# Patient Record
Sex: FEMALE | Race: WHITE | NOT HISPANIC OR LATINO | Employment: OTHER | URBAN - METROPOLITAN AREA
[De-identification: names, ages, dates, MRNs, and addresses within clinical notes are randomized per-mention and may not be internally consistent; named-entity substitution may affect disease eponyms.]

---

## 2018-09-04 ENCOUNTER — APPOINTMENT (OUTPATIENT)
Dept: LAB | Facility: CLINIC | Age: 68
End: 2018-09-04
Payer: MEDICARE

## 2018-09-04 ENCOUNTER — TELEPHONE (OUTPATIENT)
Dept: GASTROENTEROLOGY | Facility: CLINIC | Age: 68
End: 2018-09-04

## 2018-09-04 ENCOUNTER — OFFICE VISIT (OUTPATIENT)
Dept: GASTROENTEROLOGY | Facility: CLINIC | Age: 68
End: 2018-09-04
Payer: MEDICARE

## 2018-09-04 VITALS
HEART RATE: 80 BPM | HEIGHT: 65 IN | SYSTOLIC BLOOD PRESSURE: 122 MMHG | WEIGHT: 172 LBS | BODY MASS INDEX: 28.66 KG/M2 | DIASTOLIC BLOOD PRESSURE: 70 MMHG

## 2018-09-04 DIAGNOSIS — R93.89 ABNORMAL ULTRASOUND: Primary | ICD-10-CM

## 2018-09-04 DIAGNOSIS — R93.89 ABNORMAL ULTRASOUND: ICD-10-CM

## 2018-09-04 DIAGNOSIS — K62.5 RECTAL BLEEDING: ICD-10-CM

## 2018-09-04 DIAGNOSIS — K74.69 OTHER CIRRHOSIS OF LIVER (HCC): ICD-10-CM

## 2018-09-04 DIAGNOSIS — K80.20 GALLSTONES: ICD-10-CM

## 2018-09-04 DIAGNOSIS — K83.8 DILATED BILE DUCT: ICD-10-CM

## 2018-09-04 DIAGNOSIS — R18.8 OTHER ASCITES: ICD-10-CM

## 2018-09-04 LAB
FERRITIN SERPL-MCNC: 274 NG/ML (ref 8–388)
IRON SATN MFR SERPL: 9 %
IRON SERPL-MCNC: 21 UG/DL (ref 50–170)
TIBC SERPL-MCNC: 237 UG/DL (ref 250–450)

## 2018-09-04 PROCEDURE — 82784 ASSAY IGA/IGD/IGG/IGM EACH: CPT

## 2018-09-04 PROCEDURE — 86704 HEP B CORE ANTIBODY TOTAL: CPT

## 2018-09-04 PROCEDURE — 36415 COLL VENOUS BLD VENIPUNCTURE: CPT

## 2018-09-04 PROCEDURE — 83540 ASSAY OF IRON: CPT

## 2018-09-04 PROCEDURE — 99204 OFFICE O/P NEW MOD 45 MIN: CPT | Performed by: INTERNAL MEDICINE

## 2018-09-04 PROCEDURE — 82103 ALPHA-1-ANTITRYPSIN TOTAL: CPT

## 2018-09-04 PROCEDURE — 87340 HEPATITIS B SURFACE AG IA: CPT

## 2018-09-04 PROCEDURE — 86256 FLUORESCENT ANTIBODY TITER: CPT

## 2018-09-04 PROCEDURE — 86038 ANTINUCLEAR ANTIBODIES: CPT

## 2018-09-04 PROCEDURE — 83516 IMMUNOASSAY NONANTIBODY: CPT

## 2018-09-04 PROCEDURE — 83550 IRON BINDING TEST: CPT

## 2018-09-04 PROCEDURE — 87521 HEPATITIS C PROBE&RVRS TRNSC: CPT

## 2018-09-04 PROCEDURE — 86255 FLUORESCENT ANTIBODY SCREEN: CPT

## 2018-09-04 PROCEDURE — 82728 ASSAY OF FERRITIN: CPT

## 2018-09-04 NOTE — PROGRESS NOTES
[facility]    Dear Ada Amato,    I had the pleasure of seeing your patient Esteban Rosado in the office today and I thank you for this kind referral        Chief Complaint:   Abdominal swelling      HPI:  Esteban Rosado is a 75 y o  female who presents with swelling in the abdomen  The patient underwent a subsequent ultrasound which showed significant ascites as well as gallstones, gallbladder wall thickening, dilated bile duct, and nonvisualization of the ovaries  Interestingly the patient's platelet count is 959 and her LFTs are normal  There appeared to be some mild nodularity on the ultrasound of the liver  According to the patient she has no prior history of any known liver disease  She denies any hepatitis or hepatitis prodrome  No history of jaundice  Patient has no history of heavy alcohol use  She does have a family history of alcoholic cirrhosis in her mother but this was in her [de-identified]  Patient denies any travel to endemic areas  She did have an ectopic pregnancy many years ago and may have received a transfusion at that time  According to the patient she has no symptomatology referable to her liver such as confusion or alteration of the sleep-wake cycle  No melena  Recently she has seen some bright red blood per rectum  She has no prior history of colonoscopy  Patient has no history of intravenous or intranasal drug use  No accidental needle sticks  No known sexual exposure to any hepatitis a  No other discernible liver history  She has developed some peripheral edema but this is improved slightly  No history of any endoscopy  Review of Systems:   Constitutional: No fever or chills, feels well, no tiredness, no recent weight gain or weight loss  HENT: No complaints of earache, no hearing loss, no nosebleeds, no nasal discharge, no sore throat, no hoarseness      Eyes: No complaints of eye pain, no red eyes, no discharge from eyes, no itchy eyes   Cardiovascular: No complaints of slow heart rate, no fast heart rate, no chest pain, no palpitations, no leg claudication, positive swelling in the ankles  Respiratory: No complaints of shortness of breath, no wheezing, no cough, no SOB on exertion, no orthopnea  Gastrointestinal: As noted in HPI  Genitourinary: No complaints of dysuria, no incontinence, no hesitancy, no nocturia  Musculoskeletal: No complaints of arthralgia, no myalgias, no joint swelling or stiffness, no limb pain or swelling  Neurological: No complaints of headache, no confusion, no convulsions, no numbness or tingling, no dizziness or fainting, no limb weakness, no difficulty walking  Skin: No complaints of skin rash or skin lesions, no itching, no skin wound, no dry skin  Hematological/Lymphatic: No complaints of swollen glands, does not bleed easy  Allergic/Immunologic: No immunocompromised state  Endocrine:  No complaints of polyuria, no polydipsia  Psychiatric/Behavioral: is not suicidal, no sleep disturbances, no anxiety or depression, no change in personality, no emotional problems  Historical Information   Past Medical History:   Diagnosis Date    [facility]      Past Surgical History:   Procedure Laterality Date    ECTOPIC PREGNANCY SURGERY       Social History   History   Alcohol Use    Yes     Comment: ocassional     History   Drug Use No     History   Smoking Status    Former Smoker   Smokeless Tobacco    Former User     Family History   Problem Relation Age of Onset    Cirrhosis Mother          Current Medications: currently has no medications in their medication list        Vital Signs: /70   Pulse 80   Ht 5' 4 5" (1 638 m)   Wt 78 kg (172 lb)   BMI 29 07 kg/m²     Physical Exam:   Constitutional  General Appearance: No acute distress, well appearing and well nourished  Head  Normocephalic  Eyes  Conjunctivae and lids: No swelling, erythema, or discharge      Pupils and irises: Equal, round and reactive to light  Ears, Nose, Mouth, and Throat  External inspection of ears and nose: Normal  Nasal mucosa, septum and turbinates: Normal without edema or erythema/   Oropharynx: Normal with no erythema, edema, exudate or lesions  Neck  Normal range of motion  Neck supple  No HJR  Cardiovascular  Auscultation of the heart: Normal rate and rhythm, normal S1 and S2 without murmurs  Examination of the extremities for edema and/or varicosities:  1+ pedal edema bilaterally  Pulmonary/Chest  Respiratory effort: No increased work of breathing or signs of respiratory distress  Auscultation of lungs: Clear to auscultation, equal breath sounds bilaterally, no wheezes, rales, no rhonchi  Abdomen  Abdomen: Non-tender, no masses  Positive distention with shifting dullness and fluid wave  No caput medusa  Non painful  Moderately tense  Liver and spleen: No hepatomegaly or splenomegaly  Musculoskeletal  Gait and station: normal   Digits and Nails: normal without clubbing or cyanosis  Inspection/palpation of joints, bones, and muscles: Normal  Neurological  No nystagmus or asterixis  Skin  Skin and subcutaneous tissue: Normal without rashes or lesions  Lymphatic  Palpation of the lymph nodes in neck: No lymphadenopathy  Psychiatric  Orientation to person, place and time: Normal   Mood and affect: Normal          Labs:   No results found for: ALBUMIN, ALT, AST, BUN, CALCIUM, CL, CHOL, CO2, CREATININE, GFRAA, GFRNONAA, GLU, HDL, HCT, HGB, HGBA1C, LDL, MG, PHOS, PLT, K, PSA, NA, TRIG, WBC      X-Rays & Procedures:   MRI abdomen wo contrast and mrcp    (Results Pending)   IR paracentesis    (Results Pending)         ______________________________________________________________________      Assessment & Plan:      Diagnoses and all orders for this visit:    Abnormal ultrasound  -     Alpha-1-antitrypsin; Future  -     EAM Screen w/ Reflex to Titer/Pattern;  Future  - Antimitochondrial antibody; Future  -     Celiac Disease Antibody Profile; Future  -     Ferritin; Future  -     Hepatitis B core antibody, total; Future  -     Hepatitis B surface antigen; Future  -     Hepatitis C Qualitative by PCR; Future  -     Iron Saturation %; Future  -     TIBC; Future  -     MRI abdomen wo contrast and mrcp; Future  -     IR paracentesis; Future    Other cirrhosis of liver (HCC)  -     Alpha-1-antitrypsin; Future  -     EMA Screen w/ Reflex to Titer/Pattern; Future  -     Antimitochondrial antibody; Future  -     Celiac Disease Antibody Profile; Future  -     Ferritin; Future  -     Hepatitis B core antibody, total; Future  -     Hepatitis B surface antigen; Future  -     Hepatitis C Qualitative by PCR; Future  -     Iron Saturation %; Future  -     TIBC; Future  -     MRI abdomen wo contrast and mrcp; Future  -     IR paracentesis; Future    Other ascites  -     Alpha-1-antitrypsin; Future  -     EMA Screen w/ Reflex to Titer/Pattern; Future  -     Antimitochondrial antibody; Future  -     Celiac Disease Antibody Profile; Future  -     Ferritin; Future  -     Hepatitis B core antibody, total; Future  -     Hepatitis B surface antigen; Future  -     Hepatitis C Qualitative by PCR; Future  -     Iron Saturation %; Future  -     TIBC; Future  -     MRI abdomen wo contrast and mrcp; Future  -     IR paracentesis; Future    Rectal bleeding    Dilated bile duct    Gallstones    Other orders  -     Diet NPO; Sips with meds; Standing  -     Void on call to OR; Standing  -     Insert peripheral IV; Standing  -     Diet NPO; Sips with meds; Standing  -     Void on call to OR; Standing  -     Insert peripheral IV; Standing        I have taken liberty of scheduling the patient for both EGD and colonoscopy  We will also obtain an MRI with an MRCP  Abdominal paracentesis will be performed for diagnostic purposes  A full liver workup will be done    At the end of it, there is a possibility this might be Mieg's syndrome since neither of her ovaries Are visible, and her liver functions and platelet count are unremarkable  The nodularity of the liver is certainly minimal on the ultrasound  At any rate, further recommendations will depend on the study results      I would like to thank you for allowing me to participate in her care      With warmest regards,    Karl Rosenbaum MD, [facility]

## 2018-09-05 LAB
HBV CORE AB SER QL: NORMAL
HBV SURFACE AG SER QL: NORMAL
MITOCHONDRIA M2 IGG SER-ACNC: 6.1 UNITS (ref 0–20)
RYE IGE QN: NEGATIVE

## 2018-09-06 LAB
A1AT SERPL-MCNC: 261 MG/DL (ref 90–200)
ENDOMYSIUM IGA SER QL: NEGATIVE
GLIADIN PEPTIDE IGA SER-ACNC: 5 UNITS (ref 0–19)
GLIADIN PEPTIDE IGG SER-ACNC: 3 UNITS (ref 0–19)
IGA SERPL-MCNC: 271 MG/DL (ref 87–352)
TTG IGA SER-ACNC: <2 U/ML (ref 0–3)
TTG IGG SER-ACNC: <2 U/ML (ref 0–5)

## 2018-09-07 LAB — HCV RNA SERPL QL NAA+PROBE: NEGATIVE

## 2018-09-13 ENCOUNTER — TELEPHONE (OUTPATIENT)
Dept: GASTROENTEROLOGY | Facility: CLINIC | Age: 68
End: 2018-09-13

## 2018-09-13 ENCOUNTER — HOSPITAL ENCOUNTER (OUTPATIENT)
Dept: MRI IMAGING | Facility: CLINIC | Age: 68
Discharge: HOME | End: 2018-09-13
Payer: MEDICARE

## 2018-09-13 DIAGNOSIS — R93.89 ABNORMAL ULTRASOUND: ICD-10-CM

## 2018-09-13 DIAGNOSIS — R18.8 OTHER ASCITES: ICD-10-CM

## 2018-09-13 DIAGNOSIS — K74.69 OTHER CIRRHOSIS OF LIVER (HCC): ICD-10-CM

## 2018-09-13 PROCEDURE — 74181 MRI ABDOMEN W/O CONTRAST: CPT

## 2018-09-14 ENCOUNTER — TELEPHONE (OUTPATIENT)
Dept: INTERVENTIONAL RADIOLOGY/VASCULAR | Facility: HOSPITAL | Age: 68
End: 2018-09-14

## 2018-09-17 ENCOUNTER — TELEPHONE (OUTPATIENT)
Dept: NON INVASIVE DIAGNOSTICS | Facility: HOSPITAL | Age: 68
End: 2018-09-17

## 2018-09-18 ENCOUNTER — HOSPITAL ENCOUNTER (OUTPATIENT)
Dept: INTERVENTIONAL RADIOLOGY/VASCULAR | Facility: HOSPITAL | Age: 68
Discharge: HOME | End: 2018-09-18
Attending: INTERNAL MEDICINE | Admitting: RADIOLOGY
Payer: MEDICARE

## 2018-09-18 DIAGNOSIS — R93.89 ABNORMAL ULTRASOUND: ICD-10-CM

## 2018-09-18 DIAGNOSIS — R18.8 OTHER ASCITES: ICD-10-CM

## 2018-09-18 DIAGNOSIS — K74.69 OTHER CIRRHOSIS OF LIVER (HCC): ICD-10-CM

## 2018-09-18 LAB
ALBUMIN FLD-MCNC: 2.7 G/DL
APPEARANCE FLD: NORMAL
BILIRUB FLD-MCNC: 0.66 MG/DL
COLOR FLD: YELLOW
GLUCOSE FLD-MCNC: 86 MG/DL
LDH FLD L TO P-CCNC: 478 U/L
LYMPHOCYTES NFR BLD AUTO: 48 %
MONO+MESO NFR FLD MANUAL: 40 %
MONOCYTES NFR BLD AUTO: 5 %
NEUTS SEG NFR BLD AUTO: 7 %
PH BODY FLUID: 7.6
PROT FLD-MCNC: 5.2 G/DL
SITE: NORMAL
TOTAL CELLS COUNTED SPEC: 100
WBC # FLD MANUAL: 547 /UL

## 2018-09-18 PROCEDURE — 49083 ABD PARACENTESIS W/IMAGING: CPT

## 2018-09-18 PROCEDURE — 87205 SMEAR GRAM STAIN: CPT | Performed by: INTERNAL MEDICINE

## 2018-09-18 PROCEDURE — 89051 BODY FLUID CELL COUNT: CPT | Performed by: INTERNAL MEDICINE

## 2018-09-18 PROCEDURE — 83986 ASSAY PH BODY FLUID NOS: CPT | Performed by: INTERNAL MEDICINE

## 2018-09-18 PROCEDURE — 49083 ABD PARACENTESIS W/IMAGING: CPT | Performed by: RADIOLOGY

## 2018-09-18 PROCEDURE — 87070 CULTURE OTHR SPECIMN AEROBIC: CPT | Performed by: INTERNAL MEDICINE

## 2018-09-18 PROCEDURE — 83615 LACTATE (LD) (LDH) ENZYME: CPT | Performed by: INTERNAL MEDICINE

## 2018-09-18 PROCEDURE — 82247 BILIRUBIN TOTAL: CPT | Performed by: INTERNAL MEDICINE

## 2018-09-18 PROCEDURE — 82945 GLUCOSE OTHER FLUID: CPT | Performed by: INTERNAL MEDICINE

## 2018-09-18 PROCEDURE — 82042 OTHER SOURCE ALBUMIN QUAN EA: CPT | Performed by: INTERNAL MEDICINE

## 2018-09-18 PROCEDURE — 84157 ASSAY OF PROTEIN OTHER: CPT | Performed by: INTERNAL MEDICINE

## 2018-09-18 RX ORDER — LIDOCAINE HYDROCHLORIDE 10 MG/ML
INJECTION, SOLUTION INFILTRATION; PERINEURAL CODE/TRAUMA/SEDATION MEDICATION
Status: COMPLETED | OUTPATIENT
Start: 2018-09-18 | End: 2018-09-18

## 2018-09-18 RX ORDER — ALBUMIN (HUMAN) 12.5 G/50ML
50 SOLUTION INTRAVENOUS ONCE
Status: COMPLETED | OUTPATIENT
Start: 2018-09-18 | End: 2018-09-18

## 2018-09-18 RX ADMIN — LIDOCAINE HYDROCHLORIDE 10 ML: 10 INJECTION, SOLUTION INFILTRATION; PERINEURAL at 11:42

## 2018-09-18 RX ADMIN — ALBUMIN HUMAN 50 G: 0.25 SOLUTION INTRAVENOUS at 12:20

## 2018-09-18 NOTE — DISCHARGE INSTRUCTIONS
Abdominal Paracentesis     WHAT YOU NEED TO KNOW:   Abdominal paracentesis is a procedure to remove abnormal fluid buildup in your abdomen  Fluid builds up because of liver problems, such as swelling and scarring  Heart failure, kidney disease, a mass, or problems with your pancreas may also cause fluid buildup  DISCHARGE INSTRUCTIONS:     Follow up with your healthcare provider as directed: Write down your questions so you remember to ask them during your visits  Wound care: Remove dressing after 24 hours  Leave glue in place  Return to your normal activities    Contact Interventional Radiology at [phone] [place] PATIENTS: Contact Interventional Radiology at [phone]) Flory Adams PATIENTS: Contact Interventional Radiology at [phone]) if:  · You have a fever and your wound is red and swollen  · You have yellow, green, or bad-smelling discharge coming from your wound  · You have pain or swelling in your abdomen  · You have an upset stomach or you vomit  · You have sudden, sharp pain in your abdomen  · You urinate very little or not at all  · You feel confused and more tired than usual    · Your arm or leg feels warm, tender, and painful  It may look swollen and red  · You suddenly feel lightheaded and have trouble breathing

## 2018-09-21 LAB
BACTERIA SPEC BFLD CULT: NO GROWTH
GRAM STN SPEC: NORMAL
GRAM STN SPEC: NORMAL

## 2018-09-24 ENCOUNTER — ANESTHESIA EVENT (OUTPATIENT)
Dept: PERIOP | Facility: HOSPITAL | Age: 68
End: 2018-09-24
Payer: MEDICARE

## 2018-09-25 ENCOUNTER — ANESTHESIA (OUTPATIENT)
Dept: PERIOP | Facility: HOSPITAL | Age: 68
End: 2018-09-25
Payer: MEDICARE

## 2018-09-25 ENCOUNTER — TELEPHONE (OUTPATIENT)
Dept: GASTROENTEROLOGY | Facility: CLINIC | Age: 68
End: 2018-09-25

## 2018-09-25 ENCOUNTER — HOSPITAL ENCOUNTER (OUTPATIENT)
Facility: HOSPITAL | Age: 68
Setting detail: OUTPATIENT SURGERY
Discharge: HOME | End: 2018-09-25
Attending: INTERNAL MEDICINE | Admitting: INTERNAL MEDICINE
Payer: MEDICARE

## 2018-09-25 VITALS
HEIGHT: 64 IN | TEMPERATURE: 97.7 F | WEIGHT: 152.6 LBS | RESPIRATION RATE: 18 BRPM | BODY MASS INDEX: 26.05 KG/M2 | DIASTOLIC BLOOD PRESSURE: 66 MMHG | SYSTOLIC BLOOD PRESSURE: 105 MMHG | HEART RATE: 83 BPM | OXYGEN SATURATION: 96 %

## 2018-09-25 DIAGNOSIS — K22.10 EROSIVE ESOPHAGITIS: Primary | ICD-10-CM

## 2018-09-25 DIAGNOSIS — K62.5 RECTAL BLEEDING: ICD-10-CM

## 2018-09-25 LAB — GLUCOSE SERPL-MCNC: 95 MG/DL (ref 65–140)

## 2018-09-25 PROCEDURE — 88305 TISSUE EXAM BY PATHOLOGIST: CPT | Performed by: PATHOLOGY

## 2018-09-25 PROCEDURE — 43235 EGD DIAGNOSTIC BRUSH WASH: CPT | Performed by: INTERNAL MEDICINE

## 2018-09-25 PROCEDURE — 82948 REAGENT STRIP/BLOOD GLUCOSE: CPT

## 2018-09-25 PROCEDURE — 45380 COLONOSCOPY AND BIOPSY: CPT | Performed by: INTERNAL MEDICINE

## 2018-09-25 RX ORDER — PROPOFOL 10 MG/ML
INJECTION, EMULSION INTRAVENOUS AS NEEDED
Status: DISCONTINUED | OUTPATIENT
Start: 2018-09-25 | End: 2018-09-25 | Stop reason: SURG

## 2018-09-25 RX ORDER — PANTOPRAZOLE SODIUM 40 MG/1
40 TABLET, DELAYED RELEASE ORAL DAILY
Qty: 30 TABLET | Refills: 5 | Status: SHIPPED | OUTPATIENT
Start: 2018-09-25 | End: 2018-11-14

## 2018-09-25 RX ORDER — SODIUM CHLORIDE, SODIUM LACTATE, POTASSIUM CHLORIDE, CALCIUM CHLORIDE 600; 310; 30; 20 MG/100ML; MG/100ML; MG/100ML; MG/100ML
125 INJECTION, SOLUTION INTRAVENOUS CONTINUOUS
Status: DISCONTINUED | OUTPATIENT
Start: 2018-09-25 | End: 2018-09-25 | Stop reason: HOSPADM

## 2018-09-25 RX ORDER — LIDOCAINE HYDROCHLORIDE 10 MG/ML
INJECTION, SOLUTION INFILTRATION; PERINEURAL AS NEEDED
Status: DISCONTINUED | OUTPATIENT
Start: 2018-09-25 | End: 2018-09-25 | Stop reason: SURG

## 2018-09-25 RX ADMIN — PROPOFOL 100 MG: 10 INJECTION, EMULSION INTRAVENOUS at 10:26

## 2018-09-25 RX ADMIN — PROPOFOL 50 MG: 10 INJECTION, EMULSION INTRAVENOUS at 10:28

## 2018-09-25 RX ADMIN — PROPOFOL 50 MG: 10 INJECTION, EMULSION INTRAVENOUS at 10:37

## 2018-09-25 RX ADMIN — PROPOFOL 50 MG: 10 INJECTION, EMULSION INTRAVENOUS at 10:33

## 2018-09-25 RX ADMIN — PROPOFOL 50 MG: 10 INJECTION, EMULSION INTRAVENOUS at 10:42

## 2018-09-25 RX ADMIN — SODIUM CHLORIDE, SODIUM LACTATE, POTASSIUM CHLORIDE, AND CALCIUM CHLORIDE 125 ML/HR: .6; .31; .03; .02 INJECTION, SOLUTION INTRAVENOUS at 10:18

## 2018-09-25 RX ADMIN — LIDOCAINE HYDROCHLORIDE 50 MG: 10 INJECTION, SOLUTION INFILTRATION; PERINEURAL at 10:26

## 2018-09-25 NOTE — ANESTHESIA POSTPROCEDURE EVALUATION
Post-Op Assessment Note      CV Status:  Stable    Mental Status:  Alert and awake    Hydration Status:  Euvolemic    PONV Controlled:  Controlled    Airway Patency:  Patent    Post Op Vitals Reviewed: Yes          Staff: CRNA           /56 (09/25/18 1054)    Temp 97 7 °F (36 5 °C) (09/25/18 1054)    Pulse 86 (09/25/18 1054)   Resp 14 (09/25/18 1054)    SpO2 96 % (09/25/18 1054)

## 2018-09-25 NOTE — ANESTHESIA PREPROCEDURE EVALUATION
Review of Systems/Medical History          Cardiovascular   Pulmonary  Asthma ,        GI/Hepatic    Liver disease , Chronic liver disease,             Endo/Other  Diabetes ,      GYN       Hematology   Musculoskeletal       Neurology   Psychology           Physical Exam    Airway    Mallampati score: II         Dental   No notable dental hx     Cardiovascular      Pulmonary      Other Findings        Anesthesia Plan  ASA Score- 3     Anesthesia Type- IV sedation with anesthesia with ASA Monitors  Additional Monitors:   Airway Plan:     Comment: I, Dr Judit Ornelas, the attending physician, have personally seen and evaluated the patient prior to anesthetic care  I have reviewed the pre-anesthetic record, and other medical records if appropriate to the anesthetic care  If a CRNA is involved in the case, I have reviewed the CRNA assessment, if present, and agree  The patient is in a suitable condition to proceed with my formulated anesthetic plan        Plan Factors-    Induction- intravenous  Postoperative Plan-     Informed Consent- Anesthetic plan and risks discussed with patient  I personally reviewed this patient with the CRNA  Discussed and agreed on the Anesthesia Plan with the CRNA  Sarah Douglas

## 2018-09-25 NOTE — OP NOTE
Postoperative Note  PATIENT NAME: Baldemar Florez  : 1950  MRN: [id number]  [facility] RO[facility] 01    Surgery Date: 2018    POST-OP DIAGNOSIS: See the impression below    SEDATION: Monitored anesthesia care, check anesthesia records    PHYSICAL EXAM:  Vitals:    18 1003   BP: 123/58   Pulse: 87   Resp: 17   Temp: 98 °F (36 7 °C)   SpO2: 99%     Body mass index is 26 19 kg/m²  General: NAD  Heart: S1 & S2 normal, RRR  Lungs: CTA, No rales or rhonchi  Abdomen: Soft, nontender, nondistended, good bowel sounds    CONSENT:  Informed consent was obtained for the procedure, including sedation after explaining the risks and benefits of the procedure  Risks including but not limited to bleeding, perforation, infection, aspiration were discussed in detail  Also explained about less than 100%$ sensitivity with the exam and other alternatives  DESCRIPTION:   Procedure:  Fiberoptic colonoscopy with biopsy    Indications:  Rectal bleeding of undetermined etiology    ASA 2    Premedicated with mac  Anesthesia    Patient is identified by me  She is examined prior to the procedure found to be in stable condition  She is attached to cardiac monitor and pulse oximeter and placed in the left lateral position  After adequate intravenous sedation the Olympus video colonoscope was inserted and passed easily the cecum  The ileocecal valve and the appendiceal orifice are identified and the scope was slowly withdrawn with good circumferential visualization of the mucosa  The preparation is adequate  The right transverse and descending colons were unremarkable  In the sigmoid colon from approximately 18-25 cm there appears to be a very large infiltrative probably extrinsic compression of the colonic mucosa  No a exert fitted lesions are noted anteriorly  Multiple biopsies taken with excellent hemostasis  Some small diverticuli noted above this  None of these are inflamed or have any impacted fecaliths or bleeding  Retroversion is normal  No other inflammatory neoplastic or vascular lesions are noted  My impression: 1  Lengthy segment, possibly extrinsic or infiltrative, in the sigmoid colon, status post biopsy  2  Minor left colon diverticulosis    RECOMMENDATIONS:  Patient is already scheduled for a follow-up with me in 1 week  Repeat colonoscopy in 5 years    COMPLICATIONS:  None; patient tolerated the procedure well  DISPOSITION: PACU  CONDITION: Stable    True Craig MD  9/25/2018,10:48 AM        Portions of the record may have been created with voice recognition software   Occasional wrong word or "sound a like" substitutions may have occurred due to the inherent limitations of voice recognition software   Read the chart carefully and recognize, using context, where substitutions have occurred

## 2018-09-25 NOTE — OP NOTE
Postoperative Note  PATIENT NAME: Eladia Brush  : 1950  MRN: [id number]  [facility]    Surgery Date: 2018    POST-OP DIAGNOSIS: See the impression below    SEDATION: Monitored anesthesia care, check anesthesia records    PHYSICAL EXAM:  Vitals:    18 1003   BP: 123/58   Pulse: 87   Resp: 17   Temp: 98 °F (36 7 °C)   SpO2: 99%     Body mass index is 26 19 kg/m²  General: NAD  Heart: S1 & S2 normal, RRR  Lungs: CTA, No rales or rhonchi  Abdomen: Soft, nontender, nondistended, good bowel sounds    CONSENT:  Informed consent was obtained for the procedure, including sedation after explaining the risks and benefits of the procedure  Risks including but not limited to bleeding, perforation, infection, aspiration were discussed in detail  Also explained about less than 100%$ sensitivity with the exam and other alternatives  DESCRIPTION:   Procedure, esophagogastroduodenoscopy    Indications:  25-year-old female with cirrhosis and ascites  This is being done to rule out varices    ASA 2    Premedicated with mac anesthesia    Patient is identified by me  She is examined prior to the procedure and found to be in stable condition  She is attached to cardiac monitor and pulse oximeter and placed in left lateral position  After adequate intravenous sedation the Olympus video gastroscope was inserted under direct vision into the esophagus  No evidence of varices are seen in the proximal mid or distal esophagus  At the GE junction however there is diffuse erythema and minor erosion consistent with an LA class B esophagitis  No ulceration is seen  No varices noted  The Z-line is seen at 32 cm from the incisors  Beyond this there is a 3 cm hiatal hernia with normal gastric mucosa  The stomach is entered  Body and antrum normal  No evidence of portal hypertensive gastropathy is seen  Pylorus is normal   Duodenum was cannulated into the 3rd portion and is normal  No duodenal varices seen  Retroversion reveals an unremarkable GE junction and fundus with no gastric varices seen  She tolerated the procedure well and was stable throughout  My impression:  1  Erosive esophagitis  2  Hiatal hernia    RECOMMENDATIONS:  Begin Protonix 40 mg p o  q a m  COMPLICATIONS:  None; patient tolerated the procedure well  DISPOSITION: PACU  CONDITION: Stable    Danielle White MD  9/25/2018,10:33 AM        Portions of the record may have been created with voice recognition software   Occasional wrong word or "sound a like" substitutions may have occurred due to the inherent limitations of voice recognition software   Read the chart carefully and recognize, using context, where substitutions have occurred

## 2018-09-25 NOTE — DISCHARGE INSTR - AVS FIRST PAGE
Postoperative Note  PATIENT NAME: Baldemar Florez  : 1950  MRN: [id number]  [facility]    Surgery Date: 2018    POST-OP DIAGNOSIS: See the impression below    SEDATION: Monitored anesthesia care, check anesthesia records    PHYSICAL EXAM:  Vitals:    18 1003   BP: 123/58   Pulse: 87   Resp: 17   Temp: 98 °F (36 7 °C)   SpO2: 99%     Body mass index is 26 19 kg/m²  General: NAD  Heart: S1 & S2 normal, RRR  Lungs: CTA, No rales or rhonchi  Abdomen: Soft, nontender, nondistended, good bowel sounds    CONSENT:  Informed consent was obtained for the procedure, including sedation after explaining the risks and benefits of the procedure  Risks including but not limited to bleeding, perforation, infection, aspiration were discussed in detail  Also explained about less than 100%$ sensitivity with the exam and other alternatives  DESCRIPTION:   Procedure, esophagogastroduodenoscopy    Indications:  29-year-old female with cirrhosis and ascites  This is being done to rule out varices    ASA 2    Premedicated with mac anesthesia    Patient is identified by me  She is examined prior to the procedure and found to be in stable condition  She is attached to cardiac monitor and pulse oximeter and placed in left lateral position  After adequate intravenous sedation the Olympus video gastroscope was inserted under direct vision into the esophagus  No evidence of varices are seen in the proximal mid or distal esophagus  At the GE junction however there is diffuse erythema and minor erosion consistent with an LA class B esophagitis  No ulceration is seen  No varices noted  The Z-line is seen at 32 cm from the incisors  Beyond this there is a 3 cm hiatal hernia with normal gastric mucosa  The stomach is entered  Body and antrum normal  No evidence of portal hypertensive gastropathy is seen  Pylorus is normal   Duodenum was cannulated into the 3rd portion and is normal  No duodenal varices seen  Retroversion reveals an unremarkable GE junction and fundus with no gastric varices seen  She tolerated the procedure well and was stable throughout  My impression:  1  Erosive esophagitis  2  Hiatal hernia    RECOMMENDATIONS:  Begin Protonix 40 mg p o  q a m  COMPLICATIONS:  None; patient tolerated the procedure well  DISPOSITION: PACU  CONDITION: Stable    Mamie Mckee MD  2018,10:33 AM        Portions of the record may have been created with voice recognition software   Occasional wrong word or "sound a like" substitutions may have occurred due to the inherent limitations of voice recognition software   Read the chart carefully and recognize, using context, where substitutions have occurred  Postoperative Note  PATIENT NAME: Swapnil Garcia  : 1950  MRN: [id number]  [facility]    Surgery Date: 2018    POST-OP DIAGNOSIS: See the impression below    SEDATION: Monitored anesthesia care, check anesthesia records    PHYSICAL EXAM:  Vitals:    18 1003   BP: 123/58   Pulse: 87   Resp: 17   Temp: 98 °F (36 7 °C)   SpO2: 99%     Body mass index is 26 19 kg/m²  General: NAD  Heart: S1 & S2 normal, RRR  Lungs: CTA, No rales or rhonchi  Abdomen: Soft, nontender, nondistended, good bowel sounds    CONSENT:  Informed consent was obtained for the procedure, including sedation after explaining the risks and benefits of the procedure  Risks including but not limited to bleeding, perforation, infection, aspiration were discussed in detail  Also explained about less than 100%$ sensitivity with the exam and other alternatives  DESCRIPTION:   Procedure, esophagogastroduodenoscopy    Indications:  50-year-old female with cirrhosis and ascites  This is being done to rule out varices    ASA 2    Premedicated with mac anesthesia    Patient is identified by me  She is examined prior to the procedure and found to be in stable condition    She is attached to cardiac monitor and pulse oximeter and placed in left lateral position  After adequate intravenous sedation the Olympus video gastroscope was inserted under direct vision into the esophagus  No evidence of varices are seen in the proximal mid or distal esophagus  At the GE junction however there is diffuse erythema and minor erosion consistent with an LA class B esophagitis  No ulceration is seen  No varices noted  The Z-line is seen at 32 cm from the incisors  Beyond this there is a 3 cm hiatal hernia with normal gastric mucosa  The stomach is entered  Body and antrum normal  No evidence of portal hypertensive gastropathy is seen  Pylorus is normal   Duodenum was cannulated into the 3rd portion and is normal  No duodenal varices seen  Retroversion reveals an unremarkable GE junction and fundus with no gastric varices seen  She tolerated the procedure well and was stable throughout  My impression:  1  Erosive esophagitis  2  Hiatal hernia    RECOMMENDATIONS:  Begin Protonix 40 mg p o  q a m  COMPLICATIONS:  None; patient tolerated the procedure well  DISPOSITION: PACU  CONDITION: Stable    Priya Mendiola MD  2018,10:33 AM        Portions of the record may have been created with voice recognition software   Occasional wrong word or "sound a like" substitutions may have occurred due to the inherent limitations of voice recognition software   Read the chart carefully and recognize, using context, where substitutions have occurred  Postoperative Note  PATIENT NAME: Elicia Kamara  : 1950  MRN: [id number]  [facility]    Surgery Date: 2018    POST-OP DIAGNOSIS: See the impression below    SEDATION: Monitored anesthesia care, check anesthesia records    PHYSICAL EXAM:  Vitals:    18 1003   BP: 123/58   Pulse: 87   Resp: 17   Temp: 98 °F (36 7 °C)   SpO2: 99%     Body mass index is 26 19 kg/m²      General: NAD  Heart: S1 & S2 normal, RRR  Lungs: CTA, No rales or rhonchi  Abdomen: Soft, nontender, nondistended, good bowel sounds    CONSENT:  Informed consent was obtained for the procedure, including sedation after explaining the risks and benefits of the procedure  Risks including but not limited to bleeding, perforation, infection, aspiration were discussed in detail  Also explained about less than 100%$ sensitivity with the exam and other alternatives  DESCRIPTION:   Procedure:  Fiberoptic colonoscopy with biopsy    Indications:  Rectal bleeding of undetermined etiology    ASA 2    Premedicated with mac  Anesthesia    Patient is identified by me  She is examined prior to the procedure found to be in stable condition  She is attached to cardiac monitor and pulse oximeter and placed in the left lateral position  After adequate intravenous sedation the Olympus video colonoscope was inserted and passed easily the cecum  The ileocecal valve and the appendiceal orifice are identified and the scope was slowly withdrawn with good circumferential visualization of the mucosa  The preparation is adequate  The right transverse and descending colons were unremarkable  In the sigmoid colon from approximately 18-25 cm there appears to be a very large infiltrative probably extrinsic compression of the colonic mucosa  No a exert fitted lesions are noted anteriorly  Multiple biopsies taken with excellent hemostasis  Some small diverticuli noted above this  None of these are inflamed or have any impacted fecaliths or bleeding  Retroversion is normal  No other inflammatory neoplastic or vascular lesions are noted  My impression: 1  Lengthy segment, possibly extrinsic or infiltrative, in the sigmoid colon, status post biopsy  2  Minor left colon diverticulosis    RECOMMENDATIONS:  Patient is already scheduled for a follow-up with me in 1 week  Repeat colonoscopy in 5 years    COMPLICATIONS:  None; patient tolerated the procedure well    DISPOSITION: PACU  CONDITION: Stable    True Craig MD  9/25/2018,10:48 AM        Portions of the record may have been created with voice recognition software   Occasional wrong word or "sound a like" substitutions may have occurred due to the inherent limitations of voice recognition software   Read the chart carefully and recognize, using context, where substitutions have occurred

## 2018-10-05 ENCOUNTER — TELEPHONE (OUTPATIENT)
Dept: GASTROENTEROLOGY | Facility: CLINIC | Age: 68
End: 2018-10-05

## 2018-10-05 DIAGNOSIS — R93.89 ABNORMAL MRI: Primary | ICD-10-CM

## 2018-10-05 NOTE — TELEPHONE ENCOUNTER
Dr Brenda Rodriges please put through a referral to GYN for this ptn   You said Dr Alan Box with [facility]

## 2018-10-06 ENCOUNTER — TELEPHONE (OUTPATIENT)
Dept: GASTROENTEROLOGY | Facility: CLINIC | Age: 68
End: 2018-10-06

## 2018-10-10 ENCOUNTER — TELEPHONE (OUTPATIENT)
Dept: GASTROENTEROLOGY | Facility: CLINIC | Age: 68
End: 2018-10-10

## 2018-10-10 NOTE — TELEPHONE ENCOUNTER
Call the patient yet again and got the answering machine  Left her another message to call me up  Did the same on Sunday with no response yet

## 2018-10-11 ENCOUNTER — TELEPHONE (OUTPATIENT)
Dept: GASTROENTEROLOGY | Facility: CLINIC | Age: 68
End: 2018-10-11

## 2018-10-11 ENCOUNTER — OFFICE VISIT (OUTPATIENT)
Dept: GASTROENTEROLOGY | Facility: CLINIC | Age: 68
End: 2018-10-11
Payer: MEDICARE

## 2018-10-11 VITALS
DIASTOLIC BLOOD PRESSURE: 60 MMHG | WEIGHT: 163 LBS | HEIGHT: 64 IN | BODY MASS INDEX: 27.83 KG/M2 | SYSTOLIC BLOOD PRESSURE: 118 MMHG | HEART RATE: 76 BPM

## 2018-10-11 DIAGNOSIS — N94.89 ADNEXAL MASS: Primary | ICD-10-CM

## 2018-10-11 DIAGNOSIS — R18.8 OTHER ASCITES: ICD-10-CM

## 2018-10-11 DIAGNOSIS — K76.9 LESION OF LIVER: ICD-10-CM

## 2018-10-11 PROCEDURE — 99213 OFFICE O/P EST LOW 20 MIN: CPT | Performed by: NURSE PRACTITIONER

## 2018-10-11 NOTE — TELEPHONE ENCOUNTER
Please see if you can get the reports  Did they draw blood  give medicine do any diagnostic imaging

## 2018-10-11 NOTE — PROGRESS NOTES
Assessment/Plan:    Adnexal mass- patient given      Diagnoses and all orders for this visit:    Adnexal mass  -     Ambulatory referral to Gynecologic Oncology; Future    Other ascites  -     IR paracentesis; Future    Lesion of liver    @ASSESSMENTEN  D@     Subjective:      Patient ID: Tate Giron is a 75 y o  female  Results of MRI and colonoscopy discussed with patient by Dr lSick Stein  MRI reviewed with patient and referral given to oncological  Gynecologist    Her last appointment with gynecology was about 18 years ago  Patient with ascites and will need repeat paracentesis  She also has pedal edema that resolved with her last paracentesis and feels that her fever swelling again  She has small amount of bright red blood per rectum on her toilet tissue with bowel movements  Colonoscopy did not show any rectal lesions that would contribute to this and so we discussed the possibility that it is internal hemorrhoids  The following portions of the patient's history were reviewed and updated as appropriate: She  has a past medical history of Asthma and Diabetes mellitus ([facility] [address] )  She   Patient Active Problem List    Diagnosis Date Noted    Adnexal mass 10/11/2018    Other ascites 10/11/2018    Lesion of liver 10/11/2018    Rectal bleeding 09/04/2018     She  has a past surgical history that includes Ectopic pregnancy surgery; IR paracentesis (9/18/2018); Tonsillectomy; and pr colonoscopy flx dx w/collj spec when pfrmd (N/A, 9/25/2018)  Her family history includes Cirrhosis in her mother  She  reports that she has quit smoking  She has quit using smokeless tobacco  She reports that she drinks alcohol  She reports that she does not use drugs  Current Outpatient Prescriptions   Medication Sig Dispense Refill    pantoprazole (PROTONIX) 40 mg tablet Take 1 tablet (40 mg total) by mouth daily 30 tablet 5     No current facility-administered medications for this visit        Current Outpatient Prescriptions on File Prior to Visit   Medication Sig    pantoprazole (PROTONIX) 40 mg tablet Take 1 tablet (40 mg total) by mouth daily     No current facility-administered medications on file prior to visit  She has No Known Allergies       Review of Systems   HENT: Negative  Respiratory: Negative  Cardiovascular: Negative  Gastrointestinal: Positive for abdominal distention and blood in stool  Objective:      /60   Pulse 76   Ht 5' 4" (1 626 m)   Wt 73 9 kg (163 lb)   BMI 27 98 kg/m²          Physical Exam   Cardiovascular: Normal rate and regular rhythm  Pulmonary/Chest: Effort normal and breath sounds normal    Abdominal: She exhibits distension  There is tenderness  Musculoskeletal: She exhibits edema (pedal) and tenderness  Skin: Skin is warm and dry  Psychiatric: She has a normal mood and affect

## 2018-10-12 ENCOUNTER — TELEPHONE (OUTPATIENT)
Dept: GASTROENTEROLOGY | Facility: CLINIC | Age: 68
End: 2018-10-12

## 2018-10-17 ENCOUNTER — OFFICE VISIT (OUTPATIENT)
Dept: GYNECOLOGIC ONCOLOGY | Facility: CLINIC | Age: 68
End: 2018-10-17
Payer: MEDICARE

## 2018-10-17 VITALS
SYSTOLIC BLOOD PRESSURE: 120 MMHG | TEMPERATURE: 97.7 F | RESPIRATION RATE: 18 BRPM | DIASTOLIC BLOOD PRESSURE: 68 MMHG | HEIGHT: 64 IN | WEIGHT: 168 LBS | BODY MASS INDEX: 28.68 KG/M2 | HEART RATE: 84 BPM

## 2018-10-17 DIAGNOSIS — R19.09 OTHER INTRA-ABDOMINAL AND PELVIC SWELLING, MASS AND LUMP: ICD-10-CM

## 2018-10-17 DIAGNOSIS — K76.9 LESION OF LIVER: ICD-10-CM

## 2018-10-17 DIAGNOSIS — R60.0 EDEMA LEG: ICD-10-CM

## 2018-10-17 DIAGNOSIS — N94.89 ADNEXAL MASS: ICD-10-CM

## 2018-10-17 DIAGNOSIS — K77 LIVER DISORDERS IN DISEASES CLASSIFIED ELSEWHERE: ICD-10-CM

## 2018-10-17 DIAGNOSIS — Z12.4 CERVICAL CANCER SCREENING: ICD-10-CM

## 2018-10-17 DIAGNOSIS — R18.8 OTHER ASCITES: Primary | ICD-10-CM

## 2018-10-17 PROCEDURE — 99205 OFFICE O/P NEW HI 60 MIN: CPT | Performed by: OBSTETRICS & GYNECOLOGY

## 2018-10-17 NOTE — ASSESSMENT & PLAN NOTE
There is a palpable firm fixed adnexal mass on exam   This in association with the ascites and the liver lesion are most likely indicative of a stage IV ovarian cancer  Cytology and biopsy would need to be performed to confirm this  I have recommended the patient undergo testing with  by blood work  A full CT scan of the chest abdomen and pelvis will be performed to help further help document the extent of disease  The exam today is rather limited due to the tense abdomen from the ascites  If the CT scan reveals extensive abdominal disease especially with liver involvement a biopsy will be performed to confirm the site of origin  We have than stated to the patient that she will likely initiate her treatment with carboplatin and Taxol intravenous therapy

## 2018-10-17 NOTE — PROGRESS NOTES
Assessment/Plan:    Problem List Items Addressed This Visit     Adnexal mass     There is a palpable firm fixed adnexal mass on exam   This in association with the ascites and the liver lesion are most likely indicative of a stage IV ovarian cancer  Cytology and biopsy would need to be performed to confirm this  I have recommended the patient undergo testing with  by blood work  A full CT scan of the chest abdomen and pelvis will be performed to help further help document the extent of disease  The exam today is rather limited due to the tense abdomen from the ascites  If the CT scan reveals extensive abdominal disease especially with liver involvement a biopsy will be performed to confirm the site of origin  We have than stated to the patient that she will likely initiate her treatment with carboplatin and Taxol intravenous therapy  Relevant Orders    CT chest abdomen pelvis w contrast        BUN    Creatinine, serum    CBC and differential    APTT    Protime-INR    Other ascites - Primary     The patient is due to have ascites removed tomorrow  We will ask to have a cytologic exam performed on this  In this way we will attempt to document a likely cancer  Relevant Orders    CT chest abdomen pelvis w contrast        BUN    Creatinine, serum    Non-gynecologic cytology    Lesion of liver     Two 2 cm lesions within the dome of the liver most consistent with metastatic disease  This would likely make this a stage IV ovarian cancer if the ovary is the primary  We may need to perform a needle-guided biopsy on these areas  I will order PT PTT and platelets  If this does not appear to be a stage IV ovarian cancer then chemotherapy would be the initial treatment with carboplatin and Taxol  Edema leg     Bilateral lower extremity edema is noted although the left is slightly more pronounced and uncomfortable    Given that this is a likely malignancy I feel that we should rule out a deep venous thrombosis  Relevant Orders    VAS lower limb venous duplex study, complete bilateral      Other Visit Diagnoses     Other intra-abdominal and pelvic swelling, mass and lump         Relevant Orders        Liver disorders in diseases classified elsewhere         Relevant Orders    APTT    Cervical cancer screening        Relevant Orders    Conventional pap smear, diagnostic              CHIEF COMPLAINT:       Subjective:     Problem:  Cancer Staging  No matching staging information was found for the patient  Previous therapy:   No history exists  Patient ID: Karla Juarez is a 75 y o  female  Patient is a very pleasant 24-year-old white female seen in consultation from Dr Torsten Gray and Kyle Montez regarding evaluation and management of recurrent ascites and possible pelvic mass  The patient was in her usual state of health when she began to notice bloating of the abdomen in June of 2018  At that same time she developed significant diarrhea and vomiting but this resolved spontaneously  Later in August and in early September the patient noted significant worsening of this  She contacted her primary care physician Dr Torsten Gray who advised its testing  The patient underwent an EKG which he reports is normal ultrasound which showed significant ascites  The patient was then referred to Gastroenterology  She saw them in September 4th and had multiple testing including an ultrasound-guided paracentesis for 9 L of fluid  I do not see that ascites fluid was run for cytology anywhere  And she has also had  multiple blood work  A colonoscopy and endoscopy which were essentially unremarkable and an MRI of the abdomen  The MRI revealed:    1  Two adjacent lesions measuring 2 9 cm and 2 8 cm at the hepatic dome, likely of hepatocellular origin  Given the mild surface nodularity and possible liver cirrhosis, these may represent hepatocellular carcinomas    Further evaluation with dedicated   liver protocol MRI is recommended      2   Partially imaged lobulated and heterogeneous pelvic mass that appears to involve both adnexa  Further evaluation with dedicated pelvic MRI or pelvic ultrasound is recommended      3  Moderate to large volume of abdominopelvic ascites      4  Cholelithiasis      5  No biliary ductal dilation  The patient continues to have worsening symptomatology including significant abdominal bloating  She is due for another ultrasound-guided paracentesis tomorrow  She has had some bright red bleeding per rectum but this was told to her that this is likely hemorrhoidal   She does have significant difficulties with urgency and bowel function  The patient feels that she may actually have accidents but has not done yet  She is afraid to eat due to bloating and discomfort  The patient is caring primarily for her 3 grandchildren ages 6 7 and 15  Additionally she runs a   She also cares for her  who has had a significant stroke in approximately April of 2018  He is improving  The patient has no significant history of liver disease alcohol abuse or acetaminophen abuse  She has no family history of ovarian or breast cancer  Her mother has had colon cancer but was treated and is a survivor  The patient now presents for evaluation and management of recurrent abdominal ascites  Review of Systems   Constitutional: Negative for appetite change  Gastrointestinal: Positive for abdominal distention  Musculoskeletal: Positive for joint swelling  Current Outpatient Prescriptions   Medication Sig Dispense Refill    pantoprazole (PROTONIX) 40 mg tablet Take 1 tablet (40 mg total) by mouth daily 30 tablet 5     No current facility-administered medications for this visit          No Known Allergies    Past Medical History:   Diagnosis Date    Abdominal fluid collection     Asthma     Diabetes mellitus ([facility] [address] )        Past Surgical History:   Procedure Laterality Date    ECTOPIC PREGNANCY SURGERY      ECTOPIC PREGNANCY SURGERY      IR PARACENTESIS  2018    UT COLONOSCOPY FLX DX W/COLLJ SPEC WHEN PFRMD N/A 2018    Procedure: EGD AND COLONOSCOPY;  Surgeon: Wilman Rivera MD;  Location: [facility]; Service: Gastroenterology    TONSILECTOMY AND ADNOIDECTOMY      TONSILLECTOMY         OB History      Para Term  AB Living    3 2 2   1 2    SAB TAB Ectopic Multiple Live Births        1   2        Obstetric Comments    Menarche: 8th grade (around age 14)            Family History   Problem Relation Age of Onset    Cirrhosis Mother     Colon cancer Mother     Leukemia Cousin     Diabetes Father        The following portions of the patient's history were reviewed and updated as appropriate: allergies, current medications, past family history, past medical history, past social history, past surgical history and problem list       Objective:    Blood pressure 120/68, pulse 84, temperature 97 7 °F (36 5 °C), resp  rate 18, height 5' 3 5" (1 613 m), weight 76 2 kg (168 lb)  Body mass index is 29 29 kg/m²  Physical Exam   Constitutional: She is oriented to person, place, and time  She appears well-developed and well-nourished  HENT:   Head: Normocephalic and atraumatic  Eyes: Pupils are equal, round, and reactive to light  EOM are normal    Neck: Normal range of motion  Neck supple  Cardiovascular: Normal rate, regular rhythm and normal heart sounds  Pulmonary/Chest: Effort normal and breath sounds normal  No respiratory distress  Abdominal: Soft  Bowel sounds are normal  She exhibits distension  She exhibits no ascites  There is no tenderness  There is no rigidity, no rebound and no guarding  Marked abdominal ascites consistent with at least 8 L of fluid  No masses palpated but abdomen is significantly tense     Genitourinary:   Genitourinary Comments: -Normal external female genitalia, normal Bartholin's and [place]'s glands                  -Normal midline urethral meatus  No lesions notes                  -Bladder without fullness mass or tenderness                  -Vagina without lesion or discharge there is a significant cystocele and rectocele noted                  -Cervix normal appearing without visible lesions                  -Uterus with normal contour, mobility  No tenderness, there is significant grade 2-3 prolapse to the introitus                   -there is a fixed firm mass in the mid to posterior pelvis consistent with an adnexal mass  This appears to be fixed to the uterus as well as the bilateral pelvic sidewall that measures approximately 10 by 4 cm                   - Anus without fissure of lesion     Musculoskeletal: Normal range of motion  She exhibits edema  Bilateral lower extremity edema with left extremity in the foot more than the right  Approximately 2/4 on both lower extremities  There is mild tenderness in the left foot and ankle  Lymphadenopathy:     She has no cervical adenopathy  She has no axillary adenopathy  Right: No inguinal and no supraclavicular adenopathy present  Left: No inguinal and no supraclavicular adenopathy present  Neurological: She is alert and oriented to person, place, and time  Skin: Skin is warm and dry  Psychiatric: She has a normal mood and affect   Her behavior is normal

## 2018-10-17 NOTE — ASSESSMENT & PLAN NOTE
The patient is due to have ascites removed tomorrow  We will ask to have a cytologic exam performed on this  In this way we will attempt to document a likely cancer

## 2018-10-17 NOTE — ASSESSMENT & PLAN NOTE
Two 2 cm lesions within the dome of the liver most consistent with metastatic disease  This would likely make this a stage IV ovarian cancer if the ovary is the primary  We may need to perform a needle-guided biopsy on these areas  I will order PT PTT and platelets  If this does not appear to be a stage IV ovarian cancer then chemotherapy would be the initial treatment with carboplatin and Taxol

## 2018-10-17 NOTE — LETTER
October 17, 2018     Yarely Perches, [address] [place] [address]    Patient: Acacia Anthony   YOB: 1950   Date of Visit: 10/17/2018       Dear Dr Americo Frankel:    Thank you for referring Acacia Anthony to me for evaluation  Below are my notes for this consultation  If you have questions, please do not hesitate to call me  I look forward to following your patient along with you  Sincerely,        Annelise Elena MD        CC: MD Florence Drew Se, MD Tere Flint, MD  10/17/2018  4:15 PM  Sign at close encounter  Assessment/Plan:    Problem List Items Addressed This Visit     Adnexal mass     There is a palpable firm fixed adnexal mass on exam   This in association with the ascites and the liver lesion are most likely indicative of a stage IV ovarian cancer  Cytology and biopsy would need to be performed to confirm this  I have recommended the patient undergo testing with  by blood work  A full CT scan of the chest abdomen and pelvis will be performed to help further help document the extent of disease  The exam today is rather limited due to the tense abdomen from the ascites  If the CT scan reveals extensive abdominal disease especially with liver involvement a biopsy will be performed to confirm the site of origin  We have than stated to the patient that she will likely initiate her treatment with carboplatin and Taxol intravenous therapy  Relevant Orders    CT chest abdomen pelvis w contrast        BUN    Creatinine, serum    CBC and differential    APTT    Protime-INR    Other ascites - Primary     The patient is due to have ascites removed tomorrow  We will ask to have a cytologic exam performed on this  In this way we will attempt to document a likely cancer           Relevant Orders    CT chest abdomen pelvis w contrast        BUN    Creatinine, serum    Non-gynecologic cytology    Lesion of liver Two 2 cm lesions within the dome of the liver most consistent with metastatic disease  This would likely make this a stage IV ovarian cancer if the ovary is the primary  We may need to perform a needle-guided biopsy on these areas  I will order PT PTT and platelets  If this does not appear to be a stage IV ovarian cancer then chemotherapy would be the initial treatment with carboplatin and Taxol  Edema leg     Bilateral lower extremity edema is noted although the left is slightly more pronounced and uncomfortable  Given that this is a likely malignancy I feel that we should rule out a deep venous thrombosis  Relevant Orders    VAS lower limb venous duplex study, complete bilateral      Other Visit Diagnoses     Other intra-abdominal and pelvic swelling, mass and lump         Relevant Orders        Liver disorders in diseases classified elsewhere         Relevant Orders    APTT    Cervical cancer screening        Relevant Orders    Conventional pap smear, diagnostic              CHIEF COMPLAINT:       Subjective:     Problem:  Cancer Staging  No matching staging information was found for the patient  Previous therapy:   No history exists  Patient ID: Saba Zacarias is a 75 y o  female  Patient is a very pleasant 21-year-old white female seen in consultation from Dr Rosa Elena Nicole and Nash Boeck regarding evaluation and management of recurrent ascites and possible pelvic mass  The patient was in her usual state of health when she began to notice bloating of the abdomen in June of 2018  At that same time she developed significant diarrhea and vomiting but this resolved spontaneously  Later in August and in early September the patient noted significant worsening of this  She contacted her primary care physician Dr Rosa Elena Nicole who advised its testing  The patient underwent an EKG which he reports is normal ultrasound which showed significant ascites    The patient was then referred to Gastroenterology  She saw them in September 4th and had multiple testing including an ultrasound-guided paracentesis for 9 L of fluid  I do not see that ascites fluid was run for cytology anywhere  And she has also had  multiple blood work  A colonoscopy and endoscopy which were essentially unremarkable and an MRI of the abdomen  The MRI revealed:    1  Two adjacent lesions measuring 2 9 cm and 2 8 cm at the hepatic dome, likely of hepatocellular origin  Given the mild surface nodularity and possible liver cirrhosis, these may represent hepatocellular carcinomas  Further evaluation with dedicated   liver protocol MRI is recommended      2   Partially imaged lobulated and heterogeneous pelvic mass that appears to involve both adnexa  Further evaluation with dedicated pelvic MRI or pelvic ultrasound is recommended      3  Moderate to large volume of abdominopelvic ascites      4  Cholelithiasis      5  No biliary ductal dilation  The patient continues to have worsening symptomatology including significant abdominal bloating  She is due for another ultrasound-guided paracentesis tomorrow  She has had some bright red bleeding per rectum but this was told to her that this is likely hemorrhoidal   She does have significant difficulties with urgency and bowel function  The patient feels that she may actually have accidents but has not done yet  She is afraid to eat due to bloating and discomfort  The patient is caring primarily for her 3 grandchildren ages 6 7 and 15  Additionally she runs a   She also cares for her  who has had a significant stroke in approximately April of 2018  He is improving  The patient has no significant history of liver disease alcohol abuse or acetaminophen abuse  She has no family history of ovarian or breast cancer  Her mother has had colon cancer but was treated and is a survivor    The patient now presents for evaluation and management of recurrent abdominal ascites  Review of Systems   Constitutional: Negative for appetite change  Gastrointestinal: Positive for abdominal distention  Musculoskeletal: Positive for joint swelling  Current Outpatient Prescriptions   Medication Sig Dispense Refill    pantoprazole (PROTONIX) 40 mg tablet Take 1 tablet (40 mg total) by mouth daily 30 tablet 5     No current facility-administered medications for this visit  No Known Allergies    Past Medical History:   Diagnosis Date    Abdominal fluid collection     Asthma     Diabetes mellitus [facility])        Past Surgical History:   Procedure Laterality Date    ECTOPIC PREGNANCY SURGERY      ECTOPIC PREGNANCY SURGERY      IR PARACENTESIS  2018    MN COLONOSCOPY FLX DX W/COLLJ SPEC WHEN PFRMD N/A 2018    Procedure: EGD AND COLONOSCOPY;  Surgeon: Kael Gottlieb MD;  Location: [facility]; Service: Gastroenterology    TONSILECTOMY AND ADNOIDECTOMY      TONSILLECTOMY         OB History      Para Term  AB Living    3 2 2   1 2    SAB TAB Ectopic Multiple Live Births        1   2        Obstetric Comments    Menarche: 8th grade (around age 14)            Family History   Problem Relation Age of Onset    Cirrhosis Mother     Colon cancer Mother     Leukemia Cousin     Diabetes Father        The following portions of the patient's history were reviewed and updated as appropriate: allergies, current medications, past family history, past medical history, past social history, past surgical history and problem list       Objective:    Blood pressure 120/68, pulse 84, temperature 97 7 °F (36 5 °C), resp  rate 18, height 5' 3 5" (1 613 m), weight 76 2 kg (168 lb)  Body mass index is 29 29 kg/m²  Physical Exam   Constitutional: She is oriented to person, place, and time  She appears well-developed and well-nourished  HENT:   Head: Normocephalic and atraumatic  Eyes: Pupils are equal, round, and reactive to light   EOM are normal    Neck: Normal range of motion  Neck supple  Cardiovascular: Normal rate, regular rhythm and normal heart sounds  Pulmonary/Chest: Effort normal and breath sounds normal  No respiratory distress  Abdominal: Soft  Bowel sounds are normal  She exhibits distension  She exhibits no ascites  There is no tenderness  There is no rigidity, no rebound and no guarding  Marked abdominal ascites consistent with at least 8 L of fluid  No masses palpated but abdomen is significantly tense  Genitourinary:   Genitourinary Comments: -Normal external female genitalia, normal Bartholin's and [place]'s glands                  -Normal midline urethral meatus  No lesions notes                  -Bladder without fullness mass or tenderness                  -Vagina without lesion or discharge there is a significant cystocele and rectocele noted                  -Cervix normal appearing without visible lesions                  -Uterus with normal contour, mobility  No tenderness, there is significant grade 2-3 prolapse to the introitus                   -there is a fixed firm mass in the mid to posterior pelvis consistent with an adnexal mass  This appears to be fixed to the uterus as well as the bilateral pelvic sidewall that measures approximately 10 by 4 cm                   - Anus without fissure of lesion     Musculoskeletal: Normal range of motion  She exhibits edema  Bilateral lower extremity edema with left extremity in the foot more than the right  Approximately 2/4 on both lower extremities  There is mild tenderness in the left foot and ankle  Lymphadenopathy:     She has no cervical adenopathy  She has no axillary adenopathy  Right: No inguinal and no supraclavicular adenopathy present  Left: No inguinal and no supraclavicular adenopathy present  Neurological: She is alert and oriented to person, place, and time  Skin: Skin is warm and dry     Psychiatric: She has a normal mood and affect   Her behavior is normal

## 2018-10-17 NOTE — ASSESSMENT & PLAN NOTE
Bilateral lower extremity edema is noted although the left is slightly more pronounced and uncomfortable  Given that this is a likely malignancy I feel that we should rule out a deep venous thrombosis

## 2018-10-18 ENCOUNTER — LAB REQUISITION (OUTPATIENT)
Dept: LAB | Facility: HOSPITAL | Age: 68
End: 2018-10-18
Payer: MEDICARE

## 2018-10-18 ENCOUNTER — HOSPITAL ENCOUNTER (OUTPATIENT)
Dept: INTERVENTIONAL RADIOLOGY/VASCULAR | Facility: HOSPITAL | Age: 68
Discharge: HOME | End: 2018-10-18
Admitting: RADIOLOGY
Payer: MEDICARE

## 2018-10-18 VITALS
RESPIRATION RATE: 20 BRPM | HEART RATE: 90 BPM | DIASTOLIC BLOOD PRESSURE: 65 MMHG | OXYGEN SATURATION: 99 % | SYSTOLIC BLOOD PRESSURE: 131 MMHG

## 2018-10-18 DIAGNOSIS — R18.8 OTHER ASCITES: ICD-10-CM

## 2018-10-18 DIAGNOSIS — Z12.4 ENCOUNTER FOR SCREENING FOR MALIGNANT NEOPLASM OF CERVIX: ICD-10-CM

## 2018-10-18 LAB
APTT PPP: 32 SECONDS (ref 24–36)
BASOPHILS # BLD AUTO: 0.07 THOUSANDS/ΜL (ref 0–0.1)
BASOPHILS NFR BLD AUTO: 1 % (ref 0–1)
BUN SERPL-MCNC: 12 MG/DL (ref 5–25)
CANCER AG125 SERPL-ACNC: 194.5 U/ML (ref 0–30)
CREAT SERPL-MCNC: 0.76 MG/DL (ref 0.6–1.3)
EOSINOPHIL # BLD AUTO: 0.19 THOUSAND/ΜL (ref 0–0.61)
EOSINOPHIL NFR BLD AUTO: 2 % (ref 0–6)
ERYTHROCYTE [DISTWIDTH] IN BLOOD BY AUTOMATED COUNT: 17.8 % (ref 11.6–15.1)
GFR SERPL CREATININE-BSD FRML MDRD: 81 ML/MIN/1.73SQ M
HCT VFR BLD AUTO: 33 % (ref 34.8–46.1)
HGB BLD-MCNC: 10.3 G/DL (ref 11.5–15.4)
IMM GRANULOCYTES # BLD AUTO: 0.04 THOUSAND/UL (ref 0–0.2)
IMM GRANULOCYTES NFR BLD AUTO: 0 % (ref 0–2)
INR PPP: 1.08 (ref 0.86–1.17)
LYMPHOCYTES # BLD AUTO: 2.3 THOUSANDS/ΜL (ref 0.6–4.47)
LYMPHOCYTES NFR BLD AUTO: 23 % (ref 14–44)
MCH RBC QN AUTO: 24 PG (ref 26.8–34.3)
MCHC RBC AUTO-ENTMCNC: 31.2 G/DL (ref 31.4–37.4)
MCV RBC AUTO: 77 FL (ref 82–98)
MONOCYTES # BLD AUTO: 0.71 THOUSAND/ΜL (ref 0.17–1.22)
MONOCYTES NFR BLD AUTO: 7 % (ref 4–12)
NEUTROPHILS # BLD AUTO: 6.58 THOUSANDS/ΜL (ref 1.85–7.62)
NEUTS SEG NFR BLD AUTO: 67 % (ref 43–75)
NRBC BLD AUTO-RTO: 0 /100 WBCS
PLATELET # BLD AUTO: 313 THOUSANDS/UL (ref 149–390)
PMV BLD AUTO: 12.6 FL (ref 8.9–12.7)
PROTHROMBIN TIME: 13.9 SECONDS (ref 11.8–14.2)
RBC # BLD AUTO: 4.3 MILLION/UL (ref 3.81–5.12)
WBC # BLD AUTO: 9.89 THOUSAND/UL (ref 4.31–10.16)

## 2018-10-18 PROCEDURE — G0143 SCR C/V CYTO,THINLAYER,RESCR: HCPCS | Performed by: OBSTETRICS & GYNECOLOGY

## 2018-10-18 PROCEDURE — 88305 TISSUE EXAM BY PATHOLOGIST: CPT | Performed by: PATHOLOGY

## 2018-10-18 PROCEDURE — 82565 ASSAY OF CREATININE: CPT | Performed by: OBSTETRICS & GYNECOLOGY

## 2018-10-18 PROCEDURE — 85730 THROMBOPLASTIN TIME PARTIAL: CPT | Performed by: OBSTETRICS & GYNECOLOGY

## 2018-10-18 PROCEDURE — 88112 CYTOPATH CELL ENHANCE TECH: CPT | Performed by: PATHOLOGY

## 2018-10-18 PROCEDURE — 49083 ABD PARACENTESIS W/IMAGING: CPT

## 2018-10-18 PROCEDURE — 86304 IMMUNOASSAY TUMOR CA 125: CPT | Performed by: OBSTETRICS & GYNECOLOGY

## 2018-10-18 PROCEDURE — 85025 COMPLETE CBC W/AUTO DIFF WBC: CPT | Performed by: OBSTETRICS & GYNECOLOGY

## 2018-10-18 PROCEDURE — 49083 ABD PARACENTESIS W/IMAGING: CPT | Performed by: RADIOLOGY

## 2018-10-18 PROCEDURE — 84520 ASSAY OF UREA NITROGEN: CPT | Performed by: OBSTETRICS & GYNECOLOGY

## 2018-10-18 PROCEDURE — 85610 PROTHROMBIN TIME: CPT | Performed by: OBSTETRICS & GYNECOLOGY

## 2018-10-18 RX ORDER — ALBUMIN (HUMAN) 12.5 G/50ML
50 SOLUTION INTRAVENOUS ONCE
Status: DISCONTINUED | OUTPATIENT
Start: 2018-10-18 | End: 2018-10-22 | Stop reason: HOSPADM

## 2018-10-18 RX ORDER — LIDOCAINE HYDROCHLORIDE 10 MG/ML
INJECTION, SOLUTION INFILTRATION; PERINEURAL CODE/TRAUMA/SEDATION MEDICATION
Status: COMPLETED | OUTPATIENT
Start: 2018-10-18 | End: 2018-10-18

## 2018-10-18 RX ORDER — ALBUMIN (HUMAN) 12.5 G/50ML
12.5 SOLUTION INTRAVENOUS ONCE
Status: DISCONTINUED | OUTPATIENT
Start: 2018-10-18 | End: 2018-10-18

## 2018-10-18 RX ADMIN — LIDOCAINE HYDROCHLORIDE 5 ML: 10 INJECTION, SOLUTION INFILTRATION; PERINEURAL at 15:19

## 2018-10-18 NOTE — BRIEF OP NOTE (RAD/CATH)
IR PARACENTESIS  Procedure Note    PATIENT NAME: Willie Camarena  : 1950  MRN: [id number]     Pre-op Diagnosis:   1  Other ascites      Post-op Diagnosis:   1  Other ascites        Surgeon:   Karoline Jiang MD  Assistants:     Estimated Blood Loss:  None  Findings:  Large amount of ascites    Specimens:  7 575 L of jadiel colored fluid  Specimens were submitted for laboratory analysis  Complications:  None      Anesthesia: Mary Anne Carreon MD     Date: 10/18/2018  Time: 5:00 PM

## 2018-10-18 NOTE — SEDATION DOCUMENTATION
7575 ml jadiel tinged fluid removed from abd and sent to lab  Bloodwork also drawn from [facility] and sent to lab

## 2018-10-19 ENCOUNTER — HOSPITAL ENCOUNTER (OUTPATIENT)
Dept: ULTRASOUND IMAGING | Facility: HOSPITAL | Age: 68
Discharge: HOME | End: 2018-10-19
Attending: OBSTETRICS & GYNECOLOGY
Payer: MEDICARE

## 2018-10-19 ENCOUNTER — HOSPITAL ENCOUNTER (OUTPATIENT)
Dept: CT IMAGING | Facility: CLINIC | Age: 68
Discharge: HOME | End: 2018-10-19
Attending: OBSTETRICS & GYNECOLOGY
Payer: MEDICARE

## 2018-10-19 DIAGNOSIS — R18.8 OTHER ASCITES: ICD-10-CM

## 2018-10-19 DIAGNOSIS — N94.89 ADNEXAL MASS: ICD-10-CM

## 2018-10-19 DIAGNOSIS — R60.0 EDEMA LEG: ICD-10-CM

## 2018-10-19 PROCEDURE — 71260 CT THORAX DX C+: CPT

## 2018-10-19 PROCEDURE — 93970 EXTREMITY STUDY: CPT | Performed by: SURGERY

## 2018-10-19 PROCEDURE — 93970 EXTREMITY STUDY: CPT

## 2018-10-19 PROCEDURE — 74177 CT ABD & PELVIS W/CONTRAST: CPT

## 2018-10-19 RX ADMIN — IOHEXOL 100 ML: 350 INJECTION, SOLUTION INTRAVENOUS at 12:12

## 2018-10-23 DIAGNOSIS — C78.6 PERITONEAL CARCINOMATOSIS (HCC): Primary | ICD-10-CM

## 2018-10-23 LAB
LAB AP GYN PRIMARY INTERPRETATION: NORMAL
Lab: NORMAL

## 2018-10-24 ENCOUNTER — OFFICE VISIT (OUTPATIENT)
Dept: GYNECOLOGIC ONCOLOGY | Facility: CLINIC | Age: 68
End: 2018-10-24
Payer: MEDICARE

## 2018-10-24 VITALS
DIASTOLIC BLOOD PRESSURE: 56 MMHG | SYSTOLIC BLOOD PRESSURE: 100 MMHG | HEART RATE: 88 BPM | TEMPERATURE: 98.1 F | WEIGHT: 152 LBS | RESPIRATION RATE: 18 BRPM | BODY MASS INDEX: 25.95 KG/M2 | HEIGHT: 64 IN

## 2018-10-24 DIAGNOSIS — R18.8 OTHER ASCITES: ICD-10-CM

## 2018-10-24 DIAGNOSIS — N94.89 ADNEXAL MASS: Primary | ICD-10-CM

## 2018-10-24 PROCEDURE — 99214 OFFICE O/P EST MOD 30 MIN: CPT | Performed by: OBSTETRICS & GYNECOLOGY

## 2018-10-24 NOTE — LETTER
October 24, 2018     [facility]  [address] [facility]    Patient: Alma Avery   YOB: 1950   Date of Visit: 10/24/2018       Dear Dr Martha Holcomb: Thank you for referring Alma Avery to me for evaluation  Below are my notes for this consultation  If you have questions, please do not hesitate to call me  I look forward to following your patient along with you  Sincerely,        Ajay Boyd MD        CC: No Recipients  Ajay Boyd MD  10/24/2018 12:26 PM  Sign at close encounter  Assessment/Plan:    Problem List Items Addressed This Visit     Adnexal mass - Primary     CT scan was performed which reveals conglomeration of a large abdominal pelvic mass including bilateral adnexa and uterus  Additionally there are multiple abdominal sites as well as ascites  This is most consistent with a stage IV ovarian cancer  Paracentesis was performed and cytology was showing some atypia but essentially indeterminate for malignancy  We have gone ahead and scheduled a biopsy for the abdominal masses  The biopsy will be performed November 11th unfortunately there is no earlier date  The patient's PT PTT and platelet were already performed  I assume that this will confirm her diagnosis of stage IV ovarian cancer  In that light we have gone ahead and consented the patient for Carboplatinum paclitaxel and Avastin with a P planned treatment of carboplatin area under the curve of 6 with paclitaxel at 175 milligrams/meter squared and a vast in of 10 milligrams/kg  We will plan to perform this the for 3 cycles and then if significant disease reduction is noted we will plan an interval debulking followed by 3 further cycles of chemotherapy  The patient with was told that this is likely palliative with only 5% cure rate    She was also told that she will most likely need genetics and genomic testing to see if any further chemotherapy or biological therapy treatments may be in her best interest as we go through this  Additionally of consented her for blood  This would suffice in case she needs any transfusion during this treatment plan         Other ascites     The patient had a paracentesis since last visit  She said this was a difficult to tolerate with much more pain than the prior 1  Her abdominal symptomatology with the ascites removed is better  She would prefer not to reschedule an at this time if she can avoid it  I have stated that this will resolve very quickly after chemotherapy is initiated  CHIEF COMPLAINT:   Follow-up from evaluation of pelvic mass  Patient ID: Ruy Sepulveda is a 75 y o  female  Patient is a very pleasant 27-year-old white female with onset of abdominal bloating ascites liver mass and pelvic mass  Since her last visit she has undergone multiple blood and imaging testing  She underwent a CT scan which reveals a large midline pelvic mass including both ovaries and uterus  There is multiple abdominal metastasis including on the small bowel mesentery and within the left liver parenchyma as seen on prior MRI  This is most consistent with a stage IV ovarian cancer  Additionally  was performed which was approximately 1200  The patient was scheduled for a CT-guided needle biopsy in anticipation of neoadjuvant chemotherapy however the biopsy isn't able to be performed for another 2 weeks  She has also undergone bilateral lower extremity Dopplers for leg edema which have shown no evidence of DVT  The patient also underwent a paracentesis which resulted in significant discomfort however her abdominal bloating has decreased and she has been eating fine  She does still occasionally have episodes of urgent diarrhea  Other than that she has no new complaints and presents today for discussion of consideration of treatment          The following portions of the patient's history were reviewed and updated as appropriate: allergies, current medications, past family history, past medical history, past social history, past surgical history and problem list     Review of Systems   Constitutional: Positive for activity change and appetite change  HENT: Negative  Eyes: Negative  Respiratory: Negative  Cardiovascular: Negative  Gastrointestinal: Positive for abdominal distention, abdominal pain and diarrhea  Endocrine: Negative  Genitourinary: Negative  Musculoskeletal: Negative  Skin: Negative  Neurological: Negative  Hematological: Negative  Psychiatric/Behavioral: Negative  Current Outpatient Prescriptions   Medication Sig Dispense Refill    pantoprazole (PROTONIX) 40 mg tablet Take 1 tablet (40 mg total) by mouth daily 30 tablet 5     No current facility-administered medications for this visit  Objective:    Blood pressure 100/56, pulse 88, temperature 98 1 °F (36 7 °C), resp  rate 18, height 5' 3 5" (1 613 m), weight 68 9 kg (152 lb)  Body mass index is 26 5 kg/m²  Body surface area is 1 73 meters squared  Physical Exam   Constitutional: She is oriented to person, place, and time  She appears well-developed and well-nourished  HENT:   Head: Normocephalic and atraumatic  Eyes: Pupils are equal, round, and reactive to light  EOM are normal    Neck: Normal range of motion  Neck supple  Cardiovascular: Normal rate, regular rhythm and normal heart sounds  Pulmonary/Chest: Effort normal and breath sounds normal  No respiratory distress  Abdominal: Soft  Bowel sounds are normal  She exhibits distension and mass  She exhibits no ascites  There is no tenderness  There is no rigidity, no rebound and no guarding  Genitourinary:   Genitourinary Comments: Deferred     Musculoskeletal: Normal range of motion  Lymphadenopathy:     She has no cervical adenopathy  She has no axillary adenopathy  Right: No inguinal and no supraclavicular adenopathy present  Left: No inguinal and no supraclavicular adenopathy present  Neurological: She is alert and oriented to person, place, and time  Skin: Skin is warm and dry  Psychiatric: She has a normal mood and affect   Her behavior is normal        Lab Results   Component Value Date     194 5 (H) 10/18/2018     Lab Results   Component Value Date    BUN 12 10/18/2018    CREATININE 0 76 10/18/2018    EGFR 81 10/18/2018     Lab Results   Component Value Date    WBC 9 89 10/18/2018    HGB 10 3 (L) 10/18/2018    HCT 33 0 (L) 10/18/2018    MCV 77 (L) 10/18/2018     10/18/2018     Lab Results   Component Value Date    NEUTROABS 6 58 10/18/2018

## 2018-10-24 NOTE — PROGRESS NOTES
Assessment/Plan:    Problem List Items Addressed This Visit     Adnexal mass - Primary     CT scan was performed which reveals conglomeration of a large abdominal pelvic mass including bilateral adnexa and uterus  Additionally there are multiple abdominal sites as well as ascites  This is most consistent with a stage IV ovarian cancer  Paracentesis was performed and cytology was showing some atypia but essentially indeterminate for malignancy  We have gone ahead and scheduled a biopsy for the abdominal masses  The biopsy will be performed November 11th unfortunately there is no earlier date  The patient's PT PTT and platelet were already performed  I assume that this will confirm her diagnosis of stage IV ovarian cancer  In that light we have gone ahead and consented the patient for Carboplatinum paclitaxel and Avastin with a P planned treatment of carboplatin area under the curve of 6 with paclitaxel at 175 milligrams/meter squared and a vast in of 10 milligrams/kg  We will plan to perform this the for 3 cycles and then if significant disease reduction is noted we will plan an interval debulking followed by 3 further cycles of chemotherapy  The patient with was told that this is likely palliative with only 5% cure rate  She was also told that she will most likely need genetics and genomic testing to see if any further chemotherapy or biological therapy treatments may be in her best interest as we go through this  Additionally of consented her for blood  This would suffice in case she needs any transfusion during this treatment plan         Other ascites     The patient had a paracentesis since last visit  She said this was a difficult to tolerate with much more pain than the prior 1  Her abdominal symptomatology with the ascites removed is better  She would prefer not to reschedule an at this time if she can avoid it    I have stated that this will resolve very quickly after chemotherapy is initiated  CHIEF COMPLAINT:   Follow-up from evaluation of pelvic mass  Patient ID: Lazara Beltran is a 75 y o  female  Patient is a very pleasant 53-year-old white female with onset of abdominal bloating ascites liver mass and pelvic mass  Since her last visit she has undergone multiple blood and imaging testing  She underwent a CT scan which reveals a large midline pelvic mass including both ovaries and uterus  There is multiple abdominal metastasis including on the small bowel mesentery and within the left liver parenchyma as seen on prior MRI  This is most consistent with a stage IV ovarian cancer  Additionally  was performed which was approximately 1200  The patient was scheduled for a CT-guided needle biopsy in anticipation of neoadjuvant chemotherapy however the biopsy isn't able to be performed for another 2 weeks  She has also undergone bilateral lower extremity Dopplers for leg edema which have shown no evidence of DVT  The patient also underwent a paracentesis which resulted in significant discomfort however her abdominal bloating has decreased and she has been eating fine  She does still occasionally have episodes of urgent diarrhea  Other than that she has no new complaints and presents today for discussion of consideration of treatment  The following portions of the patient's history were reviewed and updated as appropriate: allergies, current medications, past family history, past medical history, past social history, past surgical history and problem list     Review of Systems   Constitutional: Positive for activity change and appetite change  HENT: Negative  Eyes: Negative  Respiratory: Negative  Cardiovascular: Negative  Gastrointestinal: Positive for abdominal distention, abdominal pain and diarrhea  Endocrine: Negative  Genitourinary: Negative  Musculoskeletal: Negative  Skin: Negative  Neurological: Negative  Hematological: Negative  Psychiatric/Behavioral: Negative  Current Outpatient Prescriptions   Medication Sig Dispense Refill    pantoprazole (PROTONIX) 40 mg tablet Take 1 tablet (40 mg total) by mouth daily 30 tablet 5     No current facility-administered medications for this visit  Objective:    Blood pressure 100/56, pulse 88, temperature 98 1 °F (36 7 °C), resp  rate 18, height 5' 3 5" (1 613 m), weight 68 9 kg (152 lb)  Body mass index is 26 5 kg/m²  Body surface area is 1 73 meters squared  Physical Exam   Constitutional: She is oriented to person, place, and time  She appears well-developed and well-nourished  HENT:   Head: Normocephalic and atraumatic  Eyes: Pupils are equal, round, and reactive to light  EOM are normal    Neck: Normal range of motion  Neck supple  Cardiovascular: Normal rate, regular rhythm and normal heart sounds  Pulmonary/Chest: Effort normal and breath sounds normal  No respiratory distress  Abdominal: Soft  Bowel sounds are normal  She exhibits distension and mass  She exhibits no ascites  There is no tenderness  There is no rigidity, no rebound and no guarding  Genitourinary:   Genitourinary Comments: Deferred     Musculoskeletal: Normal range of motion  Lymphadenopathy:     She has no cervical adenopathy  She has no axillary adenopathy  Right: No inguinal and no supraclavicular adenopathy present  Left: No inguinal and no supraclavicular adenopathy present  Neurological: She is alert and oriented to person, place, and time  Skin: Skin is warm and dry  Psychiatric: She has a normal mood and affect   Her behavior is normal        Lab Results   Component Value Date     194 5 (H) 10/18/2018     Lab Results   Component Value Date    BUN 12 10/18/2018    CREATININE 0 76 10/18/2018    EGFR 81 10/18/2018     Lab Results   Component Value Date    WBC 9 89 10/18/2018    HGB 10 3 (L) 10/18/2018    HCT 33 0 (L) 10/18/2018 MCV 77 (L) 10/18/2018     10/18/2018     Lab Results   Component Value Date    NEUTROABS 6 58 10/18/2018

## 2018-10-24 NOTE — ASSESSMENT & PLAN NOTE
The patient had a paracentesis since last visit  She said this was a difficult to tolerate with much more pain than the prior 1  Her abdominal symptomatology with the ascites removed is better  She would prefer not to reschedule an at this time if she can avoid it  I have stated that this will resolve very quickly after chemotherapy is initiated

## 2018-10-24 NOTE — ASSESSMENT & PLAN NOTE
CT scan was performed which reveals conglomeration of a large abdominal pelvic mass including bilateral adnexa and uterus  Additionally there are multiple abdominal sites as well as ascites  This is most consistent with a stage IV ovarian cancer  Paracentesis was performed and cytology was showing some atypia but essentially indeterminate for malignancy  We have gone ahead and scheduled a biopsy for the abdominal masses  The biopsy will be performed November 11th unfortunately there is no earlier date  The patient's PT PTT and platelet were already performed  I assume that this will confirm her diagnosis of stage IV ovarian cancer  In that light we have gone ahead and consented the patient for Carboplatinum paclitaxel and Avastin with a P planned treatment of carboplatin area under the curve of 6 with paclitaxel at 175 milligrams/meter squared and a vast in of 10 milligrams/kg  We will plan to perform this the for 3 cycles and then if significant disease reduction is noted we will plan an interval debulking followed by 3 further cycles of chemotherapy  The patient with was told that this is likely palliative with only 5% cure rate  She was also told that she will most likely need genetics and genomic testing to see if any further chemotherapy or biological therapy treatments may be in her best interest as we go through this  Additionally of consented her for blood    This would suffice in case she needs any transfusion during this treatment plan

## 2018-11-06 ENCOUNTER — HOSPITAL ENCOUNTER (OUTPATIENT)
Dept: CT IMAGING | Facility: HOSPITAL | Age: 68
Discharge: HOME | End: 2018-11-06
Payer: MEDICARE

## 2018-11-06 ENCOUNTER — TELEPHONE (OUTPATIENT)
Dept: GASTROENTEROLOGY | Facility: CLINIC | Age: 68
End: 2018-11-06

## 2018-11-06 ENCOUNTER — HOSPITAL ENCOUNTER (OUTPATIENT)
Dept: CT IMAGING | Facility: HOSPITAL | Age: 68
Discharge: HOME | End: 2018-11-06
Attending: RADIOLOGY

## 2018-11-06 VITALS
HEART RATE: 78 BPM | RESPIRATION RATE: 20 BRPM | SYSTOLIC BLOOD PRESSURE: 120 MMHG | TEMPERATURE: 98.1 F | WEIGHT: 159.39 LBS | DIASTOLIC BLOOD PRESSURE: 60 MMHG | OXYGEN SATURATION: 98 % | BODY MASS INDEX: 27.21 KG/M2 | HEIGHT: 64 IN

## 2018-11-06 VITALS
HEART RATE: 80 BPM | SYSTOLIC BLOOD PRESSURE: 110 MMHG | DIASTOLIC BLOOD PRESSURE: 59 MMHG | OXYGEN SATURATION: 100 % | RESPIRATION RATE: 18 BRPM

## 2018-11-06 DIAGNOSIS — C78.6 PERITONEAL CARCINOMATOSIS (HCC): ICD-10-CM

## 2018-11-06 PROCEDURE — 88363 XM ARCHIVE TISSUE MOLEC ANAL: CPT | Performed by: PATHOLOGY

## 2018-11-06 PROCEDURE — 49180 BIOPSY ABDOMINAL MASS: CPT | Performed by: RADIOLOGY

## 2018-11-06 PROCEDURE — 88305 TISSUE EXAM BY PATHOLOGIST: CPT | Performed by: PATHOLOGY

## 2018-11-06 PROCEDURE — 77012 CT SCAN FOR NEEDLE BIOPSY: CPT | Performed by: RADIOLOGY

## 2018-11-06 PROCEDURE — 99153 MOD SED SAME PHYS/QHP EA: CPT

## 2018-11-06 PROCEDURE — 88341 IMHCHEM/IMCYTCHM EA ADD ANTB: CPT | Performed by: PATHOLOGY

## 2018-11-06 PROCEDURE — 99152 MOD SED SAME PHYS/QHP 5/>YRS: CPT | Performed by: RADIOLOGY

## 2018-11-06 PROCEDURE — 88342 IMHCHEM/IMCYTCHM 1ST ANTB: CPT | Performed by: PATHOLOGY

## 2018-11-06 PROCEDURE — 99152 MOD SED SAME PHYS/QHP 5/>YRS: CPT

## 2018-11-06 PROCEDURE — 49083 ABD PARACENTESIS W/IMAGING: CPT

## 2018-11-06 PROCEDURE — 88333 PATH CONSLTJ SURG CYTO XM 1: CPT | Performed by: PATHOLOGY

## 2018-11-06 PROCEDURE — 49180 BIOPSY ABDOMINAL MASS: CPT

## 2018-11-06 RX ORDER — FENTANYL CITRATE 50 UG/ML
INJECTION, SOLUTION INTRAMUSCULAR; INTRAVENOUS CODE/TRAUMA/SEDATION MEDICATION
Status: COMPLETED | OUTPATIENT
Start: 2018-11-06 | End: 2018-11-06

## 2018-11-06 RX ORDER — MIDAZOLAM HYDROCHLORIDE 1 MG/ML
INJECTION INTRAMUSCULAR; INTRAVENOUS CODE/TRAUMA/SEDATION MEDICATION
Status: COMPLETED | OUTPATIENT
Start: 2018-11-06 | End: 2018-11-06

## 2018-11-06 RX ORDER — LIDOCAINE HYDROCHLORIDE 10 MG/ML
INJECTION, SOLUTION INFILTRATION; PERINEURAL CODE/TRAUMA/SEDATION MEDICATION
Status: COMPLETED | OUTPATIENT
Start: 2018-11-06 | End: 2018-11-06

## 2018-11-06 RX ORDER — ALBUMIN (HUMAN) 12.5 G/50ML
50 SOLUTION INTRAVENOUS ONCE
Status: COMPLETED | OUTPATIENT
Start: 2018-11-06 | End: 2018-11-06

## 2018-11-06 RX ADMIN — MIDAZOLAM HYDROCHLORIDE 1 MG: 1 INJECTION, SOLUTION INTRAMUSCULAR; INTRAVENOUS at 10:09

## 2018-11-06 RX ADMIN — ALBUMIN HUMAN 50 G: 0.25 SOLUTION INTRAVENOUS at 10:48

## 2018-11-06 RX ADMIN — FENTANYL CITRATE 50 MCG: 50 INJECTION, SOLUTION INTRAMUSCULAR; INTRAVENOUS at 10:09

## 2018-11-06 RX ADMIN — MIDAZOLAM HYDROCHLORIDE 1 MG: 1 INJECTION, SOLUTION INTRAMUSCULAR; INTRAVENOUS at 10:23

## 2018-11-06 RX ADMIN — LIDOCAINE HYDROCHLORIDE 10 ML: 10 INJECTION, SOLUTION INFILTRATION; PERINEURAL at 10:42

## 2018-11-06 RX ADMIN — FENTANYL CITRATE 50 MCG: 50 INJECTION, SOLUTION INTRAMUSCULAR; INTRAVENOUS at 10:23

## 2018-11-06 RX ADMIN — LIDOCAINE HYDROCHLORIDE 10 ML: 10 INJECTION, SOLUTION INFILTRATION; PERINEURAL at 10:23

## 2018-11-06 NOTE — BRIEF OP NOTE (RAD/CATH)
CT NEEDLE BIOPSY PERITONEUM  And CT guided apracentesis - Procedure Note    PATIENT NAME: Tate Giron  : 1950  MRN: [id number]     Pre-op Diagnosis:   1  Peritoneal carcinomatosis ([address] )      Post-op Diagnosis:   1   Peritoneal carcinomatosis [facility])        Surgeon:   Jacqui Rey MD  Assistants:     No qualified resident was available, Resident is only observing    Estimated Blood Loss: minimal  Findings: Successful core biopsy large pelvic mass and paracentesis due to the marked increase in ascites and patient discomfort    Specimens: 18 gauge core biospy    Complications:  None immediate    Anesthesia: Conscious sedation    Jacqui Rey MD     Date: 2018  Time: 10:53 AM

## 2018-11-06 NOTE — H&P (VIEW-ONLY)
Assessment/Plan:    Problem List Items Addressed This Visit     Adnexal mass - Primary     CT scan was performed which reveals conglomeration of a large abdominal pelvic mass including bilateral adnexa and uterus  Additionally there are multiple abdominal sites as well as ascites  This is most consistent with a stage IV ovarian cancer  Paracentesis was performed and cytology was showing some atypia but essentially indeterminate for malignancy  We have gone ahead and scheduled a biopsy for the abdominal masses  The biopsy will be performed November 11th unfortunately there is no earlier date  The patient's PT PTT and platelet were already performed  I assume that this will confirm her diagnosis of stage IV ovarian cancer  In that light we have gone ahead and consented the patient for Carboplatinum paclitaxel and Avastin with a P planned treatment of carboplatin area under the curve of 6 with paclitaxel at 175 milligrams/meter squared and a vast in of 10 milligrams/kg  We will plan to perform this the for 3 cycles and then if significant disease reduction is noted we will plan an interval debulking followed by 3 further cycles of chemotherapy  The patient with was told that this is likely palliative with only 5% cure rate  She was also told that she will most likely need genetics and genomic testing to see if any further chemotherapy or biological therapy treatments may be in her best interest as we go through this  Additionally of consented her for blood  This would suffice in case she needs any transfusion during this treatment plan         Other ascites     The patient had a paracentesis since last visit  She said this was a difficult to tolerate with much more pain than the prior 1  Her abdominal symptomatology with the ascites removed is better  She would prefer not to reschedule an at this time if she can avoid it    I have stated that this will resolve very quickly after chemotherapy is initiated  CHIEF COMPLAINT:   Follow-up from evaluation of pelvic mass  Patient ID: Irineo Wilkins is a 75 y o  female  Patient is a very pleasant 59-year-old white female with onset of abdominal bloating ascites liver mass and pelvic mass  Since her last visit she has undergone multiple blood and imaging testing  She underwent a CT scan which reveals a large midline pelvic mass including both ovaries and uterus  There is multiple abdominal metastasis including on the small bowel mesentery and within the left liver parenchyma as seen on prior MRI  This is most consistent with a stage IV ovarian cancer  Additionally  was performed which was approximately 1200  The patient was scheduled for a CT-guided needle biopsy in anticipation of neoadjuvant chemotherapy however the biopsy isn't able to be performed for another 2 weeks  She has also undergone bilateral lower extremity Dopplers for leg edema which have shown no evidence of DVT  The patient also underwent a paracentesis which resulted in significant discomfort however her abdominal bloating has decreased and she has been eating fine  She does still occasionally have episodes of urgent diarrhea  Other than that she has no new complaints and presents today for discussion of consideration of treatment  The following portions of the patient's history were reviewed and updated as appropriate: allergies, current medications, past family history, past medical history, past social history, past surgical history and problem list     Review of Systems   Constitutional: Positive for activity change and appetite change  HENT: Negative  Eyes: Negative  Respiratory: Negative  Cardiovascular: Negative  Gastrointestinal: Positive for abdominal distention, abdominal pain and diarrhea  Endocrine: Negative  Genitourinary: Negative  Musculoskeletal: Negative  Skin: Negative  Neurological: Negative  Hematological: Negative  Psychiatric/Behavioral: Negative  Current Outpatient Prescriptions   Medication Sig Dispense Refill    pantoprazole (PROTONIX) 40 mg tablet Take 1 tablet (40 mg total) by mouth daily 30 tablet 5     No current facility-administered medications for this visit  Objective:    Blood pressure 100/56, pulse 88, temperature 98 1 °F (36 7 °C), resp  rate 18, height 5' 3 5" (1 613 m), weight 68 9 kg (152 lb)  Body mass index is 26 5 kg/m²  Body surface area is 1 73 meters squared  Physical Exam   Constitutional: She is oriented to person, place, and time  She appears well-developed and well-nourished  HENT:   Head: Normocephalic and atraumatic  Eyes: Pupils are equal, round, and reactive to light  EOM are normal    Neck: Normal range of motion  Neck supple  Cardiovascular: Normal rate, regular rhythm and normal heart sounds  Pulmonary/Chest: Effort normal and breath sounds normal  No respiratory distress  Abdominal: Soft  Bowel sounds are normal  She exhibits distension and mass  She exhibits no ascites  There is no tenderness  There is no rigidity, no rebound and no guarding  Genitourinary:   Genitourinary Comments: Deferred     Musculoskeletal: Normal range of motion  Lymphadenopathy:     She has no cervical adenopathy  She has no axillary adenopathy  Right: No inguinal and no supraclavicular adenopathy present  Left: No inguinal and no supraclavicular adenopathy present  Neurological: She is alert and oriented to person, place, and time  Skin: Skin is warm and dry  Psychiatric: She has a normal mood and affect   Her behavior is normal        Lab Results   Component Value Date     194 5 (H) 10/18/2018     Lab Results   Component Value Date    BUN 12 10/18/2018    CREATININE 0 76 10/18/2018    EGFR 81 10/18/2018     Lab Results   Component Value Date    WBC 9 89 10/18/2018    HGB 10 3 (L) 10/18/2018    HCT 33 0 (L) 10/18/2018 MCV 77 (L) 10/18/2018     10/18/2018     Lab Results   Component Value Date    NEUTROABS 6 58 10/18/2018

## 2018-11-06 NOTE — TELEPHONE ENCOUNTER
Dorian from [facility] IR called requesting a PRN ORDER for patient Paracentesis w/albumin, as needed   Call IR William Lucia at [phone] ty

## 2018-11-06 NOTE — TELEPHONE ENCOUNTER
I informed Jazmin Whiteside and this is a gynecological carcinomatosis with malignant ascites and is being followed by gynecology  I am really not taking care of the patient at this point  She will contact gynecology for this order

## 2018-11-06 NOTE — INTERVAL H&P NOTE
Update: (This section must be completed if the H&P was completed greater than 24 hrs to procedure or admission)    H&P reviewed  After examining the patient, I find no changed to the H&P since it had been written  Patient re-evaluated   Accept as history and physical     Jamila Drummond MD/November 6, 2018/10:51 AM

## 2018-11-07 DIAGNOSIS — R18.8 OTHER ASCITES: Primary | ICD-10-CM

## 2018-11-09 DIAGNOSIS — C56.9 MALIGNANT NEOPLASM OF OVARY, UNSPECIFIED LATERALITY (HCC): Primary | ICD-10-CM

## 2018-11-09 RX ORDER — LORAZEPAM 1 MG/1
1 TABLET ORAL EVERY 6 HOURS PRN
Qty: 30 TABLET | Refills: 0 | Status: SHIPPED | OUTPATIENT
Start: 2018-11-09 | End: 2019-02-05

## 2018-11-09 RX ORDER — ONDANSETRON HYDROCHLORIDE 8 MG/1
8 TABLET, FILM COATED ORAL EVERY 8 HOURS PRN
Qty: 30 TABLET | Refills: 1 | Status: SHIPPED | OUTPATIENT
Start: 2018-11-09 | End: 2019-02-05

## 2018-11-14 ENCOUNTER — OFFICE VISIT (OUTPATIENT)
Dept: GYNECOLOGIC ONCOLOGY | Facility: CLINIC | Age: 68
End: 2018-11-14
Payer: MEDICARE

## 2018-11-14 ENCOUNTER — HOSPITAL ENCOUNTER (OUTPATIENT)
Dept: INFUSION CENTER | Facility: CLINIC | Age: 68
Discharge: HOME | End: 2018-11-14

## 2018-11-14 VITALS
BODY MASS INDEX: 26.38 KG/M2 | HEART RATE: 88 BPM | SYSTOLIC BLOOD PRESSURE: 110 MMHG | DIASTOLIC BLOOD PRESSURE: 54 MMHG | HEIGHT: 64 IN | WEIGHT: 154.5 LBS | RESPIRATION RATE: 18 BRPM | TEMPERATURE: 97.9 F

## 2018-11-14 DIAGNOSIS — C56.9 MALIGNANT NEOPLASM OF OVARY, UNSPECIFIED LATERALITY (HCC): Primary | ICD-10-CM

## 2018-11-14 DIAGNOSIS — R18.8 OTHER ASCITES: ICD-10-CM

## 2018-11-14 PROCEDURE — 99215 OFFICE O/P EST HI 40 MIN: CPT | Performed by: OBSTETRICS & GYNECOLOGY

## 2018-11-14 RX ORDER — TRAMADOL HYDROCHLORIDE 50 MG/1
50 TABLET ORAL EVERY 6 HOURS PRN
Qty: 30 TABLET | Refills: 0 | Status: CANCELLED | OUTPATIENT
Start: 2018-11-14

## 2018-11-14 NOTE — ASSESSMENT & PLAN NOTE
Patient continues to have significant ascites  She is at this point unwilling to move ahead with further paracenteses due to the pain and bowel dysfunction that causes thereafter  We have liberalized her use of aspirin for pain relief and I have also given her prescription for tramadol 50 mg 1 p o  Q 6 if pain becomes worse than the aspirin will take care of

## 2018-11-14 NOTE — ASSESSMENT & PLAN NOTE
Patient is now diagnosed with stage IV ovarian cancer by recent biopsy  I have discussed with the patient and her daughter Deana Bernstein by phone once again the treatment plan which would be 3 cycles of carboplatin Taxol and Avastin for neoadjuvant treatment  If adequate response is noted then interval debulking would be performed  We discussed the risks and benefits again of treatment planning of the patient has already signed a consent form  We have discussed when to treat  The patient is interested in going to [employer] and has had this planned for several months  She will go to [employer] and then come back and initiate treatment after that point  The patient does have a port planned to be placed in the near future prior to initiation of treatment

## 2018-11-14 NOTE — PROGRESS NOTES
Assessment/Plan:    Problem List Items Addressed This Visit     Other ascites     Patient continues to have significant ascites  She is at this point unwilling to move ahead with further paracenteses due to the pain and bowel dysfunction that causes thereafter  We have liberalized her use of aspirin for pain relief and I have also given her prescription for tramadol 50 mg 1 p o  Q 6 if pain becomes worse than the aspirin will take care of  Ovarian cancer [facility]) - Primary     Patient is now diagnosed with stage IV ovarian cancer by recent biopsy  I have discussed with the patient and her daughter Jerod Hanks by phone once again the treatment plan which would be 3 cycles of carboplatin Taxol and Avastin for neoadjuvant treatment  If adequate response is noted then interval debulking would be performed  We discussed the risks and benefits again of treatment planning of the patient has already signed a consent form  We have discussed when to treat  The patient is interested in going to [employer] and has had this planned for several months  She will go to [employer] and then come back and initiate treatment after that point  The patient does have a port planned to be placed in the near future prior to initiation of treatment  Overall consultation took 40 minutes with greater than 50% of the time being dedicated toward discussion time with the patient and her daughter Jerod Hanks by phone  CHIEF COMPLAINT:   Ovarian cancer clinical stage IVB for initiation of treatment      Patient ID: Armani Wagner is a 75 y o  female  Patient is very pleasant 78-year-old white female with a several month history of abdominal bloating bowel dysfunction and paracenteses for large volume ascites  She recently underwent a CT-guided needle biopsy of the pelvic mass which returned a diagnosis of serous carcinoma consistent with ovarian primary      After the patient's paracentesis she commonly has significant bowel dysfunction and abdominal pain  This continued with her last cycle  Does note decreased appetite decreased p  o  intake of 40 lb weight loss in the past several months  She has been consented for carboplatin and paclitaxel and Avastin but has not started this due to upcoming planned vacation to [place] [employer]  She presents today for consideration of treatment  The following portions of the patient's history were reviewed and updated as appropriate: allergies, current medications, past family history, past medical history, past social history, past surgical history and problem list     Review of Systems   Constitutional: Negative  HENT: Negative  Eyes: Negative  Respiratory: Negative  Cardiovascular: Negative  Gastrointestinal: Positive for abdominal distention, abdominal pain and diarrhea  Endocrine: Negative  Genitourinary: Negative  Musculoskeletal: Negative  Skin: Negative  Neurological: Negative  Hematological: Negative  Psychiatric/Behavioral: Negative  Current Outpatient Prescriptions   Medication Sig Dispense Refill    LORazepam (ATIVAN) 1 mg tablet Take 1 tablet (1 mg total) by mouth every 6 (six) hours as needed (nausea or anxiety) 30 tablet 0    ondansetron (ZOFRAN) 8 mg tablet Take 1 tablet (8 mg total) by mouth every 8 (eight) hours as needed for nausea or vomiting 30 tablet 1     No current facility-administered medications for this visit  Objective:    Blood pressure 110/54, pulse 88, temperature 97 9 °F (36 6 °C), resp  rate 18, height 5' 4" (1 626 m), weight 70 1 kg (154 lb 8 oz)  Body mass index is 26 52 kg/m²  Body surface area is 1 75 meters squared  Physical Exam   Constitutional: She is oriented to person, place, and time  She appears well-developed and well-nourished  HENT:   Head: Normocephalic and atraumatic  Eyes: EOM are normal    Neck: Normal range of motion  Neck supple  No thyromegaly present     Cardiovascular: Normal rate, regular rhythm and normal heart sounds  Pulmonary/Chest: Effort normal and breath sounds normal    Abdominal: Soft  Bowel sounds are normal  She exhibits distension and mass  Genitourinary:   Genitourinary Comments: Deferred                    Musculoskeletal: Normal range of motion  Lymphadenopathy:     She has no cervical adenopathy  Neurological: She is alert and oriented to person, place, and time  Skin: Skin is warm and dry  Psychiatric: She has a normal mood and affect   Her behavior is normal        Lab Results   Component Value Date     194 5 (H) 10/18/2018     Lab Results   Component Value Date    BUN 12 10/18/2018    CREATININE 0 76 10/18/2018    EGFR 81 10/18/2018     Lab Results   Component Value Date    WBC 9 89 10/18/2018    HGB 10 3 (L) 10/18/2018    HCT 33 0 (L) 10/18/2018    MCV 77 (L) 10/18/2018     10/18/2018     Lab Results   Component Value Date    NEUTROABS 6 58 10/18/2018

## 2018-11-26 ENCOUNTER — TELEPHONE (OUTPATIENT)
Dept: NON INVASIVE DIAGNOSTICS | Facility: HOSPITAL | Age: 68
End: 2018-11-26

## 2018-11-28 ENCOUNTER — TELEPHONE (OUTPATIENT)
Dept: SURGERY | Facility: HOSPITAL | Age: 68
End: 2018-11-28

## 2018-11-29 ENCOUNTER — HOSPITAL ENCOUNTER (OUTPATIENT)
Dept: INTERVENTIONAL RADIOLOGY/VASCULAR | Facility: HOSPITAL | Age: 68
Discharge: HOME | End: 2018-11-29
Admitting: PHYSICIAN ASSISTANT
Payer: MEDICARE

## 2018-11-29 VITALS
HEIGHT: 64 IN | DIASTOLIC BLOOD PRESSURE: 63 MMHG | BODY MASS INDEX: 28.89 KG/M2 | OXYGEN SATURATION: 100 % | HEART RATE: 81 BPM | TEMPERATURE: 98 F | WEIGHT: 169.25 LBS | SYSTOLIC BLOOD PRESSURE: 113 MMHG | RESPIRATION RATE: 16 BRPM

## 2018-11-29 DIAGNOSIS — C56.9 MALIGNANT NEOPLASM OF OVARY, UNSPECIFIED LATERALITY (HCC): ICD-10-CM

## 2018-11-29 LAB
ANION GAP SERPL CALCULATED.3IONS-SCNC: 10 MMOL/L (ref 4–13)
BUN SERPL-MCNC: 13 MG/DL (ref 5–25)
CALCIUM SERPL-MCNC: 8.9 MG/DL (ref 8.3–10.1)
CHLORIDE SERPL-SCNC: 105 MMOL/L (ref 100–108)
CO2 SERPL-SCNC: 26 MMOL/L (ref 21–32)
CREAT SERPL-MCNC: 0.73 MG/DL (ref 0.6–1.3)
ERYTHROCYTE [DISTWIDTH] IN BLOOD BY AUTOMATED COUNT: 18.8 % (ref 11.6–15.1)
GFR SERPL CREATININE-BSD FRML MDRD: 85 ML/MIN/1.73SQ M
GLUCOSE P FAST SERPL-MCNC: 112 MG/DL (ref 65–99)
GLUCOSE SERPL-MCNC: 112 MG/DL (ref 65–140)
GLUCOSE SERPL-MCNC: 118 MG/DL (ref 65–140)
HCT VFR BLD AUTO: 28.8 % (ref 34.8–46.1)
HGB BLD-MCNC: 8.9 G/DL (ref 11.5–15.4)
INR PPP: 1 (ref 0.86–1.17)
MCH RBC QN AUTO: 24.3 PG (ref 26.8–34.3)
MCHC RBC AUTO-ENTMCNC: 30.9 G/DL (ref 31.4–37.4)
MCV RBC AUTO: 79 FL (ref 82–98)
PLATELET # BLD AUTO: 477 THOUSANDS/UL (ref 149–390)
PMV BLD AUTO: 12.1 FL (ref 8.9–12.7)
POTASSIUM SERPL-SCNC: 3.8 MMOL/L (ref 3.5–5.3)
PROTHROMBIN TIME: 13.1 SECONDS (ref 11.8–14.2)
RBC # BLD AUTO: 3.66 MILLION/UL (ref 3.81–5.12)
SODIUM SERPL-SCNC: 141 MMOL/L (ref 136–145)
WBC # BLD AUTO: 10.26 THOUSAND/UL (ref 4.31–10.16)

## 2018-11-29 PROCEDURE — 36561 INSERT TUNNELED CV CATH: CPT

## 2018-11-29 PROCEDURE — 76937 US GUIDE VASCULAR ACCESS: CPT

## 2018-11-29 PROCEDURE — 85610 PROTHROMBIN TIME: CPT | Performed by: RADIOLOGY

## 2018-11-29 PROCEDURE — 85027 COMPLETE CBC AUTOMATED: CPT | Performed by: RADIOLOGY

## 2018-11-29 PROCEDURE — 36561 INSERT TUNNELED CV CATH: CPT | Performed by: RADIOLOGY

## 2018-11-29 PROCEDURE — 80048 BASIC METABOLIC PNL TOTAL CA: CPT | Performed by: RADIOLOGY

## 2018-11-29 PROCEDURE — 82948 REAGENT STRIP/BLOOD GLUCOSE: CPT

## 2018-11-29 PROCEDURE — 77001 FLUOROGUIDE FOR VEIN DEVICE: CPT | Performed by: RADIOLOGY

## 2018-11-29 PROCEDURE — 77001 FLUOROGUIDE FOR VEIN DEVICE: CPT

## 2018-11-29 PROCEDURE — 99152 MOD SED SAME PHYS/QHP 5/>YRS: CPT | Performed by: RADIOLOGY

## 2018-11-29 PROCEDURE — 76937 US GUIDE VASCULAR ACCESS: CPT | Performed by: RADIOLOGY

## 2018-11-29 PROCEDURE — C1788 PORT, INDWELLING, IMP: HCPCS

## 2018-11-29 RX ORDER — HEPARIN SODIUM (PORCINE) LOCK FLUSH IV SOLN 100 UNIT/ML 100 UNIT/ML
SOLUTION INTRAVENOUS CODE/TRAUMA/SEDATION MEDICATION
Status: COMPLETED | OUTPATIENT
Start: 2018-11-29 | End: 2018-11-29

## 2018-11-29 RX ORDER — MIDAZOLAM HYDROCHLORIDE 1 MG/ML
INJECTION INTRAMUSCULAR; INTRAVENOUS CODE/TRAUMA/SEDATION MEDICATION
Status: COMPLETED | OUTPATIENT
Start: 2018-11-29 | End: 2018-11-29

## 2018-11-29 RX ORDER — HYDROCODONE BITARTRATE AND ACETAMINOPHEN 5; 325 MG/1; MG/1
1 TABLET ORAL EVERY 6 HOURS PRN
Status: DISCONTINUED | OUTPATIENT
Start: 2018-11-29 | End: 2018-12-03 | Stop reason: HOSPADM

## 2018-11-29 RX ORDER — DIPHENHYDRAMINE HYDROCHLORIDE 50 MG/ML
INJECTION INTRAMUSCULAR; INTRAVENOUS CODE/TRAUMA/SEDATION MEDICATION
Status: COMPLETED | OUTPATIENT
Start: 2018-11-29 | End: 2018-11-29

## 2018-11-29 RX ORDER — FENTANYL CITRATE 50 UG/ML
INJECTION, SOLUTION INTRAMUSCULAR; INTRAVENOUS CODE/TRAUMA/SEDATION MEDICATION
Status: COMPLETED | OUTPATIENT
Start: 2018-11-29 | End: 2018-11-29

## 2018-11-29 RX ORDER — ACETAMINOPHEN, ASPIRIN AND CAFFEINE 250; 250; 65 MG/1; MG/1; MG/1
1 TABLET, FILM COATED ORAL EVERY 6 HOURS PRN
COMMUNITY

## 2018-11-29 RX ORDER — LIDOCAINE HYDROCHLORIDE AND EPINEPHRINE 10; 10 MG/ML; UG/ML
INJECTION, SOLUTION INFILTRATION; PERINEURAL CODE/TRAUMA/SEDATION MEDICATION
Status: COMPLETED | OUTPATIENT
Start: 2018-11-29 | End: 2018-11-29

## 2018-11-29 RX ADMIN — FENTANYL CITRATE 50 MCG: 50 INJECTION, SOLUTION INTRAMUSCULAR; INTRAVENOUS at 14:01

## 2018-11-29 RX ADMIN — FENTANYL CITRATE 50 MCG: 50 INJECTION, SOLUTION INTRAMUSCULAR; INTRAVENOUS at 13:58

## 2018-11-29 RX ADMIN — LIDOCAINE HYDROCHLORIDE,EPINEPHRINE BITARTRATE 5 ML: 10; .01 INJECTION, SOLUTION INFILTRATION; PERINEURAL at 13:58

## 2018-11-29 RX ADMIN — FENTANYL CITRATE 50 MCG: 50 INJECTION, SOLUTION INTRAMUSCULAR; INTRAVENOUS at 13:42

## 2018-11-29 RX ADMIN — MIDAZOLAM HYDROCHLORIDE 1 MG: 1 INJECTION, SOLUTION INTRAMUSCULAR; INTRAVENOUS at 13:42

## 2018-11-29 RX ADMIN — HEPARIN SODIUM (PORCINE) LOCK FLUSH IV SOLN 100 UNIT/ML 300 UNITS: 100 SOLUTION at 14:09

## 2018-11-29 RX ADMIN — MIDAZOLAM HYDROCHLORIDE 1 MG: 1 INJECTION, SOLUTION INTRAMUSCULAR; INTRAVENOUS at 13:58

## 2018-11-29 RX ADMIN — LIDOCAINE HYDROCHLORIDE,EPINEPHRINE BITARTRATE 10 ML: 10; .01 INJECTION, SOLUTION INFILTRATION; PERINEURAL at 14:01

## 2018-11-29 RX ADMIN — DIPHENHYDRAMINE HYDROCHLORIDE 50 MG: 50 INJECTION, SOLUTION INTRAMUSCULAR; INTRAVENOUS at 14:02

## 2018-11-29 NOTE — DISCHARGE INSTRUCTIONS
Implanted Venous Access Port     WHAT YOU NEED TO KNOW:   An implanted venous access port is a device used to give treatments and take blood  It may also be called a central venous access device (CVAD)  The port is a small container that is placed under your skin, usually in your upper chest  The port is attached to a catheter that enters a large vein  DISCHARGE INSTRUCTIONS:   Resume your normal diet  Small sips of flat soda will help with mild nausea  Prevent an infection:   · Wash your hands often  Use soap and water  Clean your hands before and after you care for your port  Remind everyone who cares for your port to wash their hands  · Check your skin for infection every day  Look for redness, swelling, or fluid oozing from the port site  Care for your port:   1  You may shower beginning 48 hours after procedure  2  Change dressing if it becomes wet  3  Remove dressing after 24 hours  Leave glue in place  4  It is normal for some bruising to occur  5  Use Tylenol for pain  6  Limit use of arm on the side that your port was placed  Lift nothing heavier than 5 pounds for 1 week, and then gradually increase activity as tolerated  7  DO NOT apply ointment, lotion or cream to port site until incision is healed  Allow glue to fall off  DO NOT attempt to peel glue from skin even it it begins to flake  8, After the port incision is healed you may swim, bathe  Notify the Interventional Radiologist if you have any of the followin  Fever above 101 F    2  Increased redness or swelling after 1st day  3  Increased pain after 1st day  4  Any sign of infection (drainage from port site, skin separation, hot to touch)  5  Persistent nausea or vomiting  Contact Interventional Radiology at [phone] [facility] PATIENTS: Contact Interventional Radiology at [phone]) ([address]: Contact Interventional Radiology at [phone])

## 2018-11-29 NOTE — BRIEF OP NOTE (RAD/CATH)
IR PORT PLACEMENT  Procedure Note    PATIENT NAME: Karla Code  : 1950  MRN: [id number]     Pre-op Diagnosis:   1  Malignant neoplasm of ovary, unspecified laterality (HCC)      Post-op Diagnosis:   1  Malignant neoplasm of ovary, unspecified laterality [facility])        Surgeon:   Arpan Monae MD  Assistants:     No qualified resident was available, Resident is only observing    Estimated Blood Loss: minimal     Findings:     8 Prydeinig right IJ dignity port placed with US and fluoroscopic guidance  Excellent bidirectional flow  Flushed with heparin  Port is ready for use       Specimens: none    Complications:  none    Anesthesia: Conscious sedation and Gisella Pineda MD     Date: 2018  Time: 2:18 PM

## 2018-11-30 ENCOUNTER — HOSPITAL ENCOUNTER (OUTPATIENT)
Dept: INFUSION CENTER | Facility: CLINIC | Age: 68
Discharge: HOME | End: 2018-11-30
Payer: MEDICARE

## 2018-11-30 LAB
ALBUMIN SERPL BCP-MCNC: 2.3 G/DL (ref 3.5–5)
ALP SERPL-CCNC: 92 U/L (ref 46–116)
ALT SERPL W P-5'-P-CCNC: 18 U/L (ref 12–78)
ANION GAP SERPL CALCULATED.3IONS-SCNC: 9 MMOL/L (ref 4–13)
AST SERPL W P-5'-P-CCNC: 27 U/L (ref 5–45)
BASOPHILS # BLD AUTO: 0.07 THOUSANDS/ΜL (ref 0–0.1)
BASOPHILS NFR BLD AUTO: 1 % (ref 0–1)
BILIRUB SERPL-MCNC: 0.2 MG/DL (ref 0.2–1)
BUN SERPL-MCNC: 14 MG/DL (ref 5–25)
CALCIUM SERPL-MCNC: 8.7 MG/DL (ref 8.3–10.1)
CHLORIDE SERPL-SCNC: 107 MMOL/L (ref 100–108)
CO2 SERPL-SCNC: 26 MMOL/L (ref 21–32)
CREAT SERPL-MCNC: 0.79 MG/DL (ref 0.6–1.3)
CREAT UR-MCNC: 300 MG/DL
EOSINOPHIL # BLD AUTO: 0.2 THOUSAND/ΜL (ref 0–0.61)
EOSINOPHIL NFR BLD AUTO: 2 % (ref 0–6)
ERYTHROCYTE [DISTWIDTH] IN BLOOD BY AUTOMATED COUNT: 18.7 % (ref 11.6–15.1)
GFR SERPL CREATININE-BSD FRML MDRD: 77 ML/MIN/1.73SQ M
GLUCOSE SERPL-MCNC: 120 MG/DL (ref 65–140)
HCT VFR BLD AUTO: 25.8 % (ref 34.8–46.1)
HGB BLD-MCNC: 8 G/DL (ref 11.5–15.4)
IMM GRANULOCYTES # BLD AUTO: 0.04 THOUSAND/UL (ref 0–0.2)
IMM GRANULOCYTES NFR BLD AUTO: 0 % (ref 0–2)
LYMPHOCYTES # BLD AUTO: 1.89 THOUSANDS/ΜL (ref 0.6–4.47)
LYMPHOCYTES NFR BLD AUTO: 20 % (ref 14–44)
MAGNESIUM SERPL-MCNC: 2.1 MG/DL (ref 1.6–2.6)
MCH RBC QN AUTO: 24.5 PG (ref 26.8–34.3)
MCHC RBC AUTO-ENTMCNC: 31 G/DL (ref 31.4–37.4)
MCV RBC AUTO: 79 FL (ref 82–98)
MONOCYTES # BLD AUTO: 0.72 THOUSAND/ΜL (ref 0.17–1.22)
MONOCYTES NFR BLD AUTO: 8 % (ref 4–12)
NEUTROPHILS # BLD AUTO: 6.6 THOUSANDS/ΜL (ref 1.85–7.62)
NEUTS SEG NFR BLD AUTO: 69 % (ref 43–75)
NRBC BLD AUTO-RTO: 0 /100 WBCS
PLATELET # BLD AUTO: 419 THOUSANDS/UL (ref 149–390)
PMV BLD AUTO: 12.6 FL (ref 8.9–12.7)
POTASSIUM SERPL-SCNC: 3.9 MMOL/L (ref 3.5–5.3)
PROT SERPL-MCNC: 6.1 G/DL (ref 6.4–8.2)
PROT UR-MCNC: 39 MG/DL
PROT/CREAT UR: 0.13 MG/G{CREAT} (ref 0–0.1)
RBC # BLD AUTO: 3.27 MILLION/UL (ref 3.81–5.12)
SODIUM SERPL-SCNC: 142 MMOL/L (ref 136–145)
WBC # BLD AUTO: 9.52 THOUSAND/UL (ref 4.31–10.16)

## 2018-11-30 PROCEDURE — 80053 COMPREHEN METABOLIC PANEL: CPT | Performed by: PHYSICIAN ASSISTANT

## 2018-11-30 PROCEDURE — 82570 ASSAY OF URINE CREATININE: CPT | Performed by: PHYSICIAN ASSISTANT

## 2018-11-30 PROCEDURE — 85025 COMPLETE CBC W/AUTO DIFF WBC: CPT | Performed by: PHYSICIAN ASSISTANT

## 2018-11-30 PROCEDURE — 83735 ASSAY OF MAGNESIUM: CPT | Performed by: PHYSICIAN ASSISTANT

## 2018-11-30 PROCEDURE — 86304 IMMUNOASSAY TUMOR CA 125: CPT | Performed by: PHYSICIAN ASSISTANT

## 2018-11-30 PROCEDURE — 84156 ASSAY OF PROTEIN URINE: CPT | Performed by: PHYSICIAN ASSISTANT

## 2018-11-30 RX ORDER — TRAMADOL HYDROCHLORIDE 50 MG/1
50 TABLET ORAL EVERY 6 HOURS PRN
COMMUNITY
End: 2019-02-05

## 2018-11-30 RX ADMIN — HEPARIN 300 UNITS: 100 SYRINGE at 10:59

## 2018-11-30 NOTE — PLAN OF CARE
Problem: Potential for Falls  Goal: Patient will remain free of falls  INTERVENTIONS:  - Assess patient frequently for physical needs  -  Identify cognitive and physical deficits and behaviors that affect risk of falls    -  [place] fall precautions as indicated by assessment   - Educate patient/family on patient safety including physical limitations  - Instruct patient to call for assistance with activity based on assessment  - Modify environment to reduce risk of injury  - Consider OT/PT consult to assist with strengthening/mobility   Outcome: Progressing

## 2018-11-30 NOTE — PROGRESS NOTES
Central labs drawn via port  Catheter maintenance performed per protocol without complications  Pt provided urine sample per physician's order  Pt discharged in stable condition and is aware of next appointment  AVS provided

## 2018-12-02 LAB — CANCER AG125 SERPL-ACNC: 135 U/ML (ref 0–30)

## 2018-12-03 RX ORDER — SODIUM CHLORIDE 9 MG/ML
20 INJECTION, SOLUTION INTRAVENOUS CONTINUOUS
Status: DISCONTINUED | OUTPATIENT
Start: 2018-12-04 | End: 2018-12-07 | Stop reason: HOSPADM

## 2018-12-03 RX ORDER — PALONOSETRON 0.05 MG/ML
0.25 INJECTION, SOLUTION INTRAVENOUS ONCE
Status: COMPLETED | OUTPATIENT
Start: 2018-12-04 | End: 2018-12-04

## 2018-12-04 ENCOUNTER — HOSPITAL ENCOUNTER (OUTPATIENT)
Dept: INFUSION CENTER | Facility: CLINIC | Age: 68
Discharge: HOME | End: 2018-12-04
Payer: MEDICARE

## 2018-12-04 ENCOUNTER — TELEPHONE (OUTPATIENT)
Dept: GYNECOLOGIC ONCOLOGY | Facility: CLINIC | Age: 68
End: 2018-12-04

## 2018-12-04 VITALS
SYSTOLIC BLOOD PRESSURE: 120 MMHG | DIASTOLIC BLOOD PRESSURE: 57 MMHG | RESPIRATION RATE: 18 BRPM | TEMPERATURE: 97.7 F | WEIGHT: 173.28 LBS | HEIGHT: 65 IN | BODY MASS INDEX: 28.87 KG/M2 | HEART RATE: 92 BPM

## 2018-12-04 DIAGNOSIS — D64.9 ANEMIA, UNSPECIFIED TYPE: Primary | ICD-10-CM

## 2018-12-04 DIAGNOSIS — C56.9 MALIGNANT NEOPLASM OF OVARY, UNSPECIFIED LATERALITY (HCC): ICD-10-CM

## 2018-12-04 DIAGNOSIS — D64.9 ANEMIA, UNSPECIFIED TYPE: ICD-10-CM

## 2018-12-04 LAB
ABO GROUP BLD: NORMAL
BASOPHILS # BLD AUTO: 0.08 THOUSANDS/ΜL (ref 0–0.1)
BASOPHILS NFR BLD AUTO: 1 % (ref 0–1)
BLD GP AB SCN SERPL QL: NEGATIVE
EOSINOPHIL # BLD AUTO: 0.19 THOUSAND/ΜL (ref 0–0.61)
EOSINOPHIL NFR BLD AUTO: 2 % (ref 0–6)
ERYTHROCYTE [DISTWIDTH] IN BLOOD BY AUTOMATED COUNT: 18.5 % (ref 11.6–15.1)
HCT VFR BLD AUTO: 24.6 % (ref 34.8–46.1)
HGB BLD-MCNC: 7.6 G/DL (ref 11.5–15.4)
IMM GRANULOCYTES # BLD AUTO: 0.04 THOUSAND/UL (ref 0–0.2)
IMM GRANULOCYTES NFR BLD AUTO: 0 % (ref 0–2)
LYMPHOCYTES # BLD AUTO: 1.83 THOUSANDS/ΜL (ref 0.6–4.47)
LYMPHOCYTES NFR BLD AUTO: 16 % (ref 14–44)
MCH RBC QN AUTO: 24.1 PG (ref 26.8–34.3)
MCHC RBC AUTO-ENTMCNC: 30.9 G/DL (ref 31.4–37.4)
MCV RBC AUTO: 78 FL (ref 82–98)
MONOCYTES # BLD AUTO: 0.76 THOUSAND/ΜL (ref 0.17–1.22)
MONOCYTES NFR BLD AUTO: 7 % (ref 4–12)
NEUTROPHILS # BLD AUTO: 8.3 THOUSANDS/ΜL (ref 1.85–7.62)
NEUTS SEG NFR BLD AUTO: 74 % (ref 43–75)
NRBC BLD AUTO-RTO: 0 /100 WBCS
PLATELET # BLD AUTO: 395 THOUSANDS/UL (ref 149–390)
PMV BLD AUTO: 11.9 FL (ref 8.9–12.7)
RBC # BLD AUTO: 3.15 MILLION/UL (ref 3.81–5.12)
RH BLD: POSITIVE
SPECIMEN EXPIRATION DATE: NORMAL
WBC # BLD AUTO: 11.2 THOUSAND/UL (ref 4.31–10.16)

## 2018-12-04 PROCEDURE — 96415 CHEMO IV INFUSION ADDL HR: CPT

## 2018-12-04 PROCEDURE — 96417 CHEMO IV INFUS EACH ADDL SEQ: CPT

## 2018-12-04 PROCEDURE — 86920 COMPATIBILITY TEST SPIN: CPT

## 2018-12-04 PROCEDURE — 86900 BLOOD TYPING SEROLOGIC ABO: CPT

## 2018-12-04 PROCEDURE — 85025 COMPLETE CBC W/AUTO DIFF WBC: CPT | Performed by: PHYSICIAN ASSISTANT

## 2018-12-04 PROCEDURE — 96377 APPLICATON ON-BODY INJECTOR: CPT

## 2018-12-04 PROCEDURE — 86901 BLOOD TYPING SEROLOGIC RH(D): CPT

## 2018-12-04 PROCEDURE — 96375 TX/PRO/DX INJ NEW DRUG ADDON: CPT

## 2018-12-04 PROCEDURE — 86850 RBC ANTIBODY SCREEN: CPT

## 2018-12-04 PROCEDURE — 96367 TX/PROPH/DG ADDL SEQ IV INF: CPT

## 2018-12-04 PROCEDURE — 96413 CHEMO IV INFUSION 1 HR: CPT

## 2018-12-04 RX ORDER — LIDOCAINE AND PRILOCAINE 25; 25 MG/G; MG/G
CREAM TOPICAL
Qty: 30 G | Refills: 1 | Status: SHIPPED | OUTPATIENT
Start: 2018-12-04 | End: 2019-12-31 | Stop reason: HOSPADM

## 2018-12-04 RX ADMIN — BEVACIZUMAB 720 MG: 400 INJECTION, SOLUTION INTRAVENOUS at 15:52

## 2018-12-04 RX ADMIN — SODIUM CHLORIDE 20 ML/HR: 0.9 INJECTION, SOLUTION INTRAVENOUS at 09:07

## 2018-12-04 RX ADMIN — PEGFILGRASTIM 6 MG: KIT SUBCUTANEOUS at 17:20

## 2018-12-04 RX ADMIN — PACLITAXEL 312 MG: 6 INJECTION, SOLUTION INTRAVENOUS at 11:44

## 2018-12-04 RX ADMIN — FAMOTIDINE 20 MG: 10 INJECTION, SOLUTION INTRAVENOUS at 09:38

## 2018-12-04 RX ADMIN — CARBOPLATIN 550 MG: 10 INJECTION, SOLUTION INTRAVENOUS at 14:47

## 2018-12-04 RX ADMIN — PALONOSETRON 0.25 MG: 0.25 INJECTION, SOLUTION INTRAVENOUS at 10:17

## 2018-12-04 RX ADMIN — DEXAMETHASONE SODIUM PHOSPHATE 20 MG: 10 INJECTION, SOLUTION INTRAMUSCULAR; INTRAVENOUS at 09:05

## 2018-12-04 RX ADMIN — SODIUM CHLORIDE 150 MG: 0.9 INJECTION, SOLUTION INTRAVENOUS at 10:56

## 2018-12-04 RX ADMIN — DIPHENHYDRAMINE HYDROCHLORIDE 25 MG: 50 INJECTION, SOLUTION INTRAMUSCULAR; INTRAVENOUS at 10:17

## 2018-12-04 RX ADMIN — HEPARIN 300 UNITS: 100 SYRINGE at 17:19

## 2018-12-04 NOTE — PROGRESS NOTES
Spoke with Alessandra VALADEZ informed her pt hgb today on redraw 7 6  Per Verlan Rubinstein ok to proceed with treatment today   Pt to have type and screen drawn and transfused with 2 units RBC tomorrow 12/5/18

## 2018-12-04 NOTE — PROGRESS NOTES
Pt comes to infusion for iv chemotherapy  Pt tolerated transfusion without difficulty or complaint  Pt neulasta onpro applied to LUE   Pt avs provided

## 2018-12-04 NOTE — SOCIAL WORK
MSW met with pt today in the [facility] center for her first treatment  She is here with her daughter Teena Brown  Pt was pleasant and accepting of the visit  She reports that she is feeling tired and sore from her port placement last week, but otherwise is OK  She has pain in her stomach and a poor appetite, which she relates to issues with ascites  She has had it drained 3 times and struggled with side effects, so she decided she would not do that again  She is hoping that the chemo, as a side effect, will help clear up the ascites in her abdomen  She is very self aware of her health and managed her diabetes for years through her diet  She takes several supplements and was also doing fruit/vegetable juicing, and is considering transitioning to plant based diet  She lives at home with her , who has had multiple strokes in the past few years  They have custody of 3 grandchildren, ages 15, 6, and 10  She also owns and manages a  center  Her  is impaired from his strokes and requires assistance with his care needs, which pt primarily provides  Her daughter Teena Brown has applied for FMLA from work to be around to assist while pt is having treatment done  Pt describes a strong network of support from her family, friends, and employees  She just recently just came back from a [place] vacation with 15+ family members  She does not describe any needs at this time  She has very good insurance coverage through her 's half-way plan and says that she has not paid anything OOP at this point  Family will be providing transportation to all appointments  I did review the [employer] transportation system with pt, because her home situation is complicated and in the event that family is unable to provide transport she agrees to call me to coordinate [employer]   We reviewed the role of the cancer counselor, and MSW provided my contact information    She is agreeable to me meeting with her at her next scheduled infusion to check in and see how things are going  No further needs or concerns today

## 2018-12-04 NOTE — PLAN OF CARE
Problem: Potential for Falls  Goal: Patient will remain free of falls  INTERVENTIONS:  - Assess patient frequently for physical needs  -  Identify cognitive and physical deficits and behaviors that affect risk of falls    -  [place] fall precautions as indicated by assessment   - Educate patient/family on patient safety including physical limitations  - Instruct patient to call for assistance with activity based on assessment  - Modify environment to reduce risk of injury  - Consider OT/PT consult to assist with strengthening/mobility   Outcome: Progressing

## 2018-12-05 ENCOUNTER — HOSPITAL ENCOUNTER (OUTPATIENT)
Dept: INFUSION CENTER | Facility: CLINIC | Age: 68
Discharge: HOME | End: 2018-12-05
Payer: MEDICARE

## 2018-12-05 VITALS
DIASTOLIC BLOOD PRESSURE: 57 MMHG | TEMPERATURE: 98.5 F | SYSTOLIC BLOOD PRESSURE: 111 MMHG | RESPIRATION RATE: 16 BRPM | HEART RATE: 76 BPM | OXYGEN SATURATION: 95 %

## 2018-12-05 PROCEDURE — P9021 RED BLOOD CELLS UNIT: HCPCS

## 2018-12-05 PROCEDURE — 36430 TRANSFUSION BLD/BLD COMPNT: CPT

## 2018-12-05 RX ORDER — ACETAMINOPHEN 325 MG/1
650 TABLET ORAL ONCE
Status: COMPLETED | OUTPATIENT
Start: 2018-12-05 | End: 2018-12-05

## 2018-12-05 RX ORDER — SODIUM CHLORIDE 9 MG/ML
20 INJECTION, SOLUTION INTRAVENOUS CONTINUOUS
Status: DISCONTINUED | OUTPATIENT
Start: 2018-12-05 | End: 2018-12-08 | Stop reason: HOSPADM

## 2018-12-05 RX ORDER — DIPHENHYDRAMINE HCL 25 MG
25 TABLET ORAL ONCE
Status: COMPLETED | OUTPATIENT
Start: 2018-12-05 | End: 2018-12-05

## 2018-12-05 RX ADMIN — ACETAMINOPHEN 650 MG: 325 TABLET, FILM COATED ORAL at 09:22

## 2018-12-05 RX ADMIN — HEPARIN 300 UNITS: 100 SYRINGE at 15:17

## 2018-12-05 RX ADMIN — DIPHENHYDRAMINE HCL 25 MG: 25 TABLET ORAL at 09:22

## 2018-12-05 RX ADMIN — SODIUM CHLORIDE 20 ML/HR: 0.9 INJECTION, SOLUTION INTRAVENOUS at 09:18

## 2018-12-05 NOTE — PLAN OF CARE
Problem: Potential for Falls  Goal: Patient will remain free of falls  INTERVENTIONS:  - Assess patient frequently for physical needs  -  Identify cognitive and physical deficits and behaviors that affect risk of falls    -  [place] fall precautions as indicated by assessment   - Educate patient/family on patient safety including physical limitations  - Instruct patient to call for assistance with activity based on assessment  - Modify environment to reduce risk of injury  - Consider OT/PT consult to assist with strengthening/mobility   Outcome: Progressing

## 2018-12-05 NOTE — PROGRESS NOTES
At the end of pt's 2nd unit of blood, pt reported feeling "off"  VSS  Pt walked to bathroom, urinated, and reported feeling better  Pt tolerated 2 units PRBC  Pt's port de-accessed and pt discharged in stable condition  AVS provided and pt aware to return on Friday for blood work

## 2018-12-05 NOTE — PROGRESS NOTES
Pt offers no complaints  States that she tolerated yesterday's treatment without incident and did well overnight  Pt states that she has had a blood transfusion approximately 40 years ago and does not remember having any adverse reaction  Hgb 7 6  Pt to receive 2 units PRBC today

## 2018-12-06 LAB
ABO GROUP BLD BPU: NORMAL
ABO GROUP BLD BPU: NORMAL
BPU ID: NORMAL
BPU ID: NORMAL
CROSSMATCH: NORMAL
CROSSMATCH: NORMAL
UNIT DISPENSE STATUS: NORMAL
UNIT DISPENSE STATUS: NORMAL
UNIT PRODUCT CODE: NORMAL
UNIT PRODUCT CODE: NORMAL
UNIT RH: NORMAL
UNIT RH: NORMAL

## 2018-12-07 ENCOUNTER — HOSPITAL ENCOUNTER (OUTPATIENT)
Dept: INFUSION CENTER | Facility: CLINIC | Age: 68
Discharge: HOME | End: 2018-12-07
Payer: MEDICARE

## 2018-12-07 VITALS
SYSTOLIC BLOOD PRESSURE: 126 MMHG | DIASTOLIC BLOOD PRESSURE: 58 MMHG | TEMPERATURE: 98.1 F | HEART RATE: 84 BPM | RESPIRATION RATE: 20 BRPM

## 2018-12-07 LAB
ALBUMIN SERPL BCP-MCNC: 2.2 G/DL (ref 3.5–5)
ALP SERPL-CCNC: 109 U/L (ref 46–116)
ALT SERPL W P-5'-P-CCNC: 22 U/L (ref 12–78)
ANION GAP SERPL CALCULATED.3IONS-SCNC: 10 MMOL/L (ref 4–13)
AST SERPL W P-5'-P-CCNC: 45 U/L (ref 5–45)
BASOPHILS # BLD MANUAL: 0 THOUSAND/UL (ref 0–0.1)
BASOPHILS NFR MAR MANUAL: 0 % (ref 0–1)
BILIRUB SERPL-MCNC: 0.3 MG/DL (ref 0.2–1)
BUN SERPL-MCNC: 14 MG/DL (ref 5–25)
CALCIUM SERPL-MCNC: 8.3 MG/DL (ref 8.3–10.1)
CHLORIDE SERPL-SCNC: 105 MMOL/L (ref 100–108)
CO2 SERPL-SCNC: 24 MMOL/L (ref 21–32)
CREAT SERPL-MCNC: 0.66 MG/DL (ref 0.6–1.3)
EOSINOPHIL # BLD MANUAL: 0 THOUSAND/UL (ref 0–0.4)
EOSINOPHIL NFR BLD MANUAL: 0 % (ref 0–6)
ERYTHROCYTE [DISTWIDTH] IN BLOOD BY AUTOMATED COUNT: 17.9 % (ref 11.6–15.1)
GFR SERPL CREATININE-BSD FRML MDRD: 91 ML/MIN/1.73SQ M
GLUCOSE P FAST SERPL-MCNC: 92 MG/DL (ref 65–99)
GLUCOSE SERPL-MCNC: 92 MG/DL (ref 65–140)
HCT VFR BLD AUTO: 32.7 % (ref 34.8–46.1)
HGB BLD-MCNC: 10.5 G/DL (ref 11.5–15.4)
LYMPHOCYTES # BLD AUTO: 0.89 THOUSAND/UL (ref 0.6–4.47)
LYMPHOCYTES # BLD AUTO: 2 % (ref 14–44)
MAGNESIUM SERPL-MCNC: 1.9 MG/DL (ref 1.6–2.6)
MCH RBC QN AUTO: 25.5 PG (ref 26.8–34.3)
MCHC RBC AUTO-ENTMCNC: 32.1 G/DL (ref 31.4–37.4)
MCV RBC AUTO: 80 FL (ref 82–98)
MONOCYTES # BLD AUTO: 0.45 THOUSAND/UL (ref 0–1.22)
MONOCYTES NFR BLD: 1 % (ref 4–12)
NEUTROPHILS # BLD MANUAL: 43.24 THOUSAND/UL (ref 1.85–7.62)
NEUTS BAND NFR BLD MANUAL: 9 % (ref 0–8)
NEUTS SEG NFR BLD AUTO: 88 % (ref 43–75)
NRBC BLD AUTO-RTO: 0 /100 WBCS
PLATELET # BLD AUTO: 268 THOUSANDS/UL (ref 149–390)
PLATELET BLD QL SMEAR: ADEQUATE
PMV BLD AUTO: 12.6 FL (ref 8.9–12.7)
POTASSIUM SERPL-SCNC: 3.5 MMOL/L (ref 3.5–5.3)
PROT SERPL-MCNC: 6 G/DL (ref 6.4–8.2)
RBC # BLD AUTO: 4.11 MILLION/UL (ref 3.81–5.12)
SODIUM SERPL-SCNC: 139 MMOL/L (ref 136–145)
TOTAL CELLS COUNTED SPEC: 100
WBC # BLD AUTO: 44.58 THOUSAND/UL (ref 4.31–10.16)

## 2018-12-07 PROCEDURE — 83735 ASSAY OF MAGNESIUM: CPT | Performed by: PHYSICIAN ASSISTANT

## 2018-12-07 PROCEDURE — 80053 COMPREHEN METABOLIC PANEL: CPT | Performed by: PHYSICIAN ASSISTANT

## 2018-12-07 PROCEDURE — 85027 COMPLETE CBC AUTOMATED: CPT | Performed by: PHYSICIAN ASSISTANT

## 2018-12-07 PROCEDURE — 85007 BL SMEAR W/DIFF WBC COUNT: CPT | Performed by: PHYSICIAN ASSISTANT

## 2018-12-07 RX ADMIN — HEPARIN 300 UNITS: 100 SYRINGE at 11:19

## 2018-12-07 NOTE — PROGRESS NOTES
Pt here for central labs  Appears uncomfortable and states "not great" when asked how she is feeling  Pt reports feeling achey and like she has the flu since she got the Neulasta injection  Pt also reports feeling very fatigued and has shoulder pain across her back and that her abdomen feels distended  Notified Emory Mathews PA-C  Recommended pt take ibuprofen, tylenol, and prescribed Ultran for discomforts  Also recommended a paracentesis, but patient refuses one at this time  Pt states "they make me feel worse, not better"  Emory Clemente made aware  Pt encouraged to call the office if she changes her mind  Labs drawn from port and sent to the lab  Port flushed and hep-locked and de-accessed  Aware of next appointments  AVS declined

## 2018-12-10 ENCOUNTER — HOSPITAL ENCOUNTER (EMERGENCY)
Facility: HOSPITAL | Age: 68
Discharge: HOME | End: 2018-12-10
Attending: EMERGENCY MEDICINE | Admitting: EMERGENCY MEDICINE
Payer: MEDICARE

## 2018-12-10 ENCOUNTER — APPOINTMENT (EMERGENCY)
Dept: INTERVENTIONAL RADIOLOGY/VASCULAR | Facility: HOSPITAL | Age: 68
End: 2018-12-10
Attending: EMERGENCY MEDICINE
Payer: MEDICARE

## 2018-12-10 VITALS
OXYGEN SATURATION: 96 % | BODY MASS INDEX: 28.64 KG/M2 | RESPIRATION RATE: 12 BRPM | SYSTOLIC BLOOD PRESSURE: 148 MMHG | WEIGHT: 170.64 LBS | DIASTOLIC BLOOD PRESSURE: 70 MMHG | TEMPERATURE: 97.4 F | HEART RATE: 83 BPM

## 2018-12-10 DIAGNOSIS — R14.0 ABDOMINAL DISTENTION: ICD-10-CM

## 2018-12-10 DIAGNOSIS — R18.8 ASCITIC FLUID: Primary | ICD-10-CM

## 2018-12-10 DIAGNOSIS — C56.9 MALIGNANT NEOPLASM OF OVARY, UNSPECIFIED LATERALITY (HCC): Primary | ICD-10-CM

## 2018-12-10 LAB
ALBUMIN SERPL BCP-MCNC: 2.6 G/DL (ref 3.5–5)
ALP SERPL-CCNC: 147 U/L (ref 46–116)
ALT SERPL W P-5'-P-CCNC: 21 U/L (ref 12–78)
ANION GAP SERPL CALCULATED.3IONS-SCNC: 12 MMOL/L (ref 4–13)
AST SERPL W P-5'-P-CCNC: 40 U/L (ref 5–45)
ATRIAL RATE: 72 BPM
BASOPHILS # BLD MANUAL: 0 THOUSAND/UL (ref 0–0.1)
BASOPHILS NFR MAR MANUAL: 0 % (ref 0–1)
BILIRUB SERPL-MCNC: 0.3 MG/DL (ref 0.2–1)
BUN SERPL-MCNC: 13 MG/DL (ref 5–25)
CALCIUM SERPL-MCNC: 8.6 MG/DL (ref 8.3–10.1)
CHLORIDE SERPL-SCNC: 103 MMOL/L (ref 100–108)
CO2 SERPL-SCNC: 26 MMOL/L (ref 21–32)
CREAT SERPL-MCNC: 0.64 MG/DL (ref 0.6–1.3)
EOSINOPHIL # BLD MANUAL: 0.12 THOUSAND/UL (ref 0–0.4)
EOSINOPHIL NFR BLD MANUAL: 1 % (ref 0–6)
ERYTHROCYTE [DISTWIDTH] IN BLOOD BY AUTOMATED COUNT: 17.8 % (ref 11.6–15.1)
GFR SERPL CREATININE-BSD FRML MDRD: 92 ML/MIN/1.73SQ M
GLUCOSE SERPL-MCNC: 76 MG/DL (ref 65–140)
HCT VFR BLD AUTO: 35.6 % (ref 34.8–46.1)
HGB BLD-MCNC: 11.2 G/DL (ref 11.5–15.4)
LIPASE SERPL-CCNC: 96 U/L (ref 73–393)
LYMPHOCYTES # BLD AUTO: 26 % (ref 14–44)
LYMPHOCYTES # BLD AUTO: 3.18 THOUSAND/UL (ref 0.6–4.47)
MCH RBC QN AUTO: 24.9 PG (ref 26.8–34.3)
MCHC RBC AUTO-ENTMCNC: 31.5 G/DL (ref 31.4–37.4)
MCV RBC AUTO: 79 FL (ref 82–98)
MONOCYTES # BLD AUTO: 0.73 THOUSAND/UL (ref 0–1.22)
MONOCYTES NFR BLD: 6 % (ref 4–12)
NEUTROPHILS # BLD MANUAL: 7.7 THOUSAND/UL (ref 1.85–7.62)
NEUTS BAND NFR BLD MANUAL: 13 % (ref 0–8)
NEUTS SEG NFR BLD AUTO: 50 % (ref 43–75)
NRBC BLD AUTO-RTO: 0 /100 WBCS
NT-PROBNP SERPL-MCNC: 734 PG/ML
P AXIS: 40 DEGREES
PLATELET # BLD AUTO: 233 THOUSANDS/UL (ref 149–390)
PLATELET BLD QL SMEAR: ADEQUATE
PMV BLD AUTO: 12.4 FL (ref 8.9–12.7)
POTASSIUM SERPL-SCNC: 4 MMOL/L (ref 3.5–5.3)
PR INTERVAL: 132 MS
PROT SERPL-MCNC: 6.4 G/DL (ref 6.4–8.2)
QRS AXIS: 9 DEGREES
QRSD INTERVAL: 76 MS
QT INTERVAL: 432 MS
QTC INTERVAL: 473 MS
RBC # BLD AUTO: 4.5 MILLION/UL (ref 3.81–5.12)
SODIUM SERPL-SCNC: 141 MMOL/L (ref 136–145)
T WAVE AXIS: 14 DEGREES
TOTAL CELLS COUNTED SPEC: 100
TROPONIN I SERPL-MCNC: <0.02 NG/ML
VARIANT LYMPHS # BLD AUTO: 4 %
VENTRICULAR RATE: 72 BPM
WBC # BLD AUTO: 12.22 THOUSAND/UL (ref 4.31–10.16)

## 2018-12-10 PROCEDURE — 49083 ABD PARACENTESIS W/IMAGING: CPT

## 2018-12-10 PROCEDURE — 93005 ELECTROCARDIOGRAM TRACING: CPT

## 2018-12-10 PROCEDURE — 93010 ELECTROCARDIOGRAM REPORT: CPT | Performed by: INTERNAL MEDICINE

## 2018-12-10 PROCEDURE — 87205 SMEAR GRAM STAIN: CPT | Performed by: RADIOLOGY

## 2018-12-10 PROCEDURE — 85027 COMPLETE CBC AUTOMATED: CPT | Performed by: EMERGENCY MEDICINE

## 2018-12-10 PROCEDURE — 83880 ASSAY OF NATRIURETIC PEPTIDE: CPT | Performed by: EMERGENCY MEDICINE

## 2018-12-10 PROCEDURE — 99284 EMERGENCY DEPT VISIT MOD MDM: CPT

## 2018-12-10 PROCEDURE — 96375 TX/PRO/DX INJ NEW DRUG ADDON: CPT

## 2018-12-10 PROCEDURE — 36415 COLL VENOUS BLD VENIPUNCTURE: CPT | Performed by: EMERGENCY MEDICINE

## 2018-12-10 PROCEDURE — 85007 BL SMEAR W/DIFF WBC COUNT: CPT | Performed by: EMERGENCY MEDICINE

## 2018-12-10 PROCEDURE — 83690 ASSAY OF LIPASE: CPT | Performed by: EMERGENCY MEDICINE

## 2018-12-10 PROCEDURE — 96365 THER/PROPH/DIAG IV INF INIT: CPT

## 2018-12-10 PROCEDURE — 80053 COMPREHEN METABOLIC PANEL: CPT | Performed by: EMERGENCY MEDICINE

## 2018-12-10 PROCEDURE — 49083 ABD PARACENTESIS W/IMAGING: CPT | Performed by: RADIOLOGY

## 2018-12-10 PROCEDURE — 87070 CULTURE OTHR SPECIMN AEROBIC: CPT | Performed by: RADIOLOGY

## 2018-12-10 PROCEDURE — 84484 ASSAY OF TROPONIN QUANT: CPT | Performed by: EMERGENCY MEDICINE

## 2018-12-10 RX ORDER — HYDROMORPHONE HCL/PF 1 MG/ML
1 SYRINGE (ML) INJECTION ONCE
Status: COMPLETED | OUTPATIENT
Start: 2018-12-10 | End: 2018-12-10

## 2018-12-10 RX ORDER — ONDANSETRON 2 MG/ML
4 INJECTION INTRAMUSCULAR; INTRAVENOUS ONCE
Status: COMPLETED | OUTPATIENT
Start: 2018-12-10 | End: 2018-12-10

## 2018-12-10 RX ORDER — ALBUMIN (HUMAN) 12.5 G/50ML
50 SOLUTION INTRAVENOUS ONCE
Status: COMPLETED | OUTPATIENT
Start: 2018-12-10 | End: 2018-12-10

## 2018-12-10 RX ORDER — LIDOCAINE HYDROCHLORIDE 10 MG/ML
INJECTION, SOLUTION INFILTRATION; PERINEURAL CODE/TRAUMA/SEDATION MEDICATION
Status: COMPLETED | OUTPATIENT
Start: 2018-12-10 | End: 2018-12-10

## 2018-12-10 RX ADMIN — HYDROMORPHONE HYDROCHLORIDE 1 MG: 1 INJECTION, SOLUTION INTRAMUSCULAR; INTRAVENOUS; SUBCUTANEOUS at 14:15

## 2018-12-10 RX ADMIN — LIDOCAINE HYDROCHLORIDE 10 ML: 10 INJECTION, SOLUTION INFILTRATION; PERINEURAL at 16:38

## 2018-12-10 RX ADMIN — ONDANSETRON 4 MG: 2 INJECTION INTRAMUSCULAR; INTRAVENOUS at 14:15

## 2018-12-10 RX ADMIN — ALBUMIN HUMAN 50 G: 0.25 SOLUTION INTRAVENOUS at 17:38

## 2018-12-10 RX ADMIN — SODIUM CHLORIDE 1000 ML: 0.9 INJECTION, SOLUTION INTRAVENOUS at 14:10

## 2018-12-10 NOTE — ED PROVIDER NOTES
History  Chief Complaint   Patient presents with    Abdominal Pain     PTS FAMILY SAYS "SHES HERE FOR A PARACENTESIS AND FLUIDS, PCP CALLED" +N/V/D SINCE CHEMO, LAST CHEMO WAS TUESDAY 12/4     75 y o  F presents for paracentesis  Patient has a past medical history ovarian cancer and has required multiple paracentesis in the past   Patient had port placed recently, was advised by IR team to have paracentesis performed at that time, however patient refused  Patient now presents the emergency department under direction of her doctor to have a paracentesis performed because of abdominal distention  The last paracentesis she had performed was November 4th  Also having nausea, vomiting, difficulty tolerating fluid secondary to abdominal distension  Patient is also feeling edema in her bilateral lower extremities  No fevers or chills  Abdominal pain secondary to distension  Prior to Admission Medications   Prescriptions Last Dose Informant Patient Reported? Taking?    LORazepam (ATIVAN) 1 mg tablet  Self No No   Sig: Take 1 tablet (1 mg total) by mouth every 6 (six) hours as needed (nausea or anxiety)   aspirin-acetaminophen-caffeine (EXCEDRIN MIGRAINE) 250-250-65 MG per tablet   Yes No   Sig: Take 1 tablet by mouth every 6 (six) hours as needed for headaches   lidocaine-prilocaine (EMLA) cream   No No   Sig: Apply to port site prior to labs/chemo   ondansetron (ZOFRAN) 8 mg tablet  Self No No   Sig: Take 1 tablet (8 mg total) by mouth every 8 (eight) hours as needed for nausea or vomiting   traMADol (ULTRAM) 50 mg tablet   Yes No   Sig: Take 50 mg by mouth every 6 (six) hours as needed for moderate pain      Facility-Administered Medications: None       Past Medical History:   Diagnosis Date    Abdominal fluid collection     Asthma     Cancer ([facility] [address] )     ovaries    Diabetes mellitus ([facility] [address] )        Past Surgical History:   Procedure Laterality Date    CT GUIDED PARACENTESIS  11/6/2018    CT NEEDLE BIOPSY PERITONEUM  11/6/2018    ECTOPIC PREGNANCY SURGERY      ECTOPIC PREGNANCY SURGERY      IR PARACENTESIS  9/18/2018    IR PARACENTESIS  10/18/2018    IR PORT PLACEMENT  11/29/2018    UT COLONOSCOPY FLX DX W/COLLJ SPEC WHEN PFRMD N/A 9/25/2018    Procedure: EGD AND COLONOSCOPY;  Surgeon: Yuliana Sanchez MD;  Location: [facility]; Service: Gastroenterology    TONSILECTOMY AND ADNOIDECTOMY      TONSILLECTOMY         Family History   Problem Relation Age of Onset    Cirrhosis Mother     Colon cancer Mother     Leukemia Cousin     Diabetes Father      I have reviewed and agree with the history as documented  Social History   Substance Use Topics    Smoking status: Former Smoker    Smokeless tobacco: Never Used    Alcohol use Yes      Comment: occassionally        Review of Systems   Constitutional: Negative for chills, fatigue and fever  HENT: Negative for congestion and sore throat  Respiratory: Negative for cough, chest tightness and shortness of breath  Cardiovascular: Positive for leg swelling  Negative for chest pain and palpitations  Gastrointestinal: Positive for abdominal distention, abdominal pain, nausea and vomiting  Negative for blood in stool, constipation and diarrhea  Genitourinary: Negative for dysuria and flank pain  Musculoskeletal: Negative for back pain and neck pain  Skin: Negative for color change, rash and wound  Allergic/Immunologic: Negative for immunocompromised state  Neurological: Negative for dizziness, syncope, weakness, numbness and headaches  Physical Exam  Physical Exam   Constitutional: She is oriented to person, place, and time  She appears well-developed and well-nourished  No distress  HENT:   Head: Normocephalic and atraumatic  Eyes: Pupils are equal, round, and reactive to light  Conjunctivae and EOM are normal  Right eye exhibits no discharge  Left eye exhibits no discharge  No scleral icterus  Neck: Normal range of motion  Neck supple  No JVD present  Cardiovascular: Normal rate, regular rhythm, normal heart sounds and intact distal pulses  Exam reveals no gallop and no friction rub  No murmur heard  Pulmonary/Chest: Effort normal and breath sounds normal  No respiratory distress  She has no wheezes  She has no rales  She exhibits no tenderness  Abdominal: Soft  Bowel sounds are normal  She exhibits distension  There is tenderness (Upper more than lower)  There is no rebound and no guarding  Musculoskeletal: Normal range of motion  She exhibits edema ( bilateral)  She exhibits no tenderness or deformity  Neurological: She is alert and oriented to person, place, and time  No cranial nerve deficit  Skin: Skin is warm and dry  No rash noted  She is not diaphoretic  No erythema  No pallor  Psychiatric: She has a normal mood and affect  Her behavior is normal    Vitals reviewed        Vital Signs  ED Triage Vitals   Temperature Pulse Respirations Blood Pressure SpO2   12/10/18 1343 12/10/18 1343 12/10/18 1343 12/10/18 1343 12/10/18 1343   (!) 97 4 °F (36 3 °C) 86 15 135/76 96 %      Temp Source Heart Rate Source Patient Position - Orthostatic VS BP Location FiO2 (%)   12/10/18 1343 12/10/18 1343 12/10/18 1559 12/10/18 1343 --   Oral Monitor Lying Right arm       Pain Score       12/10/18 1343       7           Vitals:    12/10/18 1705 12/10/18 1706 12/10/18 1735 12/10/18 1911   BP:   135/71 148/70   Pulse: 80 79 80 83   Patient Position - Orthostatic VS:   Lying        Visual Acuity      ED Medications  Medications   HYDROmorphone (DILAUDID) injection 1 mg (1 mg Intravenous Given 12/10/18 1415)   ondansetron (ZOFRAN) injection 4 mg (4 mg Intravenous Given 12/10/18 1415)   sodium chloride 0 9 % bolus 1,000 mL (0 mL Intravenous Stopped 12/10/18 1410)   lidocaine (XYLOCAINE) 1 % injection (10 mL Infiltration Given 12/10/18 1638)   albumin human (FLEXBUMIN) 25 % injection 50 g (0 g Intravenous Stopped 12/10/18 1901) Diagnostic Studies  Results Reviewed     Procedure Component Value Units Date/Time    Body fluid culture and Gram stain [[id number]] Collected:  12/10/18 1654    Lab Status: In process Specimen: Body Fluid from Paracentesis Updated:  12/10/18 1719    CBC and differential [[id number]]  (Abnormal) Collected:  12/10/18 1410    Lab Status:  Final result Specimen:  Blood from Arm, Right Updated:  12/10/18 1455     WBC 12 22 (H) Thousand/uL      RBC 4 50 Million/uL      Hemoglobin 11 2 (L) g/dL      Hematocrit 35 6 %      MCV 79 (L) fL      MCH 24 9 (L) pg      MCHC 31 5 g/dL      RDW 17 8 (H) %      MPV 12 4 fL      Platelets 281 Thousands/uL      nRBC 0 /100 WBCs     Narrative: This is an appended report  These results have been appended to a previously verified report      Lipase [[id number]]  (Normal) Collected:  12/10/18 1410    Lab Status:  Final result Specimen:  Blood from Arm, Right Updated:  12/10/18 1438     Lipase 96 u/L     NT-BNP PRO [[id number]]  (Abnormal) Collected:  12/10/18 1410    Lab Status:  Final result Specimen:  Blood from Arm, Right Updated:  12/10/18 1438     NT-proBNP 734 (H) pg/mL     Troponin I [[id number]]  (Normal) Collected:  12/10/18 1410    Lab Status:  Final result Specimen:  Blood from Arm, Right Updated:  12/10/18 1436     Troponin I <0 02 ng/mL     Comprehensive metabolic panel [[id number]]  (Abnormal) Collected:  12/10/18 1410    Lab Status:  Final result Specimen:  Blood from Arm, Right Updated:  12/10/18 1431     Sodium 141 mmol/L      Potassium 4 0 mmol/L      Chloride 103 mmol/L      CO2 26 mmol/L      ANION GAP 12 mmol/L      BUN 13 mg/dL      Creatinine 0 64 mg/dL      Glucose 76 mg/dL      Calcium 8 6 mg/dL      AST 40 U/L      ALT 21 U/L      Alkaline Phosphatase 147 (H) U/L      Total Protein 6 4 g/dL      Albumin 2 6 (L) g/dL      Total Bilirubin 0 30 mg/dL      eGFR 92 ml/min/1 73sq m     Narrative:         National Kidney Disease Education Program recommendations are as follows:  GFR calculation is accurate only with a steady state creatinine  Chronic Kidney disease less than 60 ml/min/1 73 sq  meters  Kidney failure less than 15 ml/min/1 73 sq  meters  IR paracentesis    (Results Pending)              Procedures  Procedures       Phone Contacts  ED Phone Contact    ED Course  ED Course as of Dec 10 1953   Mon Dec 10, 2018   1456 Dilaudid was too strong for the patient, requesting less strong pain medication next time    1503 Call placed to IR in attempt to get paracentesis preformed  IR has a full schedule and will attempt to get to her tonight  Innapropriate to preform in the ED as it is therapeutic and patient already does not want to have this procedure preformed regularly because of the pain caused one time during drainage  Will not drain in the department and further deter her from having paracentecis preformed    961 1579 Advised daughter and patient of IRs schedule and they are upset at the timing needed to get the procedure done  Explained why paracentesis are not available as emergent procedures and advised them to call for a scheduled paracentesis in the future  1643 Patient is having paracentesis performed at this time    1854 Patient has returned from paracentesis  She feels well  She has 1 more bag of albumin to go  Then patient feels well for discharge and is ready to leave the hospital                                 MDM  Number of Diagnoses or Management Options  Abdominal distention:   Ascitic fluid:   Diagnosis management comments: Patient presents for a paracentesis to be performed  Patient does have OP orders for paracentesis, however she did not schedule this to be done  Instead presents asking for paracentesis  Abd labwork preformed, pain control, IR consult to have paracentesis preformed  IR performs paracentesis for the patient  She feels much improved after this  Received albumin after her paracentesis    Patient feels well and is ready for discharge  Given good return precautions in case of signs of infection or worsening condition  Amount and/or Complexity of Data Reviewed  Clinical lab tests: ordered and reviewed  Tests in the radiology section of CPT®: reviewed and ordered      CritCare Time    Disposition  Final diagnoses:   Ascitic fluid   Abdominal distention     Time reflects when diagnosis was documented in both MDM as applicable and the Disposition within this note     Time User Action Codes Description Comment    12/10/2018  7:38 PM [place], [place] L Add [R18 8] Ascitic fluid     12/10/2018  7:38 PM [place]mith, [place] Nereida L Add [R14 0] Abdominal distention       ED Disposition     ED Disposition Condition Comment    Discharge  [address] discharge to home/self care  Condition at discharge: Good        Follow-up Information     Follow up With Specialties Details Why Contact Info Additional [address] [facility] Emergency Department Emergency Medicine  If symptoms worsen: fever/chills, worsening abdominal pain, vomiting, etc [address] [address] [address], [place], [place],   Zenaida Potter MD Internal Medicine Schedule an appointment as soon as possible for a visit For follow up to ensure improvement, and for further testing and treatment as needed [address] [facility] [address] 2001       follow up w your oncologist               Patient's Medications   Discharge Prescriptions    No medications on file     No discharge procedures on file      ED Provider  Electronically Signed by           Agapito Shelby DO  12/10/18 2294

## 2018-12-10 NOTE — SEDATION DOCUMENTATION
6000 ml dark jadiel fluid removed  Pt requested that no more fluid be removed  Albumin ordered, body fluid culture sent

## 2018-12-11 NOTE — DISCHARGE INSTRUCTIONS
Abdominal Pain   AMBULATORY CARE:   Abdominal pain  can be dull, achy, or sharp  You may have pain in one area of your abdomen, or in your entire abdomen  Your pain may be caused by a condition such as constipation, food sensitivity or poisoning, infection, or a blockage  Abdominal pain can also be from a hernia, appendicitis, or an ulcer  Liver, gallbladder, or kidney conditions can also cause abdominal pain  The cause of your abdominal pain may be unknown  Seek care immediately if:   · You have new chest pain or shortness of breath  · You have pulsing pain in your upper abdomen or lower back that suddenly becomes constant  · Your pain is in the right lower abdominal area and worsens with movement  · You have a fever over 100 4°F (38°C) or shaking chills  · You are vomiting and cannot keep food or liquids down  · Your pain does not improve or gets worse over the next 8 to 12 hours  · You see blood in your vomit or bowel movements, or they look black and tarry  · Your skin or the whites of your eyes turn yellow  · You are a woman and have a large amount of vaginal bleeding that is not your monthly period  Contact your healthcare provider if:   · You have pain in your lower back  · You are a man and have pain in your testicles  · You have pain when you urinate  · You have questions or concerns about your condition or care  Treatment for abdominal pain  may include medicine to calm your stomach, prevent vomiting, or decrease pain  Follow up with your healthcare provider as directed:  Write down your questions so you remember to ask them during your visits  © 2017 [address]  Information is for End User's use only and may not be sold, redistributed or otherwise used for commercial purposes  All illustrations and images included in CareNotes® are the copyrighted property of [employer]  or Carlos Bhandari    The above information is an educational aid only  It is not intended as medical advice for individual conditions or treatments  Talk to your doctor, nurse or pharmacist before following any medical regimen to see if it is safe and effective for you  Abdominal Paracentesis     WHAT YOU NEED TO KNOW:   Abdominal paracentesis is a procedure to remove abnormal fluid buildup in your abdomen  Fluid builds up because of liver problems, such as swelling and scarring  Heart failure, kidney disease, a mass, or problems with your pancreas may also cause fluid buildup  DISCHARGE INSTRUCTIONS:     Follow up with your healthcare provider as directed: Write down your questions so you remember to ask them during your visits  Wound care: Remove dressing after 24 hours  Leave glue in place  Return to your normal activities    Contact Interventional Radiology at [phone] [place] PATIENTS: Contact Interventional Radiology at [phone]) Kym Cornejo PATIENTS: Contact Interventional Radiology at [phone]) if:  · You have a fever and your wound is red and swollen  · You have yellow, green, or bad-smelling discharge coming from your wound  · You have pain or swelling in your abdomen  · You have an upset stomach or you vomit  · You have sudden, sharp pain in your abdomen  · You urinate very little or not at all  · You feel confused and more tired than usual    · Your arm or leg feels warm, tender, and painful  It may look swollen and red  · You suddenly feel lightheaded and have trouble breathing

## 2018-12-13 LAB
BACTERIA SPEC BFLD CULT: NO GROWTH
GRAM STN SPEC: NORMAL
GRAM STN SPEC: NORMAL

## 2018-12-14 ENCOUNTER — ANESTHESIA (INPATIENT)
Dept: PERIOP | Facility: HOSPITAL | Age: 68
DRG: 329 | End: 2018-12-14
Payer: MEDICARE

## 2018-12-14 ENCOUNTER — APPOINTMENT (EMERGENCY)
Dept: CT IMAGING | Facility: HOSPITAL | Age: 68
DRG: 754 | End: 2018-12-14
Payer: MEDICARE

## 2018-12-14 ENCOUNTER — HOSPITAL ENCOUNTER (INPATIENT)
Facility: HOSPITAL | Age: 68
LOS: 15 days | DRG: 329 | End: 2018-12-29
Attending: OBSTETRICS & GYNECOLOGY | Admitting: OBSTETRICS & GYNECOLOGY
Payer: MEDICARE

## 2018-12-14 ENCOUNTER — ANESTHESIA EVENT (INPATIENT)
Dept: PERIOP | Facility: HOSPITAL | Age: 68
DRG: 329 | End: 2018-12-14
Payer: MEDICARE

## 2018-12-14 ENCOUNTER — HOSPITAL ENCOUNTER (INPATIENT)
Facility: HOSPITAL | Age: 68
LOS: 1 days | DRG: 754 | End: 2018-12-14
Attending: EMERGENCY MEDICINE | Admitting: INTERNAL MEDICINE
Payer: MEDICARE

## 2018-12-14 VITALS
DIASTOLIC BLOOD PRESSURE: 57 MMHG | OXYGEN SATURATION: 93 % | TEMPERATURE: 97.7 F | HEART RATE: 96 BPM | RESPIRATION RATE: 20 BRPM | SYSTOLIC BLOOD PRESSURE: 98 MMHG

## 2018-12-14 DIAGNOSIS — A08.4 VIRAL GASTROENTERITIS: Primary | ICD-10-CM

## 2018-12-14 DIAGNOSIS — R11.2 NAUSEA VOMITING AND DIARRHEA: ICD-10-CM

## 2018-12-14 DIAGNOSIS — R65.21 SEPTIC SHOCK (HCC): ICD-10-CM

## 2018-12-14 DIAGNOSIS — R19.7 NAUSEA VOMITING AND DIARRHEA: ICD-10-CM

## 2018-12-14 DIAGNOSIS — Z93.2 S/P ILEOSTOMY (HCC): ICD-10-CM

## 2018-12-14 DIAGNOSIS — K62.5 RECTAL BLEEDING: ICD-10-CM

## 2018-12-14 DIAGNOSIS — E11.9 DM TYPE 2 (DIABETES MELLITUS, TYPE 2) (HCC): ICD-10-CM

## 2018-12-14 DIAGNOSIS — R10.84 GENERALIZED ABDOMINAL PAIN: ICD-10-CM

## 2018-12-14 DIAGNOSIS — K63.1 BOWEL PERFORATION (HCC): Primary | ICD-10-CM

## 2018-12-14 DIAGNOSIS — A41.9 SEPTIC SHOCK (HCC): ICD-10-CM

## 2018-12-14 DIAGNOSIS — C56.9 MALIGNANT NEOPLASM OF OVARY, UNSPECIFIED LATERALITY (HCC): ICD-10-CM

## 2018-12-14 DIAGNOSIS — D72.829 LEUKOCYTOSIS, UNSPECIFIED TYPE: ICD-10-CM

## 2018-12-14 PROBLEM — K65.9 PERITONITIS (HCC): Status: ACTIVE | Noted: 2018-12-14

## 2018-12-14 PROBLEM — R10.9 ABDOMINAL PAIN: Status: ACTIVE | Noted: 2018-12-14

## 2018-12-14 LAB
ALBUMIN SERPL BCP-MCNC: 1.9 G/DL (ref 3.5–5)
ALBUMIN SERPL BCP-MCNC: 2.5 G/DL (ref 3.5–5)
ALP SERPL-CCNC: 108 U/L (ref 46–116)
ALP SERPL-CCNC: 63 U/L (ref 46–116)
ALT SERPL W P-5'-P-CCNC: 15 U/L (ref 12–78)
ALT SERPL W P-5'-P-CCNC: 19 U/L (ref 12–78)
ANION GAP SERPL CALCULATED.3IONS-SCNC: 12 MMOL/L (ref 4–13)
ANION GAP SERPL CALCULATED.3IONS-SCNC: 13 MMOL/L (ref 4–13)
ANISOCYTOSIS BLD QL SMEAR: PRESENT
APTT PPP: 38 SECONDS (ref 26–38)
APTT PPP: 44 SECONDS (ref 26–38)
AST SERPL W P-5'-P-CCNC: 26 U/L (ref 5–45)
AST SERPL W P-5'-P-CCNC: 79 U/L (ref 5–45)
ATRIAL RATE: 102 BPM
ATRIAL RATE: 104 BPM
ATRIAL RATE: 106 BPM
BASOPHILS # BLD MANUAL: 0 THOUSAND/UL (ref 0–0.1)
BASOPHILS # BLD MANUAL: 0 THOUSAND/UL (ref 0–0.1)
BASOPHILS NFR MAR MANUAL: 0 % (ref 0–1)
BASOPHILS NFR MAR MANUAL: 0 % (ref 0–1)
BILIRUB SERPL-MCNC: 0.38 MG/DL (ref 0.2–1)
BILIRUB SERPL-MCNC: 0.8 MG/DL (ref 0.2–1)
BUN SERPL-MCNC: 25 MG/DL (ref 5–25)
BUN SERPL-MCNC: 26 MG/DL (ref 5–25)
BURR CELLS BLD QL SMEAR: PRESENT
CALCIUM SERPL-MCNC: 6.9 MG/DL (ref 8.3–10.1)
CALCIUM SERPL-MCNC: 8.6 MG/DL (ref 8.3–10.1)
CHLORIDE SERPL-SCNC: 105 MMOL/L (ref 100–108)
CHLORIDE SERPL-SCNC: 98 MMOL/L (ref 100–108)
CO2 SERPL-SCNC: 22 MMOL/L (ref 21–32)
CO2 SERPL-SCNC: 23 MMOL/L (ref 21–32)
CREAT SERPL-MCNC: 0.94 MG/DL (ref 0.6–1.3)
CREAT SERPL-MCNC: 1 MG/DL (ref 0.6–1.3)
EOSINOPHIL # BLD MANUAL: 0 THOUSAND/UL (ref 0–0.4)
EOSINOPHIL # BLD MANUAL: 0 THOUSAND/UL (ref 0–0.4)
EOSINOPHIL NFR BLD MANUAL: 0 % (ref 0–6)
EOSINOPHIL NFR BLD MANUAL: 0 % (ref 0–6)
ERYTHROCYTE [DISTWIDTH] IN BLOOD BY AUTOMATED COUNT: 18.1 % (ref 11.6–15.1)
ERYTHROCYTE [DISTWIDTH] IN BLOOD BY AUTOMATED COUNT: 18.7 % (ref 11.6–15.1)
GFR SERPL CREATININE-BSD FRML MDRD: 58 ML/MIN/1.73SQ M
GFR SERPL CREATININE-BSD FRML MDRD: 63 ML/MIN/1.73SQ M
GLUCOSE SERPL-MCNC: 127 MG/DL (ref 65–140)
GLUCOSE SERPL-MCNC: 243 MG/DL (ref 65–140)
HCT VFR BLD AUTO: 31.6 % (ref 34.8–46.1)
HCT VFR BLD AUTO: 39.1 % (ref 34.8–46.1)
HGB BLD-MCNC: 10.1 G/DL (ref 11.5–15.4)
HGB BLD-MCNC: 12.3 G/DL (ref 11.5–15.4)
HYPERCHROMIA BLD QL SMEAR: PRESENT
INR PPP: 1.49 (ref 0.86–1.17)
INR PPP: 1.66 (ref 0.86–1.17)
LACTATE SERPL-SCNC: 3.3 MMOL/L (ref 0.5–2)
LACTATE SERPL-SCNC: 4.4 MMOL/L (ref 0.5–2)
LACTATE SERPL-SCNC: 5 MMOL/L (ref 0.5–2)
LIPASE SERPL-CCNC: 23 U/L (ref 73–393)
LYMPHOCYTES # BLD AUTO: 0.18 THOUSAND/UL (ref 0.6–4.47)
LYMPHOCYTES # BLD AUTO: 0.81 THOUSAND/UL (ref 0.6–4.47)
LYMPHOCYTES # BLD AUTO: 1 % (ref 14–44)
LYMPHOCYTES # BLD AUTO: 5 % (ref 14–44)
MCH RBC QN AUTO: 24.7 PG (ref 26.8–34.3)
MCH RBC QN AUTO: 24.9 PG (ref 26.8–34.3)
MCHC RBC AUTO-ENTMCNC: 31.5 G/DL (ref 31.4–37.4)
MCHC RBC AUTO-ENTMCNC: 32 G/DL (ref 31.4–37.4)
MCV RBC AUTO: 78 FL (ref 82–98)
MCV RBC AUTO: 79 FL (ref 82–98)
METAMYELOCYTES NFR BLD MANUAL: 2 % (ref 0–1)
MONOCYTES # BLD AUTO: 0 THOUSAND/UL (ref 0–1.22)
MONOCYTES # BLD AUTO: 0.37 THOUSAND/UL (ref 0–1.22)
MONOCYTES NFR BLD: 0 % (ref 4–12)
MONOCYTES NFR BLD: 2 % (ref 4–12)
MYELOCYTES NFR BLD MANUAL: 1 % (ref 0–1)
NEUTROPHILS # BLD MANUAL: 14.79 THOUSAND/UL (ref 1.85–7.62)
NEUTROPHILS # BLD MANUAL: 16.85 THOUSAND/UL (ref 1.85–7.62)
NEUTS BAND NFR BLD MANUAL: 31 % (ref 0–8)
NEUTS BAND NFR BLD MANUAL: 9 % (ref 0–8)
NEUTS SEG NFR BLD AUTO: 61 % (ref 43–75)
NEUTS SEG NFR BLD AUTO: 82 % (ref 43–75)
NRBC BLD AUTO-RTO: 0 /100 WBCS
NRBC BLD AUTO-RTO: 0 /100 WBCS
OVALOCYTES BLD QL SMEAR: PRESENT
P AXIS: 45 DEGREES
P AXIS: 54 DEGREES
P AXIS: 60 DEGREES
PLATELET # BLD AUTO: 169 THOUSANDS/UL (ref 149–390)
PLATELET # BLD AUTO: 177 THOUSANDS/UL (ref 149–390)
PLATELET BLD QL SMEAR: ADEQUATE
PLATELET BLD QL SMEAR: ADEQUATE
PMV BLD AUTO: 12.4 FL (ref 8.9–12.7)
POIKILOCYTOSIS BLD QL SMEAR: PRESENT
POLYCHROMASIA BLD QL SMEAR: PRESENT
POTASSIUM SERPL-SCNC: 3.4 MMOL/L (ref 3.5–5.3)
POTASSIUM SERPL-SCNC: 3.4 MMOL/L (ref 3.5–5.3)
POTASSIUM SERPL-SCNC: 6.9 MMOL/L (ref 3.5–5.3)
PR INTERVAL: 130 MS
PR INTERVAL: 136 MS
PR INTERVAL: 140 MS
PROT SERPL-MCNC: 4.9 G/DL (ref 6.4–8.2)
PROT SERPL-MCNC: 6 G/DL (ref 6.4–8.2)
PROTHROMBIN TIME: 17.9 SECONDS (ref 11.8–14.2)
PROTHROMBIN TIME: 19.7 SECONDS (ref 11.8–14.2)
QRS AXIS: 30 DEGREES
QRS AXIS: 33 DEGREES
QRS AXIS: 43 DEGREES
QRSD INTERVAL: 78 MS
QRSD INTERVAL: 80 MS
QRSD INTERVAL: 82 MS
QT INTERVAL: 336 MS
QT INTERVAL: 344 MS
QT INTERVAL: 362 MS
QTC INTERVAL: 441 MS
QTC INTERVAL: 448 MS
QTC INTERVAL: 480 MS
RBC # BLD AUTO: 4.06 MILLION/UL (ref 3.81–5.12)
RBC # BLD AUTO: 4.98 MILLION/UL (ref 3.81–5.12)
RBC MORPH BLD: PRESENT
SODIUM SERPL-SCNC: 133 MMOL/L (ref 136–145)
SODIUM SERPL-SCNC: 140 MMOL/L (ref 136–145)
T WAVE AXIS: 58 DEGREES
T WAVE AXIS: 60 DEGREES
T WAVE AXIS: 75 DEGREES
TOTAL CELLS COUNTED SPEC: 100
VARIANT LYMPHS # BLD AUTO: 2 %
VARIANT LYMPHS # BLD AUTO: 4 %
VENTRICULAR RATE: 102 BPM
VENTRICULAR RATE: 104 BPM
VENTRICULAR RATE: 106 BPM
WBC # BLD AUTO: 16.25 THOUSAND/UL (ref 4.31–10.16)
WBC # BLD AUTO: 18.31 THOUSAND/UL (ref 4.31–10.16)

## 2018-12-14 PROCEDURE — 80053 COMPREHEN METABOLIC PANEL: CPT | Performed by: OBSTETRICS & GYNECOLOGY

## 2018-12-14 PROCEDURE — 82803 BLOOD GASES ANY COMBINATION: CPT

## 2018-12-14 PROCEDURE — 87077 CULTURE AEROBIC IDENTIFY: CPT | Performed by: OBSTETRICS & GYNECOLOGY

## 2018-12-14 PROCEDURE — 85610 PROTHROMBIN TIME: CPT | Performed by: OBSTETRICS & GYNECOLOGY

## 2018-12-14 PROCEDURE — 85007 BL SMEAR W/DIFF WBC COUNT: CPT | Performed by: OBSTETRICS & GYNECOLOGY

## 2018-12-14 PROCEDURE — 84132 ASSAY OF SERUM POTASSIUM: CPT

## 2018-12-14 PROCEDURE — 0WJG0ZZ INSPECTION OF PERITONEAL CAVITY, OPEN APPROACH: ICD-10-PCS | Performed by: OBSTETRICS & GYNECOLOGY

## 2018-12-14 PROCEDURE — 96361 HYDRATE IV INFUSION ADD-ON: CPT

## 2018-12-14 PROCEDURE — 87186 SC STD MICRODIL/AGAR DIL: CPT | Performed by: OBSTETRICS & GYNECOLOGY

## 2018-12-14 PROCEDURE — 86920 COMPATIBILITY TEST SPIN: CPT

## 2018-12-14 PROCEDURE — 30233N1 TRANSFUSION OF NONAUTOLOGOUS RED BLOOD CELLS INTO PERIPHERAL VEIN, PERCUTANEOUS APPROACH: ICD-10-PCS | Performed by: OBSTETRICS & GYNECOLOGY

## 2018-12-14 PROCEDURE — 86900 BLOOD TYPING SEROLOGIC ABO: CPT | Performed by: NURSE ANESTHETIST, CERTIFIED REGISTERED

## 2018-12-14 PROCEDURE — 85014 HEMATOCRIT: CPT

## 2018-12-14 PROCEDURE — 83605 ASSAY OF LACTIC ACID: CPT | Performed by: NURSE PRACTITIONER

## 2018-12-14 PROCEDURE — 82947 ASSAY GLUCOSE BLOOD QUANT: CPT

## 2018-12-14 PROCEDURE — 83690 ASSAY OF LIPASE: CPT | Performed by: NURSE PRACTITIONER

## 2018-12-14 PROCEDURE — 87186 SC STD MICRODIL/AGAR DIL: CPT | Performed by: NURSE PRACTITIONER

## 2018-12-14 PROCEDURE — 84295 ASSAY OF SERUM SODIUM: CPT

## 2018-12-14 PROCEDURE — 85027 COMPLETE CBC AUTOMATED: CPT | Performed by: NURSE PRACTITIONER

## 2018-12-14 PROCEDURE — 86901 BLOOD TYPING SEROLOGIC RH(D): CPT | Performed by: NURSE ANESTHETIST, CERTIFIED REGISTERED

## 2018-12-14 PROCEDURE — 87176 TISSUE HOMOGENIZATION CULTR: CPT | Performed by: OBSTETRICS & GYNECOLOGY

## 2018-12-14 PROCEDURE — 99233 SBSQ HOSP IP/OBS HIGH 50: CPT | Performed by: OBSTETRICS & GYNECOLOGY

## 2018-12-14 PROCEDURE — 87076 CULTURE ANAEROBE IDENT EACH: CPT | Performed by: OBSTETRICS & GYNECOLOGY

## 2018-12-14 PROCEDURE — 93005 ELECTROCARDIOGRAM TRACING: CPT

## 2018-12-14 PROCEDURE — 49000 EXPLORATION OF ABDOMEN: CPT | Performed by: OBSTETRICS & GYNECOLOGY

## 2018-12-14 PROCEDURE — 85730 THROMBOPLASTIN TIME PARTIAL: CPT | Performed by: NURSE PRACTITIONER

## 2018-12-14 PROCEDURE — 84132 ASSAY OF SERUM POTASSIUM: CPT | Performed by: NURSE PRACTITIONER

## 2018-12-14 PROCEDURE — 87185 SC STD ENZYME DETCJ PER NZM: CPT | Performed by: OBSTETRICS & GYNECOLOGY

## 2018-12-14 PROCEDURE — 87205 SMEAR GRAM STAIN: CPT | Performed by: OBSTETRICS & GYNECOLOGY

## 2018-12-14 PROCEDURE — 87086 URINE CULTURE/COLONY COUNT: CPT | Performed by: OBSTETRICS & GYNECOLOGY

## 2018-12-14 PROCEDURE — 87181 SC STD AGAR DILUTION PER AGT: CPT | Performed by: NURSE PRACTITIONER

## 2018-12-14 PROCEDURE — 85730 THROMBOPLASTIN TIME PARTIAL: CPT | Performed by: OBSTETRICS & GYNECOLOGY

## 2018-12-14 PROCEDURE — 87070 CULTURE OTHR SPECIMN AEROBIC: CPT | Performed by: OBSTETRICS & GYNECOLOGY

## 2018-12-14 PROCEDURE — 36415 COLL VENOUS BLD VENIPUNCTURE: CPT | Performed by: NURSE PRACTITIONER

## 2018-12-14 PROCEDURE — 74177 CT ABD & PELVIS W/CONTRAST: CPT

## 2018-12-14 PROCEDURE — 87040 BLOOD CULTURE FOR BACTERIA: CPT | Performed by: NURSE PRACTITIONER

## 2018-12-14 PROCEDURE — 85610 PROTHROMBIN TIME: CPT | Performed by: NURSE PRACTITIONER

## 2018-12-14 PROCEDURE — 96374 THER/PROPH/DIAG INJ IV PUSH: CPT

## 2018-12-14 PROCEDURE — 93010 ELECTROCARDIOGRAM REPORT: CPT | Performed by: INTERNAL MEDICINE

## 2018-12-14 PROCEDURE — 80053 COMPREHEN METABOLIC PANEL: CPT | Performed by: NURSE PRACTITIONER

## 2018-12-14 PROCEDURE — 82330 ASSAY OF CALCIUM: CPT

## 2018-12-14 PROCEDURE — 86850 RBC ANTIBODY SCREEN: CPT | Performed by: NURSE ANESTHETIST, CERTIFIED REGISTERED

## 2018-12-14 PROCEDURE — 87075 CULTR BACTERIA EXCEPT BLOOD: CPT | Performed by: OBSTETRICS & GYNECOLOGY

## 2018-12-14 PROCEDURE — 96375 TX/PRO/DX INJ NEW DRUG ADDON: CPT

## 2018-12-14 PROCEDURE — 85007 BL SMEAR W/DIFF WBC COUNT: CPT | Performed by: NURSE PRACTITIONER

## 2018-12-14 PROCEDURE — 99285 EMERGENCY DEPT VISIT HI MDM: CPT

## 2018-12-14 PROCEDURE — 84145 PROCALCITONIN (PCT): CPT | Performed by: NURSE PRACTITIONER

## 2018-12-14 PROCEDURE — 85027 COMPLETE CBC AUTOMATED: CPT | Performed by: OBSTETRICS & GYNECOLOGY

## 2018-12-14 PROCEDURE — 87185 SC STD ENZYME DETCJ PER NZM: CPT | Performed by: NURSE PRACTITIONER

## 2018-12-14 RX ORDER — ACETAMINOPHEN 325 MG/1
650 TABLET ORAL EVERY 6 HOURS PRN
Status: CANCELLED | OUTPATIENT
Start: 2018-12-14

## 2018-12-14 RX ORDER — HEPARIN SODIUM 5000 [USP'U]/ML
5000 INJECTION, SOLUTION INTRAVENOUS; SUBCUTANEOUS EVERY 8 HOURS SCHEDULED
Status: CANCELLED | OUTPATIENT
Start: 2018-12-14

## 2018-12-14 RX ORDER — SUCCINYLCHOLINE CHLORIDE 20 MG/ML
INJECTION INTRAMUSCULAR; INTRAVENOUS AS NEEDED
Status: DISCONTINUED | OUTPATIENT
Start: 2018-12-14 | End: 2018-12-15 | Stop reason: SURG

## 2018-12-14 RX ORDER — ONDANSETRON 2 MG/ML
4 INJECTION INTRAMUSCULAR; INTRAVENOUS ONCE
Status: COMPLETED | OUTPATIENT
Start: 2018-12-14 | End: 2018-12-14

## 2018-12-14 RX ORDER — CEFAZOLIN SODIUM 1 G/50ML
SOLUTION INTRAVENOUS AS NEEDED
Status: DISCONTINUED | OUTPATIENT
Start: 2018-12-14 | End: 2018-12-15 | Stop reason: SURG

## 2018-12-14 RX ORDER — MIDAZOLAM HYDROCHLORIDE 1 MG/ML
INJECTION INTRAMUSCULAR; INTRAVENOUS AS NEEDED
Status: DISCONTINUED | OUTPATIENT
Start: 2018-12-14 | End: 2018-12-15 | Stop reason: SURG

## 2018-12-14 RX ORDER — ALBUMIN, HUMAN INJ 5% 5 %
SOLUTION INTRAVENOUS CONTINUOUS PRN
Status: DISCONTINUED | OUTPATIENT
Start: 2018-12-14 | End: 2018-12-15 | Stop reason: SURG

## 2018-12-14 RX ORDER — PROMETHAZINE HYDROCHLORIDE 25 MG/ML
12.5 INJECTION, SOLUTION INTRAMUSCULAR; INTRAVENOUS ONCE
Status: COMPLETED | OUTPATIENT
Start: 2018-12-14 | End: 2018-12-14

## 2018-12-14 RX ORDER — ONDANSETRON 2 MG/ML
4 INJECTION INTRAMUSCULAR; INTRAVENOUS EVERY 6 HOURS PRN
Status: DISCONTINUED | OUTPATIENT
Start: 2018-12-14 | End: 2018-12-29 | Stop reason: HOSPADM

## 2018-12-14 RX ORDER — SODIUM CHLORIDE 9 MG/ML
75 INJECTION, SOLUTION INTRAVENOUS ONCE
Status: CANCELLED | OUTPATIENT
Start: 2018-12-14

## 2018-12-14 RX ORDER — SCOLOPAMINE TRANSDERMAL SYSTEM 1 MG/1
1 PATCH, EXTENDED RELEASE TRANSDERMAL
Status: CANCELLED | OUTPATIENT
Start: 2018-12-14 | End: 2018-12-17

## 2018-12-14 RX ORDER — ROCURONIUM BROMIDE 10 MG/ML
INJECTION, SOLUTION INTRAVENOUS AS NEEDED
Status: DISCONTINUED | OUTPATIENT
Start: 2018-12-14 | End: 2018-12-15 | Stop reason: SURG

## 2018-12-14 RX ORDER — MORPHINE SULFATE 4 MG/ML
4 INJECTION, SOLUTION INTRAMUSCULAR; INTRAVENOUS ONCE
Status: COMPLETED | OUTPATIENT
Start: 2018-12-14 | End: 2018-12-14

## 2018-12-14 RX ORDER — SODIUM CHLORIDE, SODIUM LACTATE, POTASSIUM CHLORIDE, CALCIUM CHLORIDE 600; 310; 30; 20 MG/100ML; MG/100ML; MG/100ML; MG/100ML
INJECTION, SOLUTION INTRAVENOUS CONTINUOUS PRN
Status: DISCONTINUED | OUTPATIENT
Start: 2018-12-14 | End: 2018-12-15 | Stop reason: SURG

## 2018-12-14 RX ORDER — PROPOFOL 10 MG/ML
INJECTION, EMULSION INTRAVENOUS AS NEEDED
Status: DISCONTINUED | OUTPATIENT
Start: 2018-12-14 | End: 2018-12-15 | Stop reason: SURG

## 2018-12-14 RX ORDER — TRAMADOL HYDROCHLORIDE 50 MG/1
50 TABLET ORAL EVERY 6 HOURS PRN
Status: CANCELLED | OUTPATIENT
Start: 2018-12-14

## 2018-12-14 RX ORDER — DEXTROSE, SODIUM CHLORIDE, AND POTASSIUM CHLORIDE 5; .9; .15 G/100ML; G/100ML; G/100ML
125 INJECTION INTRAVENOUS CONTINUOUS
Status: DISCONTINUED | OUTPATIENT
Start: 2018-12-14 | End: 2018-12-15

## 2018-12-14 RX ORDER — SODIUM CHLORIDE 9 MG/ML
INJECTION, SOLUTION INTRAVENOUS CONTINUOUS PRN
Status: DISCONTINUED | OUTPATIENT
Start: 2018-12-14 | End: 2018-12-15 | Stop reason: SURG

## 2018-12-14 RX ORDER — ONDANSETRON 2 MG/ML
4 INJECTION INTRAMUSCULAR; INTRAVENOUS EVERY 6 HOURS PRN
Status: CANCELLED | OUTPATIENT
Start: 2018-12-14

## 2018-12-14 RX ORDER — LIDOCAINE HYDROCHLORIDE 10 MG/ML
INJECTION, SOLUTION INFILTRATION; PERINEURAL AS NEEDED
Status: DISCONTINUED | OUTPATIENT
Start: 2018-12-14 | End: 2018-12-15 | Stop reason: SURG

## 2018-12-14 RX ORDER — MAGNESIUM HYDROXIDE 1200 MG/15ML
LIQUID ORAL AS NEEDED
Status: DISCONTINUED | OUTPATIENT
Start: 2018-12-14 | End: 2018-12-14 | Stop reason: HOSPADM

## 2018-12-14 RX ORDER — HEPARIN SODIUM 5000 [USP'U]/ML
INJECTION, SOLUTION INTRAVENOUS; SUBCUTANEOUS AS NEEDED
Status: DISCONTINUED | OUTPATIENT
Start: 2018-12-14 | End: 2018-12-15 | Stop reason: SURG

## 2018-12-14 RX ORDER — CALCIUM CHLORIDE 100 MG/ML
INJECTION INTRAVENOUS; INTRAVENTRICULAR AS NEEDED
Status: DISCONTINUED | OUTPATIENT
Start: 2018-12-14 | End: 2018-12-15 | Stop reason: SURG

## 2018-12-14 RX ORDER — FENTANYL CITRATE 50 UG/ML
INJECTION, SOLUTION INTRAMUSCULAR; INTRAVENOUS
Status: COMPLETED
Start: 2018-12-14 | End: 2018-12-15

## 2018-12-14 RX ADMIN — HEPARIN SODIUM 5000 UNITS: 5000 INJECTION INTRAVENOUS; SUBCUTANEOUS at 22:58

## 2018-12-14 RX ADMIN — SODIUM CHLORIDE 1000 ML: 0.9 INJECTION, SOLUTION INTRAVENOUS at 14:58

## 2018-12-14 RX ADMIN — PROMETHAZINE HYDROCHLORIDE 12.5 MG: 25 INJECTION, SOLUTION INTRAMUSCULAR; INTRAVENOUS at 14:58

## 2018-12-14 RX ADMIN — CALCIUM CHLORIDE 0.5 G: 100 INJECTION PARENTERAL at 23:16

## 2018-12-14 RX ADMIN — ALBUMIN (HUMAN): 12.5 SOLUTION INTRAVENOUS at 23:16

## 2018-12-14 RX ADMIN — SODIUM CHLORIDE, SODIUM LACTATE, POTASSIUM CHLORIDE, AND CALCIUM CHLORIDE: .6; .31; .03; .02 INJECTION, SOLUTION INTRAVENOUS at 22:35

## 2018-12-14 RX ADMIN — ALBUMIN (HUMAN): 12.5 SOLUTION INTRAVENOUS at 22:58

## 2018-12-14 RX ADMIN — MORPHINE SULFATE 4 MG: 4 INJECTION INTRAVENOUS at 17:24

## 2018-12-14 RX ADMIN — PROPOFOL 50 MCG/KG/MIN: 10 INJECTION, EMULSION INTRAVENOUS at 23:55

## 2018-12-14 RX ADMIN — SODIUM CHLORIDE 1000 ML: 0.9 INJECTION, SOLUTION INTRAVENOUS at 16:24

## 2018-12-14 RX ADMIN — LIDOCAINE HYDROCHLORIDE 50 MG: 10 INJECTION, SOLUTION INFILTRATION; PERINEURAL at 22:41

## 2018-12-14 RX ADMIN — CEFAZOLIN SODIUM 1000 MG: 1 SOLUTION INTRAVENOUS at 22:50

## 2018-12-14 RX ADMIN — ROCURONIUM BROMIDE 50 MG: 10 INJECTION INTRAVENOUS at 22:43

## 2018-12-14 RX ADMIN — IOHEXOL 85 ML: 350 INJECTION, SOLUTION INTRAVENOUS at 15:56

## 2018-12-14 RX ADMIN — MIDAZOLAM 2 MG: 1 INJECTION INTRAMUSCULAR; INTRAVENOUS at 23:32

## 2018-12-14 RX ADMIN — ALBUMIN (HUMAN): 12.5 SOLUTION INTRAVENOUS at 22:50

## 2018-12-14 RX ADMIN — SUCCINYLCHOLINE CHLORIDE 100 MG: 20 INJECTION, SOLUTION INTRAMUSCULAR; INTRAVENOUS at 22:41

## 2018-12-14 RX ADMIN — SODIUM CHLORIDE: 0.9 INJECTION, SOLUTION INTRAVENOUS at 22:47

## 2018-12-14 RX ADMIN — Medication 500 MG: at 22:54

## 2018-12-14 RX ADMIN — PROPOFOL 100 MG: 10 INJECTION, EMULSION INTRAVENOUS at 22:41

## 2018-12-14 RX ADMIN — ONDANSETRON 4 MG: 2 INJECTION INTRAMUSCULAR; INTRAVENOUS at 20:22

## 2018-12-14 NOTE — ED PROVIDER NOTES
History  Chief Complaint   Patient presents with    Abdominal Pain     patient stated that she has ovarian cancer, abdomen distended  Recently tapped Monday and got 6 L off of her  hx liver failure     Resents here with a chief complaint of nausea vomiting and diarrhea since last evening starting around 6:00 p m  Xena Patterson She is accompanied by her caretaker and her son  She currently is undergoing chemotherapy for ovarian cancer with some metastasis to the liver  She had paracentesis performed on the 10th which removed approximately 6 L of fluid, she is a generalized abdominal tenderness  Given ondansetron 4 mg prior to arrival   Will evaluate her for acute abdomen including possibility pancreatitis cholecystitis, abdominal pathology 2nd to possible metastasis  Prior to Admission Medications   Prescriptions Last Dose Informant Patient Reported? Taking?    LORazepam (ATIVAN) 1 mg tablet  Self No No   Sig: Take 1 tablet (1 mg total) by mouth every 6 (six) hours as needed (nausea or anxiety)   aspirin-acetaminophen-caffeine (EXCEDRIN MIGRAINE) 250-250-65 MG per tablet   Yes No   Sig: Take 1 tablet by mouth every 6 (six) hours as needed for headaches   lidocaine-prilocaine (EMLA) cream   No No   Sig: Apply to port site prior to labs/chemo   ondansetron (ZOFRAN) 8 mg tablet  Self No No   Sig: Take 1 tablet (8 mg total) by mouth every 8 (eight) hours as needed for nausea or vomiting   traMADol (ULTRAM) 50 mg tablet   Yes No   Sig: Take 50 mg by mouth every 6 (six) hours as needed for moderate pain      Facility-Administered Medications: None       Past Medical History:   Diagnosis Date    Abdominal fluid collection     Asthma     Cancer ([facility] )     ovaries    Diabetes mellitus ([facility] )        Past Surgical History:   Procedure Laterality Date    CT GUIDED PARACENTESIS  11/6/2018    CT NEEDLE BIOPSY PERITONEUM  11/6/2018    ECTOPIC PREGNANCY SURGERY      ECTOPIC PREGNANCY SURGERY      IR PARACENTESIS  9/18/2018  IR PARACENTESIS  10/18/2018    IR PARACENTESIS  12/10/2018    IR PORT PLACEMENT  11/29/2018    NV COLONOSCOPY FLX DX W/COLLJ SPEC WHEN PFRMD N/A 9/25/2018    Procedure: EGD AND COLONOSCOPY;  Surgeon: Sushila Bae MD;  Location: [facility]; Service: Gastroenterology    TONSILECTOMY AND ADNOIDECTOMY      TONSILLECTOMY         Family History   Problem Relation Age of Onset    Cirrhosis Mother     Colon cancer Mother     Leukemia Cousin     Diabetes Father      I have reviewed and agree with the history as documented  Social History   Substance Use Topics    Smoking status: Former Smoker    Smokeless tobacco: Never Used    Alcohol use Yes      Comment: occassionally        Review of Systems   Constitutional: Negative for activity change, fatigue and fever  HENT: Negative for congestion, ear pain, rhinorrhea and sore throat  Eyes: Negative  Respiratory: Negative for cough, shortness of breath and wheezing  Gastrointestinal: Positive for abdominal pain, diarrhea, nausea and vomiting  Endocrine: Negative  Genitourinary: Negative for difficulty urinating, dyspareunia, dysuria, flank pain, frequency, menstrual problem, pelvic pain, urgency, vaginal bleeding, vaginal discharge and vaginal pain  Musculoskeletal: Negative for arthralgias and myalgias  Skin: Negative for color change and pallor  Neurological: Negative for dizziness, speech difficulty, weakness and headaches  Hematological: Negative for adenopathy  Psychiatric/Behavioral: Negative for confusion  Physical Exam  Physical Exam   Constitutional: She is oriented to person, place, and time  She appears well-developed and well-nourished  She is cooperative  Non-toxic appearance  She does not have a sickly appearance  She does not appear ill  No distress  HENT:   Head: Normocephalic and atraumatic     Right Ear: Tympanic membrane and external ear normal    Left Ear: Tympanic membrane and external ear normal  Nose: No rhinorrhea, sinus tenderness or nasal deformity  No epistaxis  Right sinus exhibits no maxillary sinus tenderness and no frontal sinus tenderness  Left sinus exhibits no maxillary sinus tenderness and no frontal sinus tenderness  Mouth/Throat: Oropharynx is clear and moist and mucous membranes are normal  Normal dentition  Eyes: Pupils are equal, round, and reactive to light  EOM are normal    Neck: Normal range of motion  Neck supple  Cardiovascular: Normal rate, regular rhythm and normal heart sounds  No murmur heard  Pulmonary/Chest: Effort normal and breath sounds normal  No accessory muscle usage  No respiratory distress  She has no wheezes  She has no rales  She exhibits no tenderness  Abdominal: Soft  She exhibits distension  There is tenderness  There is no guarding  Musculoskeletal: Normal range of motion  She exhibits no edema or tenderness  Lymphadenopathy:     She has no cervical adenopathy  Neurological: She is alert and oriented to person, place, and time  She exhibits normal muscle tone  Skin: Skin is warm and dry  No rash noted  No erythema  Psychiatric: She has a normal mood and affect  Nursing note and vitals reviewed        Vital Signs  ED Triage Vitals   Temperature Pulse Respirations Blood Pressure SpO2   12/14/18 1410 12/14/18 1403 12/14/18 1403 12/14/18 1403 12/14/18 1403   97 7 °F (36 5 °C) 103 19 147/71 96 %      Temp src Heart Rate Source Patient Position - Orthostatic VS BP Location FiO2 (%)   -- 12/14/18 1403 12/14/18 1403 12/14/18 1403 --    Monitor Lying Right arm       Pain Score       12/14/18 1724       Worst Possible Pain           Vitals:    12/14/18 1403 12/14/18 1501 12/14/18 1838 12/14/18 1900   BP: 147/71 93/58 98/57 98/57   Pulse: 103 (!) 106 93 96   Patient Position - Orthostatic VS: Lying  Lying Lying       Visual Acuity      ED Medications  Medications   piperacillin-tazobactam (ZOSYN) 3 375 g in sodium chloride 0 9 % 50 mL IVPB (not administered)   sodium chloride 0 9 % bolus 1,000 mL (0 mL Intravenous Stopped 12/14/18 1558)   promethazine (PHENERGAN) injection 12 5 mg (12 5 mg Intravenous Given 12/14/18 1458)   sodium chloride 0 9 % bolus 1,000 mL (0 mL Intravenous Stopped 12/14/18 1724)   iohexol (OMNIPAQUE) 350 MG/ML injection (MULTI-DOSE) 85 mL (85 mL Intravenous Given 12/14/18 1556)   morphine (PF) 4 mg/mL injection 4 mg (4 mg Intravenous Given 12/14/18 1724)   ondansetron (ZOFRAN) injection 4 mg (4 mg Intravenous Given 12/14/18 2022)       Diagnostic Studies  Results Reviewed     Procedure Component Value Units Date/Time    Lactic acid x2 [[id number]]  (Abnormal) Collected:  12/14/18 1907    Lab Status:  Final result Specimen:  Blood from Arm, Left Updated:  12/14/18 1938     LACTIC ACID 3 3 (HH) mmol/L     Narrative:         Result may be elevated if tourniquet was used during collection  Blood culture #1 [[id number]] Collected:  12/14/18 1709    Lab Status: In process Specimen:  Blood from Arm, Right Updated:  12/14/18 1814    Protime-INR [[id number]]  (Abnormal) Collected:  12/14/18 1713    Lab Status:  Final result Specimen:  Blood from Arm, Right Updated:  12/14/18 1747     Protime 17 9 (H) seconds      INR 1 49 (H)    APTT [[id number]]  (Normal) Collected:  12/14/18 1713    Lab Status:  Final result Specimen:  Blood from Arm, Right Updated:  12/14/18 1747     PTT 38 seconds     Lactic acid x2 [[id number]]  (Abnormal) Collected:  12/14/18 1707    Lab Status:  Final result Specimen:  Blood from Arm, Left Updated:  12/14/18 1747     LACTIC ACID 4 4 (HH) mmol/L     Narrative:         Result may be elevated if tourniquet was used during collection  Blood culture #2 [[id number]] Collected:  12/14/18 1728    Lab Status: In process Specimen:  Blood from Arm, Left Updated:  12/14/18 1730    Procalcitonin [[id number]] Collected:  12/14/18 1712    Lab Status:   In process Specimen:  Blood from Arm, Right Updated:  12/14/18 1717    Potassium [[id number]]  (Abnormal) Collected:  12/14/18 1620    Lab Status:  Final result Specimen:  Blood from Arm, Left Updated:  12/14/18 1637     Potassium 3 4 (L) mmol/L     Lactic acid, plasma [[id number]]  (Abnormal) Collected:  12/14/18 1458    Lab Status:  Final result Specimen:  Blood from Arm, Right Updated:  12/14/18 1553     LACTIC ACID 5 0 (HH) mmol/L     Narrative:         Result may be elevated if tourniquet was used during collection  Comprehensive metabolic panel [[id number]]  (Abnormal) Collected:  12/14/18 1458    Lab Status:  Final result Specimen:  Blood from Arm, Right Updated:  12/14/18 1538     Sodium 133 (L) mmol/L      Potassium 6 9 (HH) mmol/L      Chloride 98 (L) mmol/L      CO2 22 mmol/L      ANION GAP 13 mmol/L      BUN 25 mg/dL      Creatinine 1 00 mg/dL      Glucose 243 (H) mg/dL      Calcium 8 6 mg/dL      AST 79 (H) U/L      ALT 19 U/L      Alkaline Phosphatase 108 U/L      Total Protein 6 0 (L) g/dL      Albumin 1 9 (L) g/dL      Total Bilirubin 0 80 mg/dL      eGFR 58 ml/min/1 73sq m     Narrative:         National Kidney Disease Education Program recommendations are as follows:  GFR calculation is accurate only with a steady state creatinine  Chronic Kidney disease less than 60 ml/min/1 73 sq  meters  Kidney failure less than 15 ml/min/1 73 sq  meters  CBC and differential [[id number]]  (Abnormal) Collected:  12/14/18 1458    Lab Status:  Final result Specimen:  Blood from Arm, Right Updated:  12/14/18 1537     WBC 18 31 (H) Thousand/uL      RBC 4 98 Million/uL      Hemoglobin 12 3 g/dL      Hematocrit 39 1 %      MCV 79 (L) fL      MCH 24 7 (L) pg      MCHC 31 5 g/dL      RDW 18 7 (H) %      Platelets 475 Thousands/uL      nRBC 0 /100 WBCs     Narrative: This is an appended report  These results have been appended to a previously verified report      Lipase [[id number]]  (Abnormal) Collected:  12/14/18 1458    Lab Status:  Final result Specimen:  Blood from Arm, Right Updated: 12/14/18 1531     Lipase 23 (L) u/L                  CT abdomen pelvis with contrast   Final Result by Forest Kern MD (12/14 1642)   Addendum 2 of 2 by Forest Kern MD (12/14 1957)   ADDENDUM:         I personally discussed this study with Andres Román on 12/14/2018 at 7:56    PM          The 1st line in the 1st addendum has a voice dictation error and should    state interventional radiologist not potential radiologist                Addendum 1 of 2 by Forest Kern MD (12/14 1950)   ADDENDUM:      Potential radiologist states that a large quantity of free air should not    be present secondary to paracentesis therefore bowel perforation should be    considered especially given the history of cirrhosis and implants  One of    these may have eroded into the    bowel lumen  An exact site of perforation is not apparent  Final      Large quantity of ascites  Moderate diffuse free intraperitoneal air in keeping with recent paracentesis  Pelvic mass which previously measured 13 3 x 9 3 cm is slightly smaller now measuring 12 7 x 7 4 cm  Previously seen mesenteric/peritoneal implants are less well-visualized secondary to being partially obscured by surrounding ascites  These are likely improved  Stable hepatic dome lesion measuring 3 3 x 2 5 cm other subcutaneous abscess or implants are not well visualized  No bowel obstruction is patient with nausea and diarrhea  Diffuse small bowel thickening likely reactive to the ascites  The study was marked in EPIC for significant notification  Workstation performed: [id number]                    Procedures  Procedures       Phone Contacts  ED Phone Contact    ED Course  ED Course as of Dec 14 2053   Fri Dec 14, 2018   0172 While I was attempting to find out if interventional radiology was here over the weekend he looked at the Ct images and verbalized concern for perforated viscous    I verbalized these concerns to a doctor Michelle who is the accepting surgeon  Patient is being emergently transferred to [place]  Given whether condition life flight is not on option  [employer] truck will be here in 30 minutes  5 Spoke with the Radiologist Tyson who discussed findings of CT with IR  It is now felt that she may have had mets that eroded the viscous  Transport is here to take her to [place] under the care of [address]  Number of Diagnoses or Management Options  Generalized abdominal pain: new and requires workup  Nausea vomiting and diarrhea: new and requires workup  Viral gastroenteritis: new and requires workup  Diagnosis management comments: Patient is stable at the time of transfer going to [address] [place] [address] under the surgical Oncology service  Initially CT of the abdomen was read as ascites and some free air possibly from recent paracentesis  Whilst I was trying to Establish the availability of IR Dr Deborah Brand looked at CT and felt that the findings were not consistent with a recurrent ascites but rather concerned for perforated viscus  This was communicated to Dr Makenna Orta who I then spoke with and he was concerned that perhaps she has metastasis that may have eroded the viscous  I Spoke with Dr Shawnee Toro to express radiology concerns of perforated viscous  Patient emergently transferred via ground to [place]  No Flight services now do to whether  1st available truck was utilized  GYN resident to be paged on arrival to room  Unfortunately zosyn was not administered here, we did not want to delay transfere any longer  Zosyn sent in a sealed biobag to be hung at accepting facility  Initial Lactic was felt to be inaccurate as initial Potassium was inaccurate          Amount and/or Complexity of Data Reviewed  Clinical lab tests: reviewed and ordered  Tests in the radiology section of CPT®: reviewed and ordered  Discuss the patient with other providers: yes (IR Radiologist Mio Quintanilla, Radiologist Adeola, SURG ONC Winsome, IM Van )  Independent visualization of images, tracings, or specimens: yes    Patient Progress  Patient progress: stable    CritCare Time    Disposition  Final diagnoses:   Viral gastroenteritis   Nausea vomiting and diarrhea   Generalized abdominal pain     Time reflects when diagnosis was documented in both MDM as applicable and the Disposition within this note     Time User Action Codes Description Comment    12/14/2018  6:24 PM Welch Wallback Add [A08 4] Viral gastroenteritis     12/14/2018  6:26 PM Welch Wallback Add [R11 2,  R19 7] Nausea vomiting and diarrhea     12/14/2018  6:52 PM Marga Penfield Add [K62 5] Rectal bleeding     12/14/2018  6:52 PM Harrold Penfield Modify [K62 5] Rectal bleeding     12/14/2018  8:40 PM Welch Wallback Add [R10 84] Generalized abdominal pain       ED Disposition     ED Disposition Condition Comment    Admit  Case was discussed with Tarun Palmer and the patient's admission status was agreed to be Admission Status: inpatient status to the service of Dr Tarun Palmer MD Documentation      Most Recent Value   Patient Condition  The patient has been stabilized such that within reasonable medical probability, no material deterioration of the patient condition or the condition of the unborn child(liza) is likely to result from the transfer   Reason for Transfer  Level of Care needed not available at this facility   Benefits of Transfer  Specialized equipment and/or services available at the receiving facility (Include comment)________________________   Risks of Transfer  Potential for delay in receiving treatment, Potential deterioration of medical condition, Loss of IV, Increased discomfort during transfer, Possible worsening of condition or death during transfer   Accepting Physician  [address] Name, [address]    (Name & Tel number)  Luis Every   Transported by [facility] and Unit #)  EMS   Sending MD  [facility]   Provider Certification  General risk, such as traffic hazards, adverse weather conditions, rough terrain or turbulence, possible failure of equipment (including vehicle or aircraft), or consequences of actions of persons outside the control of the transport personnel      RN Documentation      [place] [address] [address] Name, [address]   SL[facility]    (Name & Tel number)  [place] by [facility] and Unit #)  EMS      Follow-up Information    None         Discharge Medication List as of 12/14/2018  8:39 PM      CONTINUE these medications which have NOT CHANGED    Details   aspirin-acetaminophen-caffeine (EXCEDRIN MIGRAINE) 250-250-65 MG per tablet Take 1 tablet by mouth every 6 (six) hours as needed for headaches, Historical Med      lidocaine-prilocaine (EMLA) cream Apply to port site prior to labs/chemo, Normal      LORazepam (ATIVAN) 1 mg tablet Take 1 tablet (1 mg total) by mouth every 6 (six) hours as needed (nausea or anxiety), Starting Fri 11/9/2018, Normal      ondansetron (ZOFRAN) 8 mg tablet Take 1 tablet (8 mg total) by mouth every 8 (eight) hours as needed for nausea or vomiting, Starting Fri 11/9/2018, Normal      traMADol (ULTRAM) 50 mg tablet Take 50 mg by mouth every 6 (six) hours as needed for moderate pain, Historical Med           No discharge procedures on file      ED Provider  Electronically Signed by           RIGOBERTO Barnes  12/14/18 7231

## 2018-12-15 ENCOUNTER — ANESTHESIA (INPATIENT)
Dept: PERIOP | Facility: HOSPITAL | Age: 68
DRG: 329 | End: 2018-12-15
Payer: MEDICARE

## 2018-12-15 ENCOUNTER — ANESTHESIA EVENT (INPATIENT)
Dept: PERIOP | Facility: HOSPITAL | Age: 68
DRG: 329 | End: 2018-12-15
Payer: MEDICARE

## 2018-12-15 ENCOUNTER — APPOINTMENT (INPATIENT)
Dept: RADIOLOGY | Facility: HOSPITAL | Age: 68
DRG: 329 | End: 2018-12-15
Payer: MEDICARE

## 2018-12-15 LAB
ABO GROUP BLD: NORMAL
ALBUMIN SERPL BCP-MCNC: 2.2 G/DL (ref 3.5–5)
ALBUMIN SERPL BCP-MCNC: 2.6 G/DL (ref 3.5–5)
ALP SERPL-CCNC: 54 U/L (ref 46–116)
ALP SERPL-CCNC: 57 U/L (ref 46–116)
ALT SERPL W P-5'-P-CCNC: 12 U/L (ref 12–78)
ALT SERPL W P-5'-P-CCNC: 13 U/L (ref 12–78)
ANION GAP SERPL CALCULATED.3IONS-SCNC: 12 MMOL/L (ref 4–13)
ANION GAP SERPL CALCULATED.3IONS-SCNC: 9 MMOL/L (ref 4–13)
ANION GAP SERPL CALCULATED.3IONS-SCNC: 9 MMOL/L (ref 4–13)
ANISOCYTOSIS BLD QL SMEAR: PRESENT
AST SERPL W P-5'-P-CCNC: 24 U/L (ref 5–45)
AST SERPL W P-5'-P-CCNC: 24 U/L (ref 5–45)
BASE EXCESS BLDA CALC-SCNC: -2.1 MMOL/L
BASE EXCESS BLDA CALC-SCNC: -3.2 MMOL/L
BASE EXCESS BLDA CALC-SCNC: -3.8 MMOL/L
BASOPHILS # BLD MANUAL: 0 THOUSAND/UL (ref 0–0.1)
BASOPHILS NFR MAR MANUAL: 0 % (ref 0–1)
BILIRUB SERPL-MCNC: 0.51 MG/DL (ref 0.2–1)
BILIRUB SERPL-MCNC: 0.52 MG/DL (ref 0.2–1)
BLD GP AB SCN SERPL QL: NEGATIVE
BUN SERPL-MCNC: 21 MG/DL (ref 5–25)
BUN SERPL-MCNC: 23 MG/DL (ref 5–25)
BUN SERPL-MCNC: 26 MG/DL (ref 5–25)
BURR CELLS BLD QL SMEAR: PRESENT
CA-I BLD-SCNC: 1.02 MMOL/L (ref 1.12–1.32)
CA-I BLD-SCNC: 1.04 MMOL/L (ref 1.12–1.32)
CA-I BLD-SCNC: 1.05 MMOL/L (ref 1.12–1.32)
CALCIUM SERPL-MCNC: 7.1 MG/DL (ref 8.3–10.1)
CALCIUM SERPL-MCNC: 7.3 MG/DL (ref 8.3–10.1)
CALCIUM SERPL-MCNC: 7.4 MG/DL (ref 8.3–10.1)
CHLORIDE SERPL-SCNC: 106 MMOL/L (ref 100–108)
CHLORIDE SERPL-SCNC: 108 MMOL/L (ref 100–108)
CHLORIDE SERPL-SCNC: 109 MMOL/L (ref 100–108)
CO2 SERPL-SCNC: 22 MMOL/L (ref 21–32)
CO2 SERPL-SCNC: 23 MMOL/L (ref 21–32)
CO2 SERPL-SCNC: 24 MMOL/L (ref 21–32)
CREAT SERPL-MCNC: 0.58 MG/DL (ref 0.6–1.3)
CREAT SERPL-MCNC: 0.7 MG/DL (ref 0.6–1.3)
CREAT SERPL-MCNC: 0.86 MG/DL (ref 0.6–1.3)
DOHLE BOD BLD QL SMEAR: PRESENT
EOSINOPHIL # BLD MANUAL: 0 THOUSAND/UL (ref 0–0.4)
EOSINOPHIL NFR BLD MANUAL: 0 % (ref 0–6)
ERYTHROCYTE [DISTWIDTH] IN BLOOD BY AUTOMATED COUNT: 18.2 % (ref 11.6–15.1)
ERYTHROCYTE [DISTWIDTH] IN BLOOD BY AUTOMATED COUNT: 18.3 % (ref 11.6–15.1)
ERYTHROCYTE [DISTWIDTH] IN BLOOD BY AUTOMATED COUNT: 18.5 % (ref 11.6–15.1)
GFR SERPL CREATININE-BSD FRML MDRD: 70 ML/MIN/1.73SQ M
GFR SERPL CREATININE-BSD FRML MDRD: 89 ML/MIN/1.73SQ M
GFR SERPL CREATININE-BSD FRML MDRD: 95 ML/MIN/1.73SQ M
GLUCOSE SERPL-MCNC: 103 MG/DL (ref 65–140)
GLUCOSE SERPL-MCNC: 119 MG/DL (ref 65–140)
GLUCOSE SERPL-MCNC: 133 MG/DL (ref 65–140)
GLUCOSE SERPL-MCNC: 69 MG/DL (ref 65–140)
GLUCOSE SERPL-MCNC: 84 MG/DL (ref 65–140)
GLUCOSE SERPL-MCNC: 85 MG/DL (ref 65–140)
GLUCOSE SERPL-MCNC: 87 MG/DL (ref 65–140)
GLUCOSE SERPL-MCNC: 96 MG/DL (ref 65–140)
GLUCOSE SERPL-MCNC: 97 MG/DL (ref 65–140)
HCO3 BLDA-SCNC: 21.2 MMOL/L (ref 22–28)
HCO3 BLDA-SCNC: 21.9 MMOL/L (ref 22–28)
HCO3 BLDA-SCNC: 22.2 MMOL/L (ref 22–28)
HCT VFR BLD AUTO: 26.6 % (ref 34.8–46.1)
HCT VFR BLD AUTO: 29 % (ref 34.8–46.1)
HCT VFR BLD AUTO: 30.6 % (ref 34.8–46.1)
HGB BLD-MCNC: 8.4 G/DL (ref 11.5–15.4)
HGB BLD-MCNC: 9.2 G/DL (ref 11.5–15.4)
HGB BLD-MCNC: 9.7 G/DL (ref 11.5–15.4)
HOROWITZ INDEX BLDA+IHG-RTO: 50 MM[HG]
HOROWITZ INDEX BLDA+IHG-RTO: 50 MM[HG]
HYPERCHROMIA BLD QL SMEAR: PRESENT
INR PPP: 1.5 (ref 0.86–1.17)
LACTATE SERPL-SCNC: 1.2 MMOL/L (ref 0.5–2)
LACTATE SERPL-SCNC: 1.6 MMOL/L (ref 0.5–2)
LACTATE SERPL-SCNC: 2.7 MMOL/L (ref 0.5–2)
LYMPHOCYTES # BLD AUTO: 0.21 THOUSAND/UL (ref 0.6–4.47)
LYMPHOCYTES # BLD AUTO: 0.83 THOUSAND/UL (ref 0.6–4.47)
LYMPHOCYTES # BLD AUTO: 1 % (ref 14–44)
LYMPHOCYTES # BLD AUTO: 10 % (ref 14–44)
LYMPHOCYTES # BLD AUTO: 2.1 THOUSAND/UL (ref 0.6–4.47)
LYMPHOCYTES # BLD AUTO: 5 % (ref 14–44)
MAGNESIUM SERPL-MCNC: 1.5 MG/DL (ref 1.6–2.6)
MAGNESIUM SERPL-MCNC: 2.4 MG/DL (ref 1.6–2.6)
MCH RBC QN AUTO: 24.6 PG (ref 26.8–34.3)
MCH RBC QN AUTO: 24.6 PG (ref 26.8–34.3)
MCH RBC QN AUTO: 25 PG (ref 26.8–34.3)
MCHC RBC AUTO-ENTMCNC: 31.6 G/DL (ref 31.4–37.4)
MCHC RBC AUTO-ENTMCNC: 31.7 G/DL (ref 31.4–37.4)
MCHC RBC AUTO-ENTMCNC: 31.7 G/DL (ref 31.4–37.4)
MCV RBC AUTO: 78 FL (ref 82–98)
MCV RBC AUTO: 78 FL (ref 82–98)
MCV RBC AUTO: 79 FL (ref 82–98)
METAMYELOCYTES NFR BLD MANUAL: 4 % (ref 0–1)
MICROCYTES BLD QL AUTO: PRESENT
MONOCYTES # BLD AUTO: 0.17 THOUSAND/UL (ref 0–1.22)
MONOCYTES # BLD AUTO: 0.21 THOUSAND/UL (ref 0–1.22)
MONOCYTES # BLD AUTO: 0.42 THOUSAND/UL (ref 0–1.22)
MONOCYTES NFR BLD: 1 % (ref 4–12)
MONOCYTES NFR BLD: 1 % (ref 4–12)
MONOCYTES NFR BLD: 2 % (ref 4–12)
NASAL CANNULA: 2
NEUTROPHILS # BLD MANUAL: 14.93 THOUSAND/UL (ref 1.85–7.62)
NEUTROPHILS # BLD MANUAL: 18.46 THOUSAND/UL (ref 1.85–7.62)
NEUTROPHILS # BLD MANUAL: 20.94 THOUSAND/UL (ref 1.85–7.62)
NEUTS BAND NFR BLD MANUAL: 1 % (ref 0–8)
NEUTS BAND NFR BLD MANUAL: 10 % (ref 0–8)
NEUTS BAND NFR BLD MANUAL: 3 % (ref 0–8)
NEUTS SEG NFR BLD AUTO: 78 % (ref 43–75)
NEUTS SEG NFR BLD AUTO: 87 % (ref 43–75)
NEUTS SEG NFR BLD AUTO: 97 % (ref 43–75)
NON VENT ROOM AIR: 95 %
NRBC BLD AUTO-RTO: 0 /100 WBCS
O2 CT BLDA-SCNC: 13.4 ML/DL (ref 16–23)
O2 CT BLDA-SCNC: 14.7 ML/DL (ref 16–23)
O2 CT BLDA-SCNC: 15.2 ML/DL (ref 16–23)
OXYHGB MFR BLDA: 95.2 % (ref 94–97)
OXYHGB MFR BLDA: 97.9 % (ref 94–97)
OXYHGB MFR BLDA: 98.4 % (ref 94–97)
PCO2 BLDA: 34.7 MM HG (ref 36–44)
PCO2 BLDA: 35.4 MM HG (ref 36–44)
PCO2 BLDA: 44.6 MM HG (ref 36–44)
PEEP RESPIRATORY: 5 CM[H2O]
PEEP RESPIRATORY: 5 CM[H2O]
PH BLDA: 7.32 [PH] (ref 7.35–7.45)
PH BLDA: 7.39 [PH] (ref 7.35–7.45)
PH BLDA: 7.42 [PH] (ref 7.35–7.45)
PHOSPHATE SERPL-MCNC: 3.4 MG/DL (ref 2.3–4.1)
PHOSPHATE SERPL-MCNC: 3.8 MG/DL (ref 2.3–4.1)
PLATELET # BLD AUTO: 133 THOUSANDS/UL (ref 149–390)
PLATELET # BLD AUTO: 146 THOUSANDS/UL (ref 149–390)
PLATELET # BLD AUTO: 147 THOUSANDS/UL (ref 149–390)
PLATELET BLD QL SMEAR: ABNORMAL
PLATELET BLD QL SMEAR: ABNORMAL
PLATELET BLD QL SMEAR: ADEQUATE
PMV BLD AUTO: 12.6 FL (ref 8.9–12.7)
PMV BLD AUTO: 12.6 FL (ref 8.9–12.7)
PMV BLD AUTO: 12.7 FL (ref 8.9–12.7)
PO2 BLDA: 155.2 MM HG (ref 75–129)
PO2 BLDA: 170 MM HG (ref 75–129)
PO2 BLDA: 85.7 MM HG (ref 75–129)
POIKILOCYTOSIS BLD QL SMEAR: PRESENT
POLYCHROMASIA BLD QL SMEAR: PRESENT
POTASSIUM SERPL-SCNC: 3.2 MMOL/L (ref 3.5–5.3)
POTASSIUM SERPL-SCNC: 4 MMOL/L (ref 3.5–5.3)
POTASSIUM SERPL-SCNC: 4 MMOL/L (ref 3.5–5.3)
PROCALCITONIN SERPL-MCNC: 56.6 NG/ML
PROT SERPL-MCNC: 4.3 G/DL (ref 6.4–8.2)
PROT SERPL-MCNC: 4.7 G/DL (ref 6.4–8.2)
PROTHROMBIN TIME: 18.2 SECONDS (ref 11.8–14.2)
RBC # BLD AUTO: 3.36 MILLION/UL (ref 3.81–5.12)
RBC # BLD AUTO: 3.74 MILLION/UL (ref 3.81–5.12)
RBC # BLD AUTO: 3.94 MILLION/UL (ref 3.81–5.12)
RBC MORPH BLD: PRESENT
RH BLD: POSITIVE
SODIUM SERPL-SCNC: 139 MMOL/L (ref 136–145)
SODIUM SERPL-SCNC: 141 MMOL/L (ref 136–145)
SODIUM SERPL-SCNC: 142 MMOL/L (ref 136–145)
SPECIMEN EXPIRATION DATE: NORMAL
SPECIMEN SOURCE: ABNORMAL
VENT AC: 14
VENT AC: 14
VENT- AC: AC
VENT- AC: AC
VT SETTING VENT: 400 ML
VT SETTING VENT: 500 ML
WBC # BLD AUTO: 16.59 THOUSAND/UL (ref 4.31–10.16)
WBC # BLD AUTO: 20.98 THOUSAND/UL (ref 4.31–10.16)
WBC # BLD AUTO: 21.37 THOUSAND/UL (ref 4.31–10.16)

## 2018-12-15 PROCEDURE — 82330 ASSAY OF CALCIUM: CPT

## 2018-12-15 PROCEDURE — 80048 BASIC METABOLIC PNL TOTAL CA: CPT | Performed by: PHYSICIAN ASSISTANT

## 2018-12-15 PROCEDURE — 82947 ASSAY GLUCOSE BLOOD QUANT: CPT

## 2018-12-15 PROCEDURE — 71045 X-RAY EXAM CHEST 1 VIEW: CPT

## 2018-12-15 PROCEDURE — 99291 CRITICAL CARE FIRST HOUR: CPT | Performed by: SURGERY

## 2018-12-15 PROCEDURE — 80053 COMPREHEN METABOLIC PANEL: CPT | Performed by: SURGERY

## 2018-12-15 PROCEDURE — 82948 REAGENT STRIP/BLOOD GLUCOSE: CPT

## 2018-12-15 PROCEDURE — 82805 BLOOD GASES W/O2 SATURATION: CPT | Performed by: PHYSICIAN ASSISTANT

## 2018-12-15 PROCEDURE — 85027 COMPLETE CBC AUTOMATED: CPT | Performed by: SURGERY

## 2018-12-15 PROCEDURE — 85007 BL SMEAR W/DIFF WBC COUNT: CPT | Performed by: SURGERY

## 2018-12-15 PROCEDURE — 84100 ASSAY OF PHOSPHORUS: CPT | Performed by: SURGERY

## 2018-12-15 PROCEDURE — 0WQF0ZZ REPAIR ABDOMINAL WALL, OPEN APPROACH: ICD-10-PCS | Performed by: OBSTETRICS & GYNECOLOGY

## 2018-12-15 PROCEDURE — 82330 ASSAY OF CALCIUM: CPT | Performed by: PHYSICIAN ASSISTANT

## 2018-12-15 PROCEDURE — 94760 N-INVAS EAR/PLS OXIMETRY 1: CPT

## 2018-12-15 PROCEDURE — 85027 COMPLETE CBC AUTOMATED: CPT | Performed by: PHYSICIAN ASSISTANT

## 2018-12-15 PROCEDURE — 82805 BLOOD GASES W/O2 SATURATION: CPT | Performed by: SURGERY

## 2018-12-15 PROCEDURE — 83605 ASSAY OF LACTIC ACID: CPT | Performed by: OBSTETRICS & GYNECOLOGY

## 2018-12-15 PROCEDURE — C9113 INJ PANTOPRAZOLE SODIUM, VIA: HCPCS | Performed by: SURGERY

## 2018-12-15 PROCEDURE — 83735 ASSAY OF MAGNESIUM: CPT | Performed by: SURGERY

## 2018-12-15 PROCEDURE — 0WJG0ZZ INSPECTION OF PERITONEAL CAVITY, OPEN APPROACH: ICD-10-PCS | Performed by: OBSTETRICS & GYNECOLOGY

## 2018-12-15 PROCEDURE — 84132 ASSAY OF SERUM POTASSIUM: CPT

## 2018-12-15 PROCEDURE — 85610 PROTHROMBIN TIME: CPT | Performed by: PHYSICIAN ASSISTANT

## 2018-12-15 PROCEDURE — 85014 HEMATOCRIT: CPT

## 2018-12-15 PROCEDURE — 85007 BL SMEAR W/DIFF WBC COUNT: CPT | Performed by: PHYSICIAN ASSISTANT

## 2018-12-15 PROCEDURE — 84295 ASSAY OF SERUM SODIUM: CPT

## 2018-12-15 PROCEDURE — 94002 VENT MGMT INPAT INIT DAY: CPT

## 2018-12-15 PROCEDURE — 82330 ASSAY OF CALCIUM: CPT | Performed by: SURGERY

## 2018-12-15 PROCEDURE — 82803 BLOOD GASES ANY COMBINATION: CPT

## 2018-12-15 PROCEDURE — 44310 ILEOSTOMY/JEJUNOSTOMY: CPT | Performed by: OBSTETRICS & GYNECOLOGY

## 2018-12-15 PROCEDURE — 83605 ASSAY OF LACTIC ACID: CPT | Performed by: PHYSICIAN ASSISTANT

## 2018-12-15 PROCEDURE — 0D1B0Z4 BYPASS ILEUM TO CUTANEOUS, OPEN APPROACH: ICD-10-PCS | Performed by: OBSTETRICS & GYNECOLOGY

## 2018-12-15 PROCEDURE — 83605 ASSAY OF LACTIC ACID: CPT | Performed by: SURGERY

## 2018-12-15 RX ORDER — SODIUM CHLORIDE, SODIUM GLUCONATE, SODIUM ACETATE, POTASSIUM CHLORIDE, MAGNESIUM CHLORIDE, SODIUM PHOSPHATE, DIBASIC, AND POTASSIUM PHOSPHATE .53; .5; .37; .037; .03; .012; .00082 G/100ML; G/100ML; G/100ML; G/100ML; G/100ML; G/100ML; G/100ML
1000 INJECTION, SOLUTION INTRAVENOUS ONCE
Status: COMPLETED | OUTPATIENT
Start: 2018-12-15 | End: 2018-12-15

## 2018-12-15 RX ORDER — CHLORHEXIDINE GLUCONATE 0.12 MG/ML
15 RINSE ORAL EVERY 12 HOURS SCHEDULED
Status: DISCONTINUED | OUTPATIENT
Start: 2018-12-15 | End: 2018-12-29 | Stop reason: HOSPADM

## 2018-12-15 RX ORDER — FENTANYL CITRATE 50 UG/ML
25 INJECTION, SOLUTION INTRAMUSCULAR; INTRAVENOUS EVERY 2 HOUR PRN
Status: DISCONTINUED | OUTPATIENT
Start: 2018-12-15 | End: 2018-12-16

## 2018-12-15 RX ORDER — NEOSTIGMINE METHYLSULFATE 1 MG/ML
INJECTION INTRAVENOUS AS NEEDED
Status: DISCONTINUED | OUTPATIENT
Start: 2018-12-15 | End: 2018-12-15 | Stop reason: SURG

## 2018-12-15 RX ORDER — HYDROMORPHONE HCL/PF 1 MG/ML
SYRINGE (ML) INJECTION AS NEEDED
Status: DISCONTINUED | OUTPATIENT
Start: 2018-12-15 | End: 2018-12-15 | Stop reason: SURG

## 2018-12-15 RX ORDER — HYDROMORPHONE HCL/PF 1 MG/ML
0.5 SYRINGE (ML) INJECTION EVERY 6 HOURS PRN
Status: DISCONTINUED | OUTPATIENT
Start: 2018-12-15 | End: 2018-12-15

## 2018-12-15 RX ORDER — PROPOFOL 10 MG/ML
5-50 INJECTION, EMULSION INTRAVENOUS
Status: DISCONTINUED | OUTPATIENT
Start: 2018-12-15 | End: 2018-12-15

## 2018-12-15 RX ORDER — DEXTROSE MONOHYDRATE 25 G/50ML
25 INJECTION, SOLUTION INTRAVENOUS ONCE
Status: COMPLETED | OUTPATIENT
Start: 2018-12-15 | End: 2018-12-15

## 2018-12-15 RX ORDER — ONDANSETRON 2 MG/ML
INJECTION INTRAMUSCULAR; INTRAVENOUS AS NEEDED
Status: DISCONTINUED | OUTPATIENT
Start: 2018-12-15 | End: 2018-12-15 | Stop reason: SURG

## 2018-12-15 RX ORDER — MAGNESIUM SULFATE HEPTAHYDRATE 40 MG/ML
2 INJECTION, SOLUTION INTRAVENOUS ONCE
Status: COMPLETED | OUTPATIENT
Start: 2018-12-15 | End: 2018-12-15

## 2018-12-15 RX ORDER — ONDANSETRON 2 MG/ML
4 INJECTION INTRAMUSCULAR; INTRAVENOUS ONCE AS NEEDED
Status: DISCONTINUED | OUTPATIENT
Start: 2018-12-15 | End: 2018-12-15 | Stop reason: HOSPADM

## 2018-12-15 RX ORDER — FENTANYL CITRATE/PF 50 MCG/ML
25 SYRINGE (ML) INJECTION
Status: DISCONTINUED | OUTPATIENT
Start: 2018-12-15 | End: 2018-12-15 | Stop reason: HOSPADM

## 2018-12-15 RX ORDER — ROCURONIUM BROMIDE 10 MG/ML
INJECTION, SOLUTION INTRAVENOUS AS NEEDED
Status: DISCONTINUED | OUTPATIENT
Start: 2018-12-15 | End: 2018-12-15 | Stop reason: SURG

## 2018-12-15 RX ORDER — SODIUM CHLORIDE 9 MG/ML
INJECTION, SOLUTION INTRAVENOUS CONTINUOUS PRN
Status: DISCONTINUED | OUTPATIENT
Start: 2018-12-15 | End: 2018-12-15 | Stop reason: SURG

## 2018-12-15 RX ORDER — SODIUM CHLORIDE, SODIUM GLUCONATE, SODIUM ACETATE, POTASSIUM CHLORIDE, MAGNESIUM CHLORIDE, SODIUM PHOSPHATE, DIBASIC, AND POTASSIUM PHOSPHATE .53; .5; .37; .037; .03; .012; .00082 G/100ML; G/100ML; G/100ML; G/100ML; G/100ML; G/100ML; G/100ML
125 INJECTION, SOLUTION INTRAVENOUS CONTINUOUS
Status: DISCONTINUED | OUTPATIENT
Start: 2018-12-15 | End: 2018-12-16

## 2018-12-15 RX ORDER — PROPOFOL 10 MG/ML
INJECTION, EMULSION INTRAVENOUS CONTINUOUS PRN
Status: DISCONTINUED | OUTPATIENT
Start: 2018-12-14 | End: 2018-12-15 | Stop reason: SURG

## 2018-12-15 RX ORDER — DEXTROSE MONOHYDRATE 25 G/50ML
INJECTION, SOLUTION INTRAVENOUS
Status: COMPLETED
Start: 2018-12-15 | End: 2018-12-15

## 2018-12-15 RX ORDER — SODIUM CHLORIDE 9 MG/ML
20 INJECTION, SOLUTION INTRAVENOUS CONTINUOUS
Status: DISCONTINUED | OUTPATIENT
Start: 2018-12-15 | End: 2018-12-16

## 2018-12-15 RX ORDER — GLYCOPYRROLATE 0.2 MG/ML
INJECTION INTRAMUSCULAR; INTRAVENOUS AS NEEDED
Status: DISCONTINUED | OUTPATIENT
Start: 2018-12-15 | End: 2018-12-15 | Stop reason: SURG

## 2018-12-15 RX ORDER — ALBUMIN, HUMAN INJ 5% 5 %
SOLUTION INTRAVENOUS CONTINUOUS PRN
Status: DISCONTINUED | OUTPATIENT
Start: 2018-12-15 | End: 2018-12-15 | Stop reason: SURG

## 2018-12-15 RX ORDER — HEPARIN SODIUM 5000 [USP'U]/ML
5000 INJECTION, SOLUTION INTRAVENOUS; SUBCUTANEOUS EVERY 8 HOURS SCHEDULED
Status: DISCONTINUED | OUTPATIENT
Start: 2018-12-15 | End: 2018-12-17

## 2018-12-15 RX ORDER — HYDROMORPHONE HCL/PF 1 MG/ML
0.2 SYRINGE (ML) INJECTION
Status: DISCONTINUED | OUTPATIENT
Start: 2018-12-15 | End: 2018-12-15

## 2018-12-15 RX ORDER — HYDROMORPHONE HCL/PF 1 MG/ML
0.2 SYRINGE (ML) INJECTION
Status: DISCONTINUED | OUTPATIENT
Start: 2018-12-15 | End: 2018-12-15 | Stop reason: HOSPADM

## 2018-12-15 RX ORDER — MORPHINE SULFATE 4 MG/ML
4 INJECTION, SOLUTION INTRAMUSCULAR; INTRAVENOUS EVERY 4 HOURS PRN
Status: DISCONTINUED | OUTPATIENT
Start: 2018-12-15 | End: 2018-12-16

## 2018-12-15 RX ORDER — CALCIUM CHLORIDE 100 MG/ML
INJECTION INTRAVENOUS; INTRAVENTRICULAR AS NEEDED
Status: DISCONTINUED | OUTPATIENT
Start: 2018-12-15 | End: 2018-12-15 | Stop reason: SURG

## 2018-12-15 RX ORDER — POTASSIUM CHLORIDE 14.9 MG/ML
20 INJECTION INTRAVENOUS
Status: COMPLETED | OUTPATIENT
Start: 2018-12-15 | End: 2018-12-15

## 2018-12-15 RX ORDER — PANTOPRAZOLE SODIUM 40 MG/1
40 INJECTION, POWDER, FOR SOLUTION INTRAVENOUS
Status: DISCONTINUED | OUTPATIENT
Start: 2018-12-15 | End: 2018-12-23

## 2018-12-15 RX ORDER — CEFAZOLIN SODIUM 1 G/50ML
1000 SOLUTION INTRAVENOUS ONCE
Status: DISCONTINUED | OUTPATIENT
Start: 2018-12-15 | End: 2018-12-15

## 2018-12-15 RX ADMIN — METRONIDAZOLE 500 MG: 500 INJECTION, SOLUTION INTRAVENOUS at 16:03

## 2018-12-15 RX ADMIN — HEPARIN SODIUM 5000 UNITS: 5000 INJECTION INTRAVENOUS; SUBCUTANEOUS at 01:16

## 2018-12-15 RX ADMIN — CALCIUM CHLORIDE 0.25 G: 100 INJECTION PARENTERAL at 12:19

## 2018-12-15 RX ADMIN — POTASSIUM CHLORIDE 20 MEQ: 200 INJECTION, SOLUTION INTRAVENOUS at 01:54

## 2018-12-15 RX ADMIN — MAGNESIUM SULFATE IN WATER 2 G: 40 INJECTION, SOLUTION INTRAVENOUS at 01:54

## 2018-12-15 RX ADMIN — POTASSIUM CHLORIDE 20 MEQ: 200 INJECTION, SOLUTION INTRAVENOUS at 06:00

## 2018-12-15 RX ADMIN — ALBUMIN (HUMAN): 12.5 SOLUTION INTRAVENOUS at 12:00

## 2018-12-15 RX ADMIN — CALCIUM GLUCONATE 1 G: 98 INJECTION, SOLUTION INTRAVENOUS at 02:18

## 2018-12-15 RX ADMIN — HYDROMORPHONE HYDROCHLORIDE 0.5 MG: 1 INJECTION, SOLUTION INTRAMUSCULAR; INTRAVENOUS; SUBCUTANEOUS at 12:15

## 2018-12-15 RX ADMIN — CHLORHEXIDINE GLUCONATE 0.12% ORAL RINSE 15 ML: 1.2 LIQUID ORAL at 21:57

## 2018-12-15 RX ADMIN — HEPARIN SODIUM 5000 UNITS: 5000 INJECTION INTRAVENOUS; SUBCUTANEOUS at 05:04

## 2018-12-15 RX ADMIN — CHLORHEXIDINE GLUCONATE 0.12% ORAL RINSE 15 ML: 1.2 LIQUID ORAL at 01:16

## 2018-12-15 RX ADMIN — DEXTROSE MONOHYDRATE 25 ML: 25 INJECTION, SOLUTION INTRAVENOUS at 22:13

## 2018-12-15 RX ADMIN — CALCIUM GLUCONATE 1 G: 98 INJECTION, SOLUTION INTRAVENOUS at 07:24

## 2018-12-15 RX ADMIN — SODIUM CHLORIDE, SODIUM GLUCONATE, SODIUM ACETATE, POTASSIUM CHLORIDE, MAGNESIUM CHLORIDE, SODIUM PHOSPHATE, DIBASIC, AND POTASSIUM PHOSPHATE 1000 ML: .53; .5; .37; .037; .03; .012; .00082 INJECTION, SOLUTION INTRAVENOUS at 05:04

## 2018-12-15 RX ADMIN — HEPARIN SODIUM 5000 UNITS: 5000 INJECTION INTRAVENOUS; SUBCUTANEOUS at 16:03

## 2018-12-15 RX ADMIN — CEFEPIME 2000 MG: 2 INJECTION, POWDER, FOR SOLUTION INTRAMUSCULAR; INTRAVENOUS at 23:27

## 2018-12-15 RX ADMIN — CEFEPIME 2000 MG: 2 INJECTION, POWDER, FOR SOLUTION INTRAMUSCULAR; INTRAVENOUS at 16:01

## 2018-12-15 RX ADMIN — HYDROMORPHONE HYDROCHLORIDE 0.25 MG: 1 INJECTION, SOLUTION INTRAMUSCULAR; INTRAVENOUS; SUBCUTANEOUS at 13:35

## 2018-12-15 RX ADMIN — ONDANSETRON 4 MG: 2 INJECTION INTRAMUSCULAR; INTRAVENOUS at 11:36

## 2018-12-15 RX ADMIN — METRONIDAZOLE 500 MG: 500 INJECTION, SOLUTION INTRAVENOUS at 06:02

## 2018-12-15 RX ADMIN — FENTANYL CITRATE 25 MCG: 50 INJECTION, SOLUTION INTRAMUSCULAR; INTRAVENOUS at 14:37

## 2018-12-15 RX ADMIN — POTASSIUM CHLORIDE 20 MEQ: 200 INJECTION, SOLUTION INTRAVENOUS at 07:24

## 2018-12-15 RX ADMIN — DEXTROSE 50 % IN WATER (D50W) INTRAVENOUS SYRINGE 25 ML: at 22:13

## 2018-12-15 RX ADMIN — CALCIUM GLUCONATE 2 G: 98 INJECTION, SOLUTION INTRAVENOUS at 17:54

## 2018-12-15 RX ADMIN — SODIUM CHLORIDE, SODIUM GLUCONATE, SODIUM ACETATE, POTASSIUM CHLORIDE, MAGNESIUM CHLORIDE, SODIUM PHOSPHATE, DIBASIC, AND POTASSIUM PHOSPHATE 125 ML/HR: .53; .5; .37; .037; .03; .012; .00082 INJECTION, SOLUTION INTRAVENOUS at 00:46

## 2018-12-15 RX ADMIN — METRONIDAZOLE 500 MG: 500 INJECTION, SOLUTION INTRAVENOUS at 22:19

## 2018-12-15 RX ADMIN — Medication 1 SPRAY: at 22:19

## 2018-12-15 RX ADMIN — CHLORHEXIDINE GLUCONATE 0.12% ORAL RINSE 15 ML: 1.2 LIQUID ORAL at 09:31

## 2018-12-15 RX ADMIN — SODIUM CHLORIDE, SODIUM GLUCONATE, SODIUM ACETATE, POTASSIUM CHLORIDE, MAGNESIUM CHLORIDE, SODIUM PHOSPHATE, DIBASIC, AND POTASSIUM PHOSPHATE 125 ML/HR: .53; .5; .37; .037; .03; .012; .00082 INJECTION, SOLUTION INTRAVENOUS at 09:04

## 2018-12-15 RX ADMIN — GLYCOPYRROLATE 0.5 MG: 0.2 INJECTION, SOLUTION INTRAMUSCULAR; INTRAVENOUS at 13:15

## 2018-12-15 RX ADMIN — HYDROMORPHONE HYDROCHLORIDE 0.25 MG: 1 INJECTION, SOLUTION INTRAMUSCULAR; INTRAVENOUS; SUBCUTANEOUS at 13:05

## 2018-12-15 RX ADMIN — FENTANYL CITRATE 75 MCG/HR: 50 INJECTION, SOLUTION INTRAMUSCULAR; INTRAVENOUS at 01:16

## 2018-12-15 RX ADMIN — POTASSIUM CHLORIDE 20 MEQ: 200 INJECTION, SOLUTION INTRAVENOUS at 03:57

## 2018-12-15 RX ADMIN — ALBUMIN (HUMAN): 12.5 SOLUTION INTRAVENOUS at 12:20

## 2018-12-15 RX ADMIN — ROCURONIUM BROMIDE 20 MG: 10 INJECTION INTRAVENOUS at 11:21

## 2018-12-15 RX ADMIN — NEOSTIGMINE METHYLSULFATE 3 MG: 1 INJECTION INTRAVENOUS at 13:15

## 2018-12-15 RX ADMIN — HEPARIN SODIUM 5000 UNITS: 5000 INJECTION INTRAVENOUS; SUBCUTANEOUS at 21:57

## 2018-12-15 RX ADMIN — SODIUM CHLORIDE: 0.9 INJECTION, SOLUTION INTRAVENOUS at 11:19

## 2018-12-15 RX ADMIN — SODIUM CHLORIDE, SODIUM GLUCONATE, SODIUM ACETATE, POTASSIUM CHLORIDE, MAGNESIUM CHLORIDE, SODIUM PHOSPHATE, DIBASIC, AND POTASSIUM PHOSPHATE 1000 ML: .53; .5; .37; .037; .03; .012; .00082 INJECTION, SOLUTION INTRAVENOUS at 01:49

## 2018-12-15 RX ADMIN — PANTOPRAZOLE SODIUM 40 MG: 40 INJECTION, POWDER, FOR SOLUTION INTRAVENOUS at 09:31

## 2018-12-15 RX ADMIN — DEXAMETHASONE SODIUM PHOSPHATE 4 MG: 10 INJECTION INTRAMUSCULAR; INTRAVENOUS at 11:36

## 2018-12-15 RX ADMIN — MORPHINE SULFATE 4 MG: 4 INJECTION INTRAVENOUS at 20:52

## 2018-12-15 NOTE — ED NOTES
ABDIRAHMAN  20:00  Patient going to [facility] room 604 accepting Dr Елена Egan  Nurse report to [phone]       Piper Bo RN  12/14/18 7895

## 2018-12-15 NOTE — PROGRESS NOTES
Intraoperative Consultation    Called into OR by primary surgeon regarding patient with Stage IVB ovarian cancer with suspected bowel perforation  Diffuse carcinomatosis involving entire bowel and peritoneum noted  No definitive point of perforation noted  However, pelvis frozen secondary to pelvic mass  Suspect perforation likely secondary to erosion into lower sigmoid colon/rectum  After discussion Dr Елена Egan, will plan to washout abdomen and widely drain with abthera  Pt will go to SICU for further resuscitation with plans to return to OR for washout and possible diversion/drainage/closure

## 2018-12-15 NOTE — CONSULTS
RD received consult for 'dietary recommendation';   Full evaluation documented in flow sheet; Summary and Recommendations: records reveal significant wt loss of 28 lbs since 9/4/18, most likely associated with paracentesis on several occassions prior to admit; when cleared to initiate oral diet suggest progress to diet solid foods; suggest CCD1 features due to DM2 hx;  RD to follow as high nutrition risk

## 2018-12-15 NOTE — PROGRESS NOTES
Gyn Oncology Progress note   Sara Post 75 y o  female MRN: [id number]  Unit/Bed#: [facility] 513-01 Encounter: [id number]      A/P: 75 y o  With Stage IV ovarian cancer admitted in septic shock to bowel perforation, now status post exploratory laparotomy, abdominal washout and Abthera vac placement  #1 Sepsis secondary to bowel perforation:   Intraoperatively with diffuse carcinomatosis involving entire bowel & peritoneum, without definitive point of perforation noted  Status post surgery as mentioned above currently in the SICU  Rubia Scott Lactate 4 4 -->-->1 6 ; WBC 20 ; Hb 9 2  - Continued management per ICU  - Plan to return to the OR for washout and possible diversion/drainage/closure today  - Follow up blood culture, urine, anaerobic/aerobic cultures  - Continue Cefepime 2g Q 12 + Flagyl 500mg Q 8    #2 Stage IV Ovarian Cancer  Status post neoadjuvant chemotherapy with Carboplatin, Taxol & Avastin  #3 Type 2 DM: Accuchecks 90s-130s  Continue sliding scale insulin, #3    #4 FEN: NPO, Isolyte @ 125cc/hr    #5 DVT ppx  Heparin 5000u TID    #6 Disposition: Inpatient      Sara Post is intubated and sedated  Squeezes hand when instructed  BP 92/56   Pulse 86   Temp 98 4 °F (36 9 °C) (Oral)   Resp 14   Wt 65 5 kg (144 lb 6 4 oz)   SpO2 100%   BMI 24 23 kg/m²     I/O last 3 completed shifts: In: 5870 6 [I V :4720 6; IV Piggyback:1150]  Out: 7850 [Urine:250; Emesis/NG output:100; Drains:500;  Other:7000]  I/O this shift:  In: 383 3 [I V :383 3]  Out: -     Lab Results   Component Value Date    WBC 20 98 (H) 12/15/2018    HGB 9 2 (L) 12/15/2018    HCT 29 0 (L) 12/15/2018    MCV 78 (L) 12/15/2018     (L) 12/15/2018       Lab Results   Component Value Date    CALCIUM 7 3 (L) 12/15/2018    K 4 0 12/15/2018    CO2 24 12/15/2018     12/15/2018    BUN 23 12/15/2018    CREATININE 0 70 12/15/2018       Lab Results   Component Value Date/Time    POCGLU 96 12/15/2018 06:01 AM    POCGLU 133 12/15/2018 01:17 AM    POCGLU 118 11/29/2018 12:38 PM    POCGLU 95 09/25/2018 10:15 AM       Physical Exam  Gen: Intubated, sedated; follows instruction to squeeze hand  CVS: S1S2+, RRR, no murmurs  Lungs: CTAB  Abdomen: Soft, nondistended, Abthera vac in place  Extremities: SCDs on and on    Juan Pablo Newton MD  OBGYN PGY3  12/15/2018  9:06 AM

## 2018-12-15 NOTE — QUICK NOTE
57-year-old female with stage IV ovarian cancer status post recent chemotherapy presenting with intractable nausea, vomiting, abdominal pain and diarrhea  Patient had recent paracentesis, 6 L of fluid removed on 12/10/2018  CT of the abdomen revealed large volume ascites and ovarian mass  It showed no evidence of bowel obstruction  Labs reviewed it did show hyponatremia, elevated LFTs  Potassium was initially elevated, repeat sample normal   Clinically patient appears dehydrated  On examination she is cachectic appearing, dry mucous membranes, lips and tongue  She is alert oriented x3 answering questions appropriately  Lungs decreased breath sounds at bases  Heart sounds are regular  Abdomen grossly distended  Mildly tender to palpation  I did discuss the case with Soniya Joanna attending on call and discuss the management plan  He recommended to continue fluid hydration for hypotension and dehydration and transfer patient to [place] to gyn resident service  I did inform ED provider about the management plan and will be helping to transfer the patient to [facility]  Have also informed the patient and her friend at the bedside who agreed for the transfer

## 2018-12-15 NOTE — OP NOTE
OPERATIVE REPORT  PATIENT NAME: Alex Orr    :  1950  MRN: [id number]  Pt Location: [facility] OR ROOM 06    SURGERY DATE: 12/15/2018    Surgeon(s) and Role:     * Matthew Banks MD - Primary     * Smita Arias MD - Assisting    Preop Diagnosis:  Bowel perforation ([address] ) [K63 1]  Open abdomen    Post-Op Diagnosis Codes: * Bowel perforation (HCC) [K63 1]     * status post abdominal washout, intraperitoneal drain placement x4, diverting loop ileostomy abdominal wall closure    Procedure(s) (LRB):  LAPAROTOMY EXPLORATORY, ABDOMINAL WASHOUT, DRAIN PLACEMENT x 4, DIVERTING LOOP ILEOSTOMY AND ABDOMINAL CLOSURE,  ALL OTHER INDICATED PROCEDURES (N/A)    Specimen(s):  * No specimens in log *    Estimated Blood Loss:   Minimal    Drains:  Closed/Suction Drain Abdomen Bulb 19 Fr  (Active)   Number of days: 0       NG/OG/Enteral Tube Nasogastric 18 Fr Left nares (Active)   Placement Reverification Auscultation 12/15/2018  8:00 AM   Site Assessment Clean;Dry; Intact 12/15/2018  8:00 AM   Status Suction-low continuous 12/15/2018  8:00 AM   Drainage Appearance Green 12/15/2018  8:00 AM   Output (mL) 150 mL 12/15/2018 10:00 AM   Number of days: 1       Ileostomy Loop RLQ (Active)   Number of days: 0       Urethral Catheter Latex 16 Fr  (Active)   Reasons to continue Urinary Catheter  Accurate I&O assessment in critically ill patients (48 hr  max) 12/15/2018  9:10 AM   Site Assessment Clean;Skin intact 12/15/2018  4:00 AM   Collection Container Standard drainage bag 12/15/2018  4:00 AM   Securement Method Securing device (Describe) 12/15/2018  4:00 AM   Output (mL) 100 mL 12/15/2018 10:00 AM   Number of days: 1       Anesthesia Type:   General    Operative Indications:  Patient with open abdomen after exploratory laparotomy last night performed for suspected and confirmed bowel perforation    After discussion with ICU team and obtaining consent by family member she was taken back to the operating today for abdominal washout, diverting ileostomy and consideration of abdominal closure  Operative Findings:  1  No evidence of proximal gastrointestinal leak  2  Evidence of ongoing leakage in the pelvis at the level of the rectosigmoid in relation to frozen pelvis from malignant tumor involvement  3  Diffuse peritoneal miliary implants  4  Diffuse fibrinous exudate throughout peritoneal surfaces consistent with recent inflammatory process/perforation  Complications:   None    Procedure and Technique:  The patient was taken to the operating room or her endotracheal anesthesia was administered and found to be adequate  The patient was due for her next dose of cefepime and Flagyl so those antibiotics were administered in the operating room shortly before skin incision  The patient's Abthera bag system cover was removed and the superficial sponge was removed as well  The abdominal skin was prepared with Betadine  The patient was draped in the usual fashion  The subfascial sponge and Abthera plastic cover were removed  The above-mentioned findings were noted  The bowel was run from the terminal ileum to the ligament of Treitz  The upper abdomen and pelvis were meant and confirmed our suspicion for ongoing leakage from a perforation which was not clearly visible but certainly present in the distal sigmoid/rectum  The peritoneal cavity was washed with several L of warm normal saline  Then, a total of four 19 Osman drains were placed in each quadrant, exteriorized through separate stab incisions and secured with stitches of 3 0 nylon  A loop ileostomy was created  Ileostomy site was selected in the right lower quadrant approximately mid distance from the anterior superior iliac spine and the umbilicus in the midportion of the rectus muscle approximately 3 fingerbreadths away from the bony structures  The skin subcutaneous fat were excised in a circumferential fashion measuring approximately 3 5 cm  The fascia was divided in a cruciate fashion and a loop of terminal ileum was delivered through this incision  This loop was located approximately 15 cm proximal to the ileocecal valve  In order to facilitate mobilization of this loop a red rubber catheter was passed through a tiny window in the mesentery  At this point the abdominal wall was closed using a running stitch with 2 #1 looped PDS sutures  Additionally, #1 Prolene Dion-Lyon interrupted figure-of-eight stitches were placed at approximately 3-5 cm intervals to secure excellent closure given recent administration of chemotherapy, Avastin and high risk for evisceration  The subcutaneous fat was copiously irrigated  The subcutaneous fat and skin at the level the umbilicus were closed in 2 layers with interrupted stitches of 3 O Vicryl and a subcuticular stitch of 4 O Monocryl  The rest of the incision was closed with staples and intervening Telfa omar  At this point the incision was covered and the ileostomy was matured  The anti mesenteric bowel was divided with electrocautery and the ileostomy was secured to the surrounding skin with interrupted stitches of 3 O Monocryl/Vicryl in a rosebudding techniche  The red rubber catheter was looped and secured to itself with stitches of 3 0 nylon  It was kept in place as a temporary bridge  This will be removed postoperatively  An ileostomy appliance was cut to size and applied  The midline incision was dressed  The patient tolerated the procedure well  Sponge, needle and instrument counts reports correct x2  Radiofrequency survey of the peritoneal cavity revealed no retained sponges or gauzes  The patient was successfully extubated in the operating room and transferred to the postanesthetic care unit in stable condition       I was present for the entire procedure    Patient Disposition:  PACU     SIGNATURE: Kirstin Davey MD, [facility]  DATE: December 15, 2018  TIME: 1:30 PM

## 2018-12-15 NOTE — OP NOTE
OPERATIVE REPORT  PATIENT NAME: Barrett Moy    :  1950  MRN: [id number]  Pt Location: [facility] OR ROOM 08    SURGERY DATE: 2018 - 12/15/2018    Surgeon(s) and Role:     * Araceli Amos MD - Primary     * Alka Ray MD - Assisting     * José Antonio Guerrero DO - Co-surgeon    Preop Diagnosis:  1  Stage IVB ovarian cancer now with suspected bowel perforation septic shock    Postoperative diagnosis:  1  Stage IVB ovarian cancer now with suspected bowel perforation septic shock    Procedure(s) (LRB):  LAPAROTOMY EXPLORATORY; Abdominal Washout; Application of Abthera Vac Dressing (N/A)    Specimen(s):  ID Type Source Tests Collected by Time Destination   A : Peritoneal  Tissue Peritoneum ANAEROBIC CULTURE AND GRAM STAIN, CULTURE, TISSUE AND GRAM STAIN Araceli Amos MD 2018 [id number]    B :  Urine Urine, Indwelling Haskins Catheter URINE CULTURE Araceli Amos MD 2018 9524        Estimated Blood Loss:   Minimal    Drains:  Negative Pressure Wound Therapy (V A C ) Abdomen Anterior (Active)   Blue foam- # applied 2 2018 11:29 PM   Cycle Continuous 2018 11:29 PM   Target Pressure (mmHg) 125 2018 11:29 PM   Dressing Status Clean;Dry; Intact 2018 12:00 AM   Number of days: 0       NG/OG/Enteral Tube Nasogastric 18 Fr Left nares (Active)   Number of days: 0       Urethral Catheter Latex 16 Fr  (Active)   Number of days: 0       Anesthesia Type:   General    Operative Indications:  Patient with history of stage IVB ovarian cancer now approximately 4 days out from recent paracentesis and 3 days out from recent chemotherapy with carboplatin, Taxol and Avastin  Presented with intractable nausea vomiting, abdominal pain, leukocytosis and elevated lactate  CT scan demonstrated extensive free air concerning for bowel perforation  After counseling, she consented to proceed with surgical exploration  Operative Findings:  1   Approximately 6 L of bloody extremely foul-smelling ascitic fluid  2  Extensive fibrinous exudate throughout abdomen and pelvis consistent with ongoing inflammatory process  3  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, colon, anterior abdominal peritoneum, upper abdomen, etc   Frozen pelvis with bilateral pelvic masses and definitive rectal involvement  Specific point of bowel perforation was not identified  However, it was suspected that perforation arose from tumor involvement of the rectum  After consultation with Dr Zheng Fish from Trauma surgery we opted to not proceed with diversion at this time and instead to manage with open abdomen to take patient back to the operating room in the near future for repeat washout and possible diversion by means of diverting enterostomy/colostomy  Complications:   None    Procedure and Technique:  The patient was taken to the operating room her general tracheal anesthesia was induced without complications  IV fluids were running  The patient received antibiotic prophylaxis per hospital protocol  5000 units of subcutaneous heparin were administered  Sequential compression devices were applied to lower extremities and activated prior to induction of anesthesia  The patient's abdomen, perineum and vagina were prepped and draped in the usual fashion  A Haskins catheter was placed  The procedure was performed via midline infra and supraumbilical laparotomy  The peritoneal cavity was entered sharply with electrocautery without difficulties  The above-mentioned findings were noted  All bloody/foul-smelling ascites was suctioned out of the abdomen and the peritoneal cavity was copiously irrigated with approximately 15 L of warm normal saline  The peritoneal cavity was thoroughly examined and the above-mentioned findings were noted  After consultation with Dr Zheng Fish from Trauma surgery I decided to no proceed with diversion at this time    Instead, patient will be managed with open abdomen with take back for repeat washout possible diversion by enterostomy/colostomy  At this point, Dr Funmi Rucker proceeded with placement of an [place] system for management of option abdomen  The patient remained stable throughout the procedure  She was then transferred intubated to the intensive care unit for postoperative monitoring and management of sepsis from bowel perforation/peritonitis       I was present for the entire procedure    Patient Disposition:  Critical Care Unit    SIGNATURE: Carlos Ordaz MD, [facility]  DATE: December 14, 2018  TIME: 11:46 PM

## 2018-12-15 NOTE — ANESTHESIA PROCEDURE NOTES
Arterial Line Insertion  Date/Time: 12/14/2018 10:47 PM  Performed by: Carito Hernandez by: Curt Ruiz   Consent: Written consent obtained  Risks and benefits: risks, benefits and alternatives were discussed  Consent given by: patient  Patient understanding: patient states understanding of the procedure being performed  Patient consent: the patient's understanding of the procedure matches consent given  Procedure consent: procedure consent matches procedure scheduled  Relevant documents: relevant documents present and verified  Test results: test results available and properly labeled  Required items: required blood products, implants, devices, and special equipment available  Patient identity confirmed: verbally with patient, arm band, provided demographic data and hospital-assigned identification number  Time out: Immediately prior to procedure a "time out" was called to verify the correct patient, procedure, equipment, support staff and site/side marked as required  Preparation: Patient was prepped and draped in the usual sterile fashion  Indications: hemodynamic monitoring  Orientation:  Right  Location: radial artery  Sedation:  Patient sedated: GETA      Procedure Details:  Jamal's test normal: yes  Needle gauge: 20  Seldinger technique: Seldinger technique used  Cutdown: cutdown required  Number of attempts: 1    Post-procedure:  Post-procedure: dressing applied  Waveform: good waveform and waveform confirmed  Post-procedure CNS: normal  Patient tolerance: Patient tolerated the procedure well with no immediate complications

## 2018-12-15 NOTE — CONSULTS
ICU Accept Note - Surgical Critical Care   Lashell Diallo 75 y o  female MRN: [id number]  Unit/Bed#: [facility] 327-16 Encounter: [id number]    Attending Physician: Guzman Hensley MD      ______________________________________________________________________  Assessment and Plan:   Principal Problem: Bowel perforation (HCC)  Active Problems:    Other ascites    Edema leg    Ovarian cancer (HCC)    Intractable nausea and vomiting    Septic shock (HCC)    Peritonitis ([facility] [address] )  Resolved Problems:    * No resolved hospital problems   *        NEURO:  -GCS: 3T (E: 1, V: 1, M: 1)  -SEDATIVES: Propofol @50  -ANALGESIA: Fentanyl @75  -SEIZURE PROPHYLAXIS: n/a  -PLAN:   q4h neuro checks   Wean sedation as tolerated  Continue analgesia    CV:   Temp:  [96 2 °F (35 7 °C)-97 7 °F (36 5 °C)] 96 2 °F (35 7 °C)  HR:  [] 92  Resp:  [18-20] 20  BP: ()/(56-71) 89/56  SpO2:  [93 %-96 %] 93 %    Results from last 7 days  Lab Units 12/14/18  1907 12/14/18  1707 12/14/18  1458   LACTIC ACID mmol/L 3 3* 4 4* 5 0*     -MEDICATIONS: n/a  -PLAN:   Keep MAP >65  Continue fluid resuscitation   Trend endpoints  Consider pressor support if fluid unresponsive     PULM:   Temp:  [96 2 °F (35 7 °C)-97 7 °F (36 5 °C)] 96 2 °F (35 7 °C)  HR:  [] 92  Resp:  [18-20] 20  BP: ()/(56-71) 89/56  SpO2:  [93 %-96 %] 93 %     -ABG: ABG:No results found for: PHART, JJN8NVJ, PO2ART, FFY3AUC, S6MTKBMG, BEART, SOURCE  VBG:        Respiratory    Lab Data (Last 4 hours)      12/15 0037            pH, Arterial       7 395           pCO2, Arterial       (!)35 4           pO2, Arterial       (!)170 0           HCO3, Arterial       (!)21 2           Base Excess, Arterial       -3 2                O2/Vent Data       12/15 0013   Most Recent         Vent Mode AC/VC  AC/VC      Resp Rate (BPM) (BPM) 14  14      Vt (mL) (mL) 400  400      FIO2 (%) (%) 50  50      PEEP (cmH2O) (cmH2O) 5  5      MV 5 95  5 95                 -PLAN:   Serial ABGs  Continue ventilator management    GI:  S/p ex-lap, abdominal washout, Abthera VAC placement  MEDICATIONS: Protonix 40mg daily, Zofran PRN  GI ppx: n/a  -PLAN:  Eventual return to OR later today or tomorrow for second-look, possible diversion/drainage/closure    :     Results from last 7 days  Lab Units 12/14/18  2322 12/14/18  1458 12/10/18  1410   BUN mg/dL 26* 25 13   CREATININE mg/dL 0 94 1 00 0 64     ZULUAGA: yes  I/O:     I/O       12/13 0701 - 12/14 0700 12/14 0701 - 12/15 0700    I V   2000    IV Piggyback  1000    Total Intake   3000    Other  7000    Total Output   7000    Net   -4000              -PLAN:  Strict I/Os  Trend Creatinine  7L ascites drained intraop  Received 1L albumin, 2L crystalloid intraop    F/E/N:   FLUIDS: Isolyte @125  ELECTROLYTES:    Results from last 7 days  Lab Units 12/14/18  2322 12/14/18  1620 12/14/18  1458 12/10/18  1410   SODIUM mmol/L 140  --  133* 141   POTASSIUM mmol/L 3 4* 3 4* 6 9* 4 0   CHLORIDE mmol/L 105  --  98* 103   CO2 mmol/L 23  --  22 26   ANION GAP mmol/L 12  --  13 12   CALCIUM mg/dL 6 9*  --  8 6 8 6     NUTRITION: Diet NPO; Sips with meds    -PLAN:  NPO/NGT  Replete electrolytes PRN    ID:     Results from last 7 days  Lab Units 12/14/18  1458 12/14/18 12/10/18  1410   WBC Thousand/uL 18 31* 16 25* 12 22*     CULTURES:   Results from last 7 days  Lab Units 12/10/18  1654   GRAM STAIN RESULT  1+ Polys  No organisms seen   BODY FLUID CULTURE, STERILE  No growth     ANTIBIOTICS: Cefepime/Flagyl  -PLAN:   F/u blood cx, urine cx, intraop tissue/anaerobic cx  Continue Abx  Trend WBC  Monitor fever curve    HEME:    Results from last 7 days  Lab Units 12/14/18  1458 12/14/18 12/10/18  1410   HEMOGLOBIN g/dL 12 3 10 1* 11 2*   HEMATOCRIT % 39 1 31 6* 35 6   PLATELETS Thousands/uL 169 177 233       Results from last 7 days  Lab Units 12/14/18  1713 12/14/18   INR  1 49* 1 66*   PTT seconds 38 44*     DVT ppx: SQH/SCDs    -PLAN:   Stable, continue to monitor  Transfuse if Hgb <7      ENDO:   -POC        -MEDICATIONS: SSI #3 w/ bedtime coverage    -PLAN:  Continue SSI  Continue glucose checks     MSK/SKIN:   PT/OT  Routine skin care  Frequent offloading     LDA:  Invasive Devices     Peripheral Intravenous Line            Peripheral IV 18 Left Antecubital less than 1 day    Peripheral IV 18 Left Arm less than 1 day    Peripheral IV 18 Left Forearm less than 1 day    Peripheral IV 18 Right Antecubital less than 1 day          Arterial Line            Arterial Line 18 Right Radial less than 1 day          Drain            NG/OG/Enteral Tube Nasogastric 18 Fr Left nares less than 1 day    Negative Pressure Wound Therapy (V A C ) Abdomen Anterior less than 1 day    Urethral Catheter Latex 16 Fr  less than 1 day          Airway            ETT  Cuffed;Oral;Pre-curved; Hi-Lo; Inflated 7 mm less than 1 day                Disposition: Continue ICU Care    Code Status: Level 1 - Full Code    Counseling / Coordination of Care  Total Critical Care time spent 30 minutes excluding procedures, teaching and family updates  ______________________________________________________________________    Chief Complaint: Stage IV Ovarian Cancer, perforated viscus    24 Hour Events:     Pt received a total of 1L albumin and 2L of crystalloid intraop and 7L of ascites was drained  No definitive surgical planes or site of bowel perforation was identified, so abdomen was washed out, and Abthera VAC was placed with plans to return to OR later today or tomorrow for a second look  Arrived to the ICU intubated, in stable condition  On Propofol  Not requiring any pressor support at this time  Review of Systems   Unable to perform ROS: Intubated     ______________________________________________________________________    Physical Exam:     Physical Exam   Constitutional: She appears well-developed and well-nourished  No distress  She is intubated  HENT:   Head: Normocephalic and atraumatic  NGT in place   Eyes: Conjunctivae are normal    Neck: Neck supple  Cardiovascular: Normal rate and regular rhythm  Pulses:       Carotid pulses are 2+ on the right side, and 2+ on the left side  Radial pulses are 2+ on the right side, and 2+ on the left side  Femoral pulses are 2+ on the right side, and 2+ on the left side  Dorsalis pedis pulses are 2+ on the right side, and 2+ on the left side  Pulmonary/Chest: Effort normal  She is intubated  Abdominal: Soft  She exhibits no distension and no mass  Open abdomen w/ Abthera VAC in place, draining serosanguinous fluid   Genitourinary:   Genitourinary Comments: Haskins in place   Neurological: GCS eye subscore is 1  GCS verbal subscore is 1  GCS motor subscore is 1  GCS 3T   Skin: She is not diaphoretic      ______________________________________________________________________  Vitals:    18 2200   BP: (!) 89/56   BP Location: Right arm   Pulse: 92   Resp: 20   Temp: (!) 96 2 °F (35 7 °C)   TempSrc: Oral       Temperature:   Temp (24hrs), Av °F (36 1 °C), Min:96 2 °F (35 7 °C), Max:97 7 °F (36 5 °C)    Current Temperature: (!) 96 2 °F (35 7 °C)  Weights: There is no height or weight on file to calculate BMI    Weight (last 2 days)     None        Hemodynamic Monitoring:  N/A     Non-Invasive/Invasive Ventilation Settings:  Respiratory    Lab Data (Last 4 hours)    None         O2/Vent Data (Last 4 hours)    None              No results found for: PHART, RFB3ERR, PO2ART, DZI2XHM, F0KIZSGO, BEART, SOURCE  SpO2: SpO2: 100 %  Intake and Outputs:  I/O        07 -  0700  07 - 12/15 0700    IV Piggyback  1000    Total Intake   1000    Other  7000    Total Output   7000    Net   -6000                Nutrition:        Diet Orders            Start     Ordered    18  Diet NPO; Sips with meds  Diet effective now     Question Answer Comment   Diet Type NPO NPO Except: Sips with meds    RD to adjust diet per protocol?  Yes        12/14/18 2155          Labs:     Results from last 7 days  Lab Units 12/14/18  1458 12/14/18 12/10/18  1410   WBC Thousand/uL 18 31* 16 25* 12 22*   HEMOGLOBIN g/dL 12 3 10 1* 11 2*   HEMATOCRIT % 39 1 31 6* 35 6   PLATELETS Thousands/uL 169 177 233   BANDS PCT % 31* 9* 13*   MONO PCT % 2* 0* 6       Results from last 7 days  Lab Units 12/14/18  2322 12/14/18  1620 12/14/18  1458 12/10/18  1410   SODIUM mmol/L 140  --  133* 141   POTASSIUM mmol/L 3 4* 3 4* 6 9* 4 0   CHLORIDE mmol/L 105  --  98* 103   CO2 mmol/L 23  --  22 26   ANION GAP mmol/L 12  --  13 12   BUN mg/dL 26*  --  25 13   CREATININE mg/dL 0 94  --  1 00 0 64   CALCIUM mg/dL 6 9*  --  8 6 8 6   ALT U/L 15  --  19 21   AST U/L 26  --  79* 40   ALK PHOS U/L 63  --  108 147*   ALBUMIN g/dL 2 5*  --  1 9* 2 6*   TOTAL BILIRUBIN mg/dL 0 38  --  0 80 0 30            Results from last 7 days  Lab Units 12/14/18  1713 12/14/18   INR  1 49* 1 66*   PTT seconds 38 44*       Results from last 7 days  Lab Units 12/10/18  1410   TROPONIN I ng/mL <0 02       Results from last 7 days  Lab Units 12/14/18  1907 12/14/18  1707 12/14/18  1458   LACTIC ACID mmol/L 3 3* 4 4* 5 0*     ABG:No results found for: PHART, JMJ2EHE, PO2ART, BUS5SNT, U1GWBMXX, BEART, SOURCE  VBG:        No results found for: [facility]   Imaging:     STAT CXR ICU    (Results Pending)     Micro:    Results from last 7 days  Lab Units 12/10/18  1654   GRAM STAIN RESULT  1+ Polys  No organisms seen   BODY FLUID CULTURE, STERILE  No growth     Allergies: No Known Allergies  Medications:   Scheduled Meds:  Current Facility-Administered Medications:  dextrose 5 % and sodium chloride 0 9 % with KCl 20 mEq/L 125 mL/hr Intravenous Continuous Florin Beard MD   fentanyl citrate (PF)       ondansetron 4 mg Intravenous Q6H PRN Florin Willams MD     Facility-Administered Medications Ordered in Other Encounters:  albumin human   Continuous PRN Maryclare Daily, CRNA Last Rate: Stopped (12/14/18 4153)   alteplase 2 mg Intracatheter PRN Alanna Sewell MD    calcium chloride   PRN Deborah Deed, CRNA    ceFAZolin   PRN Deborah Deed, CRNA    heparin (porcine)  Subcutaneous PRN Maryclare Daily, CRNA    heparin lock flush 300 Units Intracatheter PRN Alanna Sewell MD    lactated ringers  Intravenous Continuous PRN Deborah Deed, CRNA    lidocaine   PRN Deborah Deed, CRNA    metroNIDAZOLE (FLAGYL) 5 mg/mL IV soln (BEAN/PED)   Continuous PRN Deborah Deed, CRNA    midazolam  Intravenous PRN Deborah Deed, CRNA    phenlyephrine  Intravenous PRN Deborah Deed, CRNA    propofol  Intravenous PRN Deborah Deed, CRNA    rocuronium  Intravenous PRN Deborah Deed, CRNA    sodium chloride  Intravenous Continuous PRN Deborah Deed, CRNA    succinylcholine  Intravenous PRN Deborah Deed, CRNA      Continuous Infusions:  dextrose 5 % and sodium chloride 0 9 % with KCl 20 mEq/L 125 mL/hr     PRN Meds:    ondansetron 4 mg Q6H PRN     VTE Pharmacologic Prophylaxis: Heparin  VTE Mechanical Prophylaxis: sequential compression device  Invasive lines and devices: Invasive Devices     Peripheral Intravenous Line            Peripheral IV 12/14/18 Left Antecubital less than 1 day    Peripheral IV 12/14/18 Left Arm less than 1 day    Peripheral IV 12/14/18 Left Forearm less than 1 day    Peripheral IV 12/14/18 Right Antecubital less than 1 day          Arterial Line            Arterial Line 12/14/18 Right Radial less than 1 day          Drain            NG/OG/Enteral Tube Nasogastric 18 Fr Left nares less than 1 day    Negative Pressure Wound Therapy (V A C ) Abdomen Anterior less than 1 day    Urethral Catheter Latex 16 Fr  less than 1 day          Airway            ETT  Cuffed;Oral;Pre-curved; Hi-Lo; Inflated 7 mm less than 1 day                     Portions of the record may have been created with voice recognition software    Occasional wrong word or "sound a like" substitutions may have occurred due to the inherent limitations of voice recognition software  Read the chart carefully and recognize, using context, where substitutions have occurred      Elijah Sewell MD

## 2018-12-15 NOTE — ANESTHESIA PREPROCEDURE EVALUATION
Review of Systems/Medical History  Patient summary reviewed  Chart reviewed  No history of anesthetic complications     Cardiovascular  Negative cardio ROS    Pulmonary  Smoker ex-smoker  , Asthma , well controlled/ stable ,        GI/Hepatic    Liver disease , Chronic liver disease,   Comment: Intractable N/V  Bowel perforation c/b SIRS, lactate 5  Large volume ascites s/p paracentesis     Negative  ROS        Endo/Other  Diabetes type 2 ,      GYN    Ovarian cancer,   Comment: chemotherapy     Hematology  Negative hematology ROS      Musculoskeletal  Negative musculoskeletal ROS        Neurology  Negative neurology ROS      Psychology   Negative psychology ROS              Physical Exam    Airway    Mallampati score: II  TM Distance: >3 FB  Neck ROM: full     Dental   No notable dental hx     Cardiovascular  Comment: Negative ROS, Rhythm: regular, Rate: normal, Cardiovascular exam normal    Pulmonary  Pulmonary exam normal Breath sounds clear to auscultation,     Other Findings        Anesthesia Plan  ASA Score- 5 Emergent    Anesthesia Type- general and regional with ASA Monitors  Additional Monitors: arterial line  Airway Plan: ETT  Comment: Possible bilateral TAP blocks for postop pain control  Plan Factors-    Induction- intravenous and rapid sequence induction  Postoperative Plan- Plan for postoperative opioid use  Planned trial extubation    Informed Consent- Anesthetic plan and risks discussed with patient  I personally reviewed this patient with the CRNA  Discussed and agreed on the Anesthesia Plan with the CRNA  Get [place]         Recent labs personally reviewed:  Lab Results   Component Value Date    WBC 18 31 (H) 12/14/2018    HGB 12 3 12/14/2018     12/14/2018     Lab Results   Component Value Date    K 3 4 (L) 12/14/2018    BUN 25 12/14/2018    CREATININE 1 00 12/14/2018     Lab Results   Component Value Date    PTT 38 12/14/2018      Lab Results   Component Value Date    INR 1 49 (H) 12/14/2018       Blood type A    No results found for: Sreekanth Shaffer MD, have personally seen and evaluated the patient prior to anesthetic care  I have reviewed the pre-anesthetic record, and other medical records if appropriate to the anesthetic care  If a CRNA is involved in the case, I have reviewed the CRNA assessment, if present, and agree  Risks/benefits and alternatives discussed with patient including possible PONV, sore throat, and possibility of rare anesthetic and surgical emergencies

## 2018-12-15 NOTE — NURSING NOTE
Received report from PACU RN  Per RN, no antibiotics given  Awaiting antibiotics from pharmacy  Assessment implemented and charted per flowsheet

## 2018-12-15 NOTE — PLAN OF CARE
Problem: Nutrition/Hydration-ADULT  Goal: Nutrient/Hydration intake appropriate for improving, restoring or maintaining nutritional needs  Monitor and assess patient's nutrition/hydration status for malnutrition (ex- brittle hair, bruises, dry skin, pale skin and conjunctiva, muscle wasting, smooth red tongue, and disorientation)  Collaborate with interdisciplinary team and initiate plan and interventions as ordered  Monitor patient's weight and dietary intake as ordered or per policy  Utilize nutrition screening tool and intervene per policy  Determine patient's food preferences and provide high-protein, high-caloric foods as appropriate       INTERVENTIONS:  - Monitor oral intake, urinary output, labs, and treatment plans  - Assess nutrition and hydration status and recommend course of action  - Evaluate amount of meals eaten  - Assist patient with eating if necessary   - Allow adequate time for meals  - Recommend/ encourage appropriate diets, oral nutritional supplements, and vitamin/mineral supplements  - Order, calculate, and assess calorie counts as needed  - Recommend, monitor, and adjust tube feedings and TPN/PPN based on assessed needs  - Assess need for intravenous fluids  - Provide specific nutrition/hydration education as appropriate  - Include patient/family/caregiver in decisions related to nutrition  Outcome: Progressing  Anticipate oral diet will be initiated when deemed medically appropriate with ongoing post-op recovery

## 2018-12-15 NOTE — EMTALA/ACUTE CARE TRANSFER
[address] [address]  Dept: [phone]      [id number] TRANSFER CONSENT    NAME Bonnie Talavera                                         1950                              MRN [id number]    I have been informed of my rights regarding examination, treatment, and transfer   by Dr Denisha Liao MD    Benefits: Specialized equipment and/or services available at the receiving facility (Include comment)________________________    Risks: Potential for delay in receiving treatment, Potential deterioration of medical condition, Loss of IV, Increased discomfort during transfer, Possible worsening of condition or death during transfer      Consent for Transfer:  I acknowledge that my medical condition has been evaluated and explained to me by the emergency department physician or other qualified medical person and/or my attending physician, who has recommended that I be transferred to the service of  Accepting Physician: Michelle at [address] Name, [address] : [facility]  The above potential benefits of such transfer, the potential risks associated with such transfer, and the probable risks of not being transferred have been explained to me, and I fully understand them  The doctor has explained that, in my case, the benefits of transfer outweigh the risks  I agree to be transferred  I authorize the performance of emergency medical procedures and treatments upon me in both transit and upon arrival at the receiving facility  Additionally, I authorize the release of any and all medical records to the receiving facility and request they be transported with me, if possible  I understand that the safest mode of transportation during a medical emergency is an ambulance and that the Hospital advocates the use of this mode of transport   Risks of traveling to the receiving facility by car, including absence of medical control, life sustaining equipment, such as oxygen, and medical personnel has been explained to me and I fully understand them  (HAKEEM CORRECT BOX BELOW)  [  ]  I consent to the stated transfer and to be transported by ambulance/helicopter  [  ]  I consent to the stated transfer, but refuse transportation by ambulance and accept full responsibility for my transportation by car  I understand the risks of non-ambulance transfers and I exonerate the Hospital and its staff from any deterioration in my condition that results from this refusal     X___________________________________________    DATE  18  TIME________  Signature of patient or legally responsible individual signing on patient behalf           RELATIONSHIP TO PATIENT_________________________          Provider Certification    NAME Alma Avery                                         1950                              MRN [id number]    A medical screening exam was performed on the above named patient  Based on the examination:    Condition Necessitating Transfer The primary encounter diagnosis was Viral gastroenteritis  Diagnoses of Nausea vomiting and diarrhea and Rectal bleeding were also pertinent to this visit      Patient Condition: The patient has been stabilized such that within reasonable medical probability, no material deterioration of the patient condition or the condition of the unborn child(liza) is likely to result from the transfer    Reason for Transfer: Level of Care needed not available at this facility    Transfer Requirements: Facility [facility]   · Space available and qualified personnel available for treatment as acknowledged by Navarro Hirsch  · Agreed to accept transfer and to provide appropriate medical treatment as acknowledged by       Michelle  · Appropriate medical records of the examination and treatment of the patient are provided at the time of transfer   [address] _______  · Transfer will be performed by qualified personnel from EMS  and appropriate transfer equipment as required, including the use of necessary and appropriate life support measures  Provider Certification: I have examined the patient and explained the following risks and benefits of being transferred/refusing transfer to the patient/family:  General risk, such as traffic hazards, adverse weather conditions, rough terrain or turbulence, possible failure of equipment (including vehicle or aircraft), or consequences of actions of persons outside the control of the transport personnel      Based on these reasonable risks and benefits to the patient and/or the unborn child(liza), and based upon the information available at the time of the patients examination, I certify that the medical benefits reasonably to be expected from the provision of appropriate medical treatments at another medical facility outweigh the increasing risks, if any, to the individuals medical condition, and in the case of labor to the unborn child, from effecting the transfer      X____________________________________________ DATE 12/14/18        TIME_______      ORIGINAL - SEND TO MEDICAL RECORDS   COPY - SEND WITH PATIENT DURING TRANSFER

## 2018-12-15 NOTE — NURSING NOTE
Per Dr Vaughn Madrid, send ordered antibiotics down with pt to OR for OR staff to infuse  Pt left via bed to OR

## 2018-12-15 NOTE — PROGRESS NOTES
IR consulted this evening for possible paracentesis or abdominal fluid drainage  Patient presents with nausea, vomiting and abdominal distension  She underwent recent paracentesis 4 days ago with 6 liters withdrawn  I reviewed the CT scan and there is a large amount of free air and abdominal fluid most consistent with a perforated viscus possibly from erosion of the tumor into the colon  There is an ileus also  Her WBC is 18, lactic acid high at 4 4 and she is tachycardic and likely very septic  I recommended urgent surgical consultation and she will likely need an ICU bed  I discussed this with the ED provider, Nina Torres who called Dr Gigi Valle who will accept the patient at [facility]    I also spoke with Dr Gigi Valle who is aware of these findings and is awaiting the patients arrival

## 2018-12-15 NOTE — OR NURSING
Patient identified  Surgical consent with patient  Patient intubated, bypassed holding area and taken directly back to OR 6 for surgery with Dr Clotilda Severs

## 2018-12-15 NOTE — PROGRESS NOTES
Discussed w/ Dr Jefry Ruiz, plan to proceed with ex lap, loop ileostomy, abdominal closure, all other indicated procedures     Discussed w/ OR charge nurse  Dr Jefry Ruiz will discuss w/ family for consent

## 2018-12-15 NOTE — ASSESSMENT & PLAN NOTE
Differential diagnosis viral gastroenteritis versus chemotherapy related versus bacterial  CT of the abdomen findings reviewed  No acute abnormality  No bowel obstruction    It did show large ascites and large ovarian mass

## 2018-12-15 NOTE — ANESTHESIA PREPROCEDURE EVALUATION
Review of Systems/Medical History  Patient summary reviewed  Chart reviewed  No history of anesthetic complications     Cardiovascular  Negative cardio ROS    Pulmonary  Asthma ,        GI/Hepatic      Comment: Bowel perforation     Negative  ROS        Endo/Other  Diabetes ,      GYN    Ovarian cancer,        Hematology      Comment: Coagulopathy, INR 1 5 Musculoskeletal  Negative musculoskeletal ROS        Neurology  Negative neurology ROS      Psychology   Anxiety,     Comment: Intubated and sedated         Physical Exam    Airway       Dental       Cardiovascular  Comment: Negative ROS,     Pulmonary      Other Findings  ETT in situ      Anesthesia Plan  ASA Score- 4     Anesthesia Type- general with ASA Monitors  Additional Monitors:   Airway Plan: ETT  Comment: Patient underwent emergency surgery beginning twelve hours ago and is now returning to OR for further surgical management      Lab Results       Component                Value               Date                       WBC                      20 98 (H)           12/15/2018                 HGB                      9 2 (L)             12/15/2018                 HCT                      29 0 (L)            12/15/2018                 MCV                      78 (L)              12/15/2018                 PLT                      147 (L)             12/15/2018            Lab Results       Component                Value               Date                       CALCIUM                  7 3 (L)             12/15/2018                 K                        4 0                 12/15/2018                 CO2                      24                  12/15/2018                 CL                       106                 12/15/2018                 BUN                      23                  12/15/2018                 CREATININE               0 70                12/15/2018            Lab Results       Component                Value               Date INR                      1 50 (H)            12/15/2018                 INR                      1 49 (H)            12/14/2018                 INR                      1 66 (H)            12/14/2018                 PROTIME                  18 2 (H)            12/15/2018                 PROTIME                  17 9 (H)            12/14/2018                 PROTIME                  19 7 (H)            12/14/2018              Plan Factors-    Induction- inhalational and intravenous  Postoperative Plan- Plan for postoperative opioid use  Informed Consent- Anesthetic plan and risks discussed with daughter Pushpa Dixon [phone])  I personally reviewed this patient with the CRNA  Discussed and agreed on the Anesthesia Plan with the CRNA  Mercy Hendricks

## 2018-12-15 NOTE — H&P
Assessment/Plan:     74 yo with Stage IV ovarian cancer with suspected bowel perforation  -Plan to proceed to the OR emergently for exploratory laparotomy and all other indicated procedures  CHIEF COMPLAINT:       Subjective: 74 yo with Stage IV ovarian cancer s/p neoadjuvant chemotherapy with Carbo, Taxol and Avastin who presents as a transfer from [facility]  A CT scan in the ED was concerning for a bowel perforation and patient was transferred for further management  Patient initially presented for intractable nausea, vomiting, and abdominal pain  Patient states that he had a paracentesis a few days ago ad since that time has had abdominal pain  She also notes that she has had diarrhea for the last several day since receiving Chemotherapy  She also states that she has been unable to eat today, but has been urinating without difficulty  Problem:  Cancer Staging  Ovarian cancer [facility])  Staging form: Ovary, Fallopian Tube, Primary Peritoneal, AJCC 8th Edition  - Clinical stage from 11/14/2018: FIGO Stage IVB (cT3c, cN0, pM1b) - Signed by Thania Anderson MD on 11/14/2018  - Pathologic: No stage assigned - Unsigned      Previous therapy:     Ovarian cancer ([facility] [address] )    11/9/2018 Initial Diagnosis     Ovarian cancer ([facility] [address] )         11/9/2018 Biopsy     CT-guided needle biopsy of abdominal mass 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  Given liver involvement this would be stage IV         11/14/2018 -  Chemotherapy     Carboplatin area under the curve of 6, paclitaxel 135 milligrams/meter squared, Avastin 10 milligrams/kilogram Q 3 weeks x3 followed by assessment for interval debulking              Patient ID: Juany Mars is a 75 y o  female  HPI    Review of Systems   Constitutional: Positive for fatigue  Negative for chills, diaphoresis and fever  HENT: Negative for facial swelling  Eyes: Negative for photophobia and visual disturbance     Respiratory: Positive for shortness of breath  Gastrointestinal: Positive for abdominal distention, abdominal pain, diarrhea, nausea and vomiting  Endocrine: Negative for polyuria  Genitourinary: Negative for dysuria, flank pain, frequency, hematuria and vaginal bleeding  Musculoskeletal: Positive for gait problem  Negative for back pain  Skin: Negative for pallor  Neurological: Positive for dizziness, weakness and light-headedness  Negative for facial asymmetry, numbness and headaches  Hematological: Does not bruise/bleed easily  Psychiatric/Behavioral: Negative for agitation and confusion  Current Facility-Administered Medications   Medication Dose Route Frequency Provider Last Rate Last Dose    dextrose 5 % and sodium chloride 0 9 % with KCl 20 mEq/L infusion (premix)  125 mL/hr Intravenous Continuous Florin Beard MD        ondansetron (ZOFRAN) injection 4 mg  4 mg Intravenous Q6H PRN Florin Beard MD         Facility-Administered Medications Ordered in Other Encounters   Medication Dose Route Frequency Provider Last Rate Last Dose    alteplase (CATHFLO) injection 2 mg  2 mg Intracatheter PRN Tawnya Ghosh MD        heparin lock flush 100 units/mL injection 300 Units  300 Units Intracatheter PRN Tawnya Ghosh MD           No Known Allergies    Past Medical History:   Diagnosis Date    Abdominal fluid collection     Asthma     Cancer ([facility] [address] )     ovaries    Diabetes mellitus ([facility] [address] )        Past Surgical History:   Procedure Laterality Date    CT GUIDED PARACENTESIS  11/6/2018    CT NEEDLE BIOPSY PERITONEUM  11/6/2018    ECTOPIC PREGNANCY SURGERY      ECTOPIC PREGNANCY SURGERY      IR PARACENTESIS  9/18/2018    IR PARACENTESIS  10/18/2018    IR PARACENTESIS  12/10/2018    IR PORT PLACEMENT  11/29/2018    PA COLONOSCOPY FLX DX W/COLLJ SPEC WHEN PFRMD N/A 9/25/2018    Procedure: EGD AND COLONOSCOPY;  Surgeon: Lea Galindo MD;  Location: [facility];   Service: Gastroenterology  TONSILECTOMY AND ADNOIDECTOMY      TONSILLECTOMY         OB History      Para Term  AB Living    3 2 2   1 2    SAB TAB Ectopic Multiple Live Births        1   2        Obstetric Comments    Menarche: 8th grade (around age 14)            Family History   Problem Relation Age of Onset    Cirrhosis Mother     Colon cancer Mother     Leukemia Cousin     Diabetes Father            Objective: There were no vitals taken for this visit  There is no height or weight on file to calculate BMI  Physical Exam   Constitutional: She is oriented to person, place, and time  She appears distressed  Cachetic, ill appearing   HENT:   Head: Normocephalic and atraumatic  Abdominal: Soft  She exhibits distension  There is no tenderness  There is no rebound and no guarding  Neurological: She is alert and oriented to person, place, and time  Skin: She is not diaphoretic  Vitals reviewed          Lab Results   Component Value Date     135 0 (H) 2018     Lab Results   Component Value Date    WBC 18 31 (H) 2018    HGB 12 3 2018    HCT 39 1 2018    MCV 79 (L) 2018     2018     Lab Results   Component Value Date    K 3 4 (L) 2018    CL 98 (L) 2018    CO2 22 2018    BUN 25 2018    CREATININE 1 00 2018    GLUF 92 2018    CALCIUM 8 6 2018    AST 79 (H) 2018    ALT 19 2018    ALKPHOS 108 2018    EGFR 58 2018       Mirella López MD, MPH  OB/GYN, PGY3  2018, 10:05 PM

## 2018-12-15 NOTE — ANESTHESIA POSTPROCEDURE EVALUATION
Post-Op Assessment Note      CV Status:  Stable    Mental Status:  Unresponsive (sedated on ppf)    Hydration Status:  Euvolemic and stable    PONV Controlled:  None    Airway Patency:  Patent  Airway: intubated    Post Op Vitals Reviewed: Yes          Staff: CRNA, Anesthesiologist       Comments: report to icu staff          /71 (12/15/18 0015)    Temp     Pulse 90 (12/15/18 0015)   Resp 14 (12/15/18 0015)    SpO2 100 % (12/15/18 0015)

## 2018-12-15 NOTE — ANESTHESIA POSTPROCEDURE EVALUATION
Post-Op Assessment Note      CV Status:  Stable    Mental Status:  Alert and awake    Hydration Status:  Euvolemic    PONV Controlled:  Controlled    Airway Patency:  Patent    Post Op Vitals Reviewed: Yes          Staff: CRNA           /62 (12/15/18 1339)    Temp 98 2 °F (36 8 °C) (12/15/18 1339)    Pulse 104 (12/15/18 1339)   Resp 16 (12/15/18 1339)    SpO2 100 % (12/15/18 1339)

## 2018-12-16 PROBLEM — E87.6 HYPOKALEMIA: Status: ACTIVE | Noted: 2018-12-16

## 2018-12-16 PROBLEM — Z93.2 S/P ILEOSTOMY (HCC): Status: ACTIVE | Noted: 2018-12-16

## 2018-12-16 PROBLEM — D62 ACUTE BLOOD LOSS ANEMIA: Status: ACTIVE | Noted: 2018-12-16

## 2018-12-16 PROBLEM — D72.829 LEUKOCYTOSIS: Status: ACTIVE | Noted: 2018-12-16

## 2018-12-16 LAB
ALBUMIN SERPL BCP-MCNC: 2 G/DL (ref 3.5–5)
ALP SERPL-CCNC: 71 U/L (ref 46–116)
ALT SERPL W P-5'-P-CCNC: 17 U/L (ref 12–78)
ANION GAP SERPL CALCULATED.3IONS-SCNC: 8 MMOL/L (ref 4–13)
ANISOCYTOSIS BLD QL SMEAR: PRESENT
AST SERPL W P-5'-P-CCNC: 41 U/L (ref 5–45)
BACTERIA UR CULT: NORMAL
BASE EXCESS BLDA CALC-SCNC: -1 MMOL/L (ref -2–3)
BASE EXCESS BLDA CALC-SCNC: 0 MMOL/L (ref -2–3)
BASOPHILS # BLD MANUAL: 0 THOUSAND/UL (ref 0–0.1)
BASOPHILS NFR MAR MANUAL: 0 % (ref 0–1)
BILIRUB SERPL-MCNC: 0.32 MG/DL (ref 0.2–1)
BUN SERPL-MCNC: 16 MG/DL (ref 5–25)
BURR CELLS BLD QL SMEAR: PRESENT
CA-I BLD-SCNC: 0.97 MMOL/L (ref 1.12–1.32)
CA-I BLD-SCNC: 1 MMOL/L (ref 1.12–1.32)
CA-I BLD-SCNC: 1.08 MMOL/L (ref 1.12–1.32)
CALCIUM SERPL-MCNC: 8.2 MG/DL (ref 8.3–10.1)
CHLORIDE SERPL-SCNC: 108 MMOL/L (ref 100–108)
CO2 SERPL-SCNC: 24 MMOL/L (ref 21–32)
CREAT SERPL-MCNC: 0.48 MG/DL (ref 0.6–1.3)
EOSINOPHIL # BLD MANUAL: 0 THOUSAND/UL (ref 0–0.4)
EOSINOPHIL NFR BLD MANUAL: 0 % (ref 0–6)
ERYTHROCYTE [DISTWIDTH] IN BLOOD BY AUTOMATED COUNT: 18.6 % (ref 11.6–15.1)
GFR SERPL CREATININE-BSD FRML MDRD: 101 ML/MIN/1.73SQ M
GLUCOSE SERPL-MCNC: 109 MG/DL (ref 65–140)
GLUCOSE SERPL-MCNC: 130 MG/DL (ref 65–140)
GLUCOSE SERPL-MCNC: 67 MG/DL (ref 65–140)
GLUCOSE SERPL-MCNC: 69 MG/DL (ref 65–140)
GLUCOSE SERPL-MCNC: 72 MG/DL (ref 65–140)
GLUCOSE SERPL-MCNC: 76 MG/DL (ref 65–140)
GLUCOSE SERPL-MCNC: 81 MG/DL (ref 65–140)
GLUCOSE SERPL-MCNC: 84 MG/DL (ref 65–140)
HCO3 BLDA-SCNC: 22.9 MMOL/L (ref 22–28)
HCO3 BLDA-SCNC: 25.4 MMOL/L (ref 22–28)
HCT VFR BLD AUTO: 26 % (ref 34.8–46.1)
HCT VFR BLD CALC: 27 % (ref 34.8–46.1)
HCT VFR BLD CALC: 28 % (ref 34.8–46.1)
HGB BLD-MCNC: 8.1 G/DL (ref 11.5–15.4)
HGB BLDA-MCNC: 9.2 G/DL (ref 11.5–15.4)
HGB BLDA-MCNC: 9.5 G/DL (ref 11.5–15.4)
LYMPHOCYTES # BLD AUTO: 1.02 THOUSAND/UL (ref 0.6–4.47)
LYMPHOCYTES # BLD AUTO: 4 % (ref 14–44)
MAGNESIUM SERPL-MCNC: 2.6 MG/DL (ref 1.6–2.6)
MCH RBC QN AUTO: 24.5 PG (ref 26.8–34.3)
MCHC RBC AUTO-ENTMCNC: 31.2 G/DL (ref 31.4–37.4)
MCV RBC AUTO: 79 FL (ref 82–98)
MICROCYTES BLD QL AUTO: PRESENT
MONOCYTES # BLD AUTO: 0 THOUSAND/UL (ref 0–1.22)
MONOCYTES NFR BLD: 0 % (ref 4–12)
NEUTROPHILS # BLD MANUAL: 24.25 THOUSAND/UL (ref 1.85–7.62)
NEUTS SEG NFR BLD AUTO: 95 % (ref 43–75)
NRBC BLD AUTO-RTO: 0 /100 WBCS
PCO2 BLD: 24 MMOL/L (ref 21–32)
PCO2 BLD: 27 MMOL/L (ref 21–32)
PCO2 BLD: 35.5 MM HG (ref 36–44)
PCO2 BLD: 44.4 MM HG (ref 36–44)
PH BLD: 7.36 [PH] (ref 7.35–7.45)
PH BLD: 7.42 [PH] (ref 7.35–7.45)
PHOSPHATE SERPL-MCNC: 1.6 MG/DL (ref 2.3–4.1)
PLATELET # BLD AUTO: 133 THOUSANDS/UL (ref 149–390)
PLATELET BLD QL SMEAR: ABNORMAL
PMV BLD AUTO: 13.6 FL (ref 8.9–12.7)
PO2 BLD: 209 MM HG (ref 75–129)
PO2 BLD: 370 MM HG (ref 75–129)
POIKILOCYTOSIS BLD QL SMEAR: PRESENT
POLYCHROMASIA BLD QL SMEAR: PRESENT
POTASSIUM BLD-SCNC: 3.4 MMOL/L (ref 3.5–5.3)
POTASSIUM BLD-SCNC: 4.1 MMOL/L (ref 3.5–5.3)
POTASSIUM SERPL-SCNC: 3.4 MMOL/L (ref 3.5–5.3)
PROT SERPL-MCNC: 4.7 G/DL (ref 6.4–8.2)
RBC # BLD AUTO: 3.3 MILLION/UL (ref 3.81–5.12)
RBC MORPH BLD: PRESENT
SAO2 % BLD FROM PO2: 100 % (ref 95–98)
SAO2 % BLD FROM PO2: 100 % (ref 95–98)
SODIUM BLD-SCNC: 139 MMOL/L (ref 136–145)
SODIUM BLD-SCNC: 140 MMOL/L (ref 136–145)
SODIUM SERPL-SCNC: 140 MMOL/L (ref 136–145)
SPECIMEN SOURCE: ABNORMAL
SPECIMEN SOURCE: ABNORMAL
TARGETS BLD QL SMEAR: PRESENT
VARIANT LYMPHS # BLD AUTO: 1 %
WBC # BLD AUTO: 25.53 THOUSAND/UL (ref 4.31–10.16)

## 2018-12-16 PROCEDURE — 99024 POSTOP FOLLOW-UP VISIT: CPT | Performed by: OBSTETRICS & GYNECOLOGY

## 2018-12-16 PROCEDURE — 82330 ASSAY OF CALCIUM: CPT | Performed by: PHYSICIAN ASSISTANT

## 2018-12-16 PROCEDURE — 85027 COMPLETE CBC AUTOMATED: CPT | Performed by: PHYSICIAN ASSISTANT

## 2018-12-16 PROCEDURE — 83735 ASSAY OF MAGNESIUM: CPT | Performed by: PHYSICIAN ASSISTANT

## 2018-12-16 PROCEDURE — 82948 REAGENT STRIP/BLOOD GLUCOSE: CPT

## 2018-12-16 PROCEDURE — 84100 ASSAY OF PHOSPHORUS: CPT | Performed by: PHYSICIAN ASSISTANT

## 2018-12-16 PROCEDURE — 80053 COMPREHEN METABOLIC PANEL: CPT | Performed by: PHYSICIAN ASSISTANT

## 2018-12-16 PROCEDURE — 99232 SBSQ HOSP IP/OBS MODERATE 35: CPT | Performed by: SURGERY

## 2018-12-16 PROCEDURE — 85007 BL SMEAR W/DIFF WBC COUNT: CPT | Performed by: PHYSICIAN ASSISTANT

## 2018-12-16 PROCEDURE — C9113 INJ PANTOPRAZOLE SODIUM, VIA: HCPCS | Performed by: SURGERY

## 2018-12-16 RX ORDER — DEXTROSE MONOHYDRATE 25 G/50ML
25 INJECTION, SOLUTION INTRAVENOUS ONCE
Status: COMPLETED | OUTPATIENT
Start: 2018-12-16 | End: 2018-12-16

## 2018-12-16 RX ORDER — ACETAMINOPHEN 325 MG/1
650 TABLET ORAL EVERY 6 HOURS PRN
Status: DISCONTINUED | OUTPATIENT
Start: 2018-12-16 | End: 2018-12-29 | Stop reason: HOSPADM

## 2018-12-16 RX ORDER — POTASSIUM CHLORIDE 14.9 MG/ML
20 INJECTION INTRAVENOUS
Status: DISPENSED | OUTPATIENT
Start: 2018-12-16 | End: 2018-12-16

## 2018-12-16 RX ORDER — SODIUM CHLORIDE, SODIUM GLUCONATE, SODIUM ACETATE, POTASSIUM CHLORIDE, MAGNESIUM CHLORIDE, SODIUM PHOSPHATE, DIBASIC, AND POTASSIUM PHOSPHATE .53; .5; .37; .037; .03; .012; .00082 G/100ML; G/100ML; G/100ML; G/100ML; G/100ML; G/100ML; G/100ML
125 INJECTION, SOLUTION INTRAVENOUS CONTINUOUS
Status: DISCONTINUED | OUTPATIENT
Start: 2018-12-16 | End: 2018-12-17

## 2018-12-16 RX ORDER — DEXTROSE MONOHYDRATE 25 G/50ML
INJECTION, SOLUTION INTRAVENOUS
Status: DISPENSED
Start: 2018-12-16 | End: 2018-12-17

## 2018-12-16 RX ORDER — HYDROMORPHONE HCL/PF 1 MG/ML
0.5 SYRINGE (ML) INJECTION
Status: DISCONTINUED | OUTPATIENT
Start: 2018-12-16 | End: 2018-12-16

## 2018-12-16 RX ORDER — OXYCODONE HYDROCHLORIDE 5 MG/1
5 TABLET ORAL EVERY 4 HOURS PRN
Status: DISCONTINUED | OUTPATIENT
Start: 2018-12-16 | End: 2018-12-29 | Stop reason: HOSPADM

## 2018-12-16 RX ORDER — OXYCODONE HYDROCHLORIDE 10 MG/1
10 TABLET ORAL EVERY 4 HOURS PRN
Status: DISCONTINUED | OUTPATIENT
Start: 2018-12-16 | End: 2018-12-29 | Stop reason: HOSPADM

## 2018-12-16 RX ORDER — MORPHINE SULFATE 4 MG/ML
4 INJECTION, SOLUTION INTRAMUSCULAR; INTRAVENOUS EVERY 4 HOURS PRN
Status: DISCONTINUED | OUTPATIENT
Start: 2018-12-16 | End: 2018-12-29 | Stop reason: HOSPADM

## 2018-12-16 RX ORDER — DEXTROSE MONOHYDRATE 25 G/50ML
INJECTION, SOLUTION INTRAVENOUS
Status: COMPLETED
Start: 2018-12-16 | End: 2018-12-16

## 2018-12-16 RX ADMIN — METRONIDAZOLE 500 MG: 500 INJECTION, SOLUTION INTRAVENOUS at 14:08

## 2018-12-16 RX ADMIN — HEPARIN SODIUM 5000 UNITS: 5000 INJECTION INTRAVENOUS; SUBCUTANEOUS at 21:55

## 2018-12-16 RX ADMIN — SODIUM CHLORIDE, SODIUM GLUCONATE, SODIUM ACETATE, POTASSIUM CHLORIDE, MAGNESIUM CHLORIDE, SODIUM PHOSPHATE, DIBASIC, AND POTASSIUM PHOSPHATE 125 ML/HR: .53; .5; .37; .037; .03; .012; .00082 INJECTION, SOLUTION INTRAVENOUS at 03:40

## 2018-12-16 RX ADMIN — ONDANSETRON 4 MG: 2 INJECTION INTRAMUSCULAR; INTRAVENOUS at 14:27

## 2018-12-16 RX ADMIN — CEFEPIME 2000 MG: 2 INJECTION, POWDER, FOR SOLUTION INTRAMUSCULAR; INTRAVENOUS at 22:29

## 2018-12-16 RX ADMIN — SODIUM CHLORIDE 20 ML/HR: 0.9 INJECTION, SOLUTION INTRAVENOUS at 02:00

## 2018-12-16 RX ADMIN — METRONIDAZOLE 500 MG: 500 INJECTION, SOLUTION INTRAVENOUS at 08:21

## 2018-12-16 RX ADMIN — POTASSIUM CHLORIDE 20 MEQ: 200 INJECTION, SOLUTION INTRAVENOUS at 10:26

## 2018-12-16 RX ADMIN — SODIUM CHLORIDE, SODIUM GLUCONATE, SODIUM ACETATE, POTASSIUM CHLORIDE, MAGNESIUM CHLORIDE, SODIUM PHOSPHATE, DIBASIC, AND POTASSIUM PHOSPHATE 125 ML/HR: .53; .5; .37; .037; .03; .012; .00082 INJECTION, SOLUTION INTRAVENOUS at 23:19

## 2018-12-16 RX ADMIN — CEFEPIME 2000 MG: 2 INJECTION, POWDER, FOR SOLUTION INTRAMUSCULAR; INTRAVENOUS at 13:14

## 2018-12-16 RX ADMIN — MORPHINE SULFATE 4 MG: 4 INJECTION INTRAVENOUS at 05:23

## 2018-12-16 RX ADMIN — CHLORHEXIDINE GLUCONATE 0.12% ORAL RINSE 15 ML: 1.2 LIQUID ORAL at 08:21

## 2018-12-16 RX ADMIN — OXYCODONE HYDROCHLORIDE 5 MG: 5 TABLET ORAL at 08:21

## 2018-12-16 RX ADMIN — POTASSIUM PHOSPHATE, MONOBASIC AND POTASSIUM PHOSPHATE, DIBASIC 21 MMOL: 224; 236 INJECTION, SOLUTION INTRAVENOUS at 09:31

## 2018-12-16 RX ADMIN — HEPARIN SODIUM 5000 UNITS: 5000 INJECTION INTRAVENOUS; SUBCUTANEOUS at 14:07

## 2018-12-16 RX ADMIN — SODIUM CHLORIDE, SODIUM GLUCONATE, SODIUM ACETATE, POTASSIUM CHLORIDE, MAGNESIUM CHLORIDE, SODIUM PHOSPHATE, DIBASIC, AND POTASSIUM PHOSPHATE 125 ML/HR: .53; .5; .37; .037; .03; .012; .00082 INJECTION, SOLUTION INTRAVENOUS at 02:37

## 2018-12-16 RX ADMIN — OXYCODONE HYDROCHLORIDE 10 MG: 10 TABLET ORAL at 22:06

## 2018-12-16 RX ADMIN — CALCIUM GLUCONATE 1 G: 98 INJECTION, SOLUTION INTRAVENOUS at 09:31

## 2018-12-16 RX ADMIN — DEXTROSE MONOHYDRATE 25 ML: 25 INJECTION, SOLUTION INTRAVENOUS at 22:20

## 2018-12-16 RX ADMIN — DEXTROSE 50 % IN WATER (D50W) INTRAVENOUS SYRINGE 25 ML: at 02:43

## 2018-12-16 RX ADMIN — METRONIDAZOLE 500 MG: 500 INJECTION, SOLUTION INTRAVENOUS at 23:19

## 2018-12-16 RX ADMIN — MORPHINE SULFATE 4 MG: 4 INJECTION INTRAVENOUS at 00:52

## 2018-12-16 RX ADMIN — PANTOPRAZOLE SODIUM 40 MG: 40 INJECTION, POWDER, FOR SOLUTION INTRAVENOUS at 08:21

## 2018-12-16 RX ADMIN — OXYCODONE HYDROCHLORIDE 10 MG: 10 TABLET ORAL at 14:27

## 2018-12-16 RX ADMIN — CHLORHEXIDINE GLUCONATE 0.12% ORAL RINSE 15 ML: 1.2 LIQUID ORAL at 20:43

## 2018-12-16 RX ADMIN — HEPARIN SODIUM 5000 UNITS: 5000 INJECTION INTRAVENOUS; SUBCUTANEOUS at 06:09

## 2018-12-16 RX ADMIN — DEXTROSE MONOHYDRATE 50 ML: 25 INJECTION, SOLUTION INTRAVENOUS at 12:16

## 2018-12-16 NOTE — PLAN OF CARE
Problem: Prexisting or High Potential for Compromised Skin Integrity  Goal: Skin integrity is maintained or improved  INTERVENTIONS:  - Identify patients at risk for skin breakdown  - Assess and monitor skin integrity  - Assess and monitor nutrition and hydration status  - Monitor labs (i e  albumin)  - Assess for incontinence   - Turn and reposition patient  - Assist with mobility/ambulation  - Relieve pressure over bony prominences  - Avoid friction and shearing  - Provide appropriate hygiene as needed including keeping skin clean and dry  - Evaluate need for skin moisturizer/barrier cream  - Collaborate with interdisciplinary team (i e  Nutrition, Rehabilitation, etc )   - Patient/family teaching   Outcome: Progressing      Problem: Potential for Falls  Goal: Patient will remain free of falls  INTERVENTIONS:  - Assess patient frequently for physical needs  -  Identify cognitive and physical deficits and behaviors that affect risk of falls  -  Laverne fall precautions as indicated by assessment   - Educate patient/family on patient safety including physical limitations  - Instruct patient to call for assistance with activity based on assessment  - Modify environment to reduce risk of injury  - Consider OT/PT consult to assist with strengthening/mobility   Outcome: Progressing      Problem: Nutrition/Hydration-ADULT  Goal: Nutrient/Hydration intake appropriate for improving, restoring or maintaining nutritional needs  Monitor and assess patient's nutrition/hydration status for malnutrition (ex- brittle hair, bruises, dry skin, pale skin and conjunctiva, muscle wasting, smooth red tongue, and disorientation)  Collaborate with interdisciplinary team and initiate plan and interventions as ordered  Monitor patient's weight and dietary intake as ordered or per policy  Utilize nutrition screening tool and intervene per policy   Determine patient's food preferences and provide high-protein, high-caloric foods as appropriate       INTERVENTIONS:  - Monitor oral intake, urinary output, labs, and treatment plans  - Assess nutrition and hydration status and recommend course of action  - Evaluate amount of meals eaten  - Assist patient with eating if necessary   - Allow adequate time for meals  - Recommend/ encourage appropriate diets, oral nutritional supplements, and vitamin/mineral supplements  - Order, calculate, and assess calorie counts as needed  - Recommend, monitor, and adjust tube feedings and TPN/PPN based on assessed needs  - Assess need for intravenous fluids  - Provide specific nutrition/hydration education as appropriate  - Include patient/family/caregiver in decisions related to nutrition   Outcome: Progressing      Problem: PAIN - ADULT  Goal: Verbalizes/displays adequate comfort level or baseline comfort level  Interventions:  - Encourage patient to monitor pain and request assistance  - Assess pain using appropriate pain scale  - Administer analgesics based on type and severity of pain and evaluate response  - Implement non-pharmacological measures as appropriate and evaluate response  - Consider cultural and social influences on pain and pain management  - Notify physician/advanced practitioner if interventions unsuccessful or patient reports new pain  Outcome: Progressing      Problem: INFECTION - ADULT  Goal: Absence or prevention of progression during hospitalization  INTERVENTIONS:  - Assess and monitor for signs and symptoms of infection  - Monitor lab/diagnostic results  - Monitor all insertion sites, i e  indwelling lines, tubes, and drains  - Monitor endotracheal (as able) and nasal secretions for changes in amount and color  - [place] appropriate cooling/warming therapies per order  - Administer medications as ordered  - Instruct and encourage patient and family to use good hand hygiene technique  - Identify and instruct in appropriate isolation precautions for identified infection/condition  Outcome: Progressing      Problem: DISCHARGE PLANNING  Goal: Discharge to home or other facility with appropriate resources  INTERVENTIONS:  - Identify barriers to discharge w/patient and caregiver  - Arrange for needed discharge resources and transportation as appropriate  - Identify discharge learning needs (meds, wound care, etc )  - Arrange for interpretive services to assist at discharge as needed  - Refer to Case Management Department for coordinating discharge planning if the patient needs post-hospital services based on physician/advanced practitioner order or complex needs related to functional status, cognitive ability, or social support system  Outcome: Progressing      Problem: Knowledge Deficit  Goal: Patient/family/caregiver demonstrates understanding of disease process, treatment plan, medications, and discharge instructions  Complete learning assessment and assess knowledge base    Interventions:  - Provide teaching at level of understanding  - Provide teaching via preferred learning methods  Outcome: Progressing      Problem: GASTROINTESTINAL - ADULT  Goal: Minimal or absence of nausea and/or vomiting  INTERVENTIONS:  - Administer IV fluids as ordered to ensure adequate hydration  - Maintain NPO status until nausea and vomiting are resolved  - Nasogastric tube as ordered  - Administer ordered antiemetic medications as needed  - Provide nonpharmacologic comfort measures as appropriate  - Advance diet as tolerated, if ordered  - Nutrition services referral to assist patient with adequate nutrition and appropriate food choices  Outcome: Progressing      Problem: METABOLIC, FLUID AND ELECTROLYTES - ADULT  Goal: Glucose maintained within target range  INTERVENTIONS:  - Monitor Blood Glucose as ordered  - Assess for signs and symptoms of hyperglycemia and hypoglycemia  - Administer ordered medications to maintain glucose within target range  - Assess nutritional intake and initiate nutrition service referral as needed  Outcome: Progressing

## 2018-12-16 NOTE — PROGRESS NOTES
Gyn Oncology Progress note   Willie Camarena 75 y o  female MRN: [id number]  Unit/Bed#: [facility] 513-01 Encounter: [id number]      Assessment / Plan: 75 y o   with Stage IV ovarian cancer admitted on 12/14/18 in septic shock to bowel perforation,  status post exploratory laparotomy, abdominal washout and Abthera vac placement on 12/14/18, followed by exploratory laparotomy, abdominal washout,  Drain placement x 4, loop ileostomy & abdominal closure on 12/15/18, POD#1, doing well postoperatively, Hospital Day #2  #1 Sepsis secondary to bowel perforation:   Status post surgery as mentioned, remains in the SICU overnight for management  Lactate continues downtrending, now 1 2 ; WBC 21 ; Hb 9 2 to 8 4; Vitals stable, remains afebrile  Initial Blood cultures no growth  Follow up urine culture  Tissue gram gram 4+ polys, gram + and - sergio, follow up culture  - Continue Cefepime 2g Q 12 + Flagyl 500mg Q 8  - Continued management per ICU    #2 Post operative Care:  - FEN: NPO, Isolyte 125cc/hr ; advance diet to CLD pending NGT output  - Pain: Morphine PRN; transition to PO when transitioned to CLD  - Encouraged incentive spirometry to reduce atelectasis and pneumonia risk  - Encouraged ambulation as tolerated  - DVT ppx: SCDs, Heparin 5000u TID    #3 Stage IV Ovarian Cancer  Status post neoadjuvant chemotherapy with Carboplatin, Taxol & Avastin  #4 Type 2 DM: Accuchecks 60s-90s  Continue sliding scale insulin, #3    #4 FEN: NPO, Isolyte @ 125cc/hr    #6 Disposition: Inpatient    No acute events overnight  The patient reports that pain is well controlled, recently received 1 dose of IV morphine at 523  She continues to be NPO, was not advanced to CLD secondary to NGT output, > 200 overnight  She denies nausea or vomiting  Ileostomy without output thus far  She is not yet ambulating  She denies chest pain, shortness of breath, calf, pain or fever/chills      BP 92/51   Pulse 80   Temp 97 5 °F (36 4 °C) (Oral) Resp (!) 11   Wt 65 5 kg (144 lb 6 4 oz)   SpO2 97%   BMI 24 23 kg/m²     I/O last 3 completed shifts: In: 9182 3 [I V :7122 3; NG/GT:60; IV Piggyback:2000]  Out: 9625 [Urine:900; Emesis/NG output:725; Drains:1000; Other:7000]  I/O this shift:  In: 1407 8 [I V :1257 8;  IV Piggyback:150]  Out: 860 [Urine:350; Emesis/NG output:200; Drains:310]    Lab Results   Component Value Date    WBC 21 37 (H) 12/15/2018    HGB 8 4 (L) 12/15/2018    HCT 26 6 (L) 12/15/2018    MCV 79 (L) 12/15/2018     (L) 12/15/2018       Lab Results   Component Value Date    CALCIUM 7 1 (L) 12/15/2018    K 4 0 12/15/2018    CO2 23 12/15/2018     (H) 12/15/2018    BUN 21 12/15/2018    CREATININE 0 58 (L) 12/15/2018       Lab Results   Component Value Date/Time    POCGLU 72 12/16/2018 02:19 AM    POCGLU 97 12/15/2018 10:51 PM    POCGLU 69 12/15/2018 10:01 PM    POCGLU 84 12/15/2018 04:24 PM    POCGLU 87 12/15/2018 02:06 PM       Physical Exam  Gen: AAOx3, No acute distress  CVS: RRR, No murmurs/rubs/gallops  Lungs: CTAB, No W/R/R  NGT in place: Green output, 200 overnight  Abdomen: Soft, nondistended, appropriately tender, no rebound/guarding  Ileostomy in place: pink stoma, no output  ANDREAS drain x 4 in place: Overnight output RUQ 90, RLQ 35, LUQ 65, LLQ 50  Haskins in place draining adequate output  Extremities: SCDs on and on, symmetric, nontender     Gregg Munoz MD  OBGYN PGY3  12/16/2018  6:01 AM

## 2018-12-16 NOTE — PROGRESS NOTES
Patient seen and evaluated at bedside, POD#1 ex lap, abdominal washout, Abthera placement, now POD#0 ex lap, washout, loop ileostomy & wound closure, doing well postoperatively  She reports "zero" pain  She is NPO  Haskins is in place draining adequate urine  Ileostomy in place, pink stoma  ANDREAS x 4 in place (output 125, 75, 50,50), NGT in place 500output  Vitals stable    Continue ICU care overnight for monitoring  OK for CLD and PO medications, discussed w/ Dr Soraida Pisano

## 2018-12-16 NOTE — PROGRESS NOTES
Progress Note - Critical Care   May Flaherty 75 y o  female MRN: [id number]  Unit/Bed#: [facility] 624-54 Encounter: [id number]    Attending Physician: Renee Harp MD      ______________________________________________________________________  Assessment and Plan:   Principal Problem: Bowel perforation (HCC)  Active Problems:    Other ascites    Edema leg    Ovarian cancer (HCC)    Abdominal pain    Intractable nausea and vomiting    Septic shock (HCC)    Peritonitis (HCC)    S/P ileostomy ([address] )  Resolved Problems:    * No resolved hospital problems   *    NEURO:  -GCS: 15 (E: 4, V: 5, M: 6)  -SEDATIVES: n/a  -ANALGESIA: Tylenol 650mg q6h PRN, Oxycodone 5/10mg q4h PRN, Morphine 4 mg q4h PRN  -SEIZURE PROPHYLAXIS: n/a    -PLAN:   Neuro checks qshift  Continue analgesia  Transition to PO pain meds when NGT removed    CV:   Temp:  [97 5 °F (36 4 °C)-98 6 °F (37 °C)] 97 5 °F (36 4 °C)  HR:  [] 80  Resp:  [7-26] 11  BP: ()/(50-62) 92/51  SpO2:  [85 %-100 %] 97 %    Results from last 7 days  Lab Units 12/15/18  1425 12/15/18  0506 12/15/18  0037 12/14/18  1907 12/14/18  1707 12/14/18  1458   LACTIC ACID mmol/L 1 2 1 6 2 7* 3 3* 4 4* 5 0*     -MEDICATIONS: n/a  -PLAN:   Keep MAP >65  Endpoints cleared    PULM:   Temp:  [97 5 °F (36 4 °C)-98 6 °F (37 °C)] 97 5 °F (36 4 °C)  HR:  [] 80  Resp:  [7-26] 11  BP: ()/(50-62) 92/51  SpO2:  [85 %-100 %] 97 %   Vent Mode: AC/VC  FiO2 (%):  [100] 100  -ABG: ABG:  Lab Results   Component Value Date    PHART 7 315 (L) 12/15/2018    [id number] 44 6 (H) 12/15/2018    PO2ART 85 7 12/15/2018    [id number] 22 2 12/15/2018    BEART -3 8 12/15/2018    SOURCE Line, Arterial 12/15/2018     VBG:    Results from last 7 days  Lab Units 12/15/18  1425   ABG SOURCE  Line, Arterial       -PLAN:   Extubated yesterday  No acute respiratory issues  Keep oxygen saturation >92%  IS, aggressive pulmonary toilet  Wean supplemental oxygen as tolerated    GI:  S/p second ex-lap, abdominal washout, diverting loop ileostomy, drain placement x4  MEDICATIONS: Protonix 40mg daily, Zofran PRN  GI ppx: n/a    -PLAN:   Await ostomy function  Monitor drain x4 outputs    :     Results from last 7 days  Lab Units 12/15/18  1425 12/15/18  0506 12/15/18  0037 12/14/18  2322 12/14/18  1458 12/10/18  1410   BUN mg/dL 21 23 26* 26* 25 13   CREATININE mg/dL 0 58* 0 70 0 86 0 94 1 00 0 64     CLEMONS: yes  I/O:   I/O       12/14 0701 - 12/15 0700 12/15 0701 - 12/16 0700    I V  (mL/kg) 4720 6 (72 1) 3659 5 (55 9)    NG/GT  60    IV Piggyback 1150 1000    Total Intake(mL/kg) 5870 6 (89 6) 4719 5 (72 1)    Urine (mL/kg/hr) 250 1000 (0 6)    Emesis/NG output 100 825    Drains 500 810    Other 7000     Stool  0    Total Output 7850 2635    Net -1979 4 +2084 5              UOP: 0 6cc/kg/hr    -PLAN:  Monitor UOP  D/c clemons    F/E/N:   FLUIDS: Isolyte @125  ELECTROLYTES:    Results from last 7 days  Lab Units 12/15/18  1425 12/15/18  0506 12/15/18  0037 12/14/18  2322  12/14/18  1458 12/10/18  1410   SODIUM mmol/L 141 139 142 140  --  133* 141   POTASSIUM mmol/L 4 0 4 0 3 2* 3 4*  < > 6 9* 4 0   CHLORIDE mmol/L 109* 106 108 105  --  98* 103   CO2 mmol/L 23 24 22 23  --  22 26   ANION GAP mmol/L 9 9 12 12  --  13 12   CALCIUM mg/dL 7 1* 7 3* 7 4* 6 9*  --  8 6 8 6   MAGNESIUM mg/dL  --  2 4 1 5*  --   --   --   --    PHOSPHORUS mg/dL  --  3 4 3 8  --   --   --   --    < > = values in this interval not displayed    NUTRITION: Diet NPO    -PLAN:  NPO/NGT  Consider NG clamp trial  Swallow eval once NGT removed    ID:     Results from last 7 days  Lab Units 12/15/18  1739 12/15/18  0506 12/15/18  0037 12/14/18  1458 12/14/18 12/10/18  1410   WBC Thousand/uL 21 37* 20 98* 16 59* 18 31* 16 25* 12 22*     CULTURES:     Results from last 7 days  Lab Units 12/14/18  2314 12/14/18  1709 12/10/18  1654   BLOOD CULTURE   --  No Growth at 24 hrs   --    GRAM STAIN RESULT  4+ Polys  4+ Gram negative rods  4+ Gram positive rods --  1+ Polys  No organisms seen   BODY FLUID CULTURE, STERILE   --   --  No growth     ANTIBIOTICS: Cefepime/Flagyl  -PLAN:   F/u cultures  Narrow Abx accordingly once cx speciate  Trend WBC  Monitor fever curve    HEME:    Results from last 7 days  Lab Units 12/15/18  1739 12/15/18  0506 12/15/18  0037 18  1458 12/14/18 12/10/18  1410   HEMOGLOBIN g/dL 8 4* 9 2* 9 7* 12 3 10 1* 11 2*   HEMATOCRIT % 26 6* 29 0* 30 6* 39 1 31 6* 35 6   PLATELETS Thousands/uL 133* 147* 146* 169 177 233       Results from last 7 days  Lab Units 12/15/18  0934 18  1713 18   INR  1 50* 1 49* 1 66*   PTT seconds  --  38 44*     DVT ppx: SQH/SCDs    -PLAN:   Stable, continue to monitor  Transfuse if Hgb <7     ENDO:   -POC-->97-->72        -MEDICATIONS: SSI#3    -PLAN:  Continue SSI  Continue glucose checks     MSK/SKIN:   PT/OT  Routine skin care  Frequent offloading     LDA:  Invasive Devices     Peripheral Intravenous Line            Peripheral IV 18 Left Antecubital 1 day    Peripheral IV 18 Left Forearm 1 day          Arterial Line            Arterial Line 18 Right Radial 1 day          Drain            NG/OG/Enteral Tube Nasogastric 18 Fr Left nares 1 day    Urethral Catheter Latex 16 Fr  1 day    Closed/Suction Drain Abdomen Bulb 19 Fr  less than 1 day    Closed/Suction Drain Left LLQ Bulb 19 Fr  less than 1 day    Closed/Suction Drain Right RLQ Bulb 19 Fr  less than 1 day    Closed/Suction Drain Right RUQ Bulb 19 Fr  less than 1 day    Ileostomy Loop RLQ less than 1 day                Disposition: Continue ICU Care    Code Status: Level 1 - Full Code    Counseling / Coordination of Care  Total Critical Care time spent 30 minutes excluding procedures, teaching and family updates      ______________________________________________________________________    Chief Complaint: Stage IV Ovarian cancer, perforated viscus, s/p ex-lap/abdominal washout x2, eventual loop ileostomy, closure, drain placement x4    24 Hour Events:     No acute events overnight  This morning, pt c/o abdominal pain, relieved with Morphine  Also feels that her breathing is "jumpy," feeling like she is breathing faster than her normal rate  Pulse oximetry 97% on 2L NC w/ RR ranging 18-20  Review of Systems   Constitutional: Negative for activity change, appetite change, chills, fatigue and fever  HENT: Negative for sore throat and trouble swallowing  Respiratory: Negative for shortness of breath, wheezing and stridor  Cardiovascular: Negative for chest pain, palpitations and leg swelling  Gastrointestinal: Positive for abdominal pain  Negative for constipation, diarrhea, nausea and vomiting  Genitourinary: Negative for difficulty urinating and dysuria  Musculoskeletal: Negative for arthralgias, back pain, joint swelling, neck pain and neck stiffness  Skin: Negative for color change, pallor, rash and wound  Neurological: Negative for dizziness, tremors, seizures, syncope, weakness, light-headedness, numbness and headaches  Psychiatric/Behavioral: Negative for agitation, behavioral problems and confusion  ______________________________________________________________________    Physical Exam:     Physical Exam   Constitutional: No distress  HENT:   Head: Normocephalic and atraumatic  NGT in place w/ bilious, green output   Eyes: EOM are normal    Neck: Normal range of motion  Neck supple  Cardiovascular: Normal rate and regular rhythm  Pulmonary/Chest: Effort normal  No respiratory distress  She has no rales  She exhibits no tenderness  On 2L NC   Abdominal: Soft  She exhibits no distension  There is no tenderness  + midline abdominal incision intact w/ staples and omar x6, drains x4 in place (LUQ w/ serous output, LLQ/RUQ/RLQ w/ serosanguinous outputs)  + ostomy site pink, healthy w/ succus in ostomy bag   Skin: She is not diaphoretic  ______________________________________________________________________  Vitals:    18 0100 18 0130 18 0200 18 0230   BP: 97/52  92/51    Pulse: 84 84 84 80   Resp: (!) 11 (!) 11 (!) 10 (!) 11   Temp:       TempSrc:       SpO2: 97% 97% 97% 97%   Weight:         Temperature:   Temp (24hrs), Av 1 °F (36 7 °C), Min:97 5 °F (36 4 °C), Max:98 6 °F (37 °C)    Current Temperature: 97 5 °F (36 4 °C)  Weights:   IBW: -92 5 kg    Body mass index is 24 23 kg/m²  Weight (last 2 days)     Date/Time   Weight    12/15/18 0600  65 5 (144 4)            Hemodynamic Monitoring:  N/A     Non-Invasive/Invasive Ventilation Settings:  Respiratory    Lab Data (Last 4 hours)    None         O2/Vent Data (Last 4 hours)    None              Lab Results   Component Value Date    PHART 7 315 (L) 12/15/2018    [id number] 44 6 (H) 12/15/2018    PO2ART 85 7 12/15/2018    YYZ4RSZ 22 2 12/15/2018    BEART -3 8 12/15/2018    SOURCE Line, Arterial 12/15/2018     SpO2: SpO2: 97 %  Intake and Outputs:  I/O        0701 - 12/15 0700 12/15 07 -  0700    I V  (mL/kg) 4720 6 (72 1) 3659 5 (55 9)    NG/GT  60    IV Piggyback 1150 1000    Total Intake(mL/kg) 5870 6 (89 6) 4719 5 (72 1)    Urine (mL/kg/hr) 250 1000 (0 6)    Emesis/NG output 100 825    Drains 500 810    Other 7000     Stool  0    Total Output 7850 2635    Net -1979 4 +2084 5              UOP: 0 6 ml/hr   Nutrition:        Diet Orders            Start     Ordered    12/15/18 2048  Diet NPO  Diet effective now     Question Answer Comment   Diet Type NPO    RD to adjust diet per protocol?  Yes        12/15/18 2047        Labs:     Results from last 7 days  Lab Units 12/15/18  1739 12/15/18  0506 12/15/18  0037 18  1458 12/14/18 12/10/18  1410   WBC Thousand/uL 21 37* 20 98* 16 59* 18 31* 16 25* 12 22*   HEMOGLOBIN g/dL 8 4* 9 2* 9 7* 12 3 10 1* 11 2*   HEMATOCRIT % 26 6* 29 0* 30 6* 39 1 31 6* 35 6   PLATELETS Thousands/uL 133* 147* 146* 169 177 233 BANDS PCT % 1 10* 3 31* 9* 13*   MONO PCT % 1* 2* 1* 2* 0* 6       Results from last 7 days  Lab Units 12/15/18  1425 12/15/18  0506 12/15/18  0037 12/14/18  2322  12/14/18  1458 12/10/18  1410   SODIUM mmol/L 141 139 142 140  --  133* 141   POTASSIUM mmol/L 4 0 4 0 3 2* 3 4*  < > 6 9* 4 0   CHLORIDE mmol/L 109* 106 108 105  --  98* 103   CO2 mmol/L 23 24 22 23  --  22 26   ANION GAP mmol/L 9 9 12 12  --  13 12   BUN mg/dL 21 23 26* 26*  --  25 13   CREATININE mg/dL 0 58* 0 70 0 86 0 94  --  1 00 0 64   CALCIUM mg/dL 7 1* 7 3* 7 4* 6 9*  --  8 6 8 6   ALT U/L  --  12 13 15  --  19 21   AST U/L  --  24 24 26  --  79* 40   ALK PHOS U/L  --  57 54 63  --  108 147*   ALBUMIN g/dL  --  2 2* 2 6* 2 5*  --  1 9* 2 6*   TOTAL BILIRUBIN mg/dL  --  0 51 0 52 0 38  --  0 80 0 30   < > = values in this interval not displayed  Results from last 7 days  Lab Units 12/15/18  0506 12/15/18  0037   MAGNESIUM mg/dL 2 4 1 5*   PHOSPHORUS mg/dL 3 4 3 8        Results from last 7 days  Lab Units 12/15/18  0934 12/14/18  1713 12/14/18   INR  1 50* 1 49* 1 66*   PTT seconds  --  38 44*       Results from last 7 days  Lab Units 12/10/18  1410   TROPONIN I ng/mL <0 02       Results from last 7 days  Lab Units 12/15/18  1425 12/15/18  0506 12/15/18  0037 12/14/18  1907 12/14/18  1707 12/14/18  1458   LACTIC ACID mmol/L 1 2 1 6 2 7* 3 3* 4 4* 5 0*     ABG:  Lab Results   Component Value Date    PHART 7 315 (L) 12/15/2018    HVR9HTH 44 6 (H) 12/15/2018    PO2ART 85 7 12/15/2018    WQJ2FOM 22 2 12/15/2018    BEART -3 8 12/15/2018    SOURCE Line, Arterial 12/15/2018     VBG:    Results from last 7 days  Lab Units 12/15/18  1425   ABG SOURCE  Line, Arterial       Results from last 7 days  Lab Units 12/14/18  1712   PROCALCITONIN ng/ml 56 60*     No results found for: [facility]   Imaging:     STAT CXR ICU   Final Result by Elise Sams MD (12/15 1124)   Tubes and lines as above without pneumothorax     Small right basilar pleural effusion with patchy opacity likely reflecting atelectasis              Workstation performed: [id number]           Micro:    Results from last 7 days  Lab Units 12/14/18  2314 12/14/18  1709 12/10/18  1654   BLOOD CULTURE   --  No Growth at 24 hrs   --    GRAM STAIN RESULT  4+ Polys  4+ Gram negative rods  4+ Gram positive rods  --  1+ Polys  No organisms seen   BODY FLUID CULTURE, STERILE   --   --  No growth     Allergies: No Known Allergies  Medications:   Scheduled Meds:    Current Facility-Administered Medications:  acetaminophen 650 mg Oral Q6H PRN Gerry Greenwood MD    cefepime 2,000 mg Intravenous Q12H Gerry Greenwood MD Last Rate: Stopped (12/15/18 6257)   chlorhexidine 15 mL Swish & Spit Q12H [address] Gerry Greenwood MD    heparin (porcine) 5,000 Units Subcutaneous [facility] Gerry Greenwood MD    HYDROmorphone 0 5 mg Intravenous Q3H PRN Gerry Greenwood MD    insulin lispro 1-5 Units Subcutaneous HS Gerry Greenwood MD    insulin lispro 1-6 Units Subcutaneous TID [facility] Gerry Greenwood MD    metroNIDAZOLE 500 mg Intravenous Q8H Gerry Greenwood MD Last Rate: Stopped (12/15/18 2250)   multi-electrolyte 125 mL/hr Intravenous Continuous Abiodun Katz MD Last Rate: 125 mL/hr (12/16/18 0340)   ondansetron 4 mg Intravenous Q6H PRN Florin St MD    oxyCODONE 10 mg Oral Q4H PRN Gerry Greenwood MD    oxyCODONE 5 mg Oral Q4H PRN Gerry Greenwood MD    pantoprazole 40 mg Intravenous Q24H [address] Gerry Greenwood MD    phenol 1 spray Mouth/Throat Q2H PRN Ivette Orellana PA-C      Facility-Administered Medications Ordered in Other Encounters:  alteplase 2 mg Intracatheter PRN Cedric De Souza MD   heparin lock flush 300 Units Intracatheter PRN Cedric De Souza MD     Continuous Infusions:    multi-electrolyte 125 mL/hr Last Rate: 125 mL/hr (12/16/18 0340)     PRN Meds:    acetaminophen 650 mg Q6H PRN   HYDROmorphone 0 5 mg Q3H PRN   ondansetron 4 mg Q6H PRN   oxyCODONE 10 mg Q4H PRN   oxyCODONE 5 mg Q4H PRN   phenol 1 spray Q2H PRN     VTE Pharmacologic Prophylaxis: Heparin  VTE Mechanical Prophylaxis: sequential compression device  Invasive lines and devices: Invasive Devices     Peripheral Intravenous Line            Peripheral IV 12/14/18 Left Antecubital 1 day    Peripheral IV 12/14/18 Left Forearm 1 day          Arterial Line            Arterial Line 12/14/18 Right Radial 1 day          Drain            NG/OG/Enteral Tube Nasogastric 18 Fr Left nares 1 day    Urethral Catheter Latex 16 Fr  1 day    Closed/Suction Drain Abdomen Bulb 19 Fr  less than 1 day    Closed/Suction Drain Left LLQ Bulb 19 Fr  less than 1 day    Closed/Suction Drain Right RLQ Bulb 19 Fr  less than 1 day    Closed/Suction Drain Right RUQ Bulb 19 Fr  less than 1 day    Ileostomy Loop RLQ less than 1 day                   Portions of the record may have been created with voice recognition software  Occasional wrong word or "sound a like" substitutions may have occurred due to the inherent limitations of voice recognition software  Read the chart carefully and recognize, using context, where substitutions have occurred      Mendel Munson MD

## 2018-12-17 LAB
ANION GAP SERPL CALCULATED.3IONS-SCNC: 7 MMOL/L (ref 4–13)
ANISOCYTOSIS BLD QL SMEAR: PRESENT
ATRIAL RATE: 86 BPM
BACTERIA TISS AEROBE CULT: ABNORMAL
BASOPHILS # BLD MANUAL: 0 THOUSAND/UL (ref 0–0.1)
BASOPHILS NFR MAR MANUAL: 0 % (ref 0–1)
BUN SERPL-MCNC: 11 MG/DL (ref 5–25)
CA-I BLD-SCNC: 1.09 MMOL/L (ref 1.12–1.32)
CALCIUM SERPL-MCNC: 7.3 MG/DL (ref 8.3–10.1)
CHLORIDE SERPL-SCNC: 108 MMOL/L (ref 100–108)
CO2 SERPL-SCNC: 26 MMOL/L (ref 21–32)
CREAT SERPL-MCNC: 0.33 MG/DL (ref 0.6–1.3)
EOSINOPHIL # BLD MANUAL: 0 THOUSAND/UL (ref 0–0.4)
EOSINOPHIL NFR BLD MANUAL: 0 % (ref 0–6)
ERYTHROCYTE [DISTWIDTH] IN BLOOD BY AUTOMATED COUNT: 18.8 % (ref 11.6–15.1)
GFR SERPL CREATININE-BSD FRML MDRD: 114 ML/MIN/1.73SQ M
GLUCOSE SERPL-MCNC: 119 MG/DL (ref 65–140)
GLUCOSE SERPL-MCNC: 137 MG/DL (ref 65–140)
GLUCOSE SERPL-MCNC: 63 MG/DL (ref 65–140)
GLUCOSE SERPL-MCNC: 73 MG/DL (ref 65–140)
GLUCOSE SERPL-MCNC: 78 MG/DL (ref 65–140)
GLUCOSE SERPL-MCNC: 88 MG/DL (ref 65–140)
GLUCOSE SERPL-MCNC: 90 MG/DL (ref 65–140)
GLUCOSE SERPL-MCNC: 99 MG/DL (ref 65–140)
GLUCOSE SERPL-MCNC: <20 MG/DL (ref 65–140)
GRAM STN SPEC: ABNORMAL
HCT VFR BLD AUTO: 24.3 % (ref 34.8–46.1)
HGB BLD-MCNC: 7.7 G/DL (ref 11.5–15.4)
HYPERCHROMIA BLD QL SMEAR: PRESENT
LYMPHOCYTES # BLD AUTO: 0.28 THOUSAND/UL (ref 0.6–4.47)
LYMPHOCYTES # BLD AUTO: 1 % (ref 14–44)
MAGNESIUM SERPL-MCNC: 2.4 MG/DL (ref 1.6–2.6)
MCH RBC QN AUTO: 25.2 PG (ref 26.8–34.3)
MCHC RBC AUTO-ENTMCNC: 31.7 G/DL (ref 31.4–37.4)
MCV RBC AUTO: 80 FL (ref 82–98)
MONOCYTES # BLD AUTO: 0 THOUSAND/UL (ref 0–1.22)
MONOCYTES NFR BLD: 0 % (ref 4–12)
NEUTROPHILS # BLD MANUAL: 27.6 THOUSAND/UL (ref 1.85–7.62)
NEUTS SEG NFR BLD AUTO: 99 % (ref 43–75)
NRBC BLD AUTO-RTO: 0 /100 WBCS
P AXIS: 53 DEGREES
PHOSPHATE SERPL-MCNC: 1.2 MG/DL (ref 2.3–4.1)
PLATELET # BLD AUTO: 144 THOUSANDS/UL (ref 149–390)
PLATELET BLD QL SMEAR: ABNORMAL
PMV BLD AUTO: 12.9 FL (ref 8.9–12.7)
POTASSIUM SERPL-SCNC: 3.3 MMOL/L (ref 3.5–5.3)
PR INTERVAL: 150 MS
QRS AXIS: 40 DEGREES
QRSD INTERVAL: 75 MS
QT INTERVAL: 346 MS
QTC INTERVAL: 414 MS
RBC # BLD AUTO: 3.05 MILLION/UL (ref 3.81–5.12)
RBC MORPH BLD: PRESENT
SODIUM SERPL-SCNC: 141 MMOL/L (ref 136–145)
T WAVE AXIS: 47 DEGREES
VENTRICULAR RATE: 86 BPM
WBC # BLD AUTO: 27.88 THOUSAND/UL (ref 4.31–10.16)

## 2018-12-17 PROCEDURE — 99222 1ST HOSP IP/OBS MODERATE 55: CPT | Performed by: INTERNAL MEDICINE

## 2018-12-17 PROCEDURE — G8988 SELF CARE GOAL STATUS: HCPCS

## 2018-12-17 PROCEDURE — 93010 ELECTROCARDIOGRAM REPORT: CPT | Performed by: INTERNAL MEDICINE

## 2018-12-17 PROCEDURE — 87040 BLOOD CULTURE FOR BACTERIA: CPT | Performed by: PHYSICIAN ASSISTANT

## 2018-12-17 PROCEDURE — 82330 ASSAY OF CALCIUM: CPT | Performed by: SURGERY

## 2018-12-17 PROCEDURE — 99232 SBSQ HOSP IP/OBS MODERATE 35: CPT | Performed by: EMERGENCY MEDICINE

## 2018-12-17 PROCEDURE — 83735 ASSAY OF MAGNESIUM: CPT | Performed by: SURGERY

## 2018-12-17 PROCEDURE — 97163 PT EVAL HIGH COMPLEX 45 MIN: CPT

## 2018-12-17 PROCEDURE — 99024 POSTOP FOLLOW-UP VISIT: CPT | Performed by: OBSTETRICS & GYNECOLOGY

## 2018-12-17 PROCEDURE — 93005 ELECTROCARDIOGRAM TRACING: CPT

## 2018-12-17 PROCEDURE — 80048 BASIC METABOLIC PNL TOTAL CA: CPT | Performed by: SURGERY

## 2018-12-17 PROCEDURE — G8978 MOBILITY CURRENT STATUS: HCPCS

## 2018-12-17 PROCEDURE — 85027 COMPLETE CBC AUTOMATED: CPT | Performed by: SURGERY

## 2018-12-17 PROCEDURE — 82948 REAGENT STRIP/BLOOD GLUCOSE: CPT

## 2018-12-17 PROCEDURE — G8987 SELF CARE CURRENT STATUS: HCPCS

## 2018-12-17 PROCEDURE — 84100 ASSAY OF PHOSPHORUS: CPT | Performed by: SURGERY

## 2018-12-17 PROCEDURE — 85007 BL SMEAR W/DIFF WBC COUNT: CPT | Performed by: SURGERY

## 2018-12-17 PROCEDURE — G8979 MOBILITY GOAL STATUS: HCPCS

## 2018-12-17 PROCEDURE — 97116 GAIT TRAINING THERAPY: CPT

## 2018-12-17 PROCEDURE — C9113 INJ PANTOPRAZOLE SODIUM, VIA: HCPCS | Performed by: SURGERY

## 2018-12-17 PROCEDURE — 97167 OT EVAL HIGH COMPLEX 60 MIN: CPT

## 2018-12-17 RX ORDER — DEXTROSE MONOHYDRATE 25 G/50ML
INJECTION, SOLUTION INTRAVENOUS
Status: DISPENSED
Start: 2018-12-17 | End: 2018-12-18

## 2018-12-17 RX ORDER — DEXTROSE MONOHYDRATE 25 G/50ML
25 INJECTION, SOLUTION INTRAVENOUS ONCE
Status: COMPLETED | OUTPATIENT
Start: 2018-12-17 | End: 2018-12-17

## 2018-12-17 RX ORDER — SODIUM CHLORIDE, SODIUM GLUCONATE, SODIUM ACETATE, POTASSIUM CHLORIDE, MAGNESIUM CHLORIDE, SODIUM PHOSPHATE, DIBASIC, AND POTASSIUM PHOSPHATE .53; .5; .37; .037; .03; .012; .00082 G/100ML; G/100ML; G/100ML; G/100ML; G/100ML; G/100ML; G/100ML
500 INJECTION, SOLUTION INTRAVENOUS ONCE
Status: COMPLETED | OUTPATIENT
Start: 2018-12-17 | End: 2018-12-17

## 2018-12-17 RX ADMIN — METRONIDAZOLE 500 MG: 500 INJECTION, SOLUTION INTRAVENOUS at 23:56

## 2018-12-17 RX ADMIN — OXYCODONE HYDROCHLORIDE 10 MG: 10 TABLET ORAL at 04:14

## 2018-12-17 RX ADMIN — CHLORHEXIDINE GLUCONATE 0.12% ORAL RINSE 15 ML: 1.2 LIQUID ORAL at 08:26

## 2018-12-17 RX ADMIN — METRONIDAZOLE 500 MG: 500 INJECTION, SOLUTION INTRAVENOUS at 14:48

## 2018-12-17 RX ADMIN — ENOXAPARIN SODIUM 40 MG: 40 INJECTION SUBCUTANEOUS at 08:26

## 2018-12-17 RX ADMIN — POTASSIUM & SODIUM PHOSPHATES POWDER PACK 280-160-250 MG 1 PACKET: 280-160-250 PACK at 17:47

## 2018-12-17 RX ADMIN — HEPARIN SODIUM 5000 UNITS: 5000 INJECTION INTRAVENOUS; SUBCUTANEOUS at 05:49

## 2018-12-17 RX ADMIN — POTASSIUM & SODIUM PHOSPHATES POWDER PACK 280-160-250 MG 1 PACKET: 280-160-250 PACK at 12:58

## 2018-12-17 RX ADMIN — OXYCODONE HYDROCHLORIDE 5 MG: 5 TABLET ORAL at 21:37

## 2018-12-17 RX ADMIN — ACETAMINOPHEN 650 MG: 325 TABLET, FILM COATED ORAL at 08:22

## 2018-12-17 RX ADMIN — METRONIDAZOLE 500 MG: 500 INJECTION, SOLUTION INTRAVENOUS at 08:13

## 2018-12-17 RX ADMIN — SODIUM CHLORIDE, SODIUM GLUCONATE, SODIUM ACETATE, POTASSIUM CHLORIDE, MAGNESIUM CHLORIDE, SODIUM PHOSPHATE, DIBASIC, AND POTASSIUM PHOSPHATE 125 ML/HR: .53; .5; .37; .037; .03; .012; .00082 INJECTION, SOLUTION INTRAVENOUS at 08:01

## 2018-12-17 RX ADMIN — POTASSIUM & SODIUM PHOSPHATES POWDER PACK 280-160-250 MG 1 PACKET: 280-160-250 PACK at 21:38

## 2018-12-17 RX ADMIN — DEXTROSE MONOHYDRATE 25 ML: 25 INJECTION, SOLUTION INTRAVENOUS at 05:50

## 2018-12-17 RX ADMIN — POTASSIUM PHOSPHATE, MONOBASIC AND POTASSIUM PHOSPHATE, DIBASIC 21 MMOL: 224; 236 INJECTION, SOLUTION INTRAVENOUS at 08:22

## 2018-12-17 RX ADMIN — PANTOPRAZOLE SODIUM 40 MG: 40 INJECTION, POWDER, FOR SOLUTION INTRAVENOUS at 08:26

## 2018-12-17 RX ADMIN — CALCIUM GLUCONATE 1 G: 98 INJECTION, SOLUTION INTRAVENOUS at 08:14

## 2018-12-17 RX ADMIN — OXYCODONE HYDROCHLORIDE 10 MG: 10 TABLET ORAL at 15:30

## 2018-12-17 RX ADMIN — SODIUM CHLORIDE, SODIUM GLUCONATE, SODIUM ACETATE, POTASSIUM CHLORIDE, MAGNESIUM CHLORIDE, SODIUM PHOSPHATE, DIBASIC, AND POTASSIUM PHOSPHATE 500 ML: .53; .5; .37; .037; .03; .012; .00082 INJECTION, SOLUTION INTRAVENOUS at 06:34

## 2018-12-17 RX ADMIN — CHLORHEXIDINE GLUCONATE 0.12% ORAL RINSE 15 ML: 1.2 LIQUID ORAL at 21:37

## 2018-12-17 NOTE — PALLIATIVE CARE CONFERENCE
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  The pt reports her pastors will visit her and declines a visit from the hospital chaplains  The pt reports her dgtr is working to apply for waiver programs for the pt's son to be a paid caregiver for the pt and spouse  The pt reports her and her family are working on a plan to figure out how to care for each other  The patient is currently taking her illness one day at a time  Discussed 5 Wishes as the pt reports completing an advance directive is on her to do list  Provided a copy of 5 Wishes and encouraged the pt to review with her family  Offered support to complete the document if the pt desired assistance  Emotional support provided to the pt and will continue to provide support as needed and as the pt accepts

## 2018-12-17 NOTE — PHYSICAL THERAPY NOTE
Physical Therapy Evaluation     Patient's Name: Armani Wagner    Admitting Diagnosis  Vomiting [R11 10]    Problem List  Patient Active Problem List   Diagnosis    Other ascites    Lesion of liver    Edema leg    [facility] [facility]    Abdominal pain    Intractable nausea and vomiting    [address]el perforation ([address]    Septic shock ([facility] [address] [facility]    Peritonitis ([facility] [facility]    S/P ileostomy ([facility] [facility]    Acute blood loss anemia    Leukocytosis    Hypokalemia       Past Medical History  Past Medical History:   Diagnosis Date    Abdominal fluid collection     Asthma     Cancer ([facility] [address] [facility]     ovaries    Diabetes mellitus ([facility] [address] )        Past Surgical History  Past Surgical History:   Procedure Laterality Date    CT GUIDED PARACENTESIS  11/6/2018    CT NEEDLE BIOPSY PERITONEUM  11/6/2018    ECTOPIC PREGNANCY SURGERY      ECTOPIC PREGNANCY SURGERY      IR PARACENTESIS  9/18/2018    IR PARACENTESIS  10/18/2018    IR PARACENTESIS  12/10/2018    IR PORT PLACEMENT  11/29/2018    LAPAROTOMY N/A 12/14/2018    Procedure: LAPAROTOMY EXPLORATORY; Abdominal Washout; Application of Abthera Vac Dressing;  Surgeon: Zuleika Alvarez MD;  [facility];  Service: Gynecology Oncology    LAPAROTOMY N/A 12/15/2018    Procedure: LAPAROTOMY EXPLORATORY, ABDOMINAL WASHOUT, DRAIN PLACEMENT x 4, DIVERTING LOOP ILEOSTOMY AND ABDOMINAL CLOSURE,  ALL OTHER INDICATED PROCEDURES;  Surgeon: Zuleika Alvarez MD;  [facility];  Service: Gynecology Oncology    VT COLONOSCOPY FLX DX W/[facility]HABILITATION WHEN PFRMD N/A 9/25/2018    Procedure: EGD AND COLONOSCOPY;  Surgeon: Maynor Rader MD;  [facility];   Service: Gastroenterology    TONSILECTOMY AND ADNOIDECTOMY      TONSILLECTOMY          12/17/18 5734   Note Type   Note type Eval/Treat   Pain Assessment   Pain Assessment 0-10   Pain Score 6  (8/10 with activity)   Pain Type Acute pain   Pain Location Abdomen   Pain Orientation Upper   Home Living   Type of [address] Two level;Ramped entrance   Bathroom Shower/Tub Walk-in shower   Bathroom Toilet Raised   Bathroom Equipment Grab bars in shower; Shower chair   [address] Grab bars   Prior Function   Level of [place] Independent with ADLs and functional mobility   Lives With Spouse   Receives Help From Family   ADL Assistance Independent   IADLs Independent   Falls in the last 6 months 0   Vocational Part time employment  (Behavioral therapist)   Comments PTA, pt was I with ADLs, IADLs, and functional mobility  She drives, works full time, and is the primary caregiver to her  that recently had a stroke  Restrictions/Precautions   Other Precautions Multiple lines;Telemetry;O2;Fall Risk;Pain   General   Family/Caregiver Present Yes   Cognition   Overall Cognitive Status Impaired   Arousal/Participation Alert   Attention Attends with cues to redirect   Orientation Level Oriented to person;Oriented to place;Oriented to time;Disoriented to situation   Memory Decreased recall of recent events   Following Commands Follows multistep commands with increased time or repetition   Comments slow processing and easily agitated   RLE Assessment   RLE Assessment X   Strength RLE   RLE Overall Strength 3/5   LLE Assessment   LLE Assessment X   Strength LLE   LLE Overall Strength 3/5   Transfers   Sit to Stand 3  Moderate assistance   Additional items Assist x 1; Increased time required;Verbal cues   Stand to Sit 4  Minimal assistance   Additional items Assist x 1   Ambulation/Elevation   Gait pattern Antalgic;Narrow TESS; Forward Flexion; Short stride; Excessively slow;R Knee Mateo;L Knee Mateo   Gait Assistance 3  Moderate assist   Additional items Assist x 1;Verbal cues; Tactile cues; Assist x 3  (A x1 for lines, A x1 for close chair follow)   Assistive Device Rolling walker   Distance 20'   Balance   Static Sitting Fair   Dynamic Sitting [address] Standing Poor +   Ambulatory Poor +   Endurance Deficit   Endurance Deficit Yes   Endurance Deficit Description pain, fatigue, weakness   Activity Tolerance   Activity Tolerance Patient limited by fatigue;Patient limited by pain   Nurse Made Aware Yes, RN cleared pt for PT eval and assisted in transfer to new room   Assessment   Prognosis Fair   Problem List Decreased strength;Decreased endurance; Impaired balance;Decreased mobility; Decreased safety awareness;Decreased skin integrity;Pain   Assessment Pt is 75 y o  female seen for PT evaluation s/p admit to [facility] on 12/14/2018 w/ Bowel perforation ([facility] [address] )  Pt presented to the ED for intractable nausea, vomiting, and abdominal pain  CT revealed bowel perforation and she was [facility] for further management  S/p abdominal washout, application of Abthera Vac dresing on 12/14/2018  S/p abdominal washout, drain placement x4, diverting loop ileostomy and abdominal closure on 12/15/2018  PT consulted to assess pt's functional mobility and d/c needs  Order placed for PT eval and tx, w/ up and OOB as tolerated order  Comorbidities affecting pt's physical performance at time of assessment include: hypokalemia, leukocytosis, acute blood loss anemia, septic shock, ovarian cancer, ascites  PTA, pt was ambulates community distances and elevations and works full time  Personal factors affecting pt at time of IE include: lives in 2 story house, ambulating w/ assistive device, inability to ambulate household distances, limited home support, inability to perform current job functions, unable to perform caregiver support/tasks, inability to perform IADLs and inability to perform ADLs   Please find objective findings from PT assessment regarding body systems outlined above with impairments and limitations including weakness, impaired balance, decreased endurance, impaired coordination, gait deviations, pain, decreased activity tolerance, decreased functional mobility tolerance and fall risk  The following objective measures performed on IE also reveal limitations: Barthel Index: 40/100  Pt's clinical presentation is currently unstable/unpredictable seen in pt's presentation of pain, abnormal labs, closed/suction drain x4, lines, telemetry, supplemental oxygen  Pt to benefit from continued PT tx to address deficits as defined above and maximize level of functional independent mobility and consistency  From PT/mobility standpoint, recommendation at time of d/c would be STR pending progress in order to facilitate return to PLOF  Barriers to Discharge Inaccessible home environment;Decreased caregiver support  (2 SH, caregiver to her )   Goals   Patient Goals To get back to work and back to assisting    STG Expiration Date 12/27/18   Short Term Goal #1 Pt will demonstrate: 1) increased BLE strength >/= 1 grade for improved transfers 2) supine <> sit transfer with mod I 3) sit <> stand transfer with mod I 4) increased dynamic balance >/= 1 grade to decrease risk for falls 5) Amb >/= 200' with mod I using least restrictive AD to progress to back to work 6) up/down 1 flight of stairs with </= min A x1   Treatment Day 1   Plan   Treatment/Interventions Functional transfer training;LE strengthening/ROM; Elevations; Therapeutic exercise; Endurance training;Patient/family training;Equipment eval/education; Bed mobility;Gait training;Cognitive reorientation;Spoke to nursing;OT   PT Frequency (3-5x/wk)   Recommendation   Recommendation (rehab)   Equipment Recommended Rashid Connolly   PT - OK to Discharge Yes   Additional Comments When medically stable   Barthel Index   Feeding 10   Bathing 0   Grooming Score 0   Dressing Score 0   Bladder Score 10   Bowels Score 10   Toilet Use Score 5   Transfers (Bed/Chair) Score 5   Mobility (Level Surface) Score 0   Stairs Score 0   Barthel Index Score 40       PT Treatment  Time In: 1453  Time Out: 1508  Total Time: 15    Pt was agreeable to PT treatment session  She ambulated 20' with close chair follow and A x1 for lines, A x1 for stability  She required increased encouragement and would purposefully buckle BLE with pain  She held the center of the rolling walker reporting that she was unable to stand upright due to pain  Pt would continue to benefit from skilled PT to improve strength, endurance, balance, and mobility to return to PLOF  Recommendation at this time is STR      Svetlana Ordaz, PT, DPT

## 2018-12-17 NOTE — PROGRESS NOTES
12/17/18 1300   Clinical Encounter Type   Visited With Patient and family together   Routine Visit Introduction   Continue Visiting Yes

## 2018-12-17 NOTE — SOCIAL WORK
CM met w/pt @ bedside with introduction and explanation of CM role  Pt stated she lives with  Danielle Carbajal [phone] and family in [facility]; ramp  Pt was working, driving, independent in ADLs PTA  No DME; no HHC, no hx rehab  Pt denies inpatient treatment for MH/D&A  Preferred pharmacy is [facility]  Pt has prescription plan  Pt stated family will transport home @ discharge  CM reviewed discharge checklist with pt at this time with copy left at bedside  Pt encouraged to participate in planning prior to discharge  CM reviewed d/c planning process including the following: identifying help at home, patient preference for d/c planning needs, Discharge Lounge, [facility] Meds to Bed program, availability of treatment team to discuss questions or concerns patient and/or family may have regarding understanding medications and recognizing signs and symptoms once discharged  CM also encouraged patient to follow up with all recommended appointments after discharge  Patient advised of importance for patient and family to participate in managing patients medical well being

## 2018-12-17 NOTE — RESTORATIVE TECHNICIAN NOTE
Restorative Specialist Mobility Note       Activity: Bedrest, Dangle, Stand at bedside, Turn, Commode, Chair (from bed to commode / commode to chair)     Assistive Device: Other (Comment) (HHA x 2)     Ambulation Response:  Tolerated fairly well  Repositioned: Sitting, Up in chair

## 2018-12-17 NOTE — OCCUPATIONAL THERAPY NOTE
[address]  Evaluation     Patient Name: Alma Avery  UGTIK'Y Date: 12/17/2018  Problem List  Patient Active Problem List   Diagnosis    Other ascites    Lesion of liver    Edema leg    [facility] [address] [facility]    Abdominal pain    Intractable nausea and vomiting    Bowel perforation ([address]    Septic shock ([facility] [address] [facility]    Peritonitis ([facility] [address] [facility]    S/P ileostomy ([facility] [address] [facility]    Acute blood loss anemia    Leukocytosis    Hypokalemia     Past Medical History  Past Medical History:   Diagnosis Date    Abdominal fluid collection     Asthma     Cancer ([facility] [address] )     ovaries    Diabetes mellitus ([facility] [address] )      Past Surgical History  Past Surgical History:   Procedure Laterality Date    CT GUIDED PARACENTESIS  11/6/2018    CT NEEDLE BIOPSY PERITONEUM  11/6/2018    ECTOPIC PREGNANCY SURGERY      ECTOPIC PREGNANCY SURGERY      IR PARACENTESIS  9/18/2018    IR PARACENTESIS  10/18/2018    IR PARACENTESIS  12/10/2018    IR PORT PLACEMENT  11/29/2018    LAPAROTOMY N/A 12/14/2018    Procedure: LAPAROTOMY EXPLORATORY; Abdominal Washout; Application of Abthera Vac Dressing;  Surgeon: Ramirez Pulido MD;  [facility];  Service: Gynecology Oncology    LAPAROTOMY N/A 12/15/2018    Procedure: LAPAROTOMY EXPLORATORY, ABDOMINAL WASHOUT, DRAIN PLACEMENT x 4, DIVERTING LOOP ILEOSTOMY AND ABDOMINAL CLOSURE,  ALL OTHER INDICATED PROCEDURES;  Surgeon: Ramirez Pulido MD;  [facility];  Service: Gynecology Oncology    AR COLONOSCOPY FLX DX W/[facility]HABILIT[facility] WHEN PFRMD N/A 9/25/2018    Procedure: EGD AND COLONOSCOPY;  Surgeon: Ko Corral MD;  [facility]; Service: Gastroenterology    TONSILECTOMY AND ADNOIDECTOMY      TONSILLECTOMY             12/17/18 1306   Note Type   Note type Eval only   Restrictions/Precautions   Other Precautions Multiple lines;Telemetry;O2;Pain; Fall Risk   Pain Assessment   Pain Assessment 0-10   Pain Score 6   Pain Type Acute pain   Pain Location Abdomen   Hospital Pain Intervention(s) Repositioned; Emotional support;Rest;Other (Comment)  (RN notified and aware )   Home Living   Type of [address] Two level  (+ramp)   Bathroom Shower/Tub Walk-in shower   Bathroom Toilet Raised   Bathroom Equipment Grab bars in shower; Shower chair   Bathroom Accessibility Accessible   Home Equipment Grab bars   Additional Comments pt's  recovering from a stroke and house set-up to assist    Prior Function   Level of [place] Independent with ADLs and functional mobility   Lives With [place] Help From Family  (+son and daughter lives locally)   [id number] in the last 6 months 0   Vocational Part time employment  (Behavioral therapist)   Comments Pt reports she takes care of ,  is I with self care tasks, pt does all homemaking tasks, driving   (+takes care of 3 grandchildren )   Lifestyle   Autonomy I ADL's/IADL's +driving, works part time, takes care of  who is recovering from a stroke (per pt)   Reciprocal Relationships son/daughter/family   Service to Others part time work   Intrinsic Gratification pt unable to state   Psychosocial   Psychosocial (WDL) X   Patient Behaviors/Mood Anxious; Flat affect   Subjective   Subjective pt received beside chair, encouragement given to participate in therapy and pt stating "I already got up and I am weak"   ADL   Where Assessed Chair   Eating Assistance 6  Modified independent   Grooming Assistance 4  Minimal Assistance   UB Bathing Assistance 3  Moderate Assistance   LB Bathing Assistance 1  Total Assistance   LB Bathing Deficit Right lower leg including foot; Left lower leg including foot; Left upper leg;Right upper leg;Buttocks; Perineal area   UB Dressing Assistance 3  Moderate Assistance   LB Dressing Assistance 1  Total Assistance   LB Dressing Deficit Don/doff R sock; Don/doff L sock   Toileting Assistance  1  Total Assistance   Toileting Deficit Clothing management up;Clothing management down;Perineal hygiene   Additional Comments pt limited with self care I due to impaired activity tolerance/endurance  Bed Mobility   Additional Comments pt received in bedside chair, bed mobility NT   Transfers   Sit to Stand 3  Moderate assistance   Additional items Assist x 1; Increased time required;Verbal cues   Stand to Sit 4  Minimal assistance   Additional items Assist x 1   Toilet transfer 3  Moderate assistance   Additional items Increased time required;Assist x 1;Verbal cues  (simulated to commode)   Additional Comments verbal cues for safety, RW management   Functional Mobility   Functional Mobility 4  Minimal assistance   Additional Comments min a x 1 and assistance another for lines with RW chair>hallway   Additional items Rolling walker   Balance   Static Sitting Fair   Dynamic Sitting Fair -   Static Standing Fair -   Dynamic Standing Poor +   Ambulatory Poor +   Activity Tolerance   Activity Tolerance Patient limited by fatigue;Patient limited by pain   Medical Staff Made [id number] [address] PT   Nurse Made Aware RN cleared for therapy   RUE Assessment   RUE Assessment X  (3+/5 grossly throughout)   LUE Assessment   LUE Assessment X  (3+/5 grossly throughout)   Hand Function   Gross Motor Coordination Functional   Fine Motor Coordination Functional   Vision-Basic Assessment   Current Vision Wears glasses all the time   Vision - Complex Assessment   Acuity Able to read clock/calendar on wall without difficulty   Cognition   Overall Cognitive Status Impaired   Arousal/Participation Alert;Lethargic   Attention Attends with cues to redirect   Orientation Level Oriented to person;Oriented to place;Oriented to time;Disoriented to situation   Memory Decreased recall of recent events  (pt +min conversant, min detail provided with social history)   Following Commands Follows multistep commands with increased time or repetition   Comments min slow processing, verbal cues for safety with RW   Assessment   Limitation Decreased ADL status; Decreased UE strength;Decreased Safe judgement during ADL;Decreased endurance;Decreased cognition;Decreased self-care trans;Decreased high-level ADLs   Prognosis Fair   Assessment Pt is a 75 y o  female who was admitted to transferred from [facility] on 12[facility]14 with intractable abdominal pain, [facility] on 12/14/2018 with Bowel perforation ([address] ) underwent ex-lap with washout on 12/14/18 and returned to OR on 12/15 for diverting loop ileostomy  Pt's problem list also includes PMH of  Stage IV ovarian cancer, +chemo, liver disease,  diabetes,lesion of liver, LE edema,  Asthma, diabetes, abdominal fluid collection, IR port placement    At baseline pt was completing ADL's/IADL's with I, primary caregiver for /takes care of 3 grandchildren during the day  Pt lives with spouse in a 2 SH with a ramp  Currently pt requires min/mod a for UB self care, max/total assist LB self care tasks and min/mod a with RW  for functional mobility/transfers  Pt currently presents with impairments in the following categories -difficulty performing ADLS, difficulty performing IADLS  and flat affect activity tolerance, endurance, standing balance/tolerance, sitting balance/tolerance, UE strength, memory and safety   These impairments, as well as pt's fatigue and pain  limit pt's ability to safely engage in all baseline areas of occupation, includingbathing, dressing, toileting, functional mobility/transfers, community mobility, laundry , driving, house maintenance, medication management, meal prep, cleaning, work/volunteer work , care of children and leisure activities  From OT standpoint, recommend inpatient rehab upon D/C  OT will continue to follow to address the below stated goals       Goals   Patient Goals no goals stated   LTG Time Frame 10-14   Long Term Goal #1 see below goals   Plan   Treatment Interventions ADL retraining;Functional transfer training;UE strengthening/ROM; Endurance training;Patient/family training;Equipment evaluation/education; Neuromuscular reeducation; Compensatory technique education; Energy conservation; Activityengagement   Goal Expiration Date 12/31/18   OT Frequency 3-5x/wk   Recommendation   OT Discharge Recommendation Short Term Rehab   Equipment Recommended Bedside commode;Tub seat with back   OT - OK to Discharge (yes when medically able to short term rehab)   Barthel Index   Feeding 10   Bathing 0   Grooming Score 0   Dressing Score 0   Bladder Score 5   Bowels Score 10   Toilet Use Score 5   Transfers (Bed/Chair) Score 5   Mobility (Level Surface) Score 0   Stairs Score 0   Barthel Index Score 35   Modified [place] Scale   Modified [place] Scale 4     Occupational Therapy Goals    *Min a  ADL's after set-up with the use of [employer]N  *Min a  ADL's Toileting and clothing management  *S Functional mobility & transfers to all surfaces with Fair Balance/Safety  for participation in dynamic ADL's  *Demonstrate good carryover of safety with the use of RW during functional tasks  *Increase activity tolerance to 30 minutes for participation in ADL's & Leisure activities  *Assess DME needs  *Pt to participate in ongoing functional cognitive assessment with good attention/  participation to assist with safe D/C recommendations  *Pt will demonstrate % carryover of energy conservation techniques with ADL & Leisure tasks      Farrah Host, OT

## 2018-12-17 NOTE — PROGRESS NOTES
12/17/18 1300   Spiritual Beliefs/Perceptions   Support Systems Spouse/significant other;Children   Stress Factors   Patient Stress Factors Other (Comment)   Coping Responses   Patient Coping Other (Comment)   Plan of Care   Comments Cultivated a caring and supportive presence     Assessment Completed by: Unit visit

## 2018-12-17 NOTE — PROGRESS NOTES
Progress Note - Gynecologic Oncology   Catherine Perdomo 75 y o  female MRN: [id number]  Unit/Bed#: [facility] 513-01 Encounter: [id number]    Assessment / Plan:    1  Sepsis / septic shock secondary to bowel perforation:  Source has now been controlled after 2 abdominal washout and proximal gastrointestinal diversion by means of loop ileostomy  Shock has resolved  Now on day 2 of cefepime plus Flagyl  Blood cultures from December 14th with evidence of gram-negative rods in 1/2 bottles  Peritoneal cultures with evidence of gram-positive and gram-negative rods  E  Coli  Urine culture with no growth  Broad-spectrum antibiotic coverage seems adequate for now  Continue to monitor through final cultures and sensitivities to adjust antibiotics accordingly  Patient will receive at least 2 weeks of intravenous antibiotics given peritonitis and gram-negative rods and blood      Postoperative care:  - NG tube has been removed  No nausea vomiting  Tolerating sips of water  Hungry  Good bowel sounds and evidence of ileostomy output this morning  Advance to ADA diet as tolerated, provide supplementation with glucerna  - out of bed to chair, PT OT consult  - wound care for ileostomy teaching and changing of appliance to prevent interference with wound  - 3 omar removed  Plan to remove remaining 4 omar over the next day or so      2  Stage IV ovarian cancer:  Status post neoadjuvant chemotherapy with 1st cycle of carboplatin, Taxol and Avastin complicated by bowel perforation  Patient and family aware of grave prognosis  Resumption of chemotherapy might need to be delayed given ongoing postoperative issues until healing documented  Consider discontinuation of Avastin  These issues will be discussed further with Dr Rishabh Arroyo later this week  Considered palliative care involvement for symptom management and support with goals of care if needed        3   Type 2 diabetes:  Accu-Cheks demonstrated adequate glycemic control  Continue sliding scale insulin  4  PVCs, episode of brief supraventricular tachycardia:  Check EK G  Suspect related to ongoing infectious issues  Continue to monitor  Subjective/Objective       Subjective: "I feel better, I am hungry"    Objective:  Afebrile, no nausea vomiting, no dyspnea  Episode of brief tachycardia to the 160 to 170s lasting a few seconds, results spontaneously  Ongoing PVCs  Blood pressure 110/53, pulse 86, temperature 98 1 °F (36 7 °C), resp  rate (!) 11, weight 65 5 kg (144 lb 6 4 oz), SpO2 97 %  ,Body mass index is 24 23 kg/m²  Intake/Output Summary (Last 24 hours) at 12/17/18 8250  Last data filed at 12/17/18 0400   Gross per 24 hour   Intake             3480 ml   Output             1625 ml   Net             1855 ml       Invasive Devices     Peripheral Intravenous Line            Peripheral IV 12/14/18 Left Antecubital 2 days    Peripheral IV 12/16/18 Left Hand less than 1 day          Arterial Line            Arterial Line 12/14/18 Right Radial 2 days          Drain            Closed/Suction Drain Abdomen Bulb 19 Fr  1 day    Closed/Suction Drain Left LLQ Bulb 19 Fr  1 day    Closed/Suction Drain Right RLQ Bulb 19 Fr  1 day    Closed/Suction Drain Right RUQ Bulb 19 Fr  1 day    Ileostomy Loop RLQ 1 day                Physical Exam: /53   Pulse 86   Temp 98 1 °F (36 7 °C)   Resp (!) 11   Wt 65 5 kg (144 lb 6 4 oz)   SpO2 97%   BMI 24 23 kg/m²     General Appearance:   Appears nontoxic  Tired  Not in acute distress  Neck:   Supple, symmetrical, trachea midline, no adenopathy;     thyroid:  no enlargement/tenderness/nodules; no carotid    bruit or JVD   Back:     Symmetric, no curvature, ROM normal, no CVA tenderness   Lungs:     Clear to auscultation bilaterally, respirations unlabored   Chest Wall:    No tenderness or deformity    Heart:    Regular rate and rhythm, S1 and S2 normal, no murmur, rub   or gallop    Occasional irregular beats consistent with PVCs  Abdomen:     Soft, non-tender, bowel sounds present  Incision well healed without erythema or drainage  Three omar close to umbilicus removed  Ileostomy with pink mucosa and brownish stool output  Temporary bridge in place  All drains in place  Mostly serous output from all of them with the exception of left lower quadrant which is in close relationship to suspected pelvic perforation which is putting out dark contents as expected  Extremities:   Extremities normal, atraumatic, no cyanosis or edema   Skin:   Skin color, texture, turgor normal, no rashes or lesions        Recent Results (from the past 24 hour(s))   Fingerstick Glucose (POCT)    Collection Time: 12/16/18 12:01 PM   Result Value Ref Range    POC Glucose 67 65 - 140 mg/dl   Fingerstick Glucose (POCT)    Collection Time: 12/16/18 12:43 PM   Result Value Ref Range    POC Glucose 109 65 - 140 mg/dl   Fingerstick Glucose (POCT)    Collection Time: 12/16/18  5:21 PM   Result Value Ref Range    POC Glucose 76 65 - 140 mg/dl   Fingerstick Glucose (POCT)    Collection Time: 12/16/18 10:15 PM   Result Value Ref Range    POC Glucose 69 65 - 140 mg/dl   Fingerstick Glucose (POCT)    Collection Time: 12/17/18 12:18 AM   Result Value Ref Range    POC Glucose 88 65 - 140 mg/dl   CBC and differential    Collection Time: 12/17/18  5:36 AM   Result Value Ref Range    WBC 27 88 (H) 4 31 - 10 16 Thousand/uL    RBC 3 05 (L) 3 81 - 5 12 Million/uL    Hemoglobin 7 7 (L) 11 5 - 15 4 g/dL    Hematocrit 24 3 (L) 34 8 - 46 1 %    MCV 80 (L) 82 - 98 fL    MCH 25 2 (L) 26 8 - 34 3 pg    MCHC 31 7 31 4 - 37 4 g/dL    RDW 18 8 (H) 11 6 - 15 1 %    MPV 12 9 (H) 8 9 - 12 7 fL    Platelets 547 (L) 513 - 390 Thousands/uL   Fingerstick Glucose (POCT)    Collection Time: 12/17/18  5:36 AM   Result Value Ref Range    POC Glucose 63 (L) 65 - 140 mg/dl   ]    Lab, Imaging and other studies: Labs pending  VTE Pharmacologic Prophylaxis: Heparin    Plan to switch to Lovenox once a day as kidney function is normal  VTE Mechanical Prophylaxis: sequential compression device      Sachin Grace MD, [place], LISSETH  12/17/2018  6:15 AM

## 2018-12-17 NOTE — CONSULTS
Progress Note- Ostomy  Bonnie Talavera 75 y o  female  [id number]  [facility] 513-01        Assessment:  Patient is an 75year old female with history of stage IV ovarian Ca currently on chemo now admitted with bowel perforation  She underwent  Exploratory laparotomy washout drain placements with diverting loop colostomy on 12/15  She was seen by wound care this morning for ostomy care and teaching  She is out of bed in chair with one piece pouching system in place with ongoing leakage  Stoma if barely budded, pink, moist with intact mucocutaneous junction as well as peristomal skin, (+) flatus and green effluent, red rubber ring in place  Bilateral aspect of stoma with deep and wide crease which is causing leakage of pouch, mid abdominal incision is well approximated with drain omar in place  Teaching started by reviewing surgical process of loop ileostomy creation, how and Ileostomy works, healing process, how to empty pouch as well as skin and stoma care  Pouch change completed while giving verbal teaching and hands on demonstration  Used smooth ring, strip paste and stoma paste to raised skin crease for pouching, patient closely watching pouch change and asking questions  Patient provided with "Life After Ileostomy Surgery" book and encouraged reading, patient encouraged asking for ET nurses for ongoing teaching, patient agreeable for wound care nurse to continue following with ostomy care and teaching  Plan:  Wound care to continue following, please call ext [phone] with questions or concerns  Vitals:    12/17/18 1300   BP:    Pulse: 94   Resp: (!) 25   Temp:    SpO2:      Incision 12/15/18 Abdomen Other (Comment) (Active)   Incision Description Dry; Intact; Pink 12/17/2018 12:00 PM   Yarely-wound Assessment Clean;Dry; Intact 12/17/2018 12:00 PM   Closure Staples 12/17/2018 12:00 PM   Drainage Amount Scant 12/17/2018 12:00 PM   Drainage Description Serosanguineous 12/17/2018 12:00 PM   Treatments Cleansed;Site care 12/17/2018  8:00 AM   Dressing ABD 12/17/2018 12:00 PM   Dressing Changed Changed by provider 12/16/2018  4:00 PM   Patient Tolerance Tolerated well 12/17/2018  8:00 AM   Dressing Status Clean;Dry; Intact 12/17/2018 12:00 PM   Number of days: 2     Closed/Suction Drain Abdomen Bulb 19 Fr  (Active)   Site Description Unable to view 12/17/2018  8:00 AM   Dressing Status Clean;Dry; Intact 12/17/2018  8:00 AM   Drainage Appearance Serous 12/17/2018  8:00 AM   Status To bulb suction 12/17/2018  8:00 AM   Intake (mL) 0 mL 12/17/2018  8:00 AM   Output (mL) 10 mL 12/17/2018  8:00 AM   Number of days: 2       Closed/Suction Drain Right RUQ Bulb 19 Fr  (Active)   Site Description Unable to view 12/17/2018  8:00 AM   Dressing Status Clean;Dry; Intact 12/17/2018  8:00 AM   Drainage Appearance Serous 12/17/2018  8:00 AM   Status To bulb suction 12/17/2018  8:00 AM   Intake (mL) 0 mL 12/17/2018  8:00 AM   Output (mL) 40 mL 12/17/2018  8:00 AM   Number of days: 2       Closed/Suction Drain Right RLQ Bulb 19 Fr  (Active)   Site Description Unable to view 12/17/2018  8:00 AM   Dressing Status Clean;Dry; Intact 12/17/2018  8:00 AM   Drainage Appearance Serous 12/17/2018  8:00 AM   Status To bulb suction 12/17/2018  8:00 AM   Intake (mL) 0 mL 12/17/2018  8:00 AM   Output (mL) 5 mL 12/17/2018  8:00 AM   Number of days: 2       Closed/Suction Drain Left LLQ Bulb 19 Fr  (Active)   Site Description Unable to view 12/17/2018  8:00 AM   Dressing Status Clean;Dry; Intact 12/17/2018  8:00 AM   Drainage Appearance Bloody 12/17/2018  8:00 AM   Status To bulb suction 12/17/2018  8:00 AM   Intake (mL) 0 mL 12/17/2018  8:00 AM   Output (mL) 30 mL 12/17/2018  8:00 AM   Number of days: 2       Ileostomy Loop RLQ (Active)   Stomal Appliance 1 piece;Leaking 12/17/2018 11:32 AM   Stoma Assessment Budded;Pink 12/17/2018 11:32 AM   Stoma size (in) 1 5 in 12/17/2018 11:32 AM   Stoma Shape Round 12/17/2018 11:32 AM   Peristomal Assessment Intact 12/17/2018 11:32 AM   Treatment Bag change 12/17/2018 11:32 AM   Output (mL) 150 mL 12/17/2018 11:32 AM   Number of days: 2       Ostomy  Supplies ordered and placed in room, please call ext [phone] with concerns         Pavel Bennett, RN, BSN, [employer]

## 2018-12-17 NOTE — NUTRITION
12/17/18 1241   Recommendations/Interventions   Interventions Diet: continued as ordered; Supplement continue   Nutrition Recommendations Continue diet as ordered; Other (specify)  (Replete K+, Phos 1 2)

## 2018-12-17 NOTE — PLAN OF CARE
Problem: OCCUPATIONAL THERAPY ADULT  Goal: Performs self-care activities at highest level of function for planned discharge setting  See evaluation for individualized goals  Treatment Interventions: ADL retraining, Functional transfer training, UE strengthening/ROM, Endurance training, Patient/family training, Equipment evaluation/education, Neuromuscular reeducation, Compensatory technique education, Energy conservation, Activityengagement  Equipment Recommended: Bedside commode, Tub seat with back       See flowsheet documentation for full assessment, interventions and recommendations  Limitation: Decreased ADL status, Decreased UE strength, Decreased Safe judgement during ADL, Decreased endurance, Decreased cognition, Decreased self-care trans, Decreased high-level ADLs  Prognosis: Fair  Assessment: Pt is a 75 y o  female who was admitted to transferred from [facility] on 12/14 with intractable abdominal pain, [facility] on 12/14/2018 with Bowel perforation ([facility] [address] ) underwent ex-lap with washout on 12/14/18 and returned to OR on 12/15 for diverting loop ileostomy  Pt's problem list also includes PMH of  Stage IV ovarian cancer, +chemo, liver disease,  diabetes,lesion of liver, LE edema,  Asthma, diabetes, abdominal fluid collection, IR port placement    At baseline pt was completing ADL's/IADL's with I, primary caregiver for /takes care of 3 grandchildren during the day  Pt lives with spouse in a 2 SH with a ramp  Currently pt requires min/mod a for UB self care, max/total assist LB self care tasks and min/mod a with RW  for functional mobility/transfers  Pt currently presents with impairments in the following categories -difficulty performing ADLS, difficulty performing IADLS  and flat affect activity tolerance, endurance, standing balance/tolerance, sitting balance/tolerance, UE strength, memory and safety    These impairments, as well as pt's fatigue and pain  limit pt's ability to safely engage in all baseline areas of occupation, includingbathing, dressing, toileting, functional mobility/transfers, community mobility, laundry , driving, house maintenance, medication management, meal prep, cleaning, work/volunteer work , care of children and leisure activities  From OT standpoint, recommend inpatient rehab upon D/C  OT will continue to follow to address the below stated goals         OT Discharge Recommendation: Short Term Rehab  OT - OK to Discharge:  (yes when medically able to short term rehab)

## 2018-12-17 NOTE — UTILIZATION REVIEW
Initial Clinical Review    Admission: Date/Time/Statement: 12/14/18 @ 2151     Orders Placed This Encounter   Procedures    Inpatient Admission     Standing Status:   Standing     Number of Occurrences:   1     Order Specific Question:   Admitting Physician     Answer:   Jude Vargas [[id number]]     Order Specific Question:   Level of Care     Answer:   Med Surg [[id number]]     Order Specific Question:   Estimated length of stay     Answer:   More than 2 Midnights     Order Specific Question:   Certification     Answer:   I certify that inpatient services are medically necessary for this patient for a duration of greater than two midnights  See H&P and MD Progress Notes for additional information about the patient's course of treatment  ED: Date/Time/Mode of Arrival: Tx from [id number] [place] [address]     Chief Complaint: ovarian cancer with suspected bowel perforation    History of Illness:  76 yo with Stage IV ovarian cancer s/p neoadjuvant chemotherapy with Carbo, Taxol and Avastin who presents as a transfer from [place] [address]  A CT scan in the ED was concerning for a bowel perforation and patient was transferred for further management  Patient initially presented for intractable nausea, vomiting, and abdominal pain  Patient states that he had a paracentesis a few days ago ad since that time has had abdominal pain  She also notes that she has had diarrhea for the last several day since receiving Chemotherapy  She also states that she has been unable to eat today, but has been urinating without difficulty        76 yo with Stage IV ovarian cancer with suspected bowel perforation  -Plan to proceed to the OR emergently for exploratory laparotomy and all other indicated procedures      Vital Signs:   12/15 0701  12/16 0700 12/16 0701  12/16 2048 Most Recent    Temperature (°F) 97  598 6 97 798 97 9 (36 6)    Pulse 76104 8090 82    Respirations 726 923 13    Blood Pressure 92/48127/60 95/50115/57 105/53 Arterial Line BP 88/40158/50 126/38148/42 136/38    SpO2 (%) 85100 9397 95        Abnormal Labs/Diagnostic Test Results:    Ref Range & Units 12/15/18 0037   pH, Arterial 7 350 - 7 450 7 395    pCO2, Arterial 36 0 - 44 0 mm Hg 35 4     pO2, Arterial 75 0 - 129 0 mm Hg 170 0     HCO3, Arterial 22 0 - 28 0 mmol/L 21 2     Base Excess, Arterial mmol/L -3 2    O2 Content, Arterial 16 0 - 23 0 mL/dL 15 2     O2 HGB,Arterial  94 0 - 97 0 % 97 9     SOURCE  Line, Arterial      K 3 2, BUN 26, Ca 7 4, Tl protein 4 7, Albumin 2 6, Lactic acid 2 7, WBC 16 59, Hgb 9 7, Hct 30 6,       Past Medical/Surgical History: Active Ambulatory Problems     Diagnosis Date Noted    Other ascites 10/11/2018    Lesion of liver 10/11/2018    Edema leg 10/17/2018    Ovarian cancer ([facility] [address] ) 11/14/2018    Abdominal pain 12/14/2018    Intractable nausea and vomiting 12/14/2018     Past Medical History:   Diagnosis Date    Abdominal fluid collection     Asthma     [facility]     Diabetes mellitus ([facility] )        Admitting Diagnosis: Vomiting [R11 10]    Age/Sex: 75 y o  female    Assessment/Plan:   Assessment:  67F w stage IVB ovarian Ca s/p exlap/washout of abdomen for bowel perforation; septic shock     Plan:  Neuro: c/w fent/prop for analgesia/sedation  CV:  MAP goal >65  Resp: on vent; will SBT in AM and wean accordingly  GI: NPO/NGT; abthera intact  FEN: IVF, monitor UOP, replace lytes prn  Heme: f/u postop hgb, DVT ppx  ID: cefepime/flagyl, monitor leukocytosis, OR  cx pending  Endo:  ISS  Lines: PIV/a-line; will continue to monitor BP if needed TLC will be inserted     Dispo: remain in [address]: 12/14/2018 - 12/15/2018    Procedure(s) (LRB):  LAPAROTOMY EXPLORATORY; Abdominal Washout;  Application of Abthera Vac Dressing (N/A)    Anesthesia Type:   General     Operative Indications:  Patient with history of stage IVB ovarian cancer now approximately 4 days out from recent paracentesis and 3 days out from recent chemotherapy with carboplatin, Taxol and Avastin  Presented with intractable nausea vomiting, abdominal pain, leukocytosis and elevated lactate  CT scan demonstrated extensive free air concerning for bowel perforation  After counseling, she consented to proceed with surgical exploration      Operative Findings:  1  Approximately 6 L of bloody extremely foul-smelling ascitic fluid  2  Extensive fibrinous exudate throughout abdomen and pelvis consistent with ongoing inflammatory process  3  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, colon, anterior abdominal peritoneum, upper abdomen, etc   Frozen pelvis with bilateral pelvic masses and definitive rectal involvement  Specific point of bowel perforation was not identified  However, it was suspected that perforation arose from tumor involvement of the rectum    After consultation with Dr Jerson London from Trauma surgery we opted to not proceed with diversion at this time and instead to manage with open abdomen to take patient back to the operating room in the near future for repeat washout and possible diversion by means of diverting enterostomy/colostomy        Complications:   None      Admission Orders:  Scheduled Meds:   acetaminophen 650 mg Oral Q6H PRN   cefepime 2,000 mg Intravenous Q12H   chlorhexidine 15 mL Swish & Spit Q12H [address]   heparin (porcine) 5,000 Units Subcutaneous Q8H [address]   insulin lispro 1-5 Units Subcutaneous HS   insulin lispro 1-6 Units Subcutaneous TID AC   metroNIDAZOLE 500 mg Intravenous Q8H   morphine injection 4 mg Intravenous Q4H PRN   multi-electrolyte 125 mL/hr Intravenous Continuous   ondansetron 4 mg Intravenous Q6H PRN   oxyCODONE 10 mg Oral Q4H PRN   oxyCODONE 5 mg Oral Q4H PRN   pantoprazole 40 mg Intravenous Q24H MARCIO   phenol 1 spray Mouth/Throat Q2H PRN     Fingerstick glucose checks ac & hs  Neuro checks q shift  NGT  Daily wts  SCD's  Up as sindy  Npo  Nursing dysphagia diet

## 2018-12-17 NOTE — PLAN OF CARE
Problem: DISCHARGE PLANNING - CARE MANAGEMENT  Goal: Discharge to post-acute care or home with appropriate resources  INTERVENTIONS:  - Conduct assessment to determine patient/family and health care team treatment goals, and need for post-acute services based on payer coverage, community resources, and patient preferences, and barriers to discharge  - Address psychosocial, clinical, and financial barriers to discharge as identified in assessment in conjunction with the patient/family and health care team  - Arrange appropriate level of post-acute services according to patient's   needs and preference and payer coverage in collaboration with the physician and health care team  - Communicate with and update the patient/family, physician, and health care team regarding progress on the discharge plan  - Arrange appropriate transportation to post-acute venues  - Family to drive home @ discharge  Outcome: Progressing

## 2018-12-17 NOTE — PROGRESS NOTES
Progress Note - Critical Care   Dale Jordan 75 y o  female MRN: [id number]  Unit/Bed#: [facility] 022-12 Encounter: [id number]    Attending Physician: Carlos Ordaz MD      ______________________________________________________________________  Assessment and Plan:   Principal Problem: Bowel perforation (HCC)  Active Problems:    Other ascites    Edema leg    Ovarian cancer (HCC)    Abdominal pain    Intractable nausea and vomiting    Septic shock (HCC)    Peritonitis (HCC)    S/P ileostomy (HCC)    Acute blood loss anemia    Leukocytosis    Hypokalemia  Resolved Problems:    * No resolved hospital problems   *    NEURO:  -GCS: 15   -ANALGESIA: Tylenol 650mg q6h PRN, Oxycodone 5/10mg q4h PRN, Morphine 4 mg q4h PRN  - Palliative consulting    -PLAN:   Neuro checks qshift  Continue analgesia  Transition to PO pain meds when NGT removed    CV:   Temp:  [97 7 °F (36 5 °C)-98 4 °F (36 9 °C)] 98 1 °F (36 7 °C)  HR:  [76-94] 82  Resp:  [9-26] 11  BP: ()/(48-65) 109/58  SpO2:  [89 %-98 %] 97 %    -MEDICATIONS: n/a  -PLAN:   Keep MAP >65  Endpoints cleared    PULM:   -PLAN:   Extubated 12/15  No acute respiratory issues  Keep oxygen saturation >92%  IS, aggressive pulmonary toilet  Wean supplemental oxygen as tolerated    GI:  - S/p second ex-lap, abdominal washout, diverting loop ileostomy, drain placement x4 (12/14, 12/15)  - Protonix 40mg daily, Zofran PRN  - Gyn/onc started on regular diet today   - 200 cc from ostomy   - Monitor drain x4 outputs (545 cc x24 hr)    :     Results from last 7 days  Lab Units 12/16/18  0604 12/15/18  1425 12/15/18  0506 12/15/18  0037 12/14/18  2322 12/14/18  1458 12/10/18  1410   BUN mg/dL 16 21 23 26* 26* 25 13   CREATININE mg/dL 0 48* 0 58* 0 70 0 86 0 94 1 00 0 64     I/O:   I/O       12/14 0701 - 12/15 0700 12/15 0701 - 12/16 0700    I V  (mL/kg) 4720 6 (72 1) 3659 5 (55 9)    NG/GT  60    IV Piggyback 1150 1000    Total Intake(mL/kg) 5870 6 (89 6) 4719 5 (72 1)    Urine (mL/kg/hr) 250 1000 (0 6)    Emesis/NG output 100 825    Drains 500 810    Other 7000     Stool  0    Total Output 7850 2635    Net -1979 4 +2084 5              - Haskins removed yesterday, has had some urinary retention requiring straight cath, urine retention protocol    - Followed by gyn/onc for stage IV ovarian CA    F/E/N:   FLUIDS: Isolyte @125  ELECTROLYTES: Repleting PRN    Results from last 7 days  Lab Units 12/16/18  0604 12/15/18  1425 12/15/18  1218 12/15/18  0506 12/15/18  0037 12/14/18  2322 12/14/18  2315  12/14/18  1458 12/10/18  1410   SODIUM mmol/L 140 141  --  139 142 140  --   --  133* 141   POTASSIUM mmol/L 3 4* 4 0  --  4 0 3 2* 3 4*  --   < > 6 9* 4 0   CHLORIDE mmol/L 108 109*  --  106 108 105  --   --  98* 103   CO2 mmol/L 24 23  --  24 22 23  --   --  22 26   CO2, I-STAT mmol/L  --   --  24  --   --   --  27  --   --   --    ANION GAP mmol/L 8 9  --  9 12 12  --   --  13 12   CALCIUM mg/dL 8 2* 7 1*  --  7 3* 7 4* 6 9*  --   --  8 6 8 6   MAGNESIUM mg/dL 2 6  --   --  2 4 1 5*  --   --   --   --   --    PHOSPHORUS mg/dL 1 6*  --   --  3 4 3 8  --   --   --   --   --    < > = values in this interval not displayed    NUTRITION: Diet NPO    ID:     Results from last 7 days  Lab Units 12/17/18  0536 12/16/18  0604 12/15/18  1739 12/15/18  0506 12/15/18  0037 12/14/18  1458 12/14/18   WBC Thousand/uL 27 88* 25 53* 21 37* 20 98* 16 59* 18 31* 16 25*     CULTURES:   ANTIBIOTICS: Cefepime/Flagyl  - Blood and urine cx have been negative     HEME:    Results from last 7 days  Lab Units 12/16/18  0604 12/15/18  1739 12/15/18  1218 12/15/18  0506 12/15/18  0037 12/14/18  2315 12/14/18  1458 12/14/18 12/10/18  1410   HEMOGLOBIN g/dL 8 1* 8 4*  --  9 2* 9 7*  --  12 3 10 1* 11 2*   I STAT HEMOGLOBIN g/dl  --   --  9 2*  --   --  9 5*  --   --   --    HEMATOCRIT % 26 0* 26 6*  --  29 0* 30 6*  --  39 1 31 6* 35 6   HEMATOCRIT, ISTAT %  --   --  27*  --   --  28*  --   --   --    PLATELETS Thousands/uL 133* 133*  --  147* 146*  --  169 177 233       Results from last 7 days  Lab Units 12/15/18  0934 12/14/18  1713 12/14/18   INR  1 50* 1 49* 1 66*   PTT seconds  --  38 44*       -PLAN:   Stable, continue to monitor  Transfuse if Hgb <7     ENDO:    - Hx DM, on SSI  - Hypoglycemia 63, got 2 amps D50 overnight      MSK/SKIN:   PT/OT  Routine skin care  Frequent offloading     LDA:  Invasive Devices     Peripheral Intravenous Line            Peripheral IV 12/14/18 Left Antecubital 2 days    Peripheral IV 12/16/18 Left Hand less than 1 day          Arterial Line            Arterial Line 12/14/18 Right Radial 2 days          Drain            Closed/Suction Drain Abdomen Bulb 19 Fr  1 day    Closed/Suction Drain Left LLQ Bulb 19 Fr  1 day    Closed/Suction Drain Right RLQ Bulb 19 Fr  1 day    Closed/Suction Drain Right RUQ Bulb 19 Fr  1 day    Ileostomy Loop RLQ 1 day                Disposition: Continue ICU Care    Code Status: Level 1 - Full Code    Counseling / Coordination of Care  Total Critical Care time spent 30 minutes excluding procedures, teaching and family updates  ______________________________________________________________________    Chief Complaint: Stage IV Ovarian cancer, perforated viscus, s/p ex-lap/abdominal washout x2, eventual loop ileostomy, closure, drain placement x4    24 Hour Events:   - Episode of hypoglycemia - got 2 amps D50  - Urine retention, initiated retention protocol    Review of Systems   Constitutional: Negative for activity change, appetite change, chills, fatigue and fever  HENT: Negative for sore throat and trouble swallowing  Respiratory: Negative for shortness of breath, wheezing and stridor  Cardiovascular: Negative for chest pain, palpitations and leg swelling  Gastrointestinal: Positive for abdominal pain  Negative for constipation, diarrhea, nausea and vomiting  Genitourinary: Negative for difficulty urinating and dysuria     Musculoskeletal: Negative for arthralgias, back pain, joint swelling, neck pain and neck stiffness  Skin: Negative for color change, pallor, rash and wound  Neurological: Negative for dizziness, tremors, seizures, syncope, weakness, light-headedness, numbness and headaches  Psychiatric/Behavioral: Negative for agitation, behavioral problems and confusion  ______________________________________________________________________    Physical Exam:     Physical Exam   Constitutional: No distress  HENT:   Head: Normocephalic and atraumatic  Eyes: EOM are normal    Neck: Normal range of motion  Neck supple  Cardiovascular: Normal rate and regular rhythm  Pulmonary/Chest: Effort normal  No respiratory distress  She has no rales  She exhibits no tenderness  On 2L NC   Abdominal: Soft  She exhibits no distension  There is no tenderness  + midline abdominal incision intact w/ staples and omar x6, drains x4 in place (LUQ w/ serous output, LLQ/RUQ/RLQ w/ serosanguinous outputs)  + ostomy site pink, healthy w/ succus in ostomy bag   Skin: She is not diaphoretic      ______________________________________________________________________  Vitals:    18 0300 18 0330 18 0400 18 0407   BP: 108/54  109/58    Pulse: 88 82 82    Resp: (!) 11 (!) 11 (!) 11    Temp:    98 1 °F (36 7 °C)   TempSrc:       SpO2: 98% 97% 97%    Weight:         Temperature:   Temp (24hrs), Av 1 °F (36 7 °C), Min:97 7 °F (36 5 °C), Max:98 4 °F (36 9 °C)    Current Temperature: 98 1 °F (36 7 °C)  Weights:   IBW: -92 5 kg    Body mass index is 24 23 kg/m²  Weight (last 2 days)     Date/Time   Weight    12/15/18 0600  65 5 (144 4)          Nutrition:        Diet Orders            Start     Ordered    18  Diet NPO  Diet effective now     Comments:  Allowed to have sips, water, and ice chips   Question Answer Comment   Diet Type NPO    RD to adjust diet per protocol?  Yes        18        Labs:     Results from last 7 days  Lab Units 12/16/18  0604 12/15/18  1739 12/15/18  1218 12/15/18  0506 12/15/18  0037 12/14/18  2315 12/14/18  1458 12/14/18 12/10/18  1410   WBC Thousand/uL 25 53* 21 37*  --  20 98* 16 59*  --  18 31* 16 25* 12 22*   HEMOGLOBIN g/dL 8 1* 8 4*  --  9 2* 9 7*  --  12 3 10 1* 11 2*   I STAT HEMOGLOBIN g/dl  --   --  9 2*  --   --  9 5*  --   --   --    HEMATOCRIT % 26 0* 26 6*  --  29 0* 30 6*  --  39 1 31 6* 35 6   HEMATOCRIT, ISTAT %  --   --  27*  --   --  28*  --   --   --    PLATELETS Thousands/uL 133* 133*  --  147* 146*  --  169 177 233   BANDS PCT %  --  1  --  10* 3  --  31* 9* 13*   MONO PCT % 0* 1*  --  2* 1*  --  2* 0* 6       Results from last 7 days  Lab Units 12/16/18  0604 12/15/18  1425 12/15/18  1218 12/15/18  0506 12/15/18  0037 12/14/18  2322 12/14/18  2315  12/14/18  1458 12/10/18  1410   SODIUM mmol/L 140 141  --  139 142 140  --   --  133* 141   POTASSIUM mmol/L 3 4* 4 0  --  4 0 3 2* 3 4*  --   < > 6 9* 4 0   CHLORIDE mmol/L 108 109*  --  106 108 105  --   --  98* 103   CO2 mmol/L 24 23  --  24 22 23  --   --  22 26   CO2, I-STAT mmol/L  --   --  24  --   --   --  27  --   --   --    ANION GAP mmol/L 8 9  --  9 12 12  --   --  13 12   BUN mg/dL 16 21  --  23 26* 26*  --   --  25 13   CREATININE mg/dL 0 48* 0 58*  --  0 70 0 86 0 94  --   --  1 00 0 64   CALCIUM mg/dL 8 2* 7 1*  --  7 3* 7 4* 6 9*  --   --  8 6 8 6   ALT U/L 17  --   --  12 13 15  --   --  19 21   AST U/L 41  --   --  24 24 26  --   --  79* 40   ALK PHOS U/L 71  --   --  57 54 63  --   --  108 147*   ALBUMIN g/dL 2 0*  --   --  2 2* 2 6* 2 5*  --   --  1 9* 2 6*   TOTAL BILIRUBIN mg/dL 0 32  --   --  0 51 0 52 0 38  --   --  0 80 0 30   < > = values in this interval not displayed      Results from last 7 days  Lab Units 12/16/18  0604 12/15/18  0506 12/15/18  0037   MAGNESIUM mg/dL 2 6 2 4 1 5*   PHOSPHORUS mg/dL 1 6* 3 4 3 8        Results from last 7 days  Lab Units 12/15/18  0934 12/14/18  1713 12/14/18   INR  1 50* 1 49* 1 66*   PTT seconds  --  38 44*       Results from last 7 days  Lab Units 12/10/18  1410   TROPONIN I ng/mL <0 02       Results from last 7 days  Lab Units 12/15/18  1425 12/15/18  0506 12/15/18  0037 12/14/18  1907 12/14/18  1707 12/14/18  1458   LACTIC ACID mmol/L 1 2 1 6 2 7* 3 3* 4 4* 5 0*     ABG:  Lab Results   Component Value Date    PHART 7 315 (L) 12/15/2018    WCR1ZNU 44 6 (H) 12/15/2018    PO2ART 85 7 12/15/2018    MNH5ISG 22 2 12/15/2018    BEART -3 8 12/15/2018    SOURCE Line, Arterial 12/15/2018     VBG:    Results from last 7 days  Lab Units 12/15/18  1425   ABG SOURCE  Line, Arterial       Results from last 7 days  Lab Units 12/14/18  1712   PROCALCITONIN ng/ml 56 60*     No results found for: [facility]   Imaging:     STAT CXR ICU   Final Result by Alex Caal MD (12/15 1124)   Tubes and lines as above without pneumothorax  Small right basilar pleural effusion with patchy opacity likely reflecting atelectasis              Workstation performed: [id number]           Micro:    Results from last 7 days  Lab Units 12/14/18  2334 12/14/18  2314 12/14/18  1728 12/14/18  1709 12/10/18  1654   BLOOD CULTURE   --   --   --  No Growth at 48 hrs   --    GRAM STAIN RESULT   --  4+ Polys  4+ Gram negative rods  4+ Gram positive rods Gram negative rods  --  1+ Polys  No organisms seen   URINE CULTURE  No Growth <1000 cfu/mL  --   --   --   --    BODY FLUID CULTURE, STERILE   --   --   --   --  No growth     Allergies: No Known Allergies  Medications:   Scheduled Meds:    Current Facility-Administered Medications:  acetaminophen 650 mg Oral Q6H PRN Priscila Mcdonald MD    cefepime 2,000 mg Intravenous Q12H Priscila Mcdonald MD Last Rate: Stopped (12/16/18 2300)   chlorhexidine 15 mL Swish & Spit Q12H [address] Priscila Mcdonald MD    heparin (porcine) 5,000 Units Subcutaneous [facility] Priscila Mcdonald MD    insulin lispro 1-5 Units Subcutaneous HS Priscila Mcdonald MD    insulin lispro 1-6 Units Subcutaneous TID AC Arnold Coyle MD    metroNIDAZOLE 500 mg Intravenous Q8H Arnold Coyle MD Last Rate: Stopped (12/16/18 2350)   morphine injection 4 mg Intravenous Q4H PRN Arnold Coyle MD    multi-electrolyte 125 mL/hr Intravenous Continuous Ari Singh MD Last Rate: 125 mL/hr (12/16/18 2319)   ondansetron 4 mg Intravenous Q6H PRN Florin Mohan MD    oxyCODONE 10 mg Oral Q4H PRN Arnold Coyle MD    oxyCODONE 5 mg Oral Q4H PRN Arnold Coyle MD    pantoprazole 40 mg Intravenous Q24H [address] Arnold Coyle MD    phenol 1 spray Mouth/Throat Q2H PRN Ivette Orellana PA-C      Facility-Administered Medications Ordered in Other Encounters:  alteplase 2 mg Intracatheter PRN Radha Hernandez MD   heparin lock flush 300 Units Intracatheter PRN Radha Hernandez MD     Continuous Infusions:    multi-electrolyte 125 mL/hr Last Rate: 125 mL/hr (12/16/18 2319)     PRN Meds:    acetaminophen 650 mg Q6H PRN   morphine injection 4 mg Q4H PRN   ondansetron 4 mg Q6H PRN   oxyCODONE 10 mg Q4H PRN   oxyCODONE 5 mg Q4H PRN   phenol 1 spray Q2H PRN     VTE Pharmacologic Prophylaxis: Heparin  VTE Mechanical Prophylaxis: sequential compression device  Invasive lines and devices: Invasive Devices     Peripheral Intravenous Line            Peripheral IV 12/14/18 Left Antecubital 2 days    Peripheral IV 12/16/18 Left Hand less than 1 day          Arterial Line            Arterial Line 12/14/18 Right Radial 2 days          Drain            Closed/Suction Drain Abdomen Bulb 19 Fr  1 day    Closed/Suction Drain Left LLQ Bulb 19 Fr  1 day    Closed/Suction Drain Right RLQ Bulb 19 Fr  1 day    Closed/Suction Drain Right RUQ Bulb 19 Fr  1 day    Ileostomy Loop RLQ 1 day                   Portions of the record may have been created with voice recognition software  Occasional wrong word or "sound a like" substitutions may have occurred due to the inherent limitations of voice recognition software    Read the chart carefully and recognize, using context, where substitutions have occurred      Ricky Foster MD

## 2018-12-17 NOTE — PLAN OF CARE
Problem: PHYSICAL THERAPY ADULT  Goal: Performs mobility at highest level of function for planned discharge setting  See evaluation for individualized goals  Treatment/Interventions: Functional transfer training, LE strengthening/ROM, Elevations, Therapeutic exercise, Endurance training, Patient/family training, Equipment eval/education, Bed mobility, Gait training, Cognitive reorientation, Spoke to nursing, OT  Equipment Recommended: Rudi Mejia       See flowsheet documentation for full assessment, interventions and recommendations  Prognosis: Fair  Problem List: Decreased strength, Decreased endurance, Impaired balance, Decreased mobility, Decreased safety awareness, Decreased skin integrity, Pain  Assessment: Pt is 75 y o  female seen for PT evaluation s/p admit to [facility] on 12/14/2018 w/ Bowel perforation ([address] )  Pt presented to the ED for intractable nausea, vomiting, and abdominal pain  CT revealed bowel perforation and she was [facility] further management  S/p abdominal washout, application of Abthera Vac dresing on 12/14/2018  S/p abdominal washout, drain placement x4, diverting loop ileostomy and abdominal closure on 12/15/2018  PT consulted to assess pt's functional mobility and d/c needs  Order placed for PT eval and tx, w/ up and OOB as tolerated order  Comorbidities affecting pt's physical performance at time of assessment include: hypokalemia, leukocytosis, acute blood loss anemia, septic shock, ovarian cancer, ascites  PTA, pt was ambulates community distances and elevations and works full time  Personal factors affecting pt at time of IE include: lives in 2 story house, ambulating w/ assistive device, inability to ambulate household distances, limited home support, inability to perform current job functions, unable to perform caregiver support/tasks, inability to perform IADLs and inability to perform ADLs   Please find objective findings from PT assessment regarding body systems outlined above with impairments and limitations including weakness, impaired balance, decreased endurance, impaired coordination, gait deviations, pain, decreased activity tolerance, decreased functional mobility tolerance and fall risk  The following objective measures performed on IE also reveal limitations: Barthel Index: 40/100  Pt's clinical presentation is currently unstable/unpredictable seen in pt's presentation of pain, abnormal labs, closed/suction drain x4, lines, telemetry, supplemental oxygen  Pt to benefit from continued PT tx to address deficits as defined above and maximize level of functional independent mobility and consistency  From PT/mobility standpoint, recommendation at time of d/c would be STR pending progress in order to facilitate return to PLOF  Barriers to Discharge: Inaccessible home environment, Decreased caregiver support (2 SH, caregiver to her )     Recommendation:  (rehab)     PT - OK to Discharge: Yes    See flowsheet documentation for full assessment

## 2018-12-17 NOTE — UTILIZATION REVIEW
Initial Clinical Review    Admission: Date/Time/Statement: 12/14/18 @ 1827 Inpatient Written   Admitting Physician Afsaneh Olsen    Level of Care Med Surg    Estimated length of stay More than 2 Midnights    Certification I certify that inpatient services are medically necessary for this patient for a duration of greater than two midnights  See H&P and MD Progress Notes for additional information about the patient's course of treatment  ED: Date/Time/Mode of Arrival:   ED Arrival Information     Expected Arrival Acuity Means of Arrival Escorted By Service Admission Type    - 12/14/2018 13:54 Emergent Ambulance [facility] Hospitalist Emergency    Arrival Complaint    -          Chief Complaint:   Chief Complaint   Patient presents with    Abdominal Pain     patient stated that she has ovarian cancer, abdomen distended  Recently tapped Monday and got 6 L off of her  hx liver failure       History of Illness: Here with a chief complaint of nausea vomiting and diarrhea since last evening starting around 6:00 p m  Melania Barksdale She is accompanied by her caretaker and her son  She currently is undergoing chemotherapy for ovarian cancer with some metastasis to the liver  She had paracentesis performed on the 10th which removed approximately 6 L of fluid, she is a generalized abdominal tenderness    Given ondansetron 4 mg prior to arrival      ED Vital Signs:   ED Triage Vitals   Temperature Pulse Respirations Blood Pressure SpO2   12/14/18 1410 12/14/18 1403 12/14/18 1403 12/14/18 1403 12/14/18 1403   97 7 °F (36 5 °C) 103 19 147/71 96 %      Temp src Heart Rate Source Patient Position - Orthostatic VS BP Location FiO2 (%)   -- 12/14/18 1403 12/14/18 1403 12/14/18 1403 --    Monitor Lying Right arm       Pain Score       12/14/18 1724       Worst Possible Pain        Wt Readings from Last 1 Encounters:   12/15/18 65 5 kg (144 lb 6 4 oz)       Vital Signs (abnormal):     Abnormal Labs/Diagnostic Test Results:   LACTIC ACID 3 3 (HH)   Protime 17 9 (H)   LACTIC ACID 4 4 (HH)   Potassium 3 4 (L)   LACTIC ACID 5 0 (HH)   Sodium 133 (L)   Potassium 6 9 (HH)   Chloride 98 (L)   Glucose 243 (H)   AST 79 (H)   Total Protein 6 0 (L)   Albumin 1 9 (L)   WBC 18 31 (H)   MCV 79 (L)   MCH 24 7 (L)   RDW 18 7 (H)   Lipase 23 (L)        CT abdomen pelvis with contrast   Final Result by Forest Kern MD (12/14 1642)   Addendum 2 of 2 by Forest Kern MD (12/14 1957)   ADDENDUM:   The 1st line in the 1st addendum has a voice dictation error and should    state interventional radiologist not potential radiologist    Addendum 1 of 2 by Forest Kern MD (12/14 1950)   ADDENDUM:       Potential radiologist states that a large quantity of free air should not    be present secondary to paracentesis therefore bowel perforation should be    considered especially given the history of cirrhosis and implants  One of    these may have eroded into the    bowel lumen  An exact site of perforation is not apparent            Final       Large quantity of ascites  Moderate diffuse free intraperitoneal air in keeping with recent paracentesis        Pelvic mass which previously measured 13 3 x 9 3 cm is slightly smaller now measuring 12 7 x 7 4 cm        Previously seen mesenteric/peritoneal implants are less well-visualized secondary to being partially obscured by surrounding ascites  These are likely improved        Stable hepatic dome lesion measuring 3 3 x 2 5 cm other subcutaneous abscess or implants are not well visualized        No bowel obstruction is patient with nausea and diarrhea  Diffuse small bowel thickening likely reactive to the ascites       ED Treatment:   Medication Administration from 12/14/2018 1354 to 12/17/2018 1011       Date/Time Order Dose Route Action     12/14/2018 1458 sodium chloride 0 9 % bolus 1,000 mL 1,000 mL Intravenous New Bag     12/14/2018 1458 promethazine (PHENERGAN) injection 12 5 mg 12 5 mg Intravenous Given     12/14/2018 1624 sodium chloride 0 9 % bolus 1,000 mL 1,000 mL Intravenous New Bag     12/14/2018 1556 iohexol (OMNIPAQUE) 350 MG/ML injection (MULTI-DOSE) 85 mL 85 mL Intravenous Given     12/14/2018 1724 morphine (PF) 4 mg/mL injection 4 mg 4 mg Intravenous Given     12/14/2018 2022 ondansetron (ZOFRAN) injection 4 mg 4 mg Intravenous Given          Past Medical/Surgical History: Active Ambulatory Problems     Diagnosis Date Noted    Other ascites 10/11/2018    Lesion of liver 10/11/2018    Edema leg 10/17/2018    [facility] [address] [facility] 11/14/2018    Bowel perforation ([facility] [address] ) 12/14/2018    Septic shock ([facility] [address] ) 12/14/2018    Peritonitis ([facility] [address] ) 12/14/2018    S/P ileostomy ([facility] [address] ) 12/16/2018    Acute blood loss anemia 12/16/2018    Leukocytosis 12/16/2018    Hypokalemia 12/16/2018     Resolved Ambulatory Problems     Diagnosis Date Noted    Rectal bleeding 09/04/2018     Past Medical History:   Diagnosis Date    Abdominal fluid collection     Asthma     Cancer ([facility] [address] )     Diabetes mellitus ([facility] [address] )        Admitting Diagnosis: Abdominal pain [R10 9]    Age/Sex: 75 y o  female    Assessment/Plan: Pt to ED with c/o N/V/D  Per CRNP note:  Concerns for perforated viscous; Pt emergently transferred to [address] [place] [address]  Admission Orders:  TRANSFER TO [facility] FOR SURGICAL CONSULT    Scheduled Meds:   Facility-Administered Medications Ordered in Other Encounters:  acetaminophen 650 mg Oral Q6H PRN   cefepime 2,000 mg Intravenous Q12H   chlorhexidine 15 mL Swish & Spit Q12H MARCIO   enoxaparin 40 mg Subcutaneous Q24H [facility]   insulin lispro 1-5 Units Subcutaneous HS   insulin lispro 1-6 Units Subcutaneous TID AC   metroNIDAZOLE 500 mg Intravenous Q8H   morphine injection 4 mg Intravenous Q4H PRN   multi-electrolyte 125 mL/hr Intravenous Continuous   ondansetron 4 mg Intravenous Q6H PRN   oxyCODONE 10 mg Oral Q4H PRN   oxyCODONE 5 mg Oral Q4H PRN   pantoprazole 40 mg Intravenous Q24H [facility] phenol 1 spray Mouth/Throat Q2H PRN   potassium phosphate 21 mmol Intravenous Once     Continuous Infusions:   LR CONTINUOUS INFUSION  PRN Meds:     [address] Utilization Review Department  Phone: [phone]; Fax [phone]  [email or web address]  ATTENTION: Please call with any questions or concerns to [phone]  and carefully listen to the prompts so that you are directed to the right person  Send all requests for admission clinical reviews, approved or denied determinations and any other requests to fax [phone]   All voicemails are confidential

## 2018-12-18 DIAGNOSIS — Z71.89 COMPLEX CARE COORDINATION: Primary | ICD-10-CM

## 2018-12-18 LAB
ABO GROUP BLD BPU: NORMAL
ALBUMIN SERPL BCP-MCNC: 1.7 G/DL (ref 3.5–5)
ALP SERPL-CCNC: 130 U/L (ref 46–116)
ALT SERPL W P-5'-P-CCNC: 20 U/L (ref 12–78)
ANION GAP SERPL CALCULATED.3IONS-SCNC: 6 MMOL/L (ref 4–13)
AST SERPL W P-5'-P-CCNC: 33 U/L (ref 5–45)
BACTERIA BLD CULT: ABNORMAL
BACTERIA SPEC ANAEROBE CULT: ABNORMAL
BACTERIA SPEC ANAEROBE CULT: ABNORMAL
BASOPHILS # BLD AUTO: 0.02 THOUSANDS/ΜL (ref 0–0.1)
BASOPHILS NFR BLD AUTO: 0 % (ref 0–1)
BILIRUB SERPL-MCNC: 0.35 MG/DL (ref 0.2–1)
BPU ID: NORMAL
BUN SERPL-MCNC: 9 MG/DL (ref 5–25)
CALCIUM SERPL-MCNC: 7.7 MG/DL (ref 8.3–10.1)
CHLORIDE SERPL-SCNC: 107 MMOL/L (ref 100–108)
CO2 SERPL-SCNC: 25 MMOL/L (ref 21–32)
CREAT SERPL-MCNC: 0.29 MG/DL (ref 0.6–1.3)
CROSSMATCH: NORMAL
EOSINOPHIL # BLD AUTO: 0.04 THOUSAND/ΜL (ref 0–0.61)
EOSINOPHIL NFR BLD AUTO: 0 % (ref 0–6)
ERYTHROCYTE [DISTWIDTH] IN BLOOD BY AUTOMATED COUNT: 18.9 % (ref 11.6–15.1)
GFR SERPL CREATININE-BSD FRML MDRD: 119 ML/MIN/1.73SQ M
GLUCOSE SERPL-MCNC: 110 MG/DL (ref 65–140)
GLUCOSE SERPL-MCNC: 130 MG/DL (ref 65–140)
GLUCOSE SERPL-MCNC: 82 MG/DL (ref 65–140)
GLUCOSE SERPL-MCNC: 91 MG/DL (ref 65–140)
GLUCOSE SERPL-MCNC: 98 MG/DL (ref 65–140)
GRAM STN SPEC: ABNORMAL
HCT VFR BLD AUTO: 27.6 % (ref 34.8–46.1)
HGB BLD-MCNC: 8.5 G/DL (ref 11.5–15.4)
IMM GRANULOCYTES # BLD AUTO: 0.26 THOUSAND/UL (ref 0–0.2)
IMM GRANULOCYTES NFR BLD AUTO: 1 % (ref 0–2)
LYMPHOCYTES # BLD AUTO: 1.42 THOUSANDS/ΜL (ref 0.6–4.47)
LYMPHOCYTES NFR BLD AUTO: 6 % (ref 14–44)
MCH RBC QN AUTO: 24.6 PG (ref 26.8–34.3)
MCHC RBC AUTO-ENTMCNC: 30.8 G/DL (ref 31.4–37.4)
MCV RBC AUTO: 80 FL (ref 82–98)
MONOCYTES # BLD AUTO: 0.73 THOUSAND/ΜL (ref 0.17–1.22)
MONOCYTES NFR BLD AUTO: 3 % (ref 4–12)
NEUTROPHILS # BLD AUTO: 19.78 THOUSANDS/ΜL (ref 1.85–7.62)
NEUTS SEG NFR BLD AUTO: 90 % (ref 43–75)
NRBC BLD AUTO-RTO: 0 /100 WBCS
PLATELET # BLD AUTO: 203 THOUSANDS/UL (ref 149–390)
PMV BLD AUTO: 12.4 FL (ref 8.9–12.7)
POTASSIUM SERPL-SCNC: 4 MMOL/L (ref 3.5–5.3)
PROT SERPL-MCNC: 4.8 G/DL (ref 6.4–8.2)
RBC # BLD AUTO: 3.45 MILLION/UL (ref 3.81–5.12)
SODIUM SERPL-SCNC: 138 MMOL/L (ref 136–145)
UNIT DISPENSE STATUS: NORMAL
UNIT PRODUCT CODE: NORMAL
UNIT RH: NORMAL
WBC # BLD AUTO: 22.25 THOUSAND/UL (ref 4.31–10.16)

## 2018-12-18 PROCEDURE — C9113 INJ PANTOPRAZOLE SODIUM, VIA: HCPCS | Performed by: SURGERY

## 2018-12-18 PROCEDURE — 82948 REAGENT STRIP/BLOOD GLUCOSE: CPT

## 2018-12-18 PROCEDURE — 80053 COMPREHEN METABOLIC PANEL: CPT | Performed by: STUDENT IN AN ORGANIZED HEALTH CARE EDUCATION/TRAINING PROGRAM

## 2018-12-18 PROCEDURE — 99024 POSTOP FOLLOW-UP VISIT: CPT | Performed by: OBSTETRICS & GYNECOLOGY

## 2018-12-18 PROCEDURE — 85025 COMPLETE CBC W/AUTO DIFF WBC: CPT | Performed by: STUDENT IN AN ORGANIZED HEALTH CARE EDUCATION/TRAINING PROGRAM

## 2018-12-18 PROCEDURE — 99223 1ST HOSP IP/OBS HIGH 75: CPT | Performed by: INTERNAL MEDICINE

## 2018-12-18 PROCEDURE — 99232 SBSQ HOSP IP/OBS MODERATE 35: CPT | Performed by: INTERNAL MEDICINE

## 2018-12-18 RX ADMIN — POTASSIUM & SODIUM PHOSPHATES POWDER PACK 280-160-250 MG 1 PACKET: 280-160-250 PACK at 08:07

## 2018-12-18 RX ADMIN — OXYCODONE HYDROCHLORIDE 10 MG: 10 TABLET ORAL at 08:13

## 2018-12-18 RX ADMIN — CHLORHEXIDINE GLUCONATE 0.12% ORAL RINSE 15 ML: 1.2 LIQUID ORAL at 21:02

## 2018-12-18 RX ADMIN — PIPERACILLIN SODIUM,TAZOBACTAM SODIUM 4.5 G: 4; .5 INJECTION, POWDER, FOR SOLUTION INTRAVENOUS at 18:20

## 2018-12-18 RX ADMIN — PIPERACILLIN SODIUM,TAZOBACTAM SODIUM 4.5 G: 4; .5 INJECTION, POWDER, FOR SOLUTION INTRAVENOUS at 12:15

## 2018-12-18 RX ADMIN — ENOXAPARIN SODIUM 40 MG: 40 INJECTION SUBCUTANEOUS at 08:02

## 2018-12-18 RX ADMIN — OXYCODONE HYDROCHLORIDE 5 MG: 5 TABLET ORAL at 15:26

## 2018-12-18 RX ADMIN — ACETAMINOPHEN 650 MG: 325 TABLET, FILM COATED ORAL at 19:43

## 2018-12-18 RX ADMIN — METRONIDAZOLE 500 MG: 500 INJECTION, SOLUTION INTRAVENOUS at 08:01

## 2018-12-18 RX ADMIN — CHLORHEXIDINE GLUCONATE 0.12% ORAL RINSE 15 ML: 1.2 LIQUID ORAL at 08:02

## 2018-12-18 RX ADMIN — PANTOPRAZOLE SODIUM 40 MG: 40 INJECTION, POWDER, FOR SOLUTION INTRAVENOUS at 08:03

## 2018-12-18 RX ADMIN — PIPERACILLIN SODIUM,TAZOBACTAM SODIUM 4.5 G: 4; .5 INJECTION, POWDER, FOR SOLUTION INTRAVENOUS at 23:42

## 2018-12-18 NOTE — PROGRESS NOTES
12/18/18 1000   Clinical Encounter Type   Visited With Patient   Routine Visit Follow-up   Continue Visiting Yes

## 2018-12-18 NOTE — PALLIATIVE CARE CONFERENCE
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

## 2018-12-18 NOTE — PLAN OF CARE
DISCHARGE PLANNING     Discharge to home or other facility with appropriate resources Progressing        DISCHARGE PLANNING - CARE MANAGEMENT     Discharge to post-acute care or home with appropriate resources Progressing        GASTROINTESTINAL - ADULT     Minimal or absence of nausea and/or vomiting Progressing        INFECTION - ADULT     Absence or prevention of progression during hospitalization Progressing        Knowledge Deficit     Patient/family/caregiver demonstrates understanding of disease process, treatment plan, medications, and discharge instructions Progressing        METABOLIC, FLUID AND ELECTROLYTES - ADULT     Glucose maintained within target range Progressing        Nutrition/Hydration-ADULT     Nutrient/Hydration intake appropriate for improving, restoring or maintaining nutritional needs Progressing        PAIN - ADULT     Verbalizes/displays adequate comfort level or baseline comfort level Progressing        Potential for Falls     Patient will remain free of falls Progressing        Prexisting or High Potential for Compromised Skin Integrity     Skin integrity is maintained or improved Progressing

## 2018-12-18 NOTE — SOCIAL WORK
Pt met with Oniel Kiaser liaison from [facility] and per Oniel Kaiser they will continue to follow pt  Cm will provide an update once d/c date is determined

## 2018-12-18 NOTE — PROGRESS NOTES
12/18/18 1000   Spiritual Beliefs/Perceptions   Support Systems Spouse/significant other;Family members   Stress Factors   Patient Stress Factors Exhausted   Coping Responses   Patient Coping Other (Comment)   Plan of Care   Comments Short visit offer pastoral care presence   Assessment Completed by: Unit visit

## 2018-12-18 NOTE — UTILIZATION REVIEW
Continued Stay Review    Date:    12/18/2018    Vital Signs: /56 (BP Location: Left arm)   Pulse 90   Temp 98 1 °F (36 7 °C) (Oral)   Resp 16   Wt 66 2 kg (146 lb)   SpO2 96%   BMI 24 50 kg/m²     Medications:   Scheduled Meds:   Current Facility-Administered Medications:  acetaminophen 650 mg Oral Q6H PRN Sandro Barrios MD    chlorhexidine 15 mL Swish & Spit Q12H [address] Sandro Barrios MD    enoxaparin 40 mg Subcutaneous Q24H [address] Alford Schilder, MD    insulin lispro 1-5 Units Subcutaneous HS Sandro Barrios MD    insulin lispro 1-6 Units Subcutaneous TID [facility] Sandro Barrios MD    morphine injection 4 mg Intravenous Q4H PRN Sandro Barrios MD    ondansetron 4 mg Intravenous Q6H PRN Mj Rowe MD    oxyCODONE 10 mg Oral Q4H PRN Sandro Barrios MD    oxyCODONE 5 mg Oral Q4H PRN Sandro Barrios MD    pantoprazole 40 mg Intravenous Q24H [address] Sandro Barrios MD    phenol 1 spray Mouth/Throat Q2H PRN Ivette Orellana PA-C    piperacillin-tazobactam 4 5 g Intravenous Q6H Nasrin Feng MD Last Rate: 4 5 g (12/18/18 1215)     Continuous Infusions:    PRN Meds:   acetaminophen    morphine injection    ondansetron    oxyCODONE    oxyCODONE    phenol    Abnormal Labs/Diagnostic Results: Alk phos    130  Albumin   1 7  WBC   22 25  H/H    8,5 27,6    Age/Sex: 75 y o  female     Assessment/Plan:  Assessment / Plan: 75 y  o   with Stage IV ovarian cancer admitted on 12/14/18 in septic shock to bowel perforation,  status post exploratory laparotomy, abdominal washout and Abthera vac placement on 12/14/18, followed by exploratory laparotomy, abdominal washout,  Drain placement x 4, loop ileostomy & abdominal closure on 12/15/18, POD#3, doing well postoperatively, Hospital Day #4        #1 Sepsis secondary to bowel perforation:   - Status post surgery as mentioned, now in level 1 step down from ICU    - WBC has been trending up, f/u am labs, consider ID consult, consider possible re imaging  - Initial Blood cultures no growth  Urine culture no growth  Tissue gram gram 4+ polys, gram + and - sergio, follow up anaerobic culture  - Continue Cefepime 2g Q 12 + Flagyl 500mg Q 8 for at least 14 days-currently on day 3     #2 Post operative Care:  - Pain: controlled with tylenol and roxicodone  - Encouraged incentive spirometry to reduce atelectasis and pneumonia risk  - Encouraged ambulation as tolerated-PT following, currently recommending STR  - DVT ppx: SCDs, Lovenox 40mg daily     #3 Stage IV Ovarian Cancer  Status post neoadjuvant chemotherapy with Carboplatin, Taxol & Avastin  #4 Type 2 DM:   Accuchecks 90s-130s  Continue sliding scale insulin, #3     #5 FEN: Regular with Glucerna; Isolyte @ 125cc/hr    Discharge Plan:    Acute  rehab

## 2018-12-18 NOTE — PROGRESS NOTES
Gyn Oncology Progress note   Bonnie Talavera 75 y o  female MRN: [id number]  Unit/Bed#: [facility] 509-01 Encounter: [id number]    Bonnie Talavera has no current complaints  Pain is present - adequately treated  Patient is currently voiding  She is ambulating short distances with assistance  She is tolerating PO, and denies nausea or vomiting  Patient denies fever, chills, chest pain, shortness of breath, or calf tenderness  /63 (BP Location: Left arm)   Pulse 72   Temp 98 °F (36 7 °C) (Oral)   Resp 20   Wt 65 5 kg (144 lb 6 4 oz)   SpO2 94%   BMI 24 23 kg/m²     I/O last 3 completed shifts: In: 4253 [P O :530;  I V :4870; NG/GT:90; IV Piggyback:1165]  Out: 6894 [Urine:955; Emesis/NG output:225; Drains:925; Stool:450]  I/O this shift:  In: -   Out: 65 [Urine:250; Drains:155; Stool:50]    Lab Results   Component Value Date    WBC 27 88 (H) 12/17/2018    HGB 7 7 (L) 12/17/2018    HCT 24 3 (L) 12/17/2018    MCV 80 (L) 12/17/2018     (L) 12/17/2018       Lab Results   Component Value Date    GLUCOSE 84 12/15/2018    CALCIUM 7 3 (L) 12/17/2018    K 3 3 (L) 12/17/2018    CO2 26 12/17/2018     12/17/2018    BUN 11 12/17/2018    CREATININE 0 33 (L) 12/17/2018       Lab Results   Component Value Date/Time    POCGLU 119 12/17/2018 09:13 PM    POCGLU 137 12/17/2018 04:13 PM    POCGLU 99 12/17/2018 12:34 PM    POCGLU 90 12/17/2018 12:31 PM    POCGLU <20 (LL) 12/17/2018 12:28 PM       Physical Exam  Gen: AAOx3, NAD, comfortable in bed  CVS: S1S2+, RRR, no murmurs  Lungs: CTA b/l normal respiratory effort and rate  Abdomen: soft, non tender, incision closed with staples-no active drainage, remaining omar removed; ANDREAS drain x 4 in place; Ileostomy in place: pink stoma productive of stool  Extremities: SCDs on and on, non tender    A/P: Assessment / Plan: 75 y o   with Stage IV ovarian cancer admitted on 12/14/18 in septic shock to bowel perforation,  status post exploratory laparotomy, abdominal washout and Abthera vac placement on 12/14/18, followed by exploratory laparotomy, abdominal washout,  Drain placement x 4, loop ileostomy & abdominal closure on 12/15/18, POD#3, doing well postoperatively, Hospital Day #4        #1 Sepsis secondary to bowel perforation:   - Status post surgery as mentioned, now in level 1 step down from ICU    - WBC has been trending up, f/u am labs, consider ID consult, consider possible re imaging  - Initial Blood cultures no growth  Urine culture no growth  Tissue gram gram 4+ polys, gram + and - sergio, follow up anaerobic culture  - Continue Cefepime 2g Q 12 + Flagyl 500mg Q 8 for at least 14 days-currently on day 3     #2 Post operative Care:  - Pain: controlled with tylenol and roxicodone  - Encouraged incentive spirometry to reduce atelectasis and pneumonia risk  - Encouraged ambulation as tolerated-PT following, currently recommending STR  - DVT ppx: SCDs, Lovenox 40mg daily     #3 Stage IV Ovarian Cancer  Status post neoadjuvant chemotherapy with Carboplatin, Taxol & Avastin      #4 Type 2 DM:   Accuchecks 90s-130s  Continue sliding scale insulin, #3     #5 FEN: Regular with Glucerna; Isolyte @ 125cc/hr     #6 Disposition: Inpatient

## 2018-12-18 NOTE — SOCIAL WORK
Cm met with pt to discuss therapy recommendation for rehab  Pt is requesting referral to [address] [place] [address]

## 2018-12-18 NOTE — CONSULTS
Consultation - Infectious Disease   Janel Funez 75 y o  female MRN: [id number]  Unit/Bed#: [facility] 509-01 Encounter: [id number]      IMPRESSION & RECOMMENDATIONS:   1  Sepsis-POA  Leukocytosis and tachycardia  Likely all secondary to the patient's peritonitis and bacteremia  No other clear sources appreciated  The patient has clinically improved with resolution of the tachycardia  She continues to have a leukocytosis however this confounded by the fact that she has had Neulasta 2 weeks ago   -discontinue cefepime Flagyl  -Zosyn 4 5 g IV q 6 hours  -follow up final sensitivities and adjust antibiotics as needed  -monitor CBC with diff and creatinine  -supportive care    2  Peritonitis-secondary to bowel perforation  The patient is status post exploratory laparotomy with washout, ileostomy, and closure  She is clinically much improved with significant improvement of the abdominal pain and recovery of bowel function   -antibiotics as above  -serial abdominal exam  -close gyn oncology follow-up    3  Clostridial bacteremia-appears to be all secondary to 2  No other clear source appreciated  Clostridium can be resistant Flagyl in their favor using a beta-lactam beta lactamase inhibitor awaiting additional data   -antibiotics as above  -follow up repeat blood cultures to confirm clearance of the bacteremia  -follow up sensitivities and adjust antibiotics as needed  -would hold on PICC line placement until more data available as far sensitivities    4  Stage IV ovarian carcinoma-status post new adjuvant chemotherapy and Neulasta  -hold on chemotherapy in the setting of an acute infectious process for now    5   Diabetes mellitus-type 2      HISTORY OF PRESENT ILLNESS:  Reason for Consult:  Peritonitis, clostridial bacteremia  HPI: Janel Funez is a 75y o  year old female with a history of stage IV ovarian carcinoma on neoadjuvant chemotherapy with carbo Taxol and Avastin admitted to [facility] in [place] after being transferred from [facility] for bowel perforation with peritonitis who I am asked to assist with antibiotic management  The patient apparently received her last dose of chemotherapy approximately 2 weeks ago and also received a dose of Neulasta  She has been having intermittent paracentesis for increasing ascites  She underwent repeat paracentesis a few days prior to admission  Apparently after each time she has paracentesis she gets ill with increasing abdominal pain and nausea vomiting  She once of an developed the symptoms last Wednesday after the procedure  However the following day she developed excruciating abdominal pain and therefore went to the ER for further evaluation at [facility]  CT of the abdomen pelvis revealed evidence of bowel perforation in the setting of peritoneal carcinomatosis  She was urgently transferred to [facility] in [place] for further management  She was started on cefepime and Flagyl and has been maintained on these antibiotics during her hospital stay  She underwent urgent surgical exploration on the 14th of December with washout, ileostomy, and closure  Because of the extensive peritoneal carcinomatosis, the site of the perforation could not be found  Since having the surgery her abdominal pain is much improved  She has now had her diet advanced  She denies any fever chills or sweats, denies any current nausea vomiting or diarrhea, denies any dysuria or hematuria, denies any rash or skin lesions, denies any joint or muscle pains, denies any shortness of breath but does admit to a scant cough  REVIEW OF SYSTEMS:  A complete 12 point system-based review of systems is negative other than that noted in the HPI      PAST MEDICAL HISTORY:  Past Medical History:   Diagnosis Date    Abdominal fluid collection     Asthma     Cancer ([facility] [address] )     ovaries    Diabetes mellitus ([facility] [address] )      Past Surgical History:   Procedure Laterality Date    CT GUIDED PARACENTESIS  2018    CT NEEDLE BIOPSY PERITONEUM  2018    ECTOPIC PREGNANCY SURGERY      ECTOPIC PREGNANCY SURGERY      IR PARACENTESIS  2018    IR PARACENTESIS  10/18/2018    IR PARACENTESIS  12/10/2018    IR PORT PLACEMENT  2018    LAPAROTOMY N/A 2018    Procedure: LAPAROTOMY EXPLORATORY; Abdominal Washout; Application of Abthera Vac Dressing;  Surgeon: Noa Rivera MD;  [facility]: [facility];  Service: Gynecology Oncology    LAPAROTOMY N/A 12/15/2018    Procedure: LAPAROTOMY EXPLORATORY, ABDOMINAL WASHOUT, DRAIN PLACEMENT x 4, DIVERTING LOOP ILEOSTOMY AND ABDOMINAL CLOSURE,  ALL OTHER INDICATED PROCEDURES;  Surgeon: Noa Rivera MD;  Location: [facility];  Service: Gynecology Oncology    DE COLONOSCOPY FLX DX W/COLLJ [address] WHEN PFRMD N/A 2018    Procedure: EGD AND COLONOSCOPY;  Surgeon: Willie Cruz MD;  Location: [facility]; Service: Gastroenterology    TONSILECTOMY AND ADNOIDECTOMY      TONSILLECTOMY         FAMILY HISTORY:  Non-contributory    SOCIAL HISTORY:  Social History   History   Alcohol Use    Yes     Comment: occassionally     History   Drug Use No     History   Smoking Status    Former Smoker   Smokeless Tobacco    Never Used       ALLERGIES:  No Known Allergies    MEDICATIONS:  All current active medications have been reviewed    Antibiotics:  Cefepime Flagyl 5, postop day 4    PHYSICAL EXAM:  Temp:  [97 6 °F (36 4 °C)-98 3 °F (36 8 °C)] 98 1 °F (36 7 °C)  HR:  [72-94] 90  Resp:  [16-25] 16  BP: ()/(54-66) 102/56  SpO2:  [94 %-100 %] 96 %  Temp (24hrs), Av °F (36 7 °C), Min:97 6 °F (36 4 °C), Max:98 3 °F (36 8 °C)  Current: Temperature: 98 1 °F (36 7 °C)    Intake/Output Summary (Last 24 hours) at 18 1149  Last data filed at 18 0631   Gross per 24 hour   Intake              630 ml   Output              840 ml   Net             -210 ml       General Appearance:  Appearing well, nontoxic, and in no distress Head:  Normocephalic, without obvious abnormality, atraumatic   Eyes:  Conjunctiva pale and sclera anicteric, both eyes   Nose: Nares normal, mucosa normal, no drainage   Throat: Oropharynx moist without lesions   Neck: Supple, symmetrical, no adenopathy, no tenderness/mass/nodules   Back:   Symmetric, no curvature, ROM normal, no CVA tenderness   Lungs:   Clear to auscultation bilaterally, respirations unlabored   Chest Wall:  No tenderness or deformity   Heart:  RRR; no murmur, rub or gallop   Abdomen:   Soft, mild postoperative tenderness, non-distended, positive bowel sounds, incision without erythema or drainage  Ostomy in place  Extremities: No cyanosis, clubbing or edema   Skin: No rashes or lesions  No draining wounds noted  Lymph nodes: Cervical, supraclavicular nodes normal   Neurologic: Alert and oriented times 3, extremity strength 5/5 and symmetric       LABS, IMAGING, & OTHER STUDIES:  Lab Results:  I have personally reviewed pertinent labs  Results from last 7 days  Lab Units 12/18/18  0628 12/17/18  0536 12/16/18  0604   WBC Thousand/uL 22 25* 27 88* 25 53*   HEMOGLOBIN g/dL 8 5* 7 7* 8 1*   PLATELETS Thousands/uL 203 144* 133*       Results from last 7 days  Lab Units 12/18/18  0628 12/17/18  0536 12/16/18  0604  12/15/18  1218 12/15/18  0506   SODIUM mmol/L 138 141 140  < >  --  139   POTASSIUM mmol/L 4 0 3 3* 3 4*  < >  --  4 0   CHLORIDE mmol/L 107 108 108  < >  --  106   CO2 mmol/L 25 26 24  < >  --  24   CO2, I-STAT mmol/L  --   --   --   --  24  --    BUN mg/dL 9 11 16  < >  --  23   CREATININE mg/dL 0 29* 0 33* 0 48*  < >  --  0 70   EGFR ml/min/1 73sq m 119 114 101  < >  --  89   GLUCOSE, ISTAT mg/dl  --   --   --   --  84  --    CALCIUM mg/dL 7 7* 7 3* 8 2*  < >  --  7 3*   AST U/L 33  --  41  --   --  24   ALT U/L 20  --  17  --   --  12   ALK PHOS U/L 130*  --  71  --   --  57   < > = values in this interval not displayed      Results from last 7 days  Lab Units 12/14/18  4196 12/14/18  2314 12/14/18  1728 12/14/18  1709   BLOOD CULTURE   --   --  Clostridium species* No Growth at 72 hrs  GRAM STAIN RESULT   --  4+ Polys  4+ Gram negative rods  4+ Gram positive rods Gram negative rods  --    URINE CULTURE  No Growth <1000 cfu/mL  --   --   --        Imaging Studies:   I have personally reviewed pertinent imaging study reports and images in PACS  Chest x-ray-Tubes and lines as above without pneumothorax  Small right basilar pleural effusion with patchy opacity likely reflecting atelectasis  CT abdomen pelvis-Large quantity of ascites   Moderate diffuse free intraperitoneal air in   keeping with recent paracentesis  Pelvic mass which previously measured 13 3 x 9 3 cm is slightly smaller   now measuring 12 7 x 7 4 cm  Previously seen mesenteric/peritoneal implants are less well-visualized   secondary to being partially obscured by surrounding ascites  Jose Guadalupe Hickman are   likely improved  Stable hepatic dome lesion measuring 3 3 x 2 5 cm other subcutaneous   abscess or implants are not well visualized  No bowel obstruction is patient with nausea and diarrhea   Diffuse small   bowel thickening likely reactive to the ascites

## 2018-12-18 NOTE — PROGRESS NOTES
Progress Note - Palliative & Supportive Care  [facility]  75 y o   female  [address] 509-01   MRN: [id number]  Encounter: [id number]     Assessment  Patient Active Problem List   Diagnosis    Other ascites    Lesion of liver    Edema leg    Ovarian cancer ([facility] [address] )    Abdominal pain    Intractable nausea and vomiting    Bowel perforation (HCC)    Septic shock ([facility] [address] )    Peritonitis ([facility] [address] )    S/P ileostomy ([facility] [address] )    Acute blood loss anemia    Leukocytosis    Hypokalemia       Plan:  Symptom Management: post-op pain, discussed scheduling medications vs PRN  She would prefer to leave meds as they are for now  - PRN oxyIR   - PRN acetaminophen    Goals of Care:    - Looking forward to rehab to regain function  Thoughtful regarding idea of further chemo and is hesitant about whether she would want to  Would prefer to discuss with her oncologist as outpatient  Focusing on "one day at a time " Hoping for a "healing touch from God "   - Open to idea completing advance directive, will continue to follow up    History  Feeling well  Ambulated in [place]  Was having abdominal pain this morning, improved with PRN oxyIR 10mg  Able to eat very small amt of food this morning       Meds  all current active meds have been reviewed and current meds:   Current Facility-Administered Medications   Medication Dose Route Frequency    acetaminophen (TYLENOL) tablet 650 mg  650 mg Oral Q6H PRN    chlorhexidine (PERIDEX) 0 12 % oral rinse 15 mL  15 mL Swish & Spit Q12H [address]    enoxaparin (LOVENOX) subcutaneous injection 40 mg  40 mg Subcutaneous Q24H MARCIO    insulin lispro (HumaLOG) 100 units/mL subcutaneous injection 1-5 Units  1-5 Units Subcutaneous HS    insulin lispro (HumaLOG) 100 units/mL subcutaneous injection 1-6 Units  1-6 Units Subcutaneous TID AC    morphine (PF) 4 mg/mL injection 4 mg  4 mg Intravenous Q4H PRN    ondansetron (ZOFRAN) injection 4 mg  4 mg Intravenous Q6H PRN    oxyCODONE (ROXICODONE) immediate release tablet 10 mg  10 mg Oral Q4H PRN    oxyCODONE (ROXICODONE) IR tablet 5 mg  5 mg Oral Q4H PRN    pantoprazole (PROTONIX) injection 40 mg  40 mg Intravenous Q24H [facility]    phenol (CHLORASEPTIC) 1 4 % mucosal liquid 1 spray  1 spray Mouth/Throat Q2H PRN    piperacillin-tazobactam (ZOSYN) 4 5 g in sodium chloride 0 9 % 100 mL IVPB  4 5 g Intravenous Q6H       No Known Allergies    Objective  Physical Exam   Constitutional: She is oriented to person, place, and time  She is cooperative  fatigued   Pulmonary/Chest: Effort normal    Neurological: She is alert and oriented to person, place, and time  Skin: Skin is warm and dry  Psychiatric: She has a normal mood and affect  Cognition and memory are normal      Lab Results: I have personally reviewed pertinent labs          Marvell Cogan, [facility]  Office number: [phone]

## 2018-12-19 LAB
ALBUMIN SERPL BCP-MCNC: 1.6 G/DL (ref 3.5–5)
ALP SERPL-CCNC: 179 U/L (ref 46–116)
ALT SERPL W P-5'-P-CCNC: 18 U/L (ref 12–78)
ANION GAP SERPL CALCULATED.3IONS-SCNC: 6 MMOL/L (ref 4–13)
AST SERPL W P-5'-P-CCNC: 31 U/L (ref 5–45)
BACTERIA BLD CULT: NORMAL
BASOPHILS # BLD AUTO: 0.03 THOUSANDS/ΜL (ref 0–0.1)
BASOPHILS NFR BLD AUTO: 0 % (ref 0–1)
BILIRUB SERPL-MCNC: 0.38 MG/DL (ref 0.2–1)
BUN SERPL-MCNC: 5 MG/DL (ref 5–25)
CALCIUM SERPL-MCNC: 7.6 MG/DL (ref 8.3–10.1)
CHLORIDE SERPL-SCNC: 107 MMOL/L (ref 100–108)
CO2 SERPL-SCNC: 26 MMOL/L (ref 21–32)
CREAT SERPL-MCNC: 0.31 MG/DL (ref 0.6–1.3)
EOSINOPHIL # BLD AUTO: 0.04 THOUSAND/ΜL (ref 0–0.61)
EOSINOPHIL NFR BLD AUTO: 0 % (ref 0–6)
ERYTHROCYTE [DISTWIDTH] IN BLOOD BY AUTOMATED COUNT: 18.6 % (ref 11.6–15.1)
GFR SERPL CREATININE-BSD FRML MDRD: 117 ML/MIN/1.73SQ M
GLUCOSE SERPL-MCNC: 102 MG/DL (ref 65–140)
GLUCOSE SERPL-MCNC: 76 MG/DL (ref 65–140)
GLUCOSE SERPL-MCNC: 78 MG/DL (ref 65–140)
GLUCOSE SERPL-MCNC: 80 MG/DL (ref 65–140)
GLUCOSE SERPL-MCNC: 98 MG/DL (ref 65–140)
HCT VFR BLD AUTO: 27.9 % (ref 34.8–46.1)
HGB BLD-MCNC: 8.7 G/DL (ref 11.5–15.4)
IMM GRANULOCYTES # BLD AUTO: >0.5 THOUSAND/UL (ref 0–0.2)
IMM GRANULOCYTES NFR BLD AUTO: 2 % (ref 0–2)
LYMPHOCYTES # BLD AUTO: 1.58 THOUSANDS/ΜL (ref 0.6–4.47)
LYMPHOCYTES NFR BLD AUTO: 6 % (ref 14–44)
MCH RBC QN AUTO: 24.8 PG (ref 26.8–34.3)
MCHC RBC AUTO-ENTMCNC: 31.2 G/DL (ref 31.4–37.4)
MCV RBC AUTO: 80 FL (ref 82–98)
MONOCYTES # BLD AUTO: 1.09 THOUSAND/ΜL (ref 0.17–1.22)
MONOCYTES NFR BLD AUTO: 4 % (ref 4–12)
NEUTROPHILS # BLD AUTO: 22.35 THOUSANDS/ΜL (ref 1.85–7.62)
NEUTS SEG NFR BLD AUTO: 88 % (ref 43–75)
NRBC BLD AUTO-RTO: 0 /100 WBCS
PLATELET # BLD AUTO: 296 THOUSANDS/UL (ref 149–390)
PMV BLD AUTO: 12.4 FL (ref 8.9–12.7)
POTASSIUM SERPL-SCNC: 3.9 MMOL/L (ref 3.5–5.3)
PROT SERPL-MCNC: 4.5 G/DL (ref 6.4–8.2)
RBC # BLD AUTO: 3.51 MILLION/UL (ref 3.81–5.12)
SODIUM SERPL-SCNC: 139 MMOL/L (ref 136–145)
WBC # BLD AUTO: 25.62 THOUSAND/UL (ref 4.31–10.16)

## 2018-12-19 PROCEDURE — 80053 COMPREHEN METABOLIC PANEL: CPT | Performed by: STUDENT IN AN ORGANIZED HEALTH CARE EDUCATION/TRAINING PROGRAM

## 2018-12-19 PROCEDURE — C9113 INJ PANTOPRAZOLE SODIUM, VIA: HCPCS | Performed by: SURGERY

## 2018-12-19 PROCEDURE — 85025 COMPLETE CBC W/AUTO DIFF WBC: CPT | Performed by: STUDENT IN AN ORGANIZED HEALTH CARE EDUCATION/TRAINING PROGRAM

## 2018-12-19 PROCEDURE — 99232 SBSQ HOSP IP/OBS MODERATE 35: CPT | Performed by: INTERNAL MEDICINE

## 2018-12-19 PROCEDURE — 97530 THERAPEUTIC ACTIVITIES: CPT

## 2018-12-19 PROCEDURE — 97110 THERAPEUTIC EXERCISES: CPT

## 2018-12-19 PROCEDURE — 97116 GAIT TRAINING THERAPY: CPT

## 2018-12-19 PROCEDURE — 82948 REAGENT STRIP/BLOOD GLUCOSE: CPT

## 2018-12-19 PROCEDURE — 99024 POSTOP FOLLOW-UP VISIT: CPT | Performed by: OBSTETRICS & GYNECOLOGY

## 2018-12-19 RX ADMIN — PANTOPRAZOLE SODIUM 40 MG: 40 INJECTION, POWDER, FOR SOLUTION INTRAVENOUS at 09:14

## 2018-12-19 RX ADMIN — CHLORHEXIDINE GLUCONATE 0.12% ORAL RINSE 15 ML: 1.2 LIQUID ORAL at 09:14

## 2018-12-19 RX ADMIN — OXYCODONE HYDROCHLORIDE 10 MG: 10 TABLET ORAL at 06:44

## 2018-12-19 RX ADMIN — ENOXAPARIN SODIUM 40 MG: 40 INJECTION SUBCUTANEOUS at 09:14

## 2018-12-19 RX ADMIN — ACETAMINOPHEN 650 MG: 325 TABLET, FILM COATED ORAL at 16:21

## 2018-12-19 RX ADMIN — CHLORHEXIDINE GLUCONATE 0.12% ORAL RINSE 15 ML: 1.2 LIQUID ORAL at 21:03

## 2018-12-19 RX ADMIN — PIPERACILLIN SODIUM,TAZOBACTAM SODIUM 4.5 G: 4; .5 INJECTION, POWDER, FOR SOLUTION INTRAVENOUS at 17:38

## 2018-12-19 RX ADMIN — PIPERACILLIN SODIUM,TAZOBACTAM SODIUM 4.5 G: 4; .5 INJECTION, POWDER, FOR SOLUTION INTRAVENOUS at 11:59

## 2018-12-19 RX ADMIN — PIPERACILLIN SODIUM,TAZOBACTAM SODIUM 4.5 G: 4; .5 INJECTION, POWDER, FOR SOLUTION INTRAVENOUS at 06:29

## 2018-12-19 RX ADMIN — OXYCODONE HYDROCHLORIDE 10 MG: 10 TABLET ORAL at 21:03

## 2018-12-19 RX ADMIN — OXYCODONE HYDROCHLORIDE 10 MG: 10 TABLET ORAL at 12:47

## 2018-12-19 NOTE — OCCUPATIONAL THERAPY NOTE
OCCUPATIONAL THERAPY CANCELLATION NOTE:   Patient presents seated in recliner reporting "not doing well right now"  Patient with complaints of elevated pain levels, eyes closed  Nursing aware and PCA present   Continue to follow on caseload     MINDY Bravo

## 2018-12-19 NOTE — WOUND OSTOMY CARE
Progress Note- Ostomy  Tate Force 75 y o  female  [id number]  [facility] 509-01        Assessment:  Patient seen for teaching, on arrival to bed side patient with visitor and with lunch just delivered  Patient opting to skip teaching/learning today would like to eat and spent time with company  Patient skin was assessed and found new hospital acquired pressure injuries  1-Mid sacrum with HA DTI measuring as documented, presenting as light non blanchable purple discolaration  Heels and buttocks remains intact  Skin care plans:  1-Hydraguard to bilateral sacrum, buttock and heels TID and PRN  2-Elevate heels to offload pressure  3-Soft care cushion when out of bed  4-Moisturize skin daily with skin nourishing cream   5-Turn/reposition q2h or when medically stable for pressure re-distribution on skin  Vitals:    12/19/18 1500   BP: (!) 80/50   Pulse: 88   Resp:    Temp:    SpO2:      Pressure Ulcer 12/19/18 Sacrum Left;Right;Mid Sacrum HA DTI  (Active)   Staging Deep Tissue Injury 12/19/2018 12:47 PM   Wound Description Light purple 12/19/2018 12:47 PM   Yarely-wound Assessment Intact 12/19/2018 12:47 PM   Wound Length (cm) 8 5 cm 12/19/2018 12:47 PM   Wound Width (cm) 3 cm 12/19/2018 12:47 PM   Calculated Wound Area (cm^2) 25 5 cm^2 12/19/2018 12:47 PM   Tunneling 0 cm 12/19/2018 12:47 PM   Undermining 0 12/19/2018 12:47 PM   Patient Tolerance Tolerated well 12/19/2018 12:47 PM   Number of days: 0   Incision 12/14/18 Abdomen (Active)   Incision Description Clean;Dry; Intact; Pink 12/19/2018  4:00 AM   Yarely-wound Assessment Clean;Dry; Intact 12/19/2018  4:00 AM   Closure Staples 12/19/2018  4:00 AM   Drainage Amount None 12/19/2018  4:00 AM   Drainage Description Serosanguineous 12/17/2018  8:00 AM   Treatments Site care 12/17/2018  8:00 PM   Dressing Open to air 12/19/2018  4:00 AM   Dressing Changed New dressing applied 12/16/2018 12:00 PM   Patient Tolerance Tolerated well 12/17/2018 12:00 PM Dressing Status Other (Comment) 12/18/2018  4:00 AM   Number of days: 5       Incision 12/15/18 Abdomen Other (Comment) (Active)   Incision Description Clean;Dry; Intact 12/19/2018  6:29 AM   Yarely-wound Assessment Clean;Dry; Intact 12/19/2018  6:29 AM   Closure Staples 12/19/2018  6:29 AM   Drainage Amount Scant 12/17/2018 12:00 PM   Drainage Description Serosanguineous 12/17/2018 12:00 PM   Treatments Cleansed;Site care 12/17/2018  8:00 AM   Dressing Open to air 12/19/2018  6:29 AM   Dressing Changed Changed by provider 12/16/2018  4:00 PM   Patient Tolerance Tolerated well 12/17/2018  8:00 AM   Dressing Status Clean;Dry; Intact 12/19/2018  6:29 AM   Number of days: 4         Closed/Suction Drain Abdomen Bulb 19 Fr  (Active)   Site Description Healing 12/19/2018  3:01 PM   Dressing Status Clean;Dry; Intact 12/19/2018  3:01 PM   Drainage Appearance Serous 12/19/2018  3:01 PM   Status To bulb suction 12/19/2018  3:01 PM   Intake (mL) 0 mL 12/17/2018  8:00 AM   Output (mL) 20 mL 12/18/2018  7:48 PM   Number of days: 4       Closed/Suction Drain Right RUQ Bulb 19 Fr  (Active)   Site Description Healing 12/19/2018  3:01 PM   Dressing Status Clean;Dry; Intact 12/19/2018  3:01 PM   Drainage Appearance Serous 12/19/2018  3:01 PM   Status To bulb suction 12/19/2018  3:01 PM   Intake (mL) 0 mL 12/17/2018  8:00 AM   Output (mL) 50 mL 12/19/2018 12:59 PM   Number of days: 4       Closed/Suction Drain Right RLQ Bulb 19 Fr  (Active)   Site Description Healing 12/19/2018  3:01 PM   Dressing Status Clean;Dry; Intact 12/19/2018  3:01 PM   Drainage Appearance Serous 12/19/2018  3:01 PM   Status To bulb suction 12/19/2018  3:01 PM   Intake (mL) 0 mL 12/17/2018  8:00 AM   Output (mL) 10 mL 12/19/2018 12:59 PM   Number of days: 4       Closed/Suction Drain Left LLQ Bulb 19 Fr  (Active)   Site Description Healing 12/19/2018  3:01 PM   Dressing Status Clean;Dry; Intact 12/19/2018  3:01 PM   Drainage Appearance Serosanguineous 12/19/2018  3:01 PM   Status To bulb suction 12/19/2018  3:01 PM   Intake (mL) 0 mL 12/17/2018  8:00 AM   Output (mL) 40 mL 12/19/2018 12:59 PM   Number of days: 4       Ileostomy Loop RLQ (Active)   Stomal Appliance 1 piece 12/19/2018  3:01 PM   Stoma Assessment Budded;Pink 12/19/2018  3:01 PM   Stoma size (in) 1 5 in 12/17/2018 11:32 AM   Stoma Shape Round 12/19/2018  3:01 PM   Peristomal Assessment Clean; Intact 12/19/2018  3:01 PM   Treatment Site care;Placement checked 12/17/2018  4:00 PM   Output (mL) 250 mL 12/19/2018 12:59 PM   Number of days: 4         Primary RN aware of findings, orders placed  We will continue following for teaching and ostomy care      Tere Bardales, RN, BSN, [employer]

## 2018-12-19 NOTE — PROGRESS NOTES
Progress Note - Infectious Disease   Catherine Palacio 75 y o  female MRN: [id number]  Unit/Bed#: [facility] 509-01 Encounter: [id number]      Impression/Plan:  1  Sepsis-POA  Leukocytosis and tachycardia  Likely all secondary to the patient's peritonitis and bacteremia  No other clear sources appreciated  The patient has clinically improved with resolution of the tachycardia  She continues to have a leukocytosis however this confounded by the fact that she has had Neulasta 2 weeks ago  The white cell count does remain quite elevated  -continue Zosyn for now  -recheck CBC with diff and procalcitonin level tomorrow a m   -if patient continues to improve, likely transition to oral antibiotics tomorrow  -plan 2 weeks total of antibiotic treatment  -monitor CBC with diff and creatinine  -supportive care     2  Peritonitis-secondary to bowel perforation  The patient is status post exploratory laparotomy with washout, ileostomy, and closure  She is clinically much improved with significant improvement of the abdominal pain and recovery of bowel function   -antibiotics as above  -serial abdominal exam  -close gyn oncology follow-up     3  Clostridial bacteremia-appears to be all secondary to 2  No other clear source appreciated  Clostridium can be resistant Flagyl in their favor using a beta-lactam beta lactamase inhibitor awaiting additional data   -antibiotics as above  -follow up repeat blood cultures to confirm clearance of the bacteremia  -follow up sensitivities and adjust antibiotics as needed  -no need for PICC line for now     4  Stage IV ovarian carcinoma-status post new adjuvant chemotherapy and Neulasta  -hold on chemotherapy in the setting of an acute infectious process for now     5   Diabetes mellitus-type 2      Discussed with the primary service    Antibiotics:  Zosyn 2  Antibiotics 6  Postop day 5    Subjective:  Patient has no fever, chills, sweats; no nausea, vomiting, diarrhea; no cough, shortness of breath; no increased pain  No new symptoms  Still with a poor appetite  Objective:  Vitals:  Temp:  [97 8 °F (36 6 °C)-98 5 °F (36 9 °C)] 97 8 °F (36 6 °C)  HR:  [86-96] 90  Resp:  [16-18] 16  BP: (102-116)/(49-66) 116/64  SpO2:  [86 %-96 %] 86 %  Temp (24hrs), Av 2 °F (36 8 °C), Min:97 8 °F (36 6 °C), Max:98 5 °F (36 9 °C)  Current: Temperature: 97 8 °F (36 6 °C)    Physical Exam:   General Appearance:  Alert, interactive, nontoxic, no acute distress  Throat: Oropharynx moist without lesions  Lungs:   Clear to auscultation bilaterally; no wheezes, rhonchi or rales; respirations unlabored   Heart:  RRR; no murmur, rub or gallop   Abdomen:   Soft, non-tender, non-distended, positive bowel sounds  Incision without drainage or erythema  Extremities: No clubbing, cyanosis or edema   Skin: No new rashes or lesions  No draining wounds noted  Labs, Imaging, & Other studies:   All pertinent labs and imaging studies were personally reviewed    Results from last 7 days  Lab Units 18  0543 18  0628 18  0536   WBC Thousand/uL 25 62* 22 25* 27 88*   HEMOGLOBIN g/dL 8 7* 8 5* 7 7*   PLATELETS Thousands/uL 296 203 144*       Results from last 7 days  Lab Units 18  0543 18  0812 18  0536 18  0604  12/15/18  1218   SODIUM mmol/L 139 138 141 140  < >  --    POTASSIUM mmol/L 3 9 4 0 3 3* 3 4*  < >  --    CHLORIDE mmol/L 107 107 108 108  < >  --    CO2 mmol/L 26 25 26 24  < >  --    CO2, I-STAT mmol/L  --   --   --   --   --  24   BUN mg/dL 5 9 11 16  < >  --    CREATININE mg/dL 0 31* 0 29* 0 33* 0 48*  < >  --    EGFR ml/min/1 73sq m 117 119 114 101  < >  --    GLUCOSE, ISTAT mg/dl  --   --   --   --   --  84   CALCIUM mg/dL 7 6* 7 7* 7 3* 8 2*  < >  --    AST U/L 31 33  --  41  --   --    ALT U/L 18 20  --  17  --   --    ALK PHOS U/L 179* 130*  --  71  --   --    < > = values in this interval not displayed      Results from last 7 days  Lab Units 18  1145 12/17/18  1343 12/14/18  2334 12/14/18  2314 12/14/18  1728 12/14/18  1709   BLOOD CULTURE  No Growth at 24 hrs  No Growth at 24 hrs   --   --  Clostridium species* No Growth After 4 Days     GRAM STAIN RESULT   --   --   --  4+ Polys  4+ Gram negative rods  4+ Gram positive rods Gram negative rods  --    URINE CULTURE   --   --  No Growth <1000 cfu/mL  --   --   --

## 2018-12-19 NOTE — PROGRESS NOTES
Gyn/onc resident notified of patient's BP 81/55  Patient is asymptomatic at this time  Will monitor

## 2018-12-19 NOTE — RESTORATIVE TECHNICIAN NOTE
Restorative Specialist Mobility Note       Activity: Bedrest, Dangle, Stand at bedside, Turn, Ambulate in room, Ambulate in dwyer     Assistive Device: Front wheel walker (chair follow)     Ambulation Response: Tolerated fairly well  Repositioned: Sitting, Up in chair                           Assisted therapy during session  For all/additional information please see therapy note(s)          Jam Gaitan Restorative Technician, BS

## 2018-12-19 NOTE — PHYSICAL THERAPY NOTE
Physical Therapy Progress Note     12/19/18 1416   Pain Assessment   Pain Assessment 0-10   Pain Score 7   Pain Location Abdomen   Restrictions/Precautions   Weight Bearing Precautions Per Order No   Other Precautions Pain; Fall Risk;Telemetry; Bed Alarm   General   Family/Caregiver Present No   Cognition   Overall Cognitive Status WFL   Arousal/Participation Alert; Cooperative   Subjective   Subjective Pt reports abdominal pain  Agrees to participate  Bed Mobility   Supine to Sit 3  Moderate assistance   Additional items Assist x 1;HOB elevated;LE management;Verbal cues   Transfers   Sit to Stand (min to mod assist)   Additional items Assist x 1; Armrests; Verbal cues   Stand to Sit 4  Minimal assistance   Additional items Assist x 1; Armrests; Verbal cues   Stand pivot 4  Minimal assistance   Additional items Assist x 1;Verbal cues   Toilet transfer 4  Minimal assistance   Additional items Assist x 2;Commode   Ambulation/Elevation   Gait pattern Narrow TESS; Decreased foot clearance; Foward flexed; Short stride  (slow)   Gait Assistance 4  Minimal assist   Additional items Assist x 1;Verbal cues   Assistive Device Rolling walker   Distance 20 feet   Stair Management Assistance Not tested   Balance   Static Sitting Fair -   Static Standing Fair -   Dynamic Standing Poor +   Ambulatory Poor +   Endurance Deficit   Endurance Deficit Yes   Endurance Deficit Description pain and fatigue   Activity Tolerance   Activity Tolerance Patient limited by fatigue;Patient limited by pain   Nurse [facility] to see per RN [facility]   Exercises   Hip Flexion Sitting;10 reps;AROM; Bilateral   Knee AROM Long Arc Quad Sitting;15 reps;AROM; Bilateral   Ankle Pumps Sitting;25 reps;AROM; Bilateral   Assessment   Prognosis Good   Problem List Decreased strength;Decreased endurance; Impaired balance;Decreased mobility; Decreased coordination;Pain   Assessment Pt is making steady progress with functional mobility  Limited by pain and fatigue  Initially requires encouragement for full participation  Restorative present to assist with mobility  Min to mod assist required for transfers  Additional 10 min required due to use of commode  Gait is slow and unsteady requiring min assist with a chair follow  Completed seated exercises to conclude session  Pt would benefit from continued physical therapy to maximize functional mobility and safety  Goals   Patient Goals To get stronger   STG Expiration Date 12/27/18   Treatment Day 1   Plan   Treatment/Interventions Functional transfer training;LE strengthening/ROM; Elevations; Therapeutic exercise; Endurance training;Patient/family training;Bed mobility;Gait training;Spoke to nursing   Progress Progressing toward goals   PT Frequency (3-5x/week)   Recommendation   Recommendation (Rehab)   Equipment Recommended Raman Iyer  (LAURI)     Ignacio Quiroga, PTA

## 2018-12-19 NOTE — PALLIATIVE CARE CONFERENCE
Palliative LSW briefly visited pt  This morning  She stated her daughter was in earlier but left  Pt  Appeared uncomfortable in bed, but when questioned she stated she was very tired from am care and ostomy teaching  Pt  Stated she would like to rest   Did not inquire about advanced care planning at that time  Updated RIGOBERTO Villegas of same

## 2018-12-19 NOTE — PROGRESS NOTES
Progress Note - Palliative & Supportive Care  Tate Giron  75 y o   female  [address] 509/[facility] 509-01   MRN: [id number]  Encounter: [id number]     Assessment  Patient Active Problem List   Diagnosis    Other ascites    Lesion of liver    Edema leg    [facility] [address] [facility]    Abdominal pain    Intractable nausea and vomiting    Bowel perforation ([facility] [address] [facility]    Septic shock ([facility] [address] [facility]    Peritonitis ([facility] [address] [facility]    S/P ileostomy ([facility] [address] [facility]    Acute blood loss anemia    Leukocytosis    Hypokalemia       Plan:  Symptom Management: post-op pain  Does not want any medication changes  Does not want any pain medications to be scheduled, prefers to leave as PRN  - oxyIR 5-10mg q 4h PRN   - PRN acetaminophen    Goals of Care:    - Focused on post-op recovery and rehab  Hoping to be accepted at [facility]  Too fatigued for discussion today, but has not wanted to focus on future plans beyond rehab  Will plan to follow up with outpatient oncologist after rehab  - Open to idea completing advance directive, will continue to follow up    History  Fatigued after busy day  Having abdominal pain  Pain is improved by PRN oxycodone         Meds  all current active meds have been reviewed and current meds:   Current Facility-Administered Medications   Medication Dose Route Frequency    acetaminophen (TYLENOL) tablet 650 mg  650 mg Oral Q6H PRN    chlorhexidine (PERIDEX) 0 12 % oral rinse 15 mL  15 mL Swish & Spit Q12H [address]    enoxaparin (LOVENOX) subcutaneous injection 40 mg  40 mg Subcutaneous Q24H MARCIO    insulin lispro (HumaLOG) 100 units/mL subcutaneous injection 1-5 Units  1-5 Units Subcutaneous HS    insulin lispro (HumaLOG) 100 units/mL subcutaneous injection 1-6 Units  1-6 Units Subcutaneous TID AC    morphine (PF) 4 mg/mL injection 4 mg  4 mg Intravenous Q4H PRN    ondansetron (ZOFRAN) injection 4 mg  4 mg Intravenous Q6H PRN    oxyCODONE (ROXICODONE) immediate release tablet 10 mg  10 mg Oral Q4H PRN    oxyCODONE (ROXICODONE) IR tablet 5 mg  5 mg Oral Q4H PRN    pantoprazole (PROTONIX) injection 40 mg  40 mg Intravenous Q24H MARCIO    phenol (CHLORASEPTIC) 1 4 % mucosal liquid 1 spray  1 spray Mouth/Throat Q2H PRN    piperacillin-tazobactam (ZOSYN) 4 5 g in sodium chloride 0 9 % 100 mL IVPB  4 5 g Intravenous Q6H       No Known Allergies    Objective  Physical Exam   Constitutional: She is oriented to person, place, and time  She is cooperative  Fatigued, appears chronically ill   Pulmonary/Chest: Effort normal    Neurological: She is alert and oriented to person, place, and time  Psychiatric: She has a normal mood and affect  Cognition and memory are normal      Lab Results: I have personally reviewed pertinent labs          Elissa Feliciano, [address] [place] [address]  Office number: [phone]

## 2018-12-20 ENCOUNTER — APPOINTMENT (INPATIENT)
Dept: RADIOLOGY | Facility: HOSPITAL | Age: 68
DRG: 329 | End: 2018-12-20
Payer: MEDICARE

## 2018-12-20 LAB
ALBUMIN SERPL BCP-MCNC: 1.6 G/DL (ref 3.5–5)
ALP SERPL-CCNC: 257 U/L (ref 46–116)
ALT SERPL W P-5'-P-CCNC: 20 U/L (ref 12–78)
ANION GAP SERPL CALCULATED.3IONS-SCNC: 6 MMOL/L (ref 4–13)
AST SERPL W P-5'-P-CCNC: 36 U/L (ref 5–45)
BASOPHILS # BLD AUTO: 0.05 THOUSANDS/ΜL (ref 0–0.1)
BASOPHILS NFR BLD AUTO: 0 % (ref 0–1)
BILIRUB SERPL-MCNC: 0.38 MG/DL (ref 0.2–1)
BUN SERPL-MCNC: 8 MG/DL (ref 5–25)
CALCIUM SERPL-MCNC: 7.7 MG/DL (ref 8.3–10.1)
CHLORIDE SERPL-SCNC: 106 MMOL/L (ref 100–108)
CO2 SERPL-SCNC: 27 MMOL/L (ref 21–32)
CREAT SERPL-MCNC: 0.33 MG/DL (ref 0.6–1.3)
EOSINOPHIL # BLD AUTO: 0.05 THOUSAND/ΜL (ref 0–0.61)
EOSINOPHIL NFR BLD AUTO: 0 % (ref 0–6)
ERYTHROCYTE [DISTWIDTH] IN BLOOD BY AUTOMATED COUNT: 18.6 % (ref 11.6–15.1)
GFR SERPL CREATININE-BSD FRML MDRD: 114 ML/MIN/1.73SQ M
GLUCOSE SERPL-MCNC: 107 MG/DL (ref 65–140)
GLUCOSE SERPL-MCNC: 130 MG/DL (ref 65–140)
GLUCOSE SERPL-MCNC: 66 MG/DL (ref 65–140)
GLUCOSE SERPL-MCNC: 68 MG/DL (ref 65–140)
GLUCOSE SERPL-MCNC: 72 MG/DL (ref 65–140)
GLUCOSE SERPL-MCNC: 78 MG/DL (ref 65–140)
GLUCOSE SERPL-MCNC: 85 MG/DL (ref 65–140)
HCT VFR BLD AUTO: 27.2 % (ref 34.8–46.1)
HGB BLD-MCNC: 8.7 G/DL (ref 11.5–15.4)
IMM GRANULOCYTES # BLD AUTO: >0.5 THOUSAND/UL (ref 0–0.2)
IMM GRANULOCYTES NFR BLD AUTO: 2 % (ref 0–2)
LYMPHOCYTES # BLD AUTO: 1.88 THOUSANDS/ΜL (ref 0.6–4.47)
LYMPHOCYTES NFR BLD AUTO: 7 % (ref 14–44)
MCH RBC QN AUTO: 25.4 PG (ref 26.8–34.3)
MCHC RBC AUTO-ENTMCNC: 32 G/DL (ref 31.4–37.4)
MCV RBC AUTO: 79 FL (ref 82–98)
MONOCYTES # BLD AUTO: 1.51 THOUSAND/ΜL (ref 0.17–1.22)
MONOCYTES NFR BLD AUTO: 6 % (ref 4–12)
NEUTROPHILS # BLD AUTO: 22.76 THOUSANDS/ΜL (ref 1.85–7.62)
NEUTS SEG NFR BLD AUTO: 85 % (ref 43–75)
NRBC BLD AUTO-RTO: 0 /100 WBCS
PLATELET # BLD AUTO: 388 THOUSANDS/UL (ref 149–390)
PMV BLD AUTO: 12.1 FL (ref 8.9–12.7)
POTASSIUM SERPL-SCNC: 3.8 MMOL/L (ref 3.5–5.3)
PROCALCITONIN SERPL-MCNC: 2.48 NG/ML
PROT SERPL-MCNC: 4.9 G/DL (ref 6.4–8.2)
RBC # BLD AUTO: 3.43 MILLION/UL (ref 3.81–5.12)
SODIUM SERPL-SCNC: 139 MMOL/L (ref 136–145)
WBC # BLD AUTO: 26.77 THOUSAND/UL (ref 4.31–10.16)

## 2018-12-20 PROCEDURE — 80053 COMPREHEN METABOLIC PANEL: CPT | Performed by: STUDENT IN AN ORGANIZED HEALTH CARE EDUCATION/TRAINING PROGRAM

## 2018-12-20 PROCEDURE — 99232 SBSQ HOSP IP/OBS MODERATE 35: CPT | Performed by: INTERNAL MEDICINE

## 2018-12-20 PROCEDURE — C9113 INJ PANTOPRAZOLE SODIUM, VIA: HCPCS | Performed by: SURGERY

## 2018-12-20 PROCEDURE — 74177 CT ABD & PELVIS W/CONTRAST: CPT

## 2018-12-20 PROCEDURE — 99024 POSTOP FOLLOW-UP VISIT: CPT | Performed by: OBSTETRICS & GYNECOLOGY

## 2018-12-20 PROCEDURE — 84145 PROCALCITONIN (PCT): CPT | Performed by: INTERNAL MEDICINE

## 2018-12-20 PROCEDURE — 97116 GAIT TRAINING THERAPY: CPT

## 2018-12-20 PROCEDURE — 82948 REAGENT STRIP/BLOOD GLUCOSE: CPT

## 2018-12-20 PROCEDURE — 85025 COMPLETE CBC W/AUTO DIFF WBC: CPT | Performed by: STUDENT IN AN ORGANIZED HEALTH CARE EDUCATION/TRAINING PROGRAM

## 2018-12-20 RX ORDER — SODIUM CHLORIDE, SODIUM GLUCONATE, SODIUM ACETATE, POTASSIUM CHLORIDE, MAGNESIUM CHLORIDE, SODIUM PHOSPHATE, DIBASIC, AND POTASSIUM PHOSPHATE .53; .5; .37; .037; .03; .012; .00082 G/100ML; G/100ML; G/100ML; G/100ML; G/100ML; G/100ML; G/100ML
90 INJECTION, SOLUTION INTRAVENOUS CONTINUOUS
Status: DISCONTINUED | OUTPATIENT
Start: 2018-12-20 | End: 2018-12-24

## 2018-12-20 RX ORDER — DEXTROSE MONOHYDRATE 25 G/50ML
25 INJECTION, SOLUTION INTRAVENOUS AS NEEDED
Status: DISCONTINUED | OUTPATIENT
Start: 2018-12-20 | End: 2018-12-29 | Stop reason: HOSPADM

## 2018-12-20 RX ADMIN — OXYCODONE HYDROCHLORIDE 5 MG: 5 TABLET ORAL at 18:50

## 2018-12-20 RX ADMIN — PIPERACILLIN SODIUM,TAZOBACTAM SODIUM 4.5 G: 4; .5 INJECTION, POWDER, FOR SOLUTION INTRAVENOUS at 00:04

## 2018-12-20 RX ADMIN — PIPERACILLIN SODIUM,TAZOBACTAM SODIUM 4.5 G: 4; .5 INJECTION, POWDER, FOR SOLUTION INTRAVENOUS at 05:33

## 2018-12-20 RX ADMIN — ONDANSETRON 4 MG: 2 INJECTION INTRAMUSCULAR; INTRAVENOUS at 09:42

## 2018-12-20 RX ADMIN — SODIUM CHLORIDE, SODIUM GLUCONATE, SODIUM ACETATE, POTASSIUM CHLORIDE, MAGNESIUM CHLORIDE, SODIUM PHOSPHATE, DIBASIC, AND POTASSIUM PHOSPHATE 125 ML/HR: .53; .5; .37; .037; .03; .012; .00082 INJECTION, SOLUTION INTRAVENOUS at 15:28

## 2018-12-20 RX ADMIN — OXYCODONE HYDROCHLORIDE 5 MG: 5 TABLET ORAL at 12:24

## 2018-12-20 RX ADMIN — CHLORHEXIDINE GLUCONATE 0.12% ORAL RINSE 15 ML: 1.2 LIQUID ORAL at 22:03

## 2018-12-20 RX ADMIN — PANTOPRAZOLE SODIUM 40 MG: 40 INJECTION, POWDER, FOR SOLUTION INTRAVENOUS at 08:49

## 2018-12-20 RX ADMIN — IOHEXOL 100 ML: 350 INJECTION, SOLUTION INTRAVENOUS at 13:45

## 2018-12-20 RX ADMIN — ENOXAPARIN SODIUM 40 MG: 40 INJECTION SUBCUTANEOUS at 08:49

## 2018-12-20 RX ADMIN — PIPERACILLIN SODIUM,TAZOBACTAM SODIUM 4.5 G: 4; .5 INJECTION, POWDER, FOR SOLUTION INTRAVENOUS at 17:12

## 2018-12-20 RX ADMIN — PIPERACILLIN SODIUM,TAZOBACTAM SODIUM 4.5 G: 4; .5 INJECTION, POWDER, FOR SOLUTION INTRAVENOUS at 12:27

## 2018-12-20 NOTE — PHYSICAL THERAPY NOTE
Physical Therapy Progress Note     12/20/18 9370   Pain Assessment   Pain Assessment 0-10   Pain Score 6   Pain Type Acute pain   Pain Location Abdomen   Hospital Pain Intervention(s) Repositioned; Ambulation/increased activity; Emotional support; Rest   Response to Interventions pain increased with mobility   Restrictions/Precautions   Other Precautions Pain; Fall Risk;Multiple lines   Subjective   Subjective Pt encountered supine in bed, agreeable to treatment with encouragement  Reported she ambulated short distances in room & to doorway with staff today  More pleasant after ambulation and emotional support given during session  Bed Mobility   Supine to Sit 4  Minimal assistance   Additional items Assist x 1;HOB elevated; Bedrails; Increased time required;LE management;Verbal cues   Sit to Supine 3  Moderate assistance   Additional items Assist x 1;Bedrails; Increased time required;Verbal cues;LE management   Transfers   Sit to Stand 4  Minimal assistance   Additional items Assist x 1; Armrests; Increased time required  (elevated bed surface)   Stand to Sit 4  Minimal assistance   Additional items Assist x 1; Armrests; Increased time required   Ambulation/Elevation   Gait pattern Excessively slow; Short stride;Decreased foot clearance; Inconsistent sigrid  (tense, muscle guarding)   Gait Assistance 4  Minimal assist   Additional items Assist x 1   Assistive Device Rolling walker   Distance 20'   Balance   Static Sitting Fair   Static Standing Fair -   Ambulatory Poor +   Endurance Deficit   Endurance Deficit Yes   Endurance Deficit Description pain, fatigue   Activity Tolerance   Activity Tolerance Patient limited by fatigue;Patient limited by pain   Nurse One Laura Rasmussen RN   Assessment   Prognosis Good   Problem List Decreased strength;Decreased endurance; Impaired balance;Decreased mobility; Decreased coordination;Pain   Assessment Pt continues to require assist with mobility tasks & short ambulation distances this session due to increased abdominal pain with movements  Pt able to maintain seated balance & standing balance without assist   Min A provided during gait to ensure safety as pt demonstrates short strides, muscle guarding, and slow pacing, which increases her fall risk at this time  Pt instructed in log roll technique for bed mobility, which slightly reduced assist required, but pt continues to be limited by pain  At end of session, returned to bed & educated about importance of mobility for overall recovery  Pt verbalized understanding  Will continue to benefit from therapy services at this time to increase activity tolerance, strength, and progress ambulation to maximize independence  Barriers to Discharge Inaccessible home environment;Decreased caregiver support  (2 SH, caregiver to )   Goals   Patient Goals to move with less pain   STG Expiration Date 12/27/18   Treatment Day 2   Plan   Treatment/Interventions Functional transfer training;LE strengthening/ROM; Elevations; Therapeutic exercise; Endurance training;Patient/family training;Equipment eval/education;Gait training;Bed mobility   Progress Progressing toward goals   PT Frequency (3-5x/week)   Recommendation   Recommendation (rehab)   Equipment Recommended Swathi Harrison PTA

## 2018-12-20 NOTE — PROGRESS NOTES
Gyn Oncology Progress note   Alex Orr 75 y o  female MRN: [id number]  Unit/Bed#: [facility] 509-01 Encounter: [id number]    Alex Orr has no current complaints  Pain is present - adequately treated  Patient is currently voiding  She is ambulating  She is tolerating PO, and denies nausea or vomiting  Patient denies fever, chills, chest pain, shortness of breath, or calf tenderness  BP 99/52   Pulse 78   Temp 97 9 °F (36 6 °C) (Oral)   Resp 18   Wt 69 3 kg (152 lb 12 5 oz)   SpO2 99%   BMI 25 64 kg/m²     I/O last 3 completed shifts: In: 507 [P O :507]  Out: 2395 [Urine:1125; Drains:695; Stool:575]  No intake/output data recorded  Lab Results   Component Value Date    WBC 26 77 (H) 12/20/2018    HGB 8 7 (L) 12/20/2018    HCT 27 2 (L) 12/20/2018    MCV 79 (L) 12/20/2018     12/20/2018       Lab Results   Component Value Date    GLUCOSE 84 12/15/2018    CALCIUM 7 7 (L) 12/20/2018    K 3 8 12/20/2018    CO2 27 12/20/2018     12/20/2018    BUN 8 12/20/2018    CREATININE 0 33 (L) 12/20/2018       Lab Results   Component Value Date/Time    POCGLU 98 12/19/2018 08:48 PM    POCGLU 102 12/19/2018 04:19 PM    POCGLU 80 12/19/2018 11:50 AM    POCGLU 78 12/19/2018 06:12 AM    POCGLU 98 12/18/2018 09:03 PM       Physical Exam  Gen: AAOx3, NAD, comfortable in bed  CVS: S1S2+, RRR, no murmurs  Lungs: CTA b/l normal respiratory effort and rate  Abdomen: soft, non tender,  incision closed with staples-some clear drainage at inferior portion of incision  ANDREAS drain x 4 in place; ileostomy in place, pink stoma productive of stool  Extremities: SCDs on and on, non tender    A/P: Assessment / Plan: 75 y  o   with Stage IV ovarian cancer admitted on 12/14/18 in septic shock to bowel perforation,  status post exploratory laparotomy, abdominal washout and Abthera vac placement on 12/14/18, followed by exploratory laparotomy, abdominal washout,  Drain placement x 4, loop ileostomy & abdominal closure on 12/15/18, POD#5, doing well postoperatively, Hospital Day #6        #1 Sepsis secondary to bowel perforation:   - Status post surgery as mentioned   - WBC began to trend down on 12/18, however, it has been trending upwards (26 77 this am)  ID following  Will repeat CT A/P today  - Initial Blood cultures no growth  Urine culture no growth  Tissue gram gram 4+ polys, gram + and - sergio, final consistent with multi drug resistant e coli  Anaerobic cultures growing Clostridium   - Cefepime and Flagyl were discontinued on 12/18 per ID recs, patient now on Zosyn  Appreciate continued ID input     #2 Post operative Care:  - Pain: controlled with tylenol and roxicodone-palliative care following  - Encouraged incentive spirometry to reduce atelectasis and pneumonia risk  - Encouraged ambulation as tolerated-PT following, currently recommending STR-CM working on placement at [facility]  - DVT ppx: SCDs, Lovenox 40mg daily     #3 Stage IV Ovarian Cancer  Status post neoadjuvant chemotherapy with Carboplatin, Taxol & Avastin  Any further chemo is currently on hold until acute illness has subsided     #4 Type 2 DM:   Accuchecks 80s-102  Continue sliding scale insulin, #3     #5 FEN: Regular with Glucerna; Isolyte @ 125cc/hr     #6 Disposition: Inpatient

## 2018-12-20 NOTE — PROGRESS NOTES
Patient c/o sob while eating; sats were 88-92%; placed 1L NC back on patient for comfort and encouraged use of IS

## 2018-12-20 NOTE — WOUND OSTOMY CARE
Patient seen for teaching, however on arrival patient leaving for cat scan  Per patient she hasnt' eating today and when she returns, she wants to eat  Patient agreeable with teaching tomorrow, we will return tomorrow for teaching      Isabelle Yu, RN, BSN, [employer]

## 2018-12-20 NOTE — PROGRESS NOTES
Progress Note - Infectious Disease   Catherine Reza 75 y o  female MRN: [id number]  Unit/Bed#: [facility] 509-01 Encounter: [id number]      Impression/Plan:  1  Sepsis-POA   Leukocytosis and tachycardia   Likely all secondary to the patient's peritonitis and bacteremia   No other clear sources appreciated   The patient has clinically improved with resolution of the tachycardia   She continues to have a leukocytosis however this confounded by the fact that she has had Neulasta 2 weeks ago  The white cell count does remain quite elevated  No resistant organisms isolated  -discontinue Zosyn  -ceftriaxone 1 g IV Q 24 hours  -Flagyl 500 mg p o  Q 8 hours  -recheck CBC with diff and creatinine  -supportive care  -if CT of the abdomen and pelvis is negative for any new drainable collection, and the patient continues to improve, possibly discontinue ceftriaxone tomorrow     2  Peritonitis-secondary to bowel perforation   The patient is status post exploratory laparotomy with washout, ileostomy, and closure   She is clinically much improved with significant improvement of the abdominal pain and recovery of bowel function  However the white count has continued to drift a port   -antibiotics as above  -plan to continue Flagyl through 12/27/2018 to complete 2 weeks total  -serial abdominal exam  -recheck CT of the abdomen pelvis  -recheck CBC with diff  -close gyn oncology follow-up     3  Clostridial bacteremia-appears to be all secondary to 2  No other clear source appreciated  The organism is sensitive to Flagyl  Follow-up blood cultures are negative thus far  -antibiotics as above  -plan to continue Flagyl through 12/27/2018  -follow up repeat blood cultures to confirm clearance of the bacteremia  -no need for PICC line for now     4  Stage IV ovarian carcinoma-status post new adjuvant chemotherapy and Neulasta  -hold on chemotherapy in the setting of an acute infectious process for now     5   Diabetes mellitus-type 2       Discussed with the primary service    Antibiotics:  Zosyn 3  Antibiotics 7  Postop day 6    Subjective:  Patient has no fever, chills, sweats; no nausea, vomiting, diarrhea; no cough, shortness of breath; no increased pain  No new symptoms  She seems a bit discouraged today  Objective:  Vitals:  Temp:  [97 8 °F (36 6 °C)-98 °F (36 7 °C)] 98 °F (36 7 °C)  HR:  [78-92] 92  Resp:  [16-18] 16  BP: ()/(50-61) 114/61  SpO2:  [95 %-99 %] 98 %  Temp (24hrs), Av 9 °F (36 6 °C), Min:97 8 °F (36 6 °C), Max:98 °F (36 7 °C)  Current: Temperature: 98 °F (36 7 °C)    Physical Exam:   General Appearance:  Alert, interactive, nontoxic, no acute distress  Throat: Oropharynx moist without lesions  Lungs:   Decreased breath sounds bilaterally; no wheezes, rhonchi or rales; respirations unlabored   Heart:  RRR; no murmur, rub or gallop   Abdomen:   Soft, non-tender, non-distended, positive bowel sounds  Incision well approximated without erythema or drainage  Ostomy with good output  ANDREAS drains in place  Extremities: No clubbing, cyanosis or edema   Skin: No new rashes or lesions  No draining wounds noted         Labs, Imaging, & Other studies:   All pertinent labs and imaging studies were personally reviewed    Results from last 7 days  Lab Units 18  0442 18  0543 18  0628   WBC Thousand/uL 26 77* 25 62* 22 25*   HEMOGLOBIN g/dL 8 7* 8 7* 8 5*   PLATELETS Thousands/uL 388 296 203       Results from last 7 days  Lab Units 18  0442 18  0543 18  0628  12/15/18  1218   SODIUM mmol/L 139 139 138  < >  --    POTASSIUM mmol/L 3 8 3 9 4 0  < >  --    CHLORIDE mmol/L 106 107 107  < >  --    CO2 mmol/L 27 26 25  < >  --    CO2, I-STAT mmol/L  --   --   --   --  24   BUN mg/dL 8 5 9  < >  --    CREATININE mg/dL 0 33* 0 31* 0 29*  < >  --    EGFR ml/min/1 73sq m 114 117 119  < >  --    GLUCOSE, ISTAT mg/dl  --   --   --   --  84   CALCIUM mg/dL 7 7* 7 6* 7 7*  < >  --    AST U/L 36 31 33  < >  --    ALT U/L 20 18 20  < >  --    ALK PHOS U/L 257* 179* 130*  < >  --    < > = values in this interval not displayed  Results from last 7 days  Lab Units 12/17/18  1344 12/17/18  1343 12/14/18  2334 12/14/18  2314 12/14/18  1728 12/14/18  1709   BLOOD CULTURE  No Growth at 48 hrs  No Growth at 48 hrs   --   --  Clostridium species* No Growth After 5 Days     GRAM STAIN RESULT   --   --   --  4+ Polys  4+ Gram negative rods  4+ Gram positive rods Gram negative rods  --    URINE CULTURE   --   --  No Growth <1000 cfu/mL  --   --   --

## 2018-12-20 NOTE — PLAN OF CARE
Problem: PHYSICAL THERAPY ADULT  Goal: Performs mobility at highest level of function for planned discharge setting  See evaluation for individualized goals  Treatment/Interventions: Functional transfer training, LE strengthening/ROM, Elevations, Therapeutic exercise, Endurance training, Patient/family training, Equipment eval/education, Bed mobility, Gait training, Cognitive reorientation, Spoke to nursing, OT  Equipment Recommended: Adriana Simon       See flowsheet documentation for full assessment, interventions and recommendations  Outcome: Progressing  Prognosis: Good  Problem List: Decreased strength, Decreased endurance, Impaired balance, Decreased mobility, Decreased coordination, Pain  Assessment: Pt continues to require assist with mobility tasks & short ambulation distances this session due to increased abdominal pain with movements  Pt able to maintain seated balance & standing balance without assist   Min A provided during gait to ensure safety as pt demonstrates short strides, muscle guarding, and slow pacing, which increases her fall risk at this time  Pt instructed in log roll technique for bed mobility, which slightly reduced assist required, but pt continues to be limited by pain  At end of session, returned to bed & educated about importance of mobility for overall recovery  Pt verbalized understanding  Will continue to benefit from therapy services at this time to increase activity tolerance, strength, and progress ambulation to maximize independence  Barriers to Discharge: Inaccessible home environment, Decreased caregiver support (2 SH, caregiver to )     Recommendation:  (rehab)     PT - OK to Discharge: Yes    See flowsheet documentation for full assessment

## 2018-12-21 ENCOUNTER — HOSPITAL ENCOUNTER (OUTPATIENT)
Dept: INFUSION CENTER | Facility: CLINIC | Age: 68
Discharge: HOME | End: 2018-12-21

## 2018-12-21 LAB
ABO GROUP BLD: NORMAL
ANION GAP SERPL CALCULATED.3IONS-SCNC: 9 MMOL/L (ref 4–13)
BASOPHILS # BLD AUTO: 0.03 THOUSANDS/ΜL (ref 0–0.1)
BASOPHILS NFR BLD AUTO: 0 % (ref 0–1)
BLD GP AB SCN SERPL QL: NEGATIVE
BUN SERPL-MCNC: 5 MG/DL (ref 5–25)
CALCIUM SERPL-MCNC: 6.6 MG/DL (ref 8.3–10.1)
CHLORIDE SERPL-SCNC: 104 MMOL/L (ref 100–108)
CO2 SERPL-SCNC: 24 MMOL/L (ref 21–32)
CREAT SERPL-MCNC: 0.25 MG/DL (ref 0.6–1.3)
EOSINOPHIL # BLD AUTO: 0.03 THOUSAND/ΜL (ref 0–0.61)
EOSINOPHIL NFR BLD AUTO: 0 % (ref 0–6)
ERYTHROCYTE [DISTWIDTH] IN BLOOD BY AUTOMATED COUNT: 18.7 % (ref 11.6–15.1)
GFR SERPL CREATININE-BSD FRML MDRD: 125 ML/MIN/1.73SQ M
GLUCOSE SERPL-MCNC: 139 MG/DL (ref 65–140)
GLUCOSE SERPL-MCNC: 146 MG/DL (ref 65–140)
GLUCOSE SERPL-MCNC: 148 MG/DL (ref 65–140)
GLUCOSE SERPL-MCNC: 80 MG/DL (ref 65–140)
GLUCOSE SERPL-MCNC: 97 MG/DL (ref 65–140)
HCT VFR BLD AUTO: 20.5 % (ref 34.8–46.1)
HCT VFR BLD AUTO: 34.1 % (ref 34.8–46.1)
HGB BLD-MCNC: 11.5 G/DL (ref 11.5–15.4)
HGB BLD-MCNC: 6.5 G/DL (ref 11.5–15.4)
IMM GRANULOCYTES # BLD AUTO: 0.3 THOUSAND/UL (ref 0–0.2)
IMM GRANULOCYTES NFR BLD AUTO: 1 % (ref 0–2)
LYMPHOCYTES # BLD AUTO: 1.58 THOUSANDS/ΜL (ref 0.6–4.47)
LYMPHOCYTES NFR BLD AUTO: 8 % (ref 14–44)
MCH RBC QN AUTO: 25.1 PG (ref 26.8–34.3)
MCHC RBC AUTO-ENTMCNC: 31.7 G/DL (ref 31.4–37.4)
MCV RBC AUTO: 79 FL (ref 82–98)
MONOCYTES # BLD AUTO: 1.36 THOUSAND/ΜL (ref 0.17–1.22)
MONOCYTES NFR BLD AUTO: 6 % (ref 4–12)
NEUTROPHILS # BLD AUTO: 17.85 THOUSANDS/ΜL (ref 1.85–7.62)
NEUTS SEG NFR BLD AUTO: 85 % (ref 43–75)
NRBC BLD AUTO-RTO: 0 /100 WBCS
PLATELET # BLD AUTO: 371 THOUSANDS/UL (ref 149–390)
PMV BLD AUTO: 12.2 FL (ref 8.9–12.7)
POTASSIUM SERPL-SCNC: 3.7 MMOL/L (ref 3.5–5.3)
RBC # BLD AUTO: 2.59 MILLION/UL (ref 3.81–5.12)
RH BLD: POSITIVE
SODIUM SERPL-SCNC: 137 MMOL/L (ref 136–145)
SPECIMEN EXPIRATION DATE: NORMAL
WBC # BLD AUTO: 21.15 THOUSAND/UL (ref 4.31–10.16)

## 2018-12-21 PROCEDURE — 99232 SBSQ HOSP IP/OBS MODERATE 35: CPT | Performed by: INTERNAL MEDICINE

## 2018-12-21 PROCEDURE — 86901 BLOOD TYPING SEROLOGIC RH(D): CPT | Performed by: OBSTETRICS & GYNECOLOGY

## 2018-12-21 PROCEDURE — 85025 COMPLETE CBC W/AUTO DIFF WBC: CPT | Performed by: OBSTETRICS & GYNECOLOGY

## 2018-12-21 PROCEDURE — 86900 BLOOD TYPING SEROLOGIC ABO: CPT | Performed by: OBSTETRICS & GYNECOLOGY

## 2018-12-21 PROCEDURE — 85014 HEMATOCRIT: CPT | Performed by: OBSTETRICS & GYNECOLOGY

## 2018-12-21 PROCEDURE — 86923 COMPATIBILITY TEST ELECTRIC: CPT

## 2018-12-21 PROCEDURE — P9021 RED BLOOD CELLS UNIT: HCPCS

## 2018-12-21 PROCEDURE — 82948 REAGENT STRIP/BLOOD GLUCOSE: CPT

## 2018-12-21 PROCEDURE — C9113 INJ PANTOPRAZOLE SODIUM, VIA: HCPCS | Performed by: SURGERY

## 2018-12-21 PROCEDURE — 85018 HEMOGLOBIN: CPT | Performed by: OBSTETRICS & GYNECOLOGY

## 2018-12-21 PROCEDURE — 99024 POSTOP FOLLOW-UP VISIT: CPT | Performed by: OBSTETRICS & GYNECOLOGY

## 2018-12-21 PROCEDURE — 99231 SBSQ HOSP IP/OBS SF/LOW 25: CPT | Performed by: INTERNAL MEDICINE

## 2018-12-21 PROCEDURE — P9016 RBC LEUKOCYTES REDUCED: HCPCS

## 2018-12-21 PROCEDURE — 80048 BASIC METABOLIC PNL TOTAL CA: CPT | Performed by: OBSTETRICS & GYNECOLOGY

## 2018-12-21 PROCEDURE — 86850 RBC ANTIBODY SCREEN: CPT | Performed by: OBSTETRICS & GYNECOLOGY

## 2018-12-21 RX ORDER — METRONIDAZOLE 500 MG/1
500 TABLET ORAL EVERY 8 HOURS SCHEDULED
Status: DISCONTINUED | OUTPATIENT
Start: 2018-12-21 | End: 2018-12-29 | Stop reason: HOSPADM

## 2018-12-21 RX ADMIN — CHLORHEXIDINE GLUCONATE 0.12% ORAL RINSE 15 ML: 1.2 LIQUID ORAL at 08:35

## 2018-12-21 RX ADMIN — METRONIDAZOLE 500 MG: 500 TABLET ORAL at 15:51

## 2018-12-21 RX ADMIN — CHLORHEXIDINE GLUCONATE 0.12% ORAL RINSE 15 ML: 1.2 LIQUID ORAL at 22:56

## 2018-12-21 RX ADMIN — METRONIDAZOLE 500 MG: 500 TABLET ORAL at 21:21

## 2018-12-21 RX ADMIN — ENOXAPARIN SODIUM 40 MG: 40 INJECTION SUBCUTANEOUS at 08:00

## 2018-12-21 RX ADMIN — PIPERACILLIN SODIUM,TAZOBACTAM SODIUM 4.5 G: 4; .5 INJECTION, POWDER, FOR SOLUTION INTRAVENOUS at 00:03

## 2018-12-21 RX ADMIN — PANTOPRAZOLE SODIUM 40 MG: 40 INJECTION, POWDER, FOR SOLUTION INTRAVENOUS at 08:35

## 2018-12-21 RX ADMIN — OXYCODONE HYDROCHLORIDE 10 MG: 10 TABLET ORAL at 08:34

## 2018-12-21 RX ADMIN — SODIUM CHLORIDE, SODIUM GLUCONATE, SODIUM ACETATE, POTASSIUM CHLORIDE, MAGNESIUM CHLORIDE, SODIUM PHOSPHATE, DIBASIC, AND POTASSIUM PHOSPHATE 125 ML/HR: .53; .5; .37; .037; .03; .012; .00082 INJECTION, SOLUTION INTRAVENOUS at 15:46

## 2018-12-21 RX ADMIN — METRONIDAZOLE 500 MG: 500 TABLET ORAL at 08:35

## 2018-12-21 RX ADMIN — OXYCODONE HYDROCHLORIDE 10 MG: 10 TABLET ORAL at 18:11

## 2018-12-21 RX ADMIN — PIPERACILLIN SODIUM,TAZOBACTAM SODIUM 4.5 G: 4; .5 INJECTION, POWDER, FOR SOLUTION INTRAVENOUS at 06:33

## 2018-12-21 RX ADMIN — CEFTRIAXONE SODIUM 1000 MG: 10 INJECTION, POWDER, FOR SOLUTION INTRAVENOUS at 15:49

## 2018-12-21 NOTE — RESTORATIVE TECHNICIAN NOTE
Restorative Specialist Mobility Note       Ambulation held at this time per Rn  Pt getting blood at this time  Will continue to follow up daily  RN aware            Terri Asencio Restorative Technician, BS

## 2018-12-21 NOTE — WOUND OSTOMY CARE
Progress Note- Ostomy  Lolly Code 75 y o  female  [id number]  [facility]        Assessment:  Patient seen for ostomy care and teaching, two piece remains in place with no leakage, (+) flatus and green effluent in pouch  Somat is well budded, round, pink measuring 1 1/2 inch with intact mucocutaneous junction and peristomal skin  Bilateral aspect of stoma with slight concaved area, which creates a channel of leakage  Mid abdominal incision remains well approximated with staple in place, thin incisional line erythema present, abdomen with minimal to no distention, patient with no complaints of pain or tenderness related to abdomen and tolerating PO intact without problems  Education continued with reviewing diet, hydration, medication and daily ostomy care  Patient was given visual as well as verbal teaching on selection pouch with hands on demonstration on how to changed pouch with step by step instruction as pouch change was being completed  Stoma with red rubber catheter in place, new pouch was applied after uneven skin surface was leveled with smooth seal then two piece high output pouch applies  Patient is receptive to continued teaching, states feeling her best today and able to closely watch and participate in education with questions  All questions answered, patient encouraged reading her "Life After Ileostomy Surgery" book patient given sample pouches for practice  Patient willing to participated in pouch changing process next week  Plan: We will continue following with teaching and ostomy care, patient encourage verbalizing her need for education and ostomy management  Vitals:    12/21/18 1315   BP: 115/62   Pulse: 86   Resp: 14   Temp: 98 5 °F (36 9 °C)   SpO2:      Pressure Ulcer 12/19/18 Sacrum Left;Right;Mid Sacrum HA DTI   (Active)   Staging Deep Tissue Injury 12/21/2018  8:48 AM   Wound Description Light purple 12/21/2018  8:48 AM   Yarely-wound Assessment Intact 12/21/2018 8:48 AM   Wound Length (cm) 8 5 cm 12/19/2018 12:47 PM   Wound Width (cm) 3 cm 12/19/2018 12:47 PM   Calculated Wound Area (cm^2) 25 5 cm^2 12/19/2018 12:47 PM   Tunneling 0 cm 12/19/2018 12:47 PM   Undermining 0 12/19/2018 12:47 PM   Patient Tolerance Tolerated well 12/19/2018 12:47 PM   Number of days: 2   Incision 12/14/18 Abdomen (Active)   Incision Description Clean;Dry;Pink 12/21/2018  8:48 AM   Yarely-wound Assessment Clean;Dry; Intact 12/21/2018  8:48 AM   Closure Staples 12/21/2018  8:48 AM   Drainage Amount None 12/20/2018  8:00 AM   Drainage Description MARK 12/20/2018  8:00 AM   Treatments Site care 12/17/2018  8:00 PM   Dressing Open to air 12/21/2018  8:48 AM   Dressing Changed New dressing applied 12/16/2018 12:00 PM   Patient Tolerance Tolerated well 12/20/2018  8:00 AM   Dressing Status Other (Comment) 12/18/2018  4:00 AM   Number of days: 7       Incision 12/15/18 Abdomen Other (Comment) (Active)   Incision Description Clean;Dry; Intact 12/20/2018  4:08 AM   Yarely-wound Assessment Clean;Dry; Intact 12/20/2018  4:08 AM   Closure Staples 12/20/2018  4:08 AM   Drainage Amount Scant 12/20/2018  4:08 AM   Drainage Description Serosanguineous 12/20/2018  4:08 AM   Treatments Cleansed;Site care 12/17/2018  8:00 AM   Dressing Open to air 12/20/2018  4:08 AM   Dressing Changed Changed by provider 12/16/2018  4:00 PM   Patient Tolerance Tolerated well 12/17/2018  8:00 AM   Dressing Status Clean;Dry; Intact 12/19/2018  7:42 PM   Number of days: 6     Closed/Suction Drain Abdomen Bulb 19 Fr  (Active)   Site Description Healing;Reddened 12/21/2018  8:51 AM   Dressing Status Open to air 12/21/2018  8:51 AM   Drainage Appearance Serous 12/21/2018  8:51 AM   Status To bulb suction 12/21/2018  8:51 AM   Intake (mL) 0 mL 12/17/2018  8:00 AM   Output (mL) 0 mL 12/21/2018  8:51 AM   Number of days: 6       Closed/Suction Drain Right RUQ Bulb 19 Fr   (Active)   Site Description Reddened 12/21/2018  8:51 AM   Dressing Status Open to air 12/21/2018  8:51 AM   Drainage Appearance Serous 12/21/2018  8:51 AM   Status To bulb suction 12/21/2018  8:51 AM   Intake (mL) 0 mL 12/17/2018  8:00 AM   Output (mL) 40 mL 12/21/2018  8:51 AM   Number of days: 6       Closed/Suction Drain Right RLQ Bulb 19 Fr  (Active)   Site Description Healing;Reddened 12/21/2018  8:51 AM   Dressing Status Open to air 12/21/2018  8:51 AM   Drainage Appearance Serous 12/21/2018  8:51 AM   Status To bulb suction 12/21/2018  8:51 AM   Intake (mL) 0 mL 12/17/2018  8:00 AM   Output (mL) 5 mL 12/21/2018  8:51 AM   Number of days: 6       Closed/Suction Drain Left LLQ Bulb 19 Fr  (Active)   Site Description Reddened; Healing 12/21/2018  8:51 AM   Dressing Status Open to air 12/21/2018  8:51 AM   Drainage Appearance Serosanguineous 12/21/2018  8:51 AM   Status To bulb suction 12/21/2018  8:51 AM   Intake (mL) 0 mL 12/17/2018  8:00 AM   Output (mL) 45 mL 12/21/2018  8:51 AM   Number of days: 6       Ileostomy Loop RLQ (Active)   Stomal Appliance 1 piece 12/21/2018  1:01 PM   Stoma Assessment Budded;Pink 12/21/2018  1:01 PM   Stoma size (in) 1 5 in 12/21/2018  1:01 PM   Stoma Shape Round 12/21/2018  1:01 PM   Peristomal Assessment Intact 12/21/2018  1:01 PM   Treatment Bag change 12/21/2018  1:01 PM   Output (mL) 300 mL 12/21/2018  1:01 PM   Number of days: 6           Please call ext [phone] with questions or concerns, we will continue following      Magda Rivero, RN, BSN, [employer]

## 2018-12-21 NOTE — DISCHARGE SUMMARY
Discharge Summary - Gynecology  Kristen Li 75 y o  female MRN: [id number]  Unit/Bed#: [facility] 509-01 Encounter: [id number]    Admission Date: 12/14/2018   Discharge Date: 12/29/2018    Attending Physician: Ghislaine Gaffney Physician(s):   ID    Admitting Diagnosis:   Vomiting [R11 10]    Other Secondary Diagnosis on Admission:HTN, Fibroid uterus, left ovarian cyst    Discharge Diagnosis: same     Procedures Performed: SUKUMAR, BS, left oophorectomy    Hospital Course:    75year old with stage IV ovarian cancer s/p neoadjuvant chemotherapy with carboplatin/taxol/avastin s/p ex lap, abdominal wash out, and abthera vac on 12/14 -> ex lap, washout, loop ileostomy, wound close on 12/15  1  Sepsis secondary to bowel perforation: S/p surgery as mentioned above  Patient has a lactate that decreased from 4 4 to 1 2  She had an initial blood culture with no growth and urine culture with no growth  Tissue showed gram 4+ polys consistent with multidrug resistant e coli  Anaerobic culture growing clostridium  She was started on flagyl and cefepime  ID was consulted and antibiotics were then switched to zosyn and then to ceftraixone and PO flagyl with good response  She was then switched to cefdinir and flagyl to transition to PO regimen in order for her to leave  2  Post-operative care: NG tube was removed on POD #1  Haskins was removed on POD #1, arterial line was removed on POD #1  Pain control was transitioned from IV pain meds to PO tylenol and roxicodone on POD#1 after removal of NT tube  Physical therapy consult was placed and recommended rehab  Patient received lovenox on POD #1 with SCDs  All drains where removed except for right transgluteal which lydia be removed as outpatient  3  Stage IV ovarian cancer: Status post neoadjuvant chemotherapy with Carboplatin, Taxol & Avastin  Any further chemo is currently on hold until acute illness has subsided    4   Type 2 DM: Sugars were controlled on sliding scale insulin #3  On day of discharge, she was ambulating and able to reasonably perform all ADLs  She was tolerating PO diet, voiding and had appropriate bowel function  Pain was well controlled with PO pain medications  She was discharged to SNF on post-operative day #09 without complications  Complications: as above mentioned     Condition at Discharge: stable     Discharge Medications: See after visit summary for reconciled discharge medications provided to patient and family  Discharge instructions/Information to patient and family: See after visit summary for information provided to patient and family  Provisions for Follow-Up Care: See after visit summary for information related to follow-up care and any pertinent home health orders        Disposition: Skilled [facility]     Planned Readmission: Yes , due to nature of disease    Code Status: full

## 2018-12-21 NOTE — PROGRESS NOTES
Gyn Oncology Progress note   Acacia Anthony 75 y o  female MRN: [id number]  Unit/Bed#: [facility] 509-01 Encounter: [id number]    Acacia Anthony has no current complaints  Pain is none  Patient is currently voiding  She is ambulating  She is tolerating PO, and denies nausea or vomiting  Patient denies fever, chills, chest pain, shortness of breath, or calf tenderness  /55   Pulse 87   Temp 98 5 °F (36 9 °C)   Resp 18   Wt 69 3 kg (152 lb 12 5 oz)   SpO2 95%   BMI 25 64 kg/m²     I/O last 3 completed shifts: In: 56 [P O :800; I V :30; IV Piggyback:200]  Out: 6683 [Urine:2175; Drains:460; OANOQ:0040]  No intake/output data recorded  Lab Results   Component Value Date    WBC 21 15 (H) 12/21/2018    HGB 6 5 (LL) 12/21/2018    HCT 20 5 (L) 12/21/2018    MCV 79 (L) 12/21/2018     12/21/2018       Lab Results   Component Value Date    GLUCOSE 84 12/15/2018    CALCIUM 6 6 (L) 12/21/2018    K 3 7 12/21/2018    CO2 24 12/21/2018     12/21/2018    BUN 5 12/21/2018    CREATININE 0 25 (L) 12/21/2018       Lab Results   Component Value Date/Time    POCGLU 97 12/21/2018 06:18 AM    POCGLU 130 12/20/2018 09:23 PM    POCGLU 107 12/20/2018 04:49 PM    POCGLU 78 12/20/2018 11:04 AM    POCGLU 85 12/20/2018 09:41 AM       Physical Exam  Gen: AAOx3, NAD, comfortable in bed  CVS: S1S2+, RRR, no murmurs  Lungs: CTA b/l normal respiratory effort and rate  Abdomen: soft, non tender, incision closed with staples-some clear drainage at inferior portion of incision  ANDREAS drain x 4 in place; ileostomy in place, pink stoma productive of stool  Extremities: SCDs on and on, non tender    A/P: Assessment / Plan: 75 y  o   with Stage IV ovarian cancer admitted on 12/14/18 in septic shock to bowel perforation,  status post exploratory laparotomy, abdominal washout and Abthera vac placement on 12/14/18, followed by exploratory laparotomy, abdominal washout,  Drain placement x 4, loop ileostomy & abdominal closure on 12/15/18, POD#6, doing well postoperatively, Hospital Day #7        #1 Sepsis secondary to bowel perforation:   - Status post surgery as mentioned   - WBC began to trend down on 12/18, however, it has been trending upwards (26 77 this am)  ID following  Repeat CT showed collection similar in size to previous, but appears more like an abscess  Options include drainage vs expectant management  Given that she is afebrile and WBC is decreased this morning, will favor the latter  - Initial Blood cultures no growth  Urine culture no growth  Tissue gram gram 4+ polys, gram + and - sergio, final consistent with multi drug resistant e coli  Anaerobic cultures growing Clostridium   - Per ID patient to be placed on Ceftriaxone and Flagyl-->appreciate continued recommendations     #2 Post operative Care:  - Pain: controlled with tylenol and roxicodone-palliative care following  - Encouraged incentive spirometry to reduce atelectasis and pneumonia risk  - Encouraged ambulation as tolerated-PT following, currently recommending STR-CM working on placement at [employer]  - DVT ppx: SCDs, Lovenox 40mg daily     #3 Stage IV Ovarian Cancer  Status post neoadjuvant chemotherapy with Carboplatin, Taxol & Avastin  Any further chemo is currently on hold until acute illness has subsided     #4 Type 2 DM:   Accuchecks 90s-100s  Continue sliding scale insulin, #3    #5 Anemia: likely chronic in nature given her metastatic disease  -will transfuse 2UPRBCs and f/u Hb this afternoon     FEN: Regular with Glucerna; Isolyte @ 125cc/hr     Disposition: Inpatient

## 2018-12-21 NOTE — PROGRESS NOTES
Progress Note - Palliative & Supportive Care  [facility]  75 y o   female  [address] 509/[facility] 509-01   MRN: [id number]  Encounter: [id number]     Assessment  Patient Active Problem List   Diagnosis    Other ascites    Lesion of liver    Edema leg    [facility]var[facility] ([facility] [address] [facility]    Abdominal pain    Intractable nausea and vomiting    Bowel perforation ([facility] [address] [facility]    Septic shock ([facility] [address] [facility]    Peritonitis ([facility] [address] )    S/P ileostomy ([facility] [address] )    Acute blood loss anemia    Leukocytosis    Hypokalemia       Plan:  Symptom Management: post-op pain  Does not want any medication changes  - oxyIR 5-10mg q 4h PRN   - PRN acetaminophen    Goals of Care:    - Focused on post-op recovery and rehab  History  Feels she is improving  Is not asking for pain medication as frequently  Does not want any changes to regimen at this time         Meds  all current active meds have been reviewed and current meds:   Current Facility-Administered Medications   Medication Dose Route Frequency    acetaminophen (TYLENOL) tablet 650 mg  650 mg Oral Q6H PRN    barium sulfate 2 1 % suspension 450 mL  450 mL Oral 90 min pre-procedure    cefTRIAXone (ROCEPHIN) 1,000 mg in dextrose 5 % 50 mL IVPB  1,000 mg Intravenous Q24H    chlorhexidine (PERIDEX) 0 12 % oral rinse 15 mL  15 mL Swish & Spit Q12H [address]    dextrose 50 % IV solution 25 mL  25 mL Intravenous PRN    enoxaparin (LOVENOX) subcutaneous injection 40 mg  40 mg Subcutaneous Q24H MARCIO    insulin lispro (HumaLOG) 100 units/mL subcutaneous injection 1-5 Units  1-5 Units Subcutaneous HS    insulin lispro (HumaLOG) 100 units/mL subcutaneous injection 1-6 Units  1-6 Units Subcutaneous TID AC    metroNIDAZOLE (FLAGYL) tablet 500 mg  500 mg Oral Q8H [address]    morphine (PF) 4 mg/mL injection 4 mg  4 mg Intravenous Q4H PRN    multi-electrolyte (ISOLYTE-S PH 7 4 equivalent) IV solution  125 mL/hr Intravenous Continuous    ondansetron (ZOFRAN) injection 4 mg  4 mg Intravenous Q6H PRN    oxyCODONE (ROXICODONE) immediate release tablet 10 mg  10 mg Oral Q4H PRN    oxyCODONE (ROXICODONE) IR tablet 5 mg  5 mg Oral Q4H PRN    pantoprazole (PROTONIX) injection 40 mg  40 mg Intravenous Q24H MARCIO    phenol (CHLORASEPTIC) 1 4 % mucosal liquid 1 spray  1 spray Mouth/Throat Q2H PRN     Facility-Administered Medications Ordered in Other Encounters   Medication Dose Route Frequency    heparin lock flush 100 units/mL injection 300 Units  300 Units Intracatheter Q1H PRN       No Known Allergies    Objective  Physical Exam   Constitutional: She is oriented to person, place, and time  No distress  HENT:   Head: Normocephalic and atraumatic  Right Ear: External ear normal    Left Ear: External ear normal    Eyes: Right eye exhibits no discharge  Left eye exhibits no discharge  Cardiovascular: Normal rate  Abdominal: Soft  She exhibits no distension  Musculoskeletal: She exhibits no edema  Neurological: She is alert and oriented to person, place, and time  Skin: Skin is warm and dry  There is pallor  Psychiatric: She has a normal mood and affect  Nursing note and vitals reviewed  Lab Results: I have personally reviewed pertinent labs  Total time- 25+ minutes, coordination of care, chart review, symptom assessment       Jami Sharma DO  Palliative & Supportive Care  Office number: [phone]

## 2018-12-22 LAB
ABO GROUP BLD BPU: NORMAL
ABO GROUP BLD BPU: NORMAL
ANION GAP SERPL CALCULATED.3IONS-SCNC: 7 MMOL/L (ref 4–13)
BACTERIA BLD CULT: NORMAL
BACTERIA BLD CULT: NORMAL
BASOPHILS # BLD AUTO: 0.11 THOUSANDS/ΜL (ref 0–0.1)
BASOPHILS NFR BLD AUTO: 0 % (ref 0–1)
BPU ID: NORMAL
BPU ID: NORMAL
BUN SERPL-MCNC: 4 MG/DL (ref 5–25)
CALCIUM SERPL-MCNC: 7.7 MG/DL (ref 8.3–10.1)
CHLORIDE SERPL-SCNC: 105 MMOL/L (ref 100–108)
CO2 SERPL-SCNC: 25 MMOL/L (ref 21–32)
CREAT SERPL-MCNC: 0.24 MG/DL (ref 0.6–1.3)
EOSINOPHIL # BLD AUTO: 0.03 THOUSAND/ΜL (ref 0–0.61)
EOSINOPHIL NFR BLD AUTO: 0 % (ref 0–6)
ERYTHROCYTE [DISTWIDTH] IN BLOOD BY AUTOMATED COUNT: 19.1 % (ref 11.6–15.1)
GFR SERPL CREATININE-BSD FRML MDRD: 127 ML/MIN/1.73SQ M
GLUCOSE SERPL-MCNC: 107 MG/DL (ref 65–140)
GLUCOSE SERPL-MCNC: 121 MG/DL (ref 65–140)
GLUCOSE SERPL-MCNC: 143 MG/DL (ref 65–140)
GLUCOSE SERPL-MCNC: 173 MG/DL (ref 65–140)
GLUCOSE SERPL-MCNC: 177 MG/DL (ref 65–140)
HCT VFR BLD AUTO: 41.4 % (ref 34.8–46.1)
HGB BLD-MCNC: 13.5 G/DL (ref 11.5–15.4)
IMM GRANULOCYTES # BLD AUTO: 0.42 THOUSAND/UL (ref 0–0.2)
IMM GRANULOCYTES NFR BLD AUTO: 2 % (ref 0–2)
LYMPHOCYTES # BLD AUTO: 1.71 THOUSANDS/ΜL (ref 0.6–4.47)
LYMPHOCYTES NFR BLD AUTO: 6 % (ref 14–44)
MCH RBC QN AUTO: 26.2 PG (ref 26.8–34.3)
MCHC RBC AUTO-ENTMCNC: 32.6 G/DL (ref 31.4–37.4)
MCV RBC AUTO: 80 FL (ref 82–98)
MONOCYTES # BLD AUTO: 1.49 THOUSAND/ΜL (ref 0.17–1.22)
MONOCYTES NFR BLD AUTO: 5 % (ref 4–12)
NEUTROPHILS # BLD AUTO: 24.24 THOUSANDS/ΜL (ref 1.85–7.62)
NEUTS SEG NFR BLD AUTO: 87 % (ref 43–75)
NRBC BLD AUTO-RTO: 0 /100 WBCS
PLATELET # BLD AUTO: 618 THOUSANDS/UL (ref 149–390)
PMV BLD AUTO: 11.3 FL (ref 8.9–12.7)
POTASSIUM SERPL-SCNC: 3.8 MMOL/L (ref 3.5–5.3)
RBC # BLD AUTO: 5.16 MILLION/UL (ref 3.81–5.12)
SODIUM SERPL-SCNC: 137 MMOL/L (ref 136–145)
UNIT DISPENSE STATUS: NORMAL
UNIT DISPENSE STATUS: NORMAL
UNIT PRODUCT CODE: NORMAL
UNIT PRODUCT CODE: NORMAL
UNIT RH: NORMAL
UNIT RH: NORMAL
WBC # BLD AUTO: 28 THOUSAND/UL (ref 4.31–10.16)

## 2018-12-22 PROCEDURE — 99231 SBSQ HOSP IP/OBS SF/LOW 25: CPT | Performed by: INTERNAL MEDICINE

## 2018-12-22 PROCEDURE — C9113 INJ PANTOPRAZOLE SODIUM, VIA: HCPCS | Performed by: SURGERY

## 2018-12-22 PROCEDURE — 85025 COMPLETE CBC W/AUTO DIFF WBC: CPT | Performed by: OBSTETRICS & GYNECOLOGY

## 2018-12-22 PROCEDURE — 80048 BASIC METABOLIC PNL TOTAL CA: CPT | Performed by: OBSTETRICS & GYNECOLOGY

## 2018-12-22 PROCEDURE — 82948 REAGENT STRIP/BLOOD GLUCOSE: CPT

## 2018-12-22 PROCEDURE — 99024 POSTOP FOLLOW-UP VISIT: CPT | Performed by: OBSTETRICS & GYNECOLOGY

## 2018-12-22 RX ORDER — CALCIUM CARBONATE 1250 MG/5ML
1250 SUSPENSION ORAL
Status: DISCONTINUED | OUTPATIENT
Start: 2018-12-22 | End: 2018-12-29 | Stop reason: HOSPADM

## 2018-12-22 RX ADMIN — METRONIDAZOLE 500 MG: 500 TABLET ORAL at 07:04

## 2018-12-22 RX ADMIN — METRONIDAZOLE 500 MG: 500 TABLET ORAL at 14:49

## 2018-12-22 RX ADMIN — CALCIUM CARBONATE 1250 MG: 1250 SUSPENSION ORAL at 17:15

## 2018-12-22 RX ADMIN — INSULIN LISPRO 1 UNITS: 100 INJECTION, SOLUTION INTRAVENOUS; SUBCUTANEOUS at 12:28

## 2018-12-22 RX ADMIN — PANTOPRAZOLE SODIUM 40 MG: 40 INJECTION, POWDER, FOR SOLUTION INTRAVENOUS at 10:39

## 2018-12-22 RX ADMIN — CALCIUM CARBONATE 1250 MG: 1250 SUSPENSION ORAL at 12:29

## 2018-12-22 RX ADMIN — CEFTRIAXONE SODIUM 1000 MG: 10 INJECTION, POWDER, FOR SOLUTION INTRAVENOUS at 17:15

## 2018-12-22 RX ADMIN — METRONIDAZOLE 500 MG: 500 TABLET ORAL at 22:14

## 2018-12-22 RX ADMIN — OXYCODONE HYDROCHLORIDE 5 MG: 5 TABLET ORAL at 22:17

## 2018-12-22 RX ADMIN — ENOXAPARIN SODIUM 40 MG: 40 INJECTION SUBCUTANEOUS at 10:40

## 2018-12-22 RX ADMIN — CHLORHEXIDINE GLUCONATE 0.12% ORAL RINSE 15 ML: 1.2 LIQUID ORAL at 20:53

## 2018-12-22 RX ADMIN — SODIUM CHLORIDE, SODIUM GLUCONATE, SODIUM ACETATE, POTASSIUM CHLORIDE, MAGNESIUM CHLORIDE, SODIUM PHOSPHATE, DIBASIC, AND POTASSIUM PHOSPHATE 125 ML/HR: .53; .5; .37; .037; .03; .012; .00082 INJECTION, SOLUTION INTRAVENOUS at 17:21

## 2018-12-22 RX ADMIN — SODIUM CHLORIDE, SODIUM GLUCONATE, SODIUM ACETATE, POTASSIUM CHLORIDE, MAGNESIUM CHLORIDE, SODIUM PHOSPHATE, DIBASIC, AND POTASSIUM PHOSPHATE 125 ML/HR: .53; .5; .37; .037; .03; .012; .00082 INJECTION, SOLUTION INTRAVENOUS at 10:54

## 2018-12-22 RX ADMIN — CALCIUM CARBONATE 1250 MG: 1250 SUSPENSION ORAL at 10:44

## 2018-12-22 RX ADMIN — INSULIN LISPRO 1 UNITS: 100 INJECTION, SOLUTION INTRAVENOUS; SUBCUTANEOUS at 17:15

## 2018-12-22 RX ADMIN — SODIUM CHLORIDE, SODIUM GLUCONATE, SODIUM ACETATE, POTASSIUM CHLORIDE, MAGNESIUM CHLORIDE, SODIUM PHOSPHATE, DIBASIC, AND POTASSIUM PHOSPHATE 125 ML/HR: .53; .5; .37; .037; .03; .012; .00082 INJECTION, SOLUTION INTRAVENOUS at 00:56

## 2018-12-22 RX ADMIN — CHLORHEXIDINE GLUCONATE 0.12% ORAL RINSE 15 ML: 1.2 LIQUID ORAL at 10:39

## 2018-12-22 NOTE — PROGRESS NOTES
Progress Note - Infectious Disease   Catherine Brown 75 y o  female MRN: [id number]  Unit/Bed#: [facility] 509-01 Encounter: [id number]      Impression/Plan:  1  Sepsis-POA   Leukocytosis and tachycardia   Likely all secondary to the patient's peritonitis and bacteremia   No other clear sources appreciated   [facility] continues to have a leukocytosis which is now increased to 28,000  -continue ceftriaxone 1 g IV Q 24 hours  -continue Flagyl 500 mg p o  Q 8 hours  -recheck CBC with diff and creatinine    -the last CT scan yesterday shows a loculated fluid collection in the cul-de-sac with tiny foci of gas  There is also new wall enhancement which may represent a developing abscess  There is also an additional collection in the left subdiaphragmatic region adjacent to the spleen that may also represent an abscess  -Gyne/onc comments noted regarding IR consultation to proceed with drainage of the pelvic cystic structure  2  Peritonitis-secondary to bowel perforation   The patient is status post exploratory laparotomy with washout, ileostomy, and closure   She is clinically much improved with significant improvement of the abdominal pain and recovery of bowel function  However the white count has continued to drift a port   -antibiotics as above  -plan to continue Flagyl through 12/27/2018 to complete 2 weeks total  -serial abdominal exam  -recheck CBC with diff  -close gyn oncology follow-up     3  Clostridial bacteremia-appears to be all secondary to 2  No other clear source appreciated  The organism is sensitive to Flagyl  Follow-up blood cultures are finalized as negative   -antibiotics as above  -plan to continue Flagyl through 12/27/2018  -follow up repeat blood cultures are finalized as negative  -no need for PICC line for now     4  Stage IV ovarian carcinoma-status post new adjuvant chemotherapy and Neulasta  -hold on chemotherapy in the setting of an acute infectious process for now     5   Diabetes mellitus-type 2       Discussed with the primary service    Antibiotics:  Ceftriaxone and metronidazole  Antibiotics 9  Postop day 8    Subjective: Although she feels better she is frustrated by leakage around her ostomy bag  Objective:  Vitals:  Temp:  [97 8 °F (36 6 °C)-98 6 °F (37 °C)] 98 3 °F (36 8 °C)  HR:  [80-94] 88  Resp:  [14-18] 18  BP: (101-120)/(55-67) 120/65  SpO2:  [91 %-95 %] 92 %  Temp (24hrs), Av 3 °F (36 8 °C), Min:97 8 °F (36 6 °C), Max:98 6 °F (37 °C)  Current: Temperature: 98 3 °F (36 8 °C)    Physical Exam:   General Appearance:  Alert, chronically ill-appearing interactive, nontoxic, no acute distress  Throat: Oropharynx moist without lesions  Lungs:   Decreased breath sounds bilaterally; no wheezes, rhonchi or rales; respirations unlabored   Heart:  RRR; no murmur, rub or gallop   Abdomen:   Soft, non-tender, non-distended, positive bowel sounds  Incision well approximated without erythema or drainage  Ostomy with good output with some leakage at seam   ANDREAS drains in place  Extremities: No clubbing, cyanosis or edema   Skin: No new rashes or lesions  No draining wounds noted         Labs, Imaging, & Other studies:   All pertinent labs and imaging studies were personally reviewed    Results from last 7 days  Lab Units 18  0837 18  1944 18  0453 18  0442   WBC Thousand/uL 28 00*  --  21 15* 26 77*   HEMOGLOBIN g/dL 13 5 11 5 6 5* 8 7*   PLATELETS Thousands/uL 618*  --  371 388       Results from last 7 days  Lab Units 18  0443 18  0453 18  0442 18  0543 18  0628   SODIUM mmol/L 137 137 139 139 138   POTASSIUM mmol/L 3 8 3 7 3 8 3 9 4 0   CHLORIDE mmol/L 105 104 106 107 107   CO2 mmol/L 25 24 27 26 25   BUN mg/dL 4* 5 8 5 9   CREATININE mg/dL 0 24* 0 25* 0 33* 0 31* 0 29*   EGFR ml/min/1 73sq m 127 125 114 117 119   CALCIUM mg/dL 7 7* 6 6* 7 7* 7 6* 7 7*   AST U/L  --   --  36 31 33   ALT U/L  --   --  20 18 20   ALK PHOS U/L  --   -- 257* 179* 130*       Results from last 7 days  Lab Units 12/17/18  1344 12/17/18  1343   BLOOD CULTURE  No Growth After 4 Days  No Growth After 4 Days

## 2018-12-22 NOTE — PROGRESS NOTES
Progress Note - Infectious Disease   Catherine Brown 75 y o  female MRN: [id number]  Unit/Bed#: [facility] 509-01 Encounter: [id number]      Impression/Plan:  1  Sepsis-POA   Leukocytosis and tachycardia   Likely all secondary to the patient's peritonitis and bacteremia   No other clear sources appreciated   The patient has clinically improved with resolution of the tachycardia   She continues to have a leukocytosis however this confounded by the fact that she has had Neulasta 2 weeks ago  The white cell count does remain quite elevated  No resistant organisms isolated  -continue ceftriaxone 1 g IV Q 24 hours  -continue Flagyl 500 mg p o  Q 8 hours  -recheck CBC with diff and creatinine  WBC is still elevated but less than yesterday  -supportive care  -the CT scan yesterday shows a loculated fluid collection in the cul-de-sac with tiny foci of gas  There is also new wall enhancement which may represent a developing abscess  There is also an additional collection in the left subdiaphragmatic region adjacent to the spleen that may also represent an abscess  -Gyne/onc comments noted regarding holding off on placing a drain at this time until there is evidence supporting an abscess with a fever and continued elevation of WBCs  2  Peritonitis-secondary to bowel perforation   The patient is status post exploratory laparotomy with washout, ileostomy, and closure   She is clinically much improved with significant improvement of the abdominal pain and recovery of bowel function  However the white count has continued to drift a port   -antibiotics as above  -plan to continue Flagyl through 12/27/2018 to complete 2 weeks total  -serial abdominal exam  -recheck CBC with diff  -close gyn oncology follow-up     3  Clostridial bacteremia-appears to be all secondary to 2  No other clear source appreciated  The organism is sensitive to Flagyl    Follow-up blood cultures are negative thus far  -antibiotics as above  -plan to continue Flagyl through 2018  -follow up repeat blood cultures to confirm clearance of the bacteremia which are negative at 4 days  -no need for PICC line for now     4  Stage IV ovarian carcinoma-status post new adjuvant chemotherapy and Neulasta  -hold on chemotherapy in the setting of an acute infectious process for now     5  Diabetes mellitus-type 2       Discussed with the primary service    Antibiotics:  Ceftriaxone and metronidazole  Antibiotics 8  Postop day 7    Subjective:  Overall she feels better today with much less discomfort  She is still weak and says that she is working on regaining her strength      Objective:  Vitals:  Temp:  [97 7 °F (36 5 °C)-98 6 °F (37 °C)] 98 6 °F (37 °C)  HR:  [67-94] 94  Resp:  [12-18] 18  BP: ()/(52-76) 116/67  SpO2:  [92 %-95 %] 95 %  Temp (24hrs), Av 1 °F (36 7 °C), Min:97 7 °F (36 5 °C), Max:98 6 °F (37 °C)  Current: Temperature: 98 6 °F (37 °C)    Physical Exam:   General Appearance:  Alert, chronically ill-appearing interactive, nontoxic, no acute distress  Throat: Oropharynx moist without lesions  Lungs:   Decreased breath sounds bilaterally; no wheezes, rhonchi or rales; respirations unlabored   Heart:  RRR; no murmur, rub or gallop   Abdomen:   Soft, non-tender, non-distended, positive bowel sounds  Incision well approximated without erythema or drainage  Ostomy with good output  ANDREAS drains in place  Extremities: No clubbing, cyanosis or edema   Skin: No new rashes or lesions  No draining wounds noted         Labs, Imaging, & Other studies:   All pertinent labs and imaging studies were personally reviewed    Results from last 7 days  Lab Units 18  1944 18  0453 18  0442 18  0543   WBC Thousand/uL  --  21 15* 26 77* 25 62*   HEMOGLOBIN g/dL 11 5 6 5* 8 7* 8 7*   PLATELETS Thousands/uL  --  371 388 296       Results from last 7 days  Lab Units 18  0453 18  0442 18  0543 18  4417  12/15/18  1218 SODIUM mmol/L 137 139 139 138  < >  --    POTASSIUM mmol/L 3 7 3 8 3 9 4 0  < >  --    CHLORIDE mmol/L 104 106 107 107  < >  --    CO2 mmol/L 24 27 26 25  < >  --    CO2, I-STAT mmol/L  --   --   --   --   --  24   BUN mg/dL 5 8 5 9  < >  --    CREATININE mg/dL 0 25* 0 33* 0 31* 0 29*  < >  --    EGFR ml/min/1 73sq m 125 114 117 119  < >  --    GLUCOSE, ISTAT mg/dl  --   --   --   --   --  84   CALCIUM mg/dL 6 6* 7 7* 7 6* 7 7*  < >  --    AST U/L  --  36 31 33  < >  --    ALT U/L  --  20 18 20  < >  --    ALK PHOS U/L  --  257* 179* 130*  < >  --    < > = values in this interval not displayed  Results from last 7 days  Lab Units 12/17/18  1344 12/17/18  1343 12/14/18  2334 12/14/18  2314   BLOOD CULTURE  No Growth After 4 Days  No Growth After 4 Days    --   --    GRAM STAIN RESULT   --   --   --  4+ Polys  4+ Gram negative rods  4+ Gram positive rods   URINE CULTURE   --   --  No Growth <1000 cfu/mL  --

## 2018-12-22 NOTE — PROGRESS NOTES
IR consulted for drainage tube placement in a pelvic cul-de-sac abscess  The patient presented with Stage IV ovarian cancer admitted on 12/14/18 in septic shock due to bowel perforation  She is status post exploratory laparotomy, abdominal washout and Abthera vac placement on 12/14/18, followed by exploratory laparotomy, abdominal washout, drain placement x 4, loop ileostomy & abdominal closure on 12/15/18  She has existing surgical drains but her WBC is rising and the pelvic collection may not be drained by the surgical drains  We will set the patient up for Monday and perform a CT guided abscess drainage tube placement  The clinical service will hold the Lovenox Sunday night, make the patient NPO and place the CT drainage order  Thank you for allowing interventional radiology to assist in this patient's care

## 2018-12-22 NOTE — PROGRESS NOTES
Gyn Oncology Progress note   Radha Leigh 75 y o  female MRN: [id number]  Unit/Bed#: [facility] 509-01 Encounter: [id number]    Radha Leigh has no current complaints  Pain is none  Patient is currently voiding  She is ambulating  She is tolerating PO, and denies nausea or vomiting  Patient denies fever, chills, chest pain, shortness of breath, or calf tenderness  /65 (BP Location: Left arm)   Pulse 88   Temp 98 3 °F (36 8 °C) (Oral)   Resp 18   Wt 69 3 kg (152 lb 12 5 oz)   SpO2 92%   BMI 25 64 kg/m²     I/O last 3 completed shifts: In: 3376 7 [P O :360; I V :2166 7; Blood:700; IV Piggyback:150]  Out: 8796 [Urine:2000; Drains:415; Stool:300]  I/O this shift:  In: -   Out: 720 [Urine:600; Drains:120]    Lab Results   Component Value Date    WBC 21 15 (H) 12/21/2018    HGB 11 5 12/21/2018    HCT 34 1 (L) 12/21/2018    MCV 79 (L) 12/21/2018     12/21/2018       Lab Results   Component Value Date    GLUCOSE 84 12/15/2018    CALCIUM 7 7 (L) 12/22/2018    K 3 8 12/22/2018    CO2 25 12/22/2018     12/22/2018    BUN 4 (L) 12/22/2018    CREATININE 0 24 (L) 12/22/2018       Lab Results   Component Value Date/Time    POCGLU 121 12/22/2018 06:35 AM    POCGLU 148 (H) 12/21/2018 09:00 PM    POCGLU 139 12/21/2018 04:01 PM    POCGLU 146 (H) 12/21/2018 11:08 AM    POCGLU 97 12/21/2018 06:18 AM       Physical Exam  Gen: AAOx3, NAD, comfortable in bed  CVS: S1S2+, RRR, no murmurs  Lungs: CTA b/l normal respiratory effort and rate  Abdomen: soft, non tender, incision closed with staples-some clear drainage at inferior portion of incision  ANDREAS drain x 4 in place; ileostomy in place, pink stoma productive of stool  Extremities: SCDs on and on, non tender    A/P: Assessment / Plan: 75 y  o   with Stage IV ovarian cancer admitted on 12/14/18 in septic shock to bowel perforation,  status post exploratory laparotomy, abdominal washout and Abthera vac placement on 12/14/18, followed by exploratory laparotomy, abdominal washout,  Drain placement x 4, loop ileostomy & abdominal closure on 12/15/18, POD#7, doing well postoperatively, Hospital Day #8        #1 Sepsis secondary to bowel perforation:   - Status post surgery as mentioned   - WBC elevated, but trending down as of 12/21 26-->21-->f/u am CBC  Repeat CT showed collection similar in size to previous, but appears more like an abscess: continue abx treatment  - Initial Blood cultures no growth  Urine culture no growth  Tissue gram gram 4+ polys, gram + and - sergio, final consistent with multi drug resistant e coli  Anaerobic cultures growing Clostridium   - Per ID patient on Ceftriaxone and Flagyl-->appreciate continued recommendations     #2 Post operative Care:  - Pain: controlled with tylenol and roxicodone-palliative care following  - Encouraged incentive spirometry to reduce atelectasis and pneumonia risk  - Encouraged ambulation as tolerated-PT following, currently recommending STR-CM working on placement at [employer]  - DVT ppx: SCDs, Lovenox 40mg daily     #3 Stage IV Ovarian Cancer  Status post neoadjuvant chemotherapy with Carboplatin, Taxol & Avastin  Any further chemo is currently on hold until acute illness has subsided     #4 Type 2 DM:   Accuchecks 120s-140s  Continue sliding scale insulin, #3     #5 Anemia: likely chronic in nature given her metastatic disease  -resolved s/p transfusion of 2UPRBCs-->11 5     FEN: Regular with Glucerna; Isolyte @ 125cc/hr     Disposition: Inpatient

## 2018-12-23 LAB
ANION GAP SERPL CALCULATED.3IONS-SCNC: 5 MMOL/L (ref 4–13)
BASOPHILS # BLD AUTO: 0.1 THOUSANDS/ΜL (ref 0–0.1)
BASOPHILS NFR BLD AUTO: 0 % (ref 0–1)
BUN SERPL-MCNC: 4 MG/DL (ref 5–25)
CALCIUM SERPL-MCNC: 7.5 MG/DL (ref 8.3–10.1)
CHLORIDE SERPL-SCNC: 106 MMOL/L (ref 100–108)
CO2 SERPL-SCNC: 26 MMOL/L (ref 21–32)
CREAT SERPL-MCNC: 0.3 MG/DL (ref 0.6–1.3)
EOSINOPHIL # BLD AUTO: 0.04 THOUSAND/ΜL (ref 0–0.61)
EOSINOPHIL NFR BLD AUTO: 0 % (ref 0–6)
ERYTHROCYTE [DISTWIDTH] IN BLOOD BY AUTOMATED COUNT: 19.7 % (ref 11.6–15.1)
GFR SERPL CREATININE-BSD FRML MDRD: 118 ML/MIN/1.73SQ M
GLUCOSE SERPL-MCNC: 110 MG/DL (ref 65–140)
GLUCOSE SERPL-MCNC: 111 MG/DL (ref 65–140)
GLUCOSE SERPL-MCNC: 133 MG/DL (ref 65–140)
GLUCOSE SERPL-MCNC: 139 MG/DL (ref 65–140)
GLUCOSE SERPL-MCNC: 150 MG/DL (ref 65–140)
HCT VFR BLD AUTO: 35.9 % (ref 34.8–46.1)
HGB BLD-MCNC: 11.7 G/DL (ref 11.5–15.4)
IMM GRANULOCYTES # BLD AUTO: 0.37 THOUSAND/UL (ref 0–0.2)
IMM GRANULOCYTES NFR BLD AUTO: 2 % (ref 0–2)
LYMPHOCYTES # BLD AUTO: 1.77 THOUSANDS/ΜL (ref 0.6–4.47)
LYMPHOCYTES NFR BLD AUTO: 7 % (ref 14–44)
MCH RBC QN AUTO: 26.1 PG (ref 26.8–34.3)
MCHC RBC AUTO-ENTMCNC: 32.6 G/DL (ref 31.4–37.4)
MCV RBC AUTO: 80 FL (ref 82–98)
MONOCYTES # BLD AUTO: 1.43 THOUSAND/ΜL (ref 0.17–1.22)
MONOCYTES NFR BLD AUTO: 6 % (ref 4–12)
NEUTROPHILS # BLD AUTO: 21.13 THOUSANDS/ΜL (ref 1.85–7.62)
NEUTS SEG NFR BLD AUTO: 85 % (ref 43–75)
NRBC BLD AUTO-RTO: 0 /100 WBCS
PLATELET # BLD AUTO: 586 THOUSANDS/UL (ref 149–390)
PMV BLD AUTO: 11.3 FL (ref 8.9–12.7)
POTASSIUM SERPL-SCNC: 3.8 MMOL/L (ref 3.5–5.3)
RBC # BLD AUTO: 4.49 MILLION/UL (ref 3.81–5.12)
SODIUM SERPL-SCNC: 137 MMOL/L (ref 136–145)
WBC # BLD AUTO: 24.84 THOUSAND/UL (ref 4.31–10.16)

## 2018-12-23 PROCEDURE — 85025 COMPLETE CBC W/AUTO DIFF WBC: CPT | Performed by: OBSTETRICS & GYNECOLOGY

## 2018-12-23 PROCEDURE — C9113 INJ PANTOPRAZOLE SODIUM, VIA: HCPCS | Performed by: SURGERY

## 2018-12-23 PROCEDURE — 99024 POSTOP FOLLOW-UP VISIT: CPT | Performed by: OBSTETRICS & GYNECOLOGY

## 2018-12-23 PROCEDURE — 99231 SBSQ HOSP IP/OBS SF/LOW 25: CPT | Performed by: INTERNAL MEDICINE

## 2018-12-23 PROCEDURE — 80048 BASIC METABOLIC PNL TOTAL CA: CPT | Performed by: OBSTETRICS & GYNECOLOGY

## 2018-12-23 PROCEDURE — 82948 REAGENT STRIP/BLOOD GLUCOSE: CPT

## 2018-12-23 RX ORDER — PANTOPRAZOLE SODIUM 40 MG/1
40 TABLET, DELAYED RELEASE ORAL
Status: DISCONTINUED | OUTPATIENT
Start: 2018-12-23 | End: 2018-12-29 | Stop reason: HOSPADM

## 2018-12-23 RX ADMIN — SODIUM CHLORIDE, SODIUM GLUCONATE, SODIUM ACETATE, POTASSIUM CHLORIDE, MAGNESIUM CHLORIDE, SODIUM PHOSPHATE, DIBASIC, AND POTASSIUM PHOSPHATE 125 ML/HR: .53; .5; .37; .037; .03; .012; .00082 INJECTION, SOLUTION INTRAVENOUS at 10:42

## 2018-12-23 RX ADMIN — OXYCODONE HYDROCHLORIDE 5 MG: 5 TABLET ORAL at 08:01

## 2018-12-23 RX ADMIN — CEFTRIAXONE SODIUM 1000 MG: 10 INJECTION, POWDER, FOR SOLUTION INTRAVENOUS at 16:26

## 2018-12-23 RX ADMIN — METRONIDAZOLE 500 MG: 500 TABLET ORAL at 06:23

## 2018-12-23 RX ADMIN — CHLORHEXIDINE GLUCONATE 0.12% ORAL RINSE 15 ML: 1.2 LIQUID ORAL at 08:01

## 2018-12-23 RX ADMIN — ENOXAPARIN SODIUM 40 MG: 40 INJECTION SUBCUTANEOUS at 08:00

## 2018-12-23 RX ADMIN — INSULIN LISPRO 1 UNITS: 100 INJECTION, SOLUTION INTRAVENOUS; SUBCUTANEOUS at 16:40

## 2018-12-23 RX ADMIN — CALCIUM CARBONATE 1250 MG: 1250 SUSPENSION ORAL at 08:02

## 2018-12-23 RX ADMIN — CALCIUM CARBONATE 1250 MG: 1250 SUSPENSION ORAL at 12:36

## 2018-12-23 RX ADMIN — METRONIDAZOLE 500 MG: 500 TABLET ORAL at 14:15

## 2018-12-23 RX ADMIN — PANTOPRAZOLE SODIUM 40 MG: 40 INJECTION, POWDER, FOR SOLUTION INTRAVENOUS at 08:01

## 2018-12-23 RX ADMIN — SODIUM CHLORIDE, SODIUM GLUCONATE, SODIUM ACETATE, POTASSIUM CHLORIDE, MAGNESIUM CHLORIDE, SODIUM PHOSPHATE, DIBASIC, AND POTASSIUM PHOSPHATE 125 ML/HR: .53; .5; .37; .037; .03; .012; .00082 INJECTION, SOLUTION INTRAVENOUS at 02:00

## 2018-12-23 RX ADMIN — METRONIDAZOLE 500 MG: 500 TABLET ORAL at 21:25

## 2018-12-23 RX ADMIN — PANTOPRAZOLE SODIUM 40 MG: 40 TABLET, DELAYED RELEASE ORAL at 16:26

## 2018-12-23 RX ADMIN — CALCIUM CARBONATE 1250 MG: 1250 SUSPENSION ORAL at 16:38

## 2018-12-23 NOTE — PROGRESS NOTES
Progress Note - Infectious Disease   Catherine Benitez 75 y o  female MRN: [id number]  Unit/Bed#: [facility] 509-01 Encounter: [id number]      Impression/Plan:  1  Sepsis-POA   Leukocytosis and tachycardia  Sigmund Damascus all secondary to the patient's peritonitis and bacteremia   No other clear sources appreciated   Bibiana Coleman continues to have a leukocytosis which is now  24,840    -continue ceftriaxone 1 g IV Q 24 hours  -continue Flagyl 500 mg p o  Q 8 hours  -recheck CBC with diff and creatinine    -the last CT scan  12/20 shows a loculated fluid collection in the cul-de-sac with tiny foci of gas  There is also new wall enhancement which may represent a developing abscess  There is also an additional collection in the left subdiaphragmatic region adjacent to the spleen that may also represent an abscess  -Gyne/onc comments noted regarding IR consultation to proceed with drainage of the pelvic cystic structure tomorrow  2  Peritonitis-secondary to bowel perforation   The patient is status post exploratory laparotomy with washout, ileostomy, and closure   She is clinically much improved with significant improvement of the abdominal pain and recovery of bowel function  However the white count has continued to be elevated  -antibiotics as above  -plan to continue Flagyl through 12/27/2018 to complete 2 weeks total  -serial abdominal exam  -recheck CBC with diff  -close gyn oncology follow-up     3  Clostridial bacteremia-appears to be all secondary to 2  No other clear source appreciated  The organism is sensitive to Flagyl  Follow-up blood cultures are finalized as negative   -antibiotics as above  -plan to continue Flagyl through 12/27/2018  -follow up repeat blood cultures are finalized as negative  -no need for PICC line for now     4  Stage IV ovarian carcinoma-status post new adjuvant chemotherapy and Neulasta  -hold on chemotherapy in the setting of an acute infectious process for now     5   Diabetes mellitus-type 2           Antibiotics:  Ceftriaxone and metronidazole  Antibiotics 10  Postop day 9    Subjective:  She feels better now that the ostomy bag no longer is leaking    Objective:  Vitals:  Temp:  [98 5 °F (36 9 °C)-98 6 °F (37 °C)] 98 5 °F (36 9 °C)  HR:  [85-87] 85  Resp:  [18] 18  BP: (107-116)/(58-64) 116/63  SpO2:  [94 %-98 %] 98 %  Temp (24hrs), Av 5 °F (36 9 °C), Min:98 5 °F (36 9 °C), Max:98 6 °F (37 °C)  Current: Temperature: 98 5 °F (36 9 °C)    Physical Exam:   General Appearance:  Alert, chronically ill-appearing interactive, nontoxic, no acute distress  Throat: Oropharynx moist without lesions  Lungs:   Decreased breath sounds bilaterally; no wheezes, rhonchi or rales; respirations unlabored   Heart:  RRR; no murmur, rub or gallop   Abdomen:   Soft, non-tender, non-distended, positive bowel sounds  Incision well approximated without erythema or drainage  Ostomy with good output without leakage at seam   ANDREAS drains in place  Extremities: No clubbing, cyanosis or edema   Skin: No new rashes or lesions  No draining wounds noted         Labs, Imaging, & Other studies:   All pertinent labs and imaging studies were personally reviewed    Results from last 7 days  Lab Units 18  0629 18  0837 18  1944 18  0453   WBC Thousand/uL 24 84* 28 00*  --  21 15*   HEMOGLOBIN g/dL 11 7 13 5 11 5 6 5*   PLATELETS Thousands/uL 586* 618*  --  371       Results from last 7 days  Lab Units 18  0629 18  0443 18  0453 18  0442 18  0543 18  0628   SODIUM mmol/L 137 137 137 139 139 138   POTASSIUM mmol/L 3 8 3 8 3 7 3 8 3 9 4 0   CHLORIDE mmol/L 106 105 104 106 107 107   CO2 mmol/L 26 25 24 27 26 25   BUN mg/dL 4* 4* 5 8 5 9   CREATININE mg/dL 0 30* 0 24* 0 25* 0 33* 0 31* 0 29*   EGFR ml/min/1 73sq m 118 127 125 114 117 119   CALCIUM mg/dL 7 5* 7 7* 6 6* 7 7* 7 6* 7 7*   AST U/L  --   --   --  36 31 33   ALT U/L  --   --   --  20 18 20   ALK PHOS U/L  --   -- --  257* 179* 130*       Results from last 7 days  Lab Units 12/17/18  1344 12/17/18  1343   BLOOD CULTURE  No Growth After 5 Days  No Growth After 5 Days

## 2018-12-23 NOTE — PROGRESS NOTES
Gyn Oncology Progress note   Karolina Mendez 75 y o  female MRN: [id number]  Unit/Bed#: [facility] 509-01 Encounter: [id number]    Karolina Mendez has no current complaints  Pain is none  Patient is currently voiding  She is ambulating  She is tolerating PO, and denies nausea or vomiting  Patient denies fever, chills, chest pain, shortness of breath, or calf tenderness  /58 (BP Location: Left arm)   Pulse 86   Temp 98 5 °F (36 9 °C) (Oral)   Resp 18   Wt 70 1 kg (154 lb 8 7 oz)   SpO2 94%   BMI 25 94 kg/m²     I/O last 3 completed shifts: In: 5691 7 [I V :5691 7]  Out: 7161 [Urine:3825; Drains:510; Stool:650]  No intake/output data recorded  Lab Results   Component Value Date    WBC 24 84 (H) 12/23/2018    HGB 11 7 12/23/2018    HCT 35 9 12/23/2018    MCV 80 (L) 12/23/2018     (H) 12/23/2018       Lab Results   Component Value Date    GLUCOSE 84 12/15/2018    CALCIUM 7 5 (L) 12/23/2018    K 3 8 12/23/2018    CO2 26 12/23/2018     12/23/2018    BUN 4 (L) 12/23/2018    CREATININE 0 30 (L) 12/23/2018       Lab Results   Component Value Date/Time    POCGLU 110 12/23/2018 06:07 AM    POCGLU 143 (H) 12/22/2018 09:09 PM    POCGLU 173 (H) 12/22/2018 05:13 PM    POCGLU 177 (H) 12/22/2018 11:06 AM    POCGLU 121 12/22/2018 06:35 AM       Physical Exam  Gen: AAOx3, NAD, comfortable in bed  CVS: S1S2+, RRR, no murmurs  Lungs: CTA b/l normal respiratory effort and rate  Abdomen: soft, non tender, incision closed with staples  ANDREAS drain x 4 in place; ileostomy in place, pink stoma productive of stool  Extremities: SCDs on and on, non tender    A/P: Assessment / Plan: 75 y  o   with Stage IV ovarian cancer admitted on 12/14/18 in septic shock to bowel perforation,  status post exploratory laparotomy, abdominal washout and Abthera vac placement on 12/14/18, followed by exploratory laparotomy, abdominal washout,  Drain placement x 4, loop ileostomy & abdominal closure on 12/15/18, POD#8, doing well postoperatively, Hospital Day #9        #1 Sepsis secondary to bowel perforation:   - Status post surgery as mentioned   - WBC elevated and fluctuating   - Repeat CT showed collection similar in size to previous, but appears more like an abscess: given fluctuating and elevated WBC, will proceed with IR drainage to be performed on 12/24  - Initial Blood cultures no growth  Urine culture no growth  Tissue gram gram 4+ polys, gram + and - sergio, final consistent with multi drug resistant e coli  Anaerobic cultures growing Clostridium   - Per ID patient on Ceftriaxone and Flagyl-->appreciate continued recommendations     #2 Post operative Care:  - Pain: controlled with tylenol and roxicodone-palliative care following  - Encouraged incentive spirometry to reduce atelectasis and pneumonia risk  - Encouraged ambulation as tolerated-PT following, currently recommending STR-CM working on placement at [employer]  - DVT ppx: SCDs, Lovenox 40mg daily-->will hold this after midnight     #3 Stage IV Ovarian Cancer  Status post neoadjuvant chemotherapy with Carboplatin, Taxol & Avastin   Any further chemo is currently on hold until acute illness has subsided     #4 Type 2 DM:  Continue sliding scale insulin, #3     #5 Anemia: likely chronic in nature given her metastatic disease  -resolved s/p transfusion of 2UPRBCs-->11 5     FEN: Regular with Glucerna; Isolyte @ 125cc/hr--> NPO after midnight     Disposition: Inpatient

## 2018-12-23 NOTE — PROGRESS NOTES
The pantoprazole has / have been converted to Oral per [facility] IV-to-PO Auto-Conversion Protocol for Adults as approved by the Pharmacy and Therapeutics Committee  Patient tolerating PO without nausea or vomiting per recent notes  The patient met all eligible criteria:  3 Age = 25years old   2) Received at least one dose of the IV form   3) Receiving at least one other scheduled oral/enteral medication   4) Tolerating an oral/enteral diet   and did not have any exclusions:   1) Critical care patient   2) Active GI bleed (IF assessing H2RAs or PPIs)   3) Continuous tube feeding (IF assessing cipro, doxycycline, levofloxacin, minocycline, rifampin, or voriconazole)   4) Receiving PO vancomycin (IF assessing metronidazole)   5) Persistent nausea and/or vomiting   6) Ileus or gastrointestinal obstruction   7) Ritesh/nasogastric tube set for continuous suction   8) Specific order not to automatically convert to PO (in the order's comments or if discussed in the most recent Infectious Disease or primary team's progress notes)       Regulo Bajwa, TiaraD

## 2018-12-24 ENCOUNTER — APPOINTMENT (INPATIENT)
Dept: RADIOLOGY | Facility: HOSPITAL | Age: 68
DRG: 329 | End: 2018-12-24
Payer: MEDICARE

## 2018-12-24 ENCOUNTER — APPOINTMENT (INPATIENT)
Dept: NON INVASIVE DIAGNOSTICS | Facility: HOSPITAL | Age: 68
DRG: 329 | End: 2018-12-24
Payer: MEDICARE

## 2018-12-24 LAB
ANION GAP SERPL CALCULATED.3IONS-SCNC: 5 MMOL/L (ref 4–13)
BASOPHILS # BLD AUTO: 0.09 THOUSANDS/ΜL (ref 0–0.1)
BASOPHILS NFR BLD AUTO: 0 % (ref 0–1)
BUN SERPL-MCNC: 4 MG/DL (ref 5–25)
CALCIUM SERPL-MCNC: 7.8 MG/DL (ref 8.3–10.1)
CHLORIDE SERPL-SCNC: 107 MMOL/L (ref 100–108)
CO2 SERPL-SCNC: 25 MMOL/L (ref 21–32)
CREAT SERPL-MCNC: 0.29 MG/DL (ref 0.6–1.3)
EOSINOPHIL # BLD AUTO: 0.05 THOUSAND/ΜL (ref 0–0.61)
EOSINOPHIL NFR BLD AUTO: 0 % (ref 0–6)
ERYTHROCYTE [DISTWIDTH] IN BLOOD BY AUTOMATED COUNT: 20.2 % (ref 11.6–15.1)
GFR SERPL CREATININE-BSD FRML MDRD: 119 ML/MIN/1.73SQ M
GLUCOSE SERPL-MCNC: 106 MG/DL (ref 65–140)
GLUCOSE SERPL-MCNC: 108 MG/DL (ref 65–140)
GLUCOSE SERPL-MCNC: 127 MG/DL (ref 65–140)
GLUCOSE SERPL-MCNC: 149 MG/DL (ref 65–140)
GLUCOSE SERPL-MCNC: 155 MG/DL (ref 65–140)
HCT VFR BLD AUTO: 36.5 % (ref 34.8–46.1)
HGB BLD-MCNC: 11.9 G/DL (ref 11.5–15.4)
IMM GRANULOCYTES # BLD AUTO: 0.27 THOUSAND/UL (ref 0–0.2)
IMM GRANULOCYTES NFR BLD AUTO: 1 % (ref 0–2)
LYMPHOCYTES # BLD AUTO: 1.56 THOUSANDS/ΜL (ref 0.6–4.47)
LYMPHOCYTES NFR BLD AUTO: 6 % (ref 14–44)
MCH RBC QN AUTO: 26.3 PG (ref 26.8–34.3)
MCHC RBC AUTO-ENTMCNC: 32.6 G/DL (ref 31.4–37.4)
MCV RBC AUTO: 81 FL (ref 82–98)
MONOCYTES # BLD AUTO: 1.53 THOUSAND/ΜL (ref 0.17–1.22)
MONOCYTES NFR BLD AUTO: 6 % (ref 4–12)
NEUTROPHILS # BLD AUTO: 21.36 THOUSANDS/ΜL (ref 1.85–7.62)
NEUTS SEG NFR BLD AUTO: 87 % (ref 43–75)
NRBC BLD AUTO-RTO: 0 /100 WBCS
PLATELET # BLD AUTO: 594 THOUSANDS/UL (ref 149–390)
PMV BLD AUTO: 10.9 FL (ref 8.9–12.7)
POTASSIUM SERPL-SCNC: 3.8 MMOL/L (ref 3.5–5.3)
RBC # BLD AUTO: 4.52 MILLION/UL (ref 3.81–5.12)
SODIUM SERPL-SCNC: 137 MMOL/L (ref 136–145)
WBC # BLD AUTO: 24.86 THOUSAND/UL (ref 4.31–10.16)

## 2018-12-24 PROCEDURE — 80048 BASIC METABOLIC PNL TOTAL CA: CPT | Performed by: OBSTETRICS & GYNECOLOGY

## 2018-12-24 PROCEDURE — 8E0WXBG COMPUTER ASSISTED PROCEDURE OF TRUNK REGION, WITH COMPUTERIZED TOMOGRAPHY: ICD-10-PCS | Performed by: OBSTETRICS & GYNECOLOGY

## 2018-12-24 PROCEDURE — 87205 SMEAR GRAM STAIN: CPT | Performed by: OBSTETRICS & GYNECOLOGY

## 2018-12-24 PROCEDURE — 93970 EXTREMITY STUDY: CPT

## 2018-12-24 PROCEDURE — 99152 MOD SED SAME PHYS/QHP 5/>YRS: CPT | Performed by: RADIOLOGY

## 2018-12-24 PROCEDURE — 99024 POSTOP FOLLOW-UP VISIT: CPT | Performed by: OBSTETRICS & GYNECOLOGY

## 2018-12-24 PROCEDURE — 87186 SC STD MICRODIL/AGAR DIL: CPT | Performed by: OBSTETRICS & GYNECOLOGY

## 2018-12-24 PROCEDURE — 99153 MOD SED SAME PHYS/QHP EA: CPT

## 2018-12-24 PROCEDURE — 99152 MOD SED SAME PHYS/QHP 5/>YRS: CPT

## 2018-12-24 PROCEDURE — 49406 IMAGE CATH FLUID PERI/RETRO: CPT | Performed by: RADIOLOGY

## 2018-12-24 PROCEDURE — 99231 SBSQ HOSP IP/OBS SF/LOW 25: CPT | Performed by: INTERNAL MEDICINE

## 2018-12-24 PROCEDURE — 93970 EXTREMITY STUDY: CPT | Performed by: SURGERY

## 2018-12-24 PROCEDURE — 99232 SBSQ HOSP IP/OBS MODERATE 35: CPT | Performed by: INTERNAL MEDICINE

## 2018-12-24 PROCEDURE — 85025 COMPLETE CBC W/AUTO DIFF WBC: CPT | Performed by: OBSTETRICS & GYNECOLOGY

## 2018-12-24 PROCEDURE — C1769 GUIDE WIRE: HCPCS

## 2018-12-24 PROCEDURE — 49406 IMAGE CATH FLUID PERI/RETRO: CPT

## 2018-12-24 PROCEDURE — 82948 REAGENT STRIP/BLOOD GLUCOSE: CPT

## 2018-12-24 PROCEDURE — C1729 CATH, DRAINAGE: HCPCS

## 2018-12-24 PROCEDURE — 87077 CULTURE AEROBIC IDENTIFY: CPT | Performed by: OBSTETRICS & GYNECOLOGY

## 2018-12-24 PROCEDURE — 87070 CULTURE OTHR SPECIMN AEROBIC: CPT | Performed by: OBSTETRICS & GYNECOLOGY

## 2018-12-24 PROCEDURE — 0W9J30Z DRAINAGE OF PELVIC CAVITY WITH DRAINAGE DEVICE, PERCUTANEOUS APPROACH: ICD-10-PCS | Performed by: OBSTETRICS & GYNECOLOGY

## 2018-12-24 RX ORDER — FENTANYL CITRATE 50 UG/ML
INJECTION, SOLUTION INTRAMUSCULAR; INTRAVENOUS CODE/TRAUMA/SEDATION MEDICATION
Status: COMPLETED | OUTPATIENT
Start: 2018-12-24 | End: 2018-12-24

## 2018-12-24 RX ORDER — MIDAZOLAM HYDROCHLORIDE 1 MG/ML
INJECTION INTRAMUSCULAR; INTRAVENOUS CODE/TRAUMA/SEDATION MEDICATION
Status: COMPLETED | OUTPATIENT
Start: 2018-12-24 | End: 2018-12-24

## 2018-12-24 RX ORDER — DIPHENHYDRAMINE HYDROCHLORIDE 50 MG/ML
INJECTION INTRAMUSCULAR; INTRAVENOUS CODE/TRAUMA/SEDATION MEDICATION
Status: COMPLETED | OUTPATIENT
Start: 2018-12-24 | End: 2018-12-24

## 2018-12-24 RX ADMIN — PANTOPRAZOLE SODIUM 40 MG: 40 TABLET, DELAYED RELEASE ORAL at 17:31

## 2018-12-24 RX ADMIN — OXYCODONE HYDROCHLORIDE 10 MG: 10 TABLET ORAL at 18:09

## 2018-12-24 RX ADMIN — CALCIUM CARBONATE 1250 MG: 1250 SUSPENSION ORAL at 14:07

## 2018-12-24 RX ADMIN — FENTANYL CITRATE 25 MCG: 50 INJECTION, SOLUTION INTRAMUSCULAR; INTRAVENOUS at 09:00

## 2018-12-24 RX ADMIN — DIPHENHYDRAMINE HYDROCHLORIDE 50 MG: 50 INJECTION, SOLUTION INTRAMUSCULAR; INTRAVENOUS at 09:16

## 2018-12-24 RX ADMIN — CHLORHEXIDINE GLUCONATE 0.12% ORAL RINSE 15 ML: 1.2 LIQUID ORAL at 21:18

## 2018-12-24 RX ADMIN — SODIUM CHLORIDE, SODIUM GLUCONATE, SODIUM ACETATE, POTASSIUM CHLORIDE, MAGNESIUM CHLORIDE, SODIUM PHOSPHATE, DIBASIC, AND POTASSIUM PHOSPHATE 90 ML/HR: .53; .5; .37; .037; .03; .012; .00082 INJECTION, SOLUTION INTRAVENOUS at 05:33

## 2018-12-24 RX ADMIN — CALCIUM CARBONATE 1250 MG: 1250 SUSPENSION ORAL at 17:31

## 2018-12-24 RX ADMIN — MIDAZOLAM 0.5 MG: 1 INJECTION INTRAMUSCULAR; INTRAVENOUS at 09:14

## 2018-12-24 RX ADMIN — METRONIDAZOLE 500 MG: 500 TABLET ORAL at 05:49

## 2018-12-24 RX ADMIN — MIDAZOLAM 1 MG: 1 INJECTION INTRAMUSCULAR; INTRAVENOUS at 08:54

## 2018-12-24 RX ADMIN — FENTANYL CITRATE 25 MCG: 50 INJECTION, SOLUTION INTRAMUSCULAR; INTRAVENOUS at 09:14

## 2018-12-24 RX ADMIN — PANTOPRAZOLE SODIUM 40 MG: 40 TABLET, DELAYED RELEASE ORAL at 05:49

## 2018-12-24 RX ADMIN — INSULIN LISPRO 1 UNITS: 100 INJECTION, SOLUTION INTRAVENOUS; SUBCUTANEOUS at 21:18

## 2018-12-24 RX ADMIN — MIDAZOLAM 0.5 MG: 1 INJECTION INTRAMUSCULAR; INTRAVENOUS at 09:08

## 2018-12-24 RX ADMIN — METRONIDAZOLE 500 MG: 500 TABLET ORAL at 21:18

## 2018-12-24 RX ADMIN — METRONIDAZOLE 500 MG: 500 TABLET ORAL at 14:07

## 2018-12-24 RX ADMIN — CEFTRIAXONE SODIUM 1000 MG: 10 INJECTION, POWDER, FOR SOLUTION INTRAVENOUS at 17:31

## 2018-12-24 RX ADMIN — OXYCODONE HYDROCHLORIDE 5 MG: 5 TABLET ORAL at 05:49

## 2018-12-24 RX ADMIN — ENOXAPARIN SODIUM 40 MG: 40 INJECTION SUBCUTANEOUS at 21:18

## 2018-12-24 RX ADMIN — FENTANYL CITRATE 50 MCG: 50 INJECTION, SOLUTION INTRAMUSCULAR; INTRAVENOUS at 08:54

## 2018-12-24 RX ADMIN — FENTANYL CITRATE 25 MCG: 50 INJECTION, SOLUTION INTRAMUSCULAR; INTRAVENOUS at 09:08

## 2018-12-24 RX ADMIN — MIDAZOLAM 0.5 MG: 1 INJECTION INTRAMUSCULAR; INTRAVENOUS at 09:00

## 2018-12-24 NOTE — PROGRESS NOTES
Progress Note - Palliative & Supportive Care  Lolly Code  75 y o   female  [address]   MRN: [id number]  Encounter: [id number]     Assessment  Patient Active Problem List   Diagnosis    Other ascites    Lesion of liver    Edema leg    [facility] ([facility]    Abdominal pain    Intractable nausea and vomiting    Bowel perforation ([facility]    Septic shock ([facility]    Peritonitis ([facility]    S/P ileostomy ([facility]    Acute blood loss anemia    Leukocytosis    Hypokalemia       Plan:  Symptom Management: post-op pain  Does not want any medication changes  - oxyIR 5-10mg q 4h PRN   - Fentanyl 25 mcg PRN while NPO   - PRN acetaminophen    Goals of Care:    - Focused on post-op recovery and rehab  History  Patient went for IR drainage of pelvic collection this AM and still notes that she feels "groggy" from the medications used during this procedure  Currently venous duplex of her legs are being done at bedside while I was there for evaluation  Pain controlled with PRN medications         Meds  all current active meds have been reviewed and current meds:   Current Facility-Administered Medications   Medication Dose Route Frequency    acetaminophen (TYLENOL) tablet 650 mg  650 mg Oral Q6H PRN    barium sulfate 2 1 % suspension 450 mL  450 mL Oral 90 min pre-procedure    Calcium Carbonate Antacid oral suspension 1,250 mg  1,250 mg Oral TID With Meals    cefTRIAXone (ROCEPHIN) 1,000 mg in dextrose 5 % 50 mL IVPB  1,000 mg Intravenous Q24H    chlorhexidine (PERIDEX) 0 12 % oral rinse 15 mL  15 mL Swish & Spit Q12H [facility]    dextrose 50 % IV solution 25 mL  25 mL Intravenous PRN    enoxaparin (LOVENOX) subcutaneous injection 40 mg  40 mg Subcutaneous Once    [START ON 12/25/2018] enoxaparin (LOVENOX) subcutaneous injection 40 mg  40 mg Subcutaneous Q24H [facility]    insulin lispro (HumaLOG) 100 units/mL subcutaneous injection 1-5 Units  1-5 Units Subcutaneous HS    insulin lispro (HumaLOG) 100 units/mL subcutaneous injection 1-6 Units  1-6 Units Subcutaneous TID AC    metroNIDAZOLE (FLAGYL) tablet 500 mg  500 mg Oral Q8H [facility]    morphine (PF) 4 mg/mL injection 4 mg  4 mg Intravenous Q4H PRN    ondansetron (ZOFRAN) injection 4 mg  4 mg Intravenous Q6H PRN    oxyCODONE (ROXICODONE) immediate release tablet 10 mg  10 mg Oral Q4H PRN    oxyCODONE (ROXICODONE) IR tablet 5 mg  5 mg Oral Q4H PRN    pantoprazole (PROTONIX) EC tablet 40 mg  40 mg Oral BID AC    phenol (CHLORASEPTIC) 1 4 % mucosal liquid 1 spray  1 spray Mouth/Throat Q2H PRN       No Known Allergies    Objective  Physical Exam   Constitutional: She is oriented to person, place, and time  No distress  HENT:   Head: Normocephalic and atraumatic  Right Ear: External ear normal    Left Ear: External ear normal    Eyes: EOM are normal  Right eye exhibits no discharge  Left eye exhibits no discharge  Cardiovascular: Normal rate  Abdominal: Soft  She exhibits no distension  Musculoskeletal: She exhibits edema  Neurological: She is alert and oriented to person, place, and time  Skin: There is pallor  Psychiatric: She has a normal mood and affect  Nursing note and vitals reviewed  Lab Results: I have personally reviewed pertinent labs  Total time- 25+ minutes, coordination of care, chart review, symptom assessment       Evaristo Wisdom DO  Palliative & Supportive Care  Office number: [phone]

## 2018-12-24 NOTE — WOUND OSTOMY CARE
Progress Note- Ostomy  Edwin Cooks 75 y o  female  [id number]  [facility]        Assessment:  Patient seen for ostomy care and teaching, stoma is still with high output with seal complication over the last two days  Patients daughter at bed side serving as secondary learner, she will be present for leaning when ever possible  Leaky one piece pouch in place with Tegaderm surrounding entire pouch, puddle of stool visible under tegaderm  Stoma remains well budded, pink, moist with (+) flatus and brown effluent  Peristomal skin is denuded with excess stoma paste build up, mucocutaneous junction is intact  Teaching continued with discussion of diet, hydration, hands on demonstration with verbal explanation of pouch change as daughter participating with cleaning skin, asking questions and closely watching pouch change completion  Additional supplies ordered for patient, discussed with RN need of prompt pouch change if leaking to prevent further skin breakdown  Patient pouched with a two piece high output pouch then connected to clemons bag since patient still with very liquidly stool and high output  Patient agreeable for continued teaching and ostomy care with place for patient hands on participation during next visit on Wednesday  Plan: We will continue following with ostomy care and teching  Vitals:    12/24/18 1551   BP: 109/57   Pulse: 95   Resp: 16   Temp: 98 °F (36 7 °C)   SpO2: 96%     Pressure Ulcer 12/19/18 Sacrum Left;Right;Mid Sacrum HA DTI   (Active)   Staging Deep Tissue Injury 12/24/2018  7:44 AM   Wound Description Light purple 12/24/2018  7:44 AM   Yarely-wound Assessment Fragile 12/24/2018  7:44 AM   Wound Length (cm) 8 5 cm 12/19/2018 12:47 PM   Wound Width (cm) 3 cm 12/19/2018 12:47 PM   Calculated Wound Area (cm^2) 25 5 cm^2 12/19/2018 12:47 PM   Tunneling 0 cm 12/19/2018 12:47 PM   Undermining 0 12/19/2018 12:47 PM   Drainage Amount None 12/24/2018  6:16 AM   Treatment Cleansed; Turn & reposition;Elevated with pillow(s); Offload 12/24/2018  7:44 AM   Dressing Foam, Silicone (eg  Allevyn, etc) 12/24/2018  7:44 AM   Patient Tolerance Tolerated well 12/19/2018 12:47 PM   Number of days: 5   Incision 12/14/18 Abdomen (Active)   Incision Description Clean;Dry; Intact 12/24/2018  6:16 AM   Yarely-wound Assessment Clean;Dry; Intact 12/24/2018  6:16 AM   Closure Staples 12/24/2018  6:16 AM   Drainage Amount None 12/24/2018  6:16 AM   Drainage Description MARK 12/20/2018  8:00 AM   Treatments Site care 12/17/2018  8:00 PM   Dressing Open to air 12/24/2018  6:16 AM   Dressing Changed New dressing applied 12/16/2018 12:00 PM   Patient Tolerance Tolerated well 12/20/2018  8:00 AM   Dressing Status Other (Comment) 12/18/2018  4:00 AM   Number of days: 10       Incision 12/15/18 Abdomen Other (Comment) (Active)   Incision Description Clean;Dry; Intact 12/24/2018  6:01 AM   Yarely-wound Assessment Clean;Dry; Intact 12/24/2018  6:01 AM   Closure Staples 12/24/2018  6:01 AM   Drainage Amount None 12/23/2018  7:47 AM   Drainage Description Serosanguineous 12/20/2018  4:08 AM   Treatments Cleansed;Site care 12/17/2018  8:00 AM   Dressing Open to air 12/24/2018  6:01 AM   Dressing Changed Changed by provider 12/16/2018  4:00 PM   Patient Tolerance Tolerated well 12/17/2018  8:00 AM   Dressing Status Clean;Dry; Intact 12/19/2018  7:42 PM   Number of days: 9     Closed/Suction Drain Abdomen Bulb 19 Fr  (Active)   Site Description Healing 12/24/2018  6:01 AM   Dressing Status Open to air 12/24/2018  6:01 AM   Drainage Appearance Serous 12/24/2018  6:01 AM   Status To bulb suction 12/24/2018  6:01 AM   Intake (mL) 0 mL 12/17/2018  8:00 AM   Output (mL) 20 mL 12/24/2018  2:19 PM   Number of days: 9       Closed/Suction Drain Right RUQ Bulb 19 Fr   (Active)   Site Description Healing 12/24/2018  6:01 AM   Dressing Status Open to air 12/24/2018  6:01 AM   Drainage Appearance Serous 12/24/2018  6:01 AM   Status To bulb suction 12/24/2018  6:01 AM   Intake (mL) 0 mL 12/17/2018  8:00 AM   Output (mL) 20 mL 12/24/2018  5:57 AM   Number of days: 9       Closed/Suction Drain Right RLQ Bulb 19 Fr  (Active)   Site Description Healing 12/24/2018  6:01 AM   Dressing Status Open to air 12/24/2018  6:01 AM   Drainage Appearance Serous 12/24/2018  6:01 AM   Status To bulb suction 12/24/2018  6:01 AM   Intake (mL) 0 mL 12/17/2018  8:00 AM   Output (mL) 0 mL 12/23/2018 10:50 PM   Number of days: 9       Closed/Suction Drain Left LLQ Bulb 19 Fr  (Active)   Site Description Healing 12/24/2018  6:01 AM   Dressing Status Open to air 12/24/2018  6:01 AM   Drainage Appearance Serous 12/24/2018  6:01 AM   Status To bulb suction 12/24/2018  6:01 AM   Intake (mL) 0 mL 12/17/2018  8:00 AM   Output (mL) 15 mL 12/24/2018  2:19 PM   Number of days: 9       Closed/Suction Drain Posterior;Superior;Right Buttock Bulb 12 Fr  (Active)   Site Description Unable to view 12/24/2018  9:58 AM   Dressing Status Clean;Dry; Intact 12/24/2018  9:58 AM   Drainage Appearance Purulent 12/24/2018  9:58 AM   Status To bulb suction 12/24/2018  9:58 AM   Output (mL) 50 mL 12/24/2018  2:00 PM   Number of days: 0       Ileostomy Loop RLQ (Active)   Stomal Appliance 1 piece;Leaking 12/24/2018 12:49 PM   Stoma Assessment Budded;Pink 12/24/2018 12:49 PM   Stoma size (in) 1 75 in 12/24/2018 12:49 PM   Stoma Shape Round 12/24/2018 12:49 PM   Peristomal Assessment Denuded 12/24/2018 12:49 PM   Treatment Bag change;Site care 12/24/2018 12:49 PM   Output (mL) 150 mL 12/24/2018 12:49 PM   Number of days: 9         Please call wound care to ext [phone] or [phone] with questions or concerns      Tere Bardales, RN, BSN, [employer]

## 2018-12-24 NOTE — PROGRESS NOTES
Pt c/o of leg heaviness and "disconnect feeling"  B/L LE more edematous from prior assessment  Now +2 pitting  Pt has voided 2400 mL clear urine in the past 8 hours  Spoke with OB GYN resident  Will watch for orders to decrease rate of continuous IVF

## 2018-12-24 NOTE — UTILIZATION REVIEW
Continued Stay Review    Date: 12/22/18    Vital Signs:   12/22/18 1642  98 6 °F (37 °C)  90  18  116/67  --  96 %  --  Lying     Medications:   Scheduled Meds:   Current Facility-Administered Medications:  acetaminophen 650 mg Oral Q6H PRN    barium sulfate 450 mL Oral 90 min pre-procedure    Calcium Carbonate Antacid 1,250 mg Oral TID With Meals    cefTRIAXone 1,000 mg Intravenous Q24H Last Rate: Stopped (12/23/18 1700)   chlorhexidine 15 mL Swish & Spit Q12H [facility]    dextrose 25 mL Intravenous PRN    enoxaparin 40 mg Subcutaneous Q24H [facility]    insulin lispro 1-5 Units Subcutaneous HS    insulin lispro 1-6 Units Subcutaneous TID AC    metroNIDAZOLE 500 mg Oral Q8H [facility]    morphine injection 4 mg Intravenous Q4H PRN    ondansetron 4 mg Intravenous Q6H PRN    oxyCODONE 10 mg Oral Q4H PRN    oxyCODONE 5 mg Oral Q4H PRN    pantoprazole 40 mg Oral BID AC    phenol 1 spray Mouth/Throat Q2H PRN      Continuous Infusions:  IV isolylte @ 90 ml hr   PRN Meds:   acetaminophen    dextrose    morphine injection    ondansetron    oxyCODONE 5 mg  X 1      phenol    Abnormal Labs/Diagnostic Results:   BUN/Cr 4/0 24  Mauro 7 7  WBC 28 00      Age/Sex: 75 y o  female     Assessment/Plan:     12/22 GYN ONC Progress Note  A/P: Assessment / Plan: 75 y  o   with Stage IV ovarian cancer admitted on 12/14/18 in septic shock to bowel perforation,  status post exploratory laparotomy, abdominal washout and Abthera vac placement on 12/14/18, followed by exploratory laparotomy, abdominal washout,  Drain placement x 4, loop ileostomy & abdominal closure on 12/15/18, POD#7, doing well postoperatively, Hospital Day #8        #1 Sepsis secondary to bowel perforation:   - Status post surgery as mentioned   - WBC elevated, but trending down as of 12/21 26-->21-->f/u am CBC  Repeat CT showed collection similar in size to previous, but appears more like an abscess: continue abx treatment  - Initial Blood cultures no growth  Urine culture no growth  Tissue gram gram 4+ polys, gram + and - sergio, final consistent with multi drug resistant e coli  Anaerobic cultures growing Clostridium   - Per ID patient on Ceftriaxone and Flagyl-->appreciate continued recommendations     #2 Post operative Care:  - Pain: controlled with tylenol and roxicodone-palliative care following  - Encouraged incentive spirometry to reduce atelectasis and pneumonia risk  - Encouraged ambulation as tolerated-PT following, currently recommending STR-CM working on placement at [employer]  - DVT ppx: SCDs, Lovenox 40mg daily     #3 Stage IV Ovarian Cancer  Status post neoadjuvant chemotherapy with Carboplatin, Taxol & Avastin  Any further chemo is currently on hold until acute illness has subsided     #4 Type 2 DM: Accuchecks 120s-140s  Continue sliding scale insulin, #3     #5 Anemia: likely chronic in nature given her metastatic disease  -resolved s/p transfusion of 2UPRBCs-->11 5     FEN: Regular with Glucerna; Isolyte @ 125cc/hr  _____________________  12/22 IR Consult   IR consulted for drainage tube placement in a pelvic cul-de-sac abscess  The patient presented with Stage IV ovarian cancer admitted on 12/14/18 in septic shock due to bowel perforation  She is status post exploratory laparotomy, abdominal washout and Abthera vac placement on 12/14/18, followed by exploratory laparotomy, abdominal washout, drain placement x 4, loop ileostomy & abdominal closure on 12/15/18  She has existing surgical drains but her WBC is rising and the pelvic collection may not be drained by the surgical drains  We will set the patient up for Monday and perform a CT guided abscess drainage tube placement  The clinical service will hold the Lovenox Sunday night, make the patient NPO and place the CT drainage order    Thank you for allowing interventional radiology to assist in this patient's care   ___________________  12/22 ID Progress Note  Antibiotics:  Ceftriaxone and metronidazole  Antibiotics 9  Postop day 8     Subjective:   Although she feels better she is frustrated by leakage around her ostomy bag      Discharge Plan: possible acute rehab

## 2018-12-24 NOTE — DISCHARGE INSTRUCTIONS
TUBE CARE INSTRUCTIONS    Care after your procedure:    Resume your normal diet  Small sips of flat soda will help with nausea  1  The properly functioning catheter should be forward flushed once (1x) daily with 10ml of normal saline using clean technique  You will be given a prescription for flushes  To flush the tube, clean both connections with alcohol swab  Twist off the drainage bag/ bulb  tubing and twist the saline syringe into the drainage tube and flush  Remove the syringe and twist the drainage bag / bulb tubing tubing back on     2  The drainage bag/bulb may be emptied as necessary  Keep a record of the amount of fluid you drain from your tube  This should be done with clean technique as well  3  A fresh dressing should be applied daily over the tube insertion site  4  As the tube is secured to the skin with only a suture,try not to pull on your tube  Tub baths are not permitted  Showers are permitted if the patient's skin entry site is prevented from getting wet  Similarly, washcloth "baths" are acceptable  Contact Interventional Radiology at [phone] [place] PATIENTS: Contact Interventional Radiology at [phone]) [place] [facility] PATIENTS: Contact Interventional Radiology at [phone]) if:    1  Leakage or large amounts of liquid around the catheter  2  Fever of 101 degrees lasting several hours without other obvious cause (such as sore throat, flu, etc)  3  Persistent nausea or vomiting  4  Diminished drainage, which may be associated with pressure or pain  Or when the     drainage from your tube is less than 10mls for 48 hours  5  Catheter pulled back or falls out  The following pharmacies carry the flush syringes         [facility]                     [facility]  [address]                         [address] [facility]  Phone [phone]            Phone [phone]   [facility]                                [phone]  [address] [facility]                      [address] [facility]  Phone [phone]            Phone [phone]                      Ozzie Brandt                                                                                                          [phone]  [facility]  [address]  [facility]  Phone [phone]                                                                          Drainage Tube Removal    Your drainage tube was removed today  What you need know at home:   Keep a clean dry dressing at the tube site until the small opening closes  It will take twenty four to forty eight hours  Keep the site dry until it heals  A small amount of drainage on your dressing is normal  Resume your normal diet  Small sips of flat soda will help with any nausea  Contact Interventional Radiology for any of the following: You have pain, fever greater than 101, shaking chills  If you have increased redness or swelling at the site  I the drainage from your site does not stop  If the site drains pus or has a bad odor  Contact Interventional Radiology at [phone]   [place] PATIENTS: Contact Interventional Radiology at [phone]) Carlos Carrasquillo PATIENTS: Contact Interventional Radiology at [phone]) if:      Abdominal Hysterectomy   WHAT YOU SHOULD KNOW:   An abdominal hysterectomy (AH) is surgery to remove your uterus  Your uterus will be removed through an incision in your abdomen   You may need an AH if you have a tumor in your uterus or other reproductive organs  You may also need an AH if you have an infection, pain, or bleeding  AFTER YOU LEAVE:   Medicines:   · Pain medicine: You may be given medicine to take away or decrease pain  Do not wait until the pain is severe before you take your medicine  · Antinausea medicine: This medicine may be given to calm your stomach and prevent vomiting  · Blood thinners  help prevent blood clots  Blood thinners may be given before, during, and after a surgery or procedure  Blood thinners make it more likely for you to bleed or bruise  · Take your medicine as directed  Call your healthcare provider if you think your medicine is not helping or if you have side effects  Tell him if you are allergic to any medicine  Keep a list of the medicines, vitamins, and herbs you take  Include the amounts, and when and why you take them  Bring the list or the pill bottles to follow-up visits  Carry your medicine list with you in case of an emergency  Follow up with your primary healthcare provider or gynecologist as directed:  Ask how to care for your wound  You may need blood tests, x-rays, or ultrasounds at your follow-up visits  Write down your questions so you remember to ask them during your visits  Limit activity until you have fully recovered from surgery:   · Ask when it is safe for you to drive, walk up stairs, lift heavy objects, and have sex  · Ask when it is okay to exercise, and what types of exercise to do  Start slowly and do more as you get stronger  Contact your primary healthcare provider or gynecologist if:   · You have heavy vaginal bleeding that fills 1 or more sanitary pads in 1 hour  · Your wound opens  · You have a fever, and your wound is red and swollen  · You have yellow, green, or bad-smelling discharge coming from your vagina  · You feel new pain or fullness in your vagina      · You have questions or concerns about your surgery, medicine, or care   Seek care immediately or call 911 if:   · You have new or more blood from your vagina or your wound  · Your arm or leg feels warm, tender, and painful  It may look swollen and red  · You suddenly feel lightheaded and have trouble breathing  · You have chest pain  You may have more pain when you take a deep breath or cough  You may cough up blood  © 2014 [address] is for End User's use only and may not be sold, redistributed or otherwise used for commercial purposes  All illustrations and images included in CareNotes® are the copyrighted property of A D A M , Inc  or Carlos Bhandari  The above information is an  only  It is not intended as medical advice for individual conditions or treatments  Talk to your doctor, nurse or pharmacist before following any medical regimen to see if it is safe and effective for you

## 2018-12-24 NOTE — BRIEF OP NOTE (RAD/CATH)
12 [place] pelvic abscess drain placement with CT guidance:  Procedure Note    PATIENT NAME: Maraynn Carson  : 1950  MRN: [id number]     Pre-op Diagnosis:   1  Bowel perforation ([address] )    2  Malignant neoplasm of ovary, unspecified laterality ([address] )    3  S/P ileostomy ([facility] )    4  Leukocytosis, unspecified type    5  Septic shock (HCC)      Post-op Diagnosis:   1  Bowel perforation ([address] )    2  Malignant neoplasm of ovary, unspecified laterality ([facility] [address] )    3  S/P ileostomy ([facility] [address] )    4  Leukocytosis, unspecified type    5  Septic shock [facility])        Surgeon:   Ximena Cantu MD  Assistants:     No qualified resident was available, Resident is only observing    Estimated Blood Loss: minimal     Findings:     Pelvic collection, purulent fluid returned, 12 [place] apd placed  Return of 120 mL of purulent material which was sent for culture  Specimens: 20 mL purulent fluid       Complications:  none    Anesthesia: Conscious sedation and Сергей Irby MD     Date: 2018  Time: 9:29 AM

## 2018-12-24 NOTE — PROGRESS NOTES
Gyn Oncology Progress note   Evelio Escoto 75 y o  female MRN: [id number]  Unit/Bed#: [facility] [place] 509-01 Encounter: [id number]    Evelio sEcoto is complaining of tenderness and swelling in her calves bilaterally  On exam, her legs are extremely tender to palpation  Otherwise, she has no complaints  She is eating well and ambulating  The plan is for her to have the abscess drained by IR today  Will discontinue her IVF to assist with edema and pain  Vitals:    12/23/18 2221   BP: 118/63   Pulse: 87   Resp: 18   Temp: 98 7 °F (37 1 °C)   SpO2: 97%     I/O last 3 completed shifts: In: 7603 7 [P O :987; I V :6566 7; IV Piggyback:50]  Out: 5210 [Urine:3550; Drains:535; HVMEF:6025]  I/O this shift:  In: 1917 5 [I V :1917 5]  Out: 2585 [Urine:2350; Drains:110; Stool:125]    Recent Results (from the past 12 hour(s))   Fingerstick Glucose (POCT)    Collection Time: 12/23/18  8:55 PM   Result Value Ref Range    POC Glucose 139 65 - 140 mg/dl   Fingerstick Glucose (POCT)    Collection Time: 12/24/18  5:47 AM   Result Value Ref Range    POC Glucose 108 65 - 140 mg/dl     Physical Exam  Gen: AAOx3, NAD, comfortable in bed  CVS: S1S2+, RRR, no murmurs  Lungs: CTA b/l normal respiratory effort and rate  Abdomen: soft, non tender, incision closed with staples  ANDREAS drain x 4 in place; ileostomy in place, pink stoma productive of stool  Extremities: pitting edema in the ankles bilaterally, tender to palpation     A/P: Assessment / Plan: 75 y  o   with Stage IV ovarian cancer admitted on 12/14/18 in septic shock to bowel perforation,  status post exploratory laparotomy, abdominal washout and Abthera vac placement on 12/14/18, followed by exploratory laparotomy, abdominal washout,  Drain placement x 4, loop ileostomy & abdominal closure on 12/15/18, POD#8, doing well postoperatively, Hospital Day #10        #1 Sepsis secondary to bowel perforation:   - Status post surgery as mentioned   - WBC elevated and fluctuating   - Repeat CT showed collection similar in size to previous, but appears more like an abscess: given fluctuating and elevated WBC, will proceed with IR drainage to be performed today (12/24)  - Initial Blood cultures no growth  Urine culture no growth  Tissue gram gram 4+ polys, gram + and - sergio, final consistent with multi drug resistant e coli  Anaerobic cultures growing Clostridium  Blood cultures were negative  - Per ID patient on Ceftriaxone and Flagyl-->appreciate continued recommendations     #2 Post operative Care:  - Pain: controlled with tylenol and roxicodone-palliative care following  - Encouraged incentive spirometry to reduce atelectasis and pneumonia risk  - Encouraged ambulation as tolerated-PT following, currently recommending STR-CM working on placement at [employer]  - DVT ppx: SCDs, Lovenox 40mg daily--> held for IR     #3 Stage IV Ovarian Cancer  Status post neoadjuvant chemotherapy with Carboplatin, Taxol & Avastin   Any further chemo is currently on hold until acute illness has subsided     #4 Type 2 DM:  Continue sliding scale insulin, #3     #5 Anemia: likely chronic in nature given her metastatic disease  -resolved s/p transfusion of 2UPRBCs-->11 5     FEN: Regular with Glucerna--> NPO since midnight     Disposition: Inpatient

## 2018-12-24 NOTE — PROGRESS NOTES
Progress Note - Infectious Disease   Catherine Ruff 75 y o  female MRN: [id number]  Unit/Bed#: [facility] 509-01 Encounter: [id number]      Impression/Plan:  1  Sepsis-POA   Leukocytosis and tachycardia  Vilma Mash all secondary to the patient's peritonitis and bacteremia   No other clear sources appreciated   Sharon Brewster continues to have a leukocytosis which is now  24,860    -continue ceftriaxone 1 g IV Q 24 hours  -continue Flagyl 500 mg p o  Q 8 hours  -patient had an IR drainage of the loculated pelvic fluid collection with purulent fluid obtained that is showing gram-positive cocci in pairs, rare gram-negative rods and Gram-positive sergio  Will await identification before changing antibiotics  -2  Peritonitis-secondary to bowel perforation   The patient is status post exploratory laparotomy with washout, ileostomy, and closure   She is clinically much improved with significant improvement of the abdominal pain and recovery of bowel function  However the white count has continued to be elevated  -antibiotics as above  -plan to continue Flagyl through 12/27/2018 to complete 2 weeks total  -serial abdominal exam  -recheck CBC with diff  -close gyn oncology follow-up     3  Clostridial bacteremia-appears to be all secondary to 2  No other clear source appreciated  The organism is sensitive to Flagyl  Follow-up blood cultures are finalized as negative   -antibiotics as above  -plan to continue Flagyl through 12/27/2018  -follow up repeat blood cultures are finalized as negative  -no need for PICC line for now     4  Stage IV ovarian carcinoma-status post new adjuvant chemotherapy and Neulasta  -hold on chemotherapy in the setting of an acute infectious process for now     5   Diabetes mellitus-type 2           Antibiotics:  Ceftriaxone and metronidazole  Antibiotics 11  Postop day 10    Subjective:  She feels better now that the ostomy bag no longer is leaking    Objective:  Vitals:  Temp:  [97 8 °F (36 6 °C)-98 7 °F (37 1 °C)] 98 °F (36 7 °C)  HR:  [87-95] 95  Resp:  [16-18] 16  BP: (101-122)/(55-65) 109/57  SpO2:  [96 %-100 %] 96 %  Temp (24hrs), Av 2 °F (36 8 °C), Min:97 8 °F (36 6 °C), Max:98 7 °F (37 1 °C)  Current: Temperature: 98 °F (36 7 °C)    Physical Exam:   General Appearance:  Alert, chronically ill-appearing interactive, nontoxic, no acute distress  Throat: Oropharynx moist without lesions  Lungs:   Decreased breath sounds bilaterally; no wheezes, rhonchi or rales; respirations unlabored   Heart:  RRR; no murmur, rub or gallop   Abdomen:   Soft, non-tender, non-distended, positive bowel sounds  Incision well approximated without erythema or drainage  Ostomy with good output without leakage at seam   ANDREAS drains in place  With new ANDREAS drain on the right side with purulent return   Extremities: No clubbing, cyanosis or edema   Skin: No new rashes or lesions  No draining wounds noted  Labs, Imaging, & Other studies:   All pertinent labs and imaging studies were personally reviewed    Results from last 7 days  Lab Units 18  0756 18  0629 18  0837   WBC Thousand/uL 24 86* 24 84* 28 00*   HEMOGLOBIN g/dL 11 9 11 7 13 5   PLATELETS Thousands/uL 594* 586* 618*       Results from last 7 days  Lab Units 18  0756 18  0629 18  0443  18  0442 18  0543 18  0628   SODIUM mmol/L 137 137 137  < > 139 139 138   POTASSIUM mmol/L 3 8 3 8 3 8  < > 3 8 3 9 4 0   CHLORIDE mmol/L 107 106 105  < > 106 107 107   CO2 mmol/L 25 26 25  < > 27 26 25   BUN mg/dL 4* 4* 4*  < > 8 5 9   CREATININE mg/dL 0 29* 0 30* 0 24*  < > 0 33* 0 31* 0 29*   EGFR ml/min/1 73sq m 119 118 127  < > 114 117 119   CALCIUM mg/dL 7 8* 7 5* 7 7*  < > 7 7* 7 6* 7 7*   AST U/L  --   --   --   --  36 31 33   ALT U/L  --   --   --   --  20 18 20   ALK PHOS U/L  --   --   --   --  257* 179* 130*   < > = values in this interval not displayed      Results from last 7 days  Lab Units 18  7484   GRAM STAIN RESULT 4+ Polys  2+ Gram positive cocci in pairs  1+ Gram positive rods  Rare Gram negative rods

## 2018-12-25 LAB
ANION GAP SERPL CALCULATED.3IONS-SCNC: 6 MMOL/L (ref 4–13)
BASOPHILS # BLD AUTO: 0.1 THOUSANDS/ΜL (ref 0–0.1)
BASOPHILS NFR BLD AUTO: 0 % (ref 0–1)
BUN SERPL-MCNC: 5 MG/DL (ref 5–25)
CALCIUM SERPL-MCNC: 7.8 MG/DL (ref 8.3–10.1)
CHLORIDE SERPL-SCNC: 107 MMOL/L (ref 100–108)
CO2 SERPL-SCNC: 25 MMOL/L (ref 21–32)
CREAT SERPL-MCNC: 0.3 MG/DL (ref 0.6–1.3)
EOSINOPHIL # BLD AUTO: 0.07 THOUSAND/ΜL (ref 0–0.61)
EOSINOPHIL NFR BLD AUTO: 0 % (ref 0–6)
ERYTHROCYTE [DISTWIDTH] IN BLOOD BY AUTOMATED COUNT: 20.1 % (ref 11.6–15.1)
GFR SERPL CREATININE-BSD FRML MDRD: 117 ML/MIN/1.73SQ M
GLUCOSE SERPL-MCNC: 139 MG/DL (ref 65–140)
GLUCOSE SERPL-MCNC: 141 MG/DL (ref 65–140)
GLUCOSE SERPL-MCNC: 156 MG/DL (ref 65–140)
GLUCOSE SERPL-MCNC: 98 MG/DL (ref 65–140)
GLUCOSE SERPL-MCNC: 99 MG/DL (ref 65–140)
HCT VFR BLD AUTO: 36.3 % (ref 34.8–46.1)
HGB BLD-MCNC: 11.6 G/DL (ref 11.5–15.4)
IMM GRANULOCYTES # BLD AUTO: 0.18 THOUSAND/UL (ref 0–0.2)
IMM GRANULOCYTES NFR BLD AUTO: 1 % (ref 0–2)
LYMPHOCYTES # BLD AUTO: 1.95 THOUSANDS/ΜL (ref 0.6–4.47)
LYMPHOCYTES NFR BLD AUTO: 8 % (ref 14–44)
MCH RBC QN AUTO: 26.2 PG (ref 26.8–34.3)
MCHC RBC AUTO-ENTMCNC: 32 G/DL (ref 31.4–37.4)
MCV RBC AUTO: 82 FL (ref 82–98)
MONOCYTES # BLD AUTO: 1.7 THOUSAND/ΜL (ref 0.17–1.22)
MONOCYTES NFR BLD AUTO: 7 % (ref 4–12)
NEUTROPHILS # BLD AUTO: 19.21 THOUSANDS/ΜL (ref 1.85–7.62)
NEUTS SEG NFR BLD AUTO: 84 % (ref 43–75)
NRBC BLD AUTO-RTO: 0 /100 WBCS
PLATELET # BLD AUTO: 571 THOUSANDS/UL (ref 149–390)
PMV BLD AUTO: 11.5 FL (ref 8.9–12.7)
POTASSIUM SERPL-SCNC: 3.9 MMOL/L (ref 3.5–5.3)
RBC # BLD AUTO: 4.42 MILLION/UL (ref 3.81–5.12)
SODIUM SERPL-SCNC: 138 MMOL/L (ref 136–145)
WBC # BLD AUTO: 23.21 THOUSAND/UL (ref 4.31–10.16)

## 2018-12-25 PROCEDURE — 80048 BASIC METABOLIC PNL TOTAL CA: CPT | Performed by: OBSTETRICS & GYNECOLOGY

## 2018-12-25 PROCEDURE — 99231 SBSQ HOSP IP/OBS SF/LOW 25: CPT | Performed by: INTERNAL MEDICINE

## 2018-12-25 PROCEDURE — 85025 COMPLETE CBC W/AUTO DIFF WBC: CPT | Performed by: OBSTETRICS & GYNECOLOGY

## 2018-12-25 PROCEDURE — 82948 REAGENT STRIP/BLOOD GLUCOSE: CPT

## 2018-12-25 RX ORDER — NYSTATIN 100000 [USP'U]/G
POWDER TOPICAL 2 TIMES DAILY
Status: DISCONTINUED | OUTPATIENT
Start: 2018-12-25 | End: 2018-12-29 | Stop reason: HOSPADM

## 2018-12-25 RX ADMIN — CALCIUM CARBONATE 1250 MG: 1250 SUSPENSION ORAL at 17:16

## 2018-12-25 RX ADMIN — PANTOPRAZOLE SODIUM 40 MG: 40 TABLET, DELAYED RELEASE ORAL at 17:16

## 2018-12-25 RX ADMIN — METRONIDAZOLE 500 MG: 500 TABLET ORAL at 21:21

## 2018-12-25 RX ADMIN — PANTOPRAZOLE SODIUM 40 MG: 40 TABLET, DELAYED RELEASE ORAL at 07:01

## 2018-12-25 RX ADMIN — CHLORHEXIDINE GLUCONATE 0.12% ORAL RINSE 15 ML: 1.2 LIQUID ORAL at 09:07

## 2018-12-25 RX ADMIN — INSULIN LISPRO 1 UNITS: 100 INJECTION, SOLUTION INTRAVENOUS; SUBCUTANEOUS at 21:28

## 2018-12-25 RX ADMIN — CEFTRIAXONE SODIUM 1000 MG: 10 INJECTION, POWDER, FOR SOLUTION INTRAVENOUS at 17:15

## 2018-12-25 RX ADMIN — METRONIDAZOLE 500 MG: 500 TABLET ORAL at 07:00

## 2018-12-25 RX ADMIN — ENOXAPARIN SODIUM 40 MG: 40 INJECTION SUBCUTANEOUS at 21:21

## 2018-12-25 RX ADMIN — OXYCODONE HYDROCHLORIDE 5 MG: 5 TABLET ORAL at 18:04

## 2018-12-25 RX ADMIN — CALCIUM CARBONATE 1250 MG: 1250 SUSPENSION ORAL at 07:21

## 2018-12-25 RX ADMIN — METRONIDAZOLE 500 MG: 500 TABLET ORAL at 14:37

## 2018-12-25 RX ADMIN — CALCIUM CARBONATE 1250 MG: 1250 SUSPENSION ORAL at 12:13

## 2018-12-25 RX ADMIN — NYSTATIN: 100000 POWDER TOPICAL at 17:16

## 2018-12-25 RX ADMIN — NYSTATIN: 100000 POWDER TOPICAL at 11:18

## 2018-12-25 NOTE — PLAN OF CARE
Problem: Prexisting or High Potential for Compromised Skin Integrity  Goal: Skin integrity is maintained or improved  INTERVENTIONS:  - Identify patients at risk for skin breakdown  - Assess and monitor skin integrity  - Assess and monitor nutrition and hydration status  - Monitor labs (i e  albumin)  - Assess for incontinence   - Turn and reposition patient  - Assist with mobility/ambulation  - Relieve pressure over bony prominences  - Avoid friction and shearing  - Provide appropriate hygiene as needed including keeping skin clean and dry  - Evaluate need for skin moisturizer/barrier cream  - Collaborate with interdisciplinary team (i e  Nutrition, Rehabilitation, etc )   - Patient/family teaching   Outcome: Progressing      Problem: Potential for Falls  Goal: Patient will remain free of falls  INTERVENTIONS:  - Assess patient frequently for physical needs  -  Identify cognitive and physical deficits and behaviors that affect risk of falls  -  [place] fall precautions as indicated by assessment   - Educate patient/family on patient safety including physical limitations  - Instruct patient to call for assistance with activity based on assessment  - Modify environment to reduce risk of injury  - Consider OT/PT consult to assist with strengthening/mobility   Outcome: Progressing      Problem: Nutrition/Hydration-ADULT  Goal: Nutrient/Hydration intake appropriate for improving, restoring or maintaining nutritional needs  Monitor and assess patient's nutrition/hydration status for malnutrition (ex- brittle hair, bruises, dry skin, pale skin and conjunctiva, muscle wasting, smooth red tongue, and disorientation)  Collaborate with interdisciplinary team and initiate plan and interventions as ordered  Monitor patient's weight and dietary intake as ordered or per policy  Utilize nutrition screening tool and intervene per policy   Determine patient's food preferences and provide high-protein, high-caloric foods as appropriate       INTERVENTIONS:  - Monitor oral intake, urinary output, labs, and treatment plans  - Assess nutrition and hydration status and recommend course of action  - Evaluate amount of meals eaten  - Assist patient with eating if necessary   - Allow adequate time for meals  - Recommend/ encourage appropriate diets, oral nutritional supplements, and vitamin/mineral supplements  - Order, calculate, and assess calorie counts as needed  - Recommend, monitor, and adjust tube feedings and TPN/PPN based on assessed needs  - Assess need for intravenous fluids  - Provide specific nutrition/hydration education as appropriate  - Include patient/family/caregiver in decisions related to nutrition   Outcome: Progressing      Problem: PAIN - ADULT  Goal: Verbalizes/displays adequate comfort level or baseline comfort level  Interventions:  - Encourage patient to monitor pain and request assistance  - Assess pain using appropriate pain scale  - Administer analgesics based on type and severity of pain and evaluate response  - Implement non-pharmacological measures as appropriate and evaluate response  - Consider cultural and social influences on pain and pain management  - Notify physician/advanced practitioner if interventions unsuccessful or patient reports new pain   Outcome: Progressing      Problem: INFECTION - ADULT  Goal: Absence or prevention of progression during hospitalization  INTERVENTIONS:  - Assess and monitor for signs and symptoms of infection  - Monitor lab/diagnostic results  - Monitor all insertion sites, i e  indwelling lines, tubes, and drains  - Monitor endotracheal (as able) and nasal secretions for changes in amount and color  - [place] appropriate cooling/warming therapies per order  - Administer medications as ordered  - Instruct and encourage patient and family to use good hand hygiene technique  - Identify and instruct in appropriate isolation precautions for identified infection/condition Outcome: Progressing      Problem: DISCHARGE PLANNING  Goal: Discharge to home or other facility with appropriate resources  INTERVENTIONS:  - Identify barriers to discharge w/patient and caregiver  - Arrange for needed discharge resources and transportation as appropriate  - Identify discharge learning needs (meds, wound care, etc )  - Arrange for interpretive services to assist at discharge as needed  - Refer to Case Management Department for coordinating discharge planning if the patient needs post-hospital services based on physician/advanced practitioner order or complex needs related to functional status, cognitive ability, or social support system   Outcome: Progressing      Problem: Knowledge Deficit  Goal: Patient/family/caregiver demonstrates understanding of disease process, treatment plan, medications, and discharge instructions  Complete learning assessment and assess knowledge base    Interventions:  - Provide teaching at level of understanding  - Provide teaching via preferred learning methods   Outcome: Progressing      Problem: GASTROINTESTINAL - ADULT  Goal: Minimal or absence of nausea and/or vomiting  INTERVENTIONS:  - Administer IV fluids as ordered to ensure adequate hydration  - Maintain NPO status until nausea and vomiting are resolved  - Nasogastric tube as ordered  - Administer ordered antiemetic medications as needed  - Provide nonpharmacologic comfort measures as appropriate  - Advance diet as tolerated, if ordered  - Nutrition services referral to assist patient with adequate nutrition and appropriate food choices   Outcome: Progressing      Problem: METABOLIC, FLUID AND ELECTROLYTES - ADULT  Goal: Glucose maintained within target range  INTERVENTIONS:  - Monitor Blood Glucose as ordered  - Assess for signs and symptoms of hyperglycemia and hypoglycemia  - Administer ordered medications to maintain glucose within target range  - Assess nutritional intake and initiate nutrition service referral as needed   Outcome: Progressing      Problem: DISCHARGE PLANNING - CARE MANAGEMENT  Goal: Discharge to post-acute care or home with appropriate resources  INTERVENTIONS:  - Conduct assessment to determine patient/family and health care team treatment goals, and need for post-acute services based on payer coverage, community resources, and patient preferences, and barriers to discharge  - Address psychosocial, clinical, and financial barriers to discharge as identified in assessment in conjunction with the patient/family and health care team  - Arrange appropriate level of post-acute services according to patient's   needs and preference and payer coverage in collaboration with the physician and health care team  - Communicate with and update the patient/family, physician, and health care team regarding progress on the discharge plan  - Arrange appropriate transportation to post-acute venues  - Family to drive home @ discharge   Outcome: Progressing

## 2018-12-25 NOTE — PROGRESS NOTES
Gyn Oncology Progress note   Maryann Carson 70 y o  female MRN: [id number]  Unit/Bed#: [facility] 509-01 Encounter: [id number]    Maryann Carson reports overall feeling "ok " Yesterday she has IR drainagae of pelvic collection, 12 [place] apd placed; she reports increased pain with new drain but improved overnight after medication  Yesterday reported increased pain in the legs secondary to swelling, but this has since resolved since IVF were discontinued  Otherwise, she has no complaints  She denies chest pain, shortness of breath or calf pain  She is tolerating a regular diet and denies nausea or vomiting  She is spontaneously voiding  She was ambulating at baseline prior to yesterday    Vitals:    12/24/18 2351   BP: 117/59   Pulse: 86   Resp: 18   Temp: 98 1 °F (36 7 °C)   SpO2: 98%     I/O last 3 completed shifts: In: 2157 5 [P O :240; I V :1917 5]  Out: 4840 [NNRUS:9656; Drains:420; Stool:775]  No intake/output data recorded      Recent Results (from the past 12 hour(s))   Fingerstick Glucose (POCT)    Collection Time: 12/24/18  9:08 PM   Result Value Ref Range    POC Glucose 155 (H) 65 - 140 mg/dl   CBC and differential    Collection Time: 12/25/18  4:31 AM   Result Value Ref Range    WBC 23 21 (H) 4 31 - 10 16 Thousand/uL    RBC 4 42 3 81 - 5 12 Million/uL    Hemoglobin 11 6 11 5 - 15 4 g/dL    Hematocrit 36 3 34 8 - 46 1 %    MCV 82 82 - 98 fL    MCH 26 2 (L) 26 8 - 34 3 pg    MCHC 32 0 31 4 - 37 4 g/dL    RDW 20 1 (H) 11 6 - 15 1 %    MPV 11 5 8 9 - 12 7 fL    Platelets 361 (H) 665 - 390 Thousands/uL    nRBC 0 /100 WBCs    Neutrophils Relative 84 (H) 43 - 75 %    Immat GRANS % 1 0 - 2 %    Lymphocytes Relative 8 (L) 14 - 44 %    Monocytes Relative 7 4 - 12 %    Eosinophils Relative 0 0 - 6 %    Basophils Relative 0 0 - 1 %    Neutrophils Absolute 19 21 (H) 1 85 - 7 62 Thousands/µL    Immature Grans Absolute 0 18 0 00 - 0 20 Thousand/uL    Lymphocytes Absolute 1 95 0 60 - 4 47 Thousands/µL    Monocytes Absolute 1 70 (H) 0 17 - 1 22 Thousand/µL    Eosinophils Absolute 0 07 0 00 - 0 61 Thousand/µL    Basophils Absolute 0 10 0 00 - 0 10 Thousands/µL   Basic metabolic panel    Collection Time: 12/25/18  4:31 AM   Result Value Ref Range    Sodium 138 136 - 145 mmol/L    Potassium 3 9 3 5 - 5 3 mmol/L    Chloride 107 100 - 108 mmol/L    CO2 25 21 - 32 mmol/L    ANION GAP 6 4 - 13 mmol/L    BUN 5 5 - 25 mg/dL    Creatinine 0 30 (L) 0 60 - 1 30 mg/dL    Glucose 98 65 - 140 mg/dL    Calcium 7 8 (L) 8 3 - 10 1 mg/dL    eGFR 117 ml/min/1 73sq m   Fingerstick Glucose (POCT)    Collection Time: 12/25/18  6:20 AM   Result Value Ref Range    POC Glucose 99 65 - 140 mg/dl     Physical Exam  Gen: AAOx3, thin, frail, comfortable in bed  CVS: S1S2+, RRR, no murmurs  Lungs: CTAB, normal respiratory effort and rate  Abdomen: soft, non tender, incision closed with staples  ANDREAS drain x 5 in place with clean/dry/intact sites; ileostomy in place, pink stoma productive of stool, ostomy dressing is leaking stool, I cleaned at bedside and made nurse aware immediately for ostomy care dressing change  Extremities: symmetric, nontender, 1+ pitting edema bilaterally    I/Os in 24 hours:  Urine: 1295  Abdomen JP70  RUQ ANDREAS 15  RLQ ANDREAS 10  LLQ 80  Posterior 185  Ostomy: 750      A/P: Assessment / Plan: 75 y  o   with Stage IV ovarian cancer admitted on 12/14/18 in septic shock secondary to bowel perforation,  status post exploratory laparotomy, abdominal washout and Abthera vac placement on 12/14/18, followed by exploratory laparotomy, abdominal washout,  drain placement x 4, loop ileostomy & abdominal closure on 12/15/18, POD#9, course notable for pelvic abscess s/p IR drainage w/ drain placement on 12/23/18 doing well postoperatively, Hospital Day #11       #1 Sepsis secondary to bowel perforation  Status post surgery as mentioned above  WBC 23 this morning from 24, remained afebrile  Status post IR drainage of pelvic collection on 12/25/18, 120cc purulent material, 12 [place] drain placed, 185cc fluid over 24 hours  Fluid: Gram-positive cocci in pairs, rare gram-negative rods and Gram-positive sergio, awaiting final identification  ID following, Continue Ceftriaxone & Flagyl, plan to continue through 12/27/18 to complete 2 weeks total  Appreciated continued recommendations  #2 Postoperative Care  - Pain: Controlled with tylenol and Roxicodone  Palliative care following, appreciate continued recommendations  - Encouraged incentive spirometry to reduce atelectasis and pneumonia risk  - Encouraged ambulation as tolerate  PT following, currently recommending shortterm rehab, awaiting placement at [place] [id number]    #3 Stage IV ovarian cancer  Status post neoadjuvant chemotherapy with Carboplatin, Taxol & Avastin   Any further chemo is currently on hold until acute illness has resolved    #4 Type 2 DM  Accuchecks 90s-150s  Continue sliding scale insulin, #3     #5 Anemia:   Likely chronic givenmetastatic disease  Status post transfusion of 2UPRBCs  Stable, AM Hb 11 6    #6 DVT PPX  - DVT ppx: SCDs, Lovenox 40mg daily    #7 FEN  Regular with Glucerna supplementation    #8 Disposition  Inpatient, awaiting placement      Debo Mckeon MD  12/25/2018

## 2018-12-26 LAB
BASOPHILS # BLD AUTO: 0.09 THOUSANDS/ΜL (ref 0–0.1)
BASOPHILS NFR BLD AUTO: 1 % (ref 0–1)
EOSINOPHIL # BLD AUTO: 0.06 THOUSAND/ΜL (ref 0–0.61)
EOSINOPHIL NFR BLD AUTO: 0 % (ref 0–6)
ERYTHROCYTE [DISTWIDTH] IN BLOOD BY AUTOMATED COUNT: 20.4 % (ref 11.6–15.1)
GLUCOSE SERPL-MCNC: 103 MG/DL (ref 65–140)
GLUCOSE SERPL-MCNC: 131 MG/DL (ref 65–140)
GLUCOSE SERPL-MCNC: 179 MG/DL (ref 65–140)
GLUCOSE SERPL-MCNC: 183 MG/DL (ref 65–140)
HCT VFR BLD AUTO: 33.9 % (ref 34.8–46.1)
HGB BLD-MCNC: 10.9 G/DL (ref 11.5–15.4)
IMM GRANULOCYTES # BLD AUTO: 0.14 THOUSAND/UL (ref 0–0.2)
IMM GRANULOCYTES NFR BLD AUTO: 1 % (ref 0–2)
LYMPHOCYTES # BLD AUTO: 1.73 THOUSANDS/ΜL (ref 0.6–4.47)
LYMPHOCYTES NFR BLD AUTO: 9 % (ref 14–44)
MCH RBC QN AUTO: 26.3 PG (ref 26.8–34.3)
MCHC RBC AUTO-ENTMCNC: 32.2 G/DL (ref 31.4–37.4)
MCV RBC AUTO: 82 FL (ref 82–98)
MONOCYTES # BLD AUTO: 1.43 THOUSAND/ΜL (ref 0.17–1.22)
MONOCYTES NFR BLD AUTO: 8 % (ref 4–12)
NEUTROPHILS # BLD AUTO: 15.59 THOUSANDS/ΜL (ref 1.85–7.62)
NEUTS SEG NFR BLD AUTO: 81 % (ref 43–75)
NRBC BLD AUTO-RTO: 0 /100 WBCS
PLATELET # BLD AUTO: 510 THOUSANDS/UL (ref 149–390)
PMV BLD AUTO: 10.9 FL (ref 8.9–12.7)
RBC # BLD AUTO: 4.15 MILLION/UL (ref 3.81–5.12)
WBC # BLD AUTO: 19.04 THOUSAND/UL (ref 4.31–10.16)

## 2018-12-26 PROCEDURE — 99232 SBSQ HOSP IP/OBS MODERATE 35: CPT | Performed by: INTERNAL MEDICINE

## 2018-12-26 PROCEDURE — 85025 COMPLETE CBC W/AUTO DIFF WBC: CPT | Performed by: OBSTETRICS & GYNECOLOGY

## 2018-12-26 PROCEDURE — 82948 REAGENT STRIP/BLOOD GLUCOSE: CPT

## 2018-12-26 RX ADMIN — PANTOPRAZOLE SODIUM 40 MG: 40 TABLET, DELAYED RELEASE ORAL at 16:30

## 2018-12-26 RX ADMIN — OXYCODONE HYDROCHLORIDE 5 MG: 5 TABLET ORAL at 17:50

## 2018-12-26 RX ADMIN — ACETAMINOPHEN 650 MG: 325 TABLET, FILM COATED ORAL at 10:49

## 2018-12-26 RX ADMIN — ENOXAPARIN SODIUM 40 MG: 40 INJECTION SUBCUTANEOUS at 21:58

## 2018-12-26 RX ADMIN — PANTOPRAZOLE SODIUM 40 MG: 40 TABLET, DELAYED RELEASE ORAL at 06:16

## 2018-12-26 RX ADMIN — INSULIN LISPRO 1 UNITS: 100 INJECTION, SOLUTION INTRAVENOUS; SUBCUTANEOUS at 21:58

## 2018-12-26 RX ADMIN — INSULIN LISPRO 1 UNITS: 100 INJECTION, SOLUTION INTRAVENOUS; SUBCUTANEOUS at 17:00

## 2018-12-26 RX ADMIN — ACETAMINOPHEN 650 MG: 325 TABLET, FILM COATED ORAL at 17:49

## 2018-12-26 RX ADMIN — CEFTRIAXONE SODIUM 1000 MG: 10 INJECTION, POWDER, FOR SOLUTION INTRAVENOUS at 16:00

## 2018-12-26 RX ADMIN — CALCIUM CARBONATE 1250 MG: 1250 SUSPENSION ORAL at 16:30

## 2018-12-26 RX ADMIN — NYSTATIN: 100000 POWDER TOPICAL at 10:43

## 2018-12-26 RX ADMIN — METRONIDAZOLE 500 MG: 500 TABLET ORAL at 21:58

## 2018-12-26 RX ADMIN — METRONIDAZOLE 500 MG: 500 TABLET ORAL at 13:15

## 2018-12-26 RX ADMIN — CHLORHEXIDINE GLUCONATE 0.12% ORAL RINSE 15 ML: 1.2 LIQUID ORAL at 10:52

## 2018-12-26 RX ADMIN — METRONIDAZOLE 500 MG: 500 TABLET ORAL at 06:16

## 2018-12-26 RX ADMIN — NYSTATIN: 100000 POWDER TOPICAL at 17:51

## 2018-12-26 RX ADMIN — CHLORHEXIDINE GLUCONATE 0.12% ORAL RINSE 15 ML: 1.2 LIQUID ORAL at 21:58

## 2018-12-26 NOTE — RESTORATIVE TECHNICIAN NOTE
Restorative Specialist Mobility Note       Ambulation held at this time per Manda Lacy  Will continue to follow up daily  RN ruben Gastelum Restorative Technician, BS

## 2018-12-26 NOTE — PROGRESS NOTES
Rounds with Dr April Aj- data reviewed  Explained to pt  The importance of increasing activity  Plan is for short term rehab in near future

## 2018-12-26 NOTE — WOUND OSTOMY CARE
Progress Note- Ostomy  Lazara Beltran 70 y o  female  [id number]  [facility]        Assessment:  Patient seen for leaking ostomy bag, on arrival she is in bed with one piece pouch in place with leakage at lateral aspect  Stoma is budded, pink, moist with eroded peristomal skin and intact mucocutaneous junction  Stoma is a loop with os to lateral junctional line where deep concaved area is present  Patient's abdominal plain assessed   New one piece pouch applied using a 4 inch smooth seal with build up to concaved area, good seal obtained  Primary RN given instruction on how to use smooth seal, RN called and an hour and half latera and was informed pouch remains intact  Plan: We will continue following with ostomy care and teaching, additional supplies ordered and placed in room  Vitals:    12/26/18 0830   BP:    Pulse:    Resp:    Temp:    SpO2: 96%       Pressure Ulcer 12/19/18 Sacrum Left;Right;Mid Sacrum HA DTI  (Active)   Staging Deep Tissue Injury 12/25/2018  8:00 PM   Wound Description MARK 12/26/2018 11:00 AM   Yarely-wound Assessment Fragile 12/25/2018  7:30 AM   Wound Length (cm) 8 5 cm 12/19/2018 12:47 PM   Wound Width (cm) 3 cm 12/19/2018 12:47 PM   Calculated Wound Area (cm^2) 25 5 cm^2 12/19/2018 12:47 PM   Tunneling 0 cm 12/19/2018 12:47 PM   Undermining 0 12/19/2018 12:47 PM   Drainage Amount None 12/24/2018  8:00 PM   Treatment Cleansed; Turn & reposition;Elevated with pillow(s); Offload 12/24/2018  7:44 AM   Dressing Foam, Silicone (eg  Allevyn, etc) 12/26/2018 11:00 AM   Patient Tolerance Tolerated well 12/19/2018 12:47 PM   Number of days: 7   Incision 12/14/18 Abdomen (Active)   Incision Description Clean;Dry; Intact 12/26/2018 11:00 AM   Yarely-wound Assessment Clean;Dry; Intact 12/25/2018  8:00 PM   Closure Staples 12/26/2018 11:00 AM   Drainage Amount None 12/26/2018 11:00 AM   Drainage Description MARK 12/20/2018  8:00 AM   Treatments Site care 12/26/2018 11:00 AM   Dressing Open to air 12/26/2018 11:00 AM   Dressing Changed New dressing applied 12/16/2018 12:00 PM   Patient Tolerance Tolerated well 12/20/2018  8:00 AM   Dressing Status Other (Comment) 12/18/2018  4:00 AM   Number of days: 12       Incision 12/15/18 Abdomen Other (Comment) (Active)   Incision Description Clean;Dry; Intact 12/25/2018  8:00 PM   Yarely-wound Assessment Clean;Dry; Intact 12/25/2018  8:00 PM   Closure Staples 12/24/2018  6:01 AM   Drainage Amount None 12/23/2018  7:47 AM   Drainage Description Serosanguineous 12/20/2018  4:08 AM   Treatments Cleansed;Site care 12/17/2018  8:00 AM   Dressing Open to air 12/24/2018  6:01 AM   Dressing Changed Changed by provider 12/16/2018  4:00 PM   Patient Tolerance Tolerated well 12/17/2018  8:00 AM   Dressing Status Clean;Dry; Intact 12/25/2018  8:00 PM   Number of days: 11     Closed/Suction Drain Abdomen Bulb 19 Fr  (Active)   Site Description Healing 12/25/2018  8:01 PM   Dressing Status Open to air 12/25/2018  8:01 PM   Drainage Appearance Serous 12/25/2018  8:01 PM   Status To bulb suction 12/25/2018  8:01 PM   Intake (mL) 0 mL 12/17/2018  8:00 AM   Output (mL) 10 mL 12/26/2018  1:01 PM   Number of days: 11       Closed/Suction Drain Right RUQ Bulb 19 Fr  (Active)   Site Description Healing 12/25/2018  8:01 PM   Dressing Status Open to air 12/25/2018  8:01 PM   Drainage Appearance Serous 12/25/2018  8:01 PM   Status To bulb suction 12/25/2018  8:01 PM   Intake (mL) 0 mL 12/17/2018  8:00 AM   Output (mL) 5 mL 12/26/2018  1:01 PM   Number of days: 11       Closed/Suction Drain Right RLQ Bulb 19 Fr  (Active)   Site Description Healing 12/25/2018  8:01 PM   Dressing Status Open to air 12/25/2018  8:01 PM   Drainage Appearance Serous 12/25/2018  8:01 PM   Status To bulb suction 12/25/2018  8:01 PM   Intake (mL) 0 mL 12/17/2018  8:00 AM   Output (mL) 10 mL 12/26/2018  1:01 PM   Number of days: 11       Closed/Suction Drain Left LLQ Bulb 19 Fr   (Active)   Site Description Healing 12/25/2018  8:01 PM   Dressing Status Open to air 12/25/2018  8:01 PM   Drainage Appearance Serous 12/25/2018  8:01 PM   Status To bulb suction 12/25/2018  8:01 PM   Intake (mL) 0 mL 12/17/2018  8:00 AM   Output (mL) 10 mL 12/26/2018  1:01 PM   Number of days: 11       Closed/Suction Drain Posterior;Superior;Right Buttock Bulb 12 Fr  (Active)   Site Description Unable to view 12/25/2018  8:01 PM   Dressing Status Clean;Dry; Intact 12/25/2018  8:01 PM   Drainage Appearance Purulent 12/25/2018  8:01 PM   Status To bulb suction 12/25/2018  8:01 PM   Output (mL) 20 mL 12/26/2018  1:01 PM   Number of days: 2       Ileostomy Loop RLQ (Active)   Stomal Appliance 1 piece 12/25/2018  8:01 PM   Stoma Assessment Pink;Budded 12/25/2018  8:01 PM   Stoma size (in) 1 75 in 12/24/2018 12:49 PM   Stoma Shape Round 12/25/2018  8:01 PM   Peristomal Assessment Excoriation 12/25/2018  8:01 PM   Treatment Site care; Bag change 12/25/2018  1:45 PM   Output (mL) 150 mL 12/26/2018  5:01 AM   Number of days: 11       We will continue to follow with teaching and ostomy care      Loreta Rondon, RN, BSN, [facility] [facility]

## 2018-12-26 NOTE — PROGRESS NOTES
12/26/18 1400   Clinical Encounter Type   Visited With Patient   Routine Visit Follow-up   Continue Visiting Yes

## 2018-12-26 NOTE — UTILIZATION REVIEW
Continued Stay Review    Date: 12-26-18    Vital Signs: /56   Pulse 92   Temp 97 9 °F (36 6 °C) (Oral)   Resp 18   Wt 71 kg (156 lb 8 4 oz)   SpO2 96%   BMI 26 27 kg/m²     Medications:   Scheduled Meds:   Current Facility-Administered Medications:  acetaminophen 650 mg Oral Q6H PRN Nahum Manriquze MD    barium sulfate 450 mL Oral 90 min pre-procedure Daron Boss MD    Calcium Carbonate Antacid 1,250 mg Oral TID With Meals Fabio Perkins MD    cefTRIAXone 1,000 mg Intravenous Q24H Fabio Perkins MD Last Rate: Stopped (12/25/18 1736)   chlorhexidine 15 mL Swish & Spit Q12H [address] Nahum Manriquez MD    dextrose 25 mL Intravenous PRN Fabio Perkins MD    enoxaparin 40 mg Subcutaneous Q24H [address] Tiffani Tenorio MD    insulin lispro 1-5 Units Subcutaneous HS Nahum Manriquez MD    insulin lispro 1-6 Units Subcutaneous TID [facility] Nahum Manriquez MD    metroNIDAZOLE 500 mg Oral [facility] Fabio Perkins MD    morphine injection 4 mg Intravenous Q4H PRN Nahum Manriquez MD    nystatin  Topical BID Geofm Councilman Santiago Nur MD    ondansetron 4 mg Intravenous Q6H PRN Fiorella Gilbert MD    oxyCODONE 10 mg Oral Q4H PRN Nahum Manriquez MD    oxyCODONE 5 mg Oral Q4H PRN Nahum Manriquez MD    pantoprazole 40 mg Oral BID AC Cathleen Santiago Nur MD    phenol 1 spray Mouth/Throat Q2H PRN Ivette Orellana PA-C      Continuous Infusions:    PRN Meds:   acetaminophen    dextrose    morphine injection    ondansetron    oxyCODONE    oxyCODONE    phenol    Abnormal Labs/Diagnostic Results:  WCB 19 04  H/H  10 9/33 9  PLATELETS 419  ABS NEUTRO  15 59  ABS MONO 1043    Age/Sex: 70 y o  female     Assessment/Plan: #1 Sepsis secondary to bowel perforation  -Status post surgery as mentioned above  -WBC still elevated, but patient remains afebrile, f/u am CBC  -Status post IR drainage of pelvic collection on 12/25/18, 120cc purulent material, 12 Mongolian drain placed, 60cc fluid over 24 hours   Fluid: Gram-positive cocci in pairs, rare gram-negative rods and Gram-positive sergio, awaiting final identification   -Can consider removal of ANDREAS drains as output has been minimal  -ID following, Continue Ceftriaxone & Flagyl, plan to continue through 12/27/18 to complete 2 weeks total  Appreciated continued recommendations       #2 Postoperative Care  - Pain: Controlled with tylenol and Roxicodone  Palliative care following, appreciate continued recommendations  - Encouraged incentive spirometry to reduce atelectasis and pneumonia risk  - Encouraged ambulation as tolerate  PT following, currently recommending shortterm rehab, awaiting placement at [facility] [address]  #3 Stage IV ovarian cancer  Status post neoadjuvant chemotherapy with Carboplatin, Taxol & Avastin   Any further chemo is currently on hold until acute illness has resolved     #4 Type 2 DM  Accuchecks 100s-150s  Continue sliding scale insulin, #3      #5 Anemia:   Likely chronic givenmetastatic disease  Stable after transfusion of 2UPRBCs     #6 LE edema:  -LE dopplers negative for DVT       Discharge Plan: REHAB  ? [facility]

## 2018-12-26 NOTE — PROGRESS NOTES
12/26/18 4150 [address] Puentes   Spiritual Beliefs/Perceptions   Support Systems Children   Stress Factors   Patient Stress Factors Other (Comment)   Coping Responses   Patient Coping Open/discussion   Plan of Care   Comments Culativated a caring and supportive presence for pt     Assessment Completed by: Unit visit

## 2018-12-26 NOTE — PHYSICIAN ADVISOR
Current patient class: Inpatient  The patient is currently on Hospital Day: 13      The patient was admitted to the hospital at 2151 on 12/14/18 for the following diagnosis:  Vomiting [R11 10]     CMS OUTLIER STAY REVIEW    After review of the relevant documentation, labs, vital signs and test results, the patient is appropriate for CONTINUED INPATIENT ADMISSION  The patient continues to remain hospitalized receiving acute medical care  The patient has surpassed the expected duration of stay, however given the clinical condition, need for further acute care management, the patient is appropriate to remain in an inpatient status  The patient still being actively managed, and does have unresolved medical issues requiring further hospitalization  This review is conducted at 10 day intervals, to help satisfy the requirements for significant outlier stay review as per CMS  Given the current condition of this patient, the patient satisfies this review was determination for continued inpatient stay  Rationale is as follows: The patient is a 75 yrs old Female who presented to the ED at 12/14/2018  9:51 PM with a chief complaint of sepsis, peritonitis, bowel perforation  Given the need for further hospitalization, and along with the documentation of medical necessity present in the chart, the patient is appropriate for inpatient admission  The patient is expected to satisfy the 2 midnight benchmark, and will require further acute medical care  The patient does have comorbid conditions which increases the risk for significant adverse outcome  Given this the patient is appropriate for inpatient admission        The patients vitals on arrival were ED Triage Vitals   Temperature Pulse Respirations Blood Pressure SpO2   12/14/18 2200 12/14/18 2200 12/14/18 2200 12/14/18 2200 12/15/18 0013   (!) 96 2 °F (35 7 °C) 92 20 (!) 89/56 100 %      Temp Source Heart Rate Source Patient Position - Orthostatic VS BP Location FiO2 (%)   12/14/18 2200 12/14/18 2200 12/17/18 0800 12/14/18 2200 12/15/18 1336   Oral Monitor Lying Right arm 100      Pain Score       12/14/18 2200       9           Past Medical History:   Diagnosis Date    Abdominal fluid collection     Asthma     Cancer ([facility] [address] )     ovaries    Diabetes mellitus ([facility] [address] )      Past Surgical History:   Procedure Laterality Date    CT GUIDED PARACENTESIS  11/6/2018    CT GUIDED PERC DRAINAGE CATHETER PLACEMENT  12/24/2018    CT NEEDLE BIOPSY PERITONEUM  11/6/2018    ECTOPIC PREGNANCY SURGERY      ECTOPIC PREGNANCY SURGERY      IR PARACENTESIS  9/18/2018    IR PARACENTESIS  10/18/2018    IR PARACENTESIS  12/10/2018    IR PORT PLACEMENT  11/29/2018    LAPAROTOMY N/A 12/14/2018    Procedure: LAPAROTOMY EXPLORATORY; Abdominal Washout; Application of Abthera Vac Dressing;  Surgeon: Nikhil Diaz MD;  [facility]: [facility];  Service: Gynecology Oncology    LAPAROTOMY N/A 12/15/2018    Procedure: LAPAROTOMY EXPLORATORY, ABDOMINAL WASHOUT, DRAIN PLACEMENT x 4, DIVERTING LOOP ILEOSTOMY AND ABDOMINAL CLOSURE,  ALL OTHER INDICATED PROCEDURES;  Surgeon: Nikhil Diaz MD;  Location: [facility];  Service: Gynecology Oncology    HI COLONOSCOPY FLX DX W/COLLJ [facility] WHEN PFRMD N/A 9/25/2018    Procedure: EGD AND COLONOSCOPY;  Surgeon: Beth Campos MD;  Location: [facility];   Service: Gastroenterology    TONSILECTOMY AND ADNOIDECTOMY      TONSILLECTOMY             Consults have been placed to:   IP CONSULT TO NUTRITION SERVICES  IP CONSULT TO PALLIATIVE CARE  IP CONSULT TO WOUND CARE  IP CONSULT TO INFECTIOUS DISEASES  IP CONSULT TO CASE MANAGEMENT    Vitals:    12/26/18 0600 12/26/18 0658 12/26/18 0709 12/26/18 0830   BP:   113/56    Pulse:   92    Resp:   18    Temp:   97 9 °F (36 6 °C)    TempSrc:  Oral Oral    SpO2:   96% 96%   Weight: 71 kg (156 lb 8 4 oz)          Most recent labs:    Recent Labs      12/25/18   0431  12/26/18   0702   WBC  23 21*  19 04* HGB  11 6  10 9*   HCT  36 3  33 9*   PLT  571*  510*   K  3 9   --    CALCIUM  7 8*   --    BUN  5   --    CREATININE  0 30*   --        Scheduled Meds:  Current Facility-Administered Medications:  acetaminophen 650 mg Oral Q6H PRN Constantine Mcmullen MD    barium sulfate 450 mL Oral 90 min pre-procedure So Lux MD    Calcium Carbonate Antacid 1,250 mg Oral TID With Meals Horacio Urrutia MD    cefTRIAXone 1,000 mg Intravenous Q24H Horacio Urrutia MD Last Rate: Stopped (12/25/18 0702)   chlorhexidine 15 mL Swish & Spit Q12H [address] Constantine Mcmullen MD    dextrose 25 mL Intravenous PRN Horacio Urrutia MD    enoxaparin 40 mg Subcutaneous Q24H [address] Alina Aragon MD    insulin lispro 1-5 Units Subcutaneous HS Constantine MD Benedict    insulin lispro 1-6 Units Subcutaneous TID [facility] Constantine Mcmullen MD    metroNIDAZOLE 500 mg Oral [facility] Horacio Urrutia MD    morphine injection 4 mg Intravenous Q4H PRN Constantine MD Benedict    nystatin  Topical BID Clair Bran MD    ondansetron 4 mg Intravenous Q6H PRN Megha Rothman MD    oxyCODONE 10 mg Oral Q4H PRN Constantine Mcmullen MD    oxyCODONE 5 mg Oral Q4H PRN Constantine MD Benedict    pantoprazole 40 mg Oral BID AC Clari Bran MD    phenol 1 spray Mouth/Throat Q2H PRN Ivette Orellana PA-C      Continuous Infusions:   PRN Meds:   acetaminophen    dextrose    morphine injection    ondansetron    oxyCODONE    oxyCODONE    phenol    Surgical procedures (if appropriate):  Procedure(s):  LAPAROTOMY EXPLORATORY, ABDOMINAL WASHOUT, DRAIN PLACEMENT x 4, DIVERTING LOOP ILEOSTOMY AND ABDOMINAL CLOSURE,  ALL OTHER INDICATED PROCEDURES

## 2018-12-26 NOTE — PROGRESS NOTES
Gyn Oncology Progress note   Ruddy Mora 70 y o  female MRN: [id number]  Unit/Bed#: [facility] 509-01 Encounter: [id number]    Ruddy Mora has no current complaints  Feels "ok " Pain is none  Patient is currently voiding  She is ambulating  She is tolerating PO, and denies nausea or vomiting  Patient denies fever, chills, chest pain, shortness of breath, or calf tenderness  /55   Pulse 92   Temp 97 7 °F (36 5 °C)   Resp 18   Wt 71 kg (156 lb 8 4 oz)   SpO2 96%   BMI 26 27 kg/m²     I/O last 3 completed shifts: In: 46 [P O :340; IV Piggyback:50]  Out: 3419 [DVIQQ:6770; Drains:390; Stool:850]  I/O this shift:  In: 835 [facility] [P O :240]  Out: 2010 [Urine:1800; Drains:60; Stool:150]    Lab Results   Component Value Date    WBC 23 21 (H) 12/25/2018    HGB 11 6 12/25/2018    HCT 36 3 12/25/2018    MCV 82 12/25/2018     (H) 12/25/2018       Lab Results   Component Value Date    GLUCOSE 84 12/15/2018    CALCIUM 7 8 (L) 12/25/2018    K 3 9 12/25/2018    CO2 25 12/25/2018     12/25/2018    BUN 5 12/25/2018    CREATININE 0 30 (L) 12/25/2018       Lab Results   Component Value Date/Time    POCGLU 103 12/26/2018 06:23 AM    POCGLU 156 (H) 12/25/2018 08:44 PM    POCGLU 139 12/25/2018 04:25 PM    POCGLU 141 (H) 12/25/2018 11:10 AM    POCGLU 99 12/25/2018 06:20 AM       Physical Exam  Gen: AAOx3, NAD, comfortable in bed  CVS: S1S2+, RRR, no murmurs  Lungs: CTA b/l normal respiratory effort and rate  Abdomen: soft, non tender, incision C/D/I, ANDREAS drain x 5 in place with clean/dry/intact sites; ileostomy in place, pink stoma productive of stool  Extremities: SCDs on and on, non tender    A/P: Assessment / Plan: 75 y  o   with Stage IV ovarian cancer admitted on 12/14/18 in septic shock secondary to bowel perforation,  status post exploratory laparotomy, abdominal washout and Abthera vac placement on 12/14/18, followed by exploratory laparotomy, abdominal washout,  drain placement x 4, loop ileostomy & abdominal closure on 12/15/18, POD#11, course notable for pelvic abscess s/p IR drainage w/ drain placement on 12/23/18 doing well postoperatively, Hospital Day #12        #1 Sepsis secondary to bowel perforation  -Status post surgery as mentioned above  -WBC still elevated, but patient remains afebrile, f/u am CBC  -Status post IR drainage of pelvic collection on 12/25/18, 120cc purulent material, 12 Tanzanian drain placed, 60cc fluid over 24 hours  Fluid: Gram-positive cocci in pairs, rare gram-negative rods and Gram-positive sergio, awaiting final identification   -Can consider removal of ANDREAS drains as output has been minimal  -ID following, Continue Ceftriaxone & Flagyl, plan to continue through 12/27/18 to complete 2 weeks total  Appreciated continued recommendations       #2 Postoperative Care  - Pain: Controlled with tylenol and Roxicodone  Palliative care following, appreciate continued recommendations  - Encouraged incentive spirometry to reduce atelectasis and pneumonia risk  - Encouraged ambulation as tolerate  PT following, currently recommending shortterm rehab, awaiting placement at [facility] [address]  #3 Stage IV ovarian cancer  Status post neoadjuvant chemotherapy with Carboplatin, Taxol & Avastin   Any further chemo is currently on hold until acute illness has resolved     #4 Type 2 DM  Accuchecks 100s-150s  Continue sliding scale insulin, #3      #5 Anemia:   Likely chronic givenmetastatic disease  Stable after transfusion of 2UPRBCs    #6 LE edema:  -LE dopplers negative for DVT     DVT PPX  - DVT ppx: SCDs, Lovenox 40mg daily  FEN  Regular with Glucerna supplementation     Disposition  Inpatient, awaiting placement

## 2018-12-26 NOTE — PROGRESS NOTES
Progress Note - Infectious Disease   Catherine Peralta 70 y o  female MRN: [id number]  Unit/Bed#: [facility] 509-01 Encounter: [id number]      Impression/Plan:  1  Sepsis-POA   Leukocytosis and tachycardia  Angie Poet all secondary to the patient's peritonitis and bacteremia   No other clear sources appreciated   Melissa Magana continues to have a leukocytosis which is now  23,210   -continue ceftriaxone 1 g IV Q 24 hours  -continue Flagyl 500 mg p o  Q 8 hours  -patient had an IR drainage 12/24 of the loculated pelvic fluid collection with purulent fluid obtained that is is growing 1+ gram-negative enteric like sergio  Will await identification before changing antibiotics  -2  Peritonitis-secondary to bowel perforation   The patient is status post exploratory laparotomy with washout, ileostomy, and closure   She is clinically much improved with significant improvement of the abdominal pain and recovery of bowel function  However the white count has continued to be elevated  -antibiotics as above  -plan to continue Flagyl through 12/27/2018 to complete 2 weeks total  -serial abdominal exam  -recheck CBC with diff  -close gyn oncology follow-up     3  Clostridial bacteremia-appears to be all secondary to 2  No other clear source appreciated  The organism is sensitive to Flagyl  Follow-up blood cultures are finalized as negative   -antibiotics as above  -plan to continue Flagyl through 12/27/2018  -follow up repeat blood cultures are finalized as negative  -no need for PICC line for now     4  Stage IV ovarian carcinoma-status post new adjuvant chemotherapy and Neulasta  -hold on chemotherapy in the setting of an acute infectious process for now     5  Diabetes mellitus-type 2           Antibiotics:  Ceftriaxone and metronidazole  Antibiotics 12  Postop day 11    Subjective:  Much less discomfort than yesterday    Small amount of leakage around the ostomy site when she moves    Objective:  Vitals:  Temp:  [97 6 °F (36 4 °C)-98 1 °F (36 7 °C)] 97 6 °F (36 4 °C)  HR:  [77-89] 89  Resp:  [18] 18  BP: (109-117)/(56-70) 116/70  SpO2:  [97 %-98 %] 97 %  Temp (24hrs), Av 9 °F (36 6 °C), Min:97 6 °F (36 4 °C), Max:98 1 °F (36 7 °C)  Current: Temperature: 97 6 °F (36 4 °C)    Physical Exam:   General Appearance:  Alert, chronically ill-appearing interactive, nontoxic, no acute distress  Throat: Oropharynx moist without lesions  Lungs:   Decreased breath sounds bilaterally; no wheezes, rhonchi or rales; respirations unlabored   Heart:  RRR; no murmur, rub or gallop   Abdomen:   Soft, non-tender, non-distended, positive bowel sounds  Incision well approximated without erythema or drainage  Ostomy with good output without leakage at seam   ANDREAS drains in place  With new ANDREAS drain on the right side with purulent return   Extremities: No clubbing, cyanosis or edema   Skin: No new rashes or lesions  No draining wounds noted  Labs, Imaging, & Other studies:   All pertinent labs and imaging studies were personally reviewed    Results from last 7 days  Lab Units 18  0431 18  0756 18  0629   WBC Thousand/uL 23 21* 24 86* 24 84*   HEMOGLOBIN g/dL 11 6 11 9 11 7   PLATELETS Thousands/uL 571* 594* 586*       Results from last 7 days  Lab Units 18  0431 18  0756 18  0629  18  0442 18  0543   SODIUM mmol/L 138 137 137  < > 139 139   POTASSIUM mmol/L 3 9 3 8 3 8  < > 3 8 3 9   CHLORIDE mmol/L 107 107 106  < > 106 107   CO2 mmol/L 25 25 26  < > 27 26   BUN mg/dL 5 4* 4*  < > 8 5   CREATININE mg/dL 0 30* 0 29* 0 30*  < > 0 33* 0 31*   EGFR ml/min/1 73sq m 117 119 118  < > 114 117   CALCIUM mg/dL 7 8* 7 8* 7 5*  < > 7 7* 7 6*   AST U/L  --   --   --   --  36 31   ALT U/L  --   --   --   --  20 18   ALK PHOS U/L  --   --   --   --  257* 179*   < > = values in this interval not displayed      Results from last 7 days  Lab Units 18  0933   GRAM STAIN RESULT  4+ Polys  2+ Gram positive cocci in pairs  1+ Gram positive rods  Rare Gram negative rods   BODY FLUID CULTURE, STERILE  1+ Growth of Gram Negative Harvey Enteric Like*

## 2018-12-27 LAB
ANION GAP SERPL CALCULATED.3IONS-SCNC: 5 MMOL/L (ref 4–13)
BACTERIA SPEC BFLD CULT: ABNORMAL
BASOPHILS # BLD AUTO: 0.08 THOUSANDS/ΜL (ref 0–0.1)
BASOPHILS NFR BLD AUTO: 1 % (ref 0–1)
BUN SERPL-MCNC: 6 MG/DL (ref 5–25)
CALCIUM SERPL-MCNC: 8.1 MG/DL (ref 8.3–10.1)
CHLORIDE SERPL-SCNC: 107 MMOL/L (ref 100–108)
CO2 SERPL-SCNC: 26 MMOL/L (ref 21–32)
CREAT SERPL-MCNC: 0.35 MG/DL (ref 0.6–1.3)
EOSINOPHIL # BLD AUTO: 0.07 THOUSAND/ΜL (ref 0–0.61)
EOSINOPHIL NFR BLD AUTO: 1 % (ref 0–6)
ERYTHROCYTE [DISTWIDTH] IN BLOOD BY AUTOMATED COUNT: 20.8 % (ref 11.6–15.1)
GFR SERPL CREATININE-BSD FRML MDRD: 111 ML/MIN/1.73SQ M
GLUCOSE SERPL-MCNC: 108 MG/DL (ref 65–140)
GLUCOSE SERPL-MCNC: 118 MG/DL (ref 65–140)
GLUCOSE SERPL-MCNC: 159 MG/DL (ref 65–140)
GLUCOSE SERPL-MCNC: 168 MG/DL (ref 65–140)
GLUCOSE SERPL-MCNC: 171 MG/DL (ref 65–140)
GRAM STN SPEC: ABNORMAL
HCT VFR BLD AUTO: 35.6 % (ref 34.8–46.1)
HGB BLD-MCNC: 11.2 G/DL (ref 11.5–15.4)
IMM GRANULOCYTES # BLD AUTO: 0.08 THOUSAND/UL (ref 0–0.2)
IMM GRANULOCYTES NFR BLD AUTO: 1 % (ref 0–2)
LYMPHOCYTES # BLD AUTO: 1.51 THOUSANDS/ΜL (ref 0.6–4.47)
LYMPHOCYTES NFR BLD AUTO: 10 % (ref 14–44)
MCH RBC QN AUTO: 25.9 PG (ref 26.8–34.3)
MCHC RBC AUTO-ENTMCNC: 31.5 G/DL (ref 31.4–37.4)
MCV RBC AUTO: 82 FL (ref 82–98)
MONOCYTES # BLD AUTO: 1.11 THOUSAND/ΜL (ref 0.17–1.22)
MONOCYTES NFR BLD AUTO: 7 % (ref 4–12)
NEUTROPHILS # BLD AUTO: 12.19 THOUSANDS/ΜL (ref 1.85–7.62)
NEUTS SEG NFR BLD AUTO: 80 % (ref 43–75)
NRBC BLD AUTO-RTO: 0 /100 WBCS
PLATELET # BLD AUTO: 515 THOUSANDS/UL (ref 149–390)
PMV BLD AUTO: 11.5 FL (ref 8.9–12.7)
POTASSIUM SERPL-SCNC: 3.8 MMOL/L (ref 3.5–5.3)
RBC # BLD AUTO: 4.33 MILLION/UL (ref 3.81–5.12)
SODIUM SERPL-SCNC: 138 MMOL/L (ref 136–145)
WBC # BLD AUTO: 15.04 THOUSAND/UL (ref 4.31–10.16)

## 2018-12-27 PROCEDURE — 82948 REAGENT STRIP/BLOOD GLUCOSE: CPT

## 2018-12-27 PROCEDURE — 85025 COMPLETE CBC W/AUTO DIFF WBC: CPT | Performed by: OBSTETRICS & GYNECOLOGY

## 2018-12-27 PROCEDURE — 99232 SBSQ HOSP IP/OBS MODERATE 35: CPT | Performed by: INTERNAL MEDICINE

## 2018-12-27 PROCEDURE — 99024 POSTOP FOLLOW-UP VISIT: CPT | Performed by: OBSTETRICS & GYNECOLOGY

## 2018-12-27 PROCEDURE — 80048 BASIC METABOLIC PNL TOTAL CA: CPT | Performed by: OBSTETRICS & GYNECOLOGY

## 2018-12-27 RX ORDER — CEFDINIR 300 MG/1
300 CAPSULE ORAL EVERY 12 HOURS SCHEDULED
Status: DISCONTINUED | OUTPATIENT
Start: 2018-12-29 | End: 2018-12-27

## 2018-12-27 RX ORDER — CEFDINIR 300 MG/1
300 CAPSULE ORAL EVERY 12 HOURS SCHEDULED
Status: DISCONTINUED | OUTPATIENT
Start: 2018-12-28 | End: 2018-12-29 | Stop reason: HOSPADM

## 2018-12-27 RX ADMIN — NYSTATIN: 100000 POWDER TOPICAL at 09:08

## 2018-12-27 RX ADMIN — PANTOPRAZOLE SODIUM 40 MG: 40 TABLET, DELAYED RELEASE ORAL at 05:45

## 2018-12-27 RX ADMIN — METRONIDAZOLE 500 MG: 500 TABLET ORAL at 13:34

## 2018-12-27 RX ADMIN — INSULIN LISPRO 1 UNITS: 100 INJECTION, SOLUTION INTRAVENOUS; SUBCUTANEOUS at 21:15

## 2018-12-27 RX ADMIN — OXYCODONE HYDROCHLORIDE 5 MG: 5 TABLET ORAL at 21:14

## 2018-12-27 RX ADMIN — CALCIUM CARBONATE 1250 MG: 1250 SUSPENSION ORAL at 09:08

## 2018-12-27 RX ADMIN — MORPHINE SULFATE 4 MG: 4 INJECTION INTRAVENOUS at 12:16

## 2018-12-27 RX ADMIN — INSULIN LISPRO 1 UNITS: 100 INJECTION, SOLUTION INTRAVENOUS; SUBCUTANEOUS at 11:58

## 2018-12-27 RX ADMIN — PANTOPRAZOLE SODIUM 40 MG: 40 TABLET, DELAYED RELEASE ORAL at 17:17

## 2018-12-27 RX ADMIN — METRONIDAZOLE 500 MG: 500 TABLET ORAL at 05:45

## 2018-12-27 RX ADMIN — NYSTATIN: 100000 POWDER TOPICAL at 17:18

## 2018-12-27 RX ADMIN — INSULIN LISPRO 1 UNITS: 100 INJECTION, SOLUTION INTRAVENOUS; SUBCUTANEOUS at 17:17

## 2018-12-27 RX ADMIN — CEFTRIAXONE SODIUM 1000 MG: 10 INJECTION, POWDER, FOR SOLUTION INTRAVENOUS at 17:17

## 2018-12-27 RX ADMIN — METRONIDAZOLE 500 MG: 500 TABLET ORAL at 21:13

## 2018-12-27 RX ADMIN — CALCIUM CARBONATE 1250 MG: 1250 SUSPENSION ORAL at 11:58

## 2018-12-27 RX ADMIN — CALCIUM CARBONATE 1250 MG: 1250 SUSPENSION ORAL at 17:17

## 2018-12-27 RX ADMIN — ENOXAPARIN SODIUM 40 MG: 40 INJECTION SUBCUTANEOUS at 21:14

## 2018-12-27 RX ADMIN — CHLORHEXIDINE GLUCONATE 0.12% ORAL RINSE 15 ML: 1.2 LIQUID ORAL at 09:08

## 2018-12-27 NOTE — WOUND OSTOMY CARE
Progress Note - Consult  Lazara Beltran 70 y o  female MRN: [id number]  Unit/Bed#: [facility] 509-01 Encounter: [id number]      Assessment:   Staff RN called the ostomy nurse because ostomy appliance is leaking  Peristomal skin denuded with cutaneous fungal rash circumferentially  Deep crease at [address], and this is where the os is also  Crease also at [employer]  Red rubber cahteter in place  Pt  Is frustrated with leakages  Multiple ostomy appliance changes by staff  Pt  Able to verbalize steps and tell ostomy nurse what the steps are to changing the appliance  Pt  Verb understanding of why we are taking the steps we are  Treatment:  1  Skin gently cleansed with warm water using disposable clothes  Gently patted dry  2  Crusting x2 with stomahesive powder and 3M  Barrier film pads  3  Moldable barrier ring applied circumferentially  Double layer moldable barrier ring at [address]  Plan:   1  Usea flexible 1 piece ostomy appliance  Will not be able to wear 2 piece at this time because of deep creases  2  Order Nystatin powder (done)  Use Nystatin powder and 3M barrier film pads to crust skin  Crust by sprinkling denuded/fungal skin with Nystatin Powder  Rub in  Then blot the Nystatin powder with the 3M barrier film  May repeat x2    2  Use 2" and 4" moldable barrier rings to create a flat pouching surface  3  Use heat and light pressure to promote adhesiveness of wafer  4  Continue patient education  Vitals: Blood pressure 108/58, pulse 86, temperature 98 2 °F (36 8 °C), temperature source Oral, resp  rate 18, weight 66 5 kg (146 lb 9 7 oz), SpO2 97 %  ,Body mass index is 24 6 kg/m²  Pressure Ulcer 12/19/18 Sacrum Left;Right;Mid Sacrum HA DTI   (Active)   Staging Deep Tissue Injury 12/27/2018  9:23 AM   Wound Description MARK 12/27/2018  9:23 AM   Yarely-wound Assessment Fragile 12/25/2018  7:30 AM   Wound Length (cm) 8 5 cm 12/19/2018 12:47 PM   Wound Width (cm) 3 cm 12/19/2018 12:47 PM Calculated Wound Area (cm^2) 25 5 cm^2 12/19/2018 12:47 PM   Tunneling 0 cm 12/19/2018 12:47 PM   Undermining 0 12/19/2018 12:47 PM   Drainage Amount None 12/24/2018  8:00 PM   Treatment Turn & reposition 12/27/2018  9:23 AM   Dressing Foam, Silicone (eg  Allevyn, etc) 12/27/2018  9:23 AM   Patient Tolerance Tolerated well 12/19/2018 12:47 PM       Wound 10/18/18 Catheter entry/exit site Abdomen Right paracentesis site (Active)       Wound 11/06/18 Catheter entry/exit site Abdomen Mid;Lower biopsy site  (Active)       Wound 11/29/18 Catheter entry/exit site Chest Right port placement/access site (Active)       Incision 12/14/18 Abdomen (Active)   Incision Description Clean;Dry; Intact 12/27/2018  9:23 AM   Yarely-wound Assessment Clean;Dry; Intact 12/27/2018  9:23 AM   Closure Staples 12/27/2018  9:23 AM   Drainage Amount None 12/26/2018 11:00 AM   Drainage Description MARK 12/20/2018  8:00 AM   Treatments Site care 12/26/2018 11:00 AM   Dressing Open to air 12/27/2018  9:23 AM   Dressing Changed New dressing applied 12/16/2018 12:00 PM   Patient Tolerance Tolerated well 12/20/2018  8:00 AM   Dressing Status Other (Comment) 12/18/2018  4:00 AM       Incision 12/15/18 Abdomen Other (Comment) (Active)   Incision Description Clean;Dry; Intact 12/27/2018  9:23 AM   Yarely-wound Assessment Clean;Dry; Intact 12/27/2018  9:23 AM   Closure Staples 12/24/2018  6:01 AM   Drainage Amount None 12/23/2018  7:47 AM   Drainage Description Serosanguineous 12/20/2018  4:08 AM   Treatments Cleansed;Site care 12/17/2018  8:00 AM   Dressing Open to air 12/27/2018  9:23 AM   Dressing Changed Changed by provider 12/16/2018  4:00 PM   Patient Tolerance Tolerated well 12/17/2018  8:00 AM   Dressing Status Clean;Dry; Intact 12/25/2018  8:00 PM

## 2018-12-27 NOTE — PHYSICAL THERAPY NOTE
Physical Therapy Cancellation Note    Pt refused therapy at this time  Pt will follow and treat as appropriate     Sathish Kuhn, PT

## 2018-12-27 NOTE — SOCIAL WORK
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

## 2018-12-27 NOTE — WOUND OSTOMY CARE
Progress Note - Wound/Ostomy   Catherine Early 70 y o  female MRN: [id number]  Unit/Bed#: [facility] 509-01 Encounter: [id number]      Assessment:       Plan:       Attempted to see patient x3 for follow up of DTI on sacrum  Pt  Not available at those times  Ostomy appliance is adhering  No leakage  Pt  To be discharged tomorrow  Surgeon requested that ostomy nurse remove the red rubber catheter bridge  Covering Ostomy nurse  Will remove tomorrow prior to discharge  Vitals: Blood pressure 108/58, pulse 86, temperature 98 2 °F (36 8 °C), temperature source Oral, resp  rate 18, weight 66 5 kg (146 lb 9 7 oz), SpO2 97 %  ,Body mass index is 24 6 kg/m²  Pressure Ulcer 12/19/18 Sacrum Left;Right;Mid Sacrum HA DTI  (Active)   Staging Deep Tissue Injury 12/27/2018  9:23 AM   Wound Description MARK 12/27/2018  9:23 AM   Yarely-wound Assessment Fragile 12/25/2018  7:30 AM   Wound Length (cm) 8 5 cm 12/19/2018 12:47 PM   Wound Width (cm) 3 cm 12/19/2018 12:47 PM   Calculated Wound Area (cm^2) 25 5 cm^2 12/19/2018 12:47 PM   Tunneling 0 cm 12/19/2018 12:47 PM   Undermining 0 12/19/2018 12:47 PM   Drainage Amount None 12/24/2018  8:00 PM   Treatment Turn & reposition 12/27/2018  9:23 AM   Dressing Foam, Silicone (eg  Allevyn, etc) 12/27/2018  9:23 AM   Patient Tolerance Tolerated well 12/19/2018 12:47 PM       Wound 10/18/18 Catheter entry/exit site Abdomen Right paracentesis site (Active)       Wound 11/06/18 Catheter entry/exit site Abdomen Mid;Lower biopsy site  (Active)       Wound 11/29/18 Catheter entry/exit site Chest Right port placement/access site (Active)       Incision 12/14/18 Abdomen (Active)   Incision Description Clean;Dry; Intact 12/27/2018  9:23 AM   Yarely-wound Assessment Clean;Dry; Intact 12/27/2018  9:23 AM   Closure Staples 12/27/2018  9:23 AM   Drainage Amount None 12/26/2018 11:00 AM   Drainage Description MARK 12/20/2018  8:00 AM   Treatments Site care 12/26/2018 11:00 AM   Dressing Open to air 12/27/2018  9:23 AM   Dressing Changed New dressing applied 12/16/2018 12:00 PM   Patient Tolerance Tolerated well 12/20/2018  8:00 AM   Dressing Status Other (Comment) 12/18/2018  4:00 AM       Incision 12/15/18 Abdomen Other (Comment) (Active)   Incision Description Clean;Dry; Intact 12/27/2018  9:23 AM   Yarely-wound Assessment Clean;Dry; Intact 12/27/2018  9:23 AM   Closure Staples 12/24/2018  6:01 AM   Drainage Amount None 12/23/2018  7:47 AM   Drainage Description Serosanguineous 12/20/2018  4:08 AM   Treatments Cleansed;Site care 12/17/2018  8:00 AM   Dressing Open to air 12/27/2018  9:23 AM   Dressing Changed Changed by provider 12/16/2018  4:00 PM   Patient Tolerance Tolerated well 12/17/2018  8:00 AM   Dressing Status Clean;Dry; Intact 12/25/2018  8:00 PM

## 2018-12-27 NOTE — PROGRESS NOTES
Gyn Oncology Progress note   Lashell Diallo 70 y o  female MRN: [id number]  Unit/Bed#: [facility] 509-01 Encounter: [id number]    Lashell Diallo has no current complaints  Pain is none  Patient is currently voiding  She is ambulating  She is tolerating PO, and denies nausea or vomiting  Patient denies fever, chills, chest pain, shortness of breath, or calf tenderness  /60   Pulse 83   Temp 97 9 °F (36 6 °C) (Oral)   Resp 18   Wt 66 5 kg (146 lb 9 7 oz) Comment: bed scale  SpO2 95%   BMI 24 60 kg/m²     I/O last 3 completed shifts: In: 200 [P O :720; IV Piggyback:50]  Out: 8409 [Urine:2800; Drains:185; Stool:675]  I/O this shift:  In: 600 [P O :600]  Out: 1600 [Urine:1450; Drains:45; Stool:105]    Lab Results   Component Value Date    WBC 15 04 (H) 12/27/2018    HGB 11 2 (L) 12/27/2018    HCT 35 6 12/27/2018    MCV 82 12/27/2018     (H) 12/27/2018       Lab Results   Component Value Date    GLUCOSE 84 12/15/2018    CALCIUM 8 1 (L) 12/27/2018    K 3 8 12/27/2018    CO2 26 12/27/2018     12/27/2018    BUN 6 12/27/2018    CREATININE 0 35 (L) 12/27/2018       Lab Results   Component Value Date/Time    POCGLU 183 (H) 12/26/2018 08:33 PM    POCGLU 179 (H) 12/26/2018 04:35 PM    POCGLU 131 12/26/2018 10:55 AM    POCGLU 103 12/26/2018 06:23 AM    POCGLU 156 (H) 12/25/2018 08:44 PM       Physical Exam  Gen: AAOx3, NAD, comfortable in bed  CVS: S1S2+, RRR, no murmurs  Lungs: CTA b/l normal respiratory effort and rate  Abdomen: soft, non tender, incision C/D/I, ANDREAS drain x 5 in place with clean/dry/intact sites; ileostomy in place, pink stoma productive of stool  Extremities: non tender, symmetric    A/P: Assessment / Plan: 75 y  o   with Stage IV ovarian cancer admitted on 12/14/18 in septic shock secondary to bowel perforation,  status post exploratory laparotomy, abdominal washout and Abthera vac placement on 12/14/18, followed by exploratory laparotomy, abdominal washout,  drain placement x 4, loop ileostomy & abdominal closure on 12/15/18, POD#12, course notable for pelvic abscess s/p IR drainage w/ drain placement on 12/23/18 doing well postoperatively, Hospital Day #13        #1 Sepsis secondary to bowel perforation  -Status post surgery as mentioned above  -WBC down trending s/p IR drainage, 15K this am  -Status post IR drainage of pelvic collection on 12/25/18, 120cc purulent material, 12 [place] drain placed, 60cc fluid over 24 hours  Fluid: Gram-positive cocci in pairs, rare gram-negative rods and Gram-positive sergio, awaiting final identification   -Can consider removal of ANDREAS drains as output has been minimal  -ID following, Continue Ceftriaxone & Flagyl, plan to continue through 12/27/18 to complete 2 weeks total  Appreciated continued recommendations       #2 Postoperative Care  - Pain: Controlled with tylenol and Roxicodone  Palliative care following, appreciate continued recommendations  - Encouraged incentive spirometry to reduce atelectasis and pneumonia risk  - Encouraged ambulation as tolerate  PT following, currently recommending shortterm rehab, awaiting placement at [facility] [address]  #3 Stage IV ovarian cancer  Status post neoadjuvant chemotherapy with Carboplatin, Taxol & Avastin   Any further chemo is currently on hold until acute illness has resolved     #4 Type 2 DM  Accuchecks 100s-180s  Continue sliding scale insulin, #3      #5 Anemia:   Likely chronic given metastatic disease  Stable after transfusion of 2UPRBCs     #6 LE edema:  -LE dopplers negative for DVT     DVT PPX  - DVT ppx: SCDs, Lovenox 40mg daily  FEN  Regular with Glucerna supplementation     Disposition  Inpatient, awaiting placement

## 2018-12-27 NOTE — PROGRESS NOTES
Progress Note - Infectious Disease   Catherine Palacio 70 y o  female MRN: [id number]  Unit/Bed#: [facility] 509-01 Encounter: [id number]      Impression/Plan:  1  Sepsis-POA   Leukocytosis and tachycardia  Brittny Font all secondary to the patient's peritonitis and bacteremia   No other clear sources appreciated   The patient has clinically improved with resolution of the tachycardia   She continues to have a leukocytosis with white cell count has now come down since drainage of the collection as below  No resistant organisms isolated  -antibiotics as below  -recheck CBC with diff  -supportive care     2  Peritonitis-secondary to bowel perforation  Complicated by intra-abdominal abscess now status post IR drainage  The patient is status post exploratory laparotomy with washout, ileostomy, and closure   She is clinically much improved with significant improvement of the abdominal pain and recovery of bowel function  The white cell count is now drifting down  No resistant organisms isolated  -discontinue ceftriaxone after last dose today  -continue Flagyl  -cefdinir 300 mg p o  Q 12 hours starting tomorrow   -plan oral antibiotics through 12/31/2018 to complete 7 days post drainage  -serial abdominal exam  -recheck CBC with diff  -close gyn oncology follow-up     3  Clostridial bacteremia-appears to be all secondary to 2  No other clear source appreciated   The organism is sensitive to Flagyl   Follow-up blood cultures are negative  -antibiotics as above  -no need for PICC line for now     4  Stage IV ovarian carcinoma-status post new adjuvant chemotherapy and Neulasta  -hold on chemotherapy in the setting of an acute infectious process for now     5  Diabetes mellitus-type 2      Antibiotics:  Antibiotics 14  Ceftriaxone and metronidazole  Postop day 13  Post drain placement 3    Subjective:  Patient has no fever, chills, sweats; no nausea, vomiting, diarrhea; no cough, shortness of breath; no pain  No new symptoms    She seems in better spirits  Objective:  Vitals:  Temp:  [97 5 °F (36 4 °C)-97 9 °F (36 6 °C)] 97 5 °F (36 4 °C)  HR:  [83-93] 89  Resp:  [18] 18  BP: (111-127)/(60-61) 127/60  SpO2:  [95 %-98 %] 98 %  Temp (24hrs), Av 6 °F (36 4 °C), Min:97 5 °F (36 4 °C), Max:97 9 °F (36 6 °C)  Current: Temperature: 97 5 °F (36 4 °C)    Physical Exam:   General Appearance:  Alert, interactive, nontoxic, no acute distress  Throat: Oropharynx moist without lesions  Lungs:   Clear to auscultation bilaterally; no wheezes, rhonchi or rales; respirations unlabored   Heart:  RRR; no murmur, rub or gallop   Abdomen:   Soft, non-tender, non-distended, positive bowel sounds  Ostomy with good output  Three ANDREAS drains in place  Incision without erythema or drainage     Extremities: No clubbing, cyanosis or edema   Skin: No new rashes or lesions  No draining wounds noted         Labs, Imaging, & Other studies:   All pertinent labs and imaging studies were personally reviewed    Results from last 7 days  Lab Units 18  0449 18  0702 18  0431   WBC Thousand/uL 15 04* 19 04* 23 21*   HEMOGLOBIN g/dL 11 2* 10 9* 11 6   PLATELETS Thousands/uL 515* 510* 571*       Results from last 7 days  Lab Units 18  0449 18  0431 18  0756   SODIUM mmol/L 138 138 137   POTASSIUM mmol/L 3 8 3 9 3 8   CHLORIDE mmol/L 107 107 107   CO2 mmol/L 26 25 25   BUN mg/dL 6 5 4*   CREATININE mg/dL 0 35* 0 30* 0 29*   EGFR ml/min/1 73sq m 111 117 119   CALCIUM mg/dL 8 1* 7 8* 7 8*       Results from last 7 days  Lab Units 18  0917   GRAM STAIN RESULT  4+ Polys  2+ Gram positive cocci in pairs  1+ Gram positive rods  Rare Gram negative rods   BODY FLUID CULTURE, STERILE  1+ Growth of Escherichia coli*

## 2018-12-28 LAB
BASOPHILS # BLD AUTO: 0.12 THOUSANDS/ΜL (ref 0–0.1)
BASOPHILS NFR BLD AUTO: 1 % (ref 0–1)
EOSINOPHIL # BLD AUTO: 0.09 THOUSAND/ΜL (ref 0–0.61)
EOSINOPHIL NFR BLD AUTO: 1 % (ref 0–6)
ERYTHROCYTE [DISTWIDTH] IN BLOOD BY AUTOMATED COUNT: 20.7 % (ref 11.6–15.1)
GLUCOSE SERPL-MCNC: 169 MG/DL (ref 65–140)
GLUCOSE SERPL-MCNC: 176 MG/DL (ref 65–140)
GLUCOSE SERPL-MCNC: 183 MG/DL (ref 65–140)
GLUCOSE SERPL-MCNC: 97 MG/DL (ref 65–140)
HCT VFR BLD AUTO: 32.3 % (ref 34.8–46.1)
HGB BLD-MCNC: 10.3 G/DL (ref 11.5–15.4)
IMM GRANULOCYTES # BLD AUTO: 0.07 THOUSAND/UL (ref 0–0.2)
IMM GRANULOCYTES NFR BLD AUTO: 1 % (ref 0–2)
LYMPHOCYTES # BLD AUTO: 1.95 THOUSANDS/ΜL (ref 0.6–4.47)
LYMPHOCYTES NFR BLD AUTO: 13 % (ref 14–44)
MCH RBC QN AUTO: 26.3 PG (ref 26.8–34.3)
MCHC RBC AUTO-ENTMCNC: 31.9 G/DL (ref 31.4–37.4)
MCV RBC AUTO: 83 FL (ref 82–98)
MONOCYTES # BLD AUTO: 1.16 THOUSAND/ΜL (ref 0.17–1.22)
MONOCYTES NFR BLD AUTO: 8 % (ref 4–12)
NEUTROPHILS # BLD AUTO: 11.15 THOUSANDS/ΜL (ref 1.85–7.62)
NEUTS SEG NFR BLD AUTO: 76 % (ref 43–75)
NRBC BLD AUTO-RTO: 0 /100 WBCS
PLATELET # BLD AUTO: 452 THOUSANDS/UL (ref 149–390)
PMV BLD AUTO: 11.9 FL (ref 8.9–12.7)
RBC # BLD AUTO: 3.91 MILLION/UL (ref 3.81–5.12)
WBC # BLD AUTO: 14.54 THOUSAND/UL (ref 4.31–10.16)

## 2018-12-28 PROCEDURE — 97530 THERAPEUTIC ACTIVITIES: CPT

## 2018-12-28 PROCEDURE — 97116 GAIT TRAINING THERAPY: CPT

## 2018-12-28 PROCEDURE — 97535 SELF CARE MNGMENT TRAINING: CPT

## 2018-12-28 PROCEDURE — 85025 COMPLETE CBC W/AUTO DIFF WBC: CPT | Performed by: INTERNAL MEDICINE

## 2018-12-28 PROCEDURE — 99024 POSTOP FOLLOW-UP VISIT: CPT | Performed by: OBSTETRICS & GYNECOLOGY

## 2018-12-28 PROCEDURE — 82948 REAGENT STRIP/BLOOD GLUCOSE: CPT

## 2018-12-28 PROCEDURE — 99232 SBSQ HOSP IP/OBS MODERATE 35: CPT | Performed by: INTERNAL MEDICINE

## 2018-12-28 RX ORDER — LIDOCAINE HYDROCHLORIDE 10 MG/ML
INJECTION, SOLUTION EPIDURAL; INFILTRATION; INTRACAUDAL; PERINEURAL
Status: COMPLETED
Start: 2018-12-28 | End: 2018-12-28

## 2018-12-28 RX ORDER — OXYCODONE HYDROCHLORIDE AND ACETAMINOPHEN 5; 325 MG/1; MG/1
1 TABLET ORAL EVERY 4 HOURS PRN
Qty: 30 TABLET | Refills: 0 | Status: SHIPPED | OUTPATIENT
Start: 2018-12-28 | End: 2019-01-07

## 2018-12-28 RX ORDER — MORPHINE SULFATE 4 MG/ML
4 INJECTION, SOLUTION INTRAMUSCULAR; INTRAVENOUS EVERY 4 HOURS PRN
Status: DISCONTINUED | OUTPATIENT
Start: 2018-12-28 | End: 2018-12-28

## 2018-12-28 RX ORDER — MORPHINE SULFATE 4 MG/ML
4 INJECTION, SOLUTION INTRAMUSCULAR; INTRAVENOUS EVERY 4 HOURS PRN
Status: DISCONTINUED | OUTPATIENT
Start: 2018-12-28 | End: 2018-12-29 | Stop reason: HOSPADM

## 2018-12-28 RX ORDER — LIDOCAINE HYDROCHLORIDE 10 MG/ML
10 INJECTION, SOLUTION INFILTRATION; PERINEURAL ONCE
Status: COMPLETED | OUTPATIENT
Start: 2018-12-28 | End: 2018-12-28

## 2018-12-28 RX ADMIN — NYSTATIN: 100000 POWDER TOPICAL at 08:16

## 2018-12-28 RX ADMIN — MORPHINE SULFATE 4 MG: 4 INJECTION INTRAVENOUS at 07:48

## 2018-12-28 RX ADMIN — CEFDINIR 300 MG: 300 CAPSULE ORAL at 08:10

## 2018-12-28 RX ADMIN — LIDOCAINE HYDROCHLORIDE 10 ML: 10 INJECTION, SOLUTION INFILTRATION; PERINEURAL at 08:10

## 2018-12-28 RX ADMIN — CALCIUM CARBONATE 1250 MG: 1250 SUSPENSION ORAL at 07:47

## 2018-12-28 RX ADMIN — METRONIDAZOLE 500 MG: 500 TABLET ORAL at 06:47

## 2018-12-28 RX ADMIN — INSULIN LISPRO 1 UNITS: 100 INJECTION, SOLUTION INTRAVENOUS; SUBCUTANEOUS at 17:22

## 2018-12-28 RX ADMIN — CALCIUM CARBONATE 1250 MG: 1250 SUSPENSION ORAL at 17:22

## 2018-12-28 RX ADMIN — PANTOPRAZOLE SODIUM 40 MG: 40 TABLET, DELAYED RELEASE ORAL at 17:22

## 2018-12-28 RX ADMIN — METRONIDAZOLE 500 MG: 500 TABLET ORAL at 21:30

## 2018-12-28 RX ADMIN — PANTOPRAZOLE SODIUM 40 MG: 40 TABLET, DELAYED RELEASE ORAL at 06:47

## 2018-12-28 RX ADMIN — ENOXAPARIN SODIUM 40 MG: 40 INJECTION SUBCUTANEOUS at 21:30

## 2018-12-28 RX ADMIN — CHLORHEXIDINE GLUCONATE 0.12% ORAL RINSE 15 ML: 1.2 LIQUID ORAL at 08:14

## 2018-12-28 RX ADMIN — CHLORHEXIDINE GLUCONATE 0.12% ORAL RINSE 15 ML: 1.2 LIQUID ORAL at 21:30

## 2018-12-28 RX ADMIN — INSULIN LISPRO 1 UNITS: 100 INJECTION, SOLUTION INTRAVENOUS; SUBCUTANEOUS at 11:43

## 2018-12-28 RX ADMIN — LIDOCAINE HYDROCHLORIDE 10 ML: 10 INJECTION, SOLUTION EPIDURAL; INFILTRATION; INTRACAUDAL; PERINEURAL at 08:10

## 2018-12-28 RX ADMIN — CEFDINIR 300 MG: 300 CAPSULE ORAL at 21:30

## 2018-12-28 RX ADMIN — METRONIDAZOLE 500 MG: 500 TABLET ORAL at 15:30

## 2018-12-28 RX ADMIN — NYSTATIN: 100000 POWDER TOPICAL at 17:22

## 2018-12-28 RX ADMIN — INSULIN LISPRO 1 UNITS: 100 INJECTION, SOLUTION INTRAVENOUS; SUBCUTANEOUS at 21:30

## 2018-12-28 RX ADMIN — CALCIUM CARBONATE 1250 MG: 1250 SUSPENSION ORAL at 11:43

## 2018-12-28 NOTE — WOUND OSTOMY CARE
Progress Note- Ostomy  Janel Funez 70 y o  female  [id number]  [facility]        Assessment:  Patient seen ostomy care given and red rubber catheter to loop ileostomy discontinued without incident  Stoma remains well budded, moist, pink with eroded peristomal skin and intact mucocutaneous junction  Stoma is round measuring 13/4in with os to lateral aspect of stoma at junctional line where patient is experiencing the most leakage  Patient pouch changed twice today, first during removal of catheter and this afternoon for leakage  Patient was pouched using a one piece convex [employer] pouching system with use of belt  Mid abdominal incision remains well approximate with staples removed today, minimal erythema noted just to incision, no drainage of dehiscence of wound noted  Patient educated on use of convex pouching system, patient provided with multiple convex barrier with high output pouch, patient agreeable with receiving samples from [address] once discharge home  Patient new pouch was applied with good seal, stool draining appropriately in pouch with signs of leakage  Plan:  Patient is schedule to be discharge today to rehab, if plan changes and she remains admitted, we will continue following with ostomy care teaching  Vitals:    12/28/18 0700   BP: 113/56   Pulse: 89   Resp: 20   Temp: 98 6 °F (37 °C)   SpO2: 97%     Pressure Ulcer 12/19/18 Sacrum Left;Right;Mid Sacrum HA DTI  (Active)   Staging Deep Tissue Injury 12/28/2018  7:30 AM   Wound Description Dry;Pink 12/28/2018  7:30 AM   Yarely-wound Assessment Fragile 12/28/2018  7:30 AM   Wound Length (cm) 8 5 cm 12/19/2018 12:47 PM   Wound Width (cm) 3 cm 12/19/2018 12:47 PM   Calculated Wound Area (cm^2) 25 5 cm^2 12/19/2018 12:47 PM   Tunneling 0 cm 12/19/2018 12:47 PM   Undermining 0 12/19/2018 12:47 PM   Drainage Amount None 12/24/2018  8:00 PM   Treatment Offload; Turn & reposition 12/28/2018  7:30 AM   Dressing Protective barrier 12/28/2018  7:30 AM   Patient Tolerance Tolerated well 12/28/2018  1:15 AM   Number of days: 9   Incision 12/14/18 Abdomen (Active)   Incision Description Clean;Dry; Intact 12/28/2018  7:30 AM   Yarely-wound Assessment Clean;Dry; Intact 12/28/2018  7:30 AM   Closure Other (Comment) 12/28/2018  8:00 AM   Drainage Amount None 12/28/2018  7:30 AM   Drainage Description MARK 12/20/2018  8:00 AM   Treatments Site care 12/26/2018 11:00 AM   Dressing Open to air 12/28/2018  7:30 AM   Dressing Changed New dressing applied 12/16/2018 12:00 PM   Patient Tolerance Tolerated well 12/20/2018  8:00 AM   Dressing Status Other (Comment) 12/18/2018  4:00 AM   Number of days: 14       Incision 12/15/18 Abdomen Other (Comment) (Active)   Incision Description Clean;Dry; Intact 12/27/2018  5:45 PM   Yarely-wound Assessment Clean;Dry; Intact 12/27/2018  5:45 PM   Closure Staples 12/24/2018  6:01 AM   Drainage Amount None 12/23/2018  7:47 AM   Drainage Description Serosanguineous 12/20/2018  4:08 AM   Treatments Cleansed;Site care 12/17/2018  8:00 AM   Dressing Open to air 12/27/2018  5:45 PM   Dressing Changed Changed by provider 12/16/2018  4:00 PM   Patient Tolerance Tolerated well 12/17/2018  8:00 AM   Dressing Status Clean;Dry; Intact 12/25/2018  8:00 PM   Number of days: 13     Closed/Suction Drain Posterior;Superior;Right Buttock Bulb 12 Fr  (Active)   Site Description Unable to view 12/28/2018  7:30 AM   Dressing Status Clean;Dry; Intact 12/28/2018  7:30 AM   Drainage Appearance Purulent 12/28/2018  7:30 AM   Status To bulb suction 12/28/2018  7:30 AM   Output (mL) 30 mL 12/28/2018  1:25 AM   Number of days: 4       Ileostomy Loop RLQ (Active)   Stomal Appliance 1 piece; Changed 12/28/2018  7:30 AM   Stoma Assessment Pink;Budded 12/28/2018  7:30 AM   Stoma size (in) 1 75 in 12/24/2018 12:49 PM   Stoma Shape Round 12/28/2018  7:30 AM   Peristomal Assessment Excoriation 12/28/2018  7:30 AM   Treatment Site care 12/27/2018  9:23 AM Output (mL) 100 mL 12/28/2018  1:25 AM   Number of days: 13         Please call wound care to ext [phone] with questions or concerns        Piper Salmon RN, BSN, [employer]

## 2018-12-28 NOTE — PHYSICAL THERAPY NOTE
Physical Therapy Progress Note     12/28/18 1130   Pain Assessment   Pain Assessment No/denies pain   Restrictions/Precautions   Other Precautions Pain; Fall Risk   Subjective   Subjective Pt encountered supine in bed with daughter present, agreeable to treatment  Reports dizziness & butterfly feeling in stomach due to receiving morphine earlier this AM   Reports no pain during session, but felt discomfort with ambulation  Became more pleasant and agreeable to remain in chair at end of session with encouragement  Bed Mobility   Supine to Sit 4  Minimal assistance   Additional items Assist x 1; Increased time required;HOB elevated;Verbal cues   Transfers   Sit to Stand 4  Minimal assistance   Additional items Assist x 1; Armrests; Increased time required  (elevated bed surface)   Stand to Sit 4  Minimal assistance   Additional items Assist x 1; Armrests; Increased time required;Verbal cues   Ambulation/Elevation   Gait pattern Excessively slow; Short stride; Foward flexed; Inconsistent sigrid;Decreased foot clearance;Narrow TESS  (increased unsteadiness with fatigue, no LOB noted)   Gait Assistance 4  Minimal assist   Additional items Assist x 1  (+ chair follow)   Assistive Device Rolling walker   Distance 30', 70'   Balance   Static Sitting Fair   Static Standing Fair -   Ambulatory Poor +   Endurance Deficit   Endurance Deficit Yes   Endurance Deficit Description limited by fatigue, weakness, slight dizziness   Activity Tolerance   Activity Tolerance Patient tolerated treatment well;Patient limited by fatigue   Nurse [address], RN   Assessment   Prognosis Good   Problem List Decreased strength;Decreased endurance; Impaired balance;Decreased mobility; Decreased coordination;Pain   Assessment Pt demonstrated improved activity tolerance & functional mobility this session  Continues to require assist for all transfers due to weakness, fatigue, and reported dizziness this session    Pt ambulated up to household distances with assist and chair follow to maintain safety  Pt demonstrates slow pacing & shortened stride with muscle guarding, which increases her fall risk  As pt fatigues, demonstrates increased LE instabiility, necessitating seated rest   Extended rest periods given after each trial to recover  At end of session, pt performed seated scooting in chair, but has difficulty offloading bottom & coordinating movements between UEs & LEs  Assist provided to fully reposition pt for comfort  Pt educated during session on importance of increased activity and positional changes for recovery & prevention of deconditioning  Pt verbalized understanding at this time  Will continue to benefit from therapy services at this time to improve functional mobility, strength, activity tolerance, and balance to maximize independence  Rehab continues to be appropriate based on these deficits at this time  Barriers to Discharge Inaccessible home environment;Decreased caregiver support   Barriers to Discharge Comments 2 SH, pt caregiver to    Plan   Treatment/Interventions Functional transfer training;LE strengthening/ROM; Therapeutic exercise; Endurance training;Patient/family training;Equipment eval/education; Bed mobility;Gait training   Progress Progressing toward goals   PT Frequency (3-5x/week)   Recommendation   Recommendation (rehab)   Equipment Recommended Rowena Arias, PTA

## 2018-12-28 NOTE — PROGRESS NOTES
Gyn Oncology Progress note   Isaiah Ashton 70 y o  female MRN: [id number]  Unit/Bed#: [place] 509-01 Encounter: [id number]    Isaiah Ashton has no current complaints  Pain is none  Patient is currently voiding  She is ambulating  Patient is passing stool through ostomy  She is tolerating PO, and denies nausea or vomitting  Patient denies fever, chills, chest pain, shortness of breath, or calf tenderness  /58 (BP Location: Right arm)   Pulse 85   Temp 98 2 °F (36 8 °C) (Oral)   Resp 16   Wt 66 5 kg (146 lb 9 7 oz) Comment: bed scale  SpO2 95%   BMI 24 60 kg/m²     I/O last 3 completed shifts: In: 0149 [P O :1622]  Out: 2525 [Urine:3450; Drains:180; Stool:980]  I/O this shift:  In: -   Out: 1340 [Urine:1050; Drains:40; Stool:250]    Lab Results   Component Value Date    WBC 14 54 (H) 12/28/2018    HGB 10 3 (L) 12/28/2018    HCT 32 3 (L) 12/28/2018    MCV 83 12/28/2018     (H) 12/28/2018       Lab Results   Component Value Date    GLUCOSE 84 12/15/2018    CALCIUM 8 1 (L) 12/27/2018    K 3 8 12/27/2018    CO2 26 12/27/2018     12/27/2018    BUN 6 12/27/2018    CREATININE 0 35 (L) 12/27/2018       Magnesium   Date Value Ref Range Status   12/17/2018 2 4 1 6 - 2 6 mg/dL Final       Lab Results   Component Value Date/Time    POCGLU 171 (H) 12/27/2018 08:32 PM    POCGLU 159 (H) 12/27/2018 04:18 PM    POCGLU 168 (H) 12/27/2018 10:36 AM    POCGLU 118 12/27/2018 06:53 AM    POCGLU 183 (H) 12/26/2018 08:33 PM       Physical Exam  Gen: AAOx3, NAD, comfortable in bed   CVS:  RRR, no murmurs or gallops  Lungs: CTA B/L, no rales or rhonchi,  normal respiratory effort and rate  Abdomen: soft, non tender, incision C/D/I, ANDREAS drain x 2 (right and left) in place with clean/dry/intact sites; ileostomy in place, pink stoma   Extremities: SCDs on and on, non tender, no edema, negative Homans  A/P: Assessment / Plan: 75 y  o   with Stage IV ovarian cancer admitted on 12/14/18 in septic shock secondary to bowel perforation,  status post exploratory laparotomy, abdominal washout and Abthera vac placement on 12/14/18, followed by exploratory laparotomy, abdominal washout,  drain placement x 4, loop ileostomy & abdominal closure on 12/15/18, POD#13, course notable for pelvic abscess s/p IR drainage w/ drain placement on 12/23/18 doing well postoperatively, Hospital Day #14        #1 Sepsis secondary to bowel perforation  -Status post surgery as mentioned above  -WBC stable at 14 5 from 15, continue to trend  -Status post removal of 3 ANDREAS drains, aston has 2 drains right and left location   -ID following, switched to cefdinir 300mg Q12h starting todayn continue flagyl, continue until 12/31 per their recommendation     #2 Postoperative Care  - Pain: Controlled with tylenol and Roxicodone  Palliative care following, appreciate continued recommendations  - Encouraged incentive spirometry to reduce atelectasis and pneumonia risk  - Encouraged ambulation as tolerate  PT following, currently recommending shortterm rehab, awaiting placement at [facility]  #3 Stage IV ovarian cancer  Status post neoadjuvant chemotherapy with Carboplatin, Taxol & Avastin   Any further chemo is currently on hold until acute illness has resolved     #4 Type 2 DM  Accuchecks 100s-180s  Continue sliding scale insulin, #3      #5 Anemia:   Hb 10 3 today, stable, continue to monitor   S/p  transfusion of 2UPRBCs     #6 LE edema:  -LE dopplers negative for DVT     DVT PPX  - DVT ppx: SCDs, Lovenox 40mg daily    FEN  Regular with Glucerna supplementation     Disposition  Inpatient, awaiting placement

## 2018-12-28 NOTE — RESTORATIVE TECHNICIAN NOTE
Restorative Specialist Mobility Note       Activity: Bedrest, Dangle, Stand at bedside, Turn, Ambulate in room, Ambulate in dwyer     Assistive Device: Front wheel walker, Other (Comment) (chair follow as needed)     Ambulation Response: Tolerated fairly well  Repositioned: Sitting, Up in chair                      Assisted therapy during session  For all/additional information please see therapy note(s)        Hasmukh Gastelum Restorative Technician, BS

## 2018-12-28 NOTE — SOCIAL WORK
The patient is approved at [facility] and bed will be available Saturday 12/29/18  Scheduled [facility] BLS transport for Sat 12/29, 1pm pickup  Informed RN, [facility] and called daughter, Alexis Bajwa, [phone]  Informed Gyn Oncology

## 2018-12-28 NOTE — PLAN OF CARE
Problem: OCCUPATIONAL THERAPY ADULT  Goal: Performs self-care activities at highest level of function for planned discharge setting  See evaluation for individualized goals  Treatment Interventions: ADL retraining, Functional transfer training, UE strengthening/ROM, Endurance training, Patient/family training, Equipment evaluation/education, Neuromuscular reeducation, Compensatory technique education, Energy conservation, Activityengagement  Equipment Recommended: Bedside commode, Tub seat with back       See flowsheet documentation for full assessment, interventions and recommendations  Outcome: Progressing  Limitation: Decreased ADL status, Decreased UE strength, Decreased Safe judgement during ADL, Decreased endurance, Decreased cognition, Decreased self-care trans, Decreased high-level ADLs  Prognosis: Fair  Assessment: Pt was seen this date for OT tx session focusing on, self care tasks, toileting, trasnfers, bed mobility and overall activity tolerance  Pt presents seated OOB in chair, demosntrates ability to doff/don socks with SBA and increased time and rest breaks to complete  Min A for STS from chair and trhgouhout SPT to bed side commode, vc for technique and hand placement  Min A required for clothing managment for toileting, min A in stance at RW for overall balance and support during pericare  Min A for SPT to bed  Pt requires overall mod A for sit to supine for LE managment  Pt is limited by overal ldecreased endurance and fatigue, would benefit from continued OT tx to improve overall funcitonal abilities  Continue to follow with jimenez POC       OT Discharge Recommendation: Short Term Rehab  OT - OK to Discharge:  (yes when medically able to short term rehab)  MINDY Priest

## 2018-12-28 NOTE — OCCUPATIONAL THERAPY NOTE
Occupational Therapy Treatment Note:     12/28/18 1245   Restrictions/Precautions   Weight Bearing Precautions Per Order No   Other Precautions Pain; Fall Risk   Pain Assessment   Pain Score 4   Pain Type Acute pain   Pain Location Abdomen   Hospital Pain Intervention(s) Repositioned   Response to Interventions tolerated   ADL   Where Assessed Chair   LB Dressing Assistance 5  Supervision/Setup   LB Dressing Deficit Increased time to complete   LB Dressing Comments doff don socks, rest required between R and L, increased time to complete   Toileting Assistance  4  Minimal Assistance   Toileting Deficit Clothing management up;Clothing management down   Toileting Comments able to complete perihygiene in stance with min A for balance and support at rw   Functional Standing Tolerance   Time ~3 mins   Activity during dressing tasks   Comments RW level   Bed Mobility   Sit to Supine 3  Moderate assistance   Additional items Assist x 1;LE management   Additional Comments vc for trasnfer techniques   Transfers   Sit to Stand 4  Minimal assistance   Additional items Assist x 1   Stand to Sit 4  Minimal assistance   Additional items Assist x 1   Stand pivot 4  Minimal assistance   Additional items Assist x 1   Toilet transfer 4  Minimal assistance   Additional items Assist x 1   Additional Comments vc for hand placement and technique   Toilet Transfers   Toilet Transfer From Other (Comment)  (chair)   Toilet Transfer Type To and from   Toilet Transfer to Standard bedside commode   Toilet Transfer Technique Stand pivot   Toilet Transfers Minimal assistance   Toilet Transfers Comments vc for hand placement   Cognition   Overall Cognitive Status [facility]   Arousal/Participation Alert; Cooperative   Attention Attends with cues to redirect   Orientation Level Oriented X4   Memory Decreased recall of recent events   Following Commands Follows multistep commands with increased time or repetition   Comments eager to get to rehab Activity Tolerance   Activity Tolerance Patient limited by fatigue   Medical Staff Made Aware NSG aware   Assessment   Assessment Pt was seen this date for OT tx session focusing on, self care tasks, toileting, trasnfers, bed mobility and overall activity tolerance  Pt presents seated OOB in chair, demosntrates ability to doff/don socks with SBA and increased time and rest breaks to complete  Min A for STS from chair and trhgouhout SPT to bed side commode, vc for technique and hand placement  Min A required for clothing managment for toileting, min A in stance at RW for overall balance and support during pericare  Min A for SPT to bed  Pt requires overall mod A for sit to supine for LE managment  Pt is limited by overal ldecreased endurance and fatigue, would benefit from continued OT tx to improve overall funcitonal abilities  Continue to follow with jimenez POC     Plan   Treatment Interventions ADL retraining   Goal Expiration Date 12/31/18   Treatment Day 1   OT Frequency 3-5x/wk   Recommendation   OT Discharge Recommendation Short Term Rehab   Equipment Recommended Bedside commode       Jack Graves, [address]

## 2018-12-28 NOTE — SOCIAL WORK
Informed by Dr Escamilla Breath that patient is ready for dc to rehab  Asked PT and OT to see patient and write note for [facility] to review  Message was sent to [facility]

## 2018-12-28 NOTE — PROGRESS NOTES
Progress Note - Infectious Disease   Catherine Peralta 70 y o  female MRN: [id number]  Unit/Bed#: [facility] 509-01 Encounter: [id number]      Impression/Plan:  1  Sepsis-POA   Leukocytosis and tachycardia  Angie Poet all secondary to the patient's peritonitis and bacteremia   No other clear sources appreciated   The patient has clinically improved with resolution of the tachycardia   She continues to have a leukocytosis with white cell count has continued to come down since drainage of the collection as below  No resistant organisms isolated  -antibiotics as below  -recheck CBC with diff  -supportive care     2  Peritonitis-secondary to bowel perforation   Complicated by intra-abdominal abscess now status post IR drainage  The patient is status post exploratory laparotomy with washout, ileostomy, and closure   She is clinically much improved with significant improvement of the abdominal pain and recovery of bowel function   The white cell count is now drifting down  No resistant organisms isolated  -continue cefdinir and Flagyl through 12/31/2018 to complete 7 days post drainage  -serial abdominal exam  -recheck CBC with diff  -close gyn oncology follow-up     3  Clostridial bacteremia-appears to be all secondary to 2  No other clear source appreciated   The organism is sensitive to Flagyl   Follow-up blood cultures are negative  -antibiotics as above  -no need for PICC line for now     4  Stage IV ovarian carcinoma-status post new adjuvant chemotherapy and Neulasta  -hold on chemotherapy in the setting of an acute infectious process for now     5  Diabetes mellitus-type 2      Possible discharge to rehab soon    Antibiotics:  Antibiotics 15  Postop day 14  Post drain placement 4  Cefdinir and Flagyl    Subjective:  Patient has no fever, chills, sweats; no nausea, vomiting, diarrhea; no cough, shortness of breath; no pain  No new symptoms  She is anxious to get home      Objective:  Vitals:  Temp:  [98 2 °F (36 8 °C)-98 6 °F (37 °C)] 98 6 °F (37 °C)  HR:  [85-89] 89  Resp:  [16-20] 20  BP: (108-116)/(56-58) 113/56  SpO2:  [95 %-97 %] 97 %  Temp (24hrs), Av 3 °F (36 8 °C), Min:98 2 °F (36 8 °C), Max:98 6 °F (37 °C)  Current: Temperature: 98 6 °F (37 °C)    Physical Exam:   General Appearance:  Alert, interactive, nontoxic, no acute distress  Throat: Oropharynx moist without lesions  Lungs:   Clear to auscultation bilaterally; no wheezes, rhonchi or rales; respirations unlabored   Heart:  RRR; no murmur, rub or gallop   Abdomen:   Soft, non-tender, non-distended, positive bowel sounds  ANDREAS drain in place  Incision well approximated without erythema or drainage  Ostomy with good output     Extremities: No clubbing, cyanosis or edema   Skin: No new rashes or lesions  No draining wounds noted         Labs, Imaging, & Other studies:   All pertinent labs and imaging studies were personally reviewed    Results from last 7 days  Lab Units 18  0444 18  0449 18  0702   WBC Thousand/uL 14 54* 15 04* 19 04*   HEMOGLOBIN g/dL 10 3* 11 2* 10 9*   PLATELETS Thousands/uL 452* 515* 510*       Results from last 7 days  Lab Units 18  0449 18  0431 18  0756   SODIUM mmol/L 138 138 137   POTASSIUM mmol/L 3 8 3 9 3 8   CHLORIDE mmol/L 107 107 107   CO2 mmol/L 26 25 25   BUN mg/dL 6 5 4*   CREATININE mg/dL 0 35* 0 30* 0 29*   EGFR ml/min/1 73sq m 111 117 119   CALCIUM mg/dL 8 1* 7 8* 7 8*       Results from last 7 days  Lab Units 18  0917   GRAM STAIN RESULT  4+ Polys  2+ Gram positive cocci in pairs  1+ Gram positive rods  Rare Gram negative rods   BODY FLUID CULTURE, STERILE  1+ Growth of Escherichia coli*

## 2018-12-28 NOTE — PLAN OF CARE
Problem: PHYSICAL THERAPY ADULT  Goal: Performs mobility at highest level of function for planned discharge setting  See evaluation for individualized goals  Treatment/Interventions: Functional transfer training, LE strengthening/ROM, Elevations, Therapeutic exercise, Endurance training, Patient/family training, Equipment eval/education, Bed mobility, Gait training, Cognitive reorientation, Spoke to nursing, OT  Equipment Recommended: Marina Ewing       See flowsheet documentation for full assessment, interventions and recommendations  Outcome: Progressing  Prognosis: Good  Problem List: Decreased strength, Decreased endurance, Impaired balance, Decreased mobility, Decreased coordination, Pain  Assessment: Pt demonstrated improved activity tolerance & functional mobility this session  Continues to require assist for all transfers due to weakness, fatigue, and reported dizziness this session  Pt ambulated up to household distances with assist and chair follow to maintain safety  Pt demonstrates slow pacing & shortened stride with muscle guarding, which increases her fall risk  As pt fatigues, demonstrates increased LE instabiility, necessitating seated rest   Extended rest periods given after each trial to recover  At end of session, pt performed seated scooting in chair, but has difficulty offloading bottom & coordinating movements between UEs & LEs  Assist provided to fully reposition pt for comfort  Pt educated during session on importance of increased activity and positional changes for recovery & prevention of deconditioning  Pt verbalized understanding at this time  Will continue to benefit from therapy services at this time to improve functional mobility, strength, activity tolerance, and balance to maximize independence  Rehab continues to be appropriate based on these deficits at this time    Barriers to Discharge: Inaccessible home environment, Decreased caregiver support  Barriers to Discharge Comments: 2 SH, pt caregiver to   Recommendation:  (rehab)     PT - OK to Discharge: Yes    See flowsheet documentation for full assessment

## 2018-12-29 ENCOUNTER — HOSPITAL ENCOUNTER (OUTPATIENT)
Facility: HOSPITAL | Age: 68
Discharge: HOME HEALTH | End: 2019-01-14
Attending: PHYSICAL MEDICINE & REHABILITATION | Admitting: PHYSICAL MEDICINE & REHABILITATION

## 2018-12-29 VITALS
RESPIRATION RATE: 18 BRPM | WEIGHT: 145.5 LBS | BODY MASS INDEX: 24.42 KG/M2 | TEMPERATURE: 98.1 F | OXYGEN SATURATION: 96 % | HEART RATE: 82 BPM | DIASTOLIC BLOOD PRESSURE: 63 MMHG | SYSTOLIC BLOOD PRESSURE: 111 MMHG

## 2018-12-29 LAB
GLUCOSE SERPL-MCNC: 119 MG/DL (ref 65–140)
GLUCOSE SERPL-MCNC: 164 MG/DL (ref 65–140)

## 2018-12-29 PROCEDURE — 82948 REAGENT STRIP/BLOOD GLUCOSE: CPT

## 2018-12-29 PROCEDURE — 99232 SBSQ HOSP IP/OBS MODERATE 35: CPT | Performed by: INTERNAL MEDICINE

## 2018-12-29 RX ORDER — PANTOPRAZOLE SODIUM 40 MG/1
40 TABLET, DELAYED RELEASE ORAL
Refills: 0
Start: 2018-12-29

## 2018-12-29 RX ORDER — METRONIDAZOLE 500 MG/1
500 TABLET ORAL EVERY 8 HOURS SCHEDULED
Refills: 0
Start: 2018-12-29 | End: 2018-12-31

## 2018-12-29 RX ORDER — CEFDINIR 300 MG/1
300 CAPSULE ORAL EVERY 12 HOURS SCHEDULED
Refills: 0
Start: 2018-12-29 | End: 2018-12-31

## 2018-12-29 RX ORDER — ACETAMINOPHEN 325 MG/1
650 TABLET ORAL EVERY 6 HOURS PRN
Qty: 30 TABLET | Refills: 0
Start: 2018-12-29 | End: 2019-12-31 | Stop reason: HOSPADM

## 2018-12-29 RX ADMIN — INSULIN LISPRO 1 UNITS: 100 INJECTION, SOLUTION INTRAVENOUS; SUBCUTANEOUS at 11:12

## 2018-12-29 RX ADMIN — CALCIUM CARBONATE 1250 MG: 1250 SUSPENSION ORAL at 08:03

## 2018-12-29 RX ADMIN — METRONIDAZOLE 500 MG: 500 TABLET ORAL at 06:17

## 2018-12-29 RX ADMIN — NYSTATIN: 100000 POWDER TOPICAL at 08:04

## 2018-12-29 RX ADMIN — CEFDINIR 300 MG: 300 CAPSULE ORAL at 08:03

## 2018-12-29 RX ADMIN — PANTOPRAZOLE SODIUM 40 MG: 40 TABLET, DELAYED RELEASE ORAL at 06:17

## 2018-12-29 RX ADMIN — CHLORHEXIDINE GLUCONATE 0.12% ORAL RINSE 15 ML: 1.2 LIQUID ORAL at 08:03

## 2018-12-29 NOTE — PLAN OF CARE
Problem: Prexisting or High Potential for Compromised Skin Integrity  Goal: Skin integrity is maintained or improved  INTERVENTIONS:  - Identify patients at risk for skin breakdown  - Assess and monitor skin integrity  - Assess and monitor nutrition and hydration status  - Monitor labs (i e  albumin)  - Assess for incontinence   - Turn and reposition patient  - Assist with mobility/ambulation  - Relieve pressure over bony prominences  - Avoid friction and shearing  - Provide appropriate hygiene as needed including keeping skin clean and dry  - Evaluate need for skin moisturizer/barrier cream  - Collaborate with interdisciplinary team (i e  Nutrition, Rehabilitation, etc )   - Patient/family teaching   Outcome: Progressing      Problem: Potential for Falls  Goal: Patient will remain free of falls  INTERVENTIONS:  - Assess patient frequently for physical needs  -  Identify cognitive and physical deficits and behaviors that affect risk of falls  -  Sieper fall precautions as indicated by assessment   - Educate patient/family on patient safety including physical limitations  - Instruct patient to call for assistance with activity based on assessment  - Modify environment to reduce risk of injury  - Consider OT/PT consult to assist with strengthening/mobility   Outcome: Progressing      Problem: Nutrition/Hydration-ADULT  Goal: Nutrient/Hydration intake appropriate for improving, restoring or maintaining nutritional needs  Monitor and assess patient's nutrition/hydration status for malnutrition (ex- brittle hair, bruises, dry skin, pale skin and conjunctiva, muscle wasting, smooth red tongue, and disorientation)  Collaborate with interdisciplinary team and initiate plan and interventions as ordered  Monitor patient's weight and dietary intake as ordered or per policy  Utilize nutrition screening tool and intervene per policy   Determine patient's food preferences and provide high-protein, high-caloric foods as appropriate       INTERVENTIONS:  - Monitor oral intake, urinary output, labs, and treatment plans  - Assess nutrition and hydration status and recommend course of action  - Evaluate amount of meals eaten  - Assist patient with eating if necessary   - Allow adequate time for meals  - Recommend/ encourage appropriate diets, oral nutritional supplements, and vitamin/mineral supplements  - Order, calculate, and assess calorie counts as needed  - Recommend, monitor, and adjust tube feedings and TPN/PPN based on assessed needs  - Assess need for intravenous fluids  - Provide specific nutrition/hydration education as appropriate  - Include patient/family/caregiver in decisions related to nutrition   Outcome: Progressing      Problem: PAIN - ADULT  Goal: Verbalizes/displays adequate comfort level or baseline comfort level  Interventions:  - Encourage patient to monitor pain and request assistance  - Assess pain using appropriate pain scale  - Administer analgesics based on type and severity of pain and evaluate response  - Implement non-pharmacological measures as appropriate and evaluate response  - Consider cultural and social influences on pain and pain management  - Notify physician/advanced practitioner if interventions unsuccessful or patient reports new pain   Outcome: Progressing      Problem: INFECTION - ADULT  Goal: Absence or prevention of progression during hospitalization  INTERVENTIONS:  - Assess and monitor for signs and symptoms of infection  - Monitor lab/diagnostic results  - Monitor all insertion sites, i e  indwelling lines, tubes, and drains  - Monitor endotracheal (as able) and nasal secretions for changes in amount and color  - [place] appropriate cooling/warming therapies per order  - Administer medications as ordered  - Instruct and encourage patient and family to use good hand hygiene technique  - Identify and instruct in appropriate isolation precautions for identified infection/condition Outcome: Progressing      Problem: DISCHARGE PLANNING  Goal: Discharge to home or other facility with appropriate resources  INTERVENTIONS:  - Identify barriers to discharge w/patient and caregiver  - Arrange for needed discharge resources and transportation as appropriate  - Identify discharge learning needs (meds, wound care, etc )  - Arrange for interpretive services to assist at discharge as needed  - Refer to Case Management Department for coordinating discharge planning if the patient needs post-hospital services based on physician/advanced practitioner order or complex needs related to functional status, cognitive ability, or social support system   Outcome: Progressing      Problem: Knowledge Deficit  Goal: Patient/family/caregiver demonstrates understanding of disease process, treatment plan, medications, and discharge instructions  Complete learning assessment and assess knowledge base    Interventions:  - Provide teaching at level of understanding  - Provide teaching via preferred learning methods   Outcome: Progressing      Problem: GASTROINTESTINAL - ADULT  Goal: Minimal or absence of nausea and/or vomiting  INTERVENTIONS:  - Administer IV fluids as ordered to ensure adequate hydration  - Maintain NPO status until nausea and vomiting are resolved  - Nasogastric tube as ordered  - Administer ordered antiemetic medications as needed  - Provide nonpharmacologic comfort measures as appropriate  - Advance diet as tolerated, if ordered  - Nutrition services referral to assist patient with adequate nutrition and appropriate food choices   Outcome: Progressing      Problem: METABOLIC, FLUID AND ELECTROLYTES - ADULT  Goal: Glucose maintained within target range  INTERVENTIONS:  - Monitor Blood Glucose as ordered  - Assess for signs and symptoms of hyperglycemia and hypoglycemia  - Administer ordered medications to maintain glucose within target range  - Assess nutritional intake and initiate nutrition service referral as needed   Outcome: Progressing      Problem: DISCHARGE PLANNING - CARE MANAGEMENT  Goal: Discharge to post-acute care or home with appropriate resources  INTERVENTIONS:  - Conduct assessment to determine patient/family and health care team treatment goals, and need for post-acute services based on payer coverage, community resources, and patient preferences, and barriers to discharge  - Address psychosocial, clinical, and financial barriers to discharge as identified in assessment in conjunction with the patient/family and health care team  - Arrange appropriate level of post-acute services according to patient's   needs and preference and payer coverage in collaboration with the physician and health care team  - Communicate with and update the patient/family, physician, and health care team regarding progress on the discharge plan  - Arrange appropriate transportation to post-acute venues  - Family to drive home @ discharge   Outcome: Progressing

## 2018-12-29 NOTE — PROGRESS NOTES
Gyn Oncology Progress note   Isaiah Ashton 70 y o  female MRN: [id number]  Unit/Bed#: [facility] 509-01 Encounter: [id number]    Isaiah Ashton has no current complaints  Pain is none  Patient is currently voiding  She is ambulating  Patient is currently passing flatus and has had bowel movements through ostomy  She is tolerating PO, and denies nausea or vomitting  Patient denies fever, chills, chest pain, shortness of breath, or calf tenderness  /62 (BP Location: Right arm)   Pulse 92   Temp 98 2 °F (36 8 °C) (Oral)   Resp 18   Wt 66 kg (145 lb 8 1 oz)   SpO2 94%   BMI 24 42 kg/m²     I/O last 3 completed shifts: In: 26 [P  O :702]  Out: 3430 [Urine:2650; Drains:80; Stool:700]  I/O this shift:  In: -   Out: 1310 [Urine:1050; Drains:10; Stool:250]    Lab Results   Component Value Date    WBC 14 54 (H) 12/28/2018    HGB 10 3 (L) 12/28/2018    HCT 32 3 (L) 12/28/2018    MCV 83 12/28/2018     (H) 12/28/2018       Lab Results   Component Value Date    GLUCOSE 84 12/15/2018    CALCIUM 8 1 (L) 12/27/2018    K 3 8 12/27/2018    CO2 26 12/27/2018     12/27/2018    BUN 6 12/27/2018    CREATININE 0 35 (L) 12/27/2018       Magnesium   Date Value Ref Range Status   12/17/2018 2 4 1 6 - 2 6 mg/dL Final       Lab Results   Component Value Date/Time    POCGLU 119 12/29/2018 06:26 AM    POCGLU 176 (H) 12/28/2018 09:08 PM    POCGLU 169 (H) 12/28/2018 04:33 PM    POCGLU 183 (H) 12/28/2018 11:31 AM    POCGLU 97 12/28/2018 06:44 AM       Physical Exam  Gen: AAOx3, NAD, comfortable in bed   CVS:  RRR, no murmurs or gallops  Lungs: CTA B/L, no rales or rhonchi,  normal respiratory effort and rate  Abdomen:soft, non tender, incision C/D/I, ANDREAS drain x 1,( right transgluteal) in place with clean/dry/intact sites; ileostomy in place, pink stoma   Extremities: SCDs on and on, non tender, no edema, negative Homans  A/P:  Assessment / Plan: 75 y  o   with Stage IV ovarian cancer admitted on 12/14/18 in septic shock secondary to bowel perforation,  status post exploratory laparotomy, abdominal washout and Abthera vac placement on 12/14/18, followed by exploratory laparotomy, abdominal washout,  drain placement x 4, loop ileostomy & abdominal closure on 12/15/18, POD#14, course notable for pelvic abscess s/p IR drainage w/ drain placement on 12/23/18 doing well postoperatively, Hospital Day #15       #1 Sepsis secondary to bowel perforation  - Resolved  -ID following, switched to cefdinir 300mg Q12h starting yesterday, continue flagyl, continue until 12/31 pe     #2 Postoperative Care  - Pain: Controlled with tylenol and Roxicodone    - on drain remaining, right transgluteal, minimal output 10ml in last 24h   - SNF approved, she will go to [facility] today at 1300      #3 Stage IV ovarian cancer  Status post neoadjuvant chemotherapy with Carboplatin, Taxol & Avastin   Any further chemo is currently on hold until acute illness has resolved     #4 Type 2 DM  Accuchecks at goal 100s to 180s  Continue sliding scale insulin, #3      #5 Anemia:   Last Hb 10 3, stable, continue to monitor   S/p  transfusion of 2UPRBCs       DVT PPX  - DVT ppx: SCDs, Lovenox 40mg daily     FEN  Regular with Glucerna supplementation     Disposition  To be DC/d to SNF today

## 2018-12-29 NOTE — SOCIAL WORK
Cm received call from [employer] asking to move pick-up time to noon  Cm contacted pt's daughter Mendel Avena and spoke with pt who were ok with changing time  Supervisor at [facility] and [address] [place] [address] resident aware

## 2018-12-29 NOTE — PROGRESS NOTES
Progress Note - Infectious Disease   Catherine Farrell Cecilia 70 y o  female MRN: [id number]  Unit/Bed#: [facility] 509-01 Encounter: [id number]      Impression/Plan:  1  Sepsis-POA   Leukocytosis and tachycardia  Audra Muhammad all secondary to the patient's peritonitis and bacteremia   No other clear sources appreciated   The patient has clinically improved with resolution of the tachycardia   She continues to have a leukocytosis with white cell count has continued to come down since drainage of the collection as below  No resistant organisms isolated  -antibiotics as below  -recheck CBC with diff  -supportive care     2  Peritonitis-secondary to bowel perforation   Complicated by intra-abdominal abscess now status post IR drainage  The patient is status post exploratory laparotomy with washout, ileostomy, and closure   She is clinically much improved with significant improvement of the abdominal pain and recovery of bowel function   The white cell count is now drifting down   No resistant organisms isolated  -continue cefdinir and Flagyl through 12/31/2018 to complete 7 days post drainage  -serial abdominal exam  -monitor CBC with diff  -close gyn oncology follow-up     3  Clostridial bacteremia-appears to be all secondary to 2  No other clear source appreciated   The organism is sensitive to Flagyl   Follow-up blood cultures are negative  -antibiotics as above  -no need for PICC line for now     4  Stage IV ovarian carcinoma-status post new adjuvant chemotherapy and Neulasta  -hold on chemotherapy in the setting of an acute infectious process for now     5  Diabetes mellitus-type 2       Possible discharge to rehab soon    Antibiotics:  Antibiotics 16  Postop day 15  Post drain placement 5  Cefdinir and Flagyl    Subjective:  Patient has no fever, chills, sweats; no nausea, vomiting, diarrhea; no cough, shortness of breath; no pain  No new symptoms  She seems in good spirits    She is currently eating breakfast     Objective:  Vitals:  Temp:  [98 1 °F (36 7 °C)-98 2 °F (36 8 °C)] 98 1 °F (36 7 °C)  HR:  [82-95] 82  Resp:  [18] 18  BP: (107-120)/(57-63) 111/63  SpO2:  [94 %-97 %] 96 %  Temp (24hrs), Av 2 °F (36 8 °C), Min:98 1 °F (36 7 °C), Max:98 2 °F (36 8 °C)  Current: Temperature: 98 1 °F (36 7 °C)    Physical Exam:   General Appearance:  Alert, interactive, nontoxic, no acute distress  Throat: Oropharynx moist without lesions  Lungs:   Clear to auscultation bilaterally; no wheezes, rhonchi or rales; respirations unlabored   Heart:  RRR; no murmur, rub or gallop   Abdomen:   Soft, non-tender, non-distended, positive bowel sounds  Incision without erythema or drainage  Ostomy with good output  ANDREAS drain in place  Extremities: No clubbing, cyanosis or edema   Skin: No new rashes or lesions  No draining wounds noted         Labs, Imaging, & Other studies:   All pertinent labs and imaging studies were personally reviewed    Results from last 7 days  Lab Units 18  0444 18  0449 18  0702   WBC Thousand/uL 14 54* 15 04* 19 04*   HEMOGLOBIN g/dL 10 3* 11 2* 10 9*   PLATELETS Thousands/uL 452* 515* 510*       Results from last 7 days  Lab Units 18  0449 18  0431 18  0756   SODIUM mmol/L 138 138 137   POTASSIUM mmol/L 3 8 3 9 3 8   CHLORIDE mmol/L 107 107 107   CO2 mmol/L 26 25 25   BUN mg/dL 6 5 4*   CREATININE mg/dL 0 35* 0 30* 0 29*   EGFR ml/min/1 73sq m 111 117 119   CALCIUM mg/dL 8 1* 7 8* 7 8*       Results from last 7 days  Lab Units 18  0917   GRAM STAIN RESULT  4+ Polys  2+ Gram positive cocci in pairs  1+ Gram positive rods  Rare Gram negative rods   BODY FLUID CULTURE, STERILE  1+ Growth of Escherichia coli*

## 2018-12-30 PROCEDURE — 87081 CULTURE SCREEN ONLY: CPT | Performed by: PHYSICAL MEDICINE & REHABILITATION

## 2018-12-31 LAB
BASOPHILS # BLD AUTO: 0.1 THOUSANDS/ΜL (ref 0–0.1)
BASOPHILS NFR BLD AUTO: 1 % (ref 0–1)
EOSINOPHIL # BLD AUTO: 0.13 THOUSAND/ΜL (ref 0–0.61)
EOSINOPHIL NFR BLD AUTO: 1 % (ref 0–6)
ERYTHROCYTE [DISTWIDTH] IN BLOOD BY AUTOMATED COUNT: 20.2 % (ref 11.6–15.1)
HCT VFR BLD AUTO: 31.7 % (ref 34.8–46.1)
HGB BLD-MCNC: 10.1 G/DL (ref 11.5–15.4)
IMM GRANULOCYTES # BLD AUTO: 0.05 THOUSAND/UL (ref 0–0.2)
IMM GRANULOCYTES NFR BLD AUTO: 1 % (ref 0–2)
LYMPHOCYTES # BLD AUTO: 1.81 THOUSANDS/ΜL (ref 0.6–4.47)
LYMPHOCYTES NFR BLD AUTO: 17 % (ref 14–44)
MCH RBC QN AUTO: 26 PG (ref 26.8–34.3)
MCHC RBC AUTO-ENTMCNC: 31.9 G/DL (ref 31.4–37.4)
MCV RBC AUTO: 82 FL (ref 82–98)
MONOCYTES # BLD AUTO: 0.71 THOUSAND/ΜL (ref 0.17–1.22)
MONOCYTES NFR BLD AUTO: 7 % (ref 4–12)
MRSA NOSE QL CULT: NORMAL
NEUTROPHILS # BLD AUTO: 7.59 THOUSANDS/ΜL (ref 1.85–7.62)
NEUTS SEG NFR BLD AUTO: 73 % (ref 43–75)
NRBC BLD AUTO-RTO: 0 /100 WBCS
PLATELET # BLD AUTO: 321 THOUSANDS/UL (ref 149–390)
PMV BLD AUTO: 11.5 FL (ref 8.9–12.7)
PREALB SERPL-MCNC: 14.8 MG/DL (ref 18–40)
RBC # BLD AUTO: 3.88 MILLION/UL (ref 3.81–5.12)
WBC # BLD AUTO: 10.39 THOUSAND/UL (ref 4.31–10.16)

## 2018-12-31 PROCEDURE — 85025 COMPLETE CBC W/AUTO DIFF WBC: CPT | Performed by: PHYSICAL MEDICINE & REHABILITATION

## 2018-12-31 PROCEDURE — 84134 ASSAY OF PREALBUMIN: CPT | Performed by: PHYSICAL MEDICINE & REHABILITATION

## 2019-01-03 LAB
ALBUMIN SERPL BCP-MCNC: 2.1 G/DL (ref 3.5–5)
ALP SERPL-CCNC: 194 U/L (ref 46–116)
ALT SERPL W P-5'-P-CCNC: 20 U/L (ref 12–78)
ANION GAP SERPL CALCULATED.3IONS-SCNC: 9 MMOL/L (ref 4–13)
AST SERPL W P-5'-P-CCNC: 23 U/L (ref 5–45)
BASOPHILS # BLD AUTO: 0.08 THOUSANDS/ΜL (ref 0–0.1)
BASOPHILS NFR BLD AUTO: 1 % (ref 0–1)
BILIRUB SERPL-MCNC: 0.2 MG/DL (ref 0.2–1)
BUN SERPL-MCNC: 8 MG/DL (ref 5–25)
CALCIUM SERPL-MCNC: 9 MG/DL (ref 8.3–10.1)
CHLORIDE SERPL-SCNC: 106 MMOL/L (ref 100–108)
CO2 SERPL-SCNC: 25 MMOL/L (ref 21–32)
CREAT SERPL-MCNC: 0.43 MG/DL (ref 0.6–1.3)
EOSINOPHIL # BLD AUTO: 0.19 THOUSAND/ΜL (ref 0–0.61)
EOSINOPHIL NFR BLD AUTO: 2 % (ref 0–6)
ERYTHROCYTE [DISTWIDTH] IN BLOOD BY AUTOMATED COUNT: 20 % (ref 11.6–15.1)
GFR SERPL CREATININE-BSD FRML MDRD: 104 ML/MIN/1.73SQ M
GLUCOSE P FAST SERPL-MCNC: 125 MG/DL (ref 65–99)
GLUCOSE SERPL-MCNC: 125 MG/DL (ref 65–140)
HCT VFR BLD AUTO: 33 % (ref 34.8–46.1)
HGB BLD-MCNC: 10.5 G/DL (ref 11.5–15.4)
IMM GRANULOCYTES # BLD AUTO: 0.03 THOUSAND/UL (ref 0–0.2)
IMM GRANULOCYTES NFR BLD AUTO: 0 % (ref 0–2)
LYMPHOCYTES # BLD AUTO: 1.78 THOUSANDS/ΜL (ref 0.6–4.47)
LYMPHOCYTES NFR BLD AUTO: 21 % (ref 14–44)
MCH RBC QN AUTO: 26.5 PG (ref 26.8–34.3)
MCHC RBC AUTO-ENTMCNC: 31.8 G/DL (ref 31.4–37.4)
MCV RBC AUTO: 83 FL (ref 82–98)
MONOCYTES # BLD AUTO: 0.67 THOUSAND/ΜL (ref 0.17–1.22)
MONOCYTES NFR BLD AUTO: 8 % (ref 4–12)
NEUTROPHILS # BLD AUTO: 5.9 THOUSANDS/ΜL (ref 1.85–7.62)
NEUTS SEG NFR BLD AUTO: 68 % (ref 43–75)
NRBC BLD AUTO-RTO: 0 /100 WBCS
PLATELET # BLD AUTO: 336 THOUSANDS/UL (ref 149–390)
PMV BLD AUTO: 11.6 FL (ref 8.9–12.7)
POTASSIUM SERPL-SCNC: 3.8 MMOL/L (ref 3.5–5.3)
PROT SERPL-MCNC: 6.8 G/DL (ref 6.4–8.2)
RBC # BLD AUTO: 3.96 MILLION/UL (ref 3.81–5.12)
SODIUM SERPL-SCNC: 140 MMOL/L (ref 136–145)
WBC # BLD AUTO: 8.65 THOUSAND/UL (ref 4.31–10.16)

## 2019-01-03 PROCEDURE — 36415 COLL VENOUS BLD VENIPUNCTURE: CPT | Performed by: PHYSICAL MEDICINE & REHABILITATION

## 2019-01-03 PROCEDURE — 85025 COMPLETE CBC W/AUTO DIFF WBC: CPT | Performed by: PHYSICAL MEDICINE & REHABILITATION

## 2019-01-03 PROCEDURE — 80053 COMPREHEN METABOLIC PANEL: CPT | Performed by: PHYSICAL MEDICINE & REHABILITATION

## 2019-01-04 ENCOUNTER — HOSPITAL ENCOUNTER (OUTPATIENT)
Dept: INFUSION CENTER | Facility: CLINIC | Age: 69
End: 2019-01-04

## 2019-01-07 ENCOUNTER — APPOINTMENT (OUTPATIENT)
Dept: INTERVENTIONAL RADIOLOGY/VASCULAR | Facility: HOSPITAL | Age: 69
End: 2019-01-07
Attending: PHYSICAL MEDICINE & REHABILITATION

## 2019-01-07 LAB
ALBUMIN SERPL BCP-MCNC: 2.2 G/DL (ref 3.5–5)
ALP SERPL-CCNC: 167 U/L (ref 46–116)
ALT SERPL W P-5'-P-CCNC: 26 U/L (ref 12–78)
ANION GAP SERPL CALCULATED.3IONS-SCNC: 7 MMOL/L (ref 4–13)
AST SERPL W P-5'-P-CCNC: 21 U/L (ref 5–45)
BASOPHILS # BLD AUTO: 0.07 THOUSANDS/ΜL (ref 0–0.1)
BASOPHILS NFR BLD AUTO: 1 % (ref 0–1)
BILIRUB SERPL-MCNC: 0.2 MG/DL (ref 0.2–1)
BUN SERPL-MCNC: 9 MG/DL (ref 5–25)
CALCIUM SERPL-MCNC: 9.4 MG/DL (ref 8.3–10.1)
CHLORIDE SERPL-SCNC: 103 MMOL/L (ref 100–108)
CO2 SERPL-SCNC: 27 MMOL/L (ref 21–32)
CREAT SERPL-MCNC: 0.49 MG/DL (ref 0.6–1.3)
EOSINOPHIL # BLD AUTO: 0.35 THOUSAND/ΜL (ref 0–0.61)
EOSINOPHIL NFR BLD AUTO: 5 % (ref 0–6)
ERYTHROCYTE [DISTWIDTH] IN BLOOD BY AUTOMATED COUNT: 19.8 % (ref 11.6–15.1)
GFR SERPL CREATININE-BSD FRML MDRD: 100 ML/MIN/1.73SQ M
GLUCOSE SERPL-MCNC: 113 MG/DL (ref 65–140)
HCT VFR BLD AUTO: 32 % (ref 34.8–46.1)
HGB BLD-MCNC: 10.2 G/DL (ref 11.5–15.4)
IMM GRANULOCYTES # BLD AUTO: 0.02 THOUSAND/UL (ref 0–0.2)
IMM GRANULOCYTES NFR BLD AUTO: 0 % (ref 0–2)
LYMPHOCYTES # BLD AUTO: 1.68 THOUSANDS/ΜL (ref 0.6–4.47)
LYMPHOCYTES NFR BLD AUTO: 23 % (ref 14–44)
MCH RBC QN AUTO: 26.2 PG (ref 26.8–34.3)
MCHC RBC AUTO-ENTMCNC: 31.9 G/DL (ref 31.4–37.4)
MCV RBC AUTO: 82 FL (ref 82–98)
MONOCYTES # BLD AUTO: 0.52 THOUSAND/ΜL (ref 0.17–1.22)
MONOCYTES NFR BLD AUTO: 7 % (ref 4–12)
NEUTROPHILS # BLD AUTO: 4.6 THOUSANDS/ΜL (ref 1.85–7.62)
NEUTS SEG NFR BLD AUTO: 64 % (ref 43–75)
NRBC BLD AUTO-RTO: 0 /100 WBCS
PLATELET # BLD AUTO: 344 THOUSANDS/UL (ref 149–390)
PMV BLD AUTO: 11 FL (ref 8.9–12.7)
POTASSIUM SERPL-SCNC: 4 MMOL/L (ref 3.5–5.3)
PROT SERPL-MCNC: 7.1 G/DL (ref 6.4–8.2)
RBC # BLD AUTO: 3.89 MILLION/UL (ref 3.81–5.12)
SODIUM SERPL-SCNC: 137 MMOL/L (ref 136–145)
WBC # BLD AUTO: 7.24 THOUSAND/UL (ref 4.31–10.16)

## 2019-01-07 PROCEDURE — 80053 COMPREHEN METABOLIC PANEL: CPT | Performed by: PHYSICAL MEDICINE & REHABILITATION

## 2019-01-07 PROCEDURE — 85025 COMPLETE CBC W/AUTO DIFF WBC: CPT | Performed by: PHYSICAL MEDICINE & REHABILITATION

## 2019-01-10 LAB
ALBUMIN SERPL BCP-MCNC: 2.5 G/DL (ref 3.5–5)
ALP SERPL-CCNC: 162 U/L (ref 46–116)
ALT SERPL W P-5'-P-CCNC: 24 U/L (ref 12–78)
ANION GAP SERPL CALCULATED.3IONS-SCNC: 9 MMOL/L (ref 4–13)
AST SERPL W P-5'-P-CCNC: 18 U/L (ref 5–45)
BASOPHILS # BLD AUTO: 0.05 THOUSANDS/ΜL (ref 0–0.1)
BASOPHILS NFR BLD AUTO: 1 % (ref 0–1)
BILIRUB SERPL-MCNC: 0.2 MG/DL (ref 0.2–1)
BUN SERPL-MCNC: 9 MG/DL (ref 5–25)
CALCIUM SERPL-MCNC: 9.5 MG/DL (ref 8.3–10.1)
CHLORIDE SERPL-SCNC: 101 MMOL/L (ref 100–108)
CO2 SERPL-SCNC: 26 MMOL/L (ref 21–32)
CREAT SERPL-MCNC: 0.48 MG/DL (ref 0.6–1.3)
EOSINOPHIL # BLD AUTO: 0.26 THOUSAND/ΜL (ref 0–0.61)
EOSINOPHIL NFR BLD AUTO: 3 % (ref 0–6)
ERYTHROCYTE [DISTWIDTH] IN BLOOD BY AUTOMATED COUNT: 19.3 % (ref 11.6–15.1)
GFR SERPL CREATININE-BSD FRML MDRD: 100 ML/MIN/1.73SQ M
GLUCOSE SERPL-MCNC: 117 MG/DL (ref 65–140)
HCT VFR BLD AUTO: 31.7 % (ref 34.8–46.1)
HGB BLD-MCNC: 10.2 G/DL (ref 11.5–15.4)
IMM GRANULOCYTES # BLD AUTO: 0.02 THOUSAND/UL (ref 0–0.2)
IMM GRANULOCYTES NFR BLD AUTO: 0 % (ref 0–2)
LYMPHOCYTES # BLD AUTO: 1.81 THOUSANDS/ΜL (ref 0.6–4.47)
LYMPHOCYTES NFR BLD AUTO: 23 % (ref 14–44)
MCH RBC QN AUTO: 26.3 PG (ref 26.8–34.3)
MCHC RBC AUTO-ENTMCNC: 32.2 G/DL (ref 31.4–37.4)
MCV RBC AUTO: 82 FL (ref 82–98)
MONOCYTES # BLD AUTO: 0.62 THOUSAND/ΜL (ref 0.17–1.22)
MONOCYTES NFR BLD AUTO: 8 % (ref 4–12)
NEUTROPHILS # BLD AUTO: 4.99 THOUSANDS/ΜL (ref 1.85–7.62)
NEUTS SEG NFR BLD AUTO: 65 % (ref 43–75)
NRBC BLD AUTO-RTO: 0 /100 WBCS
PLATELET # BLD AUTO: 386 THOUSANDS/UL (ref 149–390)
PMV BLD AUTO: 11.1 FL (ref 8.9–12.7)
POTASSIUM SERPL-SCNC: 3.8 MMOL/L (ref 3.5–5.3)
PROT SERPL-MCNC: 7.7 G/DL (ref 6.4–8.2)
RBC # BLD AUTO: 3.88 MILLION/UL (ref 3.81–5.12)
SODIUM SERPL-SCNC: 136 MMOL/L (ref 136–145)
WBC # BLD AUTO: 7.75 THOUSAND/UL (ref 4.31–10.16)

## 2019-01-10 PROCEDURE — 80053 COMPREHEN METABOLIC PANEL: CPT | Performed by: PHYSICAL MEDICINE & REHABILITATION

## 2019-01-10 PROCEDURE — 85025 COMPLETE CBC W/AUTO DIFF WBC: CPT | Performed by: PHYSICAL MEDICINE & REHABILITATION

## 2019-01-14 LAB
ALBUMIN SERPL BCP-MCNC: 2.7 G/DL (ref 3.5–5)
ALP SERPL-CCNC: 153 U/L (ref 46–116)
ALT SERPL W P-5'-P-CCNC: 20 U/L (ref 12–78)
ANION GAP SERPL CALCULATED.3IONS-SCNC: 11 MMOL/L (ref 4–13)
AST SERPL W P-5'-P-CCNC: 16 U/L (ref 5–45)
BASOPHILS # BLD AUTO: 0.05 THOUSANDS/ΜL (ref 0–0.1)
BASOPHILS NFR BLD AUTO: 1 % (ref 0–1)
BILIRUB SERPL-MCNC: 0.2 MG/DL (ref 0.2–1)
BUN SERPL-MCNC: 15 MG/DL (ref 5–25)
CALCIUM SERPL-MCNC: 10.3 MG/DL (ref 8.3–10.1)
CHLORIDE SERPL-SCNC: 98 MMOL/L (ref 100–108)
CO2 SERPL-SCNC: 24 MMOL/L (ref 21–32)
CREAT SERPL-MCNC: 0.63 MG/DL (ref 0.6–1.3)
EOSINOPHIL # BLD AUTO: 0.18 THOUSAND/ΜL (ref 0–0.61)
EOSINOPHIL NFR BLD AUTO: 3 % (ref 0–6)
ERYTHROCYTE [DISTWIDTH] IN BLOOD BY AUTOMATED COUNT: 18.9 % (ref 11.6–15.1)
GFR SERPL CREATININE-BSD FRML MDRD: 92 ML/MIN/1.73SQ M
GLUCOSE P FAST SERPL-MCNC: 117 MG/DL (ref 65–99)
GLUCOSE SERPL-MCNC: 117 MG/DL (ref 65–140)
HCT VFR BLD AUTO: 34 % (ref 34.8–46.1)
HGB BLD-MCNC: 11.3 G/DL (ref 11.5–15.4)
IMM GRANULOCYTES # BLD AUTO: 0.02 THOUSAND/UL (ref 0–0.2)
IMM GRANULOCYTES NFR BLD AUTO: 0 % (ref 0–2)
LYMPHOCYTES # BLD AUTO: 1.85 THOUSANDS/ΜL (ref 0.6–4.47)
LYMPHOCYTES NFR BLD AUTO: 27 % (ref 14–44)
MCH RBC QN AUTO: 27.2 PG (ref 26.8–34.3)
MCHC RBC AUTO-ENTMCNC: 33.2 G/DL (ref 31.4–37.4)
MCV RBC AUTO: 82 FL (ref 82–98)
MONOCYTES # BLD AUTO: 0.61 THOUSAND/ΜL (ref 0.17–1.22)
MONOCYTES NFR BLD AUTO: 9 % (ref 4–12)
NEUTROPHILS # BLD AUTO: 4.1 THOUSANDS/ΜL (ref 1.85–7.62)
NEUTS SEG NFR BLD AUTO: 60 % (ref 43–75)
NRBC BLD AUTO-RTO: 0 /100 WBCS
PLATELET # BLD AUTO: 389 THOUSANDS/UL (ref 149–390)
PMV BLD AUTO: 11.1 FL (ref 8.9–12.7)
POTASSIUM SERPL-SCNC: 4.1 MMOL/L (ref 3.5–5.3)
PROT SERPL-MCNC: 8.4 G/DL (ref 6.4–8.2)
RBC # BLD AUTO: 4.16 MILLION/UL (ref 3.81–5.12)
SODIUM SERPL-SCNC: 133 MMOL/L (ref 136–145)
WBC # BLD AUTO: 6.81 THOUSAND/UL (ref 4.31–10.16)

## 2019-01-14 PROCEDURE — 80053 COMPREHEN METABOLIC PANEL: CPT | Performed by: PHYSICAL MEDICINE & REHABILITATION

## 2019-01-14 PROCEDURE — 85025 COMPLETE CBC W/AUTO DIFF WBC: CPT | Performed by: PHYSICAL MEDICINE & REHABILITATION

## 2019-02-01 ENCOUNTER — HOSPITAL ENCOUNTER (OUTPATIENT)
Dept: INFUSION CENTER | Facility: CLINIC | Age: 69
Discharge: HOME | End: 2019-02-01

## 2019-02-04 NOTE — PROGRESS NOTES
Assessment/Plan:    Problem List Items Addressed This Visit     Ovarian cancer ([facility] [address] ) - Primary     The patient has recovered from her surgery  Her stoma is functioning well  She is tolerating regular diet  She is markedly deconditioned  She is working on increasing her p o  Intake  We have given her instructions on increasing her fat and protein intake  We have also assured that her physical therapy will continue  The patient is interested in starting chemotherapy but not at this point  I agree  We will see her back in approximately 3 weeks with consideration of starting chemotherapy in 4 weeks  CHIEF COMPLAINT:   Chemotherapy discussion  Problem:  Cancer Staging  Ovarian cancer [facility])  Staging form: Ovary, Fallopian Tube, Primary Peritoneal, AJCC 8th Edition  - Clinical stage from 11/14/2018: FIGO Stage IVB (cT3c, cN0, pM1b) - Signed by Fco Gutierrez MD on 11/14/2018  - Pathologic: No stage assigned - Unsigned        Previous therapy:     [facility] ([facility] )    11/9/2018 Initial Diagnosis     Ovarian cancer ([facility] )         11/9/2018 Biopsy     CT-guided needle biopsy of abdominal mass 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  Given liver involvement this would be stage IV         11/14/2018 - 12/4/2018 Chemotherapy     Neoadjuvant therapy: carboplatin area under the curve of 6, paclitaxel 135 milligrams/meter squared, Avastin 10 milligrams/kilogram  Completed 1 of 3 cycles  12/14/2018 Surgery     LAPAROTOMY EXPLORATORY; Abdominal Washout; Application of Abthera Vac Dressing for suspected bowel perforation and septic shock            12/15/2018 Surgery     LAPAROTOMY EXPLORATORY, ABDOMINAL WASHOUT, DRAIN PLACEMENT x 4, DIVERTING LOOP ILEOSTOMY AND ABDOMINAL CLOSURE for bowel perforation              Patient ID: Karla Juarez is a 70 y o  female  Patient returns today for post treatment evaluation and consideration of initiating further chemotherapy  The patient continues to have a slow recovery  She is doing physical therapy at home  Her weight has dropped to around 100 lb but is slightly coming up  She is using a walker by can ambulate reasonably well  She is noting that her stoma is functioning well and the appliance requires changing approximately every 3-4 days  She remains interested in chemotherapy but does not feel strong enough at this point to begin  The following portions of the patient's history were reviewed and updated as appropriate: current medications, past family history, past social history, past surgical history and problem list     Review of Systems    Current Outpatient Prescriptions   Medication Sig Dispense Refill    acetaminophen (TYLENOL) 325 mg tablet Take 2 tablets (650 mg total) by mouth every 6 (six) hours as needed for mild pain, headaches or fever 30 tablet 0    aspirin-acetaminophen-caffeine (EXCEDRIN MIGRAINE) 250-250-65 MG per tablet Take 1 tablet by mouth every 6 (six) hours as needed for headaches      lidocaine-prilocaine (EMLA) cream Apply to port site prior to labs/chemo 30 g 1    pantoprazole (PROTONIX) 40 mg tablet Take 1 tablet (40 mg total) by mouth 2 (two) times a day before meals  0     No current facility-administered medications for this visit  Objective:    Blood pressure 92/72, pulse 96, resp  rate 18, height 5' 4" (1 626 m), weight 46 7 kg (103 lb)  Body mass index is 17 68 kg/m²  Body surface area is 1 48 meters squared  Physical Exam   Constitutional: She is oriented to person, place, and time  She appears well-developed and well-nourished  HENT:   Head: Normocephalic and atraumatic  Eyes: EOM are normal    Neck: Normal range of motion  Neck supple  No thyromegaly present  Cardiovascular: Normal rate, regular rhythm and normal heart sounds  Pulmonary/Chest: Effort normal and breath sounds normal    Abdominal: Soft   Bowel sounds are normal  Well healed midline incisions  Stoma pink and functioning  Appliance in place  Genitourinary:   Genitourinary Comments: Deferred     Musculoskeletal: Normal range of motion  Lymphadenopathy:     She has no cervical adenopathy  Neurological: She is alert and oriented to person, place, and time  Skin: Skin is warm and dry  Psychiatric: She has a normal mood and affect   Her behavior is normal        Lab Results   Component Value Date     135 0 (H) 11/30/2018     Lab Results   Component Value Date    K 4 1 01/14/2019    CL 98 (L) 01/14/2019    CO2 24 01/14/2019    BUN 15 01/14/2019    CREATININE 0 63 01/14/2019    GLUCOSE 84 12/15/2018    GLUF 117 (H) 01/14/2019    CALCIUM 10 3 (H) 01/14/2019    AST 16 01/14/2019    ALT 20 01/14/2019    ALKPHOS 153 (H) 01/14/2019    EGFR 92 01/14/2019     Lab Results   Component Value Date    WBC 6 81 01/14/2019    HGB 11 3 (L) 01/14/2019    HCT 34 0 (L) 01/14/2019    MCV 82 01/14/2019     01/14/2019     Lab Results   Component Value Date    NEUTROABS 4 10 01/14/2019

## 2019-02-05 ENCOUNTER — OFFICE VISIT (OUTPATIENT)
Dept: GYNECOLOGIC ONCOLOGY | Facility: CLINIC | Age: 69
End: 2019-02-05
Payer: MEDICARE

## 2019-02-05 VITALS
HEART RATE: 96 BPM | WEIGHT: 103 LBS | RESPIRATION RATE: 18 BRPM | BODY MASS INDEX: 17.58 KG/M2 | DIASTOLIC BLOOD PRESSURE: 72 MMHG | HEIGHT: 64 IN | SYSTOLIC BLOOD PRESSURE: 92 MMHG

## 2019-02-05 DIAGNOSIS — C56.9 MALIGNANT NEOPLASM OF OVARY, UNSPECIFIED LATERALITY (HCC): Primary | ICD-10-CM

## 2019-02-05 PROCEDURE — 99214 OFFICE O/P EST MOD 30 MIN: CPT | Performed by: OBSTETRICS & GYNECOLOGY

## 2019-02-05 NOTE — ASSESSMENT & PLAN NOTE
The patient has recovered from her surgery  Her stoma is functioning well  She is tolerating regular diet  She is markedly deconditioned  She is working on increasing her p o  Intake  We have given her instructions on increasing her fat and protein intake  We have also assured that her physical therapy will continue  The patient is interested in starting chemotherapy but not at this point  I agree  We will see her back in approximately 3 weeks with consideration of starting chemotherapy in 4 weeks

## 2019-03-04 NOTE — ASSESSMENT & PLAN NOTE
Patient presents today for evaluation for re-initiation of chemotherapy  At this point she is improved but not at a performance status capable of undergoing chemotherapy  We discussed treatment options and recommended starting with carboplatin alone area under the curve of 5 at some point in the near future  She is still able to eat but is having problems with her stoma and a stoma consultation will be obtained  Due to the decrease in p o  Intake at times CBC and CPM P as well as electrolytes will be ordered  A port flush will also be ordered

## 2019-03-04 NOTE — PROGRESS NOTES
Assessment/Plan:    Problem List Items Addressed This Visit        Musculoskeletal and Integument    Stoma dermatitis     Patient has dermatitis associated with her stoma  And her stomal therapy consult will be obtained due to continued leaking  Genitourinary    Ovarian cancer [facility]) - Primary     Patient presents today for evaluation for re-initiation of chemotherapy  At this point she is improved but not at a performance status capable of undergoing chemotherapy  We discussed treatment options and recommended starting with carboplatin alone area under the curve of 5 at some point in the near future  She is still able to eat but is having problems with her stoma and a stoma consultation will be obtained  Due to the decrease in p o  Intake at times CBC and CPM P as well as electrolytes will be ordered  A port flush will also be ordered  Relevant Orders    Wound ostomy eval and treat            CHIEF COMPLAINT:   Discuss chemotherapy re-initiation for ovarian cancer     Problem:  Cancer Staging  Ovarian cancer [facility])  Staging form: Ovary, Fallopian Tube, Primary Peritoneal, AJCC 8th Edition  - Clinical stage from 11/14/2018: FIGO Stage IVB (cT3c, cN0, pM1b) - Signed by Annelise Elena MD on 11/14/2018  - Pathologic: No stage assigned - Unsigned    Previous therapy:     Ovarian cancer ([facility] [address] )    11/9/2018 Initial Diagnosis     Ovarian cancer ([facility] [address] )         11/9/2018 Biopsy     CT-guided needle biopsy of abdominal mass 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  Given liver involvement this would be stage IV         11/14/2018 - 12/4/2018 Chemotherapy     Neoadjuvant therapy: carboplatin area under the curve of 6, paclitaxel 135 milligrams/meter squared, Avastin 10 milligrams/kilogram  Completed 1 of 3 cycles  12/14/2018 Surgery     LAPAROTOMY EXPLORATORY; Abdominal Washout;  Application of Abthera Vac Dressing for suspected bowel perforation and septic shock  12/15/2018 Surgery     LAPAROTOMY EXPLORATORY, ABDOMINAL WASHOUT, DRAIN PLACEMENT x 4, DIVERTING LOOP ILEOSTOMY AND ABDOMINAL CLOSURE for bowel perforation              Patient ID: May Flaherty is a 70 y o  female  Patient presents today for consideration of initiating further chemotherapy  Since her last visit the patient has been doing outpatient rehab  She is ambulating better  She has been attempting to increase the protein in her diet  She has gained 1 lb and is feeling better  She continues to have ileostomy related issues which cause her to minimize her eating and drinking which resulted in some dehydration as has happened today  Overall she feels like she is improving  She does not feel at this point ready to take chemotherapy  The following portions of the patient's history were reviewed and updated as appropriate: allergies, current medications, past family history, past medical history, past social history, past surgical history and problem list     Review of Systems   Constitutional: Positive for appetite change and fatigue  HENT: Negative  Eyes: Negative  Respiratory: Negative  Cardiovascular: Negative  Gastrointestinal: Negative  Ileostomy related issues resulting in skin changes and appliance not fixing   Endocrine: Negative  Genitourinary: Negative  Musculoskeletal: Negative  Skin: Negative  Neurological: Negative  Hematological: Negative  Psychiatric/Behavioral: Negative  All other systems reviewed and are negative        Current Outpatient Medications   Medication Sig Dispense Refill    acetaminophen (TYLENOL) 325 mg tablet Take 2 tablets (650 mg total) by mouth every 6 (six) hours as needed for mild pain, headaches or fever 30 tablet 0    aspirin-acetaminophen-caffeine (EXCEDRIN MIGRAINE) 250-250-65 MG per tablet Take 1 tablet by mouth every 6 (six) hours as needed for headaches      lidocaine-prilocaine (EMLA) cream Apply to port site prior to labs/chemo 30 g 1    metFORMIN (GLUCOPHAGE) 500 mg tablet Take 500 mg by mouth 2 (two) times a day with meals      pantoprazole (PROTONIX) 40 mg tablet Take 1 tablet (40 mg total) by mouth 2 (two) times a day before meals  0     No current facility-administered medications for this visit  Objective:    Blood pressure 92/50, pulse 88, temperature (!) 96 °F (35 6 °C), temperature source Tympanic, resp  rate 18, height 5' 4" (1 626 m), weight 47 2 kg (104 lb)  Body mass index is 17 85 kg/m²  Body surface area is 1 48 meters squared  Physical Exam   Constitutional: She is oriented to person, place, and time  She appears well-developed and well-nourished  HENT:   Head: Normocephalic and atraumatic  Eyes: EOM are normal    Neck: Normal range of motion  Neck supple  No thyromegaly present  Cardiovascular: Normal rate, regular rhythm and normal heart sounds  Pulmonary/Chest: Effort normal and breath sounds normal    Abdominal: Soft  Bowel sounds are normal  There is no tenderness  Well healed  incisions  Appliance in place   Genitourinary:   Genitourinary Comments: Deferred   Musculoskeletal: Normal range of motion  Lymphadenopathy:     She has no cervical adenopathy  Neurological: She is alert and oriented to person, place, and time  Skin: Skin is warm and dry  Psychiatric: She has a normal mood and affect  Her behavior is normal    Vitals reviewed        Lab Results   Component Value Date     135 0 (H) 11/30/2018     Lab Results   Component Value Date    K 4 1 01/14/2019    CL 98 (L) 01/14/2019    CO2 24 01/14/2019    BUN 15 01/14/2019    CREATININE 0 63 01/14/2019    GLUCOSE 84 12/15/2018    GLUF 117 (H) 01/14/2019    CALCIUM 10 3 (H) 01/14/2019    AST 16 01/14/2019    ALT 20 01/14/2019    ALKPHOS 153 (H) 01/14/2019    EGFR 92 01/14/2019     Lab Results   Component Value Date    WBC 6 81 01/14/2019    HGB 11 3 (L) 01/14/2019    HCT 34 0 (L) 01/14/2019 MCV 82 01/14/2019     01/14/2019     Lab Results   Component Value Date    NEUTROABS 4 10 01/14/2019

## 2019-03-05 ENCOUNTER — OFFICE VISIT (OUTPATIENT)
Dept: GYNECOLOGIC ONCOLOGY | Facility: CLINIC | Age: 69
End: 2019-03-05
Payer: MEDICARE

## 2019-03-05 VITALS
HEIGHT: 64 IN | HEART RATE: 88 BPM | BODY MASS INDEX: 17.75 KG/M2 | TEMPERATURE: 96 F | RESPIRATION RATE: 18 BRPM | DIASTOLIC BLOOD PRESSURE: 50 MMHG | SYSTOLIC BLOOD PRESSURE: 92 MMHG | WEIGHT: 104 LBS

## 2019-03-05 DIAGNOSIS — L30.9 STOMA DERMATITIS: ICD-10-CM

## 2019-03-05 DIAGNOSIS — C56.9 MALIGNANT NEOPLASM OF OVARY, UNSPECIFIED LATERALITY (HCC): Primary | ICD-10-CM

## 2019-03-05 PROCEDURE — 99214 OFFICE O/P EST MOD 30 MIN: CPT | Performed by: OBSTETRICS & GYNECOLOGY

## 2019-03-05 NOTE — ASSESSMENT & PLAN NOTE
Patient has dermatitis associated with her stoma  And her stomal therapy consult will be obtained due to continued leaking

## 2019-03-05 NOTE — LETTER
March 5, 2019     [place]ito [place], [address] [facility]  [address] [facility]    Patient: Saba Zacarias   YOB: 1950   Date of Visit: 3/5/2019       Dear Dr Caden Lagunas: Thank you for referring Saba Zacarias to me for evaluation  Below are my notes for this consultation  If you have questions, please do not hesitate to call me  I look forward to following your patient along with you  Sincerely,        Celeste Abdi MD        CC: No Recipients  Celeste Abdi MD  3/5/2019 12:35 PM  Sign at close encounter  Assessment/Plan:    Problem List Items Addressed This Visit        Musculoskeletal and Integument    Stoma dermatitis     Patient has dermatitis associated with her stoma  And her stomal therapy consult will be obtained due to continued leaking  Genitourinary    Ovarian cancer [facility]) - Primary     Patient presents today for evaluation for re-initiation of chemotherapy  At this point she is improved but not at a performance status capable of undergoing chemotherapy  We discussed treatment options and recommended starting with carboplatin alone area under the curve of 5 at some point in the near future  She is still able to eat but is having problems with her stoma and a stoma consultation will be obtained  Due to the decrease in p o  Intake at times CBC and CPM P as well as electrolytes will be ordered  A port flush will also be ordered           Relevant Orders    Wound ostomy eval and treat            CHIEF COMPLAINT:   Discuss chemotherapy re-initiation for ovarian cancer     Problem:  Cancer Staging  Ovarian cancer [facility])  Staging form: Ovary, Fallopian Tube, Primary Peritoneal, AJCC 8th Edition  - Clinical stage from 11/14/2018: FIGO Stage IVB (cT3c, cN0, pM1b) - Signed by Celeste Abdi MD on 11/14/2018  - Pathologic: No stage assigned - Unsigned    Previous therapy:     Ovarian cancer ([facility] [address] )    11/9/2018 Initial Diagnosis     Ovarian cancer ([facility] [address] ) 11/9/2018 Biopsy     CT-guided needle biopsy of abdominal mass 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  Given liver involvement this would be stage IV         11/14/2018 - 12/4/2018 Chemotherapy     Neoadjuvant therapy: carboplatin area under the curve of 6, paclitaxel 135 milligrams/meter squared, Avastin 10 milligrams/kilogram  Completed 1 of 3 cycles  12/14/2018 Surgery     LAPAROTOMY EXPLORATORY; Abdominal Washout; Application of Abthera Vac Dressing for suspected bowel perforation and septic shock  12/15/2018 Surgery     LAPAROTOMY EXPLORATORY, ABDOMINAL WASHOUT, DRAIN PLACEMENT x 4, DIVERTING LOOP ILEOSTOMY AND ABDOMINAL CLOSURE for bowel perforation              Patient ID: Dale Jordan is a 70 y o  female  Patient presents today for consideration of initiating further chemotherapy  Since her last visit the patient has been doing outpatient rehab  She is ambulating better  She has been attempting to increase the protein in her diet  She has gained 1 lb and is feeling better  She continues to have ileostomy related issues which cause her to minimize her eating and drinking which resulted in some dehydration as has happened today  Overall she feels like she is improving  She does not feel at this point ready to take chemotherapy  The following portions of the patient's history were reviewed and updated as appropriate: allergies, current medications, past family history, past medical history, past social history, past surgical history and problem list     Review of Systems   Constitutional: Positive for appetite change and fatigue  HENT: Negative  Eyes: Negative  Respiratory: Negative  Cardiovascular: Negative  Gastrointestinal: Negative  Ileostomy related issues resulting in skin changes and appliance not fixing   Endocrine: Negative  Genitourinary: Negative  Musculoskeletal: Negative  Skin: Negative  Neurological: Negative  Hematological: Negative  Psychiatric/Behavioral: Negative  All other systems reviewed and are negative  Current Outpatient Medications   Medication Sig Dispense Refill    acetaminophen (TYLENOL) 325 mg tablet Take 2 tablets (650 mg total) by mouth every 6 (six) hours as needed for mild pain, headaches or fever 30 tablet 0    aspirin-acetaminophen-caffeine (EXCEDRIN MIGRAINE) 250-250-65 MG per tablet Take 1 tablet by mouth every 6 (six) hours as needed for headaches      lidocaine-prilocaine (EMLA) cream Apply to port site prior to labs/chemo 30 g 1    metFORMIN (GLUCOPHAGE) 500 mg tablet Take 500 mg by mouth 2 (two) times a day with meals      pantoprazole (PROTONIX) 40 mg tablet Take 1 tablet (40 mg total) by mouth 2 (two) times a day before meals  0     No current facility-administered medications for this visit  Objective:    Blood pressure 92/50, pulse 88, temperature (!) 96 °F (35 6 °C), temperature source Tympanic, resp  rate 18, height 5' 4" (1 626 m), weight 47 2 kg (104 lb)  Body mass index is 17 85 kg/m²  Body surface area is 1 48 meters squared  Physical Exam   Constitutional: She is oriented to person, place, and time  She appears well-developed and well-nourished  HENT:   Head: Normocephalic and atraumatic  Eyes: EOM are normal    Neck: Normal range of motion  Neck supple  No thyromegaly present  Cardiovascular: Normal rate, regular rhythm and normal heart sounds  Pulmonary/Chest: Effort normal and breath sounds normal    Abdominal: Soft  Bowel sounds are normal  There is no tenderness  Well healed  incisions  Appliance in place   Genitourinary:   Genitourinary Comments: Deferred   Musculoskeletal: Normal range of motion  Lymphadenopathy:     She has no cervical adenopathy  Neurological: She is alert and oriented to person, place, and time  Skin: Skin is warm and dry  Psychiatric: She has a normal mood and affect   Her behavior is normal    Vitals reviewed        Lab Results   Component Value Date     135 0 (H) 11/30/2018     Lab Results   Component Value Date    K 4 1 01/14/2019    CL 98 (L) 01/14/2019    CO2 24 01/14/2019    BUN 15 01/14/2019    CREATININE 0 63 01/14/2019    GLUCOSE 84 12/15/2018    GLUF 117 (H) 01/14/2019    CALCIUM 10 3 (H) 01/14/2019    AST 16 01/14/2019    ALT 20 01/14/2019    ALKPHOS 153 (H) 01/14/2019    EGFR 92 01/14/2019     Lab Results   Component Value Date    WBC 6 81 01/14/2019    HGB 11 3 (L) 01/14/2019    HCT 34 0 (L) 01/14/2019    MCV 82 01/14/2019     01/14/2019     Lab Results   Component Value Date    NEUTROABS 4 10 01/14/2019

## 2019-03-08 ENCOUNTER — HOSPITAL ENCOUNTER (OUTPATIENT)
Dept: INFUSION CENTER | Facility: CLINIC | Age: 69
Discharge: HOME | End: 2019-03-08
Payer: MEDICARE

## 2019-03-08 DIAGNOSIS — C56.9 MALIGNANT NEOPLASM OF OVARY, UNSPECIFIED LATERALITY (HCC): Primary | ICD-10-CM

## 2019-03-08 LAB
ALBUMIN SERPL BCP-MCNC: 2.6 G/DL (ref 3.5–5)
ALP SERPL-CCNC: 105 U/L (ref 46–116)
ALT SERPL W P-5'-P-CCNC: 15 U/L (ref 12–78)
ANION GAP SERPL CALCULATED.3IONS-SCNC: 12 MMOL/L (ref 4–13)
AST SERPL W P-5'-P-CCNC: 14 U/L (ref 5–45)
BASOPHILS # BLD AUTO: 0.07 THOUSANDS/ΜL (ref 0–0.1)
BASOPHILS NFR BLD AUTO: 1 % (ref 0–1)
BILIRUB SERPL-MCNC: 0.2 MG/DL (ref 0.2–1)
BUN SERPL-MCNC: 26 MG/DL (ref 5–25)
CALCIUM SERPL-MCNC: 10.8 MG/DL (ref 8.3–10.1)
CANCER AG125 SERPL-ACNC: 80.4 U/ML (ref 0–30)
CHLORIDE SERPL-SCNC: 102 MMOL/L (ref 100–108)
CO2 SERPL-SCNC: 19 MMOL/L (ref 21–32)
CREAT SERPL-MCNC: 1.15 MG/DL (ref 0.6–1.3)
EOSINOPHIL # BLD AUTO: 0.11 THOUSAND/ΜL (ref 0–0.61)
EOSINOPHIL NFR BLD AUTO: 1 % (ref 0–6)
ERYTHROCYTE [DISTWIDTH] IN BLOOD BY AUTOMATED COUNT: 18 % (ref 11.6–15.1)
GFR SERPL CREATININE-BSD FRML MDRD: 49 ML/MIN/1.73SQ M
GLUCOSE SERPL-MCNC: 210 MG/DL (ref 65–140)
HCT VFR BLD AUTO: 30.1 % (ref 34.8–46.1)
HGB BLD-MCNC: 9.4 G/DL (ref 11.5–15.4)
IMM GRANULOCYTES # BLD AUTO: 0.07 THOUSAND/UL (ref 0–0.2)
IMM GRANULOCYTES NFR BLD AUTO: 1 % (ref 0–2)
LYMPHOCYTES # BLD AUTO: 2.21 THOUSANDS/ΜL (ref 0.6–4.47)
LYMPHOCYTES NFR BLD AUTO: 15 % (ref 14–44)
MAGNESIUM SERPL-MCNC: 1.7 MG/DL (ref 1.6–2.6)
MCH RBC QN AUTO: 26 PG (ref 26.8–34.3)
MCHC RBC AUTO-ENTMCNC: 31.2 G/DL (ref 31.4–37.4)
MCV RBC AUTO: 83 FL (ref 82–98)
MONOCYTES # BLD AUTO: 0.99 THOUSAND/ΜL (ref 0.17–1.22)
MONOCYTES NFR BLD AUTO: 7 % (ref 4–12)
NEUTROPHILS # BLD AUTO: 11.84 THOUSANDS/ΜL (ref 1.85–7.62)
NEUTS SEG NFR BLD AUTO: 75 % (ref 43–75)
NRBC BLD AUTO-RTO: 0 /100 WBCS
PLATELET # BLD AUTO: 292 THOUSANDS/UL (ref 149–390)
PMV BLD AUTO: 12 FL (ref 8.9–12.7)
POTASSIUM SERPL-SCNC: 3.5 MMOL/L (ref 3.5–5.3)
PROT SERPL-MCNC: 8 G/DL (ref 6.4–8.2)
RBC # BLD AUTO: 3.61 MILLION/UL (ref 3.81–5.12)
SODIUM SERPL-SCNC: 133 MMOL/L (ref 136–145)
WBC # BLD AUTO: 15.29 THOUSAND/UL (ref 4.31–10.16)

## 2019-03-08 PROCEDURE — 86304 IMMUNOASSAY TUMOR CA 125: CPT | Performed by: PHYSICIAN ASSISTANT

## 2019-03-08 PROCEDURE — 80053 COMPREHEN METABOLIC PANEL: CPT | Performed by: PHYSICIAN ASSISTANT

## 2019-03-08 PROCEDURE — 83735 ASSAY OF MAGNESIUM: CPT | Performed by: PHYSICIAN ASSISTANT

## 2019-03-08 PROCEDURE — 85025 COMPLETE CBC W/AUTO DIFF WBC: CPT | Performed by: PHYSICIAN ASSISTANT

## 2019-03-08 NOTE — PROGRESS NOTES
Pt offers no complaints  Labs drawn and sent to the lab  No UPC needed per Kayley Murry  PA-ZULLY  Port flushed per protocol  Aware of next appointments with Dr Gerson Velázquez, no future infusion appointments at this time   Declines AVS

## 2019-03-13 ENCOUNTER — HOSPITAL ENCOUNTER (OUTPATIENT)
Dept: INFUSION CENTER | Facility: CLINIC | Age: 69
Discharge: HOME | End: 2019-03-13
Payer: MEDICARE

## 2019-03-13 VITALS
DIASTOLIC BLOOD PRESSURE: 68 MMHG | RESPIRATION RATE: 20 BRPM | HEART RATE: 85 BPM | WEIGHT: 103.17 LBS | SYSTOLIC BLOOD PRESSURE: 118 MMHG | BODY MASS INDEX: 17.71 KG/M2 | TEMPERATURE: 97.7 F

## 2019-03-13 PROCEDURE — 96361 HYDRATE IV INFUSION ADD-ON: CPT

## 2019-03-13 PROCEDURE — 96360 HYDRATION IV INFUSION INIT: CPT

## 2019-03-13 RX ADMIN — SODIUM CHLORIDE 1000 ML: 0.9 INJECTION, SOLUTION INTRAVENOUS at 13:01

## 2019-03-13 NOTE — SOCIAL WORK
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, care for grandchildren that she has custody of, and assist her  who had a severe stroke years ago  Pt typically manages all of this by herself  She is incredibly complimentary of the rehab at [facility]  Pt is not back on a treatment regiment at this time, she and her Dr do not feel that she could tolerate it  She says that she is focusing on taking one day at a time and regaining her strength  She is working on managing her colostomy as well, she reports having some issues with the supplies falling off, however she and her daughter went to an appointment yesterday and seemed to have learned some tips to keep it attached  No needs at this time, BINTA will remain available to pt should her needs change in the future

## 2019-03-13 NOTE — PROGRESS NOTES
Pt here for hydration, pt offers no complaints, resting comfortably  Vitals stable  Labs reviewed  Pt tolerated hydration well  Aware of next appointments  AVS provided

## 2019-03-18 ENCOUNTER — HOSPITAL ENCOUNTER (OUTPATIENT)
Dept: CT IMAGING | Facility: CLINIC | Age: 69
Discharge: HOME | End: 2019-03-18
Payer: MEDICARE

## 2019-03-18 DIAGNOSIS — C56.9 MALIGNANT NEOPLASM OF OVARY, UNSPECIFIED LATERALITY (HCC): ICD-10-CM

## 2019-03-18 PROBLEM — E86.0 DEHYDRATION: Status: ACTIVE | Noted: 2019-03-18

## 2019-03-18 PROCEDURE — 74177 CT ABD & PELVIS W/CONTRAST: CPT

## 2019-03-18 PROCEDURE — 71260 CT THORAX DX C+: CPT

## 2019-03-18 RX ADMIN — IODIXANOL 85 ML: 320 INJECTION, SOLUTION INTRAVASCULAR at 15:10

## 2019-03-18 NOTE — PROGRESS NOTES
Assessment/Plan:    Problem List Items Addressed This Visit        Cardiovascular and Mediastinum    Deep venous thrombosis of right profunda femoris vein (HCC)     Patient has a newly diagnosed deep venous thrombosis of the right femoral vein on recent CT scan  I have discussed with the patient that she would need to be treated with anticoagulation for at least 3 months and until she is in remission  The patient is willing to do this  I have discussed that the standard of care would be Lovenox injection  The patient adamantly refused injections due to not being comfortable with needles  She would be herself unable to learn and new nobody who could learn and would rather take a pill  We have discussed DOAC  The patient is interested in this  I have discussed Coumadin however the patient is not interested as this will have to be checked  I further discussed that due to insurance that the DOACs may be too expensive  We are consulting financial planning to assist us in this  I have discussed with the patient bring her into the hospital to get treatment started and she is not willing to do this at this time  Genitourinary    Ovarian cancer [facility]) - Primary     Patient presents today for treatment discussion for Stage IVB Ovarian cancer  At this point she is improved but not at a performance status capable of undergoing full chemotherapy  However I worry if we delay further treatment the patient may end up having some growth of tumor  We are very pleased that her  has significantly regressed and her CT scan shows significant improvement of her tumor  Her recent  tumor marker was 80 4 which has decreased from 11/2018 when it was 135  Her highest  level was 194 5 10/2018  We previously discussed treatment options and recommended starting with carboplatin alone area under the curve of 5 at some point in the near future    However she had significant nausea after her 1st treatment and she is worried about this  I have explained to the patient that the nausea may be related to her bowel obstruction her bowel perforation tumor volume or the chemo  However as we are trying to have the patient put back on more weight any reduction in this with nausea would set us back  We have discussed alternatives including starting with Taxol  The patient has more interest in this additionally we would do weekly Taxol at 80 milligrams/meter squared for 3 consecutive weeks to minimize any hematologic toxicity  Her total WBC was elevated on 3/8/19 at 15 29 and a CT scan of the chest/abd/pel was performed yesterday  She had been afebrile and without complaints of acute abdominal or pelvic pain  No signs of abscess were noted  Hgb level at that time was 9 4  Plan to re-check lab work today  She was having difficulties with stoma maintenance and was able to meet with the ET nurse last week  She was thrilled after this meeting and her stoma appliance has been on for 4-1/2 days which is a huge improvement  Relevant Orders    CBC and differential    Comprehensive metabolic panel    Magnesium       Other    Dehydration     She has had difficulty with PO intake  We did supplement with IV hydration last week            Relevant Orders    Comprehensive metabolic panel    Magnesium        CHIEF COMPLAINT:   Treatment discussion, Stage IVB ovarian cancer     Problem:  Cancer Staging  Ovarian cancer [facility])  Staging form: Ovary, Fallopian Tube, Primary Peritoneal, AJCC 8th Edition  - Clinical stage from 11/14/2018: FIGO Stage IVB (cT3c, cN0, pM1b) - Signed by Cedric De Souza MD on 11/14/2018  - Pathologic: No stage assigned - Unsigned        Previous therapy:     Ovarian cancer ([facility] [address] )    11/9/2018 Initial Diagnosis     Ovarian cancer ([facility] [address] )   tumor marker 194 5         11/9/2018 Biopsy     CT-guided needle biopsy of abdominal mass 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  Given liver involvement this would be stage IV         11/14/2018 - 12/4/2018 Chemotherapy     Neoadjuvant therapy: carboplatin area under the curve of 6, paclitaxel 135 milligrams/meter squared, Avastin 10 milligrams/kilogram  Completed 1 of 3 cycles  12/14/2018 Surgery     LAPAROTOMY EXPLORATORY; Abdominal Washout; Application of Abthera Vac Dressing for suspected bowel perforation and septic shock  12/15/2018 Surgery     LAPAROTOMY EXPLORATORY, ABDOMINAL WASHOUT, DRAIN PLACEMENT x 4, DIVERTING LOOP ILEOSTOMY AND ABDOMINAL CLOSURE for bowel perforation              Patient ID: Kristen Li is a 70 y o  female  Patient presents today for treatment discussion and consideration of initiating further chemotherapy  Since her last visit, her WBC count was noted to be markedly elevated at 15 29 and a CT scan was performed yesterday  She denies fevers and chills as well as acute abdominal or pelvic pain  She denies vaginal bleeding  She continues with outpatient physical therapy and her ambulation is improving  She has been attempting to increase the protein in her diet  She did see the ET nurse in [facility] last week for ileostomy related issues which were causing her to minimize her eating and drinking which resulted in some dehydration  She did receive supplemental IV hydration last week as well  Overall she feels like she is improving  She does not feel at this point ready to take chemotherapy  The following portions of the patient's history were reviewed and updated as appropriate: allergies, current medications, past family history, past medical history, past social history, past surgical history and problem list     Review of Systems   Constitutional: Positive for fatigue  Negative for chills and fever  HENT: Negative  Eyes: Negative  Respiratory: Negative  Cardiovascular: Negative  Gastrointestinal: Negative  Endocrine: Negative  Genitourinary: Negative  Musculoskeletal: Negative  Skin: Negative  Neurological: Negative  Hematological: Negative  Psychiatric/Behavioral: Negative  All other systems reviewed and are negative  Current Outpatient Medications   Medication Sig Dispense Refill    acetaminophen (TYLENOL) 325 mg tablet Take 2 tablets (650 mg total) by mouth every 6 (six) hours as needed for mild pain, headaches or fever 30 tablet 0    aspirin-acetaminophen-caffeine (EXCEDRIN MIGRAINE) 250-250-65 MG per tablet Take 1 tablet by mouth every 6 (six) hours as needed for headaches      lidocaine-prilocaine (EMLA) cream Apply to port site prior to labs/chemo 30 g 1    metFORMIN (GLUCOPHAGE) 500 mg tablet Take 500 mg by mouth 2 (two) times a day with meals      pantoprazole (PROTONIX) 40 mg tablet Take 1 tablet (40 mg total) by mouth 2 (two) times a day before meals  0     No current facility-administered medications for this visit  Objective:    Blood pressure 102/64, pulse 80, temperature 97 8 °F (36 6 °C), resp  rate 16, height 5' 4" (1 626 m), weight 47 6 kg (105 lb)  Body mass index is 18 02 kg/m²  Body surface area is 1 49 meters squared  Physical Exam   Constitutional: She is oriented to person, place, and time  She appears well-developed and well-nourished  Neck: Neck supple  No thyromegaly present  Cardiovascular: Normal rate and regular rhythm  Pulmonary/Chest: Effort normal and breath sounds normal    Abdominal: Soft  There is no tenderness  Genitourinary:   Genitourinary Comments: Exam deferred today  Lymphadenopathy:     She has no cervical adenopathy  Neurological: She is alert and oriented to person, place, and time  Skin: Skin is warm and dry  Psychiatric: She has a normal mood and affect  Vitals reviewed        Lab Results   Component Value Date     80 4 (H) 03/08/2019     Lab Results   Component Value Date    K 3 5 03/08/2019     03/08/2019    CO2 19 (L) 03/08/2019    BUN 26 (H) 03/08/2019    CREATININE 1 15 03/08/2019    GLUCOSE 84 12/15/2018    GLUF 117 (H) 01/14/2019    CALCIUM 10 8 (H) 03/08/2019    AST 14 03/08/2019    ALT 15 03/08/2019    ALKPHOS 105 03/08/2019    EGFR 49 03/08/2019     Lab Results   Component Value Date    WBC 15 29 (H) 03/08/2019    HGB 9 4 (L) 03/08/2019    HCT 30 1 (L) 03/08/2019    MCV 83 03/08/2019     03/08/2019     Lab Results   Component Value Date    NEUTROABS 11 84 (H) 03/08/2019

## 2019-03-18 NOTE — ASSESSMENT & PLAN NOTE
Patient presents today for treatment discussion for Stage IVB Ovarian cancer  At this point she is improved but not at a performance status capable of undergoing full chemotherapy  However I worry if we delay further treatment the patient may end up having some growth of tumor  We are very pleased that her  has significantly regressed and her CT scan shows significant improvement of her tumor  Her recent  tumor marker was 80 4 which has decreased from 11/2018 when it was 135  Her highest  level was 194 5 10/2018  We previously discussed treatment options and recommended starting with carboplatin alone area under the curve of 5 at some point in the near future  However she had significant nausea after her 1st treatment and she is worried about this  I have explained to the patient that the nausea may be related to her bowel obstruction her bowel perforation tumor volume or the chemo  However as we are trying to have the patient put back on more weight any reduction in this with nausea would set us back  We have discussed alternatives including starting with Taxol  The patient has more interest in this additionally we would do weekly Taxol at 80 milligrams/meter squared for 3 consecutive weeks to minimize any hematologic toxicity  Her total WBC was elevated on 3/8/19 at 15 29 and a CT scan of the chest/abd/pel was performed yesterday  She had been afebrile and without complaints of acute abdominal or pelvic pain  No signs of abscess were noted  Hgb level at that time was 9 4  Plan to re-check lab work today  She was having difficulties with stoma maintenance and was able to meet with the ET nurse last week  She was thrilled after this meeting and her stoma appliance has been on for 4-1/2 days which is a huge improvement

## 2019-03-19 ENCOUNTER — OFFICE VISIT (OUTPATIENT)
Dept: GYNECOLOGIC ONCOLOGY | Facility: CLINIC | Age: 69
End: 2019-03-19
Payer: MEDICARE

## 2019-03-19 ENCOUNTER — HOSPITAL ENCOUNTER (OUTPATIENT)
Dept: INFUSION CENTER | Facility: CLINIC | Age: 69
Discharge: HOME | End: 2019-03-19
Payer: MEDICARE

## 2019-03-19 VITALS
BODY MASS INDEX: 17.93 KG/M2 | WEIGHT: 105 LBS | TEMPERATURE: 97.8 F | SYSTOLIC BLOOD PRESSURE: 102 MMHG | RESPIRATION RATE: 16 BRPM | DIASTOLIC BLOOD PRESSURE: 64 MMHG | HEIGHT: 64 IN | HEART RATE: 80 BPM

## 2019-03-19 DIAGNOSIS — I82.411 DEEP VENOUS THROMBOSIS OF RIGHT PROFUNDA FEMORIS VEIN (HCC): Primary | ICD-10-CM

## 2019-03-19 DIAGNOSIS — E86.0 DEHYDRATION: ICD-10-CM

## 2019-03-19 DIAGNOSIS — C56.9 MALIGNANT NEOPLASM OF OVARY, UNSPECIFIED LATERALITY (HCC): Primary | ICD-10-CM

## 2019-03-19 DIAGNOSIS — I82.411 DEEP VENOUS THROMBOSIS OF RIGHT PROFUNDA FEMORIS VEIN (HCC): ICD-10-CM

## 2019-03-19 LAB
ALBUMIN SERPL BCP-MCNC: 3.1 G/DL (ref 3.5–5)
ALP SERPL-CCNC: 102 U/L (ref 46–116)
ALT SERPL W P-5'-P-CCNC: 25 U/L (ref 12–78)
ANION GAP SERPL CALCULATED.3IONS-SCNC: 12 MMOL/L (ref 4–13)
AST SERPL W P-5'-P-CCNC: 22 U/L (ref 5–45)
BASOPHILS # BLD AUTO: 0.09 THOUSANDS/ΜL (ref 0–0.1)
BASOPHILS NFR BLD AUTO: 1 % (ref 0–1)
BILIRUB SERPL-MCNC: 0.3 MG/DL (ref 0.2–1)
BUN SERPL-MCNC: 20 MG/DL (ref 5–25)
CALCIUM SERPL-MCNC: 10.1 MG/DL (ref 8.3–10.1)
CANCER AG125 SERPL-ACNC: 96.4 U/ML (ref 0–30)
CHLORIDE SERPL-SCNC: 104 MMOL/L (ref 100–108)
CO2 SERPL-SCNC: 20 MMOL/L (ref 21–32)
CREAT SERPL-MCNC: 1.21 MG/DL (ref 0.6–1.3)
EOSINOPHIL # BLD AUTO: 0.17 THOUSAND/ΜL (ref 0–0.61)
EOSINOPHIL NFR BLD AUTO: 1 % (ref 0–6)
ERYTHROCYTE [DISTWIDTH] IN BLOOD BY AUTOMATED COUNT: 18.2 % (ref 11.6–15.1)
GFR SERPL CREATININE-BSD FRML MDRD: 46 ML/MIN/1.73SQ M
GLUCOSE SERPL-MCNC: 103 MG/DL (ref 65–140)
HCT VFR BLD AUTO: 29.8 % (ref 34.8–46.1)
HGB BLD-MCNC: 9.5 G/DL (ref 11.5–15.4)
IMM GRANULOCYTES # BLD AUTO: 0.06 THOUSAND/UL (ref 0–0.2)
IMM GRANULOCYTES NFR BLD AUTO: 1 % (ref 0–2)
LYMPHOCYTES # BLD AUTO: 2.5 THOUSANDS/ΜL (ref 0.6–4.47)
LYMPHOCYTES NFR BLD AUTO: 20 % (ref 14–44)
MAGNESIUM SERPL-MCNC: 1.7 MG/DL (ref 1.6–2.6)
MCH RBC QN AUTO: 27 PG (ref 26.8–34.3)
MCHC RBC AUTO-ENTMCNC: 31.9 G/DL (ref 31.4–37.4)
MCV RBC AUTO: 85 FL (ref 82–98)
MONOCYTES # BLD AUTO: 0.76 THOUSAND/ΜL (ref 0.17–1.22)
MONOCYTES NFR BLD AUTO: 6 % (ref 4–12)
NEUTROPHILS # BLD AUTO: 8.88 THOUSANDS/ΜL (ref 1.85–7.62)
NEUTS SEG NFR BLD AUTO: 71 % (ref 43–75)
NRBC BLD AUTO-RTO: 0 /100 WBCS
PLATELET # BLD AUTO: 327 THOUSANDS/UL (ref 149–390)
PMV BLD AUTO: 11.1 FL (ref 8.9–12.7)
POTASSIUM SERPL-SCNC: 4 MMOL/L (ref 3.5–5.3)
PROT SERPL-MCNC: 8 G/DL (ref 6.4–8.2)
RBC # BLD AUTO: 3.52 MILLION/UL (ref 3.81–5.12)
SODIUM SERPL-SCNC: 136 MMOL/L (ref 136–145)
WBC # BLD AUTO: 12.46 THOUSAND/UL (ref 4.31–10.16)

## 2019-03-19 PROCEDURE — 85025 COMPLETE CBC W/AUTO DIFF WBC: CPT | Performed by: PHYSICIAN ASSISTANT

## 2019-03-19 PROCEDURE — 86304 IMMUNOASSAY TUMOR CA 125: CPT | Performed by: PHYSICIAN ASSISTANT

## 2019-03-19 PROCEDURE — 83735 ASSAY OF MAGNESIUM: CPT | Performed by: PHYSICIAN ASSISTANT

## 2019-03-19 PROCEDURE — 80053 COMPREHEN METABOLIC PANEL: CPT | Performed by: PHYSICIAN ASSISTANT

## 2019-03-19 PROCEDURE — 99215 OFFICE O/P EST HI 40 MIN: CPT | Performed by: OBSTETRICS & GYNECOLOGY

## 2019-03-19 NOTE — PROGRESS NOTES
Pt offers no complaints  Labs drawn and sent to the lab  Port flushed per protocol  Aware of next appointments   Declines AVS

## 2019-03-19 NOTE — PATIENT INSTRUCTIONS
Please have blood work drawn today  We will schedule next chemo starting Tuesday for the 3 consecutive weeks with Taxol alone

## 2019-03-19 NOTE — LETTER
March 19, 2019     Marga Ortiz, [address] [facility]  [address] [facility]    Patient: Bonnie Talavera   YOB: 1950   Date of Visit: 3/19/2019       Dear Dr Arianne Bacon: Thank you for referring Bonnie Talavera to me for evaluation  Below are my notes for this consultation  If you have questions, please do not hesitate to call me  I look forward to following your patient along with you  Sincerely,        Radha Osei MD        CC: No Recipients  Radha Osei MD  3/19/2019 12:08 PM  Incomplete  Assessment/Plan:    Problem List Items Addressed This Visit        Cardiovascular and Mediastinum    Deep venous thrombosis of right profunda femoris vein (HCC)     Patient has a newly diagnosed deep venous thrombosis of the right femoral vein on recent CT scan  I have discussed with the patient that she would need to be treated with anticoagulation for at least 3 months and until she is in remission  The patient is willing to do this  I have discussed that the standard of care would be Lovenox injection  The patient adamantly refused injections due to not being comfortable with needles  She would be herself unable to learn and new nobody who could learn and would rather take a pill  We have discussed DOAC  The patient is interested in this  I have discussed Coumadin however the patient is not interested as this will have to be checked  I further discussed that due to insurance that the DOACs may be too expensive  We are consulting financial planning to assist us in this  I have discussed with the patient bring her into the hospital to get treatment started and she is not willing to do this at this time  Genitourinary    Ovarian cancer [facility]) - Primary     Patient presents today for treatment discussion for Stage IVB Ovarian cancer  At this point she is improved but not at a performance status capable of undergoing full chemotherapy    However I worry if we delay further treatment the patient may end up having some growth of tumor  We are very pleased that her  has significantly regressed and her CT scan shows significant improvement of her tumor  Her recent  tumor marker was 80 4 which has decreased from 11/2018 when it was 135  Her highest  level was 194 5 10/2018  We previously discussed treatment options and recommended starting with carboplatin alone area under the curve of 5 at some point in the near future  However she had significant nausea after her 1st treatment and she is worried about this  I have explained to the patient that the nausea may be related to her bowel obstruction her bowel perforation tumor volume or the chemo  However as we are trying to have the patient put back on more weight any reduction in this with nausea would set us back  We have discussed alternatives including starting with Taxol  The patient has more interest in this additionally we would do weekly Taxol at 80 milligrams/meter squared for 3 consecutive weeks to minimize any hematologic toxicity  Her total WBC was elevated on 3/8/19 at 15 29 and a CT scan of the chest/abd/pel was performed yesterday  She had been afebrile and without complaints of acute abdominal or pelvic pain  No signs of abscess were noted  Hgb level at that time was 9 4  Plan to re-check lab work today  She was having difficulties with stoma maintenance and was able to meet with the ET nurse last week  She was thrilled after this meeting and her stoma appliance has been on for 4-1/2 days which is a huge improvement  Relevant Orders    CBC and differential    Comprehensive metabolic panel    Magnesium       Other    Dehydration     She has had difficulty with PO intake  We did supplement with IV hydration last week            Relevant Orders    Comprehensive metabolic panel    Magnesium        CHIEF COMPLAINT:   Treatment discussion, Stage IVB ovarian cancer     Problem:  Cancer Staging  Ovarian cancer [facility])  Staging form: Ovary, Fallopian Tube, Primary Peritoneal, AJCC 8th Edition  - Clinical stage from 11/14/2018: FIGO Stage IVB (cT3c, cN0, pM1b) - Signed by Radha Osei MD on 11/14/2018  - Pathologic: No stage assigned - Unsigned        Previous therapy:     Ovarian cancer ([facility] [address] )    11/9/2018 Initial Diagnosis     Ovarian cancer ([facility] [address] )   tumor marker 194 5         11/9/2018 Biopsy     CT-guided needle biopsy of abdominal mass 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  Given liver involvement this would be stage IV         11/14/2018 - 12/4/2018 Chemotherapy     Neoadjuvant therapy: carboplatin area under the curve of 6, paclitaxel 135 milligrams/meter squared, Avastin 10 milligrams/kilogram  Completed 1 of 3 cycles  12/14/2018 Surgery     LAPAROTOMY EXPLORATORY; Abdominal Washout; Application of Abthera Vac Dressing for suspected bowel perforation and septic shock  12/15/2018 Surgery     LAPAROTOMY EXPLORATORY, ABDOMINAL WASHOUT, DRAIN PLACEMENT x 4, DIVERTING LOOP ILEOSTOMY AND ABDOMINAL CLOSURE for bowel perforation              Patient ID: Bonnie Talavera is a 70 y o  female  Patient presents today for treatment discussion and consideration of initiating further chemotherapy  Since her last visit, her WBC count was noted to be markedly elevated at 15 29 and a CT scan was performed yesterday  She denies fevers and chills as well as acute abdominal or pelvic pain  She denies vaginal bleeding  She continues with outpatient physical therapy and her ambulation is improving  She has been attempting to increase the protein in her diet  She did see the ET nurse in [place] last week for ileostomy related issues which were causing her to minimize her eating and drinking which resulted in some dehydration  She did receive supplemental IV hydration last week as well  Overall she feels like she is improving    She does not feel at this point ready to take chemotherapy  The following portions of the patient's history were reviewed and updated as appropriate: allergies, current medications, past family history, past medical history, past social history, past surgical history and problem list     Review of Systems   Constitutional: Positive for fatigue  Negative for chills and fever  HENT: Negative  Eyes: Negative  Respiratory: Negative  Cardiovascular: Negative  Gastrointestinal: Negative  Endocrine: Negative  Genitourinary: Negative  Musculoskeletal: Negative  Skin: Negative  Neurological: Negative  Hematological: Negative  Psychiatric/Behavioral: Negative  All other systems reviewed and are negative  Current Outpatient Medications   Medication Sig Dispense Refill    acetaminophen (TYLENOL) 325 mg tablet Take 2 tablets (650 mg total) by mouth every 6 (six) hours as needed for mild pain, headaches or fever 30 tablet 0    aspirin-acetaminophen-caffeine (EXCEDRIN MIGRAINE) 250-250-65 MG per tablet Take 1 tablet by mouth every 6 (six) hours as needed for headaches      lidocaine-prilocaine (EMLA) cream Apply to port site prior to labs/chemo 30 g 1    metFORMIN (GLUCOPHAGE) 500 mg tablet Take 500 mg by mouth 2 (two) times a day with meals      pantoprazole (PROTONIX) 40 mg tablet Take 1 tablet (40 mg total) by mouth 2 (two) times a day before meals  0     No current facility-administered medications for this visit  Objective:    Blood pressure 102/64, pulse 80, temperature 97 8 °F (36 6 °C), resp  rate 16, height 5' 4" (1 626 m), weight 47 6 kg (105 lb)  Body mass index is 18 02 kg/m²  Body surface area is 1 49 meters squared  Physical Exam   Constitutional: She is oriented to person, place, and time  She appears well-developed and well-nourished  Neck: Neck supple  No thyromegaly present  Cardiovascular: Normal rate and regular rhythm     Pulmonary/Chest: Effort normal and breath sounds normal    Abdominal: Soft  There is no tenderness  Genitourinary:   Genitourinary Comments: Exam deferred today  Lymphadenopathy:     She has no cervical adenopathy  Neurological: She is alert and oriented to person, place, and time  Skin: Skin is warm and dry  Psychiatric: She has a normal mood and affect  Vitals reviewed  Lab Results   Component Value Date     80 4 (H) 03/08/2019     Lab Results   Component Value Date    K 3 5 03/08/2019     03/08/2019    CO2 19 (L) 03/08/2019    BUN 26 (H) 03/08/2019    CREATININE 1 15 03/08/2019    GLUCOSE 84 12/15/2018    GLUF 117 (H) 01/14/2019    CALCIUM 10 8 (H) 03/08/2019    AST 14 03/08/2019    ALT 15 03/08/2019    ALKPHOS 105 03/08/2019    EGFR 49 03/08/2019     Lab Results   Component Value Date    WBC 15 29 (H) 03/08/2019    HGB 9 4 (L) 03/08/2019    HCT 30 1 (L) 03/08/2019    MCV 83 03/08/2019     03/08/2019     Lab Results   Component Value Date    NEUTROABS 11 84 (H) 03/08/2019              Reji Alvarez MD  3/19/2019 11:39 AM  Incomplete  Assessment/Plan:    Problem List Items Addressed This Visit        Genitourinary    Ovarian cancer [facility]) - Primary     Patient presents today for treatment discussion for Stage IVB Ovarian cancer  At this point she is improved but not at a performance status capable of undergoing chemotherapy  We previously discussed treatment options and recommended starting with carboplatin alone area under the curve of 5 at some point in the near future  Her recent  tumor marker was 80 4 which has decreased from 11/2018 when it was 135  Her highest  level was 194 5 10/2018  Her total WBC was elevated on 3/8/19 at 15 29 and a CT scan of the chest/abd/pel was performed yesterday  She had been afebrile and without complaints of acute abdominal or pelvic pain  Hgb level at that time was 9 4  Plan to re-check lab work today       She was having difficulties with stoma maintenance and was able to meet with the ET nurse last week  Other    Dehydration     She has had difficulty with PO intake  We did supplement with IV hydration last week  CHIEF COMPLAINT:   Treatment discussion, Stage IVB ovarian cancer     Problem:  Cancer Staging  Ovarian cancer [facility])  Staging form: Ovary, Fallopian Tube, Primary Peritoneal, AJCC 8th Edition  - Clinical stage from 11/14/2018: FIGO Stage IVB (cT3c, cN0, pM1b) - Signed by Pili Pendleton MD on 11/14/2018  - Pathologic: No stage assigned - Unsigned        Previous therapy:     Ovarian cancer ([facility] [address] )    11/9/2018 Initial Diagnosis     Ovarian cancer ([facility] [address] )   tumor marker 194 5         11/9/2018 Biopsy     CT-guided needle biopsy of abdominal mass 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  Given liver involvement this would be stage IV         11/14/2018 - 12/4/2018 Chemotherapy     Neoadjuvant therapy: carboplatin area under the curve of 6, paclitaxel 135 milligrams/meter squared, Avastin 10 milligrams/kilogram  Completed 1 of 3 cycles  12/14/2018 Surgery     LAPAROTOMY EXPLORATORY; Abdominal Washout; Application of Abthera Vac Dressing for suspected bowel perforation and septic shock  12/15/2018 Surgery     LAPAROTOMY EXPLORATORY, ABDOMINAL WASHOUT, DRAIN PLACEMENT x 4, DIVERTING LOOP ILEOSTOMY AND ABDOMINAL CLOSURE for bowel perforation              Patient ID: Willie Camarena is a 70 y o  female  Patient presents today for treatment discussion and consideration of initiating further chemotherapy  Since her last visit, her WBC count was noted to be markedly elevated at 15 29 and a CT scan was performed yesterday  She denies fevers and chills as well as acute abdominal or pelvic pain  She denies vaginal bleeding  She continues with outpatient physical therapy and her ambulation is improving  She has been attempting to increase the protein in her diet    She did see the ET nurse in [place] last week for ileostomy related issues which were causing her to minimize her eating and drinking which resulted in some dehydration  She did receive supplemental IV hydration last week as well  Overall she feels like she is improving  She does not feel at this point ready to take chemotherapy  The following portions of the patient's history were reviewed and updated as appropriate: allergies, current medications, past family history, past medical history, past social history, past surgical history and problem list     Review of Systems   Constitutional: Positive for fatigue  Negative for chills and fever  HENT: Negative  Eyes: Negative  Respiratory: Negative  Cardiovascular: Negative  Gastrointestinal: Negative  Endocrine: Negative  Genitourinary: Negative  Musculoskeletal: Negative  Skin: Negative  Neurological: Negative  Hematological: Negative  Psychiatric/Behavioral: Negative  All other systems reviewed and are negative  Current Outpatient Medications   Medication Sig Dispense Refill    acetaminophen (TYLENOL) 325 mg tablet Take 2 tablets (650 mg total) by mouth every 6 (six) hours as needed for mild pain, headaches or fever 30 tablet 0    aspirin-acetaminophen-caffeine (EXCEDRIN MIGRAINE) 250-250-65 MG per tablet Take 1 tablet by mouth every 6 (six) hours as needed for headaches      lidocaine-prilocaine (EMLA) cream Apply to port site prior to labs/chemo 30 g 1    metFORMIN (GLUCOPHAGE) 500 mg tablet Take 500 mg by mouth 2 (two) times a day with meals      pantoprazole (PROTONIX) 40 mg tablet Take 1 tablet (40 mg total) by mouth 2 (two) times a day before meals  0     No current facility-administered medications for this visit  Objective: There were no vitals taken for this visit  There is no height or weight on file to calculate BMI  There is no height or weight on file to calculate BSA      Physical Exam   Constitutional: She is oriented to person, place, and time  She appears well-developed and well-nourished  Neck: Neck supple  No thyromegaly present  Cardiovascular: Normal rate and regular rhythm  Pulmonary/Chest: Effort normal and breath sounds normal    Abdominal: Soft  There is no tenderness  Genitourinary:   Genitourinary Comments: Exam deferred today  Lymphadenopathy:     She has no cervical adenopathy  Neurological: She is alert and oriented to person, place, and time  Skin: Skin is warm and dry  Psychiatric: She has a normal mood and affect  Vitals reviewed        Lab Results   Component Value Date     80 4 (H) 03/08/2019     Lab Results   Component Value Date    K 3 5 03/08/2019     03/08/2019    CO2 19 (L) 03/08/2019    BUN 26 (H) 03/08/2019    CREATININE 1 15 03/08/2019    GLUCOSE 84 12/15/2018    GLUF 117 (H) 01/14/2019    CALCIUM 10 8 (H) 03/08/2019    AST 14 03/08/2019    ALT 15 03/08/2019    ALKPHOS 105 03/08/2019    EGFR 49 03/08/2019     Lab Results   Component Value Date    WBC 15 29 (H) 03/08/2019    HGB 9 4 (L) 03/08/2019    HCT 30 1 (L) 03/08/2019    MCV 83 03/08/2019     03/08/2019     Lab Results   Component Value Date    NEUTROABS 11 84 (H) 03/08/2019

## 2019-03-19 NOTE — ASSESSMENT & PLAN NOTE
Patient has a newly diagnosed deep venous thrombosis of the right femoral vein on recent CT scan  I have discussed with the patient that she would need to be treated with anticoagulation for at least 3 months and until she is in remission  The patient is willing to do this  I have discussed that the standard of care would be Lovenox injection  The patient adamantly refused injections due to not being comfortable with needles  She would be herself unable to learn and new nobody who could learn and would rather take a pill  We have discussed DOAC  The patient is interested in this  I have discussed Coumadin however the patient is not interested as this will have to be checked  I further discussed that due to insurance that the DOACs may be too expensive  We are consulting financial planning to assist us in this  I have discussed with the patient bring her into the hospital to get treatment started and she is not willing to do this at this time

## 2019-03-21 DIAGNOSIS — R11.0 CHEMOTHERAPY-INDUCED NAUSEA: Primary | ICD-10-CM

## 2019-03-21 DIAGNOSIS — T45.1X5A CHEMOTHERAPY-INDUCED NAUSEA: Primary | ICD-10-CM

## 2019-03-21 RX ORDER — ONDANSETRON HYDROCHLORIDE 8 MG/1
8 TABLET, FILM COATED ORAL EVERY 8 HOURS PRN
Qty: 30 TABLET | Refills: 1 | Status: SHIPPED | OUTPATIENT
Start: 2019-03-21 | End: 2019-09-25 | Stop reason: HOSPADM

## 2019-03-25 RX ORDER — SODIUM CHLORIDE 9 MG/ML
20 INJECTION, SOLUTION INTRAVENOUS CONTINUOUS
Status: DISCONTINUED | OUTPATIENT
Start: 2019-03-26 | End: 2019-03-29 | Stop reason: HOSPADM

## 2019-03-26 ENCOUNTER — HOSPITAL ENCOUNTER (OUTPATIENT)
Dept: INFUSION CENTER | Facility: CLINIC | Age: 69
Discharge: HOME | End: 2019-03-26
Payer: MEDICARE

## 2019-03-26 VITALS
TEMPERATURE: 97.5 F | HEART RATE: 88 BPM | BODY MASS INDEX: 18.37 KG/M2 | WEIGHT: 107.58 LBS | HEIGHT: 64 IN | RESPIRATION RATE: 18 BRPM | SYSTOLIC BLOOD PRESSURE: 97 MMHG | DIASTOLIC BLOOD PRESSURE: 52 MMHG

## 2019-03-26 PROCEDURE — 96367 TX/PROPH/DG ADDL SEQ IV INF: CPT

## 2019-03-26 PROCEDURE — 96413 CHEMO IV INFUSION 1 HR: CPT

## 2019-03-26 RX ADMIN — DIPHENHYDRAMINE HYDROCHLORIDE 25 MG: 50 INJECTION, SOLUTION INTRAMUSCULAR; INTRAVENOUS at 10:10

## 2019-03-26 RX ADMIN — DEXAMETHASONE SODIUM PHOSPHATE 20 MG: 10 INJECTION, SOLUTION INTRAMUSCULAR; INTRAVENOUS at 09:49

## 2019-03-26 RX ADMIN — SODIUM CHLORIDE 20 ML/HR: 0.9 INJECTION, SOLUTION INTRAVENOUS at 09:43

## 2019-03-26 RX ADMIN — FAMOTIDINE 20 MG: 10 INJECTION, SOLUTION INTRAVENOUS at 10:32

## 2019-03-26 RX ADMIN — PACLITAXEL 119 MG: 6 INJECTION, SOLUTION INTRAVENOUS at 11:18

## 2019-03-26 NOTE — PROGRESS NOTES
Pt tolerated chemotherapy infusion without incident  Pt's ostomy appliance became un-attached  Pt's daughter fixed ostomy appliance prior to discharge  Pt discharged in stable condition

## 2019-03-26 NOTE — PLAN OF CARE
Problem: Potential for Falls  Goal: Patient will remain free of falls  Description  INTERVENTIONS:  - Assess patient frequently for physical needs  -  Identify cognitive and physical deficits and behaviors that affect risk of falls    -  [place] fall precautions as indicated by assessment   - Educate patient/family on patient safety including physical limitations  - Instruct patient to call for assistance with activity based on assessment  - Modify environment to reduce risk of injury  - Consider OT/PT consult to assist with strengthening/mobility  Outcome: Progressing

## 2019-04-01 RX ORDER — SODIUM CHLORIDE 9 MG/ML
20 INJECTION, SOLUTION INTRAVENOUS CONTINUOUS
Status: DISCONTINUED | OUTPATIENT
Start: 2019-04-02 | End: 2019-04-05 | Stop reason: HOSPADM

## 2019-04-02 ENCOUNTER — HOSPITAL ENCOUNTER (OUTPATIENT)
Dept: INFUSION CENTER | Facility: CLINIC | Age: 69
Discharge: HOME | End: 2019-04-02
Payer: MEDICARE

## 2019-04-02 VITALS
HEART RATE: 86 BPM | RESPIRATION RATE: 18 BRPM | SYSTOLIC BLOOD PRESSURE: 110 MMHG | WEIGHT: 114.64 LBS | BODY MASS INDEX: 19.57 KG/M2 | HEIGHT: 64 IN | DIASTOLIC BLOOD PRESSURE: 61 MMHG | TEMPERATURE: 97.4 F

## 2019-04-02 LAB
BASOPHILS # BLD AUTO: 0.06 THOUSANDS/ΜL (ref 0–0.1)
BASOPHILS NFR BLD AUTO: 1 % (ref 0–1)
EOSINOPHIL # BLD AUTO: 0.29 THOUSAND/ΜL (ref 0–0.61)
EOSINOPHIL NFR BLD AUTO: 3 % (ref 0–6)
ERYTHROCYTE [DISTWIDTH] IN BLOOD BY AUTOMATED COUNT: 19.3 % (ref 11.6–15.1)
HCT VFR BLD AUTO: 26.7 % (ref 34.8–46.1)
HGB BLD-MCNC: 8.3 G/DL (ref 11.5–15.4)
IMM GRANULOCYTES # BLD AUTO: 0.05 THOUSAND/UL (ref 0–0.2)
IMM GRANULOCYTES NFR BLD AUTO: 1 % (ref 0–2)
LYMPHOCYTES # BLD AUTO: 2.26 THOUSANDS/ΜL (ref 0.6–4.47)
LYMPHOCYTES NFR BLD AUTO: 24 % (ref 14–44)
MCH RBC QN AUTO: 27.9 PG (ref 26.8–34.3)
MCHC RBC AUTO-ENTMCNC: 31.1 G/DL (ref 31.4–37.4)
MCV RBC AUTO: 90 FL (ref 82–98)
MONOCYTES # BLD AUTO: 0.53 THOUSAND/ΜL (ref 0.17–1.22)
MONOCYTES NFR BLD AUTO: 6 % (ref 4–12)
NEUTROPHILS # BLD AUTO: 6.42 THOUSANDS/ΜL (ref 1.85–7.62)
NEUTS SEG NFR BLD AUTO: 65 % (ref 43–75)
NRBC BLD AUTO-RTO: 0 /100 WBCS
PLATELET # BLD AUTO: 333 THOUSANDS/UL (ref 149–390)
PMV BLD AUTO: 11.5 FL (ref 8.9–12.7)
RBC # BLD AUTO: 2.98 MILLION/UL (ref 3.81–5.12)
WBC # BLD AUTO: 9.61 THOUSAND/UL (ref 4.31–10.16)

## 2019-04-02 PROCEDURE — 96413 CHEMO IV INFUSION 1 HR: CPT

## 2019-04-02 PROCEDURE — 85025 COMPLETE CBC W/AUTO DIFF WBC: CPT | Performed by: PHYSICIAN ASSISTANT

## 2019-04-02 PROCEDURE — 96367 TX/PROPH/DG ADDL SEQ IV INF: CPT

## 2019-04-02 RX ADMIN — DEXAMETHASONE SODIUM PHOSPHATE 20 MG: 10 INJECTION, SOLUTION INTRAMUSCULAR; INTRAVENOUS at 11:53

## 2019-04-02 RX ADMIN — PACLITAXEL 119 MG: 6 INJECTION, SOLUTION INTRAVENOUS at 13:18

## 2019-04-02 RX ADMIN — FAMOTIDINE 20 MG: 10 INJECTION, SOLUTION INTRAVENOUS at 12:54

## 2019-04-02 RX ADMIN — DIPHENHYDRAMINE HYDROCHLORIDE 25 MG: 50 INJECTION, SOLUTION INTRAMUSCULAR; INTRAVENOUS at 12:30

## 2019-04-02 RX ADMIN — SODIUM CHLORIDE 20 ML/HR: 0.9 INJECTION, SOLUTION INTRAVENOUS at 11:30

## 2019-04-02 NOTE — PROGRESS NOTES
Pt describes peripheral neuropathy in feet as intermittent and usually occurs at night time  Pt states She usually walks around the house to " wake them up " RN notified pt to contact office if episodes occur more frequently, last longer or pt needs to walk longer distances to 'wake up' her feet  Pt verbalized understanding of instructions Per Melissa Argueta PA-C RN to reassess neuropathy next week

## 2019-04-02 NOTE — PROGRESS NOTES
Pt comes to infusion center For Cycle 1 Day 8 of Taxol  Pt tolerated treatment without difficulty or complaint  Pt D/fede in stable condition  AVS provided

## 2019-04-02 NOTE — PROGRESS NOTES
Spoke with Ignacio Balderas PA-C pt ok for treatment this week upon lab review  Pt assessed for symptomatic anemia r/t HGB 8 3  Pt denies SOB, Chest pain, and increased fatigue at this time  Reviewed with Pt to call office if she begins to have any of these symptoms  As her HGB will most likely drop after today  Also informed Lucy Pt c/p numbness and tingling in feet  Pt states " it feels like they're not attached " Per Provider, pt to keep track of symptoms and call office if they become progressive  Pt verbalized understanding of these instructions

## 2019-04-08 RX ORDER — SODIUM CHLORIDE 9 MG/ML
20 INJECTION, SOLUTION INTRAVENOUS CONTINUOUS
Status: DISCONTINUED | OUTPATIENT
Start: 2019-04-09 | End: 2019-04-12 | Stop reason: HOSPADM

## 2019-04-09 ENCOUNTER — HOSPITAL ENCOUNTER (OUTPATIENT)
Dept: INFUSION CENTER | Facility: CLINIC | Age: 69
Discharge: HOME | End: 2019-04-09
Payer: MEDICARE

## 2019-04-09 VITALS
HEART RATE: 84 BPM | RESPIRATION RATE: 18 BRPM | TEMPERATURE: 98.3 F | BODY MASS INDEX: 20.1 KG/M2 | DIASTOLIC BLOOD PRESSURE: 55 MMHG | WEIGHT: 117.73 LBS | HEIGHT: 64 IN | SYSTOLIC BLOOD PRESSURE: 94 MMHG

## 2019-04-09 LAB
BASOPHILS # BLD AUTO: 0.07 THOUSANDS/ΜL (ref 0–0.1)
BASOPHILS NFR BLD AUTO: 1 % (ref 0–1)
EOSINOPHIL # BLD AUTO: 0.16 THOUSAND/ΜL (ref 0–0.61)
EOSINOPHIL NFR BLD AUTO: 2 % (ref 0–6)
ERYTHROCYTE [DISTWIDTH] IN BLOOD BY AUTOMATED COUNT: 19.6 % (ref 11.6–15.1)
HCT VFR BLD AUTO: 28.1 % (ref 34.8–46.1)
HGB BLD-MCNC: 8.7 G/DL (ref 11.5–15.4)
IMM GRANULOCYTES # BLD AUTO: 0.04 THOUSAND/UL (ref 0–0.2)
IMM GRANULOCYTES NFR BLD AUTO: 1 % (ref 0–2)
LYMPHOCYTES # BLD AUTO: 2.29 THOUSANDS/ΜL (ref 0.6–4.47)
LYMPHOCYTES NFR BLD AUTO: 29 % (ref 14–44)
MCH RBC QN AUTO: 28.4 PG (ref 26.8–34.3)
MCHC RBC AUTO-ENTMCNC: 31 G/DL (ref 31.4–37.4)
MCV RBC AUTO: 92 FL (ref 82–98)
MONOCYTES # BLD AUTO: 0.54 THOUSAND/ΜL (ref 0.17–1.22)
MONOCYTES NFR BLD AUTO: 7 % (ref 4–12)
NEUTROPHILS # BLD AUTO: 4.87 THOUSANDS/ΜL (ref 1.85–7.62)
NEUTS SEG NFR BLD AUTO: 60 % (ref 43–75)
NRBC BLD AUTO-RTO: 0 /100 WBCS
PLATELET # BLD AUTO: 343 THOUSANDS/UL (ref 149–390)
PMV BLD AUTO: 11.4 FL (ref 8.9–12.7)
RBC # BLD AUTO: 3.06 MILLION/UL (ref 3.81–5.12)
WBC # BLD AUTO: 7.97 THOUSAND/UL (ref 4.31–10.16)

## 2019-04-09 PROCEDURE — 96367 TX/PROPH/DG ADDL SEQ IV INF: CPT

## 2019-04-09 PROCEDURE — 96413 CHEMO IV INFUSION 1 HR: CPT

## 2019-04-09 PROCEDURE — 85025 COMPLETE CBC W/AUTO DIFF WBC: CPT | Performed by: PHYSICIAN ASSISTANT

## 2019-04-09 RX ADMIN — SODIUM CHLORIDE 20 ML/HR: 0.9 INJECTION, SOLUTION INTRAVENOUS at 10:35

## 2019-04-09 RX ADMIN — DIPHENHYDRAMINE HYDROCHLORIDE 25 MG: 50 INJECTION, SOLUTION INTRAMUSCULAR; INTRAVENOUS at 11:33

## 2019-04-09 RX ADMIN — DEXAMETHASONE SODIUM PHOSPHATE 20 MG: 10 INJECTION, SOLUTION INTRAMUSCULAR; INTRAVENOUS at 11:10

## 2019-04-09 RX ADMIN — FAMOTIDINE 20 MG: 10 INJECTION, SOLUTION INTRAVENOUS at 12:02

## 2019-04-09 RX ADMIN — PACLITAXEL 119 MG: 6 INJECTION, SOLUTION INTRAVENOUS at 12:30

## 2019-04-09 NOTE — PLAN OF CARE
Problem: Potential for Falls  Goal: Patient will remain free of falls  Description  INTERVENTIONS:  - Assess patient frequently for physical needs  -  Identify cognitive and physical deficits and behaviors that affect risk of falls    -  [place] fall precautions as indicated by assessment   - Educate patient/family on patient safety including physical limitations  - Instruct patient to call for assistance with activity based on assessment  - Modify environment to reduce risk of injury  - Consider OT/PT consult to assist with strengthening/mobility  Outcome: Progressing

## 2019-04-09 NOTE — SOCIAL WORK
MSW met with pt today in the infusion center, she has recently resumed chemotherapy treatments after a lengthy hospitalization and rehab stay  She reports that she is feeling well and thinks that she has improved significantly in the last 2 weeks  She is about to finish with home health and they do not feel she needs any outpatient therapy at this time, she agrees  She says that she is doing all of her own ADLs and she was previously much more fatigued throughout the day than she is recently  She says that she is eating well and her weight is slowly increasing  All in all she has no concerns or complaints at this time, MSW will remain available to her as needed

## 2019-04-09 NOTE — PROGRESS NOTES
Pt to infusion center for taxol  Pt states peripheral neuropathy unchanged  Port accessed without issue CBC drawn stat as ordered  Spoke with Hernan Atkins 6  pt ok for treatment

## 2019-04-12 ENCOUNTER — DOCUMENTATION (OUTPATIENT)
Dept: OTHER | Facility: HOSPITAL | Age: 69
End: 2019-04-12

## 2019-04-16 ENCOUNTER — HOSPITAL ENCOUNTER (OUTPATIENT)
Dept: INFUSION CENTER | Facility: CLINIC | Age: 69
Discharge: HOME | End: 2019-04-16
Payer: MEDICARE

## 2019-04-16 LAB
ALBUMIN SERPL BCP-MCNC: 3.4 G/DL (ref 3.5–5)
ALP SERPL-CCNC: 87 U/L (ref 46–116)
ALT SERPL W P-5'-P-CCNC: 20 U/L (ref 12–78)
ANION GAP SERPL CALCULATED.3IONS-SCNC: 12 MMOL/L (ref 4–13)
AST SERPL W P-5'-P-CCNC: 15 U/L (ref 5–45)
BASOPHILS # BLD AUTO: 0.06 THOUSANDS/ΜL (ref 0–0.1)
BASOPHILS NFR BLD AUTO: 1 % (ref 0–1)
BILIRUB SERPL-MCNC: 0.2 MG/DL (ref 0.2–1)
BUN SERPL-MCNC: 25 MG/DL (ref 5–25)
CALCIUM SERPL-MCNC: 9.6 MG/DL (ref 8.3–10.1)
CANCER AG125 SERPL-ACNC: 79.5 U/ML (ref 0–30)
CHLORIDE SERPL-SCNC: 108 MMOL/L (ref 100–108)
CO2 SERPL-SCNC: 20 MMOL/L (ref 21–32)
CREAT SERPL-MCNC: 1.35 MG/DL (ref 0.6–1.3)
EOSINOPHIL # BLD AUTO: 0.11 THOUSAND/ΜL (ref 0–0.61)
EOSINOPHIL NFR BLD AUTO: 1 % (ref 0–6)
ERYTHROCYTE [DISTWIDTH] IN BLOOD BY AUTOMATED COUNT: 19.9 % (ref 11.6–15.1)
GFR SERPL CREATININE-BSD FRML MDRD: 40 ML/MIN/1.73SQ M
GLUCOSE SERPL-MCNC: 142 MG/DL (ref 65–140)
HCT VFR BLD AUTO: 27.8 % (ref 34.8–46.1)
HGB BLD-MCNC: 8.7 G/DL (ref 11.5–15.4)
IMM GRANULOCYTES # BLD AUTO: 0.07 THOUSAND/UL (ref 0–0.2)
IMM GRANULOCYTES NFR BLD AUTO: 1 % (ref 0–2)
LYMPHOCYTES # BLD AUTO: 2.25 THOUSANDS/ΜL (ref 0.6–4.47)
LYMPHOCYTES NFR BLD AUTO: 27 % (ref 14–44)
MAGNESIUM SERPL-MCNC: 1.5 MG/DL (ref 1.6–2.6)
MCH RBC QN AUTO: 29.1 PG (ref 26.8–34.3)
MCHC RBC AUTO-ENTMCNC: 31.3 G/DL (ref 31.4–37.4)
MCV RBC AUTO: 93 FL (ref 82–98)
MONOCYTES # BLD AUTO: 0.55 THOUSAND/ΜL (ref 0.17–1.22)
MONOCYTES NFR BLD AUTO: 7 % (ref 4–12)
NEUTROPHILS # BLD AUTO: 5.27 THOUSANDS/ΜL (ref 1.85–7.62)
NEUTS SEG NFR BLD AUTO: 63 % (ref 43–75)
NRBC BLD AUTO-RTO: 0 /100 WBCS
PLATELET # BLD AUTO: 387 THOUSANDS/UL (ref 149–390)
PMV BLD AUTO: 12.1 FL (ref 8.9–12.7)
POTASSIUM SERPL-SCNC: 3.6 MMOL/L (ref 3.5–5.3)
PROT SERPL-MCNC: 7.4 G/DL (ref 6.4–8.2)
RBC # BLD AUTO: 2.99 MILLION/UL (ref 3.81–5.12)
SODIUM SERPL-SCNC: 140 MMOL/L (ref 136–145)
WBC # BLD AUTO: 8.31 THOUSAND/UL (ref 4.31–10.16)

## 2019-04-16 PROCEDURE — 86304 IMMUNOASSAY TUMOR CA 125: CPT | Performed by: OBSTETRICS & GYNECOLOGY

## 2019-04-16 PROCEDURE — 83735 ASSAY OF MAGNESIUM: CPT | Performed by: OBSTETRICS & GYNECOLOGY

## 2019-04-16 PROCEDURE — 80053 COMPREHEN METABOLIC PANEL: CPT | Performed by: OBSTETRICS & GYNECOLOGY

## 2019-04-16 PROCEDURE — 85025 COMPLETE CBC W/AUTO DIFF WBC: CPT | Performed by: OBSTETRICS & GYNECOLOGY

## 2019-04-16 NOTE — PROGRESS NOTES
Central labs drawn via port  Catheter maintenance performed per protocol without incident  Pt discharged in stable condition and is aware of next infusion appointment on 4/23/19

## 2019-04-17 ENCOUNTER — OFFICE VISIT (OUTPATIENT)
Dept: GYNECOLOGIC ONCOLOGY | Facility: CLINIC | Age: 69
End: 2019-04-17
Payer: MEDICARE

## 2019-04-17 VITALS
SYSTOLIC BLOOD PRESSURE: 100 MMHG | BODY MASS INDEX: 20.49 KG/M2 | HEIGHT: 64 IN | WEIGHT: 120 LBS | TEMPERATURE: 98.6 F | DIASTOLIC BLOOD PRESSURE: 62 MMHG | RESPIRATION RATE: 18 BRPM | HEART RATE: 76 BPM

## 2019-04-17 DIAGNOSIS — G62.9 NEUROPATHY: Primary | ICD-10-CM

## 2019-04-17 DIAGNOSIS — C56.9 MALIGNANT NEOPLASM OF OVARY, UNSPECIFIED LATERALITY (HCC): ICD-10-CM

## 2019-04-17 PROCEDURE — 99215 OFFICE O/P EST HI 40 MIN: CPT | Performed by: OBSTETRICS & GYNECOLOGY

## 2019-04-23 DIAGNOSIS — I82.411 DEEP VENOUS THROMBOSIS OF RIGHT PROFUNDA FEMORIS VEIN (HCC): ICD-10-CM

## 2019-04-29 RX ORDER — MAGNESIUM SULFATE HEPTAHYDRATE 40 MG/ML
2 INJECTION, SOLUTION INTRAVENOUS ONCE
Status: COMPLETED | OUTPATIENT
Start: 2019-04-30 | End: 2019-04-30

## 2019-04-29 RX ORDER — PALONOSETRON 0.05 MG/ML
0.25 INJECTION, SOLUTION INTRAVENOUS ONCE
Status: COMPLETED | OUTPATIENT
Start: 2019-04-30 | End: 2019-04-30

## 2019-04-29 RX ORDER — SODIUM CHLORIDE 9 MG/ML
20 INJECTION, SOLUTION INTRAVENOUS CONTINUOUS
Status: DISCONTINUED | OUTPATIENT
Start: 2019-04-30 | End: 2019-05-03 | Stop reason: HOSPADM

## 2019-04-30 ENCOUNTER — HOSPITAL ENCOUNTER (OUTPATIENT)
Dept: INFUSION CENTER | Facility: CLINIC | Age: 69
Discharge: HOME | End: 2019-04-30
Payer: MEDICARE

## 2019-04-30 VITALS
WEIGHT: 126.1 LBS | TEMPERATURE: 97.7 F | DIASTOLIC BLOOD PRESSURE: 57 MMHG | HEIGHT: 63 IN | BODY MASS INDEX: 22.34 KG/M2 | SYSTOLIC BLOOD PRESSURE: 113 MMHG | RESPIRATION RATE: 18 BRPM | HEART RATE: 82 BPM

## 2019-04-30 PROCEDURE — 96375 TX/PRO/DX INJ NEW DRUG ADDON: CPT

## 2019-04-30 PROCEDURE — 96417 CHEMO IV INFUS EACH ADDL SEQ: CPT

## 2019-04-30 PROCEDURE — 96413 CHEMO IV INFUSION 1 HR: CPT

## 2019-04-30 PROCEDURE — 96368 THER/DIAG CONCURRENT INF: CPT

## 2019-04-30 PROCEDURE — 96367 TX/PROPH/DG ADDL SEQ IV INF: CPT

## 2019-04-30 RX ADMIN — DEXAMETHASONE SODIUM PHOSPHATE 20 MG: 10 INJECTION, SOLUTION INTRAMUSCULAR; INTRAVENOUS at 10:50

## 2019-04-30 RX ADMIN — CARBOPLATIN 234 MG: 10 INJECTION, SOLUTION INTRAVENOUS at 14:20

## 2019-04-30 RX ADMIN — MAGNESIUM SULFATE HEPTAHYDRATE 2 G: 40 INJECTION, SOLUTION INTRAVENOUS at 12:53

## 2019-04-30 RX ADMIN — DIPHENHYDRAMINE HYDROCHLORIDE 25 MG: 50 INJECTION, SOLUTION INTRAMUSCULAR; INTRAVENOUS at 11:46

## 2019-04-30 RX ADMIN — SODIUM CHLORIDE 20 ML/HR: 0.9 INJECTION, SOLUTION INTRAVENOUS at 10:35

## 2019-04-30 RX ADMIN — PACLITAXEL 103 MG: 6 INJECTION, SOLUTION INTRAVENOUS at 12:57

## 2019-04-30 RX ADMIN — FAMOTIDINE 20 MG: 10 INJECTION, SOLUTION INTRAVENOUS at 11:13

## 2019-04-30 RX ADMIN — SODIUM CHLORIDE 150 MG: 0.9 INJECTION, SOLUTION INTRAVENOUS at 12:06

## 2019-04-30 RX ADMIN — PALONOSETRON 0.25 MG: 0.05 INJECTION, SOLUTION INTRAVENOUS at 12:44

## 2019-04-30 NOTE — PROGRESS NOTES
Pt tolerated chemotherapy infusion well without incident  Offers no complaints  Pt states that the neuropathy in her hands/feet is improving and has not worsened  Discharged in stable condition and pt aware of next appointment  AVS provided

## 2019-04-30 NOTE — PLAN OF CARE
Problem: Potential for Falls  Goal: Patient will remain free of falls  Description  INTERVENTIONS:  - Assess patient frequently for physical needs  -  Identify cognitive and physical deficits and behaviors that affect risk of falls  -  [place] fall precautions as indicated by assessment   - Educate patient/family on patient safety including physical limitations  - Instruct patient to call for assistance with activity based on assessment  - Modify environment to reduce risk of injury  - Consider OT/PT consult to assist with strengthening/mobility  Outcome: Progressing     Problem: Knowledge Deficit  Goal: Patient/family/caregiver demonstrates understanding of disease process, treatment plan, medications, and discharge instructions  Description  Complete learning assessment and assess knowledge base    Interventions:  - Provide teaching at level of understanding  - Provide teaching via preferred learning methods  Outcome: Progressing

## 2019-05-06 DIAGNOSIS — C56.9 MALIGNANT NEOPLASM OF OVARY, UNSPECIFIED LATERALITY (HCC): ICD-10-CM

## 2019-05-06 RX ORDER — SODIUM CHLORIDE 9 MG/ML
20 INJECTION, SOLUTION INTRAVENOUS CONTINUOUS
Status: DISCONTINUED | OUTPATIENT
Start: 2019-05-07 | End: 2019-05-10 | Stop reason: HOSPADM

## 2019-05-07 ENCOUNTER — HOSPITAL ENCOUNTER (OUTPATIENT)
Dept: INFUSION CENTER | Facility: CLINIC | Age: 69
Discharge: HOME | End: 2019-05-07
Payer: MEDICARE

## 2019-05-07 VITALS
TEMPERATURE: 97.6 F | SYSTOLIC BLOOD PRESSURE: 117 MMHG | HEIGHT: 64 IN | WEIGHT: 128.31 LBS | DIASTOLIC BLOOD PRESSURE: 57 MMHG | RESPIRATION RATE: 18 BRPM | HEART RATE: 84 BPM | BODY MASS INDEX: 21.91 KG/M2

## 2019-05-07 DIAGNOSIS — C56.9 MALIGNANT NEOPLASM OF OVARY, UNSPECIFIED LATERALITY (HCC): ICD-10-CM

## 2019-05-07 LAB
BASOPHILS # BLD AUTO: 0.05 THOUSANDS/ΜL (ref 0–0.1)
BASOPHILS NFR BLD AUTO: 1 % (ref 0–1)
EOSINOPHIL # BLD AUTO: 0.24 THOUSAND/ΜL (ref 0–0.61)
EOSINOPHIL NFR BLD AUTO: 2 % (ref 0–6)
ERYTHROCYTE [DISTWIDTH] IN BLOOD BY AUTOMATED COUNT: 18.6 % (ref 11.6–15.1)
HCT VFR BLD AUTO: 25.7 % (ref 34.8–46.1)
HGB BLD-MCNC: 8 G/DL (ref 11.5–15.4)
IMM GRANULOCYTES # BLD AUTO: 0.06 THOUSAND/UL (ref 0–0.2)
IMM GRANULOCYTES NFR BLD AUTO: 1 % (ref 0–2)
LYMPHOCYTES # BLD AUTO: 1.69 THOUSANDS/ΜL (ref 0.6–4.47)
LYMPHOCYTES NFR BLD AUTO: 17 % (ref 14–44)
MCH RBC QN AUTO: 29.2 PG (ref 26.8–34.3)
MCHC RBC AUTO-ENTMCNC: 31.1 G/DL (ref 31.4–37.4)
MCV RBC AUTO: 94 FL (ref 82–98)
MONOCYTES # BLD AUTO: 0.67 THOUSAND/ΜL (ref 0.17–1.22)
MONOCYTES NFR BLD AUTO: 7 % (ref 4–12)
NEUTROPHILS # BLD AUTO: 7.31 THOUSANDS/ΜL (ref 1.85–7.62)
NEUTS SEG NFR BLD AUTO: 72 % (ref 43–75)
NRBC BLD AUTO-RTO: 0 /100 WBCS
PLATELET # BLD AUTO: 252 THOUSANDS/UL (ref 149–390)
PMV BLD AUTO: 12 FL (ref 8.9–12.7)
RBC # BLD AUTO: 2.74 MILLION/UL (ref 3.81–5.12)
WBC # BLD AUTO: 10.02 THOUSAND/UL (ref 4.31–10.16)

## 2019-05-07 PROCEDURE — 96367 TX/PROPH/DG ADDL SEQ IV INF: CPT

## 2019-05-07 PROCEDURE — 85025 COMPLETE CBC W/AUTO DIFF WBC: CPT | Performed by: OBSTETRICS & GYNECOLOGY

## 2019-05-07 PROCEDURE — 96413 CHEMO IV INFUSION 1 HR: CPT

## 2019-05-07 RX ADMIN — FAMOTIDINE 20 MG: 10 INJECTION, SOLUTION INTRAVENOUS at 11:22

## 2019-05-07 RX ADMIN — DIPHENHYDRAMINE HYDROCHLORIDE 25 MG: 50 INJECTION, SOLUTION INTRAMUSCULAR; INTRAVENOUS at 11:00

## 2019-05-07 RX ADMIN — DEXAMETHASONE SODIUM PHOSPHATE 20 MG: 10 INJECTION, SOLUTION INTRAMUSCULAR; INTRAVENOUS at 10:36

## 2019-05-07 RX ADMIN — SODIUM CHLORIDE 20 ML/HR: 0.9 INJECTION, SOLUTION INTRAVENOUS at 09:59

## 2019-05-07 RX ADMIN — PACLITAXEL 103 MG: 6 INJECTION, SOLUTION INTRAVENOUS at 11:57

## 2019-05-07 NOTE — PROGRESS NOTES
Pt here for C2,D8, last treatment on 4/30/2019 with Taxol/Carbo  Pt offers no complaints, resting comfortably  Vitals stable  Labs drawn and sent to lab  Call bell in reach, will continue to monitor

## 2019-05-07 NOTE — PROGRESS NOTES
Pt tolerated treatment well without any adverse reactions  Aware of next appointments  AVS provided

## 2019-05-08 DIAGNOSIS — C56.9 MALIGNANT NEOPLASM OF OVARY, UNSPECIFIED LATERALITY (HCC): ICD-10-CM

## 2019-05-10 RX ORDER — SODIUM CHLORIDE 9 MG/ML
20 INJECTION, SOLUTION INTRAVENOUS ONCE
Status: CANCELLED | OUTPATIENT
Start: 2019-05-14

## 2019-05-14 ENCOUNTER — HOSPITAL ENCOUNTER (OUTPATIENT)
Dept: INFUSION CENTER | Facility: CLINIC | Age: 69
Discharge: HOME | End: 2019-05-14
Payer: MEDICARE

## 2019-05-14 VITALS — BODY MASS INDEX: 22.26 KG/M2 | HEIGHT: 65 IN | WEIGHT: 133.6 LBS

## 2019-05-14 DIAGNOSIS — C56.9 MALIGNANT NEOPLASM OF OVARY, UNSPECIFIED LATERALITY (HCC): Primary | ICD-10-CM

## 2019-05-14 LAB
BASOPHILS # BLD AUTO: 0.03 THOUSANDS/ΜL (ref 0–0.1)
BASOPHILS NFR BLD AUTO: 1 % (ref 0–1)
EOSINOPHIL # BLD AUTO: 0.14 THOUSAND/ΜL (ref 0–0.61)
EOSINOPHIL NFR BLD AUTO: 2 % (ref 0–6)
ERYTHROCYTE [DISTWIDTH] IN BLOOD BY AUTOMATED COUNT: 18.4 % (ref 11.6–15.1)
HCT VFR BLD AUTO: 24.5 % (ref 34.8–46.1)
HGB BLD-MCNC: 7.8 G/DL (ref 11.5–15.4)
IMM GRANULOCYTES # BLD AUTO: 0.02 THOUSAND/UL (ref 0–0.2)
IMM GRANULOCYTES NFR BLD AUTO: 0 % (ref 0–2)
LYMPHOCYTES # BLD AUTO: 1.82 THOUSANDS/ΜL (ref 0.6–4.47)
LYMPHOCYTES NFR BLD AUTO: 29 % (ref 14–44)
MCH RBC QN AUTO: 30.5 PG (ref 26.8–34.3)
MCHC RBC AUTO-ENTMCNC: 31.8 G/DL (ref 31.4–37.4)
MCV RBC AUTO: 96 FL (ref 82–98)
MONOCYTES # BLD AUTO: 0.67 THOUSAND/ΜL (ref 0.17–1.22)
MONOCYTES NFR BLD AUTO: 11 % (ref 4–12)
NEUTROPHILS # BLD AUTO: 3.63 THOUSANDS/ΜL (ref 1.85–7.62)
NEUTS SEG NFR BLD AUTO: 57 % (ref 43–75)
NRBC BLD AUTO-RTO: 0 /100 WBCS
PLATELET # BLD AUTO: 301 THOUSANDS/UL (ref 149–390)
PMV BLD AUTO: 11.2 FL (ref 8.9–12.7)
RBC # BLD AUTO: 2.56 MILLION/UL (ref 3.81–5.12)
WBC # BLD AUTO: 6.31 THOUSAND/UL (ref 4.31–10.16)

## 2019-05-14 PROCEDURE — 96367 TX/PROPH/DG ADDL SEQ IV INF: CPT

## 2019-05-14 PROCEDURE — 85025 COMPLETE CBC W/AUTO DIFF WBC: CPT | Performed by: OBSTETRICS & GYNECOLOGY

## 2019-05-14 PROCEDURE — 96413 CHEMO IV INFUSION 1 HR: CPT

## 2019-05-14 RX ORDER — SODIUM CHLORIDE 9 MG/ML
20 INJECTION, SOLUTION INTRAVENOUS ONCE
Status: COMPLETED | OUTPATIENT
Start: 2019-05-14 | End: 2019-05-14

## 2019-05-14 RX ADMIN — DIPHENHYDRAMINE HYDROCHLORIDE 25 MG: 50 INJECTION, SOLUTION INTRAMUSCULAR; INTRAVENOUS at 12:54

## 2019-05-14 RX ADMIN — PACLITAXEL 103.8 MG: 6 INJECTION, SOLUTION INTRAVENOUS at 13:42

## 2019-05-14 RX ADMIN — SODIUM CHLORIDE 20 ML/HR: 0.9 INJECTION, SOLUTION INTRAVENOUS at 12:14

## 2019-05-14 RX ADMIN — DEXAMETHASONE SODIUM PHOSPHATE 10 MG: 10 INJECTION, SOLUTION INTRAMUSCULAR; INTRAVENOUS at 12:18

## 2019-05-14 RX ADMIN — FAMOTIDINE 20 MG: 10 INJECTION, SOLUTION INTRAVENOUS at 13:17

## 2019-05-14 NOTE — PROGRESS NOTES
Spoke with Jacki Severin PA-C via telephone reviewed labs from CBC/diff today  PT HGB 7 8  Per provider pt ok for treatment today as long as she feels well enough and is aware to call office with any new onset or progressive, fatigue, Dizziness, or SOB  Pt and daughter made aware of instructions and verbalized understanding

## 2019-05-16 PROBLEM — Z45.2 ENCOUNTER FOR CENTRAL LINE CARE: Status: ACTIVE | Noted: 2019-05-16

## 2019-05-21 ENCOUNTER — HOSPITAL ENCOUNTER (OUTPATIENT)
Dept: INFUSION CENTER | Facility: CLINIC | Age: 69
Discharge: HOME | End: 2019-05-21
Payer: MEDICARE

## 2019-05-21 DIAGNOSIS — Z45.2 ENCOUNTER FOR CENTRAL LINE CARE: ICD-10-CM

## 2019-05-21 DIAGNOSIS — C56.9 MALIGNANT NEOPLASM OF OVARY, UNSPECIFIED LATERALITY (HCC): Primary | ICD-10-CM

## 2019-05-21 LAB
ALBUMIN SERPL BCP-MCNC: 3.3 G/DL (ref 3.5–5)
ALP SERPL-CCNC: 81 U/L (ref 46–116)
ALT SERPL W P-5'-P-CCNC: 23 U/L (ref 12–78)
ANION GAP SERPL CALCULATED.3IONS-SCNC: 10 MMOL/L (ref 4–13)
AST SERPL W P-5'-P-CCNC: 19 U/L (ref 5–45)
BASOPHILS # BLD AUTO: 0.07 THOUSANDS/ΜL (ref 0–0.1)
BASOPHILS NFR BLD AUTO: 1 % (ref 0–1)
BILIRUB SERPL-MCNC: 0.1 MG/DL (ref 0.2–1)
BUN SERPL-MCNC: 25 MG/DL (ref 5–25)
CALCIUM SERPL-MCNC: 9.2 MG/DL (ref 8.3–10.1)
CANCER AG125 SERPL-ACNC: 19.9 U/ML (ref 0–30)
CHLORIDE SERPL-SCNC: 111 MMOL/L (ref 100–108)
CO2 SERPL-SCNC: 22 MMOL/L (ref 21–32)
CREAT SERPL-MCNC: 1.31 MG/DL (ref 0.6–1.3)
EOSINOPHIL # BLD AUTO: 0.04 THOUSAND/ΜL (ref 0–0.61)
EOSINOPHIL NFR BLD AUTO: 1 % (ref 0–6)
ERYTHROCYTE [DISTWIDTH] IN BLOOD BY AUTOMATED COUNT: 18 % (ref 11.6–15.1)
GFR SERPL CREATININE-BSD FRML MDRD: 42 ML/MIN/1.73SQ M
GLUCOSE SERPL-MCNC: 163 MG/DL (ref 65–140)
HCT VFR BLD AUTO: 25.1 % (ref 34.8–46.1)
HGB BLD-MCNC: 8 G/DL (ref 11.5–15.4)
IMM GRANULOCYTES # BLD AUTO: 0.04 THOUSAND/UL (ref 0–0.2)
IMM GRANULOCYTES NFR BLD AUTO: 1 % (ref 0–2)
LYMPHOCYTES # BLD AUTO: 1.73 THOUSANDS/ΜL (ref 0.6–4.47)
LYMPHOCYTES NFR BLD AUTO: 27 % (ref 14–44)
MAGNESIUM SERPL-MCNC: 1.8 MG/DL (ref 1.6–2.6)
MCH RBC QN AUTO: 30.4 PG (ref 26.8–34.3)
MCHC RBC AUTO-ENTMCNC: 31.9 G/DL (ref 31.4–37.4)
MCV RBC AUTO: 95 FL (ref 82–98)
MONOCYTES # BLD AUTO: 0.46 THOUSAND/ΜL (ref 0.17–1.22)
MONOCYTES NFR BLD AUTO: 7 % (ref 4–12)
NEUTROPHILS # BLD AUTO: 4.07 THOUSANDS/ΜL (ref 1.85–7.62)
NEUTS SEG NFR BLD AUTO: 63 % (ref 43–75)
NRBC BLD AUTO-RTO: 0 /100 WBCS
PLATELET # BLD AUTO: 296 THOUSANDS/UL (ref 149–390)
PMV BLD AUTO: 11.3 FL (ref 8.9–12.7)
POTASSIUM SERPL-SCNC: 4.5 MMOL/L (ref 3.5–5.3)
PROT SERPL-MCNC: 7.1 G/DL (ref 6.4–8.2)
RBC # BLD AUTO: 2.63 MILLION/UL (ref 3.81–5.12)
SODIUM SERPL-SCNC: 143 MMOL/L (ref 136–145)
WBC # BLD AUTO: 6.41 THOUSAND/UL (ref 4.31–10.16)

## 2019-05-21 PROCEDURE — 86304 IMMUNOASSAY TUMOR CA 125: CPT | Performed by: PHYSICIAN ASSISTANT

## 2019-05-21 PROCEDURE — 85025 COMPLETE CBC W/AUTO DIFF WBC: CPT | Performed by: OBSTETRICS & GYNECOLOGY

## 2019-05-21 PROCEDURE — 83735 ASSAY OF MAGNESIUM: CPT | Performed by: OBSTETRICS & GYNECOLOGY

## 2019-05-21 PROCEDURE — 80053 COMPREHEN METABOLIC PANEL: CPT | Performed by: OBSTETRICS & GYNECOLOGY

## 2019-05-21 NOTE — PROGRESS NOTES
Central labs drawn via port  Catheter maintenance performed per protocol without incident  Pt discharged in stable condition and is aware to return on 5/28/19 for chemotherapy infusion

## 2019-05-22 ENCOUNTER — OFFICE VISIT (OUTPATIENT)
Dept: GYNECOLOGIC ONCOLOGY | Facility: CLINIC | Age: 69
End: 2019-05-22
Payer: MEDICARE

## 2019-05-22 VITALS
DIASTOLIC BLOOD PRESSURE: 66 MMHG | HEART RATE: 72 BPM | RESPIRATION RATE: 18 BRPM | HEIGHT: 65 IN | SYSTOLIC BLOOD PRESSURE: 110 MMHG | BODY MASS INDEX: 22.74 KG/M2 | WEIGHT: 136.5 LBS | TEMPERATURE: 98.5 F

## 2019-05-22 DIAGNOSIS — T45.1X5A ANEMIA DUE TO ANTINEOPLASTIC CHEMOTHERAPY: ICD-10-CM

## 2019-05-22 DIAGNOSIS — D64.81 ANEMIA DUE TO ANTINEOPLASTIC CHEMOTHERAPY: ICD-10-CM

## 2019-05-22 DIAGNOSIS — C56.9 MALIGNANT NEOPLASM OF OVARY, UNSPECIFIED LATERALITY (HCC): Primary | ICD-10-CM

## 2019-05-22 PROBLEM — D64.9 ANEMIA: Status: ACTIVE | Noted: 2019-05-22

## 2019-05-22 PROCEDURE — 99214 OFFICE O/P EST MOD 30 MIN: CPT | Performed by: OBSTETRICS & GYNECOLOGY

## 2019-05-22 RX ORDER — PALONOSETRON 0.05 MG/ML
0.25 INJECTION, SOLUTION INTRAVENOUS ONCE
Status: CANCELLED
Start: 2019-05-28

## 2019-05-22 RX ORDER — SODIUM CHLORIDE 9 MG/ML
20 INJECTION, SOLUTION INTRAVENOUS ONCE
Status: CANCELLED | OUTPATIENT
Start: 2019-05-28

## 2019-05-28 ENCOUNTER — HOSPITAL ENCOUNTER (OUTPATIENT)
Dept: INFUSION CENTER | Facility: CLINIC | Age: 69
Discharge: HOME | End: 2019-05-28
Payer: MEDICARE

## 2019-05-28 VITALS
RESPIRATION RATE: 18 BRPM | DIASTOLIC BLOOD PRESSURE: 53 MMHG | WEIGHT: 136.24 LBS | SYSTOLIC BLOOD PRESSURE: 113 MMHG | BODY MASS INDEX: 23.26 KG/M2 | TEMPERATURE: 98.1 F | HEIGHT: 64 IN | HEART RATE: 83 BPM

## 2019-05-28 DIAGNOSIS — C56.9 MALIGNANT NEOPLASM OF OVARY, UNSPECIFIED LATERALITY (HCC): Primary | ICD-10-CM

## 2019-05-28 PROCEDURE — 96375 TX/PRO/DX INJ NEW DRUG ADDON: CPT

## 2019-05-28 PROCEDURE — 96367 TX/PROPH/DG ADDL SEQ IV INF: CPT

## 2019-05-28 PROCEDURE — 96417 CHEMO IV INFUS EACH ADDL SEQ: CPT

## 2019-05-28 PROCEDURE — 96413 CHEMO IV INFUSION 1 HR: CPT

## 2019-05-28 RX ORDER — SODIUM CHLORIDE 9 MG/ML
20 INJECTION, SOLUTION INTRAVENOUS ONCE
Status: COMPLETED | OUTPATIENT
Start: 2019-05-28 | End: 2019-05-28

## 2019-05-28 RX ORDER — PALONOSETRON 0.05 MG/ML
0.25 INJECTION, SOLUTION INTRAVENOUS ONCE
Status: COMPLETED | OUTPATIENT
Start: 2019-05-28 | End: 2019-05-28

## 2019-05-28 RX ADMIN — FAMOTIDINE 20 MG: 10 INJECTION, SOLUTION INTRAVENOUS at 13:15

## 2019-05-28 RX ADMIN — DIPHENHYDRAMINE HYDROCHLORIDE 25 MG: 50 INJECTION, SOLUTION INTRAMUSCULAR; INTRAVENOUS at 12:48

## 2019-05-28 RX ADMIN — SODIUM CHLORIDE 150 MG: 0.9 INJECTION, SOLUTION INTRAVENOUS at 13:38

## 2019-05-28 RX ADMIN — DEXAMETHASONE SODIUM PHOSPHATE 20 MG: 10 INJECTION, SOLUTION INTRAMUSCULAR; INTRAVENOUS at 12:27

## 2019-05-28 RX ADMIN — PALONOSETRON 0.25 MG: 0.05 INJECTION, SOLUTION INTRAVENOUS at 12:25

## 2019-05-28 RX ADMIN — CARBOPLATIN 258.4 MG: 10 INJECTION, SOLUTION INTRAVENOUS at 15:30

## 2019-05-28 RX ADMIN — PACLITAXEL 108.6 MG: 6 INJECTION, SOLUTION INTRAVENOUS at 14:22

## 2019-05-28 RX ADMIN — SODIUM CHLORIDE 20 ML/HR: 0.9 INJECTION, SOLUTION INTRAVENOUS at 12:06

## 2019-05-28 NOTE — PROGRESS NOTES
Pt here for chemotherapy D1 C3 offers no complaints, resting comfortably  Vitals stable  Labs reviewed-hgb 8-ok to treat per orders      Pt tolerated treatment well without any adverse reactions  Aware of next appointments  AVS provided

## 2019-05-31 RX ORDER — SODIUM CHLORIDE 9 MG/ML
20 INJECTION, SOLUTION INTRAVENOUS ONCE
Status: CANCELLED | OUTPATIENT
Start: 2019-06-04

## 2019-06-04 ENCOUNTER — HOSPITAL ENCOUNTER (OUTPATIENT)
Dept: INFUSION CENTER | Facility: CLINIC | Age: 69
Discharge: HOME | End: 2019-06-04
Payer: MEDICARE

## 2019-06-04 VITALS
SYSTOLIC BLOOD PRESSURE: 110 MMHG | DIASTOLIC BLOOD PRESSURE: 62 MMHG | TEMPERATURE: 98.2 F | BODY MASS INDEX: 23.71 KG/M2 | WEIGHT: 138.89 LBS | HEIGHT: 64 IN | RESPIRATION RATE: 18 BRPM | HEART RATE: 80 BPM

## 2019-06-04 DIAGNOSIS — D64.81 ANEMIA DUE TO ANTINEOPLASTIC CHEMOTHERAPY: ICD-10-CM

## 2019-06-04 DIAGNOSIS — D64.81 ANEMIA DUE TO CHEMOTHERAPY: ICD-10-CM

## 2019-06-04 DIAGNOSIS — T45.1X5A ANEMIA DUE TO ANTINEOPLASTIC CHEMOTHERAPY: ICD-10-CM

## 2019-06-04 DIAGNOSIS — D64.81 ANEMIA DUE TO CHEMOTHERAPY: Primary | ICD-10-CM

## 2019-06-04 DIAGNOSIS — T45.1X5A ANEMIA DUE TO CHEMOTHERAPY: Primary | ICD-10-CM

## 2019-06-04 DIAGNOSIS — C56.9 MALIGNANT NEOPLASM OF OVARY, UNSPECIFIED LATERALITY (HCC): Primary | ICD-10-CM

## 2019-06-04 DIAGNOSIS — T45.1X5A ANEMIA DUE TO CHEMOTHERAPY: ICD-10-CM

## 2019-06-04 LAB
ABO GROUP BLD: NORMAL
BASOPHILS # BLD AUTO: 0.04 THOUSANDS/ΜL (ref 0–0.1)
BASOPHILS NFR BLD AUTO: 1 % (ref 0–1)
BLD GP AB SCN SERPL QL: NEGATIVE
EOSINOPHIL # BLD AUTO: 0.11 THOUSAND/ΜL (ref 0–0.61)
EOSINOPHIL NFR BLD AUTO: 1 % (ref 0–6)
ERYTHROCYTE [DISTWIDTH] IN BLOOD BY AUTOMATED COUNT: 15.9 % (ref 11.6–15.1)
HCT VFR BLD AUTO: 24 % (ref 34.8–46.1)
HGB BLD-MCNC: 7.7 G/DL (ref 11.5–15.4)
IMM GRANULOCYTES # BLD AUTO: 0.05 THOUSAND/UL (ref 0–0.2)
IMM GRANULOCYTES NFR BLD AUTO: 1 % (ref 0–2)
LYMPHOCYTES # BLD AUTO: 1.82 THOUSANDS/ΜL (ref 0.6–4.47)
LYMPHOCYTES NFR BLD AUTO: 23 % (ref 14–44)
MCH RBC QN AUTO: 30.8 PG (ref 26.8–34.3)
MCHC RBC AUTO-ENTMCNC: 32.1 G/DL (ref 31.4–37.4)
MCV RBC AUTO: 96 FL (ref 82–98)
MONOCYTES # BLD AUTO: 0.58 THOUSAND/ΜL (ref 0.17–1.22)
MONOCYTES NFR BLD AUTO: 7 % (ref 4–12)
NEUTROPHILS # BLD AUTO: 5.27 THOUSANDS/ΜL (ref 1.85–7.62)
NEUTS SEG NFR BLD AUTO: 67 % (ref 43–75)
NRBC BLD AUTO-RTO: 0 /100 WBCS
PLATELET # BLD AUTO: 198 THOUSANDS/UL (ref 149–390)
PMV BLD AUTO: 11.6 FL (ref 8.9–12.7)
RBC # BLD AUTO: 2.5 MILLION/UL (ref 3.81–5.12)
RH BLD: POSITIVE
SPECIMEN EXPIRATION DATE: NORMAL
WBC # BLD AUTO: 7.87 THOUSAND/UL (ref 4.31–10.16)

## 2019-06-04 PROCEDURE — 86900 BLOOD TYPING SEROLOGIC ABO: CPT | Performed by: OBSTETRICS & GYNECOLOGY

## 2019-06-04 PROCEDURE — 86850 RBC ANTIBODY SCREEN: CPT | Performed by: OBSTETRICS & GYNECOLOGY

## 2019-06-04 PROCEDURE — 86901 BLOOD TYPING SEROLOGIC RH(D): CPT | Performed by: OBSTETRICS & GYNECOLOGY

## 2019-06-04 PROCEDURE — 96413 CHEMO IV INFUSION 1 HR: CPT

## 2019-06-04 PROCEDURE — 86920 COMPATIBILITY TEST SPIN: CPT

## 2019-06-04 PROCEDURE — 96367 TX/PROPH/DG ADDL SEQ IV INF: CPT

## 2019-06-04 PROCEDURE — 85025 COMPLETE CBC W/AUTO DIFF WBC: CPT | Performed by: OBSTETRICS & GYNECOLOGY

## 2019-06-04 RX ORDER — DIPHENHYDRAMINE HCL 25 MG
25 TABLET ORAL ONCE
Status: CANCELLED | OUTPATIENT
Start: 2019-06-04

## 2019-06-04 RX ORDER — SODIUM CHLORIDE 9 MG/ML
20 INJECTION, SOLUTION INTRAVENOUS ONCE
Status: CANCELLED | OUTPATIENT
Start: 2019-06-04

## 2019-06-04 RX ORDER — SODIUM CHLORIDE 9 MG/ML
20 INJECTION, SOLUTION INTRAVENOUS ONCE
Status: CANCELLED | OUTPATIENT
Start: 2019-06-05

## 2019-06-04 RX ORDER — ACETAMINOPHEN 325 MG/1
650 TABLET ORAL ONCE
Status: CANCELLED | OUTPATIENT
Start: 2019-06-04

## 2019-06-04 RX ORDER — DIPHENHYDRAMINE HCL 25 MG
25 TABLET ORAL ONCE
Status: CANCELLED | OUTPATIENT
Start: 2019-06-05

## 2019-06-04 RX ORDER — ACETAMINOPHEN 325 MG/1
650 TABLET ORAL ONCE
Status: CANCELLED | OUTPATIENT
Start: 2019-06-05

## 2019-06-04 RX ORDER — SODIUM CHLORIDE 9 MG/ML
20 INJECTION, SOLUTION INTRAVENOUS ONCE
Status: COMPLETED | OUTPATIENT
Start: 2019-06-04 | End: 2019-06-04

## 2019-06-04 RX ADMIN — DIPHENHYDRAMINE HYDROCHLORIDE 25 MG: 50 INJECTION, SOLUTION INTRAMUSCULAR; INTRAVENOUS at 12:15

## 2019-06-04 RX ADMIN — PACLITAXEL 108.6 MG: 6 INJECTION, SOLUTION INTRAVENOUS at 13:21

## 2019-06-04 RX ADMIN — DEXAMETHASONE SODIUM PHOSPHATE 10 MG: 10 INJECTION, SOLUTION INTRAMUSCULAR; INTRAVENOUS at 11:46

## 2019-06-04 RX ADMIN — FAMOTIDINE 20 MG: 10 INJECTION, SOLUTION INTRAVENOUS at 12:36

## 2019-06-04 RX ADMIN — SODIUM CHLORIDE 20 ML/HR: 0.9 INJECTION, SOLUTION INTRAVENOUS at 11:45

## 2019-06-04 NOTE — PLAN OF CARE
Problem: Potential for Falls  Goal: Patient will remain free of falls  Description  INTERVENTIONS:  - Assess patient frequently for physical needs  -  Identify cognitive and physical deficits and behaviors that affect risk of falls  -  [place] fall precautions as indicated by assessment   - Educate patient/family on patient safety including physical limitations  - Instruct patient to call for assistance with activity based on assessment  - Modify environment to reduce risk of injury  - Consider OT/PT consult to assist with strengthening/mobility  Outcome: Progressing     Problem: Knowledge Deficit  Goal: Patient/family/caregiver demonstrates understanding of disease process, treatment plan, medications, and discharge instructions  Description  Complete learning assessment and assess knowledge base    Interventions:  - Provide teaching at level of understanding  - Provide teaching via preferred learning methods  Outcome: Progressing

## 2019-06-04 NOTE — PROGRESS NOTES
Central labs drawn via port per physician order  Hgb 7 7  Pt asymptomatic - not reporting feelings of being fatigued, SOB, etc  Neo FRANKLIN made aware  Per Mary Beth Adams, pt is ok to proceed with treatment as scheduled today, and will receive 2 units PRBC tomorrow  Type and screen drawn via port

## 2019-06-04 NOTE — PROGRESS NOTES
Pt tolerated chemotherapy infusion well without incident  Pt discharged in stable condition and is aware to return tomorrow at 8:30AM for blood transfusion

## 2019-06-05 ENCOUNTER — HOSPITAL ENCOUNTER (OUTPATIENT)
Dept: INFUSION CENTER | Facility: CLINIC | Age: 69
Discharge: HOME | End: 2019-06-05
Payer: MEDICARE

## 2019-06-05 VITALS
RESPIRATION RATE: 18 BRPM | HEART RATE: 75 BPM | TEMPERATURE: 97.6 F | SYSTOLIC BLOOD PRESSURE: 127 MMHG | DIASTOLIC BLOOD PRESSURE: 60 MMHG

## 2019-06-05 DIAGNOSIS — D64.81 ANEMIA DUE TO ANTINEOPLASTIC CHEMOTHERAPY: Primary | ICD-10-CM

## 2019-06-05 DIAGNOSIS — T45.1X5A ANEMIA DUE TO ANTINEOPLASTIC CHEMOTHERAPY: Primary | ICD-10-CM

## 2019-06-05 PROCEDURE — 36430 TRANSFUSION BLD/BLD COMPNT: CPT

## 2019-06-05 PROCEDURE — P9016 RBC LEUKOCYTES REDUCED: HCPCS

## 2019-06-05 RX ORDER — DIPHENHYDRAMINE HCL 25 MG
25 TABLET ORAL ONCE
Status: CANCELLED | OUTPATIENT
Start: 2019-06-05

## 2019-06-05 RX ORDER — SODIUM CHLORIDE 9 MG/ML
20 INJECTION, SOLUTION INTRAVENOUS ONCE
Status: CANCELLED | OUTPATIENT
Start: 2019-06-05

## 2019-06-05 RX ORDER — ACETAMINOPHEN 325 MG/1
650 TABLET ORAL ONCE
Status: COMPLETED | OUTPATIENT
Start: 2019-06-05 | End: 2019-06-05

## 2019-06-05 RX ORDER — SODIUM CHLORIDE 9 MG/ML
20 INJECTION, SOLUTION INTRAVENOUS ONCE
Status: COMPLETED | OUTPATIENT
Start: 2019-06-05 | End: 2019-06-05

## 2019-06-05 RX ORDER — DIPHENHYDRAMINE HCL 25 MG
25 TABLET ORAL ONCE
Status: COMPLETED | OUTPATIENT
Start: 2019-06-05 | End: 2019-06-05

## 2019-06-05 RX ORDER — ACETAMINOPHEN 325 MG/1
650 TABLET ORAL ONCE
Status: CANCELLED | OUTPATIENT
Start: 2019-06-05

## 2019-06-05 RX ADMIN — SODIUM CHLORIDE 20 ML/HR: 0.9 INJECTION, SOLUTION INTRAVENOUS at 09:00

## 2019-06-05 RX ADMIN — DIPHENHYDRAMINE HCL 25 MG: 25 TABLET, FILM COATED ORAL at 09:01

## 2019-06-05 RX ADMIN — ACETAMINOPHEN 650 MG: 325 TABLET, FILM COATED ORAL at 09:01

## 2019-06-05 NOTE — PROGRESS NOTES
Pt tolerated 2 units PRBC transfusion well without incident  Hgb 7 7  Pt discharged in stable condition and is aware of next chemotherapy infusion appointment  AVS provided

## 2019-06-05 NOTE — PLAN OF CARE
Problem: Potential for Falls  Goal: Patient will remain free of falls  Description  INTERVENTIONS:  - Assess patient frequently for physical needs  -  Identify cognitive and physical deficits and behaviors that affect risk of falls  -  [place] fall precautions as indicated by assessment   - Educate patient/family on patient safety including physical limitations  - Instruct patient to call for assistance with activity based on assessment  - Modify environment to reduce risk of injury  - Consider OT/PT consult to assist with strengthening/mobility  Outcome: Progressing     Problem: Knowledge Deficit  Goal: Patient/family/caregiver demonstrates understanding of disease process, treatment plan, medications, and discharge instructions  Description  Complete learning assessment and assess knowledge base    Interventions:  - Provide teaching at level of understanding  - Provide teaching via preferred learning methods  Outcome: Progressing

## 2019-06-06 RX ORDER — SODIUM CHLORIDE 9 MG/ML
20 INJECTION, SOLUTION INTRAVENOUS ONCE
Status: CANCELLED | OUTPATIENT
Start: 2019-06-11

## 2019-06-11 ENCOUNTER — HOSPITAL ENCOUNTER (OUTPATIENT)
Dept: INFUSION CENTER | Facility: CLINIC | Age: 69
Discharge: HOME | End: 2019-06-11
Payer: MEDICARE

## 2019-06-11 VITALS
DIASTOLIC BLOOD PRESSURE: 55 MMHG | SYSTOLIC BLOOD PRESSURE: 108 MMHG | HEART RATE: 75 BPM | HEIGHT: 64 IN | RESPIRATION RATE: 18 BRPM | TEMPERATURE: 98 F | BODY MASS INDEX: 23.71 KG/M2 | WEIGHT: 138.89 LBS

## 2019-06-11 DIAGNOSIS — C56.9 MALIGNANT NEOPLASM OF OVARY, UNSPECIFIED LATERALITY (HCC): Primary | ICD-10-CM

## 2019-06-11 LAB
BASOPHILS # BLD AUTO: 0.04 THOUSANDS/ΜL (ref 0–0.1)
BASOPHILS NFR BLD AUTO: 1 % (ref 0–1)
EOSINOPHIL # BLD AUTO: 0.09 THOUSAND/ΜL (ref 0–0.61)
EOSINOPHIL NFR BLD AUTO: 2 % (ref 0–6)
ERYTHROCYTE [DISTWIDTH] IN BLOOD BY AUTOMATED COUNT: 14.6 % (ref 11.6–15.1)
HCT VFR BLD AUTO: 34.2 % (ref 34.8–46.1)
HGB BLD-MCNC: 10.9 G/DL (ref 11.5–15.4)
IMM GRANULOCYTES # BLD AUTO: 0.01 THOUSAND/UL (ref 0–0.2)
IMM GRANULOCYTES NFR BLD AUTO: 0 % (ref 0–2)
LYMPHOCYTES # BLD AUTO: 1.66 THOUSANDS/ΜL (ref 0.6–4.47)
LYMPHOCYTES NFR BLD AUTO: 34 % (ref 14–44)
MCH RBC QN AUTO: 30.4 PG (ref 26.8–34.3)
MCHC RBC AUTO-ENTMCNC: 31.9 G/DL (ref 31.4–37.4)
MCV RBC AUTO: 96 FL (ref 82–98)
MONOCYTES # BLD AUTO: 0.4 THOUSAND/ΜL (ref 0.17–1.22)
MONOCYTES NFR BLD AUTO: 8 % (ref 4–12)
NEUTROPHILS # BLD AUTO: 2.65 THOUSANDS/ΜL (ref 1.85–7.62)
NEUTS SEG NFR BLD AUTO: 55 % (ref 43–75)
NRBC BLD AUTO-RTO: 0 /100 WBCS
PLATELET # BLD AUTO: 293 THOUSANDS/UL (ref 149–390)
PMV BLD AUTO: 10.9 FL (ref 8.9–12.7)
RBC # BLD AUTO: 3.58 MILLION/UL (ref 3.81–5.12)
WBC # BLD AUTO: 4.85 THOUSAND/UL (ref 4.31–10.16)

## 2019-06-11 PROCEDURE — 96413 CHEMO IV INFUSION 1 HR: CPT

## 2019-06-11 PROCEDURE — 96367 TX/PROPH/DG ADDL SEQ IV INF: CPT

## 2019-06-11 PROCEDURE — 85025 COMPLETE CBC W/AUTO DIFF WBC: CPT | Performed by: OBSTETRICS & GYNECOLOGY

## 2019-06-11 RX ORDER — SODIUM CHLORIDE 9 MG/ML
20 INJECTION, SOLUTION INTRAVENOUS ONCE
Status: COMPLETED | OUTPATIENT
Start: 2019-06-11 | End: 2019-06-11

## 2019-06-11 RX ADMIN — PACLITAXEL 108.6 MG: 6 INJECTION, SOLUTION INTRAVENOUS at 13:31

## 2019-06-11 RX ADMIN — DEXAMETHASONE SODIUM PHOSPHATE 10 MG: 10 INJECTION, SOLUTION INTRAMUSCULAR; INTRAVENOUS at 11:53

## 2019-06-11 RX ADMIN — DIPHENHYDRAMINE HYDROCHLORIDE 25 MG: 50 INJECTION, SOLUTION INTRAMUSCULAR; INTRAVENOUS at 12:14

## 2019-06-11 RX ADMIN — SODIUM CHLORIDE 20 ML/HR: 0.9 INJECTION, SOLUTION INTRAVENOUS at 11:47

## 2019-06-11 RX ADMIN — FAMOTIDINE 20 MG: 10 INJECTION, SOLUTION INTRAVENOUS at 12:39

## 2019-06-11 NOTE — PROGRESS NOTES
Central labs drawn via port  Blood work results called to [employer]  Pt ok to continue with treatment today as ordered

## 2019-06-11 NOTE — PROGRESS NOTES
Pt tolerated chemotherapy well without incident  Pt was discharged in stable condition and is aware of next appointment  AVS provided

## 2019-06-11 NOTE — PLAN OF CARE
Problem: Potential for Falls  Goal: Patient will remain free of falls  Description  INTERVENTIONS:  - Assess patient frequently for physical needs  -  Identify cognitive and physical deficits and behaviors that affect risk of falls  -  [place] fall precautions as indicated by assessment   - Educate patient/family on patient safety including physical limitations  - Instruct patient to call for assistance with activity based on assessment  - Modify environment to reduce risk of injury  - Consider OT/PT consult to assist with strengthening/mobility  Outcome: Progressing     Problem: Knowledge Deficit  Goal: Patient/family/caregiver demonstrates understanding of disease process, treatment plan, medications, and discharge instructions  Description  Complete learning assessment and assess knowledge base    Interventions:  - Provide teaching at level of understanding  - Provide teaching via preferred learning methods  Outcome: Progressing

## 2019-06-13 ENCOUNTER — TRANSCRIBE ORDERS (OUTPATIENT)
Dept: MRI IMAGING | Facility: CLINIC | Age: 69
End: 2019-06-13

## 2019-06-14 ENCOUNTER — HOSPITAL ENCOUNTER (OUTPATIENT)
Dept: CT IMAGING | Facility: HOSPITAL | Age: 69
Discharge: HOME | End: 2019-06-14
Attending: OBSTETRICS & GYNECOLOGY
Payer: MEDICARE

## 2019-06-14 DIAGNOSIS — C56.9 MALIGNANT NEOPLASM OF OVARY, UNSPECIFIED LATERALITY (HCC): ICD-10-CM

## 2019-06-14 PROCEDURE — 74177 CT ABD & PELVIS W/CONTRAST: CPT

## 2019-06-14 PROCEDURE — 71260 CT THORAX DX C+: CPT

## 2019-06-14 RX ADMIN — IOHEXOL 100 ML: 350 INJECTION, SOLUTION INTRAVENOUS at 10:04

## 2019-06-18 ENCOUNTER — HOSPITAL ENCOUNTER (OUTPATIENT)
Dept: INFUSION CENTER | Facility: CLINIC | Age: 69
Discharge: HOME | End: 2019-06-18
Payer: MEDICARE

## 2019-06-18 DIAGNOSIS — Z45.2 ENCOUNTER FOR CENTRAL LINE CARE: ICD-10-CM

## 2019-06-18 DIAGNOSIS — C56.9 MALIGNANT NEOPLASM OF OVARY, UNSPECIFIED LATERALITY (HCC): Primary | ICD-10-CM

## 2019-06-18 LAB
ALBUMIN SERPL BCP-MCNC: 3.6 G/DL (ref 3.5–5)
ALP SERPL-CCNC: 93 U/L (ref 46–116)
ALT SERPL W P-5'-P-CCNC: 27 U/L (ref 12–78)
ANION GAP SERPL CALCULATED.3IONS-SCNC: 10 MMOL/L (ref 4–13)
AST SERPL W P-5'-P-CCNC: 16 U/L (ref 5–45)
BASOPHILS # BLD AUTO: 0.06 THOUSANDS/ΜL (ref 0–0.1)
BASOPHILS NFR BLD AUTO: 1 % (ref 0–1)
BILIRUB SERPL-MCNC: 0.1 MG/DL (ref 0.2–1)
BUN SERPL-MCNC: 35 MG/DL (ref 5–25)
CALCIUM SERPL-MCNC: 9.4 MG/DL (ref 8.3–10.1)
CANCER AG125 SERPL-ACNC: 13.9 U/ML (ref 0–30)
CHLORIDE SERPL-SCNC: 107 MMOL/L (ref 100–108)
CO2 SERPL-SCNC: 24 MMOL/L (ref 21–32)
CREAT SERPL-MCNC: 1.47 MG/DL (ref 0.6–1.3)
EOSINOPHIL # BLD AUTO: 0.05 THOUSAND/ΜL (ref 0–0.61)
EOSINOPHIL NFR BLD AUTO: 1 % (ref 0–6)
ERYTHROCYTE [DISTWIDTH] IN BLOOD BY AUTOMATED COUNT: 14.5 % (ref 11.6–15.1)
GFR SERPL CREATININE-BSD FRML MDRD: 36 ML/MIN/1.73SQ M
GLUCOSE SERPL-MCNC: 155 MG/DL (ref 65–140)
HCT VFR BLD AUTO: 32.4 % (ref 34.8–46.1)
HGB BLD-MCNC: 10.4 G/DL (ref 11.5–15.4)
IMM GRANULOCYTES # BLD AUTO: 0.02 THOUSAND/UL (ref 0–0.2)
IMM GRANULOCYTES NFR BLD AUTO: 0 % (ref 0–2)
LYMPHOCYTES # BLD AUTO: 1.97 THOUSANDS/ΜL (ref 0.6–4.47)
LYMPHOCYTES NFR BLD AUTO: 37 % (ref 14–44)
MAGNESIUM SERPL-MCNC: 1.8 MG/DL (ref 1.6–2.6)
MCH RBC QN AUTO: 30.6 PG (ref 26.8–34.3)
MCHC RBC AUTO-ENTMCNC: 32.1 G/DL (ref 31.4–37.4)
MCV RBC AUTO: 95 FL (ref 82–98)
MONOCYTES # BLD AUTO: 0.47 THOUSAND/ΜL (ref 0.17–1.22)
MONOCYTES NFR BLD AUTO: 9 % (ref 4–12)
NEUTROPHILS # BLD AUTO: 2.82 THOUSANDS/ΜL (ref 1.85–7.62)
NEUTS SEG NFR BLD AUTO: 52 % (ref 43–75)
NRBC BLD AUTO-RTO: 0 /100 WBCS
PLATELET # BLD AUTO: 251 THOUSANDS/UL (ref 149–390)
PMV BLD AUTO: 11.3 FL (ref 8.9–12.7)
POTASSIUM SERPL-SCNC: 4.4 MMOL/L (ref 3.5–5.3)
PROT SERPL-MCNC: 7.5 G/DL (ref 6.4–8.2)
RBC # BLD AUTO: 3.4 MILLION/UL (ref 3.81–5.12)
SODIUM SERPL-SCNC: 141 MMOL/L (ref 136–145)
WBC # BLD AUTO: 5.39 THOUSAND/UL (ref 4.31–10.16)

## 2019-06-18 PROCEDURE — 83735 ASSAY OF MAGNESIUM: CPT | Performed by: OBSTETRICS & GYNECOLOGY

## 2019-06-18 PROCEDURE — 85025 COMPLETE CBC W/AUTO DIFF WBC: CPT | Performed by: OBSTETRICS & GYNECOLOGY

## 2019-06-18 PROCEDURE — 80053 COMPREHEN METABOLIC PANEL: CPT | Performed by: OBSTETRICS & GYNECOLOGY

## 2019-06-18 PROCEDURE — 86304 IMMUNOASSAY TUMOR CA 125: CPT

## 2019-06-19 ENCOUNTER — OFFICE VISIT (OUTPATIENT)
Dept: GYNECOLOGIC ONCOLOGY | Facility: CLINIC | Age: 69
End: 2019-06-19
Payer: MEDICARE

## 2019-06-19 VITALS
RESPIRATION RATE: 18 BRPM | SYSTOLIC BLOOD PRESSURE: 130 MMHG | WEIGHT: 139.5 LBS | BODY MASS INDEX: 23.82 KG/M2 | DIASTOLIC BLOOD PRESSURE: 82 MMHG | HEIGHT: 64 IN | HEART RATE: 78 BPM | TEMPERATURE: 98.4 F

## 2019-06-19 DIAGNOSIS — C56.9 MALIGNANT NEOPLASM OF OVARY, UNSPECIFIED LATERALITY (HCC): Primary | ICD-10-CM

## 2019-06-19 PROCEDURE — 99214 OFFICE O/P EST MOD 30 MIN: CPT | Performed by: OBSTETRICS & GYNECOLOGY

## 2019-06-24 RX ORDER — SODIUM CHLORIDE 9 MG/ML
20 INJECTION, SOLUTION INTRAVENOUS ONCE
Status: CANCELLED | OUTPATIENT
Start: 2019-06-25

## 2019-06-24 RX ORDER — PALONOSETRON 0.05 MG/ML
0.25 INJECTION, SOLUTION INTRAVENOUS ONCE
Status: CANCELLED
Start: 2019-06-25

## 2019-06-25 ENCOUNTER — HOSPITAL ENCOUNTER (OUTPATIENT)
Dept: INFUSION CENTER | Facility: CLINIC | Age: 69
Discharge: HOME | End: 2019-06-25
Payer: MEDICARE

## 2019-06-25 VITALS
HEART RATE: 80 BPM | TEMPERATURE: 98.1 F | RESPIRATION RATE: 16 BRPM | DIASTOLIC BLOOD PRESSURE: 58 MMHG | SYSTOLIC BLOOD PRESSURE: 123 MMHG | BODY MASS INDEX: 24.24 KG/M2 | HEIGHT: 64 IN | WEIGHT: 141.98 LBS

## 2019-06-25 DIAGNOSIS — C56.9 MALIGNANT NEOPLASM OF OVARY, UNSPECIFIED LATERALITY (HCC): Primary | ICD-10-CM

## 2019-06-25 DIAGNOSIS — D64.9 ANEMIA, UNSPECIFIED TYPE: ICD-10-CM

## 2019-06-25 PROCEDURE — 96367 TX/PROPH/DG ADDL SEQ IV INF: CPT

## 2019-06-25 PROCEDURE — 96417 CHEMO IV INFUS EACH ADDL SEQ: CPT

## 2019-06-25 PROCEDURE — 96413 CHEMO IV INFUSION 1 HR: CPT

## 2019-06-25 PROCEDURE — 96375 TX/PRO/DX INJ NEW DRUG ADDON: CPT

## 2019-06-25 RX ORDER — SODIUM CHLORIDE 9 MG/ML
20 INJECTION, SOLUTION INTRAVENOUS ONCE
Status: COMPLETED | OUTPATIENT
Start: 2019-06-25 | End: 2019-06-25

## 2019-06-25 RX ORDER — PALONOSETRON 0.05 MG/ML
0.25 INJECTION, SOLUTION INTRAVENOUS ONCE
Status: COMPLETED | OUTPATIENT
Start: 2019-06-25 | End: 2019-06-25

## 2019-06-25 RX ADMIN — FAMOTIDINE 20 MG: 10 INJECTION, SOLUTION INTRAVENOUS at 12:01

## 2019-06-25 RX ADMIN — PALONOSETRON 0.25 MG: 0.05 INJECTION, SOLUTION INTRAVENOUS at 11:07

## 2019-06-25 RX ADMIN — PACLITAXEL 109.8 MG: 6 INJECTION, SOLUTION INTRAVENOUS at 13:09

## 2019-06-25 RX ADMIN — CARBOPLATIN 244.4 MG: 10 INJECTION, SOLUTION INTRAVENOUS at 14:22

## 2019-06-25 RX ADMIN — SODIUM CHLORIDE 20 ML/HR: 0.9 INJECTION, SOLUTION INTRAVENOUS at 10:50

## 2019-06-25 RX ADMIN — SODIUM CHLORIDE 150 MG: 0.9 INJECTION, SOLUTION INTRAVENOUS at 12:22

## 2019-06-25 RX ADMIN — DIPHENHYDRAMINE HYDROCHLORIDE 25 MG: 50 INJECTION, SOLUTION INTRAMUSCULAR; INTRAVENOUS at 11:36

## 2019-06-25 RX ADMIN — SODIUM CHLORIDE 500 ML: 0.9 INJECTION, SOLUTION INTRAVENOUS at 10:52

## 2019-06-25 RX ADMIN — DEXAMETHASONE SODIUM PHOSPHATE 20 MG: 10 INJECTION, SOLUTION INTRAMUSCULAR; INTRAVENOUS at 11:15

## 2019-06-25 NOTE — PROGRESS NOTES
Pt here for D1 chemotherapy, pt offers no complaints, resting comfortably  Vitals stable  Labs reviewed-WNL for treatment  Call bell in reach, will continue to monitor

## 2019-06-25 NOTE — PROGRESS NOTES
Pt tolerated treatment well without any adverse reactions  Aware of next appointments, pt will have STAT labs drawn prior to next infusion on 7/2/19  AVS provided

## 2019-06-26 RX ORDER — SODIUM CHLORIDE 9 MG/ML
20 INJECTION, SOLUTION INTRAVENOUS ONCE
Status: CANCELLED | OUTPATIENT
Start: 2019-07-02

## 2019-07-01 RX ORDER — SODIUM CHLORIDE 9 MG/ML
20 INJECTION, SOLUTION INTRAVENOUS ONCE
Status: CANCELLED | OUTPATIENT
Start: 2019-07-09

## 2019-07-02 ENCOUNTER — HOSPITAL ENCOUNTER (OUTPATIENT)
Dept: INFUSION CENTER | Facility: CLINIC | Age: 69
Discharge: HOME | End: 2019-07-02
Payer: MEDICARE

## 2019-07-02 VITALS
BODY MASS INDEX: 23.54 KG/M2 | HEIGHT: 65 IN | HEART RATE: 81 BPM | TEMPERATURE: 98.6 F | DIASTOLIC BLOOD PRESSURE: 51 MMHG | RESPIRATION RATE: 18 BRPM | SYSTOLIC BLOOD PRESSURE: 115 MMHG | WEIGHT: 141.31 LBS

## 2019-07-02 DIAGNOSIS — C56.9 MALIGNANT NEOPLASM OF OVARY, UNSPECIFIED LATERALITY (HCC): Primary | ICD-10-CM

## 2019-07-02 LAB
BASOPHILS # BLD AUTO: 0.04 THOUSANDS/ΜL (ref 0–0.1)
BASOPHILS NFR BLD AUTO: 1 % (ref 0–1)
EOSINOPHIL # BLD AUTO: 0.08 THOUSAND/ΜL (ref 0–0.61)
EOSINOPHIL NFR BLD AUTO: 1 % (ref 0–6)
ERYTHROCYTE [DISTWIDTH] IN BLOOD BY AUTOMATED COUNT: 14 % (ref 11.6–15.1)
HCT VFR BLD AUTO: 29.6 % (ref 34.8–46.1)
HGB BLD-MCNC: 9.7 G/DL (ref 11.5–15.4)
IMM GRANULOCYTES # BLD AUTO: 0.04 THOUSAND/UL (ref 0–0.2)
IMM GRANULOCYTES NFR BLD AUTO: 1 % (ref 0–2)
LYMPHOCYTES # BLD AUTO: 1.57 THOUSANDS/ΜL (ref 0.6–4.47)
LYMPHOCYTES NFR BLD AUTO: 20 % (ref 14–44)
MCH RBC QN AUTO: 30.7 PG (ref 26.8–34.3)
MCHC RBC AUTO-ENTMCNC: 32.8 G/DL (ref 31.4–37.4)
MCV RBC AUTO: 94 FL (ref 82–98)
MONOCYTES # BLD AUTO: 0.45 THOUSAND/ΜL (ref 0.17–1.22)
MONOCYTES NFR BLD AUTO: 6 % (ref 4–12)
NEUTROPHILS # BLD AUTO: 5.57 THOUSANDS/ΜL (ref 1.85–7.62)
NEUTS SEG NFR BLD AUTO: 71 % (ref 43–75)
NRBC BLD AUTO-RTO: 0 /100 WBCS
PLATELET # BLD AUTO: 162 THOUSANDS/UL (ref 149–390)
PMV BLD AUTO: 11.5 FL (ref 8.9–12.7)
RBC # BLD AUTO: 3.16 MILLION/UL (ref 3.81–5.12)
WBC # BLD AUTO: 7.75 THOUSAND/UL (ref 4.31–10.16)

## 2019-07-02 PROCEDURE — 96367 TX/PROPH/DG ADDL SEQ IV INF: CPT

## 2019-07-02 PROCEDURE — 85025 COMPLETE CBC W/AUTO DIFF WBC: CPT | Performed by: OBSTETRICS & GYNECOLOGY

## 2019-07-02 PROCEDURE — 96413 CHEMO IV INFUSION 1 HR: CPT

## 2019-07-02 RX ORDER — SODIUM CHLORIDE 9 MG/ML
20 INJECTION, SOLUTION INTRAVENOUS ONCE
Status: COMPLETED | OUTPATIENT
Start: 2019-07-02 | End: 2019-07-02

## 2019-07-02 RX ADMIN — DIPHENHYDRAMINE HYDROCHLORIDE 25 MG: 50 INJECTION, SOLUTION INTRAMUSCULAR; INTRAVENOUS at 12:24

## 2019-07-02 RX ADMIN — SODIUM CHLORIDE 20 ML/HR: 0.9 INJECTION, SOLUTION INTRAVENOUS at 11:38

## 2019-07-02 RX ADMIN — DEXAMETHASONE SODIUM PHOSPHATE 10 MG: 10 INJECTION, SOLUTION INTRAMUSCULAR; INTRAVENOUS at 12:44

## 2019-07-02 RX ADMIN — FAMOTIDINE 20 MG: 10 INJECTION, SOLUTION INTRAVENOUS at 12:04

## 2019-07-02 RX ADMIN — PACLITAXEL 109.8 MG: 6 INJECTION, SOLUTION INTRAVENOUS at 13:16

## 2019-07-09 ENCOUNTER — HOSPITAL ENCOUNTER (OUTPATIENT)
Facility: HOSPITAL | Age: 69
Discharge: HOME | End: 2019-07-10
Attending: OBSTETRICS & GYNECOLOGY | Admitting: OBSTETRICS & GYNECOLOGY
Payer: MEDICARE

## 2019-07-09 ENCOUNTER — HOSPITAL ENCOUNTER (OUTPATIENT)
Dept: INFUSION CENTER | Facility: CLINIC | Age: 69
Discharge: HOME | End: 2019-07-09
Payer: MEDICARE

## 2019-07-09 VITALS
TEMPERATURE: 98.9 F | HEIGHT: 64 IN | BODY MASS INDEX: 22.96 KG/M2 | HEART RATE: 80 BPM | DIASTOLIC BLOOD PRESSURE: 61 MMHG | RESPIRATION RATE: 16 BRPM | WEIGHT: 134.48 LBS | SYSTOLIC BLOOD PRESSURE: 134 MMHG

## 2019-07-09 DIAGNOSIS — Z93.2 S/P ILEOSTOMY (HCC): Primary | ICD-10-CM

## 2019-07-09 DIAGNOSIS — R10.10 PAIN OF UPPER ABDOMEN: ICD-10-CM

## 2019-07-09 DIAGNOSIS — C56.9 MALIGNANT NEOPLASM OF OVARY, UNSPECIFIED LATERALITY (HCC): Primary | ICD-10-CM

## 2019-07-09 LAB
BASOPHILS # BLD AUTO: 0.03 THOUSANDS/ΜL (ref 0–0.1)
BASOPHILS NFR BLD AUTO: 1 % (ref 0–1)
EOSINOPHIL # BLD AUTO: 0.06 THOUSAND/ΜL (ref 0–0.61)
EOSINOPHIL NFR BLD AUTO: 1 % (ref 0–6)
ERYTHROCYTE [DISTWIDTH] IN BLOOD BY AUTOMATED COUNT: 13.6 % (ref 11.6–15.1)
HCT VFR BLD AUTO: 25.5 % (ref 34.8–46.1)
HGB BLD-MCNC: 8.3 G/DL (ref 11.5–15.4)
IMM GRANULOCYTES # BLD AUTO: 0.01 THOUSAND/UL (ref 0–0.2)
IMM GRANULOCYTES NFR BLD AUTO: 0 % (ref 0–2)
LYMPHOCYTES # BLD AUTO: 1.43 THOUSANDS/ΜL (ref 0.6–4.47)
LYMPHOCYTES NFR BLD AUTO: 32 % (ref 14–44)
MCH RBC QN AUTO: 30.3 PG (ref 26.8–34.3)
MCHC RBC AUTO-ENTMCNC: 32.5 G/DL (ref 31.4–37.4)
MCV RBC AUTO: 93 FL (ref 82–98)
MONOCYTES # BLD AUTO: 0.63 THOUSAND/ΜL (ref 0.17–1.22)
MONOCYTES NFR BLD AUTO: 14 % (ref 4–12)
NEUTROPHILS # BLD AUTO: 2.33 THOUSANDS/ΜL (ref 1.85–7.62)
NEUTS SEG NFR BLD AUTO: 52 % (ref 43–75)
NRBC BLD AUTO-RTO: 0 /100 WBCS
PLATELET # BLD AUTO: 291 THOUSANDS/UL (ref 149–390)
PMV BLD AUTO: 10.7 FL (ref 8.9–12.7)
RBC # BLD AUTO: 2.74 MILLION/UL (ref 3.81–5.12)
WBC # BLD AUTO: 4.49 THOUSAND/UL (ref 4.31–10.16)

## 2019-07-09 PROCEDURE — NC001 PR NO CHARGE: Performed by: OBSTETRICS & GYNECOLOGY

## 2019-07-09 PROCEDURE — 85025 COMPLETE CBC W/AUTO DIFF WBC: CPT | Performed by: OBSTETRICS & GYNECOLOGY

## 2019-07-09 PROCEDURE — 96367 TX/PROPH/DG ADDL SEQ IV INF: CPT

## 2019-07-09 PROCEDURE — 96413 CHEMO IV INFUSION 1 HR: CPT

## 2019-07-09 RX ORDER — SODIUM CHLORIDE 9 MG/ML
20 INJECTION, SOLUTION INTRAVENOUS ONCE
Status: COMPLETED | OUTPATIENT
Start: 2019-07-09 | End: 2019-07-09

## 2019-07-09 RX ORDER — PANTOPRAZOLE SODIUM 40 MG/1
40 TABLET, DELAYED RELEASE ORAL
Status: DISCONTINUED | OUTPATIENT
Start: 2019-07-10 | End: 2019-07-10 | Stop reason: HOSPADM

## 2019-07-09 RX ORDER — PANTOPRAZOLE SODIUM 40 MG/1
40 TABLET, DELAYED RELEASE ORAL ONCE
Status: COMPLETED | OUTPATIENT
Start: 2019-07-09 | End: 2019-07-09

## 2019-07-09 RX ORDER — ONDANSETRON 8 MG/1
8 TABLET, ORALLY DISINTEGRATING ORAL ONCE
Status: DISCONTINUED | OUTPATIENT
Start: 2019-07-09 | End: 2019-07-10 | Stop reason: HOSPADM

## 2019-07-09 RX ORDER — ACETAMINOPHEN 325 MG/1
650 TABLET ORAL EVERY 6 HOURS PRN
Status: DISCONTINUED | OUTPATIENT
Start: 2019-07-09 | End: 2019-07-10 | Stop reason: HOSPADM

## 2019-07-09 RX ADMIN — DEXAMETHASONE SODIUM PHOSPHATE 10 MG: 10 INJECTION, SOLUTION INTRAMUSCULAR; INTRAVENOUS at 12:42

## 2019-07-09 RX ADMIN — APIXABAN 5 MG: 5 TABLET, FILM COATED ORAL at 22:38

## 2019-07-09 RX ADMIN — PACLITAXEL 109.8 MG: 6 INJECTION, SOLUTION INTRAVENOUS at 14:37

## 2019-07-09 RX ADMIN — SODIUM CHLORIDE 20 ML/HR: 0.9 INJECTION, SOLUTION INTRAVENOUS at 12:00

## 2019-07-09 RX ADMIN — PANTOPRAZOLE SODIUM 40 MG: 40 TABLET, DELAYED RELEASE ORAL at 22:38

## 2019-07-09 RX ADMIN — FAMOTIDINE 20 MG: 10 INJECTION, SOLUTION INTRAVENOUS at 13:29

## 2019-07-09 RX ADMIN — DIPHENHYDRAMINE HYDROCHLORIDE 25 MG: 50 INJECTION, SOLUTION INTRAMUSCULAR; INTRAVENOUS at 13:06

## 2019-07-09 NOTE — PROGRESS NOTES
Pt here for chemotherapy, Pt states that on Friday she was doing too much at home and her stoma has "prolapsed" pt stated that she called the office and spoke with Dr Mata Hernandez who told her over the phone that it was prolapsed and to continue to monitor the stoma and report if it turns a dark color  Pt stated she spoke with Isaac Miles NP and Sheryl Cuellar PA-C this week  Stoma today appears to be further out of site and firm to touch  Stoma is pink near skin, there is a slight purplish striation at the furthest site of stoma  Stomach directly above stoma is firm to touch as well  Pt states that it is less tender than it was the other day, but  to touch  Pt has been producing stool since this has occurred  Spoke with [employer], pt to continue to monitor if she is producing stool and has a pink stoma  Sheryl Cuellar offered to refer pt back to wound care for stoma services  Pt agrees to referral  Pending appointment at this time  Pt understands to monitor stoma and report new s/s  STAT labs drawn and sent to lab

## 2019-07-09 NOTE — PROGRESS NOTES
Pt tolerated treatment well without any adverse reactions  Pt agreed to go to [facility] for admission and evaluation of stoma  Pt plans to head to [facility] around 1700 tonight  Aware of next appointments with infusion  AVS provided

## 2019-07-09 NOTE — PROGRESS NOTES
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

## 2019-07-10 ENCOUNTER — DOCUMENTATION (OUTPATIENT)
Dept: WOUND CARE | Facility: HOSPITAL | Age: 69
End: 2019-07-10

## 2019-07-10 VITALS
SYSTOLIC BLOOD PRESSURE: 124 MMHG | BODY MASS INDEX: 22.88 KG/M2 | HEART RATE: 78 BPM | TEMPERATURE: 97.9 F | HEIGHT: 64 IN | DIASTOLIC BLOOD PRESSURE: 61 MMHG | WEIGHT: 134 LBS | RESPIRATION RATE: 14 BRPM | OXYGEN SATURATION: 92 %

## 2019-07-10 PROCEDURE — 99214 OFFICE O/P EST MOD 30 MIN: CPT | Performed by: OBSTETRICS & GYNECOLOGY

## 2019-07-10 RX ADMIN — PANTOPRAZOLE SODIUM 40 MG: 40 TABLET, DELAYED RELEASE ORAL at 05:39

## 2019-07-10 RX ADMIN — METFORMIN HYDROCHLORIDE 500 MG: 500 TABLET ORAL at 08:22

## 2019-07-10 NOTE — UTILIZATION REVIEW
07/09/19 1936  Outpatient No Charge Bed Once     Transfer Service: GYN Oncology       Question: Admitting Physician Answer: Jesse Yang        70year old female, presented to the infusion center at [facility] for chemotherapy from home via car  Transferred to [facility] as direct admission via car  Admitted as OP/NCB due to needed evaluation of stoma (possible prolapsed ostomy)  Consult Colorectal:  Few days ago she had pain around the ostomy site as well  The pain has gotten better but the intestine remains prolapsed  She has had this issue on and off but has always been able to reduce it through the stoma  She cannot reduce it at this time  For that reason, she came to the hospital and colorectal is consulted  She has never lost ostomy function nor has it ever looked differently than its normal pink color        /61 (BP Location: Right arm)   Pulse 78   Temp 97 9 °F (36 6 °C) (Oral)   Resp 14   Ht 5' 4" (1 626 m)   Wt 60 8 kg (134 lb)   SpO2 92%   BMI 23 00 kg/m²     Scheduled Meds:  Current Facility-Administered Medications:  acetaminophen 650 mg Oral Q6H PRN   apixaban 5 mg Oral Daily   metFORMIN 500 mg Oral BID With Meals   ondansetron 8 mg Oral Once   pantoprazole 40 mg Oral Early Morning       07/10/2019  DC order entered - Home

## 2019-07-10 NOTE — H&P
Gynecologic Oncology H&P    Assessment/Plan: Karon Langston is a 69 y/o w/ with stage IVB ovarian cancer with possible prolapsed ostomy  Possible prolapsed ostomy  - Wound care consultation  - Consult to colorectal surgery    Stage IVB Ovarian Cancer  - Last taxol treatment earlier today  - Will continue carboplatin and taxol treatment outpatient as scheduled    Type 2 DM  - Metformin 500mg BID    FEN  - Diabetic diet     DVT PPx  - Continue home eliquis  - Ambulation                CHIEF COMPLAINT: ostomy concerns    Subjective: Karon Langston is a 65y/o with stave IVB ovarian cancer s/p ileostomy placement in 2018 who presents with concerns of pain and bulging of stoma  Patient reports that she has noticed that her stoma appeared bigger over the last few months, but this would resolve when she changed out her ostomy bag  However on 7/5/19, patient noted that the stoma was larger and hard to touch  She also had exquisite pain at the stoma site as well as on her abdomen surrounding the ostomy bag  She reports that she decreased her appetite secondary to her concerns with the stoma, but she denies nausea/vomiting  She reports that she has continued to have ostomy output throughout the whole weekend  Currently, patient denies having any pain at the stoma site or in her abdomen  She denies fevers/chills, dysuria, chest pain, SOB  Problem:  Cancer Staging  Ovarian cancer [facility])  Staging form: Ovary, Fallopian Tube, Primary Peritoneal, AJCC 8th Edition  - Clinical stage from 11/14/2018: FIGO Stage IVB (cT3c, cN0, pM1b) - Signed by Klaus Hinkle MD on 11/14/2018  - Pathologic: No stage assigned - Unsigned      Previous therapy:     Ovarian cancer ([facility] [address] )    11/9/2018 Initial Diagnosis     Ovarian cancer ([facility] [address] )   tumor marker 194 5      11/9/2018 Biopsy     CT-guided needle biopsy of abdominal mass 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    Given liver involvement this would be stage IV      11/14/2018 - 12/4/2018 Chemotherapy     Neoadjuvant therapy: carboplatin area under the curve of 6, paclitaxel 135 milligrams/meter squared, Avastin 10 milligrams/kilogram  Completed 1 of 3 cycles  12/14/2018 Surgery     LAPAROTOMY EXPLORATORY; Abdominal Washout; Application of Abthera Vac Dressing for suspected bowel perforation and septic shock  12/15/2018 Surgery     LAPAROTOMY EXPLORATORY, ABDOMINAL WASHOUT, DRAIN PLACEMENT x 4, DIVERTING LOOP ILEOSTOMY AND ABDOMINAL CLOSURE for bowel perforation      3/26/2019 -  Chemotherapy     Taxol weekly 80 mg/m2 for 3 consecutive weeks, may add carboplatin in the future with AUC of 5       3/26/2019 -  Chemotherapy     CARBOplatin (PARAPLATIN) in sodium chloride 0 9 % 250 mL IVPB,  (original dose ), Intravenous, Once, 3 of 5 cycles  Dose modification:   (Cycle 2),   (original dose 258 4 mg, Cycle 3),   (original dose 258 4 mg, Cycle 3),   (original dose 244 4 mg, Cycle 4)  Administration: 258 4 mg (5/28/2019), 244 4 mg (6/25/2019)  PACLitaxel (TAXOL) 127 8 mg in sodium chloride 0 9 % 250 mL chemo IVPB, 80 mg/m2, Intravenous, Once, 4 of 6 cycles  Dose modification: 65 mg/m2 (original dose 80 mg/m2, Cycle 2, Reason: Dose Not Tolerated)  Administration: 103 8 mg (5/14/2019), 108 6 mg (5/28/2019), 108 6 mg (6/4/2019), 108 6 mg (6/11/2019), 109 8 mg (6/25/2019), 109 8 mg (7/2/2019), 109 8 mg (7/9/2019)      4/29/2019 Genomic Testing      Foundation 1 testing revealed a PD L1 tumor proportions score of 0%  The patient is not a candidate for PD L1 manipulation       Genetic Testing     [facility] Breast/Gyn panel, wildtype  Review of Systems   Constitutional: Negative for chills, diaphoresis and fever  Respiratory: Negative for apnea, chest tightness, shortness of breath and wheezing  Cardiovascular: Negative for chest pain and palpitations  Gastrointestinal: Negative for abdominal distention, blood in stool, nausea and vomiting  Genitourinary: Negative for decreased urine volume, dysuria, hematuria and urgency  Neurological: Negative for dizziness and numbness  No Known Allergies    Past Medical History:   Diagnosis Date    Abdominal fluid collection     Asthma     Cancer ([facility] [address] )     ovaries    Diabetes mellitus ([facility] [address] )        Past Surgical History:   Procedure Laterality Date    CT GUIDED PARACENTESIS  2018    CT GUIDED PERC DRAINAGE CATHETER PLACEMENT  2018    CT NEEDLE BIOPSY PERITONEUM  2018    ECTOPIC PREGNANCY SURGERY      ECTOPIC PREGNANCY SURGERY      IR PARACENTESIS  2018    IR PARACENTESIS  10/18/2018    IR PARACENTESIS  12/10/2018    IR PORT PLACEMENT  2018    LAPAROTOMY N/A 2018    Procedure: LAPAROTOMY EXPLORATORY; Abdominal Washout; Application of Abthera Vac Dressing;  Surgeon: Griselda Harry MD;  Location: [facility];  Service: Gynecology Oncology    LAPAROTOMY N/A 12/15/2018    Procedure: LAPAROTOMY EXPLORATORY, ABDOMINAL WASHOUT, DRAIN PLACEMENT x 4, DIVERTING LOOP ILEOSTOMY AND ABDOMINAL CLOSURE,  ALL OTHER INDICATED PROCEDURES;  Surgeon: Griselda Harry MD;  Location: [facility];  Service: Gynecology Oncology    [facility] N/A 2018    Procedure: EGD AND COLONOSCOPY;  Surgeon: Gabriella Otero MD;  Location: [facility]; Service: Gastroenterology    TONSILECTOMY AND ADNOIDECTOMY      TONSILLECTOMY         OB History        3    Para   2    Term   2            AB   1    Living   2       SAB        TAB        Ectopic   1    Multiple        Live Births   2           Obstetric Comments   Menarche: 8th grade (around age 14)               Family History   Problem Relation Age of Onset    Cirrhosis Mother     Colon cancer Mother     Leukemia Cousin     Diabetes Father        Objective: There were no vitals taken for this visit  There is no height or weight on file to calculate BMI      Physical Exam Constitutional: She is oriented to person, place, and time  She appears well-nourished  HENT:   Head: Normocephalic and atraumatic  Cardiovascular: Normal rate, regular rhythm and normal heart sounds  Pulmonary/Chest: Breath sounds normal  No respiratory distress  She has no wheezes  Abdominal: Soft  She exhibits no distension  There is no tenderness  Ostomy bag in place, C/D/I  Stoma appears pink, but larger in size  Stoma is soft to palpation  No tenderness to palpation of abdomen or stoma  Hypoactive bowel sounds throughout   Neurological: She is alert and oriented to person, place, and time  Skin: Skin is warm and dry          Lab Results   Component Value Date     13 9 06/18/2019     Lab Results   Component Value Date    WBC 4 49 07/09/2019    HGB 8 3 (L) 07/09/2019    HCT 25 5 (L) 07/09/2019    MCV 93 07/09/2019     07/09/2019     Lab Results   Component Value Date    K 4 4 06/18/2019     06/18/2019    CO2 24 06/18/2019    BUN 35 (H) 06/18/2019    CREATININE 1 47 (H) 06/18/2019    GLUCOSE 84 12/15/2018    GLUF 117 (H) 01/14/2019    CALCIUM 9 4 06/18/2019    AST 16 06/18/2019    ALT 27 06/18/2019    ALKPHOS 93 06/18/2019    EGFR 36 06/18/2019           Angy Goins MD, PGY-2  7/9/2019  8:30 PM

## 2019-07-10 NOTE — DISCHARGE INSTRUCTIONS
Ileostomy Care   WHAT YOU NEED TO KNOW:   Ileostomy care is done to keep your ileostomy and the skin around it clean  This helps prevent skin problems  An ileostomy specialist will show you how to care for your ileostomy  Follow up with your healthcare provider or ileostomy specialist as directed  DISCHARGE INSTRUCTIONS:   Products used with an ileostomy:   · Pouches  are used to collect bowel content that drains from the stoma  The pouch has a spout at the bottom to empty contents  Pouches come in a variety of sizes and styles  Most are lightweight and prevent odor  Some also have filters that release gas slowly and help decrease odor  Use a pouch that has an opening 1/8 inch larger than your stoma on each side  Your ileostomy specialist can help you decide which type of pouch is best for you  · Skin protection  may help keep your skin from getting irritated  It includes films, paste, strips, or rings  How to empty your pouch:  Empty your pouch about every 4 to 6 hours  Always empty the pouch when it becomes 1/3 full  Do not let the pouch fill completely  A full pouch puts pressure on the seal and may cause a leak  The pouch may also detach, causing bowel contents to spill  · Hold the end of the pouch up  Remove the clamp  · Roll up the ends of the pouch  This helps keep the ends clean  · Place toilet paper into the toilet to reduce splash back  Then empty the pouch into the toilet  · Unroll the ends of the pouch  Clean the ends with toilet paper or a moist paper towel  · Replace the clamp or close the end of the pouch as directed  How to change your pouch:  Your ileostomy specialist will tell you how often to change your pouch and will show you how  Always change it if is leaking  The following is general information about how to change your pouch:  · Empty the contents of the pouch into the toilet  · Gently remove the pouch    Push your skin down and away from the adhesive skin barrier with one hand  With the other hand, pull the pouch up and away from your stoma  · Clean the skin around the stoma with warm water  You may use soap  Do not use soaps that contain oil or perfumes  Pat your skin dry  · Use skin protection products as directed if you have irritated skin around the stoma  · Center the new pouch over the stoma and press it firmly into place on clean, dry skin  It may be helpful to hold your hand over the newly applied pouch for 30 seconds  The warmth of your hand can help to mold the adhesive skin barrier into place  · Discard or clean the old pouch  Place the old pouch in another plastic bag and throw it away if the pouch is disposable  If you use a reusable pouch, ask how to clean the reusable pouch  Nutrition changes to reduce symptoms:   · Do not eat foods that are hard to digest for 6 to 8 weeks after your surgery  Examples are tough meat, nuts, seeds, and raw fruits and vegetables  After 6 to 8 weeks, you can start eating your regular foods  You may still have to limit some foods that are hard to digest, such as corn, nuts, seeds, and celery  · Do not eat foods that cause cramps or diarrhea  When you start eating these foods again, eat small portions first and then gradually increase how much you eat  · Reduce gas and odor  Do not use a straw to drink liquids  Eat slowly  Some foods, such as broccoli, cabbage, beans, eggs, and fish may increase gas  Fresh parsley, yogurt, and buttermilk may help to reduce odor and gas  · Drink liquids as directed  You may need to drink about 1 to 2 extra glasses of liquid each day to prevent dehydration  Ask how much liquid you should drink each day  How an ileostomy fits into your lifestyle:   · Return to work  when your healthcare provider says it is okay  You may need support to prevent a hernia if you lift heavy items or perform heavy labor   You may need an ostomy belt over the pouch to keep it in place if you are active  · Stay active and exercise as directed  Ask your healthcare provider about the best exercise plan for you  Wear your pouch when you swim  Use waterproof tape over the edges of your skin barrier to keep your pouch from leaking  Empty your pouch before you exercise or have sex  · Carry extra supplies with you in case your bag leaks  Supplies include extra pouches, skin protection products, and a change of clothing  Contact your healthcare provider or ileostomy specialist if:   · You have a fever  · Your stoma changes in size or appearance  · You see pills (medicine) in your bowel contents  · Your incision wound or stoma is red or swollen, or you have a rash  · You have diarrhea or very watery bowel movements for more than 6 hours  · You have an unusual odor that lasts longer than a week  · Your bowel contents are black or bloody  · You have more bowel contents draining from your stoma than is normal for you  · You have nausea or are vomiting  · Your stoma becomes narrow, comes out too far, or sinks inside your abdomen  · You have questions or concerns about your condition or care  Seek care immediately or call 911 if:   · You cannot stop the bleeding from your stoma  · You are so weak that you cannot stand  · You have severe abdominal pain  · Bowel contents will not drain through your stoma  © 2017 [address]  Information is for End User's use only and may not be sold, redistributed or otherwise used for commercial purposes  All illustrations and images included in CareNotes® are the copyrighted property of A D A M , Inc  or Carlos Bhandari  The above information is an  only  It is not intended as medical advice for individual conditions or treatments  Talk to your doctor, nurse or pharmacist before following any medical regimen to see if it is safe and effective for you

## 2019-07-10 NOTE — ASSESSMENT & PLAN NOTE
68F with stage 4 ovarian cancer s/p resection of tumor and bowel with end ileostomy   Ileostomy with prolapse of about 10 cm, but continues to function normally    -Ostomy scope this admission  -time TBD  -care per primary

## 2019-07-10 NOTE — SOCIAL WORK
CM met w/pt @ bedside to discuss CM role and possible d/c needs  Pt reported she lives with her  Paul Nixon [phone] and family members in [facility] with ramp  Pt not working, drives at baseline and independent in ADLs PTA  Pt denies HHC  Hx [facility]  Pt denies inpatient mental health/ substance abuse treatment  Preferred pharmacy is [facility]  Pt reports family will drive her home at discharge  CM following for d/c needs  CM reviewed discharge checklist with patient with copy left at bedside  Pt encouraged to participate in d/c planning prior to discharge  CM reviewed d/c planning process including the following: identifying help at home, patient preference for d/c planning needs, Discharge Lounge, [facility] Meds to Bed program, availability of treatment team to discuss questions or concerns patient and/or family may have regarding understanding medications and recognizing signs and symptoms once discharged  CM also encouraged patient to follow up with all recommended appointments after discharge  Patient advised of importance for patient and family to participate in managing patients medical well being

## 2019-07-10 NOTE — PROGRESS NOTES
Gyn Oncology Progress note   Nemiah Crigler 70 y o  female MRN: [id number]  Unit/Bed#: [facility] 901-01 Encounter: [id number]    Assessment: 70 y o  with stage IVB ovarian cancer with prolapsed ostomy    Plan:    Prolapsed ostomy  - Colorectal to evaluate today, appreciate recommendations  - F/u wound care consultation    Stage IV B ovarian cancer  - Last taxol treatment on 7/9/19  - Will continue carboplatin and taxol treatment outpatient as scheduled    Type 2 DM  - Metformin 500mg BID    FEN  - Diabetic diet    DVT PPx  - Continue home eliquis  - Ambulation                  Subjective:    Nemiah Crigler has no current complaints  She has no pain  Patient is currently voiding  She is ambulating  Patient is currently passing flatus and has had bowel movement in her ostomy bag  She is tolerating PO, and denies nausea or vomitting  Patient denies fever, chills, chest pain, shortness of breath, or calf tenderness  /67   Pulse 87   Temp 98 1 °F (36 7 °C)   Resp 18   Ht 5' 4" (1 626 m)   Wt 60 8 kg (134 lb)   SpO2 99%   BMI 23 00 kg/m²     No intake/output data recorded  I/O this shift:  In: 300 [P O :300]  Out: 210 [Stool:210]    Lab Results   Component Value Date    WBC 4 49 07/09/2019    HGB 8 3 (L) 07/09/2019    HCT 25 5 (L) 07/09/2019    MCV 93 07/09/2019     07/09/2019       Lab Results   Component Value Date    GLUCOSE 84 12/15/2018    CALCIUM 9 4 06/18/2019    K 4 4 06/18/2019    CO2 24 06/18/2019     06/18/2019    BUN 35 (H) 06/18/2019    CREATININE 1 47 (H) 06/18/2019       Physical Exam   Constitutional: She is oriented to person, place, and time  She appears well-nourished  HENT:   Head: Normocephalic and atraumatic  Cardiovascular: Normal rate, regular rhythm and normal heart sounds  Pulmonary/Chest: Breath sounds normal  No respiratory distress  She has no wheezes  Abdominal: Soft  Bowel sounds are normal  She exhibits no mass  There is no tenderness   There is no guarding  Ostomy bag continues to have large, pink stoma  Stool noted in ostomy bag  No abdominal tenderness   Neurological: She is alert and oriented to person, place, and time  Skin: Skin is warm and dry              Angy Goins MD  7/10/2019  5:58 AM

## 2019-07-10 NOTE — PLAN OF CARE
Problem: DISCHARGE PLANNING  Goal: Discharge to home or other facility with appropriate resources  Description  INTERVENTIONS:  - Identify barriers to discharge w/patient and caregiver  - Arrange for needed discharge resources and transportation as appropriate  - Identify discharge learning needs (meds, wound care, etc )  - Arrange for interpretive services to assist at discharge as needed  - Refer to Case Management Department for coordinating discharge planning if the patient needs post-hospital services based on physician/advanced practitioner order or complex needs related to functional status, cognitive ability, or social support system  Outcome: Adequate for Discharge     Problem: Knowledge Deficit  Goal: Patient/family/caregiver demonstrates understanding of disease process, treatment plan, medications, and discharge instructions  Description  Complete learning assessment and assess knowledge base    Interventions:  - Provide teaching at level of understanding  - Provide teaching via preferred learning methods  Outcome: Adequate for Discharge

## 2019-07-10 NOTE — CONSULTS
Consultation - Colorectal Surgery   Martha Gunter 70 y o  female MRN: [id number]  Unit/Bed#: [facility] 901-01 Encounter: [id number]    Assessment/Plan     Assessment:  68F with stage 4 ovarian cancer s/p resection of tumor and bowel with end ileostomy  Ileostomy with prolapse of about 10 cm, but continues to function normally  Plan:  -Ostomy scope this admission  -time TBD  -care per primary  History of Present Illness   HPI:  Martha Gunter is a 70 y o  female who presents with ileostomy prolapse  A few days ago she had pain around the ostomy site as well  The pain has gotten better but the intestine remains prolapsed  She has had this issue on and off but has always been able to reduce it through the stoma  She cannot reduce it at this time  For that reason, she came to the hospital and colorectal is consulted  She has never lost ostomy function nor has it ever looked differently than its normal pink color  She has had no fevers, chills, nausea, or vomiting  She did have some pain and a feeling of hardness around her ostomy, however, this has resolved spontaneously  She has a history significant for ovarian cancer s/p resection currently on chemo, DM, and asthma  Inpatient consult to Colorectal Surgery     Date/Time 7/10/2019 4:17 AM     Performed by  Dago Case MD     Authorized by Mary Hernandez MD              Review of Systems   Constitutional: Negative  Negative for activity change and appetite change  HENT: Negative  Negative for hearing loss and trouble swallowing  Eyes: Negative  Negative for photophobia and redness  Respiratory: Negative  Negative for chest tightness and shortness of breath  Cardiovascular: Negative  Negative for chest pain and palpitations  Gastrointestinal: Positive for abdominal pain  Negative for abdominal distention, constipation, diarrhea, nausea and vomiting  Endocrine: Negative  Negative for cold intolerance and heat intolerance     Genitourinary: Negative  Negative for flank pain and genital sores  Musculoskeletal: Negative  Negative for arthralgias and back pain  Skin: Negative  Negative for color change and pallor  Allergic/Immunologic: Negative  Neurological: Negative  Negative for facial asymmetry, speech difficulty and light-headedness  Hematological: Negative  Negative for adenopathy  Psychiatric/Behavioral: Negative  Negative for agitation and behavioral problems  Historical Information   Past Medical History:   Diagnosis Date    Abdominal fluid collection     Asthma     Cancer ([facility] [address] )     ovaries    Diabetes mellitus ([facility] )      Past Surgical History:   Procedure Laterality Date    CT GUIDED PARACENTESIS  11/6/2018    CT GUIDED PERC DRAINAGE CATHETER PLACEMENT  12/24/2018    CT NEEDLE BIOPSY PERITONEUM  11/6/2018    ECTOPIC PREGNANCY SURGERY      ECTOPIC PREGNANCY SURGERY      IR PARACENTESIS  9/18/2018    IR PARACENTESIS  10/18/2018    IR PARACENTESIS  12/10/2018    IR PORT PLACEMENT  11/29/2018    LAPAROTOMY N/A 12/14/2018    Procedure: LAPAROTOMY EXPLORATORY; Abdominal Washout; Application of Abthera Vac Dressing;  Surgeon: Alisha Sarah MD;  [facility];  Service: Gynecology Oncology    LAPAROTOMY N/A 12/15/2018    Procedure: LAPAROTOMY EXPLORATORY, ABDOMINAL WASHOUT, DRAIN PLACEMENT x 4, DIVERTING LOOP ILEOSTOMY AND ABDOMINAL CLOSURE,  ALL OTHER INDICATED PROCEDURES;  Surgeon: Alisha Sarah MD;  [facility];  Service: Gynecology Oncology    [place] [facility] WHEN PFR[facility] N/A 9/25/2018    Procedure: EGD AND COLONOSCOPY;  Surgeon: Betzaida Moseley MD;  [facility]: [facility];   Service: Gastroenterology    TONSILECTOMY AND ADNOIDECTOMY      TONSILLECTOMY       Social History   Social History     Substance and Sexual Activity   Alcohol Use Yes    Comment: occassionally     Social History     Substance and Sexual Activity   Drug Use No     Social History     Tobacco Use Smoking Status Former Smoker   Smokeless Tobacco Never Used     Family History:   Family History   Problem Relation Age of Onset    Cirrhosis Mother     Colon cancer Mother     Leukemia Cousin     Diabetes Father        Meds/Allergies   current meds:   Current Facility-Administered Medications   Medication Dose Route Frequency    acetaminophen (TYLENOL) tablet 650 mg  650 mg Oral Q6H PRN    apixaban (ELIQUIS) tablet 5 mg  5 mg Oral Daily    metFORMIN (GLUCOPHAGE) tablet 500 mg  500 mg Oral BID With Meals    ondansetron (ZOFRAN-ODT) dispersible tablet 8 mg  8 mg Oral Once    pantoprazole (PROTONIX) EC tablet 40 mg  40 mg Oral Early Morning    and PTA meds:   Prior to Admission Medications   Prescriptions Last Dose Informant Patient Reported?  Taking?   acetaminophen (TYLENOL) 325 mg tablet  Self No Yes   Sig: Take 2 tablets (650 mg total) by mouth every 6 (six) hours as needed for mild pain, headaches or fever   apixaban (ELIQUIS) 5 mg  Self No Yes   Sig: Take 1 tablet PO twice daily   aspirin-acetaminophen-caffeine (EXCEDRIN MIGRAINE) 250-250-65 MG per tablet  Self Yes Yes   Sig: Take 1 tablet by mouth every 6 (six) hours as needed for headaches   lidocaine-prilocaine (EMLA) cream  Self No Yes   Sig: Apply to port site prior to labs/chemo   metFORMIN (GLUCOPHAGE) 500 mg tablet Not Taking at Unknown time Self Yes No   Sig: Take 500 mg by mouth 2 (two) times a day with meals   ondansetron (ZOFRAN) 8 mg tablet  Self No Yes   Sig: Take 1 tablet (8 mg total) by mouth every 8 (eight) hours as needed for nausea or vomiting   pantoprazole (PROTONIX) 40 mg tablet  Self No Yes   Sig: Take 1 tablet (40 mg total) by mouth 2 (two) times a day before meals      Facility-Administered Medications: None     No Known Allergies    Objective   First Vitals:   Blood Pressure: 128/72 (07/09/19 1910)  Pulse: 87 (07/09/19 1910)  Temperature: 97 7 °F (36 5 °C) (07/09/19 1910)  Respirations: 18 (07/09/19 1910)  SpO2: 98 % (07/09/19 1910)    Current Vitals:   Blood Pressure: 128/72 (07/09/19 1910)  Pulse: 87 (07/09/19 1910)  Temperature: 97 7 °F (36 5 °C) (07/09/19 1910)  Respirations: 18 (07/09/19 1910)  SpO2: 98 % (07/09/19 1910)    No intake or output data in the 24 hours ending 07/09/19 2111    Invasive Devices     Central Venous Catheter Line            Port A Cath 11/29/18 Right Chest 222 days          Drain            Ileostomy Loop  days                Physical Exam   Constitutional: She appears well-developed and well-nourished  No distress  HENT:   Head: Normocephalic and atraumatic  Right Ear: External ear normal    Left Ear: External ear normal    Eyes: Pupils are equal, round, and reactive to light  EOM are normal  Right eye exhibits no discharge  Left eye exhibits no discharge  No scleral icterus  Neck: No tracheal deviation present  Cardiovascular: Normal rate  Pulmonary/Chest: Effort normal  No respiratory distress  Abdominal: Soft  She exhibits no distension  There is no tenderness  Neurological: She is alert  Coordination normal    Skin: No rash noted  She is not diaphoretic  Vitals reviewed  Lab Results:   CBC:   Lab Results   Component Value Date    WBC 4 49 07/09/2019    HGB 8 3 (L) 07/09/2019    HCT 25 5 (L) 07/09/2019    MCV 93 07/09/2019     07/09/2019    MCH 30 3 07/09/2019    MCHC 32 5 07/09/2019    RDW 13 6 07/09/2019    MPV 10 7 07/09/2019    NRBC 0 07/09/2019   , CMP: No results found for: SODIUM, K, CL, CO2, ANIONGAP, BUN, CREATININE, GLUCOSE, CALCIUM, AST, ALT, ALKPHOS, PROT, BILITOT, EGFR, Coagulation: No results found for: PT, INR, APTT  Imaging: I have personally reviewed pertinent reports  and I have personally reviewed pertinent films in PACS  EKG, Pathology, and Other Studies: I have personally reviewed pertinent reports

## 2019-07-10 NOTE — PLAN OF CARE
Problem: DISCHARGE PLANNING  Goal: Discharge to home or other facility with appropriate resources  Description  INTERVENTIONS:  - Identify barriers to discharge w/patient and caregiver  - Arrange for needed discharge resources and transportation as appropriate  - Identify discharge learning needs (meds, wound care, etc )  - Arrange for interpretive services to assist at discharge as needed  - Refer to Case Management Department for coordinating discharge planning if the patient needs post-hospital services based on physician/advanced practitioner order or complex needs related to functional status, cognitive ability, or social support system  Outcome: Progressing     Problem: Knowledge Deficit  Goal: Patient/family/caregiver demonstrates understanding of disease process, treatment plan, medications, and discharge instructions  Description  Complete learning assessment and assess knowledge base    Interventions:  - Provide teaching at level of understanding  - Provide teaching via preferred learning methods  Outcome: Progressing

## 2019-07-14 ENCOUNTER — APPOINTMENT (EMERGENCY)
Dept: CT IMAGING | Facility: HOSPITAL | Age: 69
End: 2019-07-14
Payer: MEDICARE

## 2019-07-14 ENCOUNTER — HOSPITAL ENCOUNTER (EMERGENCY)
Facility: HOSPITAL | Age: 69
End: 2019-07-15
Attending: EMERGENCY MEDICINE | Admitting: EMERGENCY MEDICINE
Payer: MEDICARE

## 2019-07-14 DIAGNOSIS — K56.609 SBO (SMALL BOWEL OBSTRUCTION) (HCC): Primary | ICD-10-CM

## 2019-07-14 LAB
ALBUMIN SERPL BCP-MCNC: 3.5 G/DL (ref 3.5–5)
ALP SERPL-CCNC: 112 U/L (ref 46–116)
ALT SERPL W P-5'-P-CCNC: 20 U/L (ref 12–78)
ANION GAP SERPL CALCULATED.3IONS-SCNC: 15 MMOL/L (ref 4–13)
AST SERPL W P-5'-P-CCNC: 16 U/L (ref 5–45)
ATRIAL RATE: 87 BPM
BASOPHILS # BLD AUTO: 0.04 THOUSANDS/ΜL (ref 0–0.1)
BASOPHILS NFR BLD AUTO: 1 % (ref 0–1)
BILIRUB SERPL-MCNC: 0.3 MG/DL (ref 0.2–1)
BUN SERPL-MCNC: 25 MG/DL (ref 5–25)
CALCIUM SERPL-MCNC: 10.3 MG/DL (ref 8.3–10.1)
CHLORIDE SERPL-SCNC: 104 MMOL/L (ref 100–108)
CO2 SERPL-SCNC: 20 MMOL/L (ref 21–32)
CREAT SERPL-MCNC: 1.38 MG/DL (ref 0.6–1.3)
EOSINOPHIL # BLD AUTO: 0.01 THOUSAND/ΜL (ref 0–0.61)
EOSINOPHIL NFR BLD AUTO: 0 % (ref 0–6)
ERYTHROCYTE [DISTWIDTH] IN BLOOD BY AUTOMATED COUNT: 13.6 % (ref 11.6–15.1)
GFR SERPL CREATININE-BSD FRML MDRD: 39 ML/MIN/1.73SQ M
GLUCOSE SERPL-MCNC: 216 MG/DL (ref 65–140)
HCT VFR BLD AUTO: 31.7 % (ref 34.8–46.1)
HGB BLD-MCNC: 10.6 G/DL (ref 11.5–15.4)
IMM GRANULOCYTES # BLD AUTO: 0.05 THOUSAND/UL (ref 0–0.2)
IMM GRANULOCYTES NFR BLD AUTO: 1 % (ref 0–2)
LACTATE SERPL-SCNC: 2 MMOL/L (ref 0.5–2)
LIPASE SERPL-CCNC: 88 U/L (ref 73–393)
LYMPHOCYTES # BLD AUTO: 1.69 THOUSANDS/ΜL (ref 0.6–4.47)
LYMPHOCYTES NFR BLD AUTO: 24 % (ref 14–44)
MCH RBC QN AUTO: 30.1 PG (ref 26.8–34.3)
MCHC RBC AUTO-ENTMCNC: 33.4 G/DL (ref 31.4–37.4)
MCV RBC AUTO: 90 FL (ref 82–98)
MONOCYTES # BLD AUTO: 0.37 THOUSAND/ΜL (ref 0.17–1.22)
MONOCYTES NFR BLD AUTO: 5 % (ref 4–12)
NEUTROPHILS # BLD AUTO: 5.04 THOUSANDS/ΜL (ref 1.85–7.62)
NEUTS SEG NFR BLD AUTO: 69 % (ref 43–75)
NRBC BLD AUTO-RTO: 0 /100 WBCS
P AXIS: 69 DEGREES
PLATELET # BLD AUTO: 405 THOUSANDS/UL (ref 149–390)
PMV BLD AUTO: 11.3 FL (ref 8.9–12.7)
POTASSIUM SERPL-SCNC: 4.4 MMOL/L (ref 3.5–5.3)
PR INTERVAL: 144 MS
PROT SERPL-MCNC: 8.1 G/DL (ref 6.4–8.2)
QRS AXIS: 62 DEGREES
QRSD INTERVAL: 76 MS
QT INTERVAL: 372 MS
QTC INTERVAL: 447 MS
RBC # BLD AUTO: 3.52 MILLION/UL (ref 3.81–5.12)
SODIUM SERPL-SCNC: 139 MMOL/L (ref 136–145)
T WAVE AXIS: 82 DEGREES
TROPONIN I SERPL-MCNC: <0.02 NG/ML
VENTRICULAR RATE: 87 BPM
WBC # BLD AUTO: 7.2 THOUSAND/UL (ref 4.31–10.16)

## 2019-07-14 PROCEDURE — 99283 EMERGENCY DEPT VISIT LOW MDM: CPT | Performed by: EMERGENCY MEDICINE

## 2019-07-14 PROCEDURE — 96374 THER/PROPH/DIAG INJ IV PUSH: CPT

## 2019-07-14 PROCEDURE — 83690 ASSAY OF LIPASE: CPT | Performed by: EMERGENCY MEDICINE

## 2019-07-14 PROCEDURE — 96375 TX/PRO/DX INJ NEW DRUG ADDON: CPT

## 2019-07-14 PROCEDURE — 74176 CT ABD & PELVIS W/O CONTRAST: CPT

## 2019-07-14 PROCEDURE — 36415 COLL VENOUS BLD VENIPUNCTURE: CPT | Performed by: EMERGENCY MEDICINE

## 2019-07-14 PROCEDURE — 80053 COMPREHEN METABOLIC PANEL: CPT | Performed by: EMERGENCY MEDICINE

## 2019-07-14 PROCEDURE — 83605 ASSAY OF LACTIC ACID: CPT | Performed by: EMERGENCY MEDICINE

## 2019-07-14 PROCEDURE — 93010 ELECTROCARDIOGRAM REPORT: CPT | Performed by: INTERNAL MEDICINE

## 2019-07-14 PROCEDURE — 93005 ELECTROCARDIOGRAM TRACING: CPT

## 2019-07-14 PROCEDURE — 84484 ASSAY OF TROPONIN QUANT: CPT | Performed by: EMERGENCY MEDICINE

## 2019-07-14 PROCEDURE — 85025 COMPLETE CBC W/AUTO DIFF WBC: CPT | Performed by: EMERGENCY MEDICINE

## 2019-07-14 PROCEDURE — 99285 EMERGENCY DEPT VISIT HI MDM: CPT

## 2019-07-14 RX ORDER — FENTANYL CITRATE 50 UG/ML
25 INJECTION, SOLUTION INTRAMUSCULAR; INTRAVENOUS ONCE
Status: COMPLETED | OUTPATIENT
Start: 2019-07-14 | End: 2019-07-14

## 2019-07-14 RX ORDER — ONDANSETRON 2 MG/ML
4 INJECTION INTRAMUSCULAR; INTRAVENOUS ONCE
Status: COMPLETED | OUTPATIENT
Start: 2019-07-14 | End: 2019-07-14

## 2019-07-14 RX ADMIN — FENTANYL CITRATE 25 MCG: 50 INJECTION INTRAMUSCULAR; INTRAVENOUS at 21:46

## 2019-07-14 RX ADMIN — ONDANSETRON 4 MG: 2 INJECTION INTRAMUSCULAR; INTRAVENOUS at 21:39

## 2019-07-15 ENCOUNTER — HOSPITAL ENCOUNTER (INPATIENT)
Facility: HOSPITAL | Age: 69
LOS: 4 days | Discharge: HOME | DRG: 394 | End: 2019-07-19
Attending: OBSTETRICS & GYNECOLOGY | Admitting: OBSTETRICS & GYNECOLOGY
Payer: MEDICARE

## 2019-07-15 ENCOUNTER — APPOINTMENT (INPATIENT)
Dept: RADIOLOGY | Facility: HOSPITAL | Age: 69
DRG: 394 | End: 2019-07-15
Payer: MEDICARE

## 2019-07-15 VITALS
WEIGHT: 136 LBS | SYSTOLIC BLOOD PRESSURE: 131 MMHG | DIASTOLIC BLOOD PRESSURE: 66 MMHG | RESPIRATION RATE: 18 BRPM | HEIGHT: 64 IN | OXYGEN SATURATION: 96 % | TEMPERATURE: 97.8 F | BODY MASS INDEX: 23.22 KG/M2 | HEART RATE: 82 BPM

## 2019-07-15 DIAGNOSIS — Z93.2 S/P ILEOSTOMY (HCC): Primary | ICD-10-CM

## 2019-07-15 PROBLEM — K56.609 SBO (SMALL BOWEL OBSTRUCTION) (HCC): Status: ACTIVE | Noted: 2019-07-15

## 2019-07-15 LAB
ABO GROUP BLD: NORMAL
ALBUMIN SERPL BCP-MCNC: 3.2 G/DL (ref 3.5–5)
ALP SERPL-CCNC: 99 U/L (ref 46–116)
ALT SERPL W P-5'-P-CCNC: 20 U/L (ref 12–78)
ANION GAP SERPL CALCULATED.3IONS-SCNC: 8 MMOL/L (ref 4–13)
AST SERPL W P-5'-P-CCNC: 14 U/L (ref 5–45)
BASOPHILS # BLD AUTO: 0.04 THOUSANDS/ΜL (ref 0–0.1)
BASOPHILS NFR BLD AUTO: 1 % (ref 0–1)
BILIRUB SERPL-MCNC: 0.24 MG/DL (ref 0.2–1)
BLD GP AB SCN SERPL QL: NEGATIVE
BUN SERPL-MCNC: 25 MG/DL (ref 5–25)
CALCIUM SERPL-MCNC: 9.3 MG/DL (ref 8.3–10.1)
CHLORIDE SERPL-SCNC: 110 MMOL/L (ref 100–108)
CO2 SERPL-SCNC: 21 MMOL/L (ref 21–32)
CREAT SERPL-MCNC: 1.25 MG/DL (ref 0.6–1.3)
EOSINOPHIL # BLD AUTO: 0.01 THOUSAND/ΜL (ref 0–0.61)
EOSINOPHIL NFR BLD AUTO: 0 % (ref 0–6)
ERYTHROCYTE [DISTWIDTH] IN BLOOD BY AUTOMATED COUNT: 13.6 % (ref 11.6–15.1)
GFR SERPL CREATININE-BSD FRML MDRD: 44 ML/MIN/1.73SQ M
GLUCOSE SERPL-MCNC: 174 MG/DL (ref 65–140)
HCT VFR BLD AUTO: 28.9 % (ref 34.8–46.1)
HGB BLD-MCNC: 9.5 G/DL (ref 11.5–15.4)
IMM GRANULOCYTES # BLD AUTO: 0.04 THOUSAND/UL (ref 0–0.2)
IMM GRANULOCYTES NFR BLD AUTO: 1 % (ref 0–2)
LYMPHOCYTES # BLD AUTO: 1.56 THOUSANDS/ΜL (ref 0.6–4.47)
LYMPHOCYTES NFR BLD AUTO: 28 % (ref 14–44)
MAGNESIUM SERPL-MCNC: 1.8 MG/DL (ref 1.6–2.6)
MCH RBC QN AUTO: 30.1 PG (ref 26.8–34.3)
MCHC RBC AUTO-ENTMCNC: 32.9 G/DL (ref 31.4–37.4)
MCV RBC AUTO: 92 FL (ref 82–98)
MONOCYTES # BLD AUTO: 0.29 THOUSAND/ΜL (ref 0.17–1.22)
MONOCYTES NFR BLD AUTO: 5 % (ref 4–12)
NEUTROPHILS # BLD AUTO: 3.66 THOUSANDS/ΜL (ref 1.85–7.62)
NEUTS SEG NFR BLD AUTO: 65 % (ref 43–75)
NRBC BLD AUTO-RTO: 0 /100 WBCS
PHOSPHATE SERPL-MCNC: 4.4 MG/DL (ref 2.3–4.1)
PLATELET # BLD AUTO: 346 THOUSANDS/UL (ref 149–390)
PMV BLD AUTO: 10.8 FL (ref 8.9–12.7)
POTASSIUM SERPL-SCNC: 3.7 MMOL/L (ref 3.5–5.3)
PROT SERPL-MCNC: 7.1 G/DL (ref 6.4–8.2)
RBC # BLD AUTO: 3.16 MILLION/UL (ref 3.81–5.12)
RH BLD: POSITIVE
SODIUM SERPL-SCNC: 139 MMOL/L (ref 136–145)
SPECIMEN EXPIRATION DATE: NORMAL
WBC # BLD AUTO: 5.6 THOUSAND/UL (ref 4.31–10.16)

## 2019-07-15 PROCEDURE — 80053 COMPREHEN METABOLIC PANEL: CPT | Performed by: OBSTETRICS & GYNECOLOGY

## 2019-07-15 PROCEDURE — 84100 ASSAY OF PHOSPHORUS: CPT | Performed by: OBSTETRICS & GYNECOLOGY

## 2019-07-15 PROCEDURE — 86901 BLOOD TYPING SEROLOGIC RH(D): CPT | Performed by: OBSTETRICS & GYNECOLOGY

## 2019-07-15 PROCEDURE — 96376 TX/PRO/DX INJ SAME DRUG ADON: CPT

## 2019-07-15 PROCEDURE — 83735 ASSAY OF MAGNESIUM: CPT | Performed by: OBSTETRICS & GYNECOLOGY

## 2019-07-15 PROCEDURE — 85025 COMPLETE CBC W/AUTO DIFF WBC: CPT | Performed by: OBSTETRICS & GYNECOLOGY

## 2019-07-15 PROCEDURE — 74018 RADEX ABDOMEN 1 VIEW: CPT

## 2019-07-15 PROCEDURE — 86850 RBC ANTIBODY SCREEN: CPT | Performed by: OBSTETRICS & GYNECOLOGY

## 2019-07-15 PROCEDURE — 86900 BLOOD TYPING SEROLOGIC ABO: CPT | Performed by: OBSTETRICS & GYNECOLOGY

## 2019-07-15 PROCEDURE — 96375 TX/PRO/DX INJ NEW DRUG ADDON: CPT

## 2019-07-15 PROCEDURE — 99221 1ST HOSP IP/OBS SF/LOW 40: CPT | Performed by: OBSTETRICS & GYNECOLOGY

## 2019-07-15 RX ORDER — LIDOCAINE HYDROCHLORIDE 20 MG/ML
1 JELLY TOPICAL ONCE
Status: COMPLETED | OUTPATIENT
Start: 2019-07-15 | End: 2019-07-15

## 2019-07-15 RX ORDER — HYDROMORPHONE HCL/PF 1 MG/ML
0.2 SYRINGE (ML) INJECTION EVERY 4 HOURS PRN
Status: DISCONTINUED | OUTPATIENT
Start: 2019-07-15 | End: 2019-07-15

## 2019-07-15 RX ORDER — POTASSIUM CHLORIDE 14.9 MG/ML
20 INJECTION INTRAVENOUS
Status: COMPLETED | OUTPATIENT
Start: 2019-07-15 | End: 2019-07-16

## 2019-07-15 RX ORDER — FENTANYL CITRATE 50 UG/ML
25 INJECTION, SOLUTION INTRAMUSCULAR; INTRAVENOUS ONCE
Status: COMPLETED | OUTPATIENT
Start: 2019-07-15 | End: 2019-07-15

## 2019-07-15 RX ORDER — DEXTROSE, SODIUM CHLORIDE, AND POTASSIUM CHLORIDE 5; .45; .15 G/100ML; G/100ML; G/100ML
100 INJECTION INTRAVENOUS CONTINUOUS
Status: DISCONTINUED | OUTPATIENT
Start: 2019-07-15 | End: 2019-07-16

## 2019-07-15 RX ORDER — MIDAZOLAM HYDROCHLORIDE 1 MG/ML
0.5 INJECTION INTRAMUSCULAR; INTRAVENOUS ONCE AS NEEDED
Status: COMPLETED | OUTPATIENT
Start: 2019-07-15 | End: 2019-07-15

## 2019-07-15 RX ORDER — HYDROMORPHONE HCL/PF 1 MG/ML
0.1 SYRINGE (ML) INJECTION EVERY 4 HOURS PRN
Status: DISCONTINUED | OUTPATIENT
Start: 2019-07-15 | End: 2019-07-15

## 2019-07-15 RX ORDER — ONDANSETRON 2 MG/ML
4 INJECTION INTRAMUSCULAR; INTRAVENOUS EVERY 6 HOURS PRN
Status: DISCONTINUED | OUTPATIENT
Start: 2019-07-15 | End: 2019-07-19 | Stop reason: HOSPADM

## 2019-07-15 RX ORDER — HEPARIN SODIUM 5000 [USP'U]/ML
5000 INJECTION, SOLUTION INTRAVENOUS; SUBCUTANEOUS EVERY 8 HOURS SCHEDULED
Status: DISCONTINUED | OUTPATIENT
Start: 2019-07-15 | End: 2019-07-15

## 2019-07-15 RX ADMIN — Medication 1 SPRAY: at 07:05

## 2019-07-15 RX ADMIN — MORPHINE SULFATE 2 MG: 2 INJECTION, SOLUTION INTRAMUSCULAR; INTRAVENOUS at 18:49

## 2019-07-15 RX ADMIN — FENTANYL CITRATE 25 MCG: 50 INJECTION INTRAMUSCULAR; INTRAVENOUS at 00:37

## 2019-07-15 RX ADMIN — DEXTROSE, SODIUM CHLORIDE, AND POTASSIUM CHLORIDE 100 ML/HR: 5; .45; .15 INJECTION INTRAVENOUS at 12:51

## 2019-07-15 RX ADMIN — ONDANSETRON 4 MG: 2 INJECTION INTRAMUSCULAR; INTRAVENOUS at 04:07

## 2019-07-15 RX ADMIN — MORPHINE SULFATE 2 MG: 2 INJECTION, SOLUTION INTRAMUSCULAR; INTRAVENOUS at 11:20

## 2019-07-15 RX ADMIN — LIDOCAINE HYDROCHLORIDE 1 APPLICATION: 20 JELLY TOPICAL at 01:08

## 2019-07-15 RX ADMIN — ONDANSETRON 4 MG: 2 INJECTION INTRAMUSCULAR; INTRAVENOUS at 22:59

## 2019-07-15 RX ADMIN — DEXTROSE, SODIUM CHLORIDE, AND POTASSIUM CHLORIDE 100 ML/HR: 5; .45; .15 INJECTION INTRAVENOUS at 03:21

## 2019-07-15 RX ADMIN — DEXTROSE, SODIUM CHLORIDE, AND POTASSIUM CHLORIDE 100 ML/HR: 5; .45; .15 INJECTION INTRAVENOUS at 22:13

## 2019-07-15 RX ADMIN — POTASSIUM CHLORIDE 20 MEQ: 200 INJECTION, SOLUTION INTRAVENOUS at 11:15

## 2019-07-15 RX ADMIN — ENOXAPARIN SODIUM 50 MG: 60 INJECTION SUBCUTANEOUS at 16:05

## 2019-07-15 RX ADMIN — MORPHINE SULFATE 2 MG: 2 INJECTION, SOLUTION INTRAMUSCULAR; INTRAVENOUS at 22:59

## 2019-07-15 RX ADMIN — POTASSIUM CHLORIDE 20 MEQ: 200 INJECTION, SOLUTION INTRAVENOUS at 15:22

## 2019-07-15 RX ADMIN — ENOXAPARIN SODIUM 40 MG: 40 INJECTION SUBCUTANEOUS at 11:19

## 2019-07-15 RX ADMIN — MIDAZOLAM 0.5 MG: 1 INJECTION INTRAMUSCULAR; INTRAVENOUS at 01:39

## 2019-07-15 NOTE — H&P
H&P - GYN ONC  Mani Santana 70 y o  female MRN: [id number]  Unit/Bed#: [facility] 907-01 Encounter: [id number]      History of Present Illness     Chief Complaint:  Abdominal pain, nausea/vomiting    HPI:  Mani Santana is a 70 y o  [id number] with stage IVB ovarian cancer presented initially to [facility] for abdominal pain and nausea and vomiting  She states that her symptoms started earlier Sunday morning and have been worsening throughout the day  She states that symptoms were preceded by the feeling of a "GAS BUBBLE"in her abdomen that increased bloating and abdominal pain  She had multiple episodes of  nonbloody emesis  CT abdomen pelvis without contrast was performed have [facility] on 07/14/2019 showing findings consistent with small-bowel obstruction involving loops of small bowel proximal to the site of ileostomy, possibly secondary to adhesion  Patient was subsequently transferred over to [facility] for higher acuity care and gyn Oncology /colorectal surgery management  At bedside, patient appears uncomfortable  She has generalized tenderness throughout her abdomen, mild distention and guarding  Hyperactive bowel sounds are suggestive of small-bowel obstruction  Ostomy is putting out small liquidy brown stools  Review of Systems   Constitutional: Positive for appetite change and fatigue  Negative for chills, diaphoresis and fever  Respiratory: Negative for cough, choking, shortness of breath and wheezing  Cardiovascular: Negative for chest pain, palpitations and leg swelling  Gastrointestinal: Positive for abdominal distention, abdominal pain, nausea and vomiting  Negative for constipation and diarrhea  Genitourinary: Negative for decreased urine volume, dysuria, flank pain, genital sores, hematuria, pelvic pain, vaginal bleeding, vaginal discharge and vaginal pain     Neurological: Negative for dizziness, seizures, syncope, facial asymmetry, weakness, light-headedness, numbness and headaches  Psychiatric/Behavioral: Negative  Historical Information   Past Medical History:   Diagnosis Date    Abdominal fluid collection     Anemia     Asthma     Cancer [facility] [address]     ovaries    Diabetes mellitus ([facility] [address] )     History of transfusion      Past Surgical History:   Procedure Laterality Date    CT GUIDED PARACENTESIS  11/6/2018    CT GUIDED PERC DRAINAGE CATHETER PLACEMENT  12/24/2018    CT NEEDLE BIOPSY PERITONEUM  11/6/2018    ECTOPIC PREGNANCY SURGERY      ECTOPIC PREGNANCY SURGERY      IR PARACENTESIS  9/18/2018    IR PARACENTESIS  10/18/2018    IR PARACENTESIS  12/10/2018    IR PORT PLACEMENT  11/29/2018    LAPAROTOMY N/A 12/14/2018    Procedure: LAPAROTOMY EXPLORATORY; Abdominal Washout; Application of Abthera Vac Dressing;  Surgeon: Griselda Harry MD;  [facility];  Service: Gynecology Oncology    LAPAROTOMY N/A 12/15/2018    Procedure: LAPAROTOMY EXPLORATORY, ABDOMINAL WASHOUT, DRAIN PLACEMENT x 4, DIVERTING LOOP ILEOSTOMY AND ABDOMINAL CLOSURE,  ALL OTHER INDICATED PROCEDURES;  Surgeon: Griselda Harry MD;  [facility];  Service: Gynecology Oncology    [place] COLONOSCOPY FLX DX W/[address] WHEN PFRMD N/A 9/25/2018    Procedure: EGD AND COLONOSCOPY;  Surgeon: Gabriella Otero MD;  [facility];   Service: Gastroenterology    TONSILECTOMY AND ADNOIDECTOMY      TONSILLECTOMY       Social History   Social History     Substance and Sexual Activity   Alcohol Use Not Currently    Comment: occassionally     Social History     Substance and Sexual Activity   Drug Use No     Social History     Tobacco Use   Smoking Status Former Smoker   Smokeless Tobacco Never Used   Tobacco Comment    "Quit 40 yrs ago"     Family History: non-contributory    Meds/Allergies      Medications Prior to Admission   Medication    metFORMIN (GLUCOPHAGE) 500 mg tablet    pantoprazole (PROTONIX) 40 mg tablet    acetaminophen (TYLENOL) 325 mg tablet    apixaban (ELIQUIS) 5 mg    aspirin-acetaminophen-caffeine (EXCEDRIN MIGRAINE) 250-250-65 MG per tablet    lidocaine-prilocaine (EMLA) cream    ondansetron (ZOFRAN) 8 mg tablet      No Known Allergies    OBJECTIVE:    Vitals: Blood pressure 124/57, pulse 89, temperature 98 8 °F (37 1 °C), temperature source Oral, resp  rate 18, height 5' 4" (1 626 m), weight 61 7 kg (136 lb), SpO2 98 %  Body mass index is 23 34 kg/m²  Physical Exam   Constitutional: She is oriented to person, place, and time  No distress  Thin, appears uncomfortable   HENT:   Head: Normocephalic and atraumatic  Nose: Nose normal    Mouth/Throat: Oropharynx is clear and moist    Cardiovascular: Normal rate, regular rhythm, normal heart sounds and intact distal pulses  Pulmonary/Chest: Effort normal and breath sounds normal  No respiratory distress  She has no wheezes  Abdominal: Soft  She exhibits distension  There is tenderness  There is guarding  There is no rebound  No hernia  Right sided ostomy with brown stool present, hyperactive bowel sounds   Neurological: She is alert and oriented to person, place, and time  She displays normal reflexes  She exhibits normal muscle tone  Skin: Skin is warm and dry  She is not diaphoretic  No erythema  No pallor  Psychiatric: Her behavior is normal  Judgment and thought content normal            Invasive Devices     Central Venous Catheter Line            Port A Cath 11/29/18 Right Chest 228 days          Peripheral Intravenous Line            Peripheral IV 07/14/19 Left Antecubital less than 1 day          Drain            Ileostomy Loop  days    NG/OG/Enteral Tube Nasogastric 18 Fr Left nares less than 1 day                  Assessment/Plan     ASSESSMENT:    Nikki Muhammad 70 y o  [id number] with stage IVB ovarian cancer suspicious for SBO with hx of prolapsed ostomy      PLAN:  1) suspicion of small bowel obstruction with history of prolapsed ostomy  -NPO, IV fluids, NG tube on continuous suction  -colorectal surgery consult    2) stage IVB ovarian cancer  -carboplatin Taxol treatment as outpatient    3) type 2 diabetes  -hold home metformin pending dietary status    FEN: NPO, D5, half normal saline KCl 20 mEq at 100 mL/hour  DVT PPX:  Hold Eliquis at this time, we will revisit anticoagulation pharmacotherapy during morning rounds, patient is refusing SCDs, Hill Ge MD  OB/GYN PGY-4  7/15/2019 4:48 AM

## 2019-07-15 NOTE — UTILIZATION REVIEW
Initial Clinical Review  OBSERVATION 07/15/2019 @ 0304, CONVERTED TO INPATIENT ADMISSION 07/15/2019 @ 1040, DUE TO FURTHER DIAGNOSTIC WORKUP, REQUIRING AT LEAST 2 MIDNIGHT STAY  SBO > IV flds, NG Tube, NPO    Admission: Date/Time/Statement: 7/15/19 @ 1040  Orders Placed This Encounter     07/15/19 1040  Inpatient Admission Once     Transfer Service: GYN Oncology       Question Answer Comment   Admitting Physician Aziza Martínez    Level of Care Med Surg    Estimated length of stay More than 2 Midnights    Certification I certify that inpatient services are medically necessary for this patient for a duration of greater than two midnights  See H&P and MD Progress Notes for additional information about the patient's course of treatment  Assessment/Plan: 70year old female, [facility] the [facility] [address] [facility], Transferred to [facility] via EMS, higher level of care  Admitted as OBSERVATION due to SBO  patient appears uncomfortable  She has generalized tenderness throughout her abdomen, mild distention and guarding  Hyperactive bowel sounds are suggestive of small-bowel obstruction  Ostomy is putting out small liquidy brown stools  suspicion of small bowel obstruction with history of prolapsed ostomy:  NPO, IV fluids, NG tube on continuous suction  colorectal surgery consult       07/15/2019  Consult Colorectal:  Get her through this hospitalization with nonoperative management to better able to get her to elective debulking surgery, continue NPO/NG/IV fluids today      Triage Vitals   Temperature Pulse Respirations Blood Pressure SpO2   07/15/19 0251 07/15/19 0251 07/15/19 0251 07/15/19 0251 07/15/19 0251   98 8 °F (37 1 °C) 89 18 124/57 98 %      Temp Source Heart Rate Source Patient Position - Orthostatic VS BP Location FiO2 (%)   07/15/19 0251 -- 07/15/19 0251 07/15/19 0251 --   Oral  Lying Right arm       Pain Score       07/15/19 0500       7        Wt Readings from Last 1 Encounters: 07/15/19 61 7 kg (136 lb)     Additional Vital Signs:  Date/Time  Temp  Pulse  Resp  BP  SpO2  O2 Device  Patient Position - Orthostatic VS   07/15/19 0930          98 %  None (Room air)     07/15/19 0645  99 1 °F (37 3 °C)  98  18  147/74  98 %  None (Room air)  Lying   07/15/19 0251  98 8 °F (37 1 °C)  89  18  124/57  98 %  None (Room air)  Lying       Pertinent Labs/Diagnostic Test Results:   Results from last 7 days   Lab Units 07/15/19  0413 19  2138 19  1205   WBC Thousand/uL 5 60 7 20 4 49   HEMOGLOBIN g/dL 9 5* 10 6* 8 3*   HEMATOCRIT % 28 9* 31 7* 25 5*   PLATELETS Thousands/uL 346 405* 291   NEUTROS ABS Thousands/µL 3 66 5 04 2 33     Results from last 7 days   Lab Units 07/15/19  0413 19  2138   SODIUM mmol/L 139 139   POTASSIUM mmol/L 3 7 4 4   CHLORIDE mmol/L 110* 104   CO2 mmol/L 21 20*   ANION GAP mmol/L 8 15*   BUN mg/dL 25 25   CREATININE mg/dL 1 25 1 38*   EGFR ml/min/1 73sq m 44 39   CALCIUM mg/dL 9 3 10 3*   MAGNESIUM mg/dL 1 8  --    PHOSPHORUS mg/dL 4 4*  --      Results from last 7 days   Lab Units 07/15/19  0413 19  2138   AST U/L 14 16   ALT U/L 20 20   ALK PHOS U/L 99 112   TOTAL PROTEIN g/dL 7 1 8 1   ALBUMIN g/dL 3 2* 3 5   TOTAL BILIRUBIN mg/dL 0 24 0 30     Results from last 7 days   Lab Units 07/15/19  0413 19  2138   GLUCOSE RANDOM mg/dL 174* 216*     Results from last 7 days   Lab Units 19  2138   TROPONIN I ng/mL <0 02     Results from last 7 days   Lab Units 19  2138   LACTIC ACID mmol/L 2 0     Results from last 7 days   Lab Units 19  2138   LIPASE u/L 88     2019 @ 2352  CT abd/pel:  Findings consistent with small bowel obstruction involving a loop of small bowel proximal to site of ileostomy, possibly secondary to adhesion      2019 @ 2153  EC, NSR    Past Medical History:   Diagnosis Date    Abdominal fluid collection     Anemia     Asthma     Cancer ([facility] [address]     ovaries    Diabetes mellitus ([facility] [address] [facility]     History of transfusion      Admitting Diagnosis: Small bowel obstruction ([facility] [address] ) [K56 [id number]]  Age/Sex: 70 y o  female  Admission Orders:  Current Facility-Administered Medications:  dextrose 5 % and sodium chloride 0 45 % with KCl 20 mEq/L 100 mL/hr Intravenous Continuous   heparin (porcine) 5,000 Units Subcutaneous Q8H [address]   HYDROmorphone 0 2 mg Intravenous Q4H PRN   ondansetron 4 mg Intravenous Q6H PRN   phenol 1 spray Mouth/Throat Q2H PRN     NPO  IP CONSULT TO COLORECTAL SURGERY: suspicion of SBO with hx of prolapsed ostomy, s/o ileostomy  NG Tube LCWS  Noel SCDs    Network Utilization Review Department  Phone: [phone]; Fax [phone]  [email or web address]  ATTENTION: Please call with any questions or concerns to [phone]  and carefully listen to the prompts so that you are directed to the right person  Send all requests for admission clinical reviews, approved or denied determinations and any other requests to fax [phone]   All voicemails are confidential

## 2019-07-15 NOTE — EMTALA/ACUTE CARE TRANSFER
[address] [facility] [address]  [address] [facility] [id number]  Dept: [phone]      EMTALA TRANSFER CONSENT    NAME Lauro Vallejo                                         1950                              MRN [id number]    I have been informed of my rights regarding examination, treatment, and transfer   by Dr Val Fajardo MD    Benefits: Specialized equipment and/or services available at the receiving facility (Include comment)________________________(Gyn onc / colorectal)    Risks: Potential for delay in receiving treatment, Potential deterioration of medical condition, Loss of IV, Increased discomfort during transfer      Consent for Transfer:  I acknowledge that my medical condition has been evaluated and explained to me by the emergency department physician or other qualified medical person and/or my attending physician, who has recommended that I be transferred to the service of  Accepting Physician: Zeb Headings at [address], [address] : [facility]  The above potential benefits of such transfer, the potential risks associated with such transfer, and the probable risks of not being transferred have been explained to me, and I fully understand them  The doctor has explained that, in my case, the benefits of transfer outweigh the risks  I agree to be transferred  I authorize the performance of emergency medical procedures and treatments upon me in both transit and upon arrival at the receiving facility  Additionally, I authorize the release of any and all medical records to the receiving facility and request they be transported with me, if possible  I understand that the safest mode of transportation during a medical emergency is an ambulance and that the Hospital advocates the use of this mode of transport   Risks of traveling to the receiving facility by car, including absence of medical control, life sustaining equipment, such as oxygen, and medical personnel has been explained to me and I fully understand them  (HAKEEM CORRECT BOX BELOW)  [  ]  I consent to the stated transfer and to be transported by ambulance/helicopter  [  ]  I consent to the stated transfer, but refuse transportation by ambulance and accept full responsibility for my transportation by car  I understand the risks of non-ambulance transfers and I exonerate the Hospital and its staff from any deterioration in my condition that results from this refusal     X___________________________________________    DATE  07/15/19  TIME________  Signature of patient or legally responsible individual signing on patient behalf           RELATIONSHIP TO PATIENT_________________________          Provider Certification    NAME Hermelinda Croft                                         1950                              MRN [id number]    A medical screening exam was performed on the above named patient  Based on the examination:    Condition Necessitating Transfer The encounter diagnosis was SBO (small bowel obstruction) ([address] )      Patient Condition: The patient has been stabilized such that within reasonable medical probability, no material deterioration of the patient condition or the condition of the unborn child(liza) is likely to result from the transfer    Reason for Transfer: Level of Care needed not available at this facility    Transfer Requirements: Facility B   · Space available and qualified personnel available for treatment as acknowledged by PACS  · Agreed to accept transfer and to provide appropriate medical treatment as acknowledged by       Josey Shah  · Appropriate medical records of the examination and treatment of the patient are provided at the time of transfer   [address] _______  · Transfer will be performed by qualified personnel from [facility]  and appropriate transfer equipment as required, including the use of necessary and appropriate life support measures  Provider Certification: I have examined the patient and explained the following risks and benefits of being transferred/refusing transfer to the patient/family:  General risk, such as traffic hazards, adverse weather conditions, rough terrain or turbulence, possible failure of equipment (including vehicle or aircraft), or consequences of actions of persons outside the control of the transport personnel, Unanticipated needs of medical equipment and personnel during transport, Risk of worsening condition, The possibility of a transport vehicle being unavailable      Based on these reasonable risks and benefits to the patient and/or the unborn child(liza), and based upon the information available at the time of the patients examination, I certify that the medical benefits reasonably to be expected from the provision of appropriate medical treatments at another medical facility outweigh the increasing risks, if any, to the individuals medical condition, and in the case of labor to the unborn child, from effecting the transfer      X____________________________________________ DATE 07/15/19        TIME_______      ORIGINAL - SEND TO MEDICAL RECORDS   COPY - SEND WITH PATIENT DURING TRANSFER

## 2019-07-15 NOTE — SOCIAL WORK
Initial interview:     CM met with the patient to review the CM role and discuss possible dc needs  Pt lives with her  in a 2 story home in [place], [place]  Ramp entry  2nd floor bedroom with bathroom on each level  Pt is independent, retired and drives  No DME  Hx of [facility] and [employer]  Pt denied drug, etoh or mental illness history  No POA or LW  Prescriptions are filled at [facility]  Main contact:   Gerry Moreno III [phone]  Dtr Luis Li [phone]    NK Plan - home via   Family avail to assist prn  CM reviewed d/c planning process including the following: identifying help at home, patient preference for d/c planning needs, Discharge Lounge, [facility] Meds to Bed program, availability of treatment team to discuss questions or concerns patient and/or family may have regarding understanding medications and recognizing signs and symptoms once discharged  CM also encouraged patient to follow up with all recommended appointments after discharge  Patient advised of importance for patient and family to participate in managing patients medical well being

## 2019-07-15 NOTE — ED NOTES
Patient transported to [address] [place] [address], [address] [place] [address]  07/14/19 2502

## 2019-07-15 NOTE — CONSULTS
Colorectal surgery   Consultation -  Diana Ch 70 y o  female MRN: [id number]  Unit/Bed#: [facility] 907-01 Encounter: [id number]      Assessment/Plan      Assessment:  Pt is a 71 y/o F, w PMH of ovarian ca and diverting loop ileostomy placement in 2018 for colonic perforation  CT abd pelvis on 7/14 suggestive of partial SBO  VSS, and afebrile since arrival to [facility] w nice output  Abd soft, nontender  Stoma prolapsed but pink and nontender  Noted improvement of symptoms s/p NGT placement  Plan:  NPO  IVF  NGT  Nausea control  Pain control  Serial abd exams  Discussed w attending      History of Present Illness   Physician Requesting Consult: Ayde Lozoya MD  Reason for Consult / Principal Problem: Concern for SBO and prolapsed ostomy  Hx and PE limited by: No limitations  HPI: Diana Ch is a 70y o  year old female who presents with abdominal pain, nausea, and vomiting  Pt transferred from [facility] early this morning  Pt reported having emergent exlap for bowel perforation in Dec, 2018 2/2 adverse rxn to chemotherapy  Reported normal functioning of ileostomy until 7/5/19 when she noticed her stoma became firm, tender and had increased in size  Yesterday after eating a muffin she developed distension, severe pain and nausea  Reported having multiple episodes of dry heaves and nonbilious non bloody vomiting and reported to ED  Now reported reduced abd distension/ pain/ and nausea after NGT was placed  Noted that ileostomy has had reduced, but continual output since symptoms started yesterday  Denied fever, chills, chest pain, shortness of breath, nausea, vomiting, this morning  Inpatient consult to Colorectal Surgery     Date/Time 7/15/2019 6:24 AM     Performed by  Lavonne Obrien MD     Authorized by Scooby Mandujano MD              Review of Systems   Constitutional: Negative for diaphoresis, fatigue and fever  HENT: Negative           NGT in place to suction Respiratory: Negative for apnea, choking, chest tightness and shortness of breath  Cardiovascular: Negative for chest pain, palpitations and leg swelling  Gastrointestinal: Positive for abdominal distention, nausea and vomiting  N/V improved s/p NGT placement   Endocrine: Negative  Genitourinary: Negative  Skin: Negative  Allergic/Immunologic: Negative  Neurological: Negative  Hematological: Negative  Historical Information   Past Medical History:   Diagnosis Date    Abdominal fluid collection     Anemia     Asthma     Cancer ([facility] [address] )     ovaries    Diabetes mellitus ([facility] )     History of transfusion      Past Surgical History:   Procedure Laterality Date    CT GUIDED PARACENTESIS  11/6/2018    CT GUIDED PERC DRAINAGE CATHETER PLACEMENT  12/24/2018    CT NEEDLE BIOPSY PERITONEUM  11/6/2018    ECTOPIC PREGNANCY SURGERY      ECTOPIC PREGNANCY SURGERY      IR PARACENTESIS  9/18/2018    IR PARACENTESIS  10/18/2018    IR PARACENTESIS  12/10/2018    IR PORT PLACEMENT  11/29/2018    LAPAROTOMY N/A 12/14/2018    Procedure: LAPAROTOMY EXPLORATORY; Abdominal Washout; Application of Abthera Vac Dressing;  Surgeon: Divine Laughlin MD;  [facility]: [facility];  Service: Gynecology Oncology    LAPAROTOMY N/A 12/15/2018    Procedure: LAPAROTOMY EXPLORATORY, ABDOMINAL WASHOUT, DRAIN PLACEMENT x 4, DIVERTING LOOP ILEOSTOMY AND ABDOMINAL CLOSURE,  ALL OTHER INDICATED PROCEDURES;  Surgeon: Divine Laughlin MD;  [facility]: [facility];  Service: Gynecology Oncology    IN COLONOSCOPY FLX DX [facility] WHEN PFRMD N/A 9/25/2018    Procedure: EGD AND COLONOSCOPY;  Surgeon: Noemi Warren MD;  Location: [facility];   Service: Gastroenterology    TONSILECTOMY AND ADNOIDECTOMY      TONSILLECTOMY       Social History   Social History     Substance and Sexual Activity   Alcohol Use Not Currently    Comment: occassionally     Social History     Substance and Sexual Activity   Drug Use No Social History     Tobacco Use   Smoking Status Former Smoker   Smokeless Tobacco Never Used   Tobacco Comment    "Quit 40 yrs ago"     Family History   Problem Relation Age of Onset    Cirrhosis Mother     Colon cancer Mother     Leukemia Cousin     Diabetes Father        Meds/Allergies   all current active meds have been reviewed    No Known Allergies    Objective     Intake/Output Summary (Last 24 hours) at 7/15/2019 1843  Last data filed at 7/15/2019 0445  Gross per 24 hour   Intake 0 ml   Output 0 ml   Net 0 ml       Invasive Devices     Central Venous Catheter Line            Port A Cath 11/29/18 Right Chest 228 days          Peripheral Intravenous Line            Peripheral IV 07/14/19 Left Antecubital less than 1 day          Drain            Ileostomy Loop  days    NG/OG/Enteral Tube Nasogastric 18 Fr Left nares less than 1 day                Physical Exam   Constitutional: She is oriented to person, place, and time  She appears well-developed and well-nourished  HENT:   Head: Normocephalic and atraumatic  Neck: Neck supple  No JVD present  Cardiovascular: Normal rate, regular rhythm and normal heart sounds  Exam reveals no friction rub  No murmur heard  Pulmonary/Chest: Effort normal  No respiratory distress  She has no rales  Abdominal: Soft  Bowel sounds are normal  She exhibits no distension  There is no tenderness  There is no guarding  Ileostomy with good output  Stoma prolapse present, firm, but pink  Nontender  Genitourinary:   Genitourinary Comments: deferred   Musculoskeletal: She exhibits no edema  Neurological: She is alert and oriented to person, place, and time  Skin: Skin is warm  Capillary refill takes less than 2 seconds  Psychiatric: She has a normal mood and affect         CBC and differential [[id number]] (Abnormal) Collected: 07/15/19 0413   Lab Status: Final result Specimen: Blood from Arm, Right Updated: 07/15/19 0422    WBC 5 60 Thousand/uL     RBC 3 16Low  Million/uL     Hemoglobin 9 5Low  g/dL     Hematocrit 28 9Low  %     MCV 92 fL     MCH 30 1 pg     MCHC 32 9 g/dL     RDW 13 6 %     MPV 10 8 fL     Platelets 871 Thousands/uL     nRBC 0 /100 WBCs     Neutrophils Relative 65 %     Immat GRANS % 1 %     Lymphocytes Relative 28 %     Monocytes Relative 5 %     Eosinophils Relative 0 %     Basophils Relative 1 %     Neutrophils Absolute 3 66 Thousands/µL     Immature Grans Absolute 0 04 Thousand/uL     Lymphocytes Absolute 1 56 Thousands/µL     Monocytes Absolute 0 29 Thousand/µL     Eosinophils Absolute 0 01 Thousand/µL     Basophils Absolute 0 04 Thousands/µL        Results from last 7 days   Lab Units 07/15/19  0413   SODIUM mmol/L 139   CHLORIDE mmol/L 110*   CO2 mmol/L 21   BUN mg/dL 25   CREATININE mg/dL 1 25   CALCIUM mg/dL 9 3   ALK PHOS U/L 99   ALT U/L 20   AST U/L 14       Lab Results: I have personally reviewed pertinent lab results  Imaging Studies: I have personally reviewed pertinent reports  Code Status: Level 1 - Full Code  Advance Directive and Living Will:      Power of :    POLST:      Counseling / Coordination of Care  Total floor / unit time spent today 30 minutes  Greater than 50% of total time was spent with the patient and / or family counseling and / or coordination of care   A description of the counseling / coordination of care: 30

## 2019-07-15 NOTE — QUICK NOTE
KUB shows NG tube coiling at the fundus of the stomach, with the tip extending back to the GE junction  Due to these findings, would recommend nursing to pull NG tube back approximately 3 cm to allow for uncoiling  Will monitor output      Ghassan Lynn MD  OB/GYN PGY-4  7/15/2019 6:47 PM

## 2019-07-15 NOTE — RESTORATIVE TECHNICIAN NOTE
Restorative Specialist Mobility Note       Activity: Ambulate in room, Ambulate in dwyer, [address] privileges, Stand at bedside, Dangle(Educated/encouraged pt to ambulate with assistance 3-4 x's/day   Pt callbell, phone/tray within reach )     Assistive Device: None(NG Tube hooked up to wall suction )       Jan ROSS, Restorative Technician, [employer]

## 2019-07-15 NOTE — PLAN OF CARE
Problem: Prexisting or High Potential for Compromised Skin Integrity  Goal: Skin integrity is maintained or improved  Description  INTERVENTIONS:  - Identify patients at risk for skin breakdown  - Assess and monitor skin integrity  - Assess and monitor nutrition and hydration status  - Monitor labs (i e  albumin)  - Assess for incontinence   - Turn and reposition patient  - Assist with mobility/ambulation  - Relieve pressure over bony prominences  - Avoid friction and shearing  - Provide appropriate hygiene as needed including keeping skin clean and dry  - Evaluate need for skin moisturizer/barrier cream  - Collaborate with interdisciplinary team (i e  Nutrition, Rehabilitation, etc )   - Patient/family teaching  Outcome: Progressing     Problem: Nutrition/Hydration-ADULT  Goal: Nutrient/Hydration intake appropriate for improving, restoring or maintaining nutritional needs  Description  Monitor and assess patient's nutrition/hydration status for malnutrition (ex- brittle hair, bruises, dry skin, pale skin and conjunctiva, muscle wasting, smooth red tongue, and disorientation)  Collaborate with interdisciplinary team and initiate plan and interventions as ordered  Monitor patient's weight and dietary intake as ordered or per policy  Utilize nutrition screening tool and intervene per policy  Determine patient's food preferences and provide high-protein, high-caloric foods as appropriate       INTERVENTIONS:  - Monitor oral intake, urinary output, labs, and treatment plans  - Assess nutrition and hydration status and recommend course of action  - Evaluate amount of meals eaten  - Assist patient with eating if necessary   - Allow adequate time for meals  - Recommend/ encourage appropriate diets, oral nutritional supplements, and vitamin/mineral supplements  - Order, calculate, and assess calorie counts as needed  - Recommend, monitor, and adjust tube feedings and TPN/PPN based on assessed needs  - Assess need for intravenous fluids  - Provide specific nutrition/hydration education as appropriate  - Include patient/family/caregiver in decisions related to nutrition  Outcome: Not Progressing

## 2019-07-15 NOTE — PROGRESS NOTES
Ng tube secure untaped from nose and Ng tube length was pulled back 3cm as per nurse communication orders from gynecology-oncology team  Re-secured at tip of nose with new band-aid in place

## 2019-07-15 NOTE — ED PROVIDER NOTES
History  Chief Complaint   Patient presents with    Abdominal Pain     Pt states that she is having severe abdominal pain  on right lower quadrant  Pt has an ileostomy and states her stoma is hard and has been nauseous all day  70-year-old female with a history of stage IV ovarian cancer with extensive surgical history including ileostomy prolapse presents with progressive ongoing abdominal pain that started last night has progressed after taking a Gas-X earlier today  Patient subsequently has been unable to tolerate oral intake with multiple episodes of nonbloody, nonbilious emesis  Patient describes severe "gas pain" that worsened today and continues in the ER  Patient states certain movement aggravates and oral intake the pain and nothing alleviates it  Medical Decision Making   Abdominal pain with a broad differential  Will establish IV access and make patient NPO considering possibility of surgical intervention required  Will administer fentanyl for pain control  Differential includes significant likelihood of intra-abdominal pathology and based on patients exam findings and history  Will obtain EKG and troponin to evaluate for potential referred pain related to ACS  Will obtain CBC to evaluate for anemia and leukocytosis  Will obtain CMP to evaluate electrolytes, renal and hepatic function  Will obtain lipase to evaluate for potential pancreatitis  Will obtain lactate to evaluate for mesenteric ischemia and sepsis  Based on patients history, physical exam and presenting complaint, will obtain noncontrast CT imaging of patients abdomen and pelvis to further evaluate for possible intraabdominal pathology including obstruction  Medical record information:    Recent H&P:  1  Stage IVB ovarian cancer:  Ongoing chemotherapy with single agent Taxol at this time  Defer plan for eventual surgical intervention to Dr Itzel Escoto      2   Ileostomy prolapse:  Patient brought in to have enterostomal evaluation as she had difficulty adjusting pouch around a largely enlarged/prolapsed stoma  Appreciate enter stomal care input and colorectal surgery consultation  She follow-up with them for colonoscopy in anticipation of possible ileostomy takedown to be coordinated with Dr Mackenzie Cornejo      Colorectal consult:  Patient with 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, perforation likely due to avastin therapy, underwent washout of peritoneal contaminationdiverting loop ileostomy, this has some prolapse/incarceration, but is softening by her report and she would like to go home at this point given she has seen wound ostomy care  Initially thought to be colostomy and patient was scheduled for colonoscopy, however will cancel this as she has had colonoscopy within the last 1 year and would be unable to prep her as diverted to small bowel, will bring her in for office visit to reassess and discuss with her attending Dr Mackenzie Cornejo on further evaluation and surgical planning  History provided by:  Patient  Abdominal Pain   Pain location:  Generalized  Pain quality comment:  Gas like  Pain radiates to:  Does not radiate  Pain severity:  Severe  Onset quality:  Sudden  Timing:  Constant  Progression:  Worsening  Relieved by:  Nothing  Worsened by: Movement, palpation and eating  Ineffective treatments:  OTC medications  Associated symptoms: anorexia, nausea and vomiting    Associated symptoms: no belching, no chest pain, no chills, no constipation, no cough, no diarrhea, no dysuria, no fatigue, no fever, no hematemesis, no hematochezia, no hematuria, no melena, no shortness of breath and no sore throat        Prior to Admission Medications   Prescriptions Last Dose Informant Patient Reported?  Taking?   acetaminophen (TYLENOL) 325 mg tablet  Self No No   Sig: Take 2 tablets (650 mg total) by mouth every 6 (six) hours as needed for mild pain, headaches or fever   apixaban (ELIQUIS) 5 mg  Self No No   Sig: Take 1 tablet PO twice daily   aspirin-acetaminophen-caffeine (EXCEDRIN MIGRAINE) 250-250-65 MG per tablet  Self Yes No   Sig: Take 1 tablet by mouth every 6 (six) hours as needed for headaches   lidocaine-prilocaine (EMLA) cream  Self No No   Sig: Apply to port site prior to labs/chemo   metFORMIN (GLUCOPHAGE) 500 mg tablet  Self Yes No   Sig: Take 500 mg by mouth 2 (two) times a day with meals   ondansetron (ZOFRAN) 8 mg tablet  Self No No   Sig: Take 1 tablet (8 mg total) by mouth every 8 (eight) hours as needed for nausea or vomiting   pantoprazole (PROTONIX) 40 mg tablet  Self No No   Sig: Take 1 tablet (40 mg total) by mouth 2 (two) times a day before meals      Facility-Administered Medications: None       Past Medical History:   Diagnosis Date    Abdominal fluid collection     Anemia     Asthma     Cancer ([facility] )     ovaries    Diabetes mellitus ([facility] )     History of transfusion        Past Surgical History:   Procedure Laterality Date    CT GUIDED PARACENTESIS  11/6/2018    CT GUIDED PERC DRAINAGE CATHETER PLACEMENT  12/24/2018    CT NEEDLE BIOPSY PERITONEUM  11/6/2018    ECTOPIC PREGNANCY SURGERY      ECTOPIC PREGNANCY SURGERY      IR PARACENTESIS  9/18/2018    IR PARACENTESIS  10/18/2018    IR PARACENTESIS  12/10/2018    IR PORT PLACEMENT  11/29/2018    LAPAROTOMY N/A 12/14/2018    Procedure: LAPAROTOMY EXPLORATORY; Abdominal Washout;  Application of Abthera Vac Dressing;  Surgeon: Loren Ladd MD;  [facility];  Service: Gynecology Oncology    LAPAROTOMY N/A 12/15/2018    Procedure: LAPAROTOMY EXPLORATORY, ABDOMINAL WASHOUT, DRAIN PLACEMENT x 4, DIVERTING LOOP ILEOSTOMY AND ABDOMINAL CLOSURE,  ALL OTHER INDICATED PROCEDURES;  Surgeon: Loren Ladd MD;  [facility];  Service: Gynecology Oncology    UT COLONOSCOPY FLX DX W/COLLJ [facility] WHEN PFRMD N/A 9/25/2018    Procedure: EGD AND COLONOSCOPY;  Surgeon: Priya Mendiola MD;  Location: [facility]; Service: Gastroenterology    TONSILECTOMY AND ADNOIDECTOMY      TONSILLECTOMY         Family History   Problem Relation Age of Onset    Cirrhosis Mother     Colon cancer Mother     Leukemia Cousin     Diabetes Father      I have reviewed and agree with the history as documented  Social History     Tobacco Use    Smoking status: Former Smoker    Smokeless tobacco: Never Used    Tobacco comment: "Quit 40 yrs ago"   Substance Use Topics    Alcohol use: Not Currently     Comment: occassionally    Drug use: No        Review of Systems   Constitutional: Negative for chills, fatigue and fever  HENT: Negative for sore throat  Respiratory: Negative for cough and shortness of breath  Cardiovascular: Negative for chest pain  Gastrointestinal: Positive for abdominal pain, anorexia, nausea and vomiting  Negative for constipation, diarrhea, hematemesis, hematochezia and melena  Genitourinary: Negative for dysuria and hematuria  Physical Exam  Physical Exam   Constitutional: She appears well-developed and well-nourished  HENT:   Head: Normocephalic and atraumatic  Mouth/Throat: Oropharynx is clear and moist    Eyes: Pupils are equal, round, and reactive to light  EOM are normal    Neck: Neck supple  Cardiovascular: Normal rate and regular rhythm  Pulmonary/Chest: Effort normal and breath sounds normal    Abdominal: Soft  There is generalized tenderness  There is no rebound and no guarding  Ileostomy with mild prolapse, minimal output after reportedly being emptied this morning  Neurological: She is alert  Skin: Skin is warm and dry  Psychiatric: She has a normal mood and affect  Vitals reviewed        Vital Signs  ED Triage Vitals [07/14/19 2121]   Temperature Pulse Respirations Blood Pressure SpO2   97 8 °F (36 6 °C) 100 (!) 25 139/100 98 %      Temp Source Heart Rate Source Patient Position - Orthostatic VS BP Location FiO2 (%)   Oral Monitor Sitting Left arm --      Pain Score Worst Possible Pain           Vitals:    07/14/19 2145 07/14/19 2200 07/14/19 2230 07/15/19 0045   BP: 156/71 154/72 142/67 131/66   Pulse:  78 80 82   Patient Position - Orthostatic VS:             Visual Acuity      ED Medications  Medications   fentanyl citrate (PF) 100 MCG/2ML 25 mcg (25 mcg Intravenous Given 7/14/19 2146)   ondansetron (ZOFRAN) injection 4 mg (4 mg Intravenous Given 7/14/19 2139)   lidocaine (URO-JET) 2 % urethral/mucosal gel 1 application (1 application Urethral Given 7/15/19 0108)   midazolam (VERSED) injection 0 5 mg (0 5 mg Intravenous Given 7/15/19 0139)   fentanyl citrate (PF) 100 MCG/2ML 25 mcg (25 mcg Intravenous Given 7/15/19 0037)       Diagnostic Studies  Results Reviewed     Procedure Component Value Units Date/Time    Lactic acid, plasma [[id number]]  (Normal) Collected:  07/14/19 2138    Lab Status:  Final result Specimen:  Blood from Arm, Left Updated:  07/14/19 2224     LACTIC ACID 2 0 mmol/L     Narrative:       Result may be elevated if tourniquet was used during collection      Troponin I [[id number]]  (Normal) Collected:  07/14/19 2138    Lab Status:  Final result Specimen:  Blood from Arm, Left Updated:  07/14/19 2224     Troponin I <0 02 ng/mL     CMP [[id number]]  (Abnormal) Collected:  07/14/19 2138    Lab Status:  Final result Specimen:  Blood from Arm, Left Updated:  07/14/19 2221     Sodium 139 mmol/L      Potassium 4 4 mmol/L      Chloride 104 mmol/L      CO2 20 mmol/L      ANION GAP 15 mmol/L      BUN 25 mg/dL      Creatinine 1 38 mg/dL      Glucose 216 mg/dL      Calcium 10 3 mg/dL      AST 16 U/L      ALT 20 U/L      Alkaline Phosphatase 112 U/L      Total Protein 8 1 g/dL      Albumin 3 5 g/dL      Total Bilirubin 0 30 mg/dL      eGFR 39 ml/min/1 73sq m     Narrative:       [place] guidelines for Chronic Kidney Disease (CKD):     Stage 1 with normal or high GFR (GFR > 90 mL/min/1 73 square meters)    Stage 2 Mild CKD (GFR = 60-89 mL/min/1 73 square meters)    Stage 3A Moderate CKD (GFR = 45-59 mL/min/1 73 square meters)    Stage 3B Moderate CKD (GFR = 30-44 mL/min/1 73 square meters)    Stage 4 Severe CKD (GFR = 15-29 mL/min/1 73 square meters)    Stage 5 End Stage CKD (GFR <15 mL/min/1 73 square meters)  Note: GFR calculation is accurate only with a steady state creatinine    Lipase [[id number]]  (Normal) Collected:  07/14/19 2138    Lab Status:  Final result Specimen:  Blood from Arm, Left Updated:  07/14/19 2221     Lipase 88 u/L     CBC and differential [[id number]]  (Abnormal) Collected:  07/14/19 2138    Lab Status:  Final result Specimen:  Blood from Arm, Left Updated:  07/14/19 2204     WBC 7 20 Thousand/uL      RBC 3 52 Million/uL      Hemoglobin 10 6 g/dL      Hematocrit 31 7 %      MCV 90 fL      MCH 30 1 pg      MCHC 33 4 g/dL      RDW 13 6 %      MPV 11 3 fL      Platelets 232 Thousands/uL      nRBC 0 /100 WBCs      Neutrophils Relative 69 %      Immat GRANS % 1 %      Lymphocytes Relative 24 %      Monocytes Relative 5 %      Eosinophils Relative 0 %      Basophils Relative 1 %      Neutrophils Absolute 5 04 Thousands/µL      Immature Grans Absolute 0 05 Thousand/uL      Lymphocytes Absolute 1 69 Thousands/µL      Monocytes Absolute 0 37 Thousand/µL      Eosinophils Absolute 0 01 Thousand/µL      Basophils Absolute 0 04 Thousands/µL                  CT abdomen pelvis wo contrast   Final Result by Darya Love MD (07/15 0009)      Findings consistent with small bowel obstruction involving a loop of small bowel proximal to site of ileostomy, possibly secondary to adhesion  Findings discussed with Dr Andree Arroyo at 12:09 AM, 7/15/2019               Workstation performed: [id number]                    Procedures  Procedures       ED Course  ED Course as of Jul 15 0410   Sun Jul 14, 2019 2224 EKG demonstrates normal sinus rhythm with no acute ST segment changes  There is no PVCs as seen in prior EKG    There is inversion of the T-wave in aVL  Otherwise, nondiagnostic due to prior PVCs  2357 improving   Hemoglobin(!): 10 6   2358 baseline   Creatinine(!): 1 38   2358 IMPRESSION:  1  Distended gallbladder  There is cholelithiasis  No adjacent stranding or fluid  Correlate with physical  exam  If there is clinical suspicion for acute cholecystitis, consider ultrasound  2  There is fluid in the distal esophagus  Patient may be an aspiration risk  3  Right lower quadrant ileostomy  There is distention and stasis in portions of the small bowel  Suspect partial obstruction  4  Prominent uterus  Suspect fibroids  2358 Patient without signs or symptoms concerning for potential cholecystitis despite CT read  Patient's symptoms more associated with possibility of partial small bowel obstruction also seen on CT scan  Will transfer patient to [address] due to unavailability of gynecology Oncology  Discussed with Dr Barbara Herrera of [facility] who requested NGT and accepted transfer to [facility] for continued monitoring and evaluation  MDM    Disposition  Final diagnoses:   SBO (small bowel obstruction) ([facility] [address] )     Time reflects when diagnosis was documented in both MDM as applicable and the Disposition within this note     Time User Action Codes Description Comment    7/15/2019 12:56 AM Eber Lance Add [K56 609] SBO (small bowel obstruction) [facility])       ED Disposition     ED Disposition Condition Date/Time Comment    Transfer to Another [facility] Jul 15, 2019 12:56 AM Lio Quiñones should be transferred out to B          MD Documentation      Most Recent Value   Patient Condition  The patient has been stabilized such that within reasonable medical probability, no material deterioration of the patient condition or the condition of the unborn child(liza) is likely to result from the transfer   Reason for Transfer  Level of Care needed not available at this facility   Benefits of Transfer Specialized equipment and/or services available at the receiving facility (Include comment)________________________ Lattie Bushy onc / colorectal]   Risks of Transfer  Potential for delay in receiving treatment, Potential deterioration of medical condition, Loss of IV, Increased discomfort during transfer   Accepting Physician  [address] [facility] [address] Name, [facility]   [facility]    (Name & Tel number)  PACS   [facility]  [place]   Sending MD  [facility]   Provider Certification  General risk, such as traffic hazards, adverse weather conditions, rough terrain or turbulence, possible failure of equipment (including vehicle or aircraft), or consequences of actions of persons outside the control of the transport personnel, Unanticipated needs of medical equipment and personnel during transport, Risk of worsening condition, The possibility of a transport vehicle being unavailable      RN Documentation      Most [facility] Name, [facility]   [facility]    (Name & Tel number)  PACS   Transported by (Company and Unit [facility]      Follow-up Information    None         Discharge Medication List as of 7/15/2019  1:52 AM      CONTINUE these medications which have NOT CHANGED    Details   metFORMIN (GLUCOPHAGE) 500 mg tablet Take 500 mg by mouth 2 (two) times a day with meals, Historical Med      pantoprazole (PROTONIX) 40 mg tablet Take 1 tablet (40 mg total) by mouth 2 (two) times a day before meals, Starting Sat 12/29/2018, No Print      acetaminophen (TYLENOL) 325 mg tablet Take 2 tablets (650 mg total) by mouth every 6 (six) hours as needed for mild pain, headaches or fever, Starting Sat 12/29/2018, No Print      apixaban (ELIQUIS) 5 mg Take 1 tablet PO twice daily, Normal      aspirin-acetaminophen-caffeine (EXCEDRIN MIGRAINE) 250-250-65 MG per tablet Take 1 tablet by mouth every 6 (six) hours as needed for headaches, Historical Med lidocaine-prilocaine (EMLA) cream Apply to port site prior to labs/chemo, Normal      ondansetron (ZOFRAN) 8 mg tablet Take 1 tablet (8 mg total) by mouth every 8 (eight) hours as needed for nausea or vomiting, Starting Thu 3/21/2019, Normal           No discharge procedures on file      ED Provider  Electronically Signed by           Benedicto Lombardi MD  07/16/19 6774

## 2019-07-16 ENCOUNTER — APPOINTMENT (INPATIENT)
Dept: RADIOLOGY | Facility: HOSPITAL | Age: 69
DRG: 394 | End: 2019-07-16
Payer: MEDICARE

## 2019-07-16 ENCOUNTER — HOSPITAL ENCOUNTER (OUTPATIENT)
Dept: INFUSION CENTER | Facility: CLINIC | Age: 69
Discharge: HOME | End: 2019-07-16

## 2019-07-16 LAB
ANION GAP SERPL CALCULATED.3IONS-SCNC: 4 MMOL/L (ref 4–13)
BASOPHILS # BLD AUTO: 0.03 THOUSANDS/ΜL (ref 0–0.1)
BASOPHILS NFR BLD AUTO: 1 % (ref 0–1)
BUN SERPL-MCNC: 18 MG/DL (ref 5–25)
CALCIUM SERPL-MCNC: 9.5 MG/DL (ref 8.3–10.1)
CHLORIDE SERPL-SCNC: 109 MMOL/L (ref 100–108)
CO2 SERPL-SCNC: 24 MMOL/L (ref 21–32)
CREAT SERPL-MCNC: 1.16 MG/DL (ref 0.6–1.3)
EOSINOPHIL # BLD AUTO: 0.04 THOUSAND/ΜL (ref 0–0.61)
EOSINOPHIL NFR BLD AUTO: 1 % (ref 0–6)
ERYTHROCYTE [DISTWIDTH] IN BLOOD BY AUTOMATED COUNT: 13.9 % (ref 11.6–15.1)
GFR SERPL CREATININE-BSD FRML MDRD: 48 ML/MIN/1.73SQ M
GLUCOSE SERPL-MCNC: 205 MG/DL (ref 65–140)
HCT VFR BLD AUTO: 29.4 % (ref 34.8–46.1)
HGB BLD-MCNC: 9.4 G/DL (ref 11.5–15.4)
IMM GRANULOCYTES # BLD AUTO: 0.02 THOUSAND/UL (ref 0–0.2)
IMM GRANULOCYTES NFR BLD AUTO: 0 % (ref 0–2)
LYMPHOCYTES # BLD AUTO: 1.18 THOUSANDS/ΜL (ref 0.6–4.47)
LYMPHOCYTES NFR BLD AUTO: 21 % (ref 14–44)
MCH RBC QN AUTO: 30 PG (ref 26.8–34.3)
MCHC RBC AUTO-ENTMCNC: 32 G/DL (ref 31.4–37.4)
MCV RBC AUTO: 94 FL (ref 82–98)
MONOCYTES # BLD AUTO: 0.87 THOUSAND/ΜL (ref 0.17–1.22)
MONOCYTES NFR BLD AUTO: 15 % (ref 4–12)
NEUTROPHILS # BLD AUTO: 3.6 THOUSANDS/ΜL (ref 1.85–7.62)
NEUTS SEG NFR BLD AUTO: 62 % (ref 43–75)
NRBC BLD AUTO-RTO: 0 /100 WBCS
PLATELET # BLD AUTO: 318 THOUSANDS/UL (ref 149–390)
PMV BLD AUTO: 11 FL (ref 8.9–12.7)
POTASSIUM SERPL-SCNC: 4.2 MMOL/L (ref 3.5–5.3)
RBC # BLD AUTO: 3.13 MILLION/UL (ref 3.81–5.12)
SODIUM SERPL-SCNC: 137 MMOL/L (ref 136–145)
WBC # BLD AUTO: 5.74 THOUSAND/UL (ref 4.31–10.16)

## 2019-07-16 PROCEDURE — 74018 RADEX ABDOMEN 1 VIEW: CPT

## 2019-07-16 PROCEDURE — 85025 COMPLETE CBC W/AUTO DIFF WBC: CPT | Performed by: SURGERY

## 2019-07-16 PROCEDURE — 99232 SBSQ HOSP IP/OBS MODERATE 35: CPT | Performed by: OBSTETRICS & GYNECOLOGY

## 2019-07-16 PROCEDURE — 80048 BASIC METABOLIC PNL TOTAL CA: CPT | Performed by: SURGERY

## 2019-07-16 PROCEDURE — NC001 PR NO CHARGE: Performed by: OBSTETRICS & GYNECOLOGY

## 2019-07-16 RX ORDER — PROMETHAZINE HYDROCHLORIDE 25 MG/ML
12.5 INJECTION, SOLUTION INTRAMUSCULAR; INTRAVENOUS EVERY 6 HOURS PRN
Status: DISCONTINUED | OUTPATIENT
Start: 2019-07-16 | End: 2019-07-19 | Stop reason: HOSPADM

## 2019-07-16 RX ORDER — PANTOPRAZOLE SODIUM 40 MG/1
40 INJECTION, POWDER, FOR SOLUTION INTRAVENOUS
Status: DISCONTINUED | OUTPATIENT
Start: 2019-07-17 | End: 2019-07-19 | Stop reason: HOSPADM

## 2019-07-16 RX ORDER — METOCLOPRAMIDE HYDROCHLORIDE 5 MG/ML
10 INJECTION INTRAMUSCULAR; INTRAVENOUS EVERY 6 HOURS PRN
Status: DISCONTINUED | OUTPATIENT
Start: 2019-07-16 | End: 2019-07-16

## 2019-07-16 RX ORDER — SODIUM CHLORIDE 450 MG/100ML
100 INJECTION, SOLUTION INTRAVENOUS CONTINUOUS
Status: DISCONTINUED | OUTPATIENT
Start: 2019-07-16 | End: 2019-07-18

## 2019-07-16 RX ORDER — LORAZEPAM 2 MG/ML
0.5 INJECTION INTRAMUSCULAR EVERY 6 HOURS PRN
Status: DISCONTINUED | OUTPATIENT
Start: 2019-07-16 | End: 2019-07-19 | Stop reason: HOSPADM

## 2019-07-16 RX ADMIN — MORPHINE SULFATE 2 MG: 2 INJECTION, SOLUTION INTRAMUSCULAR; INTRAVENOUS at 10:42

## 2019-07-16 RX ADMIN — LORAZEPAM 0.5 MG: 2 INJECTION INTRAMUSCULAR; INTRAVENOUS at 16:17

## 2019-07-16 RX ADMIN — MORPHINE SULFATE 2 MG: 2 INJECTION, SOLUTION INTRAMUSCULAR; INTRAVENOUS at 16:28

## 2019-07-16 RX ADMIN — SODIUM CHLORIDE 100 ML/HR: 0.45 INJECTION, SOLUTION INTRAVENOUS at 12:37

## 2019-07-16 RX ADMIN — PROMETHAZINE HYDROCHLORIDE 12.5 MG: 25 INJECTION INTRAMUSCULAR; INTRAVENOUS at 10:39

## 2019-07-16 RX ADMIN — MORPHINE SULFATE 2 MG: 2 INJECTION, SOLUTION INTRAMUSCULAR; INTRAVENOUS at 22:53

## 2019-07-16 RX ADMIN — SODIUM CHLORIDE 100 ML/HR: 0.45 INJECTION, SOLUTION INTRAVENOUS at 22:53

## 2019-07-16 RX ADMIN — ONDANSETRON 4 MG: 2 INJECTION INTRAMUSCULAR; INTRAVENOUS at 04:56

## 2019-07-16 RX ADMIN — METOCLOPRAMIDE 10 MG: 5 INJECTION, SOLUTION INTRAMUSCULAR; INTRAVENOUS at 06:37

## 2019-07-16 RX ADMIN — MORPHINE SULFATE 2 MG: 2 INJECTION, SOLUTION INTRAMUSCULAR; INTRAVENOUS at 06:31

## 2019-07-16 RX ADMIN — ONDANSETRON 4 MG: 2 INJECTION INTRAMUSCULAR; INTRAVENOUS at 14:41

## 2019-07-16 RX ADMIN — DEXTROSE, SODIUM CHLORIDE, AND POTASSIUM CHLORIDE 100 ML/HR: 5; .45; .15 INJECTION INTRAVENOUS at 06:37

## 2019-07-16 RX ADMIN — ENOXAPARIN SODIUM 90 MG: 100 INJECTION SUBCUTANEOUS at 09:52

## 2019-07-16 NOTE — DISCHARGE SUMMARY
Discharge Summary - Gynecologic Oncology  Rolly Cortés 70 y o  female MRN: [id number]  [facility] Encounter: [id number]    Admission Date: 7/15/2019   Discharge Date: 7/19/2019    Attending Physician: Rupinder Garcia  Discharge Attending: Robert Cary Physician(s): Karrie Hicks    Admitting Diagnosis:   Small bowel obstruction [facility]) [P21 [id number]]    Discharge Diagnosis: Same    Procedures Performed: None    Hospital Course: Rolly Cortés is a 69 y/o with history of ovarian cancer and diverting loop ileostomy placement in 2018 for colonic perforation who presented for possible partial SBO  Vital signs were stable and patient was afebrile since [facility], so the decision was made to proceed with conservative management of her small bowel obstruction  An NG tube was placed on hospital day 1  After KUB tube was pulled by 3 cm to coiling, and 400 cc of output occurred overnight  She was given morphine 2 mg q4h PRN for pain  On hospital day 2 patient complained of nausea and was vomiting  She was given Zofran and Phenergan PRN along with Protonix  A KUB was ordered and showed NG tube was again coiled, the tube was readjusted and the nausea and vomiting resolved  On hospital day 3 her ostomy began having increased output and her NG tube was removed after decreased output  Her diet was advanced to clears  On hospital day 4 she was advanced to regular diet and restarted on home metformin  Patient was discharged on hospital day 5 with plans for surgery on 8/19/19      Lab Results:   Lab Results   Component Value Date    WBC 5 74 07/16/2019    HGB 9 4 (L) 07/16/2019    HCT 29 4 (L) 07/16/2019    MCV 94 07/16/2019     07/16/2019     Lab Results   Component Value Date    GLUCOSE 84 12/15/2018    CALCIUM 9 5 07/16/2019    K 4 2 07/16/2019    CO2 24 07/16/2019     (H) 07/16/2019    BUN 18 07/16/2019    CREATININE 1 16 07/16/2019     Lab Results   Component Value Date/Time    POCGLU 164 (H) 12/29/2018 10:41 AM    POCGLU 119 12/29/2018 06:26 AM    POCGLU 176 (H) 12/28/2018 09:08 PM    POCGLU 169 (H) 12/28/2018 04:33 PM    POCGLU 183 (H) 12/28/2018 11:31 AM     Lab Results   Component Value Date    PTT 38 12/14/2018     Lab Results   Component Value Date    INR 1 50 (H) 12/15/2018    INR 1 49 (H) 12/14/2018    INR 1 66 (H) 12/14/2018    PROTIME 18 2 (H) 12/15/2018    PROTIME 17 9 (H) 12/14/2018    PROTIME 19 7 (H) 12/14/2018     Imaging: Summarized above    Condition at Discharge: good     Discharge Medications: See after visit summary for reconciled discharge medications provided to patient and family  Discharge instructions/Information to patient and family: See after visit summary for information provided to patient and family  Provisions for Follow-Up Care: See after visit summary for information related to follow-up care and any pertinent home health orders        Disposition: Home    Planned Readmission: Yes, given nature of disease    Code Status: Full code      aBn Guillaume MD, PGY-2  7/19/2019  8:51 AM

## 2019-07-16 NOTE — PROGRESS NOTES
Gyn Oncology Progress note   Megha Caal 70 y o  female MRN: [id number]  Unit/Bed#: [facility] 907-01 Encounter: [id number]    Assessment: 70 y o  with stage IVB ovarian cancer with history of ostomy prolapse here with concerns for SBO  Hospital day 1  Plan:    Small bowel obstruciton  - NG tube to suction  - NPO status  - NG Tube was pulled back 3cm due to coiling seen on KUB  - NG tube: 400cc output overnight  - CBC WNL; BMP pending    Stage IVB ovarian cancer  - Hold carbo/taxol treatment while inpatient    Type 2 DM  - Hold home metformin    Pain  - Morphine 2mg q4h PRN  - Last dose of morphine at approximately 2300 last night  - Patient reporting pain at this time, encouraged use of morphine    Nausea/Vomiting  - Zofran 4mg q6h PRN  - Will add reglan 10mg q6h PRN for continued nausea    FEN  - NPO  - D5 1/2 NS +KCL @ 100 cc/hr                     Subjective:    Megha Caal has reports nausea/dry heaving as well as pain this morning  She reports having relief from pain when taking morphine, but the last dose was last night at approximately 2300  She also reports nausea/dry heaving around her NG tube  Last dose of zofran was approximately 2 hours ago and patient continues to have nausea at this time  Patient is currently voiding  She is ambulating  Patient is not currently passing flatus and has had small amount of brown liquid output from ostomy  She is  NPO  Patient denies fever, chills, chest pain, shortness of breath, or calf tenderness  /71   Pulse 86   Temp 98 6 °F (37 °C)   Resp 16   Ht 5' 4" (1 626 m)   Wt 61 7 kg (136 lb)   SpO2 99%   BMI 23 34 kg/m²     I/O last 3 completed shifts: In: 7373 [I V :1255; IV Piggyback:100]  Out: 460 [Urine:350; Emesis/NG output:425]  I/O this shift:   In: 826 7 [I V :726 7; IV Piggyback:100]  Out: 900 [Urine:550; Emesis/NG output:350]    Lab Results   Component Value Date    WBC 5 60 07/15/2019    HGB 9 5 (L) 07/15/2019    HCT 28 9 (L) 07/15/2019 MCV 92 07/15/2019     07/15/2019       Lab Results   Component Value Date    GLUCOSE 84 12/15/2018    CALCIUM 9 3 07/15/2019    K 3 7 07/15/2019    CO2 21 07/15/2019     (H) 07/15/2019    BUN 25 07/15/2019    CREATININE 1 25 07/15/2019       Physical Exam   Constitutional: She is oriented to person, place, and time  Patient appears to be very uncomfortable   HENT:   Head: Normocephalic and atraumatic  Cardiovascular: Normal rate, regular rhythm and normal heart sounds  Pulmonary/Chest: Breath sounds normal  No respiratory distress  She has no wheezes  Abdominal: Soft  She exhibits no distension  There is no tenderness  Bowel sounds mildly hypoactive  Ostomy shows small amount of thin brown, liquid output   Neurological: She is alert and oriented to person, place, and time  Skin: She is not diaphoretic  Vitals reviewed           Manuel Cid MD  7/16/2019  6:28 AM

## 2019-07-16 NOTE — PROGRESS NOTES
Progress Note - Swapnil Garcia 1950, 70 y o  female MRN: [id number]    Unit/Bed#: [facility] 907-01 Encounter: [id number]    Primary Care Provider: Aldair Hsieh MD   Date and time admitted to hospital: 7/15/2019  2:54 AM        Intractable nausea and vomiting  Assessment & Plan        * SBO (small bowel obstruction) ([address] )  Assessment & Plan  Pt is a 71 y/o F, w PMH of ovarian ca and diverting loop ileostomy placement in 2018 for colonic perforation  Presented w  prolapsed  stoma and CT reading partial sbo  VSS  Afebrile  Plan:  NPO  IVF  NGT  Nausea control  Pain control  Serial abd exams                Subjective/Objective      Subjective: no acute events  Feeling nauseated this AM    Objective:     Blood pressure 142/71, pulse 86, temperature 98 6 °F (37 °C), resp  rate 16, height 5' 4" (1 626 m), weight 61 7 kg (136 lb), SpO2 99 %  ,Body mass index is 23 34 kg/m²        Intake/Output Summary (Last 24 hours) at 7/16/2019 0613  Last data filed at 7/16/2019 0504  Gross per 24 hour   Intake 2181 67 ml   Output 1675 ml   Net 506 67 ml       Invasive Devices     Central Venous Catheter Line            Port A Cath 11/29/18 Right Chest 229 days          Peripheral Intravenous Line            Peripheral IV 07/14/19 Left Antecubital 1 day          Drain            Ileostomy Loop  days    NG/OG/Enteral Tube Nasogastric 18 Fr Left nares 1 day                   Lab, Imaging and other studies:CBC: No results found for: WBC, HGB, HCT, MCV, PLT, ADJUSTEDWBC, MCH, MCHC, RDW, MPV, NRBC, CMP: No results found for: SODIUM, K, CL, CO2, ANIONGAP, BUN, CREATININE, GLUCOSE, CALCIUM, AST, ALT, ALKPHOS, PROT, BILITOT, EGFR  VTE Pharmacologic Prophylaxis: Heparin  VTE Mechanical Prophylaxis: sequential compression device

## 2019-07-16 NOTE — PLAN OF CARE
Problem: Prexisting or High Potential for Compromised Skin Integrity  Goal: Skin integrity is maintained or improved  Description  INTERVENTIONS:  - Identify patients at risk for skin breakdown  - Assess and monitor skin integrity  - Assess and monitor nutrition and hydration status  - Monitor labs (i e  albumin)  - Assess for incontinence   - Turn and reposition patient  - Assist with mobility/ambulation  - Relieve pressure over bony prominences  - Avoid friction and shearing  - Provide appropriate hygiene as needed including keeping skin clean and dry  - Evaluate need for skin moisturizer/barrier cream  - Collaborate with interdisciplinary team (i e  Nutrition, Rehabilitation, etc )   - Patient/family teaching  Outcome: Progressing     Problem: Potential for Falls  Goal: Patient will remain free of falls  Description  INTERVENTIONS:  - Assess patient frequently for physical needs  -  Identify cognitive and physical deficits and behaviors that affect risk of falls  -  [place] fall precautions as indicated by assessment   - Educate patient/family on patient safety including physical limitations  - Instruct patient to call for assistance with activity based on assessment  - Modify environment to reduce risk of injury  - Consider OT/PT consult to assist with strengthening/mobility  Outcome: Progressing     Problem: Nutrition/Hydration-ADULT  Goal: Nutrient/Hydration intake appropriate for improving, restoring or maintaining nutritional needs  Description  Monitor and assess patient's nutrition/hydration status for malnutrition (ex- brittle hair, bruises, dry skin, pale skin and conjunctiva, muscle wasting, smooth red tongue, and disorientation)  Collaborate with interdisciplinary team and initiate plan and interventions as ordered  Monitor patient's weight and dietary intake as ordered or per policy  Utilize nutrition screening tool and intervene per policy   Determine patient's food preferences and provide high-protein, high-caloric foods as appropriate       INTERVENTIONS:  - Monitor oral intake, urinary output, labs, and treatment plans  - Assess nutrition and hydration status and recommend course of action  - Evaluate amount of meals eaten  - Assist patient with eating if necessary   - Allow adequate time for meals  - Recommend/ encourage appropriate diets, oral nutritional supplements, and vitamin/mineral supplements  - Order, calculate, and assess calorie counts as needed  - Recommend, monitor, and adjust tube feedings and TPN/PPN based on assessed needs  - Assess need for intravenous fluids  - Provide specific nutrition/hydration education as appropriate  - Include patient/family/caregiver in decisions related to nutrition  Outcome: Not Progressing

## 2019-07-16 NOTE — ASSESSMENT & PLAN NOTE
Pt is a 71 y/o F, w PMH of ovarian ca and diverting loop ileostomy placement in 2018 for colonic perforation  Presented w  prolapsed stoma and CT reading partial sbo  VSS  Afebrile       Plan:  NPO  IVF  NGT  Nausea control  Pain control  Serial abd exams

## 2019-07-16 NOTE — PLAN OF CARE
Problem: Nutrition/Hydration-ADULT  Goal: Nutrient/Hydration intake appropriate for improving, restoring or maintaining nutritional needs  Description  Monitor and assess patient's nutrition/hydration status for malnutrition (ex- brittle hair, bruises, dry skin, pale skin and conjunctiva, muscle wasting, smooth red tongue, and disorientation)  Collaborate with interdisciplinary team and initiate plan and interventions as ordered  Monitor patient's weight and dietary intake as ordered or per policy  Utilize nutrition screening tool and intervene per policy  Determine patient's food preferences and provide high-protein, high-caloric foods as appropriate       INTERVENTIONS:  - Monitor oral intake, urinary output, labs, and treatment plans  - Assess nutrition and hydration status and recommend course of action  - Evaluate amount of meals eaten  - Assist patient with eating if necessary   - Allow adequate time for meals  - Recommend/ encourage appropriate diets, oral nutritional supplements, and vitamin/mineral supplements  - Order, calculate, and assess calorie counts as needed  - Recommend, monitor, and adjust tube feedings and TPN/PPN based on assessed needs  - Assess need for intravenous fluids  - Provide specific nutrition/hydration education as appropriate  - Include patient/family/caregiver in decisions related to nutrition  Outcome: Progressing     Problem: Prexisting or High Potential for Compromised Skin Integrity  Goal: Skin integrity is maintained or improved  Description  INTERVENTIONS:  - Identify patients at risk for skin breakdown  - Assess and monitor skin integrity  - Assess and monitor nutrition and hydration status  - Monitor labs (i e  albumin)  - Assess for incontinence   - Turn and reposition patient  - Assist with mobility/ambulation  - Relieve pressure over bony prominences  - Avoid friction and shearing  - Provide appropriate hygiene as needed including keeping skin clean and dry  - Evaluate need for skin moisturizer/barrier cream  - Collaborate with interdisciplinary team (i e  Nutrition, Rehabilitation, etc )   - Patient/family teaching  Outcome: Progressing     Problem: Potential for Falls  Goal: Patient will remain free of falls  Description  INTERVENTIONS:  - Assess patient frequently for physical needs  -  Identify cognitive and physical deficits and behaviors that affect risk of falls    -  [place] fall precautions as indicated by assessment   - Educate patient/family on patient safety including physical limitations  - Instruct patient to call for assistance with activity based on assessment  - Modify environment to reduce risk of injury  - Consider OT/PT consult to assist with strengthening/mobility  Outcome: Progressing

## 2019-07-17 LAB
ANION GAP SERPL CALCULATED.3IONS-SCNC: 5 MMOL/L (ref 4–13)
BASOPHILS # BLD AUTO: 0.02 THOUSANDS/ΜL (ref 0–0.1)
BASOPHILS NFR BLD AUTO: 0 % (ref 0–1)
BUN SERPL-MCNC: 18 MG/DL (ref 5–25)
CALCIUM SERPL-MCNC: 9 MG/DL (ref 8.3–10.1)
CHLORIDE SERPL-SCNC: 105 MMOL/L (ref 100–108)
CO2 SERPL-SCNC: 26 MMOL/L (ref 21–32)
CREAT SERPL-MCNC: 1.09 MG/DL (ref 0.6–1.3)
EOSINOPHIL # BLD AUTO: 0.01 THOUSAND/ΜL (ref 0–0.61)
EOSINOPHIL NFR BLD AUTO: 0 % (ref 0–6)
ERYTHROCYTE [DISTWIDTH] IN BLOOD BY AUTOMATED COUNT: 13.6 % (ref 11.6–15.1)
GFR SERPL CREATININE-BSD FRML MDRD: 52 ML/MIN/1.73SQ M
GLUCOSE SERPL-MCNC: 147 MG/DL (ref 65–140)
HCT VFR BLD AUTO: 28.3 % (ref 34.8–46.1)
HGB BLD-MCNC: 9.3 G/DL (ref 11.5–15.4)
IMM GRANULOCYTES # BLD AUTO: 0.03 THOUSAND/UL (ref 0–0.2)
IMM GRANULOCYTES NFR BLD AUTO: 0 % (ref 0–2)
LYMPHOCYTES # BLD AUTO: 1.63 THOUSANDS/ΜL (ref 0.6–4.47)
LYMPHOCYTES NFR BLD AUTO: 21 % (ref 14–44)
MCH RBC QN AUTO: 30.5 PG (ref 26.8–34.3)
MCHC RBC AUTO-ENTMCNC: 32.9 G/DL (ref 31.4–37.4)
MCV RBC AUTO: 93 FL (ref 82–98)
MONOCYTES # BLD AUTO: 0.99 THOUSAND/ΜL (ref 0.17–1.22)
MONOCYTES NFR BLD AUTO: 13 % (ref 4–12)
NEUTROPHILS # BLD AUTO: 5.25 THOUSANDS/ΜL (ref 1.85–7.62)
NEUTS SEG NFR BLD AUTO: 66 % (ref 43–75)
NRBC BLD AUTO-RTO: 0 /100 WBCS
PLATELET # BLD AUTO: 322 THOUSANDS/UL (ref 149–390)
PMV BLD AUTO: 11.4 FL (ref 8.9–12.7)
POTASSIUM SERPL-SCNC: 3.8 MMOL/L (ref 3.5–5.3)
RBC # BLD AUTO: 3.05 MILLION/UL (ref 3.81–5.12)
SODIUM SERPL-SCNC: 136 MMOL/L (ref 136–145)
WBC # BLD AUTO: 7.93 THOUSAND/UL (ref 4.31–10.16)

## 2019-07-17 PROCEDURE — C9113 INJ PANTOPRAZOLE SODIUM, VIA: HCPCS | Performed by: STUDENT IN AN ORGANIZED HEALTH CARE EDUCATION/TRAINING PROGRAM

## 2019-07-17 PROCEDURE — 99233 SBSQ HOSP IP/OBS HIGH 50: CPT | Performed by: COLON & RECTAL SURGERY

## 2019-07-17 PROCEDURE — 80048 BASIC METABOLIC PNL TOTAL CA: CPT | Performed by: STUDENT IN AN ORGANIZED HEALTH CARE EDUCATION/TRAINING PROGRAM

## 2019-07-17 PROCEDURE — 99232 SBSQ HOSP IP/OBS MODERATE 35: CPT | Performed by: OBSTETRICS & GYNECOLOGY

## 2019-07-17 PROCEDURE — 85025 COMPLETE CBC W/AUTO DIFF WBC: CPT | Performed by: STUDENT IN AN ORGANIZED HEALTH CARE EDUCATION/TRAINING PROGRAM

## 2019-07-17 RX ADMIN — ENOXAPARIN SODIUM 90 MG: 100 INJECTION SUBCUTANEOUS at 08:55

## 2019-07-17 RX ADMIN — SODIUM CHLORIDE 100 ML/HR: 0.45 INJECTION, SOLUTION INTRAVENOUS at 08:56

## 2019-07-17 RX ADMIN — SODIUM CHLORIDE 100 ML/HR: 0.45 INJECTION, SOLUTION INTRAVENOUS at 19:28

## 2019-07-17 RX ADMIN — PANTOPRAZOLE SODIUM 40 MG: 40 INJECTION, POWDER, FOR SOLUTION INTRAVENOUS at 08:55

## 2019-07-17 RX ADMIN — ONDANSETRON 4 MG: 2 INJECTION INTRAMUSCULAR; INTRAVENOUS at 07:21

## 2019-07-17 NOTE — PROGRESS NOTES
Gyn Progress Note   Olena Stylesor 70 y o  female MRN: [id number]  Unit/Bed#: [facility] 907-01 Encounter: [id number]    Assessment/Plan:  70 y o  with stage IVB ovarian cancer with history of ostomy prolapse here with concerns for SBO  Hospital day 3      Plan:     Small bowel obstruciton  - Continue NG tube to suction- readjusted overnight following KUB showing tube wrapped around back to esophageal junction   - NG tube with 1750 cc overnight  - Output to ostomy overnight  - NPO status  - F/u AM labs and replete electrolytes as necessary     Stage IVB ovarian cancer  - Hold carbo/taxol treatment while inpatient  - Plan for debulking and ostomy reversal possibly next week      Type 2 DM  - Hold home metformin     Pain: Improved from yesterday  - Morphine 2mg q4h PRN     Nausea/Vomiting- much improved following NG tube readjustment ovenright  - Zofran 4mg q6h PRN  - Phenergan 12 5mg q6 PRN  - Protonix 40mg IV added     FEN  - NPO  - D5 1/2 NS +KCL @ 100 cc/hr    Subjective:   Patient denies any nausea or vomiting since NG tube adjustment and states she is feeling much better  Her pain is well controlled  She denies fevers, chills, N/V, chest pain or shortness of breath  She reports she emptied a full ostomy bag this AM with dark brown liquid  She is urinating and ambulating without difficulty      /64   Pulse 85   Temp 98 1 °F (36 7 °C)   Resp 16   Ht 5' 4" (1 626 m)   Wt 61 7 kg (136 lb)   SpO2 94%   BMI 23 34 kg/m²     Lab Results   Component Value Date    WBC 5 74 07/16/2019    HGB 9 4 (L) 07/16/2019    HCT 29 4 (L) 07/16/2019    MCV 94 07/16/2019     07/16/2019       Lab Results   Component Value Date    GLUCOSE 84 12/15/2018    CALCIUM 9 0 07/17/2019    K 3 8 07/17/2019    CO2 26 07/17/2019     07/17/2019    BUN 18 07/17/2019    CREATININE 1 09 07/17/2019       Lab Results   Component Value Date/Time    POCGLU 164 (H) 12/29/2018 10:41 AM    POCGLU 119 12/29/2018 06:26 AM    POCGLU 176 (H) 12/28/2018 09:08 PM    POCGLU 169 (H) 12/28/2018 04:33 PM    POCGLU 183 (H) 12/28/2018 11:31 AM       I/O last 3 completed shifts: In: 3416 7 [I V :3156 7; NG/GT:60; IV Piggyback:200]  Out: 2150 [Urine:1300; Emesis/NG output:850]  I/O this shift: In: 918 [I V :825; NG/GT:50]  Out: 200 [Urine:1000; Emesis/NG output:450; Stool:450]      Physical Exam  Physical Exam   Constitutional: She is oriented to person, place, and time  She appears well-developed and well-nourished  No distress  HENT:   Head: Normocephalic and atraumatic  NG tube in place with green bile draining   Cardiovascular: Normal rate, regular rhythm and normal heart sounds  Exam reveals no gallop and no friction rub  No murmur heard  Pulmonary/Chest: Effort normal and breath sounds normal  No respiratory distress  She has no wheezes  She has no rales  Abdominal: Soft  Bowel sounds are normal  She exhibits no distension  There is no tenderness  There is no rebound and no guarding  Ostomy with brown fluid output noted   Neurological: She is alert and oriented to person, place, and time  Skin: Skin is warm and dry  She is not diaphoretic  Psychiatric: She has a normal mood and affect  Her behavior is normal    Vitals reviewed        Mary Lucero MD   Gyn PGY-3  7/17/2019 5:56 AM

## 2019-07-17 NOTE — PROGRESS NOTES
Progress Note - General Surgery   Jo Shin 70 y o  female MRN: [id number]  Unit/Bed#: [facility] 907-01 Encounter: [id number]    Assessment:  Stable, feeling better  Benign abdomen  Stoma prolapse  Plan:  Ok to pull NG from our standpoint  I defer to GYN/ONC    Subjective/Objective   Chief Complaint: carcinomatosis    Subjective: Feels well  NG discomfort  Objective:     Blood pressure 138/61, pulse 85, temperature 98 2 °F (36 8 °C), resp  rate 20, height 5' 4" (1 626 m), weight 61 7 kg (136 lb), SpO2 96 %  ,Body mass index is 23 34 kg/m²  Intake/Output Summary (Last 24 hours) at 7/17/2019 1252  Last data filed at 7/17/2019 1249  Gross per 24 hour   Intake 1950 ml   Output 3200 ml   Net -1250 ml       Invasive Devices     Central Venous Catheter Line            Port A Cath 11/29/18 Right Chest 230 days          Peripheral Intravenous Line            Peripheral IV 07/14/19 Left Antecubital 2 days          Drain            Ileostomy Loop  days    NG/OG/Enteral Tube Nasogastric 18 Fr Left nares 2 days                Physical Exam:   Abdomen soft, nontender  Stomal prolapse  Lab, Imaging and other studies:I have personally reviewed pertinent lab results

## 2019-07-17 NOTE — PLAN OF CARE
Problem: Prexisting or High Potential for Compromised Skin Integrity  Goal: Skin integrity is maintained or improved  Description  INTERVENTIONS:  - Identify patients at risk for skin breakdown  - Assess and monitor skin integrity  - Assess and monitor nutrition and hydration status  - Monitor labs (i e  albumin)  - Assess for incontinence   - Turn and reposition patient  - Assist with mobility/ambulation  - Relieve pressure over bony prominences  - Avoid friction and shearing  - Provide appropriate hygiene as needed including keeping skin clean and dry  - Evaluate need for skin moisturizer/barrier cream  - Collaborate with interdisciplinary team (i e  Nutrition, Rehabilitation, etc )   - Patient/family teaching  Outcome: Progressing     Problem: Potential for Falls  Goal: Patient will remain free of falls  Description  INTERVENTIONS:  - Assess patient frequently for physical needs  -  Identify cognitive and physical deficits and behaviors that affect risk of falls    -  [place] fall precautions as indicated by assessment   - Educate patient/family on patient safety including physical limitations  - Instruct patient to call for assistance with activity based on assessment  - Modify environment to reduce risk of injury  - Consider OT/PT consult to assist with strengthening/mobility  Outcome: Progressing

## 2019-07-17 NOTE — QUICK NOTE
Ostomy with increased output and 50cc output from NG since 7AM per nursing  Will remove NG and advanced to clears as tolerated      Pipo Pearson MD  07/17/19

## 2019-07-17 NOTE — RESTORATIVE TECHNICIAN NOTE
Restorative Specialist Mobility Note       Activity: Ambulate in dwyer, Ambulate in room, Bathroom privileges, Chair, Dangle, Stand at bedside(Educated/encouraged pt to ambulate with assistance 3-4 x's/day   Pt callbell, phone/tray within reach )     Assistive Device: None    Reina ROSS, Restorative Technician, [employer]

## 2019-07-18 LAB
ANION GAP SERPL CALCULATED.3IONS-SCNC: 5 MMOL/L (ref 4–13)
BUN SERPL-MCNC: 17 MG/DL (ref 5–25)
CALCIUM SERPL-MCNC: 8.9 MG/DL (ref 8.3–10.1)
CHLORIDE SERPL-SCNC: 103 MMOL/L (ref 100–108)
CO2 SERPL-SCNC: 27 MMOL/L (ref 21–32)
CREAT SERPL-MCNC: 1.05 MG/DL (ref 0.6–1.3)
ERYTHROCYTE [DISTWIDTH] IN BLOOD BY AUTOMATED COUNT: 13.5 % (ref 11.6–15.1)
GFR SERPL CREATININE-BSD FRML MDRD: 54 ML/MIN/1.73SQ M
GLUCOSE SERPL-MCNC: 90 MG/DL (ref 65–140)
HCT VFR BLD AUTO: 26 % (ref 34.8–46.1)
HGB BLD-MCNC: 8.5 G/DL (ref 11.5–15.4)
MCH RBC QN AUTO: 30.7 PG (ref 26.8–34.3)
MCHC RBC AUTO-ENTMCNC: 32.7 G/DL (ref 31.4–37.4)
MCV RBC AUTO: 94 FL (ref 82–98)
PLATELET # BLD AUTO: 275 THOUSANDS/UL (ref 149–390)
PMV BLD AUTO: 11.4 FL (ref 8.9–12.7)
POTASSIUM SERPL-SCNC: 3.2 MMOL/L (ref 3.5–5.3)
RBC # BLD AUTO: 2.77 MILLION/UL (ref 3.81–5.12)
SODIUM SERPL-SCNC: 135 MMOL/L (ref 136–145)
WBC # BLD AUTO: 7.34 THOUSAND/UL (ref 4.31–10.16)

## 2019-07-18 PROCEDURE — 99232 SBSQ HOSP IP/OBS MODERATE 35: CPT | Performed by: OBSTETRICS & GYNECOLOGY

## 2019-07-18 PROCEDURE — 85027 COMPLETE CBC AUTOMATED: CPT | Performed by: STUDENT IN AN ORGANIZED HEALTH CARE EDUCATION/TRAINING PROGRAM

## 2019-07-18 PROCEDURE — C9113 INJ PANTOPRAZOLE SODIUM, VIA: HCPCS | Performed by: STUDENT IN AN ORGANIZED HEALTH CARE EDUCATION/TRAINING PROGRAM

## 2019-07-18 PROCEDURE — 80048 BASIC METABOLIC PNL TOTAL CA: CPT | Performed by: STUDENT IN AN ORGANIZED HEALTH CARE EDUCATION/TRAINING PROGRAM

## 2019-07-18 RX ORDER — POTASSIUM CHLORIDE 20 MEQ/1
40 TABLET, EXTENDED RELEASE ORAL ONCE
Status: COMPLETED | OUTPATIENT
Start: 2019-07-18 | End: 2019-07-18

## 2019-07-18 RX ORDER — ACETAMINOPHEN 325 MG/1
650 TABLET ORAL EVERY 6 HOURS PRN
Status: DISCONTINUED | OUTPATIENT
Start: 2019-07-18 | End: 2019-07-19 | Stop reason: HOSPADM

## 2019-07-18 RX ADMIN — ENOXAPARIN SODIUM 90 MG: 100 INJECTION SUBCUTANEOUS at 09:50

## 2019-07-18 RX ADMIN — POTASSIUM CHLORIDE 40 MEQ: 1500 TABLET, EXTENDED RELEASE ORAL at 13:59

## 2019-07-18 RX ADMIN — METFORMIN HYDROCHLORIDE 500 MG: 500 TABLET ORAL at 09:50

## 2019-07-18 RX ADMIN — SODIUM CHLORIDE 100 ML/HR: 0.45 INJECTION, SOLUTION INTRAVENOUS at 05:35

## 2019-07-18 RX ADMIN — ACETAMINOPHEN 650 MG: 325 TABLET ORAL at 23:52

## 2019-07-18 RX ADMIN — METFORMIN HYDROCHLORIDE 500 MG: 500 TABLET ORAL at 17:59

## 2019-07-18 RX ADMIN — PANTOPRAZOLE SODIUM 40 MG: 40 INJECTION, POWDER, FOR SOLUTION INTRAVENOUS at 09:50

## 2019-07-18 RX ADMIN — ACETAMINOPHEN 650 MG: 325 TABLET ORAL at 17:58

## 2019-07-18 NOTE — ASSESSMENT & PLAN NOTE
Pt is a 71 y/o F, w PMH of ovarian ca and diverting loop ileostomy placement in 2018 for colonic perforation  Presented w  prolapsed stoma and CT reading partial sbo  VSS  Afebrile  Doing well after NGT removed       Plan:  Clear liq diet  Pain control  Nausea control  Ambulate Speaking Coherently

## 2019-07-18 NOTE — QUICK NOTE
Patient seen at bedside- reports she is doing well and is tolerating regular diet without any N/V  She states that her ostomy has output has been more than fluid today  She denies any abdominal pain, chest pain, shortness of breath or calf pain  BP (!) 106/45   Pulse 80   Temp 98 4 °F (36 9 °C)   Resp 18   Ht 5' 4" (1 626 m)   Wt 61 7 kg (136 lb)   SpO2 98%   BMI 23 34 kg/m²   General:  Appears well, no acute distress, pleasant demeanor  Abdomen:  Soft, nontender, ostomy bag appears to be leaking- filled with yellow seedy stool    Continue regular diet  Will consult wound care ostomy nursing for assistance with ostomy bag      Kaya Daly MD  07/18/19

## 2019-07-18 NOTE — RESTORATIVE TECHNICIAN NOTE
Restorative Specialist Mobility Note       Activity: Ambulate in dwyer, Ambulate in room, Bathroom privileges, Dangle, Stand at bedside(Educated/encouraged pt to ambulate with assistance 3-4 x's/day   Pt callbell, phone/tray within reach )     Assistive Device: None       Martita Zamora BS, Restorative Technician, [employer]

## 2019-07-18 NOTE — PROGRESS NOTES
Progress Note - Thomas Silvina 1950, 70 y o  female MRN: [id number]    Unit/Bed#: [facility] 907-01 Encounter: [id number]    Primary Care Provider: Soraida Singh MD   Date and time admitted to hospital: 7/15/2019  2:54 AM      * SBO (small bowel obstruction) ([address] )  Assessment & Plan  Pt is a 69 y/o F, w PMH of ovarian ca and diverting loop ileostomy placement in 2018 for colonic perforation  Presented w  prolapsed stoma and CT reading partial sbo  VSS  Afebrile  Doing well after NGT removed  Plan:  Clear liq diet  Pain control  Nausea control  Ambulate          Subjective/Objective     Subjective: 1425cc out through ileostomy  AGUSTO  Denied fever, chills, chest pain, shortness of breath, nausea, vomiting, or abdominal pain this morning  Feeling well without complaints  Objective:     Blood pressure 126/60, pulse 77, temperature 99 °F (37 2 °C), temperature source Oral, resp  rate 20, height 5' 4" (1 626 m), weight 61 7 kg (136 lb), SpO2 97 %  ,Body mass index is 23 34 kg/m²  Intake/Output Summary (Last 24 hours) at 7/18/2019 0106  Last data filed at 7/18/2019 0056  Gross per 24 hour   Intake 3036 66 ml   Output 3100 ml   Net -63 34 ml       Invasive Devices     Central Venous Catheter Line            Port A Cath 11/29/18 Right Chest 231 days          Peripheral Intravenous Line            Peripheral IV 07/14/19 Left Antecubital 3 days          Drain            Ileostomy Loop  days                Physical Exam  General: NAD  HEENT: NC/AT  MMM  Cv: RRR  Lungs: normal effort  Ab: Soft, NT/ND  Ex: no CCE  Neuro: AAOx3      Lab, Imaging and other studies:  I have personally reviewed pertinent lab results    , CBC:   Lab Results   Component Value Date    WBC 7 93 07/17/2019    HGB 9 3 (L) 07/17/2019    HCT 28 3 (L) 07/17/2019    MCV 93 07/17/2019     07/17/2019    MCH 30 5 07/17/2019    MCHC 32 9 07/17/2019    RDW 13 6 07/17/2019    MPV 11 4 07/17/2019    NRBC 0 07/17/2019   , CMP: Lab Results   Component Value Date    SODIUM 136 07/17/2019    K 3 8 07/17/2019     07/17/2019    CO2 26 07/17/2019    BUN 18 07/17/2019    CREATININE 1 09 07/17/2019    CALCIUM 9 0 07/17/2019    EGFR 52 07/17/2019     VTE Pharmacologic Prophylaxis: Enoxaparin (Lovenox)  VTE Mechanical Prophylaxis: sequential compression device      Milo Rivera, PGY1  Surgery  7/18/19

## 2019-07-18 NOTE — PROGRESS NOTES
Gyn Oncology Progress note   Rio Pearson 70 y o  female MRN: [id number]  Unit/Bed#: [facility] 907-01 Encounter: [id number]      A/P:68 y  o  w/ stage IVB ovarian cancer with hx of ostomy prolapse who initially presented with concerns for a SBO, hospital day 4     1) SBO   -NG tube was removed yesterday afternoon   -Continues to have output from ostomy   -F/u am labs   -Advance diet    2) Stage IVB ovarian cancer   -Carbo/Taxol treatment held while inpatient   -Ostomy reversal and debulking surgery to be planned    3) Pain   -Well controlled, morphine 2 mg q4h PRN    4)Type 2 Diabetes    -Restart home metformin    5) Nausea/Vomiting   -Resolved since NG tube readjustment and removal   -Protonix 40 Mg IV    6) FEN   -Advanced to regular diet   -D/c fluids    7) DVT PPx   -Lovenox 1 5mg/kg    Catherine Templeton has no current complaints  Pain is well controlled with morphine PRN  Patient is currently voiding  She is ambulating  She is having output from her ostomy, 575 cc of brown liquid stool overnight  She denies nausea or vomiting, fever, chills, chest pain, shortness of breath, or calf tenderness  /60   Pulse 77   Temp 99 °F (37 2 °C) (Oral)   Resp 20   Ht 5' 4" (1 626 m)   Wt 61 7 kg (136 lb)   SpO2 97%   BMI 23 34 kg/m²     I/O last 3 completed shifts: In: 4195 [P O :860; I V :3225; NG/GT:110]  Out: 7164 [Urine:1700; Emesis/NG output:650; RSRJ]  I/O this shift:  In: 1558 3 [P O :120;  I V :1438 3]  Out: 875 [Urine:300; Stool:575]    Lab Results   Component Value Date    WBC 7 93 2019    HGB 9 3 (L) 2019    HCT 28 3 (L) 2019    MCV 93 2019     2019       Lab Results   Component Value Date    GLUCOSE 84 12/15/2018    CALCIUM 9 0 2019    K 3 8 2019    CO2 26 2019     2019    BUN 18 2019    CREATININE 1 09 2019       Lab Results   Component Value Date/Time    POCGLU 164 (H) 2018 10:41 AM    POCGLU 119 2018 06:26 AM    POCGLU 176 (H) 12/28/2018 09:08 PM    POCGLU 169 (H) 12/28/2018 04:33 PM    POCGLU 183 (H) 12/28/2018 11:31 AM       Physical Exam  Gen: AAOx3, NAD, comfortable in bed  CVS: S1S2+, RRR, no murmurs  Lungs: CTA b/l normal respiratory effort and rate  Abdomen: soft, non tender, ostomy is soft  Extremities: Non tender, homans sign negative bilaterally    Rebekah Leal MD  OBGYN PGY-1  7/18/2019  5:43 AM

## 2019-07-19 VITALS
HEART RATE: 79 BPM | HEIGHT: 64 IN | SYSTOLIC BLOOD PRESSURE: 105 MMHG | DIASTOLIC BLOOD PRESSURE: 43 MMHG | BODY MASS INDEX: 23.22 KG/M2 | WEIGHT: 136 LBS | OXYGEN SATURATION: 97 % | TEMPERATURE: 98.8 F | RESPIRATION RATE: 18 BRPM

## 2019-07-19 LAB
ANION GAP SERPL CALCULATED.3IONS-SCNC: 6 MMOL/L (ref 4–13)
BASOPHILS # BLD AUTO: 0.03 THOUSANDS/ΜL (ref 0–0.1)
BASOPHILS NFR BLD AUTO: 0 % (ref 0–1)
BUN SERPL-MCNC: 21 MG/DL (ref 5–25)
CALCIUM SERPL-MCNC: 9.1 MG/DL (ref 8.3–10.1)
CHLORIDE SERPL-SCNC: 105 MMOL/L (ref 100–108)
CO2 SERPL-SCNC: 28 MMOL/L (ref 21–32)
CREAT SERPL-MCNC: 1.17 MG/DL (ref 0.6–1.3)
EOSINOPHIL # BLD AUTO: 0.05 THOUSAND/ΜL (ref 0–0.61)
EOSINOPHIL NFR BLD AUTO: 1 % (ref 0–6)
ERYTHROCYTE [DISTWIDTH] IN BLOOD BY AUTOMATED COUNT: 13.5 % (ref 11.6–15.1)
GFR SERPL CREATININE-BSD FRML MDRD: 48 ML/MIN/1.73SQ M
GLUCOSE SERPL-MCNC: 149 MG/DL (ref 65–140)
HCT VFR BLD AUTO: 26.2 % (ref 34.8–46.1)
HGB BLD-MCNC: 8.5 G/DL (ref 11.5–15.4)
IMM GRANULOCYTES # BLD AUTO: 0.05 THOUSAND/UL (ref 0–0.2)
IMM GRANULOCYTES NFR BLD AUTO: 1 % (ref 0–2)
LYMPHOCYTES # BLD AUTO: 1.77 THOUSANDS/ΜL (ref 0.6–4.47)
LYMPHOCYTES NFR BLD AUTO: 23 % (ref 14–44)
MCH RBC QN AUTO: 30.4 PG (ref 26.8–34.3)
MCHC RBC AUTO-ENTMCNC: 32.4 G/DL (ref 31.4–37.4)
MCV RBC AUTO: 94 FL (ref 82–98)
MONOCYTES # BLD AUTO: 1.18 THOUSAND/ΜL (ref 0.17–1.22)
MONOCYTES NFR BLD AUTO: 16 % (ref 4–12)
NEUTROPHILS # BLD AUTO: 4.52 THOUSANDS/ΜL (ref 1.85–7.62)
NEUTS SEG NFR BLD AUTO: 59 % (ref 43–75)
NRBC BLD AUTO-RTO: 0 /100 WBCS
PLATELET # BLD AUTO: 261 THOUSANDS/UL (ref 149–390)
PMV BLD AUTO: 11.4 FL (ref 8.9–12.7)
POTASSIUM SERPL-SCNC: 3.6 MMOL/L (ref 3.5–5.3)
RBC # BLD AUTO: 2.8 MILLION/UL (ref 3.81–5.12)
SODIUM SERPL-SCNC: 139 MMOL/L (ref 136–145)
WBC # BLD AUTO: 7.6 THOUSAND/UL (ref 4.31–10.16)

## 2019-07-19 PROCEDURE — 85025 COMPLETE CBC W/AUTO DIFF WBC: CPT | Performed by: STUDENT IN AN ORGANIZED HEALTH CARE EDUCATION/TRAINING PROGRAM

## 2019-07-19 PROCEDURE — 80048 BASIC METABOLIC PNL TOTAL CA: CPT | Performed by: STUDENT IN AN ORGANIZED HEALTH CARE EDUCATION/TRAINING PROGRAM

## 2019-07-19 PROCEDURE — 99238 HOSP IP/OBS DSCHRG MGMT 30/<: CPT | Performed by: OBSTETRICS & GYNECOLOGY

## 2019-07-19 RX ADMIN — METFORMIN HYDROCHLORIDE 500 MG: 500 TABLET ORAL at 08:55

## 2019-07-19 NOTE — DISCHARGE INSTRUCTIONS
Ileostomy Care   WHAT YOU NEED TO KNOW:   Ileostomy care is done to keep your ileostomy and the skin around it clean  This helps prevent skin problems  An ileostomy specialist will show you how to care for your ileostomy  Follow up with your healthcare provider or ileostomy specialist as directed  DISCHARGE INSTRUCTIONS:   Products used with an ileostomy:   · Pouches  are used to collect bowel content that drains from the stoma  The pouch has a spout at the bottom to empty contents  Pouches come in a variety of sizes and styles  Most are lightweight and prevent odor  Some also have filters that release gas slowly and help decrease odor  Use a pouch that has an opening 1/8 inch larger than your stoma on each side  Your ileostomy specialist can help you decide which type of pouch is best for you  · Skin protection  may help keep your skin from getting irritated  It includes films, paste, strips, or rings  How to empty your pouch:  Empty your pouch about every 4 to 6 hours  Always empty the pouch when it becomes 1/3 full  Do not let the pouch fill completely  A full pouch puts pressure on the seal and may cause a leak  The pouch may also detach, causing bowel contents to spill  · Hold the end of the pouch up  Remove the clamp  · Roll up the ends of the pouch  This helps keep the ends clean  · Place toilet paper into the toilet to reduce splash back  Then empty the pouch into the toilet  · Unroll the ends of the pouch  Clean the ends with toilet paper or a moist paper towel  · Replace the clamp or close the end of the pouch as directed  How to change your pouch:  Your ileostomy specialist will tell you how often to change your pouch and will show you how  Always change it if is leaking  The following is general information about how to change your pouch:  · Empty the contents of the pouch into the toilet  · Gently remove the pouch    Push your skin down and away from the adhesive skin barrier with one hand  With the other hand, pull the pouch up and away from your stoma  · Clean the skin around the stoma with warm water  You may use soap  Do not use soaps that contain oil or perfumes  Pat your skin dry  · Use skin protection products as directed if you have irritated skin around the stoma  · Center the new pouch over the stoma and press it firmly into place on clean, dry skin  It may be helpful to hold your hand over the newly applied pouch for 30 seconds  The warmth of your hand can help to mold the adhesive skin barrier into place  · Discard or clean the old pouch  Place the old pouch in another plastic bag and throw it away if the pouch is disposable  If you use a reusable pouch, ask how to clean the reusable pouch  Nutrition changes to reduce symptoms:   · Do not eat foods that are hard to digest for 6 to 8 weeks after your surgery  Examples are tough meat, nuts, seeds, and raw fruits and vegetables  After 6 to 8 weeks, you can start eating your regular foods  You may still have to limit some foods that are hard to digest, such as corn, nuts, seeds, and celery  · Do not eat foods that cause cramps or diarrhea  When you start eating these foods again, eat small portions first and then gradually increase how much you eat  · Reduce gas and odor  Do not use a straw to drink liquids  Eat slowly  Some foods, such as broccoli, cabbage, beans, eggs, and fish may increase gas  Fresh parsley, yogurt, and buttermilk may help to reduce odor and gas  · Drink liquids as directed  You may need to drink about 1 to 2 extra glasses of liquid each day to prevent dehydration  Ask how much liquid you should drink each day  How an ileostomy fits into your lifestyle:   · Return to work  when your healthcare provider says it is okay  You may need support to prevent a hernia if you lift heavy items or perform heavy labor   You may need an ostomy belt over the pouch to keep it in place if you are active  · Stay active and exercise as directed  Ask your healthcare provider about the best exercise plan for you  Wear your pouch when you swim  Use waterproof tape over the edges of your skin barrier to keep your pouch from leaking  Empty your pouch before you exercise or have sex  · Carry extra supplies with you in case your bag leaks  Supplies include extra pouches, skin protection products, and a change of clothing  Contact your healthcare provider or ileostomy specialist if:   · You have a fever  · Your stoma changes in size or appearance  · You see pills (medicine) in your bowel contents  · Your incision wound or stoma is red or swollen, or you have a rash  · You have diarrhea or very watery bowel movements for more than 6 hours  · You have an unusual odor that lasts longer than a week  · Your bowel contents are black or bloody  · You have more bowel contents draining from your stoma than is normal for you  · You have nausea or are vomiting  · Your stoma becomes narrow, comes out too far, or sinks inside your abdomen  · You have questions or concerns about your condition or care  Seek care immediately or call 911 if:   · You cannot stop the bleeding from your stoma  · You are so weak that you cannot stand  · You have severe abdominal pain  · Bowel contents will not drain through your stoma  © 2017 [facility] Information is for End User's use only and may not be sold, redistributed or otherwise used for commercial purposes  All illustrations and images included in CareNotes® are the copyrighted property of A D A M , Inc  or Carlos Bhandari  The above information is an  only  It is not intended as medical advice for individual conditions or treatments  Talk to your doctor, nurse or pharmacist before following any medical regimen to see if it is safe and effective for you

## 2019-07-19 NOTE — PLAN OF CARE
Problem: Nutrition/Hydration-ADULT  Goal: Nutrient/Hydration intake appropriate for improving, restoring or maintaining nutritional needs  Description  Monitor and assess patient's nutrition/hydration status for malnutrition (ex- brittle hair, bruises, dry skin, pale skin and conjunctiva, muscle wasting, smooth red tongue, and disorientation)  Collaborate with interdisciplinary team and initiate plan and interventions as ordered  Monitor patient's weight and dietary intake as ordered or per policy  Utilize nutrition screening tool and intervene per policy  Determine patient's food preferences and provide high-protein, high-caloric foods as appropriate       INTERVENTIONS:  - Monitor oral intake, urinary output, labs, and treatment plans  - Assess nutrition and hydration status and recommend course of action  - Evaluate amount of meals eaten  - Assist patient with eating if necessary   - Allow adequate time for meals  - Recommend/ encourage appropriate diets, oral nutritional supplements, and vitamin/mineral supplements  - Order, calculate, and assess calorie counts as needed  - Recommend, monitor, and adjust tube feedings and TPN/PPN based on assessed needs  - Assess need for intravenous fluids  - Provide specific nutrition/hydration education as appropriate  - Include patient/family/caregiver in decisions related to nutrition  Outcome: Adequate for Discharge     Problem: Prexisting or High Potential for Compromised Skin Integrity  Goal: Skin integrity is maintained or improved  Description  INTERVENTIONS:  - Identify patients at risk for skin breakdown  - Assess and monitor skin integrity  - Assess and monitor nutrition and hydration status  - Monitor labs (i e  albumin)  - Assess for incontinence   - Turn and reposition patient  - Assist with mobility/ambulation  - Relieve pressure over bony prominences  - Avoid friction and shearing  - Provide appropriate hygiene as needed including keeping skin clean and dry  - Evaluate need for skin moisturizer/barrier cream  - Collaborate with interdisciplinary team (i e  Nutrition, Rehabilitation, etc )   - Patient/family teaching  Outcome: Adequate for Discharge     Problem: Potential for Falls  Goal: Patient will remain free of falls  Description  INTERVENTIONS:  - Assess patient frequently for physical needs  -  Identify cognitive and physical deficits and behaviors that affect risk of falls    -  [place] fall precautions as indicated by assessment   - Educate patient/family on patient safety including physical limitations  - Instruct patient to call for assistance with activity based on assessment  - Modify environment to reduce risk of injury  - Consider OT/PT consult to assist with strengthening/mobility  Outcome: Adequate for Discharge

## 2019-07-19 NOTE — PLAN OF CARE
Problem: Nutrition/Hydration-ADULT  Goal: Nutrient/Hydration intake appropriate for improving, restoring or maintaining nutritional needs  Description  Monitor and assess patient's nutrition/hydration status for malnutrition (ex- brittle hair, bruises, dry skin, pale skin and conjunctiva, muscle wasting, smooth red tongue, and disorientation)  Collaborate with interdisciplinary team and initiate plan and interventions as ordered  Monitor patient's weight and dietary intake as ordered or per policy  Utilize nutrition screening tool and intervene per policy  Determine patient's food preferences and provide high-protein, high-caloric foods as appropriate       INTERVENTIONS:  - Monitor oral intake, urinary output, labs, and treatment plans  - Assess nutrition and hydration status and recommend course of action  - Evaluate amount of meals eaten  - Assist patient with eating if necessary   - Allow adequate time for meals  - Recommend/ encourage appropriate diets, oral nutritional supplements, and vitamin/mineral supplements  - Order, calculate, and assess calorie counts as needed  - Recommend, monitor, and adjust tube feedings and TPN/PPN based on assessed needs  - Assess need for intravenous fluids  - Provide specific nutrition/hydration education as appropriate  - Include patient/family/caregiver in decisions related to nutrition  Outcome: Progressing     Problem: Prexisting or High Potential for Compromised Skin Integrity  Goal: Skin integrity is maintained or improved  Description  INTERVENTIONS:  - Identify patients at risk for skin breakdown  - Assess and monitor skin integrity  - Assess and monitor nutrition and hydration status  - Monitor labs (i e  albumin)  - Assess for incontinence   - Turn and reposition patient  - Assist with mobility/ambulation  - Relieve pressure over bony prominences  - Avoid friction and shearing  - Provide appropriate hygiene as needed including keeping skin clean and dry  - Evaluate need for skin moisturizer/barrier cream  - Collaborate with interdisciplinary team (i e  Nutrition, Rehabilitation, etc )   - Patient/family teaching  Outcome: Progressing     Problem: Potential for Falls  Goal: Patient will remain free of falls  Description  INTERVENTIONS:  - Assess patient frequently for physical needs  -  Identify cognitive and physical deficits and behaviors that affect risk of falls    -  [place] fall precautions as indicated by assessment   - Educate patient/family on patient safety including physical limitations  - Instruct patient to call for assistance with activity based on assessment  - Modify environment to reduce risk of injury  - Consider OT/PT consult to assist with strengthening/mobility  Outcome: Progressing

## 2019-07-19 NOTE — PROGRESS NOTES
Pt's IV due to be changed 7/18  Was told in report that Gyn/Onc ws okay with no IV access since patient is being discharged tomorrow 7/19  No order was placed  Pulled IV and told patient she may need another one and that I'm trying to get an order saying it is okay to not have IV access  The patient stated "i'm leaving in the morning, I don't want another IV " Currently attempting to reach resident covering for gyn/onc to place order

## 2019-07-19 NOTE — PROGRESS NOTES
Gyn Progress Note   Diana Ch 70 y o  female MRN: [id number]  Unit/Bed#: [facility] 907-01 Encounter: [id number]    Assessment/Plan:  70 y o  w/ stage IVB ovarian cancer with hx of ostomy prolapse who initially presented with concerns for a SBO which has since resolved and who is now tolerating regular diet  Hospital day 5      1) SBO now resolved with excellent output from ostomy and tolerating regular diet  Perhaps may be secondary to prolapsed ostomy vs adhesive disease/ tumor burden  -S/p NG tube   - Patient offered ostomy nurse consultation and she is willing to see her if available early morning  She believes ostomy leakage is secondary to placement in hospital and that her and her daughter "have a good system"  2) Stage IVB ovarian cancer              -Carbo/Taxol treatment held while inpatient              -Ostomy reversal and debulking surgery to be planned outpatient setting in 2-4 weeks time to allow appropriate interval from chemotherapy     3) Pain              -Well controlled, morphine 2 mg q4h PRN     4)Type 2 Diabetes               -Home metformin     5) Nausea/Vomiting              -Resolved since NG tube readjustment and removal              -Protonix 40 Mg IV     6) FEN              -Regular diet     7) DVT PPx              -Lovenox 1 5mg/kg   - To continue home Eliquis upon discharge    Disposition: Plan for discharge home today with plan for debulking and ostomy reversal surgery to be planned outpatient    Subjective:   Patient has no complaints  No new events over night  Her pain is  well controlled  She continues to tolerate regular diet  She denies fevers, chills, N/V, chest pain, shortness of breath or calf pain  She continues to have good output from ostomy  She is voiding and ambulating without difficulty  Desires discharge today      BP (!) 104/44   Pulse 82   Temp 98 8 °F (37 1 °C)   Resp 18   Ht 5' 4" (1 626 m)   Wt 61 7 kg (136 lb)   SpO2 100%   BMI 23 34 kg/m²     Lab Results   Component Value Date    WBC 7 34 07/18/2019    HGB 8 5 (L) 07/18/2019    HCT 26 0 (L) 07/18/2019    MCV 94 07/18/2019     07/18/2019       Lab Results   Component Value Date    GLUCOSE 84 12/15/2018    CALCIUM 8 9 07/18/2019    K 3 2 (L) 07/18/2019    CO2 27 07/18/2019     07/18/2019    BUN 17 07/18/2019    CREATININE 1 05 07/18/2019       Lab Results   Component Value Date/Time    POCGLU 164 (H) 12/29/2018 10:41 AM    POCGLU 119 12/29/2018 06:26 AM    POCGLU 176 (H) 12/28/2018 09:08 PM    POCGLU 169 (H) 12/28/2018 04:33 PM    POCGLU 183 (H) 12/28/2018 11:31 AM       I/O last 3 completed shifts: In: 4338 3 [P O :1100; I V :3238 3]  Out: 1197 [Urine:1750; Emesis/NG output:50; SJYTU:0733]  I/O this shift:  In: -   Out: 1905 [Urine:980; Stool:925]      Physical Exam  Physical Exam   Constitutional: She is oriented to person, place, and time  She appears well-developed and well-nourished  No distress  HENT:   Head: Normocephalic and atraumatic  Cardiovascular: Normal rate, regular rhythm and normal heart sounds  Exam reveals no gallop and no friction rub  No murmur heard  Pulmonary/Chest: Effort normal and breath sounds normal  No respiratory distress  She has no wheezes  She has no rales  Abdominal: Soft  Bowel sounds are normal  She exhibits no distension  There is no tenderness  There is no rebound and no guarding  Ostomy bag is intact  Ostomy output of moderate amount of yellow seedy stool   Neurological: She is alert and oriented to person, place, and time  Skin: Skin is warm and dry  She is not diaphoretic  Psychiatric: She has a normal mood and affect  Her behavior is normal    Vitals reviewed        Glen Salmeron MD   Gyn PGY-3  7/19/2019 6:11 AM

## 2019-08-13 ENCOUNTER — HOSPITAL ENCOUNTER (OUTPATIENT)
Dept: INFUSION CENTER | Facility: CLINIC | Age: 69
Discharge: HOME | End: 2019-08-13
Payer: MEDICARE

## 2019-08-13 DIAGNOSIS — Z45.2 ENCOUNTER FOR CENTRAL LINE CARE: Primary | ICD-10-CM

## 2019-08-13 DIAGNOSIS — C56.9 MALIGNANT NEOPLASM OF OVARY, UNSPECIFIED LATERALITY (HCC): ICD-10-CM

## 2019-08-13 LAB — CANCER AG125 SERPL-ACNC: 12.7 U/ML (ref 0–30)

## 2019-08-13 PROCEDURE — 86304 IMMUNOASSAY TUMOR CA 125: CPT

## 2019-08-13 NOTE — PLAN OF CARE
Problem: Potential for Falls  Goal: Patient will remain free of falls  Description  INTERVENTIONS:  - Assess patient frequently for physical needs  -  Identify cognitive and physical deficits and behaviors that affect risk of falls    -  [place] fall precautions as indicated by assessment   - Educate patient/family on patient safety including physical limitations  - Instruct patient to call for assistance with activity based on assessment  - Modify environment to reduce risk of injury  Outcome: Progressing

## 2019-08-14 ENCOUNTER — OFFICE VISIT (OUTPATIENT)
Dept: GYNECOLOGIC ONCOLOGY | Facility: CLINIC | Age: 69
End: 2019-08-14
Payer: MEDICARE

## 2019-08-14 ENCOUNTER — TELEPHONE (OUTPATIENT)
Dept: GYNECOLOGIC ONCOLOGY | Facility: CLINIC | Age: 69
End: 2019-08-14

## 2019-08-14 VITALS
SYSTOLIC BLOOD PRESSURE: 138 MMHG | TEMPERATURE: 98.4 F | RESPIRATION RATE: 18 BRPM | HEART RATE: 86 BPM | BODY MASS INDEX: 24.75 KG/M2 | DIASTOLIC BLOOD PRESSURE: 62 MMHG | HEIGHT: 64 IN | WEIGHT: 145 LBS

## 2019-08-14 DIAGNOSIS — C56.9 MALIGNANT NEOPLASM OF OVARY, UNSPECIFIED LATERALITY (HCC): Primary | ICD-10-CM

## 2019-08-14 PROCEDURE — 99215 OFFICE O/P EST HI 40 MIN: CPT | Performed by: OBSTETRICS & GYNECOLOGY

## 2019-08-14 RX ORDER — ENOXAPARIN SODIUM 300 MG/3ML
40 INJECTION INTRAVENOUS; SUBCUTANEOUS
Status: CANCELLED | OUTPATIENT
Start: 2019-08-14 | End: 2019-08-15

## 2019-08-14 RX ORDER — ACETAMINOPHEN 325 MG/1
975 TABLET ORAL ONCE
Status: CANCELLED | OUTPATIENT
Start: 2019-08-14 | End: 2019-08-14

## 2019-08-14 RX ORDER — GABAPENTIN 100 MG/1
100 CAPSULE ORAL ONCE
Status: CANCELLED | OUTPATIENT
Start: 2019-08-14 | End: 2019-08-14

## 2019-08-14 NOTE — PROGRESS NOTES
Assessment/Plan:    Problem List Items Addressed This Visit        Genitourinary    Ovarian cancer [facility]) - Primary     Patient has undergone neoadjuvant chemotherapy and has had significant regression of tumor and normalization of her   Additionally the patient has had several bouts of bowel obstructions which have resolved spontaneously  At this point we have recommended exploratory laparotomy SUKUMAR BSO tumor debulking possible bowel resection and reversal of colostomy we have discussed risks and benefits including bleeding requiring transfusion infection and damage to local structures  The patient understands accepts the risks of surgery and has signed an informed consent  Dr Mars Menendez has evaluated the patient will be available for surgery  It is likely that a portion of her colon will need to be resected  The patient knows that she is at increased risk and has signed an informed consent with me  Preoperative blood work chest x-ray EKG will all be performed  Unfortunately due to surgical scheduling the current procedures being moved  We will get back to the patient with a time when both surgeons are available  I have discussed the case with the patient and her daughter by phone           Relevant Orders    Case request operating room: HYSTERECTOMY TOTAL ABDOMINAL (SUKUMAR) BSO, tumor debulking possible bowel resection (Completed)    Type and screen    Comprehensive metabolic panel    CBC and Platelet        HEMOGLOBIN A1C W/ EAG ESTIMATION    EKG 12 lead    XR chest pa & lateral              CHIEF COMPLAINT:       Subjective:     Problem:  Cancer Staging  Ovarian cancer ([facility] )  Staging form: Ovary, Fallopian Tube, Primary Peritoneal, AJCC 8th Edition  - Clinical stage from 11/14/2018: FIGO Stage IVB (cT3c, cN0, pM1b) - Signed by Kyle Hagen MD on 11/14/2018  - Pathologic: No stage assigned - Unsigned      Previous therapy:     Ovarian cancer ([facility] )    11/9/2018 Initial Diagnosis Ovarian cancer ([facility] [address] )   tumor marker 194 5      11/9/2018 Biopsy     CT-guided needle biopsy of abdominal mass 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  Given liver involvement this would be stage IV      11/14/2018 - 12/4/2018 Chemotherapy     Neoadjuvant therapy: carboplatin area under the curve of 6, paclitaxel 135 milligrams/meter squared, Avastin 10 milligrams/kilogram  Completed 1 of 3 cycles  12/14/2018 Surgery     LAPAROTOMY EXPLORATORY; Abdominal Washout; Application of Abthera Vac Dressing for suspected bowel perforation and septic shock  12/15/2018 Surgery     LAPAROTOMY EXPLORATORY, ABDOMINAL WASHOUT, DRAIN PLACEMENT x 4, DIVERTING LOOP ILEOSTOMY AND ABDOMINAL CLOSURE for bowel perforation      3/26/2019 -  Chemotherapy     Taxol weekly 80 mg/m2 for 3 consecutive weeks, may add carboplatin in the future with AUC of 5       3/26/2019 -  Chemotherapy     CARBOplatin (PARAPLATIN) in sodium chloride 0 9 % 250 mL IVPB,  (original dose ), Intravenous, Once, 3 of 3 cycles  Dose modification:   (Cycle 2),   (original dose 258 4 mg, Cycle 3),   (original dose 258 4 mg, Cycle 3),   (original dose 244 4 mg, Cycle 4)  Administration: 258 4 mg (5/28/2019), 244 4 mg (6/25/2019)  PACLitaxel (TAXOL) 127 8 mg in sodium chloride 0 9 % 250 mL chemo IVPB, 80 mg/m2, Intravenous, Once, 4 of 4 cycles  Dose modification: 65 mg/m2 (original dose 80 mg/m2, Cycle 2, Reason: Dose Not Tolerated)  Administration: 103 8 mg (5/14/2019), 108 6 mg (5/28/2019), 108 6 mg (6/4/2019), 108 6 mg (6/11/2019), 109 8 mg (6/25/2019), 109 8 mg (7/2/2019), 109 8 mg (7/9/2019)      4/29/2019 Genomic Testing      Foundation 1 testing revealed a PD L1 tumor proportions score of 0%  The patient is not a candidate for PD L1 manipulation       Genetic Testing     [employer] Breast/Gyn panel, wildtype             Patient ID: Diana Ch is a 70 y o  female  Patient presents today for surgical evaluation for interval ovarian cancer debulking  Patient is a very pleasant 34-year-old white female who underwent treatment for stage IVB ovarian cancer  She initiated treatment with carboplatin paclitaxel and Avastin in December of 2018  She developed a bowel perforation at that time and underwent exploratory laparotomy and drainage with diverting ileostomy  Since that time the patient has undergone multiple chemotherapy treatments with carboplatin paclitaxel alone with significant regression of her tumor  Her most recent  is 11  The patient has had several episodes of bowel obstructions which have resolved spontaneously  This may be related to tele scoping of her ileostomy  The patient is in stable condition to go through surgery and we have therefore recommended surgical debulking and reversal of colostomy  The patient has been seen by Dr Kory Reynolds who will be involved with surgery  Review of Systems   Constitutional: Negative  HENT: Negative  Eyes: Negative  Respiratory: Negative  Cardiovascular: Negative  Gastrointestinal: Negative  Endocrine: Negative  Genitourinary: Negative  Musculoskeletal: Negative  Skin: Negative  Neurological: Negative  Hematological: Negative  Psychiatric/Behavioral: Negative          Current Outpatient Medications   Medication Sig Dispense Refill    acetaminophen (TYLENOL) 325 mg tablet Take 2 tablets (650 mg total) by mouth every 6 (six) hours as needed for mild pain, headaches or fever 30 tablet 0    apixaban (ELIQUIS) 5 mg Take 1 tablet PO twice daily 60 tablet 2    aspirin-acetaminophen-caffeine (EXCEDRIN MIGRAINE) 250-250-65 MG per tablet Take 1 tablet by mouth every 6 (six) hours as needed for headaches      lidocaine-prilocaine (EMLA) cream Apply to port site prior to labs/chemo 30 g 1    metFORMIN (GLUCOPHAGE) 500 mg tablet Take 500 mg by mouth 2 (two) times a day with meals      ondansetron (ZOFRAN) 8 mg tablet Take 1 tablet (8 mg total) by mouth every 8 (eight) hours as needed for nausea or vomiting 30 tablet 1    pantoprazole (PROTONIX) 40 mg tablet Take 1 tablet (40 mg total) by mouth 2 (two) times a day before meals  0     No current facility-administered medications for this visit  No Known Allergies    Past Medical History:   Diagnosis Date    Abdominal fluid collection     Anemia     Asthma     Cancer ([facility] [address] )     ovaries    Diabetes mellitus ([facility] )     History of transfusion        Past Surgical History:   Procedure Laterality Date    CT GUIDED PARACENTESIS  2018    CT GUIDED PERC DRAINAGE CATHETER PLACEMENT  2018    CT NEEDLE BIOPSY PERITONEUM  2018    ECTOPIC PREGNANCY SURGERY      ECTOPIC PREGNANCY SURGERY      IR PARACENTESIS  2018    IR PARACENTESIS  10/18/2018    IR PARACENTESIS  12/10/2018    IR PORT PLACEMENT  2018    LAPAROTOMY N/A 2018    Procedure: LAPAROTOMY EXPLORATORY; Abdominal Washout; Application of Abthera Vac Dressing;  Surgeon: Abram Hernandez MD;  [facility];  Service: Gynecology Oncology    LAPAROTOMY N/A 12/15/2018    Procedure: LAPAROTOMY EXPLORATORY, ABDOMINAL WASHOUT, DRAIN PLACEMENT x 4, DIVERTING LOOP ILEOSTOMY AND ABDOMINAL CLOSURE,  ALL OTHER INDICATED PROCEDURES;  Surgeon: Abram Hernandez MD;  [facility];  Service: Gynecology Oncology    IN COLONOSCOPY FLX DX W/[facility] WHEN PFRMD N/A 2018    Procedure: EGD AND COLONOSCOPY;  Surgeon: Miranda García MD;  [facility];   Service: Gastroenterology    TONSILECTOMY AND ADNOIDECTOMY      TONSILLECTOMY         OB History        3    Para   2    Term   2            AB   1    Living   2       SAB        TAB        Ectopic   1    Multiple        Live Births   2           Obstetric Comments   Menarche: 8th grade (around age 14)               Family History   Problem Relation Age of Onset    Cirrhosis Mother     Colon cancer Mother     Leukemia Cousin     Diabetes Father        The following portions of the patient's history were reviewed and updated as appropriate: allergies, current medications, past family history, past medical history, past social history, past surgical history and problem list       Objective:    Blood pressure 138/62, pulse 86, temperature 98 4 °F (36 9 °C), resp  rate 18, height 5' 4" (1 626 m), weight 65 8 kg (145 lb)  Body mass index is 24 89 kg/m²  Physical Exam   Constitutional: She is oriented to person, place, and time  She appears well-developed and well-nourished  HENT:   Head: Normocephalic and atraumatic  Eyes: Pupils are equal, round, and reactive to light  EOM are normal    Neck: Normal range of motion  Neck supple  Cardiovascular: Normal rate, regular rhythm and normal heart sounds  Pulmonary/Chest: Effort normal and breath sounds normal  No respiratory distress  Abdominal: Soft  Bowel sounds are normal  She exhibits no distension and no ascites  There is no tenderness  There is no rigidity, no rebound and no guarding  Stoma present and some herniation noted  Genitourinary:   Genitourinary Comments: -Normal external female genitalia, normal Bartholin's and [place]'s glands                  -Normal midline urethral meatus  No lesions notes                  -Bladder without fullness mass or tenderness                  -Vagina without lesion or discharge No significant cystocele or rectocele noted                  Uterus cervix tubes and ovaries involved in confluents mass measuring approximately 8 x 8 cm  This is significantly proximal to the pelvic floor                     - Anus without fissure of lesion     Musculoskeletal: Normal range of motion  Lymphadenopathy:     She has no cervical adenopathy  She has no axillary adenopathy  Right: No inguinal and no supraclavicular adenopathy present  Left: No inguinal and no supraclavicular adenopathy present  Neurological: She is alert and oriented to person, place, and time  Skin: Skin is warm and dry  Psychiatric: She has a normal mood and affect   Her behavior is normal          Lab Results   Component Value Date     12 7 08/13/2019     Lab Results   Component Value Date    WBC 7 60 07/19/2019    HGB 8 5 (L) 07/19/2019    HCT 26 2 (L) 07/19/2019    MCV 94 07/19/2019     07/19/2019     Lab Results   Component Value Date    K 3 6 07/19/2019     07/19/2019    CO2 28 07/19/2019    BUN 21 07/19/2019    CREATININE 1 17 07/19/2019    GLUCOSE 84 12/15/2018    GLUF 117 (H) 01/14/2019    CALCIUM 9 1 07/19/2019    AST 14 07/15/2019    ALT 20 07/15/2019    ALKPHOS 99 07/15/2019    EGFR 48 07/19/2019

## 2019-08-14 NOTE — TELEPHONE ENCOUNTER
Called patient and left message asking patient to call me back in regards to scheduling her surgery

## 2019-08-14 NOTE — LETTER
August 14, 2019     Corrinne Adolphus, MD  Attn: Corrinne Adolphus, M D   [address] [place] [address]    Patient: Zeb Hunter   YOB: 1950   Date of Visit: 8/14/2019       Dear Dr Fabricio Estrada: Thank you for referring Zeb Hunter to me for evaluation  Below are my notes for this consultation  If you have questions, please do not hesitate to call me  I look forward to following your patient along with you  Sincerely,        Estella Stearns MD        CC: No Recipients  Estella Stearns MD  8/14/2019  3:57 PM  Sign at close encounter  Assessment/Plan:    Problem List Items Addressed This Visit        Genitourinary    Ovarian cancer [facility]) - Primary     Patient has undergone neoadjuvant chemotherapy and has had significant regression of tumor and normalization of her   Additionally the patient has had several bouts of bowel obstructions which have resolved spontaneously  At this point we have recommended exploratory laparotomy SUKUMAR BSO tumor debulking possible bowel resection and reversal of colostomy we have discussed risks and benefits including bleeding requiring transfusion infection and damage to local structures  The patient understands accepts the risks of surgery and has signed an informed consent  Dr Ximena Queen has evaluated the patient will be available for surgery  It is likely that a portion of her colon will need to be resected  The patient knows that she is at increased risk and has signed an informed consent with me  Preoperative blood work chest x-ray EKG will all be performed  Unfortunately due to surgical scheduling the current procedures being moved  We will get back to the patient with a time when both surgeons are available  I have discussed the case with the patient and her daughter by phone           Relevant Orders    Case request operating room: HYSTERECTOMY TOTAL ABDOMINAL (SUKUMAR) BSO, tumor debulking possible bowel resection (Completed)    Type and screen    Comprehensive metabolic panel    CBC and Platelet        HEMOGLOBIN A1C W/ EAG ESTIMATION    EKG 12 lead    XR chest pa & lateral              CHIEF COMPLAINT:       Subjective:     Problem:  Cancer Staging  Ovarian cancer ([facility] [address] )  Staging form: Ovary, Fallopian Tube, Primary Peritoneal, AJCC 8th Edition  - Clinical stage from 11/14/2018: FIGO Stage IVB (cT3c, cN0, pM1b) - Signed by Yordy Solares MD on 11/14/2018  - Pathologic: No stage assigned - Unsigned      Previous therapy:     Ovarian cancer ([facility] [address] )    11/9/2018 Initial Diagnosis     [facility]   tumor marker 194 5      11/9/2018 Biopsy     CT-guided needle biopsy of abdominal mass 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  Given liver involvement this would be stage IV      11/14/2018 - 12/4/2018 Chemotherapy     Neoadjuvant therapy: carboplatin area under the curve of 6, paclitaxel 135 milligrams/meter squared, Avastin 10 milligrams/kilogram  Completed 1 of 3 cycles  12/14/2018 Surgery     LAPAROTOMY EXPLORATORY; Abdominal Washout; Application of Abthera Vac Dressing for suspected bowel perforation and septic shock         12/15/2018 Surgery     LAPAROTOMY EXPLORATORY, ABDOMINAL WASHOUT, DRAIN PLACEMENT x 4, DIVERTING LOOP ILEOSTOMY AND ABDOMINAL CLOSURE for bowel perforation      3/26/2019 -  Chemotherapy     Taxol weekly 80 mg/m2 for 3 consecutive weeks, may add carboplatin in the future with AUC of 5       3/26/2019 -  Chemotherapy     CARBOplatin (PARAPLATIN) in sodium chloride 0 9 % 250 mL IVPB,  (original dose ), Intravenous, Once, 3 of 3 cycles  Dose modification:   (Cycle 2),   (original dose 258 4 mg, Cycle 3),   (original dose 258 4 mg, Cycle 3),   (original dose 244 4 mg, Cycle 4)  Administration: 258 4 mg (5/28/2019), 244 4 mg (6/25/2019)  PACLitaxel (TAXOL) 127 8 mg in sodium chloride 0 9 % 250 mL chemo IVPB, 80 mg/m2, Intravenous, Once, 4 of 4 cycles  Dose modification: 65 mg/m2 (original dose 80 mg/m2, Cycle 2, Reason: Dose Not Tolerated)  Administration: 103 8 mg (5/14/2019), 108 6 mg (5/28/2019), 108 6 mg (6/4/2019), 108 6 mg (6/11/2019), 109 8 mg (6/25/2019), 109 8 mg (7/2/2019), 109 8 mg (7/9/2019)      4/29/2019 Genomic Testing      Foundation 1 testing revealed a PD L1 tumor proportions score of 0%  The patient is not a candidate for PD L1 manipulation       Genetic Testing     [employer] Breast/Gyn panel, wildtype  Patient ID: Vernon Stager is a 70 y o  female  Patient presents today for surgical evaluation for interval ovarian cancer debulking  Patient is a very pleasant 16-year-old white female who underwent treatment for stage IVB ovarian cancer  She initiated treatment with carboplatin paclitaxel and Avastin in December of 2018  She developed a bowel perforation at that time and underwent exploratory laparotomy and drainage with diverting ileostomy  Since that time the patient has undergone multiple chemotherapy treatments with carboplatin paclitaxel alone with significant regression of her tumor  Her most recent  is 12  The patient has had several episodes of bowel obstructions which have resolved spontaneously  This may be related to tele scoping of her ileostomy  The patient is in stable condition to go through surgery and we have therefore recommended surgical debulking and reversal of colostomy  The patient has been seen by Dr Paramjit Wilson who will be involved with surgery  Review of Systems   Constitutional: Negative  HENT: Negative  Eyes: Negative  Respiratory: Negative  Cardiovascular: Negative  Gastrointestinal: Negative  Endocrine: Negative  Genitourinary: Negative  Musculoskeletal: Negative  Skin: Negative  Neurological: Negative  Hematological: Negative  Psychiatric/Behavioral: Negative          Current Outpatient Medications   Medication Sig Dispense Refill    acetaminophen (TYLENOL) 325 mg tablet Take 2 tablets (650 mg total) by mouth every 6 (six) hours as needed for mild pain, headaches or fever 30 tablet 0    apixaban (ELIQUIS) 5 mg Take 1 tablet PO twice daily 60 tablet 2    aspirin-acetaminophen-caffeine (EXCEDRIN MIGRAINE) 250-250-65 MG per tablet Take 1 tablet by mouth every 6 (six) hours as needed for headaches      lidocaine-prilocaine (EMLA) cream Apply to port site prior to labs/chemo 30 g 1    metFORMIN (GLUCOPHAGE) 500 mg tablet Take 500 mg by mouth 2 (two) times a day with meals      ondansetron (ZOFRAN) 8 mg tablet Take 1 tablet (8 mg total) by mouth every 8 (eight) hours as needed for nausea or vomiting 30 tablet 1    pantoprazole (PROTONIX) 40 mg tablet Take 1 tablet (40 mg total) by mouth 2 (two) times a day before meals  0     No current facility-administered medications for this visit  No Known Allergies    Past Medical History:   Diagnosis Date    Abdominal fluid collection     Anemia     Asthma     Cancer ([facility] )     ovaries    Diabetes mellitus ([facility] )     History of transfusion        Past Surgical History:   Procedure Laterality Date    CT GUIDED PARACENTESIS  11/6/2018    CT GUIDED PERC DRAINAGE CATHETER PLACEMENT  12/24/2018    CT NEEDLE BIOPSY PERITONEUM  11/6/2018    ECTOPIC PREGNANCY SURGERY      ECTOPIC PREGNANCY SURGERY      IR PARACENTESIS  9/18/2018    IR PARACENTESIS  10/18/2018    IR PARACENTESIS  12/10/2018    IR PORT PLACEMENT  11/29/2018    LAPAROTOMY N/A 12/14/2018    Procedure: LAPAROTOMY EXPLORATORY; Abdominal Washout;  Application of Abthera Vac Dressing;  Surgeon: Tyree Espinoza MD;  [facility];  Service: Gynecology Oncology    LAPAROTOMY N/A 12/15/2018    Procedure: LAPAROTOMY EXPLORATORY, ABDOMINAL WASHOUT, DRAIN PLACEMENT x 4, DIVERTING LOOP ILEOSTOMY AND ABDOMINAL CLOSURE,  ALL OTHER INDICATED PROCEDURES;  Surgeon: Tyree Espinoza MD;  [facility];  Service: Gynecology Oncology    [place] COLONOSCOPY FLX DX W/[facility]IL[facility] WHEN PFRMD N/A 2018    Procedure: EGD AND COLONOSCOPY;  Surgeon: Oscar Terrell MD;  [facility]; Service: Gastroenterology    TONSILECTOMY AND ADNOIDECTOMY      TONSILLECTOMY         OB History        3    Para   2    Term   2            AB   1    Living   2       SAB        TAB        Ectopic   1    Multiple        Live Births   2           Obstetric Comments   Menarche: 8th grade (around age 14)               Family History   Problem Relation Age of Onset    Cirrhosis Mother     Colon cancer Mother     Leukemia Cousin     Diabetes Father        The following portions of the patient's history were reviewed and updated as appropriate: allergies, current medications, past family history, past medical history, past social history, past surgical history and problem list       Objective:    Blood pressure 138/62, pulse 86, temperature 98 4 °F (36 9 °C), resp  rate 18, height 5' 4" (1 626 m), weight 65 8 kg (145 lb)  Body mass index is 24 89 kg/m²  Physical Exam   Constitutional: She is oriented to person, place, and time  She appears well-developed and well-nourished  HENT:   Head: Normocephalic and atraumatic  Eyes: Pupils are equal, round, and reactive to light  EOM are normal    Neck: Normal range of motion  Neck supple  Cardiovascular: Normal rate, regular rhythm and normal heart sounds  Pulmonary/Chest: Effort normal and breath sounds normal  No respiratory distress  Abdominal: Soft  Bowel sounds are normal  She exhibits no distension and no ascites  There is no tenderness  There is no rigidity, no rebound and no guarding  Stoma present and some herniation noted  Genitourinary:   Genitourinary Comments: -Normal external female genitalia, normal Bartholin's and [place]'s glands                  -Normal midline urethral meatus   No lesions notes                  -Bladder without fullness mass or tenderness                  -Vagina without lesion or discharge No significant cystocele or rectocele noted                  Uterus cervix tubes and ovaries involved in confluents mass measuring approximately 8 x 8 cm  This is significantly proximal to the pelvic floor                     - Anus without fissure of lesion     Musculoskeletal: Normal range of motion  Lymphadenopathy:     She has no cervical adenopathy  She has no axillary adenopathy  Right: No inguinal and no supraclavicular adenopathy present  Left: No inguinal and no supraclavicular adenopathy present  Neurological: She is alert and oriented to person, place, and time  Skin: Skin is warm and dry  Psychiatric: She has a normal mood and affect   Her behavior is normal          Lab Results   Component Value Date     12 7 08/13/2019     Lab Results   Component Value Date    WBC 7 60 07/19/2019    HGB 8 5 (L) 07/19/2019    HCT 26 2 (L) 07/19/2019    MCV 94 07/19/2019     07/19/2019     Lab Results   Component Value Date    K 3 6 07/19/2019     07/19/2019    CO2 28 07/19/2019    BUN 21 07/19/2019    CREATININE 1 17 07/19/2019    GLUCOSE 84 12/15/2018    GLUF 117 (H) 01/14/2019    CALCIUM 9 1 07/19/2019    AST 14 07/15/2019    ALT 20 07/15/2019    ALKPHOS 99 07/15/2019    EGFR 48 07/19/2019

## 2019-08-14 NOTE — ASSESSMENT & PLAN NOTE
Patient has undergone neoadjuvant chemotherapy and has had significant regression of tumor and normalization of her   Additionally the patient has had several bouts of bowel obstructions which have resolved spontaneously  At this point we have recommended exploratory laparotomy SUKUMAR BSO tumor debulking possible bowel resection and reversal of colostomy we have discussed risks and benefits including bleeding requiring transfusion infection and damage to local structures  The patient understands accepts the risks of surgery and has signed an informed consent  Dr Kenji Vásquez has evaluated the patient will be available for surgery  It is likely that a portion of her colon will need to be resected  The patient knows that she is at increased risk and has signed an informed consent with me  Preoperative blood work chest x-ray EKG will all be performed  Unfortunately due to surgical scheduling the current procedures being moved  We will get back to the patient with a time when both surgeons are available  I have discussed the case with the patient and her daughter by phone

## 2019-08-14 NOTE — H&P (VIEW-ONLY)
Assessment/Plan:    Problem List Items Addressed This Visit        Genitourinary    Ovarian cancer [facility]) - Primary     Patient has undergone neoadjuvant chemotherapy and has had significant regression of tumor and normalization of her   Additionally the patient has had several bouts of bowel obstructions which have resolved spontaneously  At this point we have recommended exploratory laparotomy SUKUMAR BSO tumor debulking possible bowel resection and reversal of colostomy we have discussed risks and benefits including bleeding requiring transfusion infection and damage to local structures  The patient understands accepts the risks of surgery and has signed an informed consent  Dr Bruce Baker has evaluated the patient will be available for surgery  It is likely that a portion of her colon will need to be resected  The patient knows that she is at increased risk and has signed an informed consent with me  Preoperative blood work chest x-ray EKG will all be performed  Unfortunately due to surgical scheduling the current procedures being moved  We will get back to the patient with a time when both surgeons are available  I have discussed the case with the patient and her daughter by phone           Relevant Orders    Case request operating room: HYSTERECTOMY TOTAL ABDOMINAL (SUKUMAR) BSO, tumor debulking possible bowel resection (Completed)    Type and screen    Comprehensive metabolic panel    CBC and Platelet        HEMOGLOBIN A1C W/ EAG ESTIMATION    EKG 12 lead    XR chest pa & lateral              CHIEF COMPLAINT:       Subjective:     Problem:  Cancer Staging  Ovarian cancer ([facility] [place] [address] )  Staging form: Ovary, Fallopian Tube, Primary Peritoneal, AJCC 8th Edition  - Clinical stage from 11/14/2018: FIGO Stage IVB (cT3c, cN0, pM1b) - Signed by Sunni Sidhu MD on 11/14/2018  - Pathologic: No stage assigned - Unsigned      Previous therapy:     Ovarian cancer (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  Given liver involvement this would be stage IV      11/14/2018 - 12/4/2018 Chemotherapy     Neoadjuvant therapy: carboplatin area under the curve of 6, paclitaxel 135 milligrams/meter squared, Avastin 10 milligrams/kilogram  Completed 1 of 3 cycles  12/14/2018 Surgery     LAPAROTOMY EXPLORATORY; Abdominal Washout; Application of Abthera Vac Dressing for suspected bowel perforation and septic shock  12/15/2018 Surgery     LAPAROTOMY EXPLORATORY, ABDOMINAL WASHOUT, DRAIN PLACEMENT x 4, DIVERTING LOOP ILEOSTOMY AND ABDOMINAL CLOSURE for bowel perforation      3/26/2019 -  Chemotherapy     Taxol weekly 80 mg/m2 for 3 consecutive weeks, may add carboplatin in the future with AUC of 5       3/26/2019 -  Chemotherapy     CARBOplatin (PARAPLATIN) in sodium chloride 0 9 % 250 mL IVPB,  (original dose ), Intravenous, Once, 3 of 3 cycles  Dose modification:   (Cycle 2),   (original dose 258 4 mg, Cycle 3),   (original dose 258 4 mg, Cycle 3),   (original dose 244 4 mg, Cycle 4)  Administration: 258 4 mg (5/28/2019), 244 4 mg (6/25/2019)  PACLitaxel (TAXOL) 127 8 mg in sodium chloride 0 9 % 250 mL chemo IVPB, 80 mg/m2, Intravenous, Once, 4 of 4 cycles  Dose modification: 65 mg/m2 (original dose 80 mg/m2, Cycle 2, Reason: Dose Not Tolerated)  Administration: 103 8 mg (5/14/2019), 108 6 mg (5/28/2019), 108 6 mg (6/4/2019), 108 6 mg (6/11/2019), 109 8 mg (6/25/2019), 109 8 mg (7/2/2019), 109 8 mg (7/9/2019)      4/29/2019 Genomic Testing      Foundation 1 testing revealed a PD L1 tumor proportions score of 0%  The patient is not a candidate for PD L1 manipulation       Genetic Testing     [facility] Breast/Gyn panel, wildtype             Patient ID: Rivka Dai is a 70 y o  female  Patient presents today for surgical evaluation for interval ovarian cancer debulking  Patient is a very pleasant 69-year-old white female who underwent treatment for stage IVB ovarian cancer  She initiated treatment with carboplatin paclitaxel and Avastin in December of 2018  She developed a bowel perforation at that time and underwent exploratory laparotomy and drainage with diverting ileostomy  Since that time the patient has undergone multiple chemotherapy treatments with carboplatin paclitaxel alone with significant regression of her tumor  Her most recent  is 11  The patient has had several episodes of bowel obstructions which have resolved spontaneously  This may be related to tele scoping of her ileostomy  The patient is in stable condition to go through surgery and we have therefore recommended surgical debulking and reversal of colostomy  The patient has been seen by Dr Jj Multani who will be involved with surgery  Review of Systems   Constitutional: Negative  HENT: Negative  Eyes: Negative  Respiratory: Negative  Cardiovascular: Negative  Gastrointestinal: Negative  Endocrine: Negative  Genitourinary: Negative  Musculoskeletal: Negative  Skin: Negative  Neurological: Negative  Hematological: Negative  Psychiatric/Behavioral: Negative          Current Outpatient Medications   Medication Sig Dispense Refill    acetaminophen (TYLENOL) 325 mg tablet Take 2 tablets (650 mg total) by mouth every 6 (six) hours as needed for mild pain, headaches or fever 30 tablet 0    apixaban (ELIQUIS) 5 mg Take 1 tablet PO twice daily 60 tablet 2    aspirin-acetaminophen-caffeine (EXCEDRIN MIGRAINE) 250-250-65 MG per tablet Take 1 tablet by mouth every 6 (six) hours as needed for headaches      lidocaine-prilocaine (EMLA) cream Apply to port site prior to labs/chemo 30 g 1    metFORMIN (GLUCOPHAGE) 500 mg tablet Take 500 mg by mouth 2 (two) times a day with meals      ondansetron (ZOFRAN) 8 mg tablet Take 1 tablet (8 mg total) by mouth every 8 (eight) hours as needed for nausea or vomiting 30 tablet 1    pantoprazole (PROTONIX) 40 mg tablet Take 1 tablet (40 mg total) by mouth 2 (two) times a day before meals  0     No current facility-administered medications for this visit  No Known Allergies    Past Medical History:   Diagnosis Date    Abdominal fluid collection     Anemia     Asthma     Cancer ([facility] [address] )     ovaries    Diabetes mellitus ([facility] )     History of transfusion        Past Surgical History:   Procedure Laterality Date    CT GUIDED PARACENTESIS  2018    CT GUIDED PERC DRAINAGE CATHETER PLACEMENT  2018    CT NEEDLE BIOPSY PERITONEUM  2018    ECTOPIC PREGNANCY SURGERY      ECTOPIC PREGNANCY SURGERY      IR PARACENTESIS  2018    IR PARACENTESIS  10/18/2018    IR PARACENTESIS  12/10/2018    IR PORT PLACEMENT  2018    LAPAROTOMY N/A 2018    Procedure: LAPAROTOMY EXPLORATORY; Abdominal Washout; Application of Abthera Vac Dressing;  Surgeon: Atul Castorena MD;  [facility]: [facility];  Service: Gynecology Oncology    LAPAROTOMY N/A 12/15/2018    Procedure: LAPAROTOMY EXPLORATORY, ABDOMINAL WASHOUT, DRAIN PLACEMENT x 4, DIVERTING LOOP ILEOSTOMY AND ABDOMINAL CLOSURE,  ALL OTHER INDICATED PROCEDURES;  Surgeon: Atul Castorena MD;  Location: [facility];  Service: Gynecology Oncology    UT COLONOSCOPY FLX DX W/COLLJ [address] WHEN PFRMD N/A 2018    Procedure: EGD AND COLONOSCOPY;  Surgeon: True Craig MD;  Location: [facility];   Service: Gastroenterology    TONSILECTOMY AND ADNOIDECTOMY      TONSILLECTOMY         OB History        3    Para   2    Term   2            AB   1    Living   2       SAB        TAB        Ectopic   1    Multiple        Live Births   2           Obstetric Comments   Menarche: 8th grade (around age 14)               Family History   Problem Relation Age of Onset    Cirrhosis Mother     Colon cancer Mother     Leukemia Cousin     Diabetes Father        The following portions of the patient's history were reviewed and updated as appropriate: allergies, current medications, past family history, past medical history, past social history, past surgical history and problem list       Objective:    Blood pressure 138/62, pulse 86, temperature 98 4 °F (36 9 °C), resp  rate 18, height 5' 4" (1 626 m), weight 65 8 kg (145 lb)  Body mass index is 24 89 kg/m²  Physical Exam   Constitutional: She is oriented to person, place, and time  She appears well-developed and well-nourished  HENT:   Head: Normocephalic and atraumatic  Eyes: Pupils are equal, round, and reactive to light  EOM are normal    Neck: Normal range of motion  Neck supple  Cardiovascular: Normal rate, regular rhythm and normal heart sounds  Pulmonary/Chest: Effort normal and breath sounds normal  No respiratory distress  Abdominal: Soft  Bowel sounds are normal  She exhibits no distension and no ascites  There is no tenderness  There is no rigidity, no rebound and no guarding  Stoma present and some herniation noted  Genitourinary:   Genitourinary Comments: -Normal external female genitalia, normal Bartholin's and Cornucopia's glands                  -Normal midline urethral meatus  No lesions notes                  -Bladder without fullness mass or tenderness                  -Vagina without lesion or discharge No significant cystocele or rectocele noted                  Uterus cervix tubes and ovaries involved in confluents mass measuring approximately 8 x 8 cm  This is significantly proximal to the pelvic floor                     - Anus without fissure of lesion     Musculoskeletal: Normal range of motion  Lymphadenopathy:     She has no cervical adenopathy  She has no axillary adenopathy  Right: No inguinal and no supraclavicular adenopathy present  Left: No inguinal and no supraclavicular adenopathy present  Neurological: She is alert and oriented to person, place, and time  Skin: Skin is warm and dry  Psychiatric: She has a normal mood and affect   Her behavior is normal          Lab Results   Component Value Date     12 7 08/13/2019     Lab Results   Component Value Date    WBC 7 60 07/19/2019    HGB 8 5 (L) 07/19/2019    HCT 26 2 (L) 07/19/2019    MCV 94 07/19/2019     07/19/2019     Lab Results   Component Value Date    K 3 6 07/19/2019     07/19/2019    CO2 28 07/19/2019    BUN 21 07/19/2019    CREATININE 1 17 07/19/2019    GLUCOSE 84 12/15/2018    GLUF 117 (H) 01/14/2019    CALCIUM 9 1 07/19/2019    AST 14 07/15/2019    ALT 20 07/15/2019    ALKPHOS 99 07/15/2019    EGFR 48 07/19/2019

## 2019-08-20 PROBLEM — C56.9 MALIGNANT NEOPLASM OF OVARY (HCC): Status: ACTIVE | Noted: 2019-08-20

## 2019-08-20 PROCEDURE — 1124F ACP DISCUSS-NO DSCNMKR DOCD: CPT | Performed by: OBSTETRICS & GYNECOLOGY

## 2019-08-30 ENCOUNTER — HOSPITAL ENCOUNTER (OUTPATIENT)
Dept: RADIOLOGY | Facility: HOSPITAL | Age: 69
Discharge: HOME | End: 2019-08-30
Attending: OBSTETRICS & GYNECOLOGY
Payer: MEDICARE

## 2019-08-30 ENCOUNTER — HOSPITAL ENCOUNTER (OUTPATIENT)
Dept: INFUSION CENTER | Facility: CLINIC | Age: 69
Discharge: HOME | End: 2019-08-30
Payer: MEDICARE

## 2019-08-30 ENCOUNTER — OFFICE VISIT (OUTPATIENT)
Dept: LAB | Facility: HOSPITAL | Age: 69
End: 2019-08-30
Attending: OBSTETRICS & GYNECOLOGY
Payer: MEDICARE

## 2019-08-30 DIAGNOSIS — C56.9 MALIGNANT NEOPLASM OF OVARY, UNSPECIFIED LATERALITY (HCC): Primary | ICD-10-CM

## 2019-08-30 DIAGNOSIS — C56.9 MALIGNANT NEOPLASM OF OVARY, UNSPECIFIED LATERALITY (HCC): ICD-10-CM

## 2019-08-30 DIAGNOSIS — Z45.2 ENCOUNTER FOR CENTRAL LINE CARE: ICD-10-CM

## 2019-08-30 LAB
ABO GROUP BLD: NORMAL
ALBUMIN SERPL BCP-MCNC: 3.2 G/DL (ref 3.5–5)
ALP SERPL-CCNC: 90 U/L (ref 46–116)
ALT SERPL W P-5'-P-CCNC: 17 U/L (ref 12–78)
ANION GAP SERPL CALCULATED.3IONS-SCNC: 12 MMOL/L (ref 4–13)
AST SERPL W P-5'-P-CCNC: 18 U/L (ref 5–45)
BILIRUB SERPL-MCNC: 0.2 MG/DL (ref 0.2–1)
BLD GP AB SCN SERPL QL: NEGATIVE
BUN SERPL-MCNC: 19 MG/DL (ref 5–25)
CALCIUM SERPL-MCNC: 9.1 MG/DL (ref 8.3–10.1)
CANCER AG125 SERPL-ACNC: 19.1 U/ML (ref 0–30)
CHLORIDE SERPL-SCNC: 108 MMOL/L (ref 100–108)
CO2 SERPL-SCNC: 22 MMOL/L (ref 21–32)
CREAT SERPL-MCNC: 1.32 MG/DL (ref 0.6–1.3)
ERYTHROCYTE [DISTWIDTH] IN BLOOD BY AUTOMATED COUNT: 14.9 % (ref 11.6–15.1)
EST. AVERAGE GLUCOSE BLD GHB EST-MCNC: 143 MG/DL
GFR SERPL CREATININE-BSD FRML MDRD: 41 ML/MIN/1.73SQ M
GLUCOSE SERPL-MCNC: 214 MG/DL (ref 65–140)
HBA1C MFR BLD: 6.6 % (ref 4.2–6.3)
HCT VFR BLD AUTO: 28 % (ref 34.8–46.1)
HGB BLD-MCNC: 8.7 G/DL (ref 11.5–15.4)
MCH RBC QN AUTO: 30.6 PG (ref 26.8–34.3)
MCHC RBC AUTO-ENTMCNC: 31.1 G/DL (ref 31.4–37.4)
MCV RBC AUTO: 99 FL (ref 82–98)
PLATELET # BLD AUTO: 296 THOUSANDS/UL (ref 149–390)
PMV BLD AUTO: 11.6 FL (ref 8.9–12.7)
POTASSIUM SERPL-SCNC: 4 MMOL/L (ref 3.5–5.3)
PROT SERPL-MCNC: 7.3 G/DL (ref 6.4–8.2)
RBC # BLD AUTO: 2.84 MILLION/UL (ref 3.81–5.12)
RH BLD: POSITIVE
SODIUM SERPL-SCNC: 142 MMOL/L (ref 136–145)
SPECIMEN EXPIRATION DATE: NORMAL
WBC # BLD AUTO: 7.08 THOUSAND/UL (ref 4.31–10.16)

## 2019-08-30 PROCEDURE — 85027 COMPLETE CBC AUTOMATED: CPT

## 2019-08-30 PROCEDURE — 80053 COMPREHEN METABOLIC PANEL: CPT

## 2019-08-30 PROCEDURE — 83036 HEMOGLOBIN GLYCOSYLATED A1C: CPT

## 2019-08-30 PROCEDURE — 86850 RBC ANTIBODY SCREEN: CPT

## 2019-08-30 PROCEDURE — 86901 BLOOD TYPING SEROLOGIC RH(D): CPT

## 2019-08-30 PROCEDURE — 86304 IMMUNOASSAY TUMOR CA 125: CPT

## 2019-08-30 PROCEDURE — 71046 X-RAY EXAM CHEST 2 VIEWS: CPT

## 2019-08-30 PROCEDURE — 93005 ELECTROCARDIOGRAM TRACING: CPT

## 2019-08-30 PROCEDURE — 86900 BLOOD TYPING SEROLOGIC ABO: CPT

## 2019-08-30 NOTE — PROGRESS NOTES
Pt here for central labs, offers no complaints  Labs drawn and sent to the lab  Port flushed per protocol  Aware of next appointments   Declines AVS

## 2019-08-31 LAB
ATRIAL RATE: 83 BPM
P AXIS: 47 DEGREES
PR INTERVAL: 160 MS
QRS AXIS: 33 DEGREES
QRSD INTERVAL: 76 MS
QT INTERVAL: 378 MS
QTC INTERVAL: 444 MS
T WAVE AXIS: 45 DEGREES
VENTRICULAR RATE: 83 BPM

## 2019-08-31 PROCEDURE — 93010 ELECTROCARDIOGRAM REPORT: CPT | Performed by: INTERNAL MEDICINE

## 2019-09-03 ENCOUNTER — ANESTHESIA EVENT (INPATIENT)
Dept: PERIOP | Facility: HOSPITAL | Age: 69
DRG: 736 | End: 2019-09-03
Payer: MEDICARE

## 2019-09-03 DIAGNOSIS — N17.9 AKI (ACUTE KIDNEY INJURY) (HCC): Primary | ICD-10-CM

## 2019-09-03 NOTE — PRE-PROCEDURE INSTRUCTIONS
Pre-Surgery Instructions:   Medication Instructions    acetaminophen (TYLENOL) 325 mg tablet Instructed patient per Anesthesia Guidelines   apixaban (ELIQUIS) 5 mg Patient was instructed by Physician and understands  stopped on 9/8    metFORMIN (GLUCOPHAGE) 500 mg tablet Instructed patient per Anesthesia Guidelines  holding on 9/10    [START ON 9/9/2019] metroNIDAZOLE (FLAGYL) 500 mg tablet Instructed patient per Anesthesia Guidelines   [START ON 9/9/2019] neomycin (MYCIFRADIN) 500 mg tablet Instructed patient per Anesthesia Guidelines   pantoprazole (PROTONIX) 40 mg tablet Instructed patient per Anesthesia Guidelines  5HT3 Antagonists Med Class     Continue to take this medication on your normal schedule  If this is an oral medication and you take it in the morning, then you may take this medicine with a sip of water  Acetaminophen Med Class     Continue to take this medication on your normal schedule  If this is an oral medication and you take it in the morning, then you may take this medicine with a sip of water  ASA Med Class: Aspirin     Should be discontinued at least one week prior to planned operation, unless specifically stated otherwise by surgical service  Your Surgeon may have patient stop taking aspirin up to a week before surgery if having intracranial, middle ear, posterior eye, spine surgery or prostate surgery  [Patients taking aspirin for coronary stents should be reviewed by an anesthesiologist in the optimization clinic  Please do not discontinue aspirin in patients with coronary stents unless given specific permission to do so by the cardiologist who prescribed medication ]   If your surgeon approves please continue to take this medication on your normal schedule  You may take this medication on the morning of your surgery with a sip of water      Direct Xa Inhibitor Med Class     Stop taking this medication at least 3 days prior to surgery/procedure with prescribing Physician and Surgeon consultation  Insulin Med Class     Pre-Surgery/Procedure Instructions for Adult Patients who Take Medicine for Diabetes or to Control their Blood Sugar     Day Before Surgery/Procedure  Use the directions based on the type of medicine you take for your diabetes  1  If you are having a procedure that does not require a bowel prep:  ? Pre-Mixed Insulin (Intermediate Acting: Humalog 75/25, Humulin 70/30  Novolog 70/30, Regular Insulin)  § Take ½ your regular dose the evening before your procedure  ? Rapid/Fast Acting Insulin/Long Acting Insulin (Humalog U200, NovoLog, Apidra, Lantus, Levemir, Baum Patee, [place])  § Take your FULL regular dose the day before procedure  ? Oral Diabetic Medicines including Glipizide/Glimepiride/Glucotrol (sulfonylurea)  § Take your regular dose with dinner the evening before your procedure  2  If you are having a procedure (e g  Colonoscopy) that requires a bowel prep and you are allowed to have at least a clear liquid diet:  ? Pre-Mixed Insulin (Intermediate Acting: Humalog 75/25, Humulin 70/30, Novolog 70/30, Regular Insulin)  § Take ½ your regular dose the evening before your procedure  ? Rapid/Fast Acting Insulin (Humalog U200, NovoLog, Apidra, Fiasp)  § Take ½ your regular dose the evening before your procedure  ? Long Acting Insulin (Lantus, Levemir, Baum Patee)  § Take your FULL regular dose the day before procedure  ? Oral Glipizide/Glimepiride/Glucotrol (sulfonylurea)  § Take ½ your regular dose the evening before your procedure  ? Oral Diabetic Medicines that are NOT Glipizide/Glimepiride/Glucotrol  § Take your regular dose with dinner in the evening before your procedure      Day of Surgery/Procedure  · Long Acting Insulin (Lantus, Levemir, Baum Patee)  ? If you usually take your Long-Acting Insulin in the morning, take the full dose as scheduled    · With the exception of the morning Long-Acting Insulin noted above, DO NOT take ANY diabetic medicine on the day of your procedure unless you were instructed by the doctor who manages your diabetic medicines  · Continue to check your blood sugars  · If you have an insulin pump then consult with your Endocrinologist for instructions  · If you cannot see your Endocrinologist, on the day of the procedure set your insulin pump to your basal rate only  Please bring your insulin pump supplies to the hospital      This Educational material has been approved by the Patient Education Advisory Committee  Date prepared: 1/17/2018          Expiration date: 1/17/2019        Approval Number:                     Pre op instructions reviewed with pt on 9/3  Meds,bathing and hospital instructuions reviewed at this time with understanding    Phone call education given for hysterectomy class pt verbalizes understanding at this time

## 2019-09-10 ENCOUNTER — HOSPITAL ENCOUNTER (INPATIENT)
Facility: HOSPITAL | Age: 69
LOS: 11 days | Discharge: HOME HEALTH | DRG: 736 | End: 2019-09-21
Attending: OBSTETRICS & GYNECOLOGY | Admitting: OBSTETRICS & GYNECOLOGY
Payer: MEDICARE

## 2019-09-10 ENCOUNTER — ANESTHESIA (INPATIENT)
Dept: PERIOP | Facility: HOSPITAL | Age: 69
DRG: 736 | End: 2019-09-10
Payer: MEDICARE

## 2019-09-10 ENCOUNTER — APPOINTMENT (INPATIENT)
Dept: RADIOLOGY | Facility: HOSPITAL | Age: 69
DRG: 736 | End: 2019-09-10
Payer: MEDICARE

## 2019-09-10 DIAGNOSIS — J18.9 PNEUMONIA: ICD-10-CM

## 2019-09-10 DIAGNOSIS — Z43.3 COLOSTOMY CARE (HCC): ICD-10-CM

## 2019-09-10 DIAGNOSIS — C56.9 MALIGNANT NEOPLASM OF OVARY, UNSPECIFIED LATERALITY (HCC): Primary | ICD-10-CM

## 2019-09-10 DIAGNOSIS — N18.9 ACUTE RENAL FAILURE SUPERIMPOSED ON CHRONIC KIDNEY DISEASE, UNSPECIFIED CKD STAGE, UNSPECIFIED ACUTE RENAL FAILURE TYPE (HCC): ICD-10-CM

## 2019-09-10 DIAGNOSIS — E87.6 HYPOKALEMIA: ICD-10-CM

## 2019-09-10 DIAGNOSIS — R31.9 HEMATURIA, UNSPECIFIED TYPE: ICD-10-CM

## 2019-09-10 DIAGNOSIS — N17.9 ACUTE RENAL FAILURE SUPERIMPOSED ON CHRONIC KIDNEY DISEASE, UNSPECIFIED CKD STAGE, UNSPECIFIED ACUTE RENAL FAILURE TYPE (HCC): ICD-10-CM

## 2019-09-10 DIAGNOSIS — R53.81 PHYSICAL DECONDITIONING: ICD-10-CM

## 2019-09-10 LAB
ABO GROUP BLD: NORMAL
ALBUMIN SERPL BCP-MCNC: 2.8 G/DL (ref 3.5–5)
ALBUMIN SERPL BCP-MCNC: 2.9 G/DL (ref 3.5–5)
ALP SERPL-CCNC: 57 U/L (ref 46–116)
ALP SERPL-CCNC: 60 U/L (ref 46–116)
ALT SERPL W P-5'-P-CCNC: 26 U/L (ref 12–78)
ALT SERPL W P-5'-P-CCNC: 26 U/L (ref 12–78)
ANION GAP SERPL CALCULATED.3IONS-SCNC: 10 MMOL/L (ref 4–13)
ANION GAP SERPL CALCULATED.3IONS-SCNC: 7 MMOL/L (ref 4–13)
APTT PPP: 29 SECONDS (ref 23–37)
APTT PPP: 29 SECONDS (ref 23–37)
AST SERPL W P-5'-P-CCNC: 38 U/L (ref 5–45)
AST SERPL W P-5'-P-CCNC: 39 U/L (ref 5–45)
BASE EXCESS BLDA CALC-SCNC: -6 MMOL/L (ref -2–3)
BASE EXCESS BLDA CALC-SCNC: -6.8 MMOL/L
BASE EXCESS BLDA CALC-SCNC: -7 MMOL/L (ref -2–3)
BASE EXCESS BLDA CALC-SCNC: -9 MMOL/L (ref -2–3)
BASOPHILS # BLD AUTO: 0.02 THOUSANDS/ΜL (ref 0–0.1)
BASOPHILS # BLD AUTO: 0.03 THOUSANDS/ΜL (ref 0–0.1)
BASOPHILS NFR BLD AUTO: 0 % (ref 0–1)
BASOPHILS NFR BLD AUTO: 0 % (ref 0–1)
BILIRUB SERPL-MCNC: 1.14 MG/DL (ref 0.2–1)
BILIRUB SERPL-MCNC: 1.15 MG/DL (ref 0.2–1)
BLD GP AB SCN SERPL QL: NEGATIVE
BODY TEMPERATURE: 97.9 DEGREES FEHRENHEIT
BUN SERPL-MCNC: 22 MG/DL (ref 5–25)
BUN SERPL-MCNC: 22 MG/DL (ref 5–25)
CA-I BLD-SCNC: 1.19 MMOL/L (ref 1.12–1.32)
CA-I BLD-SCNC: 1.2 MMOL/L (ref 1.12–1.32)
CA-I BLD-SCNC: 1.22 MMOL/L (ref 1.12–1.32)
CA-I BLD-SCNC: 1.25 MMOL/L (ref 1.12–1.32)
CA-I BLD-SCNC: 1.26 MMOL/L (ref 1.12–1.32)
CA-I BLD-SCNC: 1.36 MMOL/L (ref 1.12–1.32)
CA-I BLD-SCNC: 1.58 MMOL/L (ref 1.12–1.32)
CALCIUM SERPL-MCNC: 10.3 MG/DL (ref 8.3–10.1)
CALCIUM SERPL-MCNC: 9.8 MG/DL (ref 8.3–10.1)
CHLORIDE SERPL-SCNC: 117 MMOL/L (ref 100–108)
CHLORIDE SERPL-SCNC: 117 MMOL/L (ref 100–108)
CO2 SERPL-SCNC: 19 MMOL/L (ref 21–32)
CO2 SERPL-SCNC: 20 MMOL/L (ref 21–32)
CREAT SERPL-MCNC: 1.29 MG/DL (ref 0.6–1.3)
CREAT SERPL-MCNC: 1.29 MG/DL (ref 0.6–1.3)
EOSINOPHIL # BLD AUTO: 0 THOUSAND/ΜL (ref 0–0.61)
EOSINOPHIL # BLD AUTO: 0.01 THOUSAND/ΜL (ref 0–0.61)
EOSINOPHIL NFR BLD AUTO: 0 % (ref 0–6)
EOSINOPHIL NFR BLD AUTO: 0 % (ref 0–6)
ERYTHROCYTE [DISTWIDTH] IN BLOOD BY AUTOMATED COUNT: 15.5 % (ref 11.6–15.1)
ERYTHROCYTE [DISTWIDTH] IN BLOOD BY AUTOMATED COUNT: 15.6 % (ref 11.6–15.1)
FIBRINOGEN PPP-MCNC: 299 MG/DL (ref 227–495)
GFR SERPL CREATININE-BSD FRML MDRD: 42 ML/MIN/1.73SQ M
GFR SERPL CREATININE-BSD FRML MDRD: 42 ML/MIN/1.73SQ M
GLUCOSE SERPL-MCNC: 101 MG/DL (ref 65–140)
GLUCOSE SERPL-MCNC: 157 MG/DL (ref 65–140)
GLUCOSE SERPL-MCNC: 179 MG/DL (ref 65–140)
GLUCOSE SERPL-MCNC: 182 MG/DL (ref 65–140)
GLUCOSE SERPL-MCNC: 194 MG/DL (ref 65–140)
GLUCOSE SERPL-MCNC: 201 MG/DL (ref 65–140)
GLUCOSE SERPL-MCNC: 218 MG/DL (ref 65–140)
GLUCOSE SERPL-MCNC: 224 MG/DL (ref 65–140)
GLUCOSE SERPL-MCNC: 227 MG/DL (ref 65–140)
GLUCOSE SERPL-MCNC: 228 MG/DL (ref 65–140)
HCO3 BLDA-SCNC: 17.2 MMOL/L (ref 22–28)
HCO3 BLDA-SCNC: 18.6 MMOL/L (ref 22–28)
HCO3 BLDA-SCNC: 18.8 MMOL/L (ref 22–28)
HCO3 BLDA-SCNC: 19.2 MMOL/L (ref 22–28)
HCO3 BLDA-SCNC: 19.8 MMOL/L (ref 22–28)
HCO3 BLDA-SCNC: 19.8 MMOL/L (ref 22–28)
HCO3 BLDA-SCNC: 20.3 MMOL/L (ref 22–28)
HCT VFR BLD AUTO: 28.9 % (ref 34.8–46.1)
HCT VFR BLD AUTO: 29.9 % (ref 34.8–46.1)
HCT VFR BLD CALC: 19 % (ref 34.8–46.1)
HCT VFR BLD CALC: 20 % (ref 34.8–46.1)
HCT VFR BLD CALC: 21 % (ref 34.8–46.1)
HCT VFR BLD CALC: 23 % (ref 34.8–46.1)
HCT VFR BLD CALC: 25 % (ref 34.8–46.1)
HCT VFR BLD CALC: 26 % (ref 34.8–46.1)
HGB BLD-MCNC: 10 G/DL (ref 11.5–15.4)
HGB BLD-MCNC: 9.7 G/DL (ref 11.5–15.4)
HGB BLDA-MCNC: 6.5 G/DL (ref 11.5–15.4)
HGB BLDA-MCNC: 6.8 G/DL (ref 11.5–15.4)
HGB BLDA-MCNC: 7.1 G/DL (ref 11.5–15.4)
HGB BLDA-MCNC: 7.8 G/DL (ref 11.5–15.4)
HGB BLDA-MCNC: 8.5 G/DL (ref 11.5–15.4)
HGB BLDA-MCNC: 8.8 G/DL (ref 11.5–15.4)
HOROWITZ INDEX BLDA+IHG-RTO: 40 MM[HG]
IMM GRANULOCYTES # BLD AUTO: 0.05 THOUSAND/UL (ref 0–0.2)
IMM GRANULOCYTES # BLD AUTO: 0.05 THOUSAND/UL (ref 0–0.2)
IMM GRANULOCYTES NFR BLD AUTO: 0 % (ref 0–2)
IMM GRANULOCYTES NFR BLD AUTO: 0 % (ref 0–2)
INR PPP: 1.16 (ref 0.84–1.19)
INR PPP: 1.23 (ref 0.84–1.19)
LACTATE SERPL-SCNC: 1.8 MMOL/L (ref 0.5–2)
LYMPHOCYTES # BLD AUTO: 0.68 THOUSANDS/ΜL (ref 0.6–4.47)
LYMPHOCYTES # BLD AUTO: 0.75 THOUSANDS/ΜL (ref 0.6–4.47)
LYMPHOCYTES NFR BLD AUTO: 5 % (ref 14–44)
LYMPHOCYTES NFR BLD AUTO: 5 % (ref 14–44)
MAGNESIUM SERPL-MCNC: 1.6 MG/DL (ref 1.6–2.6)
MAGNESIUM SERPL-MCNC: 1.8 MG/DL (ref 1.6–2.6)
MCH RBC QN AUTO: 29.5 PG (ref 26.8–34.3)
MCH RBC QN AUTO: 29.5 PG (ref 26.8–34.3)
MCHC RBC AUTO-ENTMCNC: 33.4 G/DL (ref 31.4–37.4)
MCHC RBC AUTO-ENTMCNC: 33.6 G/DL (ref 31.4–37.4)
MCV RBC AUTO: 88 FL (ref 82–98)
MCV RBC AUTO: 88 FL (ref 82–98)
MONOCYTES # BLD AUTO: 1.59 THOUSAND/ΜL (ref 0.17–1.22)
MONOCYTES # BLD AUTO: 1.8 THOUSAND/ΜL (ref 0.17–1.22)
MONOCYTES NFR BLD AUTO: 11 % (ref 4–12)
MONOCYTES NFR BLD AUTO: 12 % (ref 4–12)
NEUTROPHILS # BLD AUTO: 12.76 THOUSANDS/ΜL (ref 1.85–7.62)
NEUTROPHILS # BLD AUTO: 12.8 THOUSANDS/ΜL (ref 1.85–7.62)
NEUTS SEG NFR BLD AUTO: 83 % (ref 43–75)
NEUTS SEG NFR BLD AUTO: 84 % (ref 43–75)
NRBC BLD AUTO-RTO: 0 /100 WBCS
NRBC BLD AUTO-RTO: 0 /100 WBCS
O2 CT BLDA-SCNC: 14.4 ML/DL (ref 16–23)
OXYHGB MFR BLDA: 97.6 % (ref 94–97)
PCO2 BLD: 18 MMOL/L (ref 21–32)
PCO2 BLD: 20 MMOL/L (ref 21–32)
PCO2 BLD: 20 MMOL/L (ref 21–32)
PCO2 BLD: 21 MMOL/L (ref 21–32)
PCO2 BLD: 37 MM HG (ref 36–44)
PCO2 BLD: 38 MM HG (ref 36–44)
PCO2 BLD: 38.2 MM HG (ref 36–44)
PCO2 BLD: 38.4 MM HG (ref 36–44)
PCO2 BLD: 38.6 MM HG (ref 36–44)
PCO2 BLD: 41 MM HG (ref 36–44)
PCO2 BLDA: 38 MM HG (ref 36–44)
PCO2 TEMP ADJ BLDA: 37.3 MM HG (ref 36–44)
PEEP RESPIRATORY: 5 CM[H2O]
PH BLD: 7.26 [PH] (ref 7.35–7.45)
PH BLD: 7.29 [PH] (ref 7.35–7.45)
PH BLD: 7.3 [PH] (ref 7.35–7.45)
PH BLD: 7.32 [PH] (ref 7.35–7.45)
PH BLDA: 7.31 [PH] (ref 7.35–7.45)
PHOSPHATE SERPL-MCNC: 4.3 MG/DL (ref 2.3–4.1)
PHOSPHATE SERPL-MCNC: 4.4 MG/DL (ref 2.3–4.1)
PLATELET # BLD AUTO: 144 THOUSANDS/UL (ref 149–390)
PLATELET # BLD AUTO: 144 THOUSANDS/UL (ref 149–390)
PMV BLD AUTO: 11.2 FL (ref 8.9–12.7)
PMV BLD AUTO: 11.3 FL (ref 8.9–12.7)
PO2 BLD: 163.6 MM HG (ref 75–129)
PO2 BLD: 235 MM HG (ref 75–129)
PO2 BLD: 244 MM HG (ref 75–129)
PO2 BLD: 249 MM HG (ref 75–129)
PO2 BLD: 289 MM HG (ref 75–129)
PO2 BLDA: 165.9 MM HG (ref 75–129)
POTASSIUM BLD-SCNC: 3.6 MMOL/L (ref 3.5–5.3)
POTASSIUM BLD-SCNC: 3.7 MMOL/L (ref 3.5–5.3)
POTASSIUM BLD-SCNC: 3.7 MMOL/L (ref 3.5–5.3)
POTASSIUM BLD-SCNC: 3.8 MMOL/L (ref 3.5–5.3)
POTASSIUM BLD-SCNC: 3.9 MMOL/L (ref 3.5–5.3)
POTASSIUM BLD-SCNC: 4.3 MMOL/L (ref 3.5–5.3)
POTASSIUM SERPL-SCNC: 3.8 MMOL/L (ref 3.5–5.3)
POTASSIUM SERPL-SCNC: 3.9 MMOL/L (ref 3.5–5.3)
PROT SERPL-MCNC: 5.3 G/DL (ref 6.4–8.2)
PROT SERPL-MCNC: 5.3 G/DL (ref 6.4–8.2)
PROTHROMBIN TIME: 14.4 SECONDS (ref 11.6–14.5)
PROTHROMBIN TIME: 15.1 SECONDS (ref 11.6–14.5)
RBC # BLD AUTO: 3.29 MILLION/UL (ref 3.81–5.12)
RBC # BLD AUTO: 3.39 MILLION/UL (ref 3.81–5.12)
RH BLD: POSITIVE
SAO2 % BLD FROM PO2: 100 % (ref 95–98)
SODIUM BLD-SCNC: 142 MMOL/L (ref 136–145)
SODIUM BLD-SCNC: 142 MMOL/L (ref 136–145)
SODIUM BLD-SCNC: 143 MMOL/L (ref 136–145)
SODIUM BLD-SCNC: 144 MMOL/L (ref 136–145)
SODIUM BLD-SCNC: 144 MMOL/L (ref 136–145)
SODIUM BLD-SCNC: 145 MMOL/L (ref 136–145)
SODIUM SERPL-SCNC: 144 MMOL/L (ref 136–145)
SODIUM SERPL-SCNC: 146 MMOL/L (ref 136–145)
SPECIMEN EXPIRATION DATE: NORMAL
SPECIMEN SOURCE: ABNORMAL
VENT AC: 14
VENT- AC: AC
VT SETTING VENT: 430 ML
WBC # BLD AUTO: 15.14 THOUSAND/UL (ref 4.31–10.16)
WBC # BLD AUTO: 15.4 THOUSAND/UL (ref 4.31–10.16)

## 2019-09-10 PROCEDURE — 0D1N0Z4 BYPASS SIGMOID COLON TO CUTANEOUS, OPEN APPROACH: ICD-10-PCS | Performed by: OBSTETRICS & GYNECOLOGY

## 2019-09-10 PROCEDURE — 84132 ASSAY OF SERUM POTASSIUM: CPT

## 2019-09-10 PROCEDURE — 88309 TISSUE EXAM BY PATHOLOGIST: CPT | Performed by: PATHOLOGY

## 2019-09-10 PROCEDURE — 0T9880Z DRAINAGE OF BILATERAL URETERS WITH DRAINAGE DEVICE, VIA NATURAL OR ARTIFICIAL OPENING ENDOSCOPIC: ICD-10-PCS | Performed by: OBSTETRICS & GYNECOLOGY

## 2019-09-10 PROCEDURE — 86923 COMPATIBILITY TEST ELECTRIC: CPT

## 2019-09-10 PROCEDURE — 85730 THROMBOPLASTIN TIME PARTIAL: CPT | Performed by: OBSTETRICS & GYNECOLOGY

## 2019-09-10 PROCEDURE — 85610 PROTHROMBIN TIME: CPT | Performed by: PHYSICIAN ASSISTANT

## 2019-09-10 PROCEDURE — 82948 REAGENT STRIP/BLOOD GLUCOSE: CPT

## 2019-09-10 PROCEDURE — 88342 IMHCHEM/IMCYTCHM 1ST ANTB: CPT | Performed by: PATHOLOGY

## 2019-09-10 PROCEDURE — BT10ZZZ FLUOROSCOPY OF BLADDER: ICD-10-PCS | Performed by: OBSTETRICS & GYNECOLOGY

## 2019-09-10 PROCEDURE — 85610 PROTHROMBIN TIME: CPT | Performed by: OBSTETRICS & GYNECOLOGY

## 2019-09-10 PROCEDURE — 94760 N-INVAS EAR/PLS OXIMETRY 1: CPT

## 2019-09-10 PROCEDURE — 0DBB0ZZ EXCISION OF ILEUM, OPEN APPROACH: ICD-10-PCS | Performed by: OBSTETRICS & GYNECOLOGY

## 2019-09-10 PROCEDURE — 88344 IMHCHEM/IMCYTCHM EA MLT ANTB: CPT | Performed by: PATHOLOGY

## 2019-09-10 PROCEDURE — 82330 ASSAY OF CALCIUM: CPT

## 2019-09-10 PROCEDURE — P9100 PATHOGEN TEST FOR PLATELETS: HCPCS

## 2019-09-10 PROCEDURE — 83735 ASSAY OF MAGNESIUM: CPT | Performed by: PHYSICIAN ASSISTANT

## 2019-09-10 PROCEDURE — 0DTP0ZZ RESECTION OF RECTUM, OPEN APPROACH: ICD-10-PCS | Performed by: OBSTETRICS & GYNECOLOGY

## 2019-09-10 PROCEDURE — 0DJD8ZZ INSPECTION OF LOWER INTESTINAL TRACT, VIA NATURAL OR ARTIFICIAL OPENING ENDOSCOPIC: ICD-10-PCS | Performed by: OBSTETRICS & GYNECOLOGY

## 2019-09-10 PROCEDURE — 85014 HEMATOCRIT: CPT

## 2019-09-10 PROCEDURE — 86850 RBC ANTIBODY SCREEN: CPT | Performed by: OBSTETRICS & GYNECOLOGY

## 2019-09-10 PROCEDURE — 99291 CRITICAL CARE FIRST HOUR: CPT | Performed by: PHYSICIAN ASSISTANT

## 2019-09-10 PROCEDURE — 84100 ASSAY OF PHOSPHORUS: CPT | Performed by: PHYSICIAN ASSISTANT

## 2019-09-10 PROCEDURE — 85730 THROMBOPLASTIN TIME PARTIAL: CPT | Performed by: PHYSICIAN ASSISTANT

## 2019-09-10 PROCEDURE — 49205 PR EXCISION/DESTRUCTION OPEN ABDOMINAL TUMORS >10.0 CM: CPT | Performed by: COLON & RECTAL SURGERY

## 2019-09-10 PROCEDURE — 88341 IMHCHEM/IMCYTCHM EA ADD ANTB: CPT | Performed by: PATHOLOGY

## 2019-09-10 PROCEDURE — 84100 ASSAY OF PHOSPHORUS: CPT | Performed by: OBSTETRICS & GYNECOLOGY

## 2019-09-10 PROCEDURE — 0DTU0ZZ RESECTION OF OMENTUM, OPEN APPROACH: ICD-10-PCS | Performed by: OBSTETRICS & GYNECOLOGY

## 2019-09-10 PROCEDURE — 82803 BLOOD GASES ANY COMBINATION: CPT

## 2019-09-10 PROCEDURE — 71045 X-RAY EXAM CHEST 1 VIEW: CPT

## 2019-09-10 PROCEDURE — 88307 TISSUE EXAM BY PATHOLOGIST: CPT | Performed by: PATHOLOGY

## 2019-09-10 PROCEDURE — 88305 TISSUE EXAM BY PATHOLOGIST: CPT | Performed by: PATHOLOGY

## 2019-09-10 PROCEDURE — 85384 FIBRINOGEN ACTIVITY: CPT | Performed by: OBSTETRICS & GYNECOLOGY

## 2019-09-10 PROCEDURE — P9040 RBC LEUKOREDUCED IRRADIATED: HCPCS

## 2019-09-10 PROCEDURE — 94002 VENT MGMT INPAT INIT DAY: CPT

## 2019-09-10 PROCEDURE — C1769 GUIDE WIRE: HCPCS | Performed by: OBSTETRICS & GYNECOLOGY

## 2019-09-10 PROCEDURE — 85025 COMPLETE CBC W/AUTO DIFF WBC: CPT | Performed by: PHYSICIAN ASSISTANT

## 2019-09-10 PROCEDURE — 99024 POSTOP FOLLOW-UP VISIT: CPT | Performed by: OBSTETRICS & GYNECOLOGY

## 2019-09-10 PROCEDURE — 80053 COMPREHEN METABOLIC PANEL: CPT | Performed by: PHYSICIAN ASSISTANT

## 2019-09-10 PROCEDURE — P9035 PLATELET PHERES LEUKOREDUCED: HCPCS

## 2019-09-10 PROCEDURE — 83735 ASSAY OF MAGNESIUM: CPT | Performed by: OBSTETRICS & GYNECOLOGY

## 2019-09-10 PROCEDURE — 82805 BLOOD GASES W/O2 SATURATION: CPT | Performed by: PHYSICIAN ASSISTANT

## 2019-09-10 PROCEDURE — 84295 ASSAY OF SERUM SODIUM: CPT

## 2019-09-10 PROCEDURE — 0BH17EZ INSERTION OF ENDOTRACHEAL AIRWAY INTO TRACHEA, VIA NATURAL OR ARTIFICIAL OPENING: ICD-10-PCS | Performed by: OBSTETRICS & GYNECOLOGY

## 2019-09-10 PROCEDURE — 0UB20ZZ EXCISION OF BILATERAL OVARIES, OPEN APPROACH: ICD-10-PCS | Performed by: OBSTETRICS & GYNECOLOGY

## 2019-09-10 PROCEDURE — 82330 ASSAY OF CALCIUM: CPT | Performed by: PHYSICIAN ASSISTANT

## 2019-09-10 PROCEDURE — 86900 BLOOD TYPING SEROLOGIC ABO: CPT | Performed by: OBSTETRICS & GYNECOLOGY

## 2019-09-10 PROCEDURE — P9017 PLASMA 1 DONOR FRZ W/IN 8 HR: HCPCS

## 2019-09-10 PROCEDURE — 86901 BLOOD TYPING SEROLOGIC RH(D): CPT | Performed by: OBSTETRICS & GYNECOLOGY

## 2019-09-10 PROCEDURE — 80053 COMPREHEN METABOLIC PANEL: CPT | Performed by: OBSTETRICS & GYNECOLOGY

## 2019-09-10 PROCEDURE — 85025 COMPLETE CBC W/AUTO DIFF WBC: CPT | Performed by: OBSTETRICS & GYNECOLOGY

## 2019-09-10 PROCEDURE — 0TJ90ZZ INSPECTION OF URETER, OPEN APPROACH: ICD-10-PCS | Performed by: OBSTETRICS & GYNECOLOGY

## 2019-09-10 PROCEDURE — 0UB70ZZ EXCISION OF BILATERAL FALLOPIAN TUBES, OPEN APPROACH: ICD-10-PCS | Performed by: OBSTETRICS & GYNECOLOGY

## 2019-09-10 PROCEDURE — 58240 REMOVAL OF PELVIS CONTENTS: CPT | Performed by: OBSTETRICS & GYNECOLOGY

## 2019-09-10 PROCEDURE — P9016 RBC LEUKOCYTES REDUCED: HCPCS

## 2019-09-10 PROCEDURE — 5A1935Z RESPIRATORY VENTILATION, LESS THAN 24 CONSECUTIVE HOURS: ICD-10-PCS | Performed by: OBSTETRICS & GYNECOLOGY

## 2019-09-10 PROCEDURE — 45330 DIAGNOSTIC SIGMOIDOSCOPY: CPT | Performed by: COLON & RECTAL SURGERY

## 2019-09-10 PROCEDURE — 82947 ASSAY GLUCOSE BLOOD QUANT: CPT

## 2019-09-10 PROCEDURE — 49000 EXPLORATION OF ABDOMEN: CPT | Performed by: UROLOGY

## 2019-09-10 PROCEDURE — 83605 ASSAY OF LACTIC ACID: CPT | Performed by: PHYSICIAN ASSISTANT

## 2019-09-10 PROCEDURE — 44625 REPAIR BOWEL OPENING: CPT | Performed by: COLON & RECTAL SURGERY

## 2019-09-10 RX ORDER — ROCURONIUM BROMIDE 10 MG/ML
INJECTION, SOLUTION INTRAVENOUS AS NEEDED
Status: DISCONTINUED | OUTPATIENT
Start: 2019-09-10 | End: 2019-09-10 | Stop reason: SURG

## 2019-09-10 RX ORDER — CEFAZOLIN SODIUM 2 G/50ML
2000 SOLUTION INTRAVENOUS ONCE
Status: COMPLETED | OUTPATIENT
Start: 2019-09-10 | End: 2019-09-10

## 2019-09-10 RX ORDER — ONDANSETRON 2 MG/ML
4 INJECTION INTRAMUSCULAR; INTRAVENOUS EVERY 6 HOURS PRN
Status: DISCONTINUED | OUTPATIENT
Start: 2019-09-10 | End: 2019-09-21 | Stop reason: HOSPADM

## 2019-09-10 RX ORDER — CALCIUM CHLORIDE 100 MG/ML
INJECTION INTRAVENOUS; INTRAVENTRICULAR AS NEEDED
Status: DISCONTINUED | OUTPATIENT
Start: 2019-09-10 | End: 2019-09-10 | Stop reason: SURG

## 2019-09-10 RX ORDER — SODIUM CHLORIDE 9 MG/ML
INJECTION, SOLUTION INTRAVENOUS CONTINUOUS PRN
Status: DISCONTINUED | OUTPATIENT
Start: 2019-09-10 | End: 2019-09-10 | Stop reason: SURG

## 2019-09-10 RX ORDER — LIDOCAINE HYDROCHLORIDE AND EPINEPHRINE 15; 5 MG/ML; UG/ML
INJECTION, SOLUTION EPIDURAL
Status: COMPLETED | OUTPATIENT
Start: 2019-09-10 | End: 2019-09-10

## 2019-09-10 RX ORDER — DEXAMETHASONE SODIUM PHOSPHATE 4 MG/ML
4 INJECTION, SOLUTION INTRA-ARTICULAR; INTRALESIONAL; INTRAMUSCULAR; INTRAVENOUS; SOFT TISSUE EVERY 6 HOURS SCHEDULED
Status: COMPLETED | OUTPATIENT
Start: 2019-09-10 | End: 2019-09-12

## 2019-09-10 RX ORDER — PROPOFOL 10 MG/ML
INJECTION, EMULSION INTRAVENOUS AS NEEDED
Status: DISCONTINUED | OUTPATIENT
Start: 2019-09-10 | End: 2019-09-10 | Stop reason: SURG

## 2019-09-10 RX ORDER — MAGNESIUM HYDROXIDE 1200 MG/15ML
LIQUID ORAL AS NEEDED
Status: DISCONTINUED | OUTPATIENT
Start: 2019-09-10 | End: 2019-09-10 | Stop reason: HOSPADM

## 2019-09-10 RX ORDER — GABAPENTIN 100 MG/1
100 CAPSULE ORAL ONCE
Status: COMPLETED | OUTPATIENT
Start: 2019-09-10 | End: 2019-09-10

## 2019-09-10 RX ORDER — CHLORHEXIDINE GLUCONATE 0.12 MG/ML
15 RINSE ORAL EVERY 12 HOURS SCHEDULED
Status: DISCONTINUED | OUTPATIENT
Start: 2019-09-10 | End: 2019-09-11

## 2019-09-10 RX ORDER — HEPARIN SODIUM 5000 [USP'U]/ML
5000 INJECTION, SOLUTION INTRAVENOUS; SUBCUTANEOUS EVERY 8 HOURS SCHEDULED
Status: DISCONTINUED | OUTPATIENT
Start: 2019-09-10 | End: 2019-09-10 | Stop reason: SDUPTHER

## 2019-09-10 RX ORDER — EPHEDRINE SULFATE 50 MG/ML
INJECTION INTRAVENOUS AS NEEDED
Status: DISCONTINUED | OUTPATIENT
Start: 2019-09-10 | End: 2019-09-10 | Stop reason: SURG

## 2019-09-10 RX ORDER — OXYCODONE HYDROCHLORIDE 5 MG/1
5 TABLET ORAL EVERY 4 HOURS PRN
Qty: 20 TABLET | Refills: 0 | Status: SHIPPED | OUTPATIENT
Start: 2019-09-10 | End: 2019-09-20

## 2019-09-10 RX ORDER — CHLORHEXIDINE GLUCONATE 0.12 MG/ML
15 RINSE ORAL EVERY 12 HOURS SCHEDULED
Status: DISCONTINUED | OUTPATIENT
Start: 2019-09-10 | End: 2019-09-10 | Stop reason: SDUPTHER

## 2019-09-10 RX ORDER — PROPOFOL 10 MG/ML
5-50 INJECTION, EMULSION INTRAVENOUS
Status: DISCONTINUED | OUTPATIENT
Start: 2019-09-10 | End: 2019-09-11

## 2019-09-10 RX ORDER — SODIUM CHLORIDE, SODIUM GLUCONATE, SODIUM ACETATE, POTASSIUM CHLORIDE, MAGNESIUM CHLORIDE, SODIUM PHOSPHATE, DIBASIC, AND POTASSIUM PHOSPHATE .53; .5; .37; .037; .03; .012; .00082 G/100ML; G/100ML; G/100ML; G/100ML; G/100ML; G/100ML; G/100ML
125 INJECTION, SOLUTION INTRAVENOUS CONTINUOUS
Status: DISCONTINUED | OUTPATIENT
Start: 2019-09-10 | End: 2019-09-11

## 2019-09-10 RX ORDER — ACETAMINOPHEN 325 MG/1
975 TABLET ORAL ONCE
Status: COMPLETED | OUTPATIENT
Start: 2019-09-10 | End: 2019-09-10

## 2019-09-10 RX ORDER — SODIUM CHLORIDE, SODIUM LACTATE, POTASSIUM CHLORIDE, CALCIUM CHLORIDE 600; 310; 30; 20 MG/100ML; MG/100ML; MG/100ML; MG/100ML
INJECTION, SOLUTION INTRAVENOUS CONTINUOUS PRN
Status: DISCONTINUED | OUTPATIENT
Start: 2019-09-10 | End: 2019-09-10 | Stop reason: SURG

## 2019-09-10 RX ORDER — PROPOFOL 10 MG/ML
INJECTION, EMULSION INTRAVENOUS CONTINUOUS PRN
Status: DISCONTINUED | OUTPATIENT
Start: 2019-09-10 | End: 2019-09-10 | Stop reason: SURG

## 2019-09-10 RX ORDER — MIDAZOLAM HYDROCHLORIDE 1 MG/ML
INJECTION INTRAMUSCULAR; INTRAVENOUS AS NEEDED
Status: DISCONTINUED | OUTPATIENT
Start: 2019-09-10 | End: 2019-09-10 | Stop reason: SURG

## 2019-09-10 RX ORDER — ALBUMIN, HUMAN INJ 5% 5 %
SOLUTION INTRAVENOUS CONTINUOUS PRN
Status: DISCONTINUED | OUTPATIENT
Start: 2019-09-10 | End: 2019-09-10 | Stop reason: SURG

## 2019-09-10 RX ORDER — DIPHENHYDRAMINE HYDROCHLORIDE 50 MG/ML
25 INJECTION INTRAMUSCULAR; INTRAVENOUS EVERY 6 HOURS PRN
Status: DISCONTINUED | OUTPATIENT
Start: 2019-09-10 | End: 2019-09-21 | Stop reason: HOSPADM

## 2019-09-10 RX ORDER — ONDANSETRON 2 MG/ML
INJECTION INTRAMUSCULAR; INTRAVENOUS AS NEEDED
Status: DISCONTINUED | OUTPATIENT
Start: 2019-09-10 | End: 2019-09-10 | Stop reason: SURG

## 2019-09-10 RX ORDER — DEXTROSE, SODIUM CHLORIDE, AND POTASSIUM CHLORIDE 5; .45; .15 G/100ML; G/100ML; G/100ML
125 INJECTION INTRAVENOUS CONTINUOUS
Status: DISCONTINUED | OUTPATIENT
Start: 2019-09-10 | End: 2019-09-11

## 2019-09-10 RX ORDER — BUPIVACAINE HYDROCHLORIDE 2.5 MG/ML
INJECTION, SOLUTION INFILTRATION; PERINEURAL AS NEEDED
Status: DISCONTINUED | OUTPATIENT
Start: 2019-09-10 | End: 2019-09-10 | Stop reason: SURG

## 2019-09-10 RX ORDER — ACETAMINOPHEN 325 MG/1
650 TABLET ORAL EVERY 6 HOURS SCHEDULED
Status: DISCONTINUED | OUTPATIENT
Start: 2019-09-10 | End: 2019-09-19

## 2019-09-10 RX ORDER — HEPARIN SODIUM 5000 [USP'U]/ML
INJECTION, SOLUTION INTRAVENOUS; SUBCUTANEOUS AS NEEDED
Status: DISCONTINUED | OUTPATIENT
Start: 2019-09-10 | End: 2019-09-10 | Stop reason: SURG

## 2019-09-10 RX ORDER — DEXAMETHASONE SODIUM PHOSPHATE 10 MG/ML
INJECTION, SOLUTION INTRAMUSCULAR; INTRAVENOUS AS NEEDED
Status: DISCONTINUED | OUTPATIENT
Start: 2019-09-10 | End: 2019-09-10 | Stop reason: SURG

## 2019-09-10 RX ORDER — LIDOCAINE HYDROCHLORIDE 10 MG/ML
INJECTION, SOLUTION INFILTRATION; PERINEURAL AS NEEDED
Status: DISCONTINUED | OUTPATIENT
Start: 2019-09-10 | End: 2019-09-10 | Stop reason: SURG

## 2019-09-10 RX ORDER — FENTANYL CITRATE 50 UG/ML
INJECTION, SOLUTION INTRAMUSCULAR; INTRAVENOUS AS NEEDED
Status: DISCONTINUED | OUTPATIENT
Start: 2019-09-10 | End: 2019-09-10 | Stop reason: SURG

## 2019-09-10 RX ORDER — HYDROMORPHONE HCL/PF 1 MG/ML
0.5 SYRINGE (ML) INJECTION
Status: DISCONTINUED | OUTPATIENT
Start: 2019-09-10 | End: 2019-09-13

## 2019-09-10 RX ORDER — ONDANSETRON 2 MG/ML
4 INJECTION INTRAMUSCULAR; INTRAVENOUS EVERY 4 HOURS PRN
Status: DISCONTINUED | OUTPATIENT
Start: 2019-09-10 | End: 2019-09-10 | Stop reason: HOSPADM

## 2019-09-10 RX ORDER — HEPARIN SODIUM 5000 [USP'U]/ML
5000 INJECTION, SOLUTION INTRAVENOUS; SUBCUTANEOUS EVERY 8 HOURS SCHEDULED
Status: DISCONTINUED | OUTPATIENT
Start: 2019-09-10 | End: 2019-09-18

## 2019-09-10 RX ADMIN — INSULIN LISPRO 2 UNITS: 100 INJECTION, SOLUTION INTRAVENOUS; SUBCUTANEOUS at 23:52

## 2019-09-10 RX ADMIN — SODIUM CHLORIDE, SODIUM LACTATE, POTASSIUM CHLORIDE, AND CALCIUM CHLORIDE: .6; .31; .03; .02 INJECTION, SOLUTION INTRAVENOUS at 07:14

## 2019-09-10 RX ADMIN — INSULIN HUMAN 5 UNITS: 100 INJECTION, SOLUTION PARENTERAL at 15:00

## 2019-09-10 RX ADMIN — CEFAZOLIN SODIUM 2000 MG: 2 SOLUTION INTRAVENOUS at 12:05

## 2019-09-10 RX ADMIN — SODIUM CHLORIDE: 0.9 INJECTION, SOLUTION INTRAVENOUS at 13:13

## 2019-09-10 RX ADMIN — ONDANSETRON 4 MG: 2 INJECTION INTRAMUSCULAR; INTRAVENOUS at 08:04

## 2019-09-10 RX ADMIN — PROPOFOL 100 MCG/KG/MIN: 10 INJECTION, EMULSION INTRAVENOUS at 08:39

## 2019-09-10 RX ADMIN — LIDOCAINE HYDROCHLORIDE 60 MG: 10 INJECTION, SOLUTION INFILTRATION; PERINEURAL at 08:04

## 2019-09-10 RX ADMIN — PHENYLEPHRINE HYDROCHLORIDE 200 MCG: 10 INJECTION INTRAVENOUS at 13:20

## 2019-09-10 RX ADMIN — CEFAZOLIN SODIUM 2000 MG: 2 SOLUTION INTRAVENOUS at 08:27

## 2019-09-10 RX ADMIN — CALCIUM CHLORIDE 0.5 G: 100 INJECTION, SOLUTION INTRAVENOUS; INTRAVENTRICULAR at 13:10

## 2019-09-10 RX ADMIN — ONDANSETRON 4 MG: 2 INJECTION INTRAMUSCULAR; INTRAVENOUS at 14:41

## 2019-09-10 RX ADMIN — CALCIUM CHLORIDE 0.5 G: 100 INJECTION, SOLUTION INTRAVENOUS; INTRAVENTRICULAR at 14:31

## 2019-09-10 RX ADMIN — BUPIVACAINE HYDROCHLORIDE 4 ML: 2.5 INJECTION, SOLUTION INFILTRATION; PERINEURAL at 15:00

## 2019-09-10 RX ADMIN — HEPARIN SODIUM 5000 UNITS: 5000 INJECTION INTRAVENOUS; SUBCUTANEOUS at 18:12

## 2019-09-10 RX ADMIN — SODIUM CHLORIDE 250 ML: 0.9 INJECTION, SOLUTION INTRAVENOUS at 06:25

## 2019-09-10 RX ADMIN — ROCURONIUM BROMIDE 50 MG: 10 INJECTION INTRAVENOUS at 08:04

## 2019-09-10 RX ADMIN — FENTANYL CITRATE 50 MCG: 50 INJECTION, SOLUTION INTRAMUSCULAR; INTRAVENOUS at 07:43

## 2019-09-10 RX ADMIN — SODIUM CHLORIDE: 0.9 INJECTION, SOLUTION INTRAVENOUS at 08:13

## 2019-09-10 RX ADMIN — DEXMEDETOMIDINE HYDROCHLORIDE 0.2 MCG/KG/HR: 100 INJECTION, SOLUTION INTRAVENOUS at 08:45

## 2019-09-10 RX ADMIN — SODIUM CHLORIDE, SODIUM LACTATE, POTASSIUM CHLORIDE, AND CALCIUM CHLORIDE: .6; .31; .03; .02 INJECTION, SOLUTION INTRAVENOUS at 08:43

## 2019-09-10 RX ADMIN — ROPIVACAINE HYDROCHLORIDE: 5 INJECTION, SOLUTION EPIDURAL; INFILTRATION; PERINEURAL at 16:30

## 2019-09-10 RX ADMIN — PHENYLEPHRINE HYDROCHLORIDE 100 MCG: 10 INJECTION INTRAVENOUS at 13:28

## 2019-09-10 RX ADMIN — ACETAMINOPHEN 975 MG: 325 TABLET ORAL at 06:25

## 2019-09-10 RX ADMIN — BUPIVACAINE HYDROCHLORIDE 4 ML: 2.5 INJECTION, SOLUTION INFILTRATION; PERINEURAL at 12:00

## 2019-09-10 RX ADMIN — PHENYLEPHRINE HYDROCHLORIDE 100 MCG: 10 INJECTION INTRAVENOUS at 08:13

## 2019-09-10 RX ADMIN — METRONIDAZOLE 500 MG: 500 INJECTION, SOLUTION INTRAVENOUS at 08:30

## 2019-09-10 RX ADMIN — BUPIVACAINE HYDROCHLORIDE 4 ML: 2.5 INJECTION, SOLUTION INFILTRATION; PERINEURAL at 11:00

## 2019-09-10 RX ADMIN — INSULIN LISPRO 1 UNITS: 100 INJECTION, SOLUTION INTRAVENOUS; SUBCUTANEOUS at 22:51

## 2019-09-10 RX ADMIN — EPHEDRINE SULFATE 5 MG: 50 INJECTION, SOLUTION INTRAVENOUS at 11:37

## 2019-09-10 RX ADMIN — BUPIVACAINE HYDROCHLORIDE 4 ML: 2.5 INJECTION, SOLUTION INFILTRATION; PERINEURAL at 09:00

## 2019-09-10 RX ADMIN — ALBUMIN (HUMAN): 12.5 SOLUTION INTRAVENOUS at 08:39

## 2019-09-10 RX ADMIN — GABAPENTIN 100 MG: 100 CAPSULE ORAL at 06:25

## 2019-09-10 RX ADMIN — BUPIVACAINE HYDROCHLORIDE 4 ML: 2.5 INJECTION, SOLUTION INFILTRATION; PERINEURAL at 10:00

## 2019-09-10 RX ADMIN — HEPARIN SODIUM 5000 UNITS: 5000 INJECTION, SOLUTION INTRAVENOUS; SUBCUTANEOUS at 09:11

## 2019-09-10 RX ADMIN — ROCURONIUM BROMIDE 10 MG: 10 INJECTION INTRAVENOUS at 13:01

## 2019-09-10 RX ADMIN — ROCURONIUM BROMIDE 20 MG: 10 INJECTION INTRAVENOUS at 10:41

## 2019-09-10 RX ADMIN — CALCIUM CHLORIDE 0.5 G: 100 INJECTION, SOLUTION INTRAVENOUS; INTRAVENTRICULAR at 14:36

## 2019-09-10 RX ADMIN — FAMOTIDINE 20 MG: 10 INJECTION, SOLUTION INTRAVENOUS at 18:12

## 2019-09-10 RX ADMIN — CALCIUM CHLORIDE 0.5 G: 100 INJECTION, SOLUTION INTRAVENOUS; INTRAVENTRICULAR at 13:07

## 2019-09-10 RX ADMIN — LIDOCAINE HYDROCHLORIDE AND EPINEPHRINE 3 ML: 15; 5 INJECTION, SOLUTION EPIDURAL at 07:45

## 2019-09-10 RX ADMIN — EPHEDRINE SULFATE 5 MG: 50 INJECTION, SOLUTION INTRAVENOUS at 08:39

## 2019-09-10 RX ADMIN — MIDAZOLAM 2 MG: 1 INJECTION INTRAMUSCULAR; INTRAVENOUS at 07:43

## 2019-09-10 RX ADMIN — PHENYLEPHRINE HYDROCHLORIDE 200 MCG: 10 INJECTION INTRAVENOUS at 13:11

## 2019-09-10 RX ADMIN — PHENYLEPHRINE HYDROCHLORIDE 200 MCG: 10 INJECTION INTRAVENOUS at 08:16

## 2019-09-10 RX ADMIN — SODIUM CHLORIDE: 0.9 INJECTION, SOLUTION INTRAVENOUS at 09:32

## 2019-09-10 RX ADMIN — BUPIVACAINE HYDROCHLORIDE 4 ML: 2.5 INJECTION, SOLUTION INFILTRATION; PERINEURAL at 13:00

## 2019-09-10 RX ADMIN — PHENYLEPHRINE HYDROCHLORIDE 200 MCG: 10 INJECTION INTRAVENOUS at 08:29

## 2019-09-10 RX ADMIN — SODIUM CHLORIDE, SODIUM GLUCONATE, SODIUM ACETATE, POTASSIUM CHLORIDE AND MAGNESIUM CHLORIDE 125 ML/HR: 526; 502; 368; 37; 30 INJECTION, SOLUTION INTRAVENOUS at 18:08

## 2019-09-10 RX ADMIN — CALCIUM CHLORIDE 0.5 G: 100 INJECTION, SOLUTION INTRAVENOUS; INTRAVENTRICULAR at 11:24

## 2019-09-10 RX ADMIN — ALBUMIN (HUMAN): 12.5 SOLUTION INTRAVENOUS at 10:03

## 2019-09-10 RX ADMIN — ROCURONIUM BROMIDE 20 MG: 10 INJECTION INTRAVENOUS at 11:51

## 2019-09-10 RX ADMIN — ACETAMINOPHEN 650 MG: 325 TABLET ORAL at 18:12

## 2019-09-10 RX ADMIN — FENTANYL CITRATE 50 MCG: 50 INJECTION, SOLUTION INTRAMUSCULAR; INTRAVENOUS at 08:04

## 2019-09-10 RX ADMIN — METRONIDAZOLE 500 MG: 500 INJECTION, SOLUTION INTRAVENOUS at 12:05

## 2019-09-10 RX ADMIN — ROCURONIUM BROMIDE 20 MG: 10 INJECTION INTRAVENOUS at 09:25

## 2019-09-10 RX ADMIN — SODIUM BICARBONATE 25 MEQ: 84 INJECTION PARENTERAL at 12:51

## 2019-09-10 RX ADMIN — PHENYLEPHRINE HYDROCHLORIDE 200 MCG: 10 INJECTION INTRAVENOUS at 11:49

## 2019-09-10 RX ADMIN — SODIUM CHLORIDE: 0.9 INJECTION, SOLUTION INTRAVENOUS at 11:43

## 2019-09-10 RX ADMIN — DEXAMETHASONE SODIUM PHOSPHATE 10 MG: 10 INJECTION, SOLUTION INTRAMUSCULAR; INTRAVENOUS at 08:10

## 2019-09-10 RX ADMIN — BUPIVACAINE HYDROCHLORIDE 4 ML: 2.5 INJECTION, SOLUTION INFILTRATION; PERINEURAL at 14:00

## 2019-09-10 RX ADMIN — SODIUM CHLORIDE, SODIUM LACTATE, POTASSIUM CHLORIDE, AND CALCIUM CHLORIDE: .6; .31; .03; .02 INJECTION, SOLUTION INTRAVENOUS at 14:31

## 2019-09-10 RX ADMIN — CALCIUM CHLORIDE 0.5 G: 100 INJECTION, SOLUTION INTRAVENOUS; INTRAVENTRICULAR at 11:28

## 2019-09-10 RX ADMIN — DEXAMETHASONE SODIUM PHOSPHATE 4 MG: 4 INJECTION, SOLUTION INTRAMUSCULAR; INTRAVENOUS at 19:15

## 2019-09-10 RX ADMIN — BUPIVACAINE HYDROCHLORIDE 5 ML: 2.5 INJECTION, SOLUTION INFILTRATION; PERINEURAL at 08:55

## 2019-09-10 RX ADMIN — PHENYLEPHRINE HYDROCHLORIDE 35 MCG/MIN: 10 INJECTION INTRAVENOUS at 08:39

## 2019-09-10 RX ADMIN — DEXMEDETOMIDINE 0.4 MCG/KG/HR: 100 INJECTION, SOLUTION, CONCENTRATE INTRAVENOUS at 19:21

## 2019-09-10 RX ADMIN — PROPOFOL 100 MG: 10 INJECTION, EMULSION INTRAVENOUS at 08:04

## 2019-09-10 NOTE — ANESTHESIA POSTPROCEDURE EVALUATION
Post-Op Assessment Note    CV Status:  Stable  Pain Score: 0    Pain management: adequate     Mental Status:  Unresponsive (patient on propofol drip and intubated)   Hydration Status:  Stable   PONV Controlled:  Controlled   Airway Patency:  Patent  Airway: intubated   Post Op Vitals Reviewed: Yes      Staff: Anesthesiologist, CRNA   Comments: patient transported to ICU with RN and anesthesiologist  patient hemodynamically stable  Report given to ICU RN  pt intubated and on propofol drip      Post-op block assessment: secured with tape, site cleaned, catheter intact and no complications        BP   136/56   Temp 97 4   Pulse 68   Resp 12   SpO2 100%

## 2019-09-10 NOTE — PLAN OF CARE
Problem: Prexisting or High Potential for Compromised Skin Integrity  Goal: Skin integrity is maintained or improved  Description  INTERVENTIONS:  - Identify patients at risk for skin breakdown  - Assess and monitor skin integrity  - Assess and monitor nutrition and hydration status  - Monitor labs   - Assess for incontinence   - Turn and reposition patient  - Assist with mobility/ambulation  - Relieve pressure over bony prominences  - Avoid friction and shearing  - Provide appropriate hygiene as needed including keeping skin clean and dry  - Evaluate need for skin moisturizer/barrier cream  - Collaborate with interdisciplinary team   - Patient/family teaching  - Consider wound care consult   Outcome: Progressing     Problem: NEUROSENSORY - ADULT  Goal: Achieves stable or improved neurological status  Description  INTERVENTIONS  - Monitor and report changes in neurological status  - Monitor vital signs such as temperature, blood pressure, glucose, and any other labs ordered   - Initiate measures to prevent increased intracranial pressure  - Monitor for seizure activity and implement precautions if appropriate      Outcome: Progressing     Problem: CARDIOVASCULAR - ADULT  Goal: Maintains optimal cardiac output and hemodynamic stability  Description  INTERVENTIONS:  - Monitor I/O, vital signs and rhythm  - Monitor for S/S and trends of decreased cardiac output  - Administer and titrate ordered vasoactive medications to optimize hemodynamic stability  - Assess quality of pulses, skin color and temperature  - Assess for signs of decreased coronary artery perfusion  - Instruct patient to report change in severity of symptoms  Outcome: Progressing     Problem: RESPIRATORY - ADULT  Goal: Achieves optimal ventilation and oxygenation  Description  INTERVENTIONS:  - Assess for changes in respiratory status  - Assess for changes in mentation and behavior  - Position to facilitate oxygenation and minimize respiratory effort  - Oxygen administered by appropriate delivery if ordered  - Initiate smoking cessation education as indicated  - Encourage broncho-pulmonary hygiene including cough, deep breathe, Incentive Spirometry  - Assess the need for suctioning and aspirate as needed  - Assess and instruct to report SOB or any respiratory difficulty  - Respiratory Therapy support as indicated  Outcome: Progressing     Problem: GASTROINTESTINAL - ADULT  Goal: Maintains or returns to baseline bowel function  Description  INTERVENTIONS:  - Assess bowel function  - Encourage oral fluids to ensure adequate hydration  - Administer IV fluids if ordered to ensure adequate hydration  - Administer ordered medications as needed  - Encourage mobilization and activity  - Consider nutritional services referral to assist patient with adequate nutrition and appropriate food choices  Outcome: Progressing     Problem: GENITOURINARY - ADULT  Goal: Maintains or returns to baseline urinary function  Description  INTERVENTIONS:  - Assess urinary function  - Encourage oral fluids to ensure adequate hydration if ordered  - Administer IV fluids as ordered to ensure adequate hydration  - Administer ordered medications as needed  - Offer frequent toileting  - Follow urinary retention protocol if ordered  Outcome: Progressing     Problem: METABOLIC, FLUID AND ELECTROLYTES - ADULT  Goal: Electrolytes maintained within normal limits  Description  INTERVENTIONS:  - Monitor labs and assess patient for signs and symptoms of electrolyte imbalances  - Administer electrolyte replacement as ordered  - Monitor response to electrolyte replacements, including repeat lab results as appropriate  - Instruct patient on fluid and nutrition as appropriate  Outcome: Progressing  Goal: Fluid balance maintained  Description  INTERVENTIONS:  - Monitor labs   - Monitor I/O and WT  - Instruct patient on fluid and nutrition as appropriate  - Assess for signs & symptoms of volume excess or deficit  Outcome: Progressing  Goal: Glucose maintained within target range  Description  INTERVENTIONS:  - Monitor Blood Glucose as ordered  - Assess for signs and symptoms of hyperglycemia and hypoglycemia  - Administer ordered medications to maintain glucose within target range  - Assess nutritional intake and initiate nutrition service referral as needed  Outcome: Progressing     Problem: SKIN/TISSUE INTEGRITY - ADULT  Goal: Skin integrity remains intact  Description  INTERVENTIONS  - Identify patients at risk for skin breakdown  - Assess and monitor skin integrity  - Assess and monitor nutrition and hydration status  - Monitor labs (i e  albumin)  - Assess for incontinence   - Turn and reposition patient  - Assist with mobility/ambulation  - Relieve pressure over bony prominences  - Avoid friction and shearing  - Provide appropriate hygiene as needed including keeping skin clean and dry  - Evaluate need for skin moisturizer/barrier cream  - Collaborate with interdisciplinary team (i e  Nutrition, Rehabilitation, etc )   - Patient/family teaching  Outcome: Progressing  Goal: Incision(s), wounds(s) or drain site(s) healing without S/S of infection  Description  INTERVENTIONS  - Assess and document risk factors for skin impairment   - Assess and document dressing, incision, wound bed, drain sites and surrounding tissue  - Consider nutrition services referral as needed  - Oral mucous membranes remain intact  - Provide patient/ family education  Outcome: Progressing  Goal: Oral mucous membranes remain intact  Description  INTERVENTIONS  - Assess oral mucosa and hygiene practices  - Implement preventative oral hygiene regimen  - Implement oral medicated treatments as ordered  - Initiate Nutrition services referral as needed  Outcome: Progressing     Problem: HEMATOLOGIC - ADULT  Goal: Maintains hematologic stability  Description  INTERVENTIONS  - Assess for signs and symptoms of bleeding or hemorrhage  - Monitor labs  - Administer supportive blood products/factors as ordered and appropriate  Outcome: Progressing     Problem: MUSCULOSKELETAL - ADULT  Goal: Maintain or return mobility to safest level of function  Description  INTERVENTIONS:  - Assess patient's ability to carry out ADLs; assess patient's baseline for ADL function and identify physical deficits which impact ability to perform ADLs (bathing, care of mouth/teeth, toileting, grooming, dressing, etc )  - Assess/evaluate cause of self-care deficits   - Assess range of motion  - Assess patient's mobility  - Assess patient's need for assistive devices and provide as appropriate  - Encourage maximum independence but intervene and supervise when necessary  - Involve family in performance of ADLs  - Assess for home care needs following discharge   - Consider OT consult to assist with ADL evaluation and planning for discharge  - Provide patient education as appropriate  Outcome: Progressing  Goal: Maintain proper alignment of affected body part  Description  INTERVENTIONS:  - Support, maintain and protect limb and body alignment  - Provide patient/ family with appropriate education  Outcome: Progressing     Problem: PAIN - ADULT  Goal: Verbalizes/displays adequate comfort level or baseline comfort level  Description  Interventions:  - Encourage patient to monitor pain and request assistance  - Assess pain using appropriate pain scale  - Administer analgesics based on type and severity of pain and evaluate response  - Implement non-pharmacological measures as appropriate and evaluate response  - Consider cultural and social influences on pain and pain management  - Notify physician/advanced practitioner if interventions unsuccessful or patient reports new pain  Outcome: Progressing     Problem: INFECTION - ADULT  Goal: Absence or prevention of progression during hospitalization  Description  INTERVENTIONS:  - Assess and monitor for signs and symptoms of infection  - Monitor lab/diagnostic results  - Monitor all insertion sites, i e  indwelling lines, tubes, and drains  - Monitor endotracheal if appropriate and nasal secretions for changes in amount and color  - [place] appropriate cooling/warming therapies per order  - Administer medications as ordered  - Instruct and encourage patient and family to use good hand hygiene technique  - Identify and instruct in appropriate isolation precautions for identified infection/condition  Outcome: Progressing  Goal: Absence of fever/infection during neutropenic period  Description  INTERVENTIONS:  - Monitor WBC    Outcome: Progressing     Problem: SAFETY ADULT  Goal: Patient will remain free of falls  Description  INTERVENTIONS:  - Assess patient frequently for physical needs  -  Identify cognitive and physical deficits and behaviors that affect risk of falls    -  [place] fall precautions as indicated by assessment   - Educate patient/family on patient safety including physical limitations  - Instruct patient to call for assistance with activity based on assessment  - Modify environment to reduce risk of injury  - Consider OT/PT consult to assist with strengthening/mobility  Outcome: Progressing  Goal: Maintain or return to baseline ADL function  Description  INTERVENTIONS:  -  Assess patient's ability to carry out ADLs; assess patient's baseline for ADL function and identify physical deficits which impact ability to perform ADLs (bathing, care of mouth/teeth, toileting, grooming, dressing, etc )  - Assess/evaluate cause of self-care deficits   - Assess range of motion  - Assess patient's mobility; develop plan if impaired  - Assess patient's need for assistive devices and provide as appropriate  - Encourage maximum independence but intervene and supervise when necessary  - Involve family in performance of ADLs  - Assess for home care needs following discharge   - Consider OT consult to assist with ADL evaluation and planning for discharge  - Provide patient education as appropriate  Outcome: Progressing  Goal: Maintain or return mobility status to optimal level  Description  INTERVENTIONS:  - Assess patient's baseline mobility status (ambulation, transfers, stairs, etc )    - Identify cognitive and physical deficits and behaviors that affect mobility  - Identify mobility aids required to assist with transfers and/or ambulation (gait belt, sit-to-stand, lift, walker, cane, etc )  - [place] fall precautions as indicated by assessment  - Record patient progress and toleration of activity level on Mobility SBAR; progress patient to next Phase/Stage  - Instruct patient to call for assistance with activity based on assessment  - Consider rehabilitation consult to assist with strengthening/weightbearing, etc   Outcome: Progressing     Problem: DISCHARGE PLANNING  Goal: Discharge to home or other facility with appropriate resources  Description  INTERVENTIONS:  - Identify barriers to discharge w/patient and caregiver  - Arrange for needed discharge resources and transportation as appropriate  - Identify discharge learning needs (meds, wound care, etc )  - Arrange for interpretive services to assist at discharge as needed  - Refer to Case Management Department for coordinating discharge planning if the patient needs post-hospital services based on physician/advanced practitioner order or complex needs related to functional status, cognitive ability, or social support system  Outcome: Progressing     Problem: Knowledge Deficit  Goal: Patient/family/caregiver demonstrates understanding of disease process, treatment plan, medications, and discharge instructions  Description  Complete learning assessment and assess knowledge base    Interventions:  - Provide teaching at level of understanding  - Provide teaching via preferred learning methods  Outcome: Progressing

## 2019-09-10 NOTE — PROGRESS NOTES
Seen/examined, reviewed previous discussions from inpatient care [place]  All labs reviewed, imaging reviewed, we discussed assistance with Dr Oren Bazan for likely pelvic resection sigmoid/rectosigmoid, possible proximal rectum to encompass left hemicolectomy for previously seen involvement  She asked me again about ileostomy reversal which I told her was possible only if we were quite satisfied with anastomosis, discussing that any large amount of blood loss, tenuous anastomosis or low anastomosis among others would steer me toward leaving the ileostomy temporarily for the sake of healing anastomosis  Also discussed traditional risks of surgery including not limited to bleeding, infection, risks of anesthesia, open surgery, DVT/PE, heart attack, stroke, death, damage to local structures including ureter and duodenum, and anastomotic leak requiring reoperation, temporary versus permanent stoma  She understood these risks today, signed informed consent wished to proceed

## 2019-09-10 NOTE — RESPIRATORY THERAPY NOTE
RT Ventilator Management Note  Griffin Samano 70 y o  female MRN: [id number]  Unit/Bed#: ICU 10 Encounter: [id number]      Daily Screen     No data found in the last 10 encounters  Physical Exam:   Assessment Type: (P) Assess only  General Appearance: (P) Awake, Alert  Respiratory Pattern: (P) Assisted  Chest Assessment: (P) Chest expansion symmetrical  Bilateral Breath Sounds: (P) Clear, Diminished      Resp Comments: (P) Pt awake and alert  VS are stable and pt is comfortable  Will continue to monitor

## 2019-09-10 NOTE — ANESTHESIA PROCEDURE NOTES
Arterial Line Insertion  Performed by: Radha Clark CRNA  Authorized by: Lizy Short MD   Consent: Verbal consent obtained  Written consent obtained  Risks and benefits: risks, benefits and alternatives were discussed  Consent given by: patient  Patient understanding: patient states understanding of the procedure being performed  Patient consent: the patient's understanding of the procedure matches consent given  Procedure consent: procedure consent matches procedure scheduled  Relevant documents: relevant documents present and verified  Required items: required blood products, implants, devices, and special equipment available  Patient identity confirmed: arm band  Time out: Immediately prior to procedure a "time out" was called to verify the correct patient, procedure, equipment, support staff and site/side marked as required  Preparation: Patient was prepped and draped in the usual sterile fashion  Indications: hemodynamic monitoring  Orientation:  Left  Location: radial artery  Sedation:  Patient sedated: Patient under general anesthesia      Procedure Details:  Jamal's test normal: yes  Needle gauge: 20  Seldinger technique: Seldinger technique used  Number of attempts: 2    Post-procedure:  Post-procedure: dressing applied  Waveform: good waveform and waveform confirmed  Post-procedure CNS: normal and unchanged  Patient tolerance: Patient tolerated the procedure well with no immediate complications

## 2019-09-10 NOTE — ANESTHESIA PROCEDURE NOTES
Epidural Block    Patient location during procedure: pre-op  Start time: 9/10/2019 7:45 AM  Reason for block: procedure for pain, at surgeon's request and post-op pain management  Staffing  Anesthesiologist: Marisol Valdez MD  Performed: anesthesiologist   Preanesthetic Checklist  Completed: patient identified, site marked, surgical consent, pre-op evaluation, timeout performed, IV checked, risks and benefits discussed and monitors and equipment checked  Epidural  Patient position: sitting  Prep: ChloraPrep  Patient monitoring: cardiac monitor and frequent blood pressure checks  Approach: midline  Location: thoracic (1-12)  Injection technique: WAQAR saline  Needle  Needle type: Tuohy   Needle gauge: 18 G  Catheter type: side hole  Catheter size: 20 G  Catheter at skin depth: 10 cm  Test dose: negativelidocaine 1 5% with epinephrine 1:200,000 test dose, 3 mLnegative aspiration for CSF, negative aspiration for heme and no paresthesia on injection  patient tolerated the procedure well with no immediate complications

## 2019-09-10 NOTE — INTERVAL H&P NOTE
H&P reviewed  After examining the patient I find no changes in the patients condition since the H&P had been written  Twin total abdominal hysterectomy bilateral salpingo-oophorectomy probable Bowel resection and take down a stoma  Additionally we will likely place ureteral catheter  All bloodwork is a table for surgery  Ill preoperative testing is acceptable

## 2019-09-10 NOTE — OP NOTE
OPERATIVE REPORT  PATIENT NAME: Mani Santana    :  1950  MRN: [id number]  Pt Location: BE OR ROOM 08    SURGERY DATE: 9/10/2019    Surgeon(s) and Role:     * Ashley Villarreal MD - Primary     * Efrain Og MD - [address] Pj Arechiga MD - Assisting     * Rachel Davis MD - Co-surgeon     * Mary Day MD - Co-surgeon    Preop Diagnosis:  Malignant neoplasm of ovary, unspecified laterality ([address] ) [C56 9]    Post-Op Diagnosis Codes:     * Malignant neoplasm of ovary, unspecified laterality ([address] ) [C56 9]    Procedure(s) (LRB):  CYSTOSCOPY , INSERTION URETERAL CATHETERS (N/A)  EXPLORATORY LAPAROTOMY, EXENTERATION POSTERIOR PELVIS,  END COLOSTOMY, ILEOSTOMY REVERSAL, LYSIS OF ADHESIONS, rADICAL OMENTECTOMY AND TUMOR DEBULKINGFLEXIBLE SIGMOIDOSCOPY, CYSTOGRAM, INSPECTION OF URETERS (N/A)    Specimen(s):  ID Type Source Tests Collected by Time Destination   1 : cervix, mass, colon Tissue Uterus w/Bilateral Ovaries and Fallopian Tubes TISSUE EXAM Ashley Villarreal MD 9/10/2019 1148    2 :  Tissue Omentum TISSUE EXAM Ashley Villarreal MD 9/10/2019 1216    3 : ileostomy/small bowel resection Tissue Other TISSUE EXAM Ashley Villarreal MD 9/10/2019 1250        Estimated Blood Loss:   3000 mL    Drains:  Closed/Suction Drain Right RLQ Bulb (Active)   Number of days: 0       Ileostomy Loop RLQ (Active)   Number of days: 269       Urethral Catheter Double-lumen;Non-latex 16 Fr  (Active)   Number of days: 0       [REMOVED] Urethral Catheter Double-lumen;Non-latex 16 Fr  (Removed)   Number of days: 0       [REMOVED] Ureteral Drain/Stent Left ureter 5 Fr  (Removed)   Number of days: 0       [REMOVED] Ureteral Drain/Stent Right ureter 5 Fr  (Removed)   Number of days: 0       [REMOVED] Ureteral Drain/Stent Right ureter 5 Fr  (Removed)   Number of days: 0       [REMOVED] Ureteral Drain/Stent Left ureter 5 Fr   (Removed)   Number of days: 0       Anesthesia Type: * No anesthesia type entered *    Operative Indications:  Malignant neoplasm of ovary, unspecified laterality ([facility] [address] ) [C56 9]  History of bowel perforation    Operative Findings:  Upon opening the abdomen the omentum was noted to be measuring approximately 6 x 4 x 20 cm and wrapped around the colon  There was no obvious tumor in it but it was thickened  This was removed in its entirety  The small and large bowel were run  Minimal miliary disease of 1-2 mm was noted  The left upper quadrant was evaluated and the cystic area by the liver was not seen or palpated  A large 10 x 10 cm phlegmon mass involving uterus cervix tubes ovaries sigmoid colon and rectum was identified  This was removed in its entirety  As it was only approximately 5 cm from the anal verge it was elected not to remain us to Mohs and an and colostomy was performed  The ileostomy was taken down by Dr Kev Dale without difficulty  A small bowel resection was performed in primary side-to-side reanastomosis  At the end of the procedure an optimal debulking was obtained  There was likely some miliary 1-2 mm disease which was not resected    Complications:   None    Procedure and Technique:  After informed consent was obtained, the patient was taken to the operating room where general endotracheal anesthesia was then administered without incident  A full time out procedure was performed  The patient was prepped and draped in normal sterile fashion in the low dorsolithotomy position  A 30 degree cystoscope and bilateral ureteral catheters were placed with some difficulty due to vaginal prolapse as well as a large pelvic mass  These were brought through the urethra and placed into the Haskins catheter  Due to instrument maneuvering the catheters were dislodged and the procedure was repeated  The catheters were noted to be in place  A Haskins catheter was inserted       A midline vertical incision was created with a knife and carried through to the fascia with a Bovie electrocautery device  This was taken down to the underlying layer of fascia with cautery  The fascia was opened the midline  The fascial incision extended superiorly and anteriorly  The peritoneum was identified and entered  The peritoneal incision extended superiorly and inferiorly as well  A Bookwalter self retaining retractor was placed  The bowel was packed with moist lap pads  The mass which included uterus cervix tubes ovaries and sigmoid and rectum was a phlegmon of all these organs and could not be  from each of them  It was elected to remove the mass en mass  The bilateral pelvic sidewalls were open with the Bovie electrocautery device  The IP ligaments in ureters were identified  A hole was placed in an avascular plane between the 2  The IP ligaments were cross clamped with MD Damon clamps, back clamped with Shirley clamp cut with heavy scissors  Each pedicle is retired with 0 Vicryl suture than suture ligated with 0 Vicryl suture in a fixation stitch fashion  The ureters were stripped from there surrounding tissue bilaterally to facilitate the dissection  The bladder was  from the mass with the Bovie electrocautery device and sharp and blunt dissection  The colon was dissected free from the posterior pelvis  The blood supply was doubly cross clamped with Shirley clamps with heavy scissors,   and free tied x2 with 0 Vicryl suture  The colon was then transected after skeleton is a shin with a contour Stapler  The bilateral pararectal and paravesicle spaces were open  The ureter was traced down to the bladder opening and dissected free from its surrounding tissue  The presacral space was opened with the Bovie electrocautery device  All of these planes were then connected through their vascular areas  The bilateral uterine artery and veins were hemoclips and cut    All connective tissue was taken now with the Bovie electrocautery device any bleeding was oversewn with 0 Vicryl suture  This continued to the level of the Levater plate bilaterally  The bladder was then taken down to level of vagina and the vagina was entered with the Bovie electrocautery device  The vagina was entered anteriorly and taken around bilaterally  The uterus sacral ligaments were taken down with an Enseal device bilaterally  The posterior vagina was taken down with the Bovie electrocautery device the rectal pillars were skeleton eyes to  The superior rectum was then taken now with a contour device in the mass was removed in its entirety  Several bleeding sites in the pelvis were noted which were oversewn with 2 0 Vicryl suture or clipped with hemoclips  The vaginal cuff was skeleton eyes with the Bovie electrocautery device and oversewn with 0 Vicryl suture in a running locked fashion  The splenic flexure was skeleton eyes with the Bovie electrocautery device bringing the colon down into the pelvis  A radical omentectomy was performed by  the omentum from the transverse colon and the greater curvature of the stomach with the Enseal   A discussion was had to decide whether to undertaken and colostomy and takedown her ileostomy verses do a primary low rectal reanastomosis  As the patient still had some visible disease it was elected to do an end colostomy and remove her ileostomy which has been causing life style issues  The stoma was detached from the skin with Bovie electrocautery device and brought back into the abdomen  The loop of bowel was reapproximated with interrupted sutures of 3 0 Vicryl dating a window for side-to-side anastomosis  The small bowel was entered bilaterally with the Bovie electrocautery device and a GI a 100 was placed through this and fired in in side decide fashion  The incision line was closed with 4 Michelle clamps and the GI a 100 was again fired over this completing the anastomosis  No leaks were noted    The corner areas were reinforced with interrupted sutures of 3 0 Vicryl  No leaks were noted  There was concern for ureteral injury and Dr Lawrence Chavez was consulted  Our review of the pelvis indicated that a phleboliths was identified rather than a cut ureteral catheter  No ureteral injury was noted  The bladder was back filled and no leak was noted  The abdomen is irrigated with copious quantities of normal saline solution and multiple sutures and clips were required for hemostasis  Then deep in the pelvis Tisseal was sprayed on although raw surfaces  No further significant bleeding or bruising was noted  The upper abdomen was irrigated and explored no bleeding sites were noted  A circular disc of skin and subcutaneous tissue was removed from the left lower quadrant  A cruciate incision was created in the fascia and the and stoma was brought through without difficulty  A 10 mm flat ANDREAS drain was brought to the right lower quadrant and placed in the pelvis  The pelvis was irrigated  There is no evidence of bleeding noted  The closure tray was brought to the field  The staff was regloved and regowned  The fascia was closed using #1 looped PDS in a running Smead-Lyon fashion  The subcutaneous tissue was closed with interruprted 2 0 plain suture  The skin was closed with stratafix and histocryl  A sterile dressing was applied  The stoma was then matured by opening the suture line with the heavy scissors and 3 deep fascial sutures were placed anchoring the bowel to the fascia  The stoma was then matured in a boy but fashion with 3 0 Vicryl suture  The patient was then awakened and transferred to the ICU in stable condition  All instrument and instrument counts were correct X 2 for the procedure  No complications were noted  I was present for the entire procedure      It should be noted that this is a joint case with Dr hSae Srivastava where we performed the surgery together and functioned as co-surgeons     I was present for the entire procedure    Patient Disposition:  ICU    SIGNATURE: Garth Everett MD  DATE: September 10, 2019  TIME: 3:55 PM

## 2019-09-10 NOTE — RESPIRATORY THERAPY NOTE
RT Protocol Note  Eladia Brush 70 y o  female MRN: [id number]  Unit/Bed#: ICU 10 Encounter: [id number]    Assessment    Principal Problem:    Malignant neoplasm of ovary ([facility] )      Home Pulmonary Medications:  none       Past Medical History:   Diagnosis Date    Abdominal fluid collection     Anemia     Asthma     Cancer ([facility] )     ovaries    Diabetes mellitus ([place] [address] )     History of transfusion     PONV (postoperative nausea and vomiting)      Social History     Socioeconomic History    Marital status: /Civil Union     Spouse name: None    Number of children: None    Years of education: None    Highest education level: None   Occupational History    None   Social Needs    Financial resource strain: None    Food insecurity:     Worry: None     Inability: None    Transportation needs:     Medical: None     Non-medical: None   Tobacco Use    Smoking status: Former Smoker    Smokeless tobacco: Never Used    Tobacco comment: "Quit 40 yrs ago"   Substance and Sexual Activity    Alcohol use: Not Currently     Comment: occassionally    Drug use: No    Sexual activity: Not Currently   Lifestyle    Physical activity:     Days per week: None     Minutes per session: None    Stress: None   Relationships    Social connections:     Talks on phone: None     Gets together: None     Attends Hinduism service: None     Active member of club or organization: None     Attends meetings of clubs or organizations: None     Relationship status: None    Intimate partner violence:     Fear of current or ex partner: None     Emotionally abused: None     Physically abused: None     Forced sexual activity: None   Other Topics Concern    None   Social History Narrative    None       Subjective         Objective    Physical Exam:   Assessment Type: Assess only  General Appearance: Sedated  Respiratory Pattern: Assisted  Chest Assessment: Chest expansion symmetrical  Bilateral Breath Sounds: (P) Clear, Diminished    Vitals:  Blood pressure 132/62, pulse 70, temperature (!) 97 4 °F (36 3 °C), temperature source Oral, resp  rate 14, height 5' 4" (1 626 m), weight 63 5 kg (140 lb), SpO2 100 %  Imaging and other studies: I have personally reviewed pertinent reports              Plan    Respiratory Plan: (P) Vent/NIV/HFNC        Resp Comments: (P) pt admitted to ICU from [place]; BS clear; asthma listed in medical hx but no home pulm meds; bronchodilators not indicated; will cont to monitor

## 2019-09-10 NOTE — PROGRESS NOTES
POST OP CHECK    Procedure: Ex lap, exenteration posterior pelvis, end colostomy, ileostomy reversal, radial omentectomy, tumor debulking     Patient seen in ICU post op  No major issues post op  Patient intubated but awake, writing on paper  Vitals:    09/10/19 1909   BP:    Pulse:    Resp: (!) 25   Temp:    SpO2:      ANDREAS - 375 ml    Physical Exam:   Gen: Awake & arousable, NAD  Cardio: RRR  Lungs: CTAB  Abd: Soft, non distended, appropriate mild tenderness  Buddy Moores / serosang  Ostomy pink, bag flat  Gauze wick in ileostomy site (previous)  A/P  11AO F s/p pelvic exenteration, takedown ileostomy, end colostomy  Post op labs w/ mild metabolic acidosis  Hemodynamically stable      - Remaining intubated this evening, wean to extubated AM  - Pain control - epidural in place  - Monitor ANDREAS output quality / amount  - Further resuscitation - IVF  - Monitor endpoints

## 2019-09-10 NOTE — DISCHARGE SUMMARY
Discharge Summary   Hermelinda Croft MRN: [id number]  Unit/Bed#: PPHP 905-01 Encounter: [id number]      Admission Date: 9/10/2019     Discharge Date: 09/21/19    Attending: Jomar Luna MD    Principal Diagnosis: Malignant neoplasm of ovary, unspecified laterality ([facility] [address] ) [C56 9]    Procedures: CYSTOSCOPY , INSERTION URETERAL CATHETERS (N/A)  EXPLORATORY LAPAROTOMY, EXENTERATION POSTERIOR PELVIS,  END COLOSTOMY, ILEOSTOMY REVERSAL, LYSIS OF ADHESIONS, rADICAL OMENTECTOMY AND TUMOR DEBULKINGFLEXIBLE SIGMOIDOSCOPY, CYSTOGRAM    Hospital course: Patient with history of ovarian cancer, status post neoadjuvant chemotherapy, presented for above listed procedure  Her procedure was complicated by acute blood loss anemia, for which she received received a total of 5 units packed red blood cells, 6 units of FFP and 1 unit of platelets  Her postoperative course was  complicated by acute on chronic kidney disease, with subsequent nephrology and urology consultation  Her acute kidney injury ventrally resolved, and her Haskins catheter was removed prior to discharge and subsequent spontaneous voiding was appreciated  In the setting of bilateral pleural effusions and concern for pneumonia she received broad-spectrum antibiotics x5 days, along with thoracentesis  Palliative Care was also consulted for management of her post surgical pain, with regimen of gabapentin providing sufficient relief of discomfort    On day of discharge, her pain was controlled with oral medications, she was tolerating a regular diet, she was voiding spontaneously, and small liquid stool was appreciated in the colostomy bag  She was discharged with plan for home physical therapy and occupational therapy    She will follow up in the outpatient setting for postoperative exam    Lab Results:   Lab Results   Component Value Date    WBC 11 85 (H) 09/20/2019    HGB 8 4 (L) 09/20/2019    HCT 26 6 (L) 09/20/2019    MCV 95 09/20/2019     09/20/2019 Lab Results   Component Value Date    GLUCOSE 228 (H) 09/10/2019    CALCIUM 8 0 (L) 09/20/2019    K 3 2 (L) 09/20/2019    CO2 31 09/20/2019     09/20/2019    BUN 11 09/20/2019    CREATININE 0 89 09/20/2019     Lab Results   Component Value Date/Time    POCGLU 169 (H) 09/20/2019 09:08 PM    POCGLU 153 (H) 09/20/2019 04:14 PM    POCGLU 196 (H) 09/20/2019 12:16 PM    POCGLU 168 (H) 09/20/2019 08:09 AM    POCGLU 126 09/18/2019 06:21 PM     Lab Results   Component Value Date    PTT 41 (H) 09/12/2019     Lab Results   Component Value Date    INR 1 19 09/12/2019    INR 1 16 09/10/2019    INR 1 23 (H) 09/10/2019    PROTIME 14 7 (H) 09/12/2019    PROTIME 14 4 09/10/2019    PROTIME 15 1 (H) 58/03/9770       Complications: none apparent    Condition at discharge: stable     Discharge instructions/Information to patient and family:   See After Visit Summary for information provided to patient and family  Provisions for Follow-Up Care:  See After Visit Summary for information related to follow-up care and any pertinent home health orders  Disposition: See After Visit Summary for discharge disposition information  Planned Readmission: No    Discharge Medications: For a complete list of the patient's medications, please refer to her med rec      DO Danielle  OB/GYN, PGY3  9/21/2019, 4:16 AM

## 2019-09-10 NOTE — CONSULTS
Tomas Houston 70 y o  female MRN: [id number]  Unit/Bed#: ICU 10 Encounter: [id number]    Physician Requesting Consult: Guzman Nguyen MD    Reason for Consultation / Chief Complaint:  Postoperative abdominal tumor debulking    History of Present Illness:  Nikki Muhammad is a 70 y o  female who presents to the ICU [facility] and propofol  Patient is status post cystoscopy, insertion of ureteral catheters, exploratory laparotomy, exenteration posterior pelvis, end colostomy, ileostomy reversal, lysis of adhesions, radical omentectomy and tumor debulking, flexible sigmoidoscopy, cystogram, inspection of ureters  Due to intraoperative bleeding, patient received 6 units packed red blood cells, 4 units FFP and 1 pack of platelets  Patient remained hemodynamically stable throughout the procedure  Patient also received 5 5 L of crystalloid and 500 mL of albumin  Case lasted approximately 7-1/2 hours  History obtained from chart review  Past Medical History:  Past Medical History:   Diagnosis Date    Abdominal fluid collection     Anemia     Asthma     Cancer ([facility] )     ovaries    Diabetes mellitus ([facility] )     History of transfusion     PONV (postoperative nausea and vomiting)        Past Surgical History:  Past Surgical History:   Procedure Laterality Date    ABDOMINAL SURGERY      CT GUIDED PARACENTESIS  11/6/2018    CT GUIDED PERC DRAINAGE CATHETER PLACEMENT  12/24/2018    CT NEEDLE BIOPSY PERITONEUM  11/6/2018    ECTOPIC PREGNANCY SURGERY      ECTOPIC PREGNANCY SURGERY      IR PARACENTESIS  9/18/2018    IR PARACENTESIS  10/18/2018    IR PARACENTESIS  12/10/2018    IR PORT PLACEMENT  11/29/2018    LAPAROTOMY N/A 12/14/2018    Procedure: LAPAROTOMY EXPLORATORY; Abdominal Washout;  Application of Abthera Vac Dressing;  Surgeon: Federica Perez MD;  [facility]: [facility];  Service: Gynecology Oncology    LAPAROTOMY N/A 12/15/2018 Procedure: LAPAROTOMY EXPLORATORY, ABDOMINAL WASHOUT, DRAIN PLACEMENT x 4, DIVERTING LOOP ILEOSTOMY AND ABDOMINAL CLOSURE,  ALL OTHER INDICATED PROCEDURES;  Surgeon: Alisha Sarah MD;  Location: [facility];  Service: Gynecology Oncology    [place] [facility] WHEN PFR[facility] N/A 9/25/2018    Procedure: EGD AND COLONOSCOPY;  Surgeon: Betzaida Moseley MD;  Location: [facility]; Service: Gastroenterology    TONSILECTOMY AND ADNOIDECTOMY      TONSILLECTOMY         Past Family History:  Family History   Problem Relation Age of Onset    Cirrhosis Mother     Colon cancer Mother     Leukemia Cousin     Diabetes Father        Social History:  Social History     Tobacco Use   Smoking Status Former Smoker   Smokeless Tobacco Never Used   Tobacco Comment    "Quit 40 yrs ago"     Social History     Substance and Sexual Activity   Alcohol Use Not Currently    Comment: occassionally     Social History     Substance and Sexual Activity   Drug Use No     Marital Status: /Civil Union      Home Medications:   Prior to Admission medications    Medication Sig Start Date End Date Taking? Authorizing Provider   acetaminophen (TYLENOL) 325 mg tablet Take 2 tablets (650 mg total) by mouth every 6 (six) hours as needed for mild pain, headaches or fever 12/29/18  Yes Maged Pearson   apixaban (ELIQUIS) 5 mg Take 1 tablet PO twice daily 4/23/19  Yes Lucy Mathews PA-C   aspirin-acetaminophen-caffeine (EXCEDRIN MIGRAINE) 250-250-65 MG per tablet Take 1 tablet by mouth every 6 (six) hours as needed for headaches   Yes Historical Provider, MD   metroNIDAZOLE (FLAGYL) 500 mg tablet Take 1 tablet at 1:00pm and 1 tablet at 10:00pm the day prior to surgery  9/9/19 9/9/19 Yes Sukumar Castanon MD   neomycin [facility]) 500 mg tablet Take 2 tablets at 1:00pm and 2 tablets at 10:00pm the day prior to surgery   9/9/19 9/9/19 Yes Sukumar Casatnon MD   pantoprazole (PROTONIX) 40 mg tablet Take 1 tablet (40 mg total) by mouth 2 (two) times a day before meals 12/29/18  Yes Lacinda Horse   lidocaine-prilocaine (EMLA) cream Apply to port site prior to labs/chemo 12/4/18   Lucy Mathews PA-C   metFORMIN (GLUCOPHAGE) 500 mg tablet Take 500 mg by mouth 2 (two) times a day with meals    Historical Provider, MD   ondansetron (ZOFRAN) 8 mg tablet Take 1 tablet (8 mg total) by mouth every 8 (eight) hours as needed for nausea or vomiting 3/21/19   Kermit Chito, CRNP   oxyCODONE (ROXICODONE) 5 mg immediate release tablet Take 1 tablet (5 mg total) by mouth every 4 (four) hours as needed for moderate pain for up to 10 daysMax Daily Amount: 30 mg 9/10/19 9/20/19  Emile Arcos DO       Inpatient Medications:  Scheduled Meds:  Current Facility-Administered Medications:  acetaminophen 650 mg Oral Q6H [facility], DO   chlorhexidine 15 mL Swish & Spit Q12H [facility], DO   chlorhexidine 15 mL Swish & Spit Q12H [facility] Luciano Ricketts PA-C   dextrose 5 % and sodium chloride 0 45 % with KCl 20 mEq/L 125 mL/hr Intravenous Continuous [facility], DO   diphenhydrAMINE 25 mg Intravenous Q6H PRN [facility], DO   famotidine 20 mg Intravenous BID Judy Garcia PA-C   heparin (porcine) 5,000 Units Subcutaneous Q8H [facility], DO   heparin (porcine) 5,000 Units Subcutaneous [facility] Judy Garcia PA-C   HYDROmorphone 0 5 mg Intravenous Q1H PRN Shruthi Reyes MD   insulin lispro 1-5 Units Subcutaneous Q6H [facility] Judy Garcia PA-C   multi-electrolyte 125 mL/hr Intravenous Continuous Judy Garcia PA-C   ondansetron 4 mg Intravenous Q6H PRN [facility], DO   propofol 5-50 mcg/kg/min Intravenous Titrated Judy Garcia PA-C   ropivacaine 0 1% and fentaNYL 2 mcg/mL  Epidural Continuous Ann Wagner DO     Continuous Infusions:  dextrose 5 % and sodium chloride 0 45 % with KCl 20 mEq/L 125 mL/hr   multi-electrolyte 125 mL/hr   propofol 5-50 mcg/kg/min   ropivacaine 0 1% and fentaNYL 2 mcg/mL      PRN Meds:    diphenhydrAMINE 25 mg Q6H PRN   HYDROmorphone 0 5 mg Q1H PRN   ondansetron 4 mg Q6H PRN       Allergies:  No Known Allergies    ROS:   Review of Systems   Unable to perform ROS: Intubated       Vitals:  Vitals:    09/10/19 0602 09/10/19 0651 09/10/19 1615 09/10/19 1640   BP: 132/62      Pulse: 85   66   Resp: 20   14   Temp: 99 °F (37 2 °C)   (!) 97 4 °F (36 3 °C)   TempSrc: Tympanic   Oral   SpO2: 98%  99% 100%   Weight:  63 5 kg (140 lb)     Height:  5' 4" (1 626 m)       Temperature:   Temp (24hrs), Av 2 °F (36 8 °C), Min:97 4 °F (36 3 °C), Max:99 °F (37 2 °C)    Current Temperature: (!) 97 4 °F (36 3 °C)    Weights:   IBW: 54 7 kg  Body mass index is 24 03 kg/m²  Hemodynamic Monitoring:  N/A     Non-Invasive/Invasive Ventilation Settings:  Respiratory    Lab Data (Last 4 hours)    None         O2/Vent Data (Last 4 hours)      09/10 1615           Vent Mode AC/VC       Resp Rate (BPM) (BPM) 14       Vt (mL) (mL) 430       FIO2 (%) (%) 40       PEEP (cmH2O) (cmH2O) 5       MV 5 52                 No results found for: PHART, LAA1EHP, PO2ART, JJV7UIM, W3PUQAVF, BEART, SOURCE  SpO2: SpO2: 100 %, SpO2 Activity: SpO2 Activity: (P) At Rest, SpO2 Device: O2 Device: (P) Venturi mask     Physical Exam:  Physical Exam   Constitutional: Vital signs are normal  She appears well-developed and well-nourished  She appears listless  She is easily aroused  Non-toxic appearance  She appears ill  No distress  She is sedated, intubated and restrained  HENT:   Head: Normocephalic and atraumatic  Eyes: Pupils are equal, round, and reactive to light  Conjunctivae are normal    Neck: No JVD present  No tracheal deviation present  Cardiovascular: Normal rate, regular rhythm, normal heart sounds, intact distal pulses and normal pulses  Pulmonary/Chest: Effort normal and breath sounds normal  She is intubated  Abdominal: Soft  She exhibits no distension  There is generalized tenderness   There is no rigidity and no guarding  Neurological: She is easily aroused  She has normal strength  She appears listless  GCS eye subscore is 3  GCS verbal subscore is 1  GCS motor subscore is 6  Skin: Skin is warm and dry  Capillary refill takes less than 2 seconds  Labs:  Results from last 7 days   Lab Units 09/10/19  1454 09/10/19  1415 09/10/19  1310 09/10/19  1223 09/10/19  1118 09/10/19  1018   I STAT HEMOGLOBIN g/dl 8 5* 6 8* 7 8* 6 5* 8 8* 7 1*   HEMATOCRIT, ISTAT % 25* 20* 23* 19* 26* 21*     Results from last 7 days   Lab Units 09/10/19  1454 09/10/19  1415 09/10/19  1310 09/10/19  1223 09/10/19  1118 09/10/19  1018   CO2, I-STAT mmol/L 21 20* 21 18* 20* 21                       ABG:    VBG:          Imaging:  Portable chest x-ray pending  I have personally reviewed pertinent reports  and I have personally reviewed pertinent films in PACS  Micro:        ______________________________________________________________________    Assessment and Plan:   Principal Problem:    Malignant neoplasm of ovary ([address] )  Resolved Problems:    * No resolved hospital problems  *      Neuro:  Patient currently sedated on propofol at 30  Opens eyes to voice and follows commands  RASS -1  Continue propofol to maintain RASS -1  Frequent neurologic exams, daily CAM ICU, delirium precautions  Maintain adequate sleep-wake cycle  CV:  Hemodynamically normal, normal sinus rhythm at 64 beats per minute on telemetry  No acute events  Postop ABG with mild metabolic acidosis with pH 7 32 and base deficit 6, bicarb 19 8  Continue IV fluids  Lung:  Acute respiratory failure with hypoxia  Patient currently on [facility] ventilation  Plan to decrease sedation and perform pressure support trial   Patient may be able to be extubated this evening  Aggressive pulmonary toilet and frequent suctioning  Encourage deep breathing and coughing, incentive spirometry after extubation  ABG with metabolic acidosis    PCO2 and PO2 normal     GI:  Status post extensive abdominal surgery as listed above  Abdomen soft and generally tender without peritonitis  Midline incision well-approximated  End-colostomy pink and viable  Right lower quadrant ANDREAS drain with nearly 200 mL sanguinous output postoperatively  Per GYN Oncology resident, patient had significant bleeding in the pelvis washed out twice with irrigation and placement of Tisseal   NG tube in place  Continue to suction for now  NPO for the moment  F/E/N:  Maintenance fluids at 125 mL/hr  Follow-up postop labs and replete electrolytes as needed  :  Haskins catheter in place with bloody urine reportedly present from the time of placement of the Haskins catheter preoperatively  Monitor urine output closely  Follow-up postop BMP  ID:  Afebrile  CBC pending  Received 1 dose of Ancef and Flagyl preprocedure  No indication for antibiotics currently  Heme:  Acute blood loss anemia with EBL 3 L  Status post transfusion 6 units PRBCs, 4 units FFP and 1 unit platelets  Postop hemoglobin 10 and platelet count [id number]  Monitor for acute blood loss  Continue subcutaneous heparin and SCDs  History of PE within the last year on Eliquis discontinued on Friday, 9/6/19  Endo:  History of type 2 diabetes  Monitor blood glucose and cover with sliding scale insulin  Msk/Skin:  No skin breakdown  Frequent repositioning and offloading to avoid breakdown  Disposition:  Admit to ICU  Counseling / Coordination of Care  Total Critical Care time spent 71 minutes excluding procedures, teaching and family updates  ______________________________________________________________________    VTE Pharmacologic Prophylaxis: Heparin  VTE Mechanical Prophylaxis: sequential compression device    Invasive lines and devices:   Invasive Devices     Central Venous Catheter Line            Port A Cath 11/29/18 Right Chest 285 days          Peripheral Intravenous Line            Peripheral IV 09/10/19 Left Arm less than 1 day    Peripheral IV 09/10/19 Left Wrist less than 1 day          Epidural Line            Epidural Catheter 09/10/19 less than 1 day          Arterial Line            Arterial Line 09/10/19 Left Radial less than 1 day          Drain            Ileostomy Loop  days    Closed/Suction Drain Right RLQ Bulb less than 1 day    Urethral Catheter Double-lumen;Non-latex 16 Fr  less than 1 day          Airway            ETT  Cuffed;Oral 7 mm less than 1 day                Code Status: Level 1 - Full Code  POA:    POLST:      Given critical illness, patient length of stay will require greater than two midnights  Portions of the record may have been created with voice recognition software  Occasional wrong word or "sound a like" substitutions may have occurred due to the inherent limitations of voice recognition software  Read the chart carefully and recognize, using context, where substitutions have occurred        Lili Boles PA-C    Consults

## 2019-09-10 NOTE — ANESTHESIA PREPROCEDURE EVALUATION
Review of Systems/Medical History      History of anesthetic complications PONV    Cardiovascular  EKG reviewed, Exercise tolerance (METS): >4,  No CHF ,    Pulmonary  Smoker ex-smoker  , Asthma , well controlled/ stable ,        GI/Hepatic  Negative GI/hepatic ROS          Negative  ROS        Endo/Other  Diabetes well controlled Oral agent,      GYN    Ovarian cancer,        Hematology  Anemia ,     Musculoskeletal  Negative musculoskeletal ROS        Neurology  Negative neurology ROS      Psychology   Negative psychology ROS              Physical Exam    Airway    Mallampati score: II  TM Distance: >3 FB  Neck ROM: full     Dental       Cardiovascular  Rhythm: regular, Rate: normal,     Pulmonary  Breath sounds clear to auscultation,     Other Findings        Anesthesia Plan  ASA Score- 3     Anesthesia Type- general and epidural with ASA Monitors  Additional Monitors: arterial line  Airway Plan: ETT  Plan Factors-    Induction- intravenous  Postoperative Plan- Plan for postoperative opioid use  Informed Consent- Anesthetic plan and risks discussed with patient  I personally reviewed this patient with the CRNA  Discussed and agreed on the Anesthesia Plan with the CRNA  Angelina Abreu

## 2019-09-11 LAB
ABO GROUP BLD BPU: NORMAL
ANION GAP SERPL CALCULATED.3IONS-SCNC: 9 MMOL/L (ref 4–13)
BASOPHILS # BLD AUTO: 0.02 THOUSANDS/ΜL (ref 0–0.1)
BASOPHILS NFR BLD AUTO: 0 % (ref 0–1)
BPU ID: NORMAL
BUN SERPL-MCNC: 29 MG/DL (ref 5–25)
CALCIUM SERPL-MCNC: 9.2 MG/DL (ref 8.3–10.1)
CHLORIDE SERPL-SCNC: 116 MMOL/L (ref 100–108)
CO2 SERPL-SCNC: 20 MMOL/L (ref 21–32)
CREAT SERPL-MCNC: 1.37 MG/DL (ref 0.6–1.3)
CROSSMATCH: NORMAL
EOSINOPHIL # BLD AUTO: 0 THOUSAND/ΜL (ref 0–0.61)
EOSINOPHIL NFR BLD AUTO: 0 % (ref 0–6)
ERYTHROCYTE [DISTWIDTH] IN BLOOD BY AUTOMATED COUNT: 17.2 % (ref 11.6–15.1)
GFR SERPL CREATININE-BSD FRML MDRD: 39 ML/MIN/1.73SQ M
GLUCOSE SERPL-MCNC: 189 MG/DL (ref 65–140)
GLUCOSE SERPL-MCNC: 196 MG/DL (ref 65–140)
GLUCOSE SERPL-MCNC: 197 MG/DL (ref 65–140)
HCT VFR BLD AUTO: 27.4 % (ref 34.8–46.1)
HGB BLD-MCNC: 9.3 G/DL (ref 11.5–15.4)
IMM GRANULOCYTES # BLD AUTO: 0.08 THOUSAND/UL (ref 0–0.2)
IMM GRANULOCYTES NFR BLD AUTO: 1 % (ref 0–2)
LYMPHOCYTES # BLD AUTO: 0.91 THOUSANDS/ΜL (ref 0.6–4.47)
LYMPHOCYTES NFR BLD AUTO: 6 % (ref 14–44)
MAGNESIUM SERPL-MCNC: 1.9 MG/DL (ref 1.6–2.6)
MCH RBC QN AUTO: 28.9 PG (ref 26.8–34.3)
MCHC RBC AUTO-ENTMCNC: 33.9 G/DL (ref 31.4–37.4)
MCV RBC AUTO: 85 FL (ref 82–98)
MONOCYTES # BLD AUTO: 0.95 THOUSAND/ΜL (ref 0.17–1.22)
MONOCYTES NFR BLD AUTO: 6 % (ref 4–12)
NEUTROPHILS # BLD AUTO: 13.72 THOUSANDS/ΜL (ref 1.85–7.62)
NEUTS SEG NFR BLD AUTO: 87 % (ref 43–75)
NRBC BLD AUTO-RTO: 0 /100 WBCS
PHOSPHATE SERPL-MCNC: 5.4 MG/DL (ref 2.3–4.1)
PLATELET # BLD AUTO: 133 THOUSANDS/UL (ref 149–390)
PMV BLD AUTO: 12.1 FL (ref 8.9–12.7)
POTASSIUM SERPL-SCNC: 4.3 MMOL/L (ref 3.5–5.3)
RBC # BLD AUTO: 3.22 MILLION/UL (ref 3.81–5.12)
SODIUM SERPL-SCNC: 145 MMOL/L (ref 136–145)
UNIT DISPENSE STATUS: NORMAL
UNIT PRODUCT CODE: NORMAL
UNIT RH: NORMAL
WBC # BLD AUTO: 15.68 THOUSAND/UL (ref 4.31–10.16)

## 2019-09-11 PROCEDURE — 83735 ASSAY OF MAGNESIUM: CPT | Performed by: OBSTETRICS & GYNECOLOGY

## 2019-09-11 PROCEDURE — G8988 SELF CARE GOAL STATUS: HCPCS

## 2019-09-11 PROCEDURE — 94760 N-INVAS EAR/PLS OXIMETRY 1: CPT

## 2019-09-11 PROCEDURE — 85025 COMPLETE CBC W/AUTO DIFF WBC: CPT | Performed by: OBSTETRICS & GYNECOLOGY

## 2019-09-11 PROCEDURE — 80048 BASIC METABOLIC PNL TOTAL CA: CPT | Performed by: OBSTETRICS & GYNECOLOGY

## 2019-09-11 PROCEDURE — 99232 SBSQ HOSP IP/OBS MODERATE 35: CPT | Performed by: EMERGENCY MEDICINE

## 2019-09-11 PROCEDURE — 97167 OT EVAL HIGH COMPLEX 60 MIN: CPT

## 2019-09-11 PROCEDURE — NC001 PR NO CHARGE: Performed by: PHYSICIAN ASSISTANT

## 2019-09-11 PROCEDURE — 99024 POSTOP FOLLOW-UP VISIT: CPT | Performed by: OBSTETRICS & GYNECOLOGY

## 2019-09-11 PROCEDURE — G8987 SELF CARE CURRENT STATUS: HCPCS

## 2019-09-11 PROCEDURE — 84100 ASSAY OF PHOSPHORUS: CPT | Performed by: OBSTETRICS & GYNECOLOGY

## 2019-09-11 PROCEDURE — G8979 MOBILITY GOAL STATUS: HCPCS

## 2019-09-11 PROCEDURE — 82948 REAGENT STRIP/BLOOD GLUCOSE: CPT

## 2019-09-11 PROCEDURE — 97163 PT EVAL HIGH COMPLEX 45 MIN: CPT

## 2019-09-11 PROCEDURE — G8978 MOBILITY CURRENT STATUS: HCPCS

## 2019-09-11 RX ORDER — MAGNESIUM SULFATE 1 G/100ML
1 INJECTION INTRAVENOUS ONCE
Status: COMPLETED | OUTPATIENT
Start: 2019-09-11 | End: 2019-09-11

## 2019-09-11 RX ORDER — SODIUM CHLORIDE, SODIUM GLUCONATE, SODIUM ACETATE, POTASSIUM CHLORIDE, MAGNESIUM CHLORIDE, SODIUM PHOSPHATE, DIBASIC, AND POTASSIUM PHOSPHATE .53; .5; .37; .037; .03; .012; .00082 G/100ML; G/100ML; G/100ML; G/100ML; G/100ML; G/100ML; G/100ML
500 INJECTION, SOLUTION INTRAVENOUS ONCE
Status: COMPLETED | OUTPATIENT
Start: 2019-09-11 | End: 2019-09-11

## 2019-09-11 RX ORDER — CEFAZOLIN SODIUM 1 G/50ML
1000 SOLUTION INTRAVENOUS EVERY 8 HOURS
Status: COMPLETED | OUTPATIENT
Start: 2019-09-11 | End: 2019-09-11

## 2019-09-11 RX ADMIN — HEPARIN SODIUM 5000 UNITS: 5000 INJECTION INTRAVENOUS; SUBCUTANEOUS at 06:00

## 2019-09-11 RX ADMIN — CEFAZOLIN SODIUM 1000 MG: 1 SOLUTION INTRAVENOUS at 21:45

## 2019-09-11 RX ADMIN — ACETAMINOPHEN 650 MG: 325 TABLET ORAL at 17:50

## 2019-09-11 RX ADMIN — CHLORHEXIDINE GLUCONATE 0.12% ORAL RINSE 15 ML: 1.2 LIQUID ORAL at 01:22

## 2019-09-11 RX ADMIN — MAGNESIUM SULFATE HEPTAHYDRATE 1 G: 1 INJECTION, SOLUTION INTRAVENOUS at 08:57

## 2019-09-11 RX ADMIN — DEXAMETHASONE SODIUM PHOSPHATE 4 MG: 4 INJECTION, SOLUTION INTRAMUSCULAR; INTRAVENOUS at 01:17

## 2019-09-11 RX ADMIN — FAMOTIDINE 20 MG: 10 INJECTION, SOLUTION INTRAVENOUS at 08:08

## 2019-09-11 RX ADMIN — ACETAMINOPHEN 650 MG: 325 TABLET ORAL at 12:38

## 2019-09-11 RX ADMIN — DEXAMETHASONE SODIUM PHOSPHATE 4 MG: 4 INJECTION, SOLUTION INTRAMUSCULAR; INTRAVENOUS at 06:29

## 2019-09-11 RX ADMIN — CEFAZOLIN SODIUM 1000 MG: 1 SOLUTION INTRAVENOUS at 14:44

## 2019-09-11 RX ADMIN — DEXAMETHASONE SODIUM PHOSPHATE 4 MG: 4 INJECTION, SOLUTION INTRAMUSCULAR; INTRAVENOUS at 12:37

## 2019-09-11 RX ADMIN — SODIUM CHLORIDE, SODIUM GLUCONATE, SODIUM ACETATE, POTASSIUM CHLORIDE AND MAGNESIUM CHLORIDE 125 ML/HR: 526; 502; 368; 37; 30 INJECTION, SOLUTION INTRAVENOUS at 06:35

## 2019-09-11 RX ADMIN — METRONIDAZOLE 500 MG: 500 INJECTION, SOLUTION INTRAVENOUS at 23:25

## 2019-09-11 RX ADMIN — CEFAZOLIN SODIUM 1000 MG: 1 SOLUTION INTRAVENOUS at 08:09

## 2019-09-11 RX ADMIN — INSULIN LISPRO 1 UNITS: 100 INJECTION, SOLUTION INTRAVENOUS; SUBCUTANEOUS at 06:00

## 2019-09-11 RX ADMIN — SODIUM CHLORIDE, SODIUM GLUCONATE, SODIUM ACETATE, POTASSIUM CHLORIDE AND MAGNESIUM CHLORIDE 500 ML: 526; 502; 368; 37; 30 INJECTION, SOLUTION INTRAVENOUS at 06:35

## 2019-09-11 RX ADMIN — INSULIN LISPRO 1 UNITS: 100 INJECTION, SOLUTION INTRAVENOUS; SUBCUTANEOUS at 21:28

## 2019-09-11 RX ADMIN — METRONIDAZOLE 500 MG: 500 INJECTION, SOLUTION INTRAVENOUS at 15:35

## 2019-09-11 RX ADMIN — METRONIDAZOLE 500 MG: 500 INJECTION, SOLUTION INTRAVENOUS at 09:22

## 2019-09-11 RX ADMIN — ROPIVACAINE HYDROCHLORIDE: 5 INJECTION, SOLUTION EPIDURAL; INFILTRATION; PERINEURAL at 14:45

## 2019-09-11 RX ADMIN — CHLORHEXIDINE GLUCONATE 0.12% ORAL RINSE 15 ML: 1.2 LIQUID ORAL at 08:08

## 2019-09-11 RX ADMIN — SODIUM CHLORIDE, SODIUM GLUCONATE, SODIUM ACETATE, POTASSIUM CHLORIDE AND MAGNESIUM CHLORIDE 125 ML/HR: 526; 502; 368; 37; 30 INJECTION, SOLUTION INTRAVENOUS at 01:25

## 2019-09-11 RX ADMIN — INSULIN LISPRO 1 UNITS: 100 INJECTION, SOLUTION INTRAVENOUS; SUBCUTANEOUS at 12:38

## 2019-09-11 RX ADMIN — DEXAMETHASONE SODIUM PHOSPHATE 4 MG: 4 INJECTION, SOLUTION INTRAMUSCULAR; INTRAVENOUS at 23:27

## 2019-09-11 RX ADMIN — DEXAMETHASONE SODIUM PHOSPHATE 4 MG: 4 INJECTION, SOLUTION INTRAMUSCULAR; INTRAVENOUS at 17:50

## 2019-09-11 RX ADMIN — HEPARIN SODIUM 5000 UNITS: 5000 INJECTION INTRAVENOUS; SUBCUTANEOUS at 14:44

## 2019-09-11 RX ADMIN — ONDANSETRON 4 MG: 2 INJECTION INTRAMUSCULAR; INTRAVENOUS at 21:26

## 2019-09-11 RX ADMIN — HEPARIN SODIUM 5000 UNITS: 5000 INJECTION INTRAVENOUS; SUBCUTANEOUS at 21:34

## 2019-09-11 NOTE — PROGRESS NOTES
Progress Note - Colorectal Surgery   Karon Langston 70 y o  female MRN: [id number]  Unit/Bed#: ICU 10 Encounter: [id number]    Assessment:  71 yo F s/p Ex lap, exenteration posterior pelvis, end colostomy, ileostomy reversal, radial omentectomy, tumor debulking  Remains intubated but stable  ANDREAS 520 serosang  Hb 9 3    Plan:  - Wean to extubation today  - Epidural for pain control  - Continue wick in place to old ileostomy site- will remove 9/12 or 9/13  - Monitor ANDREAS output  - Can start clears once extubated, no indication for TF unless prolonged vent expected  - Remaining care per primary / ICU    Subjective/Objective   Chief Complaint:     Subjective: No acute issues overnight  Patient arousable, remains intubated  Objective:       Blood pressure 112/62, pulse 60, temperature 97 5 °F (36 4 °C), resp  rate 14, height 5' 4" (1 626 m), weight 63 5 kg (140 lb), SpO2 100 %  ,Body mass index is 24 03 kg/m²  Intake/Output Summary (Last 24 hours) at 9/11/2019 0527  Last data filed at 9/11/2019 0200  Gross per 24 hour   Intake 15598 31 ml   Output 4745 ml   Net 6139 31 ml       Invasive Devices     Central Venous Catheter Line            Port A Cath 11/29/18 Right Chest 286 days          Peripheral Intravenous Line            Peripheral IV 09/10/19 Left Arm less than 1 day    Peripheral IV 09/10/19 Left Wrist less than 1 day          Epidural Line            Epidural Catheter 09/10/19 less than 1 day          Arterial Line            Arterial Line 09/10/19 Left Radial less than 1 day          Drain            Ileostomy Loop  days    Closed/Suction Drain Right RLQ Bulb less than 1 day    Urethral Catheter Double-lumen;Non-latex 16 Fr  less than 1 day          Airway            ETT  Cuffed;Oral 7 mm less than 1 day                Physical Exam:   Gen: Arousable, NAD  Cardio: RRR  Lungs: CTAB  Abd: Soft, non distended, appropriately tender  ANDREAS becoming more serosang (less sanginous)   Ostomy pink, bag flat w/o gas  : Haskins w blood urine, becoming more yellow/clear      Lab, Imaging and other studies:  CBC:   Lab Results   Component Value Date    WBC 15 68 (H) 09/11/2019    HGB 9 3 (L) 09/11/2019    HCT 27 4 (L) 09/11/2019    MCV 85 09/11/2019     (L) 09/11/2019    MCH 28 9 09/11/2019    MCHC 33 9 09/11/2019    RDW 17 2 (H) 09/11/2019    MPV 12 1 09/11/2019    NRBC 0 09/11/2019   , CMP:   Lab Results   Component Value Date    SODIUM 146 (H) 09/10/2019    K 3 9 09/10/2019     (H) 09/10/2019    CO2 19 (L) 09/10/2019    CO2 21 09/10/2019    BUN 22 09/10/2019    CREATININE 1 29 09/10/2019    GLUCOSE 228 (H) 09/10/2019    CALCIUM 9 8 09/10/2019    AST 38 09/10/2019    ALT 26 09/10/2019    ALKPHOS 57 09/10/2019    EGFR 42 09/10/2019   , Coagulation:   Lab Results   Component Value Date    INR 1 16 09/10/2019     VTE Pharmacologic Prophylaxis: Heparin  VTE Mechanical Prophylaxis: sequential compression device

## 2019-09-11 NOTE — PROGRESS NOTES
Progress Note - Critical Care   Diana Ch 70 y o  female MRN: [id number]  Unit/Bed#: ICU 10 Encounter: [id number]    Assessment:  Patient is a 26-year-old female with history of ovarian cancer that presents from the operating room yesterday after an exploratory laparotomy with tumor debulking,  Exenteration of the posterior pelvis, end colostomy, ileostomy reversal, radical omentectomy, ureteral stents  Patient had  6 units PRBCs, 4 units FFP, 1 pack of platelets intraoperatively  Case last approximately 7-1/2 hours  Patient has had largely unremarkable postoperative course other than swelling of the tongue and lips  That has prevented us from extubating the patient  Patient had estimated 3 L blood loss during the case  Malignant neoplasm of ovary  Acute blood-loss anemia  History of ileostomy, Re- anastomosis with End colostomy  DM    Plan:          Neuro:    no acute issues   sedation: Precedex drip    currently following commands   GCS 11 T (E4,V1,M6)   analgesia: Tylenol 650 mg Q 6, epidural with ropivacaine and fentanyl PCA, Dilaudid 0 5 mg q 1 hour p r n  CV:   No acute issues    patient has remained normotensive overnight   patient receive 1 IV fluid bolus due to borderline pressures overnight                 Lung:     Patient remained intubated overnight secondary to minor tongue and lip swelling   currently on pressure support 8/5   plan to extubate this morning   no acute issues                 GI:   End Colostomy  ·  patient converted from ileostomy to colostomy with reanastomosis  ·  patient is having mucoid discharge from the colostomy  ·  NG tube in place to suction      GI prophylaxis: Pepcid 20 mg b i d                  FEN:   F: isolyte at 125 mL/hour  E:  Magnesium repleted this morning  N:  NPO                 :    creatinine at baseline   bloody urine from [place]   urine output adequate over the past 12 hours                 ID:    perioperative Ancef and Flagyl   no acute issues                 Heme:    DVT prophylaxis with heparin  Acute blood loss anemia   hemoglobin stable from yesterday after receiving 4 units PRBCs after significant intraoperative blood loss                 Endo:   DM  ·   insulin sliding scale algorithm 4                           Msk/Skin:    tongue/lip swelling  ·  etiology unknown  ·  started on steroids last night and swelling has subsequently improved                 Disposition:  ICU    Chief Complaint: NA    HPI/24hr events: none    Physical Exam:   Physical Exam   Constitutional: She appears well-developed and well-nourished  No distress  HENT:   Head: Normocephalic  Swelling of the lips and tongue is noted  Improved from 12 hours ago   Eyes: Pupils are equal, round, and reactive to light  Neck: Normal range of motion  Neck supple  Cardiovascular: Normal rate, regular rhythm, normal heart sounds and intact distal pulses  Pulmonary/Chest: Effort normal and breath sounds normal    Abdominal: Soft  Bowel sounds are normal  She exhibits no distension  There is tenderness  There is no guarding  Midline incision site looks non erythematous, non draining  Patient appropriately tender throughout her abdomen  ANDREAS drain placed in right lower quadrant with serosanguineous drainage  And colostomy site pink and viable with mucoid discharge   Musculoskeletal: Normal range of motion  She exhibits no edema, tenderness or deformity  Neurological: She is alert  No cranial nerve deficit or sensory deficit  GCS 11 T (E4 V1 M6)   Skin: Skin is warm and dry  Capillary refill takes less than 2 seconds  Psychiatric: She has a normal mood and affect  Her behavior is normal  Judgment and thought content normal    Vitals reviewed          Vitals:    09/11/19 0105 09/11/19 0110 09/11/19 0355 09/11/19 0545   BP:  112/62     Pulse: 60 60  62   Resp: 14 14  16   Temp:       TempSrc:       SpO2: 100% 100% 100% 100%   Weight:       Height:         Arterial Line BP: 96/52  Arterial Line MAP (mmHg): 68 mmHg    Temperature:   Temp (24hrs), Av 6 °F (36 4 °C), Min:97 4 °F (36 3 °C), Max:97 8 °F (36 6 °C)    Current: Temperature: 97 5 °F (36 4 °C)    Weights:   IBW: 54 7 kg    Body mass index is 24 03 kg/m²  Weight (last 2 days)     Date/Time   Weight    09/10/19 0651   63 5 (140)              Hemodynamic Monitoring:  N/A     Non-Invasive/Invasive Ventilation Settings:  Respiratory    Lab Data (Last 4 hours)    None         O2/Vent Data (Last 4 hours)       0355           Vent Mode AC/VC       Resp Rate (BPM) (BPM) 14       Vt (mL) (mL) 430       FIO2 (%) (%) 40       PEEP (cmH2O) (cmH2O) 5       MV 6 12                 Lab Results   Component Value Date    PHART 7 312 (L) 09/10/2019    GJA4EFH 38 0 09/10/2019    PO2ART 165 9 (H) 09/10/2019    JNR9IQB 18 8 (L) 09/10/2019    BEART -6 8 09/10/2019    SOURCE Line, Arterial 09/10/2019     SpO2: SpO2: 100 %    Intake and Outputs:  I/O        07 - 09/10 0700 09/10 07 -  0700    I V  (mL/kg)  6864 3 (108 1)    Blood  3520    IV Piggyback  500    Total Intake(mL/kg)  15459 3 (171 4)    Urine (mL/kg/hr)  1675 (1 1)    Emesis/NG output  100    Drains  680    Blood  3000    Total Output  5455    Net  +5429 3              UOP: 75 cc/hour   Nutrition:        Diet Orders   (From admission, onward)             Start     Ordered    09/10/19 1715  Diet NPO  Diet effective now     Question Answer Comment   Diet Type NPO    RD to adjust diet per protocol?  No        09/10/19 171              NPO    Labs:   Results from last 7 days   Lab Units 19  0456 09/10/19  1804 09/10/19  1715   WBC Thousand/uL 15 68* 15 14* 15 40*   HEMOGLOBIN g/dL 9 3* 9 7* 10 0*   HEMATOCRIT % 27 4* 28 9* 29 9*   PLATELETS Thousands/uL 133* 144* 144*   NEUTROS PCT % 87* 84* 83*   MONOS PCT % 6 11 12      Results from last 7 days   Lab Units 19  0456 09/10/19  1804 09/10/19  1715 09/10/19  1454 09/10/19  1415 09/10/19  1310 SODIUM mmol/L 145 146* 144  --   --   --    POTASSIUM mmol/L 4 3 3 9 3 8  --   --   --    CHLORIDE mmol/L 116* 117* 117*  --   --   --    CO2 mmol/L 20* 19* 20*  --   --   --    CO2, I-STAT mmol/L  --   --   --  21 20* 21   BUN mg/dL 29* 22 22  --   --   --    CREATININE mg/dL 1 37* 1 29 1 29  --   --   --    CALCIUM mg/dL 9 2 9 8 10 3*  --   --   --    ALK PHOS U/L  --  57 60  --   --   --    ALT U/L  --  26 26  --   --   --    AST U/L  --  38 39  --   --   --    GLUCOSE, ISTAT mg/dl  --   --   --  228* 227* 224*     Results from last 7 days   Lab Units 09/11/19  0456 09/10/19  1804 09/10/19  1715   MAGNESIUM mg/dL 1 9 1 6 1 8     Results from last 7 days   Lab Units 09/11/19  0456 09/10/19  1804 09/10/19  1715   PHOSPHORUS mg/dL 5 4* 4 4* 4 3*      Results from last 7 days   Lab Units 09/10/19  1804 09/10/19  1715   INR  1 16 1 23*   PTT seconds 29 29     Results from last 7 days   Lab Units 09/10/19  1804   LACTIC ACID mmol/L 1 8     0   Lab Value Date/Time    TROPONINI <0 02 07/14/2019 2138    TROPONINI <0 02 12/10/2018 1410       Imaging:    chest x-ray with no acute cardiopulmonary disease with endotracheal tube in good position   I have personally reviewed pertinent reports  EKG:  No new    Micro:  Lab Results   Component Value Date    BLOODCX No Growth After 5 Days  12/17/2018    BLOODCX No Growth After 5 Days   12/17/2018    BLOODCX Clostridium species (A) 12/14/2018    URINECX No Growth <1000 cfu/mL 12/14/2018       Allergies: No Known Allergies    Medications:   Scheduled Meds:    Current Facility-Administered Medications:  acetaminophen 650 mg Oral Q6H [facility] Kristy, DO    cefazolin 1,000 mg Intravenous Q8H [facility] Kristy, DO    chlorhexidine 15 mL Swish & Spit Q12H [facility] [place] Luciano Bills PA-C    dexamethasone 4 mg Intravenous Q6H [facility] Ivette Orellana PA-C    dexmedetomidine 0 1-0 7 mcg/kg/hr Intravenous Titrated Bryantblair Pacheco MD Last Rate: 0 7 mcg/kg/hr (09/10/19 2100)   dextrose 5 % and sodium chloride 0 45 % with KCl 20 mEq/L 125 mL/hr Intravenous Continuous [place] F Zabo, DO Last Rate: Stopped (09/10/19 1810)   diphenhydrAMINE 25 mg Intravenous Q6H PRN [place] F Zabo, DO    famotidine 20 mg Intravenous BID Uli Pacheco PA-C    heparin (porcine) 5,000 Units Subcutaneous [facility] Uli Pacheco PA-C    HYDROmorphone 0 5 mg Intravenous Q1H PRN Edward Kuo MD    insulin lispro 1-5 Units Subcutaneous Q6H [address] Luciano Ricketts PA-C    magnesium sulfate 1 g Intravenous Once Emily Hernandez MD    metroNIDAZOLE 500 mg Intravenous Q8H Aysha Sutton DO    multi-electrolyte 125 mL/hr Intravenous Continuous Lennox Calhoun Last Rate: 125 mL/hr (09/11/19 9810)   multi-electrolyte 500 mL Intravenous Once Ivette Orellana PA-C    ondansetron 4 mg Intravenous Q6H PRN [place] F Zabo, DO    propofol 5-50 mcg/kg/min Intravenous Titrated Uli Pacheco PA-C Last Rate: 6 mcg/kg/min (09/10/19 2256)   ropivacaine 0 1% and fentaNYL 2 mcg/mL  Epidural Continuous [place] F Zabo, DO      Continuous Infusions:    dexmedetomidine 0 1-0 7 mcg/kg/hr Last Rate: 0 7 mcg/kg/hr (09/10/19 2100)   dextrose 5 % and sodium chloride 0 45 % with KCl 20 mEq/L 125 mL/hr Last Rate: Stopped (09/10/19 1810)   multi-electrolyte 125 mL/hr Last Rate: 125 mL/hr (09/11/19 0635)   propofol 5-50 mcg/kg/min Last Rate: 6 mcg/kg/min (09/10/19 2256)   ropivacaine 0 1% and fentaNYL 2 mcg/mL       PRN Meds:    diphenhydrAMINE 25 mg Q6H PRN   HYDROmorphone 0 5 mg Q1H PRN   ondansetron 4 mg Q6H PRN       VTE Pharmacologic Prophylaxis: Heparin  VTE Mechanical Prophylaxis: sequential compression device    Invasive lines and devices:   Invasive Devices     Central Venous Catheter Line            Port A Cath 11/29/18 Right Chest 286 days          Peripheral Intravenous Line            Peripheral IV 09/10/19 Left Wrist 1 day    Peripheral IV 09/10/19 Left Arm less than 1 day          Epidural Line            Epidural Catheter 09/10/19 less than 1 day          Arterial Line            Arterial Line 09/10/19 Left Radial less than 1 day          Drain            Ileostomy Loop  days    Closed/Suction Drain Right RLQ Bulb less than 1 day    Urethral Catheter Double-lumen;Non-latex 16 Fr  less than 1 day          Airway            ETT  Cuffed;Oral 7 mm less than 1 day                   Counseling / Coordination of Care  Total Critical Care time spent 55 minutes excluding procedures, teaching and family updates  Code Status: Level 1 - Full Code     Portions of the record may have been created with voice recognition software  Occasional wrong word or "sound a like" substitutions may have occurred due to the inherent limitations of voice recognition software  Read the chart carefully and recognize, using context, where substitutions have occurred       Justin Odell MD

## 2019-09-11 NOTE — CONSULTS
Anesthesia Progress Note - Epidural Pain Management    Nemiah Crigler MRN: [id number]  Unit/Bed#: [facility] 905-01 Encounter: [id number]    SURGERY DATE: 9/10/2019  Post-Op Diagnosis Codes:     * Malignant neoplasm of ovary, unspecified laterality ([facility] [address] ) [C56 9]    Assessment:   70 y o  female STATUS POST   Procedure(s):  CYSTOSCOPY , INSERTION URETERAL CATHETERS  EXPLORATORY LAPAROTOMY, EXENTERATION POSTERIOR PELVIS,  END COLOSTOMY, ILEOSTOMY REVERSAL, LYSIS OF ADHESIONS, rADICAL OMENTECTOMY AND TUMOR DEBULKINGFLEXIBLE SIGMOIDOSCOPY, CYSTOGRAM, INSPECTION OF URETERS  POD# 1    Plan:   Patient has well managed pain with current PCEA settings  She had some pain exacerbations while be transported out of ICU and has just pushed for a demand bolus for the first time  She will try to get relief with some demand boluses, if she continues to be uncomfortable we can consider increasing her continuous infusion rate       Please call  ( [phone]) with any questions    Subjective:  Current pain location(s): abdomen  Pain Scale:   5-7/10    Meds/Allergies     No Known Allergies    Objective     Temp:  [97 4 °F (36 3 °C)-98 6 °F (37 °C)] 97 7 °F (36 5 °C)  HR:  [58-99] 99  Resp:  [12-25] 17  BP: ()/(47-67) 114/56  Arterial Line BP: ()/(42-82) 132/82    Physical Exam:  Gen: Well appearing, NAD  CV: RRR  Pulm: CTAB  Neuro: No focal deficits  Epidural Site: Catheter in good position, not tender to palpation, site clean    SIGNATURE: Rafa Salazar MD  DATE: September 11, 2019  TIME: 3:05 PM

## 2019-09-11 NOTE — NUTRITION
09/11/19 7669   Recommendations/Interventions   Interventions Other (comment)  (RD does not have protocol to adjust diet/supplements)   Nutrition Recommendations Other (specify); Lab - consider order (specify)  (While patient is on clear liquid diet suggest add Ensure clear BID  Once clear liquid diet tolerated suggest advance diet to low fiber, residue   Monitor electrolytes  )

## 2019-09-11 NOTE — PROGRESS NOTES
GYN-ONC Progress Note  Deedee Puckett  [id number]  ICU 10/ICU 10  2019  4:19 AM    ASSESS: 70year old with stage IVN ovarian cancer, s/p neoadjuvant chemotherapy, s/p ex lap, exenteration posterior pelvic, end colostomy, ileostomy reversal, NAYA, rydical omentectomy and tumor debulking, flexible sigmoidoscopy, cystogram, inspection of ureters following initial insertion ureteral catheters under cystoscopy    PLAN:    1  Postoperative care: per SICU warn to extubation today-advance diet to clears, continue wick in place at old ostomy site and remove in 48 hours, monitor ANDREAS output-600 cc output post-op, continue ancef and flagyl 24 hours postop  2  Acute blood loss anemia: Hgb 8 7-->intraop received 6 units PRBCs, 4 units FFP, 1 unit platelets, HGB 88 5-->7 3-->4 8, VSS stable overnight  3  End colostomy: per ostomy care  4   Type 2 DM: home metformin held, SSI when tolerating PO    Dispo: inpatient, anticipate stepdown 2 status following extubation    PPx: SCDs, Heparin 5000 units TID  FEN: NPO while intubated-->clear liquid diet, replete lytes prn, D5 1/2NS + 20KCL  Pain mgmnt: epidural per APS  Invasive lines: clemons, ET tube, RLQ drain  Code status: Full  ____________________    SUBJECTIVE  History of Present Illness:  Overnight: no acute events, remains intubated per SICU  Pain: epidural  Tolerating Oral Intake: intubated   Voidin 27 cc/kg/hr, concentrated red output in clemons  Flatus: no  Bowel Movement: no  Ambulating: no  Vaginal Bleeding: no  Pelvic Pain: no  Chest Pain: no  Shortness of Breath: no  Leg Pain/Discomfort: no    OBJECTIVE  Vitals  Temp:  [97 4 °F (36 3 °C)-99 °F (37 2 °C)] 97 5 °F (36 4 °C)  HR:  [60-85] 60  Resp:  [13-25] 14  BP: ()/(47-67) 112/62  Arterial Line BP: ()/(42-68) 120/66       Intake/Output Summary (Last 24 hours) at 2019 0419  Last data filed at 2019 0200  Gross per 24 hour   Intake 30648 31 ml   Output 4745 ml   Net 6139 31 ml       Physical Exam: Physical Exam   Constitutional: Vital signs are normal  She appears well-developed and well-nourished  She appears lethargic  She is active  She is intubated  Cardiovascular: Normal rate, regular rhythm, normal heart sounds and intact distal pulses  Pulmonary/Chest: Effort normal and breath sounds normal  No stridor  She is intubated  No respiratory distress  Abdominal: Soft  She exhibits no distension  There is no tenderness  Abdominal incsion clean dry and intact    RLQ drain with serosnaguinous output     Ben dressing in place for old ostomy closure    Genitourinary:   Genitourinary Comments: Concentrated red urine output in clemons    Neurological: She appears lethargic  Labs:   Recent Results (from the past 72 hour(s))   Type and screen    Collection Time: 09/10/19  6:10 AM   Result Value Ref Range    ABO Grouping A     Rh Factor Positive     Antibody Screen Negative     Specimen Expiration Date [id number]    Fingerstick Glucose (POCT)    Collection Time: 09/10/19  6:31 AM   Result Value Ref Range    POC Glucose 101 65 - 140 mg/dl   POCT Blood Gas (CG8+)    Collection Time: 09/10/19 10:18 AM   Result Value Ref Range    pH, Art i-STAT 7 302 (L) 7 350 - 7 450    pCO2, Art i-STAT 41 0 36 0 - 44 0 mm HG    pO2, ART i-STAT 289 0 (H) 75 0 - 129 0 mm HG    BE, i-STAT -6 (L) -2 - 3 mmol/L    HCO3, Art i-STAT 20 3 (L) 22 0 - 28 0 mmol/L    CO2, i-STAT 21 21 - 32 mmol/L    O2 Sat, i-STAT 100 (H) 95 - 98 %    SODIUM, I-STAT 142 136 - 145 mmol/l    Potassium, i-STAT 3 7 3 5 - 5 3 mmol/L    Calcium, Ionized i-STAT 1 25 1 12 - 1 32 mmol/L    Hct, i-STAT 21 (L) 34 8 - 46 1 %    Hgb, i-STAT 7 1 (L) 11 5 - 15 4 g/dl    Glucose, i-STAT 157 (H) 65 - 140 mg/dl    Specimen Type ARTERIAL    Prepare RBC:Has consent been obtained? Yes; Where is the Surgery Scheduled? [facility], [place];  Date of Surgery: 9/10/2019; Date of Infusion: 9/10/2019, 2 Units    Collection Time: 09/10/19 10:30 AM   Result Value Ref Range    Unit Product Code J5186B70     Unit Number [id number]     Unit ABO A     Unit [facility] POS     Crossmatch Compatible     Unit Dispense Status Issued     Unit Product Code V7741E03     Unit Number [id number]     Unit ABO A     Unit RH POS     Crossmatch Compatible     Unit Dispense Status Issued    Prepare RBC:Has consent been obtained? Yes; Where is the Surgery Scheduled? [facility]; Date of Surgery: 9/10/2019; Date of Infusion: 9/10/2019, 2 Units    Collection Time: 09/10/19 11:12 AM   Result Value Ref Range    Unit Product Code Z3153Z13     Unit Number [id number]     Unit ABO A     Unit [facility] POS     Crossmatch Compatible     Unit Dispense Status Issued     Unit Product Code K6186Q55     Unit Number [id number]     Unit ABO A     Unit RH POS     Crossmatch Compatible     Unit Dispense Status Issued    POCT Blood Gas (CG8+)    Collection Time: 09/10/19 11:18 AM   Result Value Ref Range    pH, Art i-STAT 7 295 (L) 7 350 - 7 450    pCO2, Art i-STAT 38 4 36 0 - 44 0 mm HG    pO2, ART i-STAT 244 0 (H) 75 0 - 129 0 mm HG    BE, i-STAT -7 (L) -2 - 3 mmol/L    HCO3, Art i-STAT 18 6 (L) 22 0 - 28 0 mmol/L    CO2, i-STAT 20 (L) 21 - 32 mmol/L    O2 Sat, i-STAT 100 (H) 95 - 98 %    SODIUM, I-STAT 142 136 - 145 mmol/l    Potassium, i-STAT 4 3 3 5 - 5 3 mmol/L    Calcium, Ionized i-STAT 1 19 1 12 - 1 32 mmol/L    Hct, i-STAT 26 (L) 34 8 - 46 1 %    Hgb, i-STAT 8 8 (L) 11 5 - 15 4 g/dl    Glucose, i-STAT 201 (H) 65 - 140 mg/dl    Specimen Type ARTERIAL    Prepare fresh frozen plasma:Has consent been obtained?  Yes; Date of Surgery: 9/10/2019, 2 Units    Collection Time: 09/10/19 11:30 AM   Result Value Ref Range    Unit Product Code R4747K94     Unit Number [id number]     Unit ABO A     Unit [facility] POS     Unit Dispense Status Crossmatched     Unit Product Code G1157A81     Unit Number [id number]     Unit ABO A     Unit RH POS     Unit Dispense Status Crossmatched    POCT Blood Gas (CG8+)    Collection Time: 09/10/19 12:23 PM   Result Value Ref Range    pH, Art i-STAT 7 263 (L) 7 350 - 7 450    pCO2, Art i-STAT 38 0 36 0 - 44 0 mm HG    pO2, ART i-STAT 235 0 (H) 75 0 - 129 0 mm HG    BE, i-STAT -9 (L) -2 - 3 mmol/L    HCO3, Art i-STAT 17 2 (L) 22 0 - 28 0 mmol/L    CO2, i-STAT 18 (L) 21 - 32 mmol/L    O2 Sat, i-STAT 100 (H) 95 - 98 %    SODIUM, I-STAT 145 136 - 145 mmol/l    Potassium, i-STAT 3 7 3 5 - 5 3 mmol/L    Calcium, Ionized i-STAT 1 20 1  12 - 1 32 mmol/L    Hct, i-STAT 19 (L) 34 8 - 46 1 %    Hgb, i-STAT 6 5 (LL) 11 5 - 15 4 g/dl    Glucose, i-STAT 194 (H) 65 - 140 mg/dl    Specimen Type ARTERIAL    Prepare RBC:Has consent been obtained? Yes; Where is the Surgery Scheduled? [facility]; Date of Surgery: 9/10/2019; Date of Infusion: 9/10/2019, 2 Units    Collection Time: 09/10/19 12:42 PM   Result Value Ref Range    Unit Product Code Q1570H41     Unit Number [id number]     Unit ABO A     Unit [facility] POS     Crossmatch Compatible     Unit Dispense Status Issued     Unit Product Code U9402L71     Unit Number [id number]     Unit ABO A     Unit RH POS     Crossmatch Compatible     Unit Dispense Status Issued    Prepare fresh frozen plasma:Has consent been obtained?  Yes; Date of Surgery: 9/10/2019, 2 Units    Collection Time: 09/10/19 12:42 PM   Result Value Ref Range    Unit Product Code Y1693J74     Unit Number [id number]     Unit ABO A     Unit [facility] POS     Unit Dispense Status Issued     Unit Product Code K8590T86     Unit Number [id number]     Unit ABO A     Unit RH POS     Unit Dispense Status Issued    POCT Blood Gas (CG8+)    Collection Time: 09/10/19  1:10 PM   Result Value Ref Range    pH, Art i-STAT 7 322 (L) 7 350 - 7 450    pCO2, Art i-STAT 38 2 36 0 - 44 0 mm HG    pO2, ART i-STAT 249 0 (H) 75 0 - 129 0 mm HG    BE, i-STAT -6 (L) -2 - 3 mmol/L    HCO3, Art i-STAT 19 8 (L) 22 0 - 28 0 mmol/L    CO2, i-STAT 21 21 - 32 mmol/L    O2 Sat, i-STAT 100 (H) 95 - 98 %    SODIUM, I-STAT 144 136 - 145 mmol/l    Potassium, i-STAT 3 8 3 5 - 5 3 mmol/L    Calcium, Ionized i-STAT 1 22 1 12 - 1 32 mmol/L    Hct, i-STAT 23 (L) 34 8 - 46 1 %    Hgb, i-STAT 7 8 (L) 11 5 - 15 4 g/dl    Glucose, i-STAT 224 (H) 65 - 140 mg/dl    Specimen Type ARTERIAL    Prepare platelet pheresis:Has consent been obtained? Yes, 1 Units    Collection Time: 09/10/19  1:52 PM   Result Value Ref Range    Unit Product Code C6818I97     Unit Number [id number]     Unit ABO O     Unit RH POS     Unit Dispense Status Issued    POCT Blood Gas (CG8+)    Collection Time: 09/10/19  2:15 PM   Result Value Ref Range    pH, Art i-STAT 7 322 (L) 7 350 - 7 450    pCO2, Art i-STAT 37 0 36 0 - 44 0 mm HG    pO2, ART i-STAT 249 0 (H) 75 0 - 129 0 mm HG    BE, i-STAT -6 (L) -2 - 3 mmol/L    HCO3, Art i-STAT 19 2 (L) 22 0 - 28 0 mmol/L    CO2, i-STAT 20 (L) 21 - 32 mmol/L    O2 Sat, i-STAT 100 (H) 95 - 98 %    SODIUM, I-STAT 144 136 - 145 mmol/l    Potassium, i-STAT 3 6 3 5 - 5 3 mmol/L    Calcium, Ionized i-STAT 1 26 1 12 - 1 32 mmol/L    Hct, i-STAT 20 (L) 34 8 - 46 1 %    Hgb, i-STAT 6 8 (LL) 11 5 - 15 4 g/dl    Glucose, i-STAT 227 (H) 65 - 140 mg/dl    Specimen Type ARTERIAL    Prepare RBC:Has consent been obtained? Yes; Date of Surgery: 9/10/2019; Date of Infusion: 9/10/2019, 2 Units    Collection Time: 09/10/19  2:16 PM   Result Value Ref Range    Unit Product Code E9250B37     Unit Number [id number]     Unit ABO A     Unit [facility] POS     Crossmatch Compatible     Unit Dispense Status Crossmatched     Unit Product Code C9980J03     Unit Number [id number]     Unit ABO A     Unit RH POS     Crossmatch Compatible     Unit Dispense Status Crossmatched    Prepare fresh frozen plasma:Has consent been obtained?  Yes; Date of Surgery: 9/10/2019, 2 Units    Collection Time: 09/10/19  2:18 PM   Result Value Ref Range    Unit Product Code D4255Q11     Unit Number [id number]     Unit ABO A     Unit RH NEG     Unit Dispense Status Crossmatched Unit Product Code L9281G68     Unit Number [id number]     Unit ABO A     Unit RH POS     Unit Dispense Status Multiple XM'd    POCT Blood Gas (CG8+)    Collection Time: 09/10/19  2:54 PM   Result Value Ref Range    pH, Art i-STAT 7 318 (L) 7 350 - 7 450    pCO2, Art i-STAT 38 6 36 0 - 44 0 mm HG    pO2, ART i-STAT 249 0 (H) 75 0 - 129 0 mm HG    BE, i-STAT -6 (L) -2 - 3 mmol/L    HCO3, Art i-STAT 19 8 (L) 22 0 - 28 0 mmol/L    CO2, i-STAT 21 21 - 32 mmol/L    O2 Sat, i-STAT 100 (H) 95 - 98 %    SODIUM, I-STAT 143 136 - 145 mmol/l    Potassium, i-STAT 3 9 3 5 - 5 3 mmol/L    Calcium, Ionized i-STAT 1 58 (H) 1 12 - 1 32 mmol/L    Hct, i-STAT 25 (L) 34 8 - 46 1 %    Hgb, i-STAT 8 5 (L) 11 5 - 15 4 g/dl    Glucose, i-STAT 228 (H) 65 - 140 mg/dl    Specimen Type ARTERIAL    CBC and differential    Collection Time: 09/10/19  5:15 PM   Result Value Ref Range    WBC 15 40 (H) 4 31 - 10 16 Thousand/uL    RBC 3 39 (L) 3 81 - 5 12 Million/uL    Hemoglobin 10 0 (L) 11 5 - 15 4 g/dL    Hematocrit 29 9 (L) 34 8 - 46 1 %    MCV 88 82 - 98 fL    MCH 29 5 26 8 - 34 3 pg    MCHC 33 4 31 4 - 37 4 g/dL    RDW 15 6 (H) 11 6 - 15 1 %    MPV 11 2 8 9 - 12 7 fL    Platelets 392 (L) 361 - 390 Thousands/uL    nRBC 0 /100 WBCs    Neutrophils Relative 83 (H) 43 - 75 %    Immat GRANS % 0 0 - 2 %    Lymphocytes Relative 5 (L) 14 - 44 %    Monocytes Relative 12 4 - 12 %    Eosinophils Relative 0 0 - 6 %    Basophils Relative 0 0 - 1 %    Neutrophils Absolute 12 76 (H) 1 85 - 7 62 Thousands/µL    Immature Grans Absolute 0 05 0 00 - 0 20 Thousand/uL    Lymphocytes Absolute 0 75 0 60 - 4 47 Thousands/µL    Monocytes Absolute 1 80 (H) 0 17 - 1 22 Thousand/µL    Eosinophils Absolute 0 01 0 00 - 0 61 Thousand/µL    Basophils Absolute 0 03 0 00 - 0 10 Thousands/µL   Comprehensive metabolic panel    Collection Time: 09/10/19  5:15 PM   Result Value Ref Range    Sodium 144 136 - 145 mmol/L    Potassium 3 8 3 5 - 5 3 mmol/L    Chloride 117 (H) 100 - 108 mmol/L    CO2 20 (L) 21 - 32 mmol/L    ANION GAP 7 4 - 13 mmol/L    BUN 22 5 - 25 mg/dL    Creatinine 1 29 0 60 - 1 30 mg/dL    Glucose 182 (H) 65 - 140 mg/dL    Calcium 10 3 (H) 8 3 - 10 1 mg/dL    AST 39 5 - 45 U/L    ALT 26 12 - 78 U/L    Alkaline Phosphatase 60 46 - 116 U/L    Total Protein 5 3 (L) 6 4 - 8 2 g/dL    Albumin 2 9 (L) 3 5 - 5 0 g/dL    Total Bilirubin 1 15 (H) 0 20 - 1 00 mg/dL    eGFR 42 ml/min/1 73sq m   Protime-INR    Collection Time: 09/10/19  5:15 PM   Result Value Ref Range    Protime 15 1 (H) 11 6 - 14 5 seconds    INR 1 23 (H) 0 84 - 1 19   APTT    Collection Time: 09/10/19  5:15 PM   Result Value Ref Range    PTT 29 23 - 37 seconds   Fibrinogen    Collection Time: 09/10/19  5:15 PM   Result Value Ref Range    Fibrinogen 299 227 - 495 mg/dL   Magnesium    Collection Time: 09/10/19  5:15 PM   Result Value Ref Range    Magnesium 1 8 1 6 - 2 6 mg/dL   Phosphorus    Collection Time: 09/10/19  5:15 PM   Result Value Ref Range    Phosphorus 4 3 (H) 2 3 - 4 1 mg/dL   Blood gas, arterial    Collection Time: 09/10/19  6:04 PM   Result Value Ref Range    pH, Arterial 7 312 (L) 7 350 - 7 450    PH ART TC 7 318 (L) 7 350 - 7 450    pCO2, Arterial 38 0 36 0 - 44 0 mm Hg    PCO2 (TC) Arterial 37 3 36 0 - 44 0 mm Hg    pO2, Arterial 165 9 (H) 75 0 - 129 0 mm Hg    PO2 (TC) Arterial 163 6 (H) 75 0 - 129 0 mm Hg    HCO3, Arterial 18 8 (L) 22 0 - 28 0 mmol/L    Base Excess, Arterial -6 8 mmol/L    O2 Content, Arterial 14 4 (L) 16 0 - 23 0 mL/dL    O2 HGB,Arterial  97 6 (H) 94 0 - 97 0 %    SOURCE Line, Arterial     Temperature 97 9 Degrees Fehrenheit    Vent Type- AC AC     AC Rate 14     Tidal Volume 430 ml    Inspired Air (FIO2) 40     PEEP 5    CBC and differential    Collection Time: 09/10/19  6:04 PM   Result Value Ref Range    WBC 15 14 (H) 4 31 - 10 16 Thousand/uL    RBC 3 29 (L) 3 81 - 5 12 Million/uL    Hemoglobin 9 7 (L) 11 5 - 15 4 g/dL    Hematocrit 28 9 (L) 34 8 - 46 1 %    MCV 88 82 - 98 fL MCH 29 5 26 8 - 34 3 pg    MCHC 33 6 31 4 - 37 4 g/dL    RDW 15 5 (H) 11 6 - 15 1 %    MPV 11 3 8 9 - 12 7 fL    Platelets 896 (L) 348 - 390 Thousands/uL    nRBC 0 /100 WBCs    Neutrophils Relative 84 (H) 43 - 75 %    Immat GRANS % 0 0 - 2 %    Lymphocytes Relative 5 (L) 14 - 44 %    Monocytes Relative 11 4 - 12 %    Eosinophils Relative 0 0 - 6 %    Basophils Relative 0 0 - 1 %    Neutrophils Absolute 12 80 (H) 1 85 - 7 62 Thousands/µL    Immature Grans Absolute 0 05 0 00 - 0 20 Thousand/uL    Lymphocytes Absolute 0 68 0 60 - 4 47 Thousands/µL    Monocytes Absolute 1 59 (H) 0 17 - 1 22 Thousand/µL    Eosinophils Absolute 0 00 0 00 - 0 61 Thousand/µL    Basophils Absolute 0 02 0 00 - 0 10 Thousands/µL   Comprehensive metabolic panel    Collection Time: 09/10/19  6:04 PM   Result Value Ref Range    Sodium 146 (H) 136 - 145 mmol/L    Potassium 3 9 3 5 - 5 3 mmol/L    Chloride 117 (H) 100 - 108 mmol/L    CO2 19 (L) 21 - 32 mmol/L    ANION GAP 10 4 - 13 mmol/L    BUN 22 5 - 25 mg/dL    Creatinine 1 29 0 60 - 1 30 mg/dL    Glucose 179 (H) 65 - 140 mg/dL    Calcium 9 8 8 3 - 10 1 mg/dL    AST 38 5 - 45 U/L    ALT 26 12 - 78 U/L    Alkaline Phosphatase 57 46 - 116 U/L    Total Protein 5 3 (L) 6 4 - 8 2 g/dL    Albumin 2 8 (L) 3 5 - 5 0 g/dL    Total Bilirubin 1 14 (H) 0 20 - 1 00 mg/dL    eGFR 42 ml/min/1 73sq m   Lactic acid    Collection Time: 09/10/19  6:04 PM   Result Value Ref Range    LACTIC ACID 1 8 0 5 - 2 0 mmol/L   Magnesium    Collection Time: 09/10/19  6:04 PM   Result Value Ref Range    Magnesium 1 6 1 6 - 2 6 mg/dL   Phosphorus    Collection Time: 09/10/19  6:04 PM   Result Value Ref Range    Phosphorus 4 4 (H) 2 3 - 4 1 mg/dL   Calcium, ionized    Collection Time: 09/10/19  6:04 PM   Result Value Ref Range    Calcium, Ionized 1 36 (H) 1 12 - 1 32 mmol/L   Protime-INR    Collection Time: 09/10/19  6:04 PM   Result Value Ref Range    Protime 14 4 11 6 - 14 5 seconds    INR 1 16 0 84 - 1 19   APTT Collection Time: 09/10/19  6:04 PM   Result Value Ref Range    PTT 29 23 - 37 seconds   Fingerstick Glucose (POCT)    Collection Time: 09/10/19 11:51 PM   Result Value Ref Range    POC Glucose 218 (H) 65 - 140 mg/dl       Meds:  Scheduled Meds:  Current Facility-Administered Medications:  acetaminophen 650 mg Oral Q6H [address] Jamariia F Gisellebo, DO    chlorhexidine 15 mL Swish & Spit Q12H [address] Luciano Bills PA-C    dexamethasone 4 mg Intravenous Q6H [address] Ivette Orellana PA-C    dexmedetomidine 0 1-0 7 mcg/kg/hr Intravenous Titrated Claudia Marrufo MD Last Rate: 0 7 mcg/kg/hr (09/10/19 2100)   dextrose 5 % and sodium chloride 0 45 % with KCl 20 mEq/L 125 mL/hr Intravenous Continuous Jamariia F Gisellebo, DO Last Rate: Stopped (09/10/19 1810)   diphenhydrAMINE 25 mg Intravenous Q6H PRN Ann Desaibo, DO    famotidine 20 mg Intravenous BID Lacey Shen PA-C    heparin (porcine) 5,000 Units Subcutaneous [facility] Lacey Shen PA-C    HYDROmorphone 0 5 mg Intravenous Q1H PRN Lizy Short MD    insulin lispro 1-5 Units Subcutaneous Q6H [address] Lacey Shen PA-C    multi-electrolyte 125 mL/hr Intravenous Continuous [place] Last Rate: 125 mL/hr (09/11/19 0125)   ondansetron 4 mg Intravenous Q6H PRN Ann Desaibo, DO    propofol 5-50 mcg/kg/min Intravenous Titrated Lacey Shen PA-C Last Rate: 6 mcg/kg/min (09/10/19 2256)   ropivacaine 0 1% and fentaNYL 2 mcg/mL  Epidural Continuous Jamariia F Zabo, DO      Continuous Infusions:  dexmedetomidine 0 1-0 7 mcg/kg/hr Last Rate: 0 7 mcg/kg/hr (09/10/19 2100)   dextrose 5 % and sodium chloride 0 45 % with KCl 20 mEq/L 125 mL/hr Last Rate: Stopped (09/10/19 1810)   multi-electrolyte 125 mL/hr Last Rate: 125 mL/hr (09/11/19 0124)   propofol 5-50 mcg/kg/min Last Rate: 6 mcg/kg/min (09/10/19 9855)   ropivacaine 0 1% and fentaNYL 2 mcg/mL       PRN Meds: diphenhydrAMINE    HYDROmorphone    ondansetron    Imaging Studies: I have personally reviewed pertinent reports  EKG, Pathology, Microbiology, and Other Studies: I have personally reviewed pertinent reports        __________________    Signature / Title: [id number] [facility] DO [place], Ob/Gyn, PGY-3  Date: 9/11/2019  Time: 4:19 AM

## 2019-09-11 NOTE — PLAN OF CARE
Problem: PHYSICAL THERAPY ADULT  Goal: Performs mobility at highest level of function for planned discharge setting  See evaluation for individualized goals  Description  Treatment/Interventions: Functional transfer training, LE strengthening/ROM, Elevations, Therapeutic exercise, Endurance training, Patient/family training, Equipment eval/education, Bed mobility, Gait training, Spoke to nursing          See flowsheet documentation for full assessment, interventions and recommendations  Note:   Prognosis: Fair  Problem List: Decreased strength, Decreased endurance, Impaired balance, Decreased mobility, Decreased coordination, Decreased cognition, Decreased safety awareness, Pain  Assessment: Pt is 70 y o  female seen for PT evaluation s/p admit [facility] 9/10/2019 w/ Malignant neoplasm of ovary ([facility] [address] )  PT consulted to assess pt's functional mobility and d/c needs  Order placed for PT eval and tx, w/ up and OOB as tolerated order  Comorbidities affecting pt's physical performance at time of assessment include: ovarian cancer, abdominal pain, anemia, neuropathy  PTA, pt was independent w/ all functional mobility with no AD and lives with  and 3 grandchildren with 9 steps to bedroom/bath on 2nd floor  House has ramped entrance  Personal factors affecting pt at time of IE include: lives in two story house, stairs to enter home and limited home support  Please find objective findings from PT assessment regarding body systems outlined above with impairments and limitations including weakness, impaired balance, decreased endurance, impaired coordination, gait deviations, pain, decreased activity tolerance, decreased functional mobility tolerance, decreased safety awareness and fall risk  Pt performed bed mobility at [address] and performed stand pivot from bed to w/c at [address] with HHA  Pt limited due to pain in abdomen   The following objective measures performed on IE also reveal limitations: Barthel Index: 30/100  Pt's clinical presentation is currently unstable/unpredictable seen in pt's presentation of multiple lines, epidural, severe abdominal pain, below baseline functional mobility, inaccessible home environment  Pt to benefit from continued PT tx to address deficits as defined above and maximize level of functional independent mobility and consistency  From PT/mobility standpoint, recommendation at time of d/c would be STR pending progress in order to facilitate return to PLOF  Recommendation: (rehab)     PT - OK to Discharge: (when medically stable to rehab)    See flowsheet documentation for full assessment

## 2019-09-11 NOTE — PHYSICAL THERAPY NOTE
Physical Therapy Evaluation     Patient's Name: Thomas Cool    Admitting Diagnosis  Malignant neoplasm of ovary, unspecified laterality ([facility] ) [C56 9]    Problem List  Patient Active Problem List   Diagnosis    Other ascites    Lesion of liver    Edema leg    [facility]    Abdominal pain    Intractable nausea and vomiting    Bowel perforation ([facility]    Septic shock ([facility]    Peritonitis ([facility]    S/P ileostomy ([facility]    Acute blood loss anemia    Leukocytosis    Hypokalemia    Stoma dermatitis    Dehydration    Deep venous thrombosis of right profunda femoris vein ([facility]    Neuropathy    Encounter for central line care    Anemia    SBO (small bowel obstruction) ([facility]    Malignant neoplasm of ovary ([facility] )       Past Medical History  Past Medical History:   Diagnosis Date    Abdominal fluid collection     Anemia     Asthma     Cancer ([facility]     ovaries    Diabetes mellitus [facility]     History of transfusion     PONV (postoperative nausea and vomiting)        Past Surgical History  Past Surgical History:   Procedure Laterality Date    ABDOMINAL SURGERY      CT GUIDED PARACENTESIS  11/6/2018    CT GUIDED PERC DRAINAGE CATHETER PLACEMENT  12/24/2018    CT NEEDLE BIOPSY PERITONEUM  11/6/2018    CYSTOSCOPY N/A 9/10/2019    Procedure: CYSTOSCOPY , INSERTION URETERAL CATHETERS;  Surgeon: Kyle Hagen MD;  [facility];  Service: Gynecology Oncology    ECTOPIC PREGNANCY SURGERY      ECTOPIC PREGNANCY SURGERY      EXENTERATION PELVIS N/A 9/10/2019    Procedure: EXPLORATORY LAPAROTOMY, EXENTERATION POSTERIOR PELVIS,  END COLOSTOMY, ILEOSTOMY REVERSAL, LYSIS OF ADHESIONS, rADICAL OMENTECTOMY AND TUMOR DEBULKINGFLEXIBLE SIGMOIDOSCOPY, CYSTOGRAM, INSPECTION OF URETERS;  Surgeon: Kyle Hagen MD;  [facility];  Service: Gynecology Oncology    IR PARACENTESIS  9/18/2018    IR PARACENTESIS  10/18/2018    IR PARACENTESIS  12/10/2018    IR PORT PLACEMENT  11/29/2018  LAPAROTOMY N/A 12/14/2018    Procedure: LAPAROTOMY EXPLORATORY; Abdominal Washout; Application of Abthera Vac Dressing;  Surgeon: Romy Rene MD;  [facility];  Service: Gynecology Oncology    LAPAROTOMY N/A 12/15/2018    Procedure: LAPAROTOMY EXPLORATORY, ABDOMINAL WASHOUT, DRAIN PLACEMENT x 4, DIVERTING LOOP ILEOSTOMY AND ABDOMINAL CLOSURE,  ALL OTHER INDICATED PROCEDURES;  Surgeon: Romy Rene MD;  [facility];  Service: Gynecology Oncology    CO COLONOSCOPY FLX DX W/COLLJ [facility] WHEN PFRMD N/A 9/25/2018    Procedure: EGD AND COLONOSCOPY;  Surgeon: Peter Olivia MD;  Location: [facility]; Service: Gastroenterology    TONSILECTOMY AND ADNOIDECTOMY      TONSILLECTOMY          09/11/19 1412   Note Type   Note type Eval only   Pain Assessment   Pain Assessment 0-10   Pain Score 6   Pain Type Acute pain   Pain Location Abdomen   Hospital Pain Intervention(s) Repositioned; Ambulation/increased activity; Distraction; Emotional support   Home Living   Type of [address] Two level;Bed/bath upstairs; Ramped entrance  (9 IRVIN)   Bathroom Shower/Tub Walk-in shower   Bathroom Toilet Raised   Bathroom Equipment Grab bars in shower;Grab bars around [employer]  (Denies using PTA)   Additional Comments pt reports living in 2 [address] [place] [address] with 9 IRVIN and bed/bath on 2nd floor   Prior Function   Level of [place] Independent with ADLs and functional mobility   Lives With Spouse; Other (Comment)  (3 grandkids ranging 8-14)   Receives Help From Family   ADL Assistance Independent   IADLs Independent   Falls in the last 6 months 0   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)   Comments pt reports being I with mobility and IADLs PTA   Restrictions/Precautions   Weight Bearing Precautions Per Order No   Other Precautions Cognitive;Multiple lines;Telemetry; Fall Risk;Pain  (epidural; A-line)   General   Family/Caregiver Present Yes  (daughter)   Cognition   Overall Cognitive Status WFL   Arousal/Participation Responsive   Attention Attends with cues to redirect   Orientation Level Oriented X4   Memory Within functional limits   Following Commands Follows one step commands without difficulty   Comments pt is agreeable to work with therapy   RLE Assessment   RLE Assessment   (functionally 3+/5)   LLE Assessment   LLE Assessment   (functionally 3+/5)   Light Touch   RLE Light Touch Grossly intact   LLE Light Touch Grossly intact   Bed Mobility   Supine to Sit 4  Minimal assistance   Additional items Assist x 1;HOB elevated; Increased time required;Verbal cues;LE management  (increased time 2* to pain)   Additional Comments pt sitting up in w/c at end of session being transferred to P9   Transfers   Sit to Stand 3  Moderate assistance   Additional items Assist x 1; Increased time required;Verbal cues   Stand to Sit 3  Moderate assistance   Additional items Assist x 1; Increased time required;Verbal cues   Stand pivot 3  Moderate assistance   Additional items Assist x 1; Increased time required;Verbal cues   Additional Comments HHA for stand pivot from bed to w/c   Balance   Static Sitting Fair   Static Standing Poor +   Ambulatory Poor   Endurance Deficit   Endurance Deficit Yes   Endurance Deficit Description pain   Activity Tolerance   Activity Tolerance Patient limited by pain   Medical Staff Made Aware OT   Nurse Made Aware RN cleared pt for therapy   Assessment   Prognosis Fair   Problem List Decreased strength;Decreased endurance; Impaired balance;Decreased mobility; Decreased coordination;Decreased cognition;Decreased safety awareness;Pain   Assessment Pt is 70 y o  female seen for PT evaluation s/p admit to [facility] on [facility]10[facility]2019 w/ Malignant neoplasm of ovary ([facility] )  PT consulted to assess pt's functional mobility and d/c needs  Order placed for PT eval and tx, w/ up and OOB as tolerated order   Comorbidities affecting pt's physical performance at time of assessment include: ovarian cancer, abdominal pain, anemia, neuropathy  PTA, pt was independent w/ all functional mobility with no AD and lives with  and 3 grandchildren with 9 steps to bedroom/bath on 2nd floor  House has ramped entrance  Personal factors affecting pt at time of IE include: lives in two story house, stairs to enter home and limited home support  Please find objective findings from PT assessment regarding body systems outlined above with impairments and limitations including weakness, impaired balance, decreased endurance, impaired coordination, gait deviations, pain, decreased activity tolerance, decreased functional mobility tolerance, decreased safety awareness and fall risk  Pt performed bed mobility at [address] and performed stand pivot from bed to w/c at [address] with HHA  Pt limited due to pain in abdomen  The following objective measures performed on IE also reveal limitations: Barthel Index: 30/100  Pt's clinical presentation is currently unstable/unpredictable seen in pt's presentation of multiple lines, epidural, severe abdominal pain, below baseline functional mobility, inaccessible home environment  Pt to benefit from continued PT tx to address deficits as defined above and maximize level of functional independent mobility and consistency  From PT/mobility standpoint, recommendation at time of d/c would be STR pending progress in order to facilitate return to PLOF  Goals   Patient Goals to go home   STG Expiration Date 09/25/19   Short Term Goal #1 1  Pt will demonstrate the ability to perform all aspects of bed mobility at Mod I in onder to maximize functional independence and decrease burden on caregivers  2 Pt will demonstrate the ability to perform all functional transfers at Mod I in order to maximize functional independence and decrease burden on caregivers   3 Pt will demonstrate the ability to ambulate at least 150ft with least restrictive device at Mod I in order to maximize functional independence  4 Pt will demonstrate the ability to negotiate at least 5-10 steps with/without HR and S in order to return to household/community mobility  5 Pt will demonstrate improved balance by one grade in order to decrease risk of falls  6 Pt will increase b/l LE strength by 1 grade in order to increase ease of functional mobility and transfers   Plan   Treatment/Interventions Functional transfer training;LE strengthening/ROM; Elevations; Therapeutic exercise; Endurance training;Patient/family training;Equipment eval/education; Bed mobility;Gait training;Spoke to nursing   PT Frequency   (3-5x/week)   Recommendation   Recommendation   (rehab)   PT - OK to Discharge   (when medically stable to rehab)   Modified Gallitzin Scale   Modified Gallitzin Scale 4   Barthel Index   Feeding 0   Bathing 0   Grooming Score 5   Dressing Score 5   Bladder Score 0   Bowels Score 10   Toilet Use Score 5   Transfers (Bed/Chair) Score 5   Mobility (Level Surface) Score 0   Stairs Score 0   Barthel Index Score 30       Odalis Lorenzo, PT, DPT

## 2019-09-11 NOTE — PLAN OF CARE
Problem: OCCUPATIONAL THERAPY ADULT  Goal: Performs self-care activities at highest level of function for planned discharge setting  See evaluation for individualized goals  Description  Treatment Interventions: ADL retraining, Functional transfer training, UE strengthening/ROM, Endurance training, Cognitive reorientation, Patient/family training, Equipment evaluation/education, Compensatory technique education, Continued evaluation, Energy conservation, Activityengagement          See flowsheet documentation for full assessment, interventions and recommendations  Note:   Limitation: Decreased ADL status, Decreased UE strength, Decreased UE ROM, Decreased Safe judgement during ADL, Decreased endurance, Decreased self-care trans, Decreased high-level ADLs  Prognosis: Fair  Assessment: Pt is a 71 y/o female seen for OT eval s/p adm to [facility] w/ IVN ovarian cancer; pt is status post cystoscopy, insertion of ureteral catheters, exploratory laparotomy, exenteration posterior pelvis, end colostomy, ileostomy reversal, lysis of adhesions, radical omentectomy and tumor debulking, flexible sigmoidoscopy, cystogram, inspection of ureters  Pt is dx'd w/ malignant neoplasm of ovary  Pt  has a past medical history of Abdominal fluid collection, Anemia, Asthma, Cancer ([facility] ), Diabetes mellitus ([facility] ), History of transfusion, and PONV (postoperative nausea and vomiting)    Pt with active OT orders and up with assistance  orders  Pt lives with spouse and grandkids in 2 , 9 IRVIN w/ ramp, bed/bath 2nd floor  Pt was I w/  ADLS and IADLS, drove, & required no use of DME PTA but has available a s/c, raised toilet, grab bars, RW, and cane  Pt is currently demonstrating the following occupational deficits: Mod A UB ADLS, Max A LB ADLS, Min A bed mobility, Mod A transfers and functional mobility w/ HHA and +VC for safety/SBA 2nd for lines   These deficits that are impacting pt's baseline areas of occupation are a result of the following impairments: pain, endurance, activity tolerance, functional mobility, forward functional reach, balance, trunk control, functional standing tolerance, unsupportive home environment, decreased I w/ ADLS/IADLS, strength, ROM and decreased safety awareness  The following Occupational Performance Areas to address include: eating, grooming, bathing/shower, toilet hygiene, dressing, medication management, socialization, health maintenance, functional mobility, community mobility, clothing management, household maintenance, care of children, job performance/volunteering and social participation  Pt scored overall 30/100 on the Barthel Index  Based on the aforementioned OT evaluation, functional performance deficits, and assessments, pt has been identified as a high complexity evaluation  Recommend STR upon D/C, when medically stable   Pt to continue to benefit from acute immediate OT services to address the following goals 3-5x/week to  w/in 10-14 days:      OT Discharge Recommendation: Short Term Rehab  OT - OK to Discharge: Yes(when medically stable)  Page Jeanie BERRY, OTR/L

## 2019-09-11 NOTE — CONSULTS
Progress Note- Ostomy  Deedee Puckett 70 y o  female  [id number]  ICU 10-ICU 10        Assessment:  POD # 1 -  Patient is a 70year old female admitted with planned SUKUMAR, tumor debulking with loop Ileostomy takedown and possible bowel resection  Patient is awake, alert in ICU accompanied by daughter,  expressing her frustration with having to have a colostomy now, especiall since she was expecting a reversal of her Ileostomy be done with pouching needs  Patient has a one piece pouching system in place with intact seal and minimal serosanguinous effluent, no evidence of flatus note  Previous stoma site is packed with DSD, mid abdominal incision is well approximated with Hystocril closure and left open to air, no drainage or erythema to incision noted  Stoma is pink, moist, round, appears minimally budded and appriximately 1 inch round  Colostomy care teaching stated, patient is well verse with ostomy care since she has been managing her Ileostomy with no problems from December of 2018  Patient prefers to use [place] pouching system, however we do not carry [place], patient agreeable with us providing [employer] pouching system for now, per patient she had stopped ordering any new supplies with anticipation of reversal  Patient is to be transferred to medical surgical unit sometime this afternoon, we will order supplies once transferred to new floor to prevent loosing her supplies  Plan: We will see patient tomorrow and continue with colostomy care education          Vitals:    09/11/19 0815   BP:    Pulse:    Resp:    Temp:    SpO2: 100%     Colostomy Descending/sigmoid LUQ (Active)   Stomal Appliance 1 piece 9/11/2019  9:22 AM   Stoma Assessment [place] 9/11/2019  9:22 AM   Stoma Shape Round 9/11/2019  9:22 AM   Peristomal Assessment Intact 9/11/2019  9:22 AM   Number of days: 1       Please call wound care to ext [phone] with questions or concerns, we will continue following with care and teaching        Carlee Cortez, RN, BSN, [employer]

## 2019-09-11 NOTE — OCCUPATIONAL THERAPY NOTE
[address]  Evaluation     Patient Name: Alee Soares  [id number] Date: 9/11/2019  Problem List  Principal Problem:    Malignant neoplasm of ovary [facility])    Past Medical History  Past Medical History:   Diagnosis Date    Abdominal fluid collection     Anemia     Asthma     Cancer ([facility] [address] )     ovaries    Diabetes mellitus ([facility] [address] )     History of transfusion     PONV (postoperative nausea and vomiting)      Past Surgical History  Past Surgical History:   Procedure Laterality Date    ABDOMINAL SURGERY      CT GUIDED PARACENTESIS  11/6/2018    CT GUIDED PERC DRAINAGE CATHETER PLACEMENT  12/24/2018    CT NEEDLE BIOPSY PERITONEUM  11/6/2018    CYSTOSCOPY N/A 9/10/2019    Procedure: CYSTOSCOPY , INSERTION URETERAL CATHETERS;  Surgeon: Garth Everett MD;  Location: [facility];  Service: Gynecology Oncology    ECTOPIC PREGNANCY SURGERY      ECTOPIC PREGNANCY SURGERY      EXENTERATION PELVIS N/A 9/10/2019    Procedure: EXPLORATORY LAPAROTOMY, EXENTERATION POSTERIOR PELVIS,  END COLOSTOMY, ILEOSTOMY REVERSAL, LYSIS OF ADHESIONS, rADICAL OMENTECTOMY AND TUMOR DEBULKINGFLEXIBLE SIGMOIDOSCOPY, CYSTOGRAM, INSPECTION OF URETERS;  Surgeon: Garth Everett MD;  Location: [facility];  Service: Gynecology Oncology    IR PARACENTESIS  9/18/2018    IR PARACENTESIS  10/18/2018    IR PARACENTESIS  12/10/2018    IR PORT PLACEMENT  11/29/2018    LAPAROTOMY N/A 12/14/2018    Procedure: LAPAROTOMY EXPLORATORY; Abdominal Washout;  Application of Abthera Vac Dressing;  Surgeon: Christian Ayers MD;  Location: [facility];  Service: Gynecology Oncology    LAPAROTOMY N/A 12/15/2018    Procedure: LAPAROTOMY EXPLORATORY, ABDOMINAL WASHOUT, DRAIN PLACEMENT x 4, DIVERTING LOOP ILEOSTOMY AND ABDOMINAL CLOSURE,  ALL OTHER INDICATED PROCEDURES;  Surgeon: Christian Ayers MD;  Location: [facility];  Service: Gynecology Oncology    MA COLONOSCOPY FLX DX W/CHANO Louise 1979 PFRMD N/A 9/25/2018    Procedure: EGD AND COLONOSCOPY;  Surgeon: True Craig MD;  Location: [facility]; Service: Gastroenterology    TONSILECTOMY AND ADNOIDECTOMY      TONSILLECTOMY               09/11/19 1413   Note Type   Note type Eval/Treat   Restrictions/Precautions   Weight Bearing Precautions Per Order No   Other Precautions Cognitive;Multiple lines;Telemetry; Fall Risk;Pain  (epidural; A-line)   Pain Assessment   Pain Assessment FLACC   Pain Rating: FLACC (Rest) - Face 1   Pain Rating: FLACC (Rest) - Legs 0   Pain Rating: FLACC (Rest) - Activity 0   Pain Rating: FLACC (Rest) - Cry 1   Pain Rating: FLACC (Rest) - Consolability 2   Score: FLACC (Rest) 4   Pain Rating: FLACC (Activity) - Face 1   Pain Rating: FLACC (Activity) - Legs 0   Pain Rating: FLACC (Activity) - Activity 0   Pain Rating: FLACC (Activity) - Cry 1   Pain Rating: FLACC (Activity) - Consolability 2   Score: FLACC (Activity) 4   Home Living   Type of Home House   Home Layout Two level; Other (Comment); Ramped entrance;Bed/bath upstairs  (9 IRVIN)   Bathroom Shower/Tub Walk-in shower   Bathroom Toilet Raised   Bathroom Equipment Grab bars in shower;Grab bars around [employer]  (unused PTA)   Additional Comments pt reports living in 2 SH, [address], ramped entrance available, bed/bath 2nd floor w/ full flight to enter  Prior Function   Level of [place] Independent with ADLs and functional mobility   Lives With Spouse; Other (Comment)  (3 grandkids)   Receives Help From Family   ADL Assistance Independent   IADLs Independent   Falls in the last 6 months 0   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)   Comments pt reports being I w/ ADLS, IADLS, transfers and functional mobility PTA   Lifestyle   Autonomy I ADLS, IADLS, transfers and functional mobility PTA   Reciprocal Relationships Pt lives w/ spouse and 3 grandkids; is a caregiver for all of them    Service to Others Pt is caregiver for family   Intrinsic Gratification Pt enjoys being w/ grandkids   Psychosocial   Psychosocial (WDL) X   Patient Behaviors/Mood Irritable   Length of Time/Family Visitation 6-15 min  (dght)   ADL   Eating Assistance Unable to assess   Eating Deficit NPO   Grooming Assistance 3  Moderate Assistance   UB Bathing Assistance 3  Moderate Assistance   LB Bathing Assistance 2  Maximal [address] 3  Moderate Assistance   LB Dressing Assistance 2  Maximal [address]  3  Moderate Assistance   Functional Assistance 3  Moderate Assistance   Functional Deficit Steadying;Verbal cueing;Supervision/safety; Increased time to complete   Bed Mobility   Supine to Sit 4  Minimal assistance   Additional items Assist x 1;HOB elevated; Increased time required;Verbal cues;LE management   Sit to Supine Unable to assess   Additional Comments pt went from supine to sit w/ MIn A x1 for UB support and LE management, HOB elevated for assist  increased time needed 2/2 pain  Transfers   Sit to Stand 3  Moderate assistance   Additional items Assist x 1; Increased time required;Verbal cues   Stand to Sit 3  Moderate assistance   Additional items Assist x 1; Increased time required;Verbal cues   Additional Comments pt performed sit-stand from EOB w/ Mod A x1 for moderate force production into standing and +VC for safety/proper technique   SBA 2nd for safety/lines, HHA used   Functional Mobility   Functional Mobility 3  Moderate assistance   Additional Comments pt took few small steps from EOB to chair w/ Mod A x1, HHA used, +VC for safety, SBA 2nd for safety and line management   Additional items Hand hold assistance   Balance   Static Sitting Fair -   Static Standing Poor   Ambulatory Poor   Activity Tolerance   Activity Tolerance Patient limited by fatigue;Patient limited by pain   Medical Staff Made Aware PT    Nurse Made Aware yes, Sunni   RULAMINE Assessment   RUE Assessment X  (AROM limited by pain)   LUE Assessment   LUE Assessment X  (AROM limited by pain)   Hand Function   Gross Motor Coordination Functional   Fine Motor Coordination Functional   Sensation   Light Touch No apparent deficits   Cognition   Overall Cognitive Status WFL   Arousal/Participation Responsive; Cooperative   Attention Attends with cues to redirect   Orientation Level Oriented X4   Memory Within functional limits   Following Commands Follows one step commands without difficulty   Comments Pt is cooperative; easily agitated; requires cues for safety; limited by pain and fatigue   Assessment   Limitation Decreased ADL status; Decreased UE strength;Decreased UE ROM; Decreased Safe judgement during ADL;Decreased endurance;Decreased self-care trans;Decreased high-level ADLs   Prognosis Fair   Assessment Pt is a 69 y/o female seen for OT eval s/p adm to [facility] w/ IVN ovarian cancer; pt is status post cystoscopy, insertion of ureteral catheters, exploratory laparotomy, exenteration posterior pelvis, end colostomy, ileostomy reversal, lysis of adhesions, radical omentectomy and tumor debulking, flexible sigmoidoscopy, cystogram, inspection of ureters  Pt is dx'd w/ malignant neoplasm of ovary  Pt  has a past medical history of Abdominal fluid collection, Anemia, Asthma, Cancer ([facility] [address] ), Diabetes mellitus ([facility] [address] ), History of transfusion, and PONV (postoperative nausea and vomiting)    Pt with active OT orders and up with assistance  orders  Pt lives with spouse and grandkids in 2 , [address] w/ ramp, bed/bath 2nd floor  Pt was I w/  ADLS and IADLS, drove, & required no use of DME PTA but has available a s/c, raised toilet, grab bars, RW, and cane  Pt is currently demonstrating the following occupational deficits: Mod A UB ADLS, Max A LB ADLS, Min A bed mobility, Mod A transfers and functional mobility w/ HHA and +VC for safety/SBA 2nd for lines   These deficits that are impacting pt's baseline areas of occupation are a result of the following impairments: pain, endurance, activity tolerance, functional mobility, forward functional reach, balance, trunk control, functional standing tolerance, unsupportive home environment, decreased I w/ ADLS/IADLS, strength, ROM and decreased safety awareness  The following Occupational Performance Areas to address include: eating, grooming, bathing/shower, toilet hygiene, dressing, medication management, socialization, health maintenance, functional mobility, community mobility, clothing management, household maintenance, care of children, job performance/volunteering and social participation  Pt scored overall 30/100 on the Barthel Index  Based on the aforementioned OT evaluation, functional performance deficits, and assessments, pt has been identified as a high complexity evaluation  Recommend STR upon D/C, when medically stable  Pt to continue to benefit from acute immediate OT services to address the following goals 3-5x/week to  w/in 10-14 days:    Goals   Patient Goals to be able to take care of family again   LTG Time Frame 10-   Long Term Goal #1 see below listed goals   Plan   Treatment Interventions ADL retraining;Functional transfer training;UE strengthening/ROM; Endurance training;Cognitive reorientation;Patient/family training;Equipment evaluation/education; Compensatory technique education;Continued evaluation; Energy conservation; Activityengagement   Goal Expiration Date 19   OT Frequency 3-5x/wk   Recommendation   OT Discharge Recommendation Short Term Rehab   OT - OK to Discharge Yes  (when medically stable)   Barthel Index   Feeding 0   Bathing 0   Grooming Score 5   Dressing Score 5   Bladder Score 0   Bowels Score 10   Toilet Use Score 5   Transfers (Bed/Chair) Score 5   Mobility (Level Surface) Score 0   Stairs Score 0   Barthel Index Score 30   Modified [place] Scale   Modified [place] Scale 4        GOALS    1) Pt will complete rolling left/right in bed with S assist, as prerequisite for further engagement in ADLS   2) Pt will complete supine to sit transfer with S using B/L UEs to initiate bed mobility   3) Pt will tolerate sitting at EOB 20 minutes with S assist and stable vital signs, as prerequisite for further engagement in ADLS   4) Pt will complete grooming task with S assist and increased time to increase independence in functional tasks  5) Pt will increase B/L UE ROM 1/2 MMT to increase functional UB use during ADLS   6) Pt will complete UB ADLS with S and use of AD/DME as needed to increase independence in functional tasks  7) Pt will complete LB ADLS with Min A and use of AD/DME as needed to increase independence in functional tasks  8) Pt will complete sit to stand transfer / sit pivot transfer / stand pivot transfer with S assist on/off all ADL surfaces   9) Pt will follow 100% simple one step verbal commands and be A/Ox4 consistently t/o use of external environmental cues to increased awareness for functional tasks  10) Pt will demonstrate 100% carryover of E C  techniques t/o fx'l I/ADL/ leisure tasks w/o cues s/p skilled education  11) Pt will improve functional mobility to S w/ use of AD/DME as needed to increase independence in functional activity     Rebekah Peña MS, OTR/L

## 2019-09-11 NOTE — OP NOTE
OPERATIVE REPORT  PATIENT NAME: Rio Pearson    :  1950  MRN: [id number]  Pt Location: [facility] OR ROOM 08    SURGERY DATE: 9/10/2019    Surgeon(s) and Role:     * Buzz Irwin MD - Primary     * Jakob Wagner MD - Assisting     * Charles Sanz DO - Anitha Alfaro MD - Assisting     * Emery Burr MD - Co-surgeon     * Kristi Weir MD - Co-surgeon    Preop Diagnosis:  Malignant neoplasm of ovary, unspecified laterality ([address] ) [C56 9]    Post-Op Diagnosis Codes:     * Malignant neoplasm of ovary, unspecified laterality ([address] ) [C56 9]    Procedure(s) (LRB):  CYSTOSCOPY , INSERTION URETERAL CATHETERS   CYSTOGRAM, INSPECTION OF URETERS (N/A)    EXPLORATORY LAPAROTOMY  EXENTERATION POSTERIOR PELVIS(To include low anterior resection)  END COLOSTOMY  ILEOSTOMY REVERSAL including small bowel resection  LYSIS OF ADHESIONS  RADICAL OMENTECTOMY AND TUMOR DEBULKING  FLEXIBLE SIGMOIDOSCOPY      Specimen(s):  ID Type Source Tests Collected by Time Destination   1 : cervix, mass, colon Tissue Uterus w/Bilateral Ovaries and Fallopian Tubes TISSUE EXAM Buzz Irwin MD 9/10/2019 1148    2 :  Tissue Omentum TISSUE EXAM Buzz Irwin MD 9/10/2019 1216    3 : ileostomy/small bowel resection Tissue Small Bowel, NOS TISSUE EXAM Buzz Irwni MD 9/10/2019 1250        Estimated Blood Loss:   3000 mL    Drains:  Closed/Suction Drain Right RLQ Bulb (Active)   Site Description Unable to view 9/10/2019  6:00 PM   Dressing Status Open to air 9/10/2019  6:00 PM   Drainage Appearance Bloody 9/10/2019  6:00 PM   Status To bulb suction 9/10/2019  6:00 PM   Output (mL) 80 mL 2019  6:35 AM   Number of days: 1       Ileostomy Loop RLQ (Active)   Number of days: 270       Urethral Catheter Double-lumen;Non-latex 16 Fr  (Active)   Output (mL) 100 mL 2019  5:44 AM   Number of days: 1       [REMOVED] Urethral Catheter Double-lumen;Non-latex 16 Fr   (Removed)   Number of days: 0 [REMOVED] Ureteral Drain/Stent Left ureter 5 Fr  (Removed)   Number of days: 0       [REMOVED] Ureteral Drain/Stent Right ureter 5 Fr  (Removed)   Number of days: 0       [REMOVED] Ureteral Drain/Stent Right ureter 5 Fr  (Removed)   Number of days: 0       [REMOVED] Ureteral Drain/Stent Left ureter 5 Fr  (Removed)   Number of days: 0       Anesthesia Type:   * No anesthesia type entered *    Operative Indications:  Malignant neoplasm of ovary, unspecified laterality ([facility] [address] ) [C56 9]      Operative Findings:  -Dense sigmoid/rectosigmoid phlegmon likely site of previous perforation, requiring total mesorectal excision and low anterior resection, leaving rectal cuff approximately 4 cm  On discussion with Dr Mili Landis, we agreed on end colostomy given her blood loss, and remaining miliary disease that will eventually require ongoing chemotherapy rather than place high risk anastomosis and need to keep diverting loop ileostomy  Complications:   None    Procedure and Technique:  Evelio Díaz is a 27-year-old female with ovarian cancer, previous bowel perforation on Avastin requiring diverting loop ileostomy by Gynecologic Oncology  On day of surgery we reviewed previous discussions from inpatient care  All labs reviewed, imaging reviewed, we discussed operation concurrent with Dr Mili Landis for likely pelvic resection sigmoid/rectosigmoid, possible proximal rectum to encompass left hemicolectomy for previously seen involvement    She asked me again about ileostomy reversal which I told her was possible only if we were quite satisfied with anastomosis, discussing that any large amount of blood loss, tenuous anastomosis or low anastomosis among others would steer me toward leaving the ileostomy temporarily for the sake of healing anastomosis       Also discussed traditional risks of surgery including not limited to bleeding, infection, risks of anesthesia, open surgery, DVT/PE, heart attack, stroke, death, damage to local structures including ureter and duodenum, and anastomotic leak requiring reoperation, temporary versus permanent stoma  She understood these risks today, signed informed consent wished to proceed  She is brought to the operating room as per Dr Sandrine Stafford, epidural placed, he began with laparotomy, at this point I scrubbed into the case as requested  In conjunction with mobilizing and taking infundibulopelvic ligaments, gonadal vessels, with attention to stented ureters that he was able to stent prior, the SUKUMAR/BSO was slowly completed given the large mass encompassing the pelvis and adherent to rectum  I mobilized sigmoid/rectum by encircling the superior rectal artery, again with identifying the stents prior to taking the superior rectal artery in a high/central ligation between Arzola, clamps, doubly tied with 0 Vicryl  This was hemostatic, continued with presacral dissection through the areolar plane until we were able to come down to the level of the tumor involvement posteriorly  Anteriorly after coming through cervix it was apparent that bowel had perforated at the mass here  I was able to place green load of the contour below this area of phlegmon on to clean/soft rectal tissue and fired this after appropriate compression time  This allowed us to remove pelvic mass, SUKUMAR/BSO as well as sigmoid, rectosigmoid and proximal rectum functioning as a low anterior resection within the procedure of posterior pelvic exenteration  Intraoperative flexible sigmoidoscope was placed by me after placing irrigation for pelvis, showing healthy cuff however this was only approximately 4 cm of length    Negative bubble leak test   On discussion and after omentectomy showing miliary disease also in the mesentery of the bowel we discussed that she will require further chemotherapy for her burden of disease and given her blood loss and extended case did not feel it prudent to proceed with a high risk ultra low anastomosis which would also leave her with same diverting loop ileostomy  Opted to close the ileostomy and give her a left end colostomy  Circular paddle of skin was removed approximately 1 handbreadth from costal margin and ASIS on the left side, this was in similar placement across from her contralateral ileostomy site  Dissected through the tissues, fascia was scored in a cruciate fashion, muscle was split and entered with 2 fingerbreadths  End colostomy was brought up to be later matured at the completion of the case by Dr Shan Weir  Attention was then turned to ileostomy reversal, mucocutaneous junction was taken down and the loops were dropped into the abdomen, SHAWN 100 blue load stapler was placed through anti mesenteric enterotomies, fired after appropriate compression time showing good common channel  Allis clamps were used to close the enterotomy and come below this with a repeat firing of the stapler to close this, corner and crotch stitches were placed with 3 0 Vicryl, this was tension free and placed back in the abdomen  Case at this point was turned back over to Dr Shan Weir as her original surgeon for completion as Dr Kentrell Avitia was requested for inspection of the ureters  Please refer to the remainder of his dictation for closure and maturation of colostomy, I notified circulating nurse of pelvic sponge that was placed after dissection which she acknowledged, and the counts were correct prior to my departing the operating room  I was required as co-surgeon as no qualified resident available      Patient Disposition:  PACU     SIGNATURE: Marcos Odell MD  DATE: September 11, 2019  TIME: 9:57 AM

## 2019-09-11 NOTE — SOCIAL WORK
Initial interview:     Pt is intubated, no family at bedside  Information obtained from previous encounter 7/15/2019  Pt lives with her  in a 2 story home in [place], [place]  Ramp entry  2nd floor bedroom with bathrooms on each level  Pt is independent, retired and drives  No hx of DME  Hx of [facility]  Hx of [address] rehab  No drug, etoh or mental illness history  NO POA or LW  Prescriptions are filled at [facility]  Main contact:   Gerry Dean III  [phone]  Dtr Kamila Dose [phone]     Plan - pend med progression  PT/OT hero  MSW CM [place] given handoff to follow for dc needs  CM reviewed d/c planning process including the following: identifying help at home, patient preference for d/c planning needs, Discharge Lounge, [facility] Meds to Bed program, availability of treatment team to discuss questions or concerns patient and/or family may have regarding understanding medications and recognizing signs and symptoms once discharged  CM also encouraged patient to follow up with all recommended appointments after discharge  Patient advised of importance for patient and family to participate in managing patients medical well being

## 2019-09-11 NOTE — PROGRESS NOTES
Progress Note - Critical Care   Rivka Dai 70 y o  female MRN: [id number]  Unit/Bed#: ICU 10 Encounter: [id number]    Assessment:   1  Malignant neoplasm of ovary ([address] )  2  S/p ileostomy reversal, small bowel resection, and end colostomy  3  Acute respiratory failure with hypoxia  4  Acute blood loss anemia  5  Type 2 diabetes mellitus     Plan:  Neuro:   GCS 11T  E4 moving eyes spontaneously, V1T intubated trying to communicate, M6 moves extremities spontaneously and responds to commands  - Sedation: Precedex 0 7 mcg/hr  Wean off sedation this morning   - Pain: Epidural catheter (ropivacaine + fentanyl PCEA)  Scheduled Tylenol 650 mg Q6H  PRN dilaudid  - Anxiety: no meds currently ordered  - Delirium: CAM ICU    CV:   /60 (/42-60)  HR 62 (60-72)  MAP 89 (60-94)  - Patient hemodynamically stable  - Soft blood pressure of 96/52 this morning responded well to IV fluid bolus  Lung:   SpO2 100 ()  RR 16 (13-25)  ABG 9/10: pH 7 3, pCO2 38, pO2 165 9, HCO 18 8, Base deficit -6 8  - Vent settings: AC/VC 14/430/40/5 overnight changed to pressure support 8/5  - SBT: as patient's swelling decreases wean to pressure support with trial of extubation later today as long as swelling continues to resolve  - Patient's tongue and lips swollen post-operatively given Decadron 4 mg Q6H  - Peridex rinse ordered     GI:   S/p ileostomy reversal, small bowel resection, and end colostomy  - Continuing flagyl 500 mg Q8H and ancef 1000 mg Q8H for ppx until 24 hrs postop  - Surgery to remove wick from old ileostomy site on 9/12 or 9/13  - RLQ ANDREAS 680 cc serosanguinous fluid  - NG to suction drained 100cc  - PRN zofran  - Holding home Protonix 40 mg BID    FEN:   Na 145, K 4 3, Phos 5 4, Mg 1 9      Intake: IV 7 3 L, Blood 3 5 L, Total- 10 9 L  Output: Urine 1 68 L,  cc, ANDREAS 680 cc, Blood 3 L, Total- 5 5 L  Net: +5 4 L  Rate: 24-hr 1 1 ml/kg/hr / 12-hr 1 2 mL/k/hr    - Fluids: IV isolyte at 125 ml/hr  - Diet:  Patient currently NPO  Per surgery patient can progress to clears once extubated  Avoid tube feeds if extubation expected soon  - Replete electrolytes with goals: Potassium >4 0, Magnesium >2 0, Phosphorous > 3 0     :   Cr 1 37, BUN 29   - Baseline Cr 1 32 preoperatively  - Clemons catheter draining red-tinged urine  Consider d/c'ing clemons today  Malignant Neoplasm of Ovary  S/p exenteration posterior pelvis with SUKUMAR BSO  - Continued management per gynecologic oncology team     ID:   Afebrile 97 4-97 8  WBC 15 68   - No acute infectious concern  - Continuing flagyl 500 mg Q8H and ancef 1000 mg Q8H for ppx until 24 hrs postop  Heme:   Hb 9 3, WBC 15 68, Plt 133  Acute blood loss anemia  Patient s/p intraoperative 6 U pRBC, 4 U FFP, 1 pack platelets  - Hb stable this morning at 9 3 from 9 7 postoperatively  - Continue to trend Hb level    Mild thrombocytopenia 2/2 blood loss vs  HIT  - Preoperative Plt 296, Plt this morning 133 so >50% drop in platelets since starting Heparin  Patient given 1 pack platelets intraoperatively as part of resuscitation for 3L blood loss  - Continue to trend platelets  - DVT ppx: Heparin 5000 U TID, SCD   - Holding home Eliquis 5 mg BID  Endo:   Type 2 Diabetes Mellitus  - A1c 6 6 % (8/30/19)  Most recent glucose 196  - Continue SSI and monitor glucose  - Continue holding home Metformin 500 mg BID                Msk/Skin:   - PT/OT when able     Disposition: Continue ICU care    Lines: L Radial A line, LLQ colostomy, RLQ ANDREAS drain, OGT, Clemons catheter, ETT, epidural catheter    Chief Complaint: Extubated, no complaint expressed    HPI:  71 y/o F with 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 diagnosed 11/9/18  Patient had been treated with neoadjuvant chemotherapy avastin, carboplatin, paclitaxel   On 12/14/2018 patient underwent exploratory laparotomy and diverting ileostomy due to bowel perforation likely secondary to avastin therapy  Following procedureu patient continued on carboplatin and paclitaxel with regression of tumor burden and decrease in   Patient presented 9/10/19 for surgical intervention for cancer and is now s/p exenteration of posterior pelvis with SUKUMAR and BSO, ileostomy reversal, small bowel resection, and end colostomy, and cystoscopy with ureteral catheter insertion  The procedure lasted 7 5 hrs and patient lost 3 L blood  Patient received total 6 U pRBC, 4 U FFP, 1 pack platelets intraoperatively  Patient admitted to ICU postoperatively hemodynamically stable for continued resuscitation and ventilatory management  24hr events:   Overnight patient intubated on AC/VC 14/430/40/5 maintaining saturations of %  Changed to pressure support 8/10 this morning  Patient's tongue and lips swollen post-operatively given Decadron 4 mg Q6H, which has made the swelling decrease  Patient anxious overnight  This morning patient had soft pressure 96/52, which responded well to fluid bolus  Physical Exam:   General: 71 y/o female  Well-developed, well-nourished  Head: Atraumatic, normocephalic  Mouth: Tongue and lip swelling noted  CV: Regular rate, rhythm, no murmur  Lungs: Patient intubated on AC/VC 14/430/40/5  Abdomen: Soft, nontender  Midline incision intact, nonerythematous, nondraining  Chrystine Knight at old RLQ ileostomy site  LLQ colostomy site pink  RLQ ANDREAS draining serosanguinous fluid  : Haskins catheter draining blood-tinged urine  MSK: No lower extremity edema  Neruo: GCS 11T  E4 moving eyes spontaneously, V1T intubated trying to communicate, M6 moves extremities spontaneously and responds to commands        Vitals:    09/11/19 0105 09/11/19 0110 09/11/19 0355 09/11/19 0545   BP:  112/62     Pulse: 60 60  62   Resp: 14 14  16   Temp:       TempSrc:       SpO2: 100% 100% 100% 100%   Weight:       Height:         Arterial Line BP: 96/52  Arterial Line MAP (mmHg): 68 mmHg    Temperature:   Temp (24hrs), Av 6 °F (36 4 °C), Min:97 4 °F (36 3 °C), Max:97 8 °F (36 6 °C)    Current: Temperature: 97 5 °F (36 4 °C)    Weights:   IBW: 54 7 kg    Body mass index is 24 03 kg/m²  Weight (last 2 days)     Date/Time   Weight    09/10/19 0651   63 5 (140)              Hemodynamic Monitoring:  N/A     Non-Invasive/Invasive Ventilation Settings:  Respiratory    Lab Data (Last 4 hours)    None         O2/Vent Data (Last 4 hours)       0355           Vent Mode AC/VC       Resp Rate (BPM) (BPM) 14       Vt (mL) (mL) 430       FIO2 (%) (%) 40       PEEP (cmH2O) (cmH2O) 5       MV 6 12                 Lab Results   Component Value Date    PHART 7 312 (L) 09/10/2019    YEN7FUW 38 0 09/10/2019    PO2ART 165 9 (H) 09/10/2019    TMA1NAN 18 8 (L) 09/10/2019    BEART -6 8 09/10/2019    SOURCE Line, Arterial 09/10/2019     SpO2: SpO2: 100 %    Intake and Outputs:  I/O        07 - 09/10 0700 09/10 07 -  0700    I V  (mL/kg)  6864 3 (108 1)    Blood  3520    IV Piggyback  500    Total Intake(mL/kg)  99095 3 (171 4)    Urine (mL/kg/hr)  1675 (1 1)    Emesis/NG output  100    Drains  600    Blood  3000    Total Output  5375    Net  +5509 3              UOP: 1 1 mL/kg/hour     Nutrition:        Diet Orders   (From admission, onward)             Start     Ordered    09/10/19 1715  Diet NPO  Diet effective now     Question Answer Comment   Diet Type NPO    RD to adjust diet per protocol?  No        09/10/19 171                Labs:   Results from last 7 days   Lab Units 19  0456 09/10/19  1804 09/10/19  1715   WBC Thousand/uL 15 68* 15 14* 15 40*   HEMOGLOBIN g/dL 9 3* 9 7* 10 0*   HEMATOCRIT % 27 4* 28 9* 29 9*   PLATELETS Thousands/uL 133* 144* 144*   NEUTROS PCT % 87* 84* 83*   MONOS PCT % 6 11 12    Results from last 7 days   Lab Units 19  0456 09/10/19  1804 09/10/19  1715 09/10/19  1454 09/10/19  1415 09/10/19  1310   SODIUM mmol/L 145 146* 144  --   --   --    POTASSIUM mmol/L 4 3 3 9 3 8  --   --   -- CHLORIDE mmol/L 116* 117* 117*  --   --   --    CO2 mmol/L 20* 19* 20*  --   --   --    CO2, I-STAT mmol/L  --   --   --  21 20* 21   BUN mg/dL 29* 22 22  --   --   --    CREATININE mg/dL 1 37* 1 29 1 29  --   --   --    CALCIUM mg/dL 9 2 9 8 10 3*  --   --   --    ALK PHOS U/L  --  57 60  --   --   --    ALT U/L  --  26 26  --   --   --    AST U/L  --  38 39  --   --   --    GLUCOSE, ISTAT mg/dl  --   --   --  228* 227* 224*     Results from last 7 days   Lab Units 09/11/19  0456 09/10/19  1804 09/10/19  1715   MAGNESIUM mg/dL 1 9 1 6 1 8     Results from last 7 days   Lab Units 09/11/19  0456 09/10/19  1804 09/10/19  1715   PHOSPHORUS mg/dL 5 4* 4 4* 4 3*      Results from last 7 days   Lab Units 09/10/19  1804 09/10/19  1715   INR  1 16 1 23*   PTT seconds 29 29     Results from last 7 days   Lab Units 09/10/19  1804   LACTIC ACID mmol/L 1 8     0   Lab Value Date/Time    TROPONINI <0 02 07/14/2019 2138    TROPONINI <0 02 12/10/2018 1410       Imaging: N/A      Micro:  Lab Results   Component Value Date    BLOODCX No Growth After 5 Days  12/17/2018    BLOODCX No Growth After 5 Days   12/17/2018    BLOODCX Clostridium species (A) 12/14/2018    URINECX No Growth <1000 cfu/mL 12/14/2018       Allergies: No Known Allergies    Medications:   Scheduled Meds:  Current Facility-Administered Medications:  acetaminophen 650 mg Oral Q6H [address] [place] F Kristy, DO    cefazolin 1,000 mg Intravenous Q8H Ann F Kristy, DO    chlorhexidine 15 mL Swish & Spit Q12H [address] Luciano Bills PA-C    dexamethasone 4 mg Intravenous Q6H [address] Ivette Orellana PA-C    dexmedetomidine 0 1-0 7 mcg/kg/hr Intravenous Titrated Mac Fernandez MD Last Rate: 0 7 mcg/kg/hr (09/10/19 2100)   dextrose 5 % and sodium chloride 0 45 % with KCl 20 mEq/L 125 mL/hr Intravenous Continuous Ann Wagner DO Last Rate: Stopped (09/10/19 1810)   diphenhydrAMINE 25 mg Intravenous Q6H PRN Ann Wagner DO    famotidine 20 mg Intravenous BID Olinda Mata, WHITNEY    heparin (porcine) 5,000 Units Subcutaneous [facility] Sri Watson PA-C    HYDROmorphone 0 5 mg Intravenous Q1H PRN Emerita Gonzáles MD    insulin lispro 1-5 Units Subcutaneous Q6H [facility] Luciano CarbajalWHITNEY mak    metroNIDAZOLE 500 mg Intravenous Q8H [place], DO    multi-electrolyte 125 mL/hr Intravenous Continuous [place], [place] Last Rate: 125 mL/hr (09/11/19 0125)   multi-electrolyte 500 mL Intravenous Once Ivette Orellana PA-C    ondansetron 4 mg Intravenous Q6H PRN [facility]  Kristy, DO    propofol 5-50 mcg/kg/min Intravenous Titrated Sri Watson PA-C Last Rate: 6 mcg/kg/min (09/10/19 2256)   ropivacaine 0 1% and fentaNYL 2 mcg/mL  Epidural Continuous [facility] DO      Continuous Infusions:  dexmedetomidine 0 1-0 7 mcg/kg/hr Last Rate: 0 7 mcg/kg/hr (09/10/19 2100)   dextrose 5 % and sodium chloride 0 45 % with KCl 20 mEq/L 125 mL/hr Last Rate: Stopped (09/10/19 1810)   multi-electrolyte 125 mL/hr Last Rate: 125 mL/hr (09/11/19 0125)   propofol 5-50 mcg/kg/min Last Rate: 6 mcg/kg/min (09/10/19 2256)   ropivacaine 0 1% and fentaNYL 2 mcg/mL       PRN Meds:    diphenhydrAMINE 25 mg Q6H PRN   HYDROmorphone 0 5 mg Q1H PRN   ondansetron 4 mg Q6H PRN       VTE Pharmacologic Prophylaxis: Heparin  VTE Mechanical Prophylaxis: sequential compression device    Invasive lines and devices:   Invasive Devices     Central Venous Catheter Line            Port A Cath 11/29/18 Right Chest 286 days          Peripheral Intravenous Line            Peripheral IV 09/10/19 Left Wrist 1 day    Peripheral IV 09/10/19 Left Arm less than 1 day          Epidural Line            Epidural Catheter 09/10/19 less than 1 day          Arterial Line            Arterial Line 09/10/19 Left Radial less than 1 day          Drain            Ileostomy Loop  days    Closed/Suction Drain Right RLQ Bulb less than 1 day    Urethral Catheter Double-lumen;Non-latex 16 Fr  less than 1 day          Airway            ETT Cuffed;Oral 7 mm less than 1 day                   Counseling / Coordination of Care  Total Time: 30 minutes     Code Status: Level 1 - Full Code     Portions of the record may have been created with voice recognition software  Occasional wrong word or "sound a like" substitutions may have occurred due to the inherent limitations of voice recognition software  Read the chart carefully and recognize, using context, where substitutions have occurred       Jia Howell

## 2019-09-11 NOTE — RESPIRATORY THERAPY NOTE
RT Ventilator Management Note  Thomas Cool 70 y o  female MRN: [id number]  Unit/Bed#: ICU 10 Encounter: [id number]      Daily Screen     No data found in the last 10 encounters  Physical Exam:   Assessment Type: (P) Assess only  General Appearance: (P) Sedated  Respiratory Pattern: (P) Assisted  Chest Assessment: (P) Chest expansion symmetrical  Bilateral Breath Sounds: (P) Clear, Diminished  Cough: (P) None  Suction: (P) ET Tube  O2 Device: (P) Ventilator      Resp Comments: (P) Pt  remains stable on AC mode  No changes at this time  Will continue to monitor per protocol

## 2019-09-11 NOTE — OP NOTE
OPERATIVE REPORT  PATIENT NAME: Vernon Recio    :  1950  MRN: [id number]  Pt Location: BE OR ROOM 08    SURGERY DATE: 9/10/2019    Surgeon(s) and Role:     * Lorna Escobar MD - Primary     * Yoni Simon MD - Co-surgeon      Preop Diagnosis:  Malignant neoplasm of ovary, unspecified laterality ([address] ) [C56 9]    Post-Op Diagnosis Codes:     * Malignant neoplasm of ovary, unspecified laterality ([address] ) [C56 9]    Procedure:  Exploratory laparotomy, inspection of bilateral ureters, distension of bladder (cystogram)    Specimen(s):  ID Type Source Tests Collected by Time Destination   1 : cervix, mass, colon Tissue Uterus w/Bilateral Ovaries and Fallopian Tubes TISSUE EXAM Lorna Escobar MD 9/10/2019 1148    2 :  Tissue Omentum TISSUE EXAM Lorna Escobar MD 9/10/2019 1216    3 : ileostomy/small bowel resection Tissue Small Bowel, NOS TISSUE EXAM Lorna Escobar MD 9/10/2019 1250        Estimated Blood Loss:   3000 mL    Drains:  Closed/Suction Drain Right RLQ Bulb (Active)   Site Description Unable to view 9/10/2019  6:00 PM   Dressing Status Open to air 9/10/2019  6:00 PM   Drainage Appearance Bloody 9/10/2019  6:00 PM   Status To bulb suction 9/10/2019  6:00 PM   Output (mL) 80 mL 2019  5:01 AM   Number of days: 1       Ileostomy Loop RLQ (Active)   Number of days: 270       Urethral Catheter Double-lumen;Non-latex 16 Fr  (Active)   Output (mL) 100 mL 2019  5:44 AM   Number of days: 1       [REMOVED] Urethral Catheter Double-lumen;Non-latex 16 Fr  (Removed)   Number of days: 0       [REMOVED] Ureteral Drain/Stent Left ureter 5 Fr  (Removed)   Number of days: 0       [REMOVED] Ureteral Drain/Stent Right ureter 5 Fr  (Removed)   Number of days: 0       [REMOVED] Ureteral Drain/Stent Right ureter 5 Fr  (Removed)   Number of days: 0       [REMOVED] Ureteral Drain/Stent Left ureter 5 Fr   (Removed)   Number of days: 0       Anesthesia Type:   General endotracheal    Operative Indications:  Malignant neoplasm of ovary, unspecified laterality ([facility] [address] ) [C56 9]      Operative Findings:  No evidence of ureteral or bladder injury    Complications:   None    Procedure and Technique:  Radha tinajero is a 69-year-old female under the care of Dr Sheyla Contreras for ovarian malignancy  She presents to undergo tumor debulking and colon resection along with exenteration of the posterior pelvis  Intraoperatively there was a suspicion for a left ureteral injury and urologic consultation was obtained  Upon my arrival in the operating room the patient was asleep under general anesthesia  Exploratory laparotomy was in process  A Bookwalter retractor was in place  The specimen had been removed  The left ureter had been extensively skeletonized from the retroperitoneum  Bilateral 5 [place] open-ended orange ureteral catheters had been placed by gynecological Oncology  I inspected did both ureters and found them to be intact  The area of concern for possible ureteral injury appeared to be a tubular structure likely a pelvic blood vessel with a tubular calcification within the inner lumen similar in size to a ureteral catheter  This calcification was white and clearly not the 5 [place] open-ended ureteral catheter  I thoroughly inspected the bladder as well  The bladder was over distended with 250 cc and inspected  There was no extravasation of fluid  I also assisted Dr Sheyla Contreras with hemostasis of the pelvis  At this point there did not appear to be a ureteral injury requiring additional intervention or reimplantation  Please see the dictation of Dr Sheyla Contreras for additional details      Patient Disposition:  Per Dr Kaya Johnson: Melissa Vega MD  DATE: September 11, 2019  TIME: 6:15 AM

## 2019-09-12 ENCOUNTER — APPOINTMENT (INPATIENT)
Dept: RADIOLOGY | Facility: HOSPITAL | Age: 69
DRG: 736 | End: 2019-09-12
Payer: MEDICARE

## 2019-09-12 LAB
ABO GROUP BLD BPU: NORMAL
ABO GROUP BLD BPU: NORMAL
ANION GAP SERPL CALCULATED.3IONS-SCNC: 7 MMOL/L (ref 4–13)
ANION GAP SERPL CALCULATED.3IONS-SCNC: 9 MMOL/L (ref 4–13)
APTT PPP: 41 SECONDS (ref 23–37)
BASOPHILS # BLD AUTO: 0.01 THOUSANDS/ΜL (ref 0–0.1)
BASOPHILS # BLD AUTO: 0.02 THOUSANDS/ΜL (ref 0–0.1)
BASOPHILS NFR BLD AUTO: 0 % (ref 0–1)
BASOPHILS NFR BLD AUTO: 0 % (ref 0–1)
BPU ID: NORMAL
BPU ID: NORMAL
BUN SERPL-MCNC: 50 MG/DL (ref 5–25)
BUN SERPL-MCNC: 57 MG/DL (ref 5–25)
CALCIUM SERPL-MCNC: 8.2 MG/DL (ref 8.3–10.1)
CALCIUM SERPL-MCNC: 8.3 MG/DL (ref 8.3–10.1)
CHLORIDE SERPL-SCNC: 112 MMOL/L (ref 100–108)
CHLORIDE SERPL-SCNC: 113 MMOL/L (ref 100–108)
CO2 SERPL-SCNC: 18 MMOL/L (ref 21–32)
CO2 SERPL-SCNC: 21 MMOL/L (ref 21–32)
CREAT SERPL-MCNC: 2.23 MG/DL (ref 0.6–1.3)
CREAT SERPL-MCNC: 2.94 MG/DL (ref 0.6–1.3)
EOSINOPHIL # BLD AUTO: 0 THOUSAND/ΜL (ref 0–0.61)
EOSINOPHIL # BLD AUTO: 0 THOUSAND/ΜL (ref 0–0.61)
EOSINOPHIL NFR BLD AUTO: 0 % (ref 0–6)
EOSINOPHIL NFR BLD AUTO: 0 % (ref 0–6)
ERYTHROCYTE [DISTWIDTH] IN BLOOD BY AUTOMATED COUNT: 18 % (ref 11.6–15.1)
ERYTHROCYTE [DISTWIDTH] IN BLOOD BY AUTOMATED COUNT: 18.2 % (ref 11.6–15.1)
GFR SERPL CREATININE-BSD FRML MDRD: 16 ML/MIN/1.73SQ M
GFR SERPL CREATININE-BSD FRML MDRD: 22 ML/MIN/1.73SQ M
GLUCOSE SERPL-MCNC: 102 MG/DL (ref 65–140)
GLUCOSE SERPL-MCNC: 138 MG/DL (ref 65–140)
GLUCOSE SERPL-MCNC: 140 MG/DL (ref 65–140)
GLUCOSE SERPL-MCNC: 144 MG/DL (ref 65–140)
GLUCOSE SERPL-MCNC: 157 MG/DL (ref 65–140)
GLUCOSE SERPL-MCNC: 191 MG/DL (ref 65–140)
HCT VFR BLD AUTO: 23.9 % (ref 34.8–46.1)
HCT VFR BLD AUTO: 25.1 % (ref 34.8–46.1)
HGB BLD-MCNC: 7.8 G/DL (ref 11.5–15.4)
HGB BLD-MCNC: 8.2 G/DL (ref 11.5–15.4)
IMM GRANULOCYTES # BLD AUTO: 0.15 THOUSAND/UL (ref 0–0.2)
IMM GRANULOCYTES # BLD AUTO: 0.19 THOUSAND/UL (ref 0–0.2)
IMM GRANULOCYTES NFR BLD AUTO: 1 % (ref 0–2)
IMM GRANULOCYTES NFR BLD AUTO: 1 % (ref 0–2)
INR PPP: 1.19 (ref 0.84–1.19)
LYMPHOCYTES # BLD AUTO: 1.34 THOUSANDS/ΜL (ref 0.6–4.47)
LYMPHOCYTES # BLD AUTO: 1.47 THOUSANDS/ΜL (ref 0.6–4.47)
LYMPHOCYTES NFR BLD AUTO: 7 % (ref 14–44)
LYMPHOCYTES NFR BLD AUTO: 7 % (ref 14–44)
MCH RBC QN AUTO: 29 PG (ref 26.8–34.3)
MCH RBC QN AUTO: 29.2 PG (ref 26.8–34.3)
MCHC RBC AUTO-ENTMCNC: 32.6 G/DL (ref 31.4–37.4)
MCHC RBC AUTO-ENTMCNC: 32.7 G/DL (ref 31.4–37.4)
MCV RBC AUTO: 89 FL (ref 82–98)
MCV RBC AUTO: 89 FL (ref 82–98)
MONOCYTES # BLD AUTO: 1.18 THOUSAND/ΜL (ref 0.17–1.22)
MONOCYTES # BLD AUTO: 1.4 THOUSAND/ΜL (ref 0.17–1.22)
MONOCYTES NFR BLD AUTO: 6 % (ref 4–12)
MONOCYTES NFR BLD AUTO: 8 % (ref 4–12)
NEUTROPHILS # BLD AUTO: 15.39 THOUSANDS/ΜL (ref 1.85–7.62)
NEUTROPHILS # BLD AUTO: 17.61 THOUSANDS/ΜL (ref 1.85–7.62)
NEUTS SEG NFR BLD AUTO: 84 % (ref 43–75)
NEUTS SEG NFR BLD AUTO: 86 % (ref 43–75)
NRBC BLD AUTO-RTO: 0 /100 WBCS
NRBC BLD AUTO-RTO: 0 /100 WBCS
PLATELET # BLD AUTO: 139 THOUSANDS/UL (ref 149–390)
PLATELET # BLD AUTO: 157 THOUSANDS/UL (ref 149–390)
PMV BLD AUTO: 12.3 FL (ref 8.9–12.7)
PMV BLD AUTO: 13 FL (ref 8.9–12.7)
POTASSIUM SERPL-SCNC: 4.2 MMOL/L (ref 3.5–5.3)
POTASSIUM SERPL-SCNC: 4.3 MMOL/L (ref 3.5–5.3)
PROTHROMBIN TIME: 14.7 SECONDS (ref 11.6–14.5)
RBC # BLD AUTO: 2.69 MILLION/UL (ref 3.81–5.12)
RBC # BLD AUTO: 2.81 MILLION/UL (ref 3.81–5.12)
SODIUM SERPL-SCNC: 140 MMOL/L (ref 136–145)
SODIUM SERPL-SCNC: 140 MMOL/L (ref 136–145)
UNIT DISPENSE STATUS: NORMAL
UNIT DISPENSE STATUS: NORMAL
UNIT PRODUCT CODE: NORMAL
UNIT PRODUCT CODE: NORMAL
UNIT RH: NORMAL
UNIT RH: NORMAL
WBC # BLD AUTO: 18.29 THOUSAND/UL (ref 4.31–10.16)
WBC # BLD AUTO: 20.47 THOUSAND/UL (ref 4.31–10.16)

## 2019-09-12 PROCEDURE — 85610 PROTHROMBIN TIME: CPT | Performed by: OBSTETRICS & GYNECOLOGY

## 2019-09-12 PROCEDURE — 80048 BASIC METABOLIC PNL TOTAL CA: CPT | Performed by: OBSTETRICS & GYNECOLOGY

## 2019-09-12 PROCEDURE — 85025 COMPLETE CBC W/AUTO DIFF WBC: CPT | Performed by: STUDENT IN AN ORGANIZED HEALTH CARE EDUCATION/TRAINING PROGRAM

## 2019-09-12 PROCEDURE — 99024 POSTOP FOLLOW-UP VISIT: CPT | Performed by: OBSTETRICS & GYNECOLOGY

## 2019-09-12 PROCEDURE — C9113 INJ PANTOPRAZOLE SODIUM, VIA: HCPCS | Performed by: OBSTETRICS & GYNECOLOGY

## 2019-09-12 PROCEDURE — 85025 COMPLETE CBC W/AUTO DIFF WBC: CPT | Performed by: OBSTETRICS & GYNECOLOGY

## 2019-09-12 PROCEDURE — 85730 THROMBOPLASTIN TIME PARTIAL: CPT | Performed by: OBSTETRICS & GYNECOLOGY

## 2019-09-12 PROCEDURE — 82948 REAGENT STRIP/BLOOD GLUCOSE: CPT

## 2019-09-12 PROCEDURE — 74176 CT ABD & PELVIS W/O CONTRAST: CPT

## 2019-09-12 PROCEDURE — 30233K1 TRANSFUSION OF NONAUTOLOGOUS FROZEN PLASMA INTO PERIPHERAL VEIN, PERCUTANEOUS APPROACH: ICD-10-PCS | Performed by: OBSTETRICS & GYNECOLOGY

## 2019-09-12 PROCEDURE — P9017 PLASMA 1 DONOR FRZ W/IN 8 HR: HCPCS

## 2019-09-12 PROCEDURE — 30233R1 TRANSFUSION OF NONAUTOLOGOUS PLATELETS INTO PERIPHERAL VEIN, PERCUTANEOUS APPROACH: ICD-10-PCS | Performed by: OBSTETRICS & GYNECOLOGY

## 2019-09-12 PROCEDURE — 30233N1 TRANSFUSION OF NONAUTOLOGOUS RED BLOOD CELLS INTO PERIPHERAL VEIN, PERCUTANEOUS APPROACH: ICD-10-PCS | Performed by: OBSTETRICS & GYNECOLOGY

## 2019-09-12 RX ORDER — PANTOPRAZOLE SODIUM 40 MG/1
40 INJECTION, POWDER, FOR SOLUTION INTRAVENOUS
Status: DISCONTINUED | OUTPATIENT
Start: 2019-09-12 | End: 2019-09-21 | Stop reason: HOSPADM

## 2019-09-12 RX ORDER — DEXAMETHASONE SODIUM PHOSPHATE 4 MG/ML
4 INJECTION, SOLUTION INTRA-ARTICULAR; INTRALESIONAL; INTRAMUSCULAR; INTRAVENOUS; SOFT TISSUE ONCE
Status: COMPLETED | OUTPATIENT
Start: 2019-09-12 | End: 2019-09-12

## 2019-09-12 RX ORDER — ONDANSETRON 2 MG/ML
4 INJECTION INTRAMUSCULAR; INTRAVENOUS EVERY 6 HOURS PRN
Status: DISCONTINUED | OUTPATIENT
Start: 2019-09-12 | End: 2019-09-12 | Stop reason: SDUPTHER

## 2019-09-12 RX ORDER — MORPHINE SULFATE 4 MG/ML
4 INJECTION, SOLUTION INTRAMUSCULAR; INTRAVENOUS ONCE
Status: COMPLETED | OUTPATIENT
Start: 2019-09-12 | End: 2019-09-12

## 2019-09-12 RX ORDER — SODIUM CHLORIDE 9 MG/ML
150 INJECTION, SOLUTION INTRAVENOUS CONTINUOUS
Status: DISCONTINUED | OUTPATIENT
Start: 2019-09-12 | End: 2019-09-13

## 2019-09-12 RX ADMIN — ONDANSETRON 4 MG: 2 INJECTION INTRAMUSCULAR; INTRAVENOUS at 16:31

## 2019-09-12 RX ADMIN — PANTOPRAZOLE SODIUM 40 MG: 40 INJECTION, POWDER, FOR SOLUTION INTRAVENOUS at 08:21

## 2019-09-12 RX ADMIN — INSULIN LISPRO 1 UNITS: 100 INJECTION, SOLUTION INTRAVENOUS; SUBCUTANEOUS at 00:46

## 2019-09-12 RX ADMIN — HYDROMORPHONE HYDROCHLORIDE 0.5 MG: 1 INJECTION, SOLUTION INTRAMUSCULAR; INTRAVENOUS; SUBCUTANEOUS at 16:31

## 2019-09-12 RX ADMIN — SODIUM CHLORIDE 75 ML/HR: 0.9 INJECTION, SOLUTION INTRAVENOUS at 06:42

## 2019-09-12 RX ADMIN — DEXAMETHASONE SODIUM PHOSPHATE 4 MG: 4 INJECTION, SOLUTION INTRAMUSCULAR; INTRAVENOUS at 19:37

## 2019-09-12 RX ADMIN — ROPIVACAINE HYDROCHLORIDE: 5 INJECTION, SOLUTION EPIDURAL; INFILTRATION; PERINEURAL at 08:21

## 2019-09-12 RX ADMIN — Medication: at 17:16

## 2019-09-12 RX ADMIN — MORPHINE SULFATE 4 MG: 4 INJECTION INTRAVENOUS at 17:48

## 2019-09-12 RX ADMIN — INSULIN LISPRO 1 UNITS: 100 INJECTION, SOLUTION INTRAVENOUS; SUBCUTANEOUS at 07:23

## 2019-09-12 RX ADMIN — ACETAMINOPHEN 650 MG: 325 TABLET ORAL at 06:45

## 2019-09-12 RX ADMIN — HEPARIN SODIUM 5000 UNITS: 5000 INJECTION INTRAVENOUS; SUBCUTANEOUS at 13:20

## 2019-09-12 RX ADMIN — ACETAMINOPHEN 650 MG: 325 TABLET ORAL at 11:34

## 2019-09-12 RX ADMIN — DEXAMETHASONE SODIUM PHOSPHATE 4 MG: 4 INJECTION, SOLUTION INTRAMUSCULAR; INTRAVENOUS at 06:29

## 2019-09-12 RX ADMIN — SODIUM CHLORIDE 1000 ML: 0.9 INJECTION, SOLUTION INTRAVENOUS at 07:26

## 2019-09-12 RX ADMIN — HEPARIN SODIUM 5000 UNITS: 5000 INJECTION INTRAVENOUS; SUBCUTANEOUS at 06:29

## 2019-09-12 RX ADMIN — DEXAMETHASONE SODIUM PHOSPHATE 4 MG: 4 INJECTION, SOLUTION INTRAMUSCULAR; INTRAVENOUS at 11:34

## 2019-09-12 NOTE — PROGRESS NOTES
GYN-ONC Progress Note  Shaun Colindres  [id number]  [facility] 905/[facility] 905-01  2019  5:00 AM    ASSESS: 70year old with stage IV ovarian cancer, s/p neoadjuvant chemotherapy, s/p ex lap, exenteration posterior pelvic, end colostomy, ileostomy reversal, NAYA, rydical omentectomy and tumor debulking, flexible sigmoidoscopy, cystogram, inspection of ureters following initial insertion ureteral catheters under cystoscopy    PLAN:    1  Stage IV ovarian cancer: s/p surgery, follow-up final pathology    2  Postoperative care: omar removed from old ostomy site, now covered with gauze, continue RLQ drain-250 cc output throughout yesterday    3  Hematuria: in the setting of bilateral ureteral catheterization, bladder manipulation at time of surgery, intraoperative inspection of ureter and bladder following procedure without lesions, add maintenance fluid, NS Bolus    4  Nausea and Vomiting: make NPO, zofran, protonix     5  ABLA: Hgb 8 7-->intraop received 6 units PRBCs, 4 units FFP, 1 unit platelets, HGB 26 0-->9 5-->0 1, VS remain stable overnight, follow-up AM CBC    6  T2DM: SSI 4     7   End colostomy: continue colostomy care     Dispo: inpatient    PPx: SCDs, Heparin 5000 units TID-not held as anticipating continued use of epidural catheter  FEN: clear liquid diet, replete lytes prn, NS @ 75 cc  Pain mgmnt: epidural per APS  Invasive lines: clemons, RLQ drain   Code status: FUll  ____________________    SUBJECTIVE  History of Present Illness:  Overnight: Reports minimal sleep but improved pain control with epidural   Reports vomiting around 2300 last night, bringing up dried blood, has tolerated sips of clears overnight  Voidin 7 cc/kg/hr output yesterday  Flatus: passing some gas through ostomy   Bowel Movement: mucous output through colostomy  Ambulating: no  Vaginal Bleeding: no  Pelvic Pain: no  Chest Pain: no  Shortness of Breath: no  Leg Pain/Discomfort: no    OBJECTIVE  Vitals  Temp:  [97 7 °F (36 5 °C)-99 4 °F (37 4 °C)] 99 4 °F (37 4 °C)  HR:  [62-99] 93  Resp:  [12-21] 20  BP: (100-127)/(52-58) 100/52  Arterial Line BP: ()/(42-82) 132/82       Intake/Output Summary (Last 24 hours) at 9/12/2019 0500  Last data filed at 9/12/2019 0300  Gross per 24 hour   Intake 2529 1 ml   Output 1481 ml   Net 1048 1 ml       Physical Exam:   Physical Exam   Constitutional: Vital signs are normal  She is active  Cardiovascular: Normal rate, regular rhythm, normal heart sounds and intact distal pulses  Pulmonary/Chest: Effort normal and breath sounds normal  No stridor  No respiratory distress  Abdominal: Soft  Bowel sounds are normal  She exhibits no distension  There is tenderness  + tenderness at incision site-clean, dry, and intact     RLQ drain with continue serosang output    Genitourinary:   Genitourinary Comments: Concentrated red bloody urine in clemons    Neurological: She is alert  Skin: Skin is warm and dry  Psychiatric: She has a normal mood and affect   Her behavior is normal          Labs:   Recent Results (from the past 72 hour(s))   Type and screen    Collection Time: 09/10/19  6:10 AM   Result Value Ref Range    ABO Grouping A     Rh Factor Positive     Antibody Screen Negative     Specimen Expiration Date [id number]    Fingerstick Glucose (POCT)    Collection Time: 09/10/19  6:31 AM   Result Value Ref Range    POC Glucose 101 65 - 140 mg/dl   POCT Blood Gas (CG8+)    Collection Time: 09/10/19 10:18 AM   Result Value Ref Range    pH, Art i-STAT 7 302 (L) 7 350 - 7 450    pCO2, Art i-STAT 41 0 36 0 - 44 0 mm HG    pO2, ART i-STAT 289 0 (H) 75 0 - 129 0 mm HG    BE, i-STAT -6 (L) -2 - 3 mmol/L    HCO3, Art i-STAT 20 3 (L) 22 0 - 28 0 mmol/L    CO2, i-STAT 21 21 - 32 mmol/L    O2 Sat, i-STAT 100 (H) 95 - 98 %    SODIUM, I-STAT 142 136 - 145 mmol/l    Potassium, i-STAT 3 7 3 5 - 5 3 mmol/L    Calcium, Ionized i-STAT 1 25 1 12 - 1 32 mmol/L    Hct, i-STAT 21 (L) 34 8 - 46 1 %    Hgb, i-STAT 7 1 (L) 11 5 - 15 4 g/dl    Glucose, i-STAT 157 (H) 65 - 140 mg/dl    Specimen Type ARTERIAL    POCT Blood Gas (CG8+)    Collection Time: 09/10/19 11:18 AM   Result Value Ref Range    pH, Art i-STAT 7 295 (L) 7 350 - 7 450    pCO2, Art i-STAT 38 4 36 0 - 44 0 mm HG    pO2, ART i-STAT 244 0 (H) 75 0 - 129 0 mm HG    BE, i-STAT -7 (L) -2 - 3 mmol/L    HCO3, Art i-STAT 18 6 (L) 22 0 - 28 0 mmol/L    CO2, i-STAT 20 (L) 21 - 32 mmol/L    O2 Sat, i-STAT 100 (H) 95 - 98 %    SODIUM, I-STAT 142 136 - 145 mmol/l    Potassium, i-STAT 4 3 3 5 - 5 3 mmol/L    Calcium, Ionized i-STAT 1 19 1 12 - 1 32 mmol/L    Hct, i-STAT 26 (L) 34 8 - 46 1 %    Hgb, i-STAT 8 8 (L) 11 5 - 15 4 g/dl    Glucose, i-STAT 201 (H) 65 - 140 mg/dl    Specimen Type ARTERIAL    Prepare fresh frozen plasma:Has consent been obtained? Yes; Date of Surgery: 9/10/2019, 2 Units    Collection Time: 09/10/19 11:30 AM   Result Value Ref Range    Unit Product Code A6166H14     Unit Number [id number]     Unit ABO A     Unit [facility] POS     Unit Dispense Status Crossmatched     Unit Product Code P9660H96     Unit Number [id number]     Unit ABO A     Unit RH POS     Unit Dispense Status Crossmatched    POCT Blood Gas (CG8+)    Collection Time: 09/10/19 12:23 PM   Result Value Ref Range    pH, Art i-STAT 7 263 (L) 7 350 - 7 450    pCO2, Art i-STAT 38 0 36 0 - 44 0 mm HG    pO2, ART i-STAT 235 0 (H) 75 0 - 129 0 mm HG    BE, i-STAT -9 (L) -2 - 3 mmol/L    HCO3, Art i-STAT 17 2 (L) 22 0 - 28 0 mmol/L    CO2, i-STAT 18 (L) 21 - 32 mmol/L    O2 Sat, i-STAT 100 (H) 95 - 98 %    SODIUM, I-STAT 145 136 - 145 mmol/l    Potassium, i-STAT 3 7 3 5 - 5 3 mmol/L    Calcium, Ionized i-STAT 1 20 1  12 - 1 32 mmol/L    Hct, i-STAT 19 (L) 34 8 - 46 1 %    Hgb, i-STAT 6 5 (LL) 11 5 - 15 4 g/dl    Glucose, i-STAT 194 (H) 65 - 140 mg/dl    Specimen Type ARTERIAL    POCT Blood Gas (CG8+)    Collection Time: 09/10/19  1:10 PM   Result Value Ref Range    pH, Art i-STAT 7 322 (L) 7 350 - 7 450    pCO2, Art i-STAT 38 2 36 0 - 44 0 mm HG    pO2, ART i-STAT 249 0 (H) 75 0 - 129 0 mm HG    BE, i-STAT -6 (L) -2 - 3 mmol/L    HCO3, Art i-STAT 19 8 (L) 22 0 - 28 0 mmol/L    CO2, i-STAT 21 21 - 32 mmol/L    O2 Sat, i-STAT 100 (H) 95 - 98 %    SODIUM, I-STAT 144 136 - 145 mmol/l    Potassium, i-STAT 3 8 3 5 - 5 3 mmol/L    Calcium, Ionized i-STAT 1 22 1 12 - 1 32 mmol/L    Hct, i-STAT 23 (L) 34 8 - 46 1 %    Hgb, i-STAT 7 8 (L) 11 5 - 15 4 g/dl    Glucose, i-STAT 224 (H) 65 - 140 mg/dl    Specimen Type ARTERIAL    POCT Blood Gas (CG8+)    Collection Time: 09/10/19  2:15 PM   Result Value Ref Range    pH, Art i-STAT 7 322 (L) 7 350 - 7 450    pCO2, Art i-STAT 37 0 36 0 - 44 0 mm HG    pO2, ART i-STAT 249 0 (H) 75 0 - 129 0 mm HG    BE, i-STAT -6 (L) -2 - 3 mmol/L    HCO3, Art i-STAT 19 2 (L) 22 0 - 28 0 mmol/L    CO2, i-STAT 20 (L) 21 - 32 mmol/L    O2 Sat, i-STAT 100 (H) 95 - 98 %    SODIUM, I-STAT 144 136 - 145 mmol/l    Potassium, i-STAT 3 6 3 5 - 5 3 mmol/L    Calcium, Ionized i-STAT 1 26 1 12 - 1 32 mmol/L    Hct, i-STAT 20 (L) 34 8 - 46 1 %    Hgb, i-STAT 6 8 (LL) 11 5 - 15 4 g/dl    Glucose, i-STAT 227 (H) 65 - 140 mg/dl    Specimen Type ARTERIAL    Prepare RBC:Has consent been obtained?  Yes; Date of Surgery: 9/10/2019; Date of Infusion: 9/10/2019, 2 Units    Collection Time: 09/10/19  2:16 PM   Result Value Ref Range    Unit Product Code M5009X20     Unit Number [id number]     Unit ABO A     Unit [facility] POS     Crossmatch Compatible     Unit Dispense Status Crossmatched     Unit Product Code T9557N36     Unit Number [id number]     Unit ABO A     Unit RH POS     Crossmatch Compatible     Unit Dispense Status Crossmatched    POCT Blood Gas (CG8+)    Collection Time: 09/10/19  2:54 PM   Result Value Ref Range    pH, Art i-STAT 7 318 (L) 7 350 - 7 450    pCO2, Art i-STAT 38 6 36 0 - 44 0 mm HG    pO2, ART i-STAT 249 0 (H) 75 0 - 129 0 mm HG    BE, i-STAT -6 (L) -2 - 3 mmol/L    HCO3, Art i-STAT 19 8 (L) 22 0 - 28 0 mmol/L    CO2, i-STAT 21 21 - 32 mmol/L    O2 Sat, i-STAT 100 (H) 95 - 98 %    SODIUM, I-STAT 143 136 - 145 mmol/l    Potassium, i-STAT 3 9 3 5 - 5 3 mmol/L    Calcium, Ionized i-STAT 1 58 (H) 1 12 - 1 32 mmol/L    Hct, i-STAT 25 (L) 34 8 - 46 1 %    Hgb, i-STAT 8 5 (L) 11 5 - 15 4 g/dl    Glucose, i-STAT 228 (H) 65 - 140 mg/dl    Specimen Type ARTERIAL    CBC and differential    Collection Time: 09/10/19  5:15 PM   Result Value Ref Range    WBC 15 40 (H) 4 31 - 10 16 Thousand/uL    RBC 3 39 (L) 3 81 - 5 12 Million/uL    Hemoglobin 10 0 (L) 11 5 - 15 4 g/dL    Hematocrit 29 9 (L) 34 8 - 46 1 %    MCV 88 82 - 98 fL    MCH 29 5 26 8 - 34 3 pg    MCHC 33 4 31 4 - 37 4 g/dL    RDW 15 6 (H) 11 6 - 15 1 %    MPV 11 2 8 9 - 12 7 fL    Platelets 034 (L) 052 - 390 Thousands/uL    nRBC 0 /100 WBCs    Neutrophils Relative 83 (H) 43 - 75 %    Immat GRANS % 0 0 - 2 %    Lymphocytes Relative 5 (L) 14 - 44 %    Monocytes Relative 12 4 - 12 %    Eosinophils Relative 0 0 - 6 %    Basophils Relative 0 0 - 1 %    Neutrophils Absolute 12 76 (H) 1 85 - 7 62 Thousands/µL    Immature Grans Absolute 0 05 0 00 - 0 20 Thousand/uL    Lymphocytes Absolute 0 75 0 60 - 4 47 Thousands/µL    Monocytes Absolute 1 80 (H) 0 17 - 1 22 Thousand/µL    Eosinophils Absolute 0 01 0 00 - 0 61 Thousand/µL    Basophils Absolute 0 03 0 00 - 0 10 Thousands/µL   Comprehensive metabolic panel    Collection Time: 09/10/19  5:15 PM   Result Value Ref Range    Sodium 144 136 - 145 mmol/L    Potassium 3 8 3 5 - 5 3 mmol/L    Chloride 117 (H) 100 - 108 mmol/L    CO2 20 (L) 21 - 32 mmol/L    ANION GAP 7 4 - 13 mmol/L    BUN 22 5 - 25 mg/dL    Creatinine 1 29 0 60 - 1 30 mg/dL    Glucose 182 (H) 65 - 140 mg/dL    Calcium 10 3 (H) 8 3 - 10 1 mg/dL    AST 39 5 - 45 U/L    ALT 26 12 - 78 U/L    Alkaline Phosphatase 60 46 - 116 U/L    Total Protein 5 3 (L) 6 4 - 8 2 g/dL    Albumin 2 9 (L) 3 5 - 5 0 g/dL    Total Bilirubin 1 15 (H) 0 20 - 1 00 mg/dL    eGFR 42 ml/min/1 73sq m   Protime-INR    Collection Time: 09/10/19  5:15 PM   Result Value Ref Range    Protime 15 1 (H) 11 6 - 14 5 seconds    INR 1 23 (H) 0 84 - 1 19   APTT    Collection Time: 09/10/19  5:15 PM   Result Value Ref Range    PTT 29 23 - 37 seconds   Fibrinogen    Collection Time: 09/10/19  5:15 PM   Result Value Ref Range    Fibrinogen 299 227 - 495 mg/dL   Magnesium    Collection Time: 09/10/19  5:15 PM   Result Value Ref Range    Magnesium 1 8 1 6 - 2 6 mg/dL   Phosphorus    Collection Time: 09/10/19  5:15 PM   Result Value Ref Range    Phosphorus 4 3 (H) 2 3 - 4 1 mg/dL   Blood gas, arterial    Collection Time: 09/10/19  6:04 PM   Result Value Ref Range    pH, Arterial 7 312 (L) 7 350 - 7 450    PH ART TC 7 318 (L) 7 350 - 7 450    pCO2, Arterial 38 0 36 0 - 44 0 mm Hg    PCO2 (TC) Arterial 37 3 36 0 - 44 0 mm Hg    pO2, Arterial 165 9 (H) 75 0 - 129 0 mm Hg    PO2 (TC) Arterial 163 6 (H) 75 0 - 129 0 mm Hg    HCO3, Arterial 18 8 (L) 22 0 - 28 0 mmol/L    Base Excess, Arterial -6 8 mmol/L    O2 Content, Arterial 14 4 (L) 16 0 - 23 0 mL/dL    O2 HGB,Arterial  97 6 (H) 94 0 - 97 0 %    SOURCE Line, Arterial     Temperature 97 9 Degrees Fehrenheit    Vent Type- AC AC     AC Rate 14     Tidal Volume 430 ml    Inspired Air (FIO2) 40     PEEP 5    CBC and differential    Collection Time: 09/10/19  6:04 PM   Result Value Ref Range    WBC 15 14 (H) 4 31 - 10 16 Thousand/uL    RBC 3 29 (L) 3 81 - 5 12 Million/uL    Hemoglobin 9 7 (L) 11 5 - 15 4 g/dL    Hematocrit 28 9 (L) 34 8 - 46 1 %    MCV 88 82 - 98 fL    MCH 29 5 26 8 - 34 3 pg    MCHC 33 6 31 4 - 37 4 g/dL    RDW 15 5 (H) 11 6 - 15 1 %    MPV 11 3 8 9 - 12 7 fL    Platelets 880 (L) 720 - 390 Thousands/uL    nRBC 0 /100 WBCs    Neutrophils Relative 84 (H) 43 - 75 %    Immat GRANS % 0 0 - 2 %    Lymphocytes Relative 5 (L) 14 - 44 %    Monocytes Relative 11 4 - 12 %    Eosinophils Relative 0 0 - 6 %    Basophils Relative 0 0 - 1 %    Neutrophils Absolute 12 80 (H) 1 85 - 7 62 Thousands/µL    Immature Grans Absolute 0 05 0 00 - 0 20 Thousand/uL    Lymphocytes Absolute 0 68 0 60 - 4 47 Thousands/µL    Monocytes Absolute 1 59 (H) 0 17 - 1 22 Thousand/µL    Eosinophils Absolute 0 00 0 00 - 0 61 Thousand/µL    Basophils Absolute 0 02 0 00 - 0 10 Thousands/µL   Comprehensive metabolic panel    Collection Time: 09/10/19  6:04 PM   Result Value Ref Range    Sodium 146 (H) 136 - 145 mmol/L    Potassium 3 9 3 5 - 5 3 mmol/L    Chloride 117 (H) 100 - 108 mmol/L    CO2 19 (L) 21 - 32 mmol/L    ANION GAP 10 4 - 13 mmol/L    BUN 22 5 - 25 mg/dL    Creatinine 1 29 0 60 - 1 30 mg/dL    Glucose 179 (H) 65 - 140 mg/dL    Calcium 9 8 8 3 - 10 1 mg/dL    AST 38 5 - 45 U/L    ALT 26 12 - 78 U/L    Alkaline Phosphatase 57 46 - 116 U/L    Total Protein 5 3 (L) 6 4 - 8 2 g/dL    Albumin 2 8 (L) 3 5 - 5 0 g/dL    Total Bilirubin 1 14 (H) 0 20 - 1 00 mg/dL    eGFR 42 ml/min/1 73sq m   Lactic acid    Collection Time: 09/10/19  6:04 PM   Result Value Ref Range    LACTIC ACID 1 8 0 5 - 2 0 mmol/L   Magnesium    Collection Time: 09/10/19  6:04 PM   Result Value Ref Range    Magnesium 1 6 1 6 - 2 6 mg/dL   Phosphorus    Collection Time: 09/10/19  6:04 PM   Result Value Ref Range    Phosphorus 4 4 (H) 2 3 - 4 1 mg/dL   Calcium, ionized    Collection Time: 09/10/19  6:04 PM   Result Value Ref Range    Calcium, Ionized 1 36 (H) 1 12 - 1 32 mmol/L   Protime-INR    Collection Time: 09/10/19  6:04 PM   Result Value Ref Range    Protime 14 4 11 6 - 14 5 seconds    INR 1 16 0 84 - 1 19   APTT    Collection Time: 09/10/19  6:04 PM   Result Value Ref Range    PTT 29 23 - 37 seconds   Fingerstick Glucose (POCT)    Collection Time: 09/10/19 11:51 PM   Result Value Ref Range    POC Glucose 218 (H) 65 - 140 mg/dl   CBC and differential    Collection Time: 09/11/19  4:56 AM   Result Value Ref Range    WBC 15 68 (H) 4 31 - 10 16 Thousand/uL    RBC 3 22 (L) 3 81 - 5 12 Million/uL    Hemoglobin 9 3 (L) 11 5 - 15 4 g/dL Hematocrit 27 4 (L) 34 8 - 46 1 %    MCV 85 82 - 98 fL    MCH 28 9 26 8 - 34 3 pg    MCHC 33 9 31 4 - 37 4 g/dL    RDW 17 2 (H) 11 6 - 15 1 %    MPV 12 1 8 9 - 12 7 fL    Platelets 570 (L) 155 - 390 Thousands/uL    nRBC 0 /100 WBCs    Neutrophils Relative 87 (H) 43 - 75 %    Immat GRANS % 1 0 - 2 %    Lymphocytes Relative 6 (L) 14 - 44 %    Monocytes Relative 6 4 - 12 %    Eosinophils Relative 0 0 - 6 %    Basophils Relative 0 0 - 1 %    Neutrophils Absolute 13 72 (H) 1 85 - 7 62 Thousands/µL    Immature Grans Absolute 0 08 0 00 - 0 20 Thousand/uL    Lymphocytes Absolute 0 91 0 60 - 4 47 Thousands/µL    Monocytes Absolute 0 95 0 17 - 1 22 Thousand/µL    Eosinophils Absolute 0 00 0 00 - 0 61 Thousand/µL    Basophils Absolute 0 02 0 00 - 0 10 Thousands/µL   Basic metabolic panel    Collection Time: 09/11/19  4:56 AM   Result Value Ref Range    Sodium 145 136 - 145 mmol/L    Potassium 4 3 3 5 - 5 3 mmol/L    Chloride 116 (H) 100 - 108 mmol/L    CO2 20 (L) 21 - 32 mmol/L    ANION GAP 9 4 - 13 mmol/L    BUN 29 (H) 5 - 25 mg/dL    Creatinine 1 37 (H) 0 60 - 1 30 mg/dL    Glucose 196 (H) 65 - 140 mg/dL    Calcium 9 2 8 3 - 10 1 mg/dL    eGFR 39 ml/min/1 73sq m   Magnesium    Collection Time: 09/11/19  4:56 AM   Result Value Ref Range    Magnesium 1 9 1 6 - 2 6 mg/dL   Phosphorus    Collection Time: 09/11/19  4:56 AM   Result Value Ref Range    Phosphorus 5 4 (H) 2 3 - 4 1 mg/dL   Prepare RBC:Has consent been obtained? Yes; Where is the Surgery Scheduled? [facility];  Date of Surgery: 9/10/2019; Date of Infusion: 9/10/2019, 2 Units    Collection Time: 09/11/19  5:55 AM   Result Value Ref Range    Unit Product Code B1677A76     Unit Number [id number]     Unit ABO A     Unit [facility] POS     Crossmatch Compatible     Unit Dispense Status Presumed Trans     Unit Product Code D5616T65     Unit Number [id number]     Unit ABO A     Unit RH POS     Crossmatch Compatible     Unit Dispense Status Presumed Trans    Prepare RBC:Has consent been obtained? Yes; Where is the Surgery Scheduled? [facility] Date of Surgery: 9/10/2019; Date of Infusion: 9/10/2019, 2 Units    Collection Time: 09/11/19  5:55 AM   Result Value Ref Range    Unit Product Code V6572Y62     Unit Number [id number]     Unit ABO A     Unit [facility] POS     Crossmatch Compatible     Unit Dispense Status Presumed Trans     Unit Product Code I6420B91     Unit Number [id number]     Unit ABO A     Unit RH POS     Crossmatch Compatible     Unit Dispense Status Presumed Trans    Prepare RBC:Has consent been obtained? Yes; Where is the Surgery Scheduled? [facility]; Date of Surgery: 9/10/2019; Date of Infusion: 9/10/2019, 2 Units    Collection Time: 09/11/19  5:55 AM   Result Value Ref Range    Unit Product Code H4893C93     Unit Number [id number]     Unit ABO A     Unit [facility] POS     Crossmatch Compatible     Unit Dispense Status Presumed Trans     Unit Product Code Z0169O24     Unit Number [id number]     Unit ABO A     Unit RH POS     Crossmatch Compatible     Unit Dispense Status Presumed Trans    Prepare fresh frozen plasma:Has consent been obtained? Yes; Date of Surgery: 9/10/2019, 2 Units    Collection Time: 09/11/19  5:55 AM   Result Value Ref Range    Unit Product Code X3565M95     Unit Number [id number]     Unit ABO A     Unit [facility] POS     Unit Dispense Status Presumed Trans     Unit Product Code L5515A28     Unit Number [id number]     Unit ABO A     Unit RH POS     Unit Dispense Status Presumed Trans    Prepare platelet pheresis:Has consent been obtained?  Yes, 1 Units    Collection Time: 09/11/19  5:55 AM   Result Value Ref Range    Unit Product Code X4442H88     Unit Number [id number]     Unit ABO O     Unit [facility] POS     Unit Dispense Status Presumed Trans    Fingerstick Glucose (POCT)    Collection Time: 09/11/19 11:06 AM   Result Value Ref Range    POC Glucose 197 (H) 65 - 140 mg/dl   Prepare fresh frozen plasma:Has consent been obtained? Yes; Date of Surgery: 9/10/2019, 2 Units    Collection Time: 09/11/19 11:38 AM   Result Value Ref Range    Unit Product Code A5570B64     Unit Number [id number]     Unit ABO A     Unit [facility] NEG     Unit Dispense Status Crossmatched     Unit Product Code X0534D66     Unit Number [id number]     Unit ABO A     Unit [facility] POS     Unit Dispense Status Return to Inv    Fingerstick Glucose (POCT)    Collection Time: 09/11/19  6:02 PM   Result Value Ref Range    POC Glucose 189 (H) 65 - 140 mg/dl   Fingerstick Glucose (POCT)    Collection Time: 09/12/19 12:03 AM   Result Value Ref Range    POC Glucose 191 (H) 65 - 140 mg/dl       Meds:  Scheduled Meds:  Current Facility-Administered Medications:  acetaminophen 650 mg Oral Q6H [address] Karla R Rasjesseniaon, PA-C   dexamethasone 4 mg Intravenous Q6H [address] Karla R Rasmuson, PA-C   diphenhydrAMINE 25 mg Intravenous Q6H PRN Catarino Dapnu Zepeda, PA-C   heparin (porcine) 5,000 Units Subcutaneous Q8H [address] Catarino Dapper Katelyn, PA-C   HYDROmorphone 0 5 mg Intravenous Q1H PRN Catarino Dapper Rasmuson, PA-C   insulin lispro 1-5 Units Subcutaneous Q6H [address] Karla R Rasmuson, PA-C   ondansetron 4 mg Intravenous Q6H PRN Catarino Dapper Katelyn, PA-C   ropivacaine 0 1% and fentaNYL 2 mcg/mL  Epidural Continuous Karla R Rasmuson, PA-C     Continuous Infusions:  ropivacaine 0 1% and fentaNYL 2 mcg/mL      PRN Meds: diphenhydrAMINE    HYDROmorphone    ondansetron    Imaging Studies: I have personally reviewed pertinent reports  EKG, Pathology, Microbiology, and Other Studies: I have personally reviewed pertinent reports        __________________    Signature / Title: Yonas Harvey DO, Ob/Gyn, PGY-3  Date: 9/12/2019  Time: 5:00 AM

## 2019-09-12 NOTE — PROGRESS NOTES
Progress Note - Shaun Colindres 70 y o  female MRN: [id number]    Unit/Bed#: [facility] 905-01 Encounter: [id number]      Assessment:  69 yo F s/p Ex lap, exenteration posterior pelvis, end colostomy, ileostomy reversal, radial omentectomy, tumor debulking  VSS  Afebrile  WBC 15 6, and H/H 9 3/27  Abdomen soft/ nontender/ nondistended  Ostomy viable, bowel sweat, and passing some flatus into stoma  JAISON w serosang output, 250cc  Clemons with anshu blood in bag  Plan:  Removed wick from prior colostomy this am  Continue clears  Maintain epidural  Walk  Maintain jaison  Maintain clemons  Continue IS    Subjective:   Reported 3 episodes of emesis last night  Reported passing flatus into ostomy  Denied chest pain, shortness of breath today  Objective:     Vitals: Blood pressure 100/52, pulse 93, temperature 99 4 °F (37 4 °C), resp  rate 20, height 5' 4" (1 626 m), weight 70 6 kg (155 lb 10 3 oz), SpO2 95 %  ,Body mass index is 26 72 kg/m²  Intake/Output Summary (Last 24 hours) at 9/12/2019 0548  Last data filed at 9/12/2019 0300  Gross per 24 hour   Intake 2529 1 ml   Output 1301 ml   Net 1228 1 ml       Physical Exam  General: NAD  HEENT: NC/AT  MMM  Cv: RRR     Lungs: normal effort  Ab: Soft, NT/ND  Ex: no CCE  Neuro: AAOx3    Scheduled Meds:  Current Facility-Administered Medications:  acetaminophen 650 mg Oral Q6H [address] Karla ARMANDO Zepeda PA-C   dexamethasone 4 mg Intravenous Q6H [address] Karla RADHA Hunt-ZULLY   diphenhydrAMINE 25 mg Intravenous Q6H PRN Sandra RADHA Lamas-ZULLY   heparin (porcine) 5,000 Units Subcutaneous Q8H [address] Sandra RADHA Lamas-ZULLY   HYDROmorphone 0 5 mg Intravenous Q1H PRN Sandra Dayna Zepeda PA-ZULLY   insulin lispro 1-5 Units Subcutaneous Q6H [address] KarlaRADHA Verdugo-C   ondansetron 4 mg Intravenous Q6H PRN Asndra Dayna Zepeda PA-C   ropivacaine 0 1% and fentaNYL 2 mcg/mL  Epidural Continuous Karla R WHITNEY Zepeda     Continuous Infusions:  ropivacaine 0 1% and fentaNYL 2 mcg/mL      PRN Meds: diphenhydrAMINE    HYDROmorphone    ondansetron      Invasive Devices     Central Venous Catheter Line            Port A Cath 11/29/18 Right Chest 287 days          Peripheral Intravenous Line            Peripheral IV 09/10/19 Left Arm 1 day    Peripheral IV 09/10/19 Right Wrist 1 day          Epidural Line            Epidural Catheter 09/10/19 1 day          Drain            Ileostomy Loop  days    Colostomy Descending/sigmoid LUQ 2 days    Closed/Suction Drain Right RLQ Bulb 1 day    Urethral Catheter Double-lumen;Non-latex 16 Fr  1 day                Lab, Imaging and other studies: I have personally reviewed pertinent reports      VTE Pharmacologic Prophylaxis: Heparin  VTE Mechanical Prophylaxis: sequential compression device

## 2019-09-12 NOTE — PROGRESS NOTES
Anesthesia Progress Note - [facility]    Shaun Colindres MRN: [id number]  Unit/Bed#: [facility] 905-01 Encounter: [id number]        Epidural is runnin 1% Ropivacaine plus 2 ug/cc fentanyl at 8 cc/hr, bolus of 4 cc every 10 minutes  Patient comfortable      Plan:   Continue same      Assessment:   70 y o  female status post Colostomy POD# 2  Epidural Site:C/D/I  Neurological assessment : Patient neurologically intact    Subjective:  Current pain location(s): none  Pain Scale:   0/10      Meds/Allergies   current meds:   Current Facility-Administered Medications   Medication Dose Route Frequency    acetaminophen (TYLENOL) tablet 650 mg  650 mg Oral Q6H [address]    diphenhydrAMINE (BENADRYL) injection 25 mg  25 mg Intravenous Q6H PRN    heparin (porcine) subcutaneous injection 5,000 Units  5,000 Units Subcutaneous Q8H [address]    HYDROmorphone (DILAUDID) injection 0 5 mg  0 5 mg Intravenous Q1H PRN    insulin lispro (HumaLOG) 100 units/mL subcutaneous injection 1-5 Units  1-5 Units Subcutaneous Q6H [address]    ondansetron (ZOFRAN) injection 4 mg  4 mg Intravenous Q6H PRN    pantoprazole (PROTONIX) injection 40 mg  40 mg Intravenous Q24H MARCIO    ropivacaine 0 1% and fentaNYL 2 mcg/mL PCEA   Epidural Continuous    sodium chloride 0 9 % infusion  75 mL/hr Intravenous Continuous       No Known Allergies    Objective     Temp:  [97 7 °F (36 5 °C)-99 4 °F (37 4 °C)] 98 8 °F (37 1 °C)  HR:  [83-99] 83  Resp:  [16-20] 16  BP: (100-127)/(52-58) 105/53    SIGNATURE: Wong Lamb MD  DATE: 2019  TIME: 2:25 PM    Please call  ( [facility] [phone]) with any questions

## 2019-09-12 NOTE — RESTORATIVE TECHNICIAN NOTE
Restorative Specialist Mobility Note       Activity: Other (Comment)(Attempted to see pt for activity, per RN Gisela John pt is currently resting but nursing staff will assist pt with walking later this eveing )          Martita ROSS, Restorative Technician, [employer]

## 2019-09-12 NOTE — SOCIAL WORK
A post acute care recommendation was made by your care team for STR  Discussed Freedom of Choice with patient  List of facilities given to patient via in person  patient aware the list is custom filtered for them by zip code location and that [facility] post acute providers are designated  Pt requested referrals to [address] [place] [address]  Referrals placed  Pt discussed during care coordination rounds with gyn-onc & anticipate dc next week

## 2019-09-13 PROBLEM — N17.9 ACUTE KIDNEY INJURY (HCC): Status: ACTIVE | Noted: 2019-09-13

## 2019-09-13 PROBLEM — E87.2 METABOLIC ACIDOSIS: Status: ACTIVE | Noted: 2019-09-13

## 2019-09-13 LAB
ABO GROUP BLD BPU: NORMAL
ABO GROUP BLD BPU: NORMAL
ANION GAP SERPL CALCULATED.3IONS-SCNC: 11 MMOL/L (ref 4–13)
ANION GAP SERPL CALCULATED.3IONS-SCNC: 9 MMOL/L (ref 4–13)
BACTERIA UR QL AUTO: ABNORMAL /HPF
BASOPHILS # BLD AUTO: 0.02 THOUSANDS/ΜL (ref 0–0.1)
BASOPHILS NFR BLD AUTO: 0 % (ref 0–1)
BILIRUB UR QL STRIP: ABNORMAL
BPU ID: NORMAL
BPU ID: NORMAL
BUN SERPL-MCNC: 60 MG/DL (ref 5–25)
BUN SERPL-MCNC: 64 MG/DL (ref 5–25)
CALCIUM SERPL-MCNC: 8.1 MG/DL (ref 8.3–10.1)
CALCIUM SERPL-MCNC: 8.3 MG/DL (ref 8.3–10.1)
CHLORIDE SERPL-SCNC: 113 MMOL/L (ref 100–108)
CHLORIDE SERPL-SCNC: 117 MMOL/L (ref 100–108)
CLARITY UR: ABNORMAL
CO2 SERPL-SCNC: 17 MMOL/L (ref 21–32)
CO2 SERPL-SCNC: 17 MMOL/L (ref 21–32)
COLOR UR: ABNORMAL
CREAT FLD-MCNC: 3.92 MG/DL
CREAT SERPL-MCNC: 3.43 MG/DL (ref 0.6–1.3)
CREAT SERPL-MCNC: 3.91 MG/DL (ref 0.6–1.3)
CREAT UR-MCNC: 52.1 MG/DL
EOSINOPHIL # BLD AUTO: 0 THOUSAND/ΜL (ref 0–0.61)
EOSINOPHIL NFR BLD AUTO: 0 % (ref 0–6)
ERYTHROCYTE [DISTWIDTH] IN BLOOD BY AUTOMATED COUNT: 16.8 % (ref 11.6–15.1)
GFR SERPL CREATININE-BSD FRML MDRD: 11 ML/MIN/1.73SQ M
GFR SERPL CREATININE-BSD FRML MDRD: 13 ML/MIN/1.73SQ M
GLUCOSE SERPL-MCNC: 128 MG/DL (ref 65–140)
GLUCOSE SERPL-MCNC: 129 MG/DL (ref 65–140)
GLUCOSE SERPL-MCNC: 130 MG/DL (ref 65–140)
GLUCOSE SERPL-MCNC: 135 MG/DL (ref 65–140)
GLUCOSE SERPL-MCNC: 154 MG/DL (ref 65–140)
GLUCOSE SERPL-MCNC: 156 MG/DL (ref 65–140)
GLUCOSE SERPL-MCNC: 181 MG/DL (ref 65–140)
GLUCOSE SERPL-MCNC: 190 MG/DL (ref 65–140)
GLUCOSE UR STRIP-MCNC: NEGATIVE MG/DL
HCT VFR BLD AUTO: 28.3 % (ref 34.8–46.1)
HGB BLD-MCNC: 9.2 G/DL (ref 11.5–15.4)
HGB UR QL STRIP.AUTO: ABNORMAL
IMM GRANULOCYTES # BLD AUTO: 0.2 THOUSAND/UL (ref 0–0.2)
IMM GRANULOCYTES NFR BLD AUTO: 1 % (ref 0–2)
KETONES UR STRIP-MCNC: ABNORMAL MG/DL
LEUKOCYTE ESTERASE UR QL STRIP: ABNORMAL
LYMPHOCYTES # BLD AUTO: 1.32 THOUSANDS/ΜL (ref 0.6–4.47)
LYMPHOCYTES NFR BLD AUTO: 6 % (ref 14–44)
MAGNESIUM SERPL-MCNC: 2.2 MG/DL (ref 1.6–2.6)
MCH RBC QN AUTO: 29.4 PG (ref 26.8–34.3)
MCHC RBC AUTO-ENTMCNC: 32.5 G/DL (ref 31.4–37.4)
MCV RBC AUTO: 90 FL (ref 82–98)
MONOCYTES # BLD AUTO: 1.5 THOUSAND/ΜL (ref 0.17–1.22)
MONOCYTES NFR BLD AUTO: 7 % (ref 4–12)
NEUTROPHILS # BLD AUTO: 17.87 THOUSANDS/ΜL (ref 1.85–7.62)
NEUTS SEG NFR BLD AUTO: 86 % (ref 43–75)
NITRITE UR QL STRIP: POSITIVE
NON-SQ EPI CELLS URNS QL MICRO: ABNORMAL /HPF
NRBC BLD AUTO-RTO: 0 /100 WBCS
PH UR STRIP.AUTO: 5 [PH]
PHOSPHATE SERPL-MCNC: 5.6 MG/DL (ref 2.3–4.1)
PLATELET # BLD AUTO: 154 THOUSANDS/UL (ref 149–390)
PMV BLD AUTO: 12.3 FL (ref 8.9–12.7)
POTASSIUM SERPL-SCNC: 4.2 MMOL/L (ref 3.5–5.3)
POTASSIUM SERPL-SCNC: 4.3 MMOL/L (ref 3.5–5.3)
PROT UR STRIP-MCNC: ABNORMAL MG/DL
RBC # BLD AUTO: 3.13 MILLION/UL (ref 3.81–5.12)
RBC #/AREA URNS AUTO: ABNORMAL /HPF
SODIUM 24H UR-SCNC: 100 MOL/L
SODIUM SERPL-SCNC: 139 MMOL/L (ref 136–145)
SODIUM SERPL-SCNC: 145 MMOL/L (ref 136–145)
SP GR UR STRIP.AUTO: 1.01 (ref 1–1.03)
UNIT DISPENSE STATUS: NORMAL
UNIT DISPENSE STATUS: NORMAL
UNIT PRODUCT CODE: NORMAL
UNIT PRODUCT CODE: NORMAL
UNIT RH: NORMAL
UNIT RH: NORMAL
UROBILINOGEN UR QL STRIP.AUTO: 1 E.U./DL
WBC # BLD AUTO: 20.91 THOUSAND/UL (ref 4.31–10.16)
WBC #/AREA URNS AUTO: ABNORMAL /HPF

## 2019-09-13 PROCEDURE — 82570 ASSAY OF URINE CREATININE: CPT | Performed by: INTERNAL MEDICINE

## 2019-09-13 PROCEDURE — NC001 PR NO CHARGE: Performed by: UROLOGY

## 2019-09-13 PROCEDURE — 80048 BASIC METABOLIC PNL TOTAL CA: CPT | Performed by: OBSTETRICS & GYNECOLOGY

## 2019-09-13 PROCEDURE — 82570 ASSAY OF URINE CREATININE: CPT | Performed by: OBSTETRICS & GYNECOLOGY

## 2019-09-13 PROCEDURE — 84100 ASSAY OF PHOSPHORUS: CPT | Performed by: OBSTETRICS & GYNECOLOGY

## 2019-09-13 PROCEDURE — 94762 N-INVAS EAR/PLS OXIMTRY CONT: CPT

## 2019-09-13 PROCEDURE — 99223 1ST HOSP IP/OBS HIGH 75: CPT | Performed by: INTERNAL MEDICINE

## 2019-09-13 PROCEDURE — 82948 REAGENT STRIP/BLOOD GLUCOSE: CPT

## 2019-09-13 PROCEDURE — 84300 ASSAY OF URINE SODIUM: CPT | Performed by: INTERNAL MEDICINE

## 2019-09-13 PROCEDURE — P9016 RBC LEUKOCYTES REDUCED: HCPCS

## 2019-09-13 PROCEDURE — 83735 ASSAY OF MAGNESIUM: CPT | Performed by: OBSTETRICS & GYNECOLOGY

## 2019-09-13 PROCEDURE — C9113 INJ PANTOPRAZOLE SODIUM, VIA: HCPCS | Performed by: OBSTETRICS & GYNECOLOGY

## 2019-09-13 PROCEDURE — 81001 URINALYSIS AUTO W/SCOPE: CPT | Performed by: INTERNAL MEDICINE

## 2019-09-13 PROCEDURE — 85025 COMPLETE CBC W/AUTO DIFF WBC: CPT | Performed by: OBSTETRICS & GYNECOLOGY

## 2019-09-13 PROCEDURE — 99024 POSTOP FOLLOW-UP VISIT: CPT | Performed by: OBSTETRICS & GYNECOLOGY

## 2019-09-13 PROCEDURE — 99024 POSTOP FOLLOW-UP VISIT: CPT | Performed by: UROLOGY

## 2019-09-13 RX ADMIN — HEPARIN SODIUM 5000 UNITS: 5000 INJECTION INTRAVENOUS; SUBCUTANEOUS at 23:44

## 2019-09-13 RX ADMIN — HYDROMORPHONE HYDROCHLORIDE 0.5 MG: 1 INJECTION, SOLUTION INTRAMUSCULAR; INTRAVENOUS; SUBCUTANEOUS at 01:59

## 2019-09-13 RX ADMIN — Medication: at 10:26

## 2019-09-13 RX ADMIN — HEPARIN SODIUM 5000 UNITS: 5000 INJECTION INTRAVENOUS; SUBCUTANEOUS at 13:40

## 2019-09-13 RX ADMIN — SODIUM BICARBONATE 125 ML/HR: 84 INJECTION, SOLUTION INTRAVENOUS at 12:01

## 2019-09-13 RX ADMIN — HYDROMORPHONE HYDROCHLORIDE 0.5 MG: 1 INJECTION, SOLUTION INTRAMUSCULAR; INTRAVENOUS; SUBCUTANEOUS at 04:59

## 2019-09-13 RX ADMIN — SODIUM CHLORIDE 150 ML/HR: 0.9 INJECTION, SOLUTION INTRAVENOUS at 03:25

## 2019-09-13 RX ADMIN — INSULIN LISPRO 1 UNITS: 100 INJECTION, SOLUTION INTRAVENOUS; SUBCUTANEOUS at 06:24

## 2019-09-13 RX ADMIN — SODIUM BICARBONATE 125 ML/HR: 84 INJECTION, SOLUTION INTRAVENOUS at 20:38

## 2019-09-13 RX ADMIN — PANTOPRAZOLE SODIUM 40 MG: 40 INJECTION, POWDER, FOR SOLUTION INTRAVENOUS at 08:36

## 2019-09-13 RX ADMIN — SODIUM CHLORIDE 1000 ML: 0.9 INJECTION, SOLUTION INTRAVENOUS at 08:36

## 2019-09-13 RX ADMIN — INSULIN LISPRO 1 UNITS: 100 INJECTION, SOLUTION INTRAVENOUS; SUBCUTANEOUS at 00:58

## 2019-09-13 RX ADMIN — HEPARIN SODIUM 5000 UNITS: 5000 INJECTION INTRAVENOUS; SUBCUTANEOUS at 05:00

## 2019-09-13 NOTE — CONSULTS
Consultation - Nephrology   Elicia Kamara 70 y o  female MRN: [id number]  Unit/Bed#: [facility] 905-01 Encounter: [id number]    ASSESSMENT and PLAN:  Acute kidney injury  -admission creatinine was 1 29, baseline creatinine 1 1-1 3  Renal function gradually worsening postop after  surgery on 09/10 to creatinine 3 9 today  Nonoliguric  -UA with urine microscopy pending, CT abdomen from 09/02 was suggestive of mild bilateral hydroureteronephrosis without obstructive ureteric calculi   -acute kidney injury likely multifactorial   Could be combination of obstructive uropathy as well as possible postop ATN from intraoperative hypotension and blood loss  Patient had hypotension on the day of surgery on 09/10 require IV phenylephrine, also required 6 units of PRBC intraoperatively due to blood loss  -clinically appears euvolemic, overall 9 L positive balance   -would follow up Urology recommendations regarding bilateral hydroureteronephrosis  Check UA with urine microscopy, check urine sodium and urine creatinine to calculate FeNa  -would change IV fluid to half NS with sodium bicarbonate at 125 mL/hour, avoid hypotension  Patient already has small bilateral pleural effusion, no need for aggressive IV fluid at this time   -at this time no indication for starting hemodialysis, would continue to monitor renal function and electrolytes  If renal function continues to worsen may need to considered hemodialysis  Discussed about benefit and risk involved with hemodialysis with the patient as well as her daughter  Both verbalized understanding, dialysis consent was signed and placed in the chart   -follow-up repeat labs from today afternoon  -if urine output drops, would consider IV Lasix 80 mg  Chronic kidney disease stage 3:    -Baseline creatinine 1 1-1 3  -chronic kidney disease likely due to diabetes mellitus and age-related nephron loss, chemotherapy could be contributing   -avoid nephrotoxins    Has not seen nephrologist as outpatient    Metabolic acidosis:  -bicarb level dropped to 17  Will change IV fluid to half NS with sodium bicarbonate  Lactic acid from 09/10 was 1 8    Hyperphosphatemia:  Due to acute kidney injury  Would continue to monitor    Bilateral mild hydroureteronephrosis:  Onset of hydroureteronephrosis post operatively  Follow up Urology recommendations  Noted that urology has been consulted  Anemia/leukocytosis:  Hemoglobin stable currently at 9 2  Continue to monitor  Status post multiple PRBC intraoperatively  Elevated WBC count, could be due to steroids  Continue monitoring for signs of infection  Diabetes mellitus type 2:  Continue management per primary team   Continue to hold metformin    Ovarian cancer underwent pelvic exenteration, tumor debulking, takedown ileostomy, bilateral ureteral catheters insertion and end colostomy on 09/10  Continue postop care per surgery    Discussed with Dr Dru Zamarripa  Addendum:  Patient seen by Urology already  Recommendations-if creatinine continues to rise would recommend noncontrast CT scan on September 14 to look for worsening hydronephrosis  If hydronephrosis worsened Urology would consider placement of ureteral stents  HISTORY OF PRESENT ILLNESS:  Requesting Physician: Ford Amaya MD  Reason for Consult:  Acute kidney injury    Shaun Colindres is a 70 y o  female with history of ovarian cancer diagnosed in November 2018, status post neoadjuvant chemotherapy, was receiving carboplatin and paclitaxel last chemotherapy in July 2019, history of bowel perforation status post exploratory laparotomy with diverting ileostomy, diabetes mellitus type 2 who was admitted to [facility] after presenting for elective surgery  She underwent pelvic exenteration, tumor debulking, takedown ileostomy and end colostomy on 09/10  PATIENT ALSO HAD CYSTOSCOPY INSERTION OF URETERAL CATHETERS    Nephrology consulted for worsening renal function postop  Blood pressure on 09/12 was around 100/52 but blood pressure more stable today  Urine output over last 24 hour was 955 mL  Overall 9 L net positive since admission  Patient's baseline creatinine since March 2019 has been around 1 1-1 3  Creatinine was 1 29 on 09/10 but since then has been gradually worsening and today has gone up to 3 9  Also has mild acidosis with bicarbonate level of 17  Phosphorus is elevated  WBC count elevated and hemoglobin 9 2  CT abdomen pelvis without contrast on 9/12 shows mild bilateral hydroureteronephrosis without obstructing ureteral calculus  No perinephric collection  At this time patient complaining of nausea and vomiting and abdominal pain as well as back pain  Has Haskins catheter with hematuria        PAST MEDICAL HISTORY:  Past Medical History:   Diagnosis Date    Abdominal fluid collection     Anemia     Asthma     Cancer [address] [address]     ovaries    Diabetes mellitus ([facility] [address] )     History of transfusion     PONV (postoperative nausea and vomiting)        PAST SURGICAL HISTORY:  Past Surgical History:   Procedure Laterality Date    ABDOMINAL SURGERY      CT GUIDED PARACENTESIS  11/6/2018    CT GUIDED PERC DRAINAGE CATHETER PLACEMENT  12/24/2018    CT NEEDLE BIOPSY PERITONEUM  11/6/2018    CYSTOSCOPY N/A 9/10/2019    Procedure: CYSTOSCOPY , INSERTION URETERAL CATHETERS;  Surgeon: Aracely Cardona MD;  [facility];  Service: Gynecology Oncology    ECTOPIC PREGNANCY SURGERY      ECTOPIC PREGNANCY SURGERY      EXENTERATION PELVIS N/A 9/10/2019    Procedure: EXPLORATORY LAPAROTOMY, EXENTERATION POSTERIOR PELVIS,  END COLOSTOMY, ILEOSTOMY REVERSAL, LYSIS OF ADHESIONS, rADICAL OMENTECTOMY AND TUMOR DEBULKINGFLEXIBLE SIGMOIDOSCOPY, CYSTOGRAM, INSPECTION OF URETERS;  Surgeon: Aracely Cardona MD;  [facility];  Service: Gynecology Oncology    IR PARACENTESIS  9/18/2018    IR PARACENTESIS  10/18/2018    IR PARACENTESIS  12/10/2018    IR PORT PLACEMENT  11/29/2018    LAPAROTOMY N/A 12/14/2018    Procedure: LAPAROTOMY EXPLORATORY; Abdominal Washout; Application of Abthera Vac Dressing;  Surgeon: Romy Rene MD;  [facility];  Service: Gynecology Oncology    LAPAROTOMY N/A 12/15/2018    Procedure: LAPAROTOMY EXPLORATORY, ABDOMINAL WASHOUT, DRAIN PLACEMENT x 4, DIVERTING LOOP ILEOSTOMY AND ABDOMINAL CLOSURE,  ALL OTHER INDICATED PROCEDURES;  Surgeon: Romy Rene MD;  Location: [facility];  Service: Gynecology Oncology    [facility]FR[facility] N/A 9/25/2018    Procedure: EGD AND COLONOSCOPY;  Surgeon: Peter Olivia MD;  Location: [facility];   Service: Gastroenterology    TONSILECTOMY AND ADNOIDECTOMY      TONSILLECTOMY         SOCIAL HISTORY:  Social History     Substance and Sexual Activity   Alcohol Use Not Currently    Comment: occassionally     Social History     Substance and Sexual Activity   Drug Use No     Social History     Tobacco Use   Smoking Status Former Smoker   Smokeless Tobacco Never Used   Tobacco Comment    "Quit 40 yrs ago"       FAMILY HISTORY:  Family History   Problem Relation Age of Onset    Cirrhosis Mother     Colon cancer Mother     Leukemia Cousin     Diabetes Father        ALLERGIES:  No Known Allergies    MEDICATIONS:    Current Facility-Administered Medications:     acetaminophen (TYLENOL) tablet 650 mg, 650 mg, Oral, Q6H [address], Karla R WHITNEY Zepeda, 650 mg at 09/12/19 1134    diphenhydrAMINE (BENADRYL) injection 25 mg, 25 mg, Intravenous, Q6H PRN, Peri Zepeda PA-C    fentaNYL 10 mcg/mL PCA, , Intravenous, Continuous, Ann Wagner DO    heparin (porcine) subcutaneous injection 5,000 Units, 5,000 Units, Subcutaneous, Q8H [address], 5,000 Units at 09/13/19 0500 **AND** Platelet count, , , Once, Andie Samayoa PA-C    HYDROmorphone (DILAUDID) injection 0 5 mg, 0 5 mg, Intravenous, Q1H PRN, Peri Zepeda PA-C, 0 5 mg at 09/13/19 0459    insulin lispro (HumaLOG) 100 units/mL subcutaneous injection 1-5 Units, 1-5 Units, Subcutaneous, Q6H [address], 1 Units at 09/13/19 0624 **AND** Fingerstick Glucose (POCT), , , Q6H, Karlashweta Zepeda PA-C    ondansetron (ZOFRAN) injection 4 mg, 4 mg, Intravenous, Q6H PRN, Earlean Kelp WHITNEY Zepeda, 4 mg at 09/12/19 1631    pantoprazole (PROTONIX) injection 40 mg, 40 mg, Intravenous, Q24H [address], [place] F Zabo, DO, 40 mg at 09/13/19 0836    sodium chloride 0 9 % bolus 1,000 mL, 1,000 mL, Intravenous, Once, [place], DO, Last Rate: 1,000 mL/hr at 09/13/19 0836, 1,000 mL at 09/13/19 0836    sodium chloride 0 9 % infusion, 150 mL/hr, Intravenous, Continuous, Divya Yepez MD, Last Rate: 150 mL/hr at 09/13/19 0325, 150 mL/hr at 09/13/19 0325    REVIEW OF SYSTEMS:   Review of Systems   Constitutional: Negative for appetite change, chills, fatigue and fever  HENT: Negative for ear pain and sore throat  Eyes: Negative for pain  Respiratory: Negative for cough and shortness of breath  Cardiovascular: Negative for chest pain and leg swelling  Gastrointestinal: Positive for abdominal pain, nausea and vomiting  Negative for abdominal distention and diarrhea  Genitourinary: Negative for difficulty urinating, flank pain, hematuria and urgency  Musculoskeletal: Negative for arthralgias and joint swelling  Skin: Negative for pallor and rash  Neurological: Negative for dizziness, weakness and headaches  Psychiatric/Behavioral: Negative for confusion  The patient is not nervous/anxious  All the systems were reviewed and were negative except as documented on the HPI and ROS      PHYSICAL EXAM:  Current Weight: Weight - Scale: 85 7 kg (188 lb 14 4 oz)  First Weight: Weight - Scale: 63 5 kg (140 lb)  Vitals:    09/13/19 0523 09/13/19 0539 09/13/19 0705 09/13/19 0732   BP:   156/79    BP Location:       Pulse:   85    Resp:   18    Temp:   98 7 °F (37 1 °C)    TempSrc:       SpO2: 92%  92% 92%   Weight:  85 7 kg (188 lb 14 4 oz)     Height:           Intake/Output Summary (Last 24 hours) at 9/13/2019 0836  Last data filed at 9/13/2019 0631  Gross per 24 hour   Intake 4540 88 ml   Output 1285 ml   Net 3255 88 ml     Physical Exam   Constitutional: She is oriented to person, place, and time  She appears well-developed  No distress  HENT:   Head: Normocephalic and atraumatic  Nose: Nose normal    Eyes: Pupils are equal, round, and reactive to light  Conjunctivae are normal    Neck: Neck supple  No JVD present  No thyromegaly present  Cardiovascular: Normal rate, regular rhythm and normal heart sounds  Exam reveals no friction rub  No murmur heard  Pulmonary/Chest: Effort normal and breath sounds normal  She has no wheezes  She has no rales  Abdominal: Soft  Bowel sounds are normal  She exhibits no distension  There is no tenderness  ABDOMINAL TENDERNESS, DRAIN+ , COLOSTOMY+   Musculoskeletal: She exhibits no edema or deformity  Neurological: She is alert and oriented to person, place, and time  Skin: Skin is warm and dry  Psychiatric: She has a normal mood and affect  Invasive Devices:   Urethral Catheter Double-lumen;Non-latex 16 Fr   (Active)   Reasons to continue Urinary Catheter  Accurate I&O assessment in critically ill patients (48 hr  max) 9/12/2019 10:01 AM   Goal for Removal Other (Comment) 9/12/2019 10:01 AM   Site Assessment Clean;Skin intact 9/13/2019  7:32 AM   Collection Container Standard drainage bag 9/13/2019  7:32 AM   Securement Method Securing device (Describe) 9/13/2019  7:32 AM   Irrigant Sterile water 9/13/2019  2:39 AM   Urethral Irrigation Intake (mL) 400 mL 9/13/2019  2:39 AM   Output (mL) 25 mL 9/13/2019  5:06 AM       Lab Results:   Results from last 7 days   Lab Units 09/13/19  0603 09/12/19  1735 09/12/19  1628 09/12/19  0455 09/11/19  0456 09/10/19  1804 09/10/19  1715  09/10/19  1454 09/10/19  1415 09/10/19  1310   WBC Thousand/uL 20 91*  --  20 47* 18 29* 15 68* 15 14* 15 40*   < >  --   --   --    HEMOGLOBIN g/dL 9 2*  --  8 2* 7 8* 9 3* 9 7* 10 0*   < >  --   --   --    I STAT HEMOGLOBIN g/dl  --   --   --   --   --   --   --   --  8 5* 6 8* 7 8*   HEMATOCRIT % 28 3*  --  25 1* 23 9* 27 4* 28 9* 29 9*   < >  --   --   --    HEMATOCRIT, ISTAT %  --   --   --   --   --   --   --   --  25* 20* 23*   PLATELETS Thousands/uL 154  --  157 139* 133* 144* 144*   < >  --   --   --    POTASSIUM mmol/L 4 3 4 2  --  4 3 4 3 3 9 3 8   < >  --   --   --    CHLORIDE mmol/L 113* 113*  --  112* 116* 117* 117*   < >  --   --   --    CO2 mmol/L 17* 18*  --  21 20* 19* 20*   < >  --   --   --    CO2, I-STAT mmol/L  --   --   --   --   --   --   --   --  21 20* 21   BUN mg/dL 64* 57*  --  50* 29* 22 22   < >  --   --   --    CREATININE mg/dL 3 91* 2 94*  --  2 23* 1 37* 1 29 1 29   < >  --   --   --    CALCIUM mg/dL 8 1* 8 2*  --  8 3 9 2 9 8 10 3*   < >  --   --   --    MAGNESIUM mg/dL 2 2  --   --   --  1 9 1 6 1 8   < >  --   --   --    PHOSPHORUS mg/dL 5 6*  --   --   --  5 4* 4 4* 4 3*   < >  --   --   --    ALK PHOS U/L  --   --   --   --   --  57 60  --   --   --   --    ALT U/L  --   --   --   --   --  26 26  --   --   --   --    AST U/L  --   --   --   --   --  38 39  --   --   --   --    GLUCOSE, ISTAT mg/dl  --   --   --   --   --   --   --   --  228* 227* 224*    < > = values in this interval not displayed  Other Studies:   CT abdomen pelvis  IMPRESSION:     Mild bilateral hydroureteronephrosis without obstructive ureteral calculi  No perinephric collection      The bladder is decompressed with a Haskins catheter          Portions of the record may have been created with voice recognition software  Occasional wrong word or "sound a like" substitutions may have occurred due to the inherent limitations of voice recognition software  Read the chart carefully and recognize, using context, where substitutions have occurred  If you have any questions, please contact the dictating provider

## 2019-09-13 NOTE — PLAN OF CARE
Problem: Nutrition/Hydration-ADULT  Goal: Nutrient/Hydration intake appropriate for improving, restoring or maintaining nutritional needs  Description  Monitor and assess patient's nutrition/hydration status for malnutrition  Collaborate with interdisciplinary team and initiate plan and interventions as ordered  Monitor patient's weight and dietary intake as ordered or per policy  Utilize nutrition screening tool and intervene as necessary  Determine patient's food preferences and provide high-protein, high-caloric foods as appropriate       INTERVENTIONS:  - Monitor oral intake, urinary output, labs, and treatment plans  - Assess nutrition and hydration status and recommend course of action  - Evaluate amount of meals eaten  - Assist patient with eating if necessary   - Allow adequate time for meals  - Recommend/ encourage appropriate diets, oral nutritional supplements, and vitamin/mineral supplements  - Order, calculate, and assess calorie counts as needed  - Recommend, monitor, and adjust tube feedings and TPN/PPN based on assessed needs  - Assess need for intravenous fluids  - Provide specific nutrition/hydration education as appropriate  - Include patient/family/caregiver in decisions related to nutrition  Outcome: Not Progressing  Note:   Due to NPO

## 2019-09-13 NOTE — PROGRESS NOTES
GYN-ONC Progress Note  Diane Luu  [id number]  [facility] 905/[facility] 905-01  9/13/2019  6:11 AM    ASSESS:  21-year-old with stage IV ovarian cancer, status post neoadjuvant chemotherapy, status post ex lap, exenteration posterior pelvis, and colostomy, ileostomy reversal, lysis of adhesions, radical omentectomy and tumor debulking, flexible sigmoidoscopy, cystogram, inspection of ureters following initial insertion of ureteral catheters under cystoscopy    PLAN:    1  Stage IV ovarian cancer:  Status post surgery follow up final pathology    2  Hematuria, acute on chronic kidney disease: In the setting of bilateral ureteral catheterization and bladder manipulation at time of surgery, intraoperative inspection of ureter bladder following procedure without lesions notable rising creatinine from 1 37-2 2 3-2 9, follow-up a m creatinine  CT abdomen and pelvis without contrast notable for bilateral hydro ureteral nephrosis without obstructive ureteral calculi, consider Urology, Nephrology consultation with increasing creatinine, consideration be given for ureteral stent placement ;    3  Nausea and Vomiting:  Continue NPO status, Zofran, Protonix    4  Acute blood loss anemia: Hgb 8 7-->intraop received 6 units PRBCs, 4 units FFP, 1 unit platelets, HGB 74 3-->7 0-->0 7-->7 5-->3 9-->7 unit PRBCs, 2 Units FFP in the setting of elevated PTT-->AM CBC pending    5  T2DM: SSI 4    6  End colostomy: continue colostomy care     Dispo:  Inpatient     PPx: SCDs, Heparin 5000 units TID, patient has yet to be assessed by acute Pain service following incidental epidural removal, when cleared consider transition to Lovenox  FEN: NPO, replete lytes prn, NS @ 150 cc  Pain mgmnt: Fentanyl PCA, consider switch to Dilaudid PCA  Invasive lines: clemons, RLQ drain  Code status: full  ____________________    SUBJECTIVE  History of Present Illness:  Overnight:  Reports difficulty evening, minimal sleep and feels like abdominal pain is getting worse, needing additional dose of the Dilaudid overnight in addition to fentanyl PCA states that when she is walking she feels like she has to throw up but only dry heaves and it quickly resolves  Voiding:  Urine output less bloody than yesterday, 0 58 cc/kg/hr output overnight  Flatus: reports flatus in ostomy bag  Bowel Movement: no  Ambulating: no  Vaginal Bleeding: no  Pelvic Pain: no  Chest Pain: no  Shortness of Breath: no  Leg Pain/Discomfort: no    OBJECTIVE  Vitals  Temp:  [98 5 °F (36 9 °C)-99 9 °F (37 7 °C)] 99 °F (37 2 °C)  HR:  [72-88] 82  Resp:  [16-22] 16  BP: (100-156)/(52-78) 154/78       Intake/Output Summary (Last 24 hours) at 9/13/2019 7503  Last data filed at 9/13/2019 0506  Gross per 24 hour   Intake 4339 83 ml   Output 1495 ml   Net 2844 83 ml       Physical Exam:   Physical Exam   Constitutional: She is oriented to person, place, and time  Vital signs are normal  She is active  She appears distressed  Cardiovascular: Normal rate, regular rhythm, normal heart sounds and intact distal pulses  Pulmonary/Chest: Effort normal and breath sounds normal  No stridor  No respiratory distress  Abdominal: Soft  Bowel sounds are normal  She exhibits no distension and no mass  There is tenderness  There is no rebound and no guarding  Abdominal incision clean, dry, and intact   RLQ drain-330 cc out q24h, serosang    Neurological: She is alert and oriented to person, place, and time  Skin: Skin is warm and dry  Psychiatric: She has a normal mood and affect   Her behavior is normal          Labs:   Recent Results (from the past 72 hour(s))   Fingerstick Glucose (POCT)    Collection Time: 09/10/19  6:31 AM   Result Value Ref Range    POC Glucose 101 65 - 140 mg/dl   POCT Blood Gas (CG8+)    Collection Time: 09/10/19 10:18 AM   Result Value Ref Range    pH, Art i-STAT 7 302 (L) 7 350 - 7 450    pCO2, Art i-STAT 41 0 36 0 - 44 0 mm HG    pO2, ART i-STAT 289 0 (H) 75 0 - 129 0 mm HG    BE, i-STAT -6 (L) -2 - 3 mmol/L    HCO3, Art i-STAT 20 3 (L) 22 0 - 28 0 mmol/L    CO2, i-STAT 21 21 - 32 mmol/L    O2 Sat, i-STAT 100 (H) 95 - 98 %    SODIUM, I-STAT 142 136 - 145 mmol/l    Potassium, i-STAT 3 7 3 5 - 5 3 mmol/L    Calcium, Ionized i-STAT 1 25 1 12 - 1 32 mmol/L    Hct, i-STAT 21 (L) 34 8 - 46 1 %    Hgb, i-STAT 7 1 (L) 11 5 - 15 4 g/dl    Glucose, i-STAT 157 (H) 65 - 140 mg/dl    Specimen Type ARTERIAL    POCT Blood Gas (CG8+)    Collection Time: 09/10/19 11:18 AM   Result Value Ref Range    pH, Art i-STAT 7 295 (L) 7 350 - 7 450    pCO2, Art i-STAT 38 4 36 0 - 44 0 mm HG    pO2, ART i-STAT 244 0 (H) 75 0 - 129 0 mm HG    BE, i-STAT -7 (L) -2 - 3 mmol/L    HCO3, Art i-STAT 18 6 (L) 22 0 - 28 0 mmol/L    CO2, i-STAT 20 (L) 21 - 32 mmol/L    O2 Sat, i-STAT 100 (H) 95 - 98 %    SODIUM, I-STAT 142 136 - 145 mmol/l    Potassium, i-STAT 4 3 3 5 - 5 3 mmol/L    Calcium, Ionized i-STAT 1 19 1 12 - 1 32 mmol/L    Hct, i-STAT 26 (L) 34 8 - 46 1 %    Hgb, i-STAT 8 8 (L) 11 5 - 15 4 g/dl    Glucose, i-STAT 201 (H) 65 - 140 mg/dl    Specimen Type ARTERIAL    Prepare fresh frozen plasma:Has consent been obtained?  Yes; Date of Surgery: 9/10/2019, 2 Units    Collection Time: 09/10/19 11:30 AM   Result Value Ref Range    Unit Product Code Y2228Q78     Unit Number [id number]     Unit ABO A     Unit [facility] POS     Unit Dispense Status Crossmatched     Unit Product Code C5421V53     Unit Number [id number]     Unit ABO A     Unit RH POS     Unit Dispense Status Crossmatched    POCT Blood Gas (CG8+)    Collection Time: 09/10/19 12:23 PM   Result Value Ref Range    pH, Art i-STAT 7 263 (L) 7 350 - 7 450    pCO2, Art i-STAT 38 0 36 0 - 44 0 mm HG    pO2, ART i-STAT 235 0 (H) 75 0 - 129 0 mm HG    BE, i-STAT -9 (L) -2 - 3 mmol/L    HCO3, Art i-STAT 17 2 (L) 22 0 - 28 0 mmol/L    CO2, i-STAT 18 (L) 21 - 32 mmol/L    O2 Sat, i-STAT 100 (H) 95 - 98 %    SODIUM, I-STAT 145 136 - 145 mmol/l    Potassium, i-STAT 3 7 3 5 - 5 3 mmol/L Calcium, Ionized i-STAT 1 20 1  12 - 1 32 mmol/L    Hct, i-STAT 19 (L) 34 8 - 46 1 %    Hgb, i-STAT 6 5 (LL) 11 5 - 15 4 g/dl    Glucose, i-STAT 194 (H) 65 - 140 mg/dl    Specimen Type ARTERIAL    POCT Blood Gas (CG8+)    Collection Time: 09/10/19  1:10 PM   Result Value Ref Range    pH, Art i-STAT 7 322 (L) 7 350 - 7 450    pCO2, Art i-STAT 38 2 36 0 - 44 0 mm HG    pO2, ART i-STAT 249 0 (H) 75 0 - 129 0 mm HG    BE, i-STAT -6 (L) -2 - 3 mmol/L    HCO3, Art i-STAT 19 8 (L) 22 0 - 28 0 mmol/L    CO2, i-STAT 21 21 - 32 mmol/L    O2 Sat, i-STAT 100 (H) 95 - 98 %    SODIUM, I-STAT 144 136 - 145 mmol/l    Potassium, i-STAT 3 8 3 5 - 5 3 mmol/L    Calcium, Ionized i-STAT 1 22 1 12 - 1 32 mmol/L    Hct, i-STAT 23 (L) 34 8 - 46 1 %    Hgb, i-STAT 7 8 (L) 11 5 - 15 4 g/dl    Glucose, i-STAT 224 (H) 65 - 140 mg/dl    Specimen Type ARTERIAL    POCT Blood Gas (CG8+)    Collection Time: 09/10/19  2:15 PM   Result Value Ref Range    pH, Art i-STAT 7 322 (L) 7 350 - 7 450    pCO2, Art i-STAT 37 0 36 0 - 44 0 mm HG    pO2, ART i-STAT 249 0 (H) 75 0 - 129 0 mm HG    BE, i-STAT -6 (L) -2 - 3 mmol/L    HCO3, Art i-STAT 19 2 (L) 22 0 - 28 0 mmol/L    CO2, i-STAT 20 (L) 21 - 32 mmol/L    O2 Sat, i-STAT 100 (H) 95 - 98 %    SODIUM, I-STAT 144 136 - 145 mmol/l    Potassium, i-STAT 3 6 3 5 - 5 3 mmol/L    Calcium, Ionized i-STAT 1 26 1 12 - 1 32 mmol/L    Hct, i-STAT 20 (L) 34 8 - 46 1 %    Hgb, i-STAT 6 8 (LL) 11 5 - 15 4 g/dl    Glucose, i-STAT 227 (H) 65 - 140 mg/dl    Specimen Type ARTERIAL    POCT Blood Gas (CG8+)    Collection Time: 09/10/19  2:54 PM   Result Value Ref Range    pH, Art i-STAT 7 318 (L) 7 350 - 7 450    pCO2, Art i-STAT 38 6 36 0 - 44 0 mm HG    pO2, ART i-STAT 249 0 (H) 75 0 - 129 0 mm HG    BE, i-STAT -6 (L) -2 - 3 mmol/L    HCO3, Art i-STAT 19 8 (L) 22 0 - 28 0 mmol/L    CO2, i-STAT 21 21 - 32 mmol/L    O2 Sat, i-STAT 100 (H) 95 - 98 %    SODIUM, I-STAT 143 136 - 145 mmol/l    Potassium, i-STAT 3 9 3 5 - 5 3 mmol/L Calcium, Ionized i-STAT 1 58 (H) 1 12 - 1 32 mmol/L    Hct, i-STAT 25 (L) 34 8 - 46 1 %    Hgb, i-STAT 8 5 (L) 11 5 - 15 4 g/dl    Glucose, i-STAT 228 (H) 65 - 140 mg/dl    Specimen Type ARTERIAL    CBC and differential    Collection Time: 09/10/19  5:15 PM   Result Value Ref Range    WBC 15 40 (H) 4 31 - 10 16 Thousand/uL    RBC 3 39 (L) 3 81 - 5 12 Million/uL    Hemoglobin 10 0 (L) 11 5 - 15 4 g/dL    Hematocrit 29 9 (L) 34 8 - 46 1 %    MCV 88 82 - 98 fL    MCH 29 5 26 8 - 34 3 pg    MCHC 33 4 31 4 - 37 4 g/dL    RDW 15 6 (H) 11 6 - 15 1 %    MPV 11 2 8 9 - 12 7 fL    Platelets 314 (L) 478 - 390 Thousands/uL    nRBC 0 /100 WBCs    Neutrophils Relative 83 (H) 43 - 75 %    Immat GRANS % 0 0 - 2 %    Lymphocytes Relative 5 (L) 14 - 44 %    Monocytes Relative 12 4 - 12 %    Eosinophils Relative 0 0 - 6 %    Basophils Relative 0 0 - 1 %    Neutrophils Absolute 12 76 (H) 1 85 - 7 62 Thousands/µL    Immature Grans Absolute 0 05 0 00 - 0 20 Thousand/uL    Lymphocytes Absolute 0 75 0 60 - 4 47 Thousands/µL    Monocytes Absolute 1 80 (H) 0 17 - 1 22 Thousand/µL    Eosinophils Absolute 0 01 0 00 - 0 61 Thousand/µL    Basophils Absolute 0 03 0 00 - 0 10 Thousands/µL   Comprehensive metabolic panel    Collection Time: 09/10/19  5:15 PM   Result Value Ref Range    Sodium 144 136 - 145 mmol/L    Potassium 3 8 3 5 - 5 3 mmol/L    Chloride 117 (H) 100 - 108 mmol/L    CO2 20 (L) 21 - 32 mmol/L    ANION GAP 7 4 - 13 mmol/L    BUN 22 5 - 25 mg/dL    Creatinine 1 29 0 60 - 1 30 mg/dL    Glucose 182 (H) 65 - 140 mg/dL    Calcium 10 3 (H) 8 3 - 10 1 mg/dL    AST 39 5 - 45 U/L    ALT 26 12 - 78 U/L    Alkaline Phosphatase 60 46 - 116 U/L    Total Protein 5 3 (L) 6 4 - 8 2 g/dL    Albumin 2 9 (L) 3 5 - 5 0 g/dL    Total Bilirubin 1 15 (H) 0 20 - 1 00 mg/dL    eGFR 42 ml/min/1 73sq m   Protime-INR    Collection Time: 09/10/19  5:15 PM   Result Value Ref Range    Protime 15 1 (H) 11 6 - 14 5 seconds    INR 1 23 (H) 0 84 - 1 19   APTT Collection Time: 09/10/19  5:15 PM   Result Value Ref Range    PTT 29 23 - 37 seconds   Fibrinogen    Collection Time: 09/10/19  5:15 PM   Result Value Ref Range    Fibrinogen 299 227 - 495 mg/dL   Magnesium    Collection Time: 09/10/19  5:15 PM   Result Value Ref Range    Magnesium 1 8 1 6 - 2 6 mg/dL   Phosphorus    Collection Time: 09/10/19  5:15 PM   Result Value Ref Range    Phosphorus 4 3 (H) 2 3 - 4 1 mg/dL   Blood gas, arterial    Collection Time: 09/10/19  6:04 PM   Result Value Ref Range    pH, Arterial 7 312 (L) 7 350 - 7 450    PH ART TC 7 318 (L) 7 350 - 7 450    pCO2, Arterial 38 0 36 0 - 44 0 mm Hg    PCO2 (TC) Arterial 37 3 36 0 - 44 0 mm Hg    pO2, Arterial 165 9 (H) 75 0 - 129 0 mm Hg    PO2 (TC) Arterial 163 6 (H) 75 0 - 129 0 mm Hg    HCO3, Arterial 18 8 (L) 22 0 - 28 0 mmol/L    Base Excess, Arterial -6 8 mmol/L    O2 Content, Arterial 14 4 (L) 16 0 - 23 0 mL/dL    O2 HGB,Arterial  97 6 (H) 94 0 - 97 0 %    SOURCE Line, Arterial     Temperature 97 9 Degrees Fehrenheit    Vent Type- AC AC     AC Rate 14     Tidal Volume 430 ml    Inspired Air (FIO2) 40     PEEP 5    CBC and differential    Collection Time: 09/10/19  6:04 PM   Result Value Ref Range    WBC 15 14 (H) 4 31 - 10 16 Thousand/uL    RBC 3 29 (L) 3 81 - 5 12 Million/uL    Hemoglobin 9 7 (L) 11 5 - 15 4 g/dL    Hematocrit 28 9 (L) 34 8 - 46 1 %    MCV 88 82 - 98 fL    MCH 29 5 26 8 - 34 3 pg    MCHC 33 6 31 4 - 37 4 g/dL    RDW 15 5 (H) 11 6 - 15 1 %    MPV 11 3 8 9 - 12 7 fL    Platelets 808 (L) 517 - 390 Thousands/uL    nRBC 0 /100 WBCs    Neutrophils Relative 84 (H) 43 - 75 %    Immat GRANS % 0 0 - 2 %    Lymphocytes Relative 5 (L) 14 - 44 %    Monocytes Relative 11 4 - 12 %    Eosinophils Relative 0 0 - 6 %    Basophils Relative 0 0 - 1 %    Neutrophils Absolute 12 80 (H) 1 85 - 7 62 Thousands/µL    Immature Grans Absolute 0 05 0 00 - 0 20 Thousand/uL    Lymphocytes Absolute 0 68 0 60 - 4 47 Thousands/µL    Monocytes Absolute 1 59 (H) 0 17 - 1 22 Thousand/µL    Eosinophils Absolute 0 00 0 00 - 0 61 Thousand/µL    Basophils Absolute 0 02 0 00 - 0 10 Thousands/µL   Comprehensive metabolic panel    Collection Time: 09/10/19  6:04 PM   Result Value Ref Range    Sodium 146 (H) 136 - 145 mmol/L    Potassium 3 9 3 5 - 5 3 mmol/L    Chloride 117 (H) 100 - 108 mmol/L    CO2 19 (L) 21 - 32 mmol/L    ANION GAP 10 4 - 13 mmol/L    BUN 22 5 - 25 mg/dL    Creatinine 1 29 0 60 - 1 30 mg/dL    Glucose 179 (H) 65 - 140 mg/dL    Calcium 9 8 8 3 - 10 1 mg/dL    AST 38 5 - 45 U/L    ALT 26 12 - 78 U/L    Alkaline Phosphatase 57 46 - 116 U/L    Total Protein 5 3 (L) 6 4 - 8 2 g/dL    Albumin 2 8 (L) 3 5 - 5 0 g/dL    Total Bilirubin 1 14 (H) 0 20 - 1 00 mg/dL    eGFR 42 ml/min/1 73sq m   Lactic acid    Collection Time: 09/10/19  6:04 PM   Result Value Ref Range    LACTIC ACID 1 8 0 5 - 2 0 mmol/L   Magnesium    Collection Time: 09/10/19  6:04 PM   Result Value Ref Range    Magnesium 1 6 1 6 - 2 6 mg/dL   Phosphorus    Collection Time: 09/10/19  6:04 PM   Result Value Ref Range    Phosphorus 4 4 (H) 2 3 - 4 1 mg/dL   Calcium, ionized    Collection Time: 09/10/19  6:04 PM   Result Value Ref Range    Calcium, Ionized 1 36 (H) 1 12 - 1 32 mmol/L   Protime-INR    Collection Time: 09/10/19  6:04 PM   Result Value Ref Range    Protime 14 4 11 6 - 14 5 seconds    INR 1 16 0 84 - 1 19   APTT    Collection Time: 09/10/19  6:04 PM   Result Value Ref Range    PTT 29 23 - 37 seconds   Fingerstick Glucose (POCT)    Collection Time: 09/10/19 11:51 PM   Result Value Ref Range    POC Glucose 218 (H) 65 - 140 mg/dl   CBC and differential    Collection Time: 09/11/19  4:56 AM   Result Value Ref Range    WBC 15 68 (H) 4 31 - 10 16 Thousand/uL    RBC 3 22 (L) 3 81 - 5 12 Million/uL    Hemoglobin 9 3 (L) 11 5 - 15 4 g/dL    Hematocrit 27 4 (L) 34 8 - 46 1 %    MCV 85 82 - 98 fL    MCH 28 9 26 8 - 34 3 pg    MCHC 33 9 31 4 - 37 4 g/dL    RDW 17 2 (H) 11 6 - 15 1 %    MPV 12 1 8 9 - 12 7 fL Platelets 774 (L) 323 - 390 Thousands/uL    nRBC 0 /100 WBCs    Neutrophils Relative 87 (H) 43 - 75 %    Immat GRANS % 1 0 - 2 %    Lymphocytes Relative 6 (L) 14 - 44 %    Monocytes Relative 6 4 - 12 %    Eosinophils Relative 0 0 - 6 %    Basophils Relative 0 0 - 1 %    Neutrophils Absolute 13 72 (H) 1 85 - 7 62 Thousands/µL    Immature Grans Absolute 0 08 0 00 - 0 20 Thousand/uL    Lymphocytes Absolute 0 91 0 60 - 4 47 Thousands/µL    Monocytes Absolute 0 95 0 17 - 1 22 Thousand/µL    Eosinophils Absolute 0 00 0 00 - 0 61 Thousand/µL    Basophils Absolute 0 02 0 00 - 0 10 Thousands/µL   Basic metabolic panel    Collection Time: 09/11/19  4:56 AM   Result Value Ref Range    Sodium 145 136 - 145 mmol/L    Potassium 4 3 3 5 - 5 3 mmol/L    Chloride 116 (H) 100 - 108 mmol/L    CO2 20 (L) 21 - 32 mmol/L    ANION GAP 9 4 - 13 mmol/L    BUN 29 (H) 5 - 25 mg/dL    Creatinine 1 37 (H) 0 60 - 1 30 mg/dL    Glucose 196 (H) 65 - 140 mg/dL    Calcium 9 2 8 3 - 10 1 mg/dL    eGFR 39 ml/min/1 73sq m   Magnesium    Collection Time: 09/11/19  4:56 AM   Result Value Ref Range    Magnesium 1 9 1 6 - 2 6 mg/dL   Phosphorus    Collection Time: 09/11/19  4:56 AM   Result Value Ref Range    Phosphorus 5 4 (H) 2 3 - 4 1 mg/dL   Prepare RBC:Has consent been obtained? Yes; Where is the Surgery Scheduled? [address] [place] [address]; Date of Surgery: 9/10/2019; Date of Infusion: 9/10/2019, 2 Units    Collection Time: 09/11/19  5:55 AM   Result Value Ref Range    Unit Product Code T2109R70     Unit Number [id number]     Unit ABO A     Unit [facility] POS     Crossmatch Compatible     Unit Dispense Status Presumed Trans     Unit Product Code U3107C36     Unit Number [id number]     Unit ABO A     Unit RH POS     Crossmatch Compatible     Unit Dispense Status Presumed Trans    Prepare RBC:Has consent been obtained? Yes; Where is the Surgery Scheduled? [address] [place] [address];  Date of Surgery: 9/10/2019; Date of Infusion: 9/10/2019, 2 Units    Collection Time: 09/11/19  5:55 AM   Result Value Ref Range    Unit Product Code U6753P05     Unit Number [id number]     Unit ABO A     Unit [facility] POS     Crossmatch Compatible     Unit Dispense Status Presumed Trans     Unit Product Code O2559R89     Unit Number [id number]     Unit ABO A     Unit RH POS     Crossmatch Compatible     Unit Dispense Status Presumed Trans    Prepare RBC:Has consent been obtained? Yes; Where is the Surgery Scheduled? [facility]; Date of Surgery: 9/10/2019; Date of Infusion: 9/10/2019, 2 Units    Collection Time: 09/11/19  5:55 AM   Result Value Ref Range    Unit Product Code G1237C17     Unit Number [id number]     Unit ABO A     Unit [facility] POS     Crossmatch Compatible     Unit Dispense Status Presumed Trans     Unit Product Code D8534V42     Unit Number [id number]     Unit ABO A     Unit RH POS     Crossmatch Compatible     Unit Dispense Status Presumed Trans    Prepare fresh frozen plasma:Has consent been obtained? Yes; Date of Surgery: 9/10/2019, 2 Units    Collection Time: 09/11/19  5:55 AM   Result Value Ref Range    Unit Product Code J3289C02     Unit Number [id number]     Unit ABO A     Unit [facility] POS     Unit Dispense Status Presumed Trans     Unit Product Code C9563W52     Unit Number [id number]     Unit ABO A     Unit RH POS     Unit Dispense Status Presumed Trans    Prepare platelet pheresis:Has consent been obtained?  Yes, 1 Units    Collection Time: 09/11/19  5:55 AM   Result Value Ref Range    Unit Product Code T7712R74     Unit Number [id number]     Unit ABO O     Unit [facility] POS     Unit Dispense Status Presumed Trans    Fingerstick Glucose (POCT)    Collection Time: 09/11/19 11:06 AM   Result Value Ref Range    POC Glucose 197 (H) 65 - 140 mg/dl   Fingerstick Glucose (POCT)    Collection Time: 09/11/19  6:02 PM   Result Value Ref Range    POC Glucose 189 (H) 65 - 140 mg/dl   Fingerstick Glucose (POCT)    Collection Time: 09/12/19 12:03 AM   Result Value Ref Range    POC Glucose 191 (H) 65 - 140 mg/dl   CBC and differential    Collection Time: 09/12/19  4:55 AM   Result Value Ref Range    WBC 18 29 (H) 4 31 - 10 16 Thousand/uL    RBC 2 69 (L) 3 81 - 5 12 Million/uL    Hemoglobin 7 8 (L) 11 5 - 15 4 g/dL    Hematocrit 23 9 (L) 34 8 - 46 1 %    MCV 89 82 - 98 fL    MCH 29 0 26 8 - 34 3 pg    MCHC 32 6 31 4 - 37 4 g/dL    RDW 18 2 (H) 11 6 - 15 1 %    MPV 13 0 (H) 8 9 - 12 7 fL    Platelets 938 (L) 528 - 390 Thousands/uL    nRBC 0 /100 WBCs    Neutrophils Relative 84 (H) 43 - 75 %    Immat GRANS % 1 0 - 2 %    Lymphocytes Relative 7 (L) 14 - 44 %    Monocytes Relative 8 4 - 12 %    Eosinophils Relative 0 0 - 6 %    Basophils Relative 0 0 - 1 %    Neutrophils Absolute 15 39 (H) 1 85 - 7 62 Thousands/µL    Immature Grans Absolute 0 15 0 00 - 0 20 Thousand/uL    Lymphocytes Absolute 1 34 0 60 - 4 47 Thousands/µL    Monocytes Absolute 1 40 (H) 0 17 - 1 22 Thousand/µL    Eosinophils Absolute 0 00 0 00 - 0 61 Thousand/µL    Basophils Absolute 0 01 0 00 - 0 10 Thousands/µL   Basic metabolic panel    Collection Time: 09/12/19  4:55 AM   Result Value Ref Range    Sodium 140 136 - 145 mmol/L    Potassium 4 3 3 5 - 5 3 mmol/L    Chloride 112 (H) 100 - 108 mmol/L    CO2 21 21 - 32 mmol/L    ANION GAP 7 4 - 13 mmol/L    BUN 50 (H) 5 - 25 mg/dL    Creatinine 2 23 (H) 0 60 - 1 30 mg/dL    Glucose 102 65 - 140 mg/dL    Calcium 8 3 8 3 - 10 1 mg/dL    eGFR 22 ml/min/1 73sq m   Fingerstick Glucose (POCT)    Collection Time: 09/12/19  6:03 AM   Result Value Ref Range    POC Glucose 157 (H) 65 - 140 mg/dl   Prepare fresh frozen plasma:Has consent been obtained?  Yes; Date of Surgery: 9/10/2019, 2 Units    Collection Time: 09/12/19  7:52 AM   Result Value Ref Range    Unit Product Code P3791Y12     Unit Number [id number]     Unit ABO A     Unit [facility] NEG     Unit Dispense Status Return to [facility]     Unit Product Code O0212P28     Unit Number [id number]     Unit ABO A     Unit RH POS Unit Dispense Status Return to Inv    Fingerstick Glucose (POCT)    Collection Time: 09/12/19 11:32 AM   Result Value Ref Range    POC Glucose 138 65 - 140 mg/dl   CBC and differential    Collection Time: 09/12/19  4:28 PM   Result Value Ref Range    WBC 20 47 (H) 4 31 - 10 16 Thousand/uL    RBC 2 81 (L) 3 81 - 5 12 Million/uL    Hemoglobin 8 2 (L) 11 5 - 15 4 g/dL    Hematocrit 25 1 (L) 34 8 - 46 1 %    MCV 89 82 - 98 fL    MCH 29 2 26 8 - 34 3 pg    MCHC 32 7 31 4 - 37 4 g/dL    RDW 18 0 (H) 11 6 - 15 1 %    MPV 12 3 8 9 - 12 7 fL    Platelets 919 236 - 028 Thousands/uL    nRBC 0 /100 WBCs    Neutrophils Relative 86 (H) 43 - 75 %    Immat GRANS % 1 0 - 2 %    Lymphocytes Relative 7 (L) 14 - 44 %    Monocytes Relative 6 4 - 12 %    Eosinophils Relative 0 0 - 6 %    Basophils Relative 0 0 - 1 %    Neutrophils Absolute 17 61 (H) 1 85 - 7 62 Thousands/µL    Immature Grans Absolute 0 19 0 00 - 0 20 Thousand/uL    Lymphocytes Absolute 1 47 0 60 - 4 47 Thousands/µL    Monocytes Absolute 1 18 0 17 - 1 22 Thousand/µL    Eosinophils Absolute 0 00 0 00 - 0 61 Thousand/µL    Basophils Absolute 0 02 0 00 - 0 10 Thousands/µL   Protime-INR    Collection Time: 09/12/19  5:34 PM   Result Value Ref Range    Protime 14 7 (H) 11 6 - 14 5 seconds    INR 1 19 0 84 - 1 19   APTT    Collection Time: 09/12/19  5:34 PM   Result Value Ref Range    PTT 41 (H) 23 - 37 seconds   Basic metabolic panel    Collection Time: 09/12/19  5:35 PM   Result Value Ref Range    Sodium 140 136 - 145 mmol/L    Potassium 4 2 3 5 - 5 3 mmol/L    Chloride 113 (H) 100 - 108 mmol/L    CO2 18 (L) 21 - 32 mmol/L    ANION GAP 9 4 - 13 mmol/L    BUN 57 (H) 5 - 25 mg/dL    Creatinine 2 94 (H) 0 60 - 1 30 mg/dL    Glucose 140 65 - 140 mg/dL    Calcium 8 2 (L) 8 3 - 10 1 mg/dL    eGFR 16 ml/min/1 73sq m   Fingerstick Glucose (POCT)    Collection Time: 09/12/19  6:08 PM   Result Value Ref Range    POC Glucose 144 (H) 65 - 140 mg/dl   Prepare RBC:Has consent been obtained? Yes, 1 Units    Collection Time: 09/12/19  7:32 PM   Result Value Ref Range    Unit Product Code J2339L96     Unit Number [id number]     Unit ABO A     Unit RH POS     Crossmatch Compatible     Unit Dispense Status Crossmatched    Fingerstick Glucose (POCT)    Collection Time: 09/13/19 12:00 AM   Result Value Ref Range    POC Glucose 190 (H) 65 - 140 mg/dl   Fingerstick Glucose (POCT)    Collection Time: 09/13/19 12:56 AM   Result Value Ref Range    POC Glucose 181 (H) 65 - 140 mg/dl   Prepare RBC:Has consent been obtained? Yes; Date of Surgery: 9/10/2019; Date of Infusion: 9/10/2019, 2 Units    Collection Time: 09/13/19  5:55 AM   Result Value Ref Range    Unit Product Code I1852X02     Unit Number [id number]     Unit ABO A     Unit [facility] POS     Crossmatch Compatible     Unit Dispense Status Presumed Trans     Unit Product Code B4610L59     Unit Number [id number]     Unit ABO A     Unit RH POS     Crossmatch Compatible     Unit Dispense Status Crossmatched    Prepare fresh frozen plasma:Has consent been obtained?  Yes, 2 Units    Collection Time: 09/13/19  5:55 AM   Result Value Ref Range    Unit Product Code E0659P08     Unit Number [id number]     Unit ABO A     Unit [facility] POS     Unit Dispense Status Presumed Trans     Unit Product Code K1093B42     Unit Number [id number]     Unit ABO A     Unit RH POS     Unit Dispense Status Presumed Trans        Meds:  Scheduled Meds:  Current Facility-Administered Medications:  acetaminophen 650 mg Oral Q6H [address] Karla Zepeda PA-C    diphenhydrAMINE 25 mg Intravenous Q6H PRN Sharron Moya PA-C    fentaNYL  Intravenous Continuous Ann Wagner DO    heparin (porcine) 5,000 Units Subcutaneous Q8H [address] Nestor Zepeda PA-C    HYDROmorphone 0 5 mg Intravenous Q1H PRN Nestor Zepeda PA-C    insulin lispro 1-5 Units Subcutaneous Q6H [address] Karla Zepeda PA-C    ondansetron 4 mg Intravenous Q6H PRN Sharron Moya PA-C pantoprazole 40 mg Intravenous Q24H [facility] Ann Wagner,     ropivacaine 0 1% and fentaNYL 2 mcg/mL  Epidural Continuous Matt Zepeda PA-C Last Rate: Stopped (09/12/19 1600)   sodium chloride 150 mL/hr Intravenous Continuous Hernesto Crawford MD Last Rate: 150 mL/hr (09/13/19 0325)     Continuous Infusions:  fentaNYL     ropivacaine 0 1% and fentaNYL 2 mcg/mL  Last Rate: Stopped (09/12/19 1600)   sodium chloride 150 mL/hr Last Rate: 150 mL/hr (09/13/19 0325)     PRN Meds: diphenhydrAMINE    HYDROmorphone    ondansetron    Imaging Studies: I have personally reviewed pertinent reports  CT ABDOMEN AND PELVIS WITHOUT IV CONTRAST     INDICATION:   Hematuria   "CYSTOSCOPY , INSERTION URETERAL CATHETERS (N/A)  EXPLORATORY LAPAROTOMY, EXENTERATION POSTERIOR PELVIS,  END COLOSTOMY, ILEOSTOMY REVERSAL, LYSIS OF ADHESIONS, rADICAL OMENTECTOMY AND TUMOR DEBULKINGFLEXIBLE SIGMOIDOSCOPY, CYSTOGRAM, INSPECTION OF URETERS  ""     COMPARISON:  CT abdomen pelvis 7/14/2019     TECHNIQUE:  CT examination of the abdomen and pelvis was performed without intravenous contrast   Axial, sagittal, and coronal 2D reformatted images were created from the source data and submitted for interpretation       Radiation dose length product (DLP) for this visit:  729 28 mGy-cm     This examination, like all CT scans performed in the [facility], was performed utilizing techniques to minimize radiation dose exposure, including the use of iterative   reconstruction and automated exposure control       Enteric contrast was administered       FINDINGS:     ABDOMEN     LOWER CHEST:  Small moderate bibasilar pleural effusions with associated compressive atelectasis      Low density appearance of the intracardiac blood pool in keeping with anemia     LIVER/BILIARY TREE:  Previously seen hypodense area along the medial hepatic dome is less well-visualized but likely persistent     GALLBLADDER:  There are gallstone(s) within the gallbladder, without pericholecystic inflammatory changes      SPLEEN:  Stable posterior cystic lesion with rim calcification measuring 3 cm     PANCREAS:  Unremarkable      ADRENAL GLANDS:  Unremarkable      KIDNEYS/URETERS:  Interval development of mild bilateral hydroureteronephrosis without obstructive ureteral calculi      STOMACH AND BOWEL:  No bowel obstruction  Left lower quadrant ostomy      APPENDIX:  No findings to suggest appendicitis      ABDOMINOPELVIC CAVITY:  Small quantity of free air within the abdomen and pelvis on a recent postoperative basis      Small amount of pelvic ascites  A pelvic drain is present      VESSELS:  Unremarkable for patient's age      PELVIS     REPRODUCTIVE ORGANS:  Hysterectomy      URINARY BLADDER:  The bladder is decompressed      ABDOMINAL WALL/INGUINAL REGIONS:  Postsurgical changes      OSSEOUS STRUCTURES:  No acute fracture or destructive osseous lesion      IMPRESSION:     Mild bilateral hydroureteronephrosis without obstructive ureteral calculi  No perinephric collection      The bladder is decompressed with a Haskins catheter      Expected postsurgical changes within the abdomen and pelvis  EKG, Pathology, Microbiology, and Other Studies: I have personally reviewed pertinent reports        __________________    Signature / Title: Mando Chawla DO, Ob/Gyn, PGY-3  Date: 9/13/2019  Time: 6:11 AM

## 2019-09-13 NOTE — NURSING NOTE
Haskins output of 75 cc since 1900, while the patient is receiving continuous fluids @150 cc/hr, 2 units FFP, and 1 unit PRBC  VS stable  Urine is bloody  Notified GynOnc  Ordered to continue to monitor

## 2019-09-13 NOTE — PROGRESS NOTES
Progress Note - Diane Luu 70 y o  female MRN: [id number]    Unit/Bed#: [facility] 905-01 Encounter: [id number]      Assessment:  69 yo F s/p Ex lap, exenteration posterior pelvis, end colostomy, ileostomy reversal, radial omentectomy, tumor debulking    POD#3  VSS  Afebrile  Abdomen soft/ nontender/ non distended  Ostomy viable  Passing some flatus into stoma  JAISON w serosanguinous output,300  Clemons w 930 red tinged urine  Plan:  Continue clears  Maintain epidural  Walk  Maintain jaison  Maintain clemons  Continue IS    Subjective:   Reported passing gas into ostomy  Reporting abdominal discomfort, nausea, dry heaves yesterday  Denied chest pain or shortness of breath  Objective:     Vitals: Blood pressure 156/70, pulse 82, temperature 99 9 °F (37 7 °C), resp  rate 21, height 5' 4" (1 626 m), weight 70 5 kg (155 lb 6 8 oz), SpO2 90 %  ,Body mass index is 26 68 kg/m²  Intake/Output Summary (Last 24 hours) at 9/13/2019 0300  Last data filed at 9/13/2019 0239  Gross per 24 hour   Intake 3620 33 ml   Output 1350 ml   Net 2270 33 ml       Physical Exam  General: NAD  HEENT: NC/AT  MMM  Cv: RRR     Lungs: normal effort  Ab: Soft, NT/ND  Ex: no CCE  Neuro: AAOx3    Scheduled Meds:  Current Facility-Administered Medications:  acetaminophen 650 mg Oral Q6H [address] Karla R Rasmuson, PA-C    diphenhydrAMINE 25 mg Intravenous Q6H PRN Darlean Forward PA-C    fentaNYL  Intravenous Continuous [place] F Gisellebo, DO    heparin (porcine) 5,000 Units Subcutaneous Q8H [address] Sathish Renzo Rasmuson, PA-C    HYDROmorphone 0 5 mg Intravenous Q1H PRN Sathish Renzo Rasmuson, PA-C    insulin lispro 1-5 Units Subcutaneous Q6H [address] Karla R Rasmuson, PA-C    ondansetron 4 mg Intravenous Q6H PRN Sathish Renzo Rasmuson, PA-C    pantoprazole 40 mg Intravenous Q24H [address] [place] F Zabo, DO    ropivacaine 0 1% and fentaNYL 2 mcg/mL  Epidural Continuous Sathish Zepeda PA-C Last Rate: Stopped (09/12/19 1600)   sodium chloride 150 mL/hr Intravenous Continuous Alicia Martin MD Last Rate: 150 mL/hr (09/12/19 2038)     Continuous Infusions:  fentaNYL     ropivacaine 0 1% and fentaNYL 2 mcg/mL  Last Rate: Stopped (09/12/19 1600)   sodium chloride 150 mL/hr Last Rate: 150 mL/hr (09/12/19 2038)     PRN Meds: diphenhydrAMINE    HYDROmorphone    ondansetron      Invasive Devices     Central Venous Catheter Line            Port A Cath 11/29/18 Right Chest 288 days          Peripheral Intravenous Line            Peripheral IV 09/10/19 Left Arm 2 days    Peripheral IV 09/12/19 Right Forearm less than 1 day          Epidural Line            Epidural Catheter 09/10/19 2 days          Drain            Ileostomy Loop  days    Colostomy Descending/sigmoid LUQ 3 days    Closed/Suction Drain Right RLQ Bulb 2 days    Urethral Catheter Double-lumen;Non-latex 16 Fr  2 days                Lab, Imaging and other studies: I have personally reviewed pertinent reports      VTE Pharmacologic Prophylaxis: Heparin  VTE Mechanical Prophylaxis: sequential compression device

## 2019-09-13 NOTE — WOUND OSTOMY CARE
Progress Note- Ostomy  Nikki Muhammad 70 y o  female  [id number]  [facility] 905-[facility] 905-01        Assessment:  Patient and daughter seen for colostomy care teaching, patient in bed, very somnolent and complaining of nausea  Mid abdominal incision remains well approximated  With Hystocril, with no drainage  Colostomy is pouch with a one piece pouching system, pouch is intact with no evidence of flatus or stool, minimal serosanguinous drainage noted in pouch  Stoma is round, minimally budded, pink and moist  Patient brought is some supplies from home, however her pouching system is not recommended for post-operative use  Teaching today included, difference in type output from ileostomy vs Colostomy, dietary recommendations, skin and stoma care as well as pouching system available  Both patient and daughter shown difference between closed end and drainable as well as one to two piece pouching system  All questions answered, patient and daughter encouraged asking questions and requesting for ET if they have any questions  Per patient and daughter they will not be needing any further education, ostomy supplies ordered and given to patient to take home  Plan:  Wound care is signing off, please call with questions or concerns  Vitals:    09/13/19 1303   BP: 156/77   Pulse: 84   Resp: 18   Temp: 99 3 °F (37 4 °C)   SpO2: 92%     Colostomy Descending/sigmoid LUQ (Active)   Stomal Appliance 1 piece 9/13/2019 12:09 PM   Stoma Assessment [place] 9/13/2019 12:09 PM   Stoma size (in) 1 in 9/13/2019 12:09 PM   Stoma Shape Round 9/13/2019 12:09 PM   Peristomal Assessment Intact 9/13/2019 12:09 PM   Output (mL) 0 mL 9/13/2019 12:09 PM   Number of days: 3       We encouraged call wound care with questions, concerns to ext [phone], we will remains available for patients needs or questions  Daniel Abdi, RN, BSN, [place]

## 2019-09-13 NOTE — CONSULTS
Patient seen in consultation    Please refer to most recent urology progress note by Dr Jacqueline Escobedo 9/13

## 2019-09-13 NOTE — PROGRESS NOTES
Patient refusing NGT placement at this time, despite education  No vomiting and nausea is a little bit better per pt  Attempted to call Gyn onc team multiple times, awaiting response  Will continue to monitor

## 2019-09-13 NOTE — PROGRESS NOTES
Anesthesia Progress Note - Epidural Pain Management    Olena Gallardo MRN: [id number]  Unit/Bed#: [facility] 905-01 Encounter: [id number]    SURGERY DATE: 9/10/2019  Post-Op Diagnosis Codes:     * Malignant neoplasm of ovary, unspecified laterality ([facility] [address] ) [C56 9]    Assessment:   70 y o  female STATUS POST   Procedure(s):  CYSTOSCOPY , INSERTION URETERAL CATHETERS  EXPLORATORY LAPAROTOMY, EXENTERATION POSTERIOR PELVIS,  END COLOSTOMY, ILEOSTOMY REVERSAL, LYSIS OF ADHESIONS, rADICAL OMENTECTOMY AND TUMOR DEBULKINGFLEXIBLE SIGMOIDOSCOPY, CYSTOGRAM, INSPECTION OF URETERS  POD# 3    Plan:   Patient accidentally pulled out her epidural overnight while moving around in bed  It is unclear the timing of previous dose of anticoagulation, however she denies any loss of neurological function or back pain suspect of epidural hematoma  Patient is currently being managed on a Dilaudid PCA  If pain is uncontrolled consider replacing epidural for improved postop pain control  APS will sign-off please call [phone] with additional question or if primary service would like her epidural to be replaced for improved pain control  Please call  ( [facility] [phone]) with any questions    Subjective:  Current pain location(s):  Abdomen  Pain Scale:   6-9/10    Meds/Allergies     No Known Allergies    Objective     Temp:  [98 5 °F (36 9 °C)-99 9 °F (37 7 °C)] 99 3 °F (37 4 °C)  HR:  [72-88] 87  Resp:  [16-22] 20  BP: (121-156)/(58-79) 156/78    Physical Exam:  Gen: Well appearing, NAD  CV: RRR  Pulm: CTAB  Neuro: No focal deficits  Epidural Site:  Site clean, no induration, no pain to palpation      SIGNATURE: Milton Wood MD  DATE: September 13, 2019  TIME: 12:15 PM

## 2019-09-13 NOTE — PROGRESS NOTES
Postoperative day 3  Status post posterior pelvic exenteration secondary to ovarian malignancy  Intraoperatively I was called to assess the bladder and ureters  I found that there was no evidence of ureteral injury  Both ureters, however, were extensively mobilized out of the retroperitoneum  She has a Haskins catheter in place  Her creatinine continues to rise  Today her creatinine is as high as 3 9  Consultation with Nephrology has been obtained  CT scan was performed on September 12, 2019 showing mild bilateral hydronephrosis without evidence of intra-abdominal fluid  Urine output is marginal     ANDREAS drain 260/330 last 48 hours    Impression:  Ovarian malignancy status post posterior pelvic exenteration, acute on chronic kidney disease    Plan:  Agree with nephrology consultation  Check ANDREAS drain for creatinine  If her creatinine continues to rise I would repeat a noncontrast CT scan on September 14, 2019    If the hydronephrosis worsens consideration will be given to placing ureteral stents, however, it is likely that this is acute kidney injury secondary to ATN

## 2019-09-13 NOTE — PLAN OF CARE
Problem: Prexisting or High Potential for Compromised Skin Integrity  Goal: Skin integrity is maintained or improved  Description  INTERVENTIONS:  - Identify patients at risk for skin breakdown  - Assess and monitor skin integrity  - Assess and monitor nutrition and hydration status  - Monitor labs   - Assess for incontinence   - Turn and reposition patient  - Assist with mobility/ambulation  - Relieve pressure over bony prominences  - Avoid friction and shearing  - Provide appropriate hygiene as needed including keeping skin clean and dry  - Evaluate need for skin moisturizer/barrier cream  - Collaborate with interdisciplinary team   - Patient/family teaching  - Consider wound care consult   Outcome: Progressing     Problem: NEUROSENSORY - ADULT  Goal: Achieves stable or improved neurological status  Description  INTERVENTIONS  - Monitor and report changes in neurological status  - Monitor vital signs such as temperature, blood pressure, glucose, and any other labs ordered   - Initiate measures to prevent increased intracranial pressure  - Monitor for seizure activity and implement precautions if appropriate      Outcome: Progressing     Problem: CARDIOVASCULAR - ADULT  Goal: Maintains optimal cardiac output and hemodynamic stability  Description  INTERVENTIONS:  - Monitor I/O, vital signs and rhythm  - Monitor for S/S and trends of decreased cardiac output  - Administer and titrate ordered vasoactive medications to optimize hemodynamic stability  - Assess quality of pulses, skin color and temperature  - Assess for signs of decreased coronary artery perfusion  - Instruct patient to report change in severity of symptoms  Outcome: Progressing     Problem: RESPIRATORY - ADULT  Goal: Achieves optimal ventilation and oxygenation  Description  INTERVENTIONS:  - Assess for changes in respiratory status  - Assess for changes in mentation and behavior  - Position to facilitate oxygenation and minimize respiratory effort  - Oxygen administered by appropriate delivery if ordered  - Initiate smoking cessation education as indicated  - Encourage broncho-pulmonary hygiene including cough, deep breathe, Incentive Spirometry  - Assess the need for suctioning and aspirate as needed  - Assess and instruct to report SOB or any respiratory difficulty  - Respiratory Therapy support as indicated  Outcome: Progressing     Problem: GASTROINTESTINAL - ADULT  Goal: Maintains or returns to baseline bowel function  Description  INTERVENTIONS:  - Assess bowel function  - Encourage oral fluids to ensure adequate hydration  - Administer IV fluids if ordered to ensure adequate hydration  - Administer ordered medications as needed  - Encourage mobilization and activity  - Consider nutritional services referral to assist patient with adequate nutrition and appropriate food choices  Outcome: Progressing     Problem: GENITOURINARY - ADULT  Goal: Maintains or returns to baseline urinary function  Description  INTERVENTIONS:  - Assess urinary function  - Encourage oral fluids to ensure adequate hydration if ordered  - Administer IV fluids as ordered to ensure adequate hydration  - Administer ordered medications as needed  - Offer frequent toileting  - Follow urinary retention protocol if ordered  Outcome: Progressing     Problem: METABOLIC, FLUID AND ELECTROLYTES - ADULT  Goal: Electrolytes maintained within normal limits  Description  INTERVENTIONS:  - Monitor labs and assess patient for signs and symptoms of electrolyte imbalances  - Administer electrolyte replacement as ordered  - Monitor response to electrolyte replacements, including repeat lab results as appropriate  - Instruct patient on fluid and nutrition as appropriate  Outcome: Progressing  Goal: Fluid balance maintained  Description  INTERVENTIONS:  - Monitor labs   - Monitor I/O and WT  - Instruct patient on fluid and nutrition as appropriate  - Assess for signs & symptoms of volume excess or deficit  Outcome: Progressing  Goal: Glucose maintained within target range  Description  INTERVENTIONS:  - Monitor Blood Glucose as ordered  - Assess for signs and symptoms of hyperglycemia and hypoglycemia  - Administer ordered medications to maintain glucose within target range  - Assess nutritional intake and initiate nutrition service referral as needed  Outcome: Progressing     Problem: SKIN/TISSUE INTEGRITY - ADULT  Goal: Skin integrity remains intact  Description  INTERVENTIONS  - Identify patients at risk for skin breakdown  - Assess and monitor skin integrity  - Assess and monitor nutrition and hydration status  - Monitor labs (i e  albumin)  - Assess for incontinence   - Turn and reposition patient  - Assist with mobility/ambulation  - Relieve pressure over bony prominences  - Avoid friction and shearing  - Provide appropriate hygiene as needed including keeping skin clean and dry  - Evaluate need for skin moisturizer/barrier cream  - Collaborate with interdisciplinary team (i e  Nutrition, Rehabilitation, etc )   - Patient/family teaching  Outcome: Progressing  Goal: Incision(s), wounds(s) or drain site(s) healing without S/S of infection  Description  INTERVENTIONS  - Assess and document risk factors for skin impairment   - Assess and document dressing, incision, wound bed, drain sites and surrounding tissue  - Consider nutrition services referral as needed  - Oral mucous membranes remain intact  - Provide patient/ family education  Outcome: Progressing  Goal: Oral mucous membranes remain intact  Description  INTERVENTIONS  - Assess oral mucosa and hygiene practices  - Implement preventative oral hygiene regimen  - Implement oral medicated treatments as ordered  - Initiate Nutrition services referral as needed  Outcome: Progressing     Problem: HEMATOLOGIC - ADULT  Goal: Maintains hematologic stability  Description  INTERVENTIONS  - Assess for signs and symptoms of bleeding or hemorrhage  - Monitor labs  - Administer supportive blood products/factors as ordered and appropriate  Outcome: Progressing     Problem: MUSCULOSKELETAL - ADULT  Goal: Maintain or return mobility to safest level of function  Description  INTERVENTIONS:  - Assess patient's ability to carry out ADLs; assess patient's baseline for ADL function and identify physical deficits which impact ability to perform ADLs (bathing, care of mouth/teeth, toileting, grooming, dressing, etc )  - Assess/evaluate cause of self-care deficits   - Assess range of motion  - Assess patient's mobility  - Assess patient's need for assistive devices and provide as appropriate  - Encourage maximum independence but intervene and supervise when necessary  - Involve family in performance of ADLs  - Assess for home care needs following discharge   - Consider OT consult to assist with ADL evaluation and planning for discharge  - Provide patient education as appropriate  Outcome: Progressing  Goal: Maintain proper alignment of affected body part  Description  INTERVENTIONS:  - Support, maintain and protect limb and body alignment  - Provide patient/ family with appropriate education  Outcome: Progressing     Problem: PAIN - ADULT  Goal: Verbalizes/displays adequate comfort level or baseline comfort level  Description  Interventions:  - Encourage patient to monitor pain and request assistance  - Assess pain using appropriate pain scale  - Administer analgesics based on type and severity of pain and evaluate response  - Implement non-pharmacological measures as appropriate and evaluate response  - Consider cultural and social influences on pain and pain management  - Notify physician/advanced practitioner if interventions unsuccessful or patient reports new pain  Outcome: Progressing     Problem: INFECTION - ADULT  Goal: Absence or prevention of progression during hospitalization  Description  INTERVENTIONS:  - Assess and monitor for signs and symptoms of infection  - Monitor lab/diagnostic results  - Monitor all insertion sites, i e  indwelling lines, tubes, and drains  - Monitor endotracheal if appropriate and nasal secretions for changes in amount and color  - [place] appropriate cooling/warming therapies per order  - Administer medications as ordered  - Instruct and encourage patient and family to use good hand hygiene technique  - Identify and instruct in appropriate isolation precautions for identified infection/condition  Outcome: Progressing  Goal: Absence of fever/infection during neutropenic period  Description  INTERVENTIONS:  - Monitor WBC    Outcome: Progressing     Problem: SAFETY ADULT  Goal: Patient will remain free of falls  Description  INTERVENTIONS:  - Assess patient frequently for physical needs  -  Identify cognitive and physical deficits and behaviors that affect risk of falls    -  [place] fall precautions as indicated by assessment   - Educate patient/family on patient safety including physical limitations  - Instruct patient to call for assistance with activity based on assessment  - Modify environment to reduce risk of injury  - Consider OT/PT consult to assist with strengthening/mobility  Outcome: Progressing  Goal: Maintain or return to baseline ADL function  Description  INTERVENTIONS:  -  Assess patient's ability to carry out ADLs; assess patient's baseline for ADL function and identify physical deficits which impact ability to perform ADLs (bathing, care of mouth/teeth, toileting, grooming, dressing, etc )  - Assess/evaluate cause of self-care deficits   - Assess range of motion  - Assess patient's mobility; develop plan if impaired  - Assess patient's need for assistive devices and provide as appropriate  - Encourage maximum independence but intervene and supervise when necessary  - Involve family in performance of ADLs  - Assess for home care needs following discharge   - Consider OT consult to assist with ADL evaluation and planning for discharge  - Provide patient education as appropriate  Outcome: Progressing  Goal: Maintain or return mobility status to optimal level  Description  INTERVENTIONS:  - Assess patient's baseline mobility status (ambulation, transfers, stairs, etc )    - Identify cognitive and physical deficits and behaviors that affect mobility  - Identify mobility aids required to assist with transfers and/or ambulation (gait belt, sit-to-stand, lift, walker, cane, etc )  - [place] fall precautions as indicated by assessment  - Record patient progress and toleration of activity level on Mobility SBAR; progress patient to next Phase/Stage  - Instruct patient to call for assistance with activity based on assessment  - Consider rehabilitation consult to assist with strengthening/weightbearing, etc   Outcome: Progressing     Problem: DISCHARGE PLANNING  Goal: Discharge to home or other facility with appropriate resources  Description  INTERVENTIONS:  - Identify barriers to discharge w/patient and caregiver  - Arrange for needed discharge resources and transportation as appropriate  - Identify discharge learning needs (meds, wound care, etc )  - Arrange for interpretive services to assist at discharge as needed  - Refer to Case Management Department for coordinating discharge planning if the patient needs post-hospital services based on physician/advanced practitioner order or complex needs related to functional status, cognitive ability, or social support system  Outcome: Progressing     Problem: Knowledge Deficit  Goal: Patient/family/caregiver demonstrates understanding of disease process, treatment plan, medications, and discharge instructions  Description  Complete learning assessment and assess knowledge base    Interventions:  - Provide teaching at level of understanding  - Provide teaching via preferred learning methods  Outcome: Progressing     Problem: Nutrition/Hydration-ADULT  Goal: Nutrient/Hydration intake appropriate for improving, restoring or maintaining nutritional needs  Description  Monitor and assess patient's nutrition/hydration status for malnutrition  Collaborate with interdisciplinary team and initiate plan and interventions as ordered  Monitor patient's weight and dietary intake as ordered or per policy  Utilize nutrition screening tool and intervene as necessary  Determine patient's food preferences and provide high-protein, high-caloric foods as appropriate  INTERVENTIONS:  - Monitor oral intake, urinary output, labs, and treatment plans  - Assess nutrition and hydration status and recommend course of action  - Evaluate amount of meals eaten  - Assist patient with eating if necessary   - Allow adequate time for meals  - Recommend/ encourage appropriate diets, oral nutritional supplements, and vitamin/mineral supplements  - Order, calculate, and assess calorie counts as needed  - Recommend, monitor, and adjust tube feedings and TPN/PPN based on assessed needs  - Assess need for intravenous fluids  - Provide specific nutrition/hydration education as appropriate  - Include patient/family/caregiver in decisions related to nutrition  Outcome: Progressing     Problem: Potential for Falls  Goal: Patient will remain free of falls  Description  INTERVENTIONS:  - Assess patient frequently for physical needs  -  Identify cognitive and physical deficits and behaviors that affect risk of falls    -  [place] fall precautions as indicated by assessment   - Educate patient/family on patient safety including physical limitations  - Instruct patient to call for assistance with activity based on assessment  - Modify environment to reduce risk of injury  - Consider OT/PT consult to assist with strengthening/mobility  Outcome: Progressing

## 2019-09-14 LAB
ABO GROUP BLD BPU: NORMAL
ANION GAP SERPL CALCULATED.3IONS-SCNC: 9 MMOL/L (ref 4–13)
BASOPHILS # BLD AUTO: 0.02 THOUSANDS/ΜL (ref 0–0.1)
BASOPHILS NFR BLD AUTO: 0 % (ref 0–1)
BPU ID: NORMAL
BUN SERPL-MCNC: 59 MG/DL (ref 5–25)
CALCIUM SERPL-MCNC: 8.1 MG/DL (ref 8.3–10.1)
CHLORIDE SERPL-SCNC: 114 MMOL/L (ref 100–108)
CO2 SERPL-SCNC: 19 MMOL/L (ref 21–32)
CREAT SERPL-MCNC: 2.54 MG/DL (ref 0.6–1.3)
CROSSMATCH: NORMAL
EOSINOPHIL # BLD AUTO: 0 THOUSAND/ΜL (ref 0–0.61)
EOSINOPHIL NFR BLD AUTO: 0 % (ref 0–6)
ERYTHROCYTE [DISTWIDTH] IN BLOOD BY AUTOMATED COUNT: 16.6 % (ref 11.6–15.1)
GFR SERPL CREATININE-BSD FRML MDRD: 19 ML/MIN/1.73SQ M
GLUCOSE SERPL-MCNC: 116 MG/DL (ref 65–140)
GLUCOSE SERPL-MCNC: 121 MG/DL (ref 65–140)
GLUCOSE SERPL-MCNC: 128 MG/DL (ref 65–140)
GLUCOSE SERPL-MCNC: 137 MG/DL (ref 65–140)
HCT VFR BLD AUTO: 27.4 % (ref 34.8–46.1)
HGB BLD-MCNC: 9 G/DL (ref 11.5–15.4)
IMM GRANULOCYTES # BLD AUTO: 0.2 THOUSAND/UL (ref 0–0.2)
IMM GRANULOCYTES NFR BLD AUTO: 1 % (ref 0–2)
LYMPHOCYTES # BLD AUTO: 1.45 THOUSANDS/ΜL (ref 0.6–4.47)
LYMPHOCYTES NFR BLD AUTO: 8 % (ref 14–44)
MAGNESIUM SERPL-MCNC: 2.2 MG/DL (ref 1.6–2.6)
MCH RBC QN AUTO: 29.9 PG (ref 26.8–34.3)
MCHC RBC AUTO-ENTMCNC: 32.8 G/DL (ref 31.4–37.4)
MCV RBC AUTO: 91 FL (ref 82–98)
MONOCYTES # BLD AUTO: 1.42 THOUSAND/ΜL (ref 0.17–1.22)
MONOCYTES NFR BLD AUTO: 8 % (ref 4–12)
NEUTROPHILS # BLD AUTO: 15.13 THOUSANDS/ΜL (ref 1.85–7.62)
NEUTS SEG NFR BLD AUTO: 83 % (ref 43–75)
NRBC BLD AUTO-RTO: 0 /100 WBCS
PHOSPHATE SERPL-MCNC: 3.7 MG/DL (ref 2.3–4.1)
PLATELET # BLD AUTO: 179 THOUSANDS/UL (ref 149–390)
PMV BLD AUTO: 12.3 FL (ref 8.9–12.7)
POTASSIUM SERPL-SCNC: 3.6 MMOL/L (ref 3.5–5.3)
RBC # BLD AUTO: 3.01 MILLION/UL (ref 3.81–5.12)
SODIUM SERPL-SCNC: 142 MMOL/L (ref 136–145)
UNIT DISPENSE STATUS: NORMAL
UNIT PRODUCT CODE: NORMAL
UNIT RH: NORMAL
WBC # BLD AUTO: 18.22 THOUSAND/UL (ref 4.31–10.16)

## 2019-09-14 PROCEDURE — 99232 SBSQ HOSP IP/OBS MODERATE 35: CPT | Performed by: INTERNAL MEDICINE

## 2019-09-14 PROCEDURE — 99024 POSTOP FOLLOW-UP VISIT: CPT | Performed by: UROLOGY

## 2019-09-14 PROCEDURE — C9113 INJ PANTOPRAZOLE SODIUM, VIA: HCPCS | Performed by: OBSTETRICS & GYNECOLOGY

## 2019-09-14 PROCEDURE — 85025 COMPLETE CBC W/AUTO DIFF WBC: CPT | Performed by: INTERNAL MEDICINE

## 2019-09-14 PROCEDURE — 99024 POSTOP FOLLOW-UP VISIT: CPT | Performed by: OBSTETRICS & GYNECOLOGY

## 2019-09-14 PROCEDURE — 84100 ASSAY OF PHOSPHORUS: CPT | Performed by: INTERNAL MEDICINE

## 2019-09-14 PROCEDURE — 80048 BASIC METABOLIC PNL TOTAL CA: CPT | Performed by: INTERNAL MEDICINE

## 2019-09-14 PROCEDURE — 82948 REAGENT STRIP/BLOOD GLUCOSE: CPT

## 2019-09-14 PROCEDURE — 83735 ASSAY OF MAGNESIUM: CPT | Performed by: INTERNAL MEDICINE

## 2019-09-14 RX ORDER — METOCLOPRAMIDE HYDROCHLORIDE 5 MG/ML
10 INJECTION INTRAMUSCULAR; INTRAVENOUS EVERY 6 HOURS PRN
Status: DISCONTINUED | OUTPATIENT
Start: 2019-09-14 | End: 2019-09-14

## 2019-09-14 RX ADMIN — PANTOPRAZOLE SODIUM 40 MG: 40 INJECTION, POWDER, FOR SOLUTION INTRAVENOUS at 08:13

## 2019-09-14 RX ADMIN — HEPARIN SODIUM 5000 UNITS: 5000 INJECTION INTRAVENOUS; SUBCUTANEOUS at 14:38

## 2019-09-14 RX ADMIN — SODIUM BICARBONATE 125 ML/HR: 84 INJECTION, SOLUTION INTRAVENOUS at 23:27

## 2019-09-14 RX ADMIN — SODIUM BICARBONATE 125 ML/HR: 84 INJECTION, SOLUTION INTRAVENOUS at 23:55

## 2019-09-14 RX ADMIN — HEPARIN SODIUM 5000 UNITS: 5000 INJECTION INTRAVENOUS; SUBCUTANEOUS at 23:03

## 2019-09-14 RX ADMIN — ONDANSETRON 4 MG: 2 INJECTION INTRAMUSCULAR; INTRAVENOUS at 05:28

## 2019-09-14 RX ADMIN — HEPARIN SODIUM 5000 UNITS: 5000 INJECTION INTRAVENOUS; SUBCUTANEOUS at 05:23

## 2019-09-14 RX ADMIN — SODIUM BICARBONATE 125 ML/HR: 84 INJECTION, SOLUTION INTRAVENOUS at 05:24

## 2019-09-14 NOTE — PROGRESS NOTES
Progress Note - Colorectal Surgery   Darreld Screen 70 y o  female MRN: [id number]  Unit/Bed#: [facility] 905-01 Encounter: [id number]    Assessment:  71 yo F s/p Ex lap, exenteration posterior pelvis, end colostomy, ileostomy reversal, radial omentectomy, tumor debulking  Ongoing nausea  ANDREAS 320 , serosang    Plan:  NPO, consider NGT if becomes distended or + emesis  Pain control w/ PCA   ANDREAS creat 3 92 - same as blood levels - continue for high outputs  Monitor colostomy function  Care per primary    Subjective/Objective   Chief Complaint:     Subjective: Remains w/ nausea, small volume spit up  Pain better controlled w/ PCA    Objective:     Blood pressure 156/80, pulse 90, temperature 99 3 °F (37 4 °C), resp  rate 18, height 5' 4" (1 626 m), weight 84 4 kg (186 lb 1 1 oz), SpO2 92 %  ,Body mass index is 31 94 kg/m²  Intake/Output Summary (Last 24 hours) at 9/14/2019 0635  Last data filed at 9/14/2019 4908  Gross per 24 hour   Intake 3728 17 ml   Output 4220 ml   Net -491 83 ml       Invasive Devices     Central Venous Catheter Line            Port A Cath 11/29/18 Right Chest 289 days          Peripheral Intravenous Line            Peripheral IV 09/10/19 Left Arm 3 days    Peripheral IV 09/12/19 Right Forearm 1 day          Drain            Ileostomy Loop  days    Colostomy Descending/sigmoid LUQ 4 days    Closed/Suction Drain Right RLQ Bulb 3 days    Urethral Catheter Double-lumen;Non-latex 16 Fr  3 days                Physical Exam:   Gen: A&O, NAD  Cardio: RRR  Lungs: CTAB  Abd: Soft, non distended, non tympanitic  Moderately tender  ANDREAS serosang   Colostomy flat, no air in bag      Lab, Imaging and other studies:  CBC:   Lab Results   Component Value Date    WBC 18 22 (H) 09/14/2019    HGB 9 0 (L) 09/14/2019    HCT 27 4 (L) 09/14/2019    MCV 91 09/14/2019     09/14/2019    MCH 29 9 09/14/2019    MCHC 32 8 09/14/2019    RDW 16 6 (H) 09/14/2019    MPV 12 3 09/14/2019    NRBC 0 09/14/2019   , CMP: Lab Results   Component Value Date    SODIUM 145 09/13/2019    K 4 2 09/13/2019     (H) 09/13/2019    CO2 17 (L) 09/13/2019    BUN 60 (H) 09/13/2019    CREATININE 3 43 (H) 09/13/2019    CALCIUM 8 3 09/13/2019    EGFR 13 09/13/2019   , Coagulation: No results found for: PT, INR, APTT  VTE Pharmacologic Prophylaxis: Heparin  VTE Mechanical Prophylaxis: sequential compression device

## 2019-09-14 NOTE — PLAN OF CARE
Problem: Prexisting or High Potential for Compromised Skin Integrity  Goal: Skin integrity is maintained or improved  Description  INTERVENTIONS:  - Identify patients at risk for skin breakdown  - Assess and monitor skin integrity  - Assess and monitor nutrition and hydration status  - Monitor labs   - Assess for incontinence   - Turn and reposition patient  - Assist with mobility/ambulation  - Relieve pressure over bony prominences  - Avoid friction and shearing  - Provide appropriate hygiene as needed including keeping skin clean and dry  - Evaluate need for skin moisturizer/barrier cream  - Collaborate with interdisciplinary team   - Patient/family teaching  - Consider wound care consult   Outcome: Progressing     Problem: NEUROSENSORY - ADULT  Goal: Achieves stable or improved neurological status  Description  INTERVENTIONS  - Monitor and report changes in neurological status  - Monitor vital signs such as temperature, blood pressure, glucose, and any other labs ordered   - Initiate measures to prevent increased intracranial pressure  - Monitor for seizure activity and implement precautions if appropriate      Outcome: Progressing     Problem: CARDIOVASCULAR - ADULT  Goal: Maintains optimal cardiac output and hemodynamic stability  Description  INTERVENTIONS:  - Monitor I/O, vital signs and rhythm  - Monitor for S/S and trends of decreased cardiac output  - Administer and titrate ordered vasoactive medications to optimize hemodynamic stability  - Assess quality of pulses, skin color and temperature  - Assess for signs of decreased coronary artery perfusion  - Instruct patient to report change in severity of symptoms  Outcome: Progressing     Problem: RESPIRATORY - ADULT  Goal: Achieves optimal ventilation and oxygenation  Description  INTERVENTIONS:  - Assess for changes in respiratory status  - Assess for changes in mentation and behavior  - Position to facilitate oxygenation and minimize respiratory effort  - Oxygen administered by appropriate delivery if ordered  - Initiate smoking cessation education as indicated  - Encourage broncho-pulmonary hygiene including cough, deep breathe, Incentive Spirometry  - Assess the need for suctioning and aspirate as needed  - Assess and instruct to report SOB or any respiratory difficulty  - Respiratory Therapy support as indicated  Outcome: Progressing     Problem: GASTROINTESTINAL - ADULT  Goal: Maintains or returns to baseline bowel function  Description  INTERVENTIONS:  - Assess bowel function  - Encourage oral fluids to ensure adequate hydration  - Administer IV fluids if ordered to ensure adequate hydration  - Administer ordered medications as needed  - Encourage mobilization and activity  - Consider nutritional services referral to assist patient with adequate nutrition and appropriate food choices  Outcome: Progressing     Problem: GENITOURINARY - ADULT  Goal: Maintains or returns to baseline urinary function  Description  INTERVENTIONS:  - Assess urinary function  - Encourage oral fluids to ensure adequate hydration if ordered  - Administer IV fluids as ordered to ensure adequate hydration  - Administer ordered medications as needed  - Offer frequent toileting  - Follow urinary retention protocol if ordered  Outcome: Progressing     Problem: METABOLIC, FLUID AND ELECTROLYTES - ADULT  Goal: Electrolytes maintained within normal limits  Description  INTERVENTIONS:  - Monitor labs and assess patient for signs and symptoms of electrolyte imbalances  - Administer electrolyte replacement as ordered  - Monitor response to electrolyte replacements, including repeat lab results as appropriate  - Instruct patient on fluid and nutrition as appropriate  Outcome: Progressing  Goal: Fluid balance maintained  Description  INTERVENTIONS:  - Monitor labs   - Monitor I/O and WT  - Instruct patient on fluid and nutrition as appropriate  - Assess for signs & symptoms of volume excess or deficit  Outcome: Progressing  Goal: Glucose maintained within target range  Description  INTERVENTIONS:  - Monitor Blood Glucose as ordered  - Assess for signs and symptoms of hyperglycemia and hypoglycemia  - Administer ordered medications to maintain glucose within target range  - Assess nutritional intake and initiate nutrition service referral as needed  Outcome: Progressing     Problem: SKIN/TISSUE INTEGRITY - ADULT  Goal: Skin integrity remains intact  Description  INTERVENTIONS  - Identify patients at risk for skin breakdown  - Assess and monitor skin integrity  - Assess and monitor nutrition and hydration status  - Monitor labs (i e  albumin)  - Assess for incontinence   - Turn and reposition patient  - Assist with mobility/ambulation  - Relieve pressure over bony prominences  - Avoid friction and shearing  - Provide appropriate hygiene as needed including keeping skin clean and dry  - Evaluate need for skin moisturizer/barrier cream  - Collaborate with interdisciplinary team (i e  Nutrition, Rehabilitation, etc )   - Patient/family teaching  Outcome: Progressing  Goal: Incision(s), wounds(s) or drain site(s) healing without S/S of infection  Description  INTERVENTIONS  - Assess and document risk factors for skin impairment   - Assess and document dressing, incision, wound bed, drain sites and surrounding tissue  - Consider nutrition services referral as needed  - Oral mucous membranes remain intact  - Provide patient/ family education  Outcome: Progressing  Goal: Oral mucous membranes remain intact  Description  INTERVENTIONS  - Assess oral mucosa and hygiene practices  - Implement preventative oral hygiene regimen  - Implement oral medicated treatments as ordered  - Initiate Nutrition services referral as needed  Outcome: Progressing     Problem: HEMATOLOGIC - ADULT  Goal: Maintains hematologic stability  Description  INTERVENTIONS  - Assess for signs and symptoms of bleeding or hemorrhage  - Monitor labs  - Administer supportive blood products/factors as ordered and appropriate  Outcome: Progressing     Problem: MUSCULOSKELETAL - ADULT  Goal: Maintain or return mobility to safest level of function  Description  INTERVENTIONS:  - Assess patient's ability to carry out ADLs; assess patient's baseline for ADL function and identify physical deficits which impact ability to perform ADLs (bathing, care of mouth/teeth, toileting, grooming, dressing, etc )  - Assess/evaluate cause of self-care deficits   - Assess range of motion  - Assess patient's mobility  - Assess patient's need for assistive devices and provide as appropriate  - Encourage maximum independence but intervene and supervise when necessary  - Involve family in performance of ADLs  - Assess for home care needs following discharge   - Consider OT consult to assist with ADL evaluation and planning for discharge  - Provide patient education as appropriate  Outcome: Progressing  Goal: Maintain proper alignment of affected body part  Description  INTERVENTIONS:  - Support, maintain and protect limb and body alignment  - Provide patient/ family with appropriate education  Outcome: Progressing     Problem: PAIN - ADULT  Goal: Verbalizes/displays adequate comfort level or baseline comfort level  Description  Interventions:  - Encourage patient to monitor pain and request assistance  - Assess pain using appropriate pain scale  - Administer analgesics based on type and severity of pain and evaluate response  - Implement non-pharmacological measures as appropriate and evaluate response  - Consider cultural and social influences on pain and pain management  - Notify physician/advanced practitioner if interventions unsuccessful or patient reports new pain  Outcome: Progressing     Problem: INFECTION - ADULT  Goal: Absence or prevention of progression during hospitalization  Description  INTERVENTIONS:  - Assess and monitor for signs and symptoms of infection  - Monitor lab/diagnostic results  - Monitor all insertion sites, i e  indwelling lines, tubes, and drains  - Monitor endotracheal if appropriate and nasal secretions for changes in amount and color  - [place] appropriate cooling/warming therapies per order  - Administer medications as ordered  - Instruct and encourage patient and family to use good hand hygiene technique  - Identify and instruct in appropriate isolation precautions for identified infection/condition  Outcome: Progressing  Goal: Absence of fever/infection during neutropenic period  Description  INTERVENTIONS:  - Monitor WBC    Outcome: Progressing     Problem: SAFETY ADULT  Goal: Patient will remain free of falls  Description  INTERVENTIONS:  - Assess patient frequently for physical needs  -  Identify cognitive and physical deficits and behaviors that affect risk of falls    -  [place] fall precautions as indicated by assessment   - Educate patient/family on patient safety including physical limitations  - Instruct patient to call for assistance with activity based on assessment  - Modify environment to reduce risk of injury  - Consider OT/PT consult to assist with strengthening/mobility  Outcome: Progressing  Goal: Maintain or return to baseline ADL function  Description  INTERVENTIONS:  -  Assess patient's ability to carry out ADLs; assess patient's baseline for ADL function and identify physical deficits which impact ability to perform ADLs (bathing, care of mouth/teeth, toileting, grooming, dressing, etc )  - Assess/evaluate cause of self-care deficits   - Assess range of motion  - Assess patient's mobility; develop plan if impaired  - Assess patient's need for assistive devices and provide as appropriate  - Encourage maximum independence but intervene and supervise when necessary  - Involve family in performance of ADLs  - Assess for home care needs following discharge   - Consider OT consult to assist with ADL evaluation and planning for discharge  - Provide patient education as appropriate  Outcome: Progressing  Goal: Maintain or return mobility status to optimal level  Description  INTERVENTIONS:  - Assess patient's baseline mobility status (ambulation, transfers, stairs, etc )    - Identify cognitive and physical deficits and behaviors that affect mobility  - Identify mobility aids required to assist with transfers and/or ambulation (gait belt, sit-to-stand, lift, walker, cane, etc )  - [place] fall precautions as indicated by assessment  - Record patient progress and toleration of activity level on Mobility SBAR; progress patient to next Phase/Stage  - Instruct patient to call for assistance with activity based on assessment  - Consider rehabilitation consult to assist with strengthening/weightbearing, etc   Outcome: Progressing     Problem: DISCHARGE PLANNING  Goal: Discharge to home or other facility with appropriate resources  Description  INTERVENTIONS:  - Identify barriers to discharge w/patient and caregiver  - Arrange for needed discharge resources and transportation as appropriate  - Identify discharge learning needs (meds, wound care, etc )  - Arrange for interpretive services to assist at discharge as needed  - Refer to Case Management Department for coordinating discharge planning if the patient needs post-hospital services based on physician/advanced practitioner order or complex needs related to functional status, cognitive ability, or social support system  Outcome: Progressing     Problem: Knowledge Deficit  Goal: Patient/family/caregiver demonstrates understanding of disease process, treatment plan, medications, and discharge instructions  Description  Complete learning assessment and assess knowledge base    Interventions:  - Provide teaching at level of understanding  - Provide teaching via preferred learning methods  Outcome: Progressing     Problem: Nutrition/Hydration-ADULT  Goal: Nutrient/Hydration intake appropriate for improving, restoring or maintaining nutritional needs  Description  Monitor and assess patient's nutrition/hydration status for malnutrition  Collaborate with interdisciplinary team and initiate plan and interventions as ordered  Monitor patient's weight and dietary intake as ordered or per policy  Utilize nutrition screening tool and intervene as necessary  Determine patient's food preferences and provide high-protein, high-caloric foods as appropriate  INTERVENTIONS:  - Monitor oral intake, urinary output, labs, and treatment plans  - Assess nutrition and hydration status and recommend course of action  - Evaluate amount of meals eaten  - Assist patient with eating if necessary   - Allow adequate time for meals  - Recommend/ encourage appropriate diets, oral nutritional supplements, and vitamin/mineral supplements  - Order, calculate, and assess calorie counts as needed  - Recommend, monitor, and adjust tube feedings and TPN/PPN based on assessed needs  - Assess need for intravenous fluids  - Provide specific nutrition/hydration education as appropriate  - Include patient/family/caregiver in decisions related to nutrition  Outcome: Progressing     Problem: Potential for Falls  Goal: Patient will remain free of falls  Description  INTERVENTIONS:  - Assess patient frequently for physical needs  -  Identify cognitive and physical deficits and behaviors that affect risk of falls    -  [place] fall precautions as indicated by assessment   - Educate patient/family on patient safety including physical limitations  - Instruct patient to call for assistance with activity based on assessment  - Modify environment to reduce risk of injury  - Consider OT/PT consult to assist with strengthening/mobility  Outcome: Progressing

## 2019-09-14 NOTE — PROGRESS NOTES
Progress Note - Urology Progress  Karon Langston 70 y o  female MRN: [id number]  Unit/Bed#: [facility] 905-01 Encounter: [id number]    Assessment:  Postoperative day 4  Status post posterior pelvic exenteration secondary to ovarian malignancy  Intraoperatively urologic consultation was obtained to evaluate the significant surgical dissection around her bladder and ureters, which were assessed to be intact  Today her creatinine is improved and down to 2 5, and urine output is substantially improved  Consultation with Nephrology has been obtained  Plan:  Consultation with Nephrology has been obtained  Maintain Haskins catheter and ANDREAS drainage    Subjective/Objective   Chief Complaint:  Incisional discomfort    Objective:  Her serum creatinine has stabilized  Yesterday her ANDREAS creatinine was 3 9, similar serum creatinine that was 3 4 at the time  CT scan was performed on September 12, 2019 showing mild bilateral hydronephrosis without evidence of intra-abdominal fluid  Blood pressure 156/81, pulse 95, temperature 99 3 °F (37 4 °C), resp  rate 17, height 5' 4" (1 626 m), weight 84 4 kg (186 lb 1 1 oz), SpO2 91 %  ,Body mass index is 31 94 kg/m²        Intake/Output Summary (Last 24 hours) at 9/14/2019 1435  Last data filed at 9/14/2019 1431  Gross per 24 hour   Intake 2861 75 ml   Output 3590 ml   Net -728 25 ml       Invasive Devices     Central Venous Catheter Line            Port A Cath 11/29/18 Right Chest 289 days          Peripheral Intravenous Line            Peripheral IV 09/10/19 Left Arm 4 days    Peripheral IV 09/12/19 Right Forearm 1 day          Drain            Ileostomy Loop  days    Closed/Suction Drain Right RLQ Bulb 4 days    Colostomy Descending/sigmoid LUQ 4 days    Urethral Catheter Double-lumen;Non-latex 16 Fr  4 days                Physical Exam  Gen: AAOx3, NAD, comfortable in bed  CVS: S1S2+, RRR, no murmurs  Lungs: CTA b/l normal respiratory effort and rate  Abdomen: soft, normal BS, no distension, incision C/D/I; RLQ drain 320cc over 24 hours; serosang  :  Haskins in place  Extremities: SCDs on and on, non tender    Lab, Imaging and other studies:  I have personally reviewed pertinent lab results    , CBC:   Lab Results   Component Value Date    WBC 18 22 (H) 09/14/2019    HGB 9 0 (L) 09/14/2019    HCT 27 4 (L) 09/14/2019    MCV 91 09/14/2019     09/14/2019    MCH 29 9 09/14/2019    MCHC 32 8 09/14/2019    RDW 16 6 (H) 09/14/2019    MPV 12 3 09/14/2019    NRBC 0 09/14/2019   , CMP:   Lab Results   Component Value Date    SODIUM 142 09/14/2019    K 3 6 09/14/2019     (H) 09/14/2019    CO2 19 (L) 09/14/2019    BUN 59 (H) 09/14/2019    CREATININE 2 54 (H) 09/14/2019    CALCIUM 8 1 (L) 09/14/2019    EGFR 19 09/14/2019   , Coagulation: No results found for: PT, INR, APTT

## 2019-09-14 NOTE — PROGRESS NOTES
Gyn Oncology Progress note   Zeb Hunter 70 y o  female MRN: [id number]  Unit/Bed#: [facility] 905-01 Encounter: [id number]    Zeb Hunter reports feeling better this am  She is still complaining of nausea, but it is improved from yesterday  Pain is present - adequately treated  She is ambulating  She is passing flatus in ostomy bag  Patient denies fever, chills, chest pain, shortness of breath, or calf tenderness  /80   Pulse 90   Temp 98 9 °F (37 2 °C)   Resp 18   Ht 5' 4" (1 626 m)   Wt 84 4 kg (186 lb 1 1 oz)   SpO2 92%   BMI 31 94 kg/m²     I/O last 3 completed shifts: In: 6930 3 [I V :4880 3; Blood:1050; IV Piggyback:1000]  Out: 5100 [Urine:4450; Drains:650]  No intake/output data recorded  Lab Results   Component Value Date    WBC 18 22 (H) 09/14/2019    HGB 9 0 (L) 09/14/2019    HCT 27 4 (L) 09/14/2019    MCV 91 09/14/2019     09/14/2019       Lab Results   Component Value Date    GLUCOSE 228 (H) 09/10/2019    CALCIUM 8 1 (L) 09/14/2019    K 3 6 09/14/2019    CO2 19 (L) 09/14/2019     (H) 09/14/2019    BUN 59 (H) 09/14/2019    CREATININE 2 54 (H) 09/14/2019       Lab Results   Component Value Date/Time    POCGLU 121 09/14/2019 05:22 AM    POCGLU 129 09/13/2019 11:43 PM    POCGLU 128 09/13/2019 06:09 PM    POCGLU 130 09/13/2019 12:06 PM    POCGLU 156 (H) 09/13/2019 06:21 AM       Physical Exam  Gen: AAOx3, NAD, comfortable in bed  CVS: S1S2+, RRR, no murmurs  Lungs: CTA b/l normal respiratory effort and rate  Abdomen: soft, normal BS, no distension, incision C/D/I; RLQ drain 320cc over 24 hours; serosang  Extremities: SCDs on and on, non tender    A/P: 70year old with stage IV ovarian cancer s/p neoadjuvant chemotherapy, ex lap, exenteration of the posterior pelvis, colostomy, lysis of adhesions, radical omentectomy and tumor debulking, flexible sigmoidoscopy, cystogram, inspection of the ureters following initial insertion of ureteral catheters under cystoscopy  Postoperative day 4      1) stage IV ovarian cancer  -status post surgery followup final pathology  2) hematuria, acute on chronic kidney disease  -elevated creatinine, which is improving: 3 9-->3 4-->2 5 (this am)  -Haskins to remain in place  3) nausea and vomiting  -secondary to postoperative ileus  -improving this a m   -patient refusing NG tube  -continue NPO and Zofran  -consider adding Reglan for breakthrough nausea  4) type 2 diabetes:  Sliding scale algorithm 4  5) acute blood loss anemia  -hemoglobin now stable at 9 1  6) end colostomy:  Continue colostomy care    DVT PPx: SCDs, heparin  FEN:  NPO  Pain management:  Dilaudid PCA    Dispo:   Inpatient

## 2019-09-14 NOTE — PROGRESS NOTES
NEPHROLOGY PROGRESS NOTE   Esteban Heart 70 y o  female MRN: [id number]  Unit/Bed#: [facility] 905-01 Encounter: [id number]  Reason for Consult:  Acute kidney injury    ASSESSMENT/PLAN:  1  Acute kidney injury, suspecting possible ATN from intraoperative hypotension and blood loss  Overall urine output had been improved yesterday but appears oliguric currently  2  Bilateral hydronephrosis seen on previous CT  3  Possible Haskins catheter malfunction, may need replacement  4  Chronic kidney disease stage 3, baseline creatinine 1 1-1 3  5  Metabolic acidosis, overall improved  6  Postop ex lap, colostomy, ileostomy reversal, lysis of adhesions, radical omentectomy and tumor debulking  7  Status post cystogram with inspection of ureters following bilateral urethral catheters    PLAN:  · Possible Haskins catheter malfunction, some signs of possible traction with flushing as per nursing, will notified gyn Oncology  · Continue with current IV fluids  · If no significant urine output with Haskins manipulation, may need repeat CT scan of the abdomen pelvis  · Continue with sodium bicarbonate    SUBJECTIVE:  Seen examined  Patient does awaken with stimuli  Pain seems to be under better control  Denies any worsening flank or back pain  Urine output over the evening hours was good however since this morning urine output has decreased only 100 cc  Nursing was asked to flush the catheter with possible signs of obstruction        Review of Systems    OBJECTIVE:  Current Weight: Weight - Scale: 84 4 kg (186 lb 1 1 oz)  Vitals:    09/14/19 0555 09/14/19 0703 09/14/19 0706 09/14/19 1117   BP:  158/80  156/81   BP Location:       Pulse:   89 95   Resp:  18  17   Temp:  98 9 °F (37 2 °C)  99 3 °F (37 4 °C)   TempSrc:       SpO2:    91%   Weight: 84 4 kg (186 lb 1 1 oz)      Height:           Intake/Output Summary (Last 24 hours) at 9/14/2019 1444  Last data filed at 9/14/2019 1437  Gross per 24 hour   Intake 3324 25 ml   Output 3590 ml Net -265 75 ml       Physical Exam   Constitutional: No distress  HENT:   Head: Normocephalic  Neck: Neck supple  Cardiovascular: Normal rate and regular rhythm  Pulmonary/Chest: Effort normal and breath sounds normal    Abdominal: Soft  Musculoskeletal: She exhibits no edema  Neurological: She is alert  Skin: Skin is warm and dry         Medications:    Current Facility-Administered Medications:     acetaminophen (TYLENOL) tablet 650 mg, 650 mg, Oral, Q6H MARCIO, Karla Zepeda PA-C, 650 mg at 09/12/19 1134    diphenhydrAMINE (BENADRYL) injection 25 mg, 25 mg, Intravenous, Q6H PRN, Vish Zepeda PA-C    heparin (porcine) subcutaneous injection 5,000 Units, 5,000 Units, Subcutaneous, Q8H [address], 5,000 Units at 09/14/19 1438 **AND** Platelet count, , , Once, Derarlene Marie PA-C    HYDROmorphone (DILAUDID) 1 mg/mL 50 mL PCA, , Intravenous, Continuous, Ann F DO Kristy    insulin lispro (HumaLOG) 100 units/mL subcutaneous injection 1-5 Units, 1-5 Units, Subcutaneous, Q6H [address], 1 Units at 09/13/19 0624 **AND** Fingerstick Glucose (POCT), , , Q6H, Karla Zepeda PA-C    ondansetron (ZOFRAN) injection 4 mg, 4 mg, Intravenous, Q6H PRN, Vish Zepeda PA-C, 4 mg at 09/14/19 0528    pantoprazole (PROTONIX) injection 40 mg, 40 mg, Intravenous, Q24H [address], [facility], DO, 40 mg at 09/14/19 0813    sodium bicarbonate 75 mEq in sodium chloride 0 45 % 1,000 mL infusion, 125 mL/hr, Intravenous, Continuous, Mary Nunez DO, Last Rate: 125 mL/hr at 09/14/19 0524, 125 mL/hr at 09/14/19 0524    Laboratory Results:  Results from last 7 days   Lab Units 09/14/19  0524 09/13/19  1312 09/13/19  0603 09/12/19  1735 09/12/19  1628 09/12/19  0455 09/11/19  0456 09/10/19  1804 09/10/19  1715  09/10/19  1454 09/10/19  1415 09/10/19  1310 09/10/19  1223 09/10/19  1118 09/10/19  1018   WBC Thousand/uL 18 22*  --  20 91*  --  20 47* 18 29* 15 68* 15 14* 15 40*  --   --   --   --   --   --   -- HEMOGLOBIN g/dL 9 0*  --  9 2*  --  8 2* 7 8* 9 3* 9 7* 10 0*  --   --   --   --   --   --   --    I STAT HEMOGLOBIN g/dl  --   --   --   --   --   --   --   --   --   --  8 5* 6 8* 7 8* 6 5* 8 8* 7 1*   HEMATOCRIT % 27 4*  --  28 3*  --  25 1* 23 9* 27 4* 28 9* 29 9*  --   --   --   --   --   --   --    HEMATOCRIT, ISTAT %  --   --   --   --   --   --   --   --   --   --  25* 20* 23* 19* 26* 21*   PLATELETS Thousands/uL 179  --  154  --  157 139* 133* 144* 144*  --   --   --   --   --   --   --    POTASSIUM mmol/L 3 6 4 2 4 3 4 2  --  4 3 4 3 3 9 3 8   < >  --   --   --   --   --   --    CHLORIDE mmol/L 114* 117* 113* 113*  --  112* 116* 117* 117*   < >  --   --   --   --   --   --    CO2 mmol/L 19* 17* 17* 18*  --  21 20* 19* 20*   < >  --   --   --   --   --   --    CO2, I-STAT mmol/L  --   --   --   --   --   --   --   --   --   --  21 20* 21 18* 20* 21   BUN mg/dL 59* 60* 64* 57*  --  50* 29* 22 22   < >  --   --   --   --   --   --    CREATININE mg/dL 2 54* 3 43* 3 91* 2 94*  --  2 23* 1 37* 1 29 1 29   < >  --   --   --   --   --   --    CALCIUM mg/dL 8 1* 8 3 8 1* 8 2*  --  8 3 9 2 9 8 10 3*   < >  --   --   --   --   --   --    MAGNESIUM mg/dL 2 2  --  2 2  --   --   --  1 9 1 6 1 8  --   --   --   --   --   --   --    PHOSPHORUS mg/dL 3 7  --  5 6*  --   --   --  5 4* 4 4* 4 3*  --   --   --   --   --   --   --    GLUCOSE, ISTAT mg/dl  --   --   --   --   --   --   --   --   --   --  228* 227* 224* 194* 201* 157*    < > = values in this interval not displayed

## 2019-09-15 ENCOUNTER — APPOINTMENT (INPATIENT)
Dept: RADIOLOGY | Facility: HOSPITAL | Age: 69
DRG: 736 | End: 2019-09-15
Payer: MEDICARE

## 2019-09-15 LAB
ANION GAP SERPL CALCULATED.3IONS-SCNC: 8 MMOL/L (ref 4–13)
BACTERIA UR QL AUTO: ABNORMAL /HPF
BASOPHILS # BLD AUTO: 0.01 THOUSANDS/ΜL (ref 0–0.1)
BASOPHILS NFR BLD AUTO: 0 % (ref 0–1)
BILIRUB UR QL STRIP: ABNORMAL
BUN SERPL-MCNC: 67 MG/DL (ref 5–25)
CALCIUM SERPL-MCNC: 8.1 MG/DL (ref 8.3–10.1)
CHLORIDE SERPL-SCNC: 113 MMOL/L (ref 100–108)
CLARITY UR: ABNORMAL
CO2 SERPL-SCNC: 23 MMOL/L (ref 21–32)
COLOR UR: ABNORMAL
CREAT SERPL-MCNC: 2.66 MG/DL (ref 0.6–1.3)
EOSINOPHIL # BLD AUTO: 0.02 THOUSAND/ΜL (ref 0–0.61)
EOSINOPHIL NFR BLD AUTO: 0 % (ref 0–6)
ERYTHROCYTE [DISTWIDTH] IN BLOOD BY AUTOMATED COUNT: 15.9 % (ref 11.6–15.1)
GFR SERPL CREATININE-BSD FRML MDRD: 18 ML/MIN/1.73SQ M
GLUCOSE SERPL-MCNC: 109 MG/DL (ref 65–140)
GLUCOSE SERPL-MCNC: 113 MG/DL (ref 65–140)
GLUCOSE SERPL-MCNC: 113 MG/DL (ref 65–140)
GLUCOSE SERPL-MCNC: 114 MG/DL (ref 65–140)
GLUCOSE SERPL-MCNC: 126 MG/DL (ref 65–140)
GLUCOSE SERPL-MCNC: 168 MG/DL (ref 65–140)
GLUCOSE UR STRIP-MCNC: ABNORMAL MG/DL
HCT VFR BLD AUTO: 25.3 % (ref 34.8–46.1)
HGB BLD-MCNC: 8.2 G/DL (ref 11.5–15.4)
HGB UR QL STRIP.AUTO: ABNORMAL
IMM GRANULOCYTES # BLD AUTO: 0.2 THOUSAND/UL (ref 0–0.2)
IMM GRANULOCYTES NFR BLD AUTO: 1 % (ref 0–2)
KETONES UR STRIP-MCNC: ABNORMAL MG/DL
LEUKOCYTE ESTERASE UR QL STRIP: ABNORMAL
LYMPHOCYTES # BLD AUTO: 1.48 THOUSANDS/ΜL (ref 0.6–4.47)
LYMPHOCYTES NFR BLD AUTO: 10 % (ref 14–44)
MCH RBC QN AUTO: 29.8 PG (ref 26.8–34.3)
MCHC RBC AUTO-ENTMCNC: 32.4 G/DL (ref 31.4–37.4)
MCV RBC AUTO: 92 FL (ref 82–98)
MONOCYTES # BLD AUTO: 1.39 THOUSAND/ΜL (ref 0.17–1.22)
MONOCYTES NFR BLD AUTO: 9 % (ref 4–12)
NEUTROPHILS # BLD AUTO: 11.92 THOUSANDS/ΜL (ref 1.85–7.62)
NEUTS SEG NFR BLD AUTO: 80 % (ref 43–75)
NITRITE UR QL STRIP: ABNORMAL
NON-SQ EPI CELLS URNS QL MICRO: ABNORMAL /HPF
NRBC BLD AUTO-RTO: 0 /100 WBCS
PH UR STRIP.AUTO: ABNORMAL [PH]
PLATELET # BLD AUTO: 198 THOUSANDS/UL (ref 149–390)
PMV BLD AUTO: 11.8 FL (ref 8.9–12.7)
POTASSIUM SERPL-SCNC: 3.5 MMOL/L (ref 3.5–5.3)
PROCALCITONIN SERPL-MCNC: 0.52 NG/ML
PROT UR STRIP-MCNC: ABNORMAL MG/DL
RBC # BLD AUTO: 2.75 MILLION/UL (ref 3.81–5.12)
RBC #/AREA URNS AUTO: ABNORMAL /HPF
SODIUM SERPL-SCNC: 144 MMOL/L (ref 136–145)
SP GR UR STRIP.AUTO: 1.01 (ref 1–1.03)
UROBILINOGEN UR QL STRIP.AUTO: ABNORMAL E.U./DL
WBC # BLD AUTO: 15.02 THOUSAND/UL (ref 4.31–10.16)
WBC #/AREA URNS AUTO: ABNORMAL /HPF

## 2019-09-15 PROCEDURE — 99223 1ST HOSP IP/OBS HIGH 75: CPT | Performed by: INTERNAL MEDICINE

## 2019-09-15 PROCEDURE — 82948 REAGENT STRIP/BLOOD GLUCOSE: CPT

## 2019-09-15 PROCEDURE — 81001 URINALYSIS AUTO W/SCOPE: CPT | Performed by: OBSTETRICS & GYNECOLOGY

## 2019-09-15 PROCEDURE — C9113 INJ PANTOPRAZOLE SODIUM, VIA: HCPCS | Performed by: OBSTETRICS & GYNECOLOGY

## 2019-09-15 PROCEDURE — 74176 CT ABD & PELVIS W/O CONTRAST: CPT

## 2019-09-15 PROCEDURE — 99024 POSTOP FOLLOW-UP VISIT: CPT | Performed by: OBSTETRICS & GYNECOLOGY

## 2019-09-15 PROCEDURE — 84145 PROCALCITONIN (PCT): CPT | Performed by: OBSTETRICS & GYNECOLOGY

## 2019-09-15 PROCEDURE — 71250 CT THORAX DX C-: CPT

## 2019-09-15 PROCEDURE — 99232 SBSQ HOSP IP/OBS MODERATE 35: CPT | Performed by: INTERNAL MEDICINE

## 2019-09-15 PROCEDURE — 85025 COMPLETE CBC W/AUTO DIFF WBC: CPT | Performed by: OBSTETRICS & GYNECOLOGY

## 2019-09-15 PROCEDURE — 87040 BLOOD CULTURE FOR BACTERIA: CPT | Performed by: OBSTETRICS & GYNECOLOGY

## 2019-09-15 PROCEDURE — 80048 BASIC METABOLIC PNL TOTAL CA: CPT | Performed by: OBSTETRICS & GYNECOLOGY

## 2019-09-15 RX ORDER — VANCOMYCIN HYDROCHLORIDE 1 G/200ML
15 INJECTION, SOLUTION INTRAVENOUS ONCE
Status: COMPLETED | OUTPATIENT
Start: 2019-09-15 | End: 2019-09-15

## 2019-09-15 RX ADMIN — PANTOPRAZOLE SODIUM 40 MG: 40 INJECTION, POWDER, FOR SOLUTION INTRAVENOUS at 09:01

## 2019-09-15 RX ADMIN — METRONIDAZOLE 500 MG: 500 INJECTION, SOLUTION INTRAVENOUS at 23:13

## 2019-09-15 RX ADMIN — HEPARIN SODIUM 5000 UNITS: 5000 INJECTION INTRAVENOUS; SUBCUTANEOUS at 05:28

## 2019-09-15 RX ADMIN — VANCOMYCIN HYDROCHLORIDE 1000 MG: 1 INJECTION, SOLUTION INTRAVENOUS at 16:03

## 2019-09-15 RX ADMIN — INSULIN LISPRO 1 UNITS: 100 INJECTION, SOLUTION INTRAVENOUS; SUBCUTANEOUS at 17:53

## 2019-09-15 RX ADMIN — METRONIDAZOLE 500 MG: 500 INJECTION, SOLUTION INTRAVENOUS at 14:43

## 2019-09-15 RX ADMIN — SODIUM BICARBONATE 125 ML/HR: 84 INJECTION, SOLUTION INTRAVENOUS at 18:26

## 2019-09-15 RX ADMIN — CEFEPIME HYDROCHLORIDE 1000 MG: 1 INJECTION, POWDER, FOR SOLUTION INTRAMUSCULAR; INTRAVENOUS at 15:23

## 2019-09-15 RX ADMIN — HEPARIN SODIUM 5000 UNITS: 5000 INJECTION INTRAVENOUS; SUBCUTANEOUS at 14:43

## 2019-09-15 RX ADMIN — SODIUM BICARBONATE 125 ML/HR: 84 INJECTION, SOLUTION INTRAVENOUS at 09:05

## 2019-09-15 RX ADMIN — Medication: at 18:28

## 2019-09-15 RX ADMIN — HEPARIN SODIUM 5000 UNITS: 5000 INJECTION INTRAVENOUS; SUBCUTANEOUS at 23:13

## 2019-09-15 NOTE — PROGRESS NOTES
Progress Note - Colorectal Surgery   Alee Soares 70 y o  female MRN: [id number]  Unit/Bed#: [facility] 905-01 Encounter: [id number]    Assessment:  71 yo F s/p Ex lap, exenteration posterior pelvis, end colostomy, ileostomy reversal, radial omentectomy, tumor debulking  Ongoing nausea  ANDREAS 285, serosang    Plan:  NPO, consider NGT if becomes distended or + emesis  Pain control w/ PCA   ANDREAS to bulb suction  Monitor colostomy function  Care per primary      Subjective/Objective   Chief Complaint:     Subjective: Still w nausea, refusing NGT    Objective:     Blood pressure 148/70, pulse 81, temperature 99 °F (37 2 °C), resp  rate 18, height 5' 4" (1 626 m), weight 84 3 kg (185 lb 13 6 oz), SpO2 92 %  ,Body mass index is 31 9 kg/m²        Intake/Output Summary (Last 24 hours) at 9/15/2019 0752  Last data filed at 9/15/2019 0650  Gross per 24 hour   Intake 2596 18 ml   Output 2060 ml   Net 536 18 ml       Invasive Devices     Central Venous Catheter Line            Port A Cath 11/29/18 Right Chest 290 days          Peripheral Intravenous Line            Peripheral IV 09/12/19 Right Forearm 2 days          Drain            Ileostomy Loop  days    Colostomy Descending/sigmoid LUQ 5 days    Closed/Suction Drain Right RLQ Bulb 4 days    Urethral Catheter Double-lumen;Non-latex 16 Fr  4 days                Physical Exam:   NAD, alert and oriented x3  Normocephalic, atraumatic  MMM, EOMI, PERRLA  Norm resp effort on RA  RRR  Abd soft, NT/ND, incision cdi, ANDREAS serosang, ostomy with viable stoma but no air or stool in bag  No calf tenderness or peripheral edema  Motor/sensation intact in distal extremities  CN grossly intact  -rash/lesions        Lab, Imaging and other studies:  CBC:   Lab Results   Component Value Date    WBC 15 02 (H) 09/15/2019    HGB 8 2 (L) 09/15/2019    HCT 25 3 (L) 09/15/2019    MCV 92 09/15/2019     09/15/2019    MCH 29 8 09/15/2019    MCHC 32 4 09/15/2019    RDW 15 9 (H) 09/15/2019    MPV 11 8 09/15/2019    NRBC 0 09/15/2019   , CMP:   Lab Results   Component Value Date    SODIUM 144 09/15/2019    K 3 5 09/15/2019     (H) 09/15/2019    CO2 23 09/15/2019    BUN 67 (H) 09/15/2019    CREATININE 2 66 (H) 09/15/2019    CALCIUM 8 1 (L) 09/15/2019    EGFR 18 09/15/2019   , Coagulation: No results found for: PT, INR, APTT  VTE Pharmacologic Prophylaxis: Heparin  VTE Mechanical Prophylaxis: sequential compression device

## 2019-09-15 NOTE — PROGRESS NOTES
Vancomycin Assessment    Catherine Gerber is a 70 y o  female who is currently receiving vancomycin 1,250 gm once for Pneumonia  Relevant clinical data and objective history reviewed:  Creatinine   Date Value Ref Range Status   09/15/2019 2 66 (H) 0 60 - 1 30 mg/dL Final     Comment:     Standardized to IDMS reference method   09/14/2019 2 54 (H) 0 60 - 1 30 mg/dL Final     Comment:     Standardized to IDMS reference method   09/13/2019 3 43 (H) 0 60 - 1 30 mg/dL Final     Comment:     Standardized to IDMS reference method     /72   Pulse 88   Temp 98 3 °F (36 8 °C)   Resp 16   Ht 5' 4" (1 626 m)   Wt 84 3 kg (185 lb 13 6 oz)   SpO2 95%   BMI 31 90 kg/m²   I/O last 3 completed shifts: In: 3933 7 [I V :3933 7]  Out: 4550 [Urine:4100; Drains:450]  Lab Results   Component Value Date/Time    BUN 67 (H) 09/15/2019 05:48 AM    WBC 15 02 (H) 09/15/2019 05:48 AM    HGB 8 2 (L) 09/15/2019 05:48 AM    HCT 25 3 (L) 09/15/2019 05:48 AM    MCV 92 09/15/2019 05:48 AM     09/15/2019 05:48 AM     Temp Readings from Last 3 Encounters:   09/15/19 98 3 °F (36 8 °C)   08/14/19 98 4 °F (36 9 °C)   07/19/19 98 8 °F (37 1 °C)     Vancomycin Assessment:  Indication: Pneumonia  Status: With overall improvement, however now with development of possible pneumonia  Cultures:  9/15 blood cultures x 2: in process  9/15 sputum culture: pending collection  Procalcitonin:   0 52 (9/15)  Renal Function: With GRISELDA, has been improving over the last several days  Scr is 2 66 from a peak of 3 91; baseline appears to be 1  to 1 3; UOP 0 9 ml/kg/hr in the last 24 hours  Potential Nephrotoxicity Factors:  Medications: none  Patient Factors: elderly, renal dysfunction, b/l hydronephrosis  Days of Therapy: 1  Current Dose: 1,250 mg once  Goal Trough: 15-20  Last Level: n/a     Vancomycin Plan:  Dosing: Will decrease dose to 1,000 mg x 1 (15 mg/kg based on adjusted body weight)  Next Level:  Will check a 24 hour random level for pulse dosing  Renal Function Monitoring: Continue to monitor UOP and BMP     Pharmacy will continue to follow closely for s/sx of nephrotoxicity, infusion reactions and appropriateness of therapy  BMP and CBC will be ordered per protocol  We will continue to follow the patients culture results and clinical progress daily      Sage Zuniga, PharmD, [facility] Clinical Pharmacist

## 2019-09-15 NOTE — PROGRESS NOTES
Progress Note - Colorectal Surgery   Foreignnamelizabeth Perfect 70 y o  female MRN: [id number]  Unit/Bed#: [facility] 905-01 Encounter: [id number]    Assessment:  71 yo F s/p Ex lap, exenteration posterior pelvis, end colostomy, ileostomy reversal, radial omentectomy, tumor debulking  Ongoing nausea  ANDREAS 285, serosang    Plan:  NPO, consider NGT if becomes distended or + emesis  Pain control w/ PCA   ANDREAS to bulb suction  Monitor colostomy function  Care per primary      Subjective/Objective   Chief Complaint:     Subjective: Still w nausea, refusing NGT    Objective:     Blood pressure 148/70, pulse 81, temperature 99 °F (37 2 °C), resp  rate 18, height 5' 4" (1 626 m), weight 84 3 kg (185 lb 13 6 oz), SpO2 92 %  ,Body mass index is 31 9 kg/m²        Intake/Output Summary (Last 24 hours) at 9/15/2019 0751  Last data filed at 9/15/2019 0650  Gross per 24 hour   Intake 2596 18 ml   Output 2060 ml   Net 536 18 ml       Invasive Devices     Central Venous Catheter Line            Port A Cath 11/29/18 Right Chest 290 days          Peripheral Intravenous Line            Peripheral IV 09/12/19 Right Forearm 2 days          Drain            Ileostomy Loop  days    Colostomy Descending/sigmoid LUQ 5 days    Closed/Suction Drain Right RLQ Bulb 4 days    Urethral Catheter Double-lumen;Non-latex 16 Fr  4 days                Physical Exam:   NAD, alert and oriented x3  Normocephalic, atraumatic  MMM, EOMI, PERRLA  Norm resp effort on RA  RRR  Abd soft, NT/ND, incision cdi, ANDREAS serosang, ostomy with viable stoma but no air or stool in bag  No calf tenderness or peripheral edema  Motor/sensation intact in distal extremities  CN grossly intact  -rash/lesions        Lab, Imaging and other studies:  CBC:   Lab Results   Component Value Date    WBC 15 02 (H) 09/15/2019    HGB 8 2 (L) 09/15/2019    HCT 25 3 (L) 09/15/2019    MCV 92 09/15/2019     09/15/2019    MCH 29 8 09/15/2019    MCHC 32 4 09/15/2019    RDW 15 9 (H) 09/15/2019    MPV 11 8 09/15/2019    NRBC 0 09/15/2019   , CMP:   Lab Results   Component Value Date    SODIUM 144 09/15/2019    K 3 5 09/15/2019     (H) 09/15/2019    CO2 23 09/15/2019    BUN 67 (H) 09/15/2019    CREATININE 2 66 (H) 09/15/2019    CALCIUM 8 1 (L) 09/15/2019    EGFR 18 09/15/2019   , Coagulation: No results found for: PT, INR, APTT  VTE Pharmacologic Prophylaxis: Heparin  VTE Mechanical Prophylaxis: sequential compression device

## 2019-09-15 NOTE — SOCIAL WORK
CM continues to follow for discharge planning  Patient will need rehab upon discharge  [facility] is following along and CM to notify [place] once patient is stable for discharge

## 2019-09-15 NOTE — PROGRESS NOTES
NEPHROLOGY PROGRESS NOTE   Shaun Colindres 70 y o  female MRN: [id number]  Unit/Bed#: [facility] 905-01 Encounter: [id number]  Reason for Consult:  Acute kidney injury    ASSESSMENT/PLAN:  1  Acute kidney injury, suspecting possible ATN from intraoperative hypotension and blood loss  Overall urine output had been improved yesterday but appears oliguric currently  2  Bilateral hydronephrosis persistent seen on recent CT scan  3  Chronic kidney disease stage 3, baseline creatinine 1 1-1 3  4  Metabolic acidosis, overall improved, continue sodium bicarbonate drip next 24 hours  5  Postop ex lap, colostomy, ileostomy reversal, lysis of adhesions, radical omentectomy and tumor debulking  6  Status post cystogram with inspection of ureters following bilateral urethral catheters  7  Bilateral pleural effusions, may require thoracentesis    PLAN:  · Continue with IV fluids  · Urine output appears adequate  · Renal function unchanged, may be multifactorial given ATN also could be component of obstruction given persistent bilateral hydronephrosis, discussed the possibility of stent placement    SUBJECTIVE:  Seen examined  Patient awake alert  Family at bedside  Denies any shortness of breath at rest   No active chest pain  Pain seems to be under fairly good control  Denies any significant flank pain  Catheter draining pink tinged urine  Review of Systems    OBJECTIVE:  Current Weight: Weight - Scale: 84 3 kg (185 lb 13 6 oz)  Vitals:    09/15/19 0256 09/15/19 0600 09/15/19 0708 09/15/19 1121   BP: 148/71  148/70 150/72   Pulse: 81  81 88   Resp: 18   16   Temp: 100 5 °F (38 1 °C)  99 °F (37 2 °C) 98 3 °F (36 8 °C)   TempSrc:       SpO2: 92%  92% 95%   Weight:  84 3 kg (185 lb 13 6 oz)     Height:           Intake/Output Summary (Last 24 hours) at 9/15/2019 1324  Last data filed at 9/15/2019 1300  Gross per 24 hour   Intake 1905 2 ml   Output 2685 ml   Net -779 8 ml       Physical Exam   Constitutional: No distress  HENT:   Head: Normocephalic  Neck: Neck supple  Cardiovascular: Normal rate and regular rhythm  Pulmonary/Chest: Effort normal  She has decreased breath sounds in the right lower field and the left lower field  Abdominal: Soft  Musculoskeletal: She exhibits edema  Neurological: She is alert  Skin: Skin is warm and dry         Medications:    Current Facility-Administered Medications:     acetaminophen (TYLENOL) tablet 650 mg, 650 mg, Oral, Q6H MARCIO, Karla Zepeda PA-C, 650 mg at 09/12/19 1134    diphenhydrAMINE (BENADRYL) injection 25 mg, 25 mg, Intravenous, Q6H PRN, Lidia Zepeda PA-C    heparin (porcine) subcutaneous injection 5,000 Units, 5,000 Units, Subcutaneous, Q8H [address], 5,000 Units at 09/15/19 0528 **AND** Platelet count, , , Once, Eriberto Vargas PA-C    HYDROmorphone (DILAUDID) 1 mg/mL 50 mL PCA, , Intravenous, Continuous, Ann F Gisellebo,     insulin lispro (HumaLOG) 100 units/mL subcutaneous injection 1-5 Units, 1-5 Units, Subcutaneous, Q6H [address], 1 Units at 09/13/19 0624 **AND** Fingerstick Glucose (POCT), , , Q6H, Karla Zepeda PA-C    ondansetron (ZOFRAN) injection 4 mg, 4 mg, Intravenous, Q6H PRN, Lidia Zepeda PA-C, 4 mg at 09/14/19 0528    pantoprazole (PROTONIX) injection 40 mg, 40 mg, Intravenous, Q24H [address], [place] F Zabo, DO, 40 mg at 09/15/19 0901    sodium bicarbonate 75 mEq in sodium chloride 0 45 % 1,000 mL infusion, 125 mL/hr, Intravenous, Continuous, Darshan Álvarez DO, Last Rate: 125 mL/hr at 09/15/19 0905, 125 mL/hr at 09/15/19 0905    Laboratory Results:  Results from last 7 days   Lab Units 09/15/19  0548 09/14/19  0524 09/13/19  1312 09/13/19  0603 09/12/19  1735 09/12/19  1628 09/12/19  0455 09/11/19  0456 09/10/19  1804 09/10/19  1715  09/10/19  1454 09/10/19  1415 09/10/19  1310 09/10/19  1223 09/10/19  1118 09/10/19  1018   WBC Thousand/uL 15 02* 18 22*  --  20 91*  --  20 47* 18 29* 15 68* 15 14* 15 40*   < >  --   --   --   --   -- --    HEMOGLOBIN g/dL 8 2* 9 0*  --  9 2*  --  8 2* 7 8* 9 3* 9 7* 10 0*   < >  --   --   --   --   --   --    I STAT HEMOGLOBIN g/dl  --   --   --   --   --   --   --   --   --   --   --  8 5* 6 8* 7 8* 6 5* 8 8* 7 1*   HEMATOCRIT % 25 3* 27 4*  --  28 3*  --  25 1* 23 9* 27 4* 28 9* 29 9*   < >  --   --   --   --   --   --    HEMATOCRIT, ISTAT %  --   --   --   --   --   --   --   --   --   --   --  25* 20* 23* 19* 26* 21*   PLATELETS Thousands/uL 198 179  --  154  --  157 139* 133* 144* 144*   < >  --   --   --   --   --   --    POTASSIUM mmol/L 3 5 3 6 4 2 4 3 4 2  --  4 3 4 3 3 9 3 8   < >  --   --   --   --   --   --    CHLORIDE mmol/L 113* 114* 117* 113* 113*  --  112* 116* 117* 117*   < >  --   --   --   --   --   --    CO2 mmol/L 23 19* 17* 17* 18*  --  21 20* 19* 20*   < >  --   --   --   --   --   --    CO2, I-STAT mmol/L  --   --   --   --   --   --   --   --   --   --   --  21 20* 21 18* 20* 21   BUN mg/dL 67* 59* 60* 64* 57*  --  50* 29* 22 22   < >  --   --   --   --   --   --    CREATININE mg/dL 2 66* 2 54* 3 43* 3 91* 2 94*  --  2 23* 1 37* 1 29 1 29   < >  --   --   --   --   --   --    CALCIUM mg/dL 8 1* 8 1* 8 3 8 1* 8 2*  --  8 3 9 2 9 8 10 3*   < >  --   --   --   --   --   --    MAGNESIUM mg/dL  --  2 2  --  2 2  --   --   --  1 9 1 6 1 8  --   --   --   --   --   --   --    PHOSPHORUS mg/dL  --  3 7  --  5 6*  --   --   --  5 4* 4 4* 4 3*  --   --   --   --   --   --   --    GLUCOSE, ISTAT mg/dl  --   --   --   --   --   --   --   --   --   --   --  228* 227* 224* 194* 201* 157*    < > = values in this interval not displayed

## 2019-09-15 NOTE — PROGRESS NOTES
Gyn Oncology Progress note   Rolly Cortés 70 y o  female MRN: [id number]  Unit/Bed#: [facility] 905-01 Encounter: [id number]    Rolly Cortés had a temp of 100 early this morning  She states that she is feeling ok right now, just very tired  She states that her nasuea has improved and has had no episodes of vomiting  Pain is well controlled with IV pain medications  Patient is currently voiding via clemons  There is some air in the ostomy bag  Patient denies fever, chills, chest pain, shortness of breath, or calf tenderness  /71   Pulse 81   Temp 100 5 °F (38 1 °C)   Resp 18   Ht 5' 4" (1 626 m)   Wt 84 4 kg (186 lb 1 1 oz)   SpO2 92%   BMI 31 94 kg/m²     I/O last 3 completed shifts: In: 5401 8 [I V :4401 8; IV Piggyback:1000]  Out: 8855 [Urine:4725; Drains:465]  I/O this shift:  In: 1 1 [I V :1 1]  Out: 625 [Urine:525; Drains:100]    Lab Results   Component Value Date    WBC 18 22 (H) 09/14/2019    HGB 9 0 (L) 09/14/2019    HCT 27 4 (L) 09/14/2019    MCV 91 09/14/2019     09/14/2019       Lab Results   Component Value Date    GLUCOSE 228 (H) 09/10/2019    CALCIUM 8 1 (L) 09/14/2019    K 3 6 09/14/2019    CO2 19 (L) 09/14/2019     (H) 09/14/2019    BUN 59 (H) 09/14/2019    CREATININE 2 54 (H) 09/14/2019       Lab Results   Component Value Date/Time    POCGLU 126 09/14/2019 11:59 PM    POCGLU 128 09/14/2019 06:04 PM    POCGLU 137 09/14/2019 11:43 AM    POCGLU 121 09/14/2019 05:22 AM    POCGLU 129 09/13/2019 11:43 PM       Physical Exam   Constitutional: She is oriented to person, place, and time  No distress  HENT:   Head: Normocephalic and atraumatic  Eyes: EOM are normal    Cardiovascular: Normal rate, regular rhythm, normal heart sounds and intact distal pulses  Pulmonary/Chest: Effort normal  No respiratory distress  She exhibits no tenderness  Abdominal: Soft  She exhibits no distension  Incision c/d/i   Musculoskeletal: She exhibits no edema or tenderness  Neurological: She is alert and oriented to person, place, and time  Skin: Skin is warm and dry  Psychiatric: She has a normal mood and affect  Her behavior is normal    Nursing note and vitals reviewed  A/P: 70 y o  with stage IV ovarian cancer status post neoadjuvant chemotherapy, ex lap, exenteration of the posterior pelvis, colostomy, lysis of adhesions, tumor debulking, radical omentectomy, flexible sigmoidoscopy, cystogram and insertion of ureteral catheters    Postop day 5  1) stage IV ovarian cancer:    -s/p surgery, f/u final pathology   2) Post operative Care:  - FEN: NPO, Isolate @ 125mL/hr  - Pain: dilaudid PCA  - RLQ drain with serosanguinous fluid 285cc/24hr  - DVT ppx: SCDs, Heparin 5000u TID  - Encouraged incentive spirometry to reduce atelectasis and pneumonia risk  - Encouraged ambulation as tolerated  3) Isolated temperature of a 100 5   - follow-up blood cultures, UA, CT abdomen and pelvis with PO contrast only   4) Acute on chronic kidney disease   -nephrology following, appreciate recommendations   -creatinine remains elevated but improving 3 9-->3 4-->2 5--> f/u am labs    -adequate urine output overnight 1 2 mg/kg/hr, clemons to remain in place  5) Nausea and vomiting   -continue current regimen of Zofran   - no episodes of emesis overnight  6) Type II DM   -SSI 4   -Blood sugars yesterday 114/126/128/137  7) Acute on blood loss anemia   -Hb stable at 9 1  8) Dispo: Continue inpatient hospital admission    Carlos Alberto De Jesus MD, MPH  OB/GYN, PGY4  9/15/2019, 5:35 AM

## 2019-09-15 NOTE — PROGRESS NOTES
After discussion with Rocio Elizalde attending, Dr Qi Minor, will discuss possible need for stent placement with urology, will discuss need for right thoracentesis with IR, and will discuss need for management of possible pneumonia with ID  All attendings were contacted via tiger text

## 2019-09-15 NOTE — CONSULTS
Consultation - Infectious Disease   Catherine Smith 70 y o  female MRN: [id number]  Unit/Bed#: [facility] 905-01 Encounter: [id number]      IMPRESSION & RECOMMENDATIONS:     62-year-old female patient with ovarian cancer status post neoadjuvant chemo now admitted 09/10/2019 and underwent SUKUMAR/BSO with ileostomy reversal, and colostomy, tumor debulking, and ureteral catheterization  At postop day 4 patient had a temperature of 100 5° with leukocytosis of 15,000 and procalcitonin of 0 52 associated with increased cough and increased sputum production  1- pneumonia:  Cough, dark yellow sputum production, Tmax 100 5, PC T 0 52, CT scan showing bilateral patchy airspace opacities and air bronchograms  Onset of these symptoms was preceded by few days of post op vomiting  Patient likely had an aspiration event  - please check sputum gram stain and culture  - follow Bcx result sent yesterday 09/14/19  - trend PCT, repeat CBC  - start vancomycin, cefepime and flagyl  - Check MRSA nasal screen      2- Ovarian cancer:  POD 5 for SUKUMAR/BSO with ileostomy reversal, and colostomy, tumor debulking, and ureteral catheterization    Now with bilateral pleural effusions  - consider tap of the pleural fluid to rule out parapneumonic effusion or a malignant effusion    3- GRISELDA with baseline CKD3:  Creat slowly improving, now creat 2 6 mg/dl  - repeat bmp, adjust abx dose based on CrCl  - nephrology follow up    4- anemia:  likely secondary to acute blood loss, Hb today 8 2 mg/dl  - repeat cbc and transfuse as needed      Case discussed with surgical oncology team    HISTORY OF PRESENT ILLNESS:  Reason for Consult: pneumonia  HPI: Esteban Rosado is a 70y o  year old female with ovarian cancer stage IV diagnosed 11/2018, status post neoadjuvant chemo through right chest wall Port-A-Cath, admitted 09/10/2019 and underwent SUKUMAR/BSO with ileostomy reversal, and colostomy, radical omentectomy, tumor debulking, and insertion of ureteral catheter  On 09/15/2019 patient had an episode of low-grade fever of 100 5  She also reported cough and dark yellow phlegm production for the past 2 days  The symptoms were preceded by few days of postoperative vomiting which has now resolved  A CT scan chest abdomen and pelvis was done to investigate her fever, and she was found to have bilateral patchy airspace opacities, air bronchograms, and bilateral pleural effusion  Upon evaluation today, is complaining of cough and phlegm production, she denies pain, she denies shortness of breath  Her abdomen is slightly sore, but her incision is healing well  She has a colostomy in place and a ANDREAS drain  Her Haskins is draining bloody urine  Her GRISELDA is improving  She denies pain or swelling over the right chest wall at the exit site of the Port-A-Cath  REVIEW OF SYSTEMS:  A complete 12 point system-based review of systems is negative other than that noted in the HPI      PAST MEDICAL HISTORY:  Past Medical History:   Diagnosis Date    Abdominal fluid collection     Anemia     Asthma     Cancer ([facility] [address]     ovaries    Diabetes mellitus ([facility] [address]     History of transfusion     PONV (postoperative nausea and vomiting)      Past Surgical History:   Procedure Laterality Date    ABDOMINAL SURGERY      CT GUIDED PARACENTESIS  11/6/2018    CT GUIDED PERC DRAINAGE CATHETER PLACEMENT  12/24/2018    CT NEEDLE BIOPSY PERITONEUM  11/6/2018    CYSTOSCOPY N/A 9/10/2019    Procedure: CYSTOSCOPY , INSERTION URETERAL CATHETERS;  Surgeon: Sunni Sidhu MD;  [facility];  Service: Gynecology Oncology    ECTOPIC PREGNANCY SURGERY      ECTOPIC PREGNANCY SURGERY      EXENTERATION PELVIS N/A 9/10/2019    Procedure: EXPLORATORY LAPAROTOMY, EXENTERATION POSTERIOR PELVIS,  END COLOSTOMY, ILEOSTOMY REVERSAL, LYSIS OF ADHESIONS, rADICAL OMENTECTOMY AND TUMOR DEBULKINGFLEXIBLE SIGMOIDOSCOPY, CYSTOGRAM, INSPECTION OF URETERS;  Surgeon: Sunni Sidhu MD;  [facility];  Service: Gynecology Oncology    IR PARACENTESIS  2018    IR PARACENTESIS  10/18/2018    IR PARACENTESIS  12/10/2018    IR PORT PLACEMENT  2018    LAPAROTOMY N/A 2018    Procedure: LAPAROTOMY EXPLORATORY; Abdominal Washout; Application of Abthera Vac Dressing;  Surgeon: Tyree Espinoza MD;  [facility];  Service: Gynecology Oncology    LAPAROTOMY N/A 12/15/2018    Procedure: LAPAROTOMY EXPLORATORY, ABDOMINAL WASHOUT, DRAIN PLACEMENT x 4, DIVERTING LOOP ILEOSTOMY AND ABDOMINAL CLOSURE,  ALL OTHER INDICATED PROCEDURES;  Surgeon: Tyree Espinoza MD;  [facility];  Service: Gynecology Oncology    CT COLONOSCOPY FLX DX W/COLLJ [facility] WHEN [facility] N/A 2018    Procedure: EGD AND COLONOSCOPY;  Surgeon: Dominique Lindquist MD;  Location: [facility]; Service: Gastroenterology    TONSILECTOMY AND ADNOIDECTOMY      TONSILLECTOMY         FAMILY HISTORY:  Non-contributory    SOCIAL HISTORY:  Social History   Social History     Substance and Sexual Activity   Alcohol Use Not Currently    Comment: occassionally     Social History     Substance and Sexual Activity   Drug Use No     Social History     Tobacco Use   Smoking Status Former Smoker   Smokeless Tobacco Never Used   Tobacco Comment    "Quit 40 yrs ago"       ALLERGIES:  No Known Allergies    MEDICATIONS:  All current active medications have been reviewed    Last dexamethasone dose given 19      PHYSICAL EXAM:  Temp:  [97 2 °F (36 2 °C)-100 5 °F (38 1 °C)] 98 3 °F (36 8 °C)  HR:  [81-99] 88  Resp:  [16-20] 16  BP: (148-154)/(70-81) 150/72  SpO2:  [90 %-95 %] 95 %  Temp (24hrs), Av °F (37 2 °C), Min:97 2 °F (36 2 °C), Max:100 5 °F (38 1 °C)  Current: Temperature: 98 3 °F (36 8 °C)    Intake/Output Summary (Last 24 hours) at 9/15/2019 1426  Last data filed at 9/15/2019 1300  Gross per 24 hour   Intake 1905 2 ml   Output 2685 ml   Net -779 8 ml       General Appearance:  Looks tired, but no signs of acute distress  Coughs during encounter   Head:  Normocephalic, without obvious abnormality, atraumatic   Eyes:  Conjunctiva pink and sclera anicteric, both eyes   Nose: Nares normal, mucosa normal, no drainage   Throat: Oropharynx moist without lesions   Neck: Supple, symmetrical, no adenopathy, no tenderness/mass/nodules   Back:   Symmetric, no curvature, ROM normal, no CVA tenderness   Lungs:   Decrease air entry at bases bilaterally; mild inspiratory crackles bilaterally  No wheezing or rhonchi  Chest Wall:  No tenderness or deformity; RCW port-ocath, no surrounding swelling or tenderness   Heart:  RRR; no murmur, rub or gallop   Abdomen:   Soft, non-tender, non-distended, colostomy in place, ANDREAS drain in LLQ  Abdominal incison healing well, no erythema, purulence,   Extremities: No cyanosis, clubbing or edema   Skin: No rashes or lesions  Surgical incision healing well   Lymph nodes: Cervical, supraclavicular nodes normal   Neurologic: Alert and oriented times 3, extremity strength 5/5 and symmetric       LABS, IMAGING, & OTHER STUDIES:  Lab Results:  I have personally reviewed pertinent labs    Results from last 7 days   Lab Units 09/15/19  0548 09/14/19  0524 09/13/19  0603   WBC Thousand/uL 15 02* 18 22* 20 91*   HEMOGLOBIN g/dL 8 2* 9 0* 9 2*   PLATELETS Thousands/uL 198 179 154     Results from last 7 days   Lab Units 09/15/19  0548 09/14/19  0524 09/13/19  1312  09/10/19  1804 09/10/19  1715  09/10/19  1454   SODIUM mmol/L 144 142 145   < > 146* 144   < >  --    POTASSIUM mmol/L 3 5 3 6 4 2   < > 3 9 3 8   < >  --    CHLORIDE mmol/L 113* 114* 117*   < > 117* 117*   < >  --    CO2 mmol/L 23 19* 17*   < > 19* 20*   < >  --    CO2, I-STAT mmol/L  --   --   --   --   --   --   --  21   BUN mg/dL 67* 59* 60*   < > 22 22   < >  --    CREATININE mg/dL 2 66* 2 54* 3 43*   < > 1 29 1 29   < >  --    EGFR ml/min/1 73sq m 18 19 13   < > 42 42   < >  --    GLUCOSE, ISTAT mg/dl  --   --   --   --   --   --   --  228* CALCIUM mg/dL 8 1* 8 1* 8 3   < > 9 8 10 3*   < >  --    AST U/L  --   --   --   --  38 39  --   --    ALT U/L  --   --   --   --  26 26   < >  --    ALK PHOS U/L  --   --   --   --  57 60   < >  --     < > = values in this interval not displayed  Results from last 7 days   Lab Units 09/15/19  1008   PROCALCITONIN ng/ml 0 52*       Imaging Studies:   I have personally reviewed pertinent imaging study reports and images in PACS  Other Studies:   I have personally reviewed pertinent reports

## 2019-09-16 ENCOUNTER — APPOINTMENT (INPATIENT)
Dept: RADIOLOGY | Facility: HOSPITAL | Age: 69
DRG: 736 | End: 2019-09-16
Payer: MEDICARE

## 2019-09-16 LAB
ANION GAP SERPL CALCULATED.3IONS-SCNC: 7 MMOL/L (ref 4–13)
BASOPHILS # BLD AUTO: 0.01 THOUSANDS/ΜL (ref 0–0.1)
BASOPHILS NFR BLD AUTO: 0 % (ref 0–1)
BUN SERPL-MCNC: 53 MG/DL (ref 5–25)
CALCIUM SERPL-MCNC: 8 MG/DL (ref 8.3–10.1)
CHLORIDE SERPL-SCNC: 112 MMOL/L (ref 100–108)
CO2 SERPL-SCNC: 27 MMOL/L (ref 21–32)
CREAT SERPL-MCNC: 2.01 MG/DL (ref 0.6–1.3)
EOSINOPHIL # BLD AUTO: 0.31 THOUSAND/ΜL (ref 0–0.61)
EOSINOPHIL NFR BLD AUTO: 3 % (ref 0–6)
ERYTHROCYTE [DISTWIDTH] IN BLOOD BY AUTOMATED COUNT: 15.5 % (ref 11.6–15.1)
GFR SERPL CREATININE-BSD FRML MDRD: 25 ML/MIN/1.73SQ M
GLUCOSE FLD-MCNC: 110 MG/DL
GLUCOSE SERPL-MCNC: 104 MG/DL (ref 65–140)
GLUCOSE SERPL-MCNC: 106 MG/DL (ref 65–140)
GLUCOSE SERPL-MCNC: 107 MG/DL (ref 65–140)
GLUCOSE SERPL-MCNC: 109 MG/DL (ref 65–140)
HCT VFR BLD AUTO: 24.6 % (ref 34.8–46.1)
HGB BLD-MCNC: 8 G/DL (ref 11.5–15.4)
HISTIOCYTES NFR FLD: 10 %
IMM GRANULOCYTES # BLD AUTO: 0.24 THOUSAND/UL (ref 0–0.2)
IMM GRANULOCYTES NFR BLD AUTO: 2 % (ref 0–2)
LDH FLD L TO P-CCNC: 118 U/L
LDH SERPL-CCNC: 266 U/L (ref 81–234)
LYMPHOCYTES # BLD AUTO: 1.49 THOUSANDS/ΜL (ref 0.6–4.47)
LYMPHOCYTES NFR BLD AUTO: 13 % (ref 14–44)
LYMPHOCYTES NFR BLD AUTO: 14 %
MCH RBC QN AUTO: 29.9 PG (ref 26.8–34.3)
MCHC RBC AUTO-ENTMCNC: 32.5 G/DL (ref 31.4–37.4)
MCV RBC AUTO: 92 FL (ref 82–98)
MONO+MESO NFR FLD MANUAL: 10 %
MONOCYTES # BLD AUTO: 1.19 THOUSAND/ΜL (ref 0.17–1.22)
MONOCYTES NFR BLD AUTO: 10 % (ref 4–12)
MONOCYTES NFR BLD AUTO: 6 %
NEUTROPHILS # BLD AUTO: 8.45 THOUSANDS/ΜL (ref 1.85–7.62)
NEUTS SEG NFR BLD AUTO: 60 %
NEUTS SEG NFR BLD AUTO: 72 % (ref 43–75)
NRBC BLD AUTO-RTO: 0 /100 WBCS
PH BODY FLUID: 7.8
PLATELET # BLD AUTO: 219 THOUSANDS/UL (ref 149–390)
PMV BLD AUTO: 11.5 FL (ref 8.9–12.7)
POTASSIUM SERPL-SCNC: 3.4 MMOL/L (ref 3.5–5.3)
PROCALCITONIN SERPL-MCNC: 0.33 NG/ML
PROT FLD-MCNC: <2 G/DL
PROT SERPL-MCNC: 5.3 G/DL (ref 6.4–8.2)
RBC # BLD AUTO: 2.68 MILLION/UL (ref 3.81–5.12)
SODIUM SERPL-SCNC: 146 MMOL/L (ref 136–145)
TOTAL CELLS COUNTED SPEC: 100
VANCOMYCIN SERPL-MCNC: 9.5 UG/ML
WBC # BLD AUTO: 11.69 THOUSAND/UL (ref 4.31–10.16)
WBC # FLD MANUAL: 330 /UL

## 2019-09-16 PROCEDURE — 88305 TISSUE EXAM BY PATHOLOGIST: CPT | Performed by: PATHOLOGY

## 2019-09-16 PROCEDURE — 32555 ASPIRATE PLEURA W/ IMAGING: CPT

## 2019-09-16 PROCEDURE — 84155 ASSAY OF PROTEIN SERUM: CPT | Performed by: OBSTETRICS & GYNECOLOGY

## 2019-09-16 PROCEDURE — 89051 BODY FLUID CELL COUNT: CPT | Performed by: OBSTETRICS & GYNECOLOGY

## 2019-09-16 PROCEDURE — 87070 CULTURE OTHR SPECIMN AEROBIC: CPT | Performed by: OBSTETRICS & GYNECOLOGY

## 2019-09-16 PROCEDURE — 83615 LACTATE (LD) (LDH) ENZYME: CPT | Performed by: OBSTETRICS & GYNECOLOGY

## 2019-09-16 PROCEDURE — 88112 CYTOPATH CELL ENHANCE TECH: CPT | Performed by: PATHOLOGY

## 2019-09-16 PROCEDURE — 0W993ZZ DRAINAGE OF RIGHT PLEURAL CAVITY, PERCUTANEOUS APPROACH: ICD-10-PCS | Performed by: RADIOLOGY

## 2019-09-16 PROCEDURE — 99232 SBSQ HOSP IP/OBS MODERATE 35: CPT | Performed by: INTERNAL MEDICINE

## 2019-09-16 PROCEDURE — C9113 INJ PANTOPRAZOLE SODIUM, VIA: HCPCS | Performed by: OBSTETRICS & GYNECOLOGY

## 2019-09-16 PROCEDURE — 84157 ASSAY OF PROTEIN OTHER: CPT | Performed by: OBSTETRICS & GYNECOLOGY

## 2019-09-16 PROCEDURE — 85025 COMPLETE CBC W/AUTO DIFF WBC: CPT | Performed by: OBSTETRICS & GYNECOLOGY

## 2019-09-16 PROCEDURE — 80202 ASSAY OF VANCOMYCIN: CPT | Performed by: INTERNAL MEDICINE

## 2019-09-16 PROCEDURE — 32555 ASPIRATE PLEURA W/ IMAGING: CPT | Performed by: RADIOLOGY

## 2019-09-16 PROCEDURE — 80048 BASIC METABOLIC PNL TOTAL CA: CPT | Performed by: OBSTETRICS & GYNECOLOGY

## 2019-09-16 PROCEDURE — 87205 SMEAR GRAM STAIN: CPT | Performed by: OBSTETRICS & GYNECOLOGY

## 2019-09-16 PROCEDURE — 84145 PROCALCITONIN (PCT): CPT | Performed by: INTERNAL MEDICINE

## 2019-09-16 PROCEDURE — 99024 POSTOP FOLLOW-UP VISIT: CPT | Performed by: UROLOGY

## 2019-09-16 PROCEDURE — 83986 ASSAY PH BODY FLUID NOS: CPT | Performed by: OBSTETRICS & GYNECOLOGY

## 2019-09-16 PROCEDURE — 82945 GLUCOSE OTHER FLUID: CPT | Performed by: OBSTETRICS & GYNECOLOGY

## 2019-09-16 PROCEDURE — 82948 REAGENT STRIP/BLOOD GLUCOSE: CPT

## 2019-09-16 PROCEDURE — 87081 CULTURE SCREEN ONLY: CPT | Performed by: INTERNAL MEDICINE

## 2019-09-16 PROCEDURE — 99024 POSTOP FOLLOW-UP VISIT: CPT | Performed by: OBSTETRICS & GYNECOLOGY

## 2019-09-16 RX ORDER — VANCOMYCIN HYDROCHLORIDE 1 G/200ML
15 INJECTION, SOLUTION INTRAVENOUS ONCE
Status: COMPLETED | OUTPATIENT
Start: 2019-09-16 | End: 2019-09-16

## 2019-09-16 RX ORDER — POTASSIUM CHLORIDE 14.9 MG/ML
20 INJECTION INTRAVENOUS ONCE
Status: COMPLETED | OUTPATIENT
Start: 2019-09-16 | End: 2019-09-16

## 2019-09-16 RX ORDER — SODIUM CHLORIDE 450 MG/100ML
50 INJECTION, SOLUTION INTRAVENOUS CONTINUOUS
Status: DISCONTINUED | OUTPATIENT
Start: 2019-09-16 | End: 2019-09-18

## 2019-09-16 RX ADMIN — HEPARIN SODIUM 5000 UNITS: 5000 INJECTION INTRAVENOUS; SUBCUTANEOUS at 14:27

## 2019-09-16 RX ADMIN — POTASSIUM CHLORIDE 20 MEQ: 14.9 INJECTION, SOLUTION INTRAVENOUS at 09:10

## 2019-09-16 RX ADMIN — HEPARIN SODIUM 5000 UNITS: 5000 INJECTION INTRAVENOUS; SUBCUTANEOUS at 21:21

## 2019-09-16 RX ADMIN — ACETAMINOPHEN 650 MG: 325 TABLET ORAL at 17:49

## 2019-09-16 RX ADMIN — METRONIDAZOLE 500 MG: 500 INJECTION, SOLUTION INTRAVENOUS at 21:32

## 2019-09-16 RX ADMIN — NYSTATIN 500000 UNITS: 100000 SUSPENSION ORAL at 21:21

## 2019-09-16 RX ADMIN — VANCOMYCIN HYDROCHLORIDE 1000 MG: 1 INJECTION, SOLUTION INTRAVENOUS at 17:49

## 2019-09-16 RX ADMIN — METRONIDAZOLE 500 MG: 500 INJECTION, SOLUTION INTRAVENOUS at 14:27

## 2019-09-16 RX ADMIN — SODIUM CHLORIDE 50 ML/HR: 0.45 INJECTION, SOLUTION INTRAVENOUS at 09:09

## 2019-09-16 RX ADMIN — HEPARIN SODIUM 5000 UNITS: 5000 INJECTION INTRAVENOUS; SUBCUTANEOUS at 06:14

## 2019-09-16 RX ADMIN — PANTOPRAZOLE SODIUM 40 MG: 40 INJECTION, POWDER, FOR SOLUTION INTRAVENOUS at 09:10

## 2019-09-16 RX ADMIN — METRONIDAZOLE 500 MG: 500 INJECTION, SOLUTION INTRAVENOUS at 06:14

## 2019-09-16 RX ADMIN — NYSTATIN 500000 UNITS: 100000 SUSPENSION ORAL at 17:50

## 2019-09-16 RX ADMIN — CEFEPIME HYDROCHLORIDE 1000 MG: 1 INJECTION, POWDER, FOR SOLUTION INTRAMUSCULAR; INTRAVENOUS at 15:13

## 2019-09-16 RX ADMIN — SODIUM BICARBONATE 125 ML/HR: 84 INJECTION, SOLUTION INTRAVENOUS at 03:02

## 2019-09-16 NOTE — PLAN OF CARE
Problem: SAFETY ADULT  Goal: Maintain or return to baseline ADL function  Description  INTERVENTIONS:  -  Assess patient's ability to carry out ADLs; assess patient's baseline for ADL function and identify physical deficits which impact ability to perform ADLs (bathing, care of mouth/teeth, toileting, grooming, dressing, etc )  - Assess/evaluate cause of self-care deficits   - Assess range of motion  - Assess patient's mobility; develop plan if impaired  - Assess patient's need for assistive devices and provide as appropriate  - Encourage maximum independence but intervene and supervise when necessary  - Involve family in performance of ADLs  - Assess for home care needs following discharge   - Consider OT consult to assist with ADL evaluation and planning for discharge  - Provide patient education as appropriate  Outcome: Not Progressing  Goal: Maintain or return mobility status to optimal level  Description  INTERVENTIONS:  - Assess patient's baseline mobility status (ambulation, transfers, stairs, etc )    - Identify cognitive and physical deficits and behaviors that affect mobility  - Identify mobility aids required to assist with transfers and/or ambulation (gait belt, sit-to-stand, lift, walker, cane, etc )  - [place] fall precautions as indicated by assessment  - Record patient progress and toleration of activity level on Mobility SBAR; progress patient to next Phase/Stage  - Instruct patient to call for assistance with activity based on assessment  - Consider rehabilitation consult to assist with strengthening/weightbearing, etc   Outcome: Not Progressing     Problem: Nutrition/Hydration-ADULT  Goal: Nutrient/Hydration intake appropriate for improving, restoring or maintaining nutritional needs  Description  Monitor and assess patient's nutrition/hydration status for malnutrition  Collaborate with interdisciplinary team and initiate plan and interventions as ordered    Monitor patient's weight and dietary intake as ordered or per policy  Utilize nutrition screening tool and intervene as necessary  Determine patient's food preferences and provide high-protein, high-caloric foods as appropriate       INTERVENTIONS:  - Monitor oral intake, urinary output, labs, and treatment plans  - Assess nutrition and hydration status and recommend course of action  - Evaluate amount of meals eaten  - Assist patient with eating if necessary   - Allow adequate time for meals  - Recommend/ encourage appropriate diets, oral nutritional supplements, and vitamin/mineral supplements  - Order, calculate, and assess calorie counts as needed  - Recommend, monitor, and adjust tube feedings and TPN/PPN based on assessed needs  - Assess need for intravenous fluids  - Provide specific nutrition/hydration education as appropriate  - Include patient/family/caregiver in decisions related to nutrition  Outcome: Not Progressing     Problem: NEUROSENSORY - ADULT  Goal: Achieves stable or improved neurological status  Description  INTERVENTIONS  - Monitor and report changes in neurological status  - Monitor vital signs such as temperature, blood pressure, glucose, and any other labs ordered   - Initiate measures to prevent increased intracranial pressure  - Monitor for seizure activity and implement precautions if appropriate      Outcome: Completed     Problem: CARDIOVASCULAR - ADULT  Goal: Maintains optimal cardiac output and hemodynamic stability  Description  INTERVENTIONS:  - Monitor I/O, vital signs and rhythm  - Monitor for S/S and trends of decreased cardiac output  - Administer and titrate ordered vasoactive medications to optimize hemodynamic stability  - Assess quality of pulses, skin color and temperature  - Assess for signs of decreased coronary artery perfusion  - Instruct patient to report change in severity of symptoms  Outcome: Completed     Problem: SKIN/TISSUE INTEGRITY - ADULT  Goal: Oral mucous membranes remain intact  Description  INTERVENTIONS  - Assess oral mucosa and hygiene practices  - Implement preventative oral hygiene regimen  - Implement oral medicated treatments as ordered  - Initiate Nutrition services referral as needed  Outcome: Completed     Problem: HEMATOLOGIC - ADULT  Goal: Maintains hematologic stability  Description  INTERVENTIONS  - Assess for signs and symptoms of bleeding or hemorrhage  - Monitor labs  - Administer supportive blood products/factors as ordered and appropriate  Outcome: Completed     Problem: MUSCULOSKELETAL - ADULT  Goal: Maintain proper alignment of affected body part  Description  INTERVENTIONS:  - Support, maintain and protect limb and body alignment  - Provide patient/ family with appropriate education  Outcome: Completed     Problem: INFECTION - ADULT  Goal: Absence of fever/infection during neutropenic period  Description  INTERVENTIONS:  - Monitor WBC    Outcome: Completed

## 2019-09-16 NOTE — PROGRESS NOTES
Gyn Oncology Progress note   Rolly Cortés 70 y o  female MRN: [id number]  Unit/Bed#: [facility] 905-01 Encounter: [id number]    Assessment: 70 y o  with stage 4B ovarian cancer POD# 6 from exploratory laparotomy, extended duration of posterior pelvis with low anterior resection, end colostomy, ileostomy reversal, radical omentectomy and tumor debulking, cystoscopy and flexible sigmoidoscopy    Plan:    1) acute on chronic kidney disease  - flatus creatinine 2 6, follow-up a m  Labs; nursing reported increase in urine output, 1 5 mL/kg/hr output documented last 8 hours    2) postoperative pain  - Dilaudid PCA, currently well controlled  For attempted 4 doses given on current pump report    3) anemia  - last hemoglobin 8 2, follow-up a m  Labs; patient denies any symptoms at this time, continue to monitor    4) bilateral pleural effusions   Plan for thoracocentesis today, currently NPO    5) pneumonia  Currently treated with vancomycin/cefepime/, will be day 2 pending renal function and level ; infectious disease following, cultures pending      Subjective:    Rolly Cortés has current complaints of hunger and thirst   She is currently NPO for thoracocentesis is persistently asking for something to drink     Pain is well controlled with Dilaudid PCA  Patient is currently voiding via Haskins catheter dark red urine  She is ambulating to chair  Patient is currently passing flatus into colostomy bag and has had no bowel movement  She is tolerating PO, and denies nausea or vomitting at this time  Patient denies fever, chills, chest pain, shortness of breath, or calf tenderness  Plan to transfer to [facility] from step-down today    /66   Pulse 81   Temp 99 1 °F (37 3 °C)   Resp 18   Ht 5' 4" (1 626 m)   Wt 84 3 kg (185 lb 13 6 oz)   SpO2 95%   BMI 31 90 kg/m²     I/O last 3 completed shifts: In: 2699 6 [P O :100;  I V :2599 6]  Out: 3300 [Urine:2850; Drains:450]  I/O this shift:  In: 3012 9 [I V :2912 9; IV Piggyback:100]  Out: 5595 [Urine:1050; Drains:195]    Lab Results   Component Value Date    WBC 15 02 (H) 09/15/2019    HGB 8 2 (L) 09/15/2019    HCT 25 3 (L) 09/15/2019    MCV 92 09/15/2019     09/15/2019       Lab Results   Component Value Date    GLUCOSE 228 (H) 09/10/2019    CALCIUM 8 1 (L) 09/15/2019    K 3 5 09/15/2019    CO2 23 09/15/2019     (H) 09/15/2019    BUN 67 (H) 09/15/2019    CREATININE 2 66 (H) 09/15/2019           Physical Exam   Constitutional: She is oriented to person, place, and time  She appears well-developed  She appears distressed  Saturating 95% with nasal cannula on this morning   HENT:   Head: Normocephalic and atraumatic  Eyes: Pupils are equal, round, and reactive to light  EOM are normal    Neck: Normal range of motion  No JVD present  No tracheal deviation present  Cardiovascular: Normal rate, regular rhythm, normal heart sounds and intact distal pulses  Exam reveals no gallop and no friction rub  No murmur heard  Pulmonary/Chest: Effort normal  No stridor  No respiratory distress  She has no wheezes  She has no rales  Decreased breath sounds in bilateral bases   Abdominal: Soft  Bowel sounds are normal  She exhibits no distension and no mass  There is no tenderness  There is no rebound and no guarding  Gas noted in ostomy, ANDREAS bulb full of serous fluid   Musculoskeletal: She exhibits no edema  Neurological: She is alert and oriented to person, place, and time  No cranial nerve deficit  Skin: Skin is warm and dry  She is not diaphoretic  Psychiatric: She has a normal mood and affect           Nena Michel MD  9/16/2019  6:16 AM

## 2019-09-16 NOTE — PROGRESS NOTES
Vancomycin IV Pharmacy-to-Dose Consultation    Andre Israel is a 70 y o  female who is currently receiving Vancomycin IV with management by the Pharmacy Consult service  Relevant clinical data and objective / subjective history reviewed  Vancomycin Assessment:  Indication: Pneumonia  Status: stable, afebrile, mild leukocytosis  Micro:           9/16 MRSA culture: in process           9/15 blood cultures x 2: no growth at 24 hours           9/15 sputum culture: no result  Procalcitonin:  0 33 (9/16)  Renal Function: with GRISELDA, has been improving over the last several days; SCr is 2 66 from a peak of 3 91; baseline appears to be 1 0 to 1 3; UOP 1 1 mL/kg/hr in the last 24 hours  Potential Nephrotoxicity Factors:  Patient-Factors: advanced age, renal dysfunction, b/l hydronephrosis  Days of Therapy: 2  Current Dose: utilizing pulse dosing  Goal Trough: 15-20 (appropriate for most indications)   Goal AUC(24h): 400-600  Last Level: 9 5 (random level drawn 24 hours after last dose given)      Vancomycin Plan:  Vancomycin 1000 mg (15 mg/kg using adjusted BW) IV one time to be given today for random level < 15  Next Level: will check a 12 hour random level for pulse dosing  Renal Function Monitoring: continue to monitor UOP and BMP      Pharmacy will continue to follow closely for s/sx of nephrotoxicity, infusion reactions and appropriateness of therapy  BMP and CBC will be ordered per protocol  We will continue to follow the patient's culture results and clinical progress daily         Fabio Garcia, PharmD  Pharmacist

## 2019-09-16 NOTE — QUICK NOTE
Chart reviewed with attending  Pt with hx of stage IV ovarian cancer  CT chest/abd/pelvis on 9/15/19 revealed bilateral pleural effusions  IR will attempt to perform thoracentesis today       Orville Vega PA-C

## 2019-09-16 NOTE — PROGRESS NOTES
Progress Note - Infectious Disease   Catherine Flanagan 70 y o  female MRN: [id number]  Unit/Bed#: [facility] 905-01 Encounter: [id number]      Impression/Plan:  70 y ears old female patient  with stage 4 ovarian cancer; today she is  POD  6 from exploratory laparotomy, extended duration of posterior pelvis with low anterior resection, end colostomy, ileostomy reversal, radical omentectomy and tumor debulking, cystoscopy and flexible sigmoidoscopy  She had fever of 100 5, with cough and sputum production  CT showing patchy bilateral airspace opacities, air bronchograms, and her PCT 0 52  Patient started on empiric treatment for pneumonia  She is planned for thoracentesis today to r/o malignant effusion or parapnuemonic fluid collection  1- pneumonia:  Cough, dark yellow sputum production, Tmax 100 5, PC T 0 52, CT scan showing bilateral patchy airspace opacities and air bronchograms  Onset of these symptoms was preceded by few days of post op vomiting  Patient likely had an aspiration event  - please send sputum gram stain and culture  - follow Bcx result sent yesterday 09/14/19  - trend PCT, repeat CBC  Check MRSA nasal screen  - continue  vancomycin, cefepime and flagyl; if MRSA screen negative, stop vancomycin            2- Ovarian cancer:  POD 6 for SUKMUAR/BSO with ileostomy reversal, and colostomy, tumor debulking, and ureteral catheterization  Now with bilateral pleural effusions  parapnuemonic versus malignant?  -  tap of the pleural fluid to rule out parapneumonic effusion or a malignant effusion; send fluid for PH, glucose, LDH, gram stain and culture, protein   Check serum LDH and protein     3- GRISELDA with baseline CKD3:  Creat slowly improving, now creat 2 01 mg/dl  - repeat bmp, adjust abx dose based on CrCl  - nephrology follow up     4- anemia:  likely secondary to acute blood loss, Hb today 8 2 mg/dl  - repeat cbc and transfuse as needed        Case discussed with surgical oncology team    Antibiotics:  Vanco, cefepime, flagyl day 2     Subjective:  Patient has no fever, chills, sweats; no nausea, vomiting, diarrhea;; no pain  No new symptoms  Cough imporving    Objective:  Vitals:  Temp:  [98 4 °F (36 9 °C)-99 4 °F (37 4 °C)] 99 4 °F (37 4 °C)  HR:  [80-89] 88  Resp:  [16-20] 20  BP: (132-160)/(66-83) 160/83  SpO2:  [93 %-96 %] 94 %  Temp (24hrs), Av 8 °F (37 1 °C), Min:98 4 °F (36 9 °C), Max:99 4 °F (37 4 °C)  Current: Temperature: 99 4 °F (37 4 °C)    Physical Exam:   General Appearance:  Alert, interactive, nontoxic, no acute distress  Throat: Oropharynx moist without lesions  Lungs:   Clear to auscultation bilaterally; no wheezes, rhonchi or rales; respirations unlabored   Heart:  RRR; no murmur, rub or gallop   Abdomen:   Soft, non-tender, non-distended, positive bowel sounds  Abdominal incision healing well, no purulence or erythema   Extremities: No clubbing, cyanosis or edema   Skin: No new rashes or lesions  No draining wounds noted   RCW port-o-cath in place, site not tender or swollen       Labs, Imaging, & Other studies:   All pertinent labs and imaging studies were personally reviewed  Results from last 7 days   Lab Units 19  0657 09/15/19  0548 19  0524   WBC Thousand/uL 11 69* 15 02* 18 22*   HEMOGLOBIN g/dL 8 0* 8 2* 9 0*   PLATELETS Thousands/uL 219 198 179     Results from last 7 days   Lab Units 19  0657 09/15/19  0548 19  0524  09/10/19  1804 09/10/19  1715  09/10/19  1454   SODIUM mmol/L 146* 144 142   < > 146* 144   < >  --    POTASSIUM mmol/L 3 4* 3 5 3 6   < > 3 9 3 8   < >  --    CHLORIDE mmol/L 112* 113* 114*   < > 117* 117*   < >  --    CO2 mmol/L 27 23 19*   < > 19* 20*   < >  --    CO2, I-STAT mmol/L  --   --   --   --   --   --   --  21   BUN mg/dL 53* 67* 59*   < > 22 22   < >  --    CREATININE mg/dL 2 01* 2 66* 2 54*   < > 1 29 1 29   < >  --    EGFR ml/min/1 73sq m 25 18 19   < > 42 42   < >  --    GLUCOSE, ISTAT mg/dl  --   -- --   --   --   --   --  228*   CALCIUM mg/dL 8 0* 8 1* 8 1*   < > 9 8 10 3*   < >  --    AST U/L  --   --   --   --  38 39  --   --    ALT U/L  --   --   --   --  26 26   < >  --    ALK PHOS U/L  --   --   --   --  57 60   < >  --     < > = values in this interval not displayed           Results from last 7 days   Lab Units 09/16/19  0620 09/15/19  1008   PROCALCITONIN ng/ml 0 33* 0 52*

## 2019-09-16 NOTE — BRIEF OP NOTE (RAD/CATH)
INTERVENTIONAL RADIOLOGY PROCEDURE NOTE    Date: 9/16/2019    Preoperative diagnosis:  Large right pleural effusion    Postoperative diagnosis: Same    Procedure:  Ultrasound-guided right thoracentesis    Surgeon: Milka You MD     Assistant: None  No qualified resident was available  Blood loss:  Trace    Specimens:  Sent to lab as requested    Findings:  1125 mL clear yellow pleural fluid removed from right chest under ultrasound guidance      Complications:  None immediate    Anesthesia: local

## 2019-09-16 NOTE — SEDATION DOCUMENTATION
Dr Brendan Wills performed right side thoracentesis without complication  Site CDI  1125cc clear yellow fluid drained  Labs sent   Report called to [facility]

## 2019-09-16 NOTE — PROGRESS NOTES
[address] [facility] NOTE   Olena Payor 70 y o  female MRN: [id number]  Unit/Bed#: [facility] 905-01 Encounter: [id number]  Reason for Consult: GRISELDA on CKD III    ASSESSMENT and PLAN:    50-year-old female with ovarian ca s/p neoadjuvent chemo, who on 09/10 underwent laparotomy, cystoscopy, ileostomy reversal, lysis of adhesions, tumor debulking  Patient required 6 units of packed red blood cell, 4 units of FFP, 1 unit of platelet, 5 L of fluids  Postoperatively patient is transferred to the ICU  1) GRISELDA on CKD III - CKD likely secondary to DM and age related nephron loss     - baseline Cr 1 1-1 3 mg/dL   - Cr rising to 3 9 on 9/13 and Nephrology is consulted  -urinalysis-  -CT of the abdomen pelvis with mild hydroureteronephrosis   - repeat CT scan reviewed from 9/15 with patchy b/l ground glass airspace opacity, b/l pl effusion, persistent mild b/l hydro without obstruction noted  - Etiology possible ATN in setting of hypotension and blood loss and hypoperfusion and post obstruction   - Urology on board   - 9/16 - UOP improving to 2 1 L yesterday  Input 3 1 L; Cr improving from 2 7 to 2 mg/dL;  Na rising    Plan:    - f/u vancomycin levels  - discontinue bicarb gtt  - start 1/2 NS  - give 20 meq potassium IV one time  - BMP in AM  - b/l hydro being monitored conservatively (final plan per Urology)    2) ovarian Ca     - s/p surgery     3) acid/base     - bicarb improved with bicarb gtt to 27    4) electrolytes     - Na rising to 146  - K low 3 4    5) HTN     - BP stable 130-150    6) anemia     - monitoring for now  - Hb 8 on 9/16    7) clemons     - there was concern for clemons catheter malfunction  - clemons appears to be draining    8) PNA with fevers and pl effusion    - ID on board  - started on broad spectrum antibiotics on 9/15  - blood cultures pending  - IR for drain of pl effusions today to eval for infectious vs malignancy   - leukocytosis improving on 9/16      SUBJECTIVE / INTERVAL HISTORY:  Pt weak  Feeling thirsty  States is feeling better overall       OBJECTIVE:  Current Weight: Weight - Scale: 84 6 kg (186 lb 6 4 oz)  Vitals:    09/15/19 2203 09/16/19 0346 09/16/19 0600 09/16/19 0736   BP: 152/75 132/66  135/67   Pulse: 85 81  80   Resp: 18 18  18   Temp: 99 1 °F (37 3 °C) 99 1 °F (37 3 °C)  99 °F (37 2 °C)   TempSrc:       SpO2: 96% 95%  95%   Weight:   84 6 kg (186 lb 6 4 oz)    Height:           Intake/Output Summary (Last 24 hours) at 9/16/2019 0464  Last data filed at 9/16/2019 8668  Gross per 24 hour   Intake 3116 3 ml   Output 2485 ml   Net 631 3 ml     General: NAD, weak  Skin: no rash  Eyes: anicteric sclera  ENT: dry mucous membrane  Neck: supple  Chest: CTA b/l, no ronchii, no wheeze, no rubs, no rales, diminished air intake b/l bases  CVS: s1s2, no murmur, no gallop, no rub  Abdomen: soft, nl sounds, surg site intact  Extremities: trace edema LE b/l  : + clemons  Neuro: AAOX3  Psych: normal affect    Medications:    Current Facility-Administered Medications:     acetaminophen (TYLENOL) tablet 650 mg, 650 mg, Oral, Q6H MARCIO, Karla Zepeda PA-C, 650 mg at 09/12/19 1134    cefepime (MAXIPIME) 1,000 mg in dextrose 5 % 50 mL IVPB, 1,000 mg, Intravenous, Q24H, Daquan Pineda MD, Last Rate: 100 mL/hr at 09/15/19 1523, 1,000 mg at 09/15/19 1523    diphenhydrAMINE (BENADRYL) injection 25 mg, 25 mg, Intravenous, Q6H PRN, Miriam Zepeda PA-C    heparin (porcine) subcutaneous injection 5,000 Units, 5,000 Units, Subcutaneous, Q8H [address], 5,000 Units at 09/16/19 9688 **AND** Platelet count, , , Once, Ignacia Sheller, PA-C    HYDROmorphone (DILAUDID) 1 mg/mL 50 mL PCA, , Intravenous, Continuous, Jamariia F DO Kristy    insulin lispro (HumaLOG) 100 units/mL subcutaneous injection 1-5 Units, 1-5 Units, Subcutaneous, Q6H [address], 1 Units at 09/15/19 1753 **AND** Fingerstick Glucose (POCT), , , Q6H, Karla Zepeda PA-C    metroNIDAZOLE (FLAGYL) IVPB (premix) 500 mg, 500 mg, Intravenous, Q8H, Shahbaz Martines MD, Last Rate: 200 mL/hr at 09/16/19 0614, 500 mg at 09/16/19 0614    ondansetron (ZOFRAN) injection 4 mg, 4 mg, Intravenous, Q6H PRN, Ayesha Zepeda PA-C, 4 mg at 09/14/19 0528    pantoprazole (PROTONIX) injection 40 mg, 40 mg, Intravenous, Q24H [address], [place] F DO Kristy, 40 mg at 09/15/19 0901    sodium bicarbonate 75 mEq in sodium chloride 0 45 % 1,000 mL infusion, 125 mL/hr, Intravenous, Continuous, Tiny DO Lang, Last Rate: 125 mL/hr at 09/16/19 0302, 125 mL/hr at 09/16/19 0302    Laboratory Results:  Results from last 7 days   Lab Units 09/16/19  0657 09/15/19  0548 09/14/19  0524 09/13/19  1312 09/13/19  0603 09/12/19  1735 09/12/19  1628 09/12/19  0455 09/11/19  0456 09/10/19  1804 09/10/19  1715  09/10/19  1454 09/10/19  1415 09/10/19  1310 09/10/19  1223 09/10/19  1118 09/10/19  1018   WBC Thousand/uL 11 69* 15 02* 18 22*  --  20 91*  --  20 47* 18 29* 15 68* 15 14* 15 40*   < >  --   --   --   --   --   --    HEMOGLOBIN g/dL 8 0* 8 2* 9 0*  --  9 2*  --  8 2* 7 8* 9 3* 9 7* 10 0*   < >  --   --   --   --   --   --    I STAT HEMOGLOBIN g/dl  --   --   --   --   --   --   --   --   --   --   --   --  8 5* 6 8* 7 8* 6 5* 8 8* 7 1*   HEMATOCRIT % 24 6* 25 3* 27 4*  --  28 3*  --  25 1* 23 9* 27 4* 28 9* 29 9*   < >  --   --   --   --   --   --    HEMATOCRIT, ISTAT %  --   --   --   --   --   --   --   --   --   --   --   --  25* 20* 23* 19* 26* 21*   PLATELETS Thousands/uL 219 198 179  --  154  --  157 139* 133* 144* 144*   < >  --   --   --   --   --   --    POTASSIUM mmol/L 3 4* 3 5 3 6 4 2 4 3 4 2  --  4 3 4 3 3 9 3 8   < >  --   --   --   --   --   --    CHLORIDE mmol/L 112* 113* 114* 117* 113* 113*  --  112* 116* 117* 117*   < >  --   --   --   --   --   --    CO2 mmol/L 27 23 19* 17* 17* 18*  --  21 20* 19* 20*   < >  --   --   --   --   --   --    CO2, I-STAT mmol/L  --   --   --   --   --   --   --   --   --   --   --   --  21 20* 21 18* 20* 21   BUN mg/dL 53* 67* 59* 60* 64* 57*  --  50* 29* 22 22   < >  --   --   --   --   --   --    CREATININE mg/dL 2 01* 2 66* 2 54* 3 43* 3 91* 2 94*  --  2 23* 1 37* 1 29 1 29   < >  --   --   --   --   --   --    CALCIUM mg/dL 8 0* 8 1* 8 1* 8 3 8 1* 8 2*  --  8 3 9 2 9 8 10 3*   < >  --   --   --   --   --   --    MAGNESIUM mg/dL  --   --  2 2  --  2 2  --   --   --  1 9 1 6 1 8  --   --   --   --   --   --   --    PHOSPHORUS mg/dL  --   --  3 7  --  5 6*  --   --   --  5 4* 4 4* 4 3*  --   --   --   --   --   --   --    GLUCOSE, ISTAT mg/dl  --   --   --   --   --   --   --   --   --   --   --   --  228* 227* 224* 194* 201* 157*    < > = values in this interval not displayed

## 2019-09-16 NOTE — PROGRESS NOTES
UROLOGY PROGRESS NOTE   Patient Identifiers: Griffin Samano (MRN [id number])  Date of Service: 9/16/2019    Subjective:     Awake alert  Very weak  Urine pink  No clots      Patient has    As above      Objective:     VITALS:    Vitals:    09/16/19 1102   BP: 160/83   Pulse: 88   Resp: 20   Temp: 99 4 °F (37 4 °C)   SpO2: 94%           LABS:  Lab Results   Component Value Date    HGB 8 0 (L) 09/16/2019    HCT 24 6 (L) 09/16/2019    WBC 11 69 (H) 09/16/2019     09/16/2019   ]    Lab Results   Component Value Date    K 3 4 (L) 09/16/2019     (H) 09/16/2019    CO2 27 09/16/2019    BUN 53 (H) 09/16/2019    CREATININE 2 01 (H) 09/16/2019    CALCIUM 8 0 (L) 09/16/2019    GLUCOSE 228 (H) 09/10/2019   ]        INPATIENT MEDS:    Current Facility-Administered Medications:     acetaminophen (TYLENOL) tablet 650 mg, 650 mg, Oral, Q6H [facility], Karla Zepeda PA-C, 650 mg at 09/12/19 1134    cefepime (MAXIPIME) 1,000 mg in dextrose 5 % 50 mL IVPB, 1,000 mg, Intravenous, Q24H, Daquan Pineda MD, Last Rate: 100 mL/hr at 09/15/19 1523, 1,000 mg at 09/15/19 1523    diphenhydrAMINE (BENADRYL) injection 25 mg, 25 mg, Intravenous, Q6H PRN, Casper Zepeda PA-C    heparin (porcine) subcutaneous injection 5,000 Units, 5,000 Units, Subcutaneous, Q8H [facility], 5,000 Units at 09/16/19 0614 **AND** Platelet count, , , Once, Cy Victoria PA-C    HYDROmorphone (DILAUDID) 1 mg/mL 50 mL PCA, , Intravenous, Continuous, Tish Wagner DO    insulin lispro (HumaLOG) 100 units/mL subcutaneous injection 1-5 Units, 1-5 Units, Subcutaneous, Q6H [facility], 1 Units at 09/15/19 2193 **AND** Fingerstick Glucose (POCT), , , Q6H, Karla Zepeda PA-C    metroNIDAZOLE (FLAGYL) IVPB (premix) 500 mg, 500 mg, Intravenous, Q8H, Daquan Pineda MD, Last Rate: 200 mL/hr at 09/16/19 0614, 500 mg at 09/16/19 0614    ondansetron (ZOFRAN) injection 4 mg, 4 mg, Intravenous, Q6H PRN, Casper Zepeda PA-C, 4 mg at 09/14/19 0509   pantoprazole (PROTONIX) injection 40 mg, 40 mg, Intravenous, Q24H [address], Anthony Flaming F Zabo, DO, 40 mg at 09/16/19 0910    sodium chloride infusion 0 45 %, 50 mL/hr, Intravenous, Continuous, Annabelle Robles MD, Last Rate: 50 mL/hr at 09/16/19 0909, 50 mL/hr at 09/16/19 0909      Physical Exam:   /83   Pulse 88   Temp 99 4 °F (37 4 °C)   Resp 20   Ht 5' 4" (1 626 m)   Wt 84 6 kg (186 lb 6 4 oz)   SpO2 94%   BMI 32 00 kg/m²   GEN: no acute distress    RESP: breathing comfortably with no accessory muscle use    ABD: soft, appropriately tender to palpation, non-distended   INCISION:    EXT: no significant peripheral edema   ZULUAGA: draining pink clear urine  no clots and       RADIOLOGY:   CT CHEST, ABDOMEN AND PELVIS WITHOUT IV CONTRAST   IMPRESSION:  1  Limited evaluation secondary to noncontrast technique  Patchy bilateral groundglass airspace opacities suspicious for multifocal infection versus pulmonary edema  2  Moderate size bilateral pleural effusions with adjacent bibasilar opacities likely compressive atelectasis  3  There are a few residual foci of free intra-abdominal air decreased from prior examination in this patient with recent abdominal surgery  4  Cholelithiasis and gallbladder distention  If there is concern for acute cholecystitis further evaluation with right upper quadrant ultrasound could be considered  5  Persistent mild bilateral hydronephrosis without obstructing calculus      Assessment:     1  POD#5  Pelvic exoneration secondary to ovarian malignancy    2   Acute kidney injury /improving    Plan:   - ANDREAS drain 195  - maintain Zuluaga catheter  - continue to monitor for hydronephrosis-  With repeat at least a renal ultrasound if creatinine does not continue to improve  -

## 2019-09-17 LAB
ANION GAP SERPL CALCULATED.3IONS-SCNC: 5 MMOL/L (ref 4–13)
BUN SERPL-MCNC: 37 MG/DL (ref 5–25)
CALCIUM SERPL-MCNC: 8.2 MG/DL (ref 8.3–10.1)
CHLORIDE SERPL-SCNC: 110 MMOL/L (ref 100–108)
CO2 SERPL-SCNC: 29 MMOL/L (ref 21–32)
CREAT SERPL-MCNC: 1.38 MG/DL (ref 0.6–1.3)
ERYTHROCYTE [DISTWIDTH] IN BLOOD BY AUTOMATED COUNT: 15.2 % (ref 11.6–15.1)
GFR SERPL CREATININE-BSD FRML MDRD: 39 ML/MIN/1.73SQ M
GLUCOSE SERPL-MCNC: 106 MG/DL (ref 65–140)
GLUCOSE SERPL-MCNC: 107 MG/DL (ref 65–140)
GLUCOSE SERPL-MCNC: 134 MG/DL (ref 65–140)
GLUCOSE SERPL-MCNC: 136 MG/DL (ref 65–140)
GLUCOSE SERPL-MCNC: 142 MG/DL (ref 65–140)
HCT VFR BLD AUTO: 25.2 % (ref 34.8–46.1)
HGB BLD-MCNC: 8 G/DL (ref 11.5–15.4)
MCH RBC QN AUTO: 29.4 PG (ref 26.8–34.3)
MCHC RBC AUTO-ENTMCNC: 31.7 G/DL (ref 31.4–37.4)
MCV RBC AUTO: 93 FL (ref 82–98)
MRSA NOSE QL CULT: NORMAL
PLATELET # BLD AUTO: 252 THOUSANDS/UL (ref 149–390)
PMV BLD AUTO: 11.9 FL (ref 8.9–12.7)
POTASSIUM SERPL-SCNC: 3.4 MMOL/L (ref 3.5–5.3)
RBC # BLD AUTO: 2.72 MILLION/UL (ref 3.81–5.12)
SODIUM SERPL-SCNC: 144 MMOL/L (ref 136–145)
VANCOMYCIN SERPL-MCNC: 17.8 UG/ML
VANCOMYCIN TROUGH SERPL-MCNC: 15.2 UG/ML (ref 10–20)
WBC # BLD AUTO: 12.03 THOUSAND/UL (ref 4.31–10.16)

## 2019-09-17 PROCEDURE — 97110 THERAPEUTIC EXERCISES: CPT

## 2019-09-17 PROCEDURE — C9113 INJ PANTOPRAZOLE SODIUM, VIA: HCPCS | Performed by: OBSTETRICS & GYNECOLOGY

## 2019-09-17 PROCEDURE — 85027 COMPLETE CBC AUTOMATED: CPT | Performed by: INTERNAL MEDICINE

## 2019-09-17 PROCEDURE — 99232 SBSQ HOSP IP/OBS MODERATE 35: CPT | Performed by: INTERNAL MEDICINE

## 2019-09-17 PROCEDURE — 82948 REAGENT STRIP/BLOOD GLUCOSE: CPT

## 2019-09-17 PROCEDURE — 80202 ASSAY OF VANCOMYCIN: CPT | Performed by: OBSTETRICS & GYNECOLOGY

## 2019-09-17 PROCEDURE — 80202 ASSAY OF VANCOMYCIN: CPT | Performed by: INTERNAL MEDICINE

## 2019-09-17 PROCEDURE — 99024 POSTOP FOLLOW-UP VISIT: CPT | Performed by: UROLOGY

## 2019-09-17 PROCEDURE — 80048 BASIC METABOLIC PNL TOTAL CA: CPT | Performed by: INTERNAL MEDICINE

## 2019-09-17 PROCEDURE — 99024 POSTOP FOLLOW-UP VISIT: CPT | Performed by: OBSTETRICS & GYNECOLOGY

## 2019-09-17 PROCEDURE — 97530 THERAPEUTIC ACTIVITIES: CPT

## 2019-09-17 RX ORDER — HYDROMORPHONE HCL/PF 1 MG/ML
0.5 SYRINGE (ML) INJECTION EVERY 4 HOURS PRN
Status: DISCONTINUED | OUTPATIENT
Start: 2019-09-17 | End: 2019-09-17

## 2019-09-17 RX ORDER — POTASSIUM CHLORIDE 14.9 MG/ML
20 INJECTION INTRAVENOUS ONCE
Status: COMPLETED | OUTPATIENT
Start: 2019-09-17 | End: 2019-09-17

## 2019-09-17 RX ORDER — OXYCODONE HYDROCHLORIDE 10 MG/1
10 TABLET ORAL EVERY 6 HOURS
Status: DISCONTINUED | OUTPATIENT
Start: 2019-09-17 | End: 2019-09-17

## 2019-09-17 RX ORDER — OXYCODONE HYDROCHLORIDE 10 MG/1
10 TABLET ORAL EVERY 4 HOURS PRN
Status: DISCONTINUED | OUTPATIENT
Start: 2019-09-17 | End: 2019-09-17

## 2019-09-17 RX ORDER — HYDROMORPHONE HCL/PF 1 MG/ML
1 SYRINGE (ML) INJECTION EVERY 4 HOURS PRN
Status: DISCONTINUED | OUTPATIENT
Start: 2019-09-17 | End: 2019-09-19

## 2019-09-17 RX ORDER — OXYCODONE HYDROCHLORIDE 10 MG/1
10 TABLET ORAL EVERY 4 HOURS PRN
Status: DISCONTINUED | OUTPATIENT
Start: 2019-09-17 | End: 2019-09-18

## 2019-09-17 RX ORDER — OXYCODONE HYDROCHLORIDE 5 MG/1
5 TABLET ORAL EVERY 4 HOURS PRN
Status: DISCONTINUED | OUTPATIENT
Start: 2019-09-17 | End: 2019-09-18

## 2019-09-17 RX ORDER — HYDROMORPHONE HYDROCHLORIDE 2 MG/1
1 TABLET ORAL EVERY 4 HOURS PRN
Status: DISCONTINUED | OUTPATIENT
Start: 2019-09-17 | End: 2019-09-17

## 2019-09-17 RX ADMIN — METRONIDAZOLE 500 MG: 500 INJECTION, SOLUTION INTRAVENOUS at 16:20

## 2019-09-17 RX ADMIN — OXYCODONE HYDROCHLORIDE 10 MG: 10 TABLET ORAL at 19:19

## 2019-09-17 RX ADMIN — POTASSIUM CHLORIDE 20 MEQ: 14.9 INJECTION, SOLUTION INTRAVENOUS at 09:01

## 2019-09-17 RX ADMIN — ACETAMINOPHEN 650 MG: 325 TABLET ORAL at 17:07

## 2019-09-17 RX ADMIN — CEFEPIME HYDROCHLORIDE 1000 MG: 1 INJECTION, POWDER, FOR SOLUTION INTRAMUSCULAR; INTRAVENOUS at 17:07

## 2019-09-17 RX ADMIN — OXYCODONE HYDROCHLORIDE 10 MG: 10 TABLET ORAL at 08:57

## 2019-09-17 RX ADMIN — HYDROMORPHONE HYDROCHLORIDE 1 MG: 1 INJECTION, SOLUTION INTRAMUSCULAR; INTRAVENOUS; SUBCUTANEOUS at 16:52

## 2019-09-17 RX ADMIN — HEPARIN SODIUM 5000 UNITS: 5000 INJECTION INTRAVENOUS; SUBCUTANEOUS at 21:39

## 2019-09-17 RX ADMIN — HYDROMORPHONE HYDROCHLORIDE 1 MG: 1 INJECTION, SOLUTION INTRAMUSCULAR; INTRAVENOUS; SUBCUTANEOUS at 11:25

## 2019-09-17 RX ADMIN — VANCOMYCIN HYDROCHLORIDE 1250 MG: 10 INJECTION, POWDER, LYOPHILIZED, FOR SOLUTION INTRAVENOUS at 11:25

## 2019-09-17 RX ADMIN — SODIUM CHLORIDE 50 ML/HR: 0.45 INJECTION, SOLUTION INTRAVENOUS at 09:01

## 2019-09-17 RX ADMIN — NYSTATIN 500000 UNITS: 100000 SUSPENSION ORAL at 09:02

## 2019-09-17 RX ADMIN — HEPARIN SODIUM 5000 UNITS: 5000 INJECTION INTRAVENOUS; SUBCUTANEOUS at 05:18

## 2019-09-17 RX ADMIN — METRONIDAZOLE 500 MG: 500 INJECTION, SOLUTION INTRAVENOUS at 06:04

## 2019-09-17 RX ADMIN — HEPARIN SODIUM 5000 UNITS: 5000 INJECTION INTRAVENOUS; SUBCUTANEOUS at 13:18

## 2019-09-17 RX ADMIN — OXYCODONE HYDROCHLORIDE 10 MG: 10 TABLET ORAL at 13:28

## 2019-09-17 RX ADMIN — PANTOPRAZOLE SODIUM 40 MG: 40 INJECTION, POWDER, FOR SOLUTION INTRAVENOUS at 09:02

## 2019-09-17 RX ADMIN — METRONIDAZOLE 500 MG: 500 INJECTION, SOLUTION INTRAVENOUS at 21:39

## 2019-09-17 NOTE — PLAN OF CARE
Problem: PHYSICAL THERAPY ADULT  Goal: Performs mobility at highest level of function for planned discharge setting  See evaluation for individualized goals  Description  Treatment/Interventions: Functional transfer training, LE strengthening/ROM, Elevations, Therapeutic exercise, Endurance training, Patient/family training, Equipment eval/education, Bed mobility, Gait training, Spoke to nursing          See flowsheet documentation for full assessment, interventions and recommendations  Outcome: Progressing  Note:   Prognosis: Fair  Problem List: Decreased strength, Decreased endurance, Impaired balance, Decreased mobility, Pain  Assessment: Pt presents to therapy today with increased abdominal pain, limited endurance, decreased B LE strength and fall risk  These impairments limit the patient by requiring assistance for mobility and limiting her participation in therapy  Pt would benefit from continued skilled therapy while in the hospital to improve function and work towards PLOF  Recommend rehab  At end of session patient was left supine with call bell within reach  Pt was unable to stand for more than 5 seconds due to increased pain  Recommendation: Other (Comment)(rehab)     PT - OK to Discharge: No    See flowsheet documentation for full assessment

## 2019-09-17 NOTE — PLAN OF CARE
Problem: OCCUPATIONAL THERAPY ADULT  Goal: Performs self-care activities at highest level of function for planned discharge setting  See evaluation for individualized goals  Description  Treatment Interventions: ADL retraining, Functional transfer training, UE strengthening/ROM, Endurance training, Cognitive reorientation, Patient/family training, Equipment evaluation/education, Compensatory technique education, Continued evaluation, Energy conservation, Activityengagement          See flowsheet documentation for full assessment, interventions and recommendations  Outcome: Progressing  Note:   Limitation: Decreased ADL status, Decreased UE strength, Decreased UE ROM, Decreased Safe judgement during ADL, Decreased endurance, Decreased self-care trans, Decreased high-level ADLs  Prognosis: Fair  Assessment: Patient participated in skilled OT with focus on activity endurance, activity engagement, bed mobility skills, adaptive techniques for dressing and bathing  Patient presents supine in bed agreeable to participate in OT within her pain limitations  Patient identifiedy by name and   Patient reports that her pain elevated "when I sat on the edge of the bed with PT  Patient educated in functional ROM for BUE's for carryover into functional task performance including fisting for both hands  Patient reports that "the swelling has decreased  It is still difficult to make a full fist " Patient requires assist levels as documented due to decreased endurance and increased pain levels with movement  Patient would benefit from [address] with focus on increasing functional strength and endurance, increasing functional safety and independence with transfers and functional mobility skills, increase functional independence with self care/adl skills for carryover into her daily routine        OT Discharge Recommendation: Short Term Rehab  OT - OK to Discharge: (when medically cleared)  Denisha Herzog

## 2019-09-17 NOTE — PROGRESS NOTES
Progress Note - Infectious Disease   Catherine Truong 70 y o  female MRN: [id number]  Unit/Bed#: [facility] 905-01 Encounter: [id number]      Impression/Plan:    70 y ears old female patient  with stage 4 ovarian cancer; today she is  POD 7 from exploratory laparotomy, extended duration of posterior pelvis with low anterior resection, end colostomy, ileostomy reversal, radical omentectomy and tumor debulking, cystoscopy and flexible sigmoidoscopy  She had fever of 100 5, with cough and sputum production  CT showing patchy bilateral airspace opacities, air bronchograms, and her PCT 0 52  Patient started on empiric treatment for pneumonia  She is day 3 of abx today, clinically improving  She had thoracentesis yesterday          1- pneumonia:  Cough, dark yellow sputum production, Tmax 100 5, PC T 0 52, CT scan showing bilateral patchy airspace opacities and air bronchograms  Onset of these symptoms was preceded by few days of post op vomiting  Patient likely had an aspiration event  Now afebrile, cough significantly improved  Sputum Cx not available, Bcx negative, MRSA screen negative  Pleural effusion with LDH ratio 0 44, protein ratio <0 3, PH 7 8, normal glucose, culture negative  Not paraopneumonic   - stop vancomycin   - continue cefepime and flagyl;  - at time of discharge, can switch to po abx to finish 5 days course          2- Ovarian cancer:  POD 7 for SUKUMAR/BSO with ileostomy reversal, and colostomy, tumor debulking, and ureteral catheterization  Pleural effusion tapped yesterday, transudate by light criteria     - follow up cytology of the fluid       3- GRISELDA with baseline CKD3:  Creat slowly improving, now creat 1 3 mg/dl  - repeat bmp, adjust abx dose based on CrCl  - nephrology follow up     4- anemia:  likely secondary to acute blood loss, Hb today 8 mg/dl  - repeat cbc and transfuse as needed       Case discussed with surgical oncology team     Antibiotics:  Vanco, cefepime, flagyl day 3    Subjective:  Patient has no fever, chills, sweats; no nausea, vomiting, diarrhea; no cough, shortness of breath; no pain  No new symptoms  Objective:  Vitals:  Temp:  [97 6 °F (36 4 °C)-99 5 °F (37 5 °C)] 98 6 °F (37 °C)  HR:  [72-89] 77  Resp:  [18-20] 18  BP: (112-186)/(45-84) 167/84  SpO2:  [92 %-95 %] 93 %  Temp (24hrs), Av 6 °F (37 °C), Min:97 6 °F (36 4 °C), Max:99 5 °F (37 5 °C)  Current: Temperature: 98 6 °F (37 °C)    Physical Exam:   General Appearance:  Alert, interactive, nontoxic, no acute distress  Throat: Oropharynx moist without lesions  Lungs:   Clear to auscultation bilaterally; no wheezes, rhonchi or rales; respirations unlabored   Heart:  RRR; no murmur, rub or gallop   Abdomen:   Soft, non-tender, non-distended, positive bowel sounds  Colostomy in place  Extremities: No clubbing, cyanosis or edema   Skin: No new rashes or lesions  Abdominal incision healing well, no erythema or purulence          Labs, Imaging, & Other studies:   All pertinent labs and imaging studies were personally reviewed  Results from last 7 days   Lab Units 19  0523 19  0657 09/15/19  0548   WBC Thousand/uL 12 03* 11 69* 15 02*   HEMOGLOBIN g/dL 8 0* 8 0* 8 2*   PLATELETS Thousands/uL 252 219 198     Results from last 7 days   Lab Units 19  0523 19  0657 09/15/19  0548  09/10/19  1804 09/10/19  1715  09/10/19  1454   SODIUM mmol/L 144 146* 144   < > 146* 144   < >  --    POTASSIUM mmol/L 3 4* 3 4* 3 5   < > 3 9 3 8   < >  --    CHLORIDE mmol/L 110* 112* 113*   < > 117* 117*   < >  --    CO2 mmol/L 29 27 23   < > 19* 20*   < >  --    CO2, I-STAT mmol/L  --   --   --   --   --   --   --  21   BUN mg/dL 37* 53* 67*   < > 22 22   < >  --    CREATININE mg/dL 1 38* 2 01* 2 66*   < > 1 29 1 29   < >  --    EGFR ml/min/1 73sq m 39 25 18   < > 42 42   < >  --    GLUCOSE, ISTAT mg/dl  --   --   --   --   --   --   --  228*   CALCIUM mg/dL 8 2* 8 0* 8 1*   < > 9 8 10 3*   < >  --    AST U/L --   --   --   --  38 39  --   --    ALT U/L  --   --   --   --  26 26   < >  --    ALK PHOS U/L  --   --   --   --  57 60   < >  --     < > = values in this interval not displayed  Results from last 7 days   Lab Units 09/16/19  1412 09/15/19  0606 09/15/19  8493   BLOOD CULTURE   --  No Growth at 24 hrs  No Growth at 24 hrs     GRAM STAIN RESULT  1+ Polys  No bacteria seen  --   --    BODY FLUID CULTURE, STERILE  No growth  --   --      Results from last 7 days   Lab Units 09/16/19  0620 09/15/19  1008   PROCALCITONIN ng/ml 0 33* 0 52*

## 2019-09-17 NOTE — PROGRESS NOTES
[address] [facility] NOTE   Nikki Muhammad 70 y o  female MRN: [id number]  Unit/Bed#: [facility] 905-01 Encounter: [id number]  Reason for Consult: GRISELDA on CKD III    ASSESSMENT and PLAN:    59-year-old female with ovarian ca s/p neoadjuvent chemo, who on 09/10 underwent laparotomy, cystoscopy, ileostomy reversal, lysis of adhesions, tumor debulking  Patient required 6 units of packed red blood cell, 4 units of FFP, 1 unit of platelet, 5 L of fluids  Postoperatively patient is transferred to the ICU     1) GRISELDA on CKD III - CKD likely secondary to DM and age related nephron loss      - baseline Cr 1 1-1 3 mg/dL   - Cr rising to 3 9 on 9/13 and Nephrology is consulted  -urinalysis-  -CT of the abdomen pelvis with mild hydroureteronephrosis   - repeat CT scan reviewed from 9/15 with patchy b/l ground glass airspace opacity, b/l pl effusion, persistent mild b/l hydro without obstruction noted  - Etiology possible ATN in setting of hypotension and blood loss and hypoperfusion and post obstruction   - Urology on board   - 9/16 - UOP improving to 2 1 L yesterday  Input 3 1 L; Cr improving from 2 7 to 2 mg/dL;  Na rising; thoracentesis completed of R lung    - 9/17 - Cr improving to 1 4 mg/dL; vanc random 17 8       Plan:     - f/u vancomycin levels per Pharmacy team    - cont 1/2 NS  - give 20 meq potassium IV one time  - check mag  - BMP in AM  - b/l hydro being monitored conservatively (final plan per Urology)     2) ovarian Ca      - s/p surgery      3) acid/base      - bicarb improved with bicarb gtt to 27     4) electrolytes      - Na improvign 144  - K low 3 4  - replete as above     5) HTN      - BP relatively stable 130-150     6) anemia      - monitoring for now  - Hb 8 on 9/17     7) clemons      - there was concern for clemons catheter malfunction  - clemons appears to be draining  - Urology on board     8) PNA with fevers and pl effusion     - ID on board  - started on broad spectrum antibiotics on 9/15  - blood cultures pending  - IR for drain of pl effusions 9/16 to eval for infectious vs malignancy --> Right thoracentesis with 1 1 L removed  - leukocytosis improving on 9/16    SUBJECTIVE / INTERVAL HISTORY:    I/O - UOP 1 7 L; thoracentesis 1 1 L; ANDREAS drain 280 cc  -160s with one time 170-180s  Feeling weak  No SOB  + nausea overnight  OBJECTIVE:  Current Weight: Weight - Scale: 84 kg (185 lb 3 oz)  Vitals:    09/16/19 2256 09/17/19 0344 09/17/19 0600 09/17/19 0732   BP: 144/69 145/68  145/67   Pulse: 84 73  72   Resp: 20 18  18   Temp: 99 °F (37 2 °C) 99 5 °F (37 5 °C)  97 6 °F (36 4 °C)   TempSrc:       SpO2: 95% 93%  93%   Weight:   84 kg (185 lb 3 oz)    Height:           Intake/Output Summary (Last 24 hours) at 9/17/2019 0811  Last data filed at 9/17/2019 0636  Gross per 24 hour   Intake 1115 5 ml   Output 3180 ml   Net -2064 5 ml     General: NAD  Skin: no rash  Eyes: anicteric sclera  ENT: moist mucous membrane  Neck: supple  Chest: CTA b/l, no ronchii, no wheeze, no rubs, no rales, diminished air intake bases  CVS: s1s2, no murmur, no gallop, no rub  Abdomen: soft, nontender, nl sounds, surg site intact  + ostomy   + ANDREAS drain  Extremities: no edema LE b/l  : + clemons  Neuro: AAOX3  Psych: normal affect    Medications:    Current Facility-Administered Medications:     acetaminophen (TYLENOL) tablet 650 mg, 650 mg, Oral, Q6H Karla BUCKLEY PA-C, 650 mg at 09/16/19 1749    cefepime (MAXIPIME) 1,000 mg in dextrose 5 % 50 mL IVPB, 1,000 mg, Intravenous, Q24H, Daquan Pineda MD, Last Rate: 100 mL/hr at 09/16/19 1513, 1,000 mg at 09/16/19 1513    diphenhydrAMINE (BENADRYL) injection 25 mg, 25 mg, Intravenous, Q6H PRN, Dana Bates WHITNEY Zepeda    heparin (porcine) subcutaneous injection 5,000 Units, 5,000 Units, Subcutaneous, Q8H [facility], 5,000 Units at 09/17/19 0518 **AND** Platelet count, , , Once, Aruna Mathew PA-C    insulin lispro (HumaLOG) 100 units/mL subcutaneous injection 1-5 Units, 1-5 Units, Subcutaneous, Q6H [facility], 1 Units at 09/15/19 1753 **AND** Fingerstick Glucose (POCT), , , Q6H, Karla Zepeda PA-C    metroNIDAZOLE (FLAGYL) IVPB (premix) 500 mg, 500 mg, Intravenous, Q8H, Daquan Pineda MD, Stopped at 09/17/19 1685    nystatin (MYCOSTATIN) oral suspension 500,000 Units, 500,000 Units, Swish & Swallow, 4x Daily, Ezequiel Llamaser, DO, 500,000 Units at 09/16/19 2121    ondansetron (ZOFRAN) injection 4 mg, 4 mg, Intravenous, Q6H PRN, Jocelyn Zepeda PA-C, 4 mg at 09/14/19 0528    oxyCODONE (ROXICODONE) immediate release tablet 10 mg, 10 mg, Oral, Q4H PRN, Vearl Boxer, MD    pantoprazole (PROTONIX) injection 40 mg, 40 mg, Intravenous, Q24H [facility], [facility] F Kristy, , 40 mg at 09/16/19 0910    sodium chloride infusion 0 45 %, 50 mL/hr, Intravenous, Continuous, Jeb Leung MD, Last Rate: 50 mL/hr at 09/16/19 0909, 50 mL/hr at 09/16/19 0909    vancomycin (VANCOCIN) 1,250 mg in sodium chloride 0 9 % 250 mL IVPB, 1,250 mg, Intravenous, Q24H, Crystal Hernandez MD    Laboratory Results:  Results from last 7 days   Lab Units 09/17/19  0523 09/16/19  0657 09/15/19  0548 09/14/19  0524 09/13/19  1312 09/13/19  0603 09/12/19  1735 09/12/19  1628 09/12/19  0455 09/11/19  0456 09/10/19  1804 09/10/19  1715  09/10/19  1454 09/10/19  1415 09/10/19  1310 09/10/19  1223 09/10/19  1118 09/10/19  1018   WBC Thousand/uL 12 03* 11 69* 15 02* 18 22*  --  20 91*  --  20 47* 18 29* 15 68* 15 14* 15 40*   < >  --   --   --   --   --   --    HEMOGLOBIN g/dL 8 0* 8 0* 8 2* 9 0*  --  9 2*  --  8 2* 7 8* 9 3* 9 7* 10 0*   < >  --   --   --   --   --   --    I STAT HEMOGLOBIN g/dl  --   --   --   --   --   --   --   --   --   --   --   --   --  8 5* 6 8* 7 8* 6 5* 8 8* 7 1*   HEMATOCRIT % 25 2* 24 6* 25 3* 27 4*  --  28 3*  --  25 1* 23 9* 27 4* 28 9* 29 9*   < >  --   --   --   --   --   --    HEMATOCRIT, ISTAT %  --   --   --   --   --   --   --   --   --   --   --   --   --  25* 20* 23* 19* 26* 21*   PLATELETS Thousands/uL 252 219 198 179  --  154  --  157 139* 133* 144* 144*   < >  --   --   --   --   --   --    POTASSIUM mmol/L 3 4* 3 4* 3 5 3 6 4 2 4 3 4 2  --  4 3 4 3 3 9 3 8   < >  --   --   --   --   --   --    CHLORIDE mmol/L 110* 112* 113* 114* 117* 113* 113*  --  112* 116* 117* 117*   < >  --   --   --   --   --   --    CO2 mmol/L 29 27 23 19* 17* 17* 18*  --  21 20* 19* 20*   < >  --   --   --   --   --   --    CO2, I-STAT mmol/L  --   --   --   --   --   --   --   --   --   --   --   --   --  21 20* 21 18* 20* 21   BUN mg/dL 37* 53* 67* 59* 60* 64* 57*  --  50* 29* 22 22   < >  --   --   --   --   --   --    CREATININE mg/dL 1 38* 2 01* 2 66* 2 54* 3 43* 3 91* 2 94*  --  2 23* 1 37* 1 29 1 29   < >  --   --   --   --   --   --    CALCIUM mg/dL 8 2* 8 0* 8 1* 8 1* 8 3 8 1* 8 2*  --  8 3 9 2 9 8 10 3*   < >  --   --   --   --   --   --    MAGNESIUM mg/dL  --   --   --  2 2  --  2 2  --   --   --  1 9 1 6 1 8  --   --   --   --   --   --   --    PHOSPHORUS mg/dL  --   --   --  3 7  --  5 6*  --   --   --  5 4* 4 4* 4 3*  --   --   --   --   --   --   --    GLUCOSE, ISTAT mg/dl  --   --   --   --   --   --   --   --   --   --   --   --   --  228* 227* 224* 194* 201* 157*    < > = values in this interval not displayed

## 2019-09-17 NOTE — PHYSICAL THERAPY NOTE
Physical Therapy treatment note     Patient Name: Deedee CAPUTO Date: 9/17/2019     Problem List  Principal Problem:    Malignant neoplasm of ovary ([facility] [address] )  Active Problems:    Acute kidney injury ([facility] [address] )    Metabolic acidosis       Past Medical History  Past Medical History:   Diagnosis Date    Abdominal fluid collection     Anemia     Asthma     Cancer ([facility] [address] )     ovaries    Diabetes mellitus ([facility] [address] )     History of transfusion     PONV (postoperative nausea and vomiting)         Past Surgical History  Past Surgical History:   Procedure Laterality Date    ABDOMINAL SURGERY      CT GUIDED PARACENTESIS  11/6/2018    CT GUIDED PERC DRAINAGE CATHETER PLACEMENT  12/24/2018    CT NEEDLE BIOPSY PERITONEUM  11/6/2018    CYSTOSCOPY N/A 9/10/2019    Procedure: CYSTOSCOPY , INSERTION URETERAL CATHETERS;  Surgeon: Kaiden Posadas MD;  [facility];  Service: Gynecology Oncology    ECTOPIC PREGNANCY SURGERY      ECTOPIC PREGNANCY SURGERY      EXENTERATION PELVIS N/A 9/10/2019    Procedure: EXPLORATORY LAPAROTOMY, EXENTERATION POSTERIOR PELVIS,  END COLOSTOMY, ILEOSTOMY REVERSAL, LYSIS OF ADHESIONS, rADICAL OMENTECTOMY AND TUMOR DEBULKINGFLEXIBLE SIGMOIDOSCOPY, CYSTOGRAM, INSPECTION OF URETERS;  Surgeon: Kaiden Posadas MD;  [facility];  Service: Gynecology Oncology    IR PARACENTESIS  9/18/2018    IR PARACENTESIS  10/18/2018    IR PARACENTESIS  12/10/2018    IR PORT PLACEMENT  11/29/2018    IR THORACENTESIS  9/16/2019    LAPAROTOMY N/A 12/14/2018    Procedure: LAPAROTOMY EXPLORATORY; Abdominal Washout;  Application of Abthera Vac Dressing;  Surgeon: Isaac Anguiano MD;  [facility];  Service: Gynecology Oncology    LAPAROTOMY N/A 12/15/2018    Procedure: LAPAROTOMY EXPLORATORY, ABDOMINAL WASHOUT, DRAIN PLACEMENT x 4, DIVERTING LOOP ILEOSTOMY AND ABDOMINAL CLOSURE,  ALL OTHER INDICATED PROCEDURES;  Surgeon: Claire MD Michelle;  [facility]: [facility];  Service: Gynecology Oncology    IL COLONOSCOPY FLX DX W/COLLJ [facility] WHEN PFRMD N/A 9/25/2018    Procedure: EGD AND COLONOSCOPY;  Surgeon: Peter Olivai MD;  Location: [facility]; Service: Gastroenterology    TONSILECTOMY AND ADNOIDECTOMY      TONSILLECTOMY             09/17/19 1056   Pain Assessment   Pain Assessment Reid-Baker FACES   Reid-Baker FACES Pain Rating 4   Pain Type Surgical pain   Pain Location Abdomen   Restrictions/Precautions   Weight Bearing Precautions Per Order No   Other Precautions Multiple lines;Cognitive;Pain; Fall Risk   General   Chart Reviewed Yes   Family/Caregiver Present No   Cognition   Overall Cognitive Status WFL   Arousal/Participation Alert; Cooperative   Attention Within functional limits   Memory Within functional limits   Following Commands Follows all commands and directions without difficulty   Bed Mobility   Rolling R 5  Supervision   Supine to Sit 4  Minimal assistance   Additional items Assist x 1   Transfers   Sit to Stand 3  Moderate assistance   Additional items Assist x 1   Stand to Sit 3  Moderate assistance   Additional items Assist x 1   Balance   Static Sitting Fair   Dynamic Sitting Fair -   Static Standing Fair -   Endurance Deficit   Endurance Deficit Yes   Activity Tolerance   Activity Tolerance Patient limited by fatigue;Patient limited by pain   Nurse Made Aware nurse approved therapy session   Exercises   Hip Flexion Sitting;Bilateral  (12 reps x 2 sets )   Knee AROM Long Arc Quad Sitting;Bilateral  (12 reps x 2 sets )   Ankle Pumps Sitting;Bilateral  (30 reps x 2 sets )   Assessment   Prognosis Fair   Problem List Decreased strength;Decreased endurance; Impaired balance;Decreased mobility;Pain   Assessment Pt presents to therapy today with increased abdominal pain, limited endurance, decreased B LE strength and fall risk   These impairments limit the patient by requiring assistance for mobility and limiting her participation in therapy  Pt would benefit from continued skilled therapy while in the hospital to improve function and work towards PLOF  Recommend rehab  At end of session patient was left supine with call bell within reach  Pt was unable to stand for more than 5 seconds due to increased pain  Goals   STG Expiration Date 09/25/19   Short Term Goal #1 continue to progres towards goals   Plan   Treatment/Interventions Functional transfer training;LE strengthening/ROM; Therapeutic exercise; Endurance training;Equipment eval/education; Bed mobility;Gait training   Progress Progressing toward goals   PT Frequency Other (Comment)  (3-5xwk)   Recommendation   Recommendation Other (Comment)  (rehab)   PT - OK to Discharge No   Additional Comments need to practice ambualtion   Silvia Miller, Pt, DPT

## 2019-09-17 NOTE — PROGRESS NOTES
Vancomycin IV Pharmacy-to-Dose Consultation    Nemiah Crigler is a 70 y o  female who is currently receiving Vancomycin IV with management by the Pharmacy Consult service  Relevant clinical data and objective / subjective history reviewed  Vancomycin Assessment:  Indication: Pneumonia  Status: stable, afebrile, WBC count still elevated  Micro:            9/16 MRSA culture: in process           9/15 blood cultures x 2: no growth at 48 hours           9/15 sputum culture: no result  Procalcitonin: 0 33 (9/16)  Renal Function: GRISELDA almost resolved; creatinine almost back to baseline; SCr 1 38 (9/17)  Potential Nephrotoxicity Factors:  Patient-Factors: advanced age, renal dysfunction, b/l hydronephrosis  Days of Therapy: 3  Current Dose: 1000 mg (15 mg/kg using adj BW) IV when level < 15 (pulse dosing)  Goal Trough: 15-20 (appropriate for most indications)  Goal AUC(24h): 400-600  Last Level: 17 8 (9/17 at 0523 drawn 12 hours after the last dose given)  Pharmacokinetic Analysis: level < 20 so patient adequately cleared first 2 doses of vancomycin with pulse therapy      Vancomycin Plan:  GRISELDA is resolving, therefore, will start standing regimen of vancomycin 1250 mg IV q24h  Predicted Trough / AUC: 16 4 / 634  Next Level: plan for trough before the 3rd dose on 9/19 at 1100 to check for adequate dosage  Renal Function Monitoring: continue to monitor SCr and UOP; next BMP scheduled for 9/18 AM      Pharmacy will continue to follow closely for s/sx of nephrotoxicity, infusion reactions and appropriateness of therapy  BMP and CBC will be ordered per protocol  We will continue to follow the patient's culture results and clinical progress daily         Hanh Hernandez, PharmD  Pharmacist

## 2019-09-17 NOTE — PROGRESS NOTES
GYN-ONC Progress Note  Vernon Stager  [id number]  [facility] 905/[facility] 905-01  9/17/2019  5:52 AM    ASSESS: 70year old with stage 4B ovarian cancer, now POD #7 from exploratory laparotomy, posterior pelvic exenteration with low anterior resection, end colostomy, ileostomy reversal, radical omentectomy and tumor debulking, cystoscopy and flexible sigmoidoscopy    PLAN:    1  Acute on chronic kidney disease:  Creatinine trending down from peak of 3 9, 2 0 yesterday in follow-up AM BMP, urine output for yesterday 1 7 L, Nephrology following recommendations appreciated  2  Acute blood loss anemia:  Hemoglobin trending the low 8s, remained asymptomatic, follow up a m  CBC  3  Bilateral pleural effusions:  Status post thoracentesis with IR yesterday taking up more than 1 L on the right side, Pneumonia suspected, continuing on vancomycin cefepime and Flagyl, follow-up MRSA culture and discontinue vancomycin if negative, preliminary blood cultures negative, id following-recommendations appreciated  4  Postoperative care:  Consider discontinuation of Dilaudid PCA when tolerating regular diet will attempt advanced today and start po pain medication    Dispo:   Inpatient    PPx: SCDs, heparin 5000 units t i d , and consider transition to Lovenox  FEN: con  advancement to regular  diet, replete lytes prn, 1/2 NS  Pain mgmnt:  Dilaudid PCA, consider transition to oral regimen  Invasive lines: clemons, RLQ drain-280 cc out throughout the past 24 hours  Code status: full   ____________________    SUBJECTIVE  History of Present Illness:  Overnight:  No acute events, states she feels like she is breathing better after thoracentesis  Pain:  Reported incisional discomfort at 6/10 using Dilaudid PCA 10 doses given with 10 attempts  Tolerating Oral Intake: tolerating clears without nausea or vomiting overnight  Voiding: yes  Flatus:  Passing flatus through ostomy  Bowel Movement: no  Ambulating: yes  Vaginal Bleeding: no  Pelvic Pain: no  Chest Pain: no  Shortness of Breath: no  Leg Pain/Discomfort: no    OBJECTIVE  Vitals  Temp:  [98 2 °F (36 8 °C)-99 5 °F (37 5 °C)] 99 5 °F (37 5 °C)  HR:  [73-89] 73  Resp:  [18-20] 18  BP: (112-186)/(45-84) 145/68       Intake/Output Summary (Last 24 hours) at 9/17/2019 0552  Last data filed at 9/16/2019 2300  Gross per 24 hour   Intake 1506 85 ml   Output 3950 ml   Net -2443 15 ml       Physical Exam:   Physical Exam   Constitutional: Vital signs are normal  She is active  Cardiovascular: Normal rate, regular rhythm, normal heart sounds and intact distal pulses  Pulmonary/Chest: Breath sounds normal  No stridor  No respiratory distress  Abdominal: Soft  Bowel sounds are normal  She exhibits no distension  There is no tenderness  Incision clean, dry, and intact  Drain with serosang output  Ostomy + gas output    Neurological: She is alert  Skin: Skin is warm and dry  Psychiatric: She has a normal mood and affect  Her behavior is normal          Labs:   Recent Results (from the past 72 hour(s))   Prepare RBC:Has consent been obtained? Yes; Date of Surgery: 9/10/2019; Date of Infusion: 9/10/2019, 2 Units    Collection Time: 09/14/19  6:52 AM   Result Value Ref Range    Unit Product Code I5248U91     Unit Number [id number]     Unit ABO A     Unit [facility] POS     Crossmatch Compatible     Unit Dispense Status Presumed Trans     Unit Product Code H0435D65     Unit Number [id number]     Unit ABO A     Unit RH POS     Crossmatch Compatible     Unit Dispense Status Return to Inv    Prepare RBC:Has consent been obtained?  Yes, 1 Units    Collection Time: 09/14/19  6:52 AM   Result Value Ref Range    Unit Product Code R7870C09     Unit Number [id number]     Unit ABO A     Unit RH POS     Crossmatch Compatible     Unit Dispense Status Return to Inv    Fingerstick Glucose (POCT)    Collection Time: 09/14/19 11:43 AM   Result Value Ref Range    POC Glucose 137 65 - 140 mg/dl   Fingerstick Glucose (POCT)    Collection Time: 09/14/19  6:04 PM   Result Value Ref Range    POC Glucose 128 65 - 140 mg/dl   Fingerstick Glucose (POCT)    Collection Time: 09/14/19 11:59 PM   Result Value Ref Range    POC Glucose 126 65 - 140 mg/dl   Prepare fresh frozen plasma:Has consent been obtained?  Yes; Date of Surgery: 9/10/2019, 2 Units    Collection Time: 09/15/19 12:01 AM   Result Value Ref Range    Unit Product Code I2687V33     Unit Number [id number]     Unit ABO A     Unit [facility] POS     Unit Dispense Status Return to [facility]     Unit Product Code Z3218V86     Unit Number [id number]     Unit ABO A     Unit RH POS     Unit Dispense Status Return to [facility]    Fingerstick Glucose (POCT)    Collection Time: 09/15/19  5:26 AM   Result Value Ref Range    POC Glucose 114 65 - 140 mg/dl   UA w Reflex to Microscopic w Reflex to Culture    Collection Time: 09/15/19  5:44 AM   Result Value Ref Range    Color, UA Bloody     Clarity, UA Turbid     Specific Elfrida, UA 1 014 1 003 - 1 030    pH, UA Interference-unable to analyze (A) 4 5, 5 0, 5 5, 6 0, 6 5, 7 0, 7 5, 8 0    Leukocytes, UA Interference- unable to analyze (A) Negative    Nitrite, UA Interference- unable to analyze Negative    Protein, UA Interference- unable to analyze (A) Negative mg/dl    Glucose, UA Interference- unable to analyze Negative mg/dl    Ketones, UA Interference- unable to analyze (A) Negative mg/dl    Urobilinogen, UA Interference-unable to analyze 0 2, 1 0 E U /dl E U /dl    Bilirubin, UA Interference- unable to analyze (A) Negative    Blood, UA Interference- unable to analyze (A) Negative   Urine Microscopic    Collection Time: 09/15/19  5:44 AM   Result Value Ref Range    RBC, UA Innumerable (A) None Seen, 0-5 /hpf    WBC, UA 4-10 (A) None Seen, 0-5, 5-55, 5-65 /hpf    Epithelial Cells None Seen None Seen, Occasional /hpf    Bacteria, UA None Seen None Seen, Occasional /hpf   CBC and differential    Collection Time: 09/15/19  5:48 AM   Result Value Ref Range    WBC 15 02 (H) 4 31 - 10 16 Thousand/uL    RBC 2 75 (L) 3 81 - 5 12 Million/uL    Hemoglobin 8 2 (L) 11 5 - 15 4 g/dL    Hematocrit 25 3 (L) 34 8 - 46 1 %    MCV 92 82 - 98 fL    MCH 29 8 26 8 - 34 3 pg    MCHC 32 4 31 4 - 37 4 g/dL    RDW 15 9 (H) 11 6 - 15 1 %    MPV 11 8 8 9 - 12 7 fL    Platelets 978 466 - 870 Thousands/uL    nRBC 0 /100 WBCs    Neutrophils Relative 80 (H) 43 - 75 %    Immat GRANS % 1 0 - 2 %    Lymphocytes Relative 10 (L) 14 - 44 %    Monocytes Relative 9 4 - 12 %    Eosinophils Relative 0 0 - 6 %    Basophils Relative 0 0 - 1 %    Neutrophils Absolute 11 92 (H) 1 85 - 7 62 Thousands/µL    Immature Grans Absolute 0 20 0 00 - 0 20 Thousand/uL    Lymphocytes Absolute 1 48 0 60 - 4 47 Thousands/µL    Monocytes Absolute 1 39 (H) 0 17 - 1 22 Thousand/µL    Eosinophils Absolute 0 02 0 00 - 0 61 Thousand/µL    Basophils Absolute 0 01 0 00 - 0 10 Thousands/µL   Basic metabolic panel    Collection Time: 09/15/19  5:48 AM   Result Value Ref Range    Sodium 144 136 - 145 mmol/L    Potassium 3 5 3 5 - 5 3 mmol/L    Chloride 113 (H) 100 - 108 mmol/L    CO2 23 21 - 32 mmol/L    ANION GAP 8 4 - 13 mmol/L    BUN 67 (H) 5 - 25 mg/dL    Creatinine 2 66 (H) 0 60 - 1 30 mg/dL    Glucose 109 65 - 140 mg/dL    Calcium 8 1 (L) 8 3 - 10 1 mg/dL    eGFR 18 ml/min/1 73sq m   Blood culture    Collection Time: 09/15/19  6:05 AM   Result Value Ref Range    Blood Culture No Growth at 24 hrs  Blood culture    Collection Time: 09/15/19  6:06 AM   Result Value Ref Range    Blood Culture No Growth at 24 hrs      Procalcitonin    Collection Time: 09/15/19 10:08 AM   Result Value Ref Range    Procalcitonin 0 52 (H) <=0 25 ng/ml   Fingerstick Glucose (POCT)    Collection Time: 09/15/19 11:55 AM   Result Value Ref Range    POC Glucose 113 65 - 140 mg/dl   Fingerstick Glucose (POCT)    Collection Time: 09/15/19  5:50 PM   Result Value Ref Range    POC Glucose 168 (H) 65 - 140 mg/dl   Fingerstick Glucose (POCT) Collection Time: 09/15/19 11:28 PM   Result Value Ref Range    POC Glucose 113 65 - 140 mg/dl   Fingerstick Glucose (POCT)    Collection Time: 09/16/19  6:02 AM   Result Value Ref Range    POC Glucose 109 65 - 140 mg/dl   Procalcitonin    Collection Time: 09/16/19  6:20 AM   Result Value Ref Range    Procalcitonin 0 33 (H) <=0 25 ng/ml   CBC and differential    Collection Time: 09/16/19  6:57 AM   Result Value Ref Range    WBC 11 69 (H) 4 31 - 10 16 Thousand/uL    RBC 2 68 (L) 3 81 - 5 12 Million/uL    Hemoglobin 8 0 (L) 11 5 - 15 4 g/dL    Hematocrit 24 6 (L) 34 8 - 46 1 %    MCV 92 82 - 98 fL    MCH 29 9 26 8 - 34 3 pg    MCHC 32 5 31 4 - 37 4 g/dL    RDW 15 5 (H) 11 6 - 15 1 %    MPV 11 5 8 9 - 12 7 fL    Platelets 825 243 - 420 Thousands/uL    nRBC 0 /100 WBCs    Neutrophils Relative 72 43 - 75 %    Immat GRANS % 2 0 - 2 %    Lymphocytes Relative 13 (L) 14 - 44 %    Monocytes Relative 10 4 - 12 %    Eosinophils Relative 3 0 - 6 %    Basophils Relative 0 0 - 1 %    Neutrophils Absolute 8 45 (H) 1 85 - 7 62 Thousands/µL    Immature Grans Absolute 0 24 (H) 0 00 - 0 20 Thousand/uL    Lymphocytes Absolute 1 49 0 60 - 4 47 Thousands/µL    Monocytes Absolute 1 19 0 17 - 1 22 Thousand/µL    Eosinophils Absolute 0 31 0 00 - 0 61 Thousand/µL    Basophils Absolute 0 01 0 00 - 0 10 Thousands/µL   Basic metabolic panel    Collection Time: 09/16/19  6:57 AM   Result Value Ref Range    Sodium 146 (H) 136 - 145 mmol/L    Potassium 3 4 (L) 3 5 - 5 3 mmol/L    Chloride 112 (H) 100 - 108 mmol/L    CO2 27 21 - 32 mmol/L    ANION GAP 7 4 - 13 mmol/L    BUN 53 (H) 5 - 25 mg/dL    Creatinine 2 01 (H) 0 60 - 1 30 mg/dL    Glucose 107 65 - 140 mg/dL    Calcium 8 0 (L) 8 3 - 10 1 mg/dL    eGFR 25 ml/min/1 73sq m   Fingerstick Glucose (POCT)    Collection Time: 09/16/19 11:51 AM   Result Value Ref Range    POC Glucose 104 65 - 140 mg/dl   Body fluid white cell count with differential    Collection Time: 09/16/19  2:11 PM   Result Value Ref Range    Site      WBC, Fluid 330 /ul   Body Fluid Diff    Collection Time: 09/16/19  2:11 PM   Result Value Ref Range    Total Counted 100     Neutrophils % (Fluid) 60 %    Lymphs % (Fluid) 14 %    Mesothelial % (Fluid) 10 %    Histiocyte % (Fluid) 10 %    Monocytes % (Fluid) 6 %   pH, body fluid    Collection Time: 09/16/19  2:12 PM   Result Value Ref Range    PH BODY FLUID 7 8    Glucose, body fluid    Collection Time: 09/16/19  2:12 PM   Result Value Ref Range    Glucose, Fluid 110 mg/dL   LD (LDH), Body Fluid    Collection Time: 09/16/19  2:12 PM   Result Value Ref Range    LD, Fluid 118 U/L   Body fluid culture and Gram stain    Collection Time: 09/16/19  2:12 PM   Result Value Ref Range    Gram Stain Result 1+ Polys     Gram Stain Result No bacteria seen    Total Protein, Fluid    Collection Time: 09/16/19  2:12 PM   Result Value Ref Range    Protein, Fluid <2 0 g/dL   Vancomycin, random    Collection Time: 09/16/19  4:03 PM   Result Value Ref Range    Vancomycin Rm 9 5 ug/mL   LD,Blood    Collection Time: 09/16/19  4:03 PM   Result Value Ref Range     (H) 81 - 234 U/L   Protein, total    Collection Time: 09/16/19  4:03 PM   Result Value Ref Range    Total Protein 5 3 (L) 6 4 - 8 2 g/dL   Fingerstick Glucose (POCT)    Collection Time: 09/16/19  5:55 PM   Result Value Ref Range    POC Glucose 106 65 - 140 mg/dl   Fingerstick Glucose (POCT)    Collection Time: 09/17/19 12:44 AM   Result Value Ref Range    POC Glucose 106 65 - 140 mg/dl       Meds:  Scheduled Meds:  Current Facility-Administered Medications:  acetaminophen 650 mg Oral Q6H [address] Karla Zepeda PA-C    cefepime 1,000 mg Intravenous Q24H Daquan Pineda MD Last Rate: 1,000 mg (09/16/19 1513)   diphenhydrAMINE 25 mg Intravenous Q6H PRN Miriam Zepeda PA-C    heparin (porcine) 5,000 Units Subcutaneous Q8H [address] Karla Zepeda PA-C    HYDROmorphone  Intravenous Continuous Gerson Wagner DO    insulin lispro 1-5 Units Subcutaneous Q6H [facility] Karla Zepeda PA-C    metroNIDAZOLE 500 mg Intravenous Q8H Daquan Pineda MD Last Rate: Stopped (09/16/19 2200)   nystatin 500,000 Units Swish & Swallow 4x Daily Ann Wagner DO    ondansetron 4 mg Intravenous Q6H PRN Vish Zepeda PA-C    pantoprazole 40 mg Intravenous Q24H [facility] Ann Wagner DO    sodium chloride 50 mL/hr Intravenous Continuous Gosia Mcmullen MD Last Rate: 50 mL/hr (09/16/19 0909)     Continuous Infusions:  HYDROmorphone     sodium chloride 50 mL/hr Last Rate: 50 mL/hr (09/16/19 0909)     PRN Meds: diphenhydrAMINE    ondansetron    Imaging Studies: I have personally reviewed pertinent reports  EKG, Pathology, Microbiology, and Other Studies: I have personally reviewed pertinent reports        __________________    Signature / Title: Mary Nunez DO, Ob/Gyn, PGY-3  Date: 9/17/2019  Time: 5:52 AM

## 2019-09-17 NOTE — OCCUPATIONAL THERAPY NOTE
Occupational Therapy Treatment Note:       09/17/19 3281   Restrictions/Precautions   Other Precautions Cognitive;Multiple lines;Telemetry; Fall Risk;Pain   Pain Assessment   Pain Assessment 0-10   Pain Score 6   Pain Location Abdomen   ADL   Where Assessed Supine, bed   Grooming Assistance 5  Supervision/Setup   Grooming Deficit Setup; Wash/dry hands; Wash/dry face; Teeth care;Brushing hair   UB Bathing Assistance 3  Moderate Assistance   UB Bathing Deficit Setup;Verbal cueing; Increased time to complete   LB Bathing Assistance 3  Moderate Assistance   LB Bathing Deficit Setup;Verbal cueing;Supervision/safety; Increased time to complete   UB Dressing Assistance 3  Moderate Assistance   UB Dressing Deficit Setup;Verbal cueing;Supervision/safety; Increased time to complete; Thread RUE; Thread LUE   LB Dressing Assistance 3  Moderate Assistance   LB Dressing Deficit Setup;Verbal cueing; Increased time to complete   Toileting Assistance  3  Moderate Assistance   Toileting Deficit Setup; Increased time to complete   Bed Mobility   Rolling R 4  Minimal assistance   Additional items Assist x 1; Increased time required;Verbal cues   Rolling L 4  Minimal assistance   Additional items Assist x 1; Increased time required;Verbal cues   Cognition   Overall Cognitive Status [facility]   Arousal/Participation Alert; Cooperative   Attention Within functional limits   Orientation Level Oriented X4   Memory Within functional limits   Following Commands Follows one step commands without difficulty   Comments Patient motivated within her pain levels   Activity Tolerance   Activity Tolerance Patient limited by fatigue;Patient limited by pain   Medical Staff Made Aware patient cleared for OT by RN   Assessment   Assessment Patient participated in skilled OT with focus on activity endurance, activity engagement, bed mobility skills, adaptive techniques for dressing and bathing   Patient presents supine in bed agreeable to participate in OT within her pain limitations  Patient identifiedy by name and   Patient reports that her pain elevated "when I sat on the edge of the bed with PT  Patient educated in functional ROM for BUE's for carryover into functional task performance including fisting for both hands  Patient reports that "the swelling has decreased  It is still difficult to make a full fist " Patient requires assist levels as documented due to decreased endurance and increased pain levels with movement  Patient would benefit from [address] with focus on increasing functional strength and endurance, increasing functional safety and independence with transfers and functional mobility skills, increase functional independence with self care/adl skills for carryover into her daily routine  Plan   Treatment Interventions ADL retraining; Endurance training; Activityengagement   Goal Expiration Date 19   Treatment Day 1   OT Frequency 3-5x/wk   Recommendation   OT Discharge Recommendation Short Term Rehab   OT - OK to Discharge   (when medically cleared)   Iveth Kent

## 2019-09-17 NOTE — PROGRESS NOTES
UROLOGY PROGRESS NOTE   Patient Identifiers: Rivka Dai (MRN [id number])  Date of Service: 9/17/2019    Subjective:     Awake and alert  Seems a little stronger and smiling this morning urine somewhat bloody but no clots    Patient has  no complaints        Objective:     VITALS:    Vitals:    09/17/19 1518   BP: 137/73   Pulse: 72   Resp: 16   Temp: 99 2 °F (37 3 °C)   SpO2: 93%           LABS:  Lab Results   Component Value Date    HGB 8 0 (L) 09/17/2019    HCT 25 2 (L) 09/17/2019    WBC 12 03 (H) 09/17/2019     09/17/2019   ]    Lab Results   Component Value Date    K 3 4 (L) 09/17/2019     (H) 09/17/2019    CO2 29 09/17/2019    BUN 37 (H) 09/17/2019    CREATININE 1 38 (H) 09/17/2019    CALCIUM 8 2 (L) 09/17/2019    GLUCOSE 228 (H) 09/10/2019   ]        INPATIENT MEDS:    Current Facility-Administered Medications:     acetaminophen (TYLENOL) tablet 650 mg, 650 mg, Oral, Q6H [facility], Karla Zepeda PA-C, 650 mg at 09/17/19 1707    cefepime (MAXIPIME) 1,000 mg in dextrose 5 % 50 mL IVPB, 1,000 mg, Intravenous, Q24H, Daquan Pineda MD, Last Rate: 100 mL/hr at 09/17/19 1707, 1,000 mg at 09/17/19 1707    diphenhydrAMINE (BENADRYL) injection 25 mg, 25 mg, Intravenous, Q6H PRN, Lidia Zepeda PA-C    heparin (porcine) subcutaneous injection 5,000 Units, 5,000 Units, Subcutaneous, Q8H [facility], 5,000 Units at 09/17/19 1318 **AND** Platelet count, , , Once, Eriberto Vargas PA-C    HYDROmorphone (DILAUDID) injection 1 mg, 1 mg, Intravenous, Q4H PRN, Thuy Suresh MD, 1 mg at 09/17/19 1652    insulin lispro (HumaLOG) 100 units/mL subcutaneous injection 1-5 Units, 1-5 Units, Subcutaneous, Q6H [facility], 1 Units at 09/15/19 1753 **AND** Fingerstick Glucose (POCT), , , Q6H, Karla Zepeda PA-C    metroNIDAZOLE (FLAGYL) IVPB (premix) 500 mg, 500 mg, Intravenous, Q8H, Daquan Pineda MD, Last Rate: 200 mL/hr at 09/17/19 1620, 500 mg at 09/17/19 1620    nystatin (MYCOSTATIN) oral suspension 500,000 Units, 500,000 Units, Swish & Swallow, 4x Daily, Altamese Filler, DO, 500,000 Units at 09/17/19 0902    ondansetron [facility]) injection 4 mg, 4 mg, Intravenous, Q6H PRN, Princess Chandra Zepeda PA-C, 4 mg at 09/14/19 0528    oxyCODONE (ROXICODONE) immediate release tablet 10 mg, 10 mg, Oral, Q4H PRN, Jami Merida MD, 10 mg at 09/17/19 1328    oxyCODONE (ROXICODONE) IR tablet 5 mg, 5 mg, Oral, Q4H PRN, Jami Merida MD    pantoprazole (PROTONIX) injection 40 mg, 40 mg, Intravenous, Q24H [address], [place] F Zabo, DO, 40 mg at 09/17/19 0902    sodium chloride infusion 0 45 %, 50 mL/hr, Intravenous, Continuous, Dana Bhat MD, Last Rate: 50 mL/hr at 09/17/19 0901, 50 mL/hr at 09/17/19 0901    vancomycin (VANCOCIN) 1,250 mg in sodium chloride 0 9 % 250 mL IVPB, 1,250 mg, Intravenous, Q24H, Bryant Ahuja MD, Last Rate: 166 7 mL/hr at 09/17/19 1125, 1,250 mg at 09/17/19 1125      Physical Exam:   /73 (BP Location: Left arm)   Pulse 72   Temp 99 2 °F (37 3 °C) (Oral)   Resp 16   Ht 5' 4" (1 626 m)   Wt 84 kg (185 lb 3 oz)   SpO2 93%   BMI 31 79 kg/m²   GEN: no acute distress    RESP: breathing comfortably with no accessory muscle use    ABD: soft, appropriately tender to palpation, non-distended   INCISION:    EXT: bilateral peripheral edema   ZULUAGA: draining pink clear urine  no clots and       RADIOLOGY:    none     Assessment:    1  POD#6  Pelvic exoneration secondary to ovarian malignancy    2   Acute kidney injury /improving      Plan:   - if her urine is clear and is agreeable with the Nephrology Service could discontinue Zuluaga catheter in the morning  -  -  -

## 2019-09-17 NOTE — PLAN OF CARE
Problem: Nutrition/Hydration-ADULT  Goal: Nutrient/Hydration intake appropriate for improving, restoring or maintaining nutritional needs  Description  Monitor and assess patient's nutrition/hydration status for malnutrition  Collaborate with interdisciplinary team and initiate plan and interventions as ordered  Monitor patient's weight and dietary intake as ordered or per policy  Utilize nutrition screening tool and intervene as necessary  Determine patient's food preferences and provide high-protein, high-caloric foods as appropriate       INTERVENTIONS:  - Monitor oral intake, urinary output, labs, and treatment plans  - Assess nutrition and hydration status and recommend course of action  - Evaluate amount of meals eaten  - Assist patient with eating if necessary   - Allow adequate time for meals  - Recommend/ encourage appropriate diets, oral nutritional supplements, and vitamin/mineral supplements  - Order, calculate, and assess calorie counts as needed  - Recommend, monitor, and adjust tube feedings and TPN/PPN based on assessed needs  - Assess need for intravenous fluids  - Provide specific nutrition/hydration education as appropriate  - Include patient/family/caregiver in decisions related to nutrition  Outcome: Progressing  Note:   Advanced to regular diet today

## 2019-09-17 NOTE — PLAN OF CARE
Problem: Prexisting or High Potential for Compromised Skin Integrity  Goal: Skin integrity is maintained or improved  Description  INTERVENTIONS:  - Identify patients at risk for skin breakdown  - Assess and monitor skin integrity  - Assess and monitor nutrition and hydration status  - Monitor labs   - Assess for incontinence   - Turn and reposition patient  - Assist with mobility/ambulation  - Relieve pressure over bony prominences  - Avoid friction and shearing  - Provide appropriate hygiene as needed including keeping skin clean and dry  - Evaluate need for skin moisturizer/barrier cream  - Collaborate with interdisciplinary team   - Patient/family teaching  - Consider wound care consult   Outcome: Progressing     Problem: RESPIRATORY - ADULT  Goal: Achieves optimal ventilation and oxygenation  Description  INTERVENTIONS:  - Assess for changes in respiratory status  - Assess for changes in mentation and behavior  - Position to facilitate oxygenation and minimize respiratory effort  - Oxygen administered by appropriate delivery if ordered  - Initiate smoking cessation education as indicated  - Encourage broncho-pulmonary hygiene including cough, deep breathe, Incentive Spirometry  - Assess the need for suctioning and aspirate as needed  - Assess and instruct to report SOB or any respiratory difficulty  - Respiratory Therapy support as indicated  Outcome: Progressing     Problem: GASTROINTESTINAL - ADULT  Goal: Maintains or returns to baseline bowel function  Description  INTERVENTIONS:  - Assess bowel function  - Encourage oral fluids to ensure adequate hydration  - Administer IV fluids if ordered to ensure adequate hydration  - Administer ordered medications as needed  - Encourage mobilization and activity  - Consider nutritional services referral to assist patient with adequate nutrition and appropriate food choices  Outcome: Progressing     Problem: GENITOURINARY - ADULT  Goal: Maintains or returns to baseline urinary function  Description  INTERVENTIONS:  - Assess urinary function  - Encourage oral fluids to ensure adequate hydration if ordered  - Administer IV fluids as ordered to ensure adequate hydration  - Administer ordered medications as needed  - Offer frequent toileting  - Follow urinary retention protocol if ordered  Outcome: Progressing     Problem: METABOLIC, FLUID AND ELECTROLYTES - ADULT  Goal: Electrolytes maintained within normal limits  Description  INTERVENTIONS:  - Monitor labs and assess patient for signs and symptoms of electrolyte imbalances  - Administer electrolyte replacement as ordered  - Monitor response to electrolyte replacements, including repeat lab results as appropriate  - Instruct patient on fluid and nutrition as appropriate  Outcome: Progressing  Goal: Fluid balance maintained  Description  INTERVENTIONS:  - Monitor labs   - Monitor I/O and WT  - Instruct patient on fluid and nutrition as appropriate  - Assess for signs & symptoms of volume excess or deficit  Outcome: Progressing  Goal: Glucose maintained within target range  Description  INTERVENTIONS:  - Monitor Blood Glucose as ordered  - Assess for signs and symptoms of hyperglycemia and hypoglycemia  - Administer ordered medications to maintain glucose within target range  - Assess nutritional intake and initiate nutrition service referral as needed  Outcome: Progressing     Problem: SKIN/TISSUE INTEGRITY - ADULT  Goal: Skin integrity remains intact  Description  INTERVENTIONS  - Identify patients at risk for skin breakdown  - Assess and monitor skin integrity  - Assess and monitor nutrition and hydration status  - Monitor labs (i e  albumin)  - Assess for incontinence   - Turn and reposition patient  - Assist with mobility/ambulation  - Relieve pressure over bony prominences  - Avoid friction and shearing  - Provide appropriate hygiene as needed including keeping skin clean and dry  - Evaluate need for skin moisturizer/barrier cream  - Collaborate with interdisciplinary team (i e  Nutrition, Rehabilitation, etc )   - Patient/family teaching  Outcome: Progressing  Goal: Incision(s), wounds(s) or drain site(s) healing without S/S of infection  Description  INTERVENTIONS  - Assess and document risk factors for skin impairment   - Assess and document dressing, incision, wound bed, drain sites and surrounding tissue  - Consider nutrition services referral as needed  - Oral mucous membranes remain intact  - Provide patient/ family education  Outcome: Progressing     Problem: MUSCULOSKELETAL - ADULT  Goal: Maintain or return mobility to safest level of function  Description  INTERVENTIONS:  - Assess patient's ability to carry out ADLs; assess patient's baseline for ADL function and identify physical deficits which impact ability to perform ADLs (bathing, care of mouth/teeth, toileting, grooming, dressing, etc )  - Assess/evaluate cause of self-care deficits   - Assess range of motion  - Assess patient's mobility  - Assess patient's need for assistive devices and provide as appropriate  - Encourage maximum independence but intervene and supervise when necessary  - Involve family in performance of ADLs  - Assess for home care needs following discharge   - Consider OT consult to assist with ADL evaluation and planning for discharge  - Provide patient education as appropriate  Outcome: Progressing     Problem: PAIN - ADULT  Goal: Verbalizes/displays adequate comfort level or baseline comfort level  Description  Interventions:  - Encourage patient to monitor pain and request assistance  - Assess pain using appropriate pain scale  - Administer analgesics based on type and severity of pain and evaluate response  - Implement non-pharmacological measures as appropriate and evaluate response  - Consider cultural and social influences on pain and pain management  - Notify physician/advanced practitioner if interventions unsuccessful or patient reports new pain  Outcome: Progressing     Problem: INFECTION - ADULT  Goal: Absence or prevention of progression during hospitalization  Description  INTERVENTIONS:  - Assess and monitor for signs and symptoms of infection  - Monitor lab/diagnostic results  - Monitor all insertion sites, i e  indwelling lines, tubes, and drains  - Monitor endotracheal if appropriate and nasal secretions for changes in amount and color  - [place] appropriate cooling/warming therapies per order  - Administer medications as ordered  - Instruct and encourage patient and family to use good hand hygiene technique  - Identify and instruct in appropriate isolation precautions for identified infection/condition  Outcome: Progressing     Problem: SAFETY ADULT  Goal: Patient will remain free of falls  Description  INTERVENTIONS:  - Assess patient frequently for physical needs  -  Identify cognitive and physical deficits and behaviors that affect risk of falls    -  [place] fall precautions as indicated by assessment   - Educate patient/family on patient safety including physical limitations  - Instruct patient to call for assistance with activity based on assessment  - Modify environment to reduce risk of injury  - Consider OT/PT consult to assist with strengthening/mobility  Outcome: Progressing  Goal: Maintain or return to baseline ADL function  Description  INTERVENTIONS:  -  Assess patient's ability to carry out ADLs; assess patient's baseline for ADL function and identify physical deficits which impact ability to perform ADLs (bathing, care of mouth/teeth, toileting, grooming, dressing, etc )  - Assess/evaluate cause of self-care deficits   - Assess range of motion  - Assess patient's mobility; develop plan if impaired  - Assess patient's need for assistive devices and provide as appropriate  - Encourage maximum independence but intervene and supervise when necessary  - Involve family in performance of ADLs  - Assess for home care needs following discharge   - Consider OT consult to assist with ADL evaluation and planning for discharge  - Provide patient education as appropriate  Outcome: Progressing  Goal: Maintain or return mobility status to optimal level  Description  INTERVENTIONS:  - Assess patient's baseline mobility status (ambulation, transfers, stairs, etc )    - Identify cognitive and physical deficits and behaviors that affect mobility  - Identify mobility aids required to assist with transfers and/or ambulation (gait belt, sit-to-stand, lift, walker, cane, etc )  - [place] fall precautions as indicated by assessment  - Record patient progress and toleration of activity level on Mobility SBAR; progress patient to next Phase/Stage  - Instruct patient to call for assistance with activity based on assessment  - Consider rehabilitation consult to assist with strengthening/weightbearing, etc   Outcome: Progressing     Problem: DISCHARGE PLANNING  Goal: Discharge to home or other facility with appropriate resources  Description  INTERVENTIONS:  - Identify barriers to discharge w/patient and caregiver  - Arrange for needed discharge resources and transportation as appropriate  - Identify discharge learning needs (meds, wound care, etc )  - Arrange for interpretive services to assist at discharge as needed  - Refer to Case Management Department for coordinating discharge planning if the patient needs post-hospital services based on physician/advanced practitioner order or complex needs related to functional status, cognitive ability, or social support system  Outcome: Progressing     Problem: Knowledge Deficit  Goal: Patient/family/caregiver demonstrates understanding of disease process, treatment plan, medications, and discharge instructions  Description  Complete learning assessment and assess knowledge base    Interventions:  - Provide teaching at level of understanding  - Provide teaching via preferred learning methods  Outcome: Progressing     Problem: Nutrition/Hydration-ADULT  Goal: Nutrient/Hydration intake appropriate for improving, restoring or maintaining nutritional needs  Description  Monitor and assess patient's nutrition/hydration status for malnutrition  Collaborate with interdisciplinary team and initiate plan and interventions as ordered  Monitor patient's weight and dietary intake as ordered or per policy  Utilize nutrition screening tool and intervene as necessary  Determine patient's food preferences and provide high-protein, high-caloric foods as appropriate  INTERVENTIONS:  - Monitor oral intake, urinary output, labs, and treatment plans  - Assess nutrition and hydration status and recommend course of action  - Evaluate amount of meals eaten  - Assist patient with eating if necessary   - Allow adequate time for meals  - Recommend/ encourage appropriate diets, oral nutritional supplements, and vitamin/mineral supplements  - Order, calculate, and assess calorie counts as needed  - Recommend, monitor, and adjust tube feedings and TPN/PPN based on assessed needs  - Assess need for intravenous fluids  - Provide specific nutrition/hydration education as appropriate  - Include patient/family/caregiver in decisions related to nutrition  Outcome: Progressing     Problem: Potential for Falls  Goal: Patient will remain free of falls  Description  INTERVENTIONS:  - Assess patient frequently for physical needs  -  Identify cognitive and physical deficits and behaviors that affect risk of falls    -  [place] fall precautions as indicated by assessment   - Educate patient/family on patient safety including physical limitations  - Instruct patient to call for assistance with activity based on assessment  - Modify environment to reduce risk of injury  - Consider OT/PT consult to assist with strengthening/mobility  Outcome: Progressing

## 2019-09-18 LAB
ABO GROUP BLD BPU: NORMAL
ABO GROUP BLD BPU: NORMAL
ANION GAP SERPL CALCULATED.3IONS-SCNC: 4 MMOL/L (ref 4–13)
BASOPHILS # BLD AUTO: 0.02 THOUSANDS/ΜL (ref 0–0.1)
BASOPHILS NFR BLD AUTO: 0 % (ref 0–1)
BPU ID: NORMAL
BPU ID: NORMAL
BUN SERPL-MCNC: 21 MG/DL (ref 5–25)
CALCIUM SERPL-MCNC: 7.6 MG/DL (ref 8.3–10.1)
CHLORIDE SERPL-SCNC: 106 MMOL/L (ref 100–108)
CO2 SERPL-SCNC: 29 MMOL/L (ref 21–32)
CREAT SERPL-MCNC: 0.85 MG/DL (ref 0.6–1.3)
EOSINOPHIL # BLD AUTO: 0.77 THOUSAND/ΜL (ref 0–0.61)
EOSINOPHIL NFR BLD AUTO: 7 % (ref 0–6)
ERYTHROCYTE [DISTWIDTH] IN BLOOD BY AUTOMATED COUNT: 14.8 % (ref 11.6–15.1)
GFR SERPL CREATININE-BSD FRML MDRD: 70 ML/MIN/1.73SQ M
GLUCOSE SERPL-MCNC: 112 MG/DL (ref 65–140)
GLUCOSE SERPL-MCNC: 126 MG/DL (ref 65–140)
HCT VFR BLD AUTO: 25.9 % (ref 34.8–46.1)
HGB BLD-MCNC: 8.3 G/DL (ref 11.5–15.4)
IMM GRANULOCYTES # BLD AUTO: 0.17 THOUSAND/UL (ref 0–0.2)
IMM GRANULOCYTES NFR BLD AUTO: 2 % (ref 0–2)
LYMPHOCYTES # BLD AUTO: 1.79 THOUSANDS/ΜL (ref 0.6–4.47)
LYMPHOCYTES NFR BLD AUTO: 16 % (ref 14–44)
MAGNESIUM SERPL-MCNC: 1.7 MG/DL (ref 1.6–2.6)
MCH RBC QN AUTO: 29.7 PG (ref 26.8–34.3)
MCHC RBC AUTO-ENTMCNC: 32 G/DL (ref 31.4–37.4)
MCV RBC AUTO: 93 FL (ref 82–98)
MONOCYTES # BLD AUTO: 0.85 THOUSAND/ΜL (ref 0.17–1.22)
MONOCYTES NFR BLD AUTO: 8 % (ref 4–12)
NEUTROPHILS # BLD AUTO: 7.64 THOUSANDS/ΜL (ref 1.85–7.62)
NEUTS SEG NFR BLD AUTO: 67 % (ref 43–75)
NRBC BLD AUTO-RTO: 0 /100 WBCS
PLATELET # BLD AUTO: 290 THOUSANDS/UL (ref 149–390)
PMV BLD AUTO: 12.2 FL (ref 8.9–12.7)
POTASSIUM SERPL-SCNC: 3.2 MMOL/L (ref 3.5–5.3)
RBC # BLD AUTO: 2.79 MILLION/UL (ref 3.81–5.12)
SODIUM SERPL-SCNC: 139 MMOL/L (ref 136–145)
UNIT DISPENSE STATUS: NORMAL
UNIT DISPENSE STATUS: NORMAL
UNIT PRODUCT CODE: NORMAL
UNIT PRODUCT CODE: NORMAL
UNIT RH: NORMAL
UNIT RH: NORMAL
WBC # BLD AUTO: 11.24 THOUSAND/UL (ref 4.31–10.16)

## 2019-09-18 PROCEDURE — 82948 REAGENT STRIP/BLOOD GLUCOSE: CPT

## 2019-09-18 PROCEDURE — 85025 COMPLETE CBC W/AUTO DIFF WBC: CPT | Performed by: OBSTETRICS & GYNECOLOGY

## 2019-09-18 PROCEDURE — 99232 SBSQ HOSP IP/OBS MODERATE 35: CPT | Performed by: INTERNAL MEDICINE

## 2019-09-18 PROCEDURE — 99024 POSTOP FOLLOW-UP VISIT: CPT | Performed by: UROLOGY

## 2019-09-18 PROCEDURE — 99024 POSTOP FOLLOW-UP VISIT: CPT | Performed by: OBSTETRICS & GYNECOLOGY

## 2019-09-18 PROCEDURE — 80048 BASIC METABOLIC PNL TOTAL CA: CPT | Performed by: INTERNAL MEDICINE

## 2019-09-18 PROCEDURE — 99223 1ST HOSP IP/OBS HIGH 75: CPT | Performed by: INTERNAL MEDICINE

## 2019-09-18 PROCEDURE — 99222 1ST HOSP IP/OBS MODERATE 55: CPT | Performed by: PHYSICAL MEDICINE & REHABILITATION

## 2019-09-18 PROCEDURE — C9113 INJ PANTOPRAZOLE SODIUM, VIA: HCPCS | Performed by: OBSTETRICS & GYNECOLOGY

## 2019-09-18 PROCEDURE — 83735 ASSAY OF MAGNESIUM: CPT | Performed by: INTERNAL MEDICINE

## 2019-09-18 RX ORDER — ONDANSETRON 4 MG/1
4 TABLET, ORALLY DISINTEGRATING ORAL
Status: DISCONTINUED | OUTPATIENT
Start: 2019-09-18 | End: 2019-09-19

## 2019-09-18 RX ORDER — POTASSIUM CHLORIDE 29.8 MG/ML
40 INJECTION INTRAVENOUS ONCE
Status: COMPLETED | OUTPATIENT
Start: 2019-09-18 | End: 2019-09-18

## 2019-09-18 RX ORDER — POTASSIUM CHLORIDE 14.9 MG/ML
20 INJECTION INTRAVENOUS ONCE
Status: COMPLETED | OUTPATIENT
Start: 2019-09-18 | End: 2019-09-19

## 2019-09-18 RX ORDER — HYDROMORPHONE HYDROCHLORIDE 4 MG/1
4 TABLET ORAL
Status: DISCONTINUED | OUTPATIENT
Start: 2019-09-18 | End: 2019-09-19

## 2019-09-18 RX ORDER — POTASSIUM CHLORIDE 20 MEQ/1
40 TABLET, EXTENDED RELEASE ORAL ONCE
Status: DISCONTINUED | OUTPATIENT
Start: 2019-09-18 | End: 2019-09-18

## 2019-09-18 RX ADMIN — OXYCODONE HYDROCHLORIDE 5 MG: 5 TABLET ORAL at 02:41

## 2019-09-18 RX ADMIN — METRONIDAZOLE 500 MG: 500 INJECTION, SOLUTION INTRAVENOUS at 16:12

## 2019-09-18 RX ADMIN — OXYCODONE HYDROCHLORIDE 10 MG: 10 TABLET ORAL at 00:05

## 2019-09-18 RX ADMIN — PANTOPRAZOLE SODIUM 40 MG: 40 INJECTION, POWDER, FOR SOLUTION INTRAVENOUS at 08:42

## 2019-09-18 RX ADMIN — CEFEPIME HYDROCHLORIDE 2000 MG: 2 INJECTION, POWDER, FOR SOLUTION INTRAVENOUS at 11:13

## 2019-09-18 RX ADMIN — METRONIDAZOLE 500 MG: 500 INJECTION, SOLUTION INTRAVENOUS at 23:10

## 2019-09-18 RX ADMIN — POTASSIUM CHLORIDE 20 MEQ: 14.9 INJECTION, SOLUTION INTRAVENOUS at 17:22

## 2019-09-18 RX ADMIN — OXYCODONE HYDROCHLORIDE 10 MG: 10 TABLET ORAL at 05:35

## 2019-09-18 RX ADMIN — METRONIDAZOLE 500 MG: 500 INJECTION, SOLUTION INTRAVENOUS at 05:36

## 2019-09-18 RX ADMIN — ACETAMINOPHEN 650 MG: 325 TABLET ORAL at 00:05

## 2019-09-18 RX ADMIN — HYDROMORPHONE HYDROCHLORIDE 1 MG: 1 INJECTION, SOLUTION INTRAMUSCULAR; INTRAVENOUS; SUBCUTANEOUS at 08:42

## 2019-09-18 RX ADMIN — POTASSIUM CHLORIDE 40 MEQ: 29.8 INJECTION, SOLUTION INTRAVENOUS at 12:04

## 2019-09-18 RX ADMIN — ACETAMINOPHEN 650 MG: 325 TABLET ORAL at 18:16

## 2019-09-18 RX ADMIN — HYDROMORPHONE HYDROCHLORIDE 4 MG: 4 TABLET ORAL at 14:37

## 2019-09-18 RX ADMIN — HYDROMORPHONE HYDROCHLORIDE 4 MG: 4 TABLET ORAL at 23:10

## 2019-09-18 RX ADMIN — ENOXAPARIN SODIUM 40 MG: 40 INJECTION SUBCUTANEOUS at 08:42

## 2019-09-18 RX ADMIN — ACETAMINOPHEN 650 MG: 325 TABLET ORAL at 05:35

## 2019-09-18 RX ADMIN — HYDROMORPHONE HYDROCHLORIDE 4 MG: 4 TABLET ORAL at 18:16

## 2019-09-18 RX ADMIN — ACETAMINOPHEN 650 MG: 325 TABLET ORAL at 11:13

## 2019-09-18 NOTE — CONSULTS
Vancomycin IV Pharmacy-to-Dose Consultation    Eladia Brush is a 70 y o  female who was receiving Vancomycin IV with management by the Pharmacy Consult service for treatment of Pneumonia    The patients Vancomycin therapy has been completed / discontinued  Thank you for allowing us to take part in this patient's care  Pharmacy will sign-off now; please call or re-consult if there are any questions  Salvatore ROBERTS   Ph  Staff Pharmacist

## 2019-09-18 NOTE — PROGRESS NOTES
UROLOGY PROGRESS NOTE   Patient Identifiers: Lane Banks (MRN [id number])  Date of Service: 9/18/2019    Subjective:     Resting comfortably no reported problems overnight    Patient has  no complaints        Objective:     VITALS:    Vitals:    09/18/19 1507   BP: 141/70   Pulse: 81   Resp: 18   Temp: 99 4 °F (37 4 °C)   SpO2: 95%           LABS:  Lab Results   Component Value Date    HGB 8 3 (L) 09/18/2019    HCT 25 9 (L) 09/18/2019    WBC 11 24 (H) 09/18/2019     09/18/2019   ]    Lab Results   Component Value Date    K 3 2 (L) 09/18/2019     09/18/2019    CO2 29 09/18/2019    BUN 21 09/18/2019    CREATININE 0 85 09/18/2019    CALCIUM 7 6 (L) 09/18/2019    GLUCOSE 228 (H) 09/10/2019   ]        INPATIENT MEDS:    Current Facility-Administered Medications:     acetaminophen (TYLENOL) tablet 650 mg, 650 mg, Oral, Q6H [address], Karla Zepeda PA-C, 650 mg at 09/18/19 1113    cefepime (MAXIPIME) 2,000 mg in dextrose 5 % 50 mL IVPB, 2,000 mg, Intravenous, Q12H, Daquan Pineda MD, Stopped at 09/18/19 1145    diphenhydrAMINE (BENADRYL) injection 25 mg, 25 mg, Intravenous, Q6H PRN, Dana Zepeda PA-C    enoxaparin (LOVENOX) subcutaneous injection 40 mg, 40 mg, Subcutaneous, Q24H [address], [place] F Zabo, DO, 40 mg at 09/18/19 0842    HYDROmorphone (DILAUDID) injection 1 mg, 1 mg, Intravenous, Q4H PRN, Dimitri Olivo MD, 1 mg at 09/18/19 0842    HYDROmorphone (DILAUDID) tablet 4 mg, 4 mg, Oral, Q4H While Awake, Rowan Mills DO, 4 mg at 09/18/19 1437    metFORMIN (GLUCOPHAGE) tablet 500 mg, 500 mg, Oral, BID With Meals, Jossy Galvez MD    metroNIDAZOLE (FLAGYL) IVPB (premix) 500 mg, 500 mg, Intravenous, Q8H, Daquan Zeineddine, MD, Last Rate: 200 mL/hr at 09/18/19 1612, 500 mg at 09/18/19 1612    nystatin (MYCOSTATIN) oral suspension 500,000 Units, 500,000 Units, Swish & Swallow, 4x Daily, Rosalina Gao DO, 500,000 Units at 09/17/19 0902    ondansetron (ZOFRAN) injection 4 mg, 4 mg, Intravenous, Q6H PRN, Casper Zepeda PA-C, 4 mg at 09/14/19 0528    ondansetron (ZOFRAN-ODT) dispersible tablet 4 mg, 4 mg, Oral, TID AC, Rowan Mills DO    pantoprazole (PROTONIX) injection 40 mg, 40 mg, Intravenous, Q24H [facility], Young Harris Chuy ROJAS DO Kristy, 40 mg at 09/18/19 6270    potassium chloride 20 mEq IVPB (premix), 20 mEq, Intravenous, Once, Shiela Kay MD      Physical Exam:   /70   Pulse 81   Temp 99 4 °F (37 4 °C)   Resp 18   Ht 5' 4" (1 626 m)   Wt 85 6 kg (188 lb 11 2 oz)   SpO2 95%   BMI 32 39 kg/m²   GEN: no acute distress    RESP: breathing comfortably with no accessory muscle use    ABD: soft, non-tender, non-distended   INCISION:    EXT: no significant peripheral edema     ZULUAGA: draining pink clear urine  no clots and       RADIOLOGY:    none     Assessment:    1  POD #8  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

## 2019-09-18 NOTE — PLAN OF CARE
Problem: Prexisting or High Potential for Compromised Skin Integrity  Goal: Skin integrity is maintained or improved  Description  INTERVENTIONS:  - Identify patients at risk for skin breakdown  - Assess and monitor skin integrity  - Assess and monitor nutrition and hydration status  - Monitor labs   - Assess for incontinence   - Turn and reposition patient  - Assist with mobility/ambulation  - Relieve pressure over bony prominences  - Avoid friction and shearing  - Provide appropriate hygiene as needed including keeping skin clean and dry  - Evaluate need for skin moisturizer/barrier cream  - Collaborate with interdisciplinary team   - Patient/family teaching  - Consider wound care consult   Outcome: Progressing     Problem: RESPIRATORY - ADULT  Goal: Achieves optimal ventilation and oxygenation  Description  INTERVENTIONS:  - Assess for changes in respiratory status  - Assess for changes in mentation and behavior  - Position to facilitate oxygenation and minimize respiratory effort  - Oxygen administered by appropriate delivery if ordered  - Initiate smoking cessation education as indicated  - Encourage broncho-pulmonary hygiene including cough, deep breathe, Incentive Spirometry  - Assess the need for suctioning and aspirate as needed  - Assess and instruct to report SOB or any respiratory difficulty  - Respiratory Therapy support as indicated  Outcome: Progressing     Problem: GASTROINTESTINAL - ADULT  Goal: Maintains or returns to baseline bowel function  Description  INTERVENTIONS:  - Assess bowel function  - Encourage oral fluids to ensure adequate hydration  - Administer IV fluids if ordered to ensure adequate hydration  - Administer ordered medications as needed  - Encourage mobilization and activity  - Consider nutritional services referral to assist patient with adequate nutrition and appropriate food choices  Outcome: Progressing     Problem: GENITOURINARY - ADULT  Goal: Maintains or returns to baseline urinary function  Description  INTERVENTIONS:  - Assess urinary function  - Encourage oral fluids to ensure adequate hydration if ordered  - Administer IV fluids as ordered to ensure adequate hydration  - Administer ordered medications as needed  - Offer frequent toileting  - Follow urinary retention protocol if ordered  Outcome: Progressing     Problem: METABOLIC, FLUID AND ELECTROLYTES - ADULT  Goal: Electrolytes maintained within normal limits  Description  INTERVENTIONS:  - Monitor labs and assess patient for signs and symptoms of electrolyte imbalances  - Administer electrolyte replacement as ordered  - Monitor response to electrolyte replacements, including repeat lab results as appropriate  - Instruct patient on fluid and nutrition as appropriate  Outcome: Progressing  Goal: Fluid balance maintained  Description  INTERVENTIONS:  - Monitor labs   - Monitor I/O and WT  - Instruct patient on fluid and nutrition as appropriate  - Assess for signs & symptoms of volume excess or deficit  Outcome: Progressing  Goal: Glucose maintained within target range  Description  INTERVENTIONS:  - Monitor Blood Glucose as ordered  - Assess for signs and symptoms of hyperglycemia and hypoglycemia  - Administer ordered medications to maintain glucose within target range  - Assess nutritional intake and initiate nutrition service referral as needed  Outcome: Progressing     Problem: SKIN/TISSUE INTEGRITY - ADULT  Goal: Skin integrity remains intact  Description  INTERVENTIONS  - Identify patients at risk for skin breakdown  - Assess and monitor skin integrity  - Assess and monitor nutrition and hydration status  - Monitor labs (i e  albumin)  - Assess for incontinence   - Turn and reposition patient  - Assist with mobility/ambulation  - Relieve pressure over bony prominences  - Avoid friction and shearing  - Provide appropriate hygiene as needed including keeping skin clean and dry  - Evaluate need for skin moisturizer/barrier cream  - Collaborate with interdisciplinary team (i e  Nutrition, Rehabilitation, etc )   - Patient/family teaching  Outcome: Progressing  Goal: Incision(s), wounds(s) or drain site(s) healing without S/S of infection  Description  INTERVENTIONS  - Assess and document risk factors for skin impairment   - Assess and document dressing, incision, wound bed, drain sites and surrounding tissue  - Consider nutrition services referral as needed  - Oral mucous membranes remain intact  - Provide patient/ family education  Outcome: Progressing     Problem: MUSCULOSKELETAL - ADULT  Goal: Maintain or return mobility to safest level of function  Description  INTERVENTIONS:  - Assess patient's ability to carry out ADLs; assess patient's baseline for ADL function and identify physical deficits which impact ability to perform ADLs (bathing, care of mouth/teeth, toileting, grooming, dressing, etc )  - Assess/evaluate cause of self-care deficits   - Assess range of motion  - Assess patient's mobility  - Assess patient's need for assistive devices and provide as appropriate  - Encourage maximum independence but intervene and supervise when necessary  - Involve family in performance of ADLs  - Assess for home care needs following discharge   - Consider OT consult to assist with ADL evaluation and planning for discharge  - Provide patient education as appropriate  Outcome: Progressing     Problem: PAIN - ADULT  Goal: Verbalizes/displays adequate comfort level or baseline comfort level  Description  Interventions:  - Encourage patient to monitor pain and request assistance  - Assess pain using appropriate pain scale  - Administer analgesics based on type and severity of pain and evaluate response  - Implement non-pharmacological measures as appropriate and evaluate response  - Consider cultural and social influences on pain and pain management  - Notify physician/advanced practitioner if interventions unsuccessful or patient reports new pain  Outcome: Progressing     Problem: INFECTION - ADULT  Goal: Absence or prevention of progression during hospitalization  Description  INTERVENTIONS:  - Assess and monitor for signs and symptoms of infection  - Monitor lab/diagnostic results  - Monitor all insertion sites, i e  indwelling lines, tubes, and drains  - Monitor endotracheal if appropriate and nasal secretions for changes in amount and color  - [place] appropriate cooling/warming therapies per order  - Administer medications as ordered  - Instruct and encourage patient and family to use good hand hygiene technique  - Identify and instruct in appropriate isolation precautions for identified infection/condition  Outcome: Progressing     Problem: SAFETY ADULT  Goal: Patient will remain free of falls  Description  INTERVENTIONS:  - Assess patient frequently for physical needs  -  Identify cognitive and physical deficits and behaviors that affect risk of falls    -  [place] fall precautions as indicated by assessment   - Educate patient/family on patient safety including physical limitations  - Instruct patient to call for assistance with activity based on assessment  - Modify environment to reduce risk of injury  - Consider OT/PT consult to assist with strengthening/mobility  Outcome: Progressing  Goal: Maintain or return to baseline ADL function  Description  INTERVENTIONS:  -  Assess patient's ability to carry out ADLs; assess patient's baseline for ADL function and identify physical deficits which impact ability to perform ADLs (bathing, care of mouth/teeth, toileting, grooming, dressing, etc )  - Assess/evaluate cause of self-care deficits   - Assess range of motion  - Assess patient's mobility; develop plan if impaired  - Assess patient's need for assistive devices and provide as appropriate  - Encourage maximum independence but intervene and supervise when necessary  - Involve family in performance of ADLs  - Assess for home care needs following discharge   - Consider OT consult to assist with ADL evaluation and planning for discharge  - Provide patient education as appropriate  Outcome: Progressing  Goal: Maintain or return mobility status to optimal level  Description  INTERVENTIONS:  - Assess patient's baseline mobility status (ambulation, transfers, stairs, etc )    - Identify cognitive and physical deficits and behaviors that affect mobility  - Identify mobility aids required to assist with transfers and/or ambulation (gait belt, sit-to-stand, lift, walker, cane, etc )  - [place] fall precautions as indicated by assessment  - Record patient progress and toleration of activity level on Mobility SBAR; progress patient to next Phase/Stage  - Instruct patient to call for assistance with activity based on assessment  - Consider rehabilitation consult to assist with strengthening/weightbearing, etc   Outcome: Progressing     Problem: DISCHARGE PLANNING  Goal: Discharge to home or other facility with appropriate resources  Description  INTERVENTIONS:  - Identify barriers to discharge w/patient and caregiver  - Arrange for needed discharge resources and transportation as appropriate  - Identify discharge learning needs (meds, wound care, etc )  - Arrange for interpretive services to assist at discharge as needed  - Refer to Case Management Department for coordinating discharge planning if the patient needs post-hospital services based on physician/advanced practitioner order or complex needs related to functional status, cognitive ability, or social support system  Outcome: Progressing     Problem: Knowledge Deficit  Goal: Patient/family/caregiver demonstrates understanding of disease process, treatment plan, medications, and discharge instructions  Description  Complete learning assessment and assess knowledge base    Interventions:  - Provide teaching at level of understanding  - Provide teaching via preferred learning methods  Outcome: Progressing     Problem: Nutrition/Hydration-ADULT  Goal: Nutrient/Hydration intake appropriate for improving, restoring or maintaining nutritional needs  Description  Monitor and assess patient's nutrition/hydration status for malnutrition  Collaborate with interdisciplinary team and initiate plan and interventions as ordered  Monitor patient's weight and dietary intake as ordered or per policy  Utilize nutrition screening tool and intervene as necessary  Determine patient's food preferences and provide high-protein, high-caloric foods as appropriate  INTERVENTIONS:  - Monitor oral intake, urinary output, labs, and treatment plans  - Assess nutrition and hydration status and recommend course of action  - Evaluate amount of meals eaten  - Assist patient with eating if necessary   - Allow adequate time for meals  - Recommend/ encourage appropriate diets, oral nutritional supplements, and vitamin/mineral supplements  - Order, calculate, and assess calorie counts as needed  - Recommend, monitor, and adjust tube feedings and TPN/PPN based on assessed needs  - Assess need for intravenous fluids  - Provide specific nutrition/hydration education as appropriate  - Include patient/family/caregiver in decisions related to nutrition  Outcome: Progressing     Problem: Potential for Falls  Goal: Patient will remain free of falls  Description  INTERVENTIONS:  - Assess patient frequently for physical needs  -  Identify cognitive and physical deficits and behaviors that affect risk of falls    -  [place] fall precautions as indicated by assessment   - Educate patient/family on patient safety including physical limitations  - Instruct patient to call for assistance with activity based on assessment  - Modify environment to reduce risk of injury  - Consider OT/PT consult to assist with strengthening/mobility  Outcome: Progressing

## 2019-09-18 NOTE — PLAN OF CARE
Problem: Prexisting or High Potential for Compromised Skin Integrity  Goal: Skin integrity is maintained or improved  Description  INTERVENTIONS:  - Identify patients at risk for skin breakdown  - Assess and monitor skin integrity  - Assess and monitor nutrition and hydration status  - Monitor labs   - Assess for incontinence   - Turn and reposition patient  - Assist with mobility/ambulation  - Relieve pressure over bony prominences  - Avoid friction and shearing  - Provide appropriate hygiene as needed including keeping skin clean and dry  - Evaluate need for skin moisturizer/barrier cream  - Collaborate with interdisciplinary team   - Patient/family teaching  - Consider wound care consult   Outcome: Progressing     Problem: RESPIRATORY - ADULT  Goal: Achieves optimal ventilation and oxygenation  Description  INTERVENTIONS:  - Assess for changes in respiratory status  - Assess for changes in mentation and behavior  - Position to facilitate oxygenation and minimize respiratory effort  - Oxygen administered by appropriate delivery if ordered  - Initiate smoking cessation education as indicated  - Encourage broncho-pulmonary hygiene including cough, deep breathe, Incentive Spirometry  - Assess the need for suctioning and aspirate as needed  - Assess and instruct to report SOB or any respiratory difficulty  - Respiratory Therapy support as indicated  Outcome: Progressing     Problem: GASTROINTESTINAL - ADULT  Goal: Maintains or returns to baseline bowel function  Description  INTERVENTIONS:  - Assess bowel function  - Encourage oral fluids to ensure adequate hydration  - Administer IV fluids if ordered to ensure adequate hydration  - Administer ordered medications as needed  - Encourage mobilization and activity  - Consider nutritional services referral to assist patient with adequate nutrition and appropriate food choices  Outcome: Progressing     Problem: GENITOURINARY - ADULT  Goal: Maintains or returns to baseline urinary function  Description  INTERVENTIONS:  - Assess urinary function  - Encourage oral fluids to ensure adequate hydration if ordered  - Administer IV fluids as ordered to ensure adequate hydration  - Administer ordered medications as needed  - Offer frequent toileting  - Follow urinary retention protocol if ordered  Outcome: Progressing     Problem: METABOLIC, FLUID AND ELECTROLYTES - ADULT  Goal: Electrolytes maintained within normal limits  Description  INTERVENTIONS:  - Monitor labs and assess patient for signs and symptoms of electrolyte imbalances  - Administer electrolyte replacement as ordered  - Monitor response to electrolyte replacements, including repeat lab results as appropriate  - Instruct patient on fluid and nutrition as appropriate  Outcome: Progressing  Goal: Fluid balance maintained  Description  INTERVENTIONS:  - Monitor labs   - Monitor I/O and WT  - Instruct patient on fluid and nutrition as appropriate  - Assess for signs & symptoms of volume excess or deficit  Outcome: Progressing  Goal: Glucose maintained within target range  Description  INTERVENTIONS:  - Monitor Blood Glucose as ordered  - Assess for signs and symptoms of hyperglycemia and hypoglycemia  - Administer ordered medications to maintain glucose within target range  - Assess nutritional intake and initiate nutrition service referral as needed  Outcome: Progressing     Problem: SKIN/TISSUE INTEGRITY - ADULT  Goal: Skin integrity remains intact  Description  INTERVENTIONS  - Identify patients at risk for skin breakdown  - Assess and monitor skin integrity  - Assess and monitor nutrition and hydration status  - Monitor labs (i e  albumin)  - Assess for incontinence   - Turn and reposition patient  - Assist with mobility/ambulation  - Relieve pressure over bony prominences  - Avoid friction and shearing  - Provide appropriate hygiene as needed including keeping skin clean and dry  - Evaluate need for skin moisturizer/barrier cream  - Collaborate with interdisciplinary team (i e  Nutrition, Rehabilitation, etc )   - Patient/family teaching  Outcome: Progressing  Goal: Incision(s), wounds(s) or drain site(s) healing without S/S of infection  Description  INTERVENTIONS  - Assess and document risk factors for skin impairment   - Assess and document dressing, incision, wound bed, drain sites and surrounding tissue  - Consider nutrition services referral as needed  - Oral mucous membranes remain intact  - Provide patient/ family education  Outcome: Progressing     Problem: MUSCULOSKELETAL - ADULT  Goal: Maintain or return mobility to safest level of function  Description  INTERVENTIONS:  - Assess patient's ability to carry out ADLs; assess patient's baseline for ADL function and identify physical deficits which impact ability to perform ADLs (bathing, care of mouth/teeth, toileting, grooming, dressing, etc )  - Assess/evaluate cause of self-care deficits   - Assess range of motion  - Assess patient's mobility  - Assess patient's need for assistive devices and provide as appropriate  - Encourage maximum independence but intervene and supervise when necessary  - Involve family in performance of ADLs  - Assess for home care needs following discharge   - Consider OT consult to assist with ADL evaluation and planning for discharge  - Provide patient education as appropriate  Outcome: Progressing     Problem: PAIN - ADULT  Goal: Verbalizes/displays adequate comfort level or baseline comfort level  Description  Interventions:  - Encourage patient to monitor pain and request assistance  - Assess pain using appropriate pain scale  - Administer analgesics based on type and severity of pain and evaluate response  - Implement non-pharmacological measures as appropriate and evaluate response  - Consider cultural and social influences on pain and pain management  - Notify physician/advanced practitioner if interventions unsuccessful or patient reports new pain  Outcome: Progressing     Problem: INFECTION - ADULT  Goal: Absence or prevention of progression during hospitalization  Description  INTERVENTIONS:  - Assess and monitor for signs and symptoms of infection  - Monitor lab/diagnostic results  - Monitor all insertion sites, i e  indwelling lines, tubes, and drains  - Monitor endotracheal if appropriate and nasal secretions for changes in amount and color  - [place] appropriate cooling/warming therapies per order  - Administer medications as ordered  - Instruct and encourage patient and family to use good hand hygiene technique  - Identify and instruct in appropriate isolation precautions for identified infection/condition  Outcome: Progressing     Problem: SAFETY ADULT  Goal: Patient will remain free of falls  Description  INTERVENTIONS:  - Assess patient frequently for physical needs  -  Identify cognitive and physical deficits and behaviors that affect risk of falls    -  [place] fall precautions as indicated by assessment   - Educate patient/family on patient safety including physical limitations  - Instruct patient to call for assistance with activity based on assessment  - Modify environment to reduce risk of injury  - Consider OT/PT consult to assist with strengthening/mobility  Outcome: Progressing  Goal: Maintain or return to baseline ADL function  Description  INTERVENTIONS:  -  Assess patient's ability to carry out ADLs; assess patient's baseline for ADL function and identify physical deficits which impact ability to perform ADLs (bathing, care of mouth/teeth, toileting, grooming, dressing, etc )  - Assess/evaluate cause of self-care deficits   - Assess range of motion  - Assess patient's mobility; develop plan if impaired  - Assess patient's need for assistive devices and provide as appropriate  - Encourage maximum independence but intervene and supervise when necessary  - Involve family in performance of ADLs  - Assess for home care needs following discharge   - Consider OT consult to assist with ADL evaluation and planning for discharge  - Provide patient education as appropriate  Outcome: Progressing  Goal: Maintain or return mobility status to optimal level  Description  INTERVENTIONS:  - Assess patient's baseline mobility status (ambulation, transfers, stairs, etc )    - Identify cognitive and physical deficits and behaviors that affect mobility  - Identify mobility aids required to assist with transfers and/or ambulation (gait belt, sit-to-stand, lift, walker, cane, etc )  - [place] fall precautions as indicated by assessment  - Record patient progress and toleration of activity level on Mobility SBAR; progress patient to next Phase/Stage  - Instruct patient to call for assistance with activity based on assessment  - Consider rehabilitation consult to assist with strengthening/weightbearing, etc   Outcome: Progressing     Problem: DISCHARGE PLANNING  Goal: Discharge to home or other facility with appropriate resources  Description  INTERVENTIONS:  - Identify barriers to discharge w/patient and caregiver  - Arrange for needed discharge resources and transportation as appropriate  - Identify discharge learning needs (meds, wound care, etc )  - Arrange for interpretive services to assist at discharge as needed  - Refer to Case Management Department for coordinating discharge planning if the patient needs post-hospital services based on physician/advanced practitioner order or complex needs related to functional status, cognitive ability, or social support system  Outcome: Progressing     Problem: Knowledge Deficit  Goal: Patient/family/caregiver demonstrates understanding of disease process, treatment plan, medications, and discharge instructions  Description  Complete learning assessment and assess knowledge base    Interventions:  - Provide teaching at level of understanding  - Provide teaching via preferred learning methods  Outcome: Progressing     Problem: Nutrition/Hydration-ADULT  Goal: Nutrient/Hydration intake appropriate for improving, restoring or maintaining nutritional needs  Description  Monitor and assess patient's nutrition/hydration status for malnutrition  Collaborate with interdisciplinary team and initiate plan and interventions as ordered  Monitor patient's weight and dietary intake as ordered or per policy  Utilize nutrition screening tool and intervene as necessary  Determine patient's food preferences and provide high-protein, high-caloric foods as appropriate  INTERVENTIONS:  - Monitor oral intake, urinary output, labs, and treatment plans  - Assess nutrition and hydration status and recommend course of action  - Evaluate amount of meals eaten  - Assist patient with eating if necessary   - Allow adequate time for meals  - Recommend/ encourage appropriate diets, oral nutritional supplements, and vitamin/mineral supplements  - Order, calculate, and assess calorie counts as needed  - Recommend, monitor, and adjust tube feedings and TPN/PPN based on assessed needs  - Assess need for intravenous fluids  - Provide specific nutrition/hydration education as appropriate  - Include patient/family/caregiver in decisions related to nutrition  Outcome: Progressing     Problem: Potential for Falls  Goal: Patient will remain free of falls  Description  INTERVENTIONS:  - Assess patient frequently for physical needs  -  Identify cognitive and physical deficits and behaviors that affect risk of falls    -  [place] fall precautions as indicated by assessment   - Educate patient/family on patient safety including physical limitations  - Instruct patient to call for assistance with activity based on assessment  - Modify environment to reduce risk of injury  - Consider OT/PT consult to assist with strengthening/mobility  Outcome: Progressing

## 2019-09-18 NOTE — PROGRESS NOTES
[address] [facility] NOTE   Griffin Samano 70 y o  female MRN: [id number]  Unit/Bed#: [facility] 905-01 Encounter: [id number]  Reason for Consult: GRISELDA on CKD    ASSESSMENT and PLAN:    58-year-old female with ovarian ca s/p neoadjuvent chemo, who on 09/10 underwent laparotomy, cystoscopy, ileostomy reversal, lysis of adhesions, tumor debulking  Patient required 6 units of packed red blood cell, 4 units of FFP, 1 unit of platelet, 5 L of fluids  Postoperatively patient is transferred to the ICU     1) GRISELDA on CKD III - CKD likely secondary to DM and age related nephron loss      - baseline Cr 1 1-1 3 mg/dL   - Cr rising to 3 9 on 9/13 and Nephrology is consulted  -urinalysis-  -CT of the abdomen pelvis with mild hydroureteronephrosis   - repeat CT scan reviewed from 9/15 with patchy b/l ground glass airspace opacity, b/l pl effusion, persistent mild b/l hydro without obstruction noted  - Etiology possible ATN in setting of hypotension and blood loss and hypoperfusion and post obstruction   - Urology on board   - 9/16 - UOP improving to 2 1 L yesterday  Input 3 1 L; Cr improving from 2 7 to 2 mg/dL; Na rising; thoracentesis completed of R lung    - 9/17 - Cr improving to 1 4 mg/dL; vanc random 17 8    - 9/18 - Cr improving to baseline 0 8 mg/dL; K 3 2, repleting  Na improved 139 with hypotonic fluids     Plan:     - hold IVF today  - give 40 meq kcl IV one time now and 20 meq at 3 pm - ordered  - BMP in AM  - b/l hydro being monitored conservatively (final plan per Urology)  - from renal standpoint, clemons can be removed when safe  Review of Urology note, to remove when urine clears - but today remains punch colored  Therefore, defer final removal plan to Urology team    - repeat u/s 2-3 days after removal of clemons  - renal team will follow from [facility]  Please call if further issues/questions shall arise in interim   Thank you     2) ovarian Ca      - s/p surgery      3) acid/base      - bicarb improved     4) electrolytes      - Na improving  - K low 3 2  - replete as above     5) HTN      - BP relatively stable 130-150     6) anemia      - monitoring for now     7) clemons      - there was concern for clemosn catheter malfunction  - clemons appears to be draining  - Urology on board     8) PNA with fevers and pl effusion     - ID on board  - started on broad spectrum antibiotics on 9/15  - blood cultures pending  - IR for drain of pl effusions 9/16 to eval for infectious vs malignancy --> Right thoracentesis with 1 1 L removed  - leukocytosis improving on 9/16      SUBJECTIVE / INTERVAL HISTORY:  Pt denies new complaints  Still weak       OBJECTIVE:  Current Weight: Weight - Scale: 85 6 kg (188 lb 11 2 oz)  Vitals:    09/17/19 1518 09/17/19 2211 09/18/19 0600 09/18/19 0738   BP: 137/73 140/72  138/66   BP Location: Left arm Left arm     Pulse: 72 73  64   Resp: 16 18  20   Temp: 99 2 °F (37 3 °C) 99 2 °F (37 3 °C)  98 5 °F (36 9 °C)   TempSrc: Oral Oral     SpO2: 93% 94%  92%   Weight:   85 6 kg (188 lb 11 2 oz)    Height:           Intake/Output Summary (Last 24 hours) at 9/18/2019 5740  Last data filed at 9/18/2019 0801  Gross per 24 hour   Intake 2190 ml   Output 3285 ml   Net -1095 ml     General: NAD, weak  Skin: no rash  Eyes: anicteric sclera  ENT: dry mucous membrane  Neck: supple  Chest: CTA b/l, no ronchii, no wheeze, no rubs, no rales  CVS: s1s2, no murmur, no gallop, no rub  Abdomen: soft, nontender, nl sounds  Extremities: trace edema LE b/l  : + clemons  Neuro: AAOX3  Psych: normal affect      Medications:    Current Facility-Administered Medications:     acetaminophen (TYLENOL) tablet 650 mg, 650 mg, Oral, Q6H Karla BUCKLEY PA-C, 650 mg at 09/18/19 0535    cefepime (MAXIPIME) 1,000 mg in dextrose 5 % 50 mL IVPB, 1,000 mg, Intravenous, Q24H, Daquan Pineda MD, Last Rate: 100 mL/hr at 09/17/19 1707, 1,000 mg at 09/17/19 1707    diphenhydrAMINE (BENADRYL) injection 25 mg, 25 mg, Intravenous, Q6H PRN, Antonina Zepeda PA-C    enoxaparin (LOVENOX) subcutaneous injection 40 mg, 40 mg, Subcutaneous, Q24H [address], Raul Wagner DO    HYDROmorphone (DILAUDID) injection 1 mg, 1 mg, Intravenous, Q4H PRN, Vandana Yeh MD, 1 mg at 09/17/19 1652    metFORMIN (GLUCOPHAGE) tablet 500 mg, 500 mg, Oral, BID With Meals, Radha Fernandez MD    metroNIDAZOLE (FLAGYL) IVPB (premix) 500 mg, 500 mg, Intravenous, Q8H, Daquan MD Miriam, Stopped at 09/18/19 0653    nystatin (MYCOSTATIN) oral suspension 500,000 Units, 500,000 Units, Swish & Swallow, 4x Daily, Victor M Nielsen DO, 500,000 Units at 09/17/19 0902    ondansetron (ZOFRAN) injection 4 mg, 4 mg, Intravenous, Q6H PRN, Antonina Zepeda PA-C, 4 mg at 09/14/19 0528    oxyCODONE (ROXICODONE) immediate release tablet 10 mg, 10 mg, Oral, Q4H PRN, Vandana Yeh MD, 10 mg at 09/18/19 0535    oxyCODONE (ROXICODONE) IR tablet 5 mg, 5 mg, Oral, Q4H PRN, Vandana Yeh MD, 5 mg at 09/18/19 0241    pantoprazole (PROTONIX) injection 40 mg, 40 mg, Intravenous, Q24H [address], Raul Wagner DO, 40 mg at 09/17/19 0902    potassium chloride 20 mEq IVPB (premix), 20 mEq, Intravenous, Once, Kiko Rushing MD    potassium chloride 40 mEq IVPB (premix), 40 mEq, Intravenous, Once, Kiko Rushing MD    Laboratory Results:  Results from last 7 days   Lab Units 09/18/19  0542 09/17/19  0523 09/16/19  0806 09/15/19  0548 09/14/19  0524 09/13/19  1312 09/13/19  0603  09/12/19  1628   WBC Thousand/uL 11 24* 12 03* 11 69* 15 02* 18 22*  --  20 91*  --  20 47*   HEMOGLOBIN g/dL 8 3* 8 0* 8 0* 8 2* 9 0*  --  9 2*  --  8 2*   HEMATOCRIT % 25 9* 25 2* 24 6* 25 3* 27 4*  --  28 3*  --  25 1*   PLATELETS Thousands/uL 290 252 219 198 179  --  154  --  157   POTASSIUM mmol/L 3 2* 3 4* 3 4* 3 5 3 6 4 2 4 3   < >  --    CHLORIDE mmol/L 106 110* 112* 113* 114* 117* 113*   < >  --    CO2 mmol/L 29 29 27 23 19* 17* 17*   < >  --    BUN mg/dL 21 37* 53* 67* 59* 60* 64*   < >  -- CREATININE mg/dL 0 85 1 38* 2 01* 2 66* 2 54* 3 43* 3 91*   < >  --    CALCIUM mg/dL 7 6* 8 2* 8 0* 8 1* 8 1* 8 3 8 1*   < >  --    MAGNESIUM mg/dL 1 7  --   --   --  2 2  --  2 2  --   --    PHOSPHORUS mg/dL  --   --   --   --  3 7  --  5 6*  --   --     < > = values in this interval not displayed

## 2019-09-18 NOTE — PROGRESS NOTES
Progress Note - Infectious Disease   Catherine Brenner [facility] 70 y o  female MRN: [id number]  Unit/Bed#: [facility] 905-01 Encounter: [id number]      Impression/Plan:    70 y ears old female patient  with stage 4 ovarian cancer; today she is  POD 8 from exploratory laparotomy, extended duration of posterior pelvis with low anterior resection, end colostomy, ileostomy reversal, radical omentectomy and tumor debulking, cystoscopy and flexible sigmoidoscopy  She had fever of 100 5, with cough and sputum production  CT showing patchy bilateral airspace opacities, air bronchograms, and her PCT 0 52  Patient started on empiric treatment for pneumonia  She is day 4 of abx today, clinically improving  She had thoracentesis 09/16/19 that rule out parapneumonic effusion        1- pneumonia:  Cough, dark yellow sputum production, Tmax 100 5, PCT 0 52, CT scan showing bilateral patchy airspace opacities and air bronchograms  Onset of these symptoms was preceded by few days of post op vomiting  Patient likely had an aspiration event  Now afebrile, cough significantly improved  Sputum Cx not available, Bcx negative, MRSA screen negative  Pleural effusion with LDH ratio 0 44, protein ratio <0 3, PH 7 8, normal glucose, culture negative  Not paraopneumonic   - continue cefepime and flagyl;  - if planned for discharge today, can switch to cefdinir 300 mg po q12h and flagyl 500 mgh po q8h, end date of abx tomorrow 09/19/19          2- Ovarian cancer:  POD 8 for SUKUMAR/BSO with ileostomy reversal, and colostomy, tumor debulking, and ureteral catheterization  Pleural effusion tapped , transudate by light criteria     - follow up cytology of the fluid        3- GRISELDA with baseline CKD3:  Creat slowly improving, now creat 0 8 mg/dl  - avoid nephrotoxic medications  - nephrology follow up     4- anemia:  likely secondary to acute blood loss, Hb today 8 3 mg/dl  - repeat cbc and transfuse as needed        Case discussed with surgical oncology team     Antibiotics:  Cefepime and flagyl day 4    Subjective:  Patient has no fever, chills, sweats; no nausea, vomiting, diarrhea; no cough, shortness of breath; no pain  No new symptoms  Objective:  Vitals:  Temp:  [98 5 °F (36 9 °C)-99 2 °F (37 3 °C)] 98 5 °F (36 9 °C)  HR:  [64-77] 64  Resp:  [16-20] 20  BP: (137-167)/(66-84) 138/66  SpO2:  [92 %-94 %] 92 %  Temp (24hrs), Av 9 °F (37 2 °C), Min:98 5 °F (36 9 °C), Max:99 2 °F (37 3 °C)  Current: Temperature: 98 5 °F (36 9 °C)    Physical Exam:   General Appearance:  Alert, interactive, nontoxic, no acute distress  Throat: Oropharynx moist without lesions  Lungs:   Clear to auscultation bilaterally; no wheezes, rhonchi or rales; respirations unlabored   Heart:  RRR; no murmur, rub or gallop   Abdomen:   Soft, non-tender, non-distended, positive bowel sounds  Extremities: No clubbing, cyanosis or edema   Skin: No new rashes or lesions  No draining wounds noted  Labs, Imaging, & Other studies:   All pertinent labs and imaging studies were personally reviewed  Results from last 7 days   Lab Units 19  0542 19  0523 19  0657   WBC Thousand/uL 11 24* 12 03* 11 69*   HEMOGLOBIN g/dL 8 3* 8 0* 8 0*   PLATELETS Thousands/uL 290 252 219     Results from last 7 days   Lab Units 19  0542 19  0523 19  0657   SODIUM mmol/L 139 144 146*   POTASSIUM mmol/L 3 2* 3 4* 3 4*   CHLORIDE mmol/L 106 110* 112*   CO2 mmol/L 29 29 27   BUN mg/dL 21 37* 53*   CREATININE mg/dL 0 85 1 38* 2 01*   EGFR ml/min/1 73sq m 70 39 25   CALCIUM mg/dL 7 6* 8 2* 8 0*     Results from last 7 days   Lab Units 19  1412 19  1213 09/15/19  0606 09/15/19  0112   BLOOD CULTURE   --   --  No Growth at 48 hrs  No Growth at 48 hrs     GRAM STAIN RESULT  1+ Polys  No bacteria seen  --   --   --    BODY FLUID CULTURE, STERILE  No growth  --   --   --    MRSA CULTURE ONLY   --  No Methicillin Resistant Stapylococcus aureus (MRSA) after 24 hours --   --      Results from last 7 days   Lab Units 09/16/19  0620 09/15/19  1008   PROCALCITONIN ng/ml 0 33* 0 52*

## 2019-09-18 NOTE — NURSING NOTE
Patient complaining of worsening abdominal pain radiating into her R flank area  Haskins putting out dark red urine  GYN/ONC paged, awaiting orders

## 2019-09-18 NOTE — CONSULTS
PHYSICAL MEDICINE AND REHABILITATION CONSULT NOTE  Andre Israel 70 y o  female MRN: [id number]  Unit/Bed#: [facility] 905-01 Encounter: [id number]    Requested by (Physician/Service): Blair Verma MD  Reason for Consultation:  Assessment of rehabilitation needs    Assessment:  Rehabilitation Diagnosis: debility    Recommendations:  Rehabilitation Plan:  -Continue PT/OT while on acute care  - Agree with Palliative Care consult to manage post operative and cancer related pain  - The patient reports therapy has been difficulty due to pain  She may require a lower level of rehabilitation at sub-acute unless her pain is controlled enough for her to tolerate a more aggressive program   Will continue to follow  Medical Co-morbidities:  - Stage IV ovarian cancer s/p tumor debulking  - Acute blood loss anemia with multiple transfusions  - GRISELDA on CKD stage 3 (likely due to ATN)  - Aspiration pneumonia on IV anti-biotics  - Bilateral plural effusions  - Diabetes  - Acute post operative pain    Thank you for this consultation  Do not hesitate to contact service with further questions  RIGOBERTO Goodman  PM&R Attending     History of Present Illness:  Andre Israel is a 70 y o  female with a PMH of stage IVB ovarian cancer s/p treatment with carboplatin paclitaxel and Avastin 12/2018  At that time she had developed bowel perforations and underwent an exploratory laparotomy and drainage with diverting ileostomy  She had significant regression of the tumor with chemotherapy treatment and it was recommended that she undergo a surgical debulking of the tumor and reversal of the colostomy  She underwent an exploratory laparotomy, posterior pelvic exenteration with low anterior resection, end colostomy, ileostomy reversal, radical omentectomy, bumor debulking, cystoscopy and flexible sigmoidoscopy    Her hospital course has been complicated by aspiration pneumonia, GRISELDA on CKD stage 3, hematuria, and acute blood loss anemia  She was placed on IV anti-biotics  She also developed plural effusions and required a right thoracentesis in IR  Currently she is on cefepime and flagyl with the plan to transition to oral antibiotics at discharge  PM&R were consulted for rehabilitation recommendations  The patient was seen in her room  She report pain that starts in her abdomen and wraps around her back  Currently she rates it 6/10  She is also fatigued but denies any other complaints  Review of Systems: 10 point ROS negative except for what is noted in HPI    Function:  Prior level of function and living situation:  Independent with no DME  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  Currently her children are taking care of him and will be able to take care of both of them upon d/c  She lives in a bi-level home with stairs to the main floor  Current level of function:  Physical Therapy:     09/17/19 1056   Pain Assessment   Pain Assessment Reid-Baker FACES   Reid-Baker FACES Pain Rating 4   Pain Type Surgical pain   Pain Location Abdomen   Restrictions/Precautions   Weight Bearing Precautions Per Order No   Other Precautions Multiple lines;Cognitive;Pain; Fall Risk   General   Chart Reviewed Yes   Family/Caregiver Present No   Cognition   Overall Cognitive Status WFL   Arousal/Participation Alert; Cooperative   Attention Within functional limits   Memory Within functional limits   Following Commands Follows all commands and directions without difficulty   Bed Mobility   Rolling R 5  Supervision   Supine to Sit 4  Minimal assistance   Additional items Assist x 1   Transfers   Sit to Stand 3  Moderate assistance   Additional items Assist x 1   Stand to Sit 3  Moderate assistance   Additional items Assist x 1   Balance   Static Sitting Fair   Dynamic Sitting Fair -   Static Standing Fair -   Endurance Deficit   Endurance Deficit Yes   Activity Tolerance   Activity Tolerance Patient limited by fatigue;Patient limited by pain Nurse Made Aware nurse approved therapy session   Exercises   Hip Flexion Sitting;Bilateral  (12 reps x 2 sets )   Knee AROM Long Arc Quad Sitting;Bilateral  (12 reps x 2 sets )   Ankle Pumps Sitting;Bilateral  (30 reps x 2 sets )   Assessment   Prognosis Fair   Problem List Decreased strength;Decreased endurance; Impaired balance;Decreased mobility;Pain   Assessment Pt presents to therapy today with increased abdominal pain, limited endurance, decreased B LE strength and fall risk  These impairments limit the patient by requiring assistance for mobility and limiting her participation in therapy  Pt would benefit from continued skilled therapy while in the hospital to improve function and work towards PLOF  Recommend rehab  At end of session patient was left supine with call bell within reach  Pt was unable to stand for more than 5 seconds due to increased pain  Goals   STG Expiration Date 09/25/19   Short Term Goal #1 continue to progres towards goals   Plan   Treatment/Interventions Functional transfer training;LE strengthening/ROM; Therapeutic exercise; Endurance training;Equipment eval/education; Bed mobility;Gait training   Progress Progressing toward goals   PT Frequency Other (Comment)  (3-5xwk)   Recommendation   Recommendation Other (Comment)  (rehab)   PT - OK to Discharge No   Additional Comments need to practice ambualtion       Occupational Therapy:  ADL   Where Assessed Supine, bed   Grooming Assistance 5  Supervision/Setup   Grooming Deficit Setup; Wash/dry hands; Wash/dry face; Teeth care;Brushing hair   UB Bathing Assistance 3  Moderate Assistance   UB Bathing Deficit Setup;Verbal cueing; Increased time to complete   LB Bathing Assistance 3  Moderate Assistance   LB Bathing Deficit Setup;Verbal cueing;Supervision/safety; Increased time to complete   UB Dressing Assistance 3  Moderate Assistance   UB Dressing Deficit Setup;Verbal cueing;Supervision/safety; Increased time to complete; Thread RUE; Thread LUE LB Dressing Assistance 3  Moderate Assistance   LB Dressing Deficit Setup;Verbal cueing; Increased time to complete   Toileting Assistance  3  Moderate Assistance   Toileting Deficit Setup; Increased time to complete   Bed Mobility   Rolling R 4  Minimal assistance   Additional items Assist x 1; Increased time required;Verbal cues   Rolling L 4  Minimal assistance   Additional items Assist x 1; Increased time required;Verbal cues   Cognition   Overall Cognitive Status [facility]   Arousal/Participation Alert; Cooperative   Attention Within functional limits   Orientation Level Oriented X4   Memory Within functional limits   Following Commands Follows one step commands without difficulty   Comments Patient motivated within her pain levels   Activity Tolerance   Activity Tolerance Patient limited by fatigue;Patient limited by pain   Medical Staff Made Aware patient cleared for OT by RN   Assessment   Assessment Patient participated in skilled OT with focus on activity endurance, activity engagement, bed mobility skills, adaptive techniques for dressing and bathing  Patient presents supine in bed agreeable to participate in OT within her pain limitations  Patient identifiedy by name and   Patient reports that her pain elevated "when I sat on the edge of the bed with PT  Patient educated in functional ROM for BUE's for carryover into functional task performance including fisting for both hands  Patient reports that "the swelling has decreased  It is still difficult to make a full fist " Patient requires assist levels as documented due to decreased endurance and increased pain levels with movement  Patient would benefit from [address] with focus on increasing functional strength and endurance, increasing functional safety and independence with transfers and functional mobility skills, increase functional independence with self care/adl skills for carryover into her daily routine      Plan   Treatment Interventions ADL retraining; Endurance training; Activityengagement   Goal Expiration Date 09/25/19   Treatment Day 1   OT Frequency 3-5x/wk       Speech Therapy:      Physical Exam:  /66   Pulse 64   Temp 98 5 °F (36 9 °C)   Resp 20   Ht 5' 4" (1 626 m)   Wt 85 6 kg (188 lb 11 2 oz)   SpO2 92%   BMI 32 39 kg/m²        Intake/Output Summary (Last 24 hours) at 9/18/2019 0944  Last data filed at 9/18/2019 0853  Gross per 24 hour   Intake 2804 17 ml   Output 3285 ml   Net -480 83 ml       Body mass index is 32 39 kg/m²  Gen: No acute distress, fatigued appearing  HEENT: Moist mucus membranes, Normocephalic/Atraumatic  Cardiovascular: Regular rate, rhythm  Heme/Extr: No edema/clubbing/cyanosis  Pulmonary: Non-labored breathing  : clemons  GI: Soft, non-tender, non-distended  Ostomy bag intact, abdominal incision C/D/I with no drainage or erythema  MSK: ROM is WFL in all extremities, No appreciated spasticity/tone  Integumentary: Skin is warm, dry  No rashes or ulcers  Neuro: AAOx3, CN 2-12 intact  Sensation intact to light touch throughout  Speech is intact  Following multi-step commands  Motor: 5/5 bilateral UE, 4/5 bilateral LE  Psych: Normal mood and affect         Social History:    Social History     Socioeconomic History    Marital status: /Civil Union     Spouse name: None    Number of children: None    Years of education: None    Highest education level: None   Occupational History    None   Social Needs    Financial resource strain: None    Food insecurity:     Worry: None     Inability: None    Transportation needs:     Medical: None     Non-medical: None   Tobacco Use    Smoking status: Former Smoker    Smokeless tobacco: Never Used    Tobacco comment: "Quit 40 yrs ago"   Substance and Sexual Activity    Alcohol use: Not Currently     Comment: occassionally    Drug use: No    Sexual activity: Not Currently   Lifestyle    Physical activity:     Days per week: None     Minutes per session: None    Stress: None   Relationships    Social connections:     Talks on phone: None     Gets together: None     Attends Orthodox service: None     Active member of club or organization: None     Attends meetings of clubs or organizations: None     Relationship status: None    Intimate partner violence:     Fear of current or ex partner: None     Emotionally abused: None     Physically abused: None     Forced sexual activity: None   Other Topics Concern    None   Social History Narrative    None        Family History:    Family History   Problem Relation Age of Onset    Cirrhosis Mother     Colon cancer Mother     Leukemia Cousin     Diabetes Father          Medications:     Current Facility-Administered Medications:     acetaminophen (TYLENOL) tablet 650 mg, 650 mg, Oral, Q6H [address], Karla Zepeda PA-C, 650 mg at 09/18/19 0535    cefepime (MAXIPIME) 1,000 mg in dextrose 5 % 50 mL IVPB, 1,000 mg, Intravenous, Q24H, Daquan Pineda MD, Last Rate: 100 mL/hr at 09/17/19 1707, 1,000 mg at 09/17/19 1707    diphenhydrAMINE (BENADRYL) injection 25 mg, 25 mg, Intravenous, Q6H PRN, Shyam Zepeda PA-C    enoxaparin (LOVENOX) subcutaneous injection 40 mg, 40 mg, Subcutaneous, Q24H [address], [place] F Zabo, DO, 40 mg at 09/18/19 0842    HYDROmorphone (DILAUDID) injection 1 mg, 1 mg, Intravenous, Q4H PRN, Kayla Quiles MD, 1 mg at 09/18/19 0842    metFORMIN (GLUCOPHAGE) tablet 500 mg, 500 mg, Oral, BID With Meals, Brennan Araujo MD, Stopped at 09/18/19 0842    metroNIDAZOLE (FLAGYL) IVPB (premix) 500 mg, 500 mg, Intravenous, Q8H, Daquan Pineda MD, Stopped at 09/18/19 0653    nystatin (MYCOSTATIN) oral suspension 500,000 Units, 500,000 Units, Swish & Swallow, 4x Daily, [place], DO, 500,000 Units at 09/17/19 0902    ondansetron (ZOFRAN) injection 4 mg, 4 mg, Intravenous, Q6H PRN, Shyam Zepeda PA-C, 4 mg at 09/14/19 0528    oxyCODONE (ROXICODONE) immediate release tablet 10 mg, 10 mg, Oral, Q4H PRN, Magalys Worthington MD, 10 mg at 09/18/19 0535    oxyCODONE (ROXICODONE) IR tablet 5 mg, 5 mg, Oral, Q4H PRN, Magalys Worthington MD, 5 mg at 09/18/19 0241    pantoprazole (PROTONIX) injection 40 mg, 40 mg, Intravenous, Q24H [address], [place] F Zabo, DO, 40 mg at 09/18/19 0125    potassium chloride 20 mEq IVPB (premix), 20 mEq, Intravenous, Once, Noni Lovell MD    potassium chloride 40 mEq IVPB (premix), 40 mEq, Intravenous, Once, Noni Lovell MD    Past Medical History:     Past Medical History:   Diagnosis Date    Abdominal fluid collection     Anemia     Asthma     Cancer ([facility] [address] )     ovaries    Diabetes mellitus ([facility] [address] )     History of transfusion     PONV (postoperative nausea and vomiting)         Past Surgical History:     Past Surgical History:   Procedure Laterality Date    ABDOMINAL SURGERY      CT GUIDED PARACENTESIS  11/6/2018    CT GUIDED PERC DRAINAGE CATHETER PLACEMENT  12/24/2018    CT NEEDLE BIOPSY PERITONEUM  11/6/2018    CYSTOSCOPY N/A 9/10/2019    Procedure: CYSTOSCOPY , INSERTION URETERAL CATHETERS;  Surgeon: Blair Verma MD;  [facility];  Service: Gynecology Oncology    ECTOPIC PREGNANCY SURGERY      ECTOPIC PREGNANCY SURGERY      EXENTERATION PELVIS N/A 9/10/2019    Procedure: EXPLORATORY LAPAROTOMY, EXENTERATION POSTERIOR PELVIS,  END COLOSTOMY, ILEOSTOMY REVERSAL, LYSIS OF ADHESIONS, rADICAL OMENTECTOMY AND TUMOR DEBULKINGFLEXIBLE SIGMOIDOSCOPY, CYSTOGRAM, INSPECTION OF URETERS;  Surgeon: Blair Verma MD;  [facility];  Service: Gynecology Oncology    IR PARACENTESIS  9/18/2018    IR PARACENTESIS  10/18/2018    IR PARACENTESIS  12/10/2018    IR PORT PLACEMENT  11/29/2018    IR THORACENTESIS  9/16/2019    LAPAROTOMY N/A 12/14/2018    Procedure: LAPAROTOMY EXPLORATORY; Abdominal Washout;  Application of Abthera Vac Dressing;  Surgeon: Tyree Espinoza MD;  [facility];  Service: Gynecology Oncology    LAPAROTOMY N/A 12/15/2018    Procedure: LAPAROTOMY EXPLORATORY, ABDOMINAL WASHOUT, DRAIN PLACEMENT x 4, DIVERTING LOOP ILEOSTOMY AND ABDOMINAL CLOSURE,  ALL OTHER INDICATED PROCEDURES;  Surgeon: Luis Campos MD;  [facility];  Service: Gynecology Oncology    SD COLONOSCOPY FLX DX W/COLLJ [facility] WHEN PFRMD N/A 9/25/2018    Procedure: EGD AND COLONOSCOPY;  Surgeon: Josey Sebastian MD;  Location: [facility]; Service: Gastroenterology    TONSILECTOMY AND ADNOIDECTOMY      TONSILLECTOMY           Allergies:     No Known Allergies        LABORATORY RESULTS:      Lab Results   Component Value Date    HGB 8 3 (L) 09/18/2019    HCT 25 9 (L) 09/18/2019    WBC 11 24 (H) 09/18/2019     Lab Results   Component Value Date    BUN 21 09/18/2019    K 3 2 (L) 09/18/2019     09/18/2019    GLUCOSE 228 (H) 09/10/2019    CREATININE 0 85 09/18/2019     Lab Results   Component Value Date    PROTIME 14 7 (H) 09/12/2019    INR 1 19 09/12/2019        DIAGNOSTIC STUDIES: Reviewed  Xr Chest Pa & Lateral    Result Date: 9/4/2019  Impression: Nonresolving patchy density left midlung corresponding abnormality noted on the previous study from June 14, 2019  This remains indeterminate  CT chest can be considered for further evaluation The study was marked in EPIC for significant notification   Workstation performed: [id number]

## 2019-09-18 NOTE — PROGRESS NOTES
GYN-ONC Progress Note  Jo Shin  [id number]  [facility] 905/[facility] 905-01  9/18/2019  4:22 AM    ASSESS: 70year old with stage 4B ovarian cancer, now POD #8 from exploratory laparotomy, posterior pelvic exenteration with low anterior resection, end colostomy, ileostomy reversal, radical omentectomy and tumor debulking, cystoscopy and flexible sigmoidoscopy    PLAN:    1  Acute on chronic kidney disease:  Creatinine back to baseline, urine output remains adequate, nephrology following  2  Acute blood loss anemia:  trend hemoglobin  3  Bilateral pleural effusions:  Status post thoracentesis, pneumonia suspected, continue cefepime and Flagyl, for id recommend transition to po regimen at time of discharge  4  Postoperative care, deconditioning:  PT/OT noted arising short-term rehab following discharge, case management working on placement      Dispo: inpatient     PPx: SCDs, Heparin transitioned to lovenox  FEN: regular diet, replete lytes prn, 1/2 NS  Pain mgmnt: tylenol  Oxy, dilaudid bkt  Invasive lines: clemons, RLQ drain  Code status: full   ____________________    SUBJECTIVE  History of Present Illness:  Overnight:  Reports intermittent back pain over night, otherwise doing well    States she was able to do activities with therapist yesterday  Pain:  As above tolerating oral pain medication  Tolerating Oral Intake:  ate yogurt and soup yesterday  Voiding:  Adequate output and Clemons catheter  Flatus:  Gas output ostomy  Bowel Movement: no  Ambulating: yes  Vaginal Bleeding: no  Pelvic Pain: no  Chest Pain: no  Shortness of Breath: no  Leg Pain/Discomfort: no    OBJECTIVE  Vitals  Temp:  [97 6 °F (36 4 °C)-99 2 °F (37 3 °C)] 99 2 °F (37 3 °C)  HR:  [72-77] 73  Resp:  [16-18] 18  BP: (137-167)/(67-84) 140/72       Intake/Output Summary (Last 24 hours) at 9/18/2019 0422  Last data filed at 9/18/2019 0003  Gross per 24 hour   Intake 2072 4 ml   Output 2740 ml   Net -667 6 ml       Physical Exam:   Physical Exam Constitutional: She is oriented to person, place, and time  She appears well-developed and well-nourished  No distress  Cardiovascular: Normal rate, regular rhythm, normal heart sounds and intact distal pulses  Pulmonary/Chest: Effort normal and breath sounds normal  No stridor  No respiratory distress  Abdominal: Soft  Bowel sounds are normal  She exhibits no distension  There is no tenderness  RLQ drain serosang  Incision clean dry and intact     Neurological: She is alert and oriented to person, place, and time  Skin: Skin is warm and dry  She is not diaphoretic  Psychiatric: She has a normal mood and affect   Her behavior is normal          Labs:   Recent Results (from the past 72 hour(s))   Fingerstick Glucose (POCT)    Collection Time: 09/15/19  5:26 AM   Result Value Ref Range    POC Glucose 114 65 - 140 mg/dl   UA w Reflex to Microscopic w Reflex to Culture    Collection Time: 09/15/19  5:44 AM   Result Value Ref Range    Color, UA Bloody     Clarity, UA Turbid     Specific [place], UA 1 014 1 003 - 1 030    pH, UA Interference-unable to analyze (A) 4 5, 5 0, 5 5, 6 0, 6 5, 7 0, 7 5, 8 0    Leukocytes, UA Interference- unable to analyze (A) Negative    Nitrite, UA Interference- unable to analyze Negative    Protein, UA Interference- unable to analyze (A) Negative mg/dl    Glucose, UA Interference- unable to analyze Negative mg/dl    Ketones, UA Interference- unable to analyze (A) Negative mg/dl    Urobilinogen, UA Interference-unable to analyze 0 2, 1 0 E U /dl E U /dl    Bilirubin, UA Interference- unable to analyze (A) Negative    Blood, UA Interference- unable to analyze (A) Negative   Urine Microscopic    Collection Time: 09/15/19  5:44 AM   Result Value Ref Range    RBC, UA Innumerable (A) None Seen, 0-5 /hpf    WBC, UA 4-10 (A) None Seen, 0-5, 5-55, 5-65 /hpf    Epithelial Cells None Seen None Seen, Occasional /hpf    Bacteria, UA None Seen None Seen, Occasional /hpf   CBC and differential Collection Time: 09/15/19  5:48 AM   Result Value Ref Range    WBC 15 02 (H) 4 31 - 10 16 Thousand/uL    RBC 2 75 (L) 3 81 - 5 12 Million/uL    Hemoglobin 8 2 (L) 11 5 - 15 4 g/dL    Hematocrit 25 3 (L) 34 8 - 46 1 %    MCV 92 82 - 98 fL    MCH 29 8 26 8 - 34 3 pg    MCHC 32 4 31 4 - 37 4 g/dL    RDW 15 9 (H) 11 6 - 15 1 %    MPV 11 8 8 9 - 12 7 fL    Platelets 511 813 - 376 Thousands/uL    nRBC 0 /100 WBCs    Neutrophils Relative 80 (H) 43 - 75 %    Immat GRANS % 1 0 - 2 %    Lymphocytes Relative 10 (L) 14 - 44 %    Monocytes Relative 9 4 - 12 %    Eosinophils Relative 0 0 - 6 %    Basophils Relative 0 0 - 1 %    Neutrophils Absolute 11 92 (H) 1 85 - 7 62 Thousands/µL    Immature Grans Absolute 0 20 0 00 - 0 20 Thousand/uL    Lymphocytes Absolute 1 48 0 60 - 4 47 Thousands/µL    Monocytes Absolute 1 39 (H) 0 17 - 1 22 Thousand/µL    Eosinophils Absolute 0 02 0 00 - 0 61 Thousand/µL    Basophils Absolute 0 01 0 00 - 0 10 Thousands/µL   Basic metabolic panel    Collection Time: 09/15/19  5:48 AM   Result Value Ref Range    Sodium 144 136 - 145 mmol/L    Potassium 3 5 3 5 - 5 3 mmol/L    Chloride 113 (H) 100 - 108 mmol/L    CO2 23 21 - 32 mmol/L    ANION GAP 8 4 - 13 mmol/L    BUN 67 (H) 5 - 25 mg/dL    Creatinine 2 66 (H) 0 60 - 1 30 mg/dL    Glucose 109 65 - 140 mg/dL    Calcium 8 1 (L) 8 3 - 10 1 mg/dL    eGFR 18 ml/min/1 73sq m   Blood culture    Collection Time: 09/15/19  6:05 AM   Result Value Ref Range    Blood Culture No Growth at 48 hrs  Blood culture    Collection Time: 09/15/19  6:06 AM   Result Value Ref Range    Blood Culture No Growth at 48 hrs      Procalcitonin    Collection Time: 09/15/19 10:08 AM   Result Value Ref Range    Procalcitonin 0 52 (H) <=0 25 ng/ml   Fingerstick Glucose (POCT)    Collection Time: 09/15/19 11:55 AM   Result Value Ref Range    POC Glucose 113 65 - 140 mg/dl   Fingerstick Glucose (POCT)    Collection Time: 09/15/19  5:50 PM   Result Value Ref Range    POC Glucose 168 (H) 65 - 140 mg/dl   Fingerstick Glucose (POCT)    Collection Time: 09/15/19 11:28 PM   Result Value Ref Range    POC Glucose 113 65 - 140 mg/dl   Fingerstick Glucose (POCT)    Collection Time: 09/16/19  6:02 AM   Result Value Ref Range    POC Glucose 109 65 - 140 mg/dl   Procalcitonin    Collection Time: 09/16/19  6:20 AM   Result Value Ref Range    Procalcitonin 0 33 (H) <=0 25 ng/ml   CBC and differential    Collection Time: 09/16/19  6:57 AM   Result Value Ref Range    WBC 11 69 (H) 4 31 - 10 16 Thousand/uL    RBC 2 68 (L) 3 81 - 5 12 Million/uL    Hemoglobin 8 0 (L) 11 5 - 15 4 g/dL    Hematocrit 24 6 (L) 34 8 - 46 1 %    MCV 92 82 - 98 fL    MCH 29 9 26 8 - 34 3 pg    MCHC 32 5 31 4 - 37 4 g/dL    RDW 15 5 (H) 11 6 - 15 1 %    MPV 11 5 8 9 - 12 7 fL    Platelets 735 016 - 477 Thousands/uL    nRBC 0 /100 WBCs    Neutrophils Relative 72 43 - 75 %    Immat GRANS % 2 0 - 2 %    Lymphocytes Relative 13 (L) 14 - 44 %    Monocytes Relative 10 4 - 12 %    Eosinophils Relative 3 0 - 6 %    Basophils Relative 0 0 - 1 %    Neutrophils Absolute 8 45 (H) 1 85 - 7 62 Thousands/µL    Immature Grans Absolute 0 24 (H) 0 00 - 0 20 Thousand/uL    Lymphocytes Absolute 1 49 0 60 - 4 47 Thousands/µL    Monocytes Absolute 1 19 0 17 - 1 22 Thousand/µL    Eosinophils Absolute 0 31 0 00 - 0 61 Thousand/µL    Basophils Absolute 0 01 0 00 - 0 10 Thousands/µL   Basic metabolic panel    Collection Time: 09/16/19  6:57 AM   Result Value Ref Range    Sodium 146 (H) 136 - 145 mmol/L    Potassium 3 4 (L) 3 5 - 5 3 mmol/L    Chloride 112 (H) 100 - 108 mmol/L    CO2 27 21 - 32 mmol/L    ANION GAP 7 4 - 13 mmol/L    BUN 53 (H) 5 - 25 mg/dL    Creatinine 2 01 (H) 0 60 - 1 30 mg/dL    Glucose 107 65 - 140 mg/dL    Calcium 8 0 (L) 8 3 - 10 1 mg/dL    eGFR 25 ml/min/1 73sq m   Fingerstick Glucose (POCT)    Collection Time: 09/16/19 11:51 AM   Result Value Ref Range    POC Glucose 104 65 - 140 mg/dl   MRSA culture    Collection Time: 09/16/19 12:13 PM Result Value Ref Range    MRSA Culture Only       No Methicillin Resistant Stapylococcus aureus (MRSA) after 24 hours   Body fluid white cell count with differential    Collection Time: 09/16/19  2:11 PM   Result Value Ref Range    Site      WBC, Fluid 330 /ul   Body Fluid Diff    Collection Time: 09/16/19  2:11 PM   Result Value Ref Range    Total Counted 100     Neutrophils % (Fluid) 60 %    Lymphs % (Fluid) 14 %    Mesothelial % (Fluid) 10 %    Histiocyte % (Fluid) 10 %    Monocytes % (Fluid) 6 %   pH, body fluid    Collection Time: 09/16/19  2:12 PM   Result Value Ref Range    PH BODY FLUID 7 8    Glucose, body fluid    Collection Time: 09/16/19  2:12 PM   Result Value Ref Range    Glucose, Fluid 110 mg/dL   LD (LDH), Body Fluid    Collection Time: 09/16/19  2:12 PM   Result Value Ref Range    LD, Fluid 118 U/L   Body fluid culture and Gram stain    Collection Time: 09/16/19  2:12 PM   Result Value Ref Range    Body Fluid Culture, Sterile No growth     Gram Stain Result 1+ Polys     Gram Stain Result No bacteria seen    Total Protein, Fluid    Collection Time: 09/16/19  2:12 PM   Result Value Ref Range    Protein, Fluid <2 0 g/dL   Vancomycin, random    Collection Time: 09/16/19  4:03 PM   Result Value Ref Range    Vancomycin Rm 9 5 ug/mL   LD,Blood    Collection Time: 09/16/19  4:03 PM   Result Value Ref Range     (H) 81 - 234 U/L   Protein, total    Collection Time: 09/16/19  4:03 PM   Result Value Ref Range    Total Protein 5 3 (L) 6 4 - 8 2 g/dL   Fingerstick Glucose (POCT)    Collection Time: 09/16/19  5:55 PM   Result Value Ref Range    POC Glucose 106 65 - 140 mg/dl   Fingerstick Glucose (POCT)    Collection Time: 09/17/19 12:44 AM   Result Value Ref Range    POC Glucose 106 65 - 140 mg/dl   Basic metabolic panel    Collection Time: 09/17/19  5:23 AM   Result Value Ref Range    Sodium 144 136 - 145 mmol/L    Potassium 3 4 (L) 3 5 - 5 3 mmol/L    Chloride 110 (H) 100 - 108 mmol/L    CO2 29 21 - 32 mmol/L    ANION GAP 5 4 - 13 mmol/L    BUN 37 (H) 5 - 25 mg/dL    Creatinine 1 38 (H) 0 60 - 1 30 mg/dL    Glucose 107 65 - 140 mg/dL    Calcium 8 2 (L) 8 3 - 10 1 mg/dL    eGFR 39 ml/min/1 73sq m   CBC    Collection Time: 09/17/19  5:23 AM   Result Value Ref Range    WBC 12 03 (H) 4 31 - 10 16 Thousand/uL    RBC 2 72 (L) 3 81 - 5 12 Million/uL    Hemoglobin 8 0 (L) 11 5 - 15 4 g/dL    Hematocrit 25 2 (L) 34 8 - 46 1 %    MCV 93 82 - 98 fL    MCH 29 4 26 8 - 34 3 pg    MCHC 31 7 31 4 - 37 4 g/dL    RDW 15 2 (H) 11 6 - 15 1 %    Platelets 158 534 - 454 Thousands/uL    MPV 11 9 8 9 - 12 7 fL   Vancomycin, random    Collection Time: 09/17/19  5:23 AM   Result Value Ref Range    Vancomycin Rm 17 8 ug/mL   Vancomycin, trough    Collection Time: 09/17/19  7:10 AM   Result Value Ref Range    Vancomycin Tr 15 2 10 0 - 20 0 ug/mL   Fingerstick Glucose (POCT)    Collection Time: 09/17/19  7:28 AM   Result Value Ref Range    POC Glucose 134 65 - 140 mg/dl   Fingerstick Glucose (POCT)    Collection Time: 09/17/19 12:02 PM   Result Value Ref Range    POC Glucose 136 65 - 140 mg/dl   Fingerstick Glucose (POCT)    Collection Time: 09/17/19  6:33 PM   Result Value Ref Range    POC Glucose 142 (H) 65 - 140 mg/dl       Meds:  Scheduled Meds:  Current Facility-Administered Medications:  acetaminophen 650 mg Oral Q6H [address] Karlashweta Zepeda PA-C    cefepime 1,000 mg Intravenous Q24H Daquan Pineda MD Last Rate: 1,000 mg (09/17/19 1707)   diphenhydrAMINE 25 mg Intravenous Q6H PRN Glenodessa Zepeda PA-C    heparin (porcine) 5,000 Units Subcutaneous Q8H [address] Karlabria Zepeda PA-C    HYDROmorphone 1 mg Intravenous Q4H PRN Anais Rodriguez MD    metFORMIN 500 mg Oral BID With Meals Alicia Martin MD    metroNIDAZOLE 500 mg Intravenous Q8H Daquan Zeineddine, MD Last Rate: Stopped (09/17/19 9413)   nystatin 500,000 Units Swish & Swallow 4x Daily Ann Wagner DO    ondansetron 4 mg Intravenous Q6H PRN Sumeet Collins, PA-C oxyCODONE 10 mg Oral Q4H PRN Lidia Walker MD    oxyCODONE 5 mg Oral Q4H PRN Lidia Walker MD    pantoprazole 40 mg Intravenous Q24H [facility] Ann Wagner DO    sodium chloride 50 mL/hr Intravenous Continuous Igor Gudino MD Last Rate: 50 mL/hr (09/17/19 0901)     Continuous Infusions:  sodium chloride 50 mL/hr Last Rate: 50 mL/hr (09/17/19 0901)     PRN Meds: diphenhydrAMINE    HYDROmorphone    ondansetron    oxyCODONE    oxyCODONE    Imaging Studies: I have personally reviewed pertinent reports  EKG, Pathology, Microbiology, and Other Studies: I have personally reviewed pertinent reports        __________________    Signature / Title: Reny Savage DO, Ob/Gyn, PGY-3  Date: 9/18/2019  Time: 4:22 AM

## 2019-09-18 NOTE — CONSULTS
Consultation - Palliative Care   Martha Gunter 70 y o  female MRN: [id number]  Unit/Bed#: [facility] 905-01 Encounter: [id number]      Assessment/Plan     Assessment:  Patient Active Problem List   Diagnosis    Other ascites    Lesion of liver    Edema leg    Ovar[facility] cancer [facility] )    Abdominal pain    Intractable nausea and vomiting    Bowel perforation ([facility]    [facility]    Peritonitis ([facility]    S/P ileostomy ([facility]    Acute blood loss anemia    Leukocytosis    Hypokalemia    Stoma dermatitis    Dehydration    Deep venous thrombosis of right profunda femoris vein ([facility]    Neuropathy    Encounter for central line care    Anemia    SBO (small bowel obstruction) (HCC)    Malignant neoplasm of ovary (HCC)    Acute kidney injury ([facility]    Metabolic acidosis       Plan:  1  D/C oxycodone due to worsening nausea  2  Start ATC PO Dilaudid 4 mg every 4 hours with strict hold parameters  3  Maintain PRN IV Dilaudid at this time  4  Continue ATC tylenol for now  5  Could consider additional adjuncts but patient remains motivated to use least amount of medications as possible, consideration for gabapentin HS dosing  6  Start Zofran ODT prior to meals   7  Will defer bowel regimen to primary team given extensive surgery  8  Goals- disease directed cares  Will ask LSW to follow up for support as she is overwhelmed with her medical condition at present and would benefit from assistance with her mental health  History of Present Illness   Physician Requesting Consult: Susan Weaver MD  Reason for Consult / Principal Problem: pain/nausea  Hx and PE limited by: NA  HPI: Martha Gunter is a 70y o  year old female who is currently POD#8 from exploratory laparotomy, posterior pelvic exenteration with low anterior resection, end colostomy, ileostomy reversal, radical omentectomy and tumor debulking, cystoscopy and flexible sigmoidoscopy for Stage 4B ovarian cancer   Post-operatively she was followed by APS for epidural management  The epidural has since been discontinued and she notes ongoing pain  She states the pain is mostly in her abdomen but begins to move to her back  She feels that she cannot stay "on top" of her pain and is "playing catch up"  She notes nausea with the pain medication- currently receiving oxycodone and used 55 mg in last 24 hours in addition to 2 mg of IV Dilaudid  She does not feel as nauseated with the dilaudid but notes it does not last long for her  She feels overwhelmed by her current situation and is hopeful that she will start to feel better soon  She does not like to use medication and is already very motivated to come off of opioid therapies as she continues to heal  No other complaints at this time  Inpatient consult to Palliative Care  Consult performed by: tSephon Magaña DO  Consult ordered by: DO Danielle          Review of Systems   Gastrointestinal: Positive for abdominal pain and nausea  Negative for vomiting  Musculoskeletal: Positive for back pain  All other systems reviewed and are negative        Historical Information   Past Medical History:   Diagnosis Date    Abdominal fluid collection     Anemia     Asthma     Cancer ([facility]     ovaries    Diabetes mellitus ([facility]     History of transfusion     PONV (postoperative nausea and vomiting)      Past Surgical History:   Procedure Laterality Date    ABDOMINAL SURGERY      CT GUIDED PARACENTESIS  11/6/2018    CT GUIDED PERC DRAINAGE CATHETER PLACEMENT  12/24/2018    CT NEEDLE BIOPSY PERITONEUM  11/6/2018    CYSTOSCOPY N/A 9/10/2019    Procedure: CYSTOSCOPY , INSERTION URETERAL CATHETERS;  Surgeon: Sunni Sidhu MD;  [facility];  Service: Gynecology Oncology    ECTOPIC PREGNANCY SURGERY      ECTOPIC PREGNANCY SURGERY      EXENTERATION PELVIS N/A 9/10/2019    Procedure: EXPLORATORY LAPAROTOMY, EXENTERATION POSTERIOR PELVIS,  END COLOSTOMY, ILEOSTOMY REVERSAL, LYSIS OF ADHESIONS, rADICAL OMENTECTOMY AND TUMOR DEBULKINGFLEXIBLE SIGMOIDOSCOPY, CYSTOGRAM, INSPECTION OF URETERS;  Surgeon: Sunni Sidhu MD;  [facility];  Service: Gynecology Oncology    IR PARACENTESIS  9/18/2018    IR PARACENTESIS  10/18/2018    IR PARACENTESIS  12/10/2018    IR PORT PLACEMENT  11/29/2018    IR THORACENTESIS  9/16/2019    LAPAROTOMY N/A 12/14/2018    Procedure: LAPAROTOMY EXPLORATORY; Abdominal Washout; Application of Abthera Vac Dressing;  Surgeon: Garima Glass MD;  [facility];  Service: Gynecology Oncology    LAPAROTOMY N/A 12/15/2018    Procedure: LAPAROTOMY EXPLORATORY, ABDOMINAL WASHOUT, DRAIN PLACEMENT x 4, DIVERTING LOOP ILEOSTOMY AND ABDOMINAL CLOSURE,  ALL OTHER INDICATED PROCEDURES;  Surgeon: Garima Glass MD;  [facility];  Service: Gynecology Oncology    VT COLONOSCOPY FLX DX W/[facility] WHEN PFRMD N/A 9/25/2018    Procedure: EGD AND COLONOSCOPY;  Surgeon: Chanda Santacruz MD;  Location: [facility];   Service: Gastroenterology    TONSILECTOMY AND ADNOIDECTOMY      TONSILLECTOMY       Social History     Socioeconomic History    Marital status: /Civil Union     Spouse name: None    Number of children: None    Years of education: None    Highest education level: None   Occupational History    None   Social Needs    Financial resource strain: None    Food insecurity:     Worry: None     Inability: None    Transportation needs:     Medical: None     Non-medical: None   Tobacco Use    Smoking status: Former Smoker    Smokeless tobacco: Never Used    Tobacco comment: "Quit 40 yrs ago"   Substance and Sexual Activity    Alcohol use: Not Currently     Comment: occassionally    Drug use: No    Sexual activity: Not Currently   Lifestyle    Physical activity:     Days per week: None     Minutes per session: None    Stress: None   Relationships    Social connections:     Talks on phone: None     Gets together: None     Attends Mandaeism service: None     Active member of club or organization: None     Attends meetings of clubs or organizations: None     Relationship status: None    Intimate partner violence:     Fear of current or ex partner: None     Emotionally abused: None     Physically abused: None     Forced sexual activity: None   Other Topics Concern    None   Social History Narrative    None     Family History   Problem Relation Age of Onset    Cirrhosis Mother     Colon cancer Mother     Leukemia Cousin     Diabetes Father        Meds/Allergies   all current active meds have been reviewed and current meds:   Current Facility-Administered Medications   Medication Dose Route Frequency    acetaminophen (TYLENOL) tablet 650 mg  650 mg Oral Q6H [facility]    cefepime (MAXIPIME) 2,000 mg in dextrose 5 % 50 mL IVPB  2,000 mg Intravenous Q12H    diphenhydrAMINE (BENADRYL) injection 25 mg  25 mg Intravenous Q6H PRN    enoxaparin (LOVENOX) subcutaneous injection 40 mg  40 mg Subcutaneous Q24H MARCIO    HYDROmorphone (DILAUDID) injection 1 mg  1 mg Intravenous Q4H PRN    HYDROmorphone (DILAUDID) tablet 4 mg  4 mg Oral Q4H While Awake    metFORMIN (GLUCOPHAGE) tablet 500 mg  500 mg Oral BID With Meals    metroNIDAZOLE (FLAGYL) IVPB (premix) 500 mg  500 mg Intravenous Q8H    nystatin (MYCOSTATIN) oral suspension 500,000 Units  500,000 Units Swish & Swallow 4x Daily    ondansetron (ZOFRAN) injection 4 mg  4 mg Intravenous Q6H PRN    ondansetron (ZOFRAN-ODT) dispersible tablet 4 mg  4 mg Oral TID AC    pantoprazole (PROTONIX) injection 40 mg  40 mg Intravenous Q24H MARCIO    potassium chloride 20 mEq IVPB (premix)  20 mEq Intravenous Once    potassium chloride 40 mEq IVPB (premix)  40 mEq Intravenous Once       Palliative Care Medications: as above    No Known Allergies    Objective     Physical Exam   Constitutional: She is oriented to person, place, and time  She appears well-nourished  No distress     Chronically ill appearing   HENT:   Head: Normocephalic and atraumatic  Right Ear: External ear normal    Left Ear: External ear normal    Nose: Nose normal    Mouth/Throat: Oropharynx is clear and moist    Eyes: EOM are normal  Right eye exhibits no discharge  Left eye exhibits no discharge  No scleral icterus  Neck: Neck supple  No JVD present  No tracheal deviation present  Cardiovascular: Normal rate, regular rhythm and intact distal pulses  Pulmonary/Chest: Effort normal and breath sounds normal  No respiratory distress  She has no wheezes  She has no rales  Abdominal: Bowel sounds are normal  She exhibits no distension and no mass  There is tenderness  Ostomy pink, incision c/d/i   Musculoskeletal: She exhibits no edema, tenderness or deformity  Neurological: She is alert and oriented to person, place, and time  No cranial nerve deficit  Coordination normal    Skin: Skin is warm and dry  She is not diaphoretic  Psychiatric: She has a normal mood and affect  Her behavior is normal  Judgment and thought content normal    Nursing note and vitals reviewed  Lab Results:   I have personally reviewed pertinent labs  , CBC:   Lab Results   Component Value Date    WBC 11 24 (H) 09/18/2019    HGB 8 3 (L) 09/18/2019    HCT 25 9 (L) 09/18/2019    MCV 93 09/18/2019     09/18/2019    MCH 29 7 09/18/2019    MCHC 32 0 09/18/2019    RDW 14 8 09/18/2019    MPV 12 2 09/18/2019    NRBC 0 09/18/2019   , CMP:   Lab Results   Component Value Date    SODIUM 139 09/18/2019    K 3 2 (L) 09/18/2019     09/18/2019    CO2 29 09/18/2019    BUN 21 09/18/2019    CREATININE 0 85 09/18/2019    CALCIUM 7 6 (L) 09/18/2019    EGFR 70 09/18/2019     Imaging Studies: I have personally reviewed pertinent reports  EKG, Pathology, and Other Studies: I have personally reviewed pertinent reports        Code Status: Level 1 - Full Code  Advance Directive and Living Will:      Power of :    POLST:      Counseling / Coordination of Care  Total floor / unit time spent today 75+ minutes  Greater than 50% of total time was spent with the patient and / or family counseling and / or coordination of care  A description of the counseling / coordination of care: chart review, symptom assessment, medication review and changes, supportive listening

## 2019-09-19 LAB
ANION GAP SERPL CALCULATED.3IONS-SCNC: 5 MMOL/L (ref 4–13)
BACTERIA SPEC BFLD CULT: NO GROWTH
BASOPHILS # BLD AUTO: 0.03 THOUSANDS/ΜL (ref 0–0.1)
BASOPHILS NFR BLD AUTO: 0 % (ref 0–1)
BUN SERPL-MCNC: 14 MG/DL (ref 5–25)
CALCIUM SERPL-MCNC: 7.9 MG/DL (ref 8.3–10.1)
CHLORIDE SERPL-SCNC: 104 MMOL/L (ref 100–108)
CO2 SERPL-SCNC: 29 MMOL/L (ref 21–32)
CREAT SERPL-MCNC: 0.85 MG/DL (ref 0.6–1.3)
EOSINOPHIL # BLD AUTO: 0.57 THOUSAND/ΜL (ref 0–0.61)
EOSINOPHIL NFR BLD AUTO: 5 % (ref 0–6)
ERYTHROCYTE [DISTWIDTH] IN BLOOD BY AUTOMATED COUNT: 14.5 % (ref 11.6–15.1)
GFR SERPL CREATININE-BSD FRML MDRD: 70 ML/MIN/1.73SQ M
GLUCOSE SERPL-MCNC: 112 MG/DL (ref 65–140)
GRAM STN SPEC: NORMAL
GRAM STN SPEC: NORMAL
HCT VFR BLD AUTO: 26.8 % (ref 34.8–46.1)
HGB BLD-MCNC: 8.5 G/DL (ref 11.5–15.4)
IMM GRANULOCYTES # BLD AUTO: 0.09 THOUSAND/UL (ref 0–0.2)
IMM GRANULOCYTES NFR BLD AUTO: 1 % (ref 0–2)
LYMPHOCYTES # BLD AUTO: 1.61 THOUSANDS/ΜL (ref 0.6–4.47)
LYMPHOCYTES NFR BLD AUTO: 13 % (ref 14–44)
MCH RBC QN AUTO: 29.4 PG (ref 26.8–34.3)
MCHC RBC AUTO-ENTMCNC: 31.7 G/DL (ref 31.4–37.4)
MCV RBC AUTO: 93 FL (ref 82–98)
MONOCYTES # BLD AUTO: 0.75 THOUSAND/ΜL (ref 0.17–1.22)
MONOCYTES NFR BLD AUTO: 6 % (ref 4–12)
NEUTROPHILS # BLD AUTO: 9.05 THOUSANDS/ΜL (ref 1.85–7.62)
NEUTS SEG NFR BLD AUTO: 75 % (ref 43–75)
NRBC BLD AUTO-RTO: 0 /100 WBCS
PLATELET # BLD AUTO: 303 THOUSANDS/UL (ref 149–390)
PMV BLD AUTO: 11.3 FL (ref 8.9–12.7)
POTASSIUM SERPL-SCNC: 3.4 MMOL/L (ref 3.5–5.3)
RBC # BLD AUTO: 2.89 MILLION/UL (ref 3.81–5.12)
SODIUM SERPL-SCNC: 138 MMOL/L (ref 136–145)
WBC # BLD AUTO: 12.1 THOUSAND/UL (ref 4.31–10.16)

## 2019-09-19 PROCEDURE — 99024 POSTOP FOLLOW-UP VISIT: CPT | Performed by: OBSTETRICS & GYNECOLOGY

## 2019-09-19 PROCEDURE — 99232 SBSQ HOSP IP/OBS MODERATE 35: CPT | Performed by: INTERNAL MEDICINE

## 2019-09-19 PROCEDURE — 85025 COMPLETE CBC W/AUTO DIFF WBC: CPT | Performed by: OBSTETRICS & GYNECOLOGY

## 2019-09-19 PROCEDURE — C9113 INJ PANTOPRAZOLE SODIUM, VIA: HCPCS | Performed by: OBSTETRICS & GYNECOLOGY

## 2019-09-19 PROCEDURE — 99024 POSTOP FOLLOW-UP VISIT: CPT | Performed by: UROLOGY

## 2019-09-19 PROCEDURE — 80048 BASIC METABOLIC PNL TOTAL CA: CPT | Performed by: INTERNAL MEDICINE

## 2019-09-19 RX ORDER — GABAPENTIN 100 MG/1
100 CAPSULE ORAL 3 TIMES DAILY
Status: DISCONTINUED | OUTPATIENT
Start: 2019-09-19 | End: 2019-09-21 | Stop reason: HOSPADM

## 2019-09-19 RX ORDER — HYDROMORPHONE HCL/PF 1 MG/ML
0.5 SYRINGE (ML) INJECTION EVERY 4 HOURS PRN
Status: DISCONTINUED | OUTPATIENT
Start: 2019-09-19 | End: 2019-09-21 | Stop reason: HOSPADM

## 2019-09-19 RX ORDER — POTASSIUM CHLORIDE 29.8 MG/ML
40 INJECTION INTRAVENOUS ONCE
Status: COMPLETED | OUTPATIENT
Start: 2019-09-19 | End: 2019-09-19

## 2019-09-19 RX ORDER — ACETAMINOPHEN 325 MG/1
650 TABLET ORAL EVERY 6 HOURS PRN
Status: DISCONTINUED | OUTPATIENT
Start: 2019-09-19 | End: 2019-09-21 | Stop reason: HOSPADM

## 2019-09-19 RX ORDER — PROCHLORPERAZINE MALEATE 5 MG/1
5 TABLET ORAL
Status: DISCONTINUED | OUTPATIENT
Start: 2019-09-19 | End: 2019-09-21 | Stop reason: HOSPADM

## 2019-09-19 RX ORDER — PROMETHAZINE HYDROCHLORIDE 25 MG/1
25 TABLET ORAL EVERY 6 HOURS PRN
Status: DISCONTINUED | OUTPATIENT
Start: 2019-09-19 | End: 2019-09-21 | Stop reason: HOSPADM

## 2019-09-19 RX ADMIN — ACETAMINOPHEN 650 MG: 325 TABLET ORAL at 05:40

## 2019-09-19 RX ADMIN — POTASSIUM CHLORIDE 40 MEQ: 29.8 INJECTION, SOLUTION INTRAVENOUS at 09:11

## 2019-09-19 RX ADMIN — NYSTATIN 500000 UNITS: 100000 SUSPENSION ORAL at 22:48

## 2019-09-19 RX ADMIN — METFORMIN HYDROCHLORIDE 500 MG: 500 TABLET ORAL at 17:47

## 2019-09-19 RX ADMIN — PROCHLORPERAZINE MALEATE 5 MG: 5 TABLET, FILM COATED ORAL at 17:46

## 2019-09-19 RX ADMIN — CEFEPIME HYDROCHLORIDE 2000 MG: 2 INJECTION, POWDER, FOR SOLUTION INTRAVENOUS at 11:50

## 2019-09-19 RX ADMIN — PROCHLORPERAZINE MALEATE 5 MG: 5 TABLET, FILM COATED ORAL at 13:28

## 2019-09-19 RX ADMIN — ENOXAPARIN SODIUM 40 MG: 40 INJECTION SUBCUTANEOUS at 09:10

## 2019-09-19 RX ADMIN — GABAPENTIN 100 MG: 100 CAPSULE ORAL at 22:48

## 2019-09-19 RX ADMIN — NYSTATIN 500000 UNITS: 100000 SUSPENSION ORAL at 17:46

## 2019-09-19 RX ADMIN — METFORMIN HYDROCHLORIDE 500 MG: 500 TABLET ORAL at 09:10

## 2019-09-19 RX ADMIN — ACETAMINOPHEN 650 MG: 325 TABLET ORAL at 11:50

## 2019-09-19 RX ADMIN — PANTOPRAZOLE SODIUM 40 MG: 40 INJECTION, POWDER, FOR SOLUTION INTRAVENOUS at 09:10

## 2019-09-19 RX ADMIN — CEFEPIME HYDROCHLORIDE 2000 MG: 2 INJECTION, POWDER, FOR SOLUTION INTRAVENOUS at 00:09

## 2019-09-19 RX ADMIN — GABAPENTIN 100 MG: 100 CAPSULE ORAL at 17:47

## 2019-09-19 RX ADMIN — NYSTATIN 500000 UNITS: 100000 SUSPENSION ORAL at 11:51

## 2019-09-19 RX ADMIN — METRONIDAZOLE 500 MG: 500 INJECTION, SOLUTION INTRAVENOUS at 05:40

## 2019-09-19 RX ADMIN — PROMETHAZINE HYDROCHLORIDE 25 MG: 25 TABLET ORAL at 02:34

## 2019-09-19 NOTE — PROGRESS NOTES
UROLOGY PROGRESS NOTE   Patient Identifiers: Jo Shin (MRN [id number])  Date of Service: 9/19/2019    Subjective:    resting comfortable  Creat 0 85   Urine clear yellow    Patient has  as above      Objective:     VITALS:    Vitals:    09/19/19 0726   BP: 143/69   Pulse: 70   Resp: 18   Temp: 98 8 °F (37 1 °C)   SpO2: 96%           LABS:  Lab Results   Component Value Date    HGB 8 5 (L) 09/19/2019    HCT 26 8 (L) 09/19/2019    WBC 12 10 (H) 09/19/2019     09/19/2019   ]    Lab Results   Component Value Date    K 3 4 (L) 09/19/2019     09/19/2019    CO2 29 09/19/2019    BUN 14 09/19/2019    CREATININE 0 85 09/19/2019    CALCIUM 7 9 (L) 09/19/2019    GLUCOSE 228 (H) 09/10/2019   ]        INPATIENT MEDS:    Current Facility-Administered Medications:     acetaminophen (TYLENOL) tablet 650 mg, 650 mg, Oral, Q6H [address], Karla Zepeda PA-C, 650 mg at 09/19/19 0540    cefepime (MAXIPIME) 2,000 mg in dextrose 5 % 50 mL IVPB, 2,000 mg, Intravenous, Q12H, Daquan Pineda MD, Stopped at 09/19/19 0136    diphenhydrAMINE (BENADRYL) injection 25 mg, 25 mg, Intravenous, Q6H PRN, Edilberto Zepeda PA-C    enoxaparin (LOVENOX) subcutaneous injection 40 mg, 40 mg, Subcutaneous, Q24H [address], [place] F Zabo, DO, 40 mg at 09/18/19 0842    HYDROmorphone (DILAUDID) injection 1 mg, 1 mg, Intravenous, Q4H PRN, Tavo Pastrana MD, 1 mg at 09/18/19 0842    HYDROmorphone (DILAUDID) tablet 4 mg, 4 mg, Oral, Q4H While Awake, Rowan Mills DO, 4 mg at 09/18/19 2310    metFORMIN (GLUCOPHAGE) tablet 500 mg, 500 mg, Oral, BID With Meals, Rebecca Pride MD    metroNIDAZOLE (FLAGYL) IVPB (premix) 500 mg, 500 mg, Intravenous, Q8H, Daquan Pineda MD, Last Rate: 200 mL/hr at 09/19/19 0540, 500 mg at 09/19/19 0540    nystatin (MYCOSTATIN) oral suspension 500,000 Units, 500,000 Units, Swish & Swallow, 4x Daily, Gregory Wagner DO, 500,000 Units at 09/17/19 0902    ondansetron (ZOFRAN) injection 4 mg, 4 mg, Intravenous, Q6H PRN, Princess Chandra Zepeda PA-C, 4 mg at 09/14/19 0528    ondansetron (ZOFRAN-ODT) dispersible tablet 4 mg, 4 mg, Oral, TID AC, Rowan Mills DO    pantoprazole (PROTONIX) injection 40 mg, 40 mg, Intravenous, Q24H [facility] DO Kristy, 40 mg at 09/18/19 5548    potassium chloride 40 mEq IVPB (premix), 40 mEq, Intravenous, Once, Dana Bhat MD    promethazine (PHENERGAN) tablet 25 mg, 25 mg, Oral, Q6H PRN, Sherian Fleischer, MD, 25 mg at 09/19/19 0234      Physical Exam:   /69   Pulse 70   Temp 98 8 °F (37 1 °C)   Resp 18   Ht 5' 4" (1 626 m)   Wt 76 8 kg (169 lb 6 4 oz) Comment: standing scale vs bed weight   SpO2 96%   BMI 29 08 kg/m²   GEN: no acute distress    RESP: breathing comfortably with no accessory muscle use    ABD: soft, appropriately tender to palpation, non-distended   INCISION: clean, dry, intact   EXT: no significant peripheral edema     ZULUAGA: in place draining clear yellow urine  no clots and       RADIOLOGY:   none     Assessment:   1  POD #9  Pelvic exoneration secondary to ovarian malignancy    2   Acute kidney injury Alecia Ashton      Plan:   -dc zuluaga  -  -  -

## 2019-09-19 NOTE — RESTORATIVE TECHNICIAN NOTE
Restorative Specialist Mobility Note       Activity: Ambulate in dwyer, Ambulate in room, Bathroom privileges, Chair, Dangle, Stand at bedside(Educated/encouraged pt to ambulate with assistance 3-4 x's/day  Chair alarm on   Pt callbell, phone/tray within reach )     Assistive Device: Front wheel walker                Edwar ROSS, Restorative Technician, [facility]

## 2019-09-19 NOTE — PROGRESS NOTES
Progress note - Palliative and Supportive Care   Shyam Jacome 70 y o  female [id number]    Assessment:   -   Patient Active Problem List   Diagnosis    Other ascites    Lesion of liver    Edema leg    Ovarian cancer ([facility] )    Abdominal pain    Intractable nausea and vomiting    Bowel perforation (HCC)    Septic shock ([facility] )    Peritonitis (HCC)    S/P ileostomy ([facility] )    Acute blood loss anemia    Leukocytosis    Hypokalemia    Stoma dermatitis    Dehydration    Deep venous thrombosis of right profunda femoris vein (HCC)    Neuropathy    Encounter for central line care    Anemia    SBO (small bowel obstruction) (HCC)    Malignant neoplasm of ovary (HCC)    Acute kidney injury ([facility] )    Metabolic acidosis         Plan:  1  Symptom management -    - D/C Dilaudid   - Start Gabapentin 100 mg TID   - Change tylenol to PRN   - Decrease IV Dilaudid to 0 5 mg PRN   - Consider short course of NSAIDS pending renal function    2  Goals - full cares       Interval history:       Patient still with nausea and feels the dilaudid is putting her in a "dark place"       MEDICATIONS / ALLERGIES:     all current active meds have been reviewed and current meds:   Current Facility-Administered Medications   Medication Dose Route Frequency    acetaminophen (TYLENOL) tablet 650 mg  650 mg Oral Q6H PRN    diphenhydrAMINE (BENADRYL) injection 25 mg  25 mg Intravenous Q6H PRN    enoxaparin (LOVENOX) subcutaneous injection 40 mg  40 mg Subcutaneous Q24H MARCIO    gabapentin (NEURONTIN) capsule 100 mg  100 mg Oral TID    HYDROmorphone (DILAUDID) injection 0 5 mg  0 5 mg Intravenous Q4H PRN    metFORMIN (GLUCOPHAGE) tablet 500 mg  500 mg Oral BID With Meals    nystatin (MYCOSTATIN) oral suspension 500,000 Units  500,000 Units Swish & Swallow 4x Daily    ondansetron (ZOFRAN) injection 4 mg  4 mg Intravenous Q6H PRN    pantoprazole (PROTONIX) injection 40 mg  40 mg Intravenous Q24H MARCIO    prochlorperazine (COMPAZINE) tablet 5 mg  5 mg Oral TID AC    promethazine (PHENERGAN) tablet 25 mg  25 mg Oral Q6H PRN       No Known Allergies    OBJECTIVE:    Physical Exam  Physical Exam   Constitutional: She is oriented to person, place, and time  No distress  HENT:   Head: Normocephalic and atraumatic  Right Ear: External ear normal    Left Ear: External ear normal    Eyes: Right eye exhibits no discharge  Left eye exhibits no discharge  Cardiovascular: Normal rate  Pulmonary/Chest: Effort normal  No respiratory distress  Abdominal: Soft  She exhibits no distension  There is tenderness  Musculoskeletal: She exhibits no edema  Neurological: She is alert and oriented to person, place, and time  Skin: There is pallor  Psychiatric: She has a normal mood and affect  Nursing note and vitals reviewed  Lab Results:   I have personally reviewed pertinent labs  , CBC:   Lab Results   Component Value Date    WBC 12 10 (H) 09/19/2019    HGB 8 5 (L) 09/19/2019    HCT 26 8 (L) 09/19/2019    MCV 93 09/19/2019     09/19/2019    MCH 29 4 09/19/2019    MCHC 31 7 09/19/2019    RDW 14 5 09/19/2019    MPV 11 3 09/19/2019    NRBC 0 09/19/2019   , CMP:   Lab Results   Component Value Date    SODIUM 138 09/19/2019    K 3 4 (L) 09/19/2019     09/19/2019    CO2 29 09/19/2019    BUN 14 09/19/2019    CREATININE 0 85 09/19/2019    CALCIUM 7 9 (L) 09/19/2019    EGFR 70 09/19/2019     Imaging Studies: reviewed  EKG, Pathology, and Other Studies: reviewed    Counseling / Coordination of Care  Total floor / unit time spent today 25+ minutes  Greater than 50% of total time was spent with the patient and / or family counseling and / or coordination of care  A description of the counseling / coordination of care: symptom assessment, medication changes, discussion with primary, supportive listening

## 2019-09-19 NOTE — PROGRESS NOTES
[address] [facility] NOTE   Nikki Muhammad 70 y o  female MRN: [id number]  Unit/Bed#: [facility] 905-01 Encounter: [id number]  Reason for Consult: GRISELDA on CKD    ASSESSMENT and PLAN:    69-year-old female with ovarian ca s/p neoadjuvent chemo, who on 09/10 underwent laparotomy, cystoscopy, ileostomy reversal, lysis of adhesions, tumor debulking  Patient required 6 units of packed red blood cell, 4 units of FFP, 1 unit of platelet, 5 L of fluids  Postoperatively patient is transferred to the ICU     1) GRISELDA on CKD III - CKD likely secondary to DM and age related nephron loss      - baseline Cr 1 1-1 3 mg/dL   - Cr rising to 3 9 on 9/13 and Nephrology is consulted  -urinalysis-  -CT of the abdomen pelvis with mild hydroureteronephrosis   - repeat CT scan reviewed from 9/15 with patchy b/l ground glass airspace opacity, b/l pl effusion, persistent mild b/l hydro without obstruction noted  - Etiology possible ATN in setting of hypotension and blood loss and hypoperfusion and post obstruction   - Urology on board   - 9/16 - UOP improving to 2 1 L yesterday  Input 3 1 L; Cr improving from 2 7 to 2 mg/dL; Na rising; thoracentesis completed of R lung    - 9/17 - Cr improving to 1 4 mg/dL; vanc random 17 8    - 9/18 - Cr improving to baseline 0 8 mg/dL; K 3 2, repleting   Na improved 139 with hypotonic fluids  - 9/19 - Cr stable 0 85  K 3 4  repleting     Plan:     - give 40 meq kcl IV one time   - BMP in AM  - b/l hydro being monitored conservatively (final plan per Urology)  - clemons removal per Urology team  - repeat u/s 2-3 days after removal of clemons     2) ovarian Ca      - s/p surgery      3) acid/base      - bicarb improved     4) electrolytes      - Na improving  - K replete as above     5) HTN      - BP relatively stable 130-150     6) anemia      - monitoring for now     7) clemons      - there was concern for clemons catheter malfunction  - clemons appears to be draining  - Urology on board     8) PNA with fevers and pl effusion     - ID on board  - started on broad spectrum antibiotics on 9/15  - blood cultures pending  - IR for drain of pl effusions 9/16 to eval for infectious vs malignancy --> Right thoracentesis with 1 1 L removed  - leukocytosis improving on 9/16    SUBJECTIVE / INTERVAL HISTORY:    Pt weak  Denies SOB       OBJECTIVE:  Current Weight: Weight - Scale: 76 8 kg (169 lb 6 4 oz)(standing scale vs bed weight )  Vitals:    09/18/19 1507 09/18/19 2225 09/19/19 0534 09/19/19 0726   BP: 141/70 142/70  143/69   Pulse: 81 73  70   Resp: 18 18  18   Temp: 99 4 °F (37 4 °C) 99 4 °F (37 4 °C)  98 8 °F (37 1 °C)   TempSrc:       SpO2: 95% 95%  96%   Weight:   76 8 kg (169 lb 6 4 oz)    Height:           Intake/Output Summary (Last 24 hours) at 9/19/2019 0748  Last data filed at 9/19/2019 0533  Gross per 24 hour   Intake 1224 17 ml   Output 4000 ml   Net -2775 83 ml     General: NAD, weak  Skin: no rash  Eyes: anicteric sclera  ENT: moist mucous membrane  Neck: supple  Chest: CTA b/l, no ronchii, no wheeze, no rubs, no rales, diminished air intake b/l bases  CVS: s1s2, no murmur, no gallop, no rub  Abdomen: soft, nontender, nl sounds  Extremities: trace edema LE b/l  : no clemons  Neuro: AAOX3  Psych: normal affect    Medications:    Current Facility-Administered Medications:     acetaminophen (TYLENOL) tablet 650 mg, 650 mg, Oral, Q6H MARCIO, Karla Zepeda PA-C, 650 mg at 09/19/19 0540    cefepime (MAXIPIME) 2,000 mg in dextrose 5 % 50 mL IVPB, 2,000 mg, Intravenous, Q12H, Daquan Pineda MD, Stopped at 09/19/19 0136    diphenhydrAMINE (BENADRYL) injection 25 mg, 25 mg, Intravenous, Q6H PRN, Lester Zepeda PA-C    enoxaparin (LOVENOX) subcutaneous injection 40 mg, 40 mg, Subcutaneous, Q24H [address], [facility] F DO Kristy, 40 mg at 09/18/19 0842    HYDROmorphone (DILAUDID) injection 1 mg, 1 mg, Intravenous, Q4H PRN, Isiah Llamas MD, 1 mg at 09/18/19 0842    HYDROmorphone (DILAUDID) tablet 4 mg, 4 mg, Oral, Q4H While Awake, Leandro Mills DO, 4 mg at 09/18/19 2310    metFORMIN (GLUCOPHAGE) tablet 500 mg, 500 mg, Oral, BID With Meals, Ivy Hoyt MD    metroNIDAZOLE (FLAGYL) IVPB (premix) 500 mg, 500 mg, Intravenous, Q8H, Daquan Pineda MD, Last Rate: 200 mL/hr at 09/19/19 0540, 500 mg at 09/19/19 0540    nystatin (MYCOSTATIN) oral suspension 500,000 Units, 500,000 Units, Swish & Swallow, 4x Daily, Gregory BOB Wagner DO, 500,000 Units at 09/17/19 0902    ondansetron (ZOFRAN) injection 4 mg, 4 mg, Intravenous, Q6H PRN, Sandra Zepeda PA-C, 4 mg at 09/14/19 0528    ondansetron (ZOFRAN-ODT) dispersible tablet 4 mg, 4 mg, Oral, TID AC, Rowan Mills DO    pantoprazole (PROTONIX) injection 40 mg, 40 mg, Intravenous, Q24H [address], [facility] F DO Kristy, 40 mg at 09/18/19 0842    promethazine (PHENERGAN) tablet 25 mg, 25 mg, Oral, Q6H PRN, Ivy Hoyt MD, 25 mg at 09/19/19 0234    Laboratory Results:  Results from last 7 days   Lab Units 09/19/19  0652 09/19/19  0546 09/18/19  0542 09/17/19  0523 09/16/19  0657 09/15/19  0548 09/14/19  0524 09/13/19  1312 09/13/19  0603   WBC Thousand/uL 12 10*  --  11 24* 12 03* 11 69* 15 02* 18 22*  --  20 91*   HEMOGLOBIN g/dL 8 5*  --  8 3* 8 0* 8 0* 8 2* 9 0*  --  9 2*   HEMATOCRIT % 26 8*  --  25 9* 25 2* 24 6* 25 3* 27 4*  --  28 3*   PLATELETS Thousands/uL 303  --  290 252 219 198 179  --  154   POTASSIUM mmol/L  --  3 4* 3 2* 3 4* 3 4* 3 5 3 6 4 2 4 3   CHLORIDE mmol/L  --  104 106 110* 112* 113* 114* 117* 113*   CO2 mmol/L  --  29 29 29 27 23 19* 17* 17*   BUN mg/dL  --  14 21 37* 53* 67* 59* 60* 64*   CREATININE mg/dL  --  0 85 0 85 1 38* 2 01* 2 66* 2 54* 3 43* 3 91*   CALCIUM mg/dL  --  7 9* 7 6* 8 2* 8 0* 8 1* 8 1* 8 3 8 1*   MAGNESIUM mg/dL  --   --  1 7  --   --   --  2 2  --  2 2   PHOSPHORUS mg/dL  --   --   --   --   --   --  3 7  --  5 6*

## 2019-09-19 NOTE — PHYSICAL THERAPY NOTE
Physical Therapy Cancellation Note  Pt refused PT services today  Encouraged however declined  Will follow as appropriate      Maria Elena Spencer, PTA

## 2019-09-19 NOTE — SOCIAL WORK
PMR note from yesterday states that patient is appropriate for Acute rehab and that patient's first choice would be to go to [facility] acute rehab  I met with patient to discuss same  She confirmed that her first choice is to go to acute rehab at [facility] instead of STR at [facility]  Referal placed in [place]

## 2019-09-19 NOTE — PROGRESS NOTES
GYN-ONC Progress Note  Moshe Cole  [id number]  [facility] 905/[facility] 905-01  9/19/2019  6:13 AM    ASSESS:  59-year-old with stage IVB ovarian cancer, now postoperative day 9  Following exploratory laparotomy and posterior pelvic exenteration with anterior resection and colostomy, ileostomy reversal, radical omentectomy and tumor debulking cystoscopy, flexible sigmoidoscopy    PLAN:    1  Acute on chronic kidney disease: Cr back to baseline, follow-up AM BMP, nephrology signed off  2  Acute Blood Loss Anemia: trend hgb  3  Bilateral pleural effusions: s/p thoracentesis, will discontinue abx for pneumonia today, afebrile  4  Postoperative care, deconditioning: recommendation made for acute rehab, case management working on placement  5  Nausea: per palliative recommendations    Dispo: inpatient pending acute rehab acceptance    PPx: SCDs, lovenox  FEN: regular diet, replete lytes prn, D5 1/2NS + 20KCL  Pain mgmnt: per palliative   Invasive lines: clemons  Code status: full   ____________________    SUBJECTIVE  History of Present Illness:  Overnight: reports nausea overnight, not relieved with ODT zofran  Pain: reports incisional discomfort rated 4/10, taking oral dilaudid and tylenol  Tolerating Oral Intake: yes   Voiding: yes  Flatus: yes  Bowel Movement: now with stool output  Ambulating: yes  Vaginal Bleeding: no  Pelvic Pain: no  Chest Pain: no  Shortness of Breath: no  Leg Pain/Discomfort: no    OBJECTIVE  Vitals  Temp:  [98 5 °F (36 9 °C)-99 4 °F (37 4 °C)] 99 4 °F (37 4 °C)  HR:  [64-81] 73  Resp:  [18-20] 18  BP: (138-142)/(66-70) 142/70       Intake/Output Summary (Last 24 hours) at 9/19/2019 5042  Last data filed at 9/19/2019 0533  Gross per 24 hour   Intake 1224 17 ml   Output 4000 ml   Net -2775 83 ml       Physical Exam:   Physical Exam   Constitutional: She appears well-developed and well-nourished  No distress  Cardiovascular: Normal rate, regular rhythm, normal heart sounds and intact distal pulses  Pulmonary/Chest: Effort normal and breath sounds normal  No stridor  No respiratory distress  Abdominal: Soft  Bowel sounds are normal  She exhibits no distension  There is no tenderness  Incision clean, dry, and intact  Drain with serosang output  Stool output in colostomy   Skin: Skin is warm and dry  She is not diaphoretic  Psychiatric: She has a normal mood and affect   Her behavior is normal          Labs:   Recent Results (from the past 72 hour(s))   Procalcitonin    Collection Time: 09/16/19  6:20 AM   Result Value Ref Range    Procalcitonin 0 33 (H) <=0 25 ng/ml   CBC and differential    Collection Time: 09/16/19  6:57 AM   Result Value Ref Range    WBC 11 69 (H) 4 31 - 10 16 Thousand/uL    RBC 2 68 (L) 3 81 - 5 12 Million/uL    Hemoglobin 8 0 (L) 11 5 - 15 4 g/dL    Hematocrit 24 6 (L) 34 8 - 46 1 %    MCV 92 82 - 98 fL    MCH 29 9 26 8 - 34 3 pg    MCHC 32 5 31 4 - 37 4 g/dL    RDW 15 5 (H) 11 6 - 15 1 %    MPV 11 5 8 9 - 12 7 fL    Platelets 673 155 - 945 Thousands/uL    nRBC 0 /100 WBCs    Neutrophils Relative 72 43 - 75 %    Immat GRANS % 2 0 - 2 %    Lymphocytes Relative 13 (L) 14 - 44 %    Monocytes Relative 10 4 - 12 %    Eosinophils Relative 3 0 - 6 %    Basophils Relative 0 0 - 1 %    Neutrophils Absolute 8 45 (H) 1 85 - 7 62 Thousands/µL    Immature Grans Absolute 0 24 (H) 0 00 - 0 20 Thousand/uL    Lymphocytes Absolute 1 49 0 60 - 4 47 Thousands/µL    Monocytes Absolute 1 19 0 17 - 1 22 Thousand/µL    Eosinophils Absolute 0 31 0 00 - 0 61 Thousand/µL    Basophils Absolute 0 01 0 00 - 0 10 Thousands/µL   Basic metabolic panel    Collection Time: 09/16/19  6:57 AM   Result Value Ref Range    Sodium 146 (H) 136 - 145 mmol/L    Potassium 3 4 (L) 3 5 - 5 3 mmol/L    Chloride 112 (H) 100 - 108 mmol/L    CO2 27 21 - 32 mmol/L    ANION GAP 7 4 - 13 mmol/L    BUN 53 (H) 5 - 25 mg/dL    Creatinine 2 01 (H) 0 60 - 1 30 mg/dL    Glucose 107 65 - 140 mg/dL    Calcium 8 0 (L) 8 3 - 10 1 mg/dL    eGFR 25 ml/min/1 73sq m   Fingerstick Glucose (POCT)    Collection Time: 09/16/19 11:51 AM   Result Value Ref Range    POC Glucose 104 65 - 140 mg/dl   MRSA culture    Collection Time: 09/16/19 12:13 PM   Result Value Ref Range    MRSA Culture Only       No Methicillin Resistant Stapylococcus aureus (MRSA) after 24 hours   Body fluid white cell count with differential    Collection Time: 09/16/19  2:11 PM   Result Value Ref Range    Site      WBC, Fluid 330 /ul   Body Fluid Diff    Collection Time: 09/16/19  2:11 PM   Result Value Ref Range    Total Counted 100     Neutrophils % (Fluid) 60 %    Lymphs % (Fluid) 14 %    Mesothelial % (Fluid) 10 %    Histiocyte % (Fluid) 10 %    Monocytes % (Fluid) 6 %   pH, body fluid    Collection Time: 09/16/19  2:12 PM   Result Value Ref Range    PH BODY FLUID 7 8    Glucose, body fluid    Collection Time: 09/16/19  2:12 PM   Result Value Ref Range    Glucose, Fluid 110 mg/dL   LD (LDH), Body Fluid    Collection Time: 09/16/19  2:12 PM   Result Value Ref Range    LD, Fluid 118 U/L   Body fluid culture and Gram stain    Collection Time: 09/16/19  2:12 PM   Result Value Ref Range    Body Fluid Culture, Sterile No growth     Gram Stain Result 1+ Polys     Gram Stain Result No bacteria seen    Total Protein, Fluid    Collection Time: 09/16/19  2:12 PM   Result Value Ref Range    Protein, Fluid <2 0 g/dL   Vancomycin, random    Collection Time: 09/16/19  4:03 PM   Result Value Ref Range    Vancomycin Rm 9 5 ug/mL   LD,Blood    Collection Time: 09/16/19  4:03 PM   Result Value Ref Range     (H) 81 - 234 U/L   Protein, total    Collection Time: 09/16/19  4:03 PM   Result Value Ref Range    Total Protein 5 3 (L) 6 4 - 8 2 g/dL   Fingerstick Glucose (POCT)    Collection Time: 09/16/19  5:55 PM   Result Value Ref Range    POC Glucose 106 65 - 140 mg/dl   Fingerstick Glucose (POCT)    Collection Time: 09/17/19 12:44 AM   Result Value Ref Range    POC Glucose 106 65 - 140 mg/dl   Basic metabolic panel    Collection Time: 09/17/19  5:23 AM   Result Value Ref Range    Sodium 144 136 - 145 mmol/L    Potassium 3 4 (L) 3 5 - 5 3 mmol/L    Chloride 110 (H) 100 - 108 mmol/L    CO2 29 21 - 32 mmol/L    ANION GAP 5 4 - 13 mmol/L    BUN 37 (H) 5 - 25 mg/dL    Creatinine 1 38 (H) 0 60 - 1 30 mg/dL    Glucose 107 65 - 140 mg/dL    Calcium 8 2 (L) 8 3 - 10 1 mg/dL    eGFR 39 ml/min/1 73sq m   CBC    Collection Time: 09/17/19  5:23 AM   Result Value Ref Range    WBC 12 03 (H) 4 31 - 10 16 Thousand/uL    RBC 2 72 (L) 3 81 - 5 12 Million/uL    Hemoglobin 8 0 (L) 11 5 - 15 4 g/dL    Hematocrit 25 2 (L) 34 8 - 46 1 %    MCV 93 82 - 98 fL    MCH 29 4 26 8 - 34 3 pg    MCHC 31 7 31 4 - 37 4 g/dL    RDW 15 2 (H) 11 6 - 15 1 %    Platelets 547 541 - 385 Thousands/uL    MPV 11 9 8 9 - 12 7 fL   Vancomycin, random    Collection Time: 09/17/19  5:23 AM   Result Value Ref Range    Vancomycin Rm 17 8 ug/mL   Vancomycin, trough    Collection Time: 09/17/19  7:10 AM   Result Value Ref Range    Vancomycin Tr 15 2 10 0 - 20 0 ug/mL   Fingerstick Glucose (POCT)    Collection Time: 09/17/19  7:28 AM   Result Value Ref Range    POC Glucose 134 65 - 140 mg/dl   Fingerstick Glucose (POCT)    Collection Time: 09/17/19 12:02 PM   Result Value Ref Range    POC Glucose 136 65 - 140 mg/dl   Fingerstick Glucose (POCT)    Collection Time: 09/17/19  6:33 PM   Result Value Ref Range    POC Glucose 142 (H) 65 - 140 mg/dl   Basic metabolic panel    Collection Time: 09/18/19  5:42 AM   Result Value Ref Range    Sodium 139 136 - 145 mmol/L    Potassium 3 2 (L) 3 5 - 5 3 mmol/L    Chloride 106 100 - 108 mmol/L    CO2 29 21 - 32 mmol/L    ANION GAP 4 4 - 13 mmol/L    BUN 21 5 - 25 mg/dL    Creatinine 0 85 0 60 - 1 30 mg/dL    Glucose 112 65 - 140 mg/dL    Calcium 7 6 (L) 8 3 - 10 1 mg/dL    eGFR 70 ml/min/1 73sq m   Magnesium    Collection Time: 09/18/19  5:42 AM   Result Value Ref Range    Magnesium 1 7 1 6 - 2 6 mg/dL   CBC and differential Collection Time: 09/18/19  5:42 AM   Result Value Ref Range    WBC 11 24 (H) 4 31 - 10 16 Thousand/uL    RBC 2 79 (L) 3 81 - 5 12 Million/uL    Hemoglobin 8 3 (L) 11 5 - 15 4 g/dL    Hematocrit 25 9 (L) 34 8 - 46 1 %    MCV 93 82 - 98 fL    MCH 29 7 26 8 - 34 3 pg    MCHC 32 0 31 4 - 37 4 g/dL    RDW 14 8 11 6 - 15 1 %    MPV 12 2 8 9 - 12 7 fL    Platelets 883 373 - 253 Thousands/uL    nRBC 0 /100 WBCs    Neutrophils Relative 67 43 - 75 %    Immat GRANS % 2 0 - 2 %    Lymphocytes Relative 16 14 - 44 %    Monocytes Relative 8 4 - 12 %    Eosinophils Relative 7 (H) 0 - 6 %    Basophils Relative 0 0 - 1 %    Neutrophils Absolute 7 64 (H) 1 85 - 7 62 Thousands/µL    Immature Grans Absolute 0 17 0 00 - 0 20 Thousand/uL    Lymphocytes Absolute 1 79 0 60 - 4 47 Thousands/µL    Monocytes Absolute 0 85 0 17 - 1 22 Thousand/µL    Eosinophils Absolute 0 77 (H) 0 00 - 0 61 Thousand/µL    Basophils Absolute 0 02 0 00 - 0 10 Thousands/µL   Prepare fresh frozen plasma:Has consent been obtained?  Yes; Date of Surgery: 9/10/2019, 2 Units    Collection Time: 09/18/19  7:17 AM   Result Value Ref Range    Unit Product Code X4770D62     Unit Number [id number]     Unit ABO A     Unit [facility] POS     Unit Dispense Status Transfused     Unit Product Code S5353W74     Unit Number [id number]     Unit ABO A     Unit [facility] POS     Unit Dispense Status Transfused    Fingerstick Glucose (POCT)    Collection Time: 09/18/19  6:21 PM   Result Value Ref Range    POC Glucose 126 65 - 140 mg/dl       Meds:  Scheduled Meds:  Current Facility-Administered Medications:  acetaminophen 650 mg Oral Q6H [address] Karla Zepeda PA-C    cefepime 2,000 mg Intravenous Q12H Daquan Pineda MD Last Rate: Stopped (09/19/19 0136)   diphenhydrAMINE 25 mg Intravenous Q6H PRN Princess Chandra Zepeda PA-C    enoxaparin 40 mg Subcutaneous Q24H [address] Jamariia BOB Wagner DO    HYDROmorphone 1 mg Intravenous Q4H PRN Jami Merida MD    HYDROmorphone 4 mg Oral Q4H While Awake Fanta Orr,     metFORMIN 500 mg Oral BID With Meals Fantasma Sims MD    metroNIDAZOLE 500 mg Intravenous Q8H Daquan Pineda MD Last Rate: 500 mg (09/19/19 0540)   nystatin 500,000 Units Swish & Swallow 4x Daily [place] F Zabo, DO    ondansetron 4 mg Intravenous Q6H PRN Lidia Zepeda PA-C    ondansetron 4 mg Oral TID AC Rowan Mills, DO    pantoprazole 40 mg Intravenous Q24H [address] [place] F Kristy, DO    promethazine 25 mg Oral Q6H PRN Fantasma Sims MD      Continuous Infusions:   PRN Meds: diphenhydrAMINE    HYDROmorphone    ondansetron    promethazine    Imaging Studies: I have personally reviewed pertinent reports  EKG, Pathology, Microbiology, and Other Studies: I have personally reviewed pertinent reports        __________________    Signature / Title: Darshan Álvarez DO, Ob/Gyn, PGY-3  Date: 9/19/2019  Time: 6:13 AM

## 2019-09-20 LAB
ANION GAP SERPL CALCULATED.3IONS-SCNC: 6 MMOL/L (ref 4–13)
BACTERIA BLD CULT: NORMAL
BACTERIA BLD CULT: NORMAL
BUN SERPL-MCNC: 11 MG/DL (ref 5–25)
CALCIUM SERPL-MCNC: 8 MG/DL (ref 8.3–10.1)
CHLORIDE SERPL-SCNC: 107 MMOL/L (ref 100–108)
CO2 SERPL-SCNC: 31 MMOL/L (ref 21–32)
CREAT SERPL-MCNC: 0.89 MG/DL (ref 0.6–1.3)
ERYTHROCYTE [DISTWIDTH] IN BLOOD BY AUTOMATED COUNT: 14.8 % (ref 11.6–15.1)
GFR SERPL CREATININE-BSD FRML MDRD: 66 ML/MIN/1.73SQ M
GLUCOSE SERPL-MCNC: 153 MG/DL (ref 65–140)
GLUCOSE SERPL-MCNC: 158 MG/DL (ref 65–140)
GLUCOSE SERPL-MCNC: 168 MG/DL (ref 65–140)
GLUCOSE SERPL-MCNC: 169 MG/DL (ref 65–140)
GLUCOSE SERPL-MCNC: 196 MG/DL (ref 65–140)
HCT VFR BLD AUTO: 26.6 % (ref 34.8–46.1)
HGB BLD-MCNC: 8.4 G/DL (ref 11.5–15.4)
MCH RBC QN AUTO: 29.9 PG (ref 26.8–34.3)
MCHC RBC AUTO-ENTMCNC: 31.6 G/DL (ref 31.4–37.4)
MCV RBC AUTO: 95 FL (ref 82–98)
PLATELET # BLD AUTO: 311 THOUSANDS/UL (ref 149–390)
PMV BLD AUTO: 11.8 FL (ref 8.9–12.7)
POTASSIUM SERPL-SCNC: 3.2 MMOL/L (ref 3.5–5.3)
RBC # BLD AUTO: 2.81 MILLION/UL (ref 3.81–5.12)
SODIUM SERPL-SCNC: 144 MMOL/L (ref 136–145)
WBC # BLD AUTO: 11.85 THOUSAND/UL (ref 4.31–10.16)

## 2019-09-20 PROCEDURE — C9113 INJ PANTOPRAZOLE SODIUM, VIA: HCPCS | Performed by: OBSTETRICS & GYNECOLOGY

## 2019-09-20 PROCEDURE — 99024 POSTOP FOLLOW-UP VISIT: CPT | Performed by: OBSTETRICS & GYNECOLOGY

## 2019-09-20 PROCEDURE — 97530 THERAPEUTIC ACTIVITIES: CPT | Performed by: STUDENT IN AN ORGANIZED HEALTH CARE EDUCATION/TRAINING PROGRAM

## 2019-09-20 PROCEDURE — 82948 REAGENT STRIP/BLOOD GLUCOSE: CPT

## 2019-09-20 PROCEDURE — 80048 BASIC METABOLIC PNL TOTAL CA: CPT | Performed by: INTERNAL MEDICINE

## 2019-09-20 PROCEDURE — 99232 SBSQ HOSP IP/OBS MODERATE 35: CPT | Performed by: INTERNAL MEDICINE

## 2019-09-20 PROCEDURE — 97535 SELF CARE MNGMENT TRAINING: CPT | Performed by: STUDENT IN AN ORGANIZED HEALTH CARE EDUCATION/TRAINING PROGRAM

## 2019-09-20 PROCEDURE — 85027 COMPLETE CBC AUTOMATED: CPT | Performed by: INTERNAL MEDICINE

## 2019-09-20 RX ORDER — POTASSIUM CHLORIDE 14.9 MG/ML
20 INJECTION INTRAVENOUS ONCE
Status: COMPLETED | OUTPATIENT
Start: 2019-09-20 | End: 2019-09-20

## 2019-09-20 RX ORDER — GABAPENTIN 100 MG/1
100 CAPSULE ORAL 3 TIMES DAILY
Qty: 90 CAPSULE | Refills: 11 | Status: ON HOLD | OUTPATIENT
Start: 2019-09-20 | End: 2019-10-22

## 2019-09-20 RX ORDER — POTASSIUM CHLORIDE 29.8 MG/ML
40 INJECTION INTRAVENOUS ONCE
Status: COMPLETED | OUTPATIENT
Start: 2019-09-20 | End: 2019-09-20

## 2019-09-20 RX ADMIN — ENOXAPARIN SODIUM 40 MG: 40 INJECTION SUBCUTANEOUS at 10:02

## 2019-09-20 RX ADMIN — NYSTATIN 500000 UNITS: 100000 SUSPENSION ORAL at 18:13

## 2019-09-20 RX ADMIN — GABAPENTIN 100 MG: 100 CAPSULE ORAL at 15:58

## 2019-09-20 RX ADMIN — POTASSIUM CHLORIDE 20 MEQ: 14.9 INJECTION, SOLUTION INTRAVENOUS at 15:58

## 2019-09-20 RX ADMIN — POTASSIUM CHLORIDE 40 MEQ: 29.8 INJECTION, SOLUTION INTRAVENOUS at 11:41

## 2019-09-20 RX ADMIN — NYSTATIN 500000 UNITS: 100000 SUSPENSION ORAL at 21:09

## 2019-09-20 RX ADMIN — PROCHLORPERAZINE MALEATE 5 MG: 5 TABLET, FILM COATED ORAL at 10:02

## 2019-09-20 RX ADMIN — GABAPENTIN 100 MG: 100 CAPSULE ORAL at 10:02

## 2019-09-20 RX ADMIN — GABAPENTIN 100 MG: 100 CAPSULE ORAL at 21:09

## 2019-09-20 RX ADMIN — METFORMIN HYDROCHLORIDE 500 MG: 500 TABLET ORAL at 10:02

## 2019-09-20 RX ADMIN — NYSTATIN 500000 UNITS: 100000 SUSPENSION ORAL at 10:02

## 2019-09-20 RX ADMIN — METFORMIN HYDROCHLORIDE 500 MG: 500 TABLET ORAL at 18:13

## 2019-09-20 RX ADMIN — PANTOPRAZOLE SODIUM 40 MG: 40 INJECTION, POWDER, FOR SOLUTION INTRAVENOUS at 10:02

## 2019-09-20 NOTE — PROGRESS NOTES
[address] [facility] NOTE   [place]ain 70 y o  female MRN: [id number]  Unit/Bed#: [facility] 905-01 Encounter: [id number]  Reason for Consult: GRISELDA on CKD III    ASSESSMENT and PLAN:     76-year-old female with ovarian ca s/p neoadjuvent chemo, who on 09/10 underwent laparotomy, cystoscopy, ileostomy reversal, lysis of adhesions, tumor debulking  Patient required 6 units of packed red blood cell, 4 units of FFP, 1 unit of platelet, 5 L of fluids  Postoperatively patient is transferred to the ICU     1) GRISELDA on CKD III - CKD likely secondary to DM and age related nephron loss      - baseline Cr 1 1-1 3 mg/dL   - Cr rising to 3 9 on 9/13 and Nephrology is consulted  -urinalysis-  -CT of the abdomen pelvis with mild hydroureteronephrosis   - repeat CT scan reviewed from 9/15 with patchy b/l ground glass airspace opacity, b/l pl effusion, persistent mild b/l hydro without obstruction noted  - Etiology possible ATN in setting of hypotension and blood loss and hypoperfusion and post obstruction   - Urology on board   - 9/16 - UOP improving to 2 1 L yesterday  Input 3 1 L; Cr improving from 2 7 to 2 mg/dL; Na rising; thoracentesis completed of R lung    - 9/17 - Cr improving to 1 4 mg/dL; vanc random 17 8    - 9/18 - Cr improving to baseline 0 8 mg/dL; K 3 2, repleting  Na improved 139 with hypotonic fluids  - 9/19 - Cr stable 0 85  K 3 4  repleting  - 9/20 - Cr stable 0 89 mg/dL     Plan:     - give IV potassium 40 meq now and 20 meq this afternoon  - on d/c can give 20 meq kdur once daily  - BMP on Monday if d/c today  - BMP in AM if inpatient  Mag in AM  - b/l hydro being monitored conservatively (final plan per Urology)  - clemons removal per Urology team (pt declined yest)  - reviewed with Primary Team plan above  - please call back if further issues/questions arise in interm   Thank you       2) ovarian Ca      - s/p surgery      3) acid/base      - bicarb improved     4) electrolytes      - Na improving  - K replete as above     5) HTN      - BP relatively stable 130-150     6) anemia      - monitoring for now     7) clemons      - there was concern for clemons catheter malfunction  - clemons appears to be draining  - Urology on board     8) PNA with fevers and pl effusion     - ID on board  - started on broad spectrum antibiotics on 9/15  - blood cultures pending  - IR for drain of pl effusions 9/16 to eval for infectious vs malignancy --> Right thoracentesis with 1 1 L removed  - leukocytosis improving on 9/20      SUBJECTIVE / INTERVAL HISTORY:    Pt denies complaints  OBJECTIVE:  Current Weight: Weight - Scale: 78 3 kg (172 lb 9 6 oz)  Vitals:    09/19/19 1529 09/19/19 2152 09/20/19 0600 09/20/19 0710   BP: 141/66 125/64  156/65   Pulse: 80 80  77   Resp: 20 18  18   Temp: 98 7 °F (37 1 °C) 98 8 °F (37 1 °C)  99 1 °F (37 3 °C)   TempSrc:       SpO2: 95% 96%  95%   Weight:   78 3 kg (172 lb 9 6 oz)    Height:           Intake/Output Summary (Last 24 hours) at 9/20/2019 1760  Last data filed at 9/20/2019 0601  Gross per 24 hour   Intake 660 ml   Output 4325 ml   Net -3665 ml     General: NAD  Skin: no rash  Eyes: anicteric sclera  Rest of exam deferred as pt had break in ostomy bag and required immediate clean up     Neuro: AAOX3  Psych: normal affect    Medications:    Current Facility-Administered Medications:     acetaminophen (TYLENOL) tablet 650 mg, 650 mg, Oral, Q6H PRN, Rowan Mills DO    diphenhydrAMINE (BENADRYL) injection 25 mg, 25 mg, Intravenous, Q6H PRN, Neno Zepeda PA-C    enoxaparin (LOVENOX) subcutaneous injection 40 mg, 40 mg, Subcutaneous, Q24H [facility] Zabo, DO, 40 mg at 09/19/19 0910    gabapentin (NEURONTIN) capsule 100 mg, 100 mg, Oral, TID, Rowan Mills DO, 100 mg at 09/19/19 2248    HYDROmorphone (DILAUDID) injection 0 5 mg, 0 5 mg, Intravenous, Q4H PRN, Rowan Mills DO    metFORMIN (GLUCOPHAGE) tablet 500 mg, 500 mg, Oral, BID With Meals, Divya Yepez MD, 500 mg at 09/19/19 1747    nystatin (MYCOSTATIN) oral suspension 500,000 Units, 500,000 Units, Swish & Swallow, 4x Daily, Gregory Wagner, DO, 500,000 Units at 09/19/19 2248    ondansetron (ZOFRAN) injection 4 mg, 4 mg, Intravenous, Q6H PRN, Barbara Zepeda PA-C, 4 mg at 09/14/19 0528    pantoprazole (PROTONIX) injection 40 mg, 40 mg, Intravenous, Q24H [address], Jamariia BOB Wagner, DO, 40 mg at 09/19/19 0910    potassium chloride 20 mEq IVPB (premix), 20 mEq, Intravenous, Once, Kwasi Mena MD    potassium chloride 40 mEq IVPB (premix), 40 mEq, Intravenous, Once, Kwasi Mena MD    prochlorperazine (COMPAZINE) tablet 5 mg, 5 mg, Oral, TID AC, Rowan Mills DO, 5 mg at 09/19/19 1746    promethazine (PHENERGAN) tablet 25 mg, 25 mg, Oral, Q6H PRN, Rob Gilbert MD, 25 mg at 09/19/19 0234    Laboratory Results:  Results from last 7 days   Lab Units 09/20/19  0544 09/20/19  0540 09/19/19  9787 09/19/19  0546 09/18/19  0542 09/17/19  0523 09/16/19  0657 09/15/19  0548 09/14/19  0524   WBC Thousand/uL  --  11 85* 12 10*  --  11 24* 12 03* 11 69* 15 02* 18 22*   HEMOGLOBIN g/dL  --  8 4* 8 5*  --  8 3* 8 0* 8 0* 8 2* 9 0*   HEMATOCRIT %  --  26 6* 26 8*  --  25 9* 25 2* 24 6* 25 3* 27 4*   PLATELETS Thousands/uL  --  311 303  --  290 252 219 198 179   POTASSIUM mmol/L 3 2*  --   --  3 4* 3 2* 3 4* 3 4* 3 5 3 6   CHLORIDE mmol/L 107  --   --  104 106 110* 112* 113* 114*   CO2 mmol/L 31  --   --  29 29 29 27 23 19*   BUN mg/dL 11  --   --  14 21 37* 53* 67* 59*   CREATININE mg/dL 0 89  --   --  0 85 0 85 1 38* 2 01* 2 66* 2 54*   CALCIUM mg/dL 8 0*  --   --  7 9* 7 6* 8 2* 8 0* 8 1* 8 1*   MAGNESIUM mg/dL  --   --   --   --  1 7  --   --   --  2 2   PHOSPHORUS mg/dL  --   --   --   --   --   --   --   --  3 7

## 2019-09-20 NOTE — SOCIAL WORK
PT recommending pt go home with [facility] Sharma on d/c  Pt requesting a referral be placed to [facility], as she has used them in the past   Referral placed in [facility] to [facility]  [facility] is willing to accept pt upon d/c, pt notified and is in agreement  A post acute care recommendation was made by your care team for [facility]  Discussed Freedom of Choice with patient  List of agencies given to patient via in person  patient aware the list is custom filtered for them by preference  and that [facility] post acute providers are designated

## 2019-09-20 NOTE — OCCUPATIONAL THERAPY NOTE
09/20/19 1236   Restrictions/Precautions   Weight Bearing Precautions Per Order No   Other Precautions Cognitive;Multiple lines;Telemetry; Fall Risk;Pain   Pain Assessment   Pain Assessment 0-10   Pain Score 5   ADL   Where Assessed Edge of bed   LB Dressing Assistance 5  Supervision/Setup   LB Dressing Deficit Setup   LB Dressing Comments don/doff socks   Functional Standing Tolerance   Time 1 min 30 sec   Activity static standing   Comments RW   Bed Mobility   Supine to Sit 5  Supervision   Sit to Supine 5  Supervision   Transfers   Sit to Stand 5  Supervision   Stand to Sit 5  Supervision   Stand pivot 5  Supervision   Toilet transfer 5  Supervision   Cognition   Overall Cognitive Status [facility]   Arousal/Participation Alert   Attention Within functional limits   Orientation Level Oriented X4   Memory Within functional limits   Following Commands Follows one step commands without difficulty   Activity Tolerance   Activity Tolerance Patient tolerated treatment well   Assessment   Assessment Pt participates in OT session with focus on LB dressing, bed mobility, transfers, and standing tolerance to increase I for d/c  Pt supervision supine to sit EOB with HOB flat and no SR support  Pt does require vc to take breaths at times during session with O2 falling briefly to 82, but bounces back after rest break  Pt supervision toilet transfer with RW and grab bar support  Pt supervision LB dressing to don/doff socks while seated EOB  Pt engaged in static standing with RW for 1 min 30 sec  D/C recommendation updated to Home OT from [address] [place] after conferring with evaluating OTR  PT also notified via OTR regarding change in OT D/C recommendation  Pt will continue to benefit from activity tolerance, adls, and transfers  Plan   Treatment Interventions ADL retraining;Functional transfer training; Endurance training; Activityengagement   Goal Expiration Date 09/25/19   Treatment Day 2   OT Frequency 3-5x/wk   Recommendation   OT Discharge Recommendation Home OT  (updated from [facility] after consulting evaluating OTR)

## 2019-09-20 NOTE — PROGRESS NOTES
GYN-ONC Progress Note  Jo Shin  [id number]  [facility] 905/[facility] 905-01  9/20/2019  5:51 AM    ASSESS: 70year old with stage IVB ovarian ca, now POD #10 following exploratory laparotomy and posterior pelvic exenteration with anterior resection and colostomy, ileostomy reversal, radical omentectomy and tumor debulking cystoscopy, flexible sigmoidoscopy    PLAN:    1  Acute on CKD: Cr stable  2  Acute blood loss anemia: trend hgb  3  Bilateral pleural effusions: s/p thoracentesis, abx for pneumonia discontinued, remains afebrile  4  Postoperative care, deconditioning, nausea: acute rehab placement pending, continue per palliative care recommendations    Dispo: inpatient pending acute rehab placement    PPx: SCDs, lovenox  FEN: regular diet, replete lytes prn  Pain mgmnt: per palliative-continue gabapentin  Invasive lines: clemons   Code status: full  ____________________    SUBJECTIVE  History of Present Illness:  Overnight: no complaints, able to sleep throughout the night, states she feels like a person again with recent addition of gabapentin to her pain regimen   Pain: most recently rated 5/10, taking tylenol  Tolerating Oral Intake: yes   Voiding: adequate clemons output  Flatus: yes  Bowel Movement: no output in ostomy q24h  Ambulating: yes  Vaginal Bleeding: no  Pelvic Pain: no  Chest Pain: no  Shortness of Breath: no  Leg Pain/Discomfort: no    OBJECTIVE  Vitals  Temp:  [98 7 °F (37 1 °C)-98 8 °F (37 1 °C)] 98 8 °F (37 1 °C)  HR:  [70-80] 80  Resp:  [18-20] 18  BP: (125-143)/(64-69) 125/64       Intake/Output Summary (Last 24 hours) at 9/20/2019 0551  Last data filed at 9/20/2019 0300  Gross per 24 hour   Intake 660 ml   Output 3210 ml   Net -2550 ml       Physical Exam:   Physical Exam   Constitutional: She is oriented to person, place, and time  She appears well-developed and well-nourished  No distress  Cardiovascular: Normal rate, regular rhythm, normal heart sounds and intact distal pulses  Pulmonary/Chest: Effort normal and breath sounds normal  No stridor  No respiratory distress  Abdominal: Soft  Bowel sounds are normal  She exhibits no distension  There is no tenderness  Incision clean, dry, and intact  RLQ drain in place    Neurological: She is alert and oriented to person, place, and time  Skin: Skin is warm and dry  She is not diaphoretic  Psychiatric: She has a normal mood and affect   Her behavior is normal        Labs:   Recent Results (from the past 72 hour(s))   Vancomycin, trough    Collection Time: 09/17/19  7:10 AM   Result Value Ref Range    Vancomycin Tr 15 2 10 0 - 20 0 ug/mL   Fingerstick Glucose (POCT)    Collection Time: 09/17/19  7:28 AM   Result Value Ref Range    POC Glucose 134 65 - 140 mg/dl   Fingerstick Glucose (POCT)    Collection Time: 09/17/19 12:02 PM   Result Value Ref Range    POC Glucose 136 65 - 140 mg/dl   Fingerstick Glucose (POCT)    Collection Time: 09/17/19  6:33 PM   Result Value Ref Range    POC Glucose 142 (H) 65 - 140 mg/dl   Basic metabolic panel    Collection Time: 09/18/19  5:42 AM   Result Value Ref Range    Sodium 139 136 - 145 mmol/L    Potassium 3 2 (L) 3 5 - 5 3 mmol/L    Chloride 106 100 - 108 mmol/L    CO2 29 21 - 32 mmol/L    ANION GAP 4 4 - 13 mmol/L    BUN 21 5 - 25 mg/dL    Creatinine 0 85 0 60 - 1 30 mg/dL    Glucose 112 65 - 140 mg/dL    Calcium 7 6 (L) 8 3 - 10 1 mg/dL    eGFR 70 ml/min/1 73sq m   Magnesium    Collection Time: 09/18/19  5:42 AM   Result Value Ref Range    Magnesium 1 7 1 6 - 2 6 mg/dL   CBC and differential    Collection Time: 09/18/19  5:42 AM   Result Value Ref Range    WBC 11 24 (H) 4 31 - 10 16 Thousand/uL    RBC 2 79 (L) 3 81 - 5 12 Million/uL    Hemoglobin 8 3 (L) 11 5 - 15 4 g/dL    Hematocrit 25 9 (L) 34 8 - 46 1 %    MCV 93 82 - 98 fL    MCH 29 7 26 8 - 34 3 pg    MCHC 32 0 31 4 - 37 4 g/dL    RDW 14 8 11 6 - 15 1 %    MPV 12 2 8 9 - 12 7 fL    Platelets 251 395 - 431 Thousands/uL    nRBC 0 /100 WBCs Neutrophils Relative 67 43 - 75 %    Immat GRANS % 2 0 - 2 %    Lymphocytes Relative 16 14 - 44 %    Monocytes Relative 8 4 - 12 %    Eosinophils Relative 7 (H) 0 - 6 %    Basophils Relative 0 0 - 1 %    Neutrophils Absolute 7 64 (H) 1 85 - 7 62 Thousands/µL    Immature Grans Absolute 0 17 0 00 - 0 20 Thousand/uL    Lymphocytes Absolute 1 79 0 60 - 4 47 Thousands/µL    Monocytes Absolute 0 85 0 17 - 1 22 Thousand/µL    Eosinophils Absolute 0 77 (H) 0 00 - 0 61 Thousand/µL    Basophils Absolute 0 02 0 00 - 0 10 Thousands/µL   Prepare fresh frozen plasma:Has consent been obtained?  Yes; Date of Surgery: 9/10/2019, 2 Units    Collection Time: 09/18/19  7:17 AM   Result Value Ref Range    Unit Product Code F6078Y59     Unit Number [id number]     Unit ABO A     Unit [facility] POS     Unit Dispense Status Transfused     Unit Product Code W3831V93     Unit Number [id number]     Unit ABO A     Unit [facility] POS     Unit Dispense Status Transfused    Fingerstick Glucose (POCT)    Collection Time: 09/18/19  6:21 PM   Result Value Ref Range    POC Glucose 126 65 - 140 mg/dl   Basic metabolic panel    Collection Time: 09/19/19  5:46 AM   Result Value Ref Range    Sodium 138 136 - 145 mmol/L    Potassium 3 4 (L) 3 5 - 5 3 mmol/L    Chloride 104 100 - 108 mmol/L    CO2 29 21 - 32 mmol/L    ANION GAP 5 4 - 13 mmol/L    BUN 14 5 - 25 mg/dL    Creatinine 0 85 0 60 - 1 30 mg/dL    Glucose 112 65 - 140 mg/dL    Calcium 7 9 (L) 8 3 - 10 1 mg/dL    eGFR 70 ml/min/1 73sq m   CBC and differential    Collection Time: 09/19/19  6:52 AM   Result Value Ref Range    WBC 12 10 (H) 4 31 - 10 16 Thousand/uL    RBC 2 89 (L) 3 81 - 5 12 Million/uL    Hemoglobin 8 5 (L) 11 5 - 15 4 g/dL    Hematocrit 26 8 (L) 34 8 - 46 1 %    MCV 93 82 - 98 fL    MCH 29 4 26 8 - 34 3 pg    MCHC 31 7 31 4 - 37 4 g/dL    RDW 14 5 11 6 - 15 1 %    MPV 11 3 8 9 - 12 7 fL    Platelets 728 193 - 756 Thousands/uL    nRBC 0 /100 WBCs    Neutrophils Relative 75 43 - 75 % Immat GRANS % 1 0 - 2 %    Lymphocytes Relative 13 (L) 14 - 44 %    Monocytes Relative 6 4 - 12 %    Eosinophils Relative 5 0 - 6 %    Basophils Relative 0 0 - 1 %    Neutrophils Absolute 9 05 (H) 1 85 - 7 62 Thousands/µL    Immature Grans Absolute 0 09 0 00 - 0 20 Thousand/uL    Lymphocytes Absolute 1 61 0 60 - 4 47 Thousands/µL    Monocytes Absolute 0 75 0 17 - 1 22 Thousand/µL    Eosinophils Absolute 0 57 0 00 - 0 61 Thousand/µL    Basophils Absolute 0 03 0 00 - 0 10 Thousands/µL       Meds:  Scheduled Meds:  Current Facility-Administered Medications:  acetaminophen 650 mg Oral Q6H PRN Rowan M Bendas, DO   diphenhydrAMINE 25 mg Intravenous Q6H PRN Nataly Zepeda PA-C   enoxaparin 40 mg Subcutaneous Q24H [address] [place] F Zabo, DO   gabapentin 100 mg Oral TID Rowan M Bendas, DO   HYDROmorphone 0 5 mg Intravenous Q4H PRN Rowan M Bendas, DO   metFORMIN 500 mg Oral BID With Meals Brooks Herrmann MD   nystatin 500,000 Units Swish & Swallow 4x Daily [place] F Zabo, DO   ondansetron 4 mg Intravenous Q6H PRN Rodolfo Henson PA-C   pantoprazole 40 mg Intravenous Q24H [address] [place] F Zabo, DO   prochlorperazine 5 mg Oral TID AC Rowan M Bendas, DO   promethazine 25 mg Oral Q6H PRN Brooks Herrmann MD     Continuous Infusions:   PRN Meds:   acetaminophen    diphenhydrAMINE    HYDROmorphone    ondansetron    promethazine    Imaging Studies: I have personally reviewed pertinent reports  EKG, Pathology, Microbiology, and Other Studies: I have personally reviewed pertinent reports        __________________    Signature / Title: Renae Reyes DO, Ob/Gyn, PGY-3  Date: 9/20/2019  Time: 5:51 AM

## 2019-09-20 NOTE — RESTORATIVE TECHNICIAN NOTE
Restorative Specialist Mobility Note       Activity: Ambulate in dwyer, Ambulate in room, Bathroom privileges, Chair, Dangle, Stand at bedside(Educated/encouraged pt to ambulate with assistance 3-4 x's/day  Bed alarm on   Pt callbell, phone/tray within reach )     Assistive Device: Front wheel walker    Bernadette ROSS, Restorative Technician, [facility]

## 2019-09-20 NOTE — PROGRESS NOTES
Progress Note - Palliative & Supportive Care  Hermelinda Croft  70 y o   female  [facility] 905/[facility] 905-01   MRN: [id number]  Encounter: [id number]     ASSESSMENT:    Patient Active Problem List   Diagnosis    Other ascites    Lesion of liver    Edema leg    Ovarian cancer ([facility] [address] )    Abdominal pain    Intractable nausea and vomiting    Bowel perforation ([address] )    Septic shock ([facility] [address] )    Peritonitis (HCC)    S/P ileostomy ([facility] [address] )    Acute blood loss anemia    Leukocytosis    Hypokalemia    Stoma dermatitis    Dehydration    Deep venous thrombosis of right profunda femoris vein (HCC)    Neuropathy    Encounter for central line care    Anemia    SBO (small bowel obstruction) (HCC)    Malignant neoplasm of ovary (HCC)    Acute kidney injury ([facility] [address] )    Metabolic acidosis     5S w/ stage IVB ovarian cancer, now s/p exploratory laparotomy + posterior pelvic exenteration w/ anterior resection + colostomy, ileostomy reversal, radical omentectomy and tumor debulking cystoscopy, flexible sigmoidoscopy  PLAN:    1  Symptom management:   Pain is being well managed on 100mg gabapentin PO TID scheduled  This should continue after discharge (whether this is done to home, or to STR)  PRN acetaminophen should continue as well   Patient felt altered when on opioids; she is very sensitive to such medications  Try to avoid opioids unless absolutely necessary   She had some nausea yesterday but felt medications "made it worse" in the past  No nausea today  She does not want any changes to her regimen (currently doing well on TID scheduled prochlorperazine, PRN ondansetron, PRN promethazine)  Recommend continuing prochlorperazine and PRN ondansetron at discharge   No other symptoms requiring acute medical intervention at this time  She denies diarrhea, constipation, anxiousness  2  Goals:    Ongoing disease-directed therapy     Wishes to go home if she is capable of caring for herself; otherwise she is willing to go to STR  She is frustrated that there is no longer a [facility] acute rehab at [facility], as that is close to her home   Patient is willing to follow up at our [facility] outpatient practice w/ Dr Natasha Chaidez  Code status: Level 1 - Full Code   Decisional apparatus:  Patient does have capacity to make medical decisions on my exam today  Advance Directive / Living Will / POLST:  Nothing on file; she has been given the 5 Wishes document in a prior hospitalization  We appreciate the opportunity to participate in this patient's care  We will continue to follow  Please do not hesitate to contact our on-call provider through our clinic answering service at [phone] should you have acute symptom control concerns  INTERVAL HISTORY:  Current pain location(s): mild diffuse abdominal pain, much improved in recent days  Current Analgesic regimen:  Gabapentin 100mg scheduled TID, PRN acetaminophen  24 hour Total OME: zero  Other history: Patient had an issue where her colostomy bag was not properly secured, and there was significant leaking this morning  Resolved  Patient feels clear-minded, and reports her pain is well-controlled at rest and when moving and ambulating  She is eager to go home  Review of Systems   Constitutional: Positive for fatigue  Negative for chills, diaphoresis and fever  HENT: Negative for congestion, sinus pressure and sore throat  Eyes: Negative for photophobia and visual disturbance  Respiratory: Negative for cough, chest tightness, shortness of breath and wheezing  Cardiovascular: Negative for chest pain, palpitations and leg swelling  Gastrointestinal: Positive for abdominal pain  Negative for abdominal distention, constipation, diarrhea, nausea and vomiting  Skin: Positive for pallor  Neurological: Negative for dizziness, syncope, speech difficulty, light-headedness and headaches     Psychiatric/Behavioral: Negative for agitation, behavioral problems, confusion, decreased concentration, dysphoric mood and hallucinations  The patient is not nervous/anxious  All other systems reviewed and are negative  MEDICATIONS / ALLERGIES:  all current active meds have been reviewed and current meds:   Current Facility-Administered Medications   Medication Dose Route Frequency    acetaminophen (TYLENOL) tablet 650 mg  650 mg Oral Q6H PRN    diphenhydrAMINE (BENADRYL) injection 25 mg  25 mg Intravenous Q6H PRN    enoxaparin (LOVENOX) subcutaneous injection 40 mg  40 mg Subcutaneous Q24H MARCIO    gabapentin (NEURONTIN) capsule 100 mg  100 mg Oral TID    HYDROmorphone (DILAUDID) injection 0 5 mg  0 5 mg Intravenous Q4H PRN    metFORMIN (GLUCOPHAGE) tablet 500 mg  500 mg Oral BID With Meals    nystatin (MYCOSTATIN) oral suspension 500,000 Units  500,000 Units Swish & Swallow 4x Daily    ondansetron (ZOFRAN) injection 4 mg  4 mg Intravenous Q6H PRN    pantoprazole (PROTONIX) injection 40 mg  40 mg Intravenous Q24H MARCIO    potassium chloride 20 mEq IVPB (premix)  20 mEq Intravenous Once    potassium chloride 40 mEq IVPB (premix)  40 mEq Intravenous Once    prochlorperazine (COMPAZINE) tablet 5 mg  5 mg Oral TID AC    promethazine (PHENERGAN) tablet 25 mg  25 mg Oral Q6H PRN       No Known Allergies    OBJECTIVE:  /65   Pulse 77   Temp 99 1 °F (37 3 °C)   Resp 18   Ht 5' 4" (1 626 m)   Wt 78 3 kg (172 lb 9 6 oz)   SpO2 95%   BMI 29 63 kg/m²   Physical Exam  Constitutional: Frail, pale elderly female in no acute physical or emotional distress  Head: Normocephalic and atraumatic  Eyes: EOM are normal  No ocular discharge  No scleral icterus  Neck: no visible adenopathy or masses  Respiratory: Effort normal  No stridor  No respiratory distress  Gastrointestinal: No abdominal distension  Mild diffuse abdominal tenderness  Surgical scars healing well  Colostomy bag in place; pink stoma  Incisions c/d/i  Musculoskeletal: No edema     Neurological: Alert, oriented and appropriately conversant  Skin: Dry, no diaphoresis  Psychiatric: Displays a normal mood and affect  Behavior, judgement and thought content appear normal      Lab Results:   I have personally reviewed pertinent labs  , CBC:   Lab Results   Component Value Date    WBC 11 85 (H) 09/20/2019    HGB 8 4 (L) 09/20/2019    HCT 26 6 (L) 09/20/2019    MCV 95 09/20/2019     09/20/2019    MCH 29 9 09/20/2019    MCHC 31 6 09/20/2019    RDW 14 8 09/20/2019    MPV 11 8 09/20/2019   , CMP:   Lab Results   Component Value Date    SODIUM 144 09/20/2019    K 3 2 (L) 09/20/2019     09/20/2019    CO2 31 09/20/2019    BUN 11 09/20/2019    CREATININE 0 89 09/20/2019    CALCIUM 8 0 (L) 09/20/2019    EGFR 66 09/20/2019     Imaging Studies: I have personally reviewed pertinent reports  EKG, Pathology, and Other Studies: I have personally reviewed pertinent reports  Counseling / Coordination of Care: Total floor / unit time spent today 20 minutes  Greater than 50% of total time was spent with the patient and / or family counseling and / or coordination of care  A description of the counseling / coordination of care: metastatic ovarian cancer, pelvic exenteration, cancer pain, post-op pain, nausea, diarrhea, constipation, goals of care      MD Petey [address] and Supportive Care

## 2019-09-20 NOTE — PLAN OF CARE
Problem: Prexisting or High Potential for Compromised Skin Integrity  Goal: Skin integrity is maintained or improved  Description  INTERVENTIONS:  - Identify patients at risk for skin breakdown  - Assess and monitor skin integrity  - Assess and monitor nutrition and hydration status  - Monitor labs   - Assess for incontinence   - Turn and reposition patient  - Assist with mobility/ambulation  - Relieve pressure over bony prominences  - Avoid friction and shearing  - Provide appropriate hygiene as needed including keeping skin clean and dry  - Evaluate need for skin moisturizer/barrier cream  - Collaborate with interdisciplinary team   - Patient/family teaching  - Consider wound care consult   Outcome: Progressing     Problem: GASTROINTESTINAL - ADULT  Goal: Maintains or returns to baseline bowel function  Description  INTERVENTIONS:  - Assess bowel function  - Encourage oral fluids to ensure adequate hydration  - Administer IV fluids if ordered to ensure adequate hydration  - Administer ordered medications as needed  - Encourage mobilization and activity  - Consider nutritional services referral to assist patient with adequate nutrition and appropriate food choices  Outcome: Progressing     Problem: GENITOURINARY - ADULT  Goal: Maintains or returns to baseline urinary function  Description  INTERVENTIONS:  - Assess urinary function  - Encourage oral fluids to ensure adequate hydration if ordered  - Administer IV fluids as ordered to ensure adequate hydration  - Administer ordered medications as needed  - Offer frequent toileting  - Follow urinary retention protocol if ordered  Outcome: Progressing     Problem: METABOLIC, FLUID AND ELECTROLYTES - ADULT  Goal: Electrolytes maintained within normal limits  Description  INTERVENTIONS:  - Monitor labs and assess patient for signs and symptoms of electrolyte imbalances  - Administer electrolyte replacement as ordered  - Monitor response to electrolyte replacements, including repeat lab results as appropriate  - Instruct patient on fluid and nutrition as appropriate  Outcome: Progressing  Goal: Fluid balance maintained  Description  INTERVENTIONS:  - Monitor labs   - Monitor I/O and WT  - Instruct patient on fluid and nutrition as appropriate  - Assess for signs & symptoms of volume excess or deficit  Outcome: Progressing  Goal: Glucose maintained within target range  Description  INTERVENTIONS:  - Monitor Blood Glucose as ordered  - Assess for signs and symptoms of hyperglycemia and hypoglycemia  - Administer ordered medications to maintain glucose within target range  - Assess nutritional intake and initiate nutrition service referral as needed  Outcome: Progressing     Problem: SKIN/TISSUE INTEGRITY - ADULT  Goal: Skin integrity remains intact  Description  INTERVENTIONS  - Identify patients at risk for skin breakdown  - Assess and monitor skin integrity  - Assess and monitor nutrition and hydration status  - Monitor labs (i e  albumin)  - Assess for incontinence   - Turn and reposition patient  - Assist with mobility/ambulation  - Relieve pressure over bony prominences  - Avoid friction and shearing  - Provide appropriate hygiene as needed including keeping skin clean and dry  - Evaluate need for skin moisturizer/barrier cream  - Collaborate with interdisciplinary team (i e  Nutrition, Rehabilitation, etc )   - Patient/family teaching  Outcome: Progressing  Goal: Incision(s), wounds(s) or drain site(s) healing without S/S of infection  Description  INTERVENTIONS  - Assess and document risk factors for skin impairment   - Assess and document dressing, incision, wound bed, drain sites and surrounding tissue  - Consider nutrition services referral as needed  - Oral mucous membranes remain intact  - Provide patient/ family education  Outcome: Progressing     Problem: MUSCULOSKELETAL - ADULT  Goal: Maintain or return mobility to safest level of function  Description  INTERVENTIONS:  - Assess patient's ability to carry out ADLs; assess patient's baseline for ADL function and identify physical deficits which impact ability to perform ADLs (bathing, care of mouth/teeth, toileting, grooming, dressing, etc )  - Assess/evaluate cause of self-care deficits   - Assess range of motion  - Assess patient's mobility  - Assess patient's need for assistive devices and provide as appropriate  - Encourage maximum independence but intervene and supervise when necessary  - Involve family in performance of ADLs  - Assess for home care needs following discharge   - Consider OT consult to assist with ADL evaluation and planning for discharge  - Provide patient education as appropriate  Outcome: Progressing     Problem: PAIN - ADULT  Goal: Verbalizes/displays adequate comfort level or baseline comfort level  Description  Interventions:  - Encourage patient to monitor pain and request assistance  - Assess pain using appropriate pain scale  - Administer analgesics based on type and severity of pain and evaluate response  - Implement non-pharmacological measures as appropriate and evaluate response  - Consider cultural and social influences on pain and pain management  - Notify physician/advanced practitioner if interventions unsuccessful or patient reports new pain  Outcome: Progressing     Problem: SAFETY ADULT  Goal: Patient will remain free of falls  Description  INTERVENTIONS:  - Assess patient frequently for physical needs  -  Identify cognitive and physical deficits and behaviors that affect risk of falls    -  [place] fall precautions as indicated by assessment   - Educate patient/family on patient safety including physical limitations  - Instruct patient to call for assistance with activity based on assessment  - Modify environment to reduce risk of injury  - Consider OT/PT consult to assist with strengthening/mobility  Outcome: Progressing  Goal: Maintain or return to baseline ADL function  Description  INTERVENTIONS:  -  Assess patient's ability to carry out ADLs; assess patient's baseline for ADL function and identify physical deficits which impact ability to perform ADLs (bathing, care of mouth/teeth, toileting, grooming, dressing, etc )  - Assess/evaluate cause of self-care deficits   - Assess range of motion  - Assess patient's mobility; develop plan if impaired  - Assess patient's need for assistive devices and provide as appropriate  - Encourage maximum independence but intervene and supervise when necessary  - Involve family in performance of ADLs  - Assess for home care needs following discharge   - Consider OT consult to assist with ADL evaluation and planning for discharge  - Provide patient education as appropriate  Outcome: Progressing  Goal: Maintain or return mobility status to optimal level  Description  INTERVENTIONS:  - Assess patient's baseline mobility status (ambulation, transfers, stairs, etc )    - Identify cognitive and physical deficits and behaviors that affect mobility  - Identify mobility aids required to assist with transfers and/or ambulation (gait belt, sit-to-stand, lift, walker, cane, etc )  - [place] fall precautions as indicated by assessment  - Record patient progress and toleration of activity level on Mobility SBAR; progress patient to next Phase/Stage  - Instruct patient to call for assistance with activity based on assessment  - Consider rehabilitation consult to assist with strengthening/weightbearing, etc   Outcome: Progressing     Problem: DISCHARGE PLANNING  Goal: Discharge to home or other facility with appropriate resources  Description  INTERVENTIONS:  - Identify barriers to discharge w/patient and caregiver  - Arrange for needed discharge resources and transportation as appropriate  - Identify discharge learning needs (meds, wound care, etc )  - Arrange for interpretive services to assist at discharge as needed  - Refer to Case Management Department for coordinating discharge planning if the patient needs post-hospital services based on physician/advanced practitioner order or complex needs related to functional status, cognitive ability, or social support system  Outcome: Progressing     Problem: Knowledge Deficit  Goal: Patient/family/caregiver demonstrates understanding of disease process, treatment plan, medications, and discharge instructions  Description  Complete learning assessment and assess knowledge base  Interventions:  - Provide teaching at level of understanding  - Provide teaching via preferred learning methods  Outcome: Progressing     Problem: Nutrition/Hydration-ADULT  Goal: Nutrient/Hydration intake appropriate for improving, restoring or maintaining nutritional needs  Description  Monitor and assess patient's nutrition/hydration status for malnutrition  Collaborate with interdisciplinary team and initiate plan and interventions as ordered  Monitor patient's weight and dietary intake as ordered or per policy  Utilize nutrition screening tool and intervene as necessary  Determine patient's food preferences and provide high-protein, high-caloric foods as appropriate       INTERVENTIONS:  - Monitor oral intake, urinary output, labs, and treatment plans  - Assess nutrition and hydration status and recommend course of action  - Evaluate amount of meals eaten  - Assist patient with eating if necessary   - Allow adequate time for meals  - Recommend/ encourage appropriate diets, oral nutritional supplements, and vitamin/mineral supplements  - Order, calculate, and assess calorie counts as needed  - Recommend, monitor, and adjust tube feedings and TPN/PPN based on assessed needs  - Assess need for intravenous fluids  - Provide specific nutrition/hydration education as appropriate  - Include patient/family/caregiver in decisions related to nutrition  Outcome: Progressing     Problem: Potential for Falls  Goal: Patient will remain free of falls  Description  INTERVENTIONS:  - Assess patient frequently for physical needs  -  Identify cognitive and physical deficits and behaviors that affect risk of falls    -  [place] fall precautions as indicated by assessment   - Educate patient/family on patient safety including physical limitations  - Instruct patient to call for assistance with activity based on assessment  - Modify environment to reduce risk of injury  - Consider OT/PT consult to assist with strengthening/mobility  Outcome: Progressing     Problem: RESPIRATORY - ADULT  Goal: Achieves optimal ventilation and oxygenation  Description  INTERVENTIONS:  - Assess for changes in respiratory status  - Assess for changes in mentation and behavior  - Position to facilitate oxygenation and minimize respiratory effort  - Oxygen administered by appropriate delivery if ordered  - Initiate smoking cessation education as indicated  - Encourage broncho-pulmonary hygiene including cough, deep breathe, Incentive Spirometry  - Assess the need for suctioning and aspirate as needed  - Assess and instruct to report SOB or any respiratory difficulty  - Respiratory Therapy support as indicated  Outcome: Completed     Problem: INFECTION - ADULT  Goal: Absence or prevention of progression during hospitalization  Description  INTERVENTIONS:  - Assess and monitor for signs and symptoms of infection  - Monitor lab/diagnostic results  - Monitor all insertion sites, i e  indwelling lines, tubes, and drains  - Monitor endotracheal if appropriate and nasal secretions for changes in amount and color  - [place] appropriate cooling/warming therapies per order  - Administer medications as ordered  - Instruct and encourage patient and family to use good hand hygiene technique  - Identify and instruct in appropriate isolation precautions for identified infection/condition  Outcome: Completed

## 2019-09-20 NOTE — PLAN OF CARE
Problem: OCCUPATIONAL THERAPY ADULT  Goal: Performs self-care activities at highest level of function for planned discharge setting  See evaluation for individualized goals  Description  Treatment Interventions: ADL retraining, Functional transfer training, UE strengthening/ROM, Endurance training, Cognitive reorientation, Patient/family training, Equipment evaluation/education, Compensatory technique education, Continued evaluation, Energy conservation, Activityengagement          See flowsheet documentation for full assessment, interventions and recommendations  Outcome: Progressing  Note:   Limitation: Decreased ADL status, Decreased UE strength, Decreased UE ROM, Decreased Safe judgement during ADL, Decreased endurance, Decreased self-care trans, Decreased high-level ADLs  Prognosis: Fair  Assessment: Pt participates in OT session with focus on LB dressing, bed mobility, transfers, and standing tolerance to increase I for d/c  Pt supervision supine to sit EOB with HOB flat and no SR support  Pt does require vc to take breaths at times during session with O2 falling briefly to 82, but bounces back after rest break  Pt supervision toilet transfer with RW and grab bar support  Pt supervision LB dressing to don/doff socks while seated EOB  Pt engaged in static standing with RW for 1 min 30 sec  D/C recommendation updated to Home OT from [employer] after conferring with evaluating OTR  PT also notified via OTR regarding change in OT D/C recommendation  Pt will continue to benefit from activity tolerance, adls, and transfers       OT Discharge Recommendation: Home OT(updated from [facility] after consulting evaluating OTR)  OT - OK to Discharge: (when medically cleared)

## 2019-09-21 VITALS
TEMPERATURE: 98.9 F | WEIGHT: 172.62 LBS | BODY MASS INDEX: 29.47 KG/M2 | HEART RATE: 72 BPM | OXYGEN SATURATION: 96 % | HEIGHT: 64 IN | RESPIRATION RATE: 18 BRPM | SYSTOLIC BLOOD PRESSURE: 143 MMHG | DIASTOLIC BLOOD PRESSURE: 66 MMHG

## 2019-09-21 LAB
ANION GAP SERPL CALCULATED.3IONS-SCNC: 8 MMOL/L (ref 4–13)
BUN SERPL-MCNC: 8 MG/DL (ref 5–25)
CALCIUM SERPL-MCNC: 7.9 MG/DL (ref 8.3–10.1)
CHLORIDE SERPL-SCNC: 112 MMOL/L (ref 100–108)
CO2 SERPL-SCNC: 30 MMOL/L (ref 21–32)
CREAT SERPL-MCNC: 0.84 MG/DL (ref 0.6–1.3)
GFR SERPL CREATININE-BSD FRML MDRD: 71 ML/MIN/1.73SQ M
GLUCOSE SERPL-MCNC: 104 MG/DL (ref 65–140)
GLUCOSE SERPL-MCNC: 126 MG/DL (ref 65–140)
GLUCOSE SERPL-MCNC: 211 MG/DL (ref 65–140)
MAGNESIUM SERPL-MCNC: 1.5 MG/DL (ref 1.6–2.6)
POTASSIUM SERPL-SCNC: 3.5 MMOL/L (ref 3.5–5.3)
SODIUM SERPL-SCNC: 150 MMOL/L (ref 136–145)

## 2019-09-21 PROCEDURE — 82948 REAGENT STRIP/BLOOD GLUCOSE: CPT

## 2019-09-21 PROCEDURE — 83735 ASSAY OF MAGNESIUM: CPT | Performed by: INTERNAL MEDICINE

## 2019-09-21 PROCEDURE — 99024 POSTOP FOLLOW-UP VISIT: CPT | Performed by: OBSTETRICS & GYNECOLOGY

## 2019-09-21 PROCEDURE — 80048 BASIC METABOLIC PNL TOTAL CA: CPT | Performed by: INTERNAL MEDICINE

## 2019-09-21 PROCEDURE — C9113 INJ PANTOPRAZOLE SODIUM, VIA: HCPCS | Performed by: OBSTETRICS & GYNECOLOGY

## 2019-09-21 RX ORDER — POTASSIUM CHLORIDE 20 MEQ/1
20 TABLET, EXTENDED RELEASE ORAL DAILY
Qty: 30 TABLET | Refills: 0 | Status: SHIPPED | OUTPATIENT
Start: 2019-09-21 | End: 2019-11-12

## 2019-09-21 RX ADMIN — NYSTATIN 500000 UNITS: 100000 SUSPENSION ORAL at 10:10

## 2019-09-21 RX ADMIN — NYSTATIN 500000 UNITS: 100000 SUSPENSION ORAL at 12:18

## 2019-09-21 RX ADMIN — ENOXAPARIN SODIUM 40 MG: 40 INJECTION SUBCUTANEOUS at 10:10

## 2019-09-21 RX ADMIN — PROCHLORPERAZINE MALEATE 5 MG: 5 TABLET, FILM COATED ORAL at 06:18

## 2019-09-21 RX ADMIN — PANTOPRAZOLE SODIUM 40 MG: 40 INJECTION, POWDER, FOR SOLUTION INTRAVENOUS at 10:10

## 2019-09-21 RX ADMIN — GABAPENTIN 100 MG: 100 CAPSULE ORAL at 10:10

## 2019-09-21 RX ADMIN — METFORMIN HYDROCHLORIDE 500 MG: 500 TABLET ORAL at 10:10

## 2019-09-21 NOTE — DISCHARGE INSTRUCTIONS
Abdominal Hysterectomy   WHAT YOU SHOULD KNOW:   An abdominal hysterectomy (AH) is surgery to remove your uterus  Your uterus will be removed through an incision in your abdomen  You may need an AH if you have a tumor in your uterus or other reproductive organs  You may also need an AH if you have an infection, pain, or bleeding  AFTER YOU LEAVE:   Medicines:   · Pain medicine: You may be given medicine to take away or decrease pain  Do not wait until the pain is severe before you take your medicine  · Antinausea medicine: This medicine may be given to calm your stomach and prevent vomiting  · Blood thinners  help prevent blood clots  Blood thinners may be given before, during, and after a surgery or procedure  Blood thinners make it more likely for you to bleed or bruise  · Take your medicine as directed  Call your healthcare provider if you think your medicine is not helping or if you have side effects  Tell him if you are allergic to any medicine  Keep a list of the medicines, vitamins, and herbs you take  Include the amounts, and when and why you take them  Bring the list or the pill bottles to follow-up visits  Carry your medicine list with you in case of an emergency  Follow up with your primary healthcare provider or gynecologist as directed:  Ask how to care for your wound  You may need blood tests, x-rays, or ultrasounds at your follow-up visits  Write down your questions so you remember to ask them during your visits  Limit activity until you have fully recovered from surgery:   · Ask when it is safe for you to drive, walk up stairs, lift heavy objects, and have sex  · Ask when it is okay to exercise, and what types of exercise to do  Start slowly and do more as you get stronger  Contact your primary healthcare provider or gynecologist if:   · You have heavy vaginal bleeding that fills 1 or more sanitary pads in 1 hour  · Your wound opens      · You have a fever, and your wound is red and swollen  · You have yellow, green, or bad-smelling discharge coming from your vagina  · You feel new pain or fullness in your vagina  · You have questions or concerns about your surgery, medicine, or care  Seek care immediately or call 911 if:   · You have new or more blood from your vagina or your wound  · Your arm or leg feels warm, tender, and painful  It may look swollen and red  · You suddenly feel lightheaded and have trouble breathing  · You have chest pain  You may have more pain when you take a deep breath or cough  You may cough up blood  © 2014 [address] is for End User's use only and may not be sold, redistributed or otherwise used for commercial purposes  All illustrations and images included in CareNotes® are the copyrighted property of A D A M , Inc  or Carlos Bhandari  The above information is an  only  It is not intended as medical advice for individual conditions or treatments  Talk to your doctor, nurse or pharmacist before following any medical regimen to see if it is safe and effective for you  Colostomy Care   WHAT YOU NEED TO KNOW:   You or a family member will need to learn to care for your colostomy  It may require changes in your lifestyle  You may work with an ostomy specialist to find the best ways to care for yourself  DISCHARGE INSTRUCTIONS:   Follow up with your healthcare provider as directed: You may need to return to have your stoma and colostomy checked  Bring your ostomy equipment with you to your appointments and any time you have to go to the hospital  Write down your questions so you remember to ask them during your visits  Skin care:  Look at the skin around your stoma each time you change your pouch  Your stoma should be pink or red and moist  You may have a small amount of bleeding when you clean your stoma   This is normal  Your stoma will get smaller and become its normal size in about 8 weeks  · Make sure the skin barrier opening fits well: The skin barrier is the part of the pouch that sticks to the skin of your abdomen  It should be no more than ? of an inch larger than your stoma  If the opening is too large, bowel movement can leak onto your skin and cause irritation  Measure the size of your stoma with the guide that comes with your colostomy supplies  Make sure you cut the skin barrier smaller as your stoma gets smaller  · Soothe irritated skin:  If your skin is red, it may mean that the skin barrier was on too long  It is important to find the cause of your skin irritation  Ask if you need help finding the cause of your skin irritation  Change your pouch: How you change your pouch depends on the type you have  You will be given specific instructions on how to change your colostomy pouch  The following is general information about how to change your pouch:  · Ask how often to change your colostomy pouch: The type of pouch you wear affects the amount of time it can be worn  The kind and amount of bowel movement you have also affects how long the pouch stays on  · Remove the pouch:  Gently remove the pouch by pushing the skin down and away from the adhesive skin barrier with one hand  With the other hand, pull the pouch up and away from the stoma  · Gently clean the skin around your stoma:  Use mild soap and water  Do not use soaps that have oil or perfumes  Pat your skin dry  · Use a pouch with the right size opening:  Use a pouch that has an opening that is ? of an inch larger than your stoma  · Use skin products to help reduce irritation:  These products can help protect your skin and keep it dry  · Use slight pressure to place your clean pouch:  Center the pouch over the stoma and press it firmly into place on clean, dry skin  It may be helpful to hold your hand over the new pouch for 30 seconds   The warmth of your hand can help stick the adhesive skin barrier into place  · Dispose of the used pouch correctly: If the pouch is disposable, place the old pouch in another plastic bag and throw it in the trash  If you use a reusable pouch, ask how to clean it  Empty your pouch:   · Empty the pouch when it is ? to ½ full:  Do not wait until the pouch is completely full  This could put pressure on the seal and cause it to leak or spill  · Hold the pouch up by the bottom end:  If the pouch has a clamp system, remove the clamp  You may need to roll the end back to keep it from getting soiled  · Drain the pouch:  Place toilet paper into the toilet before you empty the pouch to reduce splash back  Drain the pouch by squeezing the contents into the toilet  · Clean the end of the pouch:  Use toilet paper or a moist paper towel  You may also rinse the pouch but it is not necessary  Keep the end of the pouch clean  · Close the end of the pouch:  Unroll the end of the pouch  Replace the clamp or close the end of the pouch according to your healthcare provider's instructions  Foods to eat with a colostomy:   · Eat a variety of healthy foods:  Healthy foods include fruits, vegetables, whole-grain breads, low-fat dairy products, lean meats, and fish  Do not eat foods that give you cramps or diarrhea  · Limit foods that may cause gas and odor: These include vegetables such as broccoli, cabbage, and cauliflower  Beans, eggs, and fish may also cause gas and odor  Eat slowly and do not use a straw to drink liquids  Yogurt, buttermilk, and fresh parsley may help control odor and gas  · Drink liquids as directed:  Ask how much liquid to drink each day and which liquids are best for you  This may help reduce constipation  Living with a colostomy:   · Ostomy supplies:  Ask what ostomy supplies you will need and how to get them  · Work:  Your healthcare provider will tell you when you can go back to work   You may need special support to prevent a hernia if you do heavy labor, such as lifting or digging  You may need an ostomy belt over the pouch to keep it in place if you are very active  · Activity:  Ask about the best exercise plan for you  Talk to your healthcare provider before you play contact sports  You may need to wear a special support or a colostomy cover to protect your stoma  Empty your pouch before you play sports or have sex  · Bathing or swimming: You can bathe with or without your pouch  Always wear your pouch when you swim  Empty your pouch before you swim  Use waterproof tape over the edges of your skin barrier  · Traveling:  Carry extra colostomy supplies and pouches with you when you travel  If necessary, your local ostomy group or ostomy nurse may be able to help you find supplies in the area you are visiting  For support and more information:   · [employer] [facility]  [address] , [address]  Phone: [phone]  Web Address: [email or web address]  · [facility] [address] [facility] [address]   Web Address: www [email or web address]  [email or web address]  Contact your healthcare provider if:   · You have a fever  · You have a foul odor coming from your colostomy bag or stoma that lasts longer than a week  · Your skin around the stoma becomes red and irritated  · You have nausea, vomiting, pain, cramping, or bloating  · You do not have regular bowel movements through your stoma  · The size of your stoma changes  · You have questions or concerns about your condition or care  Seek care immediately or call 911 if:   · Your bowel movements are black or bloody  · Your stoma is bleeding and you cannot stop the bleeding  · You are too weak to stand up  · You have severe abdominal pain  © 2017 [address] is for End User's use only and may not be sold, redistributed or otherwise used for commercial purposes   All illustrations and images included in CareNotes® are the copyrighted property of A D A M , Inc  or Carlos Bhandari  The above information is an  only  It is not intended as medical advice for individual conditions or treatments  Talk to your doctor, nurse or pharmacist before following any medical regimen to see if it is safe and effective for you  Acute Wound Care   WHAT YOU NEED TO KNOW:   An acute wound is an injury that causes a break in the skin  DISCHARGE INSTRUCTIONS:   Medicines:   · NSAIDs  help decrease swelling, pain, and fever  This medicine is available with or without a doctor's order  NSAIDs can cause stomach bleeding or kidney problems in certain people  If you take blood thinner medicine, always ask your healthcare provider if NSAIDs are safe for you  Always read the medicine label and follow directions  · Acetaminophen  decreases pain and fever  It is available without a doctor's order  Ask how much to take and how often to take it  Follow directions  Acetaminophen can cause liver damage if not taken correctly  · Antibiotics  may be given to prevent or treat an infection caused by bacteria  · Take your medicine as directed  Contact your healthcare provider if you think your medicine is not helping or if you have side effects  Tell him if you are allergic to any medicine  Keep a list of the medicines, vitamins, and herbs you take  Include the amounts, and when and why you take them  Bring the list or the pill bottles to follow-up visits  Carry your medicine list with you in case of an emergency  Follow up with your healthcare provider as directed: You may need to return to have your stitches or staples removed, wound checked, or bandage changed  Write down your questions so you remember to ask them during your visits  Wound care:   · If your wound was closed with thin strips of medical tape, keep them clean and dry   The strips of medical tape will fall off on their own  Do not pull them off  · Keep the bandage clean and dry  Do not remove the bandage over your wound unless your healthcare provider says it is okay  · Wash your hands before and after you take care of your wound to prevent infection  · Clean the wound as directed  If you cannot reach the wound, have someone help you  · If you have packing, make sure all the gauze used to pack the wound is taken out and replaced as directed  Keep track of how many gauze dressings are placed inside the wound  Contact your healthcare provider if:   · You have muscle, joint, or body aches, sweating, or a fever  · You have more swelling, redness, or bleeding in your wound  · Your skin is itchy, swollen, or you have a rash  · You have questions or concerns about your condition or care  Seek care immediately if:   · You have pus or a foul odor coming from the wound  · You have sudden trouble breathing or chest pain  · Blood soaks through your bandage  © 2016 [address] is for End User's use only and may not be sold, redistributed or otherwise used for commercial purposes  All illustrations and images included in CareNotes® are the copyrighted property of A D A M , Inc  or Carlos Bhandari  The above information is an  only  It is not intended as medical advice for individual conditions or treatments  Talk to your doctor, nurse or pharmacist before following any medical regimen to see if it is safe and effective for you

## 2019-09-21 NOTE — SOCIAL WORK
[facility] AT [facility] updated re: discharge today  CM requested attending to include wound care on discharge instructions  No additional needs noted

## 2019-09-21 NOTE — PROGRESS NOTES
GYN-ONC Progress Note  [place]  [id number]  [facility] 9/21/2019  7:12 AM    ASSESS: 70year old with stage IVB ovarian ca, now POD #10 following exploratory laparotomy and posterior pelvic exenteration with anterior resection and colostomy, ileostomy reversal, radical omentectomy and tumor debulking cystoscopy, flexible sigmoidoscopy    PLAN:  Given resolution of CKD, stable postoperative hemoglobin, antibiotic treatment and thoracentesis for suspected pneumonia, postoperative palliative care coordination for ongoing pain control and nausea, as well as spontaneous voiding, patient may be discharged home today and will receive home therapy  Dispo: discharge    PPx: SCDs, lovenox, transition to Eliquis 2 5 mg BID at time of discharge   FEN: regular  Pain mgmnt: gabapentin  Invasive lines: ANDREAS drain removed  Code status: full  ____________________    SUBJECTIVE  History of Present Illness:  Overnight:  No acute events, stating that she is getting use to voiding spontaneously stating that sometimes at night it was hard to get to the bathroom but she was able to use a bedpan  Denies dysuria or hematuria  Pain: remains improved with gabapentin  Tolerating Oral Intake: yes   Voiding: yes  Flatus: output in colostomy  Bowel Movement: output in colostomy  Ambulating: yes  Vaginal Bleeding: no  Pelvic Pain: no  Chest Pain: no  Shortness of Breath: no  Leg Pain/Discomfort: no    OBJECTIVE  Vitals  Temp:  [98 3 °F (36 8 °C)-99 2 °F (37 3 °C)] 99 2 °F (37 3 °C)  HR:  [78-86] 83  Resp:  [20] 20  BP: (124-142)/(58-67) 142/67       Intake/Output Summary (Last 24 hours) at 9/21/2019 1451  Last data filed at 9/21/2019 0503  Gross per 24 hour   Intake 700 ml   Output 4125 ml   Net -3425 ml       Physical Exam:   Physical Exam   Constitutional: She is oriented to person, place, and time  Vital signs are normal  She is active     Cardiovascular: Normal rate, regular rhythm, normal heart sounds and intact distal pulses  Pulmonary/Chest: Effort normal and breath sounds normal  No stridor  No respiratory distress  Abdominal: Soft  Bowel sounds are normal  She exhibits no distension  There is no tenderness  Midline incision clean, dry, and intact  ANDREAS drain site clean and dry after removal, covered with steri strips  Ostomy with stool output    Neurological: She is alert and oriented to person, place, and time  Skin: Skin is warm and dry  Psychiatric: She has a normal mood and affect  Her behavior is normal          Labs:   Recent Results (from the past 72 hour(s))   Prepare fresh frozen plasma:Has consent been obtained?  Yes; Date of Surgery: 9/10/2019, 2 Units    Collection Time: 09/18/19  7:17 AM   Result Value Ref Range    Unit Product Code N0497D36     Unit Number [id number]     Unit ABO A     Unit [facility] POS     Unit Dispense Status Transfused     Unit Product Code U3773P80     Unit Number [id number]     Unit ABO A     Unit [facility] POS     Unit Dispense Status Transfused    Fingerstick Glucose (POCT)    Collection Time: 09/18/19  6:21 PM   Result Value Ref Range    POC Glucose 126 65 - 140 mg/dl   Basic metabolic panel    Collection Time: 09/19/19  5:46 AM   Result Value Ref Range    Sodium 138 136 - 145 mmol/L    Potassium 3 4 (L) 3 5 - 5 3 mmol/L    Chloride 104 100 - 108 mmol/L    CO2 29 21 - 32 mmol/L    ANION GAP 5 4 - 13 mmol/L    BUN 14 5 - 25 mg/dL    Creatinine 0 85 0 60 - 1 30 mg/dL    Glucose 112 65 - 140 mg/dL    Calcium 7 9 (L) 8 3 - 10 1 mg/dL    eGFR 70 ml/min/1 73sq m   CBC and differential    Collection Time: 09/19/19  6:52 AM   Result Value Ref Range    WBC 12 10 (H) 4 31 - 10 16 Thousand/uL    RBC 2 89 (L) 3 81 - 5 12 Million/uL    Hemoglobin 8 5 (L) 11 5 - 15 4 g/dL    Hematocrit 26 8 (L) 34 8 - 46 1 %    MCV 93 82 - 98 fL    MCH 29 4 26 8 - 34 3 pg    MCHC 31 7 31 4 - 37 4 g/dL    RDW 14 5 11 6 - 15 1 %    MPV 11 3 8 9 - 12 7 fL    Platelets 534 584 - 336 Thousands/uL    nRBC 0 /100 WBCs Neutrophils Relative 75 43 - 75 %    Immat GRANS % 1 0 - 2 %    Lymphocytes Relative 13 (L) 14 - 44 %    Monocytes Relative 6 4 - 12 %    Eosinophils Relative 5 0 - 6 %    Basophils Relative 0 0 - 1 %    Neutrophils Absolute 9 05 (H) 1 85 - 7 62 Thousands/µL    Immature Grans Absolute 0 09 0 00 - 0 20 Thousand/uL    Lymphocytes Absolute 1 61 0 60 - 4 47 Thousands/µL    Monocytes Absolute 0 75 0 17 - 1 22 Thousand/µL    Eosinophils Absolute 0 57 0 00 - 0 61 Thousand/µL    Basophils Absolute 0 03 0 00 - 0 10 Thousands/µL   CBC    Collection Time: 09/20/19  5:40 AM   Result Value Ref Range    WBC 11 85 (H) 4 31 - 10 16 Thousand/uL    RBC 2 81 (L) 3 81 - 5 12 Million/uL    Hemoglobin 8 4 (L) 11 5 - 15 4 g/dL    Hematocrit 26 6 (L) 34 8 - 46 1 %    MCV 95 82 - 98 fL    MCH 29 9 26 8 - 34 3 pg    MCHC 31 6 31 4 - 37 4 g/dL    RDW 14 8 11 6 - 15 1 %    Platelets 505 283 - 629 Thousands/uL    MPV 11 8 8 9 - 12 7 fL   Basic metabolic panel    Collection Time: 09/20/19  5:44 AM   Result Value Ref Range    Sodium 144 136 - 145 mmol/L    Potassium 3 2 (L) 3 5 - 5 3 mmol/L    Chloride 107 100 - 108 mmol/L    CO2 31 21 - 32 mmol/L    ANION GAP 6 4 - 13 mmol/L    BUN 11 5 - 25 mg/dL    Creatinine 0 89 0 60 - 1 30 mg/dL    Glucose 158 (H) 65 - 140 mg/dL    Calcium 8 0 (L) 8 3 - 10 1 mg/dL    eGFR 66 ml/min/1 73sq m   Fingerstick Glucose (POCT)    Collection Time: 09/20/19  8:09 AM   Result Value Ref Range    POC Glucose 168 (H) 65 - 140 mg/dl   Fingerstick Glucose (POCT)    Collection Time: 09/20/19 12:16 PM   Result Value Ref Range    POC Glucose 196 (H) 65 - 140 mg/dl   Fingerstick Glucose (POCT)    Collection Time: 09/20/19  4:14 PM   Result Value Ref Range    POC Glucose 153 (H) 65 - 140 mg/dl   Fingerstick Glucose (POCT)    Collection Time: 09/20/19  9:08 PM   Result Value Ref Range    POC Glucose 169 (H) 65 - 140 mg/dl   Basic metabolic panel    Collection Time: 09/21/19  5:02 AM   Result Value Ref Range    Sodium 150 (H) 136 - 145 mmol/L    Potassium 3 5 3 5 - 5 3 mmol/L    Chloride 112 (H) 100 - 108 mmol/L    CO2 30 21 - 32 mmol/L    ANION GAP 8 4 - 13 mmol/L    BUN 8 5 - 25 mg/dL    Creatinine 0 84 0 60 - 1 30 mg/dL    Glucose 104 65 - 140 mg/dL    Calcium 7 9 (L) 8 3 - 10 1 mg/dL    eGFR 71 ml/min/1 73sq m   Magnesium    Collection Time: 09/21/19  5:02 AM   Result Value Ref Range    Magnesium 1 5 (L) 1 6 - 2 6 mg/dL       Meds:  Scheduled Meds:  Current Facility-Administered Medications:  acetaminophen 650 mg Oral Q6H PRN Rowan M Bendas, DO   diphenhydrAMINE 25 mg Intravenous Q6H PRN Casper Zepeda PA-C   enoxaparin 40 mg Subcutaneous Q24H [facility] [place] Wagner, DO   gabapentin 100 mg Oral TID Rowan M Bendas, DO   HYDROmorphone 0 5 mg Intravenous Q4H PRN Rowan Mills, DO   metFORMIN 500 mg Oral BID With Meals Chitra Arora MD   nystatin 500,000 Units Swish & Swallow 4x Daily Tish Wagner, DO   ondansetron 4 mg Intravenous Q6H PRN Cy Victoria PA-C   pantoprazole 40 mg Intravenous Q24H [facility] Tish Wagner, DO   prochlorperazine 5 mg Oral TID AC Rowan NONA Mills, DO   promethazine 25 mg Oral Q6H PRN Chitra Arora MD     Continuous Infusions:   PRN Meds:   acetaminophen    diphenhydrAMINE    HYDROmorphone    ondansetron    promethazine    Imaging Studies: I have personally reviewed pertinent reports  EKG, Pathology, Microbiology, and Other Studies: I have personally reviewed pertinent reports     and I have personally reviewed pertinent films in PACS    __________________    Signature / Title: Betsy Milian DO, Ob/Gyn, PGY-3  Date: 9/21/2019  Time: 7:12 AM

## 2019-09-23 ENCOUNTER — TELEPHONE (OUTPATIENT)
Dept: GYNECOLOGIC ONCOLOGY | Facility: CLINIC | Age: 69
End: 2019-09-23

## 2019-09-25 ENCOUNTER — OFFICE VISIT (OUTPATIENT)
Dept: GYNECOLOGIC ONCOLOGY | Facility: CLINIC | Age: 69
End: 2019-09-25

## 2019-09-25 VITALS
WEIGHT: 153 LBS | BODY MASS INDEX: 26.12 KG/M2 | SYSTOLIC BLOOD PRESSURE: 120 MMHG | HEIGHT: 64 IN | RESPIRATION RATE: 18 BRPM | HEART RATE: 80 BPM | TEMPERATURE: 97.9 F | DIASTOLIC BLOOD PRESSURE: 76 MMHG

## 2019-09-25 DIAGNOSIS — C56.9 MALIGNANT NEOPLASM OF OVARY, UNSPECIFIED LATERALITY (HCC): Primary | ICD-10-CM

## 2019-09-25 DIAGNOSIS — C56.3 MALIGNANT NEOPLASM OF BOTH OVARIES (HCC): ICD-10-CM

## 2019-09-25 PROCEDURE — 99024 POSTOP FOLLOW-UP VISIT: CPT | Performed by: OBSTETRICS & GYNECOLOGY

## 2019-09-25 NOTE — LETTER
September 25, 2019     Justice Ely MD  Attn: NONA Pagan   [address] [place] [address]    Patient: Olena Gallardo   YOB: 1950   Date of Visit: 9/25/2019       Dear Dr Havery Gaucher Recipients: Thank you for referring Olena Gallardo to me for evaluation  Below are my notes for this consultation  If you have questions, please do not hesitate to call me  I look forward to following your patient along with you  Sincerely,        Sabrina Escobar MD        CC: No Recipients  Sabrina Escobar MD  9/25/2019 11:24 AM  Sign at close encounter  Assessment/Plan:    Problem List Items Addressed This Visit        Endocrine    Ovarian cancer ([facility] )     Overall the patient is doing as expected post operatively  Her stoma and bladder are functioning as expected  Her pain is improved  She is performing all activities of daily living  The patient would like to delay somewhat for her final postop check in to initiate chemotherapy  I will see her back in 2 weeks  We have recommended 3 cycles of carboplatin paclitaxel chemotherapy followed by some form of consolidation  We will give her paclitaxel on a weekly basis as we have been doing before  Malignant neoplasm of ovary ([facility] [address] ) - Primary            CHIEF COMPLAINT:  Postoperative evaluation      Problem:  Cancer Staging  Ovarian cancer [facility])  Staging form: Ovary, Fallopian Tube, Primary Peritoneal, AJCC 8th Edition  - Clinical stage from 11/14/2018: FIGO Stage IVB (cT3c, cN0, pM1b) - Signed by Sabrina Escobar MD on 11/14/2018  - Pathologic: No stage assigned - Unsigned        Previous therapy:     Ovarian cancer ([facility] [address] )    11/9/2018 Initial Diagnosis     Ovarian cancer ([facility] [address] )   tumor marker 194 5      11/9/2018 Biopsy     CT-guided needle biopsy of abdominal mass 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    Given liver involvement this would be stage IV      11/14/2018 - 12/4/2018 Chemotherapy     Neoadjuvant therapy: carboplatin area under the curve of 6, paclitaxel 135 milligrams/meter squared, Avastin 10 milligrams/kilogram  Completed 1 of 3 cycles  12/14/2018 Surgery     LAPAROTOMY EXPLORATORY; Abdominal Washout; Application of Abthera Vac Dressing for suspected bowel perforation and septic shock  12/15/2018 Surgery     LAPAROTOMY EXPLORATORY, ABDOMINAL WASHOUT, DRAIN PLACEMENT x 4, DIVERTING LOOP ILEOSTOMY AND ABDOMINAL CLOSURE for bowel perforation      3/26/2019 -  Chemotherapy     Taxol weekly 80 mg/m2 for 3 consecutive weeks, may add carboplatin in the future with AUC of 5       3/26/2019 -  Chemotherapy     CARBOplatin (PARAPLATIN) in sodium chloride 0 9 % 250 mL IVPB,  (original dose ), Intravenous, Once, 3 of 3 cycles  Dose modification:   (Cycle 2),   (original dose 258 4 mg, Cycle 3),   (original dose 258 4 mg, Cycle 3),   (original dose 244 4 mg, Cycle 4)  Administration: 258 4 mg (5/28/2019), 244 4 mg (6/25/2019)  PACLitaxel (TAXOL) 127 8 mg in sodium chloride 0 9 % 250 mL chemo IVPB, 80 mg/m2, Intravenous, Once, 4 of 4 cycles  Dose modification: 65 mg/m2 (original dose 80 mg/m2, Cycle 2, Reason: Dose Not Tolerated)  Administration: 103 8 mg (5/14/2019), 108 6 mg (5/28/2019), 108 6 mg (6/4/2019), 108 6 mg (6/11/2019), 109 8 mg (6/25/2019), 109 8 mg (7/2/2019), 109 8 mg (7/9/2019)      4/29/2019 Genomic Testing      Foundation 1 testing revealed a PD L1 tumor proportions score of 0%  The patient is not a candidate for PD L1 manipulation       Genetic Testing     [facility] Breast/Gyn panel, wildtype  9/10/2019 Surgery     Exploratory laparotomy radical omentectomy, posterior exenteration, takedown of ileostomy and end colostomy for complete debulking of visible tumor      Final pathology report revealed high-grade serous malignancy in both the omentum and the pelvic mass      9/25/2019 -  Chemotherapy     Carboplatin area under the curve of 5+ weekly paclitaxel at 80 milligrams/meters squared for 3 further cycles of treatment followed by consolidation this is to begin mid October           Patient ID: Alee Soares is a 70 y o  female  Patient presents for consideration of treatment planning and continuation of chemotherapy after recent debulking surgery  She is a very pleasant 60-year-old white female history of stage 4 ovarian cancer diagnosed in approximately August of 2018  She delayed therapy and subsequently underwent a single cycle of carboplatin paclitaxel and Avastin which resulted in bowel perforation in December of 2018  The patient then underwent exploratory laparotomy and  diverting ileostomy  The perforation was never found  She was subsequently treated with multiple cycles of carboplatin paclitaxel and had significant and reduction in disease  Most recently the patient underwent a posterior exenteration with takedown of ileostomy and some end colostomy  This was performed approximately 2 weeks ago  The patient had significant difficulty with pain control and narcotics  Once her epidural was inadvertently discontinued  The patient was started on Neurontin and her pain level improved her nausea and general dysfunction went away  The patient's final pathology report reveals the following:  Final Diagnosis  A  Uterus and bilateral ovaries and fallopian tubes, sigmoid colon, en bloc resection:  - 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, bilateral ovaries, uterus, colon (submucosa, muscular propria and pericolonic adipose tissue), favor right fallopian tube primary   - One of three pericolonic lymph nodes is positive for tumor (1/3)  - Colon resection margins are negative for tumor   - Sigmoid colon with diverticulosis  - Uterus (279 Grams) with benign endometrial polyp, adenomyosis and leiomyoma     B  Omentum, omentectomy:  - High grade serous carcinoma      C   Ileostomy, takedown:  - Portion of small intestine with mucocutaneous anastomosis consistent with stoma  - Negative for malignancy  Since she has been home the patient has been doing better  She is ambulating usually without a walker  Her pain is controlled with Neurontin  She is tolerating a regular diet  Her wounds are healing well  Her stoma is functioning well  She remains somewhat fatigued and occasionally runs out of steam   I have recommended starting chemotherapy 4 weeks after surgery which would be in early October  The patient would like to wait until mid October or november      The following portions of the patient's history were reviewed and updated as appropriate: allergies, current medications, past family history, past medical history, past social history, past surgical history and problem list     Review of Systems   Constitutional: Negative  HENT: Negative  Eyes: Negative  Respiratory: Negative  Cardiovascular: Negative  Gastrointestinal: Positive for abdominal distention and abdominal pain  Endocrine: Negative  Genitourinary: Positive for pelvic pain  Musculoskeletal: Negative  Skin: Negative  Neurological: Negative  Hematological: Negative  Psychiatric/Behavioral: Negative          Current Outpatient Medications   Medication Sig Dispense Refill    acetaminophen (TYLENOL) 325 mg tablet Take 2 tablets (650 mg total) by mouth every 6 (six) hours as needed for mild pain, headaches or fever 30 tablet 0    apixaban (ELIQUIS) 2 5 mg Take 1 tablet (2 5 mg total) by mouth 2 (two) times a day 60 tablet 0    aspirin-acetaminophen-caffeine (EXCEDRIN MIGRAINE) 250-250-65 MG per tablet Take 1 tablet by mouth every 6 (six) hours as needed for headaches      gabapentin (NEURONTIN) 100 mg capsule Take 1 capsule (100 mg total) by mouth 3 (three) times a day 90 capsule 11    lidocaine-prilocaine (EMLA) cream Apply to port site prior to labs/chemo 30 g 1    metFORMIN (GLUCOPHAGE) 500 mg tablet Take 500 mg by mouth 2 (two) times a day with meals      nystatin (MYCOSTATIN) 500,000 units/5 mL suspension Swish and swallow 5 mL (500,000 Units total) 4 (four) times a day 60 mL 0    pantoprazole (PROTONIX) 40 mg tablet Take 1 tablet (40 mg total) by mouth 2 (two) times a day before meals  0    potassium chloride (K-DUR,KLOR-CON) 20 mEq tablet Take 1 tablet (20 mEq total) by mouth daily 30 tablet 0     No current facility-administered medications for this visit  Objective:    Blood pressure 120/76, pulse 80, temperature 97 9 °F (36 6 °C), resp  rate 18, height 5' 4" (1 626 m), weight 69 4 kg (153 lb)  Body mass index is 26 26 kg/m²  Body surface area is 1 75 meters squared  Physical Exam   Constitutional: She is oriented to person, place, and time  She appears well-developed and well-nourished  HENT:   Head: Normocephalic and atraumatic  Eyes: EOM are normal    Neck: Normal range of motion  Neck supple  No thyromegaly present  Cardiovascular: Normal rate, regular rhythm and normal heart sounds  Pulmonary/Chest: Effort normal and breath sounds normal    Abdominal: Soft  Bowel sounds are normal    Well healed midline and ileostomy takedown incisions  Stoma is healed well and functioning well  Appliance is intact   Genitourinary:   Genitourinary Comments: Deferred n     Musculoskeletal: Normal range of motion  Lymphadenopathy:     She has no cervical adenopathy  Neurological: She is alert and oriented to person, place, and time  Skin: Skin is warm and dry  Psychiatric: She has a normal mood and affect   Her behavior is normal        Lab Results   Component Value Date     19 1 08/30/2019     Lab Results   Component Value Date    K 3 5 09/21/2019     (H) 09/21/2019    CO2 30 09/21/2019    BUN 8 09/21/2019    CREATININE 0 84 09/21/2019    GLUCOSE 228 (H) 09/10/2019    GLUF 117 (H) 01/14/2019    CALCIUM 7 9 (L) 09/21/2019    AST 38 09/10/2019    ALT 26 09/10/2019    ALKPHOS 57 09/10/2019    EGFR 71 09/21/2019     Lab Results   Component Value Date    WBC 11 85 (H) 09/20/2019    HGB 8 4 (L) 09/20/2019    HCT 26 6 (L) 09/20/2019    MCV 95 09/20/2019     09/20/2019     Lab Results   Component Value Date    NEUTROABS 9 05 (H) 09/19/2019

## 2019-09-25 NOTE — PROGRESS NOTES
Assessment/Plan:    Problem List Items Addressed This Visit        Endocrine    Ovarian [facility] ([facility] )     Overall the patient is doing as expected post operatively  Her stoma and bladder are functioning as expected  Her pain is improved  She is performing all activities of daily living  The patient would like to delay somewhat for her final postop check in to initiate chemotherapy  I will see her back in 2 weeks  We have recommended 3 cycles of carboplatin paclitaxel chemotherapy followed by some form of consolidation  We will give her paclitaxel on a weekly basis as we have been doing before  Malignant neoplasm of ovary ([facility] [address] ) - Primary            CHIEF COMPLAINT:  Postoperative evaluation      Problem:  Cancer Staging  Ovarian cancer [facility])  Staging form: Ovary, Fallopian Tube, Primary Peritoneal, AJCC 8th Edition  - Clinical stage from 11/14/2018: FIGO Stage IVB (cT3c, cN0, pM1b) - Signed by Margarette Garcia MD on 11/14/2018  - Pathologic: No stage assigned - Unsigned        Previous therapy:     Ovarian cancer ([facility] )    11/9/2018 Initial Diagnosis     Ovarian cancer ([place] [address] )   tumor marker 194 5      11/9/2018 Biopsy     CT-guided needle biopsy of abdominal mass 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  Given liver involvement this would be stage IV      11/14/2018 - 12/4/2018 Chemotherapy     Neoadjuvant therapy: carboplatin area under the curve of 6, paclitaxel 135 milligrams/meter squared, Avastin 10 milligrams/kilogram  Completed 1 of 3 cycles  12/14/2018 Surgery     LAPAROTOMY EXPLORATORY; Abdominal Washout; Application of Abthera Vac Dressing for suspected bowel perforation and septic shock         12/15/2018 Surgery     LAPAROTOMY EXPLORATORY, ABDOMINAL WASHOUT, DRAIN PLACEMENT x 4, DIVERTING LOOP ILEOSTOMY AND ABDOMINAL CLOSURE for bowel perforation      3/26/2019 -  Chemotherapy     Taxol weekly 80 mg/m2 for 3 consecutive weeks, may add carboplatin in the future with AUC of 5       3/26/2019 -  Chemotherapy     CARBOplatin (PARAPLATIN) in sodium chloride 0 9 % 250 mL IVPB,  (original dose ), Intravenous, Once, 3 of 3 cycles  Dose modification:   (Cycle 2),   (original dose 258 4 mg, Cycle 3),   (original dose 258 4 mg, Cycle 3),   (original dose 244 4 mg, Cycle 4)  Administration: 258 4 mg (5/28/2019), 244 4 mg (6/25/2019)  PACLitaxel (TAXOL) 127 8 mg in sodium chloride 0 9 % 250 mL chemo IVPB, 80 mg/m2, Intravenous, Once, 4 of 4 cycles  Dose modification: 65 mg/m2 (original dose 80 mg/m2, Cycle 2, Reason: Dose Not Tolerated)  Administration: 103 8 mg (5/14/2019), 108 6 mg (5/28/2019), 108 6 mg (6/4/2019), 108 6 mg (6/11/2019), 109 8 mg (6/25/2019), 109 8 mg (7/2/2019), 109 8 mg (7/9/2019)      4/29/2019 Genomic Testing      Foundation 1 testing revealed a PD L1 tumor proportions score of 0%  The patient is not a candidate for PD L1 manipulation       Genetic Testing     [facility] Breast/Gyn panel, wildtype  9/10/2019 Surgery     Exploratory laparotomy radical omentectomy, posterior exenteration, takedown of ileostomy and end colostomy for complete debulking of visible tumor  Final pathology report revealed high-grade serous malignancy in both the omentum and the pelvic mass      9/25/2019 -  Chemotherapy     Carboplatin area under the curve of 5+ weekly paclitaxel at 80 milligrams/meters squared for 3 further cycles of treatment followed by consolidation this is to begin mid October           Patient ID: Flex Colvin is a 70 y o  female  Patient presents for consideration of treatment planning and continuation of chemotherapy after recent debulking surgery  She is a very pleasant 43-year-old white female history of stage 4 ovarian cancer diagnosed in approximately August of 2018  She delayed therapy and subsequently underwent a single cycle of carboplatin paclitaxel and Avastin which resulted in bowel perforation in December of 2018   The patient then underwent exploratory laparotomy and  diverting ileostomy  The perforation was never found  She was subsequently treated with multiple cycles of carboplatin paclitaxel and had significant and reduction in disease  Most recently the patient underwent a posterior exenteration with takedown of ileostomy and some end colostomy  This was performed approximately 2 weeks ago  The patient had significant difficulty with pain control and narcotics  Once her epidural was inadvertently discontinued  The patient was started on Neurontin and her pain level improved her nausea and general dysfunction went away  The patient's final pathology report reveals the following:  Final Diagnosis  A  Uterus and bilateral ovaries and fallopian tubes, sigmoid colon, en bloc resection:  - 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, bilateral ovaries, uterus, colon (submucosa, muscular propria and pericolonic adipose tissue), favor right fallopian tube primary   - One of three pericolonic lymph nodes is positive for tumor (1/3)  - Colon resection margins are negative for tumor   - Sigmoid colon with diverticulosis  - Uterus (279 Grams) with benign endometrial polyp, adenomyosis and leiomyoma     B  Omentum, omentectomy:  - High grade serous carcinoma      C  Ileostomy, takedown:  - Portion of small intestine with mucocutaneous anastomosis consistent with stoma  - Negative for malignancy  Since she has been home the patient has been doing better  She is ambulating usually without a walker  Her pain is controlled with Neurontin  She is tolerating a regular diet  Her wounds are healing well  Her stoma is functioning well  She remains somewhat fatigued and occasionally runs out of steam   I have recommended starting chemotherapy 4 weeks after surgery which would be in early October    The patient would like to wait until mid October or november      The following portions of the patient's history were reviewed and updated as appropriate: allergies, current medications, past family history, past medical history, past social history, past surgical history and problem list     Review of Systems   Constitutional: Negative  HENT: Negative  Eyes: Negative  Respiratory: Negative  Cardiovascular: Negative  Gastrointestinal: Positive for abdominal distention and abdominal pain  Endocrine: Negative  Genitourinary: Positive for pelvic pain  Musculoskeletal: Negative  Skin: Negative  Neurological: Negative  Hematological: Negative  Psychiatric/Behavioral: Negative  Current Outpatient Medications   Medication Sig Dispense Refill    acetaminophen (TYLENOL) 325 mg tablet Take 2 tablets (650 mg total) by mouth every 6 (six) hours as needed for mild pain, headaches or fever 30 tablet 0    apixaban (ELIQUIS) 2 5 mg Take 1 tablet (2 5 mg total) by mouth 2 (two) times a day 60 tablet 0    aspirin-acetaminophen-caffeine (EXCEDRIN MIGRAINE) 250-250-65 MG per tablet Take 1 tablet by mouth every 6 (six) hours as needed for headaches      gabapentin (NEURONTIN) 100 mg capsule Take 1 capsule (100 mg total) by mouth 3 (three) times a day 90 capsule 11    lidocaine-prilocaine (EMLA) cream Apply to port site prior to labs/chemo 30 g 1    metFORMIN (GLUCOPHAGE) 500 mg tablet Take 500 mg by mouth 2 (two) times a day with meals      nystatin (MYCOSTATIN) 500,000 units/5 mL suspension Swish and swallow 5 mL (500,000 Units total) 4 (four) times a day 60 mL 0    pantoprazole (PROTONIX) 40 mg tablet Take 1 tablet (40 mg total) by mouth 2 (two) times a day before meals  0    potassium chloride (K-DUR,KLOR-CON) 20 mEq tablet Take 1 tablet (20 mEq total) by mouth daily 30 tablet 0     No current facility-administered medications for this visit  Objective:    Blood pressure 120/76, pulse 80, temperature 97 9 °F (36 6 °C), resp   rate 18, height 5' 4" (1 626 m), weight 69 4 kg (153 lb)   Body mass index is 26 26 kg/m²  Body surface area is 1 75 meters squared  Physical Exam   Constitutional: She is oriented to person, place, and time  She appears well-developed and well-nourished  HENT:   Head: Normocephalic and atraumatic  Eyes: EOM are normal    Neck: Normal range of motion  Neck supple  No thyromegaly present  Cardiovascular: Normal rate, regular rhythm and normal heart sounds  Pulmonary/Chest: Effort normal and breath sounds normal    Abdominal: Soft  Bowel sounds are normal    Well healed midline and ileostomy takedown incisions  Stoma is healed well and functioning well  Appliance is intact   Genitourinary:   Genitourinary Comments: Deferred n     Musculoskeletal: Normal range of motion  Lymphadenopathy:     She has no cervical adenopathy  Neurological: She is alert and oriented to person, place, and time  Skin: Skin is warm and dry  Psychiatric: She has a normal mood and affect   Her behavior is normal        Lab Results   Component Value Date     19 1 08/30/2019     Lab Results   Component Value Date    K 3 5 09/21/2019     (H) 09/21/2019    CO2 30 09/21/2019    BUN 8 09/21/2019    CREATININE 0 84 09/21/2019    GLUCOSE 228 (H) 09/10/2019    GLUF 117 (H) 01/14/2019    CALCIUM 7 9 (L) 09/21/2019    AST 38 09/10/2019    ALT 26 09/10/2019    ALKPHOS 57 09/10/2019    EGFR 71 09/21/2019     Lab Results   Component Value Date    WBC 11 85 (H) 09/20/2019    HGB 8 4 (L) 09/20/2019    HCT 26 6 (L) 09/20/2019    MCV 95 09/20/2019     09/20/2019     Lab Results   Component Value Date    NEUTROABS 9 05 (H) 09/19/2019

## 2019-09-25 NOTE — ASSESSMENT & PLAN NOTE
Overall the patient is doing as expected post operatively  Her stoma and bladder are functioning as expected  Her pain is improved  She is performing all activities of daily living  The patient would like to delay somewhat for her final postop check in to initiate chemotherapy  I will see her back in 2 weeks  We have recommended 3 cycles of carboplatin paclitaxel chemotherapy followed by some form of consolidation  We will give her paclitaxel on a weekly basis as we have been doing before

## 2019-10-09 ENCOUNTER — OFFICE VISIT (OUTPATIENT)
Dept: GYNECOLOGIC ONCOLOGY | Facility: CLINIC | Age: 69
End: 2019-10-09
Payer: MEDICARE

## 2019-10-09 VITALS
DIASTOLIC BLOOD PRESSURE: 60 MMHG | RESPIRATION RATE: 16 BRPM | BODY MASS INDEX: 25.27 KG/M2 | HEART RATE: 77 BPM | TEMPERATURE: 98.3 F | SYSTOLIC BLOOD PRESSURE: 120 MMHG | HEIGHT: 64 IN | WEIGHT: 148 LBS

## 2019-10-09 DIAGNOSIS — C56.9 MALIGNANT NEOPLASM OF OVARY, UNSPECIFIED LATERALITY (HCC): Primary | ICD-10-CM

## 2019-10-09 PROCEDURE — 99213 OFFICE O/P EST LOW 20 MIN: CPT | Performed by: OBSTETRICS & GYNECOLOGY

## 2019-10-09 NOTE — LETTER
October 9, 2019     Dona Miranda MD  Attn: NONA Guerrero   [address] [place] [address]    Patient: Merlyn Barreto   YOB: 1950   Date of Visit: 10/9/2019       Dear Dr Alayna Mooney: Thank you for referring Merlyn Barreto to me for evaluation  Below are my notes for this consultation  If you have questions, please do not hesitate to call me  I look forward to following your patient along with you  Sincerely,        Richard Bower MD        CC: No Recipients  Richard Bower MD  10/9/2019 10:23 AM  Sign at close encounter  Assessment/Plan:    Problem List Items Addressed This Visit        Endocrine    Ovarian cancer [facility]) - Primary     Patient has healed well postoperatively  She has no evidence of visible disease  All stoma function is normal   Her pain is resolved  She is performing normal activities of daily living  She is adapting to her new stoma  We have discussed the surgical approach to take down of her stoma  I have recommended that we complete chemotherapy in consider that at that time  This would likely involve an open procedure    We have recommended we initiating chemotherapy with carboplatin area under the curve of 5 and paclitaxel 80 milligrams/meter squared weekly  This will begin on Tuesday October 22nd  The patient has requested earlier in the morning  Pre chemo labs were ordered  The patient will continue her present postoperative care           Relevant Orders        CBC and differential    Comprehensive metabolic panel            CHIEF COMPLAINT:  Postoperative evaluation for ovarian cancer debulking to reinitiate chemotherapy      Problem:  Cancer Staging  Ovarian cancer [facility])  Staging form: Ovary, Fallopian Tube, Primary Peritoneal, AJCC 8th Edition  - Clinical stage from 11/14/2018: FIGO Stage IVB (cT3c, cN0, pM1b) - Signed by Richard Bower MD on 11/14/2018  - Pathologic: No stage assigned - Unsigned        Previous therapy:     Ovarian cancer ([facility] )    11/9/2018 Initial Diagnosis     Ovarian cancer ([facility] [address] )   tumor marker 194 5      11/9/2018 Biopsy     CT-guided needle biopsy of abdominal mass 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  Given liver involvement this would be stage IV      11/14/2018 - 12/4/2018 Chemotherapy     Neoadjuvant therapy: carboplatin area under the curve of 6, paclitaxel 135 milligrams/meter squared, Avastin 10 milligrams/kilogram  Completed 1 of 3 cycles  12/14/2018 Surgery     LAPAROTOMY EXPLORATORY; Abdominal Washout; Application of Abthera Vac Dressing for suspected bowel perforation and septic shock  12/15/2018 Surgery     LAPAROTOMY EXPLORATORY, ABDOMINAL WASHOUT, DRAIN PLACEMENT x 4, DIVERTING LOOP ILEOSTOMY AND ABDOMINAL CLOSURE for bowel perforation      3/26/2019 -  Chemotherapy     Taxol weekly 80 mg/m2 for 3 consecutive weeks, may add carboplatin in the future with AUC of 5       3/26/2019 -  Chemotherapy     CARBOplatin (PARAPLATIN) in sodium chloride 0 9 % 250 mL IVPB,  (original dose ), Intravenous, Once, 3 of 3 cycles  Dose modification:   (Cycle 2),   (original dose 258 4 mg, Cycle 3),   (original dose 258 4 mg, Cycle 3),   (original dose 244 4 mg, Cycle 4)  Administration: 258 4 mg (5/28/2019), 244 4 mg (6/25/2019)  PACLitaxel (TAXOL) 127 8 mg in sodium chloride 0 9 % 250 mL chemo IVPB, 80 mg/m2, Intravenous, Once, 4 of 4 cycles  Dose modification: 65 mg/m2 (original dose 80 mg/m2, Cycle 2, Reason: Dose Not Tolerated)  Administration: 103 8 mg (5/14/2019), 108 6 mg (5/28/2019), 108 6 mg (6/4/2019), 108 6 mg (6/11/2019), 109 8 mg (6/25/2019), 109 8 mg (7/2/2019), 109 8 mg (7/9/2019)      4/29/2019 Genomic Testing      Foundation 1 testing revealed a PD L1 tumor proportions score of 0%  The patient is not a candidate for PD L1 manipulation       Genetic Testing     [facility] Breast/Gyn panel, wildtype  9/10/2019 Surgery     Exploratory laparotomy radical omentectomy, posterior exenteration, takedown of ileostomy and end colostomy for complete debulking of visible tumor  Final pathology report revealed high-grade serous malignancy in both the omentum and the pelvic mass      9/25/2019 -  Chemotherapy     Carboplatin area under the curve of 5+ weekly paclitaxel at 80 milligrams/meters squared for 3 further cycles of treatment followed by consolidation this is to begin mid October      10/9/2019 -  Cancer Staged     Staging form: Ovary, Fallopian Tube, Primary Peritoneal, AJCC 8th Edition  - Pathologic stage from 10/9/2019: FIGO Stage IIIC (pT3c, pN1, cM0) - Signed by Vladimir Mcfarland MD on 10/9/2019  Histologic grade (G): G3  Histologic grading system: 4 grade system  Gross residual tumor after primary cyto-reductive surgery: Absent             Patient ID: Meghana Arguello is a 70 y o  female  Patient is a very pleasant 59-year-old white female with a history of stage IV ovarian cancer originally diagnosed in November of 2018  She presents today for postoperative evaluation  The patient's treatment course involve neoadjuvant chemotherapy with paclitaxel Carboplatinum and Avastin room however after the 1st cycle the patient developed a bowel perforation which required diverting stoma and long-term recovery and rehab  The patient was ease back into chemotherapy approximately February of this year and completed neoadjuvant chemotherapy in July  She underwent a posterior pelvic exenteration with end colostomy and takedown of the ileostomy in September of 2019  Her surgery was uncomplicated  We achieved an optimal debulking  The patient has had a postoperative evaluation was doing well  She presents today for consideration of re initiation of chemotherapy  Today, the patient is doing well    She denies significant abdominal pain, pelvic pain, nausea, vomiting, constipation, diarrhea, fevers, chills, or vaginal bleeding  The following portions of the patient's history were reviewed and updated as appropriate: allergies, past family history, past medical history, past social history, past surgical history and problem list     Review of Systems   Constitutional: Positive for activity change  HENT: Negative  Eyes: Negative  Respiratory: Negative  Cardiovascular: Negative  Gastrointestinal: Negative  Endocrine: Negative  Genitourinary: Negative  Musculoskeletal: Negative  Skin: Negative  Neurological: Negative  Hematological: Negative  Psychiatric/Behavioral: Negative  Current Outpatient Medications   Medication Sig Dispense Refill    acetaminophen (TYLENOL) 325 mg tablet Take 2 tablets (650 mg total) by mouth every 6 (six) hours as needed for mild pain, headaches or fever 30 tablet 0    apixaban (ELIQUIS) 2 5 mg Take 1 tablet (2 5 mg total) by mouth 2 (two) times a day 60 tablet 0    aspirin-acetaminophen-caffeine (EXCEDRIN MIGRAINE) 250-250-65 MG per tablet Take 1 tablet by mouth every 6 (six) hours as needed for headaches      gabapentin (NEURONTIN) 100 mg capsule Take 1 capsule (100 mg total) by mouth 3 (three) times a day 90 capsule 11    lidocaine-prilocaine (EMLA) cream Apply to port site prior to labs/chemo 30 g 1    metFORMIN (GLUCOPHAGE) 500 mg tablet Take 500 mg by mouth 2 (two) times a day with meals      nystatin (MYCOSTATIN) 500,000 units/5 mL suspension Swish and swallow 5 mL (500,000 Units total) 4 (four) times a day 60 mL 0    pantoprazole (PROTONIX) 40 mg tablet Take 1 tablet (40 mg total) by mouth 2 (two) times a day before meals  0    potassium chloride (K-DUR,KLOR-CON) 20 mEq tablet Take 1 tablet (20 mEq total) by mouth daily 30 tablet 0     No current facility-administered medications for this visit  Objective:    Blood pressure 120/60, pulse 77, temperature 98 3 °F (36 8 °C), resp   rate 16, height 5' 4" (1 626 m), weight 67 1 kg (148 lb)  Body mass index is 25 4 kg/m²  Body surface area is 1 72 meters squared  Physical Exam   Constitutional: She is oriented to person, place, and time  She appears well-developed and well-nourished  HENT:   Head: Normocephalic and atraumatic  Eyes: EOM are normal    Neck: Normal range of motion  Neck supple  No thyromegaly present  Cardiovascular: Normal rate, regular rhythm and normal heart sounds  Pulmonary/Chest: Effort normal and breath sounds normal    Abdominal: Soft  Bowel sounds are normal    Well healed midline and stoma takedown incisions  Colostomy functioning well  Genitourinary:   Genitourinary Comments: -Normal external female genitalia, normal Bartholin's and [place]'s glands                  -Normal midline urethral meatus  No lesions notes                  -Bladder without fullness mass or tenderness                  -Vagina without lesion or discharge No significant cystocele or rectocele noted                  -Cervix surgically absent                  -Uterus surgically absent                  -Adnexae surgically absent                  - Anus without fissure of lesion     Musculoskeletal: Normal range of motion  Lymphadenopathy:     She has no cervical adenopathy  Neurological: She is alert and oriented to person, place, and time  Skin: Skin is warm and dry  Psychiatric: She has a normal mood and affect   Her behavior is normal        Lab Results   Component Value Date     19 1 08/30/2019     Lab Results   Component Value Date    K 3 5 09/21/2019     (H) 09/21/2019    CO2 30 09/21/2019    BUN 8 09/21/2019    CREATININE 0 84 09/21/2019    GLUCOSE 228 (H) 09/10/2019    GLUF 117 (H) 01/14/2019    CALCIUM 7 9 (L) 09/21/2019    AST 38 09/10/2019    ALT 26 09/10/2019    ALKPHOS 57 09/10/2019    EGFR 71 09/21/2019     Lab Results   Component Value Date    WBC 11 85 (H) 09/20/2019    HGB 8 4 (L) 09/20/2019    HCT 26 6 (L) 09/20/2019    MCV 95 09/20/2019     09/20/2019     Lab Results   Component Value Date    NEUTROABS 9 05 (H) 09/19/2019

## 2019-10-09 NOTE — PROGRESS NOTES
Assessment/Plan:    Problem List Items Addressed This Visit        Endocrine    Ovarian cancer [facility]) - Primary     Patient has healed well postoperatively  She has no evidence of visible disease  All stoma function is normal   Her pain is resolved  She is performing normal activities of daily living  She is adapting to her new stoma  We have discussed the surgical approach to take down of her stoma  I have recommended that we complete chemotherapy in consider that at that time  This would likely involve an open procedure    We have recommended we initiating chemotherapy with carboplatin area under the curve of 5 and paclitaxel 80 milligrams/meter squared weekly  This will begin on Tuesday October 22nd  The patient has requested earlier in the morning  Pre chemo labs were ordered  The patient will continue her present postoperative care  Relevant Orders        CBC and differential    Comprehensive metabolic panel            CHIEF COMPLAINT:  Postoperative evaluation for ovarian cancer debulking to reinitiate chemotherapy      Problem:  Cancer Staging  Ovarian cancer [facility])  Staging form: Ovary, Fallopian Tube, Primary Peritoneal, AJCC 8th Edition  - Clinical stage from 11/14/2018: FIGO Stage IVB (cT3c, cN0, pM1b) - Signed by Noemi Valverde MD on 11/14/2018  - Pathologic: No stage assigned - Unsigned        Previous therapy:     Ovarian cancer ([facility] [address] )    11/9/2018 Initial Diagnosis     Ovarian cancer ([facility] [address] )   tumor marker 194 5      11/9/2018 Biopsy     CT-guided needle biopsy of abdominal mass 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  Given liver involvement this would be stage IV      11/14/2018 - 12/4/2018 Chemotherapy     Neoadjuvant therapy: carboplatin area under the curve of 6, paclitaxel 135 milligrams/meter squared, Avastin 10 milligrams/kilogram  Completed 1 of 3 cycles         12/14/2018 Surgery     LAPAROTOMY EXPLORATORY; Abdominal Washout; Application of Abthera Vac Dressing for suspected bowel perforation and septic shock  12/15/2018 Surgery     LAPAROTOMY EXPLORATORY, ABDOMINAL WASHOUT, DRAIN PLACEMENT x 4, DIVERTING LOOP ILEOSTOMY AND ABDOMINAL CLOSURE for bowel perforation      3/26/2019 -  Chemotherapy     Taxol weekly 80 mg/m2 for 3 consecutive weeks, may add carboplatin in the future with AUC of 5       3/26/2019 -  Chemotherapy     CARBOplatin (PARAPLATIN) in sodium chloride 0 9 % 250 mL IVPB,  (original dose ), Intravenous, Once, 3 of 3 cycles  Dose modification:   (Cycle 2),   (original dose 258 4 mg, Cycle 3),   (original dose 258 4 mg, Cycle 3),   (original dose 244 4 mg, Cycle 4)  Administration: 258 4 mg (5/28/2019), 244 4 mg (6/25/2019)  PACLitaxel (TAXOL) 127 8 mg in sodium chloride 0 9 % 250 mL chemo IVPB, 80 mg/m2, Intravenous, Once, 4 of 4 cycles  Dose modification: 65 mg/m2 (original dose 80 mg/m2, Cycle 2, Reason: Dose Not Tolerated)  Administration: 103 8 mg (5/14/2019), 108 6 mg (5/28/2019), 108 6 mg (6/4/2019), 108 6 mg (6/11/2019), 109 8 mg (6/25/2019), 109 8 mg (7/2/2019), 109 8 mg (7/9/2019)      4/29/2019 Genomic Testing      Foundation 1 testing revealed a PD L1 tumor proportions score of 0%  The patient is not a candidate for PD L1 manipulation       Genetic Testing     [facility] Breast/Gyn panel, wildtype  9/10/2019 Surgery     Exploratory laparotomy radical omentectomy, posterior exenteration, takedown of ileostomy and end colostomy for complete debulking of visible tumor      Final pathology report revealed high-grade serous malignancy in both the omentum and the pelvic mass      9/25/2019 -  Chemotherapy     Carboplatin area under the curve of 5+ weekly paclitaxel at 80 milligrams/meters squared for 3 further cycles of treatment followed by consolidation this is to begin mid October      10/9/2019 -  Cancer Staged     Staging form: Ovary, Fallopian Tube, Primary Peritoneal, AJCC 8th Edition  - Pathologic stage from 10/9/2019: FIGO Stage IIIC (pT3c, pN1, cM0) - Signed by Erika Quiroga MD on 10/9/2019  Histologic grade (G): G3  Histologic grading system: 4 grade system  Gross residual tumor after primary cyto-reductive surgery: Absent             Patient ID: Court Roldan is a 70 y o  female  Patient is a very pleasant 21-year-old white female with a history of stage IV ovarian cancer originally diagnosed in November of 2018  She presents today for postoperative evaluation  The patient's treatment course involve neoadjuvant chemotherapy with paclitaxel Carboplatinum and Avastin room however after the 1st cycle the patient developed a bowel perforation which required diverting stoma and long-term recovery and rehab  The patient was ease back into chemotherapy approximately February of this year and completed neoadjuvant chemotherapy in July  She underwent a posterior pelvic exenteration with end colostomy and takedown of the ileostomy in September of 2019  Her surgery was uncomplicated  We achieved an optimal debulking  The patient has had a postoperative evaluation was doing well  She presents today for consideration of re initiation of chemotherapy  Today, the patient is doing well  She denies significant abdominal pain, pelvic pain, nausea, vomiting, constipation, diarrhea, fevers, chills, or vaginal bleeding  The following portions of the patient's history were reviewed and updated as appropriate: allergies, past family history, past medical history, past social history, past surgical history and problem list     Review of Systems   Constitutional: Positive for activity change  HENT: Negative  Eyes: Negative  Respiratory: Negative  Cardiovascular: Negative  Gastrointestinal: Negative  Endocrine: Negative  Genitourinary: Negative  Musculoskeletal: Negative  Skin: Negative  Neurological: Negative  Hematological: Negative      Psychiatric/Behavioral: Negative  Current Outpatient Medications   Medication Sig Dispense Refill    acetaminophen (TYLENOL) 325 mg tablet Take 2 tablets (650 mg total) by mouth every 6 (six) hours as needed for mild pain, headaches or fever 30 tablet 0    apixaban (ELIQUIS) 2 5 mg Take 1 tablet (2 5 mg total) by mouth 2 (two) times a day 60 tablet 0    aspirin-acetaminophen-caffeine (EXCEDRIN MIGRAINE) 250-250-65 MG per tablet Take 1 tablet by mouth every 6 (six) hours as needed for headaches      gabapentin (NEURONTIN) 100 mg capsule Take 1 capsule (100 mg total) by mouth 3 (three) times a day 90 capsule 11    lidocaine-prilocaine (EMLA) cream Apply to port site prior to labs/chemo 30 g 1    metFORMIN (GLUCOPHAGE) 500 mg tablet Take 500 mg by mouth 2 (two) times a day with meals      nystatin (MYCOSTATIN) 500,000 units/5 mL suspension Swish and swallow 5 mL (500,000 Units total) 4 (four) times a day 60 mL 0    pantoprazole (PROTONIX) 40 mg tablet Take 1 tablet (40 mg total) by mouth 2 (two) times a day before meals  0    potassium chloride (K-DUR,KLOR-CON) 20 mEq tablet Take 1 tablet (20 mEq total) by mouth daily 30 tablet 0     No current facility-administered medications for this visit  Objective:    Blood pressure 120/60, pulse 77, temperature 98 3 °F (36 8 °C), resp  rate 16, height 5' 4" (1 626 m), weight 67 1 kg (148 lb)  Body mass index is 25 4 kg/m²  Body surface area is 1 72 meters squared  Physical Exam   Constitutional: She is oriented to person, place, and time  She appears well-developed and well-nourished  HENT:   Head: Normocephalic and atraumatic  Eyes: EOM are normal    Neck: Normal range of motion  Neck supple  No thyromegaly present  Cardiovascular: Normal rate, regular rhythm and normal heart sounds  Pulmonary/Chest: Effort normal and breath sounds normal    Abdominal: Soft  Bowel sounds are normal    Well healed midline and stoma takedown incisions  Colostomy functioning well  Genitourinary:   Genitourinary Comments: -Normal external female genitalia, normal Bartholin's and [place]'s glands                  -Normal midline urethral meatus  No lesions notes                  -Bladder without fullness mass or tenderness                  -Vagina without lesion or discharge No significant cystocele or rectocele noted                  -Cervix surgically absent                  -Uterus surgically absent                  -Adnexae surgically absent                  - Anus without fissure of lesion     Musculoskeletal: Normal range of motion  Lymphadenopathy:     She has no cervical adenopathy  Neurological: She is alert and oriented to person, place, and time  Skin: Skin is warm and dry  Psychiatric: She has a normal mood and affect   Her behavior is normal        Lab Results   Component Value Date     19 1 08/30/2019     Lab Results   Component Value Date    K 3 5 09/21/2019     (H) 09/21/2019    CO2 30 09/21/2019    BUN 8 09/21/2019    CREATININE 0 84 09/21/2019    GLUCOSE 228 (H) 09/10/2019    GLUF 117 (H) 01/14/2019    CALCIUM 7 9 (L) 09/21/2019    AST 38 09/10/2019    ALT 26 09/10/2019    ALKPHOS 57 09/10/2019    EGFR 71 09/21/2019     Lab Results   Component Value Date    WBC 11 85 (H) 09/20/2019    HGB 8 4 (L) 09/20/2019    HCT 26 6 (L) 09/20/2019    MCV 95 09/20/2019     09/20/2019     Lab Results   Component Value Date    NEUTROABS 9 05 (H) 09/19/2019

## 2019-10-11 DIAGNOSIS — C56.9 MALIGNANT NEOPLASM OF OVARY, UNSPECIFIED LATERALITY (HCC): ICD-10-CM

## 2019-10-15 ENCOUNTER — OFFICE VISIT (OUTPATIENT)
Dept: GYNECOLOGIC ONCOLOGY | Facility: CLINIC | Age: 69
End: 2019-10-15

## 2019-10-15 ENCOUNTER — HOSPITAL ENCOUNTER (OUTPATIENT)
Dept: CT IMAGING | Facility: HOSPITAL | Age: 69
Discharge: HOME | DRG: 857 | End: 2019-10-15
Attending: OBSTETRICS & GYNECOLOGY
Payer: MEDICARE

## 2019-10-15 ENCOUNTER — HOSPITAL ENCOUNTER (INPATIENT)
Facility: HOSPITAL | Age: 69
LOS: 7 days | Discharge: HOME HEALTH | DRG: 857 | End: 2019-10-22
Attending: EMERGENCY MEDICINE | Admitting: INTERNAL MEDICINE
Payer: MEDICARE

## 2019-10-15 VITALS
HEART RATE: 91 BPM | WEIGHT: 152 LBS | RESPIRATION RATE: 18 BRPM | HEIGHT: 64 IN | BODY MASS INDEX: 25.95 KG/M2 | SYSTOLIC BLOOD PRESSURE: 124 MMHG | DIASTOLIC BLOOD PRESSURE: 62 MMHG | TEMPERATURE: 98.4 F

## 2019-10-15 DIAGNOSIS — C56.9 MALIGNANT NEOPLASM OF OVARY, UNSPECIFIED LATERALITY (HCC): ICD-10-CM

## 2019-10-15 DIAGNOSIS — C56.3 MALIGNANT NEOPLASM OF BOTH OVARIES (HCC): Primary | ICD-10-CM

## 2019-10-15 DIAGNOSIS — R10.31 RIGHT LOWER QUADRANT ABDOMINAL PAIN: ICD-10-CM

## 2019-10-15 DIAGNOSIS — D64.9 ANEMIA, UNSPECIFIED TYPE: ICD-10-CM

## 2019-10-15 DIAGNOSIS — C56.3 MALIGNANT NEOPLASM OF BOTH OVARIES (HCC): ICD-10-CM

## 2019-10-15 DIAGNOSIS — K81.9 CHOLECYSTITIS: ICD-10-CM

## 2019-10-15 DIAGNOSIS — L02.211 ABDOMINAL WALL ABSCESS: Primary | ICD-10-CM

## 2019-10-15 DIAGNOSIS — R10.31 RIGHT LOWER QUADRANT ABDOMINAL PAIN: Primary | ICD-10-CM

## 2019-10-15 LAB
ALBUMIN SERPL BCP-MCNC: 3.1 G/DL (ref 3.5–5)
ALP SERPL-CCNC: 78 U/L (ref 46–116)
ALT SERPL W P-5'-P-CCNC: 13 U/L (ref 12–78)
ANION GAP SERPL CALCULATED.3IONS-SCNC: 11 MMOL/L (ref 4–13)
AST SERPL W P-5'-P-CCNC: 14 U/L (ref 5–45)
BASOPHILS # BLD AUTO: 0.05 THOUSANDS/ΜL (ref 0–0.1)
BASOPHILS NFR BLD AUTO: 1 % (ref 0–1)
BILIRUB SERPL-MCNC: 0.2 MG/DL (ref 0.2–1)
BUN SERPL-MCNC: 16 MG/DL (ref 5–25)
CALCIUM SERPL-MCNC: 9.3 MG/DL (ref 8.3–10.1)
CHLORIDE SERPL-SCNC: 103 MMOL/L (ref 100–108)
CO2 SERPL-SCNC: 26 MMOL/L (ref 21–32)
CREAT SERPL-MCNC: 1.08 MG/DL (ref 0.6–1.3)
EOSINOPHIL # BLD AUTO: 0.27 THOUSAND/ΜL (ref 0–0.61)
EOSINOPHIL NFR BLD AUTO: 3 % (ref 0–6)
ERYTHROCYTE [DISTWIDTH] IN BLOOD BY AUTOMATED COUNT: 14 % (ref 11.6–15.1)
GFR SERPL CREATININE-BSD FRML MDRD: 53 ML/MIN/1.73SQ M
GLUCOSE SERPL-MCNC: 104 MG/DL (ref 65–140)
HCT VFR BLD AUTO: 26.5 % (ref 34.8–46.1)
HGB BLD-MCNC: 8.4 G/DL (ref 11.5–15.4)
IMM GRANULOCYTES # BLD AUTO: 0.03 THOUSAND/UL (ref 0–0.2)
IMM GRANULOCYTES NFR BLD AUTO: 0 % (ref 0–2)
LACTATE SERPL-SCNC: 0.7 MMOL/L (ref 0.5–2)
LYMPHOCYTES # BLD AUTO: 2.1 THOUSANDS/ΜL (ref 0.6–4.47)
LYMPHOCYTES NFR BLD AUTO: 20 % (ref 14–44)
MCH RBC QN AUTO: 30 PG (ref 26.8–34.3)
MCHC RBC AUTO-ENTMCNC: 31.7 G/DL (ref 31.4–37.4)
MCV RBC AUTO: 95 FL (ref 82–98)
MONOCYTES # BLD AUTO: 0.96 THOUSAND/ΜL (ref 0.17–1.22)
MONOCYTES NFR BLD AUTO: 9 % (ref 4–12)
NEUTROPHILS # BLD AUTO: 7 THOUSANDS/ΜL (ref 1.85–7.62)
NEUTS SEG NFR BLD AUTO: 67 % (ref 43–75)
NRBC BLD AUTO-RTO: 0 /100 WBCS
PLATELET # BLD AUTO: 280 THOUSANDS/UL (ref 149–390)
PMV BLD AUTO: 11.3 FL (ref 8.9–12.7)
POTASSIUM SERPL-SCNC: 3.5 MMOL/L (ref 3.5–5.3)
PROT SERPL-MCNC: 7.4 G/DL (ref 6.4–8.2)
RBC # BLD AUTO: 2.8 MILLION/UL (ref 3.81–5.12)
SODIUM SERPL-SCNC: 140 MMOL/L (ref 136–145)
WBC # BLD AUTO: 10.41 THOUSAND/UL (ref 4.31–10.16)

## 2019-10-15 PROCEDURE — 36415 COLL VENOUS BLD VENIPUNCTURE: CPT | Performed by: PHYSICIAN ASSISTANT

## 2019-10-15 PROCEDURE — 87040 BLOOD CULTURE FOR BACTERIA: CPT | Performed by: PHYSICIAN ASSISTANT

## 2019-10-15 PROCEDURE — 74177 CT ABD & PELVIS W/CONTRAST: CPT

## 2019-10-15 PROCEDURE — 96375 TX/PRO/DX INJ NEW DRUG ADDON: CPT

## 2019-10-15 PROCEDURE — 83605 ASSAY OF LACTIC ACID: CPT | Performed by: PHYSICIAN ASSISTANT

## 2019-10-15 PROCEDURE — 85025 COMPLETE CBC W/AUTO DIFF WBC: CPT | Performed by: PHYSICIAN ASSISTANT

## 2019-10-15 PROCEDURE — 96365 THER/PROPH/DIAG IV INF INIT: CPT

## 2019-10-15 PROCEDURE — 80053 COMPREHEN METABOLIC PANEL: CPT | Performed by: PHYSICIAN ASSISTANT

## 2019-10-15 PROCEDURE — 99284 EMERGENCY DEPT VISIT MOD MDM: CPT

## 2019-10-15 PROCEDURE — 99024 POSTOP FOLLOW-UP VISIT: CPT | Performed by: OBSTETRICS & GYNECOLOGY

## 2019-10-15 PROCEDURE — 87205 SMEAR GRAM STAIN: CPT | Performed by: OBSTETRICS & GYNECOLOGY

## 2019-10-15 PROCEDURE — 87186 SC STD MICRODIL/AGAR DIL: CPT | Performed by: OBSTETRICS & GYNECOLOGY

## 2019-10-15 PROCEDURE — 87070 CULTURE OTHR SPECIMN AEROBIC: CPT | Performed by: OBSTETRICS & GYNECOLOGY

## 2019-10-15 PROCEDURE — 99223 1ST HOSP IP/OBS HIGH 75: CPT | Performed by: INTERNAL MEDICINE

## 2019-10-15 PROCEDURE — 87147 CULTURE TYPE IMMUNOLOGIC: CPT | Performed by: OBSTETRICS & GYNECOLOGY

## 2019-10-15 RX ORDER — SODIUM CHLORIDE 9 MG/ML
75 INJECTION, SOLUTION INTRAVENOUS CONTINUOUS
Status: DISCONTINUED | OUTPATIENT
Start: 2019-10-15 | End: 2019-10-18

## 2019-10-15 RX ORDER — TRAMADOL HYDROCHLORIDE 50 MG/1
50 TABLET ORAL EVERY 6 HOURS PRN
Status: DISCONTINUED | OUTPATIENT
Start: 2019-10-15 | End: 2019-10-20

## 2019-10-15 RX ORDER — ACETAMINOPHEN 325 MG/1
650 TABLET ORAL EVERY 6 HOURS PRN
Status: DISCONTINUED | OUTPATIENT
Start: 2019-10-15 | End: 2019-10-21

## 2019-10-15 RX ORDER — SODIUM CHLORIDE, SODIUM GLUCONATE, SODIUM ACETATE, POTASSIUM CHLORIDE, MAGNESIUM CHLORIDE, SODIUM PHOSPHATE, DIBASIC, AND POTASSIUM PHOSPHATE .53; .5; .37; .037; .03; .012; .00082 G/100ML; G/100ML; G/100ML; G/100ML; G/100ML; G/100ML; G/100ML
1000 INJECTION, SOLUTION INTRAVENOUS ONCE
Status: COMPLETED | OUTPATIENT
Start: 2019-10-15 | End: 2019-10-15

## 2019-10-15 RX ORDER — ONDANSETRON 2 MG/ML
4 INJECTION INTRAMUSCULAR; INTRAVENOUS EVERY 6 HOURS PRN
Status: DISCONTINUED | OUTPATIENT
Start: 2019-10-15 | End: 2019-10-22 | Stop reason: HOSPADM

## 2019-10-15 RX ORDER — CALCIUM CARBONATE 200(500)MG
1000 TABLET,CHEWABLE ORAL DAILY PRN
Status: DISCONTINUED | OUTPATIENT
Start: 2019-10-15 | End: 2019-10-22 | Stop reason: HOSPADM

## 2019-10-15 RX ORDER — VANCOMYCIN HYDROCHLORIDE 1 G/200ML
15 INJECTION, SOLUTION INTRAVENOUS EVERY 24 HOURS
Status: DISCONTINUED | OUTPATIENT
Start: 2019-10-15 | End: 2019-10-16

## 2019-10-15 RX ORDER — PANTOPRAZOLE SODIUM 40 MG/1
40 TABLET, DELAYED RELEASE ORAL
Status: DISCONTINUED | OUTPATIENT
Start: 2019-10-16 | End: 2019-10-22 | Stop reason: HOSPADM

## 2019-10-15 RX ORDER — ONDANSETRON 2 MG/ML
4 INJECTION INTRAMUSCULAR; INTRAVENOUS ONCE
Status: COMPLETED | OUTPATIENT
Start: 2019-10-15 | End: 2019-10-15

## 2019-10-15 RX ORDER — AMOXICILLIN AND CLAVULANATE POTASSIUM 875; 125 MG/1; MG/1
1 TABLET, FILM COATED ORAL EVERY 12 HOURS SCHEDULED
Qty: 20 TABLET | Refills: 0 | Status: SHIPPED | OUTPATIENT
Start: 2019-10-15 | End: 2019-10-25

## 2019-10-15 RX ORDER — GABAPENTIN 100 MG/1
100 CAPSULE ORAL 3 TIMES DAILY
Status: DISCONTINUED | OUTPATIENT
Start: 2019-10-15 | End: 2019-10-20

## 2019-10-15 RX ADMIN — MORPHINE SULFATE 2 MG: 2 INJECTION, SOLUTION INTRAMUSCULAR; INTRAVENOUS at 20:45

## 2019-10-15 RX ADMIN — IOHEXOL 100 ML: 350 INJECTION, SOLUTION INTRAVENOUS at 17:35

## 2019-10-15 RX ADMIN — ONDANSETRON 4 MG: 2 INJECTION INTRAMUSCULAR; INTRAVENOUS at 20:44

## 2019-10-15 RX ADMIN — CEFEPIME HYDROCHLORIDE 2000 MG: 2 INJECTION, POWDER, FOR SOLUTION INTRAVENOUS at 22:35

## 2019-10-15 RX ADMIN — SODIUM CHLORIDE, SODIUM GLUCONATE, SODIUM ACETATE, POTASSIUM CHLORIDE AND MAGNESIUM CHLORIDE 1000 ML: 526; 502; 368; 37; 30 INJECTION, SOLUTION INTRAVENOUS at 20:40

## 2019-10-15 NOTE — ASSESSMENT & PLAN NOTE
Patient has newly diagnosed right ethan epigastric or right lower quadrant abdominal pain  This is new  It is somewhat swollen  I am worried that there may be a purulent fluid collection below it  I have ordered a CT scan of the abdomen and pelvis  Additionally was started the patient on Augmentin 875mg 1 p  O  B i d  for 10 days     The patient is intolerant of narcotics and I have asked her to continue her Excedrin for pain relief  I have asked her to be in touch over the next several days to let us know how things are going

## 2019-10-15 NOTE — PROGRESS NOTES
Assessment/Plan:    Problem List Items Addressed This Visit        Other    Right lower quadrant abdominal pain - Primary     Patient has newly diagnosed right ethan epigastric or right lower quadrant abdominal pain  This is new  It is somewhat swollen  I am worried that there may be a purulent fluid collection below it  I have ordered a CT scan of the abdomen and pelvis  Additionally was started the patient on Augmentin 875mg 1 p  O  B i d  for 10 days     The patient is intolerant of narcotics and I have asked her to continue her Excedrin for pain relief  I have asked her to be in touch over the next several days to let us know how things are going  Relevant Medications    amoxicillin-clavulanate (AUGMENTIN) 875-125 mg per tablet    Other Relevant Orders    CT abdomen pelvis w contrast            CHIEF COMPLAINT:  Patient presents today with ethan incisional pain status post ovarian cancer debulking      Problem:  Cancer Staging  Ovarian cancer [facility])  Staging form: Ovary, Fallopian Tube, Primary Peritoneal, AJCC 8th Edition  - Clinical stage from 11/14/2018: FIGO Stage IVB (cT3c, cN0, pM1b) - Signed by Hank Best MD on 11/14/2018  - Pathologic stage from 10/9/2019: FIGO Stage IIIC (pT3c, pN1, cM0) - Signed by Hank Best MD on 10/9/2019        Previous therapy:     Ovarian cancer ([facility] [address] )    11/9/2018 Initial Diagnosis     Ovarian cancer ([facility] [address] )   tumor marker 194 5      11/9/2018 Biopsy     CT-guided needle biopsy of abdominal mass 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  Given liver involvement this would be stage IV      11/14/2018 - 12/4/2018 Chemotherapy     Neoadjuvant therapy: carboplatin area under the curve of 6, paclitaxel 135 milligrams/meter squared, Avastin 10 milligrams/kilogram  Completed 1 of 3 cycles  12/14/2018 Surgery     LAPAROTOMY EXPLORATORY; Abdominal Washout;  Application of Abthera Vac Dressing for suspected bowel perforation and septic shock  12/15/2018 Surgery     LAPAROTOMY EXPLORATORY, ABDOMINAL WASHOUT, DRAIN PLACEMENT x 4, DIVERTING LOOP ILEOSTOMY AND ABDOMINAL CLOSURE for bowel perforation      3/26/2019 -  Chemotherapy     Taxol weekly 80 mg/m2 for 3 consecutive weeks, may add carboplatin in the future with AUC of 5       3/26/2019 -  Chemotherapy     CARBOplatin (PARAPLATIN) in sodium chloride 0 9 % 250 mL IVPB,  (original dose ), Intravenous, Once, 3 of 6 cycles  Dose modification:   (Cycle 2),   (original dose 258 4 mg, Cycle 3),   (original dose 258 4 mg, Cycle 3),   (original dose 244 4 mg, Cycle 4)  Administration: 258 4 mg (5/28/2019), 244 4 mg (6/25/2019)  PACLitaxel (TAXOL) 127 8 mg in sodium chloride 0 9 % 250 mL chemo IVPB, 80 mg/m2, Intravenous, Once, 4 of 7 cycles  Dose modification: 65 mg/m2 (original dose 80 mg/m2, Cycle 2, Reason: Dose Not Tolerated)  Administration: 103 8 mg (5/14/2019), 108 6 mg (5/28/2019), 108 6 mg (6/4/2019), 108 6 mg (6/11/2019), 109 8 mg (6/25/2019), 109 8 mg (7/2/2019), 109 8 mg (7/9/2019)      4/29/2019 Genomic Testing      Foundation 1 testing revealed a PD L1 tumor proportions score of 0%  The patient is not a candidate for PD L1 manipulation       Genetic Testing     [facility] Breast/Gyn panel, wildtype  9/10/2019 Surgery     Exploratory laparotomy radical omentectomy, posterior exenteration, takedown of ileostomy and end colostomy for complete debulking of visible tumor      Final pathology report revealed high-grade serous malignancy in both the omentum and the pelvic mass      9/25/2019 -  Chemotherapy     Carboplatin area under the curve of 5+ weekly paclitaxel at 80 milligrams/meters squared for 3 further cycles of treatment followed by consolidation this is to begin mid October      10/9/2019 -  Cancer Staged     Staging form: Ovary, Fallopian Tube, Primary Peritoneal, AJCC 8th Edition  - Pathologic stage from 10/9/2019: FIGO Stage IIIC (pT3c, pN1, cM0) - Signed by Richard Bower MD on 10/9/2019  Histologic grade (G): G3  Histologic grading system: 4 grade system  Gross residual tumor after primary cyto-reductive surgery: Absent             Patient ID: Merlyn Barreto is a 70 y o  female  Patient is a very pleasant 67-year-old white female who began to notice right lower quadrant ethan-incisional pain and drainage of purulent matter over the past 24 hours  She has also noticed that the incision is abdomen is become swollen in that area  There is no erythema she has had no fevers  She has been taking Excedrin for pain with some relief  She has tried gabapentin without relief  The patient has not yet begun chemotherapy and is due to start in approximately 2 weeks  The following portions of the patient's history were reviewed and updated as appropriate: allergies, current medications, past medical history, past social history, past surgical history and problem list     Review of Systems   Constitutional: Negative  HENT: Negative  Eyes: Negative  Respiratory: Negative  Cardiovascular: Negative  Gastrointestinal: Positive for abdominal distention and abdominal pain  Endocrine: Negative  Genitourinary: Negative  Musculoskeletal: Negative  Skin: Negative  Neurological: Negative  Hematological: Negative  Psychiatric/Behavioral: Negative          Current Outpatient Medications   Medication Sig Dispense Refill    acetaminophen (TYLENOL) 325 mg tablet Take 2 tablets (650 mg total) by mouth every 6 (six) hours as needed for mild pain, headaches or fever 30 tablet 0    apixaban (ELIQUIS) 2 5 mg Take 1 tablet (2 5 mg total) by mouth 2 (two) times a day 60 tablet 0    aspirin-acetaminophen-caffeine (EXCEDRIN MIGRAINE) 250-250-65 MG per tablet Take 1 tablet by mouth every 6 (six) hours as needed for headaches      gabapentin (NEURONTIN) 100 mg capsule Take 1 capsule (100 mg total) by mouth 3 (three) times a day 90 capsule 11    lidocaine-prilocaine (EMLA) cream Apply to port site prior to labs/chemo 30 g 1    metFORMIN (GLUCOPHAGE) 500 mg tablet Take 500 mg by mouth 2 (two) times a day with meals      nystatin (MYCOSTATIN) 500,000 units/5 mL suspension Swish and swallow 5 mL (500,000 Units total) 4 (four) times a day 60 mL 0    pantoprazole (PROTONIX) 40 mg tablet Take 1 tablet (40 mg total) by mouth 2 (two) times a day before meals  0    potassium chloride (K-DUR,KLOR-CON) 20 mEq tablet Take 1 tablet (20 mEq total) by mouth daily 30 tablet 0    amoxicillin-clavulanate (AUGMENTIN) 875-125 mg per tablet Take 1 tablet by mouth every 12 (twelve) hours for 20 doses 20 tablet 0     No current facility-administered medications for this visit  Objective:    Blood pressure 124/62, pulse 91, temperature 98 4 °F (36 9 °C), resp  rate 18, height 5' 4" (1 626 m), weight 68 9 kg (152 lb)  Body mass index is 26 09 kg/m²  Body surface area is 1 74 meters squared  Physical Exam   Constitutional: She is oriented to person, place, and time  She appears well-developed and well-nourished  HENT:   Head: Normocephalic and atraumatic  Eyes: EOM are normal    Neck: Normal range of motion  Neck supple  No thyromegaly present  Cardiovascular: Normal rate, regular rhythm and normal heart sounds  Pulmonary/Chest: Effort normal and breath sounds normal    Abdominal: Soft  Bowel sounds are normal    Well healed midline incision  There is a small 2 mm area of mucosal break in the area just above the umbilicus  There is no significant drainage seen  The right periumbilical/right lower quadrant area is swollen since I last saw the patient  Additionally there is no erythema or significant tenderness  Genitourinary:   Genitourinary Comments: Deferred   Musculoskeletal: Normal range of motion  Lymphadenopathy:     She has no cervical adenopathy  Neurological: She is alert and oriented to person, place, and time     Skin: Skin is warm and dry  Psychiatric: She has a normal mood and affect   Her behavior is normal        Lab Results   Component Value Date     19 1 08/30/2019     Lab Results   Component Value Date    K 3 5 09/21/2019     (H) 09/21/2019    CO2 30 09/21/2019    BUN 8 09/21/2019    CREATININE 0 84 09/21/2019    GLUCOSE 228 (H) 09/10/2019    GLUF 117 (H) 01/14/2019    CALCIUM 7 9 (L) 09/21/2019    AST 38 09/10/2019    ALT 26 09/10/2019    ALKPHOS 57 09/10/2019    EGFR 71 09/21/2019     Lab Results   Component Value Date    WBC 11 85 (H) 09/20/2019    HGB 8 4 (L) 09/20/2019    HCT 26 6 (L) 09/20/2019    MCV 95 09/20/2019     09/20/2019     Lab Results   Component Value Date    NEUTROABS 9 05 (H) 09/19/2019

## 2019-10-16 LAB
ALBUMIN SERPL BCP-MCNC: 2.7 G/DL (ref 3.5–5)
ALP SERPL-CCNC: 72 U/L (ref 46–116)
ALT SERPL W P-5'-P-CCNC: 11 U/L (ref 12–78)
ANION GAP SERPL CALCULATED.3IONS-SCNC: 7 MMOL/L (ref 4–13)
AST SERPL W P-5'-P-CCNC: 11 U/L (ref 5–45)
BILIRUB SERPL-MCNC: 0.3 MG/DL (ref 0.2–1)
BUN SERPL-MCNC: 13 MG/DL (ref 5–25)
CALCIUM SERPL-MCNC: 9 MG/DL (ref 8.3–10.1)
CHLORIDE SERPL-SCNC: 107 MMOL/L (ref 100–108)
CO2 SERPL-SCNC: 28 MMOL/L (ref 21–32)
CREAT SERPL-MCNC: 1.08 MG/DL (ref 0.6–1.3)
ERYTHROCYTE [DISTWIDTH] IN BLOOD BY AUTOMATED COUNT: 13.8 % (ref 11.6–15.1)
GFR SERPL CREATININE-BSD FRML MDRD: 53 ML/MIN/1.73SQ M
GLUCOSE SERPL-MCNC: 113 MG/DL (ref 65–140)
GLUCOSE SERPL-MCNC: 154 MG/DL (ref 65–140)
GLUCOSE SERPL-MCNC: 159 MG/DL (ref 65–140)
GLUCOSE SERPL-MCNC: 188 MG/DL (ref 65–140)
GLUCOSE SERPL-MCNC: 99 MG/DL (ref 65–140)
HCT VFR BLD AUTO: 25.2 % (ref 34.8–46.1)
HGB BLD-MCNC: 7.9 G/DL (ref 11.5–15.4)
MCH RBC QN AUTO: 30 PG (ref 26.8–34.3)
MCHC RBC AUTO-ENTMCNC: 31.3 G/DL (ref 31.4–37.4)
MCV RBC AUTO: 96 FL (ref 82–98)
PHOSPHATE SERPL-MCNC: 4.6 MG/DL (ref 2.3–4.1)
PLATELET # BLD AUTO: 249 THOUSANDS/UL (ref 149–390)
PMV BLD AUTO: 11.4 FL (ref 8.9–12.7)
POTASSIUM SERPL-SCNC: 3.8 MMOL/L (ref 3.5–5.3)
PROT SERPL-MCNC: 6.6 G/DL (ref 6.4–8.2)
RBC # BLD AUTO: 2.63 MILLION/UL (ref 3.81–5.12)
SODIUM SERPL-SCNC: 142 MMOL/L (ref 136–145)
WBC # BLD AUTO: 7.42 THOUSAND/UL (ref 4.31–10.16)

## 2019-10-16 PROCEDURE — 82948 REAGENT STRIP/BLOOD GLUCOSE: CPT

## 2019-10-16 PROCEDURE — 99024 POSTOP FOLLOW-UP VISIT: CPT | Performed by: OBSTETRICS & GYNECOLOGY

## 2019-10-16 PROCEDURE — 99285 EMERGENCY DEPT VISIT HI MDM: CPT | Performed by: PHYSICIAN ASSISTANT

## 2019-10-16 PROCEDURE — 99233 SBSQ HOSP IP/OBS HIGH 50: CPT | Performed by: NURSE PRACTITIONER

## 2019-10-16 PROCEDURE — 99222 1ST HOSP IP/OBS MODERATE 55: CPT | Performed by: SURGERY

## 2019-10-16 PROCEDURE — 84100 ASSAY OF PHOSPHORUS: CPT | Performed by: NURSE PRACTITIONER

## 2019-10-16 PROCEDURE — 85027 COMPLETE CBC AUTOMATED: CPT | Performed by: NURSE PRACTITIONER

## 2019-10-16 PROCEDURE — 80053 COMPREHEN METABOLIC PANEL: CPT | Performed by: NURSE PRACTITIONER

## 2019-10-16 RX ADMIN — INSULIN LISPRO 1 UNITS: 100 INJECTION, SOLUTION INTRAVENOUS; SUBCUTANEOUS at 13:04

## 2019-10-16 RX ADMIN — APIXABAN 2.5 MG: 2.5 TABLET, FILM COATED ORAL at 08:14

## 2019-10-16 RX ADMIN — ACETAMINOPHEN 650 MG: 325 TABLET, FILM COATED ORAL at 01:04

## 2019-10-16 RX ADMIN — TRAMADOL HYDROCHLORIDE 50 MG: 50 TABLET, FILM COATED ORAL at 13:02

## 2019-10-16 RX ADMIN — PANTOPRAZOLE SODIUM 40 MG: 40 TABLET, DELAYED RELEASE ORAL at 18:14

## 2019-10-16 RX ADMIN — GABAPENTIN 100 MG: 100 CAPSULE ORAL at 18:14

## 2019-10-16 RX ADMIN — GABAPENTIN 100 MG: 100 CAPSULE ORAL at 22:42

## 2019-10-16 RX ADMIN — SODIUM CHLORIDE 75 ML/HR: 0.9 INJECTION, SOLUTION INTRAVENOUS at 18:14

## 2019-10-16 RX ADMIN — GABAPENTIN 100 MG: 100 CAPSULE ORAL at 02:23

## 2019-10-16 RX ADMIN — TRAMADOL HYDROCHLORIDE 50 MG: 50 TABLET, FILM COATED ORAL at 20:17

## 2019-10-16 RX ADMIN — INSULIN LISPRO 1 UNITS: 100 INJECTION, SOLUTION INTRAVENOUS; SUBCUTANEOUS at 08:15

## 2019-10-16 RX ADMIN — VANCOMYCIN HYDROCHLORIDE 1000 MG: 1 INJECTION, SOLUTION INTRAVENOUS at 02:45

## 2019-10-16 RX ADMIN — PANTOPRAZOLE SODIUM 40 MG: 40 TABLET, DELAYED RELEASE ORAL at 08:14

## 2019-10-16 RX ADMIN — SODIUM CHLORIDE 75 ML/HR: 0.9 INJECTION, SOLUTION INTRAVENOUS at 02:23

## 2019-10-16 RX ADMIN — INSULIN LISPRO 1 UNITS: 100 INJECTION, SOLUTION INTRAVENOUS; SUBCUTANEOUS at 18:14

## 2019-10-16 RX ADMIN — APIXABAN 2.5 MG: 2.5 TABLET, FILM COATED ORAL at 18:14

## 2019-10-16 RX ADMIN — GABAPENTIN 100 MG: 100 CAPSULE ORAL at 08:14

## 2019-10-16 NOTE — ASSESSMENT & PLAN NOTE
· Midline incision with adjacent thickening of the abdominal wall musculature and an adjacent 36 x 18 mm intramuscular collection #2/48 suspicious for a small abscess  · General surgery evaluated recommending local wound care with iodoform gauze and serial abdominal exam appreciate ongoing recommendation  · Gyn/Onc evaluated patient agree with local wound packing and dressing changes  Patient is scheduled to start chemotherapy outpatient 10/22  · Continue Vancomycin 1 g q 12 hours    · Wound culture x1 pending  · Blood cultures x2 pending

## 2019-10-16 NOTE — ASSESSMENT & PLAN NOTE
· Moderate abdominal pain continues related incisional abscess  · P r n   Tramadol effective, will add IV morphine for breakthrough pain

## 2019-10-16 NOTE — CONSULTS
Assessment/Plan:    Patient has developed a delayed abdominal wound hematoma verses abscess verses infected hematoma  She has been placed on appropriate antibiotics  The wound has been drained  The patient is feeling better  It is likely that she will be stable to go home with wound packing in the next day or so  The patient was questioning whether she should begin her Augmentin  I have asked her to wait until the culture was available to see if this is an appropriate treatment course  Empirically it is a reasonable drug to use however I am not sure if there will be any resistance  The cultures will tell that  The patient's chemotherapy is due to begin in a couple weeks  I feel that it is likely that she can begin on schedule  The patient will require home wound packing which can be done by visiting nurses  If the wound is clean and stable it is reasonable to begin chemotherapy despite packing this wound  CHIEF COMPLAINT:  Admission for abdominal wall lesion        Problem:  Cancer Staging  Ovarian cancer [facility])  Staging form: Ovary, Fallopian Tube, Primary Peritoneal, AJCC 8th Edition  - Clinical stage from 11/14/2018: FIGO Stage IVB (cT3c, cN0, pM1b) - Signed by Cornelia Miranda MD on 11/14/2018  - Pathologic stage from 10/9/2019: FIGO Stage IIIC (pT3c, pN1, cM0) - Signed by Cornelia Miranda MD on 10/9/2019      Previous therapy:     Ovarian cancer ([facility] [address] )    11/9/2018 Initial Diagnosis     Ovarian cancer ([facility] [address] )   tumor marker 194 5      11/9/2018 Biopsy     CT-guided needle biopsy of abdominal mass 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  Given liver involvement this would be stage IV      11/14/2018 - 12/4/2018 Chemotherapy     Neoadjuvant therapy: carboplatin area under the curve of 6, paclitaxel 135 milligrams/meter squared, Avastin 10 milligrams/kilogram  Completed 1 of 3 cycles         12/14/2018 Surgery     LAPAROTOMY EXPLORATORY; Abdominal Washout; Application of Abthera Vac Dressing for suspected bowel perforation and septic shock  12/15/2018 Surgery     LAPAROTOMY EXPLORATORY, ABDOMINAL WASHOUT, DRAIN PLACEMENT x 4, DIVERTING LOOP ILEOSTOMY AND ABDOMINAL CLOSURE for bowel perforation      3/26/2019 -  Chemotherapy     Taxol weekly 80 mg/m2 for 3 consecutive weeks, may add carboplatin in the future with AUC of 5       3/26/2019 -  Chemotherapy     CARBOplatin (PARAPLATIN) in sodium chloride 0 9 % 250 mL IVPB,  (original dose ), Intravenous, Once, 3 of 6 cycles  Dose modification:   (Cycle 2),   (original dose 258 4 mg, Cycle 3),   (original dose 258 4 mg, Cycle 3),   (original dose 244 4 mg, Cycle 4)  Administration: 258 4 mg (5/28/2019), 244 4 mg (6/25/2019)  PACLitaxel (TAXOL) 127 8 mg in sodium chloride 0 9 % 250 mL chemo IVPB, 80 mg/m2, Intravenous, Once, 4 of 7 cycles  Dose modification: 65 mg/m2 (original dose 80 mg/m2, Cycle 2, Reason: Dose Not Tolerated)  Administration: 103 8 mg (5/14/2019), 108 6 mg (5/28/2019), 108 6 mg (6/4/2019), 108 6 mg (6/11/2019), 109 8 mg (6/25/2019), 109 8 mg (7/2/2019), 109 8 mg (7/9/2019)      4/29/2019 Genomic Testing      Foundation 1 testing revealed a PD L1 tumor proportions score of 0%  The patient is not a candidate for PD L1 manipulation       Genetic Testing     [employer] Breast/Gyn panel, wildtype  9/10/2019 Surgery     Exploratory laparotomy radical omentectomy, posterior exenteration, takedown of ileostomy and end colostomy for complete debulking of visible tumor      Final pathology report revealed high-grade serous malignancy in both the omentum and the pelvic mass      9/25/2019 -  Chemotherapy     Carboplatin area under the curve of 5+ weekly paclitaxel at 80 milligrams/meters squared for 3 further cycles of treatment followed by consolidation this is to begin mid October      10/9/2019 -  Cancer Staged     Staging form: Ovary, Fallopian Tube, Primary Peritoneal, AJCC 8th Edition  - Pathologic stage from 10/9/2019: FIGO Stage IIIC (pT3c, pN1, cM0) - Signed by Sukhjinder Soler MD on 10/9/2019  Histologic grade (G): G3  Histologic grading system: 4 grade system  Gross residual tumor after primary cyto-reductive surgery: Absent             Patient ID: Tricia Kenney is a 70 y o  female  Patient is very pleasant 19-year-old white female history of stage IV ovarian carcinoma  She underwent neoadjuvant chemotherapy followed by interval debulking on 9 10/2019  Her surgery included a posterior exenteration and colostomy and takedown of ileostomy as well as omentectomy and tumor debulking  The patient did well postoperatively with the exception of pain control issues secondary to an epidural catheter becoming dislodged  The patient had been progressing well until noted approximately 48 hours ago onset of worsening abdominal pain and bloating in the left anterior abdominal wall just lateral to the midline incision  The patient had no fevers tenderness erythema  She did note some mild drainage coming from the incision  The patient was seen in the office on 10/15/2016  A culture was taken from the wound and due to the significant change in clinical status a CT scan was ordered  This revealed a small 2 cm fluid collection in the muscle  The patient's pain continued become worse until she was seen in the emergency department and subsequently admitted last evening  She was started on cefoxitin and vancomycin  Her wound was opened and packed releasing a small amount of purulence matter  The patient states that she is feeling much better  Her pain is significantly improved she can move in ambulate better  The cultures are not yet back  Her white blood cell count is 7  She has not yet begun chemotherapy and is due in approximately 2 weeks  Review of Systems   Constitutional: Negative  HENT: Negative  Eyes: Negative  Respiratory: Negative  Cardiovascular: Negative  Gastrointestinal: Positive for abdominal pain  Endocrine: Negative  Genitourinary: Negative  Musculoskeletal: Negative  Skin: Negative  Neurological: Negative  Hematological: Negative  Psychiatric/Behavioral: Negative          Current Facility-Administered Medications   Medication Dose Route Frequency Provider Last Rate Last Dose    acetaminophen (TYLENOL) tablet 650 mg  650 mg Oral Q6H PRN Valere Nims, CRNP   650 mg at 10/16/19 0104    apixaban (ELIQUIS) tablet 2 5 mg  2 5 mg Oral BID Valere Nims, CRNP   2 5 mg at 10/16/19 9281    calcium carbonate (TUMS) chewable tablet 1,000 mg  1,000 mg Oral Daily PRN Valere Nims, CRNP        gabapentin (NEURONTIN) capsule 100 mg  100 mg Oral TID Valere Nims, CRNP   100 mg at 10/16/19 0866    insulin lispro (HumaLOG) 100 units/mL subcutaneous injection 1-5 Units  1-5 Units Subcutaneous TID AC Valere Nims, CRNP   1 Units at 10/16/19 0815    insulin lispro (HumaLOG) 100 units/mL subcutaneous injection 1-5 Units  1-5 Units Subcutaneous HS Valere Nims, CRNP        ondansetron [facility]) injection 4 mg  4 mg Intravenous Q6H PRN Valere Nims, CRNP        pantoprazole (PROTONIX) EC tablet 40 mg  40 mg Oral BID AC Valere Nims, CRNP   40 mg at 10/16/19 0448    sodium chloride 0 9 % infusion  75 mL/hr Intravenous Continuous Valere Nims, CRNP 75 mL/hr at 10/16/19 0223 75 mL/hr at 10/16/19 0223    traMADol (ULTRAM) tablet 50 mg  50 mg Oral Q6H PRN Valere Nims, CRNP        [START ON 10/17/2019] vancomycin (VANCOCIN) 1,500 mg in sodium chloride 0 9 % 250 mL IVPB  20 mg/kg Intravenous Q24H Valere Nims, CRNP           No Known Allergies    Past Medical History:   Diagnosis Date    Abdominal fluid collection     Anemia     Asthma     Cancer ([facility] [address] )     ovaries    Diabetes mellitus ([facility] [address] )     History of transfusion     PONV (postoperative nausea and vomiting)        Past Surgical History:   Procedure Laterality Date    ABDOMINAL SURGERY      CT GUIDED PARACENTESIS  2018    CT GUIDED PERC DRAINAGE CATHETER PLACEMENT  2018    CT NEEDLE BIOPSY PERITONEUM  2018    CYSTOSCOPY N/A 9/10/2019    Procedure: CYSTOSCOPY , INSERTION URETERAL CATHETERS;  Surgeon: Sukhjinder Soler MD;  [facility];  Service: Gynecology Oncology    ECTOPIC PREGNANCY SURGERY      ECTOPIC PREGNANCY SURGERY      EXENTERATION PELVIS N/A 9/10/2019    Procedure: EXPLORATORY LAPAROTOMY, EXENTERATION POSTERIOR PELVIS,  END COLOSTOMY, ILEOSTOMY REVERSAL, LYSIS OF ADHESIONS, rADICAL OMENTECTOMY AND TUMOR DEBULKINGFLEXIBLE SIGMOIDOSCOPY, CYSTOGRAM, INSPECTION OF URETERS;  Surgeon: Sukhjinder Soler MD;  [facility];  Service: Gynecology Oncology    IR PARACENTESIS  2018    IR PARACENTESIS  10/18/2018    IR PARACENTESIS  12/10/2018    IR PORT PLACEMENT  2018    IR THORACENTESIS  2019    LAPAROTOMY N/A 2018    Procedure: LAPAROTOMY EXPLORATORY; Abdominal Washout; Application of Abthera Vac Dressing;  Surgeon: Ronn Anton MD;  [facility];  Service: Gynecology Oncology    LAPAROTOMY N/A 12/15/2018    Procedure: LAPAROTOMY EXPLORATORY, ABDOMINAL WASHOUT, DRAIN PLACEMENT x 4, DIVERTING LOOP ILEOSTOMY AND ABDOMINAL CLOSURE,  ALL OTHER INDICATED PROCEDURES;  Surgeon: Ronn Anton MD;  [facility]: [facility];  Service: Gynecology Oncology    IN COLONOSCOPY FLX DX W/[facility] WHEN PFRMD N/A 2018    Procedure: EGD AND COLONOSCOPY;  Surgeon: Karyn Willis MD;  Location: [facility];   Service: Gastroenterology    TONSILECTOMY AND ADNOIDECTOMY      TONSILLECTOMY         OB History        3    Para   2    Term   2            AB   1    Living   2       SAB        TAB        Ectopic   1    Multiple        Live Births   2           Obstetric Comments   Menarche: 8th grade (around age 14)               Family History   Problem Relation Age of Onset   [facility] Cirrhosis Mother     Colon cancer Mother     Leukemia Cousin     Diabetes Father        The following portions of the patient's history were reviewed and updated as appropriate: allergies, current medications, past family history, past social history, past surgical history and problem list       Objective:    Blood pressure 130/60, pulse 74, temperature 98 5 °F (36 9 °C), resp  rate 20, height 5' 4" (1 626 m), weight 68 9 kg (151 lb 14 4 oz), SpO2 96 %  Body mass index is 26 07 kg/m²  Physical Exam   Constitutional: She is oriented to person, place, and time  She appears well-developed and well-nourished  HENT:   Head: Normocephalic and atraumatic  Eyes: EOM are normal    Neck: Normal range of motion  Neck supple  No thyromegaly present  Cardiovascular: Normal rate, regular rhythm and normal heart sounds  Pulmonary/Chest: Effort normal and breath sounds normal    Abdominal: Soft  Bowel sounds are normal    Well healed midline incisions  Wound packed in the right upper quadrant from the midline incision  Colostomy is functioning well  Ileostomy site is closed   Genitourinary:   Genitourinary Comments: Deferred   Musculoskeletal: Normal range of motion  Lymphadenopathy:     She has no cervical adenopathy  Neurological: She is alert and oriented to person, place, and time  Skin: Skin is warm and dry  Psychiatric: She has a normal mood and affect   Her behavior is normal          Lab Results   Component Value Date     19 1 08/30/2019     Lab Results   Component Value Date    WBC 7 42 10/16/2019    HGB 7 9 (L) 10/16/2019    HCT 25 2 (L) 10/16/2019    MCV 96 10/16/2019     10/16/2019     Lab Results   Component Value Date    K 3 8 10/16/2019     10/16/2019    CO2 28 10/16/2019    BUN 13 10/16/2019    CREATININE 1 08 10/16/2019    GLUCOSE 228 (H) 09/10/2019    GLUF 117 (H) 01/14/2019    CALCIUM 9 0 10/16/2019    AST 11 10/16/2019    ALT 11 (L) 10/16/2019    ALKPHOS 72 10/16/2019 EGFR 53 10/16/2019

## 2019-10-16 NOTE — PROGRESS NOTES
Progress Note - Kateryna Tinsley 1950, 70 y o  female MRN: [id number]    Unit/Bed#: -01 Encounter: [id number]    Primary Care Provider: Apolonia Starkey MD   Date and time admitted to hospital: 10/15/2019  7:54 PM        * Abdominal wall abscess  Assessment & Plan  · Midline incision with adjacent thickening of the abdominal wall musculature and an adjacent 36 x 18 mm intramuscular collection #2/48 suspicious for a small abscess  · General surgery evaluated recommending local wound care with iodoform gauze and serial abdominal exam appreciate ongoing recommendation  · Gyn/Onc evaluated patient agree with local wound packing and dressing changes  Patient is scheduled to start chemotherapy outpatient 10/22  · Continue Vancomycin 1 g q 12 hours  · Wound culture x1 pending  · Blood cultures x2 pending      Abdominal pain  Assessment & Plan  · Moderate abdominal pain continues related incisional abscess  · P r n  Tramadol effective, will add IV morphine for breakthrough pain    Deep venous thrombosis of right profunda femoris vein (HCC)  Assessment & Plan  Continue Eliquis        VTE Pharmacologic Prophylaxis:   Pharmacologic: Apixaban (Eliquis)  Mechanical VTE Prophylaxis in Place: Yes    Patient Centered Rounds: I have performed bedside rounds with nursing staff today  Discussions with Specialists or Other Care Team Provider: GYN oncology and General surgery    Education and Discussions with Family / Patient:  Daughter present  Discussed antibiotic therapy including vancomycin, wound care, pain management and discharge planning  Discussion for antibiotics therapy included converting to oral medications  Additional pain coverage supported with the addition of IV morphine p r n  for breakthrough pain  Time Spent for Care: 30 minutes  More than 50% of total time spent on counseling and coordination of care as described above      Current Length of Stay: 1 day(s)    Current Patient Status: Inpatient   Certification Statement: The patient will continue to require additional inpatient hospital stay due to Antibiotic therapy in setting of abdominal wound requiring acute intervention IV    Discharge Plan:  Discharge home when medically cleared    Code Status: Level 1 - Full Code      Subjective:   Patient complained of incisional abdominal pain with drainage status post total hysterectomy, colon resection with colostomy placement, and bladder tumor debulking  Drainage started approximately 1 week ago with increasing abdominal pain beginning yesterday    Objective:   Patient is a 16-year-old female that presented to the emergency room with abdominal pain after she was seen by her gyn Oncology surgeon Dr Kathrin Ring  Patient was started on vancomycin IV  Patient is afebrile with no signs of chills, tachycardia or respiratory distress  Patient has been seen by surgery and wound care provided; iodoform pack and dry dressing placed  Patient's pain is managed with tramadol with the addition of morphine IV for breakthrough pain  Vitals:   Temp (24hrs), Av 7 °F (37 1 °C), Min:98 5 °F (36 9 °C), Max:98 9 °F (37 2 °C)    Temp:  [98 5 °F (36 9 °C)-98 9 °F (37 2 °C)] 98 5 °F (36 9 °C)  HR:  [74-87] 74  Resp:  [18-20] 20  BP: (117-172)/(57-77) 130/60  SpO2:  [95 %-99 %] 96 %  Body mass index is 26 07 kg/m²  Input and Output Summary (last 24 hours): Intake/Output Summary (Last 24 hours) at 10/16/2019 1517  Last data filed at 10/16/2019 1302  Gross per 24 hour   Intake 1240 ml   Output 650 ml   Net 590 ml       Physical Exam:     Physical Exam   Constitutional: She is oriented to person, place, and time  She appears well-developed and well-nourished  No distress  HENT:   Head: Normocephalic and atraumatic  Eyes: EOM are normal    Neck: Normal range of motion  Cardiovascular: Normal rate, regular rhythm, normal heart sounds and intact distal pulses     Pulmonary/Chest: Effort normal and breath sounds normal    Abdominal: Soft  Bowel sounds are normal  There is tenderness in the epigastric area  Musculoskeletal: Normal range of motion  Neurological: She is alert and oriented to person, place, and time  Skin: Skin is warm and dry  Capillary refill takes less than 2 seconds  She is not diaphoretic  Abdominal incision intact with the exception of 1 small area close to the umbilicus  Scant amount of serosanguineous drainage noted  Psychiatric: She has a normal mood and affect  Her behavior is normal  Thought content normal          Additional Data:     Labs:    Results from last 7 days   Lab Units 10/16/19  0545 10/15/19  2033   WBC Thousand/uL 7 42 10 41*   HEMOGLOBIN g/dL 7 9* 8 4*   HEMATOCRIT % 25 2* 26 5*   PLATELETS Thousands/uL 249 280   NEUTROS PCT %  --  67   LYMPHS PCT %  --  20   MONOS PCT %  --  9   EOS PCT %  --  3     Results from last 7 days   Lab Units 10/16/19  0545   SODIUM mmol/L 142   POTASSIUM mmol/L 3 8   CHLORIDE mmol/L 107   CO2 mmol/L 28   BUN mg/dL 13   CREATININE mg/dL 1 08   ANION GAP mmol/L 7   CALCIUM mg/dL 9 0   ALBUMIN g/dL 2 7*   TOTAL BILIRUBIN mg/dL 0 30   ALK PHOS U/L 72   ALT U/L 11*   AST U/L 11   GLUCOSE RANDOM mg/dL 99         Results from last 7 days   Lab Units 10/16/19  1044 10/16/19  0213   POC GLUCOSE mg/dl 188* 154*         Results from last 7 days   Lab Units 10/15/19  2033   LACTIC ACID mmol/L 0 7           * I Have Reviewed All Lab Data Listed Above  * Additional Pertinent Lab Tests Reviewed:  [address] Admission Reviewed    Imaging:    Imaging Reports Reviewed Today Include:  None  Imaging Personally Reviewed by Myself Includes:  None    Recent Cultures (last 7 days):           Last 24 Hours Medication List:     Current Facility-Administered Medications:  acetaminophen 650 mg Oral Q6H PRN RIGOBERTO Irizarry    apixaban 2 5 mg Oral BID RIGOBERTO Irizarry    calcium carbonate 1,000 mg Oral Daily PRN RIGOBERTO Irizarry gabapentin 100 mg Oral TID RIGOBERTO Cruz    insulin lispro 1-5 Units Subcutaneous TID AC RIGOBERTO Clements    insulin lispro 1-5 Units Subcutaneous HS RIGOBERTO Cruz    morphine injection 2 mg Intravenous Q6H PRN RIGOBERTO Mendez    ondansetron 4 mg Intravenous Q6H PRN RIGOBERTO Cruz    pantoprazole 40 mg Oral BID AC RIGOBERTO Clements    sodium chloride 75 mL/hr Intravenous Continuous RIGOBERTO Cruz Last Rate: 75 mL/hr (10/16/19 0223)   traMADol 50 mg Oral Q6H PRN RIGOBERTO Cruz    [START ON 10/17/2019] vancomycin 20 mg/kg Intravenous Q24H RIGOBERTO Cruz         Today, Patient Was Seen By: RIGOBERTO Mendez    ** Please Note: Dictation voice to text software may have been used in the creation of this document   **

## 2019-10-16 NOTE — ED PROVIDER NOTES
History  Chief Complaint   Patient presents with    Abscess     pt was told to come to ED for abcess found on CT today of incision site on abdomen  pt c/o severe pain     Patient is a 50-year-old female that presents emergency department at the direction of her Gyn/onc surgery  She had been having increased abdominal pain beginning yesterday a  She was seen by her surgeon, Dr Rosita Madrid, who ordered an outpatient CT abdomen pelvis  Patient was called and told good emergency department because the CT scan showed evidence of intra-abdominal abscess  Patient denies fever, chills  She states she is still having abdominal pain  Patient had surgical procedure on 09/10/2019 involving total hysterectomy, colon resection with stoma placement, bladder tumor debulking  Prior to Admission Medications   Prescriptions Last Dose Informant Patient Reported?  Taking?   acetaminophen (TYLENOL) 325 mg tablet Past Month at Unknown time Self No Yes   Sig: Take 2 tablets (650 mg total) by mouth every 6 (six) hours as needed for mild pain, headaches or fever   amoxicillin-clavulanate (AUGMENTIN) 875-125 mg per tablet Not Taking at Unknown time  No No   Sig: Take 1 tablet by mouth every 12 (twelve) hours for 20 doses   Patient not taking: Reported on 10/15/2019   apixaban (ELIQUIS) 2 5 mg 10/15/2019 at Unknown time Self No Yes   Sig: Take 1 tablet (2 5 mg total) by mouth 2 (two) times a day   aspirin-acetaminophen-caffeine (EXCEDRIN MIGRAINE) 250-250-65 MG per tablet Past Week at Unknown time Self Yes Yes   Sig: Take 1 tablet by mouth every 6 (six) hours as needed for headaches   gabapentin (NEURONTIN) 100 mg capsule 10/15/2019 at Unknown time Self No Yes   Sig: Take 1 capsule (100 mg total) by mouth 3 (three) times a day   lidocaine-prilocaine (EMLA) cream Not Taking at Unknown time Self No No   Sig: Apply to port site prior to labs/chemo   Patient not taking: Reported on 10/15/2019   pantoprazole (PROTONIX) 40 mg tablet 10/15/2019 at Unknown time Self No Yes   Sig: Take 1 tablet (40 mg total) by mouth 2 (two) times a day before meals   potassium chloride (K-DUR,KLOR-CON) 20 mEq tablet Not Taking at Unknown time Self No No   Sig: Take 1 tablet (20 mEq total) by mouth daily   Patient not taking: Reported on 10/15/2019      Facility-Administered Medications: None       Past Medical History:   Diagnosis Date    Abdominal fluid collection     Anemia     Asthma     Cancer ([facility] [address] )     ovaries    Diabetes mellitus ([facility] )     History of transfusion     PONV (postoperative nausea and vomiting)        Past Surgical History:   Procedure Laterality Date    ABDOMINAL SURGERY      CT GUIDED PARACENTESIS  11/6/2018    CT GUIDED PERC DRAINAGE CATHETER PLACEMENT  12/24/2018    CT NEEDLE BIOPSY PERITONEUM  11/6/2018    CYSTOSCOPY N/A 9/10/2019    Procedure: CYSTOSCOPY , INSERTION URETERAL CATHETERS;  Surgeon: Aleks Zabala MD;  [facility];  Service: Gynecology Oncology    ECTOPIC PREGNANCY SURGERY      ECTOPIC PREGNANCY SURGERY      EXENTERATION PELVIS N/A 9/10/2019    Procedure: EXPLORATORY LAPAROTOMY, EXENTERATION POSTERIOR PELVIS,  END COLOSTOMY, ILEOSTOMY REVERSAL, LYSIS OF ADHESIONS, rADICAL OMENTECTOMY AND TUMOR DEBULKINGFLEXIBLE SIGMOIDOSCOPY, CYSTOGRAM, INSPECTION OF URETERS;  Surgeon: Aleks Zabala MD;  [facility];  Service: Gynecology Oncology    IR PARACENTESIS  9/18/2018    IR PARACENTESIS  10/18/2018    IR PARACENTESIS  12/10/2018    IR PORT PLACEMENT  11/29/2018    IR THORACENTESIS  9/16/2019    LAPAROTOMY N/A 12/14/2018    Procedure: LAPAROTOMY EXPLORATORY; Abdominal Washout;  Application of Abthera Vac Dressing;  Surgeon: Phong Herbert MD;  [facility];  Service: Gynecology Oncology    LAPAROTOMY N/A 12/15/2018    Procedure: LAPAROTOMY EXPLORATORY, ABDOMINAL WASHOUT, DRAIN PLACEMENT x 4, DIVERTING LOOP ILEOSTOMY AND ABDOMINAL CLOSURE,  ALL OTHER INDICATED PROCEDURES; Surgeon: Aisha Singh MD;  [facility];  Service: Gynecology Oncology    UT COLONOSCOPY FLX DX W/[facility]HAJ [facility] WHEN PFRMD N/A 9/25/2018    Procedure: EGD AND COLONOSCOPY;  Surgeon: Ruth Vega MD;  [facility]: [facility]; Service: Gastroenterology    TONSILECTOMY AND ADNOIDECTOMY      TONSILLECTOMY         Family History   Problem Relation Age of Onset    Cirrhosis Mother     Colon cancer Mother     Leukemia Cousin     Diabetes Father      I have reviewed and agree with the history as documented  Social History     Tobacco Use    Smoking status: Former Smoker    Smokeless tobacco: Never Used    Tobacco comment: "Quit 40 yrs ago"   Substance Use Topics    Alcohol use: Not Currently     Comment: occassionally    Drug use: No        Review of Systems   Constitutional: Negative for fever  Respiratory: Negative for shortness of breath  Cardiovascular: Negative for chest pain  Gastrointestinal: Positive for abdominal pain  All other systems reviewed and are negative  Physical Exam  Physical Exam   Constitutional: She is oriented to person, place, and time  She appears well-developed and well-nourished  HENT:   Head: Normocephalic and atraumatic  Right Ear: External ear normal    Left Ear: External ear normal    Nose: Nose normal    Mouth/Throat: Oropharynx is clear and moist    Eyes: Pupils are equal, round, and reactive to light  Conjunctivae and EOM are normal    Neck: Normal range of motion  Cardiovascular: Normal rate, regular rhythm and normal heart sounds  Pulmonary/Chest: Effort normal and breath sounds normal    Abdominal: Soft  Bowel sounds are normal  There is tenderness in the periumbilical area  Neurological: She is alert and oriented to person, place, and time  Skin: Skin is warm  Psychiatric: She has a normal mood and affect  Her behavior is normal  Judgment and thought content normal    Vitals reviewed        Vital Signs  ED Triage Vitals [10/15/19 1841]   Temperature Pulse Respirations Blood Pressure SpO2   98 9 °F (37 2 °C) 87 18 (!) 172/77 99 %      Temp Source Heart Rate Source Patient Position - Orthostatic VS BP Location FiO2 (%)   Oral Monitor Sitting Left arm --      Pain Score       7           Vitals:    10/15/19 2145 10/15/19 2200 10/15/19 2215 10/16/19 0132   BP:  153/71  117/57   Pulse: 82 83 85 82   Patient Position - Orthostatic VS:             Visual Acuity      ED Medications  Medications   vancomycin (VANCOCIN) IVPB (premix) 1,000 mg (has no administration in time range)   apixaban (ELIQUIS) tablet 2 5 mg (has no administration in time range)   gabapentin (NEURONTIN) capsule 100 mg (has no administration in time range)   pantoprazole (PROTONIX) EC tablet 40 mg (has no administration in time range)   calcium carbonate (TUMS) chewable tablet 1,000 mg (has no administration in time range)   ondansetron (ZOFRAN) injection 4 mg (has no administration in time range)   acetaminophen (TYLENOL) tablet 650 mg (650 mg Oral Given 10/16/19 0104)   insulin lispro (HumaLOG) 100 units/mL subcutaneous injection 1-5 Units (has no administration in time range)   insulin lispro (HumaLOG) 100 units/mL subcutaneous injection 1-5 Units (has no administration in time range)   sodium chloride 0 9 % infusion (has no administration in time range)   traMADol (ULTRAM) tablet 50 mg (has no administration in time range)   multi-electrolyte (ISOLYTE-S PH 7 4) bolus 1,000 mL (0 mL Intravenous Stopped 10/15/19 2234)   morphine injection 2 mg (2 mg Intravenous Given 10/15/19 2045)   ondansetron (ZOFRAN) injection 4 mg (4 mg Intravenous Given 10/15/19 2044)   cefepime (MAXIPIME) 2,000 mg in dextrose 5 % 50 mL IVPB (0 mg Intravenous Stopped 10/15/19 2313)       Diagnostic Studies  Results Reviewed     Procedure Component Value Units Date/Time    Comprehensive metabolic panel [[id number]]  (Abnormal) Collected:  10/15/19 2033    Lab Status:  Final result Specimen:  Blood from Central Venous Line Updated:  10/15/19 2106     Sodium 140 mmol/L      Potassium 3 5 mmol/L      Chloride 103 mmol/L      CO2 26 mmol/L      ANION GAP 11 mmol/L      BUN 16 mg/dL      Creatinine 1 08 mg/dL      Glucose 104 mg/dL      Calcium 9 3 mg/dL      AST 14 U/L      ALT 13 U/L      Alkaline Phosphatase 78 U/L      Total Protein 7 4 g/dL      Albumin 3 1 g/dL      Total Bilirubin 0 20 mg/dL      eGFR 53 ml/min/1 73sq m     Narrative:       [place] guidelines for Chronic Kidney Disease (CKD):     Stage 1 with normal or high GFR (GFR > 90 mL/min/1 73 square meters)    Stage 2 Mild CKD (GFR = 60-89 mL/min/1 73 square meters)    Stage 3A Moderate CKD (GFR = 45-59 mL/min/1 73 square meters)    Stage 3B Moderate CKD (GFR = 30-44 mL/min/1 73 square meters)    Stage 4 Severe CKD (GFR = 15-29 mL/min/1 73 square meters)    Stage 5 End Stage CKD (GFR <15 mL/min/1 73 square meters)  Note: GFR calculation is accurate only with a steady state creatinine    Lactic acid, plasma [[id number]]  (Normal) Collected:  10/15/19 2033    Lab Status:  Final result Specimen:  Blood from Central Venous Line Updated:  10/15/19 2059     LACTIC ACID 0 7 mmol/L     Narrative:       Result may be elevated if tourniquet was used during collection      CBC and differential [[id number]]  (Abnormal) Collected:  10/15/19 2033    Lab Status:  Final result Specimen:  Blood from Central Venous Line Updated:  10/15/19 2051     WBC 10 41 Thousand/uL      RBC 2 80 Million/uL      Hemoglobin 8 4 g/dL      Hematocrit 26 5 %      MCV 95 fL      MCH 30 0 pg      MCHC 31 7 g/dL      RDW 14 0 %      MPV 11 3 fL      Platelets 030 Thousands/uL      nRBC 0 /100 WBCs      Neutrophils Relative 67 %      Immat GRANS % 0 %      Lymphocytes Relative 20 %      Monocytes Relative 9 %      Eosinophils Relative 3 %      Basophils Relative 1 %      Neutrophils Absolute 7 00 Thousands/µL      Immature Grans Absolute 0 03 Thousand/uL Lymphocytes Absolute 2 10 Thousands/µL      Monocytes Absolute 0 96 Thousand/µL      Eosinophils Absolute 0 27 Thousand/µL      Basophils Absolute 0 05 Thousands/µL     Blood culture [[id number]] Collected:  10/15/19 2039    Lab Status: In process Specimen:  Blood from Arm, Right Updated:  10/15/19 2041    Blood culture [[id number]] Collected:  10/15/19 2033    Lab Status: In process Specimen:  Blood from Central Venous Line Updated:  10/15/19 2036                 No orders to display              Procedures  Procedures       ED Course           Identification of Seniors at Risk      Most Recent Value   (ISAR) Identification of Seniors at Risk   Before the illness or injury that brought you to the Emergency, did you need someone to help you on a regular basis? 0 Filed at: 10/15/2019 1841   In the last 24 hours, have you needed more help than usual?  1 Filed at: 10/15/2019 1841   Have you been hospitalized for one or more nights during the past 6 months? 1 Filed at: 10/15/2019 1841   In general, do you see well? 1 Filed at: 10/15/2019 1841   In general, do you have serious problems with your memory? 0 Filed at: 10/15/2019 1841   Do you take more than three different medications every day? 1 Filed at: 10/15/2019 1841   ISAR Score  4 Filed at: 10/15/2019 1841                          MDM  Number of Diagnoses or Management Options  Abdominal wall abscess:   Diagnosis management comments: Patient 70 year female presents emergency department with increasing abdominal pain  CT scan is consistent small abscess in the intra-abdominal wall musculature  Patient does not meet criteria  She is started on IV cefepime  Patient will be admitted to the hospital for further IV antibiotics and evaluation  Spoke with gyn/oncology service this evening    They stated they will see the patient as a consult while in the hospital        Amount and/or Complexity of Data Reviewed  Clinical lab tests: ordered and reviewed  Tests in the radiology section of CPT®: reviewed  Review and summarize past medical records: yes  Discuss the patient with other providers: yes    Risk of Complications, Morbidity, and/or Mortality  Presenting problems: moderate  Diagnostic procedures: moderate  Management options: moderate    Patient Progress  Patient progress: stable      Disposition  Final diagnoses:   Abdominal wall abscess     Time reflects when diagnosis was documented in both MDM as applicable and the Disposition within this note     Time User Action Codes Description Comment    10/15/2019  9:37 PM Elise Olivas Add [L02 211] Abdominal wall abscess     10/15/2019 10:13 PM Alyssa Stevenson Add [C56 1,  C56 2] Malignant neoplasm of both ovaries ([facility] )     10/15/2019 10:13 PM Alyssa Stevenson Modify [C56 1,  C56 2] Malignant neoplasm of both ovaries [facility])       ED Disposition     ED Disposition Condition Date/Time Comment    Admit Paxton Deluca Oct 15, 2019 2137 Case was discussed with Dr Wilfrid Woods and the patient's admission status was agreed to be Admission Status: inpatient status to the service of Dr Wilfrid Woods   Follow-up Information    None         Current Discharge Medication List      CONTINUE these medications which have NOT CHANGED    Details   acetaminophen (TYLENOL) 325 mg tablet Take 2 tablets (650 mg total) by mouth every 6 (six) hours as needed for mild pain, headaches or fever  Qty: 30 tablet, Refills: 0    Associated Diagnoses:  Bowel perforation (HCC)      apixaban (ELIQUIS) 2 5 mg Take 1 tablet (2 5 mg total) by mouth 2 (two) times a day  Qty: 60 tablet, Refills: 0    Associated Diagnoses: Malignant neoplasm of ovary, unspecified laterality ([facility] )      aspirin-acetaminophen-caffeine (EXCEDRIN MIGRAINE) 250-250-65 MG per tablet Take 1 tablet by mouth every 6 (six) hours as needed for headaches      gabapentin (NEURONTIN) 100 mg capsule Take 1 capsule (100 mg total) by mouth 3 (three) times a day  Qty: 90 capsule, Refills: 11    Associated Diagnoses: Malignant neoplasm of ovary, unspecified laterality (HCC)      pantoprazole (PROTONIX) 40 mg tablet Take 1 tablet (40 mg total) by mouth 2 (two) times a day before meals  Refills: 0    Associated Diagnoses: Bowel perforation (HCC)      amoxicillin-clavulanate (AUGMENTIN) 875-125 mg per tablet Take 1 tablet by mouth every 12 (twelve) hours for 20 doses  Qty: 20 tablet, Refills: 0    Associated Diagnoses: Right lower quadrant abdominal pain      lidocaine-prilocaine (EMLA) cream Apply to port site prior to labs/chemo  Qty: 30 g, Refills: 1    Associated Diagnoses: Malignant neoplasm of ovary, unspecified laterality (HCC)      potassium chloride (K-DUR,KLOR-CON) 20 mEq tablet Take 1 tablet (20 mEq total) by mouth daily  Qty: 30 tablet, Refills: 0    Associated Diagnoses: Malignant neoplasm of ovary, unspecified laterality ([facility] [address] )           No discharge procedures on file      ED Provider  Electronically Signed by           Nelda Oakley PA-C  10/16/19 8953

## 2019-10-16 NOTE — H&P
[address] [facility]  H&P- Meghana Arguello 1950, 70 y o  female MRN: [id number]    Unit/Bed#: ED 16 Encounter: [id number]    Primary Care Provider: Noy Orta MD   Date and time admitted to hospital: 10/15/2019  7:54 PM    * Abdominal wall abscess  Assessment & Plan  As evidence on abdominal CT   General surgery consult  Gyn/Onc consult  Vancomycin  Local wound care    Deep venous thrombosis of right profunda femoris vein (HCC)  Assessment & Plan  Continue Eliquis    Abdominal pain  Assessment & Plan  Mild abdominal pain starting yesterday  Controlled with Excedrin at home  Prn Tylenol, tramadol  Supportive care         VTE Prophylaxis: Apixaban (Eliquis)  / sequential compression device   Code Status:  Full code  POLST: POLST form is not discussed and not completed at this time  Discussion with family:  Not available at this time    Anticipated Length of Stay:  Patient will be admitted on an Inpatient basis with an anticipated length of stay of  > 2 midnights  Justification for Hospital Stay:  Patient will require further evaluation by General surgery and fine on his well as IV antibiotics for treatment for abdominal wall abscess    Total Time for Visit, including Counseling / Coordination of Care: 1 hour  Greater than 50% of this total time spent on direct patient counseling and coordination of care  Chief Complaint:   Abdominal pain    History of Present Illness:    Meghana Arguello is a 70 y o  female who presents with abdominal pain  After further evaluation patient was found to have an abdominal wall abscess  She recently underwent surgery on September 10th  Her recovery has been going according to planned without any complications until this last week  She reports noticing some serosanguineous drainage from her incision  Denies any redness swelling or discomfort at that time    However yesterday she noticed that the drainage had turned purulent and she has had increasing abdominal pain  She denies any fevers chills nausea vomiting  Denies any chest pain chest tightness shortness of breath or difficulty breathing  She ambulates independently  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  Patient has VNA, she discussed her concerns with the in a yesterday and was advised to follow up with Dr Montilla  She went to the office today and had wound cultures collected in the was advised to come to the ED  Review of Systems:    Review of Systems   Constitutional: Negative  HENT: Negative  Eyes: Negative  Respiratory: Negative  Cardiovascular: Negative  Gastrointestinal: Positive for abdominal pain  Negative for nausea and vomiting  Endocrine: Negative  Genitourinary: Negative  Musculoskeletal: Negative  Skin: Positive for wound  Neurological: Negative  Hematological: Negative  Psychiatric/Behavioral: Negative  All other systems reviewed and are negative        Past Medical and Surgical History:     Past Medical History:   Diagnosis Date    Abdominal fluid collection     Anemia     Asthma     Cancer ([facility] [address] )     ovaries    Diabetes mellitus ([facility] [address] )     History of transfusion     PONV (postoperative nausea and vomiting)        Past Surgical History:   Procedure Laterality Date    ABDOMINAL SURGERY      CT GUIDED PARACENTESIS  11/6/2018    CT GUIDED PERC DRAINAGE CATHETER PLACEMENT  12/24/2018    CT NEEDLE BIOPSY PERITONEUM  11/6/2018    CYSTOSCOPY N/A 9/10/2019    Procedure: CYSTOSCOPY , INSERTION URETERAL CATHETERS;  Surgeon: Tina Dailey MD;  [facility];  Service: Gynecology Oncology    ECTOPIC PREGNANCY SURGERY      ECTOPIC PREGNANCY SURGERY      EXENTERATION PELVIS N/A 9/10/2019    Procedure: EXPLORATORY LAPAROTOMY, EXENTERATION POSTERIOR PELVIS,  END COLOSTOMY, ILEOSTOMY REVERSAL, LYSIS OF ADHESIONS, rADICAL OMENTECTOMY AND TUMOR DEBULKINGFLEXIBLE SIGMOIDOSCOPY, CYSTOGRAM, INSPECTION OF URETERS;  Surgeon: Tina Dailey MD;  [facility];  Service: Gynecology Oncology    IR PARACENTESIS  9/18/2018    IR PARACENTESIS  10/18/2018    IR PARACENTESIS  12/10/2018    IR PORT PLACEMENT  11/29/2018    IR THORACENTESIS  9/16/2019    LAPAROTOMY N/A 12/14/2018    Procedure: LAPAROTOMY EXPLORATORY; Abdominal Washout; Application of Abthera Vac Dressing;  Surgeon: Marcia Crocker MD;  [facility];  Service: Gynecology Oncology    LAPAROTOMY N/A 12/15/2018    Procedure: LAPAROTOMY EXPLORATORY, ABDOMINAL WASHOUT, DRAIN PLACEMENT x 4, DIVERTING LOOP ILEOSTOMY AND ABDOMINAL CLOSURE,  ALL OTHER INDICATED PROCEDURES;  Surgeon: Marcia Crocker MD;  [facility];  Service: Gynecology Oncology    KS COLONOSCOPY FLX DX W/COLLJ [facility] WHEN PFRMD N/A 9/25/2018    Procedure: EGD AND COLONOSCOPY;  Surgeon: Michele Mcmanus MD;  Location: [facility]; Service: Gastroenterology    TONSILECTOMY AND ADNOIDECTOMY      TONSILLECTOMY         Meds/Allergies:    Prior to Admission medications    Medication Sig Start Date End Date Taking?  Authorizing Provider   acetaminophen (TYLENOL) 325 mg tablet Take 2 tablets (650 mg total) by mouth every 6 (six) hours as needed for mild pain, headaches or fever 12/29/18  Yes Toño Santiago   apixaban [place]) 2 5 mg Take 1 tablet (2 5 mg total) by mouth 2 (two) times a day 9/20/19 10/20/19 Yes [place], DO   aspirin-acetaminophen-caffeine (EXCEDRIN MIGRAINE) 854-900-26 MG per tablet Take 1 tablet by mouth every 6 (six) hours as needed for headaches   Yes Historical Provider, MD   gabapentin (NEURONTIN) 100 mg capsule Take 1 capsule (100 mg total) by mouth 3 (three) times a day 9/20/19 10/20/19 Yes Ann Wagner,    pantoprazole (PROTONIX) 40 mg tablet Take 1 tablet (40 mg total) by mouth 2 (two) times a day before meals 12/29/18  Yes Toño Santiago   amoxicillin-clavulanate (AUGMENTIN) 875-125 mg per tablet Take 1 tablet by mouth every 12 (twelve) hours for 20 doses  Patient not taking: Reported on 10/15/2019 10/15/19 10/25/19  Patti Small MD   lidocaine-prilocaine (EMLA) cream Apply to port site prior to labs/chemo  Patient not taking: Reported on 10/15/2019 12/4/18   Angelica Mathews PA-C   potassium chloride (K-DUR,KLOR-CON) 20 mEq tablet Take 1 tablet (20 mEq total) by mouth daily  Patient not taking: Reported on 10/15/2019 9/21/19 10/21/19  Srini Amin DO   metFORMIN (GLUCOPHAGE) 500 mg tablet Take 500 mg by mouth 2 (two) times a day with meals  10/15/19  Historical Provider, MD   nystatin (MYCOSTATIN) 500,000 units/5 mL suspension Swish and swallow 5 mL (500,000 Units total) 4 (four) times a day  Patient not taking: Reported on 10/15/2019 9/20/19 10/15/19  Srini Amin DO     I have reviewed home medications with patient personally      Allergies: No Known Allergies    Social History:     Marital Status: /Civil Union   Occupation:  NA  Patient Pre-hospital Living Situation:  Private resident  Patient Pre-hospital Level of Mobility:  Independent  Patient Pre-hospital Diet Restrictions:  Carb controlled  Substance Use History:   Social History     Substance and Sexual Activity   Alcohol Use Not Currently    Comment: occassionally     Social History     Tobacco Use   Smoking Status Former Smoker   Smokeless Tobacco Never Used   Tobacco Comment    "Quit 40 yrs ago"     Social History     Substance and Sexual Activity   Drug Use No       Family History:    Family History   Problem Relation Age of Onset    Cirrhosis Mother     Colon cancer Mother     Leukemia Cousin     Diabetes Father        Physical Exam:     Vitals:   Blood Pressure: 163/72 (10/15/19 2130)  Pulse: 79 (10/15/19 2130)  Temperature: 98 9 °F (37 2 °C) (10/15/19 1841)  Temp Source: Oral (10/15/19 1841)  Respirations: 20 (10/15/19 2052)  Height: 5' 4" (162 6 cm) (10/15/19 1841)  Weight - Scale: 68 9 kg (151 lb 14 4 oz) (10/15/19 1841)  SpO2: 98 % (10/15/19 2130)    Physical Exam Constitutional: She is oriented to person, place, and time  She appears well-developed and well-nourished  HENT:   Head: Normocephalic  Eyes: Pupils are equal, round, and reactive to light  Neck: Normal range of motion  Neck supple  Cardiovascular: Normal rate  Pulmonary/Chest: Effort normal and breath sounds normal    Abdominal: Soft  Bowel sounds are normal  There is tenderness  Musculoskeletal: Normal range of motion  Neurological: She is alert and oriented to person, place, and time  Skin: Skin is warm and dry  There is erythema  Psychiatric: She has a normal mood and affect  Her behavior is normal  Thought content normal    Nursing note reviewed  Additional Data:     Lab Results: I have personally reviewed pertinent reports  Results from last 7 days   Lab Units 10/15/19  2033   WBC Thousand/uL 10 41*   HEMOGLOBIN g/dL 8 4*   HEMATOCRIT % 26 5*   PLATELETS Thousands/uL 280   NEUTROS PCT % 67   LYMPHS PCT % 20   MONOS PCT % 9   EOS PCT % 3     Results from last 7 days   Lab Units 10/15/19  2033   SODIUM mmol/L 140   POTASSIUM mmol/L 3 5   CHLORIDE mmol/L 103   CO2 mmol/L 26   BUN mg/dL 16   CREATININE mg/dL 1 08   ANION GAP mmol/L 11   CALCIUM mg/dL 9 3   ALBUMIN g/dL 3 1*   TOTAL BILIRUBIN mg/dL 0 20   ALK PHOS U/L 78   ALT U/L 13   AST U/L 14   GLUCOSE RANDOM mg/dL 104                 Results from last 7 days   Lab Units 10/15/19  2033   LACTIC ACID mmol/L 0 7       Imaging: I have personally reviewed pertinent reports  No orders to display       EKG, Pathology, and Other Studies Reviewed on Admission:   · EKG:  Not available    [facility] Records Reviewed: Yes     ** Please Note: This note has been constructed using a voice recognition system   **

## 2019-10-16 NOTE — ASSESSMENT & PLAN NOTE
Mild abdominal pain starting yesterday  Controlled with Excedrin at home  Prn Tylenol, tramadol  Supportive care

## 2019-10-16 NOTE — ASSESSMENT & PLAN NOTE
As evidence on abdominal CT   General surgery consult  Gyn/Onc consult  Vancomycin  Local wound care

## 2019-10-16 NOTE — PROGRESS NOTES
Vancomycin Assessment    Catherine Alvarenga is a 70 y o  female who is currently receiving vancomycin 1000mg Q24hr for skin-soft tissue infection     Relevant clinical data and objective history reviewed:  Creatinine   Date Value Ref Range Status   10/16/2019 1 08 0 60 - 1 30 mg/dL Final     Comment:     Standardized to IDMS reference method   10/15/2019 1 08 0 60 - 1 30 mg/dL Final     Comment:     Standardized to IDMS reference method   09/21/2019 0 84 0 60 - 1 30 mg/dL Final     Comment:     Standardized to IDMS reference method     Vancomycin Rm   Date Value Ref Range Status   09/17/2019 17 8 ug/mL Final     /60   Pulse 74   Temp 98 5 °F (36 9 °C)   Resp 20   Ht 5' 4" (1 626 m)   Wt 68 9 kg (151 lb 14 4 oz)   SpO2 96%   BMI 26 07 kg/m²   I/O last 3 completed shifts: In: 1000 [I V :1000]  Out: 450 [Urine:450]  Lab Results   Component Value Date/Time    BUN 13 10/16/2019 05:45 AM    WBC 7 42 10/16/2019 05:45 AM    HGB 7 9 (L) 10/16/2019 05:45 AM    HCT 25 2 (L) 10/16/2019 05:45 AM    MCV 96 10/16/2019 05:45 AM     10/16/2019 05:45 AM     Temp Readings from Last 3 Encounters:   10/16/19 98 5 °F (36 9 °C)   10/15/19 98 4 °F (36 9 °C)   10/09/19 98 3 °F (36 8 °C)     Vancomycin Days of Therapy: day 1    Assessment/Plan  The patient is currently on vancomycin utilizing scheduled dosing  Baseline risks associated with therapy include: pre-existing renal impairment and advanced age  The patient is receiving 1000mg Q24hr with the most recent vancomycin level being not at steady-state and sub-therapeutic based on a goal of 15-20 (appropriate for most indications) ; therefore, after clinical evaluation will be changed to 1500mg Q24hr   Pharmacy will continue to follow closely for s/sx of nephrotoxicity, infusion reactions and appropriateness of therapy  BMP and CBC will be ordered per protocol    Plan for trough as patient approaches steady state, prior to the 4th  dose at approximately 0145 on 10/18  Pharmacy will continue to follow the patients culture results and clinical progress daily      Curt Anaya, Pharmacist

## 2019-10-16 NOTE — ED NOTES
1  CC abcess  2  Is this admission r/t an injury? no  3  Orientation status alert and oriented  4  Abnormal labs, assessment, vitals CT scan  5  Medication/ drips  6  Last time narcotics given 2045 morphine 2mg  7  IV lines, drains, etc  Right chest port accessed today in ER, 20g power port  8  Isolation status none  9  Skin abdominal scars and colostomy bag  10  Ambulation status independant  11   ED RN phone number SARI Ramos  10/15/19 [id number] Jack Infante RN  10/15/19 [id number] Jack Infante RN  10/15/19 [id number]

## 2019-10-16 NOTE — CONSULTS
Consultation - General Surgery   Tricia Kenney 70 y o  female MRN: [id number]  Unit/Bed#: -01 Encounter: [id number]    ASSESSMENT:  39-year-old female presenting with abdominal pain and radiological findings suspicious for abdominal wall abscess  - patient had surgery on 09/10/2019, and recently began having incisional pain/abdominal pain and noticed a small area of drainage from her incision  - patient was seen by Dr Miguel Resendez in the office at which time he obtain wound cultures and a CT  CT showed evidence of intra-abdominal wall abscess so she was sent to the emergency department  - patient is currently on cefepime and vancomycin  - patient is afebrile and WBC is within normal limits  - CT also revealed gallbladder distention, cholelithiasis, and a possible small amount of pericholecystic fluid    PLAN:  - no indication for acute surgical intervention at this time for possible abscess/fluid collection in the mid abdominal incision  - continue with local wound care; wound packed with iodoform quarter-inch packing and cover with gauze and tape  - continue to monitor wound  - serial abdominal exams  - continue antibiotic  - wound cultures were taken in the office- resolved pending  - will order HIDA scan due to abnormal gallbladder findings on CT, as well as right upper quadrant tenderness  _______________________________________________________________  Physician Requesting Consult: Brandee Johnson MD    Additional consultants:  Gynecologic Oncology    Reason for Consult / Principal Problem:  Abdominal wall abscess    HPI: Tricia Kenney is a 70y o  year old female with a past medical history of ovarian cancer status post prior neoadjuvant chemotherapy and status post gynecologic procedure 09/10/2019  Patient had abdominal surgery on 09/10/2019 involving hysterectomy, colon resection and creation of colostomy, bladder tumor debulking    Patient reports having previous ileostomy that was reversed on the colostomy was created, as the were unable to perform a primary anastomosis  Patient reports incision had been healed well and had no issues until a couple days ago when she began experiencing abdominal pain, and her daughter noticed some drainage from the incision  Patient follow-up with her surgeon Dr Timmy Jung, will obtain wound cultures and sent her for a CT scan  CT scans showed possible intra-abdominal abscess so he center to the emergency department  On presentation the patient denies any fevers, chills, nausea, or vomiting  Historical Information   Past Medical History:   Diagnosis Date    Abdominal fluid collection     Anemia     Asthma     Cancer [facility] [address] [facility]     ovaries    Diabetes mellitus ([facility] [address] )     History of transfusion     PONV (postoperative nausea and vomiting)      Past Surgical History:   Procedure Laterality Date    ABDOMINAL SURGERY      CT GUIDED PARACENTESIS  11/6/2018    CT GUIDED PERC DRAINAGE CATHETER PLACEMENT  12/24/2018    CT NEEDLE BIOPSY PERITONEUM  11/6/2018    CYSTOSCOPY N/A 9/10/2019    Procedure: CYSTOSCOPY , INSERTION URETERAL CATHETERS;  Surgeon: Kyle Zacarias MD;  [facility];  Service: Gynecology Oncology    ECTOPIC PREGNANCY SURGERY      ECTOPIC PREGNANCY SURGERY      EXENTERATION PELVIS N/A 9/10/2019    Procedure: EXPLORATORY LAPAROTOMY, EXENTERATION POSTERIOR PELVIS,  END COLOSTOMY, ILEOSTOMY REVERSAL, LYSIS OF ADHESIONS, rADICAL OMENTECTOMY AND TUMOR DEBULKINGFLEXIBLE SIGMOIDOSCOPY, CYSTOGRAM, INSPECTION OF URETERS;  Surgeon: Kyle Zacarias MD;  [facility];  Service: Gynecology Oncology    IR PARACENTESIS  9/18/2018    IR PARACENTESIS  10/18/2018    IR PARACENTESIS  12/10/2018    IR PORT PLACEMENT  11/29/2018    IR THORACENTESIS  9/16/2019    LAPAROTOMY N/A 12/14/2018    Procedure: LAPAROTOMY EXPLORATORY; Abdominal Washout;  Application of Abthera Vac Dressing;  Surgeon: Nancy Shaffer MD;  [facility];  Service: Gynecology Oncology    LAPAROTOMY N/A 12/15/2018    Procedure: LAPAROTOMY EXPLORATORY, ABDOMINAL WASHOUT, DRAIN PLACEMENT x 4, DIVERTING LOOP ILEOSTOMY AND ABDOMINAL CLOSURE,  ALL OTHER INDICATED PROCEDURES;  Surgeon: Pilar Lara MD;  [facility];  Service: Gynecology Oncology    DC COLONOSCOPY FLX DX W/COLLJ [facility] WHEN P[facility] N/A 9/25/2018    Procedure: EGD AND COLONOSCOPY;  Surgeon: Tong Boss MD;  Location: [facility]; Service: Gastroenterology    TONSILECTOMY AND ADNOIDECTOMY      TONSILLECTOMY       Social History   Social History     Substance and Sexual Activity   Alcohol Use Not Currently    Comment: occassionally     Social History     Substance and Sexual Activity   Drug Use No     Social History     Tobacco Use   Smoking Status Former Smoker   Smokeless Tobacco Never Used   Tobacco Comment    "Quit 40 yrs ago"     Family History: non-contributory}    Meds/Allergies   Home meds:   Prior to Admission medications    Medication Sig Start Date End Date Taking?  Authorizing Provider   acetaminophen (TYLENOL) 325 mg tablet Take 2 tablets (650 mg total) by mouth every 6 (six) hours as needed for mild pain, headaches or fever 12/29/18  Yes [facility]) 2 5 mg Take 1 tablet (2 5 mg total) by mouth 2 (two) times a day 9/20/19 10/20/19 Yes Dong Sanford, DO   aspirin-acetaminophen-caffeine (EXCEDRIN MIGRAINE) 456-939-67 MG per tablet Take 1 tablet by mouth every 6 (six) hours as needed for headaches   Yes Historical Provider, MD   gabapentin (NEURONTIN) 100 mg capsule Take 1 capsule (100 mg total) by mouth 3 (three) times a day 9/20/19 10/20/19 Yes Ann Wagner, DO   pantoprazole (PROTONIX) 40 mg tablet Take 1 tablet (40 mg total) by mouth 2 (two) times a day before meals 12/29/18  Yes [facility]   amoxicillin-clavulanate (AUGMENTIN) 875-125 mg per tablet Take 1 tablet by mouth every 12 (twelve) hours for 20 doses  Patient not taking: Reported on 10/15/2019 10/15/19 10/25/19  Vaishnavi Rouse MD   lidocaine-prilocaine (EMLA) cream Apply to port site prior to labs/chemo  Patient not taking: Reported on 10/15/2019 12/4/18   Raheel Mathews PA-C   potassium chloride (K-DUR,KLOR-CON) 20 mEq tablet Take 1 tablet (20 mEq total) by mouth daily  Patient not taking: Reported on 10/15/2019 9/21/19 10/21/19  Christian Davidson DO     Scheduled Meds:  Current Facility-Administered Medications:  acetaminophen 650 mg Oral Q6H PRN TEOFILO FosterNP    apixaban 2 5 mg Oral BID RIGOBERTO Foster    calcium carbonate 1,000 mg Oral Daily PRN RIGOBERTO Foster    gabapentin 100 mg Oral TID RIGOBERTO Foster    insulin lispro 1-5 Units Subcutaneous TID AC RIGOBERTO Clements    insulin lispro 1-5 Units Subcutaneous HS RIGOBERTO Foster    ondansetron 4 mg Intravenous Q6H PRN TEOFILO FosterNP    pantoprazole 40 mg Oral BID AC RIGOBERTO Foster    sodium chloride 75 mL/hr Intravenous Continuous RIGOBERTO Foster Last Rate: 75 mL/hr (10/16/19 0223)   traMADol 50 mg Oral Q6H PRN RIGOBERTO Foster    [START ON 10/17/2019] vancomycin 20 mg/kg Intravenous Q24H RIGOBERTO Foster      Continuous Infusions:  sodium chloride 75 mL/hr Last Rate: 75 mL/hr (10/16/19 0223)     PRN Meds:    acetaminophen    calcium carbonate    ondansetron    traMADol    ALLERGIES: No Known Allergies    Review of Systems:  General: negative  Cardiovascular: no chest pain or dyspnea on exertion  Respiratory: no cough, shortness of breath, or wheezing  Gastrointestinal:  Abdominal pain and drainage from incisional wound  Musculoskeletal: negative  Neurological: no TIA or stroke symptoms  Hematological and Lymphatic: negative  Dermatological : negative  Psychological: negative  Ophthalmic: negative  ENT: negative      Objective   Vitals:  Blood pressure 130/60, pulse 74, temperature 98 5 °F (36 9 °C), resp   rate 20, height 5' 4" (1 626 m), weight 68 9 kg (151 lb 14 4 oz), SpO2 96 %  Body mass index is 26 07 kg/m²  SpO2: SpO2: 96 %    I/Os:  I/O       10/14 0701 - 10/15 0700 10/15 0701 - 10/16 0700 10/16 0701 - 10/17 0700    P  O   0     I V  (mL/kg)  1000 (14 5)     Total Intake(mL/kg)  1000 (14 5)     Urine (mL/kg/hr)  450 200 (0 7)    Total Output  450 200    Net  +550 -200                 Invasive Lines/Tubes:  Invasive Devices     Central Venous Catheter Line            Port A Cath 11/29/18 Right Chest 321 days          Drain            Colostomy Descending/sigmoid LUQ 36 days    Closed/Suction Drain Right RLQ Bulb 35 days                Physical Exam  General appearance: alert, appears stated age and cooperative  Head: Normocephalic, without obvious abnormality, atraumatic  Eyes: conjunctivae/corneas clear  PERRL, EOM's intact  Throat: lips, mucosa, and tongue normal; teeth and gums normal  Neck: no adenopathy, supple, symmetrical, trachea midline and thyroid not enlarged, symmetric, no tenderness/mass/nodules  Lungs: clear to auscultation bilaterally  Chest wall: no tenderness  Heart[de-identified] regular rate and rhythm  Abdomen: Soft, nondistended, nontender to palpation, normoactive bowel sounds present, previous ileostomy site is well-healed and no signs of infection, colostomy on left upper quadrant has good output and mucosa is pink and well perfused, mid abdominal incision appears to have healed well with exception of a small area near umbilicus with an opening measuring around 4 mm  Genitalia: deferred  Rectal: deferred  Extremities: extremities normal, atraumatic, no cyanosis or edema  Skin: Skin color, texture, turgor normal  No rashes or lesions  Neurologic: Grossly normal    Wound/Incision:  Small wound near umbilical area measuring around 3-4 mm, which tracts posterior laterally to the patient's right side about 2 5 cm  One has no undermining  Wound had thin clear crusting that was easily removed with setting gauze    No erythema and the perimeter of the wound noted  Upon probing of the wound bloody discharge was noted but no purulence or odor  Lab Results and Cultures:   CBC: Results from last 7 days   Lab Units 10/16/19  0545 10/15/19  2033   WBC Thousand/uL 7 42 10 41*   HEMOGLOBIN g/dL 7 9* 8 4*   HEMATOCRIT % 25 2* 26 5*   PLATELETS Thousands/uL 249 280     BMP/CMP:  Results from last 7 days   Lab Units 10/16/19  0545 10/15/19  2033   POTASSIUM mmol/L 3 8 3 5   CHLORIDE mmol/L 107 103   CO2 mmol/L 28 26   BUN mg/dL 13 16   CREATININE mg/dL 1 08 1 08   CALCIUM mg/dL 9 0 9 3     Coags:     Lipid panel:     HgbA1c:   Lab Results   Component Value Date    HGBA1C 6 6 (H) 08/30/2019       Urinalysis: Lab Results   Component Value Date    COLORU Bloody 09/15/2019    CLARITYU Turbid 09/15/2019    SPECGRAV 1 014 09/15/2019    PHUR Interference-unable to analyze (A) 09/15/2019    LEUKOCYTESUR Interference- unable to analyze (A) 09/15/2019    NITRITE Interference- unable to analyze 09/15/2019    GLUCOSEU Interference- unable to analyze 09/15/2019    KETONESU Interference- unable to analyze (A) 09/15/2019    BILIRUBINUR Interference- unable to analyze (A) 09/15/2019    BLOODU Interference- unable to analyze (A) 09/15/2019   ,   Urine Culture:   Lab Results   Component Value Date    URINECX No Growth <1000 cfu/mL 12/14/2018     Wound Culure: No results found for: WOUNDCULT  Blood Culture:   Lab Results   Component Value Date    BLOODCX No Growth After 5 Days  09/15/2019       Imaging Studies: I have personally reviewed pertinent reports  EKG, Pathology, and Other Studies: I have personally reviewed pertinent reports      VTE Prophylaxis:  Eliquis     Code Status: Level 1 - Full Code  Advance Directive and Living Will:      Power of :    POLST:      Counseling / Coordination of Care  None       Eder [place], [place]  10/16/2019

## 2019-10-16 NOTE — PLAN OF CARE
Problem: SKIN/TISSUE INTEGRITY - ADULT  Goal: Incision(s), wounds(s) or drain site(s) healing without S/S of infection  Description  INTERVENTIONS  - Assess and document risk factors for skin impairment   - Assess and document dressing, incision, wound bed, drain sites and surrounding tissue  - Consider nutrition services referral as needed  - Oral mucous membranes remain intact  - Provide patient/ family education  Outcome: Progressing     Problem: MUSCULOSKELETAL - ADULT  Goal: Maintain or return mobility to safest level of function  Description  INTERVENTIONS:  - Assess patient's ability to carry out ADLs; assess patient's baseline for ADL function and identify physical deficits which impact ability to perform ADLs (bathing, care of mouth/teeth, toileting, grooming, dressing, etc )  - Assess/evaluate cause of self-care deficits   - Assess range of motion  - Assess patient's mobility  - Assess patient's need for assistive devices and provide as appropriate  - Encourage maximum independence but intervene and supervise when necessary  - Involve family in performance of ADLs  - Assess for home care needs following discharge   - Consider OT consult to assist with ADL evaluation and planning for discharge  - Provide patient education as appropriate  Outcome: Progressing     Problem: PAIN - ADULT  Goal: Verbalizes/displays adequate comfort level or baseline comfort level  Description  Interventions:  - Encourage patient to monitor pain and request assistance  - Assess pain using appropriate pain scale  - Administer analgesics based on type and severity of pain and evaluate response  - Implement non-pharmacological measures as appropriate and evaluate response  - Consider cultural and social influences on pain and pain management  - Notify physician/advanced practitioner if interventions unsuccessful or patient reports new pain  Outcome: Progressing     Problem: INFECTION - ADULT  Goal: Absence or prevention of progression during hospitalization  Description  INTERVENTIONS:  - Assess and monitor for signs and symptoms of infection  - Monitor lab/diagnostic results  - Monitor all insertion sites, i e  indwelling lines, tubes, and drains  - Monitor endotracheal if appropriate and nasal secretions for changes in amount and color  - [place] appropriate cooling/warming therapies per order  - Administer medications as ordered  - Instruct and encourage patient and family to use good hand hygiene technique  - Identify and instruct in appropriate isolation precautions for identified infection/condition  Outcome: Progressing     Problem: SAFETY ADULT  Goal: Patient will remain free of falls  Description  INTERVENTIONS:  - Assess patient frequently for physical needs  -  Identify cognitive and physical deficits and behaviors that affect risk of falls  -  [place] fall precautions as indicated by assessment   - Educate patient/family on patient safety including physical limitations  - Instruct patient to call for assistance with activity based on assessment  - Modify environment to reduce risk of injury  - Consider OT/PT consult to assist with strengthening/mobility  Outcome: Progressing     Problem: Potential for Falls  Goal: Patient will remain free of falls  Description  INTERVENTIONS:  - Assess patient frequently for physical needs  -  Identify cognitive and physical deficits and behaviors that affect risk of falls    -  [place] fall precautions as indicated by assessment   - Educate patient/family on patient safety including physical limitations  - Instruct patient to call for assistance with activity based on assessment  - Modify environment to reduce risk of injury  - Consider OT/PT consult to assist with strengthening/mobility  Outcome: Progressing

## 2019-10-16 NOTE — MALNUTRITION/BMI
This medical record reflects one or more clinical indicators suggestive of malnutrition     Malnutrition Findings:   Malnutrition type: Chronic illness  Degree of Malnutrition: Malnutrition of mild degree  Malnutrition Characteristics: Fat loss, Muscle loss, Inadequate energy     Pt presents with mild malnutrition r/t progressive CA diagnosis as evidenced by intake hx indicative of meeting <50-75% estimated needs, moderate clavicle protrusion, servere temporal depletion, moderate interosseous wasting, mild orbital hollowing; to be treated with diet liberalization to regular, pt refuses nutritional supplements    BMI Findings: Body mass index is 26 07 kg/m²  See Nutrition note dated 10/16/2019 for additional details  Completed nutrition assessment is viewable in the nutrition documentation

## 2019-10-17 ENCOUNTER — APPOINTMENT (INPATIENT)
Dept: NUCLEAR MEDICINE | Facility: HOSPITAL | Age: 69
DRG: 857 | End: 2019-10-17
Payer: MEDICARE

## 2019-10-17 PROBLEM — T81.49XA SURGICAL WOUND INFECTION: Status: ACTIVE | Noted: 2019-10-15

## 2019-10-17 LAB
ERYTHROCYTE [DISTWIDTH] IN BLOOD BY AUTOMATED COUNT: 13.8 % (ref 11.6–15.1)
GLUCOSE SERPL-MCNC: 147 MG/DL (ref 65–140)
GLUCOSE SERPL-MCNC: 154 MG/DL (ref 65–140)
GLUCOSE SERPL-MCNC: 89 MG/DL (ref 65–140)
GLUCOSE SERPL-MCNC: 98 MG/DL (ref 65–140)
HCT VFR BLD AUTO: 26.9 % (ref 34.8–46.1)
HGB BLD-MCNC: 8.2 G/DL (ref 11.5–15.4)
MCH RBC QN AUTO: 29.1 PG (ref 26.8–34.3)
MCHC RBC AUTO-ENTMCNC: 30.5 G/DL (ref 31.4–37.4)
MCV RBC AUTO: 95 FL (ref 82–98)
PLATELET # BLD AUTO: 257 THOUSANDS/UL (ref 149–390)
PMV BLD AUTO: 11.4 FL (ref 8.9–12.7)
RBC # BLD AUTO: 2.82 MILLION/UL (ref 3.81–5.12)
WBC # BLD AUTO: 8.22 THOUSAND/UL (ref 4.31–10.16)

## 2019-10-17 PROCEDURE — A9537 TC99M MEBROFENIN: HCPCS

## 2019-10-17 PROCEDURE — 82948 REAGENT STRIP/BLOOD GLUCOSE: CPT

## 2019-10-17 PROCEDURE — 78227 HEPATOBIL SYST IMAGE W/DRUG: CPT

## 2019-10-17 PROCEDURE — 85027 COMPLETE CBC AUTOMATED: CPT | Performed by: NURSE PRACTITIONER

## 2019-10-17 PROCEDURE — 99232 SBSQ HOSP IP/OBS MODERATE 35: CPT | Performed by: SURGERY

## 2019-10-17 PROCEDURE — 99232 SBSQ HOSP IP/OBS MODERATE 35: CPT | Performed by: NURSE PRACTITIONER

## 2019-10-17 RX ADMIN — APIXABAN 2.5 MG: 2.5 TABLET, FILM COATED ORAL at 17:22

## 2019-10-17 RX ADMIN — MORPHINE SULFATE 2 MG: 2 INJECTION, SOLUTION INTRAMUSCULAR; INTRAVENOUS at 15:42

## 2019-10-17 RX ADMIN — TRAMADOL HYDROCHLORIDE 50 MG: 50 TABLET, FILM COATED ORAL at 21:18

## 2019-10-17 RX ADMIN — VANCOMYCIN HYDROCHLORIDE 1500 MG: 5 INJECTION, POWDER, LYOPHILIZED, FOR SOLUTION INTRAVENOUS at 02:26

## 2019-10-17 RX ADMIN — PANTOPRAZOLE SODIUM 40 MG: 40 TABLET, DELAYED RELEASE ORAL at 06:29

## 2019-10-17 RX ADMIN — TRAMADOL HYDROCHLORIDE 50 MG: 50 TABLET, FILM COATED ORAL at 11:30

## 2019-10-17 RX ADMIN — SODIUM CHLORIDE 75 ML/HR: 0.9 INJECTION, SOLUTION INTRAVENOUS at 08:47

## 2019-10-17 RX ADMIN — GABAPENTIN 100 MG: 100 CAPSULE ORAL at 21:18

## 2019-10-17 RX ADMIN — PANTOPRAZOLE SODIUM 40 MG: 40 TABLET, DELAYED RELEASE ORAL at 17:22

## 2019-10-17 RX ADMIN — GABAPENTIN 100 MG: 100 CAPSULE ORAL at 17:22

## 2019-10-17 RX ADMIN — APIXABAN 2.5 MG: 2.5 TABLET, FILM COATED ORAL at 08:48

## 2019-10-17 RX ADMIN — GABAPENTIN 100 MG: 100 CAPSULE ORAL at 08:48

## 2019-10-17 RX ADMIN — ACETAMINOPHEN 650 MG: 325 TABLET, FILM COATED ORAL at 00:31

## 2019-10-17 NOTE — PLAN OF CARE
Problem: SKIN/TISSUE INTEGRITY - ADULT  Goal: Incision(s), wounds(s) or drain site(s) healing without S/S of infection  Description  INTERVENTIONS  - Assess and document risk factors for skin impairment   - Assess and document dressing, incision, wound bed, drain sites and surrounding tissue  - Consider nutrition services referral as needed  - Oral mucous membranes remain intact  - Provide patient/ family education  10/17/2019 1200 by Elina Batres RN  Outcome: Progressing  10/17/2019 1158 by Elina Batres RN  Outcome: Progressing     Problem: MUSCULOSKELETAL - ADULT  Goal: Maintain or return mobility to safest level of function  Description  INTERVENTIONS:  - Assess patient's ability to carry out ADLs; assess patient's baseline for ADL function and identify physical deficits which impact ability to perform ADLs (bathing, care of mouth/teeth, toileting, grooming, dressing, etc )  - Assess/evaluate cause of self-care deficits   - Assess range of motion  - Assess patient's mobility  - Assess patient's need for assistive devices and provide as appropriate  - Encourage maximum independence but intervene and supervise when necessary  - Involve family in performance of ADLs  - Assess for home care needs following discharge   - Consider OT consult to assist with ADL evaluation and planning for discharge  - Provide patient education as appropriate  10/17/2019 1200 by Elina Batres RN  Outcome: Progressing  10/17/2019 1158 by Elina Batres RN  Outcome: Progressing     Problem: PAIN - ADULT  Goal: Verbalizes/displays adequate comfort level or baseline comfort level  Description  Interventions:  - Encourage patient to monitor pain and request assistance  - Assess pain using appropriate pain scale  - Administer analgesics based on type and severity of pain and evaluate response  - Implement non-pharmacological measures as appropriate and evaluate response  - Consider cultural and social influences on pain and pain management  - Notify physician/advanced practitioner if interventions unsuccessful or patient reports new pain  10/17/2019 1200 by Odalys Miller RN  Outcome: Progressing  10/17/2019 1158 by Odalys Miller RN  Outcome: Progressing     Problem: INFECTION - ADULT  Goal: Absence or prevention of progression during hospitalization  Description  INTERVENTIONS:  - Assess and monitor for signs and symptoms of infection  - Monitor lab/diagnostic results  - Monitor all insertion sites, i e  indwelling lines, tubes, and drains  - Monitor endotracheal if appropriate and nasal secretions for changes in amount and color  - [place] appropriate cooling/warming therapies per order  - Administer medications as ordered  - Instruct and encourage patient and family to use good hand hygiene technique  - Identify and instruct in appropriate isolation precautions for identified infection/condition  10/17/2019 1200 by Odalys Miller RN  Outcome: Progressing  10/17/2019 1158 by Odalys Miller RN  Outcome: Progressing     Problem: SAFETY ADULT  Goal: Patient will remain free of falls  Description  INTERVENTIONS:  - Assess patient frequently for physical needs  -  Identify cognitive and physical deficits and behaviors that affect risk of falls    -  [place] fall precautions as indicated by assessment   - Educate patient/family on patient safety including physical limitations  - Instruct patient to call for assistance with activity based on assessment  - Modify environment to reduce risk of injury  - Consider OT/PT consult to assist with strengthening/mobility  10/17/2019 1200 by Odalys Miller RN  Outcome: Progressing  10/17/2019 1158 by Odalys Miller RN  Outcome: Progressing     Problem: Potential for Falls  Goal: Patient will remain free of falls  Description  INTERVENTIONS:  - Assess patient frequently for physical needs  -  Identify cognitive and physical deficits and behaviors that affect risk of falls  -  [place] fall precautions as indicated by assessment   - Educate patient/family on patient safety including physical limitations  - Instruct patient to call for assistance with activity based on assessment  - Modify environment to reduce risk of injury  - Consider OT/PT consult to assist with strengthening/mobility  10/17/2019 1200 by Gloria Garcia RN  Outcome: Progressing  10/17/2019 1158 by Gloria Garcia RN  Outcome: Progressing     Problem: Nutrition/Hydration-ADULT  Goal: Nutrient/Hydration intake appropriate for improving, restoring or maintaining nutritional needs  Description  Monitor and assess patient's nutrition/hydration status for malnutrition  Collaborate with interdisciplinary team and initiate plan and interventions as ordered  Monitor patient's weight and dietary intake as ordered or per policy  Utilize nutrition screening tool and intervene as necessary  Determine patient's food preferences and provide high-protein, high-caloric foods as appropriate       INTERVENTIONS:  - Monitor oral intake, urinary output, labs, and treatment plans  - Assess nutrition and hydration status and recommend course of action  - Evaluate amount of meals eaten  - Assist patient with eating if necessary   - Allow adequate time for meals  - Recommend/ encourage appropriate diets, oral nutritional supplements, and vitamin/mineral supplements  - Order, calculate, and assess calorie counts as needed  - Recommend, monitor, and adjust tube feedings and TPN/PPN based on assessed needs  - Assess need for intravenous fluids  - Provide specific nutrition/hydration education as appropriate  - Include patient/family/caregiver in decisions related to nutrition  10/17/2019 1200 by Gloria Garcia RN  Outcome: Progressing  10/17/2019 1158 by Gloria Garcia RN  Outcome: Progressing

## 2019-10-17 NOTE — PROGRESS NOTES
Progress Note - Cynthia Prim 1950, 70 y o  female MRN: [id number]    Unit/Bed#: -01 Encounter: [id number]    Primary Care Provider: Daniel Nguyen MD   Date and time admitted to hospital: 10/15/2019  7:54 PM        * Surgical wound infection  Assessment & Plan  · Midline incision with adjacent thickening of the abdominal wall musculature and an adjacent 36 x 18 mm intramuscular collection #2/48 suspicious for a small abscess  · General surgery following ongoing recommendations appreciated  · Continue wound care instructions per surgery  · Gyn/Onc evaluated patient agree with local wound packing and dressing changes  Patient is scheduled to start chemotherapy outpatient 10/22  · Continue Vancomycin 1 g q 12 hours  · Wound culture x1 pending  · Blood cultures x2 negative for growth at 24 hours      Abdominal pain  Assessment & Plan  ·  Moderate abdominal pain continues related incisional abscess  · Personal discussion with surgery team reviewing of CT imaging there is concern for fluid around the gallbladder patient for HIDA scan today  · Will await further recommendations from surgery team  · Continue p r n  Tramadol and morphine for pain    Deep venous thrombosis of right profunda femoris vein (HCC)  Assessment & Plan  · Continue Eliquis        VTE Pharmacologic Prophylaxis:   Pharmacologic: Apixaban (Eliquis)  Mechanical VTE Prophylaxis in Place: Yes    Patient Centered Rounds: I have performed bedside rounds with nursing staff today  Discussions with Specialists or Other Care Team Provider:  Acute Care surgery    Education and Discussions with Family / Patient:  Patient    Time Spent for Care: 30 minutes  More than 50% of total time spent on counseling and coordination of care as described above      Current Length of Stay: 2 day(s)    Current Patient Status: Inpatient   Certification Statement: The patient will continue to require additional inpatient hospital stay due to Ongoing treatment in setting of abdominal pain with concern for cholecystitis    Discharge Plan:  Not medically cleared pending further diagnostic evaluation    Code Status: Level 1 - Full Code      Subjective:   Patient reports abdominal pain has improved in the last 24 hours  That surgery given concern of the right upper quadrant pains and possible CT findings of a large gallbladder the reason to perform the HIDA scan which patient states is done at 2 o'clock today  Patient was hoping to go home patient educated that if there is positive findings on the HIDA that she may need further surgical intervention patient stated understanding but is disappointed  Patient did express understanding of needing further inpatient stay at this time  Patient denies fevers chills chest pain shortness of breath denies nausea  Objective:     Vitals:   Temp (24hrs), Av 9 °F (37 7 °C), Min:98 5 °F (36 9 °C), Max:100 8 °F (38 2 °C)    Temp:  [98 5 °F (36 9 °C)-100 8 °F (38 2 °C)] 98 5 °F (36 9 °C)  HR:  [68-92] 68  Resp:  [17-19] 17  BP: (110-113)/(48-57) 111/57  SpO2:  [93 %-96 %] 96 %  Body mass index is 26 07 kg/m²  Input and Output Summary (last 24 hours): Intake/Output Summary (Last 24 hours) at 10/17/2019 1041  Last data filed at 10/17/2019 0935  Gross per 24 hour   Intake 1420 ml   Output    Net 1420 ml       Physical Exam:     Physical Exam   Constitutional: She is oriented to person, place, and time  She appears cachectic  HENT:   Head: Normocephalic and atraumatic  Eyes: Conjunctivae and EOM are normal    Neck: Normal range of motion  Cardiovascular: Normal rate, regular rhythm and normal heart sounds  Pulmonary/Chest: Effort normal and breath sounds normal    Abdominal: Soft  Bowel sounds are normal  There is tenderness  Abdominal tenderness mild improved from previous exam inferior to wound site right side surrounding tissue with no evidenceerythema   Musculoskeletal: Normal range of motion  Neurological: She is alert and oriented to person, place, and time  Skin: Skin is warm and dry  Psychiatric: She has a normal mood and affect  Her behavior is normal          Additional Data:     Labs:    Results from last 7 days   Lab Units 10/17/19  0629  10/15/19  2033   WBC Thousand/uL 8 22   < > 10 41*   HEMOGLOBIN g/dL 8 2*   < > 8 4*   HEMATOCRIT % 26 9*   < > 26 5*   PLATELETS Thousands/uL 257   < > 280   NEUTROS PCT %  --   --  67   LYMPHS PCT %  --   --  20   MONOS PCT %  --   --  9   EOS PCT %  --   --  3    < > = values in this interval not displayed  Results from last 7 days   Lab Units 10/16/19  0545   SODIUM mmol/L 142   POTASSIUM mmol/L 3 8   CHLORIDE mmol/L 107   CO2 mmol/L 28   BUN mg/dL 13   CREATININE mg/dL 1 08   ANION GAP mmol/L 7   CALCIUM mg/dL 9 0   ALBUMIN g/dL 2 7*   TOTAL BILIRUBIN mg/dL 0 30   ALK PHOS U/L 72   ALT U/L 11*   AST U/L 11   GLUCOSE RANDOM mg/dL 99         Results from last 7 days   Lab Units 10/17/19  0635 10/16/19  2244 10/16/19  1621 10/16/19  1044 10/16/19  0213   POC GLUCOSE mg/dl 98 113 159* 188* 154*         Results from last 7 days   Lab Units 10/15/19  2033   LACTIC ACID mmol/L 0 7           * I Have Reviewed All Lab Data Listed Above  * Additional Pertinent Lab Tests Reviewed: All Labs Within Last 24 Hours Reviewed        Recent Cultures (last 7 days):     Results from last 7 days   Lab Units 10/15/19  2039 10/15/19  2033 10/15/19  1458   BLOOD CULTURE  No Growth at 24 hrs   No Growth at 24 hrs   --    GRAM STAIN RESULT   --   --  1+ Disintegrating polys*  3+ Gram positive cocci in pairs*       Last 24 Hours Medication List:     Current Facility-Administered Medications:  acetaminophen 650 mg Oral Q6H PRN RIGOBERTO Gurrola    apixaban 2 5 mg Oral BID RIGOBERTO Gurrola    calcium carbonate 1,000 mg Oral Daily PRN RIGOBERTO Gurrola    gabapentin 100 mg Oral TID RIGOBERTO Gurrola    insulin lispro 1-5 Units Subcutaneous TID ALEXIA ROBERTS RIGOBERTO Figueroa    insulin lispro 1-5 Units Subcutaneous HS RIGOBERTO Grant    morphine injection 2 mg Intravenous Q6H PRN RIGOBERTO Ballard    ondansetron 4 mg Intravenous Q6H PRN RIGOBERTO Grant    pantoprazole 40 mg Oral BID AC RIGOBERTO Clements    sodium chloride 75 mL/hr Intravenous Continuous RIGOBERTO Grant Last Rate: 75 mL/hr (10/17/19 0847)   traMADol 50 mg Oral Q6H PRN RIGOBERTO Grant    vancomycin 20 mg/kg Intravenous Q24H RIGOBERTO Grant Last Rate: 1,500 mg (10/17/19 0226)        Today, Patient Was Seen By: RIGOBERTO Ballard    ** Please Note: Dictation voice to text software may have been used in the creation of this document   **

## 2019-10-17 NOTE — ASSESSMENT & PLAN NOTE
· Midline incision with adjacent thickening of the abdominal wall musculature and an adjacent 36 x 18 mm intramuscular collection #2/48 suspicious for a small abscess  · General surgery following ongoing recommendations appreciated  · Continue wound care instructions per surgery  · Gyn/Onc evaluated patient agree with local wound packing and dressing changes  Patient is scheduled to start chemotherapy outpatient 10/22  · Continue Vancomycin 1 g q 12 hours    · Wound culture x1 pending  · Blood cultures x2 negative for growth at 24 hours

## 2019-10-17 NOTE — ASSESSMENT & PLAN NOTE
·  Moderate abdominal pain continues related incisional abscess  · Personal discussion with surgery team reviewing of CT imaging there is concern for fluid around the gallbladder patient for HIDA scan today  · Will await further recommendations from surgery team  · Continue p r n   Tramadol and morphine for pain

## 2019-10-17 NOTE — SOCIAL WORK
LOS 2 0  GMLOS 4 6  The pt is a 30 day re-admit  The pt had a procedure done and there was an issue of drainage from the incion and the pt developed abdominal pain  The pt is alert and oriented  The pt resides with her  and 3 grandchildren in a bi-level home with 8 IRVIN and 6 steps to the bedroom level  The pt does not use any DME and is able to complete all of her daily living skills  The pt is active with [facility] and has been placed at [facility] in the past   The pt fills her Rx at [facility] on [address] near [facility] and is able to afford her medications  The pt  is her POA  The pt is reitred and her  transports her to and from [facility]   The pt daughter Webb Canavan will pick her up at [place] and transport her home  The pt states that she does not want to go a rehab and would like to [facility] with [facility] at [place]  CM reviewed discharge planning process including the following: identifying caregivers at home, preference for d/c planning needs, availability of treatment team to discuss questions or concerns patient and/or family may have regarding diagnosis, plan of care, old or new medications and discharge planning  Discharge Checklist reviewed and CM will continue to monitor for progress toward discharge goals in nursing and provider rounds

## 2019-10-17 NOTE — PLAN OF CARE
Problem: SKIN/TISSUE INTEGRITY - ADULT  Goal: Incision(s), wounds(s) or drain site(s) healing without S/S of infection  Description  INTERVENTIONS  - Assess and document risk factors for skin impairment   - Assess and document dressing, incision, wound bed, drain sites and surrounding tissue  - Consider nutrition services referral as needed  - Oral mucous membranes remain intact  - Provide patient/ family education  Outcome: Progressing     Problem: MUSCULOSKELETAL - ADULT  Goal: Maintain or return mobility to safest level of function  Description  INTERVENTIONS:  - Assess patient's ability to carry out ADLs; assess patient's baseline for ADL function and identify physical deficits which impact ability to perform ADLs (bathing, care of mouth/teeth, toileting, grooming, dressing, etc )  - Assess/evaluate cause of self-care deficits   - Assess range of motion  - Assess patient's mobility  - Assess patient's need for assistive devices and provide as appropriate  - Encourage maximum independence but intervene and supervise when necessary  - Involve family in performance of ADLs  - Assess for home care needs following discharge   - Consider OT consult to assist with ADL evaluation and planning for discharge  - Provide patient education as appropriate  Outcome: Progressing     Problem: PAIN - ADULT  Goal: Verbalizes/displays adequate comfort level or baseline comfort level  Description  Interventions:  - Encourage patient to monitor pain and request assistance  - Assess pain using appropriate pain scale  - Administer analgesics based on type and severity of pain and evaluate response  - Implement non-pharmacological measures as appropriate and evaluate response  - Consider cultural and social influences on pain and pain management  - Notify physician/advanced practitioner if interventions unsuccessful or patient reports new pain  Outcome: Progressing     Problem: INFECTION - ADULT  Goal: Absence or prevention of progression during hospitalization  Description  INTERVENTIONS:  - Assess and monitor for signs and symptoms of infection  - Monitor lab/diagnostic results  - Monitor all insertion sites, i e  indwelling lines, tubes, and drains  - Monitor endotracheal if appropriate and nasal secretions for changes in amount and color  - [place] appropriate cooling/warming therapies per order  - Administer medications as ordered  - Instruct and encourage patient and family to use good hand hygiene technique  - Identify and instruct in appropriate isolation precautions for identified infection/condition  Outcome: Progressing     Problem: SAFETY ADULT  Goal: Patient will remain free of falls  Description  INTERVENTIONS:  - Assess patient frequently for physical needs  -  Identify cognitive and physical deficits and behaviors that affect risk of falls  -  [place] fall precautions as indicated by assessment   - Educate patient/family on patient safety including physical limitations  - Instruct patient to call for assistance with activity based on assessment  - Modify environment to reduce risk of injury  - Consider OT/PT consult to assist with strengthening/mobility  Outcome: Progressing     Problem: Potential for Falls  Goal: Patient will remain free of falls  Description  INTERVENTIONS:  - Assess patient frequently for physical needs  -  Identify cognitive and physical deficits and behaviors that affect risk of falls    -  [place] fall precautions as indicated by assessment   - Educate patient/family on patient safety including physical limitations  - Instruct patient to call for assistance with activity based on assessment  - Modify environment to reduce risk of injury  - Consider OT/PT consult to assist with strengthening/mobility  Outcome: Progressing     Problem: Nutrition/Hydration-ADULT  Goal: Nutrient/Hydration intake appropriate for improving, restoring or maintaining nutritional needs  Description  Monitor and assess patient's nutrition/hydration status for malnutrition  Collaborate with interdisciplinary team and initiate plan and interventions as ordered  Monitor patient's weight and dietary intake as ordered or per policy  Utilize nutrition screening tool and intervene as necessary  Determine patient's food preferences and provide high-protein, high-caloric foods as appropriate       INTERVENTIONS:  - Monitor oral intake, urinary output, labs, and treatment plans  - Assess nutrition and hydration status and recommend course of action  - Evaluate amount of meals eaten  - Assist patient with eating if necessary   - Allow adequate time for meals  - Recommend/ encourage appropriate diets, oral nutritional supplements, and vitamin/mineral supplements  - Order, calculate, and assess calorie counts as needed  - Recommend, monitor, and adjust tube feedings and TPN/PPN based on assessed needs  - Assess need for intravenous fluids  - Provide specific nutrition/hydration education as appropriate  - Include patient/family/caregiver in decisions related to nutrition  Outcome: Progressing

## 2019-10-18 PROBLEM — E44.1 MILD PROTEIN-CALORIE MALNUTRITION (HCC): Status: ACTIVE | Noted: 2019-10-18

## 2019-10-18 PROBLEM — K81.9 CHOLECYSTITIS: Status: ACTIVE | Noted: 2018-12-14

## 2019-10-18 LAB
APTT PPP: 36 SECONDS (ref 23–37)
BACTERIA WND AEROBE CULT: ABNORMAL
GLUCOSE SERPL-MCNC: 129 MG/DL (ref 65–140)
GLUCOSE SERPL-MCNC: 161 MG/DL (ref 65–140)
GLUCOSE SERPL-MCNC: 176 MG/DL (ref 65–140)
GLUCOSE SERPL-MCNC: 93 MG/DL (ref 65–140)
GRAM STN SPEC: ABNORMAL
GRAM STN SPEC: ABNORMAL
INR PPP: 1.15 (ref 0.84–1.19)
PROTHROMBIN TIME: 14.7 SECONDS (ref 11.6–14.5)

## 2019-10-18 PROCEDURE — 82948 REAGENT STRIP/BLOOD GLUCOSE: CPT

## 2019-10-18 PROCEDURE — 85610 PROTHROMBIN TIME: CPT | Performed by: PHYSICIAN ASSISTANT

## 2019-10-18 PROCEDURE — 99232 SBSQ HOSP IP/OBS MODERATE 35: CPT | Performed by: PHYSICIAN ASSISTANT

## 2019-10-18 PROCEDURE — 85730 THROMBOPLASTIN TIME PARTIAL: CPT | Performed by: PHYSICIAN ASSISTANT

## 2019-10-18 PROCEDURE — 99232 SBSQ HOSP IP/OBS MODERATE 35: CPT | Performed by: SURGERY

## 2019-10-18 RX ORDER — HEPARIN SODIUM 1000 [USP'U]/ML
5200 INJECTION, SOLUTION INTRAVENOUS; SUBCUTANEOUS ONCE
Status: DISCONTINUED | OUTPATIENT
Start: 2019-10-18 | End: 2019-10-18

## 2019-10-18 RX ORDER — HEPARIN SODIUM 1000 [USP'U]/ML
5200 INJECTION, SOLUTION INTRAVENOUS; SUBCUTANEOUS AS NEEDED
Status: DISCONTINUED | OUTPATIENT
Start: 2019-10-18 | End: 2019-10-18

## 2019-10-18 RX ORDER — CEFAZOLIN SODIUM 1 G/50ML
1000 SOLUTION INTRAVENOUS EVERY 8 HOURS
Status: DISCONTINUED | OUTPATIENT
Start: 2019-10-18 | End: 2019-10-20

## 2019-10-18 RX ORDER — HEPARIN SODIUM 10000 [USP'U]/100ML
3-30 INJECTION, SOLUTION INTRAVENOUS
Status: DISCONTINUED | OUTPATIENT
Start: 2019-10-18 | End: 2019-10-18

## 2019-10-18 RX ORDER — HEPARIN SODIUM 1000 [USP'U]/ML
5200 INJECTION, SOLUTION INTRAVENOUS; SUBCUTANEOUS ONCE
Status: COMPLETED | OUTPATIENT
Start: 2019-10-18 | End: 2019-10-18

## 2019-10-18 RX ORDER — HEPARIN SODIUM 10000 [USP'U]/100ML
3-30 INJECTION, SOLUTION INTRAVENOUS
Status: DISCONTINUED | OUTPATIENT
Start: 2019-10-18 | End: 2019-10-20

## 2019-10-18 RX ORDER — HEPARIN SODIUM 1000 [USP'U]/ML
2600 INJECTION, SOLUTION INTRAVENOUS; SUBCUTANEOUS AS NEEDED
Status: DISCONTINUED | OUTPATIENT
Start: 2019-10-18 | End: 2019-10-20

## 2019-10-18 RX ORDER — HEPARIN SODIUM 1000 [USP'U]/ML
2600 INJECTION, SOLUTION INTRAVENOUS; SUBCUTANEOUS AS NEEDED
Status: DISCONTINUED | OUTPATIENT
Start: 2019-10-18 | End: 2019-10-18

## 2019-10-18 RX ORDER — HEPARIN SODIUM 1000 [USP'U]/ML
5200 INJECTION, SOLUTION INTRAVENOUS; SUBCUTANEOUS AS NEEDED
Status: DISCONTINUED | OUTPATIENT
Start: 2019-10-18 | End: 2019-10-20

## 2019-10-18 RX ADMIN — CEFAZOLIN SODIUM 1000 MG: 1 SOLUTION INTRAVENOUS at 22:23

## 2019-10-18 RX ADMIN — GABAPENTIN 100 MG: 100 CAPSULE ORAL at 22:23

## 2019-10-18 RX ADMIN — SODIUM CHLORIDE 75 ML/HR: 0.9 INJECTION, SOLUTION INTRAVENOUS at 05:41

## 2019-10-18 RX ADMIN — APIXABAN 2.5 MG: 2.5 TABLET, FILM COATED ORAL at 09:45

## 2019-10-18 RX ADMIN — INSULIN LISPRO 1 UNITS: 100 INJECTION, SOLUTION INTRAVENOUS; SUBCUTANEOUS at 22:23

## 2019-10-18 RX ADMIN — GABAPENTIN 100 MG: 100 CAPSULE ORAL at 09:45

## 2019-10-18 RX ADMIN — ACETAMINOPHEN 650 MG: 325 TABLET, FILM COATED ORAL at 06:10

## 2019-10-18 RX ADMIN — PANTOPRAZOLE SODIUM 40 MG: 40 TABLET, DELAYED RELEASE ORAL at 06:10

## 2019-10-18 RX ADMIN — SODIUM CHLORIDE 75 ML/HR: 0.9 INJECTION, SOLUTION INTRAVENOUS at 00:25

## 2019-10-18 RX ADMIN — VANCOMYCIN HYDROCHLORIDE 1500 MG: 5 INJECTION, POWDER, LYOPHILIZED, FOR SOLUTION INTRAVENOUS at 02:47

## 2019-10-18 RX ADMIN — TRAMADOL HYDROCHLORIDE 50 MG: 50 TABLET, FILM COATED ORAL at 22:22

## 2019-10-18 RX ADMIN — HEPARIN SODIUM 5200 UNITS: 1000 INJECTION, SOLUTION INTRAVENOUS; SUBCUTANEOUS at 18:42

## 2019-10-18 RX ADMIN — CEFAZOLIN SODIUM 1000 MG: 1 SOLUTION INTRAVENOUS at 14:27

## 2019-10-18 RX ADMIN — METRONIDAZOLE 500 MG: 500 INJECTION, SOLUTION INTRAVENOUS at 12:23

## 2019-10-18 RX ADMIN — GABAPENTIN 100 MG: 100 CAPSULE ORAL at 18:45

## 2019-10-18 RX ADMIN — METRONIDAZOLE 500 MG: 500 INJECTION, SOLUTION INTRAVENOUS at 20:30

## 2019-10-18 RX ADMIN — HEPARIN SODIUM AND DEXTROSE 18 UNITS/KG/HR: 10000; 5 INJECTION INTRAVENOUS at 18:57

## 2019-10-18 RX ADMIN — PANTOPRAZOLE SODIUM 40 MG: 40 TABLET, DELAYED RELEASE ORAL at 18:45

## 2019-10-18 NOTE — QUICK NOTE
Addendum:    Surgery (open genny for cholecystitis)  to be rescheduled for Sunday secondary to [facility] with Eliquis  Eliquis on hold and will bridge with heparin gtt  Patient very upset as she was told surgery was today  Both myself and surgical AP present  Explained in detail that surgery unintentionally overlooked Eliquis and that it is not safe to proceed with surgery today  Patient very frustrated over entire hospital course and felt that this diagnosis could have been made sooner  Hospital course reviewed in detail with patient and daughter  All questions answered  Patient upset because she has a court appointment next week that she needed to attend  I offered multiple times to write a letter explaining her absence, but she declined

## 2019-10-18 NOTE — ASSESSMENT & PLAN NOTE
Malnutrition Findings:   Malnutrition type: Chronic illness  Degree of Malnutrition: Malnutrition of mild degree    BMI Findings: Body mass index is 26 07 kg/m²

## 2019-10-18 NOTE — ASSESSMENT & PLAN NOTE
· Midline incision with adjacent thickening of the abdominal wall musculature and an adjacent 36 x 18 mm intramuscular collection #2/48 suspicious for a small abscess  · General surgery following ongoing recommendations appreciated  · Continue wound care instructions per surgery  · Gyn/Onc evaluated patient agree with local wound packing and dressing changes  Patient is scheduled to start chemotherapy outpatient 10/22  · Continue Vancomycin 1 g q 12 hours  · Wound culture prelim growing staph aureus - await sensitivities     · Blood cultures x2 negative for growth at 48 hours

## 2019-10-18 NOTE — PROGRESS NOTES
Vancomycin IV Pharmacy-to-Dose Consultation    Tavares Dawkins is a 70 y o   who is currently receiving Vancomycin IV with management by the Pharmacy Consult service  Assessment/Plan:  The patient was reviewed  Renal function is stable and no signs or symptoms of nephrotoxicity and/or infusion reactions were documented in the chart  Based on todays assessment, continue current vancomycin day # 2 of regimen 1500 mg q24h,  with a plan for trough to be drawn at 0200 on 10/20/19  Total Days of Vancomycin therapy received to date: 3 days  We will continue to follow the patients culture results and clinical progress daily      Hulan Nageotte, Pharmacist

## 2019-10-18 NOTE — ASSESSMENT & PLAN NOTE
· HIDA scan positive  · Await plan for surgery vs perc genny tube  · At this point patient favoring surgery so as not to delay chemo as much as possible  · Add flagyl to antibiotic regimen

## 2019-10-18 NOTE — PROGRESS NOTES
Progress Note - General Surgery   Saige Gordon 70 y o  female MRN: [id number]  Unit/Bed#: -01 Encounter: [id number]      Assessment:   58-year-old female presenting with abdominal pain and radiological findings suspicious for abdominal wall abscess  - additional CT revealed gallbladder distention with cholelithiasis, and a possible small amount of pericholecystic fluid  - nuclear medicine ago HIDA scan shows nonvisualization of the gallbladder concerning for cystic duct obstruction and acute cholecystitis  - patient is currently anticoagulated with Eliquis      Plan:  - hold Eliquis for 48 hours  - tentative open cholecystectomy on 10/20/2019  - continue with local wound care of the abdominal incisional wound    Subjective/Objective     Subjective: No acute overnight events  Patient states she is feeling okay, and denies any overt abdominal pain  Patient has a colostomy that is working well  Patient is tolerating diet without any abd pain, nausea, vomiting  Patient is urinating and ambulating without difficulty  Patient denies any CP, SOB, palpitations, fevers or chills  Objective:     Blood pressure 115/54, pulse 76, temperature 98 7 °F (37 1 °C), resp  rate 17, height 5' 4" (1 626 m), weight 68 9 kg (151 lb 14 4 oz), SpO2 97 %  Body mass index is 26 07 kg/m²  I/O       10/16 0701 - 10/17 0700 10/17 0701 - 10/18 0700 10/18 0701 - 10/19 0700    P  O  240 500 240    I V  (mL/kg)  2395 (34 8)     Total Intake(mL/kg) 240 (3 5) 2895 (42) 240 (3 5)    Urine (mL/kg/hr) 200 (0 1)      Total Output 200      Net +40 +2895 +240           Unmeasured Urine Occurrence 2 x 2 x     Unmeasured Stool Occurrence  1 x           Invasive Devices     Central Venous Catheter Line            Port A Cath 11/29/18 Right Chest 323 days          Drain            Closed/Suction Drain Right RLQ Bulb 38 days    Colostomy Descending/sigmoid LUQ 38 days                Physical Exam: /54   Pulse 76   Temp 98 7 °F (37 1 °C) Resp 17   Ht 5' 4" (1 626 m)   Wt 68 9 kg (151 lb 14 4 oz)   SpO2 97%   BMI 26 07 kg/m²   General appearance: alert and oriented, in no acute distress  Lungs: clear to auscultation bilaterally  Heart: irregularly irregular rhythm  Abdomen: soft, slight generalized tenderness, bowel sounds present, colostomy bag with stool present and ostomy appears pink and well perfused, midabdominal incisional wound has some thicker discharge but still no periincisional erythema or anshu purulent discharge  Extremities: extremities normal, warm and well-perfused; no cyanosis, clubbing, or edema    Labs   Recent Results (from the past 24 hour(s))   Fingerstick Glucose (POCT)    Collection Time: 10/17/19  4:47 PM   Result Value Ref Range    POC Glucose 89 65 - 140 mg/dl   Fingerstick Glucose (POCT)    Collection Time: 10/17/19  8:55 PM   Result Value Ref Range    POC Glucose 147 (H) 65 - 140 mg/dl   Fingerstick Glucose (POCT)    Collection Time: 10/18/19  6:43 AM   Result Value Ref Range    POC Glucose 93 65 - 140 mg/dl   Fingerstick Glucose (POCT)    Collection Time: 10/18/19 10:55 AM   Result Value Ref Range    POC Glucose 176 (H) 65 - 140 mg/dl        Imaging and other studies:  Nm Hepatobiliary W Rx    Result Date: 10/18/2019  Impression: 1  Nonvisualization of the gallbladder concerning for cystic duct obstruction and acute cholecystitis  Clinical correlation recommended  The study was marked in [facility] for immediate notification  Workstation performed: [id number]     Ct Abdomen Pelvis W Contrast    Result Date: 10/15/2019  Impression: Midline incision with adjacent thickening of the abdominal wall musculature and an adjacent 36 x 18 mm intramuscular collection #2/48 suspicious for a small abscess  The study was marked in [facility] for immediate notification   Workstation performed: [id number]       VTE Pharmacologic Prophylaxis: Eliquis  VTE Mechanical Prophylaxis: sequential compression device    Aretha Hernandez WHITNEY  10/18/2019

## 2019-10-19 LAB
ALBUMIN SERPL BCP-MCNC: 2.5 G/DL (ref 3.5–5)
ALP SERPL-CCNC: 63 U/L (ref 46–116)
ALT SERPL W P-5'-P-CCNC: 9 U/L (ref 12–78)
ANION GAP SERPL CALCULATED.3IONS-SCNC: 9 MMOL/L (ref 4–13)
APTT PPP: 75 SECONDS (ref 23–37)
APTT PPP: 81 SECONDS (ref 23–37)
AST SERPL W P-5'-P-CCNC: 11 U/L (ref 5–45)
BILIRUB SERPL-MCNC: 0.1 MG/DL (ref 0.2–1)
BUN SERPL-MCNC: 12 MG/DL (ref 5–25)
CALCIUM SERPL-MCNC: 8.9 MG/DL (ref 8.3–10.1)
CHLORIDE SERPL-SCNC: 105 MMOL/L (ref 100–108)
CO2 SERPL-SCNC: 27 MMOL/L (ref 21–32)
CREAT SERPL-MCNC: 1.1 MG/DL (ref 0.6–1.3)
ERYTHROCYTE [DISTWIDTH] IN BLOOD BY AUTOMATED COUNT: 13.6 % (ref 11.6–15.1)
GFR SERPL CREATININE-BSD FRML MDRD: 51 ML/MIN/1.73SQ M
GLUCOSE SERPL-MCNC: 109 MG/DL (ref 65–140)
GLUCOSE SERPL-MCNC: 184 MG/DL (ref 65–140)
GLUCOSE SERPL-MCNC: 201 MG/DL (ref 65–140)
GLUCOSE SERPL-MCNC: 207 MG/DL (ref 65–140)
HCT VFR BLD AUTO: 26.2 % (ref 34.8–46.1)
HGB BLD-MCNC: 8.2 G/DL (ref 11.5–15.4)
MCH RBC QN AUTO: 29.7 PG (ref 26.8–34.3)
MCHC RBC AUTO-ENTMCNC: 31.3 G/DL (ref 31.4–37.4)
MCV RBC AUTO: 95 FL (ref 82–98)
PLATELET # BLD AUTO: 259 THOUSANDS/UL (ref 149–390)
PMV BLD AUTO: 11.4 FL (ref 8.9–12.7)
POTASSIUM SERPL-SCNC: 3.4 MMOL/L (ref 3.5–5.3)
PROT SERPL-MCNC: 6.5 G/DL (ref 6.4–8.2)
RBC # BLD AUTO: 2.76 MILLION/UL (ref 3.81–5.12)
SODIUM SERPL-SCNC: 141 MMOL/L (ref 136–145)
WBC # BLD AUTO: 6.56 THOUSAND/UL (ref 4.31–10.16)

## 2019-10-19 PROCEDURE — 85730 THROMBOPLASTIN TIME PARTIAL: CPT | Performed by: FAMILY MEDICINE

## 2019-10-19 PROCEDURE — 85027 COMPLETE CBC AUTOMATED: CPT | Performed by: PHYSICIAN ASSISTANT

## 2019-10-19 PROCEDURE — 99233 SBSQ HOSP IP/OBS HIGH 50: CPT | Performed by: NURSE PRACTITIONER

## 2019-10-19 PROCEDURE — 99233 SBSQ HOSP IP/OBS HIGH 50: CPT | Performed by: PHYSICIAN ASSISTANT

## 2019-10-19 PROCEDURE — 82948 REAGENT STRIP/BLOOD GLUCOSE: CPT

## 2019-10-19 PROCEDURE — 80053 COMPREHEN METABOLIC PANEL: CPT | Performed by: PHYSICIAN ASSISTANT

## 2019-10-19 RX ORDER — METRONIDAZOLE 500 MG/1
500 TABLET ORAL EVERY 8 HOURS SCHEDULED
Status: DISCONTINUED | OUTPATIENT
Start: 2019-10-19 | End: 2019-10-20

## 2019-10-19 RX ADMIN — GABAPENTIN 100 MG: 100 CAPSULE ORAL at 16:18

## 2019-10-19 RX ADMIN — CEFAZOLIN SODIUM 1000 MG: 1 SOLUTION INTRAVENOUS at 06:26

## 2019-10-19 RX ADMIN — CEFAZOLIN SODIUM 1000 MG: 1 SOLUTION INTRAVENOUS at 23:55

## 2019-10-19 RX ADMIN — METRONIDAZOLE 500 MG: 500 TABLET ORAL at 22:04

## 2019-10-19 RX ADMIN — GABAPENTIN 100 MG: 100 CAPSULE ORAL at 20:45

## 2019-10-19 RX ADMIN — GABAPENTIN 100 MG: 100 CAPSULE ORAL at 09:27

## 2019-10-19 RX ADMIN — METRONIDAZOLE 500 MG: 500 INJECTION, SOLUTION INTRAVENOUS at 03:30

## 2019-10-19 RX ADMIN — HEPARIN SODIUM AND DEXTROSE 18 UNITS/KG/HR: 10000; 5 INJECTION INTRAVENOUS at 15:28

## 2019-10-19 RX ADMIN — PANTOPRAZOLE SODIUM 40 MG: 40 TABLET, DELAYED RELEASE ORAL at 06:26

## 2019-10-19 RX ADMIN — PANTOPRAZOLE SODIUM 40 MG: 40 TABLET, DELAYED RELEASE ORAL at 16:18

## 2019-10-19 RX ADMIN — INSULIN LISPRO 1 UNITS: 100 INJECTION, SOLUTION INTRAVENOUS; SUBCUTANEOUS at 12:35

## 2019-10-19 RX ADMIN — METRONIDAZOLE 500 MG: 500 TABLET ORAL at 16:18

## 2019-10-19 RX ADMIN — CEFAZOLIN SODIUM 1000 MG: 1 SOLUTION INTRAVENOUS at 15:28

## 2019-10-19 RX ADMIN — INSULIN LISPRO 1 UNITS: 100 INJECTION, SOLUTION INTRAVENOUS; SUBCUTANEOUS at 22:06

## 2019-10-19 NOTE — PROGRESS NOTES
Progress Note - Dillon Pete 1950, 70 y o  female MRN: [id number]    Unit/Bed#: -[facility] Encounter: [id number]    Primary Care Provider: Costa Mayer MD   Date and time admitted to hospital: 10/15/2019  7:54 PM        * Surgical wound infection  Assessment & Plan  · Midline incision with adjacent thickening of the abdominal wall musculature and an adjacent 36 x 18 mm intramuscular collection #2/48 suspicious for a small abscess  · General surgery following ongoing recommendations appreciated  · Continue wound care instructions per surgery  · Gyn/Onc evaluated patient agree with local wound packing and dressing changes  · Patient is scheduled to start chemotherapy outpatient 10/22 however this may be detained in setting of patient needing surgical intervention for cholecystitis  · Continue Vancomycin 1 g q 12 hours  · Wound culture prelim growing staph aureus - susceptible to vancomycin will continue for now patient can transition to orals complete 7 day course post surgery  · Blood cultures x2 negative for growth at 72 hours  · Dressing changed repacked with iodoform gauze suture removed from previous surgery right lower quadrant      Cholecystitis  Assessment & Plan  · HIDA scan positive  · Surgery evaluated  · Hold Eliquis for 48 hours  · Patient for lap cholecystectomy 10/20/2019  · Continue added flagyl to antibiotic regimen     Deep venous thrombosis of right profunda femoris vein (HCC)  Assessment & Plan  · Eliquis on hold in setting of need for cholecystectomy  · Patient has been initiated on a heparin GTT continue heparin protocol    Mild protein-calorie malnutrition ([facility] [address] )  Assessment & Plan  Malnutrition Findings:   Malnutrition type: Chronic illness  Degree of Malnutrition: Malnutrition of mild degree    BMI Findings: Body mass index is 26 07 kg/m²           VTE Pharmacologic Prophylaxis:   Pharmacologic: Heparin Drip  Mechanical VTE Prophylaxis in Place: Yes    Patient Centered Rounds: I have performed bedside rounds with nursing staff today  Discussions with Specialists or Other Care Team Provider:  Surgery note reviewed  Message in to gyn/onc patient's request    Education and Discussions with Family / Patient:  Patient    Time Spent for Care: 35 minutes  More than 50% of total time spent on counseling and coordination of care as described above  Current Length of Stay: 4 day(s)    Current Patient Status: Inpatient   Certification Statement: The patient will continue to require additional inpatient hospital stay due to Ongoing acute intervention in setting of acute cholecystitis with need for cholecystectomy    Discharge Plan:  Not medically cleared    Code Status: Level 1 - Full Code      Subjective:   Patient feels very frustrated and apprehensive about surgery tomorrow and really wished that her oncology physician could be present  Patient reports she feels that there is no communication discussion regarding patient giving multiple factors from surgery allowed to vent frustration encouragement given patient felt better about proceed forward with surgery if she could up with her oncologist   Patient made aware would send a message to have helped ease her mind also encouraged that there is clinical indication to proceed with surgical intervention given her testing and symptom presentation  Patient further was upset that her wound has not been changed to look that which was done at the bedside and patient was made aware is showing progress towards healing  Objective:     Vitals:   Temp (24hrs), Av 3 °F (36 8 °C), Min:98 3 °F (36 8 °C), Max:98 4 °F (36 9 °C)    Temp:  [98 3 °F (36 8 °C)-98 4 °F (36 9 °C)] 98 3 °F (36 8 °C)  HR:  [69-76] 70  Resp:  [16-18] 16  BP: (101-120)/(57-59) 101/59  SpO2:  [96 %-97 %] 96 %  Body mass index is 26 07 kg/m²       Input and Output Summary (last 24 hours):     No intake or output data in the 24 hours ending 10/19/19 1104    Physical Exam:     Physical Exam   Constitutional: She is oriented to person, place, and time  She appears cachectic  HENT:   Head: Normocephalic and atraumatic  Eyes: Conjunctivae and EOM are normal    Neck: Normal range of motion  Cardiovascular: Normal rate, regular rhythm and normal heart sounds  Pulmonary/Chest: Effort normal and breath sounds normal    Abdominal: Soft  Bowel sounds are normal    Mid abdomen borders pain minimal serous thing drainage on dressing   Wound cleanse with normal saline repacked with iodoform and covered with a dry sterile dressing  Removed suture right lower quadrant of the abdomen   Musculoskeletal: Normal range of motion  Neurological: She is alert and oriented to person, place, and time  Skin: Skin is warm and dry  Psychiatric: She has a normal mood and affect  Her behavior is normal          Additional Data:     Labs:    Results from last 7 days   Lab Units 10/19/19  1044  10/15/19  2033   WBC Thousand/uL 6 56   < > 10 41*   HEMOGLOBIN g/dL 8 2*   < > 8 4*   HEMATOCRIT % 26 2*   < > 26 5*   PLATELETS Thousands/uL 259   < > 280   NEUTROS PCT %  --   --  67   LYMPHS PCT %  --   --  20   MONOS PCT %  --   --  9   EOS PCT %  --   --  3    < > = values in this interval not displayed       Results from last 7 days   Lab Units 10/16/19  0545   SODIUM mmol/L 142   POTASSIUM mmol/L 3 8   CHLORIDE mmol/L 107   CO2 mmol/L 28   BUN mg/dL 13   CREATININE mg/dL 1 08   ANION GAP mmol/L 7   CALCIUM mg/dL 9 0   ALBUMIN g/dL 2 7*   TOTAL BILIRUBIN mg/dL 0 30   ALK PHOS U/L 72   ALT U/L 11*   AST U/L 11   GLUCOSE RANDOM mg/dL 99     Results from last 7 days   Lab Units 10/18/19  1846   INR  1 15     Results from last 7 days   Lab Units 10/18/19  2042 10/18/19  1655 10/18/19  1055 10/18/19  0643 10/17/19  2055 10/17/19  1647 10/17/19  1100 10/17/19  0635 10/16/19  2244 10/16/19  1621 10/16/19  1044 10/16/19  0213   POC GLUCOSE mg/dl 161* 129 176* 93 147* 89 154* 98 113 159* 188* 154*         Results from last 7 days   Lab Units 10/15/19  2033   LACTIC ACID mmol/L 0 7           * I Have Reviewed All Lab Data Listed Above  * Additional Pertinent Lab Tests Reviewed: All Labs Within Last 24 Hours Reviewed        Recent Cultures (last 7 days):     Results from last 7 days   Lab Units 10/15/19  2039 10/15/19  2033 10/15/19  1458   BLOOD CULTURE  No Growth at 72 hrs  No Growth at 72 hrs   --    GRAM STAIN RESULT   --   --  1+ Disintegrating polys*  3+ Gram positive cocci in pairs*   WOUND CULTURE   --   --  3+ Growth of Staphylococcus aureus*       Last 24 Hours Medication List:     Current Facility-Administered Medications:  acetaminophen 650 mg Oral Q6H PRN Garima Bidding, CRNP    calcium carbonate 1,000 mg Oral Daily PRN Garima Bidding, CRNP    cefazolin 1,000 mg Intravenous Q8H Marshall Sena, PA-C Last Rate: 1,000 mg (10/19/19 0626)   gabapentin 100 mg Oral TID Garima Bidding, CRNP    heparin (porcine) 3-30 Units/kg/hr (Order-Specific) Intravenous Titrated Tomasz Laughlin, PA-C Last Rate: 18 Units/kg/hr (10/19/19 0404)   heparin (porcine) 2,600 Units Intravenous PRN Mindalavinia Ahumada, PA-C    heparin (porcine) 5,200 Units Intravenous PRN Mindalavinia Ahumada, PA-C    insulin lispro 1-5 Units Subcutaneous TID AC RIGOBERTO Clements    insulin lispro 1-5 Units Subcutaneous HS Garima Bidding, CRNP    metroNIDAZOLE 500 mg Oral Q8H [facility] Kailyn Ritchie MD    morphine injection 2 mg Intravenous Q6H PRN RIGOBERTO Brumfield    ondansetron 4 mg Intravenous Q6H PRN Garima Bidding, CRNP    pantoprazole 40 mg Oral BID AC Garima BiddingTEOFILONP    traMADol 50 mg Oral Q6H PRN Garima Bidding, RIGOBERTO         Today, Patient Was Seen By: RIGOBERTO Brumfield    ** Please Note: Dictation voice to text software may have been used in the creation of this document   **

## 2019-10-19 NOTE — ASSESSMENT & PLAN NOTE
· Eliquis on hold in setting of need for cholecystectomy  · Patient has been initiated on a heparin GTT continue heparin protocol

## 2019-10-19 NOTE — PROGRESS NOTES
The metronidazole has / have been converted to Oral per [facility] IV-to-PO Auto-Conversion Protocol for Adults as approved by the Pharmacy and Therapeutics Committee  The patient met all eligible criteria:  3 Age = 25years old   2) Received at least one dose of the IV form   3) Receiving at least one other scheduled oral/enteral medication   4) Tolerating an oral/enteral diet   and did not have any exclusions:   1) Critical care patient   2) Active GI bleed (IF assessing H2RAs or PPIs)   3) Continuous tube feeding (IF assessing cipro, doxycycline, levofloxacin, minocycline, rifampin, or voriconazole)   4) Receiving PO vancomycin (IF assessing metronidazole)   5) Persistent nausea and/or vomiting   6) Ileus or gastrointestinal obstruction   7) Ritesh/nasogastric tube set for continuous suction   8) Specific order not to automatically convert to PO (in the order's comments or if discussed in the most recent Infectious Disease or primary team's progress notes)

## 2019-10-19 NOTE — ASSESSMENT & PLAN NOTE
· Midline incision with adjacent thickening of the abdominal wall musculature and an adjacent 36 x 18 mm intramuscular collection #2/48 suspicious for a small abscess  · General surgery following ongoing recommendations appreciated  · Continue wound care instructions per surgery  · Gyn/Onc evaluated patient agree with local wound packing and dressing changes  · Patient is scheduled to start chemotherapy outpatient 10/22 however this may be detained in setting of patient needing surgical intervention for cholecystitis  · Continue Vancomycin 1 g q 12 hours    · Wound culture prelim growing staph aureus - susceptible to vancomycin will continue for now patient can transition to orals complete 7 day course post surgery  · Blood cultures x2 negative for growth at 72 hours

## 2019-10-19 NOTE — ASSESSMENT & PLAN NOTE
· HIDA scan positive  · Surgery evaluated  · Hold Eliquis for 48 hours  · Patient for lap cholecystectomy 10/20/2019  · Continue added flagyl to antibiotic regimen

## 2019-10-19 NOTE — PROGRESS NOTES
Progress Note - General Surgery   Burgess Ingram 70 y o  female MRN: [id number]  Unit/Bed#: -01 Encounter: [id number]    Assessment/Plan  Pt seen at 1100     Cholelithiasis, cholecystitis  Plan of Open Cholecystectomy tomorrow,   -NPO mn  -type and cross  -d/c heparin gtt at mn  -cont current management  Chief Complaint: I don't have any pain, no n v        Objective/Exam:      General Appearance:    Alert and orientated x 3, cooperative, no distress   Lungs:     Clear to auscultation bilaterally, respirations unlabored    Heart:    Regular rate and rhythm   Abdomen:     Soft, NBS, small wound dehiscence with clean packing  No erythema  colostomy is functioning  Extremities:   Extremities normal,  no cyanosis or edema   Pulses:   2+ and symmetric all extremities, no calf tenderness   Skin:   Skin color, texture, turgor normal, no rashes or lesions   Neurologic:   CNII-XII intact, normal strength, sensation and reflexes     Throughout, affect appropriate       Blood pressure 101/59, pulse 70, temperature 98 3 °F (36 8 °C), resp  rate 16, height 5' 4" (1 626 m), weight 68 9 kg (151 lb 14 4 oz), SpO2 96 %  ,Body mass index is 26 07 kg/m²        Intake/Output Summary (Last 24 hours) at 10/19/2019 1421  Last data filed at 10/19/2019 1300  Gross per 24 hour   Intake 600 ml   Output    Net 600 ml       Invasive Devices     Central Venous Catheter Line            Port A Cath 11/29/18 Right Chest 324 days          Drain            Colostomy Descending/sigmoid LUQ 39 days    Closed/Suction Drain Right RLQ Bulb 38 days                                        Labs:   CBC with diff:   Lab Results   Component Value Date    WBC 6 56 10/19/2019    HGB 8 2 (L) 10/19/2019    HCT 26 2 (L) 10/19/2019    MCV 95 10/19/2019     10/19/2019    MCH 29 7 10/19/2019    MCHC 31 3 (L) 10/19/2019    RDW 13 6 10/19/2019    MPV 11 4 10/19/2019    NRBC 0 10/15/2019   ,   BMP/CMP:  Lab Results   Component Value Date K 3 4 (L) 10/19/2019     10/19/2019    CO2 27 10/19/2019    CO2 21 09/10/2019    BUN 12 10/19/2019    CREATININE 1 10 10/19/2019    GLUCOSE 228 (H) 09/10/2019    CALCIUM 8 9 10/19/2019    AST 11 10/19/2019    ALT 9 (L) 10/19/2019    ALKPHOS 63 10/19/2019    EGFR 51 10/19/2019   ,   Lipid Panel: No results found for: CHOL,   Coags:   Lab Results   Component Value Date    PTT 75 (H) 10/19/2019    INR 1 15 10/18/2019   ,     Blood Culture:   Lab Results   Component Value Date    BLOODCX No Growth at 72 hrs  10/15/2019   ,   Urinalysis:   Lab Results   Component Value Date    COLORU Bloody 09/15/2019    CLARITYU Turbid 09/15/2019    SPECGRAV 1 014 09/15/2019    PHUR Interference-unable to analyze (A) 09/15/2019    LEUKOCYTESUR Interference- unable to analyze (A) 09/15/2019    NITRITE Interference- unable to analyze 09/15/2019    GLUCOSEU Interference- unable to analyze 09/15/2019    KETONESU Interference- unable to analyze (A) 09/15/2019    BILIRUBINUR Interference- unable to analyze (A) 09/15/2019    BLOODU Interference- unable to analyze (A) 09/15/2019   ,   Urine Culture:   Lab Results   Component Value Date    URINECX No Growth <1000 cfu/mL 12/14/2018   ,   Wound Culure:   Lab Results   Component Value Date    WOUNDCULT 3+ Growth of Staphylococcus aureus (A) 10/15/2019         Imaging: Nm Hepatobiliary W Rx    Result Date: 10/18/2019  Impression: 1  Nonvisualization of the gallbladder concerning for cystic duct obstruction and acute cholecystitis  Clinical correlation recommended  The study was marked in [facility] for immediate notification  Workstation performed: [id number]     Ct Abdomen Pelvis W Contrast    Result Date: 10/15/2019  Impression: Midline incision with adjacent thickening of the abdominal wall musculature and an adjacent 36 x 18 mm intramuscular collection #2/48 suspicious for a small abscess  The study was marked in [facility] for immediate notification   Workstation performed: [id number] Nicko Wright PA-C  10/19/2019

## 2019-10-19 NOTE — PLAN OF CARE
Problem: SKIN/TISSUE INTEGRITY - ADULT  Goal: Incision(s), wounds(s) or drain site(s) healing without S/S of infection  Description  INTERVENTIONS  - Assess and document risk factors for skin impairment   - Assess and document dressing, incision, wound bed, drain sites and surrounding tissue  - Consider nutrition services referral as needed  - Oral mucous membranes remain intact  - Provide patient/ family education  Outcome: Progressing     Problem: MUSCULOSKELETAL - ADULT  Goal: Maintain or return mobility to safest level of function  Description  INTERVENTIONS:  - Assess patient's ability to carry out ADLs; assess patient's baseline for ADL function and identify physical deficits which impact ability to perform ADLs (bathing, care of mouth/teeth, toileting, grooming, dressing, etc )  - Assess/evaluate cause of self-care deficits   - Assess range of motion  - Assess patient's mobility  - Assess patient's need for assistive devices and provide as appropriate  - Encourage maximum independence but intervene and supervise when necessary  - Involve family in performance of ADLs  - Assess for home care needs following discharge   - Consider OT consult to assist with ADL evaluation and planning for discharge  - Provide patient education as appropriate  Outcome: Progressing     Problem: PAIN - ADULT  Goal: Verbalizes/displays adequate comfort level or baseline comfort level  Description  Interventions:  - Encourage patient to monitor pain and request assistance  - Assess pain using appropriate pain scale  - Administer analgesics based on type and severity of pain and evaluate response  - Implement non-pharmacological measures as appropriate and evaluate response  - Consider cultural and social influences on pain and pain management  - Notify physician/advanced practitioner if interventions unsuccessful or patient reports new pain  Outcome: Progressing     Problem: INFECTION - ADULT  Goal: Absence or prevention of progression during hospitalization  Description  INTERVENTIONS:  - Assess and monitor for signs and symptoms of infection  - Monitor lab/diagnostic results  - Monitor all insertion sites, i e  indwelling lines, tubes, and drains  - Monitor endotracheal if appropriate and nasal secretions for changes in amount and color  - [place] appropriate cooling/warming therapies per order  - Administer medications as ordered  - Instruct and encourage patient and family to use good hand hygiene technique  - Identify and instruct in appropriate isolation precautions for identified infection/condition  Outcome: Progressing     Problem: SAFETY ADULT  Goal: Patient will remain free of falls  Description  INTERVENTIONS:  - Assess patient frequently for physical needs  -  Identify cognitive and physical deficits and behaviors that affect risk of falls  -  [place] fall precautions as indicated by assessment   - Educate patient/family on patient safety including physical limitations  - Instruct patient to call for assistance with activity based on assessment  - Modify environment to reduce risk of injury  - Consider OT/PT consult to assist with strengthening/mobility  Outcome: Progressing     Problem: Potential for Falls  Goal: Patient will remain free of falls  Description  INTERVENTIONS:  - Assess patient frequently for physical needs  -  Identify cognitive and physical deficits and behaviors that affect risk of falls    -  [place] fall precautions as indicated by assessment   - Educate patient/family on patient safety including physical limitations  - Instruct patient to call for assistance with activity based on assessment  - Modify environment to reduce risk of injury  - Consider OT/PT consult to assist with strengthening/mobility  Outcome: Progressing     Problem: Nutrition/Hydration-ADULT  Goal: Nutrient/Hydration intake appropriate for improving, restoring or maintaining nutritional needs  Description  Monitor and assess patient's nutrition/hydration status for malnutrition  Collaborate with interdisciplinary team and initiate plan and interventions as ordered  Monitor patient's weight and dietary intake as ordered or per policy  Utilize nutrition screening tool and intervene as necessary  Determine patient's food preferences and provide high-protein, high-caloric foods as appropriate       INTERVENTIONS:  - Monitor oral intake, urinary output, labs, and treatment plans  - Assess nutrition and hydration status and recommend course of action  - Evaluate amount of meals eaten  - Assist patient with eating if necessary   - Allow adequate time for meals  - Recommend/ encourage appropriate diets, oral nutritional supplements, and vitamin/mineral supplements  - Order, calculate, and assess calorie counts as needed  - Recommend, monitor, and adjust tube feedings and TPN/PPN based on assessed needs  - Assess need for intravenous fluids  - Provide specific nutrition/hydration education as appropriate  - Include patient/family/caregiver in decisions related to nutrition  Outcome: Progressing

## 2019-10-20 ENCOUNTER — ANESTHESIA EVENT (INPATIENT)
Dept: PERIOP | Facility: HOSPITAL | Age: 69
DRG: 857 | End: 2019-10-20
Payer: MEDICARE

## 2019-10-20 ENCOUNTER — ANESTHESIA (INPATIENT)
Dept: PERIOP | Facility: HOSPITAL | Age: 69
DRG: 857 | End: 2019-10-20
Payer: MEDICARE

## 2019-10-20 LAB
ABO GROUP BLD: NORMAL
BACTERIA BLD CULT: NORMAL
BACTERIA BLD CULT: NORMAL
BLD GP AB SCN SERPL QL: NEGATIVE
GLUCOSE SERPL-MCNC: 166 MG/DL (ref 65–140)
GLUCOSE SERPL-MCNC: 175 MG/DL (ref 65–140)
GLUCOSE SERPL-MCNC: 203 MG/DL (ref 65–140)
GLUCOSE SERPL-MCNC: 99 MG/DL (ref 65–140)
RH BLD: POSITIVE
SPECIMEN EXPIRATION DATE: NORMAL

## 2019-10-20 PROCEDURE — 88305 TISSUE EXAM BY PATHOLOGIST: CPT | Performed by: PATHOLOGY

## 2019-10-20 PROCEDURE — 86923 COMPATIBILITY TEST ELECTRIC: CPT

## 2019-10-20 PROCEDURE — 88342 IMHCHEM/IMCYTCHM 1ST ANTB: CPT | Performed by: PATHOLOGY

## 2019-10-20 PROCEDURE — 47600 CHOLECYSTECTOMY: CPT | Performed by: SURGERY

## 2019-10-20 PROCEDURE — 88304 TISSUE EXAM BY PATHOLOGIST: CPT | Performed by: PATHOLOGY

## 2019-10-20 PROCEDURE — 99232 SBSQ HOSP IP/OBS MODERATE 35: CPT | Performed by: NURSE PRACTITIONER

## 2019-10-20 PROCEDURE — 88341 IMHCHEM/IMCYTCHM EA ADD ANTB: CPT | Performed by: PATHOLOGY

## 2019-10-20 PROCEDURE — 47600 CHOLECYSTECTOMY: CPT | Performed by: PHYSICIAN ASSISTANT

## 2019-10-20 PROCEDURE — 86850 RBC ANTIBODY SCREEN: CPT | Performed by: PHYSICIAN ASSISTANT

## 2019-10-20 PROCEDURE — 0DNW0ZZ RELEASE PERITONEUM, OPEN APPROACH: ICD-10-PCS | Performed by: SURGERY

## 2019-10-20 PROCEDURE — NC001 PR NO CHARGE: Performed by: SURGERY

## 2019-10-20 PROCEDURE — 0FT40ZZ RESECTION OF GALLBLADDER, OPEN APPROACH: ICD-10-PCS | Performed by: SURGERY

## 2019-10-20 PROCEDURE — 82948 REAGENT STRIP/BLOOD GLUCOSE: CPT

## 2019-10-20 PROCEDURE — 86901 BLOOD TYPING SEROLOGIC RH(D): CPT | Performed by: PHYSICIAN ASSISTANT

## 2019-10-20 PROCEDURE — 86900 BLOOD TYPING SEROLOGIC ABO: CPT | Performed by: PHYSICIAN ASSISTANT

## 2019-10-20 RX ORDER — KETAMINE HYDROCHLORIDE 50 MG/ML
INJECTION, SOLUTION, CONCENTRATE INTRAMUSCULAR; INTRAVENOUS AS NEEDED
Status: DISCONTINUED | OUTPATIENT
Start: 2019-10-20 | End: 2019-10-20 | Stop reason: SURG

## 2019-10-20 RX ORDER — HYDROMORPHONE HCL/PF 1 MG/ML
0.4 SYRINGE (ML) INJECTION
Status: DISCONTINUED | OUTPATIENT
Start: 2019-10-20 | End: 2019-10-20 | Stop reason: HOSPADM

## 2019-10-20 RX ORDER — SODIUM CHLORIDE, SODIUM LACTATE, POTASSIUM CHLORIDE, CALCIUM CHLORIDE 600; 310; 30; 20 MG/100ML; MG/100ML; MG/100ML; MG/100ML
50 INJECTION, SOLUTION INTRAVENOUS CONTINUOUS
Status: DISCONTINUED | OUTPATIENT
Start: 2019-10-20 | End: 2019-10-21

## 2019-10-20 RX ORDER — CEFAZOLIN SODIUM 1 G/3ML
INJECTION, POWDER, FOR SOLUTION INTRAMUSCULAR; INTRAVENOUS AS NEEDED
Status: DISCONTINUED | OUTPATIENT
Start: 2019-10-20 | End: 2019-10-20 | Stop reason: SURG

## 2019-10-20 RX ORDER — DEXAMETHASONE SODIUM PHOSPHATE 10 MG/ML
INJECTION, SOLUTION INTRAMUSCULAR; INTRAVENOUS AS NEEDED
Status: DISCONTINUED | OUTPATIENT
Start: 2019-10-20 | End: 2019-10-20 | Stop reason: SURG

## 2019-10-20 RX ORDER — FENTANYL CITRATE 50 UG/ML
INJECTION, SOLUTION INTRAMUSCULAR; INTRAVENOUS AS NEEDED
Status: DISCONTINUED | OUTPATIENT
Start: 2019-10-20 | End: 2019-10-20 | Stop reason: SURG

## 2019-10-20 RX ORDER — ROCURONIUM BROMIDE 10 MG/ML
INJECTION, SOLUTION INTRAVENOUS AS NEEDED
Status: DISCONTINUED | OUTPATIENT
Start: 2019-10-20 | End: 2019-10-20 | Stop reason: SURG

## 2019-10-20 RX ORDER — MIDAZOLAM HYDROCHLORIDE 1 MG/ML
INJECTION INTRAMUSCULAR; INTRAVENOUS AS NEEDED
Status: DISCONTINUED | OUTPATIENT
Start: 2019-10-20 | End: 2019-10-20 | Stop reason: SURG

## 2019-10-20 RX ORDER — GABAPENTIN 300 MG/1
300 CAPSULE ORAL 3 TIMES DAILY
Status: DISCONTINUED | OUTPATIENT
Start: 2019-10-20 | End: 2019-10-22 | Stop reason: HOSPADM

## 2019-10-20 RX ORDER — DIPHENHYDRAMINE HYDROCHLORIDE 50 MG/ML
12.5 INJECTION INTRAMUSCULAR; INTRAVENOUS ONCE
Status: COMPLETED | OUTPATIENT
Start: 2019-10-20 | End: 2019-10-20

## 2019-10-20 RX ORDER — PROPOFOL 10 MG/ML
INJECTION, EMULSION INTRAVENOUS CONTINUOUS PRN
Status: DISCONTINUED | OUTPATIENT
Start: 2019-10-20 | End: 2019-10-20

## 2019-10-20 RX ORDER — ONDANSETRON 2 MG/ML
4 INJECTION INTRAMUSCULAR; INTRAVENOUS ONCE AS NEEDED
Status: DISCONTINUED | OUTPATIENT
Start: 2019-10-20 | End: 2019-10-20 | Stop reason: HOSPADM

## 2019-10-20 RX ORDER — HYDROMORPHONE HCL 110MG/55ML
PATIENT CONTROLLED ANALGESIA SYRINGE INTRAVENOUS AS NEEDED
Status: DISCONTINUED | OUTPATIENT
Start: 2019-10-20 | End: 2019-10-20 | Stop reason: SURG

## 2019-10-20 RX ORDER — ONDANSETRON 2 MG/ML
INJECTION INTRAMUSCULAR; INTRAVENOUS AS NEEDED
Status: DISCONTINUED | OUTPATIENT
Start: 2019-10-20 | End: 2019-10-20 | Stop reason: SURG

## 2019-10-20 RX ORDER — TRAMADOL HYDROCHLORIDE 50 MG/1
50 TABLET ORAL EVERY 4 HOURS PRN
Status: DISCONTINUED | OUTPATIENT
Start: 2019-10-20 | End: 2019-10-22 | Stop reason: HOSPADM

## 2019-10-20 RX ORDER — LABETALOL 20 MG/4 ML (5 MG/ML) INTRAVENOUS SYRINGE
AS NEEDED
Status: DISCONTINUED | OUTPATIENT
Start: 2019-10-20 | End: 2019-10-20 | Stop reason: SURG

## 2019-10-20 RX ORDER — FENTANYL CITRATE/PF 50 MCG/ML
50 SYRINGE (ML) INJECTION
Status: COMPLETED | OUTPATIENT
Start: 2019-10-20 | End: 2019-10-20

## 2019-10-20 RX ORDER — DIPHENHYDRAMINE HCL 25 MG
25 TABLET ORAL EVERY 6 HOURS PRN
Status: DISCONTINUED | OUTPATIENT
Start: 2019-10-20 | End: 2019-10-22 | Stop reason: HOSPADM

## 2019-10-20 RX ORDER — PROPOFOL 10 MG/ML
INJECTION, EMULSION INTRAVENOUS AS NEEDED
Status: DISCONTINUED | OUTPATIENT
Start: 2019-10-20 | End: 2019-10-20 | Stop reason: SURG

## 2019-10-20 RX ORDER — SODIUM CHLORIDE 9 MG/ML
INJECTION, SOLUTION INTRAVENOUS CONTINUOUS PRN
Status: DISCONTINUED | OUTPATIENT
Start: 2019-10-20 | End: 2019-10-20

## 2019-10-20 RX ORDER — DIPHENHYDRAMINE HYDROCHLORIDE 50 MG/ML
12.5 INJECTION INTRAMUSCULAR; INTRAVENOUS ONCE AS NEEDED
Status: DISCONTINUED | OUTPATIENT
Start: 2019-10-20 | End: 2019-10-20 | Stop reason: HOSPADM

## 2019-10-20 RX ORDER — DEXMEDETOMIDINE HYDROCHLORIDE 100 UG/ML
INJECTION, SOLUTION INTRAVENOUS AS NEEDED
Status: DISCONTINUED | OUTPATIENT
Start: 2019-10-20 | End: 2019-10-20 | Stop reason: SURG

## 2019-10-20 RX ORDER — MAGNESIUM HYDROXIDE 1200 MG/15ML
LIQUID ORAL AS NEEDED
Status: DISCONTINUED | OUTPATIENT
Start: 2019-10-20 | End: 2019-10-20 | Stop reason: HOSPADM

## 2019-10-20 RX ORDER — SODIUM CHLORIDE, SODIUM LACTATE, POTASSIUM CHLORIDE, CALCIUM CHLORIDE 600; 310; 30; 20 MG/100ML; MG/100ML; MG/100ML; MG/100ML
INJECTION, SOLUTION INTRAVENOUS CONTINUOUS PRN
Status: DISCONTINUED | OUTPATIENT
Start: 2019-10-20 | End: 2019-10-20

## 2019-10-20 RX ADMIN — LIDOCAINE HYDROCHLORIDE 100 MG: 20 INJECTION, SOLUTION INTRAVENOUS at 10:26

## 2019-10-20 RX ADMIN — SODIUM CHLORIDE: 0.9 INJECTION, SOLUTION INTRAVENOUS at 10:22

## 2019-10-20 RX ADMIN — PROPOFOL 30 MG: 10 INJECTION, EMULSION INTRAVENOUS at 11:42

## 2019-10-20 RX ADMIN — HYDROMORPHONE HYDROCHLORIDE 0.5 MG: 2 INJECTION, SOLUTION INTRAMUSCULAR; INTRAVENOUS; SUBCUTANEOUS at 11:42

## 2019-10-20 RX ADMIN — CEFAZOLIN SODIUM 1000 MG: 1 SOLUTION INTRAVENOUS at 06:33

## 2019-10-20 RX ADMIN — PANTOPRAZOLE SODIUM 40 MG: 40 TABLET, DELAYED RELEASE ORAL at 06:33

## 2019-10-20 RX ADMIN — MIDAZOLAM HYDROCHLORIDE 2 MG: 1 INJECTION INTRAMUSCULAR; INTRAVENOUS at 10:22

## 2019-10-20 RX ADMIN — KETAMINE HYDROCHLORIDE 10 MG: 50 INJECTION INTRAMUSCULAR; INTRAVENOUS at 11:00

## 2019-10-20 RX ADMIN — DEXMEDETOMIDINE HCL 10 MCG: 100 INJECTION INTRAVENOUS at 10:56

## 2019-10-20 RX ADMIN — LABETALOL 20 MG/4 ML (5 MG/ML) INTRAVENOUS SYRINGE 5 MG: at 12:09

## 2019-10-20 RX ADMIN — HYDROMORPHONE HYDROCHLORIDE 0.5 MG: 2 INJECTION, SOLUTION INTRAMUSCULAR; INTRAVENOUS; SUBCUTANEOUS at 12:47

## 2019-10-20 RX ADMIN — DIPHENHYDRAMINE HYDROCHLORIDE 12.5 MG: 50 INJECTION, SOLUTION INTRAMUSCULAR; INTRAVENOUS at 15:08

## 2019-10-20 RX ADMIN — MORPHINE SULFATE 2 MG: 2 INJECTION, SOLUTION INTRAMUSCULAR; INTRAVENOUS at 19:54

## 2019-10-20 RX ADMIN — FENTANYL CITRATE 50 MCG: 50 INJECTION, SOLUTION INTRAMUSCULAR; INTRAVENOUS at 10:26

## 2019-10-20 RX ADMIN — KETAMINE HYDROCHLORIDE 10 MG: 50 INJECTION INTRAMUSCULAR; INTRAVENOUS at 11:26

## 2019-10-20 RX ADMIN — CEFAZOLIN 1000 MG: 1 INJECTION, POWDER, FOR SOLUTION INTRAVENOUS at 10:40

## 2019-10-20 RX ADMIN — METRONIDAZOLE 500 MG: 500 TABLET ORAL at 06:34

## 2019-10-20 RX ADMIN — KETAMINE HYDROCHLORIDE 20 MG: 50 INJECTION INTRAMUSCULAR; INTRAVENOUS at 10:26

## 2019-10-20 RX ADMIN — ROCURONIUM BROMIDE 40 MG: 10 INJECTION, SOLUTION INTRAVENOUS at 10:27

## 2019-10-20 RX ADMIN — SUGAMMADEX 138 MG: 100 INJECTION, SOLUTION INTRAVENOUS at 12:42

## 2019-10-20 RX ADMIN — PROPOFOL 170 MG: 10 INJECTION, EMULSION INTRAVENOUS at 10:26

## 2019-10-20 RX ADMIN — TRAMADOL HYDROCHLORIDE 50 MG: 50 TABLET, FILM COATED ORAL at 17:35

## 2019-10-20 RX ADMIN — SODIUM CHLORIDE, SODIUM LACTATE, POTASSIUM CHLORIDE, AND CALCIUM CHLORIDE: .6; .31; .03; .02 INJECTION, SOLUTION INTRAVENOUS at 10:31

## 2019-10-20 RX ADMIN — HYDROMORPHONE HYDROCHLORIDE 0.4 MG: 1 INJECTION, SOLUTION INTRAMUSCULAR; INTRAVENOUS; SUBCUTANEOUS at 13:17

## 2019-10-20 RX ADMIN — INSULIN LISPRO 1 UNITS: 100 INJECTION, SOLUTION INTRAVENOUS; SUBCUTANEOUS at 21:34

## 2019-10-20 RX ADMIN — MORPHINE SULFATE 2 MG: 2 INJECTION, SOLUTION INTRAMUSCULAR; INTRAVENOUS at 15:08

## 2019-10-20 RX ADMIN — HYDROMORPHONE HYDROCHLORIDE 1 MG: 2 INJECTION, SOLUTION INTRAMUSCULAR; INTRAVENOUS; SUBCUTANEOUS at 11:03

## 2019-10-20 RX ADMIN — INSULIN LISPRO 1 UNITS: 100 INJECTION, SOLUTION INTRAVENOUS; SUBCUTANEOUS at 18:15

## 2019-10-20 RX ADMIN — HYDROMORPHONE HYDROCHLORIDE 0.4 MG: 1 INJECTION, SOLUTION INTRAMUSCULAR; INTRAVENOUS; SUBCUTANEOUS at 13:11

## 2019-10-20 RX ADMIN — PHENYLEPHRINE HYDROCHLORIDE 100 MCG: 10 INJECTION INTRAVENOUS at 10:44

## 2019-10-20 RX ADMIN — PHENYLEPHRINE HYDROCHLORIDE 100 MCG: 10 INJECTION INTRAVENOUS at 10:32

## 2019-10-20 RX ADMIN — DEXMEDETOMIDINE HCL 10 MCG: 100 INJECTION INTRAVENOUS at 11:04

## 2019-10-20 RX ADMIN — PROPOFOL 100 MCG/KG/MIN: 10 INJECTION, EMULSION INTRAVENOUS at 10:31

## 2019-10-20 RX ADMIN — HYDROMORPHONE HYDROCHLORIDE 0.4 MG: 1 INJECTION, SOLUTION INTRAMUSCULAR; INTRAVENOUS; SUBCUTANEOUS at 13:27

## 2019-10-20 RX ADMIN — DEXMEDETOMIDINE HCL 10 MCG: 100 INJECTION INTRAVENOUS at 11:26

## 2019-10-20 RX ADMIN — FENTANYL CITRATE 50 MCG: 50 INJECTION, SOLUTION INTRAMUSCULAR; INTRAVENOUS at 10:49

## 2019-10-20 RX ADMIN — DEXAMETHASONE SODIUM PHOSPHATE 4 MG: 10 INJECTION, SOLUTION INTRAMUSCULAR; INTRAVENOUS at 10:27

## 2019-10-20 RX ADMIN — FENTANYL CITRATE 50 MCG: 50 INJECTION INTRAMUSCULAR; INTRAVENOUS at 13:02

## 2019-10-20 RX ADMIN — PANTOPRAZOLE SODIUM 40 MG: 40 TABLET, DELAYED RELEASE ORAL at 17:35

## 2019-10-20 RX ADMIN — FENTANYL CITRATE 50 MCG: 50 INJECTION INTRAMUSCULAR; INTRAVENOUS at 13:06

## 2019-10-20 RX ADMIN — GABAPENTIN 300 MG: 300 CAPSULE ORAL at 17:41

## 2019-10-20 RX ADMIN — ONDANSETRON 4 MG: 2 INJECTION INTRAMUSCULAR; INTRAVENOUS at 12:18

## 2019-10-20 RX ADMIN — SODIUM CHLORIDE, SODIUM LACTATE, POTASSIUM CHLORIDE, AND CALCIUM CHLORIDE: .6; .31; .03; .02 INJECTION, SOLUTION INTRAVENOUS at 12:36

## 2019-10-20 RX ADMIN — ROCURONIUM BROMIDE 20 MG: 10 INJECTION, SOLUTION INTRAVENOUS at 11:43

## 2019-10-20 RX ADMIN — KETAMINE HYDROCHLORIDE 10 MG: 50 INJECTION INTRAMUSCULAR; INTRAVENOUS at 10:49

## 2019-10-20 RX ADMIN — DEXMEDETOMIDINE HCL 10 MCG: 100 INJECTION INTRAVENOUS at 10:59

## 2019-10-20 NOTE — ANESTHESIA PREPROCEDURE EVALUATION
Review of Systems/Medical History  Patient summary reviewed  Chart reviewed  History of anesthetic complications PONV    Cardiovascular  EKG reviewed, Exercise tolerance (METS): >4,  No CHF , DVT (RLE; normally on eliquis, LD 10/18 AM)   Pulmonary  Smoker ex-smoker  , Asthma , well controlled/ stable ,        GI/Hepatic      Comment: Colostomy; pt prefers Teri bag for skin     Negative  ROS        Endo/Other  Diabetes well controlled Oral agent,      GYN    Ovarian cancer,   Comment: Last chemo in July (carboplatin & paclitaxol)     Hematology  Anemia ,     Musculoskeletal  Negative musculoskeletal ROS        Neurology      Comment: 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, and middle finger of left hand from chemo Psychology   Negative psychology ROS          last surgery 9/10/19 (ex lap colostomy, tumor debulking etc); did not have PONV with that anesthetic (GA, TIVA, epidural) currently has wound infection and is on ancef & flagyl     Heparin gtt off at [place]    H & H 8 2 & 26 2  plts 259  K 3 4  Glucose 99    T&C for 2 units    Physical Exam    Airway  Comment: Intubated with MAC 3, grade 1 view w/ last surgery  Mallampati score: II  TM Distance: >3 FB  Neck ROM: full     Dental   No notable dental hx     Cardiovascular  Rhythm: regular, Rate: normal,     Pulmonary  Breath sounds clear to auscultation,     Other Findings        Anesthesia Plan  ASA Score- 3     Anesthesia Type- general and regional with ASA Monitors  Additional Monitors:   Airway Plan: ETT  Comment: GETA, TIVA, and BL TAP blocks discussed and consented for  Pt has port that will be used for induction, then peripheral access  Arterial line discussed with pt if needed for hemodynamic monitoring or frequent lab draws  Plan Factors-    Induction- intravenous  Postoperative Plan- Plan for postoperative opioid use  Informed Consent- Anesthetic plan and risks discussed with patient  I personally reviewed this patient with the CRNA   Discussed and agreed on the Anesthesia Plan with the CRNA  Michelle Frias

## 2019-10-20 NOTE — ANESTHESIA POSTPROCEDURE EVALUATION
Post-Op Assessment Note    CV Status:  Stable  Pain Score: 8    Pain management: adequate     Mental Status:  Awake   Hydration Status:  Stable   PONV Controlled:  None   Airway Patency:  Patent   Post Op Vitals Reviewed: Yes      Staff: CRNA           BP (P) 114/58 (10/20/19 1254)    Temp (P) 98 8 °F (37 1 °C) (10/20/19 1254)    Pulse (P) 77 (10/20/19 1254)   Resp (P) 16 (10/20/19 1254)    SpO2 (P) 98 % (10/20/19 1254)    Postop VS in PACU noted above, SV non-obstructed  Initially said no pain, but now saying pain 8/10  PACU RN getting pain meds ordered

## 2019-10-20 NOTE — PROGRESS NOTES
Pt seen at bedside with Dr Deana Villatoro, her daughter was present  8:24  Surgery and recovery was discussed and all questions were answered  Pt feels comfortable and reassured and wishes to proceed with the surgery  She is planned for Open Cholecystectomy today  NPO and   Heparin was held since midnight        Krys Carrasquillo PA-C

## 2019-10-20 NOTE — PROGRESS NOTES
Progress Note - Bree Wells 1950, 70 y o  female MRN: [id number]    Unit/Bed#: -01 Encounter: [id number]    Primary Care Provider: Malik Ramsey MD   Date and time admitted to hospital: 10/15/2019  7:54 PM        * Surgical wound infection  Assessment & Plan  · Midline incision with adjacent thickening of the abdominal wall musculature and an adjacent 36 x 18 mm intramuscular collection #2/48 suspicious for a small abscess  · General surgery following ongoing recommendations appreciated  · Continue wound care instructions per surgery  · Gyn/Onc evaluated patient agree with local wound packing and dressing changes  · Patient is scheduled to start chemotherapy outpatient 10/22 however this may be detained in setting of patient needing surgical intervention for cholecystitis  · Continue cefazolin and Flagyl  · Wound culture growing staph aureus - susceptible to cephalosporins continue for now patient can transition to orals complete 7 day course post surgery  · Blood cultures x2 negative for growth at 4 days      Cholecystitis  Assessment & Plan  · HIDA scan positive  · Surgery evaluated  · Hold Eliquis for 48 hours  · Patient for lap cholecystectomy today  · Continue added flagyl to antibiotic regimen     Deep venous thrombosis of right profunda femoris vein (HCC)  Assessment & Plan  · Eliquis on hold in setting of need for cholecystectomy  · Heparin GTT on hold in setting of patient scheduled to go to OR today    Mild protein-calorie malnutrition ([address] )  Assessment & Plan  Malnutrition Findings:   Malnutrition type: Chronic illness  Degree of Malnutrition: Malnutrition of mild degree    BMI Findings: Body mass index is 26 07 kg/m²             VTE Pharmacologic Prophylaxis:   Pharmacologic: Pharmacologic VTE Prophylaxis contraindicated due to In setting of need for OR intervention in setting of cholecystitis  Mechanical VTE Prophylaxis in Place: Yes    Patient Centered Rounds: I have performed bedside rounds with nursing staff today  Discussions with Specialists or Other Care Team Provider:  General surgery    Education and Discussions with Family / Patient:  Patient and family friend at bedside    Time Spent for Care: 30 minutes  More than 50% of total time spent on counseling and coordination of care as described above  Current Length of Stay: 5 day(s)    Current Patient Status: Inpatient   Certification Statement: The patient will continue to require additional inpatient hospital stay due to Ongoing need for acute intervention in setting of cholecystitis    Discharge Plan:  Not medically cleared    Code Status: Level 1 - Full Code      Subjective:   Patient reporting some generalized pain denies fevers or chills  Patient is verbalizing concerned about again going to the OR today as she would like to speak with the surgeon to discuss the surgical intervention  Patient made aware would discuss with surgery team about open discussion prior to going down and is still scheduled to go to our at 09:00  Objective:     Vitals:   Temp (24hrs), Av 1 °F (36 7 °C), Min:97 5 °F (36 4 °C), Max:98 5 °F (36 9 °C)    Temp:  [97 5 °F (36 4 °C)-98 5 °F (36 9 °C)] 98 3 °F (36 8 °C)  HR:  [73-77] 73  Resp:  [18] 18  BP: (121-122)/(58) 121/58  SpO2:  [97 %-99 %] 98 %  Body mass index is 26 07 kg/m²  Input and Output Summary (last 24 hours): Intake/Output Summary (Last 24 hours) at 10/20/2019 0833  Last data filed at 10/19/2019 1746  Gross per 24 hour   Intake 980 ml   Output    Net 980 ml       Physical Exam:     Physical Exam   Constitutional: She is oriented to person, place, and time  She appears cachectic  HENT:   Head: Normocephalic and atraumatic  Eyes: Conjunctivae and EOM are normal    Neck: Normal range of motion  Cardiovascular: Normal rate, regular rhythm and normal heart sounds  Pulmonary/Chest: Effort normal and breath sounds normal    Abdominal: Soft   Bowel sounds are normal  There is generalized tenderness  Colostomy pain dressing clean dry intact to surgical wound   Musculoskeletal: Normal range of motion  Neurological: She is alert and oriented to person, place, and time  Skin: Skin is warm and dry  Psychiatric: She has a normal mood and affect  Her behavior is normal          Additional Data:     Labs:    Results from last 7 days   Lab Units 10/19/19  1044  10/15/19  2033   WBC Thousand/uL 6 56   < > 10 41*   HEMOGLOBIN g/dL 8 2*   < > 8 4*   HEMATOCRIT % 26 2*   < > 26 5*   PLATELETS Thousands/uL 259   < > 280   NEUTROS PCT %  --   --  67   LYMPHS PCT %  --   --  20   MONOS PCT %  --   --  9   EOS PCT %  --   --  3    < > = values in this interval not displayed  Results from last 7 days   Lab Units 10/19/19  1044   SODIUM mmol/L 141   POTASSIUM mmol/L 3 4*   CHLORIDE mmol/L 105   CO2 mmol/L 27   BUN mg/dL 12   CREATININE mg/dL 1 10   ANION GAP mmol/L 9   CALCIUM mg/dL 8 9   ALBUMIN g/dL 2 5*   TOTAL BILIRUBIN mg/dL 0 10*   ALK PHOS U/L 63   ALT U/L 9*   AST U/L 11   GLUCOSE RANDOM mg/dL 201*     Results from last 7 days   Lab Units 10/18/19  1846   INR  1 15     Results from last 7 days   Lab Units 10/20/19  0618 10/19/19  2115 10/19/19  1608 10/19/19  1113 10/18/19  2042 10/18/19  1655 10/18/19  1055 10/18/19  0643 10/17/19  2055 10/17/19  1647 10/17/19  1100 10/17/19  0635   POC GLUCOSE mg/dl 99 184* 109 207* 161* 129 176* 93 147* 89 154* 98         Results from last 7 days   Lab Units 10/15/19  2033   LACTIC ACID mmol/L 0 7           * I Have Reviewed All Lab Data Listed Above  * Additional Pertinent Lab Tests Reviewed: All Labs Within Last 24 Hours Reviewed        Recent Cultures (last 7 days):     Results from last 7 days   Lab Units 10/15/19  2039 10/15/19  2033 10/15/19  1458   BLOOD CULTURE  No Growth After 4 Days  No Growth After 4 Days    --    GRAM STAIN RESULT   --   --  1+ Disintegrating polys*  3+ Gram positive cocci in pairs*   WOUND CULTURE --   --  3+ Growth of Staphylococcus aureus*       Last 24 Hours Medication List:     Current Facility-Administered Medications:  acetaminophen 650 mg Oral Q6H PRN [place] Bentleyet, RIGOBERTO    calcium carbonate 1,000 mg Oral Daily PRN [place] Mcet, RIGOBERTO    cefazolin 1,000 mg Intravenous Q8H Michelle Jj PA-C Last Rate: 1,000 mg (10/20/19 0203)   gabapentin 100 mg Oral TID [place] Nichols    insulin lispro 1-5 Units Subcutaneous TID AC RIGOBERTO Clements    insulin lispro 1-5 Units Subcutaneous HS [place] Antony, RIGOBERTO    metroNIDAZOLE 500 mg Oral Q8H [address] Nicholas Manning MD    morphine injection 2 mg Intravenous Q6H PRN RIGOBERTO Ellis    ondansetron 4 mg Intravenous Q6H PRN [place]    pantoprazole 40 mg Oral BID AC RIGOBERTO Santoyo    traMADol 50 mg Oral Q6H PRN [place]         Today, Patient Was Seen By: RIGOBERTO Ellis    ** Please Note: Dictation voice to text software may have been used in the creation of this document   **

## 2019-10-20 NOTE — OP NOTE
OPERATIVE REPORT  PATIENT NAME: Kateryna Tinsley    :  1950  MRN: [id number]  Pt Location: [facility] OR ROOM 03    SURGERY DATE: 10/20/2019    Surgeon(s) and Role:     * Nicol Bowie MD - Primary     * Aracelis Cortes PA-C - Assisting    Preop Diagnosis:  Cholecystitis [K81 9]  Recent laparotomy    Post-Op Diagnosis Codes:     * Cholecystitis [K81 9]    Procedure(s) (LRB):  CHOLECYSTECTOMY, open (N/A)    Specimen(s):  ID Type Source Tests Collected by Time Destination   1 : Gallbladder and Contents Tissue Gallbladder TISSUE EXAM Nicol Bowie MD 10/20/2019 1210    2 : Attached Omental Nodule Tissue Omentum TISSUE EXAM Nicol Bowie MD 10/20/2019 1212        Estimated Blood Loss:   150 mL    Drains:  Closed/Suction Drain Right RLQ Bulb (Active)   Number of days: 40       Closed/Suction Drain Right RUQ Bulb (Active)   Number of days: 0       Colostomy Descending/sigmoid LUQ (Active)   Stomal Appliance 2 piece 10/17/2019  9:00 PM   Stoma Assessment Budded 10/17/2019  9:00 PM   Treatment Placement checked 10/16/2019  2:35 AM   Number of days: 40       Anesthesia Type:   General    Operative Indications:  Cholecystitis [K81 9]      Operative Findings:  Dense intraabdominal adhesions including the liver and gallbladder  A firm nodule was adherent to the anterior surface of the gallbladder and was sent as a separate specimen for examination  Complications:   None    Procedure and Technique:  The patient was brought to the operating room and placed in supine position  She was intubated and sedated  The abdomen was prepped and draped excluding the colostomy bag  A time out was carried out  A right subcostal incision was made with a 15 blade and carried down through the subcutaneous tissues using a bovey  The anterior sheath, rectus and posterior sheath was incised   There were dense adhesions involving the liver, omentum, duodenum, gallbladder and colon within the right upper quadrant and these were lysed for about 20 minutes  We then began a top down dissection of the galbladder from the gallbladder fossa  There was a densely adherent omental nodule on the anterior surface of the gallbladder  The duodenal and omental adhesions continued to be lysed until the triangle structures were encountered  The artery was singly and duct was doubly clipped and divided using the starr  The gallbladder and omental nodue were passed off separately  Hemostasis was achieved and the right upper quadrant was irrigated until the effluent was clear  As she had some oozing from the fossa and requires blood thinners a drain was placed into the gallbladder fossa from the right flank and sutured into place with a 2-0 nylon  The posterior and anterior sheaths were then closed individually in a running fashion using a 1 PDS  Deep dermals with 3-0 monocryl and subcuticular with 4-0 monocryl was place and the wound was covered with dermabond  She tolerated the procedure well      I was present for the entire procedure, A qualified resident physician was not available and A physician assistant was required during the procedure for retraction tissue handling,dissection and suturing    Patient Disposition:  PACU  and extubated and stable    SIGNATURE: Maria R Jacobsen MD  DATE: October 20, 2019  TIME: 12:37 PM

## 2019-10-20 NOTE — ASSESSMENT & PLAN NOTE
· Midline incision with adjacent thickening of the abdominal wall musculature and an adjacent 36 x 18 mm intramuscular collection #2/48 suspicious for a small abscess  · General surgery following ongoing recommendations appreciated  · Continue wound care instructions per surgery  · Gyn/Onc evaluated patient agree with local wound packing and dressing changes      · Patient is scheduled to start chemotherapy outpatient 10/22 however this may be detained in setting of patient needing surgical intervention for cholecystitis  · Continue cefazolin and Flagyl  · Wound culture growing staph aureus - susceptible to cephalosporins continue for now patient can transition to orals complete 7 day course post surgery  · Blood cultures x2 negative for growth at 4 days

## 2019-10-20 NOTE — ASSESSMENT & PLAN NOTE
· HIDA scan positive  · Surgery evaluated  · Hold Eliquis for 48 hours  · Patient for lap cholecystectomy today  · Continue added flagyl to antibiotic regimen

## 2019-10-20 NOTE — PLAN OF CARE
Problem: SKIN/TISSUE INTEGRITY - ADULT  Goal: Incision(s), wounds(s) or drain site(s) healing without S/S of infection  Description  INTERVENTIONS  - Assess and document risk factors for skin impairment   - Assess and document dressing, incision, wound bed, drain sites and surrounding tissue  - Consider nutrition services referral as needed  - Oral mucous membranes remain intact  - Provide patient/ family education  Outcome: Progressing     Problem: MUSCULOSKELETAL - ADULT  Goal: Maintain or return mobility to safest level of function  Description  INTERVENTIONS:  - Assess patient's ability to carry out ADLs; assess patient's baseline for ADL function and identify physical deficits which impact ability to perform ADLs (bathing, care of mouth/teeth, toileting, grooming, dressing, etc )  - Assess/evaluate cause of self-care deficits   - Assess range of motion  - Assess patient's mobility  - Assess patient's need for assistive devices and provide as appropriate  - Encourage maximum independence but intervene and supervise when necessary  - Involve family in performance of ADLs  - Assess for home care needs following discharge   - Consider OT consult to assist with ADL evaluation and planning for discharge  - Provide patient education as appropriate  Outcome: Progressing     Problem: PAIN - ADULT  Goal: Verbalizes/displays adequate comfort level or baseline comfort level  Description  Interventions:  - Encourage patient to monitor pain and request assistance  - Assess pain using appropriate pain scale  - Administer analgesics based on type and severity of pain and evaluate response  - Implement non-pharmacological measures as appropriate and evaluate response  - Consider cultural and social influences on pain and pain management  - Notify physician/advanced practitioner if interventions unsuccessful or patient reports new pain  Outcome: Progressing     Problem: INFECTION - ADULT  Goal: Absence or prevention of progression during hospitalization  Description  INTERVENTIONS:  - Assess and monitor for signs and symptoms of infection  - Monitor lab/diagnostic results  - Monitor all insertion sites, i e  indwelling lines, tubes, and drains  - Monitor endotracheal if appropriate and nasal secretions for changes in amount and color  - Sanderson appropriate cooling/warming therapies per order  - Administer medications as ordered  - Instruct and encourage patient and family to use good hand hygiene technique  - Identify and instruct in appropriate isolation precautions for identified infection/condition  Outcome: Progressing     Problem: SAFETY ADULT  Goal: Patient will remain free of falls  Description  INTERVENTIONS:  - Assess patient frequently for physical needs  -  Identify cognitive and physical deficits and behaviors that affect risk of falls  -  Sanderson fall precautions as indicated by assessment   - Educate patient/family on patient safety including physical limitations  - Instruct patient to call for assistance with activity based on assessment  - Modify environment to reduce risk of injury  - Consider OT/PT consult to assist with strengthening/mobility  Outcome: Progressing     Problem: Potential for Falls  Goal: Patient will remain free of falls  Description  INTERVENTIONS:  - Assess patient frequently for physical needs  -  Identify cognitive and physical deficits and behaviors that affect risk of falls    -  Sanderson fall precautions as indicated by assessment   - Educate patient/family on patient safety including physical limitations  - Instruct patient to call for assistance with activity based on assessment  - Modify environment to reduce risk of injury  - Consider OT/PT consult to assist with strengthening/mobility  Outcome: Progressing     Problem: Nutrition/Hydration-ADULT  Goal: Nutrient/Hydration intake appropriate for improving, restoring or maintaining nutritional needs  Description  Monitor and assess patient's nutrition/hydration status for malnutrition  Collaborate with interdisciplinary team and initiate plan and interventions as ordered  Monitor patient's weight and dietary intake as ordered or per policy  Utilize nutrition screening tool and intervene as necessary  Determine patient's food preferences and provide high-protein, high-caloric foods as appropriate       INTERVENTIONS:  - Monitor oral intake, urinary output, labs, and treatment plans  - Assess nutrition and hydration status and recommend course of action  - Evaluate amount of meals eaten  - Assist patient with eating if necessary   - Allow adequate time for meals  - Recommend/ encourage appropriate diets, oral nutritional supplements, and vitamin/mineral supplements  - Order, calculate, and assess calorie counts as needed  - Recommend, monitor, and adjust tube feedings and TPN/PPN based on assessed needs  - Assess need for intravenous fluids  - Provide specific nutrition/hydration education as appropriate  - Include patient/family/caregiver in decisions related to nutrition  Outcome: Progressing

## 2019-10-20 NOTE — PROGRESS NOTES
Brief Post Op check  Daughter present at bedside, questions answered to their satisfaction  S/p Open Cholecystectomy    Pt is awake and alert  Mikeelieser Chapman Appropriate post op incisional pain  No c/o chest pain or SOB     Pain meds ordered  If pain in not managed then increase Gabapentin per SLIM management  Incision is clean and dry       ANDREAS drain intact     Ashok Reina PA-C

## 2019-10-20 NOTE — ASSESSMENT & PLAN NOTE
· Eliquis on hold in setting of need for cholecystectomy  · Heparin GTT on hold in setting of patient scheduled to go to OR today

## 2019-10-21 LAB
ANION GAP SERPL CALCULATED.3IONS-SCNC: 8 MMOL/L (ref 4–13)
BUN SERPL-MCNC: 15 MG/DL (ref 5–25)
CALCIUM SERPL-MCNC: 8.9 MG/DL (ref 8.3–10.1)
CHLORIDE SERPL-SCNC: 103 MMOL/L (ref 100–108)
CO2 SERPL-SCNC: 30 MMOL/L (ref 21–32)
CREAT SERPL-MCNC: 1.1 MG/DL (ref 0.6–1.3)
ERYTHROCYTE [DISTWIDTH] IN BLOOD BY AUTOMATED COUNT: 13.7 % (ref 11.6–15.1)
GFR SERPL CREATININE-BSD FRML MDRD: 51 ML/MIN/1.73SQ M
GLUCOSE SERPL-MCNC: 102 MG/DL (ref 65–140)
GLUCOSE SERPL-MCNC: 104 MG/DL (ref 65–140)
GLUCOSE SERPL-MCNC: 125 MG/DL (ref 65–140)
GLUCOSE SERPL-MCNC: 144 MG/DL (ref 65–140)
GLUCOSE SERPL-MCNC: 164 MG/DL (ref 65–140)
HCT VFR BLD AUTO: 25.1 % (ref 34.8–46.1)
HGB BLD-MCNC: 7.9 G/DL (ref 11.5–15.4)
MCH RBC QN AUTO: 29.8 PG (ref 26.8–34.3)
MCHC RBC AUTO-ENTMCNC: 31.5 G/DL (ref 31.4–37.4)
MCV RBC AUTO: 95 FL (ref 82–98)
PLATELET # BLD AUTO: 282 THOUSANDS/UL (ref 149–390)
PMV BLD AUTO: 10.9 FL (ref 8.9–12.7)
POTASSIUM SERPL-SCNC: 3.9 MMOL/L (ref 3.5–5.3)
RBC # BLD AUTO: 2.65 MILLION/UL (ref 3.81–5.12)
SODIUM SERPL-SCNC: 141 MMOL/L (ref 136–145)
WBC # BLD AUTO: 9.61 THOUSAND/UL (ref 4.31–10.16)

## 2019-10-21 PROCEDURE — 80048 BASIC METABOLIC PNL TOTAL CA: CPT | Performed by: SURGERY

## 2019-10-21 PROCEDURE — 85027 COMPLETE CBC AUTOMATED: CPT | Performed by: SURGERY

## 2019-10-21 PROCEDURE — 82948 REAGENT STRIP/BLOOD GLUCOSE: CPT

## 2019-10-21 PROCEDURE — 99024 POSTOP FOLLOW-UP VISIT: CPT | Performed by: SURGERY

## 2019-10-21 PROCEDURE — 99232 SBSQ HOSP IP/OBS MODERATE 35: CPT | Performed by: PHYSICIAN ASSISTANT

## 2019-10-21 RX ORDER — CEFAZOLIN SODIUM 1 G/50ML
1000 SOLUTION INTRAVENOUS EVERY 8 HOURS
Status: DISCONTINUED | OUTPATIENT
Start: 2019-10-21 | End: 2019-10-22 | Stop reason: HOSPADM

## 2019-10-21 RX ORDER — ACETAMINOPHEN 325 MG/1
975 TABLET ORAL EVERY 6 HOURS PRN
Status: DISCONTINUED | OUTPATIENT
Start: 2019-10-21 | End: 2019-10-22 | Stop reason: HOSPADM

## 2019-10-21 RX ADMIN — TRAMADOL HYDROCHLORIDE 50 MG: 50 TABLET, FILM COATED ORAL at 06:35

## 2019-10-21 RX ADMIN — GABAPENTIN 300 MG: 300 CAPSULE ORAL at 17:11

## 2019-10-21 RX ADMIN — CEFAZOLIN SODIUM 1000 MG: 1 SOLUTION INTRAVENOUS at 17:13

## 2019-10-21 RX ADMIN — PANTOPRAZOLE SODIUM 40 MG: 40 TABLET, DELAYED RELEASE ORAL at 17:12

## 2019-10-21 RX ADMIN — GABAPENTIN 300 MG: 300 CAPSULE ORAL at 22:06

## 2019-10-21 RX ADMIN — CEFAZOLIN SODIUM 1000 MG: 1 SOLUTION INTRAVENOUS at 22:06

## 2019-10-21 RX ADMIN — INSULIN LISPRO 1 UNITS: 100 INJECTION, SOLUTION INTRAVENOUS; SUBCUTANEOUS at 21:26

## 2019-10-21 RX ADMIN — TRAMADOL HYDROCHLORIDE 50 MG: 50 TABLET, FILM COATED ORAL at 17:11

## 2019-10-21 RX ADMIN — MORPHINE SULFATE 2 MG: 2 INJECTION, SOLUTION INTRAMUSCULAR; INTRAVENOUS at 03:38

## 2019-10-21 RX ADMIN — APIXABAN 2.5 MG: 2.5 TABLET, FILM COATED ORAL at 17:11

## 2019-10-21 RX ADMIN — PANTOPRAZOLE SODIUM 40 MG: 40 TABLET, DELAYED RELEASE ORAL at 06:34

## 2019-10-21 RX ADMIN — GABAPENTIN 300 MG: 300 CAPSULE ORAL at 09:01

## 2019-10-21 NOTE — ASSESSMENT & PLAN NOTE
Malnutrition Findings:   Malnutrition type: Chronic illness  Degree of Malnutrition: Malnutrition of mild degree    BMI Findings: Body mass index is 26 07 kg/m²  Nutrition consult

## 2019-10-21 NOTE — ASSESSMENT & PLAN NOTE
· Patient with ovarian cancer s/p recent surgery in September for [facility] and tumor debulking  · Gyn oncology input appreciated  · Outpatient follow up and treatment

## 2019-10-21 NOTE — ASSESSMENT & PLAN NOTE
· HIDA scan was positive for acute cholecystitis and patient is POD #1 open laparoscopic cholecystectomy  · Post-op care per surgical team     · Serial abdominal exams, supportive care, incentive spirometry, OOB, pain control  Gabapentin dose increased  Continue Tramadol  Morphine for breakthrough  · Per general surgery, okay to resume Eliquis today  · Advancing diet as tolerated

## 2019-10-21 NOTE — PROGRESS NOTES
Progress Note - Elina Duncan 1950, 70 y o  female MRN: [id number]    Unit/Bed#: -[place] Encounter: [id number]    Primary Care Provider: Shiloh Martino MD   Date and time admitted to hospital: 10/15/2019  7:54 PM        * Surgical wound infection  Assessment & Plan  · Patient with midline incision with adjacent thickening of the abdominal wall musculature and an adjacent 36 x 18 mm intramuscular collection #2/48 suspicious for a small abdominal wall abscess  · General surgery following: ongoing recommendations appreciated  Continue wound care instructions per surgery  · Gyn/Onc evaluated patient agree with local wound packing and dressing changes  · Patient is scheduled to start chemotherapy outpatient 10/22 - however this will need to be delayed in the setting of patient needing to undergo open cholecystectomy yesterday  · Wound culture growing staph aureus - susceptible to Cefazolin - can switch to po Keflex upon discharge to complete a 7 day course s/p surgery  · Blood cultures x2 negative for growth  Cholecystitis  Assessment & Plan  · HIDA scan was positive for acute cholecystitis and patient is POD #1 open laparoscopic cholecystectomy  · Post-op care per surgical team     · Serial abdominal exams, supportive care, incentive spirometry, OOB, pain control  Gabapentin dose increased  Continue Tramadol  Morphine for breakthrough  · Per general surgery, okay to resume Eliquis today  · Advancing diet as tolerated  Mild protein-calorie malnutrition ([address] )  Assessment & Plan  Malnutrition Findings:   Malnutrition type: Chronic illness  Degree of Malnutrition: Malnutrition of mild degree    BMI Findings: Body mass index is 26 07 kg/m²  Nutrition consult  Deep venous thrombosis of right profunda femoris vein (HCC)  Assessment & Plan  · Eliquis BID      Ovarian cancer [facility])  Assessment & Plan  · Patient with ovarian cancer s/p recent surgery in September for [facility] and tumor debulking  · Gyn oncology input appreciated  · Outpatient follow up and treatment  VTE Pharmacologic Prophylaxis:   Pharmacologic: Apixaban (Eliquis)    Patient Centered Rounds: Discussed with nurse  Discussions with Specialists or Other Care Team Provider: Discussed with surgery  Education and Discussions with Family / Patient: Patient  Time Spent for Care: 30 minutes  More than 50% of total time spent on counseling and coordination of care as described above  Current Length of Stay: 6 day(s)    Current Patient Status: Inpatient   Certification Statement: The patient will continue to require additional inpatient hospital stay due to further management post surgery  Discharge Plan: Not medically cleared  Code Status: Level 1 - Full Code      Subjective:   Patient reports she is still having significant abdominal pain and does not feel she is ready to go home yet  She does think the increased Gabapentin may be helping though  No vomiting  No fevers or chills  No CP or SOB  Objective:     Vitals:   Temp (24hrs), Av 6 °F (37 °C), Min:98 2 °F (36 8 °C), Max:99 °F (37 2 °C)    Temp:  [98 2 °F (36 8 °C)-99 °F (37 2 °C)] 99 °F (37 2 °C)  HR:  [74-83] 82  Resp:  [17-18] 17  BP: (114-119)/(54-61) 118/55  SpO2:  [91 %-95 %] 95 %  Body mass index is 26 07 kg/m²  Input and Output Summary (last 24 hours): Intake/Output Summary (Last 24 hours) at 10/21/2019 1429  Last data filed at 10/21/2019 1300  Gross per 24 hour   Intake 700 ml   Output 300 ml   Net 400 ml       Physical Exam:     Physical Exam   Constitutional: She is oriented to person, place, and time  No distress  HENT:   Head: Normocephalic and atraumatic  Eyes: No scleral icterus  Cardiovascular: Normal rate and regular rhythm  Pulmonary/Chest: Effort normal  No respiratory distress  She has no wheezes  She has no rales  Abdominal: Soft  S/P surgery with mild TTP, no guarding, +ostomy with brown stool  Musculoskeletal: She exhibits no tenderness  Neurological: She is alert and oriented to person, place, and time  Skin: She is not diaphoretic  Vitals reviewed  Additional Data:     Labs:    Results from last 7 days   Lab Units 10/21/19  0636  10/15/19  2033   WBC Thousand/uL 9 61   < > 10 41*   HEMOGLOBIN g/dL 7 9*   < > 8 4*   HEMATOCRIT % 25 1*   < > 26 5*   PLATELETS Thousands/uL 282   < > 280   NEUTROS PCT %  --   --  67   LYMPHS PCT %  --   --  20   MONOS PCT %  --   --  9   EOS PCT %  --   --  3    < > = values in this interval not displayed  Results from last 7 days   Lab Units 10/21/19  0636 10/19/19  1044   POTASSIUM mmol/L 3 9 3 4*   CHLORIDE mmol/L 103 105   CO2 mmol/L 30 27   BUN mg/dL 15 12   CREATININE mg/dL 1 10 1 10   CALCIUM mg/dL 8 9 8 9   ALK PHOS U/L  --  63   ALT U/L  --  9*   AST U/L  --  11     Results from last 7 days   Lab Units 10/18/19  1846   INR  1 15       * I Have Reviewed All Lab Data Listed Above  * Additional Pertinent Lab Tests Reviewed: All Labs Within Last 24 Hours Reviewed    Imaging:    Imaging Reports Reviewed Today Include: No new imaging  Recent Cultures (last 7 days):     Results from last 7 days   Lab Units 10/15/19  2039 10/15/19  2033 10/15/19  1458   BLOOD CULTURE  No Growth After 5 Days  No Growth After 5 Days    --    GRAM STAIN RESULT   --   --  1+ Disintegrating polys*  3+ Gram positive cocci in pairs*   WOUND CULTURE   --   --  3+ Growth of Staphylococcus aureus*       Last 24 Hours Medication List:     Current Facility-Administered Medications:  acetaminophen 975 mg Oral Q6H PRN Janice Espinal PA-C   apixaban 2 5 mg Oral BID Huseyin Torres PA-C   calcium carbonate 1,000 mg Oral Daily PRN Shashank Guadarrama MD   cefazolin 1,000 mg Intravenous Q8H Hermelinda Zhang PA-C   diphenhydrAMINE 25 mg Oral Q6H PRN Keri Coad, CRNP   gabapentin 300 mg Oral TID Keri Coad, CRNP   insulin lispro 1-5 Units Subcutaneous TID AC Shashank Guadarrama MD insulin lispro 1-5 Units Subcutaneous HS Dotty Hankins MD   lactated ringers 100 mL/hr Intravenous Continuous Shirley Gomez CRNA   morphine injection 2 mg Intravenous Q4H PRN Dotty Hankins MD   ondansetron 4 mg Intravenous Q6H PRN Dotty Hankins MD   pantoprazole 40 mg Oral BID AC Dotty Hankins MD   traMADol 50 mg Oral Q4H PRN Lauren Burns MD        Today, Patient Was Seen By: Clinton So PA-C    ** Please Note: Dictation voice to text software may have been used in the creation of this document   **

## 2019-10-21 NOTE — PROGRESS NOTES
Progress Note - General Surgery   Catie Wilder 70 y o  female MRN: [id number]  Unit/Bed#: -01 Encounter: [id number]    Assessment/Plan  Catie Wilder is a 70 y o  female     1  POD#1 s/p open cholecystectomy    Continue regular diet low-fat as tolerated  Patient states she has colostomy output and has been able to tolerate PO intake without nausea or vomiting  Pain control PRN  Gabapentin dose increased by SLIM  Will add Toradol   Continue course of antibiotics for infected incisional wound from prior exploratory laparotomy  Serial abdominal exams  OK to restart Eliquis today  Continue topical dressings on wound  Will plan for removal tomorrow  ANDREAS drain with serosanguinous drainage  SLIM primary    Daily Surgical Rounds    Patient seen and assessed by Dr Stacey Mixon, and surgical team at 11:31 AM       Subjective/Objective     Subjective: No acute events overnight  Tolerating diet  Has some incisional pain but states Tramadol and Gabapentin have been effective in pain control at time of examination  Per discussion with SLIM, patient had been complaining of increased pain later in day  Having colostomy output  Objective:     Blood pressure 118/55, pulse 82, temperature 99 °F (37 2 °C), resp  rate 17, height 5' 4" (1 626 m), weight 68 9 kg (151 lb 14 4 oz), SpO2 95 %  ,Body mass index is 26 07 kg/m²        Intake/Output Summary (Last 24 hours) at 10/21/2019 1427  Last data filed at 10/21/2019 1300  Gross per 24 hour   Intake 700 ml   Output 300 ml   Net 400 ml       Invasive Devices     Central Venous Catheter Line            Port A Cath 11/29/18 Right Chest 326 days          Peripheral Intravenous Line            Peripheral IV 10/20/19 Right Wrist 1 day          Drain            Colostomy Descending/sigmoid LUQ 41 days    Closed/Suction Drain Right RLQ Bulb 40 days    Closed/Suction Drain Right RUQ Bulb 1 day                Physical Exam: /55   Pulse 82   Temp 99 °F (37 2 °C)   Resp 17   Ht 5' 4" (1 626 m)   Wt 68 9 kg (151 lb 14 4 oz)   SpO2 95%   BMI 26 07 kg/m²   General appearance: alert and oriented, in no acute distress  Lungs: clear to auscultation bilaterally  Heart: regular rate and rhythm, S1, S2 normal, no murmur, click, rub or gallop  Abdomen: soft, non-distended, cholecystectomy incision closed with drain in place, incision c/d/i approximated well, no surrounding erythema or edema, appropriate incisional tenderness, ANDREAS drain with serosang drainage  Extremities: extremities normal, warm and well-perfused; no cyanosis, clubbing, or edema    Lab, Imaging and other studies:I have personally reviewed pertinent lab results  VTE Pharmacologic Prophylaxis: Reason for no pharmacologic prophylaxis On therapeutic Eliquis  VTE Mechanical Prophylaxis: sequential compression device    Recent Results (from the past 36 hour(s))   Type and screen    Collection Time: 10/20/19  5:07 AM   Result Value Ref Range    ABO Grouping A     Rh Factor Positive     Antibody Screen Negative     Specimen Expiration Date [id number]    Fingerstick Glucose (POCT)    Collection Time: 10/20/19  6:18 AM   Result Value Ref Range    POC Glucose 99 65 - 140 mg/dl   Prepare RBC:Has consent been obtained? Yes; Where is the Surgery Scheduled? [facility];  Date of Surgery: 10/20/2019; Date of Infusion: 10/20/2019, 2 Units    Collection Time: 10/20/19  9:51 AM   Result Value Ref Range    Unit Product Code D2027H05     Unit Number [id number]     Unit ABO A     Unit [facility] POS     Crossmatch Compatible     Unit Dispense Status Crossmatched     Unit Product Code L4608J47     Unit Number [id number]     Unit ABO A     Unit [facility] POS     Crossmatch Compatible     Unit Dispense Status Crossmatched    Fingerstick Glucose (POCT)    Collection Time: 10/20/19 12:56 PM   Result Value Ref Range    POC Glucose 175 (H) 65 - 140 mg/dl   Fingerstick Glucose (POCT)    Collection Time: 10/20/19  4:00 PM   Result Value Ref Range    POC Glucose 203 (H) 65 - 140 mg/dl   Fingerstick Glucose (POCT)    Collection Time: 10/20/19  9:12 PM   Result Value Ref Range    POC Glucose 166 (H) 65 - 140 mg/dl   Fingerstick Glucose (POCT)    Collection Time: 10/21/19  6:20 AM   Result Value Ref Range    POC Glucose 104 65 - 140 mg/dl   CBC    Collection Time: 10/21/19  6:36 AM   Result Value Ref Range    WBC 9 61 4 31 - 10 16 Thousand/uL    RBC 2 65 (L) 3 81 - 5 12 Million/uL    Hemoglobin 7 9 (L) 11 5 - 15 4 g/dL    Hematocrit 25 1 (L) 34 8 - 46 1 %    MCV 95 82 - 98 fL    MCH 29 8 26 8 - 34 3 pg    MCHC 31 5 31 4 - 37 4 g/dL    RDW 13 7 11 6 - 15 1 %    Platelets 506 768 - 959 Thousands/uL    MPV 10 9 8 9 - 12 7 fL   Basic metabolic panel    Collection Time: 10/21/19  6:36 AM   Result Value Ref Range    Sodium 141 136 - 145 mmol/L    Potassium 3 9 3 5 - 5 3 mmol/L    Chloride 103 100 - 108 mmol/L    CO2 30 21 - 32 mmol/L    ANION GAP 8 4 - 13 mmol/L    BUN 15 5 - 25 mg/dL    Creatinine 1 10 0 60 - 1 30 mg/dL    Glucose 102 65 - 140 mg/dL    Calcium 8 9 8 3 - 10 1 mg/dL    eGFR 51 ml/min/1 73sq m   Fingerstick Glucose (POCT)    Collection Time: 10/21/19 11:06 AM   Result Value Ref Range    POC Glucose 125 65 - 140 mg/dl

## 2019-10-21 NOTE — ASSESSMENT & PLAN NOTE
· Patient with midline incision with adjacent thickening of the abdominal wall musculature and an adjacent 36 x 18 mm intramuscular collection #2/48 suspicious for a small abdominal wall abscess  · General surgery following: ongoing recommendations appreciated  Continue wound care instructions per surgery  · Gyn/Onc evaluated patient agree with local wound packing and dressing changes  · Patient is scheduled to start chemotherapy outpatient 10/22 - however this will need to be delayed in the setting of patient needing to undergo open cholecystectomy yesterday  · Wound culture growing staph aureus - susceptible to Cefazolin - can switch to po Keflex upon discharge to complete a 7 day course s/p surgery  · Blood cultures x2 negative for growth

## 2019-10-22 VITALS
HEART RATE: 76 BPM | OXYGEN SATURATION: 97 % | HEIGHT: 64 IN | TEMPERATURE: 98.9 F | WEIGHT: 151.9 LBS | RESPIRATION RATE: 18 BRPM | BODY MASS INDEX: 25.93 KG/M2 | SYSTOLIC BLOOD PRESSURE: 116 MMHG | DIASTOLIC BLOOD PRESSURE: 59 MMHG

## 2019-10-22 LAB
ANION GAP SERPL CALCULATED.3IONS-SCNC: 7 MMOL/L (ref 4–13)
BASOPHILS # BLD AUTO: 0.06 THOUSANDS/ΜL (ref 0–0.1)
BASOPHILS NFR BLD AUTO: 1 % (ref 0–1)
BUN SERPL-MCNC: 12 MG/DL (ref 5–25)
CALCIUM SERPL-MCNC: 9.1 MG/DL (ref 8.3–10.1)
CHLORIDE SERPL-SCNC: 103 MMOL/L (ref 100–108)
CO2 SERPL-SCNC: 31 MMOL/L (ref 21–32)
CREAT SERPL-MCNC: 1.14 MG/DL (ref 0.6–1.3)
EOSINOPHIL # BLD AUTO: 0.25 THOUSAND/ΜL (ref 0–0.61)
EOSINOPHIL NFR BLD AUTO: 3 % (ref 0–6)
ERYTHROCYTE [DISTWIDTH] IN BLOOD BY AUTOMATED COUNT: 13.5 % (ref 11.6–15.1)
GFR SERPL CREATININE-BSD FRML MDRD: 49 ML/MIN/1.73SQ M
GLUCOSE SERPL-MCNC: 123 MG/DL (ref 65–140)
GLUCOSE SERPL-MCNC: 136 MG/DL (ref 65–140)
GLUCOSE SERPL-MCNC: 146 MG/DL (ref 65–140)
HCT VFR BLD AUTO: 26.7 % (ref 34.8–46.1)
HGB BLD-MCNC: 8.5 G/DL (ref 11.5–15.4)
IMM GRANULOCYTES # BLD AUTO: 0.04 THOUSAND/UL (ref 0–0.2)
IMM GRANULOCYTES NFR BLD AUTO: 0 % (ref 0–2)
LYMPHOCYTES # BLD AUTO: 2.14 THOUSANDS/ΜL (ref 0.6–4.47)
LYMPHOCYTES NFR BLD AUTO: 24 % (ref 14–44)
MCH RBC QN AUTO: 30.1 PG (ref 26.8–34.3)
MCHC RBC AUTO-ENTMCNC: 31.8 G/DL (ref 31.4–37.4)
MCV RBC AUTO: 95 FL (ref 82–98)
MONOCYTES # BLD AUTO: 0.86 THOUSAND/ΜL (ref 0.17–1.22)
MONOCYTES NFR BLD AUTO: 10 % (ref 4–12)
NEUTROPHILS # BLD AUTO: 5.64 THOUSANDS/ΜL (ref 1.85–7.62)
NEUTS SEG NFR BLD AUTO: 62 % (ref 43–75)
NRBC BLD AUTO-RTO: 0 /100 WBCS
PLATELET # BLD AUTO: 285 THOUSANDS/UL (ref 149–390)
PMV BLD AUTO: 11.1 FL (ref 8.9–12.7)
POTASSIUM SERPL-SCNC: 4 MMOL/L (ref 3.5–5.3)
RBC # BLD AUTO: 2.82 MILLION/UL (ref 3.81–5.12)
SODIUM SERPL-SCNC: 141 MMOL/L (ref 136–145)
WBC # BLD AUTO: 8.99 THOUSAND/UL (ref 4.31–10.16)

## 2019-10-22 PROCEDURE — 82948 REAGENT STRIP/BLOOD GLUCOSE: CPT

## 2019-10-22 PROCEDURE — 80048 BASIC METABOLIC PNL TOTAL CA: CPT | Performed by: PHYSICIAN ASSISTANT

## 2019-10-22 PROCEDURE — 99239 HOSP IP/OBS DSCHRG MGMT >30: CPT | Performed by: NURSE PRACTITIONER

## 2019-10-22 PROCEDURE — 85025 COMPLETE CBC W/AUTO DIFF WBC: CPT | Performed by: PHYSICIAN ASSISTANT

## 2019-10-22 RX ORDER — TRAMADOL HYDROCHLORIDE 50 MG/1
50 TABLET ORAL EVERY 4 HOURS PRN
Qty: 20 TABLET | Refills: 0 | Status: SHIPPED | OUTPATIENT
Start: 2019-10-22 | End: 2019-10-22

## 2019-10-22 RX ORDER — GABAPENTIN 300 MG/1
300 CAPSULE ORAL 3 TIMES DAILY
Qty: 90 CAPSULE | Refills: 0 | Status: SHIPPED | OUTPATIENT
Start: 2019-10-22 | End: 2019-11-21

## 2019-10-22 RX ORDER — TRAMADOL HYDROCHLORIDE 50 MG/1
50 TABLET ORAL EVERY 4 HOURS PRN
Qty: 20 TABLET | Refills: 0 | Status: SHIPPED | OUTPATIENT
Start: 2019-10-22 | End: 2019-11-01

## 2019-10-22 RX ORDER — GABAPENTIN 300 MG/1
300 CAPSULE ORAL 3 TIMES DAILY
Qty: 90 CAPSULE | Refills: 0 | Status: SHIPPED | OUTPATIENT
Start: 2019-10-22 | End: 2019-10-22

## 2019-10-22 RX ADMIN — TRAMADOL HYDROCHLORIDE 50 MG: 50 TABLET, FILM COATED ORAL at 05:15

## 2019-10-22 RX ADMIN — APIXABAN 2.5 MG: 2.5 TABLET, FILM COATED ORAL at 08:19

## 2019-10-22 RX ADMIN — TRAMADOL HYDROCHLORIDE 50 MG: 50 TABLET, FILM COATED ORAL at 09:33

## 2019-10-22 RX ADMIN — CEFAZOLIN SODIUM 1000 MG: 1 SOLUTION INTRAVENOUS at 05:15

## 2019-10-22 RX ADMIN — GABAPENTIN 300 MG: 300 CAPSULE ORAL at 08:19

## 2019-10-22 RX ADMIN — PANTOPRAZOLE SODIUM 40 MG: 40 TABLET, DELAYED RELEASE ORAL at 08:18

## 2019-10-22 NOTE — ASSESSMENT & PLAN NOTE
Malnutrition Findings:   Malnutrition type: Chronic illness  Degree of Malnutrition: Malnutrition of mild degree    BMI Findings: Body mass index is 26 07 kg/m²     Nutrition recommendations reviewed

## 2019-10-22 NOTE — ASSESSMENT & PLAN NOTE
· Patient with ovarian cancer s/p recent surgery in September for [facility] and tumor debulking  · Gyn oncology input appreciated  · Outpatient follow up and treatment

## 2019-10-22 NOTE — ASSESSMENT & PLAN NOTE
· Patient with midline incision with adjacent thickening of the abdominal wall musculature and an adjacent 36 x 18 mm intramuscular collection #2/48 suspicious for a small abdominal wall abscess  · General surgery following: ongoing recommendations appreciated  Continue wound care instructions per surgery  · Gyn/Onc evaluated patient agree with local wound packing and dressing changes  · Patient is scheduled to start chemotherapy outpatient 10/22 however this will be rescheduled by Oncology in the outpatient setting  - however this will need to be delayed in the setting of patient needing to undergo open cholecystectomy 10/20  · Wound culture growing staph aureus - susceptible to Cefazolin - can switch to po Keflex upon discharge to complete a 7 day course s/p surgery day 4/7  · Blood cultures x2 negative for growth

## 2019-10-22 NOTE — DISCHARGE INSTRUCTIONS
Surgical wound: cleanse with normal saline, pat dry, and cover with dry sterile dressing  Surgical site: cleanse with normal saline, pat dry, and cover with dry sterile dressing  Tramadol (By mouth)   Tramadol (TRAM-a-dol)  Treats moderate to severe pain  This medicine is a narcotic pain reliever  Brand Name(s): ConZip, FusePaq Synapryn, [place], Ultram, Ultram ER, traMADol HCl   There may be other brand names for this medicine  When This Medicine Should Not Be Used: This medicine is not right for everyone  Do not use if you had an allergic reaction to tramadol or other narcotic medicine, or if you have stomach or bowel blockage (including paralytic ileus)  How to Use This Medicine:   Long Acting Capsule, Liquid, Tablet, Dissolving Tablet, Long Acting Tablet  · Take your medicine as directed  Your dose may need to be changed several times to find what works best for you  · Make sure your hands are dry before you handle the disintegrating tablet  Peel back the foil from the blister pack, then remove the tablet  Do not push the tablet through the foil  Place the tablet in your mouth  After it has melted, swallow or take a drink of water  · Swallow the extended-release tablet whole  Do not crush, break, or chew it  · Drink plenty of liquids to help avoid constipation  · This medicine should come with a Medication Guide  Ask your pharmacist for a copy if you do not have one  · Missed dose: Take a dose as soon as you remember  If it is almost time for your next dose, wait until then and take a regular dose  Do not take extra medicine to make up for a missed dose  · Store the medicine in a closed container at room temperature, away from heat, moisture, and direct light  Drugs and Foods to Avoid:   Ask your doctor or pharmacist before using any other medicine, including over-the-counter medicines, vitamins, and herbal products    · Do not use this medicine if you are using or have used an MAO inhibitor within the past 14 days  · Some medicines can affect how tramadol works  Tell your doctor if you are using any of the following:  ¨ Carbamazepine, cyclobenzaprine, digoxin, erythromycin, ketoconazole, lithium, mirtazapine, phenytoin, promethazine, rifampin, ritonavir, quinidine, or trazodone  ¨ Blood thinner (including warfarin)  ¨ Diuretic (water pill)  ¨ Medicine to treat depression (including bupropion, fluoxetine, paroxetine, quinidine)  ¨ Phenothiazine medicine  ¨ Triptan medicine for migraine headaches  · Tell your doctor if you use anything else that makes you sleepy  Some examples are allergy medicine, narcotic pain medicine, and alcohol  Tell your doctor if you are using a muscle relaxer  · Do not drink alcohol while you are using this medicine  Warnings While Using This Medicine:   · Tell your doctor if you are pregnant or breastfeeding, or if you have kidney disease, liver disease (including cirrhosis), gallstones, lung or breathing problems, pancreas problems, or a history of head injury, seizures, drug addiction, or depression or similar emotional problems  Tell your doctor if you have phenylketonuria  · This medicine may cause the following problems:  ¨ High risk of overdose, which can lead to death  ¨ Respiratory depression (serious breathing problem that can be life-threatening)  ¨ Serotonin syndrome (when used with certain medicines)  ¨ Unusual change in mood or behavior  · This medicine may make you dizzy, drowsy, or lightheaded  Do not drive or doing anything else that could be dangerous until you know how this medicine affects you  · This medicine can be habit-forming  Do not use more than your prescribed dose  Call your doctor if you think your medicine is not working  · Do not stop using this medicine suddenly  Your doctor will need to slowly decrease your dose before you stop it completely  · Tell any doctor or dentist who treats you that you are using this medicine    · This medicine may cause constipation, especially with long-term use  Ask your doctor if you should use a laxative to prevent and treat constipation  · This medicine could cause infertility  Talk with your doctor before using this medicine if you plan to have children  · Keep all medicine out of the reach of children  Never share your medicine with anyone  Possible Side Effects While Using This Medicine:   Call your doctor right away if you notice any of these side effects:  · Allergic reaction: Itching or hives, swelling in your face or hands, swelling or tingling in your mouth or throat, chest tightness, trouble breathing  · Anxiety, restlessness, fast heartbeat, fever, sweating, muscle spasms, nausea, vomiting, diarrhea, seeing or hearing things that are not there  · Blistering, peeling, red skin rash  · Blue lips, fingernails, or skin  · Extreme dizziness, drowsiness, or weakness, shallow breathing, slow heartbeat, seizures, and cold, clammy skin  · Lightheadedness, dizziness, fainting  · Seizures  · Unusual mood or behavior, thoughts of killing yourself or others  · Trouble breathing  If you notice these less serious side effects, talk with your doctor:   · Constipation, loss of appetite, stomach upset  · Dry mouth  · Headache  If you notice other side effects that you think are caused by this medicine, tell your doctor  Call your doctor for medical advice about side effects  You may report side effects to FDA at 4-866-FDA-5960  © 2017 [address] [employer] Information is for End User's use only and may not be sold, redistributed or otherwise used for commercial purposes  The above information is an  only  It is not intended as medical advice for individual conditions or treatments  Talk to your doctor, nurse or pharmacist before following any medical regimen to see if it is safe and effective for you

## 2019-10-22 NOTE — ASSESSMENT & PLAN NOTE
· HIDA scan was positive for acute cholecystitis and patient is POD #2 open laparoscopic cholecystectomy  · Post-op care per surgical team     · Serial abdominal exams, supportive care, incentive spirometry, OOB, pain control  Gabapentin dose increased  Continue Tramadol  Morphine for breakthrough  · Per general surgery, patient was resumed yesterday on Eliquis  · ANDREAS drain to be removed today patient is stable from surgery perspective for discharge  · Advancing diet as tolerated

## 2019-10-22 NOTE — PLAN OF CARE
Problem: PAIN - ADULT  Goal: Verbalizes/displays adequate comfort level or baseline comfort level  Description  Interventions:  - Encourage patient to monitor pain and request assistance  - Assess pain using appropriate pain scale  - Administer analgesics based on type and severity of pain and evaluate response  - Implement non-pharmacological measures as appropriate and evaluate response  - Consider cultural and social influences on pain and pain management  - Notify physician/advanced practitioner if interventions unsuccessful or patient reports new pain  Outcome: Progressing     Problem: INFECTION - ADULT  Goal: Absence or prevention of progression during hospitalization  Description  INTERVENTIONS:  - Assess and monitor for signs and symptoms of infection  - Monitor lab/diagnostic results  - Monitor all insertion sites, i e  indwelling lines, tubes, and drains  - Monitor endotracheal if appropriate and nasal secretions for changes in amount and color  - [place] appropriate cooling/warming therapies per order  - Administer medications as ordered  - Instruct and encourage patient and family to use good hand hygiene technique  - Identify and instruct in appropriate isolation precautions for identified infection/condition  Outcome: Progressing     Problem: SAFETY ADULT  Goal: Patient will remain free of falls  Description  INTERVENTIONS:  - Assess patient frequently for physical needs  -  Identify cognitive and physical deficits and behaviors that affect risk of falls    -  [place] fall precautions as indicated by assessment   - Educate patient/family on patient safety including physical limitations  - Instruct patient to call for assistance with activity based on assessment  - Modify environment to reduce risk of injury  - Consider OT/PT consult to assist with strengthening/mobility  Outcome: Progressing

## 2019-10-22 NOTE — PLAN OF CARE
Problem: SKIN/TISSUE INTEGRITY - ADULT  Goal: Incision(s), wounds(s) or drain site(s) healing without S/S of infection  Description  INTERVENTIONS  - Assess and document risk factors for skin impairment   - Assess and document dressing, incision, wound bed, drain sites and surrounding tissue  - Consider nutrition services referral as needed  - Oral mucous membranes remain intact  - Provide patient/ family education  Outcome: Adequate for Discharge     Problem: MUSCULOSKELETAL - ADULT  Goal: Maintain or return mobility to safest level of function  Description  INTERVENTIONS:  - Assess patient's ability to carry out ADLs; assess patient's baseline for ADL function and identify physical deficits which impact ability to perform ADLs (bathing, care of mouth/teeth, toileting, grooming, dressing, etc )  - Assess/evaluate cause of self-care deficits   - Assess range of motion  - Assess patient's mobility  - Assess patient's need for assistive devices and provide as appropriate  - Encourage maximum independence but intervene and supervise when necessary  - Involve family in performance of ADLs  - Assess for home care needs following discharge   - Consider OT consult to assist with ADL evaluation and planning for discharge  - Provide patient education as appropriate  Outcome: Adequate for Discharge     Problem: PAIN - ADULT  Goal: Verbalizes/displays adequate comfort level or baseline comfort level  Description  Interventions:  - Encourage patient to monitor pain and request assistance  - Assess pain using appropriate pain scale  - Administer analgesics based on type and severity of pain and evaluate response  - Implement non-pharmacological measures as appropriate and evaluate response  - Consider cultural and social influences on pain and pain management  - Notify physician/advanced practitioner if interventions unsuccessful or patient reports new pain  Outcome: Adequate for Discharge     Problem: INFECTION - ADULT  Goal: Absence or prevention of progression during hospitalization  Description  INTERVENTIONS:  - Assess and monitor for signs and symptoms of infection  - Monitor lab/diagnostic results  - Monitor all insertion sites, i e  indwelling lines, tubes, and drains  - Monitor endotracheal if appropriate and nasal secretions for changes in amount and color  - [place] appropriate cooling/warming therapies per order  - Administer medications as ordered  - Instruct and encourage patient and family to use good hand hygiene technique  - Identify and instruct in appropriate isolation precautions for identified infection/condition  Outcome: Adequate for Discharge     Problem: SAFETY ADULT  Goal: Patient will remain free of falls  Description  INTERVENTIONS:  - Assess patient frequently for physical needs  -  Identify cognitive and physical deficits and behaviors that affect risk of falls  -  [place] fall precautions as indicated by assessment   - Educate patient/family on patient safety including physical limitations  - Instruct patient to call for assistance with activity based on assessment  - Modify environment to reduce risk of injury  - Consider OT/PT consult to assist with strengthening/mobility  Outcome: Adequate for Discharge     Problem: Potential for Falls  Goal: Patient will remain free of falls  Description  INTERVENTIONS:  - Assess patient frequently for physical needs  -  Identify cognitive and physical deficits and behaviors that affect risk of falls    -  [place] fall precautions as indicated by assessment   - Educate patient/family on patient safety including physical limitations  - Instruct patient to call for assistance with activity based on assessment  - Modify environment to reduce risk of injury  - Consider OT/PT consult to assist with strengthening/mobility  Outcome: Adequate for Discharge     Problem: Nutrition/Hydration-ADULT  Goal: Nutrient/Hydration intake appropriate for improving, restoring or maintaining nutritional needs  Description  Monitor and assess patient's nutrition/hydration status for malnutrition  Collaborate with interdisciplinary team and initiate plan and interventions as ordered  Monitor patient's weight and dietary intake as ordered or per policy  Utilize nutrition screening tool and intervene as necessary  Determine patient's food preferences and provide high-protein, high-caloric foods as appropriate       INTERVENTIONS:  - Monitor oral intake, urinary output, labs, and treatment plans  - Assess nutrition and hydration status and recommend course of action  - Evaluate amount of meals eaten  - Assist patient with eating if necessary   - Allow adequate time for meals  - Recommend/ encourage appropriate diets, oral nutritional supplements, and vitamin/mineral supplements  - Order, calculate, and assess calorie counts as needed  - Recommend, monitor, and adjust tube feedings and TPN/PPN based on assessed needs  - Assess need for intravenous fluids  - Provide specific nutrition/hydration education as appropriate  - Include patient/family/caregiver in decisions related to nutrition  Outcome: Adequate for Discharge

## 2019-10-22 NOTE — DISCHARGE SUMMARY
Discharge- Eusebio Fleischer 1950, 70 y o  female MRN: [id number]    Unit/Bed#: -01 Encounter: [id number]    Primary Care Provider: Carmen Byrnes MD   Date and time admitted to hospital: 10/15/2019  7:54 PM        * Surgical wound infection  Assessment & Plan  · Patient with midline incision with adjacent thickening of the abdominal wall musculature and an adjacent 36 x 18 mm intramuscular collection #2/48 suspicious for a small abdominal wall abscess  · General surgery following: ongoing recommendations appreciated  Continue wound care instructions per surgery  · Gyn/Onc evaluated patient agree with local wound packing and dressing changes  · Patient is scheduled to start chemotherapy outpatient 10/22 however this will be rescheduled by Oncology in the outpatient setting  - however this will need to be delayed in the setting of patient needing to undergo open cholecystectomy 10/20  · Wound culture growing staph aureus - susceptible to Cefazolin - can switch to po Keflex upon discharge to complete a 7 day course s/p surgery day 4/7  · Blood cultures x2 negative for growth  Cholecystitis  Assessment & Plan  · HIDA scan was positive for acute cholecystitis and patient is POD #2 open laparoscopic cholecystectomy  · Post-op care per surgical team     · Serial abdominal exams, supportive care, incentive spirometry, OOB, pain control  Gabapentin dose increased  Continue Tramadol  Morphine for breakthrough  · Per general surgery, patient was resumed yesterday on Eliquis  · ANDREAS drain to be removed today patient is stable from surgery perspective for discharge  · Advancing diet as tolerated  Deep venous thrombosis of right profunda femoris vein (HCC)  Assessment & Plan  · Continue Eliquis BID      Mild protein-calorie malnutrition ([address] )  Assessment & Plan  Malnutrition Findings:   Malnutrition type: Chronic illness  Degree of Malnutrition: Malnutrition of mild degree    BMI Findings: Body mass index is 26 07 kg/m²  Nutrition recommendations reviewed    Ovarian cancer [facility])  Assessment & Plan  · Patient with ovarian cancer s/p recent surgery in September for [facility] and tumor debulking  · Gyn oncology input appreciated  · Outpatient follow up and treatment  Discharging Physician / Practitioner: RIGOBERTO Chilel  PCP: Carmen Byrnes MD  Admission Date:   Admission Orders (From admission, onward)     Ordered        10/15/19 2138  Inpatient Admission  Once                   Discharge Date: 10/22/19    Resolved Problems  Date Reviewed: 10/22/2019    None          Consultations During Hospital Stay:  · Acute Care surgery  · Gynecology/oncology    Procedures Performed:   · CT abdomen 10/15:  Midline incision adjacent to having a of abdominal vascular an adjacent the 10 x 18 mm intramuscular collection there is suspicion for small abscess  Noted prominent gallbladder distension cholelithiasis potential small quantity of  Suggest fluid  · HIDA scan 10/17:  Nonvisualization of the gallbladder concerning for cystic duct obstruction and acute cholecystitis  Patient's clinical exam lasting concern for acute cholecystitis  · Cholecystectomy 10/20    Significant Findings / Test Results:   · Surgical wound infection wound culture positive for Staph aureus treat out 7 days with cephalosporin  · Acute cholecystitis status post cholecystectomy 10/20  · Ovarian cancer  · Mild protein malnutrition  · DVT    Incidental Findings:   ·       Test Results Pending at Discharge (will require follow up): · Tissue exam from cholecystectomy     Outpatient Tests Requested:  · Per Gynecology Oncology  · Per acute Care surgery    Complications:   On anticoagulation at home 48 hours before surgical intervention could be done    Reason for Admission:  Abdominal pain/surgical wound infection/acute cholecystitis    Hospital Course:     Eusebio Fleischer is a 70 y o  female patient who originally presented to the hospital on 10/15/2019 due to increasing abdominal pain in patient who recently underwent debulking and omentectomy in setting of ovarian cancer 09/2019  Patient had noted area of nonhealing in her abdomen where she feels her source of her pain was coming from came to the ED for further evaluation where there was an area noted on CT findings however there was also findings of the gallbladder  Acute Care surgery and Gynecology Oncology were both consulted  Gynecology Oncology agreed with acute Care surgery dressing changes  On surgeries evaluation however patient's symptoms appear to be concentrated in the right upper quadrant so a HIDA scan was ordered which showed findings along with clinical exam findings of acute cholecystitis  Patient however was on Eliquis in setting of her DVT history in needed to be held for 48 hours prior to surgical intervention which prolonged her hospital stay  Patient underwent an acute cholecystectomy 10/20/2019 patient did have significant pain postprocedure however was noted to be significantly lethargic so it was recommended to avoid further opioid use be on the scheduled doses she already had  Patient's gabapentin was increased to 300 mg t i d  And patient showed gradual improvement of her pain  Patient did have a ANDREAS drain in place that was left there for 48 hours and removed on the day of discharge and patient was cleared from surgical perspective for discharge home  Patient should resume home nursing upon discharge was recommended further patient was agreeable patient also made aware that she could titrate back down offer gabapentin as her pain symptoms improved  Patient was given a script of gabapentin upon discharge and would follow up with her outpatient providers as scheduled    Of note patient was scheduled to start chemotherapy 10/23/2019 this was detained due to her need for acute intervention of her gallbladder and she will discuss with her gynecology oncology team on starting her chemotherapy  Please see above list of diagnoses and related plan for additional information  Condition at Discharge: stable     Discharge Day Visit / Exam:     Subjective:  Patient reports she does have pain however she was able to go get out of bed and go to the bathroom without difficulty reports the pain is much more tolerable since the gabapentin has been increased  Patient was questioning whether she would go home with the drain or not patient made aware that surgery would evaluate and possibly remove the drain today if she needed to go home with that that would be discussed at that time however question was surgery they are removing the drain and she is stable from their perspective for discharge home  Vitals: Blood Pressure: 116/59 (10/22/19 0654)  Pulse: 76 (10/22/19 0654)  Temperature: 98 9 °F (37 2 °C) (10/22/19 0654)  Temp Source: Temporal (10/20/19 1330)  Respirations: 18 (10/21/19 2335)  Height: 5' 4" (162 6 cm) (10/16/19 1411)  Weight - Scale: 68 9 kg (151 lb 14 4 oz) (10/16/19 1411)  SpO2: 97 % (10/22/19 0654)  Exam:   Physical Exam   Constitutional: She is oriented to person, place, and time  HENT:   Head: Normocephalic and atraumatic  Eyes: Conjunctivae and EOM are normal    Neck: Normal range of motion  Cardiovascular: Normal rate, regular rhythm and normal heart sounds  Pulmonary/Chest: Effort normal and breath sounds normal    Abdominal: Soft  Bowel sounds are normal    Minimal tenderness appreciated on exam   Musculoskeletal: Normal range of motion  Neurological: She is alert and oriented to person, place, and time  Skin: Skin is warm and dry  Midline dressing intact to surgical wound site  ANDREAS drain site dressing intact   Psychiatric: She has a normal mood and affect   Her behavior is normal            Discharge instructions/Information to patient and family:   See after visit summary for information provided to patient and family  Provisions for Follow-Up Care:  See after visit summary for information related to follow-up care and any pertinent home health orders  Disposition:     Home with VNA Services (Reminder: Complete face to face encounter)    For Discharges to [address] [facility]:   · Not Applicable to this Patient - Not Applicable to this Patient    Planned Readmission:  None     Discharge Statement:  I spent 40 minutes discharging the patient  This time was spent on the day of discharge  I had direct contact with the patient on the day of discharge  Greater than 50% of the total time was spent examining patient, answering all patient questions, arranging and discussing plan of care with patient as well as directly providing post-discharge instructions  Additional time then spent on discharge activities  Discharge Medications:  See after visit summary for reconciled discharge medications provided to patient and family        ** Please Note: This note has been constructed using a voice recognition system **

## 2019-10-22 NOTE — SOCIAL WORK
Late note: Pt for discharge home today per SLIM  IMM reviewed and signed by pt  Copy provided  Pt will return home with daughter transporting  Katy [address] 77 aware pt to be dischagred home today and will follow up for SN for wound care

## 2019-10-24 ENCOUNTER — TELEPHONE (OUTPATIENT)
Dept: SURGERY | Facility: CLINIC | Age: 69
End: 2019-10-24

## 2019-10-30 ENCOUNTER — HOSPITAL ENCOUNTER (OUTPATIENT)
Dept: INFUSION CENTER | Facility: CLINIC | Age: 69
Discharge: HOME | End: 2019-10-30

## 2019-11-04 DIAGNOSIS — C56.9 MALIGNANT NEOPLASM OF OVARY, UNSPECIFIED LATERALITY (HCC): ICD-10-CM

## 2019-11-04 RX ORDER — PALONOSETRON 0.05 MG/ML
0.25 INJECTION, SOLUTION INTRAVENOUS ONCE
Status: CANCELLED | OUTPATIENT
Start: 2019-11-06

## 2019-11-04 RX ORDER — SODIUM CHLORIDE 9 MG/ML
20 INJECTION, SOLUTION INTRAVENOUS ONCE
Status: CANCELLED | OUTPATIENT
Start: 2019-11-06

## 2019-11-06 ENCOUNTER — HOSPITAL ENCOUNTER (OUTPATIENT)
Dept: INFUSION CENTER | Facility: CLINIC | Age: 69
Discharge: HOME | End: 2019-11-06
Payer: MEDICARE

## 2019-11-06 VITALS
OXYGEN SATURATION: 96 % | RESPIRATION RATE: 22 BRPM | WEIGHT: 145.8 LBS | DIASTOLIC BLOOD PRESSURE: 63 MMHG | HEART RATE: 80 BPM | SYSTOLIC BLOOD PRESSURE: 137 MMHG | BODY MASS INDEX: 24.89 KG/M2 | TEMPERATURE: 98.5 F | HEIGHT: 64 IN

## 2019-11-06 DIAGNOSIS — C56.9 MALIGNANT NEOPLASM OF OVARY, UNSPECIFIED LATERALITY (HCC): Primary | ICD-10-CM

## 2019-11-06 DIAGNOSIS — C56.3 MALIGNANT NEOPLASM OF BOTH OVARIES (HCC): ICD-10-CM

## 2019-11-06 LAB
ALBUMIN SERPL BCP-MCNC: 3.3 G/DL (ref 3.5–5)
ALP SERPL-CCNC: 75 U/L (ref 46–116)
ALT SERPL W P-5'-P-CCNC: 19 U/L (ref 12–78)
ANION GAP SERPL CALCULATED.3IONS-SCNC: 7 MMOL/L (ref 4–13)
AST SERPL W P-5'-P-CCNC: 19 U/L (ref 5–45)
BASOPHILS # BLD AUTO: 0.06 THOUSANDS/ΜL (ref 0–0.1)
BASOPHILS NFR BLD AUTO: 1 % (ref 0–1)
BILIRUB SERPL-MCNC: 0.2 MG/DL (ref 0.2–1)
BUN SERPL-MCNC: 17 MG/DL (ref 5–25)
CALCIUM SERPL-MCNC: 9.5 MG/DL (ref 8.3–10.1)
CANCER AG125 SERPL-ACNC: 32.6 U/ML (ref 0–30)
CHLORIDE SERPL-SCNC: 106 MMOL/L (ref 100–108)
CO2 SERPL-SCNC: 28 MMOL/L (ref 21–32)
CREAT SERPL-MCNC: 1.08 MG/DL (ref 0.6–1.3)
EOSINOPHIL # BLD AUTO: 0.23 THOUSAND/ΜL (ref 0–0.61)
EOSINOPHIL NFR BLD AUTO: 3 % (ref 0–6)
ERYTHROCYTE [DISTWIDTH] IN BLOOD BY AUTOMATED COUNT: 13.5 % (ref 11.6–15.1)
GFR SERPL CREATININE-BSD FRML MDRD: 53 ML/MIN/1.73SQ M
GLUCOSE SERPL-MCNC: 119 MG/DL (ref 65–140)
HCT VFR BLD AUTO: 27.7 % (ref 34.8–46.1)
HGB BLD-MCNC: 8.8 G/DL (ref 11.5–15.4)
IMM GRANULOCYTES # BLD AUTO: 0.02 THOUSAND/UL (ref 0–0.2)
IMM GRANULOCYTES NFR BLD AUTO: 0 % (ref 0–2)
LYMPHOCYTES # BLD AUTO: 2.16 THOUSANDS/ΜL (ref 0.6–4.47)
LYMPHOCYTES NFR BLD AUTO: 31 % (ref 14–44)
MAGNESIUM SERPL-MCNC: 1.8 MG/DL (ref 1.6–2.6)
MCH RBC QN AUTO: 29.5 PG (ref 26.8–34.3)
MCHC RBC AUTO-ENTMCNC: 31.8 G/DL (ref 31.4–37.4)
MCV RBC AUTO: 93 FL (ref 82–98)
MONOCYTES # BLD AUTO: 0.5 THOUSAND/ΜL (ref 0.17–1.22)
MONOCYTES NFR BLD AUTO: 7 % (ref 4–12)
NEUTROPHILS # BLD AUTO: 4.08 THOUSANDS/ΜL (ref 1.85–7.62)
NEUTS SEG NFR BLD AUTO: 58 % (ref 43–75)
NRBC BLD AUTO-RTO: 0 /100 WBCS
PLATELET # BLD AUTO: 287 THOUSANDS/UL (ref 149–390)
PMV BLD AUTO: 11.6 FL (ref 8.9–12.7)
POTASSIUM SERPL-SCNC: 4.2 MMOL/L (ref 3.5–5.3)
PROT SERPL-MCNC: 7.4 G/DL (ref 6.4–8.2)
RBC # BLD AUTO: 2.98 MILLION/UL (ref 3.81–5.12)
SODIUM SERPL-SCNC: 141 MMOL/L (ref 136–145)
WBC # BLD AUTO: 7.05 THOUSAND/UL (ref 4.31–10.16)

## 2019-11-06 PROCEDURE — 96367 TX/PROPH/DG ADDL SEQ IV INF: CPT

## 2019-11-06 PROCEDURE — 86304 IMMUNOASSAY TUMOR CA 125: CPT

## 2019-11-06 PROCEDURE — 96413 CHEMO IV INFUSION 1 HR: CPT

## 2019-11-06 PROCEDURE — 96375 TX/PRO/DX INJ NEW DRUG ADDON: CPT

## 2019-11-06 PROCEDURE — 96417 CHEMO IV INFUS EACH ADDL SEQ: CPT

## 2019-11-06 PROCEDURE — 80053 COMPREHEN METABOLIC PANEL: CPT

## 2019-11-06 PROCEDURE — 85025 COMPLETE CBC W/AUTO DIFF WBC: CPT

## 2019-11-06 PROCEDURE — 83735 ASSAY OF MAGNESIUM: CPT

## 2019-11-06 RX ORDER — SODIUM CHLORIDE 9 MG/ML
20 INJECTION, SOLUTION INTRAVENOUS ONCE
Status: COMPLETED | OUTPATIENT
Start: 2019-11-06 | End: 2019-11-06

## 2019-11-06 RX ORDER — PALONOSETRON 0.05 MG/ML
0.25 INJECTION, SOLUTION INTRAVENOUS ONCE
Status: COMPLETED | OUTPATIENT
Start: 2019-11-06 | End: 2019-11-06

## 2019-11-06 RX ADMIN — PALONOSETRON 0.25 MG: 0.05 INJECTION, SOLUTION INTRAVENOUS at 11:29

## 2019-11-06 RX ADMIN — CARBOPLATIN 285.2 MG: 10 INJECTION, SOLUTION INTRAVENOUS at 13:28

## 2019-11-06 RX ADMIN — DEXAMETHASONE SODIUM PHOSPHATE 20 MG: 10 INJECTION, SOLUTION INTRAMUSCULAR; INTRAVENOUS at 10:02

## 2019-11-06 RX ADMIN — PACLITAXEL 111.6 MG: 6 INJECTION, SOLUTION INTRAVENOUS at 12:24

## 2019-11-06 RX ADMIN — SODIUM CHLORIDE 20 ML/HR: 0.9 INJECTION, SOLUTION INTRAVENOUS at 10:00

## 2019-11-06 RX ADMIN — DIPHENHYDRAMINE HYDROCHLORIDE 25 MG: 50 INJECTION, SOLUTION INTRAMUSCULAR; INTRAVENOUS at 10:30

## 2019-11-06 RX ADMIN — FAMOTIDINE 20 MG: 10 INJECTION, SOLUTION INTRAVENOUS at 11:02

## 2019-11-06 RX ADMIN — SODIUM CHLORIDE 150 MG: 0.9 INJECTION, SOLUTION INTRAVENOUS at 11:31

## 2019-11-06 NOTE — PROGRESS NOTES
Pt here today for chemo, port accessed stat central labs drawn and sent to lab, as per order ok to start premeds while waiting for labs to result,

## 2019-11-06 NOTE — PROGRESS NOTES
Pt tolerated infusion well and without incident, port flushed, avs given, patient d/c on stable condition

## 2019-11-06 NOTE — PLAN OF CARE
Problem: Potential for Falls  Goal: Patient will remain free of falls  Description  INTERVENTIONS:  - Assess patient frequently for physical needs  -  Identify cognitive and physical deficits and behaviors that affect risk of falls  -  [place] fall precautions as indicated by assessment   - Educate patient/family on patient safety including physical limitations  - Instruct patient to call for assistance with activity based on assessment  - Modify environment to reduce risk of injury  - Consider OT/PT consult to assist with strengthening/mobility  Outcome: Progressing     Problem: Knowledge Deficit  Goal: Patient/family/caregiver demonstrates understanding of disease process, treatment plan, medications, and discharge instructions  Description  Complete learning assessment and assess knowledge base    Interventions:  - Provide teaching at level of understanding  - Provide teaching via preferred learning methods  Outcome: Progressing

## 2019-11-06 NOTE — PROGRESS NOTES
Spoke with Andres Martin PA-C  Ordered doses of chemotherapy (Taxol 65 mg/m2 and Carboplatin AUC 4) are the correct  Dr Hatch Common note from 10/15/19 has the wrong doses noted  Pt is to proceed with treatment today as ordered, with Taxol 65 mg/m2 and Carboplatin AUC 4

## 2019-11-08 NOTE — DISCHARGE SUMMARY
Addendum to 10/22/19 discharge summary pathology report from 10/20/2019 were stating was sent for gallbladder which were compatible with patient's history of cholecystitis  Omental nodule sent with findings of metastatic carcinoma involving 1 lymph node  This is being documented in completeness of the discharge

## 2019-11-11 ENCOUNTER — OFFICE VISIT (OUTPATIENT)
Dept: SURGERY | Facility: CLINIC | Age: 69
End: 2019-11-11

## 2019-11-11 VITALS
HEIGHT: 63 IN | WEIGHT: 145 LBS | TEMPERATURE: 98.1 F | BODY MASS INDEX: 25.69 KG/M2 | DIASTOLIC BLOOD PRESSURE: 76 MMHG | SYSTOLIC BLOOD PRESSURE: 128 MMHG

## 2019-11-11 DIAGNOSIS — Z09 POSTOP CHECK: ICD-10-CM

## 2019-11-11 DIAGNOSIS — C56.3 MALIGNANT NEOPLASM OF BOTH OVARIES (HCC): ICD-10-CM

## 2019-11-11 DIAGNOSIS — K81.9 CHOLECYSTITIS: Primary | ICD-10-CM

## 2019-11-11 PROCEDURE — 99024 POSTOP FOLLOW-UP VISIT: CPT | Performed by: SURGERY

## 2019-11-11 RX ORDER — SODIUM CHLORIDE 9 MG/ML
20 INJECTION, SOLUTION INTRAVENOUS ONCE
Status: CANCELLED | OUTPATIENT
Start: 2019-11-12

## 2019-11-11 NOTE — PATIENT INSTRUCTIONS
Colace is an over the counter stool softener you can use for constipation if needed  Senna is a natural plant based over the counter laxative and also comes in a tea form

## 2019-11-11 NOTE — PROGRESS NOTES
Office Visit - General Surgery  Dedra Gudino MRN: [id number]  Encounter: [id number]    Assessment and Plan    Final Diagnosis   A  Gallbladder (cholecystectomy):     - Findings compatible with involvement by patient's known gynecologic adenocarcinoma  - Cholelithiasis noted      B  Attached omental nodule:     - Findings compatible with involvement by patient's known gynecologic adenocarcinoma  - Metastatic carcinoma involving one (1) lymph node  Problem List Items Addressed This Visit        Digestive    Cholecystitis - Primary       Endocrine    Ovarian cancer [facility])      Other Visit Diagnoses     Postop check            Id did share the results of her pathology with her  I also sent a message to her GYN oncologist to make him aware of the results as well  She is recovering very well  She reports feeling and does not have any signs or symptoms of complications  She may follow up as needed  Use Colace or senna for constipation  Chief Complaint:  Dedra Gudino is a 70 y o  female who presents for Post-op (lap genny)    Subjective    This carries a 67-year-old female who presents for postoperative exam after her open cholecystectomy  She had an open procedure due to the amount of adhesions from her prior surgeries to treat her ovarian cancer  In addition to cholecystitis her gallbladder specimen does show evidence of involvement by her known adenocarcinoma  She reports feeling well  She has a good appetite and is denying any jaundice fever or chills constipation or diarrhea  She does have some mild constipation      Past Medical History  Past Medical History:   Diagnosis Date    Abdominal fluid collection     Anemia     Asthma     [address] [address] )     ovaries    Diabetes mellitus ([facility] [address] )     History of transfusion     PONV (postoperative nausea and vomiting)        Past Surgical History  Past Surgical History:   Procedure Laterality Date    ABDOMINAL SURGERY      CHOLECYSTECTOMY N/A 10/20/2019    Procedure: CHOLECYSTECTOMY, open;  Surgeon: Cesia Haywood MD;  Location: [facility];  Service: General    CT GUIDED PARACENTESIS  11/6/2018    CT GUIDED PERC DRAINAGE CATHETER PLACEMENT  12/24/2018    CT NEEDLE BIOPSY PERITONEUM  11/6/2018    CYSTOSCOPY N/A 9/10/2019    Procedure: CYSTOSCOPY , INSERTION URETERAL CATHETERS;  Surgeon: Denis Simental MD;  [facility];  Service: Gynecology Oncology    ECTOPIC PREGNANCY SURGERY      ECTOPIC PREGNANCY SURGERY      EXENTERATION PELVIS N/A 9/10/2019    Procedure: EXPLORATORY LAPAROTOMY, EXENTERATION POSTERIOR PELVIS,  END COLOSTOMY, ILEOSTOMY REVERSAL, LYSIS OF ADHESIONS, rADICAL OMENTECTOMY AND TUMOR DEBULKINGFLEXIBLE SIGMOIDOSCOPY, CYSTOGRAM, INSPECTION OF URETERS;  Surgeon: Denis Simental MD;  [facility];  Service: Gynecology Oncology    IR PARACENTESIS  9/18/2018    IR PARACENTESIS  10/18/2018    IR PARACENTESIS  12/10/2018    IR PORT PLACEMENT  11/29/2018    IR THORACENTESIS  9/16/2019    LAPAROTOMY N/A 12/14/2018    Procedure: LAPAROTOMY EXPLORATORY; Abdominal Washout; Application of Abthera Vac Dressing;  Surgeon: Sheron Carvajal MD;  [facility];  Service: Gynecology Oncology    LAPAROTOMY N/A 12/15/2018    Procedure: LAPAROTOMY EXPLORATORY, ABDOMINAL WASHOUT, DRAIN PLACEMENT x 4, DIVERTING LOOP ILEOSTOMY AND ABDOMINAL CLOSURE,  ALL OTHER INDICATED PROCEDURES;  Surgeon: Sheron Carvajal MD;  [facility];  Service: Gynecology Oncology    [facility] N/A 9/25/2018    Procedure: EGD AND COLONOSCOPY;  Surgeon: Purnima Yao MD;  Location: [facility];   Service: Gastroenterology    TONSILECTOMY AND ADNOIDECTOMY      TONSILLECTOMY         Family History  Family History   Problem Relation Age of Onset    Cirrhosis Mother     Colon cancer Mother     Leukemia Cousin     Diabetes Father        Social History  Social History     Socioeconomic History    Marital status: /Civil Union     Spouse name: None    Number of children: None    Years of education: None    Highest education level: None   Occupational History    None   Social Needs    Financial resource strain: None    Food insecurity:     Worry: None     Inability: None    Transportation needs:     Medical: None     Non-medical: None   Tobacco Use    Smoking status: Former Smoker    Smokeless tobacco: Never Used    Tobacco comment: "Quit 40 yrs ago"   Substance and Sexual Activity    Alcohol use: Not Currently     Comment: occassionally    Drug use: No    Sexual activity: Not Currently   Lifestyle    Physical activity:     Days per week: None     Minutes per session: None    Stress: None   Relationships    Social connections:     Talks on phone: None     Gets together: None     Attends Cheondo[employer] service: None     Active member of club or organization: None     Attends meetings of clubs or organizations: None     Relationship status: None    Intimate partner violence:     Fear of current or ex partner: None     Emotionally abused: None     Physically abused: None     Forced sexual activity: None   Other Topics Concern    None   Social History Narrative    None        Medications  Current Outpatient Medications on File Prior to Visit   Medication Sig Dispense Refill    acetaminophen (TYLENOL) 325 mg tablet Take 2 tablets (650 mg total) by mouth every 6 (six) hours as needed for mild pain, headaches or fever 30 tablet 0    aspirin-acetaminophen-caffeine (EXCEDRIN MIGRAINE) 250-250-65 MG per tablet Take 1 tablet by mouth every 6 (six) hours as needed for headaches      gabapentin (NEURONTIN) 300 mg capsule Take 1 capsule (300 mg total) by mouth 3 (three) times a day 90 capsule 0    pantoprazole (PROTONIX) 40 mg tablet Take 1 tablet (40 mg total) by mouth 2 (two) times a day before meals  0    apixaban (ELIQUIS) 2 5 mg Take 1 tablet (2 5 mg total) by mouth 2 (two) times a day 60 tablet 0    lidocaine-prilocaine (EMLA) cream Apply to port site prior to labs/chemo (Patient not taking: Reported on 10/15/2019) 30 g 1    potassium chloride (K-DUR,KLOR-CON) 20 mEq tablet Take 1 tablet (20 mEq total) by mouth daily (Patient not taking: Reported on 10/15/2019) 30 tablet 0     No current facility-administered medications on file prior to visit  Allergies  No Known Allergies    Review of Systems    Objective  Vitals:    11/11/19 0837   BP: 128/76   Temp: 98 1 °F (36 7 °C)       Physical Exam   Constitutional: She is oriented to person, place, and time  She appears well-developed and well-nourished  No distress  HENT:   Head: Normocephalic and atraumatic  Eyes: Pupils are equal, round, and reactive to light  EOM are normal    Neck: Normal range of motion  Neck supple  Cardiovascular: Normal rate and regular rhythm  Pulmonary/Chest: Effort normal and breath sounds normal    Abdominal: Soft  She exhibits no distension  There is no tenderness  There is no guarding  The right upper quadrant incision is healing well  No erythema or drainage  Musculoskeletal: Normal range of motion  Neurological: She is alert and oriented to person, place, and time  Skin: Skin is warm and dry  She is not diaphoretic  She has a chronic slight palor   Psychiatric: She has a normal mood and affect  Nursing note and vitals reviewed

## 2019-11-12 ENCOUNTER — HOSPITAL ENCOUNTER (OUTPATIENT)
Dept: INFUSION CENTER | Facility: CLINIC | Age: 69
Discharge: HOME | End: 2019-11-12
Payer: MEDICARE

## 2019-11-12 VITALS
RESPIRATION RATE: 18 BRPM | WEIGHT: 146.61 LBS | HEIGHT: 64 IN | DIASTOLIC BLOOD PRESSURE: 60 MMHG | SYSTOLIC BLOOD PRESSURE: 128 MMHG | HEART RATE: 77 BPM | TEMPERATURE: 97.7 F | BODY MASS INDEX: 25.03 KG/M2

## 2019-11-12 DIAGNOSIS — C56.9 MALIGNANT NEOPLASM OF OVARY, UNSPECIFIED LATERALITY (HCC): Primary | ICD-10-CM

## 2019-11-12 DIAGNOSIS — D64.9 ANEMIA, UNSPECIFIED TYPE: ICD-10-CM

## 2019-11-12 LAB
BASOPHILS # BLD AUTO: 0.04 THOUSANDS/ΜL (ref 0–0.1)
BASOPHILS NFR BLD AUTO: 1 % (ref 0–1)
EOSINOPHIL # BLD AUTO: 0.4 THOUSAND/ΜL (ref 0–0.61)
EOSINOPHIL NFR BLD AUTO: 8 % (ref 0–6)
ERYTHROCYTE [DISTWIDTH] IN BLOOD BY AUTOMATED COUNT: 13.2 % (ref 11.6–15.1)
HCT VFR BLD AUTO: 25.5 % (ref 34.8–46.1)
HGB BLD-MCNC: 8.1 G/DL (ref 11.5–15.4)
IMM GRANULOCYTES # BLD AUTO: 0.02 THOUSAND/UL (ref 0–0.2)
IMM GRANULOCYTES NFR BLD AUTO: 0 % (ref 0–2)
LYMPHOCYTES # BLD AUTO: 1.96 THOUSANDS/ΜL (ref 0.6–4.47)
LYMPHOCYTES NFR BLD AUTO: 38 % (ref 14–44)
MCH RBC QN AUTO: 29.9 PG (ref 26.8–34.3)
MCHC RBC AUTO-ENTMCNC: 31.8 G/DL (ref 31.4–37.4)
MCV RBC AUTO: 94 FL (ref 82–98)
MONOCYTES # BLD AUTO: 0.23 THOUSAND/ΜL (ref 0.17–1.22)
MONOCYTES NFR BLD AUTO: 4 % (ref 4–12)
NEUTROPHILS # BLD AUTO: 2.56 THOUSANDS/ΜL (ref 1.85–7.62)
NEUTS SEG NFR BLD AUTO: 49 % (ref 43–75)
NRBC BLD AUTO-RTO: 0 /100 WBCS
PLATELET # BLD AUTO: 206 THOUSANDS/UL (ref 149–390)
PMV BLD AUTO: 12.2 FL (ref 8.9–12.7)
RBC # BLD AUTO: 2.71 MILLION/UL (ref 3.81–5.12)
WBC # BLD AUTO: 5.21 THOUSAND/UL (ref 4.31–10.16)

## 2019-11-12 PROCEDURE — 96413 CHEMO IV INFUSION 1 HR: CPT

## 2019-11-12 PROCEDURE — 85025 COMPLETE CBC W/AUTO DIFF WBC: CPT | Performed by: OBSTETRICS & GYNECOLOGY

## 2019-11-12 PROCEDURE — 96367 TX/PROPH/DG ADDL SEQ IV INF: CPT

## 2019-11-12 RX ORDER — SODIUM CHLORIDE 9 MG/ML
20 INJECTION, SOLUTION INTRAVENOUS ONCE
Status: COMPLETED | OUTPATIENT
Start: 2019-11-12 | End: 2019-11-12

## 2019-11-12 RX ADMIN — FAMOTIDINE 20 MG: 10 INJECTION, SOLUTION INTRAVENOUS at 13:52

## 2019-11-12 RX ADMIN — DEXAMETHASONE SODIUM PHOSPHATE 10 MG: 10 INJECTION, SOLUTION INTRAMUSCULAR; INTRAVENOUS at 13:32

## 2019-11-12 RX ADMIN — PACLITAXEL 111.6 MG: 6 INJECTION, SOLUTION INTRAVENOUS at 14:40

## 2019-11-12 RX ADMIN — SODIUM CHLORIDE 20 ML/HR: 0.9 INJECTION, SOLUTION INTRAVENOUS at 13:32

## 2019-11-12 RX ADMIN — DIPHENHYDRAMINE HYDROCHLORIDE 25 MG: 50 INJECTION, SOLUTION INTRAMUSCULAR; INTRAVENOUS at 14:12

## 2019-11-12 NOTE — PLAN OF CARE
Problem: Knowledge Deficit  Goal: Patient/family/caregiver demonstrates understanding of disease process, treatment plan, medications, and discharge instructions  Description  Complete learning assessment and assess knowledge base  Interventions:  - Provide teaching at level of understanding  - Provide teaching via preferred learning methods  11/12/2019 1457 by Mica Ramos RN  Outcome: Progressing  11/12/2019 1338 by Mica Ramos RN  Outcome: Progressing     Problem: SAFETY ADULT  Goal: Patient will remain free of falls  Description  INTERVENTIONS:  - Assess patient frequently for physical needs  -  Identify cognitive and physical deficits and behaviors that affect risk of falls    -  [employer] fall precautions as indicated by assessment   - Educate patient/family on patient safety including physical limitations  - Instruct patient to call for assistance with activity based on assessment  - Modify environment to reduce risk of injury  - Consider OT/PT consult to assist with strengthening/mobility  Outcome: Progressing

## 2019-11-18 RX ORDER — SODIUM CHLORIDE 9 MG/ML
20 INJECTION, SOLUTION INTRAVENOUS ONCE
Status: CANCELLED | OUTPATIENT
Start: 2019-11-19

## 2019-11-19 ENCOUNTER — HOSPITAL ENCOUNTER (OUTPATIENT)
Dept: INFUSION CENTER | Facility: CLINIC | Age: 69
Discharge: HOME | End: 2019-11-19
Payer: MEDICARE

## 2019-11-19 ENCOUNTER — APPOINTMENT (OUTPATIENT)
Dept: LAB | Facility: HOSPITAL | Age: 69
End: 2019-11-19
Attending: OBSTETRICS & GYNECOLOGY
Payer: MEDICARE

## 2019-11-19 VITALS
HEIGHT: 64 IN | DIASTOLIC BLOOD PRESSURE: 67 MMHG | SYSTOLIC BLOOD PRESSURE: 150 MMHG | HEART RATE: 92 BPM | TEMPERATURE: 97.7 F | WEIGHT: 148.59 LBS | RESPIRATION RATE: 18 BRPM | BODY MASS INDEX: 25.37 KG/M2

## 2019-11-19 DIAGNOSIS — C56.9 MALIGNANT NEOPLASM OF OVARY, UNSPECIFIED LATERALITY (HCC): Primary | ICD-10-CM

## 2019-11-19 DIAGNOSIS — C56.9 MALIGNANT NEOPLASM OF OVARY, UNSPECIFIED LATERALITY (HCC): ICD-10-CM

## 2019-11-19 LAB
ALBUMIN SERPL BCP-MCNC: 3.5 G/DL (ref 3.5–5)
ALP SERPL-CCNC: 86 U/L (ref 46–116)
ALT SERPL W P-5'-P-CCNC: 17 U/L (ref 12–78)
ANION GAP SERPL CALCULATED.3IONS-SCNC: 12 MMOL/L (ref 4–13)
AST SERPL W P-5'-P-CCNC: 13 U/L (ref 5–45)
BASOPHILS # BLD AUTO: 0.06 THOUSANDS/ΜL (ref 0–0.1)
BASOPHILS NFR BLD AUTO: 1 % (ref 0–1)
BILIRUB SERPL-MCNC: 0.2 MG/DL (ref 0.2–1)
BUN SERPL-MCNC: 20 MG/DL (ref 5–25)
CALCIUM SERPL-MCNC: 9.7 MG/DL (ref 8.3–10.1)
CHLORIDE SERPL-SCNC: 105 MMOL/L (ref 100–108)
CO2 SERPL-SCNC: 26 MMOL/L (ref 21–32)
CREAT SERPL-MCNC: 1.07 MG/DL (ref 0.6–1.3)
EOSINOPHIL # BLD AUTO: 0.11 THOUSAND/ΜL (ref 0–0.61)
EOSINOPHIL NFR BLD AUTO: 2 % (ref 0–6)
ERYTHROCYTE [DISTWIDTH] IN BLOOD BY AUTOMATED COUNT: 13.5 % (ref 11.6–15.1)
GFR SERPL CREATININE-BSD FRML MDRD: 53 ML/MIN/1.73SQ M
GLUCOSE P FAST SERPL-MCNC: 142 MG/DL (ref 65–99)
HCT VFR BLD AUTO: 26.7 % (ref 34.8–46.1)
HGB BLD-MCNC: 8.4 G/DL (ref 11.5–15.4)
IMM GRANULOCYTES # BLD AUTO: 0.02 THOUSAND/UL (ref 0–0.2)
IMM GRANULOCYTES NFR BLD AUTO: 0 % (ref 0–2)
LYMPHOCYTES # BLD AUTO: 1.66 THOUSANDS/ΜL (ref 0.6–4.47)
LYMPHOCYTES NFR BLD AUTO: 24 % (ref 14–44)
MAGNESIUM SERPL-MCNC: 1.8 MG/DL (ref 1.6–2.6)
MCH RBC QN AUTO: 29.2 PG (ref 26.8–34.3)
MCHC RBC AUTO-ENTMCNC: 31.5 G/DL (ref 31.4–37.4)
MCV RBC AUTO: 93 FL (ref 82–98)
MONOCYTES # BLD AUTO: 0.76 THOUSAND/ΜL (ref 0.17–1.22)
MONOCYTES NFR BLD AUTO: 11 % (ref 4–12)
NEUTROPHILS # BLD AUTO: 4.23 THOUSANDS/ΜL (ref 1.85–7.62)
NEUTS SEG NFR BLD AUTO: 62 % (ref 43–75)
NRBC BLD AUTO-RTO: 0 /100 WBCS
PLATELET # BLD AUTO: 297 THOUSANDS/UL (ref 149–390)
PMV BLD AUTO: 11 FL (ref 8.9–12.7)
POTASSIUM SERPL-SCNC: 4.2 MMOL/L (ref 3.5–5.3)
PROT SERPL-MCNC: 7.8 G/DL (ref 6.4–8.2)
RBC # BLD AUTO: 2.88 MILLION/UL (ref 3.81–5.12)
SODIUM SERPL-SCNC: 143 MMOL/L (ref 136–145)
WBC # BLD AUTO: 6.84 THOUSAND/UL (ref 4.31–10.16)

## 2019-11-19 PROCEDURE — 85025 COMPLETE CBC W/AUTO DIFF WBC: CPT | Performed by: PHYSICIAN ASSISTANT

## 2019-11-19 PROCEDURE — 96413 CHEMO IV INFUSION 1 HR: CPT

## 2019-11-19 PROCEDURE — 83735 ASSAY OF MAGNESIUM: CPT | Performed by: PHYSICIAN ASSISTANT

## 2019-11-19 PROCEDURE — 80053 COMPREHEN METABOLIC PANEL: CPT | Performed by: PHYSICIAN ASSISTANT

## 2019-11-19 PROCEDURE — 96367 TX/PROPH/DG ADDL SEQ IV INF: CPT

## 2019-11-19 RX ORDER — SODIUM CHLORIDE 9 MG/ML
20 INJECTION, SOLUTION INTRAVENOUS ONCE
Status: COMPLETED | OUTPATIENT
Start: 2019-11-19 | End: 2019-11-19

## 2019-11-19 RX ADMIN — FAMOTIDINE 20 MG: 10 INJECTION, SOLUTION INTRAVENOUS at 15:15

## 2019-11-19 RX ADMIN — PACLITAXEL 111.6 MG: 6 INJECTION, SOLUTION INTRAVENOUS at 15:40

## 2019-11-19 RX ADMIN — DEXAMETHASONE SODIUM PHOSPHATE 10 MG: 10 INJECTION, SOLUTION INTRAMUSCULAR; INTRAVENOUS at 14:32

## 2019-11-19 RX ADMIN — SODIUM CHLORIDE 20 ML/HR: 0.9 INJECTION, SOLUTION INTRAVENOUS at 14:32

## 2019-11-19 RX ADMIN — DIPHENHYDRAMINE HYDROCHLORIDE 25 MG: 50 INJECTION, SOLUTION INTRAMUSCULAR; INTRAVENOUS at 14:57

## 2019-11-19 NOTE — PLAN OF CARE
Problem: Potential for Falls  Goal: Patient will remain free of falls  Description  INTERVENTIONS:  - Assess patient frequently for physical needs  -  Identify cognitive and physical deficits and behaviors that affect risk of falls    -  [place] fall precautions as indicated by assessment   - Educate patient/family on patient safety including physical limitations  - Instruct patient to call for assistance with activity based on assessment  - Modify environment to reduce risk of injury  - Consider OT/PT consult to assist with strengthening/mobility  Outcome: Progressing

## 2019-11-22 ENCOUNTER — OFFICE VISIT (OUTPATIENT)
Dept: GYNECOLOGIC ONCOLOGY | Facility: CLINIC | Age: 69
End: 2019-11-22

## 2019-11-22 VITALS
SYSTOLIC BLOOD PRESSURE: 150 MMHG | DIASTOLIC BLOOD PRESSURE: 82 MMHG | HEART RATE: 91 BPM | WEIGHT: 149.5 LBS | TEMPERATURE: 99 F | HEIGHT: 63 IN | BODY MASS INDEX: 26.49 KG/M2

## 2019-11-22 DIAGNOSIS — L92.9 GRANULATION TISSUE: Primary | ICD-10-CM

## 2019-11-22 PROCEDURE — 99024 POSTOP FOLLOW-UP VISIT: CPT | Performed by: OBSTETRICS & GYNECOLOGY

## 2019-11-22 NOTE — ASSESSMENT & PLAN NOTE
Patient has some granulation tissue at the umbilical portion of her wound  This was treated with silver nitrate and she is presently doing well    She will follow up later this week for repeat evaluation

## 2019-11-22 NOTE — PROGRESS NOTES
Assessment/Plan:    Problem List Items Addressed This Visit        Other    Granulation tissue - Primary     Patient has some granulation tissue at the umbilical portion of her wound  This was treated with silver nitrate and she is presently doing well  She will follow up later this week for repeat evaluation                 CHIEF COMPLAINT:  Wound check      Problem:  Cancer Staging  Ovarian cancer [facility])  Staging form: Ovary, Fallopian Tube, Primary Peritoneal, AJCC 8th Edition  - Clinical stage from 11/14/2018: FIGO Stage IVB (cT3c, cN0, pM1b) - Signed by Sun Butler MD on 11/14/2018  - Pathologic stage from 10/9/2019: FIGO Stage IIIC (pT3c, pN1, cM0) - Signed by Sun Butler MD on 10/9/2019        Previous therapy:     Ovarian cancer ([facility] [address] )    11/9/2018 Initial Diagnosis     Ovarian cancer ([facility] [address] )   tumor marker 194 5      11/9/2018 Biopsy     CT-guided needle biopsy of abdominal mass 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  Given liver involvement this would be stage IV      11/14/2018 - 12/4/2018 Chemotherapy     Neoadjuvant therapy: carboplatin area under the curve of 6, paclitaxel 135 milligrams/meter squared, Avastin 10 milligrams/kilogram  Completed 1 of 3 cycles  12/14/2018 Surgery     LAPAROTOMY EXPLORATORY; Abdominal Washout; Application of Abthera Vac Dressing for suspected bowel perforation and septic shock         12/15/2018 Surgery     LAPAROTOMY EXPLORATORY, ABDOMINAL WASHOUT, DRAIN PLACEMENT x 4, DIVERTING LOOP ILEOSTOMY AND ABDOMINAL CLOSURE for bowel perforation      3/26/2019 -  Chemotherapy     Taxol weekly 80 mg/m2 for 3 consecutive weeks, may add carboplatin in the future with AUC of 5       3/26/2019 -  Chemotherapy     palonosetron (ALOXI) injection 0 25 mg, 0 25 mg, Intravenous, Once, 4 of 6 cycles  Administration: 0 25 mg (5/28/2019), 0 25 mg (6/25/2019), 0 25 mg (11/6/2019)  fosaprepitant (EMEND) 150 mg in sodium chloride 0 9 % 255 mL IVPB, 150 mg, Intravenous, Once, 4 of 6 cycles  Administration: 150 mg (5/28/2019), 150 mg (6/25/2019), 150 mg (11/6/2019)  CARBOplatin (PARAPLATIN) in sodium chloride 0 9 % 250 mL IVPB,  (original dose ), Intravenous, Once, 4 of 6 cycles  Dose modification:   (Cycle 2),   (original dose 258 4 mg, Cycle 3),   (original dose 258 4 mg, Cycle 3),   (original dose 244 4 mg, Cycle 4)  Administration: 258 4 mg (5/28/2019), 244 4 mg (6/25/2019), 285 2 mg (11/6/2019)  PACLitaxel (TAXOL) 127 8 mg in sodium chloride 0 9 % 250 mL chemo IVPB, 80 mg/m2, Intravenous, Once, 5 of 7 cycles  Dose modification: 65 mg/m2 (original dose 80 mg/m2, Cycle 2, Reason: Dose Not Tolerated)  Administration: 103 8 mg (5/14/2019), 108 6 mg (5/28/2019), 108 6 mg (6/4/2019), 108 6 mg (6/11/2019), 109 8 mg (6/25/2019), 109 8 mg (7/2/2019), 109 8 mg (7/9/2019), 111 6 mg (11/6/2019), 111 6 mg (11/12/2019), 111 6 mg (11/19/2019)      4/29/2019 Genomic Testing      Foundation 1 testing revealed a PD L1 tumor proportions score of 0%  The patient is not a candidate for PD L1 manipulation       Genetic Testing     [employer] Breast/Gyn panel, wildtype  9/10/2019 Surgery     Exploratory laparotomy radical omentectomy, posterior exenteration, takedown of ileostomy and end colostomy for complete debulking of visible tumor      Final pathology report revealed high-grade serous malignancy in both the omentum and the pelvic mass      9/25/2019 -  Chemotherapy     Carboplatin area under the curve of 5+ weekly paclitaxel at 80 milligrams/meters squared for 3 further cycles of treatment followed by consolidation this is to begin mid October      10/9/2019 -  Cancer Staged     Staging form: Ovary, Fallopian Tube, Primary Peritoneal, AJCC 8th Edition  - Pathologic stage from 10/9/2019: FIGO Stage IIIC (pT3c, pN1, cM0) - Signed by Tina Dailey MD on 10/9/2019  Histologic grade (G): G3  Histologic grading system: 4 grade system  Gross residual tumor after primary cyto-reductive surgery: Absent             Patient ID: Elina Duncan is a 70 y o  female  Patient is very pleasant 70-year-old white female who has a history of stage IV ovarian cancer  She recently underwent debulking followed by cholecystectomy  Her periumbilical incision is having some intermittent bleeding and drainage  She has no significant pain her bowel and bladder function normally she is otherwise doing well  The following portions of the patient's history were reviewed and updated as appropriate: allergies, current medications, past family history, past medical history, past surgical history and problem list     Review of Systems   Constitutional: Negative  HENT: Negative  Eyes: Negative  Respiratory: Negative  Cardiovascular: Negative  Gastrointestinal: Negative  Endocrine: Negative  Genitourinary: Negative  Musculoskeletal: Negative  Skin: Negative  Neurological: Negative  Hematological: Negative  Psychiatric/Behavioral: Negative          Current Outpatient Medications   Medication Sig Dispense Refill    aspirin-acetaminophen-caffeine (EXCEDRIN MIGRAINE) 250-250-65 MG per tablet Take 1 tablet by mouth every 6 (six) hours as needed for headaches      pantoprazole (PROTONIX) 40 mg tablet Take 1 tablet (40 mg total) by mouth 2 (two) times a day before meals  0    acetaminophen (TYLENOL) 325 mg tablet Take 2 tablets (650 mg total) by mouth every 6 (six) hours as needed for mild pain, headaches or fever (Patient not taking: Reported on 11/22/2019) 30 tablet 0    gabapentin (NEURONTIN) 300 mg capsule Take 1 capsule (300 mg total) by mouth 3 (three) times a day 90 capsule 0    lidocaine-prilocaine (EMLA) cream Apply to port site prior to labs/chemo (Patient not taking: Reported on 10/15/2019) 30 g 1    potassium chloride (K-DUR,KLOR-CON) 20 mEq tablet Take 1 tablet (20 mEq total) by mouth daily 30 tablet 0     No current facility-administered medications for this visit  Objective:    Blood pressure 150/82, pulse 91, temperature 99 °F (37 2 °C), temperature source Oral, height 5' 3" (1 6 m), weight 67 8 kg (149 lb 8 oz), not currently breastfeeding  Body mass index is 26 48 kg/m²  Body surface area is 1 71 meters squared      Physical Exam   Abdominal:   Midline incision with granulation tissue in the periumbilical area which was treated with silver nitrate       Lab Results   Component Value Date     22 0 11/19/2019     Lab Results   Component Value Date    K 4 2 11/19/2019     11/19/2019    CO2 26 11/19/2019    BUN 20 11/19/2019    CREATININE 1 07 11/19/2019    GLUCOSE 228 (H) 09/10/2019    GLUF 142 (H) 11/19/2019    CALCIUM 9 7 11/19/2019    AST 13 11/19/2019    ALT 17 11/19/2019    ALKPHOS 86 11/19/2019    EGFR 53 11/19/2019     Lab Results   Component Value Date    WBC 6 84 11/19/2019    HGB 8 4 (L) 11/19/2019    HCT 26 7 (L) 11/19/2019    MCV 93 11/19/2019     11/19/2019     Lab Results   Component Value Date    NEUTROABS 4 23 11/19/2019

## 2019-11-26 ENCOUNTER — HOSPITAL ENCOUNTER (OUTPATIENT)
Dept: INFUSION CENTER | Facility: CLINIC | Age: 69
Discharge: HOME | End: 2019-11-26
Payer: MEDICARE

## 2019-11-26 DIAGNOSIS — Z45.2 ENCOUNTER FOR CENTRAL LINE CARE: ICD-10-CM

## 2019-11-26 DIAGNOSIS — C56.9 MALIGNANT NEOPLASM OF OVARY, UNSPECIFIED LATERALITY (HCC): ICD-10-CM

## 2019-11-26 DIAGNOSIS — C56.3 MALIGNANT NEOPLASM OF BOTH OVARIES (HCC): Primary | ICD-10-CM

## 2019-11-26 LAB
ALBUMIN SERPL BCP-MCNC: 3.1 G/DL (ref 3.5–5)
ALP SERPL-CCNC: 84 U/L (ref 46–116)
ALT SERPL W P-5'-P-CCNC: 15 U/L (ref 12–78)
ANION GAP SERPL CALCULATED.3IONS-SCNC: 10 MMOL/L (ref 4–13)
AST SERPL W P-5'-P-CCNC: 13 U/L (ref 5–45)
BASOPHILS # BLD AUTO: 0.06 THOUSANDS/ΜL (ref 0–0.1)
BASOPHILS NFR BLD AUTO: 1 % (ref 0–1)
BILIRUB SERPL-MCNC: 0.2 MG/DL (ref 0.2–1)
BUN SERPL-MCNC: 18 MG/DL (ref 5–25)
CALCIUM SERPL-MCNC: 9.2 MG/DL (ref 8.3–10.1)
CHLORIDE SERPL-SCNC: 106 MMOL/L (ref 100–108)
CO2 SERPL-SCNC: 27 MMOL/L (ref 21–32)
CREAT SERPL-MCNC: 1.03 MG/DL (ref 0.6–1.3)
EOSINOPHIL # BLD AUTO: 0.11 THOUSAND/ΜL (ref 0–0.61)
EOSINOPHIL NFR BLD AUTO: 2 % (ref 0–6)
ERYTHROCYTE [DISTWIDTH] IN BLOOD BY AUTOMATED COUNT: 13.7 % (ref 11.6–15.1)
GFR SERPL CREATININE-BSD FRML MDRD: 56 ML/MIN/1.73SQ M
GLUCOSE SERPL-MCNC: 135 MG/DL (ref 65–140)
HCT VFR BLD AUTO: 24.9 % (ref 34.8–46.1)
HGB BLD-MCNC: 7.9 G/DL (ref 11.5–15.4)
IMM GRANULOCYTES # BLD AUTO: 0.02 THOUSAND/UL (ref 0–0.2)
IMM GRANULOCYTES NFR BLD AUTO: 0 % (ref 0–2)
LYMPHOCYTES # BLD AUTO: 1.74 THOUSANDS/ΜL (ref 0.6–4.47)
LYMPHOCYTES NFR BLD AUTO: 32 % (ref 14–44)
MAGNESIUM SERPL-MCNC: 1.7 MG/DL (ref 1.6–2.6)
MCH RBC QN AUTO: 29.7 PG (ref 26.8–34.3)
MCHC RBC AUTO-ENTMCNC: 31.7 G/DL (ref 31.4–37.4)
MCV RBC AUTO: 94 FL (ref 82–98)
MONOCYTES # BLD AUTO: 0.44 THOUSAND/ΜL (ref 0.17–1.22)
MONOCYTES NFR BLD AUTO: 8 % (ref 4–12)
NEUTROPHILS # BLD AUTO: 3.02 THOUSANDS/ΜL (ref 1.85–7.62)
NEUTS SEG NFR BLD AUTO: 57 % (ref 43–75)
NRBC BLD AUTO-RTO: 0 /100 WBCS
PLATELET # BLD AUTO: 340 THOUSANDS/UL (ref 149–390)
PMV BLD AUTO: 11.2 FL (ref 8.9–12.7)
POTASSIUM SERPL-SCNC: 4.1 MMOL/L (ref 3.5–5.3)
PROT SERPL-MCNC: 7 G/DL (ref 6.4–8.2)
RBC # BLD AUTO: 2.66 MILLION/UL (ref 3.81–5.12)
SODIUM SERPL-SCNC: 143 MMOL/L (ref 136–145)
WBC # BLD AUTO: 5.39 THOUSAND/UL (ref 4.31–10.16)

## 2019-11-26 PROCEDURE — 83735 ASSAY OF MAGNESIUM: CPT

## 2019-11-26 PROCEDURE — 80053 COMPREHEN METABOLIC PANEL: CPT

## 2019-11-26 PROCEDURE — 85025 COMPLETE CBC W/AUTO DIFF WBC: CPT

## 2019-11-26 RX ORDER — SODIUM CHLORIDE 9 MG/ML
20 INJECTION, SOLUTION INTRAVENOUS ONCE
Status: CANCELLED | OUTPATIENT
Start: 2019-12-03

## 2019-11-26 NOTE — PROGRESS NOTES
Pt tolerated lab drawn from port today with no adverse reactions  Left unit ambulatory with a steady gait

## 2019-11-26 NOTE — PLAN OF CARE
Problem: Potential for Falls  Goal: Patient will remain free of falls  Description  INTERVENTIONS:  - Assess patient frequently for physical needs  -  Identify cognitive and physical deficits and behaviors that affect risk of falls  -  [place] fall precautions as indicated by assessment   - Educate patient/family on patient safety including physical limitations  - Instruct patient to call for assistance with activity based on assessment  - Modify environment to reduce risk of injury  - Consider OT/PT consult to assist with strengthening/mobility  Outcome: Progressing     Problem: Knowledge Deficit  Goal: Patient/family/caregiver demonstrates understanding of disease process, treatment plan, medications, and discharge instructions  Description  Complete learning assessment and assess knowledge base    Interventions:  - Provide teaching at level of understanding  - Provide teaching via preferred learning methods  Outcome: Progressing

## 2019-11-27 ENCOUNTER — OFFICE VISIT (OUTPATIENT)
Dept: GYNECOLOGIC ONCOLOGY | Facility: CLINIC | Age: 69
End: 2019-11-27
Payer: MEDICARE

## 2019-11-27 ENCOUNTER — HOSPITAL ENCOUNTER (OUTPATIENT)
Dept: INFUSION CENTER | Facility: CLINIC | Age: 69
Discharge: HOME | End: 2019-11-27

## 2019-11-27 VITALS
WEIGHT: 156.5 LBS | HEART RATE: 98 BPM | HEIGHT: 63 IN | BODY MASS INDEX: 27.73 KG/M2 | DIASTOLIC BLOOD PRESSURE: 62 MMHG | TEMPERATURE: 97.7 F | SYSTOLIC BLOOD PRESSURE: 124 MMHG | RESPIRATION RATE: 16 BRPM

## 2019-11-27 DIAGNOSIS — C56.9 MALIGNANT NEOPLASM OF OVARY, UNSPECIFIED LATERALITY (HCC): ICD-10-CM

## 2019-11-27 DIAGNOSIS — C56.3 MALIGNANT NEOPLASM OF BOTH OVARIES (HCC): Primary | ICD-10-CM

## 2019-11-27 PROCEDURE — 99214 OFFICE O/P EST MOD 30 MIN: CPT | Performed by: OBSTETRICS & GYNECOLOGY

## 2019-11-27 NOTE — LETTER
November 27, 2019     Estevan Guerrier MD  Attn: NONA Mabry   [address] [address]    Patient: Hector Castillo   YOB: 1950   Date of Visit: 11/27/2019       Dear Dr Rita Hoff: Thank you for referring Hector Castillo to me for evaluation  Below are my notes for this consultation  If you have questions, please do not hesitate to call me  I look forward to following your patient along with you  Sincerely,        Stacey Cabrera MD        CC: No Recipients  Stacey Cabrera MD  11/27/2019 10:40 AM  Sign at close encounter  Assessment/Plan:    Problem List Items Addressed This Visit        Endocrine    Ovarian cancer ([facility] [address] ) - Primary     Overall the patient continues to do well  We will plan to further cycles of carboplatin and weekly Taxol  The patient's chemo was somewhat delayed due to her anemia  She is due to have her her transfusion performed next week  She was unable to do so today because of family obligations  Once she gets her transfusion she will reinitiate her chemotherapy and plans 2 further cycles  At that point we can consider takedown of her stoma  It is likely that she will require consolidation treatment  She is not a candidate for a Avastin however a part inhibitor may be reasonable alternative, despite the fact that her tumor has no genomic signature indicative of response  Recent data shows that this may be reasonable alternative                   CHIEF COMPLAINT:  Re-initiation of chemotherapy status post interval debulking      Problem:  Cancer Staging  Ovarian cancer [facility])  Staging form: Ovary, Fallopian Tube, Primary Peritoneal, AJCC 8th Edition  - Clinical stage from 11/14/2018: FIGO Stage IVB (cT3c, cN0, pM1b) - Signed by Stacey Cabrera MD on 11/14/2018  - Pathologic stage from 10/9/2019: FIGO Stage IIIC (pT3c, pN1, cM0) - Signed by Stacey Cabrera MD on 10/9/2019        Previous therapy:     Ovarian cancer ([facility] [address] )    11/9/2018 Initial Diagnosis     Ovarian cancer ([facility] [address] )   tumor marker 194 5      11/9/2018 Biopsy     CT-guided needle biopsy of abdominal mass 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  Given liver involvement this would be stage IV      11/14/2018 - 12/4/2018 Chemotherapy     Neoadjuvant therapy: carboplatin area under the curve of 6, paclitaxel 135 milligrams/meter squared, Avastin 10 milligrams/kilogram  Completed 1 of 3 cycles  12/14/2018 Surgery     LAPAROTOMY EXPLORATORY; Abdominal Washout; Application of Abthera Vac Dressing for suspected bowel perforation and septic shock         12/15/2018 Surgery     LAPAROTOMY EXPLORATORY, ABDOMINAL WASHOUT, DRAIN PLACEMENT x 4, DIVERTING LOOP ILEOSTOMY AND ABDOMINAL CLOSURE for bowel perforation      3/26/2019 -  Chemotherapy     Taxol weekly 80 mg/m2 for 3 consecutive weeks, may add carboplatin in the future with AUC of 5       3/26/2019 -  Chemotherapy     palonosetron (ALOXI) injection 0 25 mg, 0 25 mg, Intravenous, Once, 5 of 6 cycles  Administration: 0 25 mg (5/28/2019), 0 25 mg (6/25/2019), 0 25 mg (11/6/2019)  fosaprepitant (EMEND) 150 mg in sodium chloride 0 9 % 255 mL IVPB, 150 mg, Intravenous, Once, 5 of 6 cycles  Administration: 150 mg (5/28/2019), 150 mg (6/25/2019), 150 mg (11/6/2019)  CARBOplatin (PARAPLATIN) in sodium chloride 0 9 % 250 mL IVPB,  (original dose ), Intravenous, Once, 5 of 6 cycles  Dose modification:   (Cycle 2),   (original dose 258 4 mg, Cycle 3),   (original dose 258 4 mg, Cycle 3),   (original dose 244 4 mg, Cycle 4)  Administration: 258 4 mg (5/28/2019), 244 4 mg (6/25/2019), 285 2 mg (11/6/2019)  PACLitaxel (TAXOL) 127 8 mg in sodium chloride 0 9 % 250 mL chemo IVPB, 80 mg/m2, Intravenous, Once, 6 of 7 cycles  Dose modification: 65 mg/m2 (original dose 80 mg/m2, Cycle 2, Reason: Dose Not Tolerated)  Administration: 103 8 mg (5/14/2019), 108 6 mg (5/28/2019), 108 6 mg (6/4/2019), 108 6 mg (6/11/2019), 109 8 mg (6/25/2019), 109 8 mg (7/2/2019), 109 8 mg (7/9/2019), 111 6 mg (11/6/2019), 111 6 mg (11/12/2019), 111 6 mg (11/19/2019)      4/29/2019 Genomic Testing      Foundation 1 testing revealed a PD L1 tumor proportions score of 0%  The patient is not a candidate for PD L1 manipulation       Genetic Testing     [employer] Breast/Gyn panel, wildtype  9/10/2019 Surgery     Exploratory laparotomy radical omentectomy, posterior exenteration, takedown of ileostomy and end colostomy for complete debulking of visible tumor  Final pathology report revealed high-grade serous malignancy in both the omentum and the pelvic mass      9/25/2019 -  Chemotherapy     Carboplatin area under the curve of 5+ weekly paclitaxel at 80 milligrams/meters squared for 3 further cycles of treatment followed by consolidation this is to begin mid October      10/9/2019 -  Cancer Staged     Staging form: Ovary, Fallopian Tube, Primary Peritoneal, AJCC 8th Edition  - Pathologic stage from 10/9/2019: FIGO Stage IIIC (pT3c, pN1, cM0) - Signed by Fozia Horvath MD on 10/9/2019  Histologic grade (G): G3  Histologic grading system: 4 grade system  Gross residual tumor after primary cyto-reductive surgery: Absent        10/2019 Surgery     Interval debulking with TAHBSO revision of colostomy omentectomy and tumor debulking with complete debulking  Several weeks postoperatively the patient required a open cholecystectomy  Patient ID: Eusebio Fleischer is a 70 y o  female  The patient presents today for evaluation and management of consideration of re-initiation of chemotherapy after interval debulking  The patient was diagnosed with stage IV ovarian cancer somewhat over a year ago  She underwent initial chemotherapy resulting in a bowel perforation  The chemotherapy was modified and her treatment was re-initiated several months thereafter    She subsequently underwent an interval debulking with bridged section of all visible tumor  She developed shortly thereafter cholecystitis which required an open cholecystectomy  The patient is healing from those both and now is due to continue the last 2 cycles of carboplatin weekly paclitaxel chemotherapy  She has 1 treatment since her interval debulking which she tolerated well  Today, the patient is doing well  She denies significant abdominal pain, pelvic pain, nausea, vomiting, constipation, diarrhea, fevers, chills, or vaginal bleeding  The following portions of the patient's history were reviewed and updated as appropriate: allergies, current medications, past family history, past medical history, past surgical history and problem list     Review of Systems   Constitutional: Negative  HENT: Negative  Eyes: Negative  Respiratory: Negative  Cardiovascular: Negative  Gastrointestinal: Negative  Endocrine: Negative  Genitourinary: Negative  Musculoskeletal: Negative  Skin: Negative  Neurological: Negative  Hematological: Negative  Psychiatric/Behavioral: Negative          Current Outpatient Medications   Medication Sig Dispense Refill    acetaminophen (TYLENOL) 325 mg tablet Take 2 tablets (650 mg total) by mouth every 6 (six) hours as needed for mild pain, headaches or fever (Patient not taking: Reported on 11/22/2019) 30 tablet 0    aspirin-acetaminophen-caffeine (EXCEDRIN MIGRAINE) 250-250-65 MG per tablet Take 1 tablet by mouth every 6 (six) hours as needed for headaches      gabapentin (NEURONTIN) 300 mg capsule Take 1 capsule (300 mg total) by mouth 3 (three) times a day 90 capsule 0    lidocaine-prilocaine (EMLA) cream Apply to port site prior to labs/chemo (Patient not taking: Reported on 10/15/2019) 30 g 1    pantoprazole (PROTONIX) 40 mg tablet Take 1 tablet (40 mg total) by mouth 2 (two) times a day before meals  0    potassium chloride (K-DUR,KLOR-CON) 20 mEq tablet Take 1 tablet (20 mEq total) by mouth daily 30 tablet 0     No current facility-administered medications for this visit  Objective:    Blood pressure 124/62, pulse 98, temperature 97 7 °F (36 5 °C), resp  rate 16, height 5' 3" (1 6 m), weight 71 kg (156 lb 8 oz), not currently breastfeeding  Body mass index is 27 72 kg/m²  Body surface area is 1 74 meters squared  Physical Exam   Constitutional: She is oriented to person, place, and time  She appears well-developed and well-nourished  HENT:   Head: Normocephalic and atraumatic  Eyes: EOM are normal    Neck: Normal range of motion  Neck supple  No thyromegaly present  Cardiovascular: Normal rate, regular rhythm and normal heart sounds  Pulmonary/Chest: Effort normal and breath sounds normal    Abdominal: Soft  Bowel sounds are normal    Well healed open incisions  Stoma functioning well   Genitourinary:   Genitourinary Comments: Deferred   Musculoskeletal: Normal range of motion  Lymphadenopathy:     She has no cervical adenopathy  Neurological: She is alert and oriented to person, place, and time  Skin: Skin is warm and dry  Psychiatric: She has a normal mood and affect   Her behavior is normal        Lab Results   Component Value Date     22 0 11/19/2019     Lab Results   Component Value Date    K 4 1 11/26/2019     11/26/2019    CO2 27 11/26/2019    BUN 18 11/26/2019    CREATININE 1 03 11/26/2019    GLUCOSE 228 (H) 09/10/2019    GLUF 142 (H) 11/19/2019    CALCIUM 9 2 11/26/2019    AST 13 11/26/2019    ALT 15 11/26/2019    ALKPHOS 84 11/26/2019    EGFR 56 11/26/2019     Lab Results   Component Value Date    WBC 5 39 11/26/2019    HGB 7 9 (L) 11/26/2019    HCT 24 9 (L) 11/26/2019    MCV 94 11/26/2019     11/26/2019     Lab Results   Component Value Date    NEUTROABS 3 02 11/26/2019

## 2019-11-27 NOTE — ASSESSMENT & PLAN NOTE
Overall the patient continues to do well  We will plan to further cycles of carboplatin and weekly Taxol  The patient's chemo was somewhat delayed due to her anemia  She is due to have her her transfusion performed next week  She was unable to do so today because of family obligations  Once she gets her transfusion she will reinitiate her chemotherapy and plans 2 further cycles  At that point we can consider takedown of her stoma  It is likely that she will require consolidation treatment  She is not a candidate for a Avastin however a part inhibitor may be reasonable alternative, despite the fact that her tumor has no genomic signature indicative of response  Recent data shows that this may be reasonable alternative

## 2019-11-27 NOTE — PROGRESS NOTES
Assessment/Plan:    Problem List Items Addressed This Visit        Endocrine    Ovarian cancer ([facility] [address] ) - Primary     Overall the patient continues to do well  We will plan to further cycles of carboplatin and weekly Taxol  The patient's chemo was somewhat delayed due to her anemia  She is due to have her her transfusion performed next week  She was unable to do so today because of family obligations  Once she gets her transfusion she will reinitiate her chemotherapy and plans 2 further cycles  At that point we can consider takedown of her stoma  It is likely that she will require consolidation treatment  She is not a candidate for a Avastin however a part inhibitor may be reasonable alternative, despite the fact that her tumor has no genomic signature indicative of response  Recent data shows that this may be reasonable alternative  CHIEF COMPLAINT:  Re-initiation of chemotherapy status post interval debulking      Problem:  Cancer Staging  Ovarian cancer [facility])  Staging form: Ovary, Fallopian Tube, Primary Peritoneal, AJCC 8th Edition  - Clinical stage from 11/14/2018: FIGO Stage IVB (cT3c, cN0, pM1b) - Signed by Sukhjinder Soler MD on 11/14/2018  - Pathologic stage from 10/9/2019: FIGO Stage IIIC (pT3c, pN1, cM0) - Signed by Sukhjinder Soler MD on 10/9/2019        Previous therapy:     Ovarian cancer ([facility] [address] )    11/9/2018 Initial Diagnosis     Ovarian cancer ([facility] [address] )   tumor marker 194 5      11/9/2018 Biopsy     CT-guided needle biopsy of abdominal mass 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  Given liver involvement this would be stage IV      11/14/2018 - 12/4/2018 Chemotherapy     Neoadjuvant therapy: carboplatin area under the curve of 6, paclitaxel 135 milligrams/meter squared, Avastin 10 milligrams/kilogram  Completed 1 of 3 cycles  12/14/2018 Surgery     LAPAROTOMY EXPLORATORY; Abdominal Washout;  Application of Abthera Vac Dressing for suspected bowel perforation and septic shock  12/15/2018 Surgery     LAPAROTOMY EXPLORATORY, ABDOMINAL WASHOUT, DRAIN PLACEMENT x 4, DIVERTING LOOP ILEOSTOMY AND ABDOMINAL CLOSURE for bowel perforation      3/26/2019 -  Chemotherapy     Taxol weekly 80 mg/m2 for 3 consecutive weeks, may add carboplatin in the future with AUC of 5       3/26/2019 -  Chemotherapy     palonosetron (ALOXI) injection 0 25 mg, 0 25 mg, Intravenous, Once, 5 of 6 cycles  Administration: 0 25 mg (5/28/2019), 0 25 mg (6/25/2019), 0 25 mg (11/6/2019)  fosaprepitant (EMEND) 150 mg in sodium chloride 0 9 % 255 mL IVPB, 150 mg, Intravenous, Once, 5 of 6 cycles  Administration: 150 mg (5/28/2019), 150 mg (6/25/2019), 150 mg (11/6/2019)  CARBOplatin (PARAPLATIN) in sodium chloride 0 9 % 250 mL IVPB,  (original dose ), Intravenous, Once, 5 of 6 cycles  Dose modification:   (Cycle 2),   (original dose 258 4 mg, Cycle 3),   (original dose 258 4 mg, Cycle 3),   (original dose 244 4 mg, Cycle 4)  Administration: 258 4 mg (5/28/2019), 244 4 mg (6/25/2019), 285 2 mg (11/6/2019)  PACLitaxel (TAXOL) 127 8 mg in sodium chloride 0 9 % 250 mL chemo IVPB, 80 mg/m2, Intravenous, Once, 6 of 7 cycles  Dose modification: 65 mg/m2 (original dose 80 mg/m2, Cycle 2, Reason: Dose Not Tolerated)  Administration: 103 8 mg (5/14/2019), 108 6 mg (5/28/2019), 108 6 mg (6/4/2019), 108 6 mg (6/11/2019), 109 8 mg (6/25/2019), 109 8 mg (7/2/2019), 109 8 mg (7/9/2019), 111 6 mg (11/6/2019), 111 6 mg (11/12/2019), 111 6 mg (11/19/2019)      4/29/2019 Genomic Testing      Foundation 1 testing revealed a PD L1 tumor proportions score of 0%  The patient is not a candidate for PD L1 manipulation       Genetic Testing     [employer] Breast/Gyn panel, wildtype  9/10/2019 Surgery     Exploratory laparotomy radical omentectomy, posterior exenteration, takedown of ileostomy and end colostomy for complete debulking of visible tumor      Final pathology report revealed high-grade serous malignancy in both the omentum and the pelvic mass      9/25/2019 -  Chemotherapy     Carboplatin area under the curve of 5+ weekly paclitaxel at 80 milligrams/meters squared for 3 further cycles of treatment followed by consolidation this is to begin mid October      10/9/2019 -  Cancer Staged     Staging form: Ovary, Fallopian Tube, Primary Peritoneal, AJCC 8th Edition  - Pathologic stage from 10/9/2019: FIGO Stage IIIC (pT3c, pN1, cM0) - Signed by Fozia Horvath MD on 10/9/2019  Histologic grade (G): G3  Histologic grading system: 4 grade system  Gross residual tumor after primary cyto-reductive surgery: Absent        10/2019 Surgery     Interval debulking with TAHBSO revision of colostomy omentectomy and tumor debulking with complete debulking  Several weeks postoperatively the patient required a open cholecystectomy  Patient ID: Eusebio Fleischer is a 70 y o  female  The patient presents today for evaluation and management of consideration of re-initiation of chemotherapy after interval debulking  The patient was diagnosed with stage IV ovarian cancer somewhat over a year ago  She underwent initial chemotherapy resulting in a bowel perforation  The chemotherapy was modified and her treatment was re-initiated several months thereafter  She subsequently underwent an interval debulking with bridged section of all visible tumor  She developed shortly thereafter cholecystitis which required an open cholecystectomy  The patient is healing from those both and now is due to continue the last 2 cycles of carboplatin weekly paclitaxel chemotherapy  She has 1 treatment since her interval debulking which she tolerated well  Today, the patient is doing well  She denies significant abdominal pain, pelvic pain, nausea, vomiting, constipation, diarrhea, fevers, chills, or vaginal bleeding        The following portions of the patient's history were reviewed and updated as appropriate: allergies, current medications, past family history, past medical history, past surgical history and problem list     Review of Systems   Constitutional: Negative  HENT: Negative  Eyes: Negative  Respiratory: Negative  Cardiovascular: Negative  Gastrointestinal: Negative  Endocrine: Negative  Genitourinary: Negative  Musculoskeletal: Negative  Skin: Negative  Neurological: Negative  Hematological: Negative  Psychiatric/Behavioral: Negative  Current Outpatient Medications   Medication Sig Dispense Refill    acetaminophen (TYLENOL) 325 mg tablet Take 2 tablets (650 mg total) by mouth every 6 (six) hours as needed for mild pain, headaches or fever (Patient not taking: Reported on 11/22/2019) 30 tablet 0    aspirin-acetaminophen-caffeine (EXCEDRIN MIGRAINE) 250-250-65 MG per tablet Take 1 tablet by mouth every 6 (six) hours as needed for headaches      gabapentin (NEURONTIN) 300 mg capsule Take 1 capsule (300 mg total) by mouth 3 (three) times a day 90 capsule 0    lidocaine-prilocaine (EMLA) cream Apply to port site prior to labs/chemo (Patient not taking: Reported on 10/15/2019) 30 g 1    pantoprazole (PROTONIX) 40 mg tablet Take 1 tablet (40 mg total) by mouth 2 (two) times a day before meals  0    potassium chloride (K-DUR,KLOR-CON) 20 mEq tablet Take 1 tablet (20 mEq total) by mouth daily 30 tablet 0     No current facility-administered medications for this visit  Objective:    Blood pressure 124/62, pulse 98, temperature 97 7 °F (36 5 °C), resp  rate 16, height 5' 3" (1 6 m), weight 71 kg (156 lb 8 oz), not currently breastfeeding  Body mass index is 27 72 kg/m²  Body surface area is 1 74 meters squared  Physical Exam   Constitutional: She is oriented to person, place, and time  She appears well-developed and well-nourished  HENT:   Head: Normocephalic and atraumatic  Eyes: EOM are normal    Neck: Normal range of motion  Neck supple   No thyromegaly present  Cardiovascular: Normal rate, regular rhythm and normal heart sounds  Pulmonary/Chest: Effort normal and breath sounds normal    Abdominal: Soft  Bowel sounds are normal    Well healed open incisions  Stoma functioning well   Genitourinary:   Genitourinary Comments: Deferred   Musculoskeletal: Normal range of motion  Lymphadenopathy:     She has no cervical adenopathy  Neurological: She is alert and oriented to person, place, and time  Skin: Skin is warm and dry  Psychiatric: She has a normal mood and affect   Her behavior is normal        Lab Results   Component Value Date     22 0 11/19/2019     Lab Results   Component Value Date    K 4 1 11/26/2019     11/26/2019    CO2 27 11/26/2019    BUN 18 11/26/2019    CREATININE 1 03 11/26/2019    GLUCOSE 228 (H) 09/10/2019    GLUF 142 (H) 11/19/2019    CALCIUM 9 2 11/26/2019    AST 13 11/26/2019    ALT 15 11/26/2019    ALKPHOS 84 11/26/2019    EGFR 56 11/26/2019     Lab Results   Component Value Date    WBC 5 39 11/26/2019    HGB 7 9 (L) 11/26/2019    HCT 24 9 (L) 11/26/2019    MCV 94 11/26/2019     11/26/2019     Lab Results   Component Value Date    NEUTROABS 3 02 11/26/2019

## 2019-11-29 RX ORDER — ACETAMINOPHEN 325 MG/1
650 TABLET ORAL ONCE
Status: CANCELLED
Start: 2019-12-03

## 2019-11-29 RX ORDER — DIPHENHYDRAMINE HCL 25 MG
25 TABLET ORAL ONCE
Status: CANCELLED
Start: 2019-12-03

## 2019-12-02 ENCOUNTER — APPOINTMENT (OUTPATIENT)
Dept: LAB | Facility: HOSPITAL | Age: 69
End: 2019-12-02
Payer: MEDICARE

## 2019-12-02 DIAGNOSIS — D64.81 ANEMIA DUE TO ANTINEOPLASTIC CHEMOTHERAPY: ICD-10-CM

## 2019-12-02 DIAGNOSIS — T45.1X5A ANEMIA DUE TO ANTINEOPLASTIC CHEMOTHERAPY: ICD-10-CM

## 2019-12-02 DIAGNOSIS — C56.3 MALIGNANT NEOPLASM OF BOTH OVARIES (HCC): ICD-10-CM

## 2019-12-02 LAB
ABO GROUP BLD: NORMAL
ALBUMIN SERPL BCP-MCNC: 3.3 G/DL (ref 3.5–5)
ALP SERPL-CCNC: 85 U/L (ref 46–116)
ALT SERPL W P-5'-P-CCNC: 16 U/L (ref 12–78)
ANION GAP SERPL CALCULATED.3IONS-SCNC: 8 MMOL/L (ref 4–13)
AST SERPL W P-5'-P-CCNC: 15 U/L (ref 5–45)
BASOPHILS # BLD AUTO: 0.05 THOUSANDS/ΜL (ref 0–0.1)
BASOPHILS NFR BLD AUTO: 1 % (ref 0–1)
BILIRUB SERPL-MCNC: 0.1 MG/DL (ref 0.2–1)
BLD GP AB SCN SERPL QL: NEGATIVE
BUN SERPL-MCNC: 21 MG/DL (ref 5–25)
CALCIUM SERPL-MCNC: 9.4 MG/DL (ref 8.3–10.1)
CHLORIDE SERPL-SCNC: 104 MMOL/L (ref 100–108)
CO2 SERPL-SCNC: 29 MMOL/L (ref 21–32)
CREAT SERPL-MCNC: 1.07 MG/DL (ref 0.6–1.3)
EOSINOPHIL # BLD AUTO: 0.12 THOUSAND/ΜL (ref 0–0.61)
EOSINOPHIL NFR BLD AUTO: 2 % (ref 0–6)
ERYTHROCYTE [DISTWIDTH] IN BLOOD BY AUTOMATED COUNT: 14 % (ref 11.6–15.1)
GFR SERPL CREATININE-BSD FRML MDRD: 53 ML/MIN/1.73SQ M
GLUCOSE SERPL-MCNC: 114 MG/DL (ref 65–140)
HCT VFR BLD AUTO: 28.1 % (ref 34.8–46.1)
HGB BLD-MCNC: 8.7 G/DL (ref 11.5–15.4)
IMM GRANULOCYTES # BLD AUTO: 0.03 THOUSAND/UL (ref 0–0.2)
IMM GRANULOCYTES NFR BLD AUTO: 0 % (ref 0–2)
LYMPHOCYTES # BLD AUTO: 1.8 THOUSANDS/ΜL (ref 0.6–4.47)
LYMPHOCYTES NFR BLD AUTO: 25 % (ref 14–44)
MAGNESIUM SERPL-MCNC: 1.8 MG/DL (ref 1.6–2.6)
MCH RBC QN AUTO: 29 PG (ref 26.8–34.3)
MCHC RBC AUTO-ENTMCNC: 31 G/DL (ref 31.4–37.4)
MCV RBC AUTO: 94 FL (ref 82–98)
MONOCYTES # BLD AUTO: 0.63 THOUSAND/ΜL (ref 0.17–1.22)
MONOCYTES NFR BLD AUTO: 9 % (ref 4–12)
NEUTROPHILS # BLD AUTO: 4.63 THOUSANDS/ΜL (ref 1.85–7.62)
NEUTS SEG NFR BLD AUTO: 63 % (ref 43–75)
NRBC BLD AUTO-RTO: 0 /100 WBCS
PLATELET # BLD AUTO: 280 THOUSANDS/UL (ref 149–390)
PMV BLD AUTO: 11.2 FL (ref 8.9–12.7)
POTASSIUM SERPL-SCNC: 4.1 MMOL/L (ref 3.5–5.3)
PROT SERPL-MCNC: 7.4 G/DL (ref 6.4–8.2)
RBC # BLD AUTO: 3 MILLION/UL (ref 3.81–5.12)
RH BLD: POSITIVE
SODIUM SERPL-SCNC: 141 MMOL/L (ref 136–145)
SPECIMEN EXPIRATION DATE: NORMAL
WBC # BLD AUTO: 7.26 THOUSAND/UL (ref 4.31–10.16)

## 2019-12-02 PROCEDURE — 86900 BLOOD TYPING SEROLOGIC ABO: CPT

## 2019-12-02 PROCEDURE — 86923 COMPATIBILITY TEST ELECTRIC: CPT

## 2019-12-02 PROCEDURE — 36415 COLL VENOUS BLD VENIPUNCTURE: CPT

## 2019-12-02 PROCEDURE — 83735 ASSAY OF MAGNESIUM: CPT

## 2019-12-02 PROCEDURE — 86850 RBC ANTIBODY SCREEN: CPT

## 2019-12-02 PROCEDURE — 86304 IMMUNOASSAY TUMOR CA 125: CPT

## 2019-12-02 PROCEDURE — 80053 COMPREHEN METABOLIC PANEL: CPT

## 2019-12-02 PROCEDURE — 86901 BLOOD TYPING SEROLOGIC RH(D): CPT

## 2019-12-02 PROCEDURE — 85025 COMPLETE CBC W/AUTO DIFF WBC: CPT

## 2019-12-03 ENCOUNTER — HOSPITAL ENCOUNTER (OUTPATIENT)
Dept: INFUSION CENTER | Facility: CLINIC | Age: 69
End: 2019-12-03

## 2019-12-03 ENCOUNTER — HOSPITAL ENCOUNTER (OUTPATIENT)
Dept: INFUSION CENTER | Facility: CLINIC | Age: 69
Discharge: HOME | End: 2019-12-03
Payer: MEDICARE

## 2019-12-03 VITALS
DIASTOLIC BLOOD PRESSURE: 63 MMHG | TEMPERATURE: 98.9 F | SYSTOLIC BLOOD PRESSURE: 141 MMHG | HEART RATE: 76 BPM | RESPIRATION RATE: 20 BRPM

## 2019-12-03 DIAGNOSIS — D64.81 ANEMIA DUE TO ANTINEOPLASTIC CHEMOTHERAPY: Primary | ICD-10-CM

## 2019-12-03 DIAGNOSIS — T45.1X5A ANEMIA DUE TO ANTINEOPLASTIC CHEMOTHERAPY: Primary | ICD-10-CM

## 2019-12-03 LAB — CANCER AG125 SERPL-ACNC: 14.5 U/ML (ref 0–30)

## 2019-12-03 PROCEDURE — P9016 RBC LEUKOCYTES REDUCED: HCPCS

## 2019-12-03 PROCEDURE — 36430 TRANSFUSION BLD/BLD COMPNT: CPT

## 2019-12-03 RX ORDER — ACETAMINOPHEN 325 MG/1
650 TABLET ORAL ONCE
Status: CANCELLED
Start: 2019-12-03

## 2019-12-03 RX ORDER — SODIUM CHLORIDE 9 MG/ML
20 INJECTION, SOLUTION INTRAVENOUS ONCE
Status: CANCELLED | OUTPATIENT
Start: 2019-12-03

## 2019-12-03 RX ORDER — SODIUM CHLORIDE 9 MG/ML
20 INJECTION, SOLUTION INTRAVENOUS ONCE
Status: COMPLETED | OUTPATIENT
Start: 2019-12-03 | End: 2019-12-03

## 2019-12-03 RX ORDER — DIPHENHYDRAMINE HCL 25 MG
25 CAPSULE ORAL ONCE
Status: CANCELLED
Start: 2019-12-03

## 2019-12-03 RX ORDER — ACETAMINOPHEN 325 MG/1
650 TABLET ORAL ONCE
Status: COMPLETED | OUTPATIENT
Start: 2019-12-03 | End: 2019-12-03

## 2019-12-03 RX ORDER — DIPHENHYDRAMINE HCL 25 MG
25 TABLET ORAL ONCE
Status: COMPLETED | OUTPATIENT
Start: 2019-12-03 | End: 2019-12-03

## 2019-12-03 RX ADMIN — ACETAMINOPHEN 650 MG: 325 TABLET, FILM COATED ORAL at 12:32

## 2019-12-03 RX ADMIN — DIPHENHYDRAMINE HCL 25 MG: 25 TABLET, COATED ORAL at 12:32

## 2019-12-03 RX ADMIN — SODIUM CHLORIDE 20 ML/HR: 0.9 INJECTION, SOLUTION INTRAVENOUS at 12:32

## 2019-12-03 NOTE — PROGRESS NOTES
Pt presents for 2 units of PRBC  Pt offers no complaints, resting comfortably in stretcher  No recent consent noted in chart, Dr Kim Jansen made aware, will consent pt prior to infusion

## 2019-12-09 RX ORDER — SODIUM CHLORIDE 9 MG/ML
20 INJECTION, SOLUTION INTRAVENOUS ONCE
Status: CANCELLED | OUTPATIENT
Start: 2019-12-10

## 2019-12-09 RX ORDER — PALONOSETRON 0.05 MG/ML
0.25 INJECTION, SOLUTION INTRAVENOUS ONCE
Status: CANCELLED | OUTPATIENT
Start: 2019-12-10

## 2019-12-10 ENCOUNTER — HOSPITAL ENCOUNTER (OUTPATIENT)
Dept: INFUSION CENTER | Facility: CLINIC | Age: 69
Discharge: HOME | End: 2019-12-10
Payer: MEDICARE

## 2019-12-10 VITALS
TEMPERATURE: 98.2 F | BODY MASS INDEX: 26.72 KG/M2 | RESPIRATION RATE: 16 BRPM | WEIGHT: 156.53 LBS | DIASTOLIC BLOOD PRESSURE: 62 MMHG | HEART RATE: 82 BPM | SYSTOLIC BLOOD PRESSURE: 130 MMHG | HEIGHT: 64 IN

## 2019-12-10 DIAGNOSIS — C56.9 MALIGNANT NEOPLASM OF OVARY, UNSPECIFIED LATERALITY (HCC): Primary | ICD-10-CM

## 2019-12-10 PROCEDURE — 96413 CHEMO IV INFUSION 1 HR: CPT

## 2019-12-10 PROCEDURE — 96367 TX/PROPH/DG ADDL SEQ IV INF: CPT

## 2019-12-10 PROCEDURE — 96375 TX/PRO/DX INJ NEW DRUG ADDON: CPT

## 2019-12-10 PROCEDURE — 96417 CHEMO IV INFUS EACH ADDL SEQ: CPT

## 2019-12-10 RX ORDER — PALONOSETRON 0.05 MG/ML
0.25 INJECTION, SOLUTION INTRAVENOUS ONCE
Status: COMPLETED | OUTPATIENT
Start: 2019-12-10 | End: 2019-12-10

## 2019-12-10 RX ORDER — SODIUM CHLORIDE 9 MG/ML
20 INJECTION, SOLUTION INTRAVENOUS ONCE
Status: COMPLETED | OUTPATIENT
Start: 2019-12-10 | End: 2019-12-10

## 2019-12-10 RX ADMIN — SODIUM CHLORIDE 150 MG: 0.9 INJECTION, SOLUTION INTRAVENOUS at 13:01

## 2019-12-10 RX ADMIN — PALONOSETRON 0.25 MG: 0.05 INJECTION, SOLUTION INTRAVENOUS at 11:32

## 2019-12-10 RX ADMIN — DIPHENHYDRAMINE HYDROCHLORIDE 25 MG: 50 INJECTION, SOLUTION INTRAMUSCULAR; INTRAVENOUS at 12:08

## 2019-12-10 RX ADMIN — CARBOPLATIN 296.4 MG: 10 INJECTION, SOLUTION INTRAVENOUS at 14:55

## 2019-12-10 RX ADMIN — SODIUM CHLORIDE 500 ML: 0.9 INJECTION, SOLUTION INTRAVENOUS at 11:27

## 2019-12-10 RX ADMIN — SODIUM CHLORIDE 20 ML/HR: 0.9 INJECTION, SOLUTION INTRAVENOUS at 11:12

## 2019-12-10 RX ADMIN — DEXAMETHASONE SODIUM PHOSPHATE 20 MG: 10 INJECTION, SOLUTION INTRAMUSCULAR; INTRAVENOUS at 11:33

## 2019-12-10 RX ADMIN — PACLITAXEL 113.4 MG: 6 INJECTION, SOLUTION INTRAVENOUS at 13:44

## 2019-12-10 RX ADMIN — FAMOTIDINE 20 MG: 10 INJECTION, SOLUTION INTRAVENOUS at 12:33

## 2019-12-16 RX ORDER — SODIUM CHLORIDE 9 MG/ML
20 INJECTION, SOLUTION INTRAVENOUS ONCE
Status: CANCELLED | OUTPATIENT
Start: 2019-12-17

## 2019-12-17 ENCOUNTER — HOSPITAL ENCOUNTER (OUTPATIENT)
Dept: INFUSION CENTER | Facility: CLINIC | Age: 69
Discharge: HOME | End: 2019-12-17
Payer: MEDICARE

## 2019-12-17 VITALS
HEART RATE: 77 BPM | BODY MASS INDEX: 26.57 KG/M2 | DIASTOLIC BLOOD PRESSURE: 65 MMHG | HEIGHT: 64 IN | SYSTOLIC BLOOD PRESSURE: 140 MMHG | WEIGHT: 155.65 LBS | TEMPERATURE: 98.3 F | RESPIRATION RATE: 16 BRPM

## 2019-12-17 DIAGNOSIS — D64.9 ANEMIA, UNSPECIFIED TYPE: ICD-10-CM

## 2019-12-17 DIAGNOSIS — C56.9 MALIGNANT NEOPLASM OF OVARY, UNSPECIFIED LATERALITY (HCC): Primary | ICD-10-CM

## 2019-12-17 LAB
ALBUMIN SERPL BCP-MCNC: 3.2 G/DL (ref 3.5–5)
ALP SERPL-CCNC: 92 U/L (ref 46–116)
ALT SERPL W P-5'-P-CCNC: 16 U/L (ref 12–78)
ANION GAP SERPL CALCULATED.3IONS-SCNC: 7 MMOL/L (ref 4–13)
AST SERPL W P-5'-P-CCNC: 13 U/L (ref 5–45)
BASOPHILS # BLD AUTO: 0.04 THOUSANDS/ΜL (ref 0–0.1)
BASOPHILS NFR BLD AUTO: 1 % (ref 0–1)
BILIRUB SERPL-MCNC: 0.2 MG/DL (ref 0.2–1)
BUN SERPL-MCNC: 22 MG/DL (ref 5–25)
CALCIUM SERPL-MCNC: 8.9 MG/DL (ref 8.3–10.1)
CHLORIDE SERPL-SCNC: 106 MMOL/L (ref 100–108)
CO2 SERPL-SCNC: 28 MMOL/L (ref 21–32)
CREAT SERPL-MCNC: 1 MG/DL (ref 0.6–1.3)
EOSINOPHIL # BLD AUTO: 0.17 THOUSAND/ΜL (ref 0–0.61)
EOSINOPHIL NFR BLD AUTO: 3 % (ref 0–6)
ERYTHROCYTE [DISTWIDTH] IN BLOOD BY AUTOMATED COUNT: 13.7 % (ref 11.6–15.1)
GFR SERPL CREATININE-BSD FRML MDRD: 58 ML/MIN/1.73SQ M
GLUCOSE SERPL-MCNC: 133 MG/DL (ref 65–140)
HCT VFR BLD AUTO: 29.9 % (ref 34.8–46.1)
HGB BLD-MCNC: 9.6 G/DL (ref 11.5–15.4)
IMM GRANULOCYTES # BLD AUTO: 0.03 THOUSAND/UL (ref 0–0.2)
IMM GRANULOCYTES NFR BLD AUTO: 1 % (ref 0–2)
LYMPHOCYTES # BLD AUTO: 1.68 THOUSANDS/ΜL (ref 0.6–4.47)
LYMPHOCYTES NFR BLD AUTO: 29 % (ref 14–44)
MCH RBC QN AUTO: 29.6 PG (ref 26.8–34.3)
MCHC RBC AUTO-ENTMCNC: 32.1 G/DL (ref 31.4–37.4)
MCV RBC AUTO: 92 FL (ref 82–98)
MONOCYTES # BLD AUTO: 0.4 THOUSAND/ΜL (ref 0.17–1.22)
MONOCYTES NFR BLD AUTO: 7 % (ref 4–12)
NEUTROPHILS # BLD AUTO: 3.5 THOUSANDS/ΜL (ref 1.85–7.62)
NEUTS SEG NFR BLD AUTO: 59 % (ref 43–75)
NRBC BLD AUTO-RTO: 0 /100 WBCS
PLATELET # BLD AUTO: 254 THOUSANDS/UL (ref 149–390)
PMV BLD AUTO: 11.3 FL (ref 8.9–12.7)
POTASSIUM SERPL-SCNC: 4 MMOL/L (ref 3.5–5.3)
PROT SERPL-MCNC: 6.8 G/DL (ref 6.4–8.2)
RBC # BLD AUTO: 3.24 MILLION/UL (ref 3.81–5.12)
SODIUM SERPL-SCNC: 141 MMOL/L (ref 136–145)
WBC # BLD AUTO: 5.82 THOUSAND/UL (ref 4.31–10.16)

## 2019-12-17 PROCEDURE — 96367 TX/PROPH/DG ADDL SEQ IV INF: CPT

## 2019-12-17 PROCEDURE — 80053 COMPREHEN METABOLIC PANEL: CPT

## 2019-12-17 PROCEDURE — 85025 COMPLETE CBC W/AUTO DIFF WBC: CPT | Performed by: OBSTETRICS & GYNECOLOGY

## 2019-12-17 PROCEDURE — 96413 CHEMO IV INFUSION 1 HR: CPT

## 2019-12-17 RX ORDER — SODIUM CHLORIDE 9 MG/ML
20 INJECTION, SOLUTION INTRAVENOUS ONCE
Status: COMPLETED | OUTPATIENT
Start: 2019-12-17 | End: 2019-12-17

## 2019-12-17 RX ADMIN — PACLITAXEL 113.4 MG: 6 INJECTION, SOLUTION INTRAVENOUS at 14:24

## 2019-12-17 RX ADMIN — FAMOTIDINE 20 MG: 10 INJECTION, SOLUTION INTRAVENOUS at 13:59

## 2019-12-17 RX ADMIN — DEXAMETHASONE SODIUM PHOSPHATE 10 MG: 10 INJECTION, SOLUTION INTRAMUSCULAR; INTRAVENOUS at 12:54

## 2019-12-17 RX ADMIN — DIPHENHYDRAMINE HYDROCHLORIDE 25 MG: 50 INJECTION, SOLUTION INTRAMUSCULAR; INTRAVENOUS at 13:26

## 2019-12-17 RX ADMIN — SODIUM CHLORIDE 20 ML/HR: 0.9 INJECTION, SOLUTION INTRAVENOUS at 12:45

## 2019-12-17 NOTE — PROGRESS NOTES
Spoke with Senia Santamaria RN from Dr Abran Valencia office  She confirmed that labs were reviewed and that pt was ok to treat with Taxol 65mg/m2 today

## 2019-12-19 DIAGNOSIS — C56.3 MALIGNANT NEOPLASM OF BOTH OVARIES (HCC): Primary | ICD-10-CM

## 2019-12-20 RX ORDER — SODIUM CHLORIDE 9 MG/ML
20 INJECTION, SOLUTION INTRAVENOUS ONCE
Status: CANCELLED | OUTPATIENT
Start: 2019-12-23

## 2019-12-21 ENCOUNTER — APPOINTMENT (OUTPATIENT)
Dept: LAB | Facility: HOSPITAL | Age: 69
End: 2019-12-21
Payer: MEDICARE

## 2019-12-21 LAB
ALBUMIN SERPL BCP-MCNC: 3.4 G/DL (ref 3.5–5)
ALP SERPL-CCNC: 94 U/L (ref 46–116)
ALT SERPL W P-5'-P-CCNC: 19 U/L (ref 12–78)
ANION GAP SERPL CALCULATED.3IONS-SCNC: 8 MMOL/L (ref 4–13)
AST SERPL W P-5'-P-CCNC: 13 U/L (ref 5–45)
BASOPHILS # BLD AUTO: 0.04 THOUSANDS/ΜL (ref 0–0.1)
BASOPHILS NFR BLD AUTO: 1 % (ref 0–1)
BILIRUB SERPL-MCNC: 0.2 MG/DL (ref 0.2–1)
BUN SERPL-MCNC: 23 MG/DL (ref 5–25)
CALCIUM SERPL-MCNC: 9.3 MG/DL (ref 8.3–10.1)
CHLORIDE SERPL-SCNC: 106 MMOL/L (ref 100–108)
CO2 SERPL-SCNC: 29 MMOL/L (ref 21–32)
CREAT SERPL-MCNC: 1 MG/DL (ref 0.6–1.3)
EOSINOPHIL # BLD AUTO: 0.1 THOUSAND/ΜL (ref 0–0.61)
EOSINOPHIL NFR BLD AUTO: 2 % (ref 0–6)
ERYTHROCYTE [DISTWIDTH] IN BLOOD BY AUTOMATED COUNT: 13.9 % (ref 11.6–15.1)
GFR SERPL CREATININE-BSD FRML MDRD: 58 ML/MIN/1.73SQ M
GLUCOSE SERPL-MCNC: 130 MG/DL (ref 65–140)
HCT VFR BLD AUTO: 35.3 % (ref 34.8–46.1)
HGB BLD-MCNC: 11.1 G/DL (ref 11.5–15.4)
IMM GRANULOCYTES # BLD AUTO: 0.01 THOUSAND/UL (ref 0–0.2)
IMM GRANULOCYTES NFR BLD AUTO: 0 % (ref 0–2)
LYMPHOCYTES # BLD AUTO: 1.98 THOUSANDS/ΜL (ref 0.6–4.47)
LYMPHOCYTES NFR BLD AUTO: 42 % (ref 14–44)
MAGNESIUM SERPL-MCNC: 1.7 MG/DL (ref 1.6–2.6)
MCH RBC QN AUTO: 29.1 PG (ref 26.8–34.3)
MCHC RBC AUTO-ENTMCNC: 31.4 G/DL (ref 31.4–37.4)
MCV RBC AUTO: 93 FL (ref 82–98)
MONOCYTES # BLD AUTO: 0.32 THOUSAND/ΜL (ref 0.17–1.22)
MONOCYTES NFR BLD AUTO: 7 % (ref 4–12)
NEUTROPHILS # BLD AUTO: 2.32 THOUSANDS/ΜL (ref 1.85–7.62)
NEUTS SEG NFR BLD AUTO: 48 % (ref 43–75)
NRBC BLD AUTO-RTO: 0 /100 WBCS
PLATELET # BLD AUTO: 295 THOUSANDS/UL (ref 149–390)
PMV BLD AUTO: 11 FL (ref 8.9–12.7)
POTASSIUM SERPL-SCNC: 4.3 MMOL/L (ref 3.5–5.3)
PROT SERPL-MCNC: 7.2 G/DL (ref 6.4–8.2)
RBC # BLD AUTO: 3.81 MILLION/UL (ref 3.81–5.12)
SODIUM SERPL-SCNC: 143 MMOL/L (ref 136–145)
WBC # BLD AUTO: 4.77 THOUSAND/UL (ref 4.31–10.16)

## 2019-12-21 PROCEDURE — 36415 COLL VENOUS BLD VENIPUNCTURE: CPT | Performed by: PHYSICIAN ASSISTANT

## 2019-12-21 PROCEDURE — 83735 ASSAY OF MAGNESIUM: CPT | Performed by: PHYSICIAN ASSISTANT

## 2019-12-21 PROCEDURE — 80053 COMPREHEN METABOLIC PANEL: CPT | Performed by: PHYSICIAN ASSISTANT

## 2019-12-21 PROCEDURE — 85025 COMPLETE CBC W/AUTO DIFF WBC: CPT | Performed by: PHYSICIAN ASSISTANT

## 2019-12-23 ENCOUNTER — HOSPITAL ENCOUNTER (OUTPATIENT)
Dept: INFUSION CENTER | Facility: CLINIC | Age: 69
Discharge: HOME | End: 2019-12-23
Payer: MEDICARE

## 2019-12-23 VITALS
HEIGHT: 64 IN | HEART RATE: 80 BPM | SYSTOLIC BLOOD PRESSURE: 149 MMHG | RESPIRATION RATE: 20 BRPM | DIASTOLIC BLOOD PRESSURE: 69 MMHG | BODY MASS INDEX: 26.55 KG/M2 | WEIGHT: 155.5 LBS | TEMPERATURE: 98.2 F

## 2019-12-23 DIAGNOSIS — C56.9 MALIGNANT NEOPLASM OF OVARY, UNSPECIFIED LATERALITY (HCC): Primary | ICD-10-CM

## 2019-12-23 PROCEDURE — 96413 CHEMO IV INFUSION 1 HR: CPT

## 2019-12-23 PROCEDURE — 96367 TX/PROPH/DG ADDL SEQ IV INF: CPT

## 2019-12-23 RX ORDER — SODIUM CHLORIDE 9 MG/ML
20 INJECTION, SOLUTION INTRAVENOUS ONCE
Status: COMPLETED | OUTPATIENT
Start: 2019-12-23 | End: 2019-12-23

## 2019-12-23 RX ADMIN — SODIUM CHLORIDE 20 ML/HR: 0.9 INJECTION, SOLUTION INTRAVENOUS at 11:46

## 2019-12-23 RX ADMIN — FAMOTIDINE 20 MG: 10 INJECTION, SOLUTION INTRAVENOUS at 12:26

## 2019-12-23 RX ADMIN — DIPHENHYDRAMINE HYDROCHLORIDE 25 MG: 50 INJECTION, SOLUTION INTRAMUSCULAR; INTRAVENOUS at 12:08

## 2019-12-23 RX ADMIN — PACLITAXEL 113.4 MG: 6 INJECTION, SOLUTION INTRAVENOUS at 12:51

## 2019-12-23 RX ADMIN — DEXAMETHASONE SODIUM PHOSPHATE 10 MG: 10 INJECTION, SOLUTION INTRAMUSCULAR; INTRAVENOUS at 11:45

## 2019-12-23 NOTE — PROGRESS NOTES
Pt presents for Taxol for treatment of ovarian cancer  Vitals stable, labs within parameters for treatment  Pt offers no complaints  Will continue to monitor

## 2019-12-31 ENCOUNTER — OFFICE VISIT (OUTPATIENT)
Dept: GYNECOLOGIC ONCOLOGY | Facility: CLINIC | Age: 69
End: 2019-12-31

## 2019-12-31 ENCOUNTER — HOSPITAL ENCOUNTER (OUTPATIENT)
Dept: INFUSION CENTER | Facility: CLINIC | Age: 69
Discharge: HOME | End: 2019-12-31
Payer: MEDICARE

## 2019-12-31 VITALS
DIASTOLIC BLOOD PRESSURE: 82 MMHG | TEMPERATURE: 98 F | SYSTOLIC BLOOD PRESSURE: 138 MMHG | BODY MASS INDEX: 28.68 KG/M2 | HEART RATE: 64 BPM | RESPIRATION RATE: 18 BRPM | WEIGHT: 168 LBS | HEIGHT: 64 IN

## 2019-12-31 DIAGNOSIS — C56.9 MALIGNANT NEOPLASM OF OVARY, UNSPECIFIED LATERALITY (HCC): ICD-10-CM

## 2019-12-31 DIAGNOSIS — C56.9 MALIGNANT NEOPLASM OF OVARY, UNSPECIFIED LATERALITY (HCC): Primary | ICD-10-CM

## 2019-12-31 DIAGNOSIS — C56.3 MALIGNANT NEOPLASM OF BOTH OVARIES (HCC): ICD-10-CM

## 2019-12-31 DIAGNOSIS — C56.3 MALIGNANT NEOPLASM OF BOTH OVARIES (HCC): Primary | ICD-10-CM

## 2019-12-31 DIAGNOSIS — Z45.2 ENCOUNTER FOR CENTRAL LINE CARE: ICD-10-CM

## 2019-12-31 LAB
ALBUMIN SERPL BCP-MCNC: 3.2 G/DL (ref 3.5–5)
ALP SERPL-CCNC: 84 U/L (ref 46–116)
ALT SERPL W P-5'-P-CCNC: 18 U/L (ref 12–78)
ANION GAP SERPL CALCULATED.3IONS-SCNC: 9 MMOL/L (ref 4–13)
AST SERPL W P-5'-P-CCNC: 9 U/L (ref 5–45)
BASOPHILS # BLD AUTO: 0.05 THOUSANDS/ΜL (ref 0–0.1)
BASOPHILS NFR BLD AUTO: 1 % (ref 0–1)
BILIRUB SERPL-MCNC: 0.1 MG/DL (ref 0.2–1)
BUN SERPL-MCNC: 20 MG/DL (ref 5–25)
CALCIUM SERPL-MCNC: 9.3 MG/DL (ref 8.3–10.1)
CANCER AG125 SERPL-ACNC: 17.5 U/ML (ref 0–30)
CHLORIDE SERPL-SCNC: 104 MMOL/L (ref 100–108)
CO2 SERPL-SCNC: 27 MMOL/L (ref 21–32)
CREAT SERPL-MCNC: 1.06 MG/DL (ref 0.6–1.3)
EOSINOPHIL # BLD AUTO: 0.04 THOUSAND/ΜL (ref 0–0.61)
EOSINOPHIL NFR BLD AUTO: 1 % (ref 0–6)
ERYTHROCYTE [DISTWIDTH] IN BLOOD BY AUTOMATED COUNT: 14.4 % (ref 11.6–15.1)
GFR SERPL CREATININE-BSD FRML MDRD: 53 ML/MIN/1.73SQ M
GLUCOSE SERPL-MCNC: 182 MG/DL (ref 65–140)
HCT VFR BLD AUTO: 30 % (ref 34.8–46.1)
HGB BLD-MCNC: 9.4 G/DL (ref 11.5–15.4)
IMM GRANULOCYTES # BLD AUTO: 0.04 THOUSAND/UL (ref 0–0.2)
IMM GRANULOCYTES NFR BLD AUTO: 1 % (ref 0–2)
LYMPHOCYTES # BLD AUTO: 1.97 THOUSANDS/ΜL (ref 0.6–4.47)
LYMPHOCYTES NFR BLD AUTO: 33 % (ref 14–44)
MAGNESIUM SERPL-MCNC: 1.6 MG/DL (ref 1.6–2.6)
MCH RBC QN AUTO: 29.6 PG (ref 26.8–34.3)
MCHC RBC AUTO-ENTMCNC: 31.3 G/DL (ref 31.4–37.4)
MCV RBC AUTO: 94 FL (ref 82–98)
MONOCYTES # BLD AUTO: 0.66 THOUSAND/ΜL (ref 0.17–1.22)
MONOCYTES NFR BLD AUTO: 11 % (ref 4–12)
NEUTROPHILS # BLD AUTO: 3.16 THOUSANDS/ΜL (ref 1.85–7.62)
NEUTS SEG NFR BLD AUTO: 53 % (ref 43–75)
NRBC BLD AUTO-RTO: 0 /100 WBCS
PLATELET # BLD AUTO: 227 THOUSANDS/UL (ref 149–390)
PMV BLD AUTO: 12.2 FL (ref 8.9–12.7)
POTASSIUM SERPL-SCNC: 4 MMOL/L (ref 3.5–5.3)
PROT SERPL-MCNC: 7 G/DL (ref 6.4–8.2)
RBC # BLD AUTO: 3.18 MILLION/UL (ref 3.81–5.12)
SODIUM SERPL-SCNC: 140 MMOL/L (ref 136–145)
WBC # BLD AUTO: 5.92 THOUSAND/UL (ref 4.31–10.16)

## 2019-12-31 PROCEDURE — 80053 COMPREHEN METABOLIC PANEL: CPT

## 2019-12-31 PROCEDURE — 86304 IMMUNOASSAY TUMOR CA 125: CPT

## 2019-12-31 PROCEDURE — 85025 COMPLETE CBC W/AUTO DIFF WBC: CPT

## 2019-12-31 PROCEDURE — 83735 ASSAY OF MAGNESIUM: CPT

## 2019-12-31 PROCEDURE — 99214 OFFICE O/P EST MOD 30 MIN: CPT | Performed by: OBSTETRICS & GYNECOLOGY

## 2019-12-31 NOTE — ASSESSMENT & PLAN NOTE
Patient appears stable  She is close to a clinical remission  She has tolerated her treatment very well with the exception of the initial bowel perforation  We have recommended continuing the present chemotherapy at present dosages  She will undergo a CT scan of the chest abdomen and pelvis upon completion  I will then have the patient discussed at tumor board to see whether she is a reasonable candidate for any consolidation treatment

## 2019-12-31 NOTE — LETTER
December 31, 2019     Emily Daigle MD  Attn: NONA Blas   [address] [place], [place] [id number]    Patient: Manuelito Espinosa   YOB: 1950   Date of Visit: 12/31/2019       Dear Dr Natalya Buitrago: Thank you for referring Manuelito Espinosa to me for evaluation  Below are my notes for this consultation  If you have questions, please do not hesitate to call me  I look forward to following your patient along with you  Sincerely,        Shantelle Collado MD        CC: No Recipients  Shantelle Collado MD  12/31/2019 11:13 AM  Sign at close encounter  Assessment/Plan:    Problem List Items Addressed This Visit        Endocrine    Ovarian cancer [facility])     Patient appears stable  She is close to a clinical remission  She has tolerated her treatment very well with the exception of the initial bowel perforation  We have recommended continuing the present chemotherapy at present dosages  She will undergo a CT scan of the chest abdomen and pelvis upon completion  I will then have the patient discussed at tumor board to see whether she is a reasonable candidate for any consolidation treatment           Relevant Orders    CT chest abdomen pelvis w contrast    Malignant neoplasm of ovary ([address] ) - Primary    Relevant Orders    CT chest abdomen pelvis w contrast            CHIEF COMPLAINT:  Consideration of final carboplatin and weekly paclitaxel for primary treatment for stage ALTAGRACIA ovarian cancer      Problem:  Cancer Staging  Ovarian cancer [facility])  Staging form: Ovary, Fallopian Tube, Primary Peritoneal, AJCC 8th Edition  - Clinical stage from 11/14/2018: FIGO Stage IVB (cT3c, cN0, pM1b) - Signed by Shantelle Collado MD on 11/14/2018  - Pathologic stage from 10/9/2019: FIGO Stage IIIC (pT3c, pN1, cM0) - Signed by Shantelle Collado MD on 10/9/2019        Previous therapy:     Ovarian cancer ([address] )    11/9/2018 Initial Diagnosis     Ovarian cancer ([facility] [address] )   tumor marker 194 5 11/9/2018 Biopsy     CT-guided needle biopsy of abdominal mass 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  Given liver involvement this would be stage IV      11/14/2018 - 12/4/2018 Chemotherapy     Neoadjuvant therapy: carboplatin area under the curve of 6, paclitaxel 135 milligrams/meter squared, Avastin 10 milligrams/kilogram  Completed 1 of 3 cycles  12/14/2018 Surgery     LAPAROTOMY EXPLORATORY; Abdominal Washout; Application of Abthera Vac Dressing for suspected bowel perforation and septic shock         12/15/2018 Surgery     LAPAROTOMY EXPLORATORY, ABDOMINAL WASHOUT, DRAIN PLACEMENT x 4, DIVERTING LOOP ILEOSTOMY AND ABDOMINAL CLOSURE for bowel perforation      3/26/2019 -  Chemotherapy     Taxol weekly 80 mg/m2 for 3 consecutive weeks, may add carboplatin in the future with AUC of 5       3/26/2019 -  Chemotherapy     palonosetron (ALOXI) injection 0 25 mg, 0 25 mg, Intravenous, Once, 5 of 6 cycles  Administration: 0 25 mg (5/28/2019), 0 25 mg (6/25/2019), 0 25 mg (11/6/2019), 0 25 mg (12/10/2019)  fosaprepitant (EMEND) 150 mg in sodium chloride 0 9 % 255 mL IVPB, 150 mg, Intravenous, Once, 5 of 6 cycles  Administration: 150 mg (5/28/2019), 150 mg (6/25/2019), 150 mg (11/6/2019), 150 mg (12/10/2019)  CARBOplatin (PARAPLATIN) in sodium chloride 0 9 % 250 mL IVPB,  (original dose ), Intravenous, Once, 5 of 6 cycles  Dose modification:   (Cycle 2),   (original dose 258 4 mg, Cycle 3),   (original dose 258 4 mg, Cycle 3),   (original dose 244 4 mg, Cycle 4)  Administration: 258 4 mg (5/28/2019), 244 4 mg (6/25/2019), 285 2 mg (11/6/2019), 296 4 mg (12/10/2019)  PACLitaxel (TAXOL) 127 8 mg in sodium chloride 0 9 % 250 mL chemo IVPB, 80 mg/m2, Intravenous, Once, 6 of 7 cycles  Dose modification: 65 mg/m2 (original dose 80 mg/m2, Cycle 2, Reason: Dose Not Tolerated)  Administration: 103 8 mg (5/14/2019), 108 6 mg (5/28/2019), 108 6 mg (6/4/2019), 108 6 mg (6/11/2019), 109 8 mg (6/25/2019), 109 8 mg (7/2/2019), 109 8 mg (7/9/2019), 111 6 mg (11/6/2019), 111 6 mg (11/12/2019), 111 6 mg (11/19/2019), 113 4 mg (12/10/2019), 113 4 mg (12/17/2019), 113 4 mg (12/23/2019)      4/29/2019 Genomic Testing      Foundation 1 testing revealed a PD L1 tumor proportions score of 0%  The patient is not a candidate for PD L1 manipulation       Genetic Testing     [employer] Breast/Gyn panel, wildtype  9/10/2019 Surgery     Exploratory laparotomy radical omentectomy, posterior exenteration, takedown of ileostomy and end colostomy for complete debulking of visible tumor  Final pathology report revealed high-grade serous malignancy in both the omentum and the pelvic mass      9/25/2019 -  Chemotherapy     Carboplatin area under the curve of 5+ weekly paclitaxel at 80 milligrams/meters squared for 3 further cycles of treatment followed by consolidation this is to begin mid October      10/2019 Surgery     Interval debulking with TAHBSO revision of colostomy omentectomy and tumor debulking with complete debulking  Several weeks postoperatively the patient required a open cholecystectomy  10/9/2019 -  Cancer Staged     Staging form: Ovary, Fallopian Tube, Primary Peritoneal, AJCC 8th Edition  - Pathologic stage from 10/9/2019: FIGO Stage IIIC (pT3c, pN1, cM0) - Signed by Stacey Cabrera MD on 10/9/2019  Histologic grade (G): G3  Histologic grading system: 4 grade system  Gross residual tumor after primary cyto-reductive surgery: Absent        10/2019 - 12/2019 Chemotherapy     Carboplatin paclitaxel x3           Patient ID: Hector Castillo is a 78 y o  female  Patient is very pleasant 76-year-old female with a history of stage IV ovarian cancer  She underwent initial neoadjuvant chemotherapy with carboplatin paclitaxel and Avastin and developed a bowel perforation which required surgical diversion    She completed her neoadjuvant chemotherapy with carboplatin paclitaxel and underwent an interval debulking with no visible residual disease  The patient is presenting for her final post surgery chemotherapy with carboplatin and weekly paclitaxel  She is tolerating her treatment well  Her  is normal at 14  Today, the patient is doing well  She denies significant abdominal pain, pelvic pain, nausea, vomiting, constipation, diarrhea, fevers, chills, or vaginal bleeding  She has noted that her incision is draining in 2 spots  She is cleansing them daily  The following portions of the patient's history were reviewed and updated as appropriate: allergies, current medications, past family history, past social history, past surgical history and problem list     Review of Systems   Constitutional: Negative  HENT: Negative  Eyes: Negative  Respiratory: Negative  Cardiovascular: Negative  Gastrointestinal: Negative  Endocrine: Negative  Genitourinary: Negative  Musculoskeletal: Negative  Skin: Negative  Neurological: Negative  Hematological: Negative  Psychiatric/Behavioral: Negative  Current Outpatient Medications   Medication Sig Dispense Refill    aspirin-acetaminophen-caffeine (EXCEDRIN MIGRAINE) 250-250-65 MG per tablet Take 1 tablet by mouth every 6 (six) hours as needed for headaches      pantoprazole (PROTONIX) 40 mg tablet Take 1 tablet (40 mg total) by mouth 2 (two) times a day before meals  0    gabapentin (NEURONTIN) 300 mg capsule Take 1 capsule (300 mg total) by mouth 3 (three) times a day 90 capsule 0    potassium chloride (K-DUR,KLOR-CON) 20 mEq tablet Take 1 tablet (20 mEq total) by mouth daily 30 tablet 0     No current facility-administered medications for this visit  Objective:    Blood pressure 138/82, pulse 64, temperature 98 °F (36 7 °C), resp  rate 18, height 5' 3 78" (1 62 m), weight 76 2 kg (168 lb), not currently breastfeeding  Body mass index is 29 04 kg/m²    Body surface area is 1 81 meters squared  Physical Exam   Constitutional: She is oriented to person, place, and time  She appears well-developed and well-nourished  HENT:   Head: Normocephalic and atraumatic  Eyes: EOM are normal    Neck: Normal range of motion  Neck supple  No thyromegaly present  Cardiovascular: Normal rate, regular rhythm and normal heart sounds  Pulmonary/Chest: Effort normal and breath sounds normal    Abdominal: Soft  Bowel sounds are normal    Vertical midline incision with suture material protruding through  This was removed without difficulty in the periumbilical region  There is a small opening in the scan in more cephalad region this was probed and unremarkable  Both sites were treated with silver nitrate  Genitourinary:   Genitourinary Comments: Deferred   Musculoskeletal: Normal range of motion  Lymphadenopathy:     She has no cervical adenopathy  Neurological: She is alert and oriented to person, place, and time  Skin: Skin is warm and dry  Psychiatric: She has a normal mood and affect   Her behavior is normal        Lab Results   Component Value Date     14 5 12/02/2019     Lab Results   Component Value Date    K 4 3 12/21/2019     12/21/2019    CO2 29 12/21/2019    BUN 23 12/21/2019    CREATININE 1 00 12/21/2019    GLUCOSE 228 (H) 09/10/2019    GLUF 142 (H) 11/19/2019    CALCIUM 9 3 12/21/2019    AST 13 12/21/2019    ALT 19 12/21/2019    ALKPHOS 94 12/21/2019    EGFR 58 12/21/2019     Lab Results   Component Value Date    WBC 4 77 12/21/2019    HGB 11 1 (L) 12/21/2019    HCT 35 3 12/21/2019    MCV 93 12/21/2019     12/21/2019     Lab Results   Component Value Date    NEUTROABS 2 32 12/21/2019

## 2019-12-31 NOTE — PROGRESS NOTES
Assessment/Plan:    Problem List Items Addressed This Visit        Endocrine    Ovarian cancer [facility])     Patient appears stable  She is close to a clinical remission  She has tolerated her treatment very well with the exception of the initial bowel perforation  We have recommended continuing the present chemotherapy at present dosages  She will undergo a CT scan of the chest abdomen and pelvis upon completion  I will then have the patient discussed at tumor board to see whether she is a reasonable candidate for any consolidation treatment  Relevant Orders    CT chest abdomen pelvis w contrast    Malignant neoplasm of ovary ([address] ) - Primary    Relevant Orders    CT chest abdomen pelvis w contrast            CHIEF COMPLAINT:  Consideration of final carboplatin and weekly paclitaxel for primary treatment for stage ALTAGRACIA ovarian cancer      Problem:  Cancer Staging  Ovarian cancer [facility])  Staging form: Ovary, Fallopian Tube, Primary Peritoneal, AJCC 8th Edition  - Clinical stage from 11/14/2018: FIGO Stage IVB (cT3c, cN0, pM1b) - Signed by Shantelle Collado MD on 11/14/2018  - Pathologic stage from 10/9/2019: FIGO Stage IIIC (pT3c, pN1, cM0) - Signed by Shantelle Collado MD on 10/9/2019        Previous therapy:     Ovarian cancer ([facility] [address] )    11/9/2018 Initial Diagnosis     Ovarian cancer (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  Given liver involvement this would be stage IV      11/14/2018 - 12/4/2018 Chemotherapy     Neoadjuvant therapy: carboplatin area under the curve of 6, paclitaxel 135 milligrams/meter squared, Avastin 10 milligrams/kilogram  Completed 1 of 3 cycles  12/14/2018 Surgery     LAPAROTOMY EXPLORATORY; Abdominal Washout; Application of Abthera Vac Dressing for suspected bowel perforation and septic shock         12/15/2018 Surgery     LAPAROTOMY EXPLORATORY, ABDOMINAL WASHOUT, DRAIN PLACEMENT x 4, DIVERTING LOOP ILEOSTOMY AND ABDOMINAL CLOSURE for bowel perforation      3/26/2019 -  Chemotherapy     Taxol weekly 80 mg/m2 for 3 consecutive weeks, may add carboplatin in the future with AUC of 5       3/26/2019 -  Chemotherapy     palonosetron (ALOXI) injection 0 25 mg, 0 25 mg, Intravenous, Once, 5 of 6 cycles  Administration: 0 25 mg (5/28/2019), 0 25 mg (6/25/2019), 0 25 mg (11/6/2019), 0 25 mg (12/10/2019)  fosaprepitant (EMEND) 150 mg in sodium chloride 0 9 % 255 mL IVPB, 150 mg, Intravenous, Once, 5 of 6 cycles  Administration: 150 mg (5/28/2019), 150 mg (6/25/2019), 150 mg (11/6/2019), 150 mg (12/10/2019)  CARBOplatin (PARAPLATIN) in sodium chloride 0 9 % 250 mL IVPB,  (original dose ), Intravenous, Once, 5 of 6 cycles  Dose modification:   (Cycle 2),   (original dose 258 4 mg, Cycle 3),   (original dose 258 4 mg, Cycle 3),   (original dose 244 4 mg, Cycle 4)  Administration: 258 4 mg (5/28/2019), 244 4 mg (6/25/2019), 285 2 mg (11/6/2019), 296 4 mg (12/10/2019)  PACLitaxel (TAXOL) 127 8 mg in sodium chloride 0 9 % 250 mL chemo IVPB, 80 mg/m2, Intravenous, Once, 6 of 7 cycles  Dose modification: 65 mg/m2 (original dose 80 mg/m2, Cycle 2, Reason: Dose Not Tolerated)  Administration: 103 8 mg (5/14/2019), 108 6 mg (5/28/2019), 108 6 mg (6/4/2019), 108 6 mg (6/11/2019), 109 8 mg (6/25/2019), 109 8 mg (7/2/2019), 109 8 mg (7/9/2019), 111 6 mg (11/6/2019), 111 6 mg (11/12/2019), 111 6 mg (11/19/2019), 113 4 mg (12/10/2019), 113 4 mg (12/17/2019), 113 4 mg (12/23/2019)      4/29/2019 Genomic Testing      Foundation 1 testing revealed a PD L1 tumor proportions score of 0%  The patient is not a candidate for PD L1 manipulation       Genetic Testing     [employer] Breast/Gyn panel, wildtype        9/10/2019 Surgery     Exploratory laparotomy radical omentectomy, posterior exenteration, takedown of ileostomy and end colostomy for complete debulking of visible tumor     Final pathology report revealed high-grade serous malignancy in both the omentum and the pelvic mass      9/25/2019 -  Chemotherapy     Carboplatin area under the curve of 5+ weekly paclitaxel at 80 milligrams/meters squared for 3 further cycles of treatment followed by consolidation this is to begin mid October      10/2019 Surgery     Interval debulking with TAHBSO revision of colostomy omentectomy and tumor debulking with complete debulking  Several weeks postoperatively the patient required a open cholecystectomy  10/9/2019 -  Cancer Staged     Staging form: Ovary, Fallopian Tube, Primary Peritoneal, AJCC 8th Edition  - Pathologic stage from 10/9/2019: FIGO Stage IIIC (pT3c, pN1, cM0) - Signed by Gabrielle Ross MD on 10/9/2019  Histologic grade (G): G3  Histologic grading system: 4 grade system  Gross residual tumor after primary cyto-reductive surgery: Absent        10/2019 - 12/2019 Chemotherapy     Carboplatin paclitaxel x3           Patient ID: Dillon Pete is a 78 y o  female  Patient is very pleasant 62-year-old female with a history of stage IV ovarian cancer  She underwent initial neoadjuvant chemotherapy with carboplatin paclitaxel and Avastin and developed a bowel perforation which required surgical diversion  She completed her neoadjuvant chemotherapy with carboplatin paclitaxel and underwent an interval debulking with no visible residual disease  The patient is presenting for her final post surgery chemotherapy with carboplatin and weekly paclitaxel  She is tolerating her treatment well  Her  is normal at 14  Today, the patient is doing well  She denies significant abdominal pain, pelvic pain, nausea, vomiting, constipation, diarrhea, fevers, chills, or vaginal bleeding  She has noted that her incision is draining in 2 spots  She is cleansing them daily        The following portions of the patient's history were reviewed and updated as appropriate: allergies, current medications, past family history, past social history, past surgical history and problem list     Review of Systems   Constitutional: Negative  HENT: Negative  Eyes: Negative  Respiratory: Negative  Cardiovascular: Negative  Gastrointestinal: Negative  Endocrine: Negative  Genitourinary: Negative  Musculoskeletal: Negative  Skin: Negative  Neurological: Negative  Hematological: Negative  Psychiatric/Behavioral: Negative  Current Outpatient Medications   Medication Sig Dispense Refill    aspirin-acetaminophen-caffeine (EXCEDRIN MIGRAINE) 250-250-65 MG per tablet Take 1 tablet by mouth every 6 (six) hours as needed for headaches      pantoprazole (PROTONIX) 40 mg tablet Take 1 tablet (40 mg total) by mouth 2 (two) times a day before meals  0    gabapentin (NEURONTIN) 300 mg capsule Take 1 capsule (300 mg total) by mouth 3 (three) times a day 90 capsule 0    potassium chloride (K-DUR,KLOR-CON) 20 mEq tablet Take 1 tablet (20 mEq total) by mouth daily 30 tablet 0     No current facility-administered medications for this visit  Objective:    Blood pressure 138/82, pulse 64, temperature 98 °F (36 7 °C), resp  rate 18, height 5' 3 78" (1 62 m), weight 76 2 kg (168 lb), not currently breastfeeding  Body mass index is 29 04 kg/m²  Body surface area is 1 81 meters squared  Physical Exam   Constitutional: She is oriented to person, place, and time  She appears well-developed and well-nourished  HENT:   Head: Normocephalic and atraumatic  Eyes: EOM are normal    Neck: Normal range of motion  Neck supple  No thyromegaly present  Cardiovascular: Normal rate, regular rhythm and normal heart sounds  Pulmonary/Chest: Effort normal and breath sounds normal    Abdominal: Soft  Bowel sounds are normal    Vertical midline incision with suture material protruding through  This was removed without difficulty in the periumbilical region    There is a small opening in the scan in more cephalad region this was probed and unremarkable  Both sites were treated with silver nitrate  Genitourinary:   Genitourinary Comments: Deferred   Musculoskeletal: Normal range of motion  Lymphadenopathy:     She has no cervical adenopathy  Neurological: She is alert and oriented to person, place, and time  Skin: Skin is warm and dry  Psychiatric: She has a normal mood and affect   Her behavior is normal        Lab Results   Component Value Date     14 5 12/02/2019     Lab Results   Component Value Date    K 4 3 12/21/2019     12/21/2019    CO2 29 12/21/2019    BUN 23 12/21/2019    CREATININE 1 00 12/21/2019    GLUCOSE 228 (H) 09/10/2019    GLUF 142 (H) 11/19/2019    CALCIUM 9 3 12/21/2019    AST 13 12/21/2019    ALT 19 12/21/2019    ALKPHOS 94 12/21/2019    EGFR 58 12/21/2019     Lab Results   Component Value Date    WBC 4 77 12/21/2019    HGB 11 1 (L) 12/21/2019    HCT 35 3 12/21/2019    MCV 93 12/21/2019     12/21/2019     Lab Results   Component Value Date    NEUTROABS 2 32 12/21/2019

## 2019-12-31 NOTE — PROGRESS NOTES
Pt presents for lab specimen collection via port a cath  Port accessed, labs drawn, saline locked and de accessed without difficulty  Declined AVS, next appointment reviewed

## 2020-01-01 ENCOUNTER — TELEPHONE (OUTPATIENT)
Dept: GYNECOLOGIC ONCOLOGY | Facility: CLINIC | Age: 70
End: 2020-01-01

## 2020-01-01 ENCOUNTER — HOSPITAL ENCOUNTER (OUTPATIENT)
Dept: RADIOLOGY | Facility: MEDICAL CENTER | Age: 70
Discharge: HOME | End: 2020-12-08
Payer: MEDICARE

## 2020-01-01 ENCOUNTER — OFFICE VISIT (OUTPATIENT)
Dept: GYNECOLOGIC ONCOLOGY | Facility: CLINIC | Age: 70
End: 2020-01-01
Payer: MEDICARE

## 2020-01-01 ENCOUNTER — LAB (OUTPATIENT)
Dept: LAB | Facility: HOSPITAL | Age: 70
End: 2020-01-01
Payer: MEDICARE

## 2020-01-01 VITALS
SYSTOLIC BLOOD PRESSURE: 130 MMHG | HEIGHT: 64 IN | RESPIRATION RATE: 18 BRPM | WEIGHT: 164.5 LBS | BODY MASS INDEX: 28.09 KG/M2 | TEMPERATURE: 98.4 F | HEART RATE: 81 BPM | DIASTOLIC BLOOD PRESSURE: 72 MMHG

## 2020-01-01 VITALS
BODY MASS INDEX: 28.68 KG/M2 | RESPIRATION RATE: 18 BRPM | HEART RATE: 98 BPM | SYSTOLIC BLOOD PRESSURE: 120 MMHG | TEMPERATURE: 74 F | HEIGHT: 64 IN | DIASTOLIC BLOOD PRESSURE: 62 MMHG | WEIGHT: 168 LBS

## 2020-01-01 DIAGNOSIS — G62.9 NEUROPATHY: ICD-10-CM

## 2020-01-01 DIAGNOSIS — C56.9 MALIGNANT NEOPLASM OF OVARY, UNSPECIFIED LATERALITY (HCC): Primary | ICD-10-CM

## 2020-01-01 DIAGNOSIS — D64.9 ANEMIA, UNSPECIFIED TYPE: ICD-10-CM

## 2020-01-01 DIAGNOSIS — C56.9 MALIGNANT NEOPLASM OF OVARY, UNSPECIFIED LATERALITY (HCC): ICD-10-CM

## 2020-01-01 PROCEDURE — 71260 CT THORAX DX C+: CPT

## 2020-01-01 PROCEDURE — 99214 OFFICE O/P EST MOD 30 MIN: CPT | Performed by: OBSTETRICS & GYNECOLOGY

## 2020-01-01 PROCEDURE — G1004 CDSM NDSC: HCPCS

## 2020-01-01 PROCEDURE — 74177 CT ABD & PELVIS W/CONTRAST: CPT

## 2020-01-01 RX ORDER — TRAMADOL HYDROCHLORIDE 50 MG/1
50 TABLET ORAL EVERY 6 HOURS PRN
Qty: 20 TABLET | Refills: 1 | Status: SHIPPED | OUTPATIENT
Start: 2020-01-01 | End: 2021-01-01 | Stop reason: SDUPTHER

## 2020-01-01 RX ADMIN — IOHEXOL 85 ML: 350 INJECTION, SOLUTION INTRAVENOUS at 10:09

## 2020-01-06 RX ORDER — SODIUM CHLORIDE 9 MG/ML
20 INJECTION, SOLUTION INTRAVENOUS ONCE
Status: CANCELLED | OUTPATIENT
Start: 2020-01-07

## 2020-01-06 RX ORDER — PALONOSETRON 0.05 MG/ML
0.25 INJECTION, SOLUTION INTRAVENOUS ONCE
Status: CANCELLED | OUTPATIENT
Start: 2020-01-07

## 2020-01-07 ENCOUNTER — HOSPITAL ENCOUNTER (OUTPATIENT)
Dept: INFUSION CENTER | Facility: CLINIC | Age: 70
Discharge: HOME | End: 2020-01-07
Payer: MEDICARE

## 2020-01-07 VITALS
BODY MASS INDEX: 26.5 KG/M2 | SYSTOLIC BLOOD PRESSURE: 128 MMHG | WEIGHT: 155.2 LBS | DIASTOLIC BLOOD PRESSURE: 58 MMHG | TEMPERATURE: 99.1 F | RESPIRATION RATE: 16 BRPM | HEIGHT: 64 IN | HEART RATE: 84 BPM

## 2020-01-07 DIAGNOSIS — C56.9 MALIGNANT NEOPLASM OF OVARY, UNSPECIFIED LATERALITY (HCC): Primary | ICD-10-CM

## 2020-01-07 PROCEDURE — 96375 TX/PRO/DX INJ NEW DRUG ADDON: CPT

## 2020-01-07 PROCEDURE — 96413 CHEMO IV INFUSION 1 HR: CPT

## 2020-01-07 PROCEDURE — 96367 TX/PROPH/DG ADDL SEQ IV INF: CPT

## 2020-01-07 PROCEDURE — 96417 CHEMO IV INFUS EACH ADDL SEQ: CPT

## 2020-01-07 RX ORDER — SODIUM CHLORIDE 9 MG/ML
20 INJECTION, SOLUTION INTRAVENOUS ONCE
Status: COMPLETED | OUTPATIENT
Start: 2020-01-07 | End: 2020-01-07

## 2020-01-07 RX ORDER — PALONOSETRON 0.05 MG/ML
0.25 INJECTION, SOLUTION INTRAVENOUS ONCE
Status: COMPLETED | OUTPATIENT
Start: 2020-01-07 | End: 2020-01-07

## 2020-01-07 RX ADMIN — FAMOTIDINE 20 MG: 10 INJECTION, SOLUTION INTRAVENOUS at 11:48

## 2020-01-07 RX ADMIN — PACLITAXEL 113.4 MG: 6 INJECTION, SOLUTION INTRAVENOUS at 13:01

## 2020-01-07 RX ADMIN — SODIUM CHLORIDE 150 MG: 0.9 INJECTION, SOLUTION INTRAVENOUS at 12:17

## 2020-01-07 RX ADMIN — DEXAMETHASONE SODIUM PHOSPHATE 20 MG: 10 INJECTION, SOLUTION INTRAMUSCULAR; INTRAVENOUS at 11:02

## 2020-01-07 RX ADMIN — CARBOPLATIN 286.4 MG: 10 INJECTION, SOLUTION INTRAVENOUS at 14:05

## 2020-01-07 RX ADMIN — SODIUM CHLORIDE 20 ML/HR: 0.9 INJECTION, SOLUTION INTRAVENOUS at 10:52

## 2020-01-07 RX ADMIN — DIPHENHYDRAMINE HYDROCHLORIDE 25 MG: 50 INJECTION, SOLUTION INTRAMUSCULAR; INTRAVENOUS at 11:25

## 2020-01-07 RX ADMIN — SODIUM CHLORIDE 500 ML: 0.9 INJECTION, SOLUTION INTRAVENOUS at 10:52

## 2020-01-07 RX ADMIN — PALONOSETRON 0.25 MG: 0.05 INJECTION, SOLUTION INTRAVENOUS at 10:56

## 2020-01-07 NOTE — PROGRESS NOTES
Pt arrived on unit for chemo infusion, labs are from 12/31, per Anitra Cornejo PA-C, ok to use these labs  RCW port accessed  Will cont to [email or web address]

## 2020-01-13 ENCOUNTER — APPOINTMENT (OUTPATIENT)
Dept: LAB | Facility: HOSPITAL | Age: 70
End: 2020-01-13
Payer: MEDICARE

## 2020-01-13 RX ORDER — SODIUM CHLORIDE 9 MG/ML
20 INJECTION, SOLUTION INTRAVENOUS ONCE
Status: CANCELLED | OUTPATIENT
Start: 2020-01-14

## 2020-01-14 ENCOUNTER — HOSPITAL ENCOUNTER (OUTPATIENT)
Dept: INFUSION CENTER | Facility: CLINIC | Age: 70
Discharge: HOME | End: 2020-01-14
Payer: MEDICARE

## 2020-01-14 VITALS
HEART RATE: 88 BPM | SYSTOLIC BLOOD PRESSURE: 150 MMHG | HEIGHT: 64 IN | RESPIRATION RATE: 18 BRPM | WEIGHT: 157.41 LBS | TEMPERATURE: 97.5 F | BODY MASS INDEX: 26.87 KG/M2 | DIASTOLIC BLOOD PRESSURE: 72 MMHG

## 2020-01-14 DIAGNOSIS — C56.9 MALIGNANT NEOPLASM OF OVARY, UNSPECIFIED LATERALITY (HCC): Primary | ICD-10-CM

## 2020-01-14 PROCEDURE — 96413 CHEMO IV INFUSION 1 HR: CPT

## 2020-01-14 PROCEDURE — 96367 TX/PROPH/DG ADDL SEQ IV INF: CPT

## 2020-01-14 RX ORDER — SODIUM CHLORIDE 9 MG/ML
20 INJECTION, SOLUTION INTRAVENOUS ONCE
Status: COMPLETED | OUTPATIENT
Start: 2020-01-14 | End: 2020-01-14

## 2020-01-14 RX ADMIN — DIPHENHYDRAMINE HYDROCHLORIDE 25 MG: 50 INJECTION, SOLUTION INTRAMUSCULAR; INTRAVENOUS at 11:52

## 2020-01-14 RX ADMIN — PACLITAXEL 114.6 MG: 6 INJECTION, SOLUTION INTRAVENOUS at 12:17

## 2020-01-14 RX ADMIN — DEXAMETHASONE SODIUM PHOSPHATE 10 MG: 10 INJECTION, SOLUTION INTRAMUSCULAR; INTRAVENOUS at 11:01

## 2020-01-14 RX ADMIN — FAMOTIDINE 20 MG: 10 INJECTION, SOLUTION INTRAVENOUS at 11:24

## 2020-01-14 RX ADMIN — SODIUM CHLORIDE 20 ML/HR: 0.9 INJECTION, SOLUTION INTRAVENOUS at 11:01

## 2020-01-14 NOTE — PROGRESS NOTES
Pt presents for weekly Taxol  Vitals stable, labs within parameters for treatment  Pt offers no complaints  Will continue to monitor

## 2020-01-20 ENCOUNTER — APPOINTMENT (OUTPATIENT)
Dept: LAB | Facility: HOSPITAL | Age: 70
End: 2020-01-20
Payer: MEDICARE

## 2020-01-20 RX ORDER — SODIUM CHLORIDE 9 MG/ML
20 INJECTION, SOLUTION INTRAVENOUS ONCE
Status: CANCELLED | OUTPATIENT
Start: 2020-01-21

## 2020-01-21 ENCOUNTER — HOSPITAL ENCOUNTER (OUTPATIENT)
Dept: INFUSION CENTER | Facility: CLINIC | Age: 70
Discharge: HOME | End: 2020-01-21
Payer: MEDICARE

## 2020-01-21 VITALS
SYSTOLIC BLOOD PRESSURE: 146 MMHG | DIASTOLIC BLOOD PRESSURE: 67 MMHG | WEIGHT: 160.94 LBS | RESPIRATION RATE: 18 BRPM | HEART RATE: 75 BPM | BODY MASS INDEX: 27.48 KG/M2 | TEMPERATURE: 98.9 F | HEIGHT: 64 IN

## 2020-01-21 DIAGNOSIS — C56.9 MALIGNANT NEOPLASM OF OVARY, UNSPECIFIED LATERALITY (HCC): Primary | ICD-10-CM

## 2020-01-21 PROCEDURE — 96367 TX/PROPH/DG ADDL SEQ IV INF: CPT

## 2020-01-21 PROCEDURE — 96413 CHEMO IV INFUSION 1 HR: CPT

## 2020-01-21 RX ORDER — SODIUM CHLORIDE 9 MG/ML
20 INJECTION, SOLUTION INTRAVENOUS ONCE
Status: COMPLETED | OUTPATIENT
Start: 2020-01-21 | End: 2020-01-21

## 2020-01-21 RX ADMIN — FAMOTIDINE 20 MG: 10 INJECTION, SOLUTION INTRAVENOUS at 13:14

## 2020-01-21 RX ADMIN — DEXAMETHASONE SODIUM PHOSPHATE 10 MG: 10 INJECTION, SOLUTION INTRAMUSCULAR; INTRAVENOUS at 12:22

## 2020-01-21 RX ADMIN — SODIUM CHLORIDE 20 ML/HR: 0.9 INJECTION, SOLUTION INTRAVENOUS at 12:22

## 2020-01-21 RX ADMIN — PACLITAXEL 114.6 MG: 6 INJECTION, SOLUTION INTRAVENOUS at 13:38

## 2020-01-21 RX ADMIN — DIPHENHYDRAMINE HYDROCHLORIDE 25 MG: 50 INJECTION, SOLUTION INTRAMUSCULAR; INTRAVENOUS at 12:44

## 2020-01-21 NOTE — PLAN OF CARE
Problem: Potential for Falls  Goal: Patient will remain free of falls  Description  INTERVENTIONS:  - Assess patient frequently for physical needs  -  Identify cognitive and physical deficits and behaviors that affect risk of falls  -  [place] fall precautions as indicated by assessment   - Educate patient/family on patient safety including physical limitations  - Instruct patient to call for assistance with activity based on assessment  - Modify environment to reduce risk of injury  - Consider OT/PT consult to assist with strengthening/mobility  Outcome: Progressing     Problem: Knowledge Deficit  Goal: Patient/family/caregiver demonstrates understanding of disease process, treatment plan, medications, and discharge instructions  Description  Complete learning assessment and assess knowledge base    Interventions:  - Provide teaching at level of understanding  - Provide teaching via preferred learning methods  Outcome: Progressing

## 2020-01-28 ENCOUNTER — HOSPITAL ENCOUNTER (OUTPATIENT)
Dept: INFUSION CENTER | Facility: CLINIC | Age: 70
Discharge: HOME | End: 2020-01-28
Payer: MEDICARE

## 2020-01-28 DIAGNOSIS — C56.3 MALIGNANT NEOPLASM OF BOTH OVARIES (HCC): Primary | ICD-10-CM

## 2020-01-28 DIAGNOSIS — C56.9 MALIGNANT NEOPLASM OF OVARY, UNSPECIFIED LATERALITY (HCC): ICD-10-CM

## 2020-01-28 DIAGNOSIS — Z45.2 ENCOUNTER FOR CENTRAL LINE CARE: ICD-10-CM

## 2020-01-28 LAB
ALBUMIN SERPL BCP-MCNC: 3.3 G/DL (ref 3.5–5)
ALP SERPL-CCNC: 105 U/L (ref 46–116)
ALT SERPL W P-5'-P-CCNC: 17 U/L (ref 12–78)
ANION GAP SERPL CALCULATED.3IONS-SCNC: 8 MMOL/L (ref 4–13)
AST SERPL W P-5'-P-CCNC: 14 U/L (ref 5–45)
BASOPHILS # BLD AUTO: 0.04 THOUSANDS/ΜL (ref 0–0.1)
BASOPHILS NFR BLD AUTO: 1 % (ref 0–1)
BILIRUB SERPL-MCNC: 0.1 MG/DL (ref 0.2–1)
BUN SERPL-MCNC: 22 MG/DL (ref 5–25)
CALCIUM SERPL-MCNC: 8.9 MG/DL (ref 8.3–10.1)
CANCER AG125 SERPL-ACNC: 18.2 U/ML (ref 0–30)
CHLORIDE SERPL-SCNC: 106 MMOL/L (ref 100–108)
CO2 SERPL-SCNC: 27 MMOL/L (ref 21–32)
CREAT SERPL-MCNC: 1.16 MG/DL (ref 0.6–1.3)
EOSINOPHIL # BLD AUTO: 0.05 THOUSAND/ΜL (ref 0–0.61)
EOSINOPHIL NFR BLD AUTO: 1 % (ref 0–6)
ERYTHROCYTE [DISTWIDTH] IN BLOOD BY AUTOMATED COUNT: 15.8 % (ref 11.6–15.1)
GFR SERPL CREATININE-BSD FRML MDRD: 48 ML/MIN/1.73SQ M
GLUCOSE SERPL-MCNC: 145 MG/DL (ref 65–140)
HCT VFR BLD AUTO: 27.1 % (ref 34.8–46.1)
HGB BLD-MCNC: 8.7 G/DL (ref 11.5–15.4)
IMM GRANULOCYTES # BLD AUTO: 0.05 THOUSAND/UL (ref 0–0.2)
IMM GRANULOCYTES NFR BLD AUTO: 1 % (ref 0–2)
LYMPHOCYTES # BLD AUTO: 1.8 THOUSANDS/ΜL (ref 0.6–4.47)
LYMPHOCYTES NFR BLD AUTO: 37 % (ref 14–44)
MAGNESIUM SERPL-MCNC: 1.7 MG/DL (ref 1.6–2.6)
MCH RBC QN AUTO: 29.9 PG (ref 26.8–34.3)
MCHC RBC AUTO-ENTMCNC: 32.1 G/DL (ref 31.4–37.4)
MCV RBC AUTO: 93 FL (ref 82–98)
MONOCYTES # BLD AUTO: 0.47 THOUSAND/ΜL (ref 0.17–1.22)
MONOCYTES NFR BLD AUTO: 10 % (ref 4–12)
NEUTROPHILS # BLD AUTO: 2.45 THOUSANDS/ΜL (ref 1.85–7.62)
NEUTS SEG NFR BLD AUTO: 50 % (ref 43–75)
NRBC BLD AUTO-RTO: 0 /100 WBCS
PLATELET # BLD AUTO: 259 THOUSANDS/UL (ref 149–390)
PMV BLD AUTO: 11.8 FL (ref 8.9–12.7)
POTASSIUM SERPL-SCNC: 4.1 MMOL/L (ref 3.5–5.3)
PROT SERPL-MCNC: 6.9 G/DL (ref 6.4–8.2)
RBC # BLD AUTO: 2.91 MILLION/UL (ref 3.81–5.12)
SODIUM SERPL-SCNC: 141 MMOL/L (ref 136–145)
WBC # BLD AUTO: 4.86 THOUSAND/UL (ref 4.31–10.16)

## 2020-01-28 PROCEDURE — 86304 IMMUNOASSAY TUMOR CA 125: CPT

## 2020-01-28 PROCEDURE — 83735 ASSAY OF MAGNESIUM: CPT

## 2020-01-28 PROCEDURE — 80053 COMPREHEN METABOLIC PANEL: CPT

## 2020-01-28 PROCEDURE — 85025 COMPLETE CBC W/AUTO DIFF WBC: CPT

## 2020-02-04 ENCOUNTER — HOSPITAL ENCOUNTER (OUTPATIENT)
Dept: CT IMAGING | Facility: CLINIC | Age: 70
Discharge: HOME | End: 2020-02-04
Payer: MEDICARE

## 2020-02-04 DIAGNOSIS — C56.9 MALIGNANT NEOPLASM OF OVARY, UNSPECIFIED LATERALITY (HCC): ICD-10-CM

## 2020-02-04 PROCEDURE — 74177 CT ABD & PELVIS W/CONTRAST: CPT

## 2020-02-04 PROCEDURE — 71260 CT THORAX DX C+: CPT

## 2020-02-04 RX ADMIN — IOHEXOL 100 ML: 350 INJECTION, SOLUTION INTRAVENOUS at 10:36

## 2020-02-12 ENCOUNTER — OFFICE VISIT (OUTPATIENT)
Dept: GYNECOLOGIC ONCOLOGY | Facility: CLINIC | Age: 70
End: 2020-02-12
Payer: MEDICARE

## 2020-02-12 VITALS
BODY MASS INDEX: 28.34 KG/M2 | HEART RATE: 79 BPM | DIASTOLIC BLOOD PRESSURE: 80 MMHG | TEMPERATURE: 98.1 F | HEIGHT: 64 IN | SYSTOLIC BLOOD PRESSURE: 122 MMHG | RESPIRATION RATE: 18 BRPM | WEIGHT: 166 LBS

## 2020-02-12 DIAGNOSIS — C56.9 MALIGNANT NEOPLASM OF OVARY, UNSPECIFIED LATERALITY (HCC): Primary | ICD-10-CM

## 2020-02-12 PROCEDURE — 1124F ACP DISCUSS-NO DSCNMKR DOCD: CPT | Performed by: OBSTETRICS & GYNECOLOGY

## 2020-02-12 PROCEDURE — 99214 OFFICE O/P EST MOD 30 MIN: CPT | Performed by: OBSTETRICS & GYNECOLOGY

## 2020-02-12 NOTE — LETTER
February 12, 2020     Daniel Nguyen MD  Attn: NONA Donis   [address] [place] [address]    Patient: Cynthia Garcia   YOB: 1950   Date of Visit: 2/12/2020       Dear Dr Wili Barragan: Thank you for referring Cynthia Garcia to me for evaluation  Below are my notes for this consultation  If you have questions, please do not hesitate to call me  I look forward to following your patient along with you  Sincerely,        Tina Dailey MD        CC: No Recipients  Tina Dailey MD  2/12/2020 10:35 AM  Incomplete  Assessment/Plan:    Problem List Items Addressed This Visit        Endocrine    Ovarian cancer [facility]) - Primary     Patient has completed primary therapy with predominantly carboplatin paclitaxel for stage IV disease  Her  remains stable in the mid to high teens  Her exam is unremarkable  Her CT scan does reveal questionable abnormalities with the liver  This has been ongoing for some time  Therefore it is unclear if the patient has entered a complete remission  The patient's blood work has returned to stable levels including blood chemistries and hematology with the exception of a mildly low hemoglobin at 8 7  We discussed with the patient several options  We have discussed moving ahead with colostomy reversal with colorectal surgery verses moving ahead with consolidation therapy verses observation  The patient is very interested in having her stoma reversed as she has had problems with the throughout her course  At this time I think it is best to find out whether the patient is truly in remission  I have recommended a PET-CT scan of the abdomen and PET CT scan  This will help to decide whether the patient is truly in remission  If she is we will make the referral back to Colorectal to begin to work on colostomy reversal     With regard to consolidation treatment the patient is not a candidate for a Avastin    She does not have any actionable mutations  Relevant Orders    NM PET CT skull base to mid thigh        Overall visit took 25 minutes  CHIEF COMPLAINT:  Post treatment follow-up stage IV ovarian cancer      Problem:  Cancer Staging  Ovarian cancer [facility])  Staging form: Ovary, Fallopian Tube, Primary Peritoneal, AJCC 8th Edition  - Clinical stage from 11/14/2018: FIGO Stage IVB (cT3c, cN0, pM1b) - Signed by Luc Johansen MD on 11/14/2018  - Pathologic stage from 10/9/2019: FIGO Stage IIIC (pT3c, pN1, cM0) - Signed by Luc Johansen MD on 10/9/2019        Previous therapy:     Ovarian cancer ([facility] [address] )    11/9/2018 Initial Diagnosis     Ovarian cancer ([facility] [address] )   tumor marker 194 5      11/9/2018 Biopsy     CT-guided needle biopsy of abdominal mass 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  Given liver involvement this would be stage IV      11/14/2018 - 12/4/2018 Chemotherapy     Neoadjuvant therapy: carboplatin area under the curve of 6, paclitaxel 135 milligrams/meter squared, Avastin 10 milligrams/kilogram  Completed 1 of 3 cycles  12/14/2018 Surgery     LAPAROTOMY EXPLORATORY; Abdominal Washout; Application of Abthera Vac Dressing for suspected bowel perforation and septic shock         12/15/2018 Surgery     LAPAROTOMY EXPLORATORY, ABDOMINAL WASHOUT, DRAIN PLACEMENT x 4, DIVERTING LOOP ILEOSTOMY AND ABDOMINAL CLOSURE for bowel perforation      3/26/2019 -  Chemotherapy     Taxol weekly 80 mg/m2 for 3 consecutive weeks, may add carboplatin in the future with AUC of 5       3/26/2019 -  Chemotherapy     palonosetron (ALOXI) injection 0 25 mg, 0 25 mg, Intravenous, Once, 6 of 6 cycles  Administration: 0 25 mg (5/28/2019), 0 25 mg (6/25/2019), 0 25 mg (11/6/2019), 0 25 mg (12/10/2019), 0 25 mg (1/7/2020)  fosaprepitant (EMEND) 150 mg in sodium chloride 0 9 % 255 mL IVPB, 150 mg, Intravenous, Once, 6 of 6 cycles  Administration: 150 mg (5/28/2019), 150 mg (6/25/2019), 150 mg (11/6/2019), 150 mg (12/10/2019), 150 mg (1/7/2020)  CARBOplatin (PARAPLATIN) in sodium chloride 0 9 % 250 mL IVPB,  (original dose ), Intravenous, Once, 6 of 6 cycles  Dose modification:   (Cycle 2),   (original dose 258 4 mg, Cycle 3),   (original dose 258 4 mg, Cycle 3),   (original dose 244 4 mg, Cycle 4)  Administration: 258 4 mg (5/28/2019), 244 4 mg (6/25/2019), 285 2 mg (11/6/2019), 296 4 mg (12/10/2019), 286 4 mg (1/7/2020)  PACLitaxel (TAXOL) 127 8 mg in sodium chloride 0 9 % 250 mL chemo IVPB, 80 mg/m2, Intravenous, Once, 7 of 7 cycles  Dose modification: 65 mg/m2 (original dose 80 mg/m2, Cycle 2, Reason: Dose Not Tolerated)  Administration: 103 8 mg (5/14/2019), 108 6 mg (5/28/2019), 108 6 mg (6/4/2019), 108 6 mg (6/11/2019), 109 8 mg (6/25/2019), 109 8 mg (7/2/2019), 109 8 mg (7/9/2019), 111 6 mg (11/6/2019), 111 6 mg (11/12/2019), 111 6 mg (11/19/2019), 113 4 mg (12/10/2019), 113 4 mg (12/17/2019), 113 4 mg (12/23/2019), 113 4 mg (1/7/2020), 114 6 mg (1/14/2020), 114 6 mg (1/21/2020)      4/29/2019 Genomic Testing      Foundation 1 testing revealed a PD L1 tumor proportions score of 0%  The patient is not a candidate for PD L1 manipulation       Genetic Testing     [employer] Breast/Gyn panel, wildtype  9/10/2019 Surgery     Exploratory laparotomy radical omentectomy, posterior exenteration, takedown of ileostomy and end colostomy for complete debulking of visible tumor  Final pathology report revealed high-grade serous malignancy in both the omentum and the pelvic mass      9/25/2019 -  Chemotherapy     Carboplatin area under the curve of 5+ weekly paclitaxel at 80 milligrams/meters squared for 3 further cycles of treatment followed by consolidation this is to begin mid October      10/2019 Surgery     Interval debulking with TAHBSO revision of colostomy omentectomy and tumor debulking with complete debulking  Several weeks postoperatively the patient required a open cholecystectomy  10/2019 - 12/2019 Chemotherapy     Carboplatin paclitaxel x3      10/9/2019 -  Cancer Staged     Staging form: Ovary, Fallopian Tube, Primary Peritoneal, AJCC 8th Edition  - Pathologic stage from 10/9/2019: FIGO Stage IIIC (pT3c, pN1, cM0) - Signed by Pee Simmons MD on 10/9/2019  Histologic grade (G): G3  Histologic grading system: 4 grade system  Gross residual tumor after primary cyto-reductive surgery: Absent             Patient ID: Adilene Mcnally is a 78 y o  female  Patient is very pleasant 79-year-old female with a history of stage IV ovarian cancer initially treated with neoadjuvant chemo therapy including Avastin carboplatin paclitaxel  The patient initially developed a bowel perforation likely related to tumor die off and use of Avastin  She underwent surgery vulva in diverting colostomy and eventually recovered  She was been treated with a total of 6 cycles of carboplatin paclitaxel and an interval debulking since that time  Additionally the patient has also had a cholecystectomy  The patient's  is stable at approximately 18  She has undergone a CT scan of the chest abdomen and pelvis after completion of therapy which reveals the following:  IMPRESSION:     Innumerable hypodense hepatic lesions which were not as well seen on CT examination of 10/15/2019 which are concerning for hepatic metastasis      1 5 cm hypodensity superior to the previously noted splenic cyst, not clearly seen on prior studies and also concerning for metastasis      Parastomal hernia containing a loop of nonobstructed bowel      The study was marked in EPIC for significant notification  Overall patient is doing well  Today, the patient is doing well  She denies significant abdominal pain, pelvic pain, nausea, vomiting, constipation, diarrhea, fevers, chills, or vaginal bleeding        The following portions of the patient's history were reviewed and updated as appropriate: allergies, current medications, past medical history, past social history, past surgical history and problem list     Review of Systems   Constitutional: Negative  HENT: Negative  Eyes: Negative  Respiratory: Negative  Cardiovascular: Negative  Gastrointestinal: Negative  Endocrine: Negative  Genitourinary: Negative  Musculoskeletal: Negative  Skin: Negative  Neurological: Negative  Hematological: Negative  Psychiatric/Behavioral: Negative  Current Outpatient Medications   Medication Sig Dispense Refill    aspirin-acetaminophen-caffeine (EXCEDRIN MIGRAINE) 250-250-65 MG per tablet Take 1 tablet by mouth every 6 (six) hours as needed for headaches      pantoprazole (PROTONIX) 40 mg tablet Take 1 tablet (40 mg total) by mouth 2 (two) times a day before meals  0    gabapentin (NEURONTIN) 300 mg capsule Take 1 capsule (300 mg total) by mouth 3 (three) times a day 90 capsule 0    potassium chloride (K-DUR,KLOR-CON) 20 mEq tablet Take 1 tablet (20 mEq total) by mouth daily 30 tablet 0     No current facility-administered medications for this visit  Objective:    Blood pressure 122/80, pulse 79, temperature 98 1 °F (36 7 °C), resp  rate 18, height 5' 4 02" (1 626 m), weight 75 3 kg (166 lb), not currently breastfeeding  Body mass index is 28 48 kg/m²  Body surface area is 1 81 meters squared  Physical Exam   Constitutional: She is oriented to person, place, and time  She appears well-developed and well-nourished  HENT:   Head: Normocephalic and atraumatic  Eyes: EOM are normal    Neck: Normal range of motion  Neck supple  No thyromegaly present  Cardiovascular: Normal rate, regular rhythm and normal heart sounds  Pulmonary/Chest: Effort normal and breath sounds normal    Abdominal: Soft  Bowel sounds are normal    Well healed  incisions    Stoma in place and functioning   Genitourinary:   Genitourinary Comments: -Normal external female genitalia, normal Bartholin's and [place]'s glands                  -Normal midline urethral meatus  No lesions notes                  -Bladder without fullness mass or tenderness                  -Vagina without lesion or discharge No significant cystocele or rectocele noted                  -Cervix surgically absent                  -Uterus surgically absent                  -Adnexae surgically absent                  - Anus without fissure of lesion     Musculoskeletal: Normal range of motion  Lymphadenopathy:     She has no cervical adenopathy  Neurological: She is alert and oriented to person, place, and time  Skin: Skin is warm and dry  Psychiatric: She has a normal mood and affect   Her behavior is normal        Lab Results   Component Value Date     18 2 01/28/2020     Lab Results   Component Value Date    K 4 1 01/28/2020     01/28/2020    CO2 27 01/28/2020    BUN 22 01/28/2020    CREATININE 1 16 01/28/2020    GLUCOSE 228 (H) 09/10/2019    GLUF 142 (H) 11/19/2019    CALCIUM 8 9 01/28/2020    AST 14 01/28/2020    ALT 17 01/28/2020    ALKPHOS 105 01/28/2020    EGFR 48 01/28/2020     Lab Results   Component Value Date    WBC 4 86 01/28/2020    HGB 8 7 (L) 01/28/2020    HCT 27 1 (L) 01/28/2020    MCV 93 01/28/2020     01/28/2020     Lab Results   Component Value Date    NEUTROABS 2 45 01/28/2020

## 2020-02-12 NOTE — ASSESSMENT & PLAN NOTE
Patient has completed primary therapy with predominantly carboplatin paclitaxel for stage IV disease  Her  remains stable in the mid to high teens  Her exam is unremarkable  Her CT scan does reveal questionable abnormalities with the liver  This has been ongoing for some time  Therefore it is unclear if the patient has entered a complete remission  The patient's blood work has returned to stable levels including blood chemistries and hematology with the exception of a mildly low hemoglobin at 8 7  We discussed with the patient several options  We have discussed moving ahead with colostomy reversal with colorectal surgery verses moving ahead with consolidation therapy verses observation  The patient is very interested in having her stoma reversed as she has had problems with the throughout her course  At this time I think it is best to find out whether the patient is truly in remission  I have recommended a PET-CT scan of the abdomen and PET CT scan  This will help to decide whether the patient is truly in remission  If she is we will make the referral back to Colorectal to begin to work on colostomy reversal     With regard to consolidation treatment the patient is not a candidate for a Avastin  She does not have any actionable mutations

## 2020-02-12 NOTE — PROGRESS NOTES
Assessment/Plan:    Problem List Items Addressed This Visit        Endocrine    Ovarian cancer [facility]) - Primary     Patient has completed primary therapy with predominantly carboplatin paclitaxel for stage IV disease  Her  remains stable in the mid to high teens  Her exam is unremarkable  Her CT scan does reveal questionable abnormalities with the liver  This has been ongoing for some time  Therefore it is unclear if the patient has entered a complete remission  The patient's blood work has returned to stable levels including blood chemistries and hematology with the exception of a mildly low hemoglobin at 8 7  We discussed with the patient several options  We have discussed moving ahead with colostomy reversal with colorectal surgery verses moving ahead with consolidation therapy verses observation  The patient is very interested in having her stoma reversed as she has had problems with the throughout her course  At this time I think it is best to find out whether the patient is truly in remission  I have recommended a PET-CT scan of the abdomen and PET CT scan  This will help to decide whether the patient is truly in remission  If she is we will make the referral back to Colorectal to begin to work on colostomy reversal     With regard to consolidation treatment the patient is not a candidate for a Avastin  She does not have any actionable mutations  Relevant Orders    NM PET CT skull base to mid thigh        Overall visit took 25 minutes      CHIEF COMPLAINT:  Post treatment follow-up stage IV ovarian cancer      Problem:  Cancer Staging  Ovarian cancer [facility])  Staging form: Ovary, Fallopian Tube, Primary Peritoneal, AJCC 8th Edition  - Clinical stage from 11/14/2018: FIGO Stage IVB (cT3c, cN0, pM1b) - Signed by Amy Kam MD on 11/14/2018  - Pathologic stage from 10/9/2019: FIGO Stage IIIC (pT3c, pN1, cM0) - Signed by Amy Kam MD on 10/9/2019        Previous therapy: Ovarian cancer ([address] )    11/9/2018 Initial Diagnosis     Ovarian cancer ([address] )   tumor marker 194 5      11/9/2018 Biopsy     CT-guided needle biopsy of abdominal mass 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  Given liver involvement this would be stage IV      11/14/2018 - 12/4/2018 Chemotherapy     Neoadjuvant therapy: carboplatin area under the curve of 6, paclitaxel 135 milligrams/meter squared, Avastin 10 milligrams/kilogram  Completed 1 of 3 cycles  12/14/2018 Surgery     LAPAROTOMY EXPLORATORY; Abdominal Washout; Application of Abthera Vac Dressing for suspected bowel perforation and septic shock         12/15/2018 Surgery     LAPAROTOMY EXPLORATORY, ABDOMINAL WASHOUT, DRAIN PLACEMENT x 4, DIVERTING LOOP ILEOSTOMY AND ABDOMINAL CLOSURE for bowel perforation      3/26/2019 -  Chemotherapy     Taxol weekly 80 mg/m2 for 3 consecutive weeks, may add carboplatin in the future with AUC of 5       3/26/2019 -  Chemotherapy     palonosetron (ALOXI) injection 0 25 mg, 0 25 mg, Intravenous, Once, 6 of 6 cycles  Administration: 0 25 mg (5/28/2019), 0 25 mg (6/25/2019), 0 25 mg (11/6/2019), 0 25 mg (12/10/2019), 0 25 mg (1/7/2020)  fosaprepitant (EMEND) 150 mg in sodium chloride 0 9 % 255 mL IVPB, 150 mg, Intravenous, Once, 6 of 6 cycles  Administration: 150 mg (5/28/2019), 150 mg (6/25/2019), 150 mg (11/6/2019), 150 mg (12/10/2019), 150 mg (1/7/2020)  CARBOplatin (PARAPLATIN) in sodium chloride 0 9 % 250 mL IVPB,  (original dose ), Intravenous, Once, 6 of 6 cycles  Dose modification:   (Cycle 2),   (original dose 258 4 mg, Cycle 3),   (original dose 258 4 mg, Cycle 3),   (original dose 244 4 mg, Cycle 4)  Administration: 258 4 mg (5/28/2019), 244 4 mg (6/25/2019), 285 2 mg (11/6/2019), 296 4 mg (12/10/2019), 286 4 mg (1/7/2020)  PACLitaxel (TAXOL) 127 8 mg in sodium chloride 0 9 % 250 mL chemo IVPB, 80 mg/m2, Intravenous, Once, 7 of 7 cycles  Dose modification: 65 mg/m2 (original dose 80 mg/m2, Cycle 2, Reason: Dose Not Tolerated)  Administration: 103 8 mg (5/14/2019), 108 6 mg (5/28/2019), 108 6 mg (6/4/2019), 108 6 mg (6/11/2019), 109 8 mg (6/25/2019), 109 8 mg (7/2/2019), 109 8 mg (7/9/2019), 111 6 mg (11/6/2019), 111 6 mg (11/12/2019), 111 6 mg (11/19/2019), 113 4 mg (12/10/2019), 113 4 mg (12/17/2019), 113 4 mg (12/23/2019), 113 4 mg (1/7/2020), 114 6 mg (1/14/2020), 114 6 mg (1/21/2020)      4/29/2019 Genomic Testing      [facility] testing revealed a PD L1 tumor proportions score of 0%  The patient is not a candidate for PD L1 manipulation       Genetic Testing     [facility] Breast/Gyn panel, wildtype  9/10/2019 Surgery     Exploratory laparotomy radical omentectomy, posterior exenteration, takedown of ileostomy and end colostomy for complete debulking of visible tumor  Final pathology report revealed high-grade serous malignancy in both the omentum and the pelvic mass      9/25/2019 -  Chemotherapy     Carboplatin area under the curve of 5+ weekly paclitaxel at 80 milligrams/meters squared for 3 further cycles of treatment followed by consolidation this is to begin mid October      10/2019 Surgery     Interval debulking with TAHBSO revision of colostomy omentectomy and tumor debulking with complete debulking  Several weeks postoperatively the patient required a open cholecystectomy        10/2019 - 12/2019 Chemotherapy     Carboplatin paclitaxel x3      10/9/2019 -  Cancer Staged     Staging form: Ovary, Fallopian Tube, Primary Peritoneal, AJCC 8th Edition  - Pathologic stage from 10/9/2019: FIGO Stage IIIC (pT3c, pN1, cM0) - Signed by Patti Small MD on 10/9/2019  Histologic grade (G): G3  Histologic grading system: 4 grade system  Gross residual tumor after primary cyto-reductive surgery: Absent             Patient ID: Kimmy Gilbert is a 78 y o  female  Patient is very pleasant 67-year-old female with a history of stage IV ovarian cancer initially treated with neoadjuvant chemo therapy including Avastin carboplatin paclitaxel  The patient initially developed a bowel perforation likely related to tumor die off and use of Avastin  She underwent surgery vulva in diverting colostomy and eventually recovered  She was been treated with a total of 6 cycles of carboplatin paclitaxel and an interval debulking since that time  Additionally the patient has also had a cholecystectomy  The patient's  is stable at approximately 18  She has undergone a CT scan of the chest abdomen and pelvis after completion of therapy which reveals the following:  IMPRESSION:     Innumerable hypodense hepatic lesions which were not as well seen on CT examination of 10/15/2019 which are concerning for hepatic metastasis      1 5 cm hypodensity superior to the previously noted splenic cyst, not clearly seen on prior studies and also concerning for metastasis      Parastomal hernia containing a loop of nonobstructed bowel      The study was marked in EPIC for significant notification  Overall patient is doing well  Today, the patient is doing well  She denies significant abdominal pain, pelvic pain, nausea, vomiting, constipation, diarrhea, fevers, chills, or vaginal bleeding  The following portions of the patient's history were reviewed and updated as appropriate: allergies, current medications, past medical history, past social history, past surgical history and problem list     Review of Systems   Constitutional: Negative  HENT: Negative  Eyes: Negative  Respiratory: Negative  Cardiovascular: Negative  Gastrointestinal: Negative  Endocrine: Negative  Genitourinary: Negative  Musculoskeletal: Negative  Skin: Negative  Neurological: Negative  Hematological: Negative  Psychiatric/Behavioral: Negative          Current Outpatient Medications   Medication Sig Dispense Refill    aspirin-acetaminophen-caffeine (EXCEDRIN MIGRAINE) 250-250-65 MG per tablet Take 1 tablet by mouth every 6 (six) hours as needed for headaches      pantoprazole (PROTONIX) 40 mg tablet Take 1 tablet (40 mg total) by mouth 2 (two) times a day before meals  0    gabapentin (NEURONTIN) 300 mg capsule Take 1 capsule (300 mg total) by mouth 3 (three) times a day 90 capsule 0    potassium chloride (K-DUR,KLOR-CON) 20 mEq tablet Take 1 tablet (20 mEq total) by mouth daily 30 tablet 0     No current facility-administered medications for this visit  Objective:    Blood pressure 122/80, pulse 79, temperature 98 1 °F (36 7 °C), resp  rate 18, height 5' 4 02" (1 626 m), weight 75 3 kg (166 lb), not currently breastfeeding  Body mass index is 28 48 kg/m²  Body surface area is 1 81 meters squared  Physical Exam   Constitutional: She is oriented to person, place, and time  She appears well-developed and well-nourished  HENT:   Head: Normocephalic and atraumatic  Eyes: EOM are normal    Neck: Normal range of motion  Neck supple  No thyromegaly present  Cardiovascular: Normal rate, regular rhythm and normal heart sounds  Pulmonary/Chest: Effort normal and breath sounds normal    Abdominal: Soft  Bowel sounds are normal    Well healed  incisions  Stoma in place and functioning   Genitourinary:   Genitourinary Comments: -Normal external female genitalia, normal Bartholin's and Kenmar's glands                  -Normal midline urethral meatus  No lesions notes                  -Bladder without fullness mass or tenderness                  -Vagina without lesion or discharge No significant cystocele or rectocele noted                  -Cervix surgically absent                  -Uterus surgically absent                  -Adnexae surgically absent                  - Anus without fissure of lesion     Musculoskeletal: Normal range of motion  Lymphadenopathy:     She has no cervical adenopathy  Neurological: She is alert and oriented to person, place, and time     Skin: Skin is warm and dry  Psychiatric: She has a normal mood and affect   Her behavior is normal        Lab Results   Component Value Date     18 2 01/28/2020     Lab Results   Component Value Date    K 4 1 01/28/2020     01/28/2020    CO2 27 01/28/2020    BUN 22 01/28/2020    CREATININE 1 16 01/28/2020    GLUCOSE 228 (H) 09/10/2019    GLUF 142 (H) 11/19/2019    CALCIUM 8 9 01/28/2020    AST 14 01/28/2020    ALT 17 01/28/2020    ALKPHOS 105 01/28/2020    EGFR 48 01/28/2020     Lab Results   Component Value Date    WBC 4 86 01/28/2020    HGB 8 7 (L) 01/28/2020    HCT 27 1 (L) 01/28/2020    MCV 93 01/28/2020     01/28/2020     Lab Results   Component Value Date    NEUTROABS 2 45 01/28/2020

## 2020-02-18 ENCOUNTER — HOSPITAL ENCOUNTER (OUTPATIENT)
Dept: RADIOLOGY | Age: 70
Discharge: HOME | End: 2020-02-18
Payer: MEDICARE

## 2020-02-18 DIAGNOSIS — C57.01 FALLOPIAN TUBE CARCINOMA, RIGHT (HCC): ICD-10-CM

## 2020-02-18 DIAGNOSIS — C56.1 MALIGNANT NEOPLASM OF OVARY, RIGHT (HCC): ICD-10-CM

## 2020-02-18 DIAGNOSIS — D37.6 NEOPLASM OF UNCERTAIN BEHAVIOR OF LIVER: ICD-10-CM

## 2020-02-18 LAB — GLUCOSE SERPL-MCNC: 139 MG/DL (ref 65–140)

## 2020-02-18 PROCEDURE — A9552 F18 FDG: HCPCS

## 2020-02-18 PROCEDURE — 78815 PET IMAGE W/CT SKULL-THIGH: CPT

## 2020-02-18 PROCEDURE — 82948 REAGENT STRIP/BLOOD GLUCOSE: CPT

## 2020-02-26 ENCOUNTER — OFFICE VISIT (OUTPATIENT)
Dept: GYNECOLOGIC ONCOLOGY | Facility: CLINIC | Age: 70
End: 2020-02-26
Payer: MEDICARE

## 2020-02-26 VITALS
TEMPERATURE: 97.5 F | BODY MASS INDEX: 28.51 KG/M2 | HEART RATE: 77 BPM | DIASTOLIC BLOOD PRESSURE: 74 MMHG | HEIGHT: 64 IN | RESPIRATION RATE: 18 BRPM | SYSTOLIC BLOOD PRESSURE: 138 MMHG | WEIGHT: 167 LBS

## 2020-02-26 DIAGNOSIS — C56.9 MALIGNANT NEOPLASM OF OVARY, UNSPECIFIED LATERALITY (HCC): Primary | ICD-10-CM

## 2020-02-26 DIAGNOSIS — C56.3 MALIGNANT NEOPLASM OF BOTH OVARIES (HCC): ICD-10-CM

## 2020-02-26 PROCEDURE — 99214 OFFICE O/P EST MOD 30 MIN: CPT | Performed by: OBSTETRICS & GYNECOLOGY

## 2020-02-26 NOTE — PROGRESS NOTES
Assessment/Plan:    Problem List Items Addressed This Visit        Endocrine    Ovarian cancer [facility])     Patient unfortunately has persistent disease after completion of initial treatment  We discussed treatment options of use of a Avastin alone as consolidation however have recommended against that due to the patient's prior reaction to Avastin  We have discussed a short-term chemo holiday while she considers options or discussed moving ahead to Doxil and gemcitabine combination  The patient would like to take some time to think about it but is interested in moving ahead with Doxil and gemcitabine  We discussed with the patient risks and benefits of treatment including hand-foot syndrome  I have given the patient informational sheets and the patient has signed an informed consent  The patient awoke is aware that this is a palliative regimen and she will require treatment often on for the rest of her life  She is interested in going to [place] to see her sister over the summer  We can make arrangements to work around that  We will call the patient to get her input on when she would like to start treatment  We will use a dose of Doxil 30 milligrams/meter squared Q 4 weeks and gemcitabine 650 mg day 1 and day 8  We will use Neulasta as support             Relevant Orders        CBC and differential    Comprehensive metabolic panel    Malignant neoplasm of ovary ([facility] [address] ) - Primary    Relevant Orders        CBC and differential    Comprehensive metabolic panel        Overall consult took 25 minutes with greater than 50% being dedicated toward discussion time    CHIEF COMPLAINT:       Problem:  Cancer Staging  Ovarian cancer [facility])  Staging form: Ovary, Fallopian Tube, Primary Peritoneal, AJCC 8th Edition  - Clinical stage from 11/14/2018: FIGO Stage IVB (cT3c, cN0, pM1b) - Signed by Hank Best MD on 11/14/2018  - Pathologic stage from 10/9/2019: FIGO Stage IIIC (pT3c, pN1, cM0) - Signed by Erika Quiroga MD on 10/9/2019        Previous therapy:     Ovarian cancer ([facility] [address] )    11/9/2018 Initial Diagnosis     Ovarian cancer ([facility] [address] )   tumor marker 194 5      11/9/2018 Biopsy     CT-guided needle biopsy of abdominal mass 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  Given liver involvement this would be stage IV      11/14/2018 - 12/4/2018 Chemotherapy     Neoadjuvant therapy: carboplatin area under the curve of 6, paclitaxel 135 milligrams/meter squared, Avastin 10 milligrams/kilogram  Completed 1 of 3 cycles  12/14/2018 Surgery     LAPAROTOMY EXPLORATORY; Abdominal Washout; Application of Abthera Vac Dressing for suspected bowel perforation and septic shock         12/15/2018 Surgery     LAPAROTOMY EXPLORATORY, ABDOMINAL WASHOUT, DRAIN PLACEMENT x 4, DIVERTING LOOP ILEOSTOMY AND ABDOMINAL CLOSURE for bowel perforation      3/26/2019 -  Chemotherapy     Taxol weekly 80 mg/m2 for 3 consecutive weeks, may add carboplatin in the future with AUC of 5       3/26/2019 -  Chemotherapy     palonosetron (ALOXI) injection 0 25 mg, 0 25 mg, Intravenous, Once, 6 of 6 cycles  Administration: 0 25 mg (5/28/2019), 0 25 mg (6/25/2019), 0 25 mg (11/6/2019), 0 25 mg (12/10/2019), 0 25 mg (1/7/2020)  fosaprepitant (EMEND) 150 mg in sodium chloride 0 9 % 255 mL IVPB, 150 mg, Intravenous, Once, 6 of 6 cycles  Administration: 150 mg (5/28/2019), 150 mg (6/25/2019), 150 mg (11/6/2019), 150 mg (12/10/2019), 150 mg (1/7/2020)  CARBOplatin (PARAPLATIN) in sodium chloride 0 9 % 250 mL IVPB,  (original dose ), Intravenous, Once, 6 of 6 cycles  Dose modification:   (Cycle 2),   (original dose 258 4 mg, Cycle 3),   (original dose 258 4 mg, Cycle 3),   (original dose 244 4 mg, Cycle 4)  Administration: 258 4 mg (5/28/2019), 244 4 mg (6/25/2019), 285 2 mg (11/6/2019), 296 4 mg (12/10/2019), 286 4 mg (1/7/2020)  PACLitaxel (TAXOL) 127 8 mg in sodium chloride 0 9 % 250 mL chemo IVPB, 80 mg/m2, Intravenous, Once, 7 of 7 cycles  Dose modification: 65 mg/m2 (original dose 80 mg/m2, Cycle 2, Reason: Dose Not Tolerated)  Administration: 103 8 mg (5/14/2019), 108 6 mg (5/28/2019), 108 6 mg (6/4/2019), 108 6 mg (6/11/2019), 109 8 mg (6/25/2019), 109 8 mg (7/2/2019), 109 8 mg (7/9/2019), 111 6 mg (11/6/2019), 111 6 mg (11/12/2019), 111 6 mg (11/19/2019), 113 4 mg (12/10/2019), 113 4 mg (12/17/2019), 113 4 mg (12/23/2019), 113 4 mg (1/7/2020), 114 6 mg (1/14/2020), 114 6 mg (1/21/2020)      4/29/2019 Genomic Testing      Foundation 1 testing revealed a PD L1 tumor proportions score of 0%  The patient is not a candidate for PD L1 manipulation       Genetic Testing     [facility] Breast/[facility] panel, wildtype  9/10/2019 Surgery     Exploratory laparotomy radical omentectomy, posterior exenteration, takedown of ileostomy and end colostomy for complete debulking of visible tumor  Final pathology report revealed high-grade serous malignancy in both the omentum and the pelvic mass      9/25/2019 -  Chemotherapy     Carboplatin area under the curve of 5+ weekly paclitaxel at 80 milligrams/meters squared for 3 further cycles of treatment followed by consolidation this is to begin mid October      10/2019 Surgery     Interval debulking with TAHBSO revision of colostomy omentectomy and tumor debulking with complete debulking  Several weeks postoperatively the patient required a open cholecystectomy        10/2019 - 12/2019 Chemotherapy     Carboplatin paclitaxel x3      10/9/2019 -  Cancer Staged     Staging form: Ovary, Fallopian Tube, Primary Peritoneal, AJCC 8th Edition  - Pathologic stage from 10/9/2019: FIGO Stage IIIC (pT3c, pN1, cM0) - Signed by Gabrielle Ross MD on 10/9/2019  Histologic grade (G): G3  Histologic grading system: 4 grade system  Gross residual tumor after primary cyto-reductive surgery: Absent             Patient ID: Dillon Pete is a 78 y o  female  Patient is very pleasant 65-year-old white female with a history of stage IVB endometrial cancer  She initially underwent treatment in November of 2018  This was neoadjuvant treatment with carboplatin paclitaxel and Avastin  This resulted in a bowel perforation and subsequent exploratory laparotomy with colostomy  The patient was treated with carboplatin and paclitaxel as well as a interval debulking and colostomy revision  She has recently completed treatment  Her most recent CT scan revealed possible evidence of hepatic disease  Her  has been stable in the teens  A PET scan was performed which indicated that the lesions within the liver a hypermetabolic indicating that this is active ovarian cancer  PET/CT SCAN     INDICATION: History of ovarian cancer with possible liver metastasis  Restaging postchemotherapy  C56 1: Malignant neoplasm of right ovary  C57 01: Malignant neoplasm of right fallopian tube  D37 6: Neoplasm of uncertain behavior of liver, gallbladder and bile ducts     MODIFIER: PS      COMPARISON: CT chest abdomen pelvis 2/4/2020     CELL TYPE:  Adenocarcinoma consistent with grade serous carcinoma of the ovary  Peritoneal mass biopsy 11/16/2018     TECHNIQUE:   8 3 mCi F-18-FDG administered IV  Multiplanar attenuation corrected and non attenuation corrected PET images were acquired 60 minutes post injection  Contiguous, low dose, axial CT sections were obtained from the skull base through the   femurs   Intravenous contrast material was not utilized  This examination, like all CT scans performed in the [facility], was performed utilizing techniques to minimize radiation dose exposure, including the use of iterative   reconstruction and automated exposure control       Fasting serum glucose: 139 mg/dl     FINDINGS:      VISUALIZED BRAIN:     No acute abnormalities are seen      HEAD/NECK:     There is a physiologic distribution of FDG  No FDG avid cervical adenopathy is seen    CT images: Scattered coarse calcifications in the right lobe of thyroid gland      CHEST:     No FDG avid soft tissue lesions are seen  CT images: Right-sided Mediport line tip terminates in the SVC  Scattered coronary artery calcifications  Scattered subpleural interstitial changes      ABDOMEN:     Scattered FDG avid hepatic lesions compatible with metastasis        A lesion in the right posterior lobe inferiorly demonstrates SUV max of 10 3  This is better seen on prior contrast CT where this measures up to 1 8 cm in size        A focus in the liver centrally close to the micaela hepatis demonstrates SUV max of 6 6  This is not well-seen on CT      Curvilinear FDG uptake noted along the peripheral margins of the liver suspicious for peritoneal metastasis, SUV max of 6 4 anteriorly  No obvious findings on CT      Heterogeneous overall liver uptake  Innumerable small hypodense lesions noted on prior contrast CT without definite focal FDG uptake  These may be too small to characterize above liver background      Single focus of FDG uptake in the spleen posteriorly, SUV max of 5 2  This is likely related to a vague hypodense lesion on prior contrast CT measuring up to 1 3 cm in size image 40 series 201  There is an adjacent splenic cyst more medially,   photopenic on PET      Small FDG avid retroperitoneal lymph node, SUV max of 3 3  This left para-aortic lymph node measures 1 0 x 0 7 cm image 156 series 3  CT images: Prior cholecystectomy  Ostomy in the left lower quadrant  Associated parastomal hernia  Colonic diverticulosis      PELVIS:   No FDG avid soft tissue lesions are seen  CT images: Prior hysterectomy  Abnormal configuration to the bladder which is noted extending posteriorly to the perirectal region      OSSEOUS STRUCTURES:  No FDG avid lesions are seen  CT images: No significant findings      IMPRESSION:     1   Scattered FDG avid hepatic lesions compatible with metastasis    There is also curvilinear FDG uptake along portions of the liver suspicious for peritoneal metastasis  2   FDG avid splenic lesion suspicious for metastasis  3   Single FDG avid retroperitoneal lymph node suspicious for metastasis  4   No findings for hypermetabolic metastasis above the diaphragm           The patient is feeling well  She is interested in having her colostomy reversed however she has persistent disease  Today, the patient is doing well  She denies significant abdominal pain, pelvic pain, nausea, vomiting, constipation, diarrhea, fevers, chills, or vaginal bleeding  The following portions of the patient's history were reviewed and updated as appropriate: allergies, current medications, past family history, past social history, past surgical history and problem list     Review of Systems   Constitutional: Negative  HENT: Negative  Eyes: Negative  Respiratory: Negative  Cardiovascular: Negative  Gastrointestinal: Negative  Endocrine: Negative  Genitourinary: Negative  Musculoskeletal: Negative  Skin: Negative  Neurological: Negative  Hematological: Negative  Psychiatric/Behavioral: Negative  Current Outpatient Medications   Medication Sig Dispense Refill    aspirin-acetaminophen-caffeine (EXCEDRIN MIGRAINE) 250-250-65 MG per tablet Take 1 tablet by mouth every 6 (six) hours as needed for headaches      gabapentin (NEURONTIN) 300 mg capsule Take 1 capsule (300 mg total) by mouth 3 (three) times a day 90 capsule 0    pantoprazole (PROTONIX) 40 mg tablet Take 1 tablet (40 mg total) by mouth 2 (two) times a day before meals  0    potassium chloride (K-DUR,KLOR-CON) 20 mEq tablet Take 1 tablet (20 mEq total) by mouth daily 30 tablet 0     No current facility-administered medications for this visit  Objective:    Blood pressure 138/74, pulse 77, temperature 97 5 °F (36 4 °C), resp   rate 18, height 5' 4 02" (1 626 m), weight 75 8 kg (167 lb), not currently breastfeeding  Body mass index is 28 65 kg/m²  Body surface area is 1 81 meters squared  Physical Exam   Constitutional: She is oriented to person, place, and time  She appears well-developed and well-nourished  HENT:   Head: Normocephalic and atraumatic  Eyes: EOM are normal    Neck: Normal range of motion  Neck supple  No thyromegaly present  Cardiovascular: Normal rate, regular rhythm and normal heart sounds  Pulmonary/Chest: Effort normal and breath sounds normal    Abdominal: Soft  Bowel sounds are normal    Well healed laparoscopic incisions  Genitourinary:   Genitourinary Comments: -Normal external female genitalia, normal Bartholin's and Startex's glands                  -Normal midline urethral meatus  No lesions notes                  -Bladder without fullness mass or tenderness                  -Vagina without lesion or discharge No significant cystocele or rectocele noted                  -Cervix surgically absent                  -Uterus surgically absent                  -Adnexae surgically absent                  - Anus without fissure of lesion     Musculoskeletal: Normal range of motion  Lymphadenopathy:     She has no cervical adenopathy  Neurological: She is alert and oriented to person, place, and time  Skin: Skin is warm and dry  Psychiatric: She has a normal mood and affect   Her behavior is normal        Lab Results   Component Value Date     18 2 01/28/2020     Lab Results   Component Value Date    K 4 1 01/28/2020     01/28/2020    CO2 27 01/28/2020    BUN 22 01/28/2020    CREATININE 1 16 01/28/2020    GLUCOSE 228 (H) 09/10/2019    GLUF 142 (H) 11/19/2019    CALCIUM 8 9 01/28/2020    AST 14 01/28/2020    ALT 17 01/28/2020    ALKPHOS 105 01/28/2020    EGFR 48 01/28/2020     Lab Results   Component Value Date    WBC 4 86 01/28/2020    HGB 8 7 (L) 01/28/2020    HCT 27 1 (L) 01/28/2020    MCV 93 01/28/2020     01/28/2020     Lab Results   Component Value Date    NEUTROABS 2 45 01/28/2020

## 2020-02-26 NOTE — LETTER
February 26, 2020     Deborah Asencio MD  Attn: NONA Mcnally   [address] [address]    Patient: Tavares Dawkins   YOB: 1950   Date of Visit: 2/26/2020       Dear Dr Les Rincon: Thank you for referring Tavares Dawkins to me for evaluation  Below are my notes for this consultation  If you have questions, please do not hesitate to call me  I look forward to following your patient along with you  Sincerely,        Altaf Adams MD        CC: No Recipients  Altaf Adams MD  2/26/2020 11:25 AM  Sign at close encounter  Assessment/Plan:    Problem List Items Addressed This Visit        Endocrine    Ovarian cancer [facility])     Patient unfortunately has persistent disease after completion of initial treatment  We discussed treatment options of use of a Avastin alone as consolidation however have recommended against that due to the patient's prior reaction to Avastin  We have discussed a short-term chemo holiday while she considers options or discussed moving ahead to Doxil and gemcitabine combination  The patient would like to take some time to think about it but is interested in moving ahead with Doxil and gemcitabine  We discussed with the patient risks and benefits of treatment including hand-foot syndrome  I have given the patient informational sheets and the patient has signed an informed consent  The patient awoke is aware that this is a palliative regimen and she will require treatment often on for the rest of her life  She is interested in going to [place] to see her sister over the summer  We can make arrangements to work around that  We will call the patient to get her input on when she would like to start treatment  We will use a dose of Doxil 30 milligrams/meter squared Q 4 weeks and gemcitabine 650 mg day 1 and day 8  We will use Neulasta as support             Relevant Orders        CBC and differential Comprehensive metabolic panel    Malignant neoplasm of ovary ([facility] [address] ) - Primary    Relevant Orders        CBC and differential    Comprehensive metabolic panel        Overall consult took 25 minutes with greater than 50% being dedicated toward discussion time    CHIEF COMPLAINT:       Problem:  Cancer Staging  Ovarian cancer [facility])  Staging form: Ovary, Fallopian Tube, Primary Peritoneal, AJCC 8th Edition  - Clinical stage from 11/14/2018: FIGO Stage IVB (cT3c, cN0, pM1b) - Signed by Selwyn Spencer MD on 11/14/2018  - Pathologic stage from 10/9/2019: FIGO Stage IIIC (pT3c, pN1, cM0) - Signed by Selwyn Spencer MD on 10/9/2019        Previous therapy:     Ovarian cancer ([facility] [address] )    11/9/2018 Initial Diagnosis     [facility] )   tumor marker 194 5      11/9/2018 Biopsy     CT-guided needle biopsy of abdominal mass 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  Given liver involvement this would be stage IV      11/14/2018 - 12/4/2018 Chemotherapy     Neoadjuvant therapy: carboplatin area under the curve of 6, paclitaxel 135 milligrams/meter squared, Avastin 10 milligrams/kilogram  Completed 1 of 3 cycles  12/14/2018 Surgery     LAPAROTOMY EXPLORATORY; Abdominal Washout; Application of Abthera Vac Dressing for suspected bowel perforation and septic shock         12/15/2018 Surgery     LAPAROTOMY EXPLORATORY, ABDOMINAL WASHOUT, DRAIN PLACEMENT x 4, DIVERTING LOOP ILEOSTOMY AND ABDOMINAL CLOSURE for bowel perforation      3/26/2019 -  Chemotherapy     Taxol weekly 80 mg/m2 for 3 consecutive weeks, may add carboplatin in the future with AUC of 5       3/26/2019 -  Chemotherapy     palonosetron (ALOXI) injection 0 25 mg, 0 25 mg, Intravenous, Once, 6 of 6 cycles  Administration: 0 25 mg (5/28/2019), 0 25 mg (6/25/2019), 0 25 mg (11/6/2019), 0 25 mg (12/10/2019), 0 25 mg (1/7/2020)  fosaprepitant (EMEND) 150 mg in sodium chloride 0 9 % 255 mL IVPB, 150 mg, Intravenous, Once, 6 of 6 cycles  Administration: 150 mg (5/28/2019), 150 mg (6/25/2019), 150 mg (11/6/2019), 150 mg (12/10/2019), 150 mg (1/7/2020)  CARBOplatin (PARAPLATIN) in sodium chloride 0 9 % 250 mL IVPB,  (original dose ), Intravenous, Once, 6 of 6 cycles  Dose modification:   (Cycle 2),   (original dose 258 4 mg, Cycle 3),   (original dose 258 4 mg, Cycle 3),   (original dose 244 4 mg, Cycle 4)  Administration: 258 4 mg (5/28/2019), 244 4 mg (6/25/2019), 285 2 mg (11/6/2019), 296 4 mg (12/10/2019), 286 4 mg (1/7/2020)  PACLitaxel (TAXOL) 127 8 mg in sodium chloride 0 9 % 250 mL chemo IVPB, 80 mg/m2, Intravenous, Once, 7 of 7 cycles  Dose modification: 65 mg/m2 (original dose 80 mg/m2, Cycle 2, Reason: Dose Not Tolerated)  Administration: 103 8 mg (5/14/2019), 108 6 mg (5/28/2019), 108 6 mg (6/4/2019), 108 6 mg (6/11/2019), 109 8 mg (6/25/2019), 109 8 mg (7/2/2019), 109 8 mg (7/9/2019), 111 6 mg (11/6/2019), 111 6 mg (11/12/2019), 111 6 mg (11/19/2019), 113 4 mg (12/10/2019), 113 4 mg (12/17/2019), 113 4 mg (12/23/2019), 113 4 mg (1/7/2020), 114 6 mg (1/14/2020), 114 6 mg (1/21/2020)      4/29/2019 Genomic Testing      Foundation 1 testing revealed a PD L1 tumor proportions score of 0%  The patient is not a candidate for PD L1 manipulation       Genetic Testing     [facility] Breast/Gyn panel, wildtype  9/10/2019 Surgery     Exploratory laparotomy radical omentectomy, posterior exenteration, takedown of ileostomy and end colostomy for complete debulking of visible tumor      Final pathology report revealed high-grade serous malignancy in both the omentum and the pelvic mass      9/25/2019 -  Chemotherapy     Carboplatin area under the curve of 5+ weekly paclitaxel at 80 milligrams/meters squared for 3 further cycles of treatment followed by consolidation this is to begin mid October      10/2019 Surgery     Interval debulking with TAHBSO revision of colostomy omentectomy and tumor debulking with complete debulking  Several weeks postoperatively the patient required a open cholecystectomy  10/2019 - 12/2019 Chemotherapy     Carboplatin paclitaxel x3      10/9/2019 -  Cancer Staged     Staging form: Ovary, Fallopian Tube, Primary Peritoneal, AJCC 8th Edition  - Pathologic stage from 10/9/2019: FIGO Stage IIIC (pT3c, pN1, cM0) - Signed by Hank Best MD on 10/9/2019  Histologic grade (G): G3  Histologic grading system: 4 grade system  Gross residual tumor after primary cyto-reductive surgery: Absent             Patient ID: Elina Duncan is a 78 y o  female  Patient is very pleasant 69-year-old white female with a history of stage IVB endometrial cancer  She initially underwent treatment in November of 2018  This was neoadjuvant treatment with carboplatin paclitaxel and Avastin  This resulted in a bowel perforation and subsequent exploratory laparotomy with colostomy  The patient was treated with carboplatin and paclitaxel as well as a interval debulking and colostomy revision  She has recently completed treatment  Her most recent CT scan revealed possible evidence of hepatic disease  Her  has been stable in the teens  A PET scan was performed which indicated that the lesions within the liver a hypermetabolic indicating that this is active ovarian cancer  PET/CT SCAN     INDICATION: History of ovarian cancer with possible liver metastasis  Restaging postchemotherapy  C56 1: Malignant neoplasm of right ovary  C57 01: Malignant neoplasm of right fallopian tube  D37 6: Neoplasm of uncertain behavior of liver, gallbladder and bile ducts     MODIFIER: PS      COMPARISON: CT chest abdomen pelvis 2/4/2020     CELL TYPE:  Adenocarcinoma consistent with grade serous carcinoma of the ovary  Peritoneal mass biopsy 11/16/2018     TECHNIQUE:   8 3 mCi F-18-FDG administered IV  Multiplanar attenuation corrected and non attenuation corrected PET images were acquired 60 minutes post injection  Contiguous, low dose, axial CT sections were obtained from the skull base through the   femurs   Intravenous contrast material was not utilized  This examination, like all CT scans performed in the [facility], was performed utilizing techniques to minimize radiation dose exposure, including the use of iterative   reconstruction and automated exposure control       Fasting serum glucose: 139 mg/dl     FINDINGS:      VISUALIZED BRAIN:     No acute abnormalities are seen      HEAD/NECK:     There is a physiologic distribution of FDG  No FDG avid cervical adenopathy is seen  CT images: Scattered coarse calcifications in the right lobe of thyroid gland      CHEST:     No FDG avid soft tissue lesions are seen  CT images: Right-sided Mediport line tip terminates in the SVC  Scattered coronary artery calcifications  Scattered subpleural interstitial changes      ABDOMEN:     Scattered FDG avid hepatic lesions compatible with metastasis        A lesion in the right posterior lobe inferiorly demonstrates SUV max of 10 3  This is better seen on prior contrast CT where this measures up to 1 8 cm in size        A focus in the liver centrally close to the micaela hepatis demonstrates SUV max of 6 6  This is not well-seen on CT      Curvilinear FDG uptake noted along the peripheral margins of the liver suspicious for peritoneal metastasis, SUV max of 6 4 anteriorly  No obvious findings on CT      Heterogeneous overall liver uptake  Innumerable small hypodense lesions noted on prior contrast CT without definite focal FDG uptake  These may be too small to characterize above liver background      Single focus of FDG uptake in the spleen posteriorly, SUV max of 5 2  This is likely related to a vague hypodense lesion on prior contrast CT measuring up to 1 3 cm in size image 40 series 201    There is an adjacent splenic cyst more medially,   photopenic on PET      Small FDG avid retroperitoneal lymph node, SUV max of 3 3  This left para-aortic lymph node measures 1 0 x 0 7 cm image 156 series 3  CT images: Prior cholecystectomy  Ostomy in the left lower quadrant  Associated parastomal hernia  Colonic diverticulosis      PELVIS:   No FDG avid soft tissue lesions are seen  CT images: Prior hysterectomy  Abnormal configuration to the bladder which is noted extending posteriorly to the perirectal region      OSSEOUS STRUCTURES:  No FDG avid lesions are seen  CT images: No significant findings      IMPRESSION:     1   Scattered FDG avid hepatic lesions compatible with metastasis  There is also curvilinear FDG uptake along portions of the liver suspicious for peritoneal metastasis  2   FDG avid splenic lesion suspicious for metastasis  3   Single FDG avid retroperitoneal lymph node suspicious for metastasis  4   No findings for hypermetabolic metastasis above the diaphragm           The patient is feeling well  She is interested in having her colostomy reversed however she has persistent disease  Today, the patient is doing well  She denies significant abdominal pain, pelvic pain, nausea, vomiting, constipation, diarrhea, fevers, chills, or vaginal bleeding  The following portions of the patient's history were reviewed and updated as appropriate: allergies, current medications, past family history, past social history, past surgical history and problem list     Review of Systems   Constitutional: Negative  HENT: Negative  Eyes: Negative  Respiratory: Negative  Cardiovascular: Negative  Gastrointestinal: Negative  Endocrine: Negative  Genitourinary: Negative  Musculoskeletal: Negative  Skin: Negative  Neurological: Negative  Hematological: Negative  Psychiatric/Behavioral: Negative          Current Outpatient Medications   Medication Sig Dispense Refill    aspirin-acetaminophen-caffeine (EXCEDRIN MIGRAINE) 250-250-65 MG per tablet Take 1 tablet by mouth every 6 (six) hours as needed for headaches      gabapentin (NEURONTIN) 300 mg capsule Take 1 capsule (300 mg total) by mouth 3 (three) times a day 90 capsule 0    pantoprazole (PROTONIX) 40 mg tablet Take 1 tablet (40 mg total) by mouth 2 (two) times a day before meals  0    potassium chloride (K-DUR,KLOR-CON) 20 mEq tablet Take 1 tablet (20 mEq total) by mouth daily 30 tablet 0     No current facility-administered medications for this visit  Objective:    Blood pressure 138/74, pulse 77, temperature 97 5 °F (36 4 °C), resp  rate 18, height 5' 4 02" (1 626 m), weight 75 8 kg (167 lb), not currently breastfeeding  Body mass index is 28 65 kg/m²  Body surface area is 1 81 meters squared  Physical Exam   Constitutional: She is oriented to person, place, and time  She appears well-developed and well-nourished  HENT:   Head: Normocephalic and atraumatic  Eyes: EOM are normal    Neck: Normal range of motion  Neck supple  No thyromegaly present  Cardiovascular: Normal rate, regular rhythm and normal heart sounds  Pulmonary/Chest: Effort normal and breath sounds normal    Abdominal: Soft  Bowel sounds are normal    Well healed laparoscopic incisions  Genitourinary:   Genitourinary Comments: -Normal external female genitalia, normal Bartholin's and Romoland's glands                  -Normal midline urethral meatus  No lesions notes                  -Bladder without fullness mass or tenderness                  -Vagina without lesion or discharge No significant cystocele or rectocele noted                  -Cervix surgically absent                  -Uterus surgically absent                  -Adnexae surgically absent                  - Anus without fissure of lesion     Musculoskeletal: Normal range of motion  Lymphadenopathy:     She has no cervical adenopathy  Neurological: She is alert and oriented to person, place, and time  Skin: Skin is warm and dry  Psychiatric: She has a normal mood and affect   Her behavior is normal        Lab Results   Component Value Date     18 2 01/28/2020     Lab Results   Component Value Date    K 4 1 01/28/2020     01/28/2020    CO2 27 01/28/2020    BUN 22 01/28/2020    CREATININE 1 16 01/28/2020    GLUCOSE 228 (H) 09/10/2019    GLUF 142 (H) 11/19/2019    CALCIUM 8 9 01/28/2020    AST 14 01/28/2020    ALT 17 01/28/2020    ALKPHOS 105 01/28/2020    EGFR 48 01/28/2020     Lab Results   Component Value Date    WBC 4 86 01/28/2020    HGB 8 7 (L) 01/28/2020    HCT 27 1 (L) 01/28/2020    MCV 93 01/28/2020     01/28/2020     Lab Results   Component Value Date    NEUTROABS 2 45 01/28/2020

## 2020-02-26 NOTE — ASSESSMENT & PLAN NOTE
Patient unfortunately has persistent disease after completion of initial treatment  We discussed treatment options of use of a Avastin alone as consolidation however have recommended against that due to the patient's prior reaction to Avastin  We have discussed a short-term chemo holiday while she considers options or discussed moving ahead to Doxil and gemcitabine combination  The patient would like to take some time to think about it but is interested in moving ahead with Doxil and gemcitabine  We discussed with the patient risks and benefits of treatment including hand-foot syndrome  I have given the patient informational sheets and the patient has signed an informed consent  The patient awoke is aware that this is a palliative regimen and she will require treatment often on for the rest of her life  She is interested in going to [place] to see her sister over the summer  We can make arrangements to work around that  We will call the patient to get her input on when she would like to start treatment  We will use a dose of Doxil 30 milligrams/meter squared Q 4 weeks and gemcitabine 650 mg day 1 and day 8  We will use Neulasta as support

## 2020-03-05 DIAGNOSIS — D64.9 ANEMIA, UNSPECIFIED TYPE: ICD-10-CM

## 2020-03-05 DIAGNOSIS — C56.9 MALIGNANT NEOPLASM OF OVARY, UNSPECIFIED LATERALITY (HCC): ICD-10-CM

## 2020-03-20 ENCOUNTER — APPOINTMENT (OUTPATIENT)
Dept: LAB | Facility: HOSPITAL | Age: 70
End: 2020-03-20
Attending: OBSTETRICS & GYNECOLOGY
Payer: MEDICARE

## 2020-03-20 DIAGNOSIS — C56.9 MALIGNANT NEOPLASM OF OVARY, UNSPECIFIED LATERALITY (HCC): ICD-10-CM

## 2020-03-20 LAB
ALBUMIN SERPL BCP-MCNC: 3.9 G/DL (ref 3.5–5)
ALP SERPL-CCNC: 131 U/L (ref 46–116)
ALT SERPL W P-5'-P-CCNC: 24 U/L (ref 12–78)
ANION GAP SERPL CALCULATED.3IONS-SCNC: 7 MMOL/L (ref 4–13)
AST SERPL W P-5'-P-CCNC: 21 U/L (ref 5–45)
BASOPHILS # BLD AUTO: 0.05 THOUSANDS/ΜL (ref 0–0.1)
BASOPHILS NFR BLD AUTO: 1 % (ref 0–1)
BILIRUB SERPL-MCNC: 0.2 MG/DL (ref 0.2–1)
BUN SERPL-MCNC: 32 MG/DL (ref 5–25)
CALCIUM SERPL-MCNC: 9.4 MG/DL (ref 8.3–10.1)
CHLORIDE SERPL-SCNC: 105 MMOL/L (ref 100–108)
CO2 SERPL-SCNC: 28 MMOL/L (ref 21–32)
CREAT SERPL-MCNC: 1.29 MG/DL (ref 0.6–1.3)
EOSINOPHIL # BLD AUTO: 0.25 THOUSAND/ΜL (ref 0–0.61)
EOSINOPHIL NFR BLD AUTO: 3 % (ref 0–6)
ERYTHROCYTE [DISTWIDTH] IN BLOOD BY AUTOMATED COUNT: 14.6 % (ref 11.6–15.1)
GFR SERPL CREATININE-BSD FRML MDRD: 42 ML/MIN/1.73SQ M
GLUCOSE SERPL-MCNC: 174 MG/DL (ref 65–140)
HCT VFR BLD AUTO: 30.5 % (ref 34.8–46.1)
HGB BLD-MCNC: 9.8 G/DL (ref 11.5–15.4)
IMM GRANULOCYTES # BLD AUTO: 0.02 THOUSAND/UL (ref 0–0.2)
IMM GRANULOCYTES NFR BLD AUTO: 0 % (ref 0–2)
LYMPHOCYTES # BLD AUTO: 2.41 THOUSANDS/ΜL (ref 0.6–4.47)
LYMPHOCYTES NFR BLD AUTO: 31 % (ref 14–44)
MCH RBC QN AUTO: 30.5 PG (ref 26.8–34.3)
MCHC RBC AUTO-ENTMCNC: 32.1 G/DL (ref 31.4–37.4)
MCV RBC AUTO: 95 FL (ref 82–98)
MONOCYTES # BLD AUTO: 0.62 THOUSAND/ΜL (ref 0.17–1.22)
MONOCYTES NFR BLD AUTO: 8 % (ref 4–12)
NEUTROPHILS # BLD AUTO: 4.39 THOUSANDS/ΜL (ref 1.85–7.62)
NEUTS SEG NFR BLD AUTO: 57 % (ref 43–75)
NRBC BLD AUTO-RTO: 0 /100 WBCS
PLATELET # BLD AUTO: 224 THOUSANDS/UL (ref 149–390)
PMV BLD AUTO: 11.8 FL (ref 8.9–12.7)
POTASSIUM SERPL-SCNC: 3.9 MMOL/L (ref 3.5–5.3)
PROT SERPL-MCNC: 7.7 G/DL (ref 6.4–8.2)
RBC # BLD AUTO: 3.21 MILLION/UL (ref 3.81–5.12)
SODIUM SERPL-SCNC: 140 MMOL/L (ref 136–145)
WBC # BLD AUTO: 7.74 THOUSAND/UL (ref 4.31–10.16)

## 2020-03-20 PROCEDURE — 85025 COMPLETE CBC W/AUTO DIFF WBC: CPT

## 2020-03-20 PROCEDURE — 80053 COMPREHEN METABOLIC PANEL: CPT

## 2020-03-20 PROCEDURE — 86304 IMMUNOASSAY TUMOR CA 125: CPT

## 2020-03-20 PROCEDURE — 36415 COLL VENOUS BLD VENIPUNCTURE: CPT

## 2020-03-21 LAB — CANCER AG125 SERPL-ACNC: 40.9 U/ML (ref 0–30)

## 2020-03-23 RX ORDER — DEXTROSE MONOHYDRATE 50 MG/ML
20 INJECTION, SOLUTION INTRAVENOUS ONCE
Status: CANCELLED | OUTPATIENT
Start: 2020-03-24

## 2020-03-23 RX ORDER — SODIUM CHLORIDE 9 MG/ML
20 INJECTION, SOLUTION INTRAVENOUS ONCE
Status: CANCELLED | OUTPATIENT
Start: 2020-03-24

## 2020-03-23 RX ORDER — PALONOSETRON 0.05 MG/ML
0.25 INJECTION, SOLUTION INTRAVENOUS ONCE
Status: CANCELLED | OUTPATIENT
Start: 2020-03-24

## 2020-03-24 ENCOUNTER — HOSPITAL ENCOUNTER (OUTPATIENT)
Dept: INFUSION CENTER | Facility: CLINIC | Age: 70
Discharge: HOME | End: 2020-03-24
Payer: MEDICARE

## 2020-03-24 VITALS
HEART RATE: 73 BPM | TEMPERATURE: 97.8 F | DIASTOLIC BLOOD PRESSURE: 64 MMHG | BODY MASS INDEX: 28.51 KG/M2 | RESPIRATION RATE: 18 BRPM | WEIGHT: 167 LBS | HEIGHT: 64 IN | SYSTOLIC BLOOD PRESSURE: 138 MMHG

## 2020-03-24 DIAGNOSIS — C56.9 MALIGNANT NEOPLASM OF OVARY, UNSPECIFIED LATERALITY (HCC): Primary | ICD-10-CM

## 2020-03-24 DIAGNOSIS — D64.9 ANEMIA, UNSPECIFIED TYPE: ICD-10-CM

## 2020-03-24 PROCEDURE — 96375 TX/PRO/DX INJ NEW DRUG ADDON: CPT

## 2020-03-24 PROCEDURE — 96417 CHEMO IV INFUS EACH ADDL SEQ: CPT

## 2020-03-24 PROCEDURE — 96367 TX/PROPH/DG ADDL SEQ IV INF: CPT

## 2020-03-24 PROCEDURE — 96413 CHEMO IV INFUSION 1 HR: CPT

## 2020-03-24 RX ORDER — DEXTROSE MONOHYDRATE 50 MG/ML
20 INJECTION, SOLUTION INTRAVENOUS ONCE
Status: COMPLETED | OUTPATIENT
Start: 2020-03-24 | End: 2020-03-24

## 2020-03-24 RX ORDER — SODIUM CHLORIDE 9 MG/ML
20 INJECTION, SOLUTION INTRAVENOUS ONCE
Status: COMPLETED | OUTPATIENT
Start: 2020-03-24 | End: 2020-03-24

## 2020-03-24 RX ORDER — PALONOSETRON 0.05 MG/ML
0.25 INJECTION, SOLUTION INTRAVENOUS ONCE
Status: COMPLETED | OUTPATIENT
Start: 2020-03-24 | End: 2020-03-24

## 2020-03-24 RX ADMIN — PALONOSETRON 0.25 MG: 0.05 INJECTION, SOLUTION INTRAVENOUS at 09:27

## 2020-03-24 RX ADMIN — GEMCITABINE 1176.4 MG: 38 INJECTION INTRAVENOUS at 11:21

## 2020-03-24 RX ADMIN — DOXORUBICIN HYDROCHLORIDE 54.3 MG: 2 INJECTABLE, LIPOSOMAL INTRAVENOUS at 10:12

## 2020-03-24 RX ADMIN — DEXAMETHASONE SODIUM PHOSPHATE 10 MG: 10 INJECTION, SOLUTION INTRAMUSCULAR; INTRAVENOUS at 09:32

## 2020-03-24 RX ADMIN — DEXTROSE 20 ML/HR: 5 SOLUTION INTRAVENOUS at 09:51

## 2020-03-24 RX ADMIN — SODIUM CHLORIDE 20 ML/HR: 0.9 INJECTION, SOLUTION INTRAVENOUS at 09:07

## 2020-03-27 ENCOUNTER — APPOINTMENT (OUTPATIENT)
Dept: LAB | Facility: HOSPITAL | Age: 70
End: 2020-03-27
Payer: MEDICARE

## 2020-03-30 RX ORDER — SODIUM CHLORIDE 9 MG/ML
20 INJECTION, SOLUTION INTRAVENOUS ONCE
Status: CANCELLED | OUTPATIENT
Start: 2020-03-31

## 2020-03-31 ENCOUNTER — HOSPITAL ENCOUNTER (OUTPATIENT)
Dept: INFUSION CENTER | Facility: CLINIC | Age: 70
Discharge: HOME | End: 2020-03-31
Payer: MEDICARE

## 2020-03-31 VITALS
HEIGHT: 64 IN | RESPIRATION RATE: 20 BRPM | DIASTOLIC BLOOD PRESSURE: 61 MMHG | HEART RATE: 82 BPM | SYSTOLIC BLOOD PRESSURE: 128 MMHG | WEIGHT: 167 LBS | BODY MASS INDEX: 28.51 KG/M2 | OXYGEN SATURATION: 98 % | TEMPERATURE: 98 F

## 2020-03-31 DIAGNOSIS — D64.9 ANEMIA, UNSPECIFIED TYPE: ICD-10-CM

## 2020-03-31 DIAGNOSIS — C56.9 MALIGNANT NEOPLASM OF OVARY, UNSPECIFIED LATERALITY (HCC): Primary | ICD-10-CM

## 2020-03-31 PROCEDURE — 96413 CHEMO IV INFUSION 1 HR: CPT

## 2020-03-31 PROCEDURE — 96367 TX/PROPH/DG ADDL SEQ IV INF: CPT

## 2020-03-31 PROCEDURE — 96377 APPLICATON ON-BODY INJECTOR: CPT

## 2020-03-31 RX ORDER — SODIUM CHLORIDE 9 MG/ML
20 INJECTION, SOLUTION INTRAVENOUS ONCE
Status: COMPLETED | OUTPATIENT
Start: 2020-03-31 | End: 2020-03-31

## 2020-03-31 RX ADMIN — GEMCITABINE 1176.4 MG: 38 INJECTION INTRAVENOUS at 13:10

## 2020-03-31 RX ADMIN — DEXAMETHASONE SODIUM PHOSPHATE 10 MG: 10 INJECTION, SOLUTION INTRAMUSCULAR; INTRAVENOUS at 12:18

## 2020-03-31 RX ADMIN — PEGFILGRASTIM 6 MG: KIT SUBCUTANEOUS at 13:58

## 2020-03-31 RX ADMIN — SODIUM CHLORIDE 20 ML/HR: 0.9 INJECTION, SOLUTION INTRAVENOUS at 12:11

## 2020-04-10 ENCOUNTER — APPOINTMENT (OUTPATIENT)
Dept: LAB | Facility: HOSPITAL | Age: 70
End: 2020-04-10
Attending: OBSTETRICS & GYNECOLOGY
Payer: MEDICARE

## 2020-04-10 DIAGNOSIS — C56.9 MALIGNANT NEOPLASM OF OVARY, UNSPECIFIED LATERALITY (HCC): ICD-10-CM

## 2020-04-10 LAB
BASOPHILS # BLD MANUAL: 0 THOUSAND/UL (ref 0–0.1)
BASOPHILS NFR MAR MANUAL: 0 % (ref 0–1)
CANCER AG125 SERPL-ACNC: 42.5 U/ML (ref 0–30)
EOSINOPHIL # BLD MANUAL: 0.35 THOUSAND/UL (ref 0–0.4)
EOSINOPHIL NFR BLD MANUAL: 2 % (ref 0–6)
ERYTHROCYTE [DISTWIDTH] IN BLOOD BY AUTOMATED COUNT: 14.6 % (ref 11.6–15.1)
HCT VFR BLD AUTO: 28.9 % (ref 34.8–46.1)
HGB BLD-MCNC: 9 G/DL (ref 11.5–15.4)
LYMPHOCYTES # BLD AUTO: 19 % (ref 14–44)
LYMPHOCYTES # BLD AUTO: 3.28 THOUSAND/UL (ref 0.6–4.47)
MCH RBC QN AUTO: 29.8 PG (ref 26.8–34.3)
MCHC RBC AUTO-ENTMCNC: 31.1 G/DL (ref 31.4–37.4)
MCV RBC AUTO: 96 FL (ref 82–98)
MONOCYTES # BLD AUTO: 0.69 THOUSAND/UL (ref 0–1.22)
MONOCYTES NFR BLD: 4 % (ref 4–12)
NEUTROPHILS # BLD MANUAL: 12.94 THOUSAND/UL (ref 1.85–7.62)
NEUTS BAND NFR BLD MANUAL: 3 % (ref 0–8)
NEUTS SEG NFR BLD AUTO: 72 % (ref 43–75)
NRBC BLD AUTO-RTO: 0 /100 WBCS
PLATELET # BLD AUTO: 197 THOUSANDS/UL (ref 149–390)
PLATELET BLD QL SMEAR: ADEQUATE
PMV BLD AUTO: 11.3 FL (ref 8.9–12.7)
RBC # BLD AUTO: 3.02 MILLION/UL (ref 3.81–5.12)
TOTAL CELLS COUNTED SPEC: 100
WBC # BLD AUTO: 17.25 THOUSAND/UL (ref 4.31–10.16)

## 2020-04-10 PROCEDURE — 80053 COMPREHEN METABOLIC PANEL: CPT | Performed by: NURSE PRACTITIONER

## 2020-04-10 PROCEDURE — 36415 COLL VENOUS BLD VENIPUNCTURE: CPT

## 2020-04-10 PROCEDURE — 85027 COMPLETE CBC AUTOMATED: CPT

## 2020-04-10 PROCEDURE — 86304 IMMUNOASSAY TUMOR CA 125: CPT

## 2020-04-10 PROCEDURE — 85007 BL SMEAR W/DIFF WBC COUNT: CPT

## 2020-04-10 PROCEDURE — 83735 ASSAY OF MAGNESIUM: CPT | Performed by: NURSE PRACTITIONER

## 2020-04-13 DIAGNOSIS — C56.9 MALIGNANT NEOPLASM OF OVARY, UNSPECIFIED LATERALITY (HCC): Primary | ICD-10-CM

## 2020-04-13 LAB
ALBUMIN SERPL BCP-MCNC: 3.8 G/DL (ref 3.5–5)
ALP SERPL-CCNC: 176 U/L (ref 46–116)
ALT SERPL W P-5'-P-CCNC: 31 U/L (ref 12–78)
ANION GAP SERPL CALCULATED.3IONS-SCNC: 4 MMOL/L (ref 4–13)
AST SERPL W P-5'-P-CCNC: 20 U/L (ref 5–45)
BILIRUB SERPL-MCNC: 0.14 MG/DL (ref 0.2–1)
BUN SERPL-MCNC: 20 MG/DL (ref 5–25)
CALCIUM SERPL-MCNC: 8.9 MG/DL (ref 8.3–10.1)
CHLORIDE SERPL-SCNC: 109 MMOL/L (ref 100–108)
CO2 SERPL-SCNC: 29 MMOL/L (ref 21–32)
CREAT SERPL-MCNC: 1.17 MG/DL (ref 0.6–1.3)
GFR SERPL CREATININE-BSD FRML MDRD: 47 ML/MIN/1.73SQ M
GLUCOSE SERPL-MCNC: 137 MG/DL (ref 65–140)
MAGNESIUM SERPL-MCNC: 2.2 MG/DL (ref 1.6–2.6)
POTASSIUM SERPL-SCNC: 4 MMOL/L (ref 3.5–5.3)
PROT SERPL-MCNC: 7.2 G/DL (ref 6.4–8.2)
SODIUM SERPL-SCNC: 142 MMOL/L (ref 136–145)

## 2020-04-15 ENCOUNTER — TELEMEDICINE (OUTPATIENT)
Dept: GYNECOLOGIC ONCOLOGY | Facility: CLINIC | Age: 70
End: 2020-04-15
Payer: MEDICARE

## 2020-04-15 DIAGNOSIS — C56.3 MALIGNANT NEOPLASM OF BOTH OVARIES (HCC): ICD-10-CM

## 2020-04-15 DIAGNOSIS — C56.9 MALIGNANT NEOPLASM OF OVARY, UNSPECIFIED LATERALITY (HCC): Primary | ICD-10-CM

## 2020-04-15 PROCEDURE — 99443 PR PHYS/QHP TELEPHONE EVALUATION 21-30 MIN: CPT | Performed by: OBSTETRICS & GYNECOLOGY

## 2020-04-17 ENCOUNTER — APPOINTMENT (OUTPATIENT)
Dept: LAB | Facility: HOSPITAL | Age: 70
End: 2020-04-17
Payer: MEDICARE

## 2020-04-17 LAB
ALBUMIN SERPL BCP-MCNC: 3.6 G/DL (ref 3.5–5)
ALP SERPL-CCNC: 137 U/L (ref 46–116)
ALT SERPL W P-5'-P-CCNC: 23 U/L (ref 12–78)
ANION GAP SERPL CALCULATED.3IONS-SCNC: 9 MMOL/L (ref 4–13)
AST SERPL W P-5'-P-CCNC: 18 U/L (ref 5–45)
BASOPHILS # BLD AUTO: 0.07 THOUSANDS/ΜL (ref 0–0.1)
BASOPHILS NFR BLD AUTO: 1 % (ref 0–1)
BILIRUB SERPL-MCNC: 0.1 MG/DL (ref 0.2–1)
BUN SERPL-MCNC: 27 MG/DL (ref 5–25)
CALCIUM SERPL-MCNC: 8.7 MG/DL (ref 8.3–10.1)
CANCER AG125 SERPL-ACNC: 37.9 U/ML (ref 0–30)
CHLORIDE SERPL-SCNC: 103 MMOL/L (ref 100–108)
CO2 SERPL-SCNC: 27 MMOL/L (ref 21–32)
CREAT SERPL-MCNC: 1.4 MG/DL (ref 0.6–1.3)
EOSINOPHIL # BLD AUTO: 0.13 THOUSAND/ΜL (ref 0–0.61)
EOSINOPHIL NFR BLD AUTO: 1 % (ref 0–6)
ERYTHROCYTE [DISTWIDTH] IN BLOOD BY AUTOMATED COUNT: 14.9 % (ref 11.6–15.1)
GFR SERPL CREATININE-BSD FRML MDRD: 38 ML/MIN/1.73SQ M
GLUCOSE SERPL-MCNC: 174 MG/DL (ref 65–140)
HCT VFR BLD AUTO: 28.1 % (ref 34.8–46.1)
HGB BLD-MCNC: 8.7 G/DL (ref 11.5–15.4)
IMM GRANULOCYTES # BLD AUTO: 0.08 THOUSAND/UL (ref 0–0.2)
IMM GRANULOCYTES NFR BLD AUTO: 1 % (ref 0–2)
LYMPHOCYTES # BLD AUTO: 2.3 THOUSANDS/ΜL (ref 0.6–4.47)
LYMPHOCYTES NFR BLD AUTO: 22 % (ref 14–44)
MAGNESIUM SERPL-MCNC: 2.1 MG/DL (ref 1.6–2.6)
MCH RBC QN AUTO: 29.8 PG (ref 26.8–34.3)
MCHC RBC AUTO-ENTMCNC: 31 G/DL (ref 31.4–37.4)
MCV RBC AUTO: 96 FL (ref 82–98)
MONOCYTES # BLD AUTO: 0.92 THOUSAND/ΜL (ref 0.17–1.22)
MONOCYTES NFR BLD AUTO: 9 % (ref 4–12)
NEUTROPHILS # BLD AUTO: 7.21 THOUSANDS/ΜL (ref 1.85–7.62)
NEUTS SEG NFR BLD AUTO: 66 % (ref 43–75)
NRBC BLD AUTO-RTO: 0 /100 WBCS
PLATELET # BLD AUTO: 578 THOUSANDS/UL (ref 149–390)
PMV BLD AUTO: 11.4 FL (ref 8.9–12.7)
POTASSIUM SERPL-SCNC: 4 MMOL/L (ref 3.5–5.3)
PROT SERPL-MCNC: 7.5 G/DL (ref 6.4–8.2)
RBC # BLD AUTO: 2.92 MILLION/UL (ref 3.81–5.12)
SODIUM SERPL-SCNC: 139 MMOL/L (ref 136–145)
WBC # BLD AUTO: 10.71 THOUSAND/UL (ref 4.31–10.16)

## 2020-04-17 PROCEDURE — 86304 IMMUNOASSAY TUMOR CA 125: CPT | Performed by: NURSE PRACTITIONER

## 2020-04-17 PROCEDURE — 36415 COLL VENOUS BLD VENIPUNCTURE: CPT | Performed by: NURSE PRACTITIONER

## 2020-04-17 PROCEDURE — 85025 COMPLETE CBC W/AUTO DIFF WBC: CPT | Performed by: NURSE PRACTITIONER

## 2020-04-17 PROCEDURE — 83735 ASSAY OF MAGNESIUM: CPT | Performed by: NURSE PRACTITIONER

## 2020-04-17 PROCEDURE — 80053 COMPREHEN METABOLIC PANEL: CPT | Performed by: NURSE PRACTITIONER

## 2020-04-18 RX ORDER — SODIUM CHLORIDE 9 MG/ML
20 INJECTION, SOLUTION INTRAVENOUS ONCE
Status: CANCELLED | OUTPATIENT
Start: 2020-04-21

## 2020-04-18 RX ORDER — DEXTROSE MONOHYDRATE 50 MG/ML
20 INJECTION, SOLUTION INTRAVENOUS ONCE
Status: CANCELLED | OUTPATIENT
Start: 2020-04-21

## 2020-04-18 RX ORDER — PALONOSETRON 0.05 MG/ML
0.25 INJECTION, SOLUTION INTRAVENOUS ONCE
Status: CANCELLED | OUTPATIENT
Start: 2020-04-21

## 2020-04-21 ENCOUNTER — HOSPITAL ENCOUNTER (OUTPATIENT)
Dept: INFUSION CENTER | Facility: CLINIC | Age: 70
Discharge: HOME | End: 2020-04-21
Payer: MEDICARE

## 2020-04-21 VITALS
TEMPERATURE: 98.1 F | WEIGHT: 174.6 LBS | HEART RATE: 74 BPM | BODY MASS INDEX: 29.81 KG/M2 | HEIGHT: 64 IN | DIASTOLIC BLOOD PRESSURE: 67 MMHG | SYSTOLIC BLOOD PRESSURE: 146 MMHG

## 2020-04-21 DIAGNOSIS — D64.9 ANEMIA, UNSPECIFIED TYPE: ICD-10-CM

## 2020-04-21 DIAGNOSIS — C56.9 MALIGNANT NEOPLASM OF OVARY, UNSPECIFIED LATERALITY (HCC): Primary | ICD-10-CM

## 2020-04-21 PROCEDURE — 96367 TX/PROPH/DG ADDL SEQ IV INF: CPT

## 2020-04-21 PROCEDURE — 96413 CHEMO IV INFUSION 1 HR: CPT

## 2020-04-21 PROCEDURE — 96375 TX/PRO/DX INJ NEW DRUG ADDON: CPT

## 2020-04-21 PROCEDURE — 96417 CHEMO IV INFUS EACH ADDL SEQ: CPT

## 2020-04-21 RX ORDER — DEXTROSE MONOHYDRATE 50 MG/ML
20 INJECTION, SOLUTION INTRAVENOUS ONCE
Status: COMPLETED | OUTPATIENT
Start: 2020-04-21 | End: 2020-04-21

## 2020-04-21 RX ORDER — PALONOSETRON 0.05 MG/ML
0.25 INJECTION, SOLUTION INTRAVENOUS ONCE
Status: COMPLETED | OUTPATIENT
Start: 2020-04-21 | End: 2020-04-21

## 2020-04-21 RX ORDER — SODIUM CHLORIDE 9 MG/ML
20 INJECTION, SOLUTION INTRAVENOUS ONCE
Status: COMPLETED | OUTPATIENT
Start: 2020-04-21 | End: 2020-04-21

## 2020-04-21 RX ADMIN — PALONOSETRON HYDROCHLORIDE 0.25 MG: 0.25 INJECTION, SOLUTION INTRAVENOUS at 10:08

## 2020-04-21 RX ADMIN — DEXAMETHASONE SODIUM PHOSPHATE 10 MG: 10 INJECTION, SOLUTION INTRAMUSCULAR; INTRAVENOUS at 09:26

## 2020-04-21 RX ADMIN — GEMCITABINE 1182.9 MG: 38 INJECTION INTRAVENOUS at 11:25

## 2020-04-21 RX ADMIN — SODIUM CHLORIDE 20 ML/HR: 9 INJECTION, SOLUTION INTRAVENOUS at 09:34

## 2020-04-21 RX ADMIN — DEXTROSE 20 ML/HR: 5 SOLUTION INTRAVENOUS at 10:14

## 2020-04-21 RX ADMIN — DOXORUBICIN HYDROCHLORIDE 54.6 MG: 2 INJECTABLE, LIPOSOMAL INTRAVENOUS at 10:23

## 2020-04-21 RX ADMIN — SODIUM CHLORIDE 500 ML: 0.9 INJECTION, SOLUTION INTRAVENOUS at 09:19

## 2020-04-27 ENCOUNTER — APPOINTMENT (OUTPATIENT)
Dept: LAB | Facility: HOSPITAL | Age: 70
End: 2020-04-27
Payer: MEDICARE

## 2020-04-27 DIAGNOSIS — C56.9 MALIGNANT NEOPLASM OF OVARY, UNSPECIFIED LATERALITY (HCC): ICD-10-CM

## 2020-04-27 PROBLEM — T45.1X5A CHEMOTHERAPY INDUCED NEUTROPENIA (HCC): Status: ACTIVE | Noted: 2020-04-27

## 2020-04-27 PROBLEM — D70.1 CHEMOTHERAPY INDUCED NEUTROPENIA (HCC): Status: ACTIVE | Noted: 2020-04-27

## 2020-04-27 LAB — CANCER AG125 SERPL-ACNC: 36.5 U/ML (ref 0–30)

## 2020-04-27 PROCEDURE — 86304 IMMUNOASSAY TUMOR CA 125: CPT

## 2020-04-27 RX ORDER — SODIUM CHLORIDE 9 MG/ML
20 INJECTION, SOLUTION INTRAVENOUS ONCE
Status: CANCELLED | OUTPATIENT
Start: 2020-04-28

## 2020-04-28 ENCOUNTER — HOSPITAL ENCOUNTER (OUTPATIENT)
Dept: INFUSION CENTER | Facility: CLINIC | Age: 70
Discharge: HOME | End: 2020-04-28
Payer: MEDICARE

## 2020-04-28 VITALS
HEIGHT: 64 IN | DIASTOLIC BLOOD PRESSURE: 58 MMHG | TEMPERATURE: 97.8 F | SYSTOLIC BLOOD PRESSURE: 123 MMHG | WEIGHT: 169.09 LBS | BODY MASS INDEX: 28.87 KG/M2 | HEART RATE: 68 BPM | RESPIRATION RATE: 18 BRPM

## 2020-04-28 DIAGNOSIS — C56.9 MALIGNANT NEOPLASM OF OVARY, UNSPECIFIED LATERALITY (HCC): Primary | ICD-10-CM

## 2020-04-28 DIAGNOSIS — D64.9 ANEMIA, UNSPECIFIED TYPE: ICD-10-CM

## 2020-04-28 PROCEDURE — 96367 TX/PROPH/DG ADDL SEQ IV INF: CPT

## 2020-04-28 PROCEDURE — 96377 APPLICATON ON-BODY INJECTOR: CPT

## 2020-04-28 PROCEDURE — 96413 CHEMO IV INFUSION 1 HR: CPT

## 2020-04-28 RX ORDER — SODIUM CHLORIDE 9 MG/ML
20 INJECTION, SOLUTION INTRAVENOUS ONCE
Status: COMPLETED | OUTPATIENT
Start: 2020-04-28 | End: 2020-04-28

## 2020-04-28 RX ADMIN — GEMCITABINE 1200 MG: 38 INJECTION INTRAVENOUS at 11:17

## 2020-04-28 RX ADMIN — SODIUM CHLORIDE 20 ML/HR: 0.9 INJECTION, SOLUTION INTRAVENOUS at 10:00

## 2020-04-28 RX ADMIN — PEGFILGRASTIM 6 MG: KIT SUBCUTANEOUS at 12:06

## 2020-04-28 RX ADMIN — DEXAMETHASONE SODIUM PHOSPHATE 10 MG: 10 INJECTION, SOLUTION INTRAMUSCULAR; INTRAVENOUS at 10:42

## 2020-05-11 ENCOUNTER — APPOINTMENT (OUTPATIENT)
Dept: LAB | Facility: HOSPITAL | Age: 70
End: 2020-05-11
Payer: MEDICARE

## 2020-05-11 DIAGNOSIS — C56.9 MALIGNANT NEOPLASM OF OVARY, UNSPECIFIED LATERALITY (HCC): ICD-10-CM

## 2020-05-11 LAB — CANCER AG125 SERPL-ACNC: 30.6 U/ML (ref 0–30)

## 2020-05-11 PROCEDURE — 36415 COLL VENOUS BLD VENIPUNCTURE: CPT

## 2020-05-11 PROCEDURE — 86304 IMMUNOASSAY TUMOR CA 125: CPT

## 2020-05-13 ENCOUNTER — OFFICE VISIT (OUTPATIENT)
Dept: GYNECOLOGIC ONCOLOGY | Facility: CLINIC | Age: 70
End: 2020-05-13
Payer: MEDICARE

## 2020-05-13 VITALS
RESPIRATION RATE: 18 BRPM | TEMPERATURE: 98.7 F | SYSTOLIC BLOOD PRESSURE: 124 MMHG | HEART RATE: 65 BPM | WEIGHT: 170 LBS | BODY MASS INDEX: 29.02 KG/M2 | DIASTOLIC BLOOD PRESSURE: 62 MMHG | HEIGHT: 64 IN

## 2020-05-13 DIAGNOSIS — C56.9 MALIGNANT NEOPLASM OF OVARY, UNSPECIFIED LATERALITY (HCC): Primary | ICD-10-CM

## 2020-05-13 PROCEDURE — 99214 OFFICE O/P EST MOD 30 MIN: CPT | Performed by: OBSTETRICS & GYNECOLOGY

## 2020-05-15 ENCOUNTER — APPOINTMENT (OUTPATIENT)
Dept: LAB | Facility: HOSPITAL | Age: 70
End: 2020-05-15
Payer: MEDICARE

## 2020-05-15 DIAGNOSIS — C56.9 MALIGNANT NEOPLASM OF OVARY, UNSPECIFIED LATERALITY (HCC): ICD-10-CM

## 2020-05-15 PROCEDURE — 86304 IMMUNOASSAY TUMOR CA 125: CPT

## 2020-05-16 LAB — CANCER AG125 SERPL-ACNC: 28.6 U/ML (ref 0–30)

## 2020-05-18 RX ORDER — DEXTROSE MONOHYDRATE 50 MG/ML
20 INJECTION, SOLUTION INTRAVENOUS ONCE
Status: CANCELLED | OUTPATIENT
Start: 2020-05-19

## 2020-05-18 RX ORDER — PALONOSETRON 0.05 MG/ML
0.25 INJECTION, SOLUTION INTRAVENOUS ONCE
Status: CANCELLED | OUTPATIENT
Start: 2020-05-19

## 2020-05-18 RX ORDER — SODIUM CHLORIDE 9 MG/ML
20 INJECTION, SOLUTION INTRAVENOUS ONCE
Status: CANCELLED | OUTPATIENT
Start: 2020-05-19

## 2020-05-19 ENCOUNTER — HOSPITAL ENCOUNTER (OUTPATIENT)
Dept: INFUSION CENTER | Facility: CLINIC | Age: 70
Discharge: HOME | End: 2020-05-19
Payer: MEDICARE

## 2020-05-19 VITALS
HEIGHT: 64 IN | OXYGEN SATURATION: 99 % | SYSTOLIC BLOOD PRESSURE: 152 MMHG | DIASTOLIC BLOOD PRESSURE: 66 MMHG | RESPIRATION RATE: 18 BRPM | WEIGHT: 174.16 LBS | HEART RATE: 80 BPM | TEMPERATURE: 97.5 F | BODY MASS INDEX: 29.73 KG/M2

## 2020-05-19 DIAGNOSIS — C56.9 MALIGNANT NEOPLASM OF OVARY, UNSPECIFIED LATERALITY (HCC): Primary | ICD-10-CM

## 2020-05-19 DIAGNOSIS — D64.9 ANEMIA, UNSPECIFIED TYPE: ICD-10-CM

## 2020-05-19 PROCEDURE — 96417 CHEMO IV INFUS EACH ADDL SEQ: CPT

## 2020-05-19 PROCEDURE — 96367 TX/PROPH/DG ADDL SEQ IV INF: CPT

## 2020-05-19 PROCEDURE — 96375 TX/PRO/DX INJ NEW DRUG ADDON: CPT

## 2020-05-19 PROCEDURE — 96413 CHEMO IV INFUSION 1 HR: CPT

## 2020-05-19 RX ORDER — DEXTROSE MONOHYDRATE 50 MG/ML
20 INJECTION, SOLUTION INTRAVENOUS ONCE
Status: COMPLETED | OUTPATIENT
Start: 2020-05-19 | End: 2020-05-19

## 2020-05-19 RX ORDER — SODIUM CHLORIDE 9 MG/ML
20 INJECTION, SOLUTION INTRAVENOUS ONCE
Status: COMPLETED | OUTPATIENT
Start: 2020-05-19 | End: 2020-05-19

## 2020-05-19 RX ORDER — PALONOSETRON 0.05 MG/ML
0.25 INJECTION, SOLUTION INTRAVENOUS ONCE
Status: COMPLETED | OUTPATIENT
Start: 2020-05-19 | End: 2020-05-19

## 2020-05-19 RX ADMIN — SODIUM CHLORIDE 20 ML/HR: 0.9 INJECTION, SOLUTION INTRAVENOUS at 10:29

## 2020-05-19 RX ADMIN — DEXAMETHASONE SODIUM PHOSPHATE 10 MG: 10 INJECTION, SOLUTION INTRAMUSCULAR; INTRAVENOUS at 10:29

## 2020-05-19 RX ADMIN — GEMCITABINE 1189.4 MG: 38 INJECTION INTRAVENOUS at 12:21

## 2020-05-19 RX ADMIN — DEXTROSE 20 ML/HR: 5 SOLUTION INTRAVENOUS at 11:20

## 2020-05-19 RX ADMIN — PALONOSETRON HYDROCHLORIDE 0.25 MG: 0.25 INJECTION INTRAVENOUS at 11:14

## 2020-05-19 RX ADMIN — DOXORUBICIN HYDROCHLORIDE 54.9 MG: 2 INJECTABLE, LIPOSOMAL INTRAVENOUS at 11:20

## 2020-05-22 RX ORDER — SODIUM CHLORIDE 9 MG/ML
20 INJECTION, SOLUTION INTRAVENOUS ONCE
Status: CANCELLED | OUTPATIENT
Start: 2020-05-26

## 2020-05-26 ENCOUNTER — APPOINTMENT (OUTPATIENT)
Dept: LAB | Facility: HOSPITAL | Age: 70
End: 2020-05-26
Payer: MEDICARE

## 2020-05-26 ENCOUNTER — HOSPITAL ENCOUNTER (OUTPATIENT)
Dept: INFUSION CENTER | Facility: CLINIC | Age: 70
Discharge: HOME | End: 2020-05-26
Payer: MEDICARE

## 2020-05-26 VITALS
SYSTOLIC BLOOD PRESSURE: 146 MMHG | WEIGHT: 170.19 LBS | TEMPERATURE: 97.7 F | HEIGHT: 64 IN | HEART RATE: 81 BPM | RESPIRATION RATE: 18 BRPM | BODY MASS INDEX: 29.06 KG/M2 | DIASTOLIC BLOOD PRESSURE: 65 MMHG

## 2020-05-26 DIAGNOSIS — D64.9 ANEMIA, UNSPECIFIED TYPE: ICD-10-CM

## 2020-05-26 DIAGNOSIS — D70.1 CHEMOTHERAPY INDUCED NEUTROPENIA (HCC): ICD-10-CM

## 2020-05-26 DIAGNOSIS — T45.1X5A CHEMOTHERAPY INDUCED NEUTROPENIA (HCC): ICD-10-CM

## 2020-05-26 DIAGNOSIS — C56.9 MALIGNANT NEOPLASM OF OVARY, UNSPECIFIED LATERALITY (HCC): ICD-10-CM

## 2020-05-26 DIAGNOSIS — C56.9 MALIGNANT NEOPLASM OF OVARY, UNSPECIFIED LATERALITY (HCC): Primary | ICD-10-CM

## 2020-05-26 LAB — CANCER AG125 SERPL-ACNC: 32.5 U/ML (ref 0–30)

## 2020-05-26 PROCEDURE — 96377 APPLICATON ON-BODY INJECTOR: CPT

## 2020-05-26 PROCEDURE — 96367 TX/PROPH/DG ADDL SEQ IV INF: CPT

## 2020-05-26 PROCEDURE — 96413 CHEMO IV INFUSION 1 HR: CPT

## 2020-05-26 PROCEDURE — 86304 IMMUNOASSAY TUMOR CA 125: CPT

## 2020-05-26 RX ORDER — SODIUM CHLORIDE 9 MG/ML
20 INJECTION, SOLUTION INTRAVENOUS ONCE
Status: COMPLETED | OUTPATIENT
Start: 2020-05-26 | End: 2020-05-26

## 2020-05-26 RX ADMIN — PEGFILGRASTIM 6 MG: KIT SUBCUTANEOUS at 14:40

## 2020-05-26 RX ADMIN — GEMCITABINE 1189.4 MG: 38 INJECTION INTRAVENOUS at 14:05

## 2020-05-26 RX ADMIN — SODIUM CHLORIDE 20 ML/HR: 0.9 INJECTION, SOLUTION INTRAVENOUS at 13:28

## 2020-05-26 RX ADMIN — DEXAMETHASONE SODIUM PHOSPHATE 10 MG: 10 INJECTION, SOLUTION INTRAMUSCULAR; INTRAVENOUS at 13:29

## 2020-06-10 ENCOUNTER — APPOINTMENT (OUTPATIENT)
Dept: LAB | Facility: HOSPITAL | Age: 70
End: 2020-06-10
Payer: MEDICARE

## 2020-06-10 ENCOUNTER — OFFICE VISIT (OUTPATIENT)
Dept: GYNECOLOGIC ONCOLOGY | Facility: CLINIC | Age: 70
End: 2020-06-10
Payer: MEDICARE

## 2020-06-10 VITALS
BODY MASS INDEX: 28.85 KG/M2 | DIASTOLIC BLOOD PRESSURE: 60 MMHG | SYSTOLIC BLOOD PRESSURE: 120 MMHG | HEIGHT: 64 IN | WEIGHT: 169 LBS | RESPIRATION RATE: 18 BRPM | HEART RATE: 68 BPM | TEMPERATURE: 97.7 F

## 2020-06-10 DIAGNOSIS — C56.9 MALIGNANT NEOPLASM OF OVARY, UNSPECIFIED LATERALITY (HCC): Primary | ICD-10-CM

## 2020-06-10 DIAGNOSIS — C56.9 MALIGNANT NEOPLASM OF OVARY, UNSPECIFIED LATERALITY (HCC): ICD-10-CM

## 2020-06-10 DIAGNOSIS — C56.1 MALIGNANT NEOPLASM OF RIGHT OVARY (HCC): ICD-10-CM

## 2020-06-10 PROCEDURE — 99214 OFFICE O/P EST MOD 30 MIN: CPT | Performed by: OBSTETRICS & GYNECOLOGY

## 2020-06-10 PROCEDURE — 86304 IMMUNOASSAY TUMOR CA 125: CPT

## 2020-06-11 LAB — CANCER AG125 SERPL-ACNC: 27.4 U/ML (ref 0–30)

## 2020-06-15 ENCOUNTER — APPOINTMENT (OUTPATIENT)
Dept: LAB | Facility: HOSPITAL | Age: 70
End: 2020-06-15
Payer: MEDICARE

## 2020-06-15 DIAGNOSIS — C56.9 MALIGNANT NEOPLASM OF OVARY, UNSPECIFIED LATERALITY (HCC): ICD-10-CM

## 2020-06-15 PROCEDURE — 86304 IMMUNOASSAY TUMOR CA 125: CPT

## 2020-06-16 ENCOUNTER — HOSPITAL ENCOUNTER (OUTPATIENT)
Dept: RADIOLOGY | Age: 70
Discharge: HOME | End: 2020-06-16
Payer: MEDICARE

## 2020-06-16 DIAGNOSIS — C56.1 MALIGNANT NEOPLASM OF RIGHT OVARY (HCC): ICD-10-CM

## 2020-06-16 DIAGNOSIS — C56.9 MALIGNANT NEOPLASM OF OVARY, UNSPECIFIED LATERALITY (HCC): ICD-10-CM

## 2020-06-16 LAB
CANCER AG125 SERPL-ACNC: 29.2 U/ML (ref 0–30)
GLUCOSE SERPL-MCNC: 111 MG/DL (ref 65–140)

## 2020-06-16 PROCEDURE — 82948 REAGENT STRIP/BLOOD GLUCOSE: CPT

## 2020-06-16 PROCEDURE — 78815 PET IMAGE W/CT SKULL-THIGH: CPT

## 2020-06-16 PROCEDURE — A9552 F18 FDG: HCPCS

## 2020-06-17 ENCOUNTER — HOSPITAL ENCOUNTER (OUTPATIENT)
Dept: INFUSION CENTER | Facility: CLINIC | Age: 70
Discharge: HOME | End: 2020-06-17

## 2020-06-17 DIAGNOSIS — C56.9 MALIGNANT NEOPLASM OF OVARY, UNSPECIFIED LATERALITY (HCC): ICD-10-CM

## 2020-06-17 DIAGNOSIS — D64.9 ANEMIA, UNSPECIFIED TYPE: ICD-10-CM

## 2020-06-19 ENCOUNTER — APPOINTMENT (OUTPATIENT)
Dept: LAB | Facility: HOSPITAL | Age: 70
End: 2020-06-19
Payer: MEDICARE

## 2020-06-22 RX ORDER — PALONOSETRON 0.05 MG/ML
0.25 INJECTION, SOLUTION INTRAVENOUS ONCE
Status: CANCELLED | OUTPATIENT
Start: 2020-06-23

## 2020-06-22 RX ORDER — DEXTROSE MONOHYDRATE 50 MG/ML
20 INJECTION, SOLUTION INTRAVENOUS ONCE
Status: CANCELLED | OUTPATIENT
Start: 2020-06-23

## 2020-06-22 RX ORDER — SODIUM CHLORIDE 9 MG/ML
20 INJECTION, SOLUTION INTRAVENOUS ONCE
Status: CANCELLED | OUTPATIENT
Start: 2020-06-23

## 2020-06-23 ENCOUNTER — HOSPITAL ENCOUNTER (OUTPATIENT)
Dept: INFUSION CENTER | Facility: CLINIC | Age: 70
Discharge: HOME | End: 2020-06-23
Payer: MEDICARE

## 2020-06-23 VITALS
TEMPERATURE: 97.1 F | HEART RATE: 74 BPM | SYSTOLIC BLOOD PRESSURE: 154 MMHG | HEIGHT: 64 IN | DIASTOLIC BLOOD PRESSURE: 69 MMHG | WEIGHT: 168 LBS | BODY MASS INDEX: 28.68 KG/M2 | OXYGEN SATURATION: 100 % | RESPIRATION RATE: 18 BRPM

## 2020-06-23 DIAGNOSIS — C56.9 MALIGNANT NEOPLASM OF OVARY, UNSPECIFIED LATERALITY (HCC): Primary | ICD-10-CM

## 2020-06-23 DIAGNOSIS — D64.9 ANEMIA, UNSPECIFIED TYPE: ICD-10-CM

## 2020-06-23 PROCEDURE — 96417 CHEMO IV INFUS EACH ADDL SEQ: CPT

## 2020-06-23 PROCEDURE — 96375 TX/PRO/DX INJ NEW DRUG ADDON: CPT

## 2020-06-23 PROCEDURE — 96367 TX/PROPH/DG ADDL SEQ IV INF: CPT

## 2020-06-23 PROCEDURE — 96413 CHEMO IV INFUSION 1 HR: CPT

## 2020-06-23 RX ORDER — DEXTROSE MONOHYDRATE 50 MG/ML
20 INJECTION, SOLUTION INTRAVENOUS ONCE
Status: COMPLETED | OUTPATIENT
Start: 2020-06-23 | End: 2020-06-23

## 2020-06-23 RX ORDER — PALONOSETRON 0.05 MG/ML
0.25 INJECTION, SOLUTION INTRAVENOUS ONCE
Status: COMPLETED | OUTPATIENT
Start: 2020-06-23 | End: 2020-06-23

## 2020-06-23 RX ORDER — SODIUM CHLORIDE 9 MG/ML
20 INJECTION, SOLUTION INTRAVENOUS ONCE
Status: COMPLETED | OUTPATIENT
Start: 2020-06-23 | End: 2020-06-23

## 2020-06-23 RX ADMIN — SODIUM CHLORIDE 20 ML/HR: 0.9 INJECTION, SOLUTION INTRAVENOUS at 10:52

## 2020-06-23 RX ADMIN — DEXTROSE 20 ML/HR: 5 SOLUTION INTRAVENOUS at 12:48

## 2020-06-23 RX ADMIN — DOXORUBICIN HYDROCHLORIDE 54.6 MG: 2 INJECTABLE, LIPOSOMAL INTRAVENOUS at 11:34

## 2020-06-23 RX ADMIN — GEMCITABINE 1182.9 MG: 38 INJECTION INTRAVENOUS at 12:48

## 2020-06-23 RX ADMIN — DEXAMETHASONE SODIUM PHOSPHATE 10 MG: 10 INJECTION, SOLUTION INTRAMUSCULAR; INTRAVENOUS at 10:52

## 2020-06-23 RX ADMIN — PALONOSETRON HYDROCHLORIDE 0.25 MG: 0.25 INJECTION INTRAVENOUS at 11:28

## 2020-06-26 RX ORDER — SODIUM CHLORIDE 9 MG/ML
20 INJECTION, SOLUTION INTRAVENOUS ONCE
Status: CANCELLED | OUTPATIENT
Start: 2020-06-29

## 2020-06-27 ENCOUNTER — APPOINTMENT (OUTPATIENT)
Dept: LAB | Facility: HOSPITAL | Age: 70
End: 2020-06-27
Payer: MEDICARE

## 2020-06-29 ENCOUNTER — HOSPITAL ENCOUNTER (OUTPATIENT)
Dept: INFUSION CENTER | Facility: CLINIC | Age: 70
Discharge: HOME | End: 2020-06-29
Payer: MEDICARE

## 2020-06-29 VITALS
SYSTOLIC BLOOD PRESSURE: 144 MMHG | WEIGHT: 169.75 LBS | HEIGHT: 64 IN | RESPIRATION RATE: 20 BRPM | HEART RATE: 79 BPM | BODY MASS INDEX: 28.98 KG/M2 | OXYGEN SATURATION: 95 % | TEMPERATURE: 97.4 F | DIASTOLIC BLOOD PRESSURE: 65 MMHG

## 2020-06-29 DIAGNOSIS — D64.9 ANEMIA, UNSPECIFIED TYPE: ICD-10-CM

## 2020-06-29 DIAGNOSIS — C56.9 MALIGNANT NEOPLASM OF OVARY, UNSPECIFIED LATERALITY (HCC): Primary | ICD-10-CM

## 2020-06-29 PROCEDURE — 96367 TX/PROPH/DG ADDL SEQ IV INF: CPT

## 2020-06-29 PROCEDURE — 96377 APPLICATON ON-BODY INJECTOR: CPT

## 2020-06-29 PROCEDURE — 96413 CHEMO IV INFUSION 1 HR: CPT

## 2020-06-29 RX ORDER — SODIUM CHLORIDE 9 MG/ML
20 INJECTION, SOLUTION INTRAVENOUS ONCE
Status: COMPLETED | OUTPATIENT
Start: 2020-06-29 | End: 2020-06-29

## 2020-06-29 RX ADMIN — PEGFILGRASTIM 6 MG: KIT SUBCUTANEOUS at 11:45

## 2020-06-29 RX ADMIN — GEMCITABINE 1182.9 MG: 38 INJECTION INTRAVENOUS at 11:02

## 2020-06-29 RX ADMIN — SODIUM CHLORIDE 20 ML/HR: 0.9 INJECTION, SOLUTION INTRAVENOUS at 10:14

## 2020-06-29 RX ADMIN — DEXAMETHASONE SODIUM PHOSPHATE 10 MG: 10 INJECTION, SOLUTION INTRAMUSCULAR; INTRAVENOUS at 10:26

## 2020-06-29 NOTE — PLAN OF CARE
Problem: Potential for Falls  Goal: Patient will remain free of falls  Description  INTERVENTIONS:  - Assess patient frequently for physical needs  -  Identify cognitive and physical deficits and behaviors that affect risk of falls  -  [place] fall precautions as indicated by assessment   - Educate patient/family on patient safety including physical limitations  - Instruct patient to call for assistance with activity based on assessment  - Modify environment to reduce risk of injury  - Consider OT/PT consult to assist with strengthening/mobility  Outcome: Progressing     Problem: SAFETY ADULT  Goal: Patient will remain free of falls  Description  INTERVENTIONS:  - Assess patient frequently for physical needs  -  Identify cognitive and physical deficits and behaviors that affect risk of falls  -  [place] fall precautions as indicated by assessment   - Educate patient/family on patient safety including physical limitations  - Instruct patient to call for assistance with activity based on assessment  - Modify environment to reduce risk of injury  - Consider OT/PT consult to assist with strengthening/mobility  Outcome: Progressing     Problem: Knowledge Deficit  Goal: Patient/family/caregiver demonstrates understanding of disease process, treatment plan, medications, and discharge instructions  Description  Complete learning assessment and assess knowledge base    Interventions:  - Provide teaching at level of understanding  - Provide teaching via preferred learning methods  Outcome: Progressing

## 2020-06-29 NOTE — PROGRESS NOTES
Pt to clinic for gemzar and Neulasta on-pro, spoke with Susu Ramos NP regarding pt's lab results, Jeet Tomas stated pt is okay to proceed with treatment today based on most recent lab results, pt tolerated infusion and Neulasta on-pro on L arm without complications, aware of next appointment, avs printed and reviewed

## 2020-07-11 ENCOUNTER — APPOINTMENT (OUTPATIENT)
Dept: LAB | Facility: HOSPITAL | Age: 70
End: 2020-07-11
Payer: MEDICARE

## 2020-07-15 ENCOUNTER — OFFICE VISIT (OUTPATIENT)
Dept: GYNECOLOGIC ONCOLOGY | Facility: CLINIC | Age: 70
End: 2020-07-15
Payer: MEDICARE

## 2020-07-15 VITALS
SYSTOLIC BLOOD PRESSURE: 132 MMHG | DIASTOLIC BLOOD PRESSURE: 74 MMHG | HEART RATE: 78 BPM | WEIGHT: 170.5 LBS | RESPIRATION RATE: 18 BRPM | BODY MASS INDEX: 29.11 KG/M2 | HEIGHT: 64 IN

## 2020-07-15 DIAGNOSIS — G62.9 NEUROPATHY: Primary | ICD-10-CM

## 2020-07-15 DIAGNOSIS — C56.3 MALIGNANT NEOPLASM OF BOTH OVARIES (HCC): Primary | ICD-10-CM

## 2020-07-15 PROCEDURE — 99214 OFFICE O/P EST MOD 30 MIN: CPT | Performed by: OBSTETRICS & GYNECOLOGY

## 2020-07-15 RX ORDER — TRAMADOL HYDROCHLORIDE 50 MG/1
50 TABLET ORAL EVERY 6 HOURS PRN
Qty: 20 TABLET | Refills: 1 | Status: SHIPPED | OUTPATIENT
Start: 2020-07-15 | End: 2020-10-21 | Stop reason: SDUPTHER

## 2020-07-15 NOTE — PROGRESS NOTES
Assessment/Plan:    Problem List Items Addressed This Visit        Endocrine    Ovarian cancer ([facility] [address] ) - Primary     Overall the patient is tolerating chemo well  There appears to be at least a partial response at this point  We will continue through 6 cycles of treatment and repeat another scan at that time  In the interim we will continue present dose of Doxil and gemcitabine without modification  For social reasons we will delay her next cycle of chemotherapy 1 week  We will continue the same schedule otherwise  Overall consultation took 25 minutes with greater than 50% being dedicated toward discussion time  CHIEF COMPLAINT:  Patient presents in consideration of chemo cycle 5 for Doxil gemcitabine for recurrent stage IVB ovarian cancer      Problem:  Cancer Staging  Ovarian cancer [facility])  Staging form: Ovary, Fallopian Tube, Primary Peritoneal, AJCC 8th Edition  - Clinical stage from 11/14/2018: FIGO Stage IVB (cT3c, cN0, pM1b) - Signed by Tawnya Ghosh MD on 11/14/2018  - Pathologic stage from 10/9/2019: FIGO Stage IIIC (pT3c, pN1, cM0) - Signed by Tawnya Ghosh MD on 10/9/2019        Previous therapy:     Ovarian cancer ([facility] [address] )    11/9/2018 Initial Diagnosis     Ovarian cancer ([facility] [address] )   tumor marker 194 5      11/9/2018 Biopsy     CT-guided needle biopsy of abdominal mass 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  Given liver involvement this would be stage IV      11/14/2018 - 12/4/2018 Chemotherapy     Neoadjuvant therapy: carboplatin area under the curve of 6, paclitaxel 135 milligrams/meter squared, Avastin 10 milligrams/kilogram  Completed 1 of 3 cycles  12/14/2018 Surgery     LAPAROTOMY EXPLORATORY; Abdominal Washout; Application of Abthera Vac Dressing for suspected bowel perforation and septic shock         12/15/2018 Surgery     LAPAROTOMY EXPLORATORY, ABDOMINAL WASHOUT, DRAIN PLACEMENT x 4, DIVERTING LOOP ILEOSTOMY AND ABDOMINAL CLOSURE for bowel perforation      3/26/2019 -  Chemotherapy     Taxol weekly 80 mg/m2 for 3 consecutive weeks, may add carboplatin in the future with AUC of 5       3/26/2019 - 2/3/2020 Chemotherapy     palonosetron (ALOXI) injection 0 25 mg, 0 25 mg, Intravenous, Once, 6 of 6 cycles  Administration: 0 25 mg (5/28/2019), 0 25 mg (6/25/2019), 0 25 mg (11/6/2019), 0 25 mg (12/10/2019), 0 25 mg (1/7/2020)  fosaprepitant (EMEND) 150 mg in sodium chloride 0 9 % 255 mL IVPB, 150 mg, Intravenous, Once, 6 of 6 cycles  Administration: 150 mg (5/28/2019), 150 mg (6/25/2019), 150 mg (11/6/2019), 150 mg (12/10/2019), 150 mg (1/7/2020)  CARBOplatin (PARAPLATIN) in sodium chloride 0 9 % 250 mL IVPB,  (original dose ), Intravenous, Once, 6 of 6 cycles  Dose modification:   (Cycle 2),   (original dose 258 4 mg, Cycle 3),   (original dose 258 4 mg, Cycle 3),   (original dose 244 4 mg, Cycle 4)  Administration: 258 4 mg (5/28/2019), 244 4 mg (6/25/2019), 285 2 mg (11/6/2019), 296 4 mg (12/10/2019), 286 4 mg (1/7/2020)  PACLitaxel (TAXOL) 127 8 mg in sodium chloride 0 9 % 250 mL chemo IVPB, 80 mg/m2, Intravenous, Once, 7 of 7 cycles  Dose modification: 65 mg/m2 (original dose 80 mg/m2, Cycle 2, Reason: Dose Not Tolerated)  Administration: 103 8 mg (5/14/2019), 108 6 mg (5/28/2019), 108 6 mg (6/4/2019), 108 6 mg (6/11/2019), 109 8 mg (6/25/2019), 109 8 mg (7/2/2019), 109 8 mg (7/9/2019), 111 6 mg (11/6/2019), 111 6 mg (11/12/2019), 111 6 mg (11/19/2019), 113 4 mg (12/10/2019), 113 4 mg (12/17/2019), 113 4 mg (12/23/2019), 113 4 mg (1/7/2020), 114 6 mg (1/14/2020), 114 6 mg (1/21/2020)      4/29/2019 Genomic Testing      Foundation 1 testing revealed a PD L1 tumor proportions score of 0%  The patient is not a candidate for PD L1 manipulation       Genetic Testing     [employer] Breast/Gyn panel, wildtype        9/10/2019 Surgery     Exploratory laparotomy radical omentectomy, posterior exenteration, takedown of ileostomy and end colostomy for complete debulking of visible tumor  Final pathology report revealed high-grade serous malignancy in both the omentum and the pelvic mass      9/25/2019 -  Chemotherapy     Carboplatin area under the curve of 5+ weekly paclitaxel at 80 milligrams/meters squared for 3 further cycles of treatment followed by consolidation this is to begin mid October      10/2019 Surgery     Interval debulking with TAHBSO revision of colostomy omentectomy and tumor debulking with complete debulking  Several weeks postoperatively the patient required a open cholecystectomy        10/2019 - 12/2019 Chemotherapy     Carboplatin paclitaxel x3      10/9/2019 -  Cancer Staged     Staging form: Ovary, Fallopian Tube, Primary Peritoneal, AJCC 8th Edition  - Pathologic stage from 10/9/2019: FIGO Stage IIIC (pT3c, pN1, cM0) - Signed by Eber Carmona MD on 10/9/2019  Histologic grade (G): G3  Histologic grading system: 4 grade system  Gross residual tumor after primary cyto-reductive surgery: Absent        3/24/2020 -  Chemotherapy     palonosetron (ALOXI) injection 0 25 mg, 0 25 mg, Intravenous, Once, 4 of 6 cycles  Administration: 0 25 mg (3/24/2020), 0 25 mg (4/21/2020), 0 25 mg (5/19/2020), 0 25 mg (6/23/2020)  pegfilgrastim (NEULASTA ONPRO) subcutaneous injection kit 6 mg, 6 mg, Subcutaneous, Once, 4 of 6 cycles  Administration: 6 mg (3/31/2020), 6 mg (4/28/2020), 6 mg (5/26/2020), 6 mg (6/29/2020)  DOXOrubicin liposome (DOXIL) 54 3 mg in dextrose 5 % 250 mL chemo infusion, 30 mg/m2 = 54 3 mg, Intravenous, Once, 4 of 6 cycles  Administration: 54 3 mg (3/24/2020), 54 6 mg (4/21/2020), 54 9 mg (5/19/2020), 54 6 mg (6/23/2020)  gemcitabine (GEMZAR) 1,176 4 mg in sodium chloride 0 9 % 250 mL infusion, 650 mg/m2 = 1,176 4 mg, Intravenous, Once, 4 of 6 cycles  Administration: 1,176 4 mg (3/24/2020), 1,176 4 mg (3/31/2020), 1,182 9 mg (4/21/2020), 1,200 mg (4/28/2020), 1,189 4 mg (5/19/2020), 1,189 4 mg (5/26/2020), 1,182 9 mg (6/23/2020), 1,182 9 mg (6/29/2020)           Patient ID: Willie Camarena is a 78 y o  female  Patient is a very pleasant 78-year-old white female with a history of stage IVB ovarian cancer status post initial treatment with carboplatin paclitaxel Avastin debulking surgery  She attained a near remission however after consolidation therapy progressed  The patient was placed on Doxil Avastin  She underwent 4 cycles of treatment and tolerated this well  She presents today in consideration of cycle 5  Prior to cycle 4  The patient underwent a PET-CT scan which showed a mixed response of tumor  The  however remains stable in the mid 25s  The patient's blood work is also stable  The hematologic parameters are unremarkable  The patient's blood chemistries continue show much mild elevation in alk-phos and her creatinine is anywhere between 1 2-1 3  All overall these are stable for chemotherapy  The patient does have mild symptoms of bowel dysfunction with mild loose stool and low appetite the 1st week after Doxil and Gemzar treatment but this resolves within 2 days and then has normal quality of life after that  She is tolerating the chemo well  Today, the patient is doing well  She denies significant abdominal pain, pelvic pain, nausea, vomiting, constipation, diarrhea, fevers, chills, or vaginal bleeding  The patient would like to delay her chemotherapy 1 week due to her granddaughter 16th birthday          The following portions of the patient's history were reviewed and updated as appropriate: allergies, current medications, past family history, past social history, past surgical history and problem list     Review of Systems    Current Outpatient Medications   Medication Sig Dispense Refill    aspirin-acetaminophen-caffeine (EXCEDRIN MIGRAINE) 250-250-65 MG per tablet Take 1 tablet by mouth every 6 (six) hours as needed for headaches      pantoprazole (PROTONIX) 40 mg tablet Take 1 tablet (40 mg total) by mouth 2 (two) times a day before meals  0    gabapentin (NEURONTIN) 300 mg capsule Take 1 capsule (300 mg total) by mouth 3 (three) times a day 90 capsule 0    potassium chloride (K-DUR,KLOR-CON) 20 mEq tablet Take 1 tablet (20 mEq total) by mouth daily 30 tablet 0     No current facility-administered medications for this visit  Objective:    Blood pressure 132/74, pulse 78, resp  rate 18, height 5' 4 17" (1 63 m), weight 77 3 kg (170 lb 8 oz), not currently breastfeeding  Body mass index is 29 11 kg/m²  Body surface area is 1 83 meters squared      Physical Exam    Lab Results   Component Value Date     29 2 06/15/2020     Lab Results   Component Value Date    K 4 3 07/11/2020     07/11/2020    CO2 26 07/11/2020    BUN 22 07/11/2020    CREATININE 1 39 (H) 07/11/2020    GLUCOSE 228 (H) 09/10/2019    GLUF 127 (H) 06/15/2020    CALCIUM 9 2 07/11/2020    AST 15 07/11/2020    ALT 22 07/11/2020    ALKPHOS 161 (H) 07/11/2020    EGFR 38 07/11/2020     Lab Results   Component Value Date    WBC 11 86 (H) 07/11/2020    HGB 8 9 (L) 07/11/2020    HCT 28 2 (L) 07/11/2020    MCV 96 07/11/2020     07/11/2020     Lab Results   Component Value Date    NEUTROABS 8 48 (H) 07/11/2020        Trend:  Lab Results   Component Value Date     29 2 06/15/2020     27 4 06/10/2020     32 5 (H) 05/26/2020     28 6 05/15/2020     30 6 (H) 05/11/2020     36 5 (H) 04/27/2020     37 9 (H) 04/17/2020     42 5 (H) 04/10/2020     48 1 (H) 03/27/2020     40 9 (H) 03/20/2020     18 2 01/28/2020     17 5 12/31/2019     14 5 12/02/2019     22 0 11/19/2019     32 6 (H) 11/06/2019     19 1 08/30/2019     12 7 08/13/2019     13 9 06/18/2019     19 9 05/21/2019     79 5 (H) 04/16/2019     96 4 (H) 03/19/2019     80 4 (H) 03/08/2019     135 0 (H) 11/30/2018     194 5 (H) 10/18/2018

## 2020-07-15 NOTE — ASSESSMENT & PLAN NOTE
Overall the patient is tolerating chemo well  There appears to be at least a partial response at this point  We will continue through 6 cycles of treatment and repeat another scan at that time  In the interim we will continue present dose of Doxil and gemcitabine without modification  For social reasons we will delay her next cycle of chemotherapy 1 week  We will continue the same schedule otherwise  Overall consultation took 25 minutes with greater than 50% being dedicated toward discussion time

## 2020-07-15 NOTE — LETTER
July 15, 2020     [place], [id number] [facility] [address]    Patient: May Flaherty   YOB: 1950   Date of Visit: 7/15/2020       Dear Dr Leon Ruiz:    Thank you for referring May Flaherty to me for evaluation  Below are my notes for this consultation  If you have questions, please do not hesitate to call me  I look forward to following your patient along with you  Sincerely,        Manuel Rudolph MD        CC: MD Manuel Church MD  7/15/2020 10:05 AM  Sign at close encounter  Assessment/Plan:    Problem List Items Addressed This Visit        Endocrine    Ovarian cancer ([facility] [address] ) - Primary     Overall the patient is tolerating chemo well  There appears to be at least a partial response at this point  We will continue through 6 cycles of treatment and repeat another scan at that time  In the interim we will continue present dose of Doxil and gemcitabine without modification  For social reasons we will delay her next cycle of chemotherapy 1 week  We will continue the same schedule otherwise  Overall consultation took 25 minutes with greater than 50% being dedicated toward discussion time                   CHIEF COMPLAINT:  Patient presents in consideration of chemo cycle 5 for Doxil gemcitabine for recurrent stage IVB ovarian cancer      Problem:  Cancer Staging  Ovarian cancer [facility])  Staging form: Ovary, Fallopian Tube, Primary Peritoneal, AJCC 8th Edition  - Clinical stage from 11/14/2018: FIGO Stage IVB (cT3c, cN0, pM1b) - Signed by Manuel Rudolph MD on 11/14/2018  - Pathologic stage from 10/9/2019: FIGO Stage IIIC (pT3c, pN1, cM0) - Signed by Manuel Rudolph MD on 10/9/2019        Previous therapy:     Ovarian cancer ([facility] [address] )    11/9/2018 Initial Diagnosis     Ovarian cancer ([facility] [address] )   tumor marker 194 5      11/9/2018 Biopsy     CT-guided needle biopsy of abdominal mass 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  Given liver involvement this would be stage IV      11/14/2018 - 12/4/2018 Chemotherapy     Neoadjuvant therapy: carboplatin area under the curve of 6, paclitaxel 135 milligrams/meter squared, Avastin 10 milligrams/kilogram  Completed 1 of 3 cycles  12/14/2018 Surgery     LAPAROTOMY EXPLORATORY; Abdominal Washout; Application of Abthera Vac Dressing for suspected bowel perforation and septic shock         12/15/2018 Surgery     LAPAROTOMY EXPLORATORY, ABDOMINAL WASHOUT, DRAIN PLACEMENT x 4, DIVERTING LOOP ILEOSTOMY AND ABDOMINAL CLOSURE for bowel perforation      3/26/2019 -  Chemotherapy     Taxol weekly 80 mg/m2 for 3 consecutive weeks, may add carboplatin in the future with AUC of 5       3/26/2019 - 2/3/2020 Chemotherapy     palonosetron (ALOXI) injection 0 25 mg, 0 25 mg, Intravenous, Once, 6 of 6 cycles  Administration: 0 25 mg (5/28/2019), 0 25 mg (6/25/2019), 0 25 mg (11/6/2019), 0 25 mg (12/10/2019), 0 25 mg (1/7/2020)  fosaprepitant (EMEND) 150 mg in sodium chloride 0 9 % 255 mL IVPB, 150 mg, Intravenous, Once, 6 of 6 cycles  Administration: 150 mg (5/28/2019), 150 mg (6/25/2019), 150 mg (11/6/2019), 150 mg (12/10/2019), 150 mg (1/7/2020)  CARBOplatin (PARAPLATIN) in sodium chloride 0 9 % 250 mL IVPB,  (original dose ), Intravenous, Once, 6 of 6 cycles  Dose modification:   (Cycle 2),   (original dose 258 4 mg, Cycle 3),   (original dose 258 4 mg, Cycle 3),   (original dose 244 4 mg, Cycle 4)  Administration: 258 4 mg (5/28/2019), 244 4 mg (6/25/2019), 285 2 mg (11/6/2019), 296 4 mg (12/10/2019), 286 4 mg (1/7/2020)  PACLitaxel (TAXOL) 127 8 mg in sodium chloride 0 9 % 250 mL chemo IVPB, 80 mg/m2, Intravenous, Once, 7 of 7 cycles  Dose modification: 65 mg/m2 (original dose 80 mg/m2, Cycle 2, Reason: Dose Not Tolerated)  Administration: 103 8 mg (5/14/2019), 108 6 mg (5/28/2019), 108 6 mg (6/4/2019), 108 6 mg (6/11/2019), 109 8 mg (6/25/2019), 109 8 mg (7/2/2019), 109 8 mg (7/9/2019), 111 6 mg (11/6/2019), 111 6 mg (11/12/2019), 111 6 mg (11/19/2019), 113 4 mg (12/10/2019), 113 4 mg (12/17/2019), 113 4 mg (12/23/2019), 113 4 mg (1/7/2020), 114 6 mg (1/14/2020), 114 6 mg (1/21/2020)      4/29/2019 [id number] 1 testing revealed a PD L1 tumor proportions score of 0%  The patient is not a candidate for PD L1 manipulation       Genetic Testing     [employer] Breast/Gyn panel, wildtype  9/10/2019 Surgery     Exploratory laparotomy radical omentectomy, posterior exenteration, takedown of ileostomy and end colostomy for complete debulking of visible tumor  Final pathology report revealed high-grade serous malignancy in both the omentum and the pelvic mass      9/25/2019 -  Chemotherapy     Carboplatin area under the curve of 5+ weekly paclitaxel at 80 milligrams/meters squared for 3 further cycles of treatment followed by consolidation this is to begin mid October      10/2019 Surgery     Interval debulking with TAHBSO revision of colostomy omentectomy and tumor debulking with complete debulking  Several weeks postoperatively the patient required a open cholecystectomy        10/2019 - 12/2019 Chemotherapy     Carboplatin paclitaxel x3      10/9/2019 -  Cancer Staged     Staging form: Ovary, Fallopian Tube, Primary Peritoneal, AJCC 8th Edition  - Pathologic stage from 10/9/2019: FIGO Stage IIIC (pT3c, pN1, cM0) - Signed by Alek Jama MD on 10/9/2019  Histologic grade (G): G3  Histologic grading system: 4 grade system  Gross residual tumor after primary cyto-reductive surgery: Absent        3/24/2020 -  Chemotherapy     palonosetron (ALOXI) injection 0 25 mg, 0 25 mg, Intravenous, Once, 4 of 6 cycles  Administration: 0 25 mg (3/24/2020), 0 25 mg (4/21/2020), 0 25 mg (5/19/2020), 0 25 mg (6/23/2020)  pegfilgrastim (NEULASTA ONPRO) subcutaneous injection kit 6 mg, 6 mg, Subcutaneous, Once, 4 of 6 cycles  Administration: 6 mg (3/31/2020), 6 mg (4/28/2020), 6 mg (5/26/2020), 6 mg (6/29/2020)  DOXOrubicin liposome (DOXIL) 54 3 mg in dextrose 5 % 250 mL chemo infusion, 30 mg/m2 = 54 3 mg, Intravenous, Once, 4 of 6 cycles  Administration: 54 3 mg (3/24/2020), 54 6 mg (4/21/2020), 54 9 mg (5/19/2020), 54 6 mg (6/23/2020)  gemcitabine (GEMZAR) 1,176 4 mg in sodium chloride 0 9 % 250 mL infusion, 650 mg/m2 = 1,176 4 mg, Intravenous, Once, 4 of 6 cycles  Administration: 1,176 4 mg (3/24/2020), 1,176 4 mg (3/31/2020), 1,182 9 mg (4/21/2020), 1,200 mg (4/28/2020), 1,189 4 mg (5/19/2020), 1,189 4 mg (5/26/2020), 1,182 9 mg (6/23/2020), 1,182 9 mg (6/29/2020)           Patient ID: Dale Jordan is a 78 y o  female  Patient is a very pleasant 79-year-old white female with a history of stage IVB ovarian cancer status post initial treatment with carboplatin paclitaxel Avastin debulking surgery  She attained a near remission however after consolidation therapy progressed  The patient was placed on Doxil Avastin  She underwent 4 cycles of treatment and tolerated this well  She presents today in consideration of cycle 5  Prior to cycle 4  The patient underwent a PET-CT scan which showed a mixed response of tumor  The  however remains stable in the mid 25s  The patient's blood work is also stable  The hematologic parameters are unremarkable  The patient's blood chemistries continue show much mild elevation in alk-phos and her creatinine is anywhere between 1 2-1 3  All overall these are stable for chemotherapy  The patient does have mild symptoms of bowel dysfunction with mild loose stool and low appetite the 1st week after Doxil and Gemzar treatment but this resolves within 2 days and then has normal quality of life after that  She is tolerating the chemo well  Today, the patient is doing well  She denies significant abdominal pain, pelvic pain, nausea, vomiting, constipation, diarrhea, fevers, chills, or vaginal bleeding    The patient would like to delay her chemotherapy 1 week due to her granddaughter 16th birthday  The following portions of the patient's history were reviewed and updated as appropriate: allergies, current medications, past family history, past social history, past surgical history and problem list     Review of Systems    Current Outpatient Medications   Medication Sig Dispense Refill    aspirin-acetaminophen-caffeine (EXCEDRIN MIGRAINE) 250-250-65 MG per tablet Take 1 tablet by mouth every 6 (six) hours as needed for headaches      pantoprazole (PROTONIX) 40 mg tablet Take 1 tablet (40 mg total) by mouth 2 (two) times a day before meals  0    gabapentin (NEURONTIN) 300 mg capsule Take 1 capsule (300 mg total) by mouth 3 (three) times a day 90 capsule 0    potassium chloride (K-DUR,KLOR-CON) 20 mEq tablet Take 1 tablet (20 mEq total) by mouth daily 30 tablet 0     No current facility-administered medications for this visit  Objective:    Blood pressure 132/74, pulse 78, resp  rate 18, height 5' 4 17" (1 63 m), weight 77 3 kg (170 lb 8 oz), not currently breastfeeding  Body mass index is 29 11 kg/m²  Body surface area is 1 83 meters squared      Physical Exam    Lab Results   Component Value Date     29 2 06/15/2020     Lab Results   Component Value Date    K 4 3 07/11/2020     07/11/2020    CO2 26 07/11/2020    BUN 22 07/11/2020    CREATININE 1 39 (H) 07/11/2020    GLUCOSE 228 (H) 09/10/2019    GLUF 127 (H) 06/15/2020    CALCIUM 9 2 07/11/2020    AST 15 07/11/2020    ALT 22 07/11/2020    ALKPHOS 161 (H) 07/11/2020    EGFR 38 07/11/2020     Lab Results   Component Value Date    WBC 11 86 (H) 07/11/2020    HGB 8 9 (L) 07/11/2020    HCT 28 2 (L) 07/11/2020    MCV 96 07/11/2020     07/11/2020     Lab Results   Component Value Date    NEUTROABS 8 48 (H) 07/11/2020        Trend:  Lab Results   Component Value Date     29 2 06/15/2020     27 4 06/10/2020     32 5 (H) 05/26/2020     28 6 05/15/2020     30 6 (H) 05/11/2020     36 5 (H) 04/27/2020     37 9 (H) 04/17/2020     42 5 (H) 04/10/2020     48 1 (H) 03/27/2020     40 9 (H) 03/20/2020     18 2 01/28/2020     17 5 12/31/2019     14 5 12/02/2019     22 0 11/19/2019     32 6 (H) 11/06/2019     19 1 08/30/2019     12 7 08/13/2019     13 9 06/18/2019     19 9 05/21/2019     79 5 (H) 04/16/2019     96 4 (H) 03/19/2019     80 4 (H) 03/08/2019     135 0 (H) 11/30/2018     194 5 (H) 10/18/2018

## 2020-07-27 ENCOUNTER — APPOINTMENT (OUTPATIENT)
Dept: LAB | Facility: HOSPITAL | Age: 70
End: 2020-07-27
Payer: MEDICARE

## 2020-07-27 RX ORDER — SODIUM CHLORIDE 9 MG/ML
20 INJECTION, SOLUTION INTRAVENOUS ONCE
Status: CANCELLED | OUTPATIENT
Start: 2020-07-28

## 2020-07-27 RX ORDER — PALONOSETRON 0.05 MG/ML
0.25 INJECTION, SOLUTION INTRAVENOUS ONCE
Status: CANCELLED | OUTPATIENT
Start: 2020-07-28

## 2020-07-27 RX ORDER — DEXTROSE MONOHYDRATE 50 MG/ML
20 INJECTION, SOLUTION INTRAVENOUS ONCE
Status: CANCELLED | OUTPATIENT
Start: 2020-07-28

## 2020-07-28 ENCOUNTER — HOSPITAL ENCOUNTER (OUTPATIENT)
Dept: INFUSION CENTER | Facility: CLINIC | Age: 70
Discharge: HOME | End: 2020-07-28
Payer: MEDICARE

## 2020-07-28 VITALS
BODY MASS INDEX: 29.43 KG/M2 | DIASTOLIC BLOOD PRESSURE: 67 MMHG | HEIGHT: 64 IN | RESPIRATION RATE: 18 BRPM | HEART RATE: 83 BPM | OXYGEN SATURATION: 97 % | WEIGHT: 172.4 LBS | SYSTOLIC BLOOD PRESSURE: 147 MMHG | TEMPERATURE: 97.1 F

## 2020-07-28 DIAGNOSIS — D64.9 ANEMIA, UNSPECIFIED TYPE: ICD-10-CM

## 2020-07-28 DIAGNOSIS — C56.9 MALIGNANT NEOPLASM OF OVARY, UNSPECIFIED LATERALITY (HCC): Primary | ICD-10-CM

## 2020-07-28 PROCEDURE — 96367 TX/PROPH/DG ADDL SEQ IV INF: CPT

## 2020-07-28 PROCEDURE — 96417 CHEMO IV INFUS EACH ADDL SEQ: CPT

## 2020-07-28 PROCEDURE — 96375 TX/PRO/DX INJ NEW DRUG ADDON: CPT

## 2020-07-28 PROCEDURE — 96413 CHEMO IV INFUSION 1 HR: CPT

## 2020-07-28 RX ORDER — DEXTROSE MONOHYDRATE 50 MG/ML
20 INJECTION, SOLUTION INTRAVENOUS ONCE
Status: COMPLETED | OUTPATIENT
Start: 2020-07-28 | End: 2020-07-28

## 2020-07-28 RX ORDER — SODIUM CHLORIDE 9 MG/ML
20 INJECTION, SOLUTION INTRAVENOUS ONCE
Status: COMPLETED | OUTPATIENT
Start: 2020-07-28 | End: 2020-07-28

## 2020-07-28 RX ORDER — PALONOSETRON 0.05 MG/ML
0.25 INJECTION, SOLUTION INTRAVENOUS ONCE
Status: COMPLETED | OUTPATIENT
Start: 2020-07-28 | End: 2020-07-28

## 2020-07-28 RX ADMIN — PALONOSETRON HYDROCHLORIDE 0.25 MG: 0.25 INJECTION INTRAVENOUS at 11:54

## 2020-07-28 RX ADMIN — GEMCITABINE 1189.4 MG: 38 INJECTION, SOLUTION INTRAVENOUS at 14:13

## 2020-07-28 RX ADMIN — SODIUM CHLORIDE 20 ML/HR: 0.9 INJECTION, SOLUTION INTRAVENOUS at 11:56

## 2020-07-28 RX ADMIN — DOXORUBICIN HYDROCHLORIDE 54.9 MG: 2 INJECTABLE, LIPOSOMAL INTRAVENOUS at 12:52

## 2020-07-28 RX ADMIN — DEXTROSE 20 ML/HR: 5 SOLUTION INTRAVENOUS at 12:48

## 2020-07-28 RX ADMIN — DEXAMETHASONE SODIUM PHOSPHATE 10 MG: 10 INJECTION, SOLUTION INTRAMUSCULAR; INTRAVENOUS at 11:56

## 2020-07-28 NOTE — PROGRESS NOTES
Spoke with 262Tariq Oleary, reports labs from yesterday are ok to use for today's treatment, ok to proceed

## 2020-07-28 NOTE — PROGRESS NOTES
Pt presents for Gemzar and Doxil offering no complaints  Pt tolerated treatment without incident  AVS declined  Next appointment reviewed

## 2020-08-01 ENCOUNTER — APPOINTMENT (OUTPATIENT)
Dept: LAB | Facility: HOSPITAL | Age: 70
End: 2020-08-01
Payer: MEDICARE

## 2020-08-03 RX ORDER — SODIUM CHLORIDE 9 MG/ML
20 INJECTION, SOLUTION INTRAVENOUS ONCE
Status: CANCELLED | OUTPATIENT
Start: 2020-08-04

## 2020-08-04 ENCOUNTER — HOSPITAL ENCOUNTER (OUTPATIENT)
Dept: INFUSION CENTER | Facility: CLINIC | Age: 70
Discharge: HOME | End: 2020-08-04
Payer: MEDICARE

## 2020-08-04 VITALS
HEART RATE: 72 BPM | WEIGHT: 167.99 LBS | HEIGHT: 64 IN | TEMPERATURE: 97.3 F | SYSTOLIC BLOOD PRESSURE: 138 MMHG | BODY MASS INDEX: 28.68 KG/M2 | DIASTOLIC BLOOD PRESSURE: 64 MMHG | OXYGEN SATURATION: 97 % | RESPIRATION RATE: 18 BRPM

## 2020-08-04 DIAGNOSIS — C56.9 MALIGNANT NEOPLASM OF OVARY, UNSPECIFIED LATERALITY (HCC): Primary | ICD-10-CM

## 2020-08-04 DIAGNOSIS — D64.9 ANEMIA, UNSPECIFIED TYPE: ICD-10-CM

## 2020-08-04 PROCEDURE — 96413 CHEMO IV INFUSION 1 HR: CPT

## 2020-08-04 PROCEDURE — 96367 TX/PROPH/DG ADDL SEQ IV INF: CPT

## 2020-08-04 PROCEDURE — 96377 APPLICATON ON-BODY INJECTOR: CPT

## 2020-08-04 RX ORDER — SODIUM CHLORIDE 9 MG/ML
20 INJECTION, SOLUTION INTRAVENOUS ONCE
Status: COMPLETED | OUTPATIENT
Start: 2020-08-04 | End: 2020-08-04

## 2020-08-04 RX ADMIN — PEGFILGRASTIM 6 MG: KIT SUBCUTANEOUS at 14:43

## 2020-08-04 RX ADMIN — DEXAMETHASONE SODIUM PHOSPHATE 10 MG: 10 INJECTION, SOLUTION INTRAMUSCULAR; INTRAVENOUS at 13:29

## 2020-08-04 RX ADMIN — GEMCITABINE 1189.4 MG: 38 INJECTION, SOLUTION INTRAVENOUS at 14:10

## 2020-08-04 RX ADMIN — SODIUM CHLORIDE 20 ML/HR: 0.9 INJECTION, SOLUTION INTRAVENOUS at 13:29

## 2020-08-04 NOTE — QUICK NOTE
Pt presents for Gemzar offering no complaints  Confirmed with RIGOBERTO Jaimes that pt is to receive Gemzar and Doxil on day 1 of her treatments and just Gemzar on day 8 of her treatments  Pt tolerated treatment without incident  Neulasta onpro placed on left arm, verbalized understanding of device monitoring and removal time  AVS printed  Next appointment reviewed

## 2020-08-15 ENCOUNTER — APPOINTMENT (OUTPATIENT)
Dept: LAB | Facility: HOSPITAL | Age: 70
End: 2020-08-15
Payer: MEDICARE

## 2020-08-19 ENCOUNTER — OFFICE VISIT (OUTPATIENT)
Dept: GYNECOLOGIC ONCOLOGY | Facility: CLINIC | Age: 70
End: 2020-08-19
Payer: MEDICARE

## 2020-08-19 VITALS
BODY MASS INDEX: 29.02 KG/M2 | WEIGHT: 170 LBS | HEIGHT: 64 IN | DIASTOLIC BLOOD PRESSURE: 68 MMHG | RESPIRATION RATE: 18 BRPM | SYSTOLIC BLOOD PRESSURE: 120 MMHG | HEART RATE: 77 BPM | TEMPERATURE: 97.5 F

## 2020-08-19 DIAGNOSIS — C56.9 MALIGNANT NEOPLASM OF OVARY, UNSPECIFIED LATERALITY (HCC): Primary | ICD-10-CM

## 2020-08-19 PROCEDURE — 99214 OFFICE O/P EST MOD 30 MIN: CPT | Performed by: OBSTETRICS & GYNECOLOGY

## 2020-08-19 NOTE — PROGRESS NOTES
Assessment/Plan:    Problem List Items Addressed This Visit        Endocrine    Malignant neoplasm of ovary ([facility] [address] ) - Primary     Patient is experiencing some moderate degree of chemo toxicity however her blood counts are stable  We have elected to proceed with 6th and final chemotherapy with Doxil and Gemzar  We will keep dosages the same  After completion of this a PET-CT scan will be performed  I have discussed with the patient that 3 scenario is will exist   Either the patient will have no further disease in we should consider chemo holiday at that point  I feel that this is unlikely given the persistence in the   Most likely there will be some persistence of disease  If that is the case consideration of continuation of Doxil Gemzar versus an alternative chemo regimen would be indicated  Overall consultation took 25 minutes with greater than 50% being dedicated toward discussion time         Relevant Orders        NM PET CT skull base to mid thigh            CHIEF COMPLAINT:  Recurrent ovarian cancer stage IVB for palliative Doxil gemcitabine cycle 6  Problem:  Cancer Staging  Ovarian cancer [facility])  Staging form: Ovary, Fallopian Tube, Primary Peritoneal, AJCC 8th Edition  - Clinical stage from 11/14/2018: FIGO Stage IVB (cT3c, cN0, pM1b) - Signed by Antonio Palm MD on 11/14/2018  - Pathologic stage from 10/9/2019: FIGO Stage IIIC (pT3c, pN1, cM0) - Signed by Antonio Palm MD on 10/9/2019        Previous therapy:  Oncology History   Ovarian cancer ([facility] [address] )   11/9/2018 Initial Diagnosis    Ovarian cancer ([facility] [address] )   tumor marker 194 5     11/9/2018 Biopsy    CT-guided needle biopsy of abdominal mass 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    Given liver involvement this would be stage IV     11/14/2018 - 12/4/2018 Chemotherapy    Neoadjuvant therapy: carboplatin area under the curve of 6, paclitaxel 135 milligrams/meter squared, Avastin 10 milligrams/kilogram  Completed 1 of 3 cycles  12/14/2018 Surgery    LAPAROTOMY EXPLORATORY; Abdominal Washout; Application of Abthera Vac Dressing for suspected bowel perforation and septic shock        12/15/2018 Surgery    LAPAROTOMY EXPLORATORY, ABDOMINAL WASHOUT, DRAIN PLACEMENT x 4, DIVERTING LOOP ILEOSTOMY AND ABDOMINAL CLOSURE for bowel perforation     3/26/2019 -  Chemotherapy    Taxol weekly 80 mg/m2 for 3 consecutive weeks, may add carboplatin in the future with AUC of 5      3/26/2019 - 2/3/2020 Chemotherapy    palonosetron (ALOXI) injection 0 25 mg, 0 25 mg, Intravenous, Once, 6 of 6 cycles  Administration: 0 25 mg (5/28/2019), 0 25 mg (6/25/2019), 0 25 mg (11/6/2019), 0 25 mg (12/10/2019), 0 25 mg (1/7/2020)  fosaprepitant (EMEND) 150 mg in sodium chloride 0 9 % 255 mL IVPB, 150 mg, Intravenous, Once, 6 of 6 cycles  Administration: 150 mg (5/28/2019), 150 mg (6/25/2019), 150 mg (11/6/2019), 150 mg (12/10/2019), 150 mg (1/7/2020)  CARBOplatin (PARAPLATIN) in sodium chloride 0 9 % 250 mL IVPB,  (original dose ), Intravenous, Once, 6 of 6 cycles  Dose modification:   (Cycle 2),   (original dose 258 4 mg, Cycle 3),   (original dose 258 4 mg, Cycle 3),   (original dose 244 4 mg, Cycle 4)  Administration: 258 4 mg (5/28/2019), 244 4 mg (6/25/2019), 285 2 mg (11/6/2019), 296 4 mg (12/10/2019), 286 4 mg (1/7/2020)  PACLitaxel (TAXOL) 127 8 mg in sodium chloride 0 9 % 250 mL chemo IVPB, 80 mg/m2, Intravenous, Once, 7 of 7 cycles  Dose modification: 65 mg/m2 (original dose 80 mg/m2, Cycle 2, Reason: Dose Not Tolerated)  Administration: 103 8 mg (5/14/2019), 108 6 mg (5/28/2019), 108 6 mg (6/4/2019), 108 6 mg (6/11/2019), 109 8 mg (6/25/2019), 109 8 mg (7/2/2019), 109 8 mg (7/9/2019), 111 6 mg (11/6/2019), 111 6 mg (11/12/2019), 111 6 mg (11/19/2019), 113 4 mg (12/10/2019), 113 4 mg (12/17/2019), 113 4 mg (12/23/2019), 113 4 mg (1/7/2020), 114 6 mg (1/14/2020), 114 6 mg (1/21/2020)     4/29/2019 Genomic Testing     Foundation 1 testing revealed a PD L1 tumor proportions score of 0%  The patient is not a candidate for PD L1 manipulation      Genetic Testing    [employer] Breast/Gyn panel, wildtype  9/10/2019 Surgery    Exploratory laparotomy radical omentectomy, posterior exenteration, takedown of ileostomy and end colostomy for complete debulking of visible tumor  Final pathology report revealed high-grade serous malignancy in both the omentum and the pelvic mass     9/25/2019 -  Chemotherapy    Carboplatin area under the curve of 5+ weekly paclitaxel at 80 milligrams/meters squared for 3 further cycles of treatment followed by consolidation this is to begin mid October     10/2019 Surgery    Interval debulking with TAHBSO revision of colostomy omentectomy and tumor debulking with complete debulking  Several weeks postoperatively the patient required a open cholecystectomy       10/2019 - 12/2019 Chemotherapy    Carboplatin paclitaxel x3     10/9/2019 -  Cancer Staged    Staging form: Ovary, Fallopian Tube, Primary Peritoneal, AJCC 8th Edition  - Pathologic stage from 10/9/2019: FIGO Stage IIIC (pT3c, pN1, cM0) - Signed by Manuel Rudolph MD on 10/9/2019  Histologic grade (G): G3  Histologic grading system: 4 grade system  Gross residual tumor after primary cyto-reductive surgery: Absent       3/24/2020 -  Chemotherapy    palonosetron (ALOXI) injection 0 25 mg, 0 25 mg, Intravenous, Once, 5 of 6 cycles  Administration: 0 25 mg (3/24/2020), 0 25 mg (4/21/2020), 0 25 mg (5/19/2020), 0 25 mg (6/23/2020), 0 25 mg (7/28/2020)  pegfilgrastim (NEULASTA ONPRO) subcutaneous injection kit 6 mg, 6 mg, Subcutaneous, Once, 5 of 6 cycles  Administration: 6 mg (3/31/2020), 6 mg (4/28/2020), 6 mg (5/26/2020), 6 mg (6/29/2020), 6 mg (8/4/2020)  DOXOrubicin liposome (DOXIL) 54 3 mg in dextrose 5 % 250 mL chemo infusion, 30 mg/m2 = 54 3 mg, Intravenous, Once, 5 of 6 cycles  Administration: 54 3 mg (3/24/2020), 54 6 mg (4/21/2020), 54 9 mg (5/19/2020), 54 6 mg (6/23/2020), 54 9 mg (7/28/2020)  gemcitabine (GEMZAR) 1,176 4 mg in sodium chloride 0 9 % 250 mL infusion, 650 mg/m2 = 1,176 4 mg, Intravenous, Once, 5 of 6 cycles  Administration: 1,176 4 mg (3/24/2020), 1,176 4 mg (3/31/2020), 1,182 9 mg (4/21/2020), 1,200 mg (4/28/2020), 1,189 4 mg (5/19/2020), 1,189 4 mg (5/26/2020), 1,182 9 mg (6/23/2020), 1,182 9 mg (6/29/2020), 1,189 4 mg (7/28/2020), 1,189 4 mg (8/4/2020)           Patient ID: Ruy Sepulveda is a 78 y o  female  Patient is very pleasant 69-year-old female with a history of stage 4 ovarian cancer diagnosed in approximately October of 2018  She has been on numerous cycles of chemotherapy  She is presently on her 6 cycle of Doxil and gemcitabine  She is tolerating the treatment with a moderate degree of malaise but no overall treatable symptoms  Her  is remaining stable  Her CT scan showed a mixed response correction through treatment  She is here for consideration of cycle 6  The patient's vero counts and hematology is as well as metabolic profile is been unremarkable  She does note intermittent crampy abdominal pain for which several days a week she takes tramadol  She also notes a heat rash under her stoma pouch for which she has started nystatin powder with some improvement  Today, the patient is doing well  She denies significant abdominal pain, pelvic pain, nausea, vomiting, constipation, diarrhea, fevers, chills, or vaginal bleeding  The following portions of the patient's history were reviewed and updated as appropriate: allergies, current medications, past family history, past social history, past surgical history and problem list     Review of Systems   Constitutional: Positive for fatigue  HENT: Negative  Eyes: Negative  Respiratory: Negative  Cardiovascular: Negative  Gastrointestinal: Positive for abdominal pain  Endocrine: Negative  Genitourinary: Negative  Musculoskeletal: Negative  Skin: Negative  Neurological: Negative  Hematological: Negative  Psychiatric/Behavioral: Negative  Current Outpatient Medications   Medication Sig Dispense Refill    aspirin-acetaminophen-caffeine (EXCEDRIN MIGRAINE) 250-250-65 MG per tablet Take 1 tablet by mouth every 6 (six) hours as needed for headaches      pantoprazole (PROTONIX) 40 mg tablet Take 1 tablet (40 mg total) by mouth 2 (two) times a day before meals  0    traMADol (ULTRAM) 50 mg tablet Take 1 tablet (50 mg total) by mouth every 6 (six) hours as needed for moderate pain 20 tablet 1    gabapentin (NEURONTIN) 300 mg capsule Take 1 capsule (300 mg total) by mouth 3 (three) times a day 90 capsule 0    potassium chloride (K-DUR,KLOR-CON) 20 mEq tablet Take 1 tablet (20 mEq total) by mouth daily 30 tablet 0     No current facility-administered medications for this visit  Objective:    Blood pressure 120/68, pulse 77, temperature 97 5 °F (36 4 °C), resp  rate 18, height 5' 4 17" (1 63 m), weight 77 1 kg (170 lb), not currently breastfeeding  Body mass index is 29 03 kg/m²  Body surface area is 1 83 meters squared  Physical Exam  Constitutional:       Appearance: She is well-developed  HENT:      Head: Normocephalic and atraumatic  Neck:      Musculoskeletal: Normal range of motion and neck supple  Thyroid: No thyromegaly  Cardiovascular:      Rate and Rhythm: Normal rate and regular rhythm  Heart sounds: Normal heart sounds  Pulmonary:      Effort: Pulmonary effort is normal       Breath sounds: Normal breath sounds  Abdominal:      General: Bowel sounds are normal       Palpations: Abdomen is soft  Comments: Well healed laparoscopic incisions  Genitourinary:     Comments: Deferred  Musculoskeletal: Normal range of motion  Lymphadenopathy:      Cervical: No cervical adenopathy  Skin:     General: Skin is warm and dry     Neurological:      Mental Status: She is alert and oriented to person, place, and time     Psychiatric:         Behavior: Behavior normal          Lab Results   Component Value Date     29 2 06/15/2020     Lab Results   Component Value Date    K 4 2 08/15/2020     08/15/2020    CO2 29 08/15/2020    BUN 15 08/15/2020    CREATININE 1 31 (H) 08/15/2020    GLUCOSE 228 (H) 09/10/2019    GLUF 168 (H) 08/01/2020    CALCIUM 9 1 08/15/2020    AST 13 08/15/2020    ALT 28 08/15/2020    ALKPHOS 184 (H) 08/15/2020    EGFR 41 08/15/2020     Lab Results   Component Value Date    WBC 17 20 (H) 08/15/2020    HGB 9 2 (L) 08/15/2020    HCT 29 3 (L) 08/15/2020    MCV 95 08/15/2020     08/15/2020     Lab Results   Component Value Date    NEUTROABS 13 50 (H) 08/15/2020        Trend:  Lab Results   Component Value Date     29 2 06/15/2020     27 4 06/10/2020     32 5 (H) 05/26/2020     28 6 05/15/2020     30 6 (H) 05/11/2020     36 5 (H) 04/27/2020     37 9 (H) 04/17/2020     42 5 (H) 04/10/2020     48 1 (H) 03/27/2020     40 9 (H) 03/20/2020     18 2 01/28/2020     17 5 12/31/2019     14 5 12/02/2019     22 0 11/19/2019     32 6 (H) 11/06/2019     19 1 08/30/2019     12 7 08/13/2019     13 9 06/18/2019     19 9 05/21/2019     79 5 (H) 04/16/2019     96 4 (H) 03/19/2019     80 4 (H) 03/08/2019     135 0 (H) 11/30/2018     194 5 (H) 10/18/2018

## 2020-08-19 NOTE — ASSESSMENT & PLAN NOTE
Patient is experiencing some moderate degree of chemo toxicity however her blood counts are stable  We have elected to proceed with 6th and final chemotherapy with Doxil and Gemzar  We will keep dosages the same  After completion of this a PET-CT scan will be performed  I have discussed with the patient that 3 scenario is will exist   Either the patient will have no further disease in we should consider chemo holiday at that point  I feel that this is unlikely given the persistence in the   Most likely there will be some persistence of disease  If that is the case consideration of continuation of Doxil Gemzar versus an alternative chemo regimen would be indicated      Overall consultation took 25 minutes with greater than 50% being dedicated toward discussion time

## 2020-08-22 ENCOUNTER — APPOINTMENT (OUTPATIENT)
Dept: LAB | Facility: HOSPITAL | Age: 70
End: 2020-08-22
Payer: MEDICARE

## 2020-08-24 RX ORDER — SODIUM CHLORIDE 9 MG/ML
20 INJECTION, SOLUTION INTRAVENOUS ONCE
Status: CANCELLED | OUTPATIENT
Start: 2020-08-25

## 2020-08-24 RX ORDER — PALONOSETRON 0.05 MG/ML
0.25 INJECTION, SOLUTION INTRAVENOUS ONCE
Status: CANCELLED | OUTPATIENT
Start: 2020-08-25

## 2020-08-24 RX ORDER — SODIUM CHLORIDE 9 MG/ML
20 INJECTION, SOLUTION INTRAVENOUS ONCE
Status: CANCELLED | OUTPATIENT
Start: 2020-09-01

## 2020-08-24 RX ORDER — DEXTROSE MONOHYDRATE 50 MG/ML
20 INJECTION, SOLUTION INTRAVENOUS ONCE
Status: CANCELLED | OUTPATIENT
Start: 2020-08-25

## 2020-08-25 ENCOUNTER — HOSPITAL ENCOUNTER (OUTPATIENT)
Dept: INFUSION CENTER | Facility: CLINIC | Age: 70
Discharge: HOME | End: 2020-08-25
Payer: MEDICARE

## 2020-08-25 VITALS
BODY MASS INDEX: 29.58 KG/M2 | DIASTOLIC BLOOD PRESSURE: 62 MMHG | HEIGHT: 64 IN | HEART RATE: 70 BPM | TEMPERATURE: 97.3 F | WEIGHT: 173.28 LBS | RESPIRATION RATE: 18 BRPM | SYSTOLIC BLOOD PRESSURE: 124 MMHG

## 2020-08-25 DIAGNOSIS — D64.9 ANEMIA, UNSPECIFIED TYPE: ICD-10-CM

## 2020-08-25 DIAGNOSIS — C56.9 MALIGNANT NEOPLASM OF OVARY, UNSPECIFIED LATERALITY (HCC): Primary | ICD-10-CM

## 2020-08-25 PROCEDURE — 96413 CHEMO IV INFUSION 1 HR: CPT

## 2020-08-25 PROCEDURE — 96375 TX/PRO/DX INJ NEW DRUG ADDON: CPT

## 2020-08-25 PROCEDURE — 96417 CHEMO IV INFUS EACH ADDL SEQ: CPT

## 2020-08-25 PROCEDURE — 96367 TX/PROPH/DG ADDL SEQ IV INF: CPT

## 2020-08-25 RX ORDER — SODIUM CHLORIDE 9 MG/ML
20 INJECTION, SOLUTION INTRAVENOUS ONCE
Status: COMPLETED | OUTPATIENT
Start: 2020-08-25 | End: 2020-08-25

## 2020-08-25 RX ORDER — DEXTROSE MONOHYDRATE 50 MG/ML
20 INJECTION, SOLUTION INTRAVENOUS ONCE
Status: COMPLETED | OUTPATIENT
Start: 2020-08-25 | End: 2020-08-25

## 2020-08-25 RX ORDER — PALONOSETRON 0.05 MG/ML
0.25 INJECTION, SOLUTION INTRAVENOUS ONCE
Status: COMPLETED | OUTPATIENT
Start: 2020-08-25 | End: 2020-08-25

## 2020-08-25 RX ADMIN — DOXORUBICIN HYDROCHLORIDE 54.9 MG: 2 INJECTABLE, LIPOSOMAL INTRAVENOUS at 12:06

## 2020-08-25 RX ADMIN — DEXAMETHASONE SODIUM PHOSPHATE 10 MG: 10 INJECTION, SOLUTION INTRAMUSCULAR; INTRAVENOUS at 11:07

## 2020-08-25 RX ADMIN — DEXTROSE 20 ML/HR: 5 SOLUTION INTRAVENOUS at 12:01

## 2020-08-25 RX ADMIN — PALONOSETRON 0.25 MG: 0.25 INJECTION, SOLUTION INTRAVENOUS at 11:07

## 2020-08-25 RX ADMIN — SODIUM CHLORIDE 20 ML/HR: 0.9 INJECTION, SOLUTION INTRAVENOUS at 11:07

## 2020-08-25 RX ADMIN — GEMCITABINE 1189.4 MG: 38 INJECTION, SOLUTION INTRAVENOUS at 13:25

## 2020-08-25 NOTE — PROGRESS NOTES
Pt here for chemotherapy  Port accessed with positive blood return throughout treatment  She tolerated infusions without incident  AVS given  Port flushed and deaccessed  Aware of next scheduled appointment  Walked out in stable condition

## 2020-08-29 ENCOUNTER — APPOINTMENT (OUTPATIENT)
Dept: LAB | Facility: HOSPITAL | Age: 70
End: 2020-08-29
Attending: OBSTETRICS & GYNECOLOGY
Payer: MEDICARE

## 2020-08-29 DIAGNOSIS — C56.9 MALIGNANT NEOPLASM OF OVARY, UNSPECIFIED LATERALITY (HCC): ICD-10-CM

## 2020-09-01 ENCOUNTER — HOSPITAL ENCOUNTER (OUTPATIENT)
Dept: INFUSION CENTER | Facility: CLINIC | Age: 70
Discharge: HOME | End: 2020-09-01
Payer: MEDICARE

## 2020-09-01 VITALS
WEIGHT: 170.64 LBS | SYSTOLIC BLOOD PRESSURE: 144 MMHG | HEART RATE: 76 BPM | BODY MASS INDEX: 29.13 KG/M2 | HEIGHT: 64 IN | DIASTOLIC BLOOD PRESSURE: 66 MMHG | RESPIRATION RATE: 20 BRPM | OXYGEN SATURATION: 99 % | TEMPERATURE: 97.3 F

## 2020-09-01 DIAGNOSIS — D64.9 ANEMIA, UNSPECIFIED TYPE: ICD-10-CM

## 2020-09-01 DIAGNOSIS — C56.9 MALIGNANT NEOPLASM OF OVARY, UNSPECIFIED LATERALITY (HCC): Primary | ICD-10-CM

## 2020-09-01 LAB — CANCER AG125 SERPL-ACNC: 44.9 U/ML (ref 0–30)

## 2020-09-01 PROCEDURE — 96377 APPLICATON ON-BODY INJECTOR: CPT

## 2020-09-01 PROCEDURE — 96367 TX/PROPH/DG ADDL SEQ IV INF: CPT

## 2020-09-01 PROCEDURE — 36415 COLL VENOUS BLD VENIPUNCTURE: CPT

## 2020-09-01 PROCEDURE — 86304 IMMUNOASSAY TUMOR CA 125: CPT

## 2020-09-01 PROCEDURE — 96413 CHEMO IV INFUSION 1 HR: CPT

## 2020-09-01 RX ORDER — SODIUM CHLORIDE 9 MG/ML
20 INJECTION, SOLUTION INTRAVENOUS ONCE
Status: COMPLETED | OUTPATIENT
Start: 2020-09-01 | End: 2020-09-01

## 2020-09-01 RX ADMIN — GEMCITABINE 1195.8 MG: 38 INJECTION INTRAVENOUS at 11:02

## 2020-09-01 RX ADMIN — DEXAMETHASONE SODIUM PHOSPHATE 10 MG: 10 INJECTION, SOLUTION INTRAMUSCULAR; INTRAVENOUS at 10:26

## 2020-09-01 RX ADMIN — SODIUM CHLORIDE 20 ML/HR: 0.9 INJECTION, SOLUTION INTRAVENOUS at 10:11

## 2020-09-01 RX ADMIN — PEGFILGRASTIM 6 MG: KIT SUBCUTANEOUS at 11:49

## 2020-09-01 NOTE — PLAN OF CARE
Problem: Potential for Falls  Goal: Patient will remain free of falls  Description: INTERVENTIONS:  - Assess patient frequently for physical needs  -  Identify cognitive and physical deficits and behaviors that affect risk of falls  -  [place] fall precautions as indicated by assessment   - Educate patient/family on patient safety including physical limitations  - Instruct patient to call for assistance with activity based on assessment  - Modify environment to reduce risk of injury  - Consider OT/PT consult to assist with strengthening/mobility  Outcome: Progressing     Problem: SAFETY ADULT  Goal: Patient will remain free of falls  Description: INTERVENTIONS:  - Assess patient frequently for physical needs  -  Identify cognitive and physical deficits and behaviors that affect risk of falls  -  [place] fall precautions as indicated by assessment   - Educate patient/family on patient safety including physical limitations  - Instruct patient to call for assistance with activity based on assessment  - Modify environment to reduce risk of injury  - Consider OT/PT consult to assist with strengthening/mobility  Outcome: Progressing     Problem: Knowledge Deficit  Goal: Patient/family/caregiver demonstrates understanding of disease process, treatment plan, medications, and discharge instructions  Description: Complete learning assessment and assess knowledge base    Interventions:  - Provide teaching at level of understanding  - Provide teaching via preferred learning methods  Outcome: Progressing

## 2020-09-01 NOTE — PROGRESS NOTES
Pt to clinic for lab draw via port, gemzar, and Neulasta, tolerated lab draw, infusion, and Neulasta applied to L arm without complications, aware of next appointment, avs printed and reviewed

## 2020-09-11 ENCOUNTER — APPOINTMENT (OUTPATIENT)
Dept: LAB | Facility: HOSPITAL | Age: 70
End: 2020-09-11
Payer: MEDICARE

## 2020-09-11 DIAGNOSIS — C56.9 MALIGNANT NEOPLASM OF OVARY, UNSPECIFIED LATERALITY (HCC): Primary | ICD-10-CM

## 2020-09-11 LAB — CANCER AG125 SERPL-ACNC: 39.6 U/ML (ref 0–30)

## 2020-09-11 PROCEDURE — 86304 IMMUNOASSAY TUMOR CA 125: CPT | Performed by: NURSE PRACTITIONER

## 2020-09-11 PROCEDURE — 36415 COLL VENOUS BLD VENIPUNCTURE: CPT | Performed by: NURSE PRACTITIONER

## 2020-09-14 ENCOUNTER — HOSPITAL ENCOUNTER (OUTPATIENT)
Dept: RADIOLOGY | Age: 70
Discharge: HOME | End: 2020-09-14
Payer: MEDICARE

## 2020-09-14 DIAGNOSIS — C56.1 MALIGNANT NEOPLASM OF RIGHT OVARY (HCC): ICD-10-CM

## 2020-09-14 LAB — GLUCOSE SERPL-MCNC: 105 MG/DL (ref 65–140)

## 2020-09-14 PROCEDURE — 78815 PET IMAGE W/CT SKULL-THIGH: CPT

## 2020-09-14 PROCEDURE — G1004 CDSM NDSC: HCPCS

## 2020-09-14 PROCEDURE — 82948 REAGENT STRIP/BLOOD GLUCOSE: CPT

## 2020-09-14 PROCEDURE — A9552 F18 FDG: HCPCS

## 2020-09-16 ENCOUNTER — OFFICE VISIT (OUTPATIENT)
Dept: GYNECOLOGIC ONCOLOGY | Facility: CLINIC | Age: 70
End: 2020-09-16
Payer: MEDICARE

## 2020-09-16 VITALS
TEMPERATURE: 98.8 F | BODY MASS INDEX: 29.62 KG/M2 | HEART RATE: 74 BPM | RESPIRATION RATE: 18 BRPM | WEIGHT: 173.5 LBS | SYSTOLIC BLOOD PRESSURE: 140 MMHG | DIASTOLIC BLOOD PRESSURE: 56 MMHG | HEIGHT: 64 IN

## 2020-09-16 DIAGNOSIS — C56.9 MALIGNANT NEOPLASM OF OVARY, UNSPECIFIED LATERALITY (HCC): Primary | ICD-10-CM

## 2020-09-16 PROCEDURE — 99214 OFFICE O/P EST MOD 30 MIN: CPT | Performed by: OBSTETRICS & GYNECOLOGY

## 2020-09-16 NOTE — ASSESSMENT & PLAN NOTE
Patient is a very pleasant 69-year-old female with a history of aggressive stage IV ovarian carcinoma now diagnosed approximately 2 years an on treatment most of that time  She still has small residual persistent disease in paraortic nodes as well as in the liver hilum and 2 small lesions within the liver itself  Overall there has been improvement but the patient has not had a complete remission  It is concerning that her  climbed over the past month  We have discussed 3 options with the patient    1  We can go on treatment holiday and during that time consider reversal of her colostomy  2  We could continue Doxil and Gemzar although I have recommended against this as I feel that we have got the biggest response likely and the patient does have moderate discomfort with this due to abdominal pain  3  We could try and  Initiate new treatment plan with potentially clinical trial such as the oval trial       The overlying feature in this is which drugs to use  The patient has had a catastrophic bowel  Perforation related to carboplatin Taxol and Avastin use in the 1st initial treatment with large volume disease  It is unclear to me whether the patient is a candidate for further VEGF antagonists including Avastin or potentially drugs within the oval trial       At this point the patient is interested in a chemo holiday  We will see her back in 1 month a  will be drawn at that time  I will present the patient's case at tumor board to see other people's thoughts and possibility for clinical trial when the patient does require further therapy which would likely be within the next several months      Overall consultation took 25 minutes with greater than 50% being dedicated toward discussion time

## 2020-09-16 NOTE — LETTER
September 16, 2020     Lacey Ibarra, [phone] [facility] [address]    Patient: Bonnie Talavera   YOB: 1950   Date of Visit: 9/16/2020       Dear Dr Rebecca Barajas:    Thank you for referring Bonnie Talavera to me for evaluation  Below are my notes for this consultation  If you have questions, please do not hesitate to call me  I look forward to following your patient along with you  Sincerely,        Radha Osei MD        CC: MD Radha Iyer MD  9/16/2020 11:32 AM  Incomplete  Assessment/Plan:    Problem List Items Addressed This Visit        Endocrine    Malignant neoplasm of ovary [facility]) - Primary      Patient is a very pleasant 71-year-old female with a history of aggressive stage IV ovarian carcinoma now diagnosed approximately 2 years an on treatment most of that time  She still has small residual persistent disease in paraortic nodes as well as in the liver hilum and 2 small lesions within the liver itself  Overall there has been improvement but the patient has not had a complete remission  It is concerning that her  climbed over the past month  We have discussed 3 options with the patient    1  We can go on treatment holiday and during that time consider reversal of her colostomy  2  We could continue Doxil and Gemzar although I have recommended against this as I feel that we have got the biggest response likely and the patient does have moderate discomfort with this due to abdominal pain  3  We could try and  Initiate new treatment plan with potentially clinical trial such as the oval trial       The overlying feature in this is which drugs to use  The patient has had a catastrophic bowel  Perforation related to carboplatin Taxol and Avastin use in the 1st initial treatment with large volume disease    It is unclear to me whether the patient is a candidate for further VEGF antagonists including Avastin or potentially drugs within the oval trial       At this point the patient is interested in a chemo holiday  We will see her back in 1 month a  will be drawn at that time  I will present the patient's case at tumor board to see other people's thoughts and possibility for clinical trial when the patient does require further therapy which would likely be within the next several months  Overall consultation took 25 minutes with greater than 50% being dedicated toward discussion time         Relevant Orders                CHIEF COMPLAINT:   Treatment evaluation for recurrent stage IV ovarian cancer status post 6 cycles of gemcitabine  And Doxil      Problem:  Cancer Staging  Ovarian cancer [facility])  Staging form: Ovary, Fallopian Tube, Primary Peritoneal, AJCC 8th Edition  - Clinical stage from 11/14/2018: FIGO Stage IVB (cT3c, cN0, pM1b) - Signed by Sherice Buenrostro MD on 11/14/2018  - Pathologic stage from 10/9/2019: FIGO Stage IIIC (pT3c, pN1, cM0) - Signed by Sherice Buenrostro MD on 10/9/2019        Previous therapy:  Oncology History   Ovarian cancer ([facility] [address] )   11/9/2018 Initial Diagnosis    Ovarian cancer ([facility] [address] )   tumor marker 194 5     11/9/2018 Biopsy    CT-guided needle biopsy of abdominal mass 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  Given liver involvement this would be stage IV     11/14/2018 - 12/4/2018 Chemotherapy    Neoadjuvant therapy: carboplatin area under the curve of 6, paclitaxel 135 milligrams/meter squared, Avastin 10 milligrams/kilogram  Completed 1 of 3 cycles  12/14/2018 Surgery    LAPAROTOMY EXPLORATORY; Abdominal Washout; Application of Abthera Vac Dressing for suspected bowel perforation and septic shock        12/15/2018 Surgery    LAPAROTOMY EXPLORATORY, ABDOMINAL WASHOUT, DRAIN PLACEMENT x 4, DIVERTING LOOP ILEOSTOMY AND ABDOMINAL CLOSURE for bowel perforation     3/26/2019 -  Chemotherapy    Taxol weekly 80 mg/m2 for 3 consecutive weeks, may add carboplatin in the future with AUC of 5      3/26/2019 - 2/3/2020 Chemotherapy    palonosetron (ALOXI) injection 0 25 mg, 0 25 mg, Intravenous, Once, 6 of 6 cycles  Administration: 0 25 mg (5/28/2019), 0 25 mg (6/25/2019), 0 25 mg (11/6/2019), 0 25 mg (12/10/2019), 0 25 mg (1/7/2020)  fosaprepitant (EMEND) 150 mg in sodium chloride 0 9 % 255 mL IVPB, 150 mg, Intravenous, Once, 6 of 6 cycles  Administration: 150 mg (5/28/2019), 150 mg (6/25/2019), 150 mg (11/6/2019), 150 mg (12/10/2019), 150 mg (1/7/2020)  CARBOplatin (PARAPLATIN) in sodium chloride 0 9 % 250 mL IVPB,  (original dose ), Intravenous, Once, 6 of 6 cycles  Dose modification:   (Cycle 2),   (original dose 258 4 mg, Cycle 3),   (original dose 258 4 mg, Cycle 3),   (original dose 244 4 mg, Cycle 4)  Administration: 258 4 mg (5/28/2019), 244 4 mg (6/25/2019), 285 2 mg (11/6/2019), 296 4 mg (12/10/2019), 286 4 mg (1/7/2020)  PACLitaxel (TAXOL) 127 8 mg in sodium chloride 0 9 % 250 mL chemo IVPB, 80 mg/m2, Intravenous, Once, 7 of 7 cycles  Dose modification: 65 mg/m2 (original dose 80 mg/m2, Cycle 2, Reason: Dose Not Tolerated)  Administration: 103 8 mg (5/14/2019), 108 6 mg (5/28/2019), 108 6 mg (6/4/2019), 108 6 mg (6/11/2019), 109 8 mg (6/25/2019), 109 8 mg (7/2/2019), 109 8 mg (7/9/2019), 111 6 mg (11/6/2019), 111 6 mg (11/12/2019), 111 6 mg (11/19/2019), 113 4 mg (12/10/2019), 113 4 mg (12/17/2019), 113 4 mg (12/23/2019), 113 4 mg (1/7/2020), 114 6 mg (1/14/2020), 114 6 mg (1/21/2020)     4/29/2019 Genomic Testing     Foundation 1 testing revealed a PD L1 tumor proportions score of 0%  The patient is not a candidate for PD L1 manipulation      Genetic Testing    [employer] Breast/Gyn panel, wildtype  9/10/2019 Surgery    Exploratory laparotomy radical omentectomy, posterior exenteration, takedown of ileostomy and end colostomy for complete debulking of visible tumor      Final pathology report revealed high-grade serous malignancy in both the omentum and the pelvic mass     9/25/2019 -  Chemotherapy    Carboplatin area under the curve of 5+ weekly paclitaxel at 80 milligrams/meters squared for 3 further cycles of treatment followed by consolidation this is to begin mid October     10/2019 Surgery    Interval debulking with TAHBSO revision of colostomy omentectomy and tumor debulking with complete debulking  Several weeks postoperatively the patient required a open cholecystectomy       10/2019 - 12/2019 Chemotherapy    Carboplatin paclitaxel x3     10/9/2019 -  Cancer Staged    Staging form: Ovary, Fallopian Tube, Primary Peritoneal, AJCC 8th Edition  - Pathologic stage from 10/9/2019: FIGO Stage IIIC (pT3c, pN1, cM0) - Signed by Peace Mitchell MD on 10/9/2019  Histologic grade (G): G3  Histologic grading system: 4 grade system  Gross residual tumor after primary cyto-reductive surgery: Absent       3/24/2020 -  Chemotherapy    palonosetron (ALOXI) injection 0 25 mg, 0 25 mg, Intravenous, Once, 6 of 7 cycles  Administration: 0 25 mg (3/24/2020), 0 25 mg (4/21/2020), 0 25 mg (5/19/2020), 0 25 mg (6/23/2020), 0 25 mg (7/28/2020), 0 25 mg (8/25/2020)  pegfilgrastim (NEULASTA ONPRO) subcutaneous injection kit 6 mg, 6 mg, Subcutaneous, Once, 6 of 7 cycles  Administration: 6 mg (3/31/2020), 6 mg (4/28/2020), 6 mg (5/26/2020), 6 mg (6/29/2020), 6 mg (8/4/2020), 6 mg (9/1/2020)  DOXOrubicin liposome (DOXIL) 54 3 mg in dextrose 5 % 250 mL chemo infusion, 30 mg/m2 = 54 3 mg, Intravenous, Once, 6 of 7 cycles  Administration: 54 3 mg (3/24/2020), 54 6 mg (4/21/2020), 54 9 mg (5/19/2020), 54 6 mg (6/23/2020), 54 9 mg (7/28/2020), 54 9 mg (8/25/2020)  gemcitabine (GEMZAR) 1,176 4 mg in sodium chloride 0 9 % 250 mL infusion, 650 mg/m2 = 1,176 4 mg, Intravenous, Once, 6 of 7 cycles  Administration: 1,176 4 mg (3/24/2020), 1,176 4 mg (3/31/2020), 1,182 9 mg (4/21/2020), 1,200 mg (4/28/2020), 1,189 4 mg (5/19/2020), 1,189 4 mg (5/26/2020), 1,182 9 mg (6/23/2020), 1,182 9 mg (6/29/2020), 1,189 4 mg (7/28/2020), 1,189 4 mg (8/4/2020), 1,189 4 mg (8/25/2020), 1,195 8 mg (9/1/2020)           Patient ID: Kristen Li is a 78 y o  female  Patient is very pleasant 74-year-old female with a history of recurrent stage IV ovarian carcinoma originally diagnosed in approximately September of 2020  She originally went underwent treatment with carboplatin paclitaxel and Avastin and developed a bowel perforation requiring diversion  She subsequently underwent a total of 6 cycles of carboplatin paclitaxel with an interval debulking  She still maintains her stoma  She transitioned shortly to Doxil Gemzar after platinum resistant disease was noted  She has recently completed 6 cycles of treatment  Her  has gone up approximately 10 points at the last cycle of treatment and her CT scan and PET reveals the following:   IMPRESSION:     1  Mixed findings for response to therapy  2   Scattered FDG avid hepatic lesions again noted likely a combination of intraparenchymal and peritoneal metastasis along the peripheral aspect of the liver  These are either stable or have slightly increased  3   Previously noted focus of FDG uptake at the spleen is no longer seen  4   Stable or improving retroperitoneal foci of FDG uptake suspicious for shashi metastasis  5   Previously noted focus of FDG uptake at the left lower quadrant ostomy site has cleared  6   Decreased focal FDG uptake in the left hemipelvis, again peritoneal metastasis is not excluded  7  There is now mild/moderate diffuse FDG uptake in the osseous structures which is likely related to marrow response to therapy  Overall the patient is feeling well but fatigued  Her  Grandchildren for which she is guardian are now in school  She has Doxil related apparent abdominal pain  This appears to be cyclic and related to her stage intravenous Doxil treatment    It is associated with right lower quadrant abdominal cramping followed by diarrhea through her stoma  The patient is interested in having her stoma reversed but finds that timing may not be good as she is not sure how long her children will remain in school and ha ve to return to home school  Today, the patient is doing well  She denies significant abdominal pain, pelvic pain, nausea, vomiting, constipation, diarrhea, fevers, chills, or vaginal bleeding  The following portions of the patient's history were reviewed and updated as appropriate: allergies, current medications, past family history, past social history, past surgical history and problem list     Review of Systems   Constitutional: Negative  HENT: Negative  Eyes: Negative  Respiratory: Negative  Cardiovascular: Negative  Gastrointestinal: Negative  Endocrine: Negative  Genitourinary: Negative  Musculoskeletal: Negative  Skin: Negative  Neurological: Negative  Hematological: Negative  Psychiatric/Behavioral: Negative  Current Outpatient Medications   Medication Sig Dispense Refill    aspirin-acetaminophen-caffeine (EXCEDRIN MIGRAINE) 250-250-65 MG per tablet Take 1 tablet by mouth every 6 (six) hours as needed for headaches      pantoprazole (PROTONIX) 40 mg tablet Take 1 tablet (40 mg total) by mouth 2 (two) times a day before meals  0    traMADol (ULTRAM) 50 mg tablet Take 1 tablet (50 mg total) by mouth every 6 (six) hours as needed for moderate pain 20 tablet 1    gabapentin (NEURONTIN) 300 mg capsule Take 1 capsule (300 mg total) by mouth 3 (three) times a day 90 capsule 0    potassium chloride (K-DUR,KLOR-CON) 20 mEq tablet Take 1 tablet (20 mEq total) by mouth daily 30 tablet 0     No current facility-administered medications for this visit  Objective:    Blood pressure 140/56, pulse 74, temperature 98 8 °F (37 1 °C), resp  rate 18, height 5' 4 17" (1 63 m), weight 78 7 kg (173 lb 8 oz), not currently breastfeeding  Body mass index is 29 62 kg/m²    Body surface area is 1 84 meters squared  Physical Exam  Constitutional:       Appearance: She is well-developed  HENT:      Head: Normocephalic and atraumatic  Neck:      Musculoskeletal: Normal range of motion and neck supple  Thyroid: No thyromegaly  Cardiovascular:      Rate and Rhythm: Normal rate and regular rhythm  Heart sounds: Normal heart sounds  Pulmonary:      Effort: Pulmonary effort is normal       Breath sounds: Normal breath sounds  Abdominal:      General: Bowel sounds are normal       Palpations: Abdomen is soft  Comments: Well healed laparoscopic incisions  Genitourinary:     Comments: Deferred  Musculoskeletal: Normal range of motion  Lymphadenopathy:      Cervical: No cervical adenopathy  Skin:     General: Skin is warm and dry  Neurological:      Mental Status: She is alert and oriented to person, place, and time     Psychiatric:         Behavior: Behavior normal          Lab Results   Component Value Date     39 6 (H) 09/11/2020     Lab Results   Component Value Date    K 4 1 08/29/2020     08/29/2020    CO2 27 08/29/2020    BUN 28 (H) 08/29/2020    CREATININE 1 48 (H) 08/29/2020    GLUCOSE 228 (H) 09/10/2019    GLUF 178 (H) 08/22/2020    CALCIUM 8 9 08/29/2020    AST 25 08/29/2020    ALT 38 08/29/2020    ALKPHOS 119 (H) 08/29/2020    EGFR 36 08/29/2020     Lab Results   Component Value Date    WBC 8 07 08/29/2020    HGB 9 3 (L) 08/29/2020    HCT 30 5 (L) 08/29/2020    MCV 95 08/29/2020     08/29/2020     Lab Results   Component Value Date    NEUTROABS 6 09 08/29/2020        Trend:  Lab Results   Component Value Date     39 6 (H) 09/11/2020     44 9 (H) 09/01/2020     29 2 06/15/2020     27 4 06/10/2020     32 5 (H) 05/26/2020     28 6 05/15/2020     30 6 (H) 05/11/2020     36 5 (H) 04/27/2020     37 9 (H) 04/17/2020     42 5 (H) 04/10/2020     48 1 (H) 03/27/2020     40 9 (H) 03/20/2020  18 2 01/28/2020     17 5 12/31/2019     14 5 12/02/2019     22 0 11/19/2019     32 6 (H) 11/06/2019     19 1 08/30/2019     12 7 08/13/2019     13 9 06/18/2019     19 9 05/21/2019     79 5 (H) 04/16/2019     96 4 (H) 03/19/2019     80 4 (H) 03/08/2019     135 0 (H) 11/30/2018     194 5 (H) 10/18/2018                  Harper Mathew MD  9/16/2020 11:08 AM  Incomplete  Assessment/Plan:    Problem List Items Addressed This Visit     None            CHIEF COMPLAINT:   Treatment evaluation for recurrent stage IV ovarian cancer status post 6 cycles of gemcitabine  And Doxil      Problem:  Cancer Staging  Ovarian cancer [facility])  Staging form: Ovary, Fallopian Tube, Primary Peritoneal, AJCC 8th Edition  - Clinical stage from 11/14/2018: FIGO Stage IVB (cT3c, cN0, pM1b) - Signed by Harper Mathew MD on 11/14/2018  - Pathologic stage from 10/9/2019: FIGO Stage IIIC (pT3c, pN1, cM0) - Signed by Harper Mathew MD on 10/9/2019        Previous therapy:  Oncology History   Ovarian cancer ([facility] [address] )   11/9/2018 Initial Diagnosis    Ovarian cancer ([facility] [address] )   tumor marker 194 5     11/9/2018 Biopsy    CT-guided needle biopsy of abdominal mass 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  Given liver involvement this would be stage IV     11/14/2018 - 12/4/2018 Chemotherapy    Neoadjuvant therapy: carboplatin area under the curve of 6, paclitaxel 135 milligrams/meter squared, Avastin 10 milligrams/kilogram  Completed 1 of 3 cycles  12/14/2018 Surgery    LAPAROTOMY EXPLORATORY; Abdominal Washout; Application of Abthera Vac Dressing for suspected bowel perforation and septic shock        12/15/2018 Surgery    LAPAROTOMY EXPLORATORY, ABDOMINAL WASHOUT, DRAIN PLACEMENT x 4, DIVERTING LOOP ILEOSTOMY AND ABDOMINAL CLOSURE for bowel perforation     3/26/2019 -  Chemotherapy    Taxol weekly 80 mg/m2 for 3 consecutive weeks, may add carboplatin in the future with AUC of 5      3/26/2019 - 2/3/2020 Chemotherapy    palonosetron (ALOXI) injection 0 25 mg, 0 25 mg, Intravenous, Once, 6 of 6 cycles  Administration: 0 25 mg (5/28/2019), 0 25 mg (6/25/2019), 0 25 mg (11/6/2019), 0 25 mg (12/10/2019), 0 25 mg (1/7/2020)  fosaprepitant (EMEND) 150 mg in sodium chloride 0 9 % 255 mL IVPB, 150 mg, Intravenous, Once, 6 of 6 cycles  Administration: 150 mg (5/28/2019), 150 mg (6/25/2019), 150 mg (11/6/2019), 150 mg (12/10/2019), 150 mg (1/7/2020)  CARBOplatin (PARAPLATIN) in sodium chloride 0 9 % 250 mL IVPB,  (original dose ), Intravenous, Once, 6 of 6 cycles  Dose modification:   (Cycle 2),   (original dose 258 4 mg, Cycle 3),   (original dose 258 4 mg, Cycle 3),   (original dose 244 4 mg, Cycle 4)  Administration: 258 4 mg (5/28/2019), 244 4 mg (6/25/2019), 285 2 mg (11/6/2019), 296 4 mg (12/10/2019), 286 4 mg (1/7/2020)  PACLitaxel (TAXOL) 127 8 mg in sodium chloride 0 9 % 250 mL chemo IVPB, 80 mg/m2, Intravenous, Once, 7 of 7 cycles  Dose modification: 65 mg/m2 (original dose 80 mg/m2, Cycle 2, Reason: Dose Not Tolerated)  Administration: 103 8 mg (5/14/2019), 108 6 mg (5/28/2019), 108 6 mg (6/4/2019), 108 6 mg (6/11/2019), 109 8 mg (6/25/2019), 109 8 mg (7/2/2019), 109 8 mg (7/9/2019), 111 6 mg (11/6/2019), 111 6 mg (11/12/2019), 111 6 mg (11/19/2019), 113 4 mg (12/10/2019), 113 4 mg (12/17/2019), 113 4 mg (12/23/2019), 113 4 mg (1/7/2020), 114 6 mg (1/14/2020), 114 6 mg (1/21/2020)     4/29/2019 Genomic Testing     Foundation 1 testing revealed a PD L1 tumor proportions score of 0%  The patient is not a candidate for PD L1 manipulation      Genetic Testing    [employer] Breast/Gyn panel, wildtype  9/10/2019 Surgery    Exploratory laparotomy radical omentectomy, posterior exenteration, takedown of ileostomy and end colostomy for complete debulking of visible tumor      Final pathology report revealed high-grade serous malignancy in both the omentum and the pelvic mass     9/25/2019 -  Chemotherapy    Carboplatin area under the curve of 5+ weekly paclitaxel at 80 milligrams/meters squared for 3 further cycles of treatment followed by consolidation this is to begin mid October     10/2019 Surgery    Interval debulking with TAHBSO revision of colostomy omentectomy and tumor debulking with complete debulking  Several weeks postoperatively the patient required a open cholecystectomy       10/2019 - 12/2019 Chemotherapy    Carboplatin paclitaxel x3     10/9/2019 -  Cancer Staged    Staging form: Ovary, Fallopian Tube, Primary Peritoneal, AJCC 8th Edition  - Pathologic stage from 10/9/2019: FIGO Stage IIIC (pT3c, pN1, cM0) - Signed by Annelise Elena MD on 10/9/2019  Histologic grade (G): G3  Histologic grading system: 4 grade system  Gross residual tumor after primary cyto-reductive surgery: Absent       3/24/2020 -  Chemotherapy    palonosetron (ALOXI) injection 0 25 mg, 0 25 mg, Intravenous, Once, 6 of 7 cycles  Administration: 0 25 mg (3/24/2020), 0 25 mg (4/21/2020), 0 25 mg (5/19/2020), 0 25 mg (6/23/2020), 0 25 mg (7/28/2020), 0 25 mg (8/25/2020)  pegfilgrastim (NEULASTA ONPRO) subcutaneous injection kit 6 mg, 6 mg, Subcutaneous, Once, 6 of 7 cycles  Administration: 6 mg (3/31/2020), 6 mg (4/28/2020), 6 mg (5/26/2020), 6 mg (6/29/2020), 6 mg (8/4/2020), 6 mg (9/1/2020)  DOXOrubicin liposome (DOXIL) 54 3 mg in dextrose 5 % 250 mL chemo infusion, 30 mg/m2 = 54 3 mg, Intravenous, Once, 6 of 7 cycles  Administration: 54 3 mg (3/24/2020), 54 6 mg (4/21/2020), 54 9 mg (5/19/2020), 54 6 mg (6/23/2020), 54 9 mg (7/28/2020), 54 9 mg (8/25/2020)  gemcitabine (GEMZAR) 1,176 4 mg in sodium chloride 0 9 % 250 mL infusion, 650 mg/m2 = 1,176 4 mg, Intravenous, Once, 6 of 7 cycles  Administration: 1,176 4 mg (3/24/2020), 1,176 4 mg (3/31/2020), 1,182 9 mg (4/21/2020), 1,200 mg (4/28/2020), 1,189 4 mg (5/19/2020), 1,189 4 mg (5/26/2020), 1,182 9 mg (6/23/2020), 1,182 9 mg (6/29/2020), 1,189 4 mg (7/28/2020), 1,189 4 mg (8/4/2020), 1,189 4 mg (8/25/2020), 1,195 8 mg (9/1/2020)           Patient ID: Tate Giron is a 78 y o  female  IMPRESSION:     1  Mixed findings for response to therapy  2   Scattered FDG avid hepatic lesions again noted likely a combination of intraparenchymal and peritoneal metastasis along the peripheral aspect of the liver  These are either stable or have slightly increased  3   Previously noted focus of FDG uptake at the spleen is no longer seen  4   Stable or improving retroperitoneal foci of FDG uptake suspicious for shashi metastasis  5   Previously noted focus of FDG uptake at the left lower quadrant ostomy site has cleared  6   Decreased focal FDG uptake in the left hemipelvis, again peritoneal metastasis is not excluded  7  There is now mild/moderate diffuse FDG uptake in the osseous structures which is likely related to marrow response to therapy  The following portions of the patient's history were reviewed and updated as appropriate: allergies, current medications, past family history, past social history, past surgical history and problem list     Review of Systems    Current Outpatient Medications   Medication Sig Dispense Refill    aspirin-acetaminophen-caffeine (EXCEDRIN MIGRAINE) 250-250-65 MG per tablet Take 1 tablet by mouth every 6 (six) hours as needed for headaches      pantoprazole (PROTONIX) 40 mg tablet Take 1 tablet (40 mg total) by mouth 2 (two) times a day before meals  0    traMADol (ULTRAM) 50 mg tablet Take 1 tablet (50 mg total) by mouth every 6 (six) hours as needed for moderate pain 20 tablet 1    gabapentin (NEURONTIN) 300 mg capsule Take 1 capsule (300 mg total) by mouth 3 (three) times a day 90 capsule 0    potassium chloride (K-DUR,KLOR-CON) 20 mEq tablet Take 1 tablet (20 mEq total) by mouth daily 30 tablet 0     No current facility-administered medications for this visit  Objective:    not currently breastfeeding  There is no height or weight on file to calculate BMI  There is no height or weight on file to calculate BSA      Physical Exam    Lab Results   Component Value Date     39 6 (H) 09/11/2020     Lab Results   Component Value Date    K 4 1 08/29/2020     08/29/2020    CO2 27 08/29/2020    BUN 28 (H) 08/29/2020    CREATININE 1 48 (H) 08/29/2020    GLUCOSE 228 (H) 09/10/2019    GLUF 178 (H) 08/22/2020    CALCIUM 8 9 08/29/2020    AST 25 08/29/2020    ALT 38 08/29/2020    ALKPHOS 119 (H) 08/29/2020    EGFR 36 08/29/2020     Lab Results   Component Value Date    WBC 8 07 08/29/2020    HGB 9 3 (L) 08/29/2020    HCT 30 5 (L) 08/29/2020    MCV 95 08/29/2020     08/29/2020     Lab Results   Component Value Date    NEUTROABS 6 09 08/29/2020        Trend:  Lab Results   Component Value Date     39 6 (H) 09/11/2020     44 9 (H) 09/01/2020     29 2 06/15/2020     27 4 06/10/2020     32 5 (H) 05/26/2020     28 6 05/15/2020     30 6 (H) 05/11/2020     36 5 (H) 04/27/2020     37 9 (H) 04/17/2020     42 5 (H) 04/10/2020     48 1 (H) 03/27/2020     40 9 (H) 03/20/2020     18 2 01/28/2020     17 5 12/31/2019     14 5 12/02/2019     22 0 11/19/2019     32 6 (H) 11/06/2019     19 1 08/30/2019     12 7 08/13/2019     13 9 06/18/2019     19 9 05/21/2019     79 5 (H) 04/16/2019     96 4 (H) 03/19/2019     80 4 (H) 03/08/2019     135 0 (H) 11/30/2018     194 5 (H) 10/18/2018

## 2020-09-16 NOTE — PROGRESS NOTES
Assessment/Plan:    Problem List Items Addressed This Visit        Endocrine    Malignant neoplasm of ovary [facility]) - Primary      Patient is a very pleasant 71-year-old female with a history of aggressive stage IV ovarian carcinoma now diagnosed approximately 2 years an on treatment most of that time  She still has small residual persistent disease in paraortic nodes as well as in the liver hilum and 2 small lesions within the liver itself  Overall there has been improvement but the patient has not had a complete remission  It is concerning that her  climbed over the past month  We have discussed 3 options with the patient    1  We can go on treatment holiday and during that time consider reversal of her colostomy  2  We could continue Doxil and Gemzar although I have recommended against this as I feel that we have got the biggest response likely and the patient does have moderate discomfort with this due to abdominal pain  3  We could try and  Initiate new treatment plan with potentially clinical trial such as the oval trial       The overlying feature in this is which drugs to use  The patient has had a catastrophic bowel  Perforation related to carboplatin Taxol and Avastin use in the 1st initial treatment with large volume disease  It is unclear to me whether the patient is a candidate for further VEGF antagonists including Avastin or potentially drugs within the oval trial       At this point the patient is interested in a chemo holiday  We will see her back in 1 month a  will be drawn at that time  I will present the patient's case at tumor board to see other people's thoughts and possibility for clinical trial when the patient does require further therapy which would likely be within the next several months      Overall consultation took 25 minutes with greater than 50% being dedicated toward discussion time         Relevant Orders                CHIEF COMPLAINT:   Treatment evaluation for recurrent stage IV ovarian cancer status post 6 cycles of gemcitabine  And Doxil      Problem:  Cancer Staging  Ovarian cancer [facility])  Staging form: Ovary, Fallopian Tube, Primary Peritoneal, AJCC 8th Edition  - Clinical stage from 11/14/2018: FIGO Stage IVB (cT3c, cN0, pM1b) - Signed by Alek Jama MD on 11/14/2018  - Pathologic stage from 10/9/2019: FIGO Stage IIIC (pT3c, pN1, cM0) - Signed by Alek Jama MD on 10/9/2019        Previous therapy:  Oncology History   Ovarian cancer ([facility] [address] )   11/9/2018 Initial Diagnosis    Ovarian cancer ([facility] [address] )   tumor marker 194 5     11/9/2018 Biopsy    CT-guided needle biopsy of abdominal mass 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  Given liver involvement this would be stage IV     11/14/2018 - 12/4/2018 Chemotherapy    Neoadjuvant therapy: carboplatin area under the curve of 6, paclitaxel 135 milligrams/meter squared, Avastin 10 milligrams/kilogram  Completed 1 of 3 cycles  12/14/2018 Surgery    LAPAROTOMY EXPLORATORY; Abdominal Washout; Application of Abthera Vac Dressing for suspected bowel perforation and septic shock        12/15/2018 Surgery    LAPAROTOMY EXPLORATORY, ABDOMINAL WASHOUT, DRAIN PLACEMENT x 4, DIVERTING LOOP ILEOSTOMY AND ABDOMINAL CLOSURE for bowel perforation     3/26/2019 -  Chemotherapy    Taxol weekly 80 mg/m2 for 3 consecutive weeks, may add carboplatin in the future with AUC of 5      3/26/2019 - 2/3/2020 Chemotherapy    palonosetron (ALOXI) injection 0 25 mg, 0 25 mg, Intravenous, Once, 6 of 6 cycles  Administration: 0 25 mg (5/28/2019), 0 25 mg (6/25/2019), 0 25 mg (11/6/2019), 0 25 mg (12/10/2019), 0 25 mg (1/7/2020)  fosaprepitant (EMEND) 150 mg in sodium chloride 0 9 % 255 mL IVPB, 150 mg, Intravenous, Once, 6 of 6 cycles  Administration: 150 mg (5/28/2019), 150 mg (6/25/2019), 150 mg (11/6/2019), 150 mg (12/10/2019), 150 mg (1/7/2020)  CARBOplatin (PARAPLATIN) in sodium chloride 0 9 % 250 mL IVPB,  (original dose ), Intravenous, Once, 6 of 6 cycles  Dose modification:   (Cycle 2),   (original dose 258 4 mg, Cycle 3),   (original dose 258 4 mg, Cycle 3),   (original dose 244 4 mg, Cycle 4)  Administration: 258 4 mg (5/28/2019), 244 4 mg (6/25/2019), 285 2 mg (11/6/2019), 296 4 mg (12/10/2019), 286 4 mg (1/7/2020)  PACLitaxel (TAXOL) 127 8 mg in sodium chloride 0 9 % 250 mL chemo IVPB, 80 mg/m2, Intravenous, Once, 7 of 7 cycles  Dose modification: 65 mg/m2 (original dose 80 mg/m2, Cycle 2, Reason: Dose Not Tolerated)  Administration: 103 8 mg (5/14/2019), 108 6 mg (5/28/2019), 108 6 mg (6/4/2019), 108 6 mg (6/11/2019), 109 8 mg (6/25/2019), 109 8 mg (7/2/2019), 109 8 mg (7/9/2019), 111 6 mg (11/6/2019), 111 6 mg (11/12/2019), 111 6 mg (11/19/2019), 113 4 mg (12/10/2019), 113 4 mg (12/17/2019), 113 4 mg (12/23/2019), 113 4 mg (1/7/2020), 114 6 mg (1/14/2020), 114 6 mg (1/21/2020)     4/29/2019 Genomic Testing     Foundation 1 testing revealed a PD L1 tumor proportions score of 0%  The patient is not a candidate for PD L1 manipulation      Genetic Testing    [employer] Breast/Gyn panel, wildtype  9/10/2019 Surgery    Exploratory laparotomy radical omentectomy, posterior exenteration, takedown of ileostomy and end colostomy for complete debulking of visible tumor  Final pathology report revealed high-grade serous malignancy in both the omentum and the pelvic mass     9/25/2019 -  Chemotherapy    Carboplatin area under the curve of 5+ weekly paclitaxel at 80 milligrams/meters squared for 3 further cycles of treatment followed by consolidation this is to begin mid October     10/2019 Surgery    Interval debulking with TAHBSO revision of colostomy omentectomy and tumor debulking with complete debulking  Several weeks postoperatively the patient required a open cholecystectomy       10/2019 - 12/2019 Chemotherapy    Carboplatin paclitaxel x3     10/9/2019 -  Cancer Staged    Staging form: Ovary, Fallopian Tube, Primary Peritoneal, AJCC 8th Edition  - Pathologic stage from 10/9/2019: FIGO Stage IIIC (pT3c, pN1, cM0) - Signed by Aby Carreon MD on 10/9/2019  Histologic grade (G): G3  Histologic grading system: 4 grade system  Gross residual tumor after primary cyto-reductive surgery: Absent       3/24/2020 -  Chemotherapy    palonosetron (ALOXI) injection 0 25 mg, 0 25 mg, Intravenous, Once, 6 of 7 cycles  Administration: 0 25 mg (3/24/2020), 0 25 mg (4/21/2020), 0 25 mg (5/19/2020), 0 25 mg (6/23/2020), 0 25 mg (7/28/2020), 0 25 mg (8/25/2020)  pegfilgrastim (NEULASTA ONPRO) subcutaneous injection kit 6 mg, 6 mg, Subcutaneous, Once, 6 of 7 cycles  Administration: 6 mg (3/31/2020), 6 mg (4/28/2020), 6 mg (5/26/2020), 6 mg (6/29/2020), 6 mg (8/4/2020), 6 mg (9/1/2020)  DOXOrubicin liposome (DOXIL) 54 3 mg in dextrose 5 % 250 mL chemo infusion, 30 mg/m2 = 54 3 mg, Intravenous, Once, 6 of 7 cycles  Administration: 54 3 mg (3/24/2020), 54 6 mg (4/21/2020), 54 9 mg (5/19/2020), 54 6 mg (6/23/2020), 54 9 mg (7/28/2020), 54 9 mg (8/25/2020)  gemcitabine (GEMZAR) 1,176 4 mg in sodium chloride 0 9 % 250 mL infusion, 650 mg/m2 = 1,176 4 mg, Intravenous, Once, 6 of 7 cycles  Administration: 1,176 4 mg (3/24/2020), 1,176 4 mg (3/31/2020), 1,182 9 mg (4/21/2020), 1,200 mg (4/28/2020), 1,189 4 mg (5/19/2020), 1,189 4 mg (5/26/2020), 1,182 9 mg (6/23/2020), 1,182 9 mg (6/29/2020), 1,189 4 mg (7/28/2020), 1,189 4 mg (8/4/2020), 1,189 4 mg (8/25/2020), 1,195 8 mg (9/1/2020)           Patient ID: Radha Leigh is a 78 y o  female  Patient is very pleasant 69-year-old female with a history of recurrent stage IV ovarian carcinoma originally diagnosed in approximately September of 2020  She originally went underwent treatment with carboplatin paclitaxel and Avastin and developed a bowel perforation requiring diversion    She subsequently underwent a total of 6 cycles of carboplatin paclitaxel with an interval debulking  She still maintains her stoma  She transitioned shortly to Doxil Gemzar after platinum resistant disease was noted  She has recently completed 6 cycles of treatment  Her  has gone up approximately 10 points at the last cycle of treatment and her CT scan and PET reveals the following:   IMPRESSION:     1  Mixed findings for response to therapy  2   Scattered FDG avid hepatic lesions again noted likely a combination of intraparenchymal and peritoneal metastasis along the peripheral aspect of the liver  These are either stable or have slightly increased  3   Previously noted focus of FDG uptake at the spleen is no longer seen  4   Stable or improving retroperitoneal foci of FDG uptake suspicious for shashi metastasis  5   Previously noted focus of FDG uptake at the left lower quadrant ostomy site has cleared  6   Decreased focal FDG uptake in the left hemipelvis, again peritoneal metastasis is not excluded  7  There is now mild/moderate diffuse FDG uptake in the osseous structures which is likely related to marrow response to therapy  Overall the patient is feeling well but fatigued  Her  Grandchildren for which she is guardian are now in school  She has Doxil related apparent abdominal pain  This appears to be cyclic and related to her stage intravenous Doxil treatment  It is associated with right lower quadrant abdominal cramping followed by diarrhea through her stoma  The patient is interested in having her stoma reversed but finds that timing may not be good as she is not sure how long her children will remain in school and ha ve to return to home school  Today, the patient is doing well  She denies significant abdominal pain, pelvic pain, nausea, vomiting, constipation, diarrhea, fevers, chills, or vaginal bleeding            The following portions of the patient's history were reviewed and updated as appropriate: allergies, current medications, past family history, past social history, past surgical history and problem list     Review of Systems   Constitutional: Negative  HENT: Negative  Eyes: Negative  Respiratory: Negative  Cardiovascular: Negative  Gastrointestinal: Negative  Endocrine: Negative  Genitourinary: Negative  Musculoskeletal: Negative  Skin: Negative  Neurological: Negative  Hematological: Negative  Psychiatric/Behavioral: Negative  Current Outpatient Medications   Medication Sig Dispense Refill    aspirin-acetaminophen-caffeine (EXCEDRIN MIGRAINE) 250-250-65 MG per tablet Take 1 tablet by mouth every 6 (six) hours as needed for headaches      pantoprazole (PROTONIX) 40 mg tablet Take 1 tablet (40 mg total) by mouth 2 (two) times a day before meals  0    traMADol (ULTRAM) 50 mg tablet Take 1 tablet (50 mg total) by mouth every 6 (six) hours as needed for moderate pain 20 tablet 1    gabapentin (NEURONTIN) 300 mg capsule Take 1 capsule (300 mg total) by mouth 3 (three) times a day 90 capsule 0    potassium chloride (K-DUR,KLOR-CON) 20 mEq tablet Take 1 tablet (20 mEq total) by mouth daily 30 tablet 0     No current facility-administered medications for this visit  Objective:    Blood pressure 140/56, pulse 74, temperature 98 8 °F (37 1 °C), resp  rate 18, height 5' 4 17" (1 63 m), weight 78 7 kg (173 lb 8 oz), not currently breastfeeding  Body mass index is 29 62 kg/m²  Body surface area is 1 84 meters squared  Physical Exam  Constitutional:       Appearance: She is well-developed  HENT:      Head: Normocephalic and atraumatic  Neck:      Musculoskeletal: Normal range of motion and neck supple  Thyroid: No thyromegaly  Cardiovascular:      Rate and Rhythm: Normal rate and regular rhythm  Heart sounds: Normal heart sounds  Pulmonary:      Effort: Pulmonary effort is normal       Breath sounds: Normal breath sounds     Abdominal:      General: Bowel sounds are normal       Palpations: Abdomen is soft       Comments: Well healed laparoscopic incisions  Genitourinary:     Comments: Deferred  Musculoskeletal: Normal range of motion  Lymphadenopathy:      Cervical: No cervical adenopathy  Skin:     General: Skin is warm and dry  Neurological:      Mental Status: She is alert and oriented to person, place, and time     Psychiatric:         Behavior: Behavior normal          Lab Results   Component Value Date     39 6 (H) 09/11/2020     Lab Results   Component Value Date    K 4 1 08/29/2020     08/29/2020    CO2 27 08/29/2020    BUN 28 (H) 08/29/2020    CREATININE 1 48 (H) 08/29/2020    GLUCOSE 228 (H) 09/10/2019    GLUF 178 (H) 08/22/2020    CALCIUM 8 9 08/29/2020    AST 25 08/29/2020    ALT 38 08/29/2020    ALKPHOS 119 (H) 08/29/2020    EGFR 36 08/29/2020     Lab Results   Component Value Date    WBC 8 07 08/29/2020    HGB 9 3 (L) 08/29/2020    HCT 30 5 (L) 08/29/2020    MCV 95 08/29/2020     08/29/2020     Lab Results   Component Value Date    NEUTROABS 6 09 08/29/2020        Trend:  Lab Results   Component Value Date     39 6 (H) 09/11/2020     44 9 (H) 09/01/2020     29 2 06/15/2020     27 4 06/10/2020     32 5 (H) 05/26/2020     28 6 05/15/2020     30 6 (H) 05/11/2020     36 5 (H) 04/27/2020     37 9 (H) 04/17/2020     42 5 (H) 04/10/2020     48 1 (H) 03/27/2020     40 9 (H) 03/20/2020     18 2 01/28/2020     17 5 12/31/2019     14 5 12/02/2019     22 0 11/19/2019     32 6 (H) 11/06/2019     19 1 08/30/2019     12 7 08/13/2019     13 9 06/18/2019     19 9 05/21/2019     79 5 (H) 04/16/2019     96 4 (H) 03/19/2019     80 4 (H) 03/08/2019     135 0 (H) 11/30/2018     194 5 (H) 10/18/2018

## 2020-09-22 ENCOUNTER — HOSPITAL ENCOUNTER (OUTPATIENT)
Dept: INFUSION CENTER | Facility: CLINIC | Age: 70
End: 2020-09-22

## 2020-09-25 ENCOUNTER — DOCUMENTATION (OUTPATIENT)
Dept: GYNECOLOGIC ONCOLOGY | Facility: CLINIC | Age: 70
End: 2020-09-25

## 2020-09-25 NOTE — PROGRESS NOTES
Her case was presented at the multidisciplinary Gynecologic Tumor Conference on 9/25/20  and the consensus recommendation is: palliative chemotherapy for platinum resistant ovarian cancer   Consideration for participation in GY-005

## 2020-10-16 ENCOUNTER — LAB (OUTPATIENT)
Dept: LAB | Facility: HOSPITAL | Age: 70
End: 2020-10-16
Payer: MEDICARE

## 2020-10-16 DIAGNOSIS — C56.9 MALIGNANT NEOPLASM OF OVARY, UNSPECIFIED LATERALITY (HCC): ICD-10-CM

## 2020-10-16 LAB
ALBUMIN SERPL BCP-MCNC: 3.5 G/DL (ref 3.5–5)
ALP SERPL-CCNC: 130 U/L (ref 46–116)
ALT SERPL W P-5'-P-CCNC: 24 U/L (ref 12–78)
ANION GAP SERPL CALCULATED.3IONS-SCNC: 7 MMOL/L (ref 4–13)
AST SERPL W P-5'-P-CCNC: 19 U/L (ref 5–45)
BASOPHILS # BLD AUTO: 0.1 THOUSANDS/ΜL (ref 0–0.1)
BASOPHILS NFR BLD AUTO: 1 % (ref 0–1)
BILIRUB SERPL-MCNC: 0.2 MG/DL (ref 0.2–1)
BUN SERPL-MCNC: 24 MG/DL (ref 5–25)
CALCIUM SERPL-MCNC: 9.3 MG/DL (ref 8.3–10.1)
CHLORIDE SERPL-SCNC: 104 MMOL/L (ref 100–108)
CO2 SERPL-SCNC: 28 MMOL/L (ref 21–32)
CREAT SERPL-MCNC: 1.19 MG/DL (ref 0.6–1.3)
EOSINOPHIL # BLD AUTO: 0.47 THOUSAND/ΜL (ref 0–0.61)
EOSINOPHIL NFR BLD AUTO: 6 % (ref 0–6)
ERYTHROCYTE [DISTWIDTH] IN BLOOD BY AUTOMATED COUNT: 15.9 % (ref 11.6–15.1)
GFR SERPL CREATININE-BSD FRML MDRD: 46 ML/MIN/1.73SQ M
GLUCOSE SERPL-MCNC: 120 MG/DL (ref 65–140)
HCT VFR BLD AUTO: 30.9 % (ref 34.8–46.1)
HGB BLD-MCNC: 9.5 G/DL (ref 11.5–15.4)
IMM GRANULOCYTES # BLD AUTO: 0.02 THOUSAND/UL (ref 0–0.2)
IMM GRANULOCYTES NFR BLD AUTO: 0 % (ref 0–2)
LYMPHOCYTES # BLD AUTO: 2.01 THOUSANDS/ΜL (ref 0.6–4.47)
LYMPHOCYTES NFR BLD AUTO: 28 % (ref 14–44)
MAGNESIUM SERPL-MCNC: 2.1 MG/DL (ref 1.6–2.6)
MCH RBC QN AUTO: 28.4 PG (ref 26.8–34.3)
MCHC RBC AUTO-ENTMCNC: 30.7 G/DL (ref 31.4–37.4)
MCV RBC AUTO: 93 FL (ref 82–98)
MONOCYTES # BLD AUTO: 0.54 THOUSAND/ΜL (ref 0.17–1.22)
MONOCYTES NFR BLD AUTO: 7 % (ref 4–12)
NEUTROPHILS # BLD AUTO: 4.18 THOUSANDS/ΜL (ref 1.85–7.62)
NEUTS SEG NFR BLD AUTO: 58 % (ref 43–75)
NRBC BLD AUTO-RTO: 0 /100 WBCS
PLATELET # BLD AUTO: 223 THOUSANDS/UL (ref 149–390)
PMV BLD AUTO: 11.8 FL (ref 8.9–12.7)
POTASSIUM SERPL-SCNC: 4.1 MMOL/L (ref 3.5–5.3)
PROT SERPL-MCNC: 7.6 G/DL (ref 6.4–8.2)
RBC # BLD AUTO: 3.34 MILLION/UL (ref 3.81–5.12)
SODIUM SERPL-SCNC: 139 MMOL/L (ref 136–145)
WBC # BLD AUTO: 7.32 THOUSAND/UL (ref 4.31–10.16)

## 2020-10-16 PROCEDURE — 36415 COLL VENOUS BLD VENIPUNCTURE: CPT | Performed by: NURSE PRACTITIONER

## 2020-10-16 PROCEDURE — 83735 ASSAY OF MAGNESIUM: CPT | Performed by: NURSE PRACTITIONER

## 2020-10-16 PROCEDURE — 80053 COMPREHEN METABOLIC PANEL: CPT | Performed by: NURSE PRACTITIONER

## 2020-10-16 PROCEDURE — 85025 COMPLETE CBC W/AUTO DIFF WBC: CPT | Performed by: NURSE PRACTITIONER

## 2020-10-20 ENCOUNTER — TELEPHONE (OUTPATIENT)
Dept: GYNECOLOGIC ONCOLOGY | Facility: CLINIC | Age: 70
End: 2020-10-20

## 2020-10-21 ENCOUNTER — OFFICE VISIT (OUTPATIENT)
Dept: GYNECOLOGIC ONCOLOGY | Facility: CLINIC | Age: 70
End: 2020-10-21
Payer: MEDICARE

## 2020-10-21 VITALS
SYSTOLIC BLOOD PRESSURE: 140 MMHG | DIASTOLIC BLOOD PRESSURE: 72 MMHG | WEIGHT: 173 LBS | HEART RATE: 72 BPM | RESPIRATION RATE: 18 BRPM | HEIGHT: 64 IN | TEMPERATURE: 99.3 F | BODY MASS INDEX: 29.53 KG/M2

## 2020-10-21 DIAGNOSIS — K56.51 INTESTINAL ADHESIONS WITH PARTIAL OBSTRUCTION (HCC): ICD-10-CM

## 2020-10-21 DIAGNOSIS — G62.9 NEUROPATHY: ICD-10-CM

## 2020-10-21 DIAGNOSIS — C56.9 MALIGNANT NEOPLASM OF OVARY, UNSPECIFIED LATERALITY (HCC): Primary | ICD-10-CM

## 2020-10-21 DIAGNOSIS — B37.9 CANDIDIASIS: ICD-10-CM

## 2020-10-21 PROCEDURE — 99214 OFFICE O/P EST MOD 30 MIN: CPT | Performed by: OBSTETRICS & GYNECOLOGY

## 2020-10-21 RX ORDER — TRAMADOL HYDROCHLORIDE 50 MG/1
50 TABLET ORAL EVERY 6 HOURS PRN
Qty: 20 TABLET | Refills: 1 | Status: SHIPPED | OUTPATIENT
Start: 2020-10-21 | End: 2020-01-01 | Stop reason: SDUPTHER

## 2020-10-21 RX ORDER — NYSTATIN 100000 [USP'U]/G
POWDER TOPICAL 3 TIMES DAILY
Qty: 15 G | Refills: 4 | Status: SHIPPED | OUTPATIENT
Start: 2020-10-21 | End: 2021-01-01 | Stop reason: SDUPTHER

## 2020-12-02 PROBLEM — C56.9 MALIGNANT NEOPLASM OF OVARY (HCC): Status: RESOLVED | Noted: 2019-08-20 | Resolved: 2020-01-01

## 2021-01-01 ENCOUNTER — APPOINTMENT (OUTPATIENT)
Dept: LAB | Facility: HOSPITAL | Age: 71
End: 2021-01-01
Payer: MEDICARE

## 2021-01-01 ENCOUNTER — TELEMEDICINE (OUTPATIENT)
Dept: GYNECOLOGIC ONCOLOGY | Facility: CLINIC | Age: 71
End: 2021-01-01
Payer: MEDICARE

## 2021-01-01 ENCOUNTER — LAB (OUTPATIENT)
Dept: LAB | Facility: HOSPITAL | Age: 71
End: 2021-01-01
Payer: MEDICARE

## 2021-01-01 ENCOUNTER — TELEPHONE (OUTPATIENT)
Dept: HEMATOLOGY ONCOLOGY | Facility: CLINIC | Age: 71
End: 2021-01-01

## 2021-01-01 ENCOUNTER — HOSPITAL ENCOUNTER (OUTPATIENT)
Dept: INFUSION CENTER | Facility: CLINIC | Age: 71
End: 2021-01-01

## 2021-01-01 ENCOUNTER — HOSPITAL ENCOUNTER (OUTPATIENT)
Dept: INFUSION CENTER | Facility: CLINIC | Age: 71
Discharge: HOME | End: 2021-04-27
Payer: MEDICARE

## 2021-01-01 ENCOUNTER — HOSPITAL ENCOUNTER (OUTPATIENT)
Dept: INFUSION CENTER | Facility: CLINIC | Age: 71
Discharge: HOME | End: 2021-01-19
Payer: MEDICARE

## 2021-01-01 ENCOUNTER — OFFICE VISIT (OUTPATIENT)
Dept: GYNECOLOGIC ONCOLOGY | Facility: CLINIC | Age: 71
End: 2021-01-01
Payer: MEDICARE

## 2021-01-01 ENCOUNTER — HOSPITAL ENCOUNTER (OUTPATIENT)
Dept: CT IMAGING | Facility: CLINIC | Age: 71
Discharge: HOME | End: 2021-09-10
Payer: MEDICARE

## 2021-01-01 ENCOUNTER — TELEPHONE (OUTPATIENT)
Dept: GYNECOLOGIC ONCOLOGY | Facility: CLINIC | Age: 71
End: 2021-01-01

## 2021-01-01 ENCOUNTER — TELEPHONE (OUTPATIENT)
Dept: UROLOGY | Facility: CLINIC | Age: 71
End: 2021-01-01

## 2021-01-01 ENCOUNTER — HOSPITAL ENCOUNTER (OUTPATIENT)
Dept: INFUSION CENTER | Facility: CLINIC | Age: 71
Discharge: HOME | End: 2021-09-08
Payer: MEDICARE

## 2021-01-01 ENCOUNTER — HOSPITAL ENCOUNTER (OUTPATIENT)
Dept: INFUSION CENTER | Facility: CLINIC | Age: 71
Discharge: HOME | End: 2021-02-09

## 2021-01-01 ENCOUNTER — TRANSCRIBE ORDERS (OUTPATIENT)
Dept: ADMINISTRATIVE | Facility: HOSPITAL | Age: 71
End: 2021-01-01

## 2021-01-01 ENCOUNTER — HOSPITAL ENCOUNTER (OUTPATIENT)
Facility: HOSPITAL | Age: 71
Setting detail: OUTPATIENT SURGERY
Discharge: HOME | End: 2021-09-30
Attending: UROLOGY | Admitting: UROLOGY
Payer: MEDICARE

## 2021-01-01 ENCOUNTER — HOSPITAL ENCOUNTER (OUTPATIENT)
Dept: INFUSION CENTER | Facility: CLINIC | Age: 71
Discharge: HOME | End: 2021-03-02
Payer: MEDICARE

## 2021-01-01 ENCOUNTER — HOSPITAL ENCOUNTER (OUTPATIENT)
Dept: INFUSION CENTER | Facility: CLINIC | Age: 71
Discharge: HOME | End: 2021-10-05

## 2021-01-01 ENCOUNTER — TELEPHONE (OUTPATIENT)
Dept: SURGICAL ONCOLOGY | Facility: CLINIC | Age: 71
End: 2021-01-01

## 2021-01-01 ENCOUNTER — HOSPITAL ENCOUNTER (OUTPATIENT)
Dept: INFUSION CENTER | Facility: CLINIC | Age: 71
Discharge: HOME | End: 2021-03-09
Payer: MEDICARE

## 2021-01-01 ENCOUNTER — APPOINTMENT (OUTPATIENT)
Dept: RADIOLOGY | Facility: HOSPITAL | Age: 71
End: 2021-01-01
Payer: MEDICARE

## 2021-01-01 ENCOUNTER — PATIENT OUTREACH (OUTPATIENT)
Dept: CASE MANAGEMENT | Facility: HOSPITAL | Age: 71
End: 2021-01-01

## 2021-01-01 ENCOUNTER — ANESTHESIA EVENT (OUTPATIENT)
Dept: PERIOP | Facility: HOSPITAL | Age: 71
End: 2021-01-01
Payer: MEDICARE

## 2021-01-01 ENCOUNTER — TELEPHONE (OUTPATIENT)
Dept: INFUSION CENTER | Facility: CLINIC | Age: 71
End: 2021-01-01

## 2021-01-01 ENCOUNTER — HOSPITAL ENCOUNTER (INPATIENT)
Facility: HOSPITAL | Age: 71
LOS: 5 days | Discharge: HOSPICE | DRG: 871 | End: 2021-10-10
Attending: EMERGENCY MEDICINE | Admitting: INTERNAL MEDICINE
Payer: MEDICARE

## 2021-01-01 ENCOUNTER — HOSPITAL ENCOUNTER (OUTPATIENT)
Dept: INFUSION CENTER | Facility: CLINIC | Age: 71
Discharge: HOME | End: 2021-05-12
Payer: MEDICARE

## 2021-01-01 ENCOUNTER — HOSPITAL ENCOUNTER (OUTPATIENT)
Dept: INFUSION CENTER | Facility: CLINIC | Age: 71
Discharge: HOME | End: 2021-05-04
Payer: MEDICARE

## 2021-01-01 ENCOUNTER — OFFICE VISIT (OUTPATIENT)
Dept: GYNECOLOGIC ONCOLOGY | Facility: CLINIC | Age: 71
End: 2021-01-01

## 2021-01-01 ENCOUNTER — HOSPITAL ENCOUNTER (OUTPATIENT)
Dept: INFUSION CENTER | Facility: CLINIC | Age: 71
Discharge: HOME | End: 2021-08-11
Payer: MEDICARE

## 2021-01-01 ENCOUNTER — HOSPITAL ENCOUNTER (OUTPATIENT)
Dept: INFUSION CENTER | Facility: CLINIC | Age: 71
Discharge: HOME | End: 2021-06-29
Payer: MEDICARE

## 2021-01-01 ENCOUNTER — HOSPITAL ENCOUNTER (OUTPATIENT)
Dept: INFUSION CENTER | Facility: CLINIC | Age: 71
Discharge: HOME | End: 2021-09-29
Payer: MEDICARE

## 2021-01-01 ENCOUNTER — HOSPITAL ENCOUNTER (OUTPATIENT)
Dept: INFUSION CENTER | Facility: CLINIC | Age: 71
Discharge: HOME | End: 2021-06-10
Payer: MEDICARE

## 2021-01-01 ENCOUNTER — HOSPITAL ENCOUNTER (OUTPATIENT)
Dept: INFUSION CENTER | Facility: CLINIC | Age: 71
Discharge: HOME | End: 2021-04-06
Payer: MEDICARE

## 2021-01-01 ENCOUNTER — HOSPITAL ENCOUNTER (OUTPATIENT)
Dept: INFUSION CENTER | Facility: CLINIC | Age: 71
Discharge: HOME | End: 2021-03-16
Payer: MEDICARE

## 2021-01-01 ENCOUNTER — HOSPITAL ENCOUNTER (OUTPATIENT)
Dept: INFUSION CENTER | Facility: CLINIC | Age: 71
Discharge: HOME | End: 2021-09-14

## 2021-01-01 ENCOUNTER — HOSPITAL ENCOUNTER (OUTPATIENT)
Dept: INFUSION CENTER | Facility: CLINIC | Age: 71
Discharge: HOME | End: 2021-09-14
Payer: MEDICARE

## 2021-01-01 ENCOUNTER — HOSPITAL ENCOUNTER (OUTPATIENT)
Dept: INFUSION CENTER | Facility: CLINIC | Age: 71
Discharge: HOME | End: 2021-01-26

## 2021-01-01 ENCOUNTER — HOSPITAL ENCOUNTER (OUTPATIENT)
Dept: INFUSION CENTER | Facility: CLINIC | Age: 71
Discharge: HOME | End: 2021-10-12

## 2021-01-01 ENCOUNTER — ANESTHESIA (OUTPATIENT)
Dept: PERIOP | Facility: HOSPITAL | Age: 71
End: 2021-01-01
Payer: MEDICARE

## 2021-01-01 ENCOUNTER — PREP FOR PROCEDURE (OUTPATIENT)
Dept: UROLOGY | Facility: CLINIC | Age: 71
End: 2021-01-01

## 2021-01-01 ENCOUNTER — HOSPITAL ENCOUNTER (OUTPATIENT)
Dept: INFUSION CENTER | Facility: CLINIC | Age: 71
Discharge: HOME | End: 2021-05-11
Payer: MEDICARE

## 2021-01-01 ENCOUNTER — HOSPITAL ENCOUNTER (OUTPATIENT)
Dept: INFUSION CENTER | Facility: CLINIC | Age: 71
Discharge: HOME | End: 2021-07-06
Payer: MEDICARE

## 2021-01-01 ENCOUNTER — TELEPHONE (OUTPATIENT)
Dept: OTHER | Facility: OTHER | Age: 71
End: 2021-01-01

## 2021-01-01 ENCOUNTER — HOSPITAL ENCOUNTER (OUTPATIENT)
Dept: INFUSION CENTER | Facility: CLINIC | Age: 71
Discharge: HOME | End: 2021-02-02

## 2021-01-01 ENCOUNTER — APPOINTMENT (INPATIENT)
Dept: MRI IMAGING | Facility: HOSPITAL | Age: 71
DRG: 871 | End: 2021-01-01
Payer: MEDICARE

## 2021-01-01 ENCOUNTER — HOSPITAL ENCOUNTER (OUTPATIENT)
Dept: INFUSION CENTER | Facility: CLINIC | Age: 71
Discharge: HOME | End: 2021-08-30
Payer: MEDICARE

## 2021-01-01 ENCOUNTER — HOSPITAL ENCOUNTER (OUTPATIENT)
Dept: INFUSION CENTER | Facility: CLINIC | Age: 71
Discharge: HOME | End: 2021-10-14
Payer: MEDICARE

## 2021-01-01 ENCOUNTER — HOSPITAL ENCOUNTER (OUTPATIENT)
Dept: INFUSION CENTER | Facility: CLINIC | Age: 71
Discharge: HOME | End: 2021-08-10
Payer: MEDICARE

## 2021-01-01 ENCOUNTER — TELEPHONE (OUTPATIENT)
Dept: MRI IMAGING | Facility: HOSPITAL | Age: 71
End: 2021-01-01

## 2021-01-01 ENCOUNTER — APPOINTMENT (EMERGENCY)
Dept: CT IMAGING | Facility: HOSPITAL | Age: 71
DRG: 871 | End: 2021-01-01
Payer: MEDICARE

## 2021-01-01 ENCOUNTER — HOSPITAL ENCOUNTER (OUTPATIENT)
Dept: INFUSION CENTER | Facility: CLINIC | Age: 71
Discharge: HOME | End: 2021-03-23
Payer: MEDICARE

## 2021-01-01 ENCOUNTER — OFFICE VISIT (OUTPATIENT)
Dept: UROLOGY | Facility: CLINIC | Age: 71
End: 2021-01-01
Payer: MEDICARE

## 2021-01-01 ENCOUNTER — HOSPITAL ENCOUNTER (OUTPATIENT)
Dept: INFUSION CENTER | Facility: CLINIC | Age: 71
Discharge: HOME | End: 2021-03-30
Payer: MEDICARE

## 2021-01-01 ENCOUNTER — HOSPITAL ENCOUNTER (OUTPATIENT)
Dept: INFUSION CENTER | Facility: CLINIC | Age: 71
Discharge: HOME | End: 2021-06-01

## 2021-01-01 ENCOUNTER — HOSPITAL ENCOUNTER (OUTPATIENT)
Dept: CT IMAGING | Facility: HOSPITAL | Age: 71
Discharge: HOME | End: 2021-04-05
Attending: OBSTETRICS & GYNECOLOGY
Payer: MEDICARE

## 2021-01-01 ENCOUNTER — HOSPITAL ENCOUNTER (OUTPATIENT)
Dept: INFUSION CENTER | Facility: CLINIC | Age: 71
Discharge: HOME | End: 2021-01-12
Payer: MEDICARE

## 2021-01-01 ENCOUNTER — HOSPITAL ENCOUNTER (OUTPATIENT)
Dept: INFUSION CENTER | Facility: CLINIC | Age: 71
Discharge: HOME | End: 2021-02-23

## 2021-01-01 ENCOUNTER — HOSPITAL ENCOUNTER (OUTPATIENT)
Dept: INFUSION CENTER | Facility: CLINIC | Age: 71
Discharge: HOME | End: 2021-04-13

## 2021-01-01 VITALS
DIASTOLIC BLOOD PRESSURE: 60 MMHG | HEART RATE: 76 BPM | BODY MASS INDEX: 28.85 KG/M2 | SYSTOLIC BLOOD PRESSURE: 104 MMHG | RESPIRATION RATE: 18 BRPM | TEMPERATURE: 98.2 F | HEIGHT: 64 IN | WEIGHT: 169 LBS

## 2021-01-01 VITALS
TEMPERATURE: 96.7 F | RESPIRATION RATE: 16 BRPM | WEIGHT: 162.7 LBS | SYSTOLIC BLOOD PRESSURE: 135 MMHG | DIASTOLIC BLOOD PRESSURE: 65 MMHG | BODY MASS INDEX: 27.78 KG/M2 | HEART RATE: 73 BPM | HEIGHT: 64 IN | OXYGEN SATURATION: 99 %

## 2021-01-01 VITALS
TEMPERATURE: 97.5 F | DIASTOLIC BLOOD PRESSURE: 78 MMHG | WEIGHT: 148 LBS | HEART RATE: 93 BPM | RESPIRATION RATE: 16 BRPM | SYSTOLIC BLOOD PRESSURE: 104 MMHG | BODY MASS INDEX: 26.22 KG/M2 | OXYGEN SATURATION: 97 % | HEIGHT: 63 IN

## 2021-01-01 VITALS
HEART RATE: 87 BPM | WEIGHT: 160.94 LBS | BODY MASS INDEX: 27.48 KG/M2 | SYSTOLIC BLOOD PRESSURE: 158 MMHG | HEIGHT: 64 IN | DIASTOLIC BLOOD PRESSURE: 71 MMHG | RESPIRATION RATE: 18 BRPM | TEMPERATURE: 97 F

## 2021-01-01 VITALS
HEART RATE: 72 BPM | BODY MASS INDEX: 28.45 KG/M2 | RESPIRATION RATE: 16 BRPM | TEMPERATURE: 96.7 F | HEIGHT: 64 IN | DIASTOLIC BLOOD PRESSURE: 58 MMHG | SYSTOLIC BLOOD PRESSURE: 118 MMHG | WEIGHT: 166.67 LBS

## 2021-01-01 VITALS
WEIGHT: 163.5 LBS | SYSTOLIC BLOOD PRESSURE: 115 MMHG | BODY MASS INDEX: 27.91 KG/M2 | TEMPERATURE: 97.3 F | RESPIRATION RATE: 20 BRPM | DIASTOLIC BLOOD PRESSURE: 55 MMHG | HEART RATE: 82 BPM | OXYGEN SATURATION: 96 % | HEIGHT: 64 IN

## 2021-01-01 VITALS
OXYGEN SATURATION: 91 % | BODY MASS INDEX: 24.69 KG/M2 | HEART RATE: 93 BPM | TEMPERATURE: 99.7 F | DIASTOLIC BLOOD PRESSURE: 62 MMHG | RESPIRATION RATE: 18 BRPM | SYSTOLIC BLOOD PRESSURE: 116 MMHG | HEIGHT: 64 IN | WEIGHT: 144.62 LBS

## 2021-01-01 VITALS
HEIGHT: 64 IN | WEIGHT: 166.67 LBS | HEART RATE: 89 BPM | BODY MASS INDEX: 28.45 KG/M2 | SYSTOLIC BLOOD PRESSURE: 150 MMHG | DIASTOLIC BLOOD PRESSURE: 67 MMHG | TEMPERATURE: 97.1 F | RESPIRATION RATE: 16 BRPM

## 2021-01-01 VITALS
BODY MASS INDEX: 28 KG/M2 | HEART RATE: 89 BPM | SYSTOLIC BLOOD PRESSURE: 100 MMHG | HEIGHT: 64 IN | TEMPERATURE: 99.2 F | RESPIRATION RATE: 18 BRPM | DIASTOLIC BLOOD PRESSURE: 62 MMHG | WEIGHT: 164 LBS

## 2021-01-01 VITALS
HEIGHT: 64 IN | RESPIRATION RATE: 18 BRPM | BODY MASS INDEX: 28.68 KG/M2 | RESPIRATION RATE: 18 BRPM | SYSTOLIC BLOOD PRESSURE: 150 MMHG | TEMPERATURE: 97.3 F | TEMPERATURE: 96.4 F | DIASTOLIC BLOOD PRESSURE: 68 MMHG | HEART RATE: 112 BPM | SYSTOLIC BLOOD PRESSURE: 120 MMHG | HEART RATE: 87 BPM | WEIGHT: 167.99 LBS | DIASTOLIC BLOOD PRESSURE: 66 MMHG

## 2021-01-01 VITALS
WEIGHT: 147 LBS | SYSTOLIC BLOOD PRESSURE: 144 MMHG | HEIGHT: 63 IN | DIASTOLIC BLOOD PRESSURE: 68 MMHG | BODY MASS INDEX: 26.05 KG/M2

## 2021-01-01 VITALS
OXYGEN SATURATION: 96 % | HEART RATE: 87 BPM | RESPIRATION RATE: 18 BRPM | BODY MASS INDEX: 28.3 KG/M2 | HEIGHT: 64 IN | SYSTOLIC BLOOD PRESSURE: 129 MMHG | WEIGHT: 165.79 LBS | TEMPERATURE: 97.7 F | DIASTOLIC BLOOD PRESSURE: 61 MMHG

## 2021-01-01 VITALS
HEART RATE: 88 BPM | TEMPERATURE: 97.7 F | BODY MASS INDEX: 28.23 KG/M2 | SYSTOLIC BLOOD PRESSURE: 140 MMHG | DIASTOLIC BLOOD PRESSURE: 63 MMHG | RESPIRATION RATE: 18 BRPM | WEIGHT: 165.34 LBS

## 2021-01-01 VITALS
TEMPERATURE: 98.1 F | DIASTOLIC BLOOD PRESSURE: 64 MMHG | RESPIRATION RATE: 16 BRPM | WEIGHT: 166.5 LBS | HEART RATE: 72 BPM | HEIGHT: 64 IN | SYSTOLIC BLOOD PRESSURE: 128 MMHG | BODY MASS INDEX: 28.42 KG/M2

## 2021-01-01 VITALS
RESPIRATION RATE: 18 BRPM | DIASTOLIC BLOOD PRESSURE: 81 MMHG | SYSTOLIC BLOOD PRESSURE: 156 MMHG | HEART RATE: 79 BPM | TEMPERATURE: 97.9 F

## 2021-01-01 VITALS
DIASTOLIC BLOOD PRESSURE: 58 MMHG | RESPIRATION RATE: 18 BRPM | HEART RATE: 89 BPM | SYSTOLIC BLOOD PRESSURE: 122 MMHG | TEMPERATURE: 96 F

## 2021-01-01 VITALS
DIASTOLIC BLOOD PRESSURE: 70 MMHG | RESPIRATION RATE: 18 BRPM | HEIGHT: 64 IN | TEMPERATURE: 97.8 F | WEIGHT: 171 LBS | HEART RATE: 76 BPM | SYSTOLIC BLOOD PRESSURE: 120 MMHG | BODY MASS INDEX: 29.19 KG/M2

## 2021-01-01 VITALS
HEART RATE: 90 BPM | RESPIRATION RATE: 20 BRPM | TEMPERATURE: 98.3 F | SYSTOLIC BLOOD PRESSURE: 144 MMHG | DIASTOLIC BLOOD PRESSURE: 63 MMHG

## 2021-01-01 VITALS
DIASTOLIC BLOOD PRESSURE: 65 MMHG | SYSTOLIC BLOOD PRESSURE: 143 MMHG | HEIGHT: 64 IN | WEIGHT: 167.55 LBS | HEART RATE: 89 BPM | RESPIRATION RATE: 16 BRPM | BODY MASS INDEX: 28.6 KG/M2 | TEMPERATURE: 97.5 F

## 2021-01-01 VITALS
SYSTOLIC BLOOD PRESSURE: 130 MMHG | HEIGHT: 64 IN | RESPIRATION RATE: 18 BRPM | DIASTOLIC BLOOD PRESSURE: 82 MMHG | BODY MASS INDEX: 27.4 KG/M2 | HEART RATE: 80 BPM | WEIGHT: 160.5 LBS | TEMPERATURE: 98.1 F

## 2021-01-01 VITALS
BODY MASS INDEX: 27.91 KG/M2 | SYSTOLIC BLOOD PRESSURE: 122 MMHG | TEMPERATURE: 98.2 F | DIASTOLIC BLOOD PRESSURE: 62 MMHG | WEIGHT: 163.5 LBS | HEIGHT: 64 IN | RESPIRATION RATE: 18 BRPM | HEART RATE: 71 BPM

## 2021-01-01 VITALS
HEIGHT: 64 IN | RESPIRATION RATE: 18 BRPM | TEMPERATURE: 97.8 F | BODY MASS INDEX: 28.85 KG/M2 | SYSTOLIC BLOOD PRESSURE: 136 MMHG | WEIGHT: 169 LBS | HEART RATE: 86 BPM | DIASTOLIC BLOOD PRESSURE: 62 MMHG

## 2021-01-01 VITALS
RESPIRATION RATE: 18 BRPM | HEART RATE: 75 BPM | SYSTOLIC BLOOD PRESSURE: 156 MMHG | TEMPERATURE: 97.3 F | DIASTOLIC BLOOD PRESSURE: 72 MMHG

## 2021-01-01 VITALS
BODY MASS INDEX: 28.53 KG/M2 | SYSTOLIC BLOOD PRESSURE: 139 MMHG | HEART RATE: 74 BPM | TEMPERATURE: 97 F | WEIGHT: 167.11 LBS | HEIGHT: 64 IN | DIASTOLIC BLOOD PRESSURE: 65 MMHG | RESPIRATION RATE: 16 BRPM

## 2021-01-01 VITALS
BODY MASS INDEX: 27.63 KG/M2 | SYSTOLIC BLOOD PRESSURE: 136 MMHG | HEIGHT: 64 IN | DIASTOLIC BLOOD PRESSURE: 63 MMHG | TEMPERATURE: 97 F | WEIGHT: 161.82 LBS | RESPIRATION RATE: 18 BRPM | HEART RATE: 78 BPM

## 2021-01-01 VITALS
HEIGHT: 64 IN | WEIGHT: 164 LBS | RESPIRATION RATE: 18 BRPM | HEART RATE: 85 BPM | TEMPERATURE: 98.1 F | DIASTOLIC BLOOD PRESSURE: 80 MMHG | BODY MASS INDEX: 28 KG/M2 | SYSTOLIC BLOOD PRESSURE: 130 MMHG

## 2021-01-01 VITALS
DIASTOLIC BLOOD PRESSURE: 77 MMHG | SYSTOLIC BLOOD PRESSURE: 158 MMHG | TEMPERATURE: 97.8 F | RESPIRATION RATE: 20 BRPM | HEART RATE: 88 BPM

## 2021-01-01 VITALS
WEIGHT: 160.94 LBS | SYSTOLIC BLOOD PRESSURE: 145 MMHG | TEMPERATURE: 97.1 F | HEART RATE: 87 BPM | BODY MASS INDEX: 27.48 KG/M2 | RESPIRATION RATE: 18 BRPM | DIASTOLIC BLOOD PRESSURE: 67 MMHG | HEIGHT: 64 IN

## 2021-01-01 VITALS
HEART RATE: 74 BPM | TEMPERATURE: 96.3 F | SYSTOLIC BLOOD PRESSURE: 123 MMHG | DIASTOLIC BLOOD PRESSURE: 70 MMHG | RESPIRATION RATE: 18 BRPM

## 2021-01-01 VITALS
TEMPERATURE: 97.3 F | SYSTOLIC BLOOD PRESSURE: 118 MMHG | HEART RATE: 75 BPM | RESPIRATION RATE: 18 BRPM | DIASTOLIC BLOOD PRESSURE: 70 MMHG

## 2021-01-01 VITALS
SYSTOLIC BLOOD PRESSURE: 133 MMHG | HEART RATE: 77 BPM | TEMPERATURE: 97.6 F | DIASTOLIC BLOOD PRESSURE: 62 MMHG | RESPIRATION RATE: 16 BRPM

## 2021-01-01 VITALS
HEART RATE: 98 BPM | WEIGHT: 146.39 LBS | TEMPERATURE: 97 F | DIASTOLIC BLOOD PRESSURE: 69 MMHG | RESPIRATION RATE: 16 BRPM | BODY MASS INDEX: 24.99 KG/M2 | HEIGHT: 64 IN | SYSTOLIC BLOOD PRESSURE: 132 MMHG | OXYGEN SATURATION: 100 %

## 2021-01-01 VITALS
WEIGHT: 168.5 LBS | RESPIRATION RATE: 18 BRPM | DIASTOLIC BLOOD PRESSURE: 65 MMHG | TEMPERATURE: 96.7 F | BODY MASS INDEX: 28.77 KG/M2 | HEIGHT: 64 IN | SYSTOLIC BLOOD PRESSURE: 138 MMHG | HEART RATE: 75 BPM

## 2021-01-01 DIAGNOSIS — C56.9 MALIGNANT NEOPLASM OF OVARY, UNSPECIFIED LATERALITY (HCC): Primary | ICD-10-CM

## 2021-01-01 DIAGNOSIS — D64.81 ANEMIA DUE TO CHEMOTHERAPY: ICD-10-CM

## 2021-01-01 DIAGNOSIS — D64.9 ANEMIA, UNSPECIFIED TYPE: ICD-10-CM

## 2021-01-01 DIAGNOSIS — E86.0 DEHYDRATION: ICD-10-CM

## 2021-01-01 DIAGNOSIS — C56.3 MALIGNANT NEOPLASM OF BOTH OVARIES (HCC): ICD-10-CM

## 2021-01-01 DIAGNOSIS — C56.9 MALIGNANT NEOPLASM OF OVARY, UNSPECIFIED LATERALITY (HCC): ICD-10-CM

## 2021-01-01 DIAGNOSIS — C56.3 MALIGNANT NEOPLASM OF BOTH OVARIES (HCC): Primary | ICD-10-CM

## 2021-01-01 DIAGNOSIS — T45.1X5A ANTINEOPLASTIC CHEMOTHERAPY INDUCED ANEMIA: ICD-10-CM

## 2021-01-01 DIAGNOSIS — D64.9 ANEMIA, UNSPECIFIED TYPE: Primary | ICD-10-CM

## 2021-01-01 DIAGNOSIS — R30.0 DYSURIA: ICD-10-CM

## 2021-01-01 DIAGNOSIS — T45.1X5A ANTINEOPLASTIC CHEMOTHERAPY INDUCED ANEMIA: Primary | ICD-10-CM

## 2021-01-01 DIAGNOSIS — T45.1X5A ANEMIA DUE TO ANTINEOPLASTIC CHEMOTHERAPY: ICD-10-CM

## 2021-01-01 DIAGNOSIS — N39.0 URINARY TRACT INFECTION WITHOUT HEMATURIA, SITE UNSPECIFIED: Primary | ICD-10-CM

## 2021-01-01 DIAGNOSIS — K43.5 PARASTOMAL HERNIA: ICD-10-CM

## 2021-01-01 DIAGNOSIS — R10.31 RIGHT LOWER QUADRANT ABDOMINAL PAIN: ICD-10-CM

## 2021-01-01 DIAGNOSIS — K56.51 INTESTINAL ADHESIONS WITH PARTIAL OBSTRUCTION (HCC): ICD-10-CM

## 2021-01-01 DIAGNOSIS — T81.49XA SURGICAL WOUND INFECTION: ICD-10-CM

## 2021-01-01 DIAGNOSIS — D64.81 ANTINEOPLASTIC CHEMOTHERAPY INDUCED ANEMIA: ICD-10-CM

## 2021-01-01 DIAGNOSIS — R77.8 ELEVATED TROPONIN: ICD-10-CM

## 2021-01-01 DIAGNOSIS — E44.1 MILD PROTEIN-CALORIE MALNUTRITION (HCC): ICD-10-CM

## 2021-01-01 DIAGNOSIS — K65.9 PERITONITIS (HCC): ICD-10-CM

## 2021-01-01 DIAGNOSIS — D64.81 ANEMIA DUE TO CHEMOTHERAPY: Primary | ICD-10-CM

## 2021-01-01 DIAGNOSIS — G62.9 NEUROPATHY: ICD-10-CM

## 2021-01-01 DIAGNOSIS — T45.1X5A ANEMIA DUE TO CHEMOTHERAPY: Primary | ICD-10-CM

## 2021-01-01 DIAGNOSIS — R52 PAIN: ICD-10-CM

## 2021-01-01 DIAGNOSIS — K59.00 CONSTIPATION: ICD-10-CM

## 2021-01-01 DIAGNOSIS — L92.9 GRANULATION TISSUE: ICD-10-CM

## 2021-01-01 DIAGNOSIS — R18.8 OTHER ASCITES: ICD-10-CM

## 2021-01-01 DIAGNOSIS — R60.0 EDEMA LEG: ICD-10-CM

## 2021-01-01 DIAGNOSIS — K21.9 GERD (GASTROESOPHAGEAL REFLUX DISEASE): ICD-10-CM

## 2021-01-01 DIAGNOSIS — K56.609 SBO (SMALL BOWEL OBSTRUCTION) (HCC): ICD-10-CM

## 2021-01-01 DIAGNOSIS — N17.9 ACUTE KIDNEY INJURY (HCC): ICD-10-CM

## 2021-01-01 DIAGNOSIS — E86.0 DEHYDRATION: Primary | ICD-10-CM

## 2021-01-01 DIAGNOSIS — A41.9 SEPSIS (HCC): Primary | ICD-10-CM

## 2021-01-01 DIAGNOSIS — D64.81 ANTINEOPLASTIC CHEMOTHERAPY INDUCED ANEMIA: Primary | ICD-10-CM

## 2021-01-01 DIAGNOSIS — E87.6 HYPOKALEMIA: ICD-10-CM

## 2021-01-01 DIAGNOSIS — K76.9 LESION OF LIVER: ICD-10-CM

## 2021-01-01 DIAGNOSIS — B37.9 CANDIDIASIS: ICD-10-CM

## 2021-01-01 DIAGNOSIS — R11.2 INTRACTABLE NAUSEA AND VOMITING: ICD-10-CM

## 2021-01-01 DIAGNOSIS — K63.1 BOWEL PERFORATION (HCC): ICD-10-CM

## 2021-01-01 DIAGNOSIS — E87.2 METABOLIC ACIDOSIS: ICD-10-CM

## 2021-01-01 DIAGNOSIS — N18.4 CKD (CHRONIC KIDNEY DISEASE) STAGE 4, GFR 15-29 ML/MIN (HCC): ICD-10-CM

## 2021-01-01 DIAGNOSIS — G89.3 CANCER RELATED PAIN: ICD-10-CM

## 2021-01-01 DIAGNOSIS — D64.81 ANEMIA DUE TO ANTINEOPLASTIC CHEMOTHERAPY: ICD-10-CM

## 2021-01-01 DIAGNOSIS — G93.9 BRAIN LESION: ICD-10-CM

## 2021-01-01 DIAGNOSIS — L30.9 STOMA DERMATITIS: ICD-10-CM

## 2021-01-01 DIAGNOSIS — N13.30 BILATERAL HYDRONEPHROSIS: Primary | ICD-10-CM

## 2021-01-01 DIAGNOSIS — K81.9 CHOLECYSTITIS: ICD-10-CM

## 2021-01-01 DIAGNOSIS — A41.9 SEPTIC SHOCK (HCC): ICD-10-CM

## 2021-01-01 DIAGNOSIS — D62 ACUTE BLOOD LOSS ANEMIA: ICD-10-CM

## 2021-01-01 DIAGNOSIS — Z93.2 S/P ILEOSTOMY (HCC): ICD-10-CM

## 2021-01-01 DIAGNOSIS — R68.2 DRY MOUTH: ICD-10-CM

## 2021-01-01 DIAGNOSIS — I82.411 DEEP VENOUS THROMBOSIS OF RIGHT PROFUNDA FEMORIS VEIN (HCC): ICD-10-CM

## 2021-01-01 DIAGNOSIS — T45.1X5A CHEMOTHERAPY INDUCED NEUTROPENIA (HCC): ICD-10-CM

## 2021-01-01 DIAGNOSIS — T45.1X5A ANEMIA DUE TO CHEMOTHERAPY: ICD-10-CM

## 2021-01-01 DIAGNOSIS — D72.829 LEUKOCYTOSIS, UNSPECIFIED TYPE: ICD-10-CM

## 2021-01-01 DIAGNOSIS — D70.1 CHEMOTHERAPY INDUCED NEUTROPENIA (HCC): ICD-10-CM

## 2021-01-01 DIAGNOSIS — R65.21 SEPTIC SHOCK (HCC): ICD-10-CM

## 2021-01-01 DIAGNOSIS — R11.2 NAUSEA & VOMITING: ICD-10-CM

## 2021-01-01 DIAGNOSIS — Z45.2 ENCOUNTER FOR CENTRAL LINE CARE: ICD-10-CM

## 2021-01-01 LAB
ABO GROUP BLD BPU: NORMAL
ABO GROUP BLD: NORMAL
ALBUMIN SERPL BCP-MCNC: 2.4 G/DL (ref 3.5–5)
ALBUMIN SERPL BCP-MCNC: 2.5 G/DL (ref 3.5–5)
ALBUMIN SERPL BCP-MCNC: 3 G/DL (ref 3.5–5)
ALBUMIN SERPL BCP-MCNC: 3.1 G/DL (ref 3.5–5)
ALBUMIN SERPL BCP-MCNC: 3.2 G/DL (ref 3.5–5)
ALP SERPL-CCNC: 108 U/L (ref 46–116)
ALP SERPL-CCNC: 124 U/L (ref 46–116)
ALP SERPL-CCNC: 130 U/L (ref 46–116)
ALP SERPL-CCNC: 206 U/L (ref 46–116)
ALP SERPL-CCNC: 227 U/L (ref 46–116)
ALT SERPL W P-5'-P-CCNC: 17 U/L (ref 12–78)
ALT SERPL W P-5'-P-CCNC: 18 U/L (ref 12–78)
ALT SERPL W P-5'-P-CCNC: 20 U/L (ref 12–78)
ALT SERPL W P-5'-P-CCNC: 24 U/L (ref 12–78)
ALT SERPL W P-5'-P-CCNC: 26 U/L (ref 12–78)
AMYLASE SERPL-CCNC: 103 IU/L (ref 25–115)
AMYLASE SERPL-CCNC: 139 IU/L (ref 25–115)
AMYLASE SERPL-CCNC: 182 IU/L (ref 25–115)
AMYLASE SERPL-CCNC: 212 IU/L (ref 25–115)
AMYLASE SERPL-CCNC: 69 IU/L (ref 25–115)
ANION GAP SERPL CALCULATED.3IONS-SCNC: 10 MMOL/L (ref 4–13)
ANION GAP SERPL CALCULATED.3IONS-SCNC: 11 MMOL/L (ref 4–13)
ANION GAP SERPL CALCULATED.3IONS-SCNC: 12 MMOL/L (ref 4–13)
ANION GAP SERPL CALCULATED.3IONS-SCNC: 13 MMOL/L (ref 4–13)
ANION GAP SERPL CALCULATED.3IONS-SCNC: 13 MMOL/L (ref 4–13)
ANION GAP SERPL CALCULATED.3IONS-SCNC: 15 MMOL/L (ref 4–13)
ANION GAP SERPL CALCULATED.3IONS-SCNC: 15 MMOL/L (ref 4–13)
ANION GAP SERPL CALCULATED.3IONS-SCNC: 8 MMOL/L (ref 4–13)
APTT PPP: 32 SECONDS (ref 23–37)
AST SERPL W P-5'-P-CCNC: 20 U/L (ref 5–45)
AST SERPL W P-5'-P-CCNC: 21 U/L (ref 5–45)
AST SERPL W P-5'-P-CCNC: 21 U/L (ref 5–45)
AST SERPL W P-5'-P-CCNC: 34 U/L (ref 5–45)
AST SERPL W P-5'-P-CCNC: 39 U/L (ref 5–45)
ATRIAL RATE: 123 BPM
BACTERIA BLD CULT: ABNORMAL
BACTERIA BLD CULT: NORMAL
BACTERIA UR CULT: NORMAL
BACTERIA UR QL AUTO: ABNORMAL /HPF
BASE EX.OXY STD BLDV CALC-SCNC: 76.8 % (ref 60–80)
BASE EXCESS BLDV CALC-SCNC: -0.3 MMOL/L
BASOPHILS # BLD AUTO: 0.05 THOUSANDS/ΜL (ref 0–0.1)
BASOPHILS # BLD AUTO: 0.07 THOUSANDS/ΜL (ref 0–0.1)
BASOPHILS # BLD AUTO: 0.07 THOUSANDS/ΜL (ref 0–0.1)
BASOPHILS # BLD AUTO: 0.08 THOUSANDS/ΜL (ref 0–0.1)
BASOPHILS # BLD AUTO: 0.08 THOUSANDS/ΜL (ref 0–0.1)
BASOPHILS # BLD MANUAL: 0 THOUSAND/UL (ref 0–0.1)
BASOPHILS # BLD MANUAL: 0 THOUSAND/UL (ref 0–0.1)
BASOPHILS NFR BLD AUTO: 0 % (ref 0–1)
BASOPHILS NFR BLD AUTO: 1 % (ref 0–1)
BASOPHILS NFR MAR MANUAL: 0 % (ref 0–1)
BASOPHILS NFR MAR MANUAL: 0 % (ref 0–1)
BILIRUB SERPL-MCNC: 0.15 MG/DL (ref 0.2–1)
BILIRUB SERPL-MCNC: 0.19 MG/DL (ref 0.2–1)
BILIRUB SERPL-MCNC: 0.19 MG/DL (ref 0.2–1)
BILIRUB SERPL-MCNC: 0.25 MG/DL (ref 0.2–1)
BILIRUB SERPL-MCNC: 0.39 MG/DL (ref 0.2–1)
BILIRUB UR QL STRIP: NEGATIVE
BLD GP AB SCN SERPL QL: NEGATIVE
BPU ID: NORMAL
BUN SERPL-MCNC: 10 MG/DL (ref 5–25)
BUN SERPL-MCNC: 13 MG/DL (ref 5–25)
BUN SERPL-MCNC: 14 MG/DL (ref 5–25)
BUN SERPL-MCNC: 17 MG/DL (ref 5–25)
BUN SERPL-MCNC: 18 MG/DL (ref 5–25)
BUN SERPL-MCNC: 21 MG/DL (ref 5–25)
BUN SERPL-MCNC: 22 MG/DL (ref 5–25)
BUN SERPL-MCNC: 24 MG/DL (ref 5–25)
BUN SERPL-MCNC: 26 MG/DL (ref 5–25)
BUN SERPL-MCNC: 26 MG/DL (ref 5–25)
BUN SERPL-MCNC: 30 MG/DL (ref 5–25)
CALCIUM ALBUM COR SERPL-MCNC: 10 MG/DL (ref 8.3–10.1)
CALCIUM ALBUM COR SERPL-MCNC: 9.2 MG/DL (ref 8.3–10.1)
CALCIUM ALBUM COR SERPL-MCNC: 9.5 MG/DL (ref 8.3–10.1)
CALCIUM ALBUM COR SERPL-MCNC: 9.6 MG/DL (ref 8.3–10.1)
CALCIUM ALBUM COR SERPL-MCNC: 9.6 MG/DL (ref 8.3–10.1)
CALCIUM SERPL-MCNC: 7.9 MG/DL (ref 8.3–10.1)
CALCIUM SERPL-MCNC: 8 MG/DL (ref 8.3–10.1)
CALCIUM SERPL-MCNC: 8.3 MG/DL (ref 8.3–10.1)
CALCIUM SERPL-MCNC: 8.3 MG/DL (ref 8.3–10.1)
CALCIUM SERPL-MCNC: 8.5 MG/DL (ref 8.3–10.1)
CALCIUM SERPL-MCNC: 8.5 MG/DL (ref 8.3–10.1)
CALCIUM SERPL-MCNC: 8.6 MG/DL (ref 8.3–10.1)
CALCIUM SERPL-MCNC: 8.7 MG/DL (ref 8.3–10.1)
CALCIUM SERPL-MCNC: 8.8 MG/DL (ref 8.3–10.1)
CALCIUM SERPL-MCNC: 8.8 MG/DL (ref 8.3–10.1)
CALCIUM SERPL-MCNC: 8.9 MG/DL (ref 8.3–10.1)
CANCER AG125 SERPL-ACNC: 101.9 U/ML (ref 0–30)
CANCER AG125 SERPL-ACNC: 103.1 U/ML (ref 0–30)
CANCER AG125 SERPL-ACNC: 110.8 U/ML (ref 0–30)
CANCER AG125 SERPL-ACNC: 116 U/ML (ref 0–30)
CANCER AG125 SERPL-ACNC: 117.1 U/ML (ref 0–30)
CANCER AG125 SERPL-ACNC: 135 U/ML (ref 0–30)
CANCER AG125 SERPL-ACNC: 198.4 U/ML (ref 0–30)
CANCER AG125 SERPL-ACNC: 210.2 U/ML (ref 0–30)
CANCER AG125 SERPL-ACNC: 225.6 U/ML (ref 0–30)
CANCER AG125 SERPL-ACNC: 60.8 U/ML (ref 0–30)
CANCER AG125 SERPL-ACNC: 62.8 U/ML (ref 0–30)
CANCER AG125 SERPL-ACNC: 75.8 U/ML (ref 0–30)
CANCER AG125 SERPL-ACNC: 90.1 U/ML (ref 0–30)
CANCER AG125 SERPL-ACNC: 91 U/ML (ref 0–30)
CHLORIDE SERPL-SCNC: 100 MMOL/L (ref 100–108)
CHLORIDE SERPL-SCNC: 102 MMOL/L (ref 100–108)
CHLORIDE SERPL-SCNC: 104 MMOL/L (ref 100–108)
CHLORIDE SERPL-SCNC: 106 MMOL/L (ref 100–108)
CHLORIDE SERPL-SCNC: 107 MMOL/L (ref 100–108)
CHLORIDE SERPL-SCNC: 98 MMOL/L (ref 100–108)
CHLORIDE SERPL-SCNC: 99 MMOL/L (ref 100–108)
CHLORIDE UR-SCNC: 34 MMOL/L
CLARITY UR: CLEAR
CO2 SERPL-SCNC: 23 MMOL/L (ref 21–32)
CO2 SERPL-SCNC: 23 MMOL/L (ref 21–32)
CO2 SERPL-SCNC: 24 MMOL/L (ref 21–32)
CO2 SERPL-SCNC: 25 MMOL/L (ref 21–32)
CO2 SERPL-SCNC: 25 MMOL/L (ref 21–32)
CO2 SERPL-SCNC: 26 MMOL/L (ref 21–32)
CO2 SERPL-SCNC: 27 MMOL/L (ref 21–32)
COLOR UR: YELLOW
CREAT SERPL-MCNC: 1.08 MG/DL (ref 0.6–1.3)
CREAT SERPL-MCNC: 1.12 MG/DL (ref 0.6–1.3)
CREAT SERPL-MCNC: 1.15 MG/DL (ref 0.6–1.3)
CREAT SERPL-MCNC: 1.18 MG/DL (ref 0.6–1.3)
CREAT SERPL-MCNC: 1.2 MG/DL (ref 0.6–1.3)
CREAT SERPL-MCNC: 1.22 MG/DL (ref 0.6–1.3)
CREAT SERPL-MCNC: 1.35 MG/DL (ref 0.6–1.3)
CREAT SERPL-MCNC: 1.46 MG/DL (ref 0.6–1.3)
CREAT SERPL-MCNC: 1.62 MG/DL (ref 0.6–1.3)
CREAT SERPL-MCNC: 1.73 MG/DL (ref 0.6–1.3)
CREAT SERPL-MCNC: 1.78 MG/DL (ref 0.6–1.3)
CROSSMATCH: NORMAL
EOSINOPHIL # BLD AUTO: 0.06 THOUSAND/ΜL (ref 0–0.61)
EOSINOPHIL # BLD AUTO: 0.06 THOUSAND/ΜL (ref 0–0.61)
EOSINOPHIL # BLD AUTO: 0.1 THOUSAND/ΜL (ref 0–0.61)
EOSINOPHIL # BLD AUTO: 0.13 THOUSAND/ΜL (ref 0–0.61)
EOSINOPHIL # BLD AUTO: 0.23 THOUSAND/ΜL (ref 0–0.61)
EOSINOPHIL # BLD AUTO: 0.43 THOUSAND/ΜL (ref 0–0.61)
EOSINOPHIL # BLD AUTO: 0.64 THOUSAND/ΜL (ref 0–0.61)
EOSINOPHIL # BLD AUTO: 0.89 THOUSAND/ΜL (ref 0–0.61)
EOSINOPHIL # BLD MANUAL: 0 THOUSAND/UL (ref 0–0.4)
EOSINOPHIL # BLD MANUAL: 0 THOUSAND/UL (ref 0–0.4)
EOSINOPHIL NFR BLD AUTO: 0 % (ref 0–6)
EOSINOPHIL NFR BLD AUTO: 0 % (ref 0–6)
EOSINOPHIL NFR BLD AUTO: 1 % (ref 0–6)
EOSINOPHIL NFR BLD AUTO: 1 % (ref 0–6)
EOSINOPHIL NFR BLD AUTO: 3 % (ref 0–6)
EOSINOPHIL NFR BLD AUTO: 6 % (ref 0–6)
EOSINOPHIL NFR BLD AUTO: 6 % (ref 0–6)
EOSINOPHIL NFR BLD AUTO: 8 % (ref 0–6)
EOSINOPHIL NFR BLD MANUAL: 0 % (ref 0–6)
EOSINOPHIL NFR BLD MANUAL: 0 % (ref 0–6)
ERYTHROCYTE [DISTWIDTH] IN BLOOD BY AUTOMATED COUNT: 16.7 % (ref 11.6–15.1)
ERYTHROCYTE [DISTWIDTH] IN BLOOD BY AUTOMATED COUNT: 16.9 % (ref 11.6–15.1)
ERYTHROCYTE [DISTWIDTH] IN BLOOD BY AUTOMATED COUNT: 16.9 % (ref 11.6–15.1)
ERYTHROCYTE [DISTWIDTH] IN BLOOD BY AUTOMATED COUNT: 17 % (ref 11.6–15.1)
ERYTHROCYTE [DISTWIDTH] IN BLOOD BY AUTOMATED COUNT: 17.1 % (ref 11.6–15.1)
ERYTHROCYTE [DISTWIDTH] IN BLOOD BY AUTOMATED COUNT: 17.2 % (ref 11.6–15.1)
ERYTHROCYTE [DISTWIDTH] IN BLOOD BY AUTOMATED COUNT: 17.2 % (ref 11.6–15.1)
ERYTHROCYTE [DISTWIDTH] IN BLOOD BY AUTOMATED COUNT: 17.3 % (ref 11.6–15.1)
ERYTHROCYTE [DISTWIDTH] IN BLOOD BY AUTOMATED COUNT: 17.4 % (ref 11.6–15.1)
ERYTHROCYTE [DISTWIDTH] IN BLOOD BY AUTOMATED COUNT: 17.4 % (ref 11.6–15.1)
ERYTHROCYTE [DISTWIDTH] IN BLOOD BY AUTOMATED COUNT: 17.5 % (ref 11.6–15.1)
ERYTHROCYTE [DISTWIDTH] IN BLOOD BY AUTOMATED COUNT: 17.5 % (ref 11.6–15.1)
GFR SERPL CREATININE-BSD FRML MDRD: 28 ML/MIN/1.73SQ M
GFR SERPL CREATININE-BSD FRML MDRD: 29 ML/MIN/1.73SQ M
GFR SERPL CREATININE-BSD FRML MDRD: 32 ML/MIN/1.73SQ M
GFR SERPL CREATININE-BSD FRML MDRD: 36 ML/MIN/1.73SQ M
GFR SERPL CREATININE-BSD FRML MDRD: 40 ML/MIN/1.73SQ M
GFR SERPL CREATININE-BSD FRML MDRD: 45 ML/MIN/1.73SQ M
GFR SERPL CREATININE-BSD FRML MDRD: 46 ML/MIN/1.73SQ M
GFR SERPL CREATININE-BSD FRML MDRD: 47 ML/MIN/1.73SQ M
GFR SERPL CREATININE-BSD FRML MDRD: 48 ML/MIN/1.73SQ M
GFR SERPL CREATININE-BSD FRML MDRD: 50 ML/MIN/1.73SQ M
GFR SERPL CREATININE-BSD FRML MDRD: 52 ML/MIN/1.73SQ M
GLUCOSE SERPL-MCNC: 102 MG/DL (ref 65–140)
GLUCOSE SERPL-MCNC: 108 MG/DL (ref 65–140)
GLUCOSE SERPL-MCNC: 110 MG/DL (ref 65–140)
GLUCOSE SERPL-MCNC: 113 MG/DL (ref 65–140)
GLUCOSE SERPL-MCNC: 116 MG/DL (ref 65–140)
GLUCOSE SERPL-MCNC: 120 MG/DL (ref 65–140)
GLUCOSE SERPL-MCNC: 122 MG/DL (ref 65–140)
GLUCOSE SERPL-MCNC: 130 MG/DL (ref 65–140)
GLUCOSE SERPL-MCNC: 136 MG/DL (ref 65–140)
GLUCOSE SERPL-MCNC: 142 MG/DL (ref 65–140)
GLUCOSE SERPL-MCNC: 145 MG/DL (ref 65–140)
GLUCOSE SERPL-MCNC: 154 MG/DL (ref 65–140)
GLUCOSE SERPL-MCNC: 155 MG/DL (ref 65–140)
GLUCOSE SERPL-MCNC: 156 MG/DL (ref 65–140)
GLUCOSE SERPL-MCNC: 179 MG/DL (ref 65–140)
GLUCOSE SERPL-MCNC: 179 MG/DL (ref 65–140)
GLUCOSE SERPL-MCNC: 182 MG/DL (ref 65–140)
GLUCOSE SERPL-MCNC: 215 MG/DL (ref 65–140)
GLUCOSE SERPL-MCNC: 225 MG/DL (ref 65–140)
GLUCOSE SERPL-MCNC: 236 MG/DL (ref 65–140)
GLUCOSE SERPL-MCNC: 59 MG/DL (ref 65–140)
GLUCOSE SERPL-MCNC: 59 MG/DL (ref 65–140)
GLUCOSE SERPL-MCNC: 72 MG/DL (ref 65–140)
GLUCOSE SERPL-MCNC: 72 MG/DL (ref 65–140)
GLUCOSE SERPL-MCNC: 73 MG/DL (ref 65–140)
GLUCOSE SERPL-MCNC: 75 MG/DL (ref 65–140)
GLUCOSE SERPL-MCNC: 76 MG/DL (ref 65–140)
GLUCOSE SERPL-MCNC: 81 MG/DL (ref 65–140)
GLUCOSE SERPL-MCNC: 81 MG/DL (ref 65–140)
GLUCOSE SERPL-MCNC: 82 MG/DL (ref 65–140)
GLUCOSE SERPL-MCNC: 86 MG/DL (ref 65–140)
GLUCOSE SERPL-MCNC: 88 MG/DL (ref 65–140)
GLUCOSE SERPL-MCNC: 89 MG/DL (ref 65–140)
GLUCOSE SERPL-MCNC: 90 MG/DL (ref 65–140)
GLUCOSE SERPL-MCNC: 94 MG/DL (ref 65–140)
GLUCOSE SERPL-MCNC: 96 MG/DL (ref 65–140)
GLUCOSE UR STRIP-MCNC: NEGATIVE MG/DL
GRAM STN SPEC: ABNORMAL
HCO3 BLDV-SCNC: 22.1 MMOL/L (ref 24–30)
HCT VFR BLD AUTO: 19.2 % (ref 34.8–46.1)
HCT VFR BLD AUTO: 22.3 % (ref 34.8–46.1)
HCT VFR BLD AUTO: 22.4 % (ref 34.8–46.1)
HCT VFR BLD AUTO: 22.6 % (ref 34.8–46.1)
HCT VFR BLD AUTO: 23.7 % (ref 34.8–46.1)
HCT VFR BLD AUTO: 23.8 % (ref 34.8–46.1)
HCT VFR BLD AUTO: 24.3 % (ref 34.8–46.1)
HCT VFR BLD AUTO: 24.3 % (ref 34.8–46.1)
HCT VFR BLD AUTO: 25.5 % (ref 34.8–46.1)
HCT VFR BLD AUTO: 26.9 % (ref 34.8–46.1)
HCT VFR BLD AUTO: 27 % (ref 34.8–46.1)
HCT VFR BLD AUTO: 27.9 % (ref 34.8–46.1)
HGB BLD-MCNC: 6.1 G/DL (ref 11.5–15.4)
HGB BLD-MCNC: 6.9 G/DL (ref 11.5–15.4)
HGB BLD-MCNC: 7.2 G/DL (ref 11.5–15.4)
HGB BLD-MCNC: 7.4 G/DL (ref 11.5–15.4)
HGB BLD-MCNC: 7.5 G/DL (ref 11.5–15.4)
HGB BLD-MCNC: 7.5 G/DL (ref 11.5–15.4)
HGB BLD-MCNC: 7.7 G/DL (ref 11.5–15.4)
HGB BLD-MCNC: 7.8 G/DL (ref 11.5–15.4)
HGB BLD-MCNC: 8.1 G/DL (ref 11.5–15.4)
HGB BLD-MCNC: 8.2 G/DL (ref 11.5–15.4)
HGB BLD-MCNC: 8.7 G/DL (ref 11.5–15.4)
HGB BLD-MCNC: 9.1 G/DL (ref 11.5–15.4)
HGB UR QL STRIP.AUTO: ABNORMAL
IMM GRANULOCYTES # BLD AUTO: 0.02 THOUSAND/UL (ref 0–0.2)
IMM GRANULOCYTES # BLD AUTO: 0.04 THOUSAND/UL (ref 0–0.2)
IMM GRANULOCYTES # BLD AUTO: 0.05 THOUSAND/UL (ref 0–0.2)
IMM GRANULOCYTES # BLD AUTO: 0.05 THOUSAND/UL (ref 0–0.2)
IMM GRANULOCYTES # BLD AUTO: 0.21 THOUSAND/UL (ref 0–0.2)
IMM GRANULOCYTES # BLD AUTO: 0.28 THOUSAND/UL (ref 0–0.2)
IMM GRANULOCYTES # BLD AUTO: 0.4 THOUSAND/UL (ref 0–0.2)
IMM GRANULOCYTES # BLD AUTO: >0.5 THOUSAND/UL (ref 0–0.2)
IMM GRANULOCYTES NFR BLD AUTO: 0 % (ref 0–2)
IMM GRANULOCYTES NFR BLD AUTO: 1 % (ref 0–2)
IMM GRANULOCYTES NFR BLD AUTO: 1 % (ref 0–2)
IMM GRANULOCYTES NFR BLD AUTO: 2 % (ref 0–2)
INR PPP: 1.19 (ref 0.84–1.19)
KETONES UR STRIP-MCNC: NEGATIVE MG/DL
LACTATE SERPL-SCNC: 0.7 MMOL/L (ref 0.5–2)
LACTATE SERPL-SCNC: 0.8 MMOL/L (ref 0.5–2)
LACTATE SERPL-SCNC: 1.8 MMOL/L (ref 0.5–2)
LEUKOCYTE ESTERASE UR QL STRIP: ABNORMAL
LIPASE SERPL-CCNC: 104 U/L (ref 73–393)
LIPASE SERPL-CCNC: 143 U/L (ref 73–393)
LIPASE SERPL-CCNC: 41 U/L (ref 73–393)
LIPASE SERPL-CCNC: 65 U/L (ref 73–393)
LIPASE SERPL-CCNC: 69 U/L (ref 73–393)
LIPASE SERPL-CCNC: 98 U/L (ref 73–393)
LYMPHOCYTES # BLD AUTO: 0.35 THOUSAND/UL (ref 0.6–4.47)
LYMPHOCYTES # BLD AUTO: 1 % (ref 14–44)
LYMPHOCYTES # BLD AUTO: 1.1 THOUSANDS/ΜL (ref 0.6–4.47)
LYMPHOCYTES # BLD AUTO: 1.33 THOUSANDS/ΜL (ref 0.6–4.47)
LYMPHOCYTES # BLD AUTO: 1.39 THOUSAND/UL (ref 0.6–4.47)
LYMPHOCYTES # BLD AUTO: 1.45 THOUSANDS/ΜL (ref 0.6–4.47)
LYMPHOCYTES # BLD AUTO: 1.58 THOUSANDS/ΜL (ref 0.6–4.47)
LYMPHOCYTES # BLD AUTO: 1.8 THOUSANDS/ΜL (ref 0.6–4.47)
LYMPHOCYTES # BLD AUTO: 1.89 THOUSANDS/ΜL (ref 0.6–4.47)
LYMPHOCYTES # BLD AUTO: 1.99 THOUSANDS/ΜL (ref 0.6–4.47)
LYMPHOCYTES # BLD AUTO: 2.07 THOUSANDS/ΜL (ref 0.6–4.47)
LYMPHOCYTES # BLD AUTO: 3 % (ref 14–44)
LYMPHOCYTES NFR BLD AUTO: 18 % (ref 14–44)
LYMPHOCYTES NFR BLD AUTO: 19 % (ref 14–44)
LYMPHOCYTES NFR BLD AUTO: 20 % (ref 14–44)
LYMPHOCYTES NFR BLD AUTO: 21 % (ref 14–44)
LYMPHOCYTES NFR BLD AUTO: 6 % (ref 14–44)
LYMPHOCYTES NFR BLD AUTO: 7 % (ref 14–44)
MAGNESIUM SERPL-MCNC: 1.5 MG/DL (ref 1.6–2.6)
MAGNESIUM SERPL-MCNC: 1.6 MG/DL (ref 1.6–2.6)
MAGNESIUM SERPL-MCNC: 1.6 MG/DL (ref 1.6–2.6)
MAGNESIUM SERPL-MCNC: 1.9 MG/DL (ref 1.6–2.6)
MAGNESIUM SERPL-MCNC: 1.9 MG/DL (ref 1.6–2.6)
MCH RBC QN AUTO: 26.6 PG (ref 26.8–34.3)
MCH RBC QN AUTO: 26.9 PG (ref 26.8–34.3)
MCH RBC QN AUTO: 26.9 PG (ref 26.8–34.3)
MCH RBC QN AUTO: 27 PG (ref 26.8–34.3)
MCH RBC QN AUTO: 27.2 PG (ref 26.8–34.3)
MCH RBC QN AUTO: 27.2 PG (ref 26.8–34.3)
MCH RBC QN AUTO: 27.3 PG (ref 26.8–34.3)
MCH RBC QN AUTO: 27.3 PG (ref 26.8–34.3)
MCH RBC QN AUTO: 27.4 PG (ref 26.8–34.3)
MCH RBC QN AUTO: 27.6 PG (ref 26.8–34.3)
MCH RBC QN AUTO: 27.9 PG (ref 26.8–34.3)
MCH RBC QN AUTO: 28 PG (ref 26.8–34.3)
MCHC RBC AUTO-ENTMCNC: 30.5 G/DL (ref 31.4–37.4)
MCHC RBC AUTO-ENTMCNC: 30.6 G/DL (ref 31.4–37.4)
MCHC RBC AUTO-ENTMCNC: 30.9 G/DL (ref 31.4–37.4)
MCHC RBC AUTO-ENTMCNC: 31.2 G/DL (ref 31.4–37.4)
MCHC RBC AUTO-ENTMCNC: 31.8 G/DL (ref 31.4–37.4)
MCHC RBC AUTO-ENTMCNC: 31.8 G/DL (ref 31.4–37.4)
MCHC RBC AUTO-ENTMCNC: 32.1 G/DL (ref 31.4–37.4)
MCHC RBC AUTO-ENTMCNC: 32.2 G/DL (ref 31.4–37.4)
MCHC RBC AUTO-ENTMCNC: 32.3 G/DL (ref 31.4–37.4)
MCHC RBC AUTO-ENTMCNC: 32.4 G/DL (ref 31.4–37.4)
MCHC RBC AUTO-ENTMCNC: 32.6 G/DL (ref 31.4–37.4)
MCHC RBC AUTO-ENTMCNC: 33.2 G/DL (ref 31.4–37.4)
MCV RBC AUTO: 83 FL (ref 82–98)
MCV RBC AUTO: 84 FL (ref 82–98)
MCV RBC AUTO: 85 FL (ref 82–98)
MCV RBC AUTO: 86 FL (ref 82–98)
MCV RBC AUTO: 86 FL (ref 82–98)
MCV RBC AUTO: 88 FL (ref 82–98)
MCV RBC AUTO: 88 FL (ref 82–98)
MCV RBC AUTO: 92 FL (ref 82–98)
MONOCYTES # BLD AUTO: 0.62 THOUSAND/ΜL (ref 0.17–1.22)
MONOCYTES # BLD AUTO: 0.76 THOUSAND/ΜL (ref 0.17–1.22)
MONOCYTES # BLD AUTO: 0.93 THOUSAND/ΜL (ref 0.17–1.22)
MONOCYTES # BLD AUTO: 1.14 THOUSAND/ΜL (ref 0.17–1.22)
MONOCYTES # BLD AUTO: 1.29 THOUSAND/ΜL (ref 0.17–1.22)
MONOCYTES # BLD AUTO: 1.31 THOUSAND/ΜL (ref 0.17–1.22)
MONOCYTES # BLD AUTO: 1.39 THOUSAND/UL (ref 0–1.22)
MONOCYTES # BLD AUTO: 1.43 THOUSAND/ΜL (ref 0.17–1.22)
MONOCYTES # BLD AUTO: 1.72 THOUSAND/ΜL (ref 0.17–1.22)
MONOCYTES # BLD AUTO: 3.25 THOUSAND/UL (ref 0–1.22)
MONOCYTES NFR BLD AUTO: 11 % (ref 4–12)
MONOCYTES NFR BLD AUTO: 5 % (ref 4–12)
MONOCYTES NFR BLD AUTO: 6 % (ref 4–12)
MONOCYTES NFR BLD AUTO: 7 % (ref 4–12)
MONOCYTES NFR BLD AUTO: 7 % (ref 4–12)
MONOCYTES NFR BLD AUTO: 8 % (ref 4–12)
MONOCYTES NFR BLD AUTO: 9 % (ref 4–12)
MONOCYTES NFR BLD AUTO: 9 % (ref 4–12)
MONOCYTES NFR BLD: 4 % (ref 4–12)
MONOCYTES NFR BLD: 7 % (ref 4–12)
NEUTROPHILS # BLD AUTO: 16.35 THOUSANDS/ΜL (ref 1.85–7.62)
NEUTROPHILS # BLD AUTO: 18.91 THOUSANDS/ΜL (ref 1.85–7.62)
NEUTROPHILS # BLD AUTO: 22.27 THOUSANDS/ΜL (ref 1.85–7.62)
NEUTROPHILS # BLD AUTO: 25.33 THOUSANDS/ΜL (ref 1.85–7.62)
NEUTROPHILS # BLD AUTO: 4.62 THOUSANDS/ΜL (ref 1.85–7.62)
NEUTROPHILS # BLD AUTO: 5.54 THOUSANDS/ΜL (ref 1.85–7.62)
NEUTROPHILS # BLD AUTO: 6.68 THOUSANDS/ΜL (ref 1.85–7.62)
NEUTROPHILS # BLD AUTO: 7.62 THOUSANDS/ΜL (ref 1.85–7.62)
NEUTROPHILS # BLD MANUAL: 33 THOUSAND/UL (ref 1.85–7.62)
NEUTROPHILS # BLD MANUAL: 41.75 THOUSAND/UL (ref 1.85–7.62)
NEUTS BAND NFR BLD MANUAL: 2 % (ref 0–8)
NEUTS SEG NFR BLD AUTO: 63 % (ref 43–75)
NEUTS SEG NFR BLD AUTO: 64 % (ref 43–75)
NEUTS SEG NFR BLD AUTO: 65 % (ref 43–75)
NEUTS SEG NFR BLD AUTO: 65 % (ref 43–75)
NEUTS SEG NFR BLD AUTO: 83 % (ref 43–75)
NEUTS SEG NFR BLD AUTO: 85 % (ref 43–75)
NEUTS SEG NFR BLD AUTO: 86 % (ref 43–75)
NEUTS SEG NFR BLD AUTO: 87 % (ref 43–75)
NEUTS SEG NFR BLD AUTO: 90 % (ref 43–75)
NEUTS SEG NFR BLD AUTO: 93 % (ref 43–75)
NITRITE UR QL STRIP: NEGATIVE
NON-SQ EPI CELLS URNS QL MICRO: ABNORMAL /HPF
NRBC BLD AUTO-RTO: 0 /100 WBCS
O2 CT BLDV-SCNC: 10.7 ML/DL
OSMOLALITY UR: 434 MMOL/KG
P AXIS: 55 DEGREES
PCO2 BLDV: 28.5 MM HG (ref 42–50)
PH BLDV: 7.51 [PH] (ref 7.3–7.4)
PH UR STRIP.AUTO: 6 [PH]
PLATELET # BLD AUTO: 251 THOUSANDS/UL (ref 149–390)
PLATELET # BLD AUTO: 256 THOUSANDS/UL (ref 149–390)
PLATELET # BLD AUTO: 269 THOUSANDS/UL (ref 149–390)
PLATELET # BLD AUTO: 271 THOUSANDS/UL (ref 149–390)
PLATELET # BLD AUTO: 276 THOUSANDS/UL (ref 149–390)
PLATELET # BLD AUTO: 277 THOUSANDS/UL (ref 149–390)
PLATELET # BLD AUTO: 291 THOUSANDS/UL (ref 149–390)
PLATELET # BLD AUTO: 306 THOUSANDS/UL (ref 149–390)
PLATELET # BLD AUTO: 314 THOUSANDS/UL (ref 149–390)
PLATELET # BLD AUTO: 346 THOUSANDS/UL (ref 149–390)
PLATELET # BLD AUTO: 380 THOUSANDS/UL (ref 149–390)
PLATELET # BLD AUTO: 473 THOUSANDS/UL (ref 149–390)
PLATELET BLD QL SMEAR: ADEQUATE
PLATELET BLD QL SMEAR: ADEQUATE
PMV BLD AUTO: 11.6 FL (ref 8.9–12.7)
PMV BLD AUTO: 11.6 FL (ref 8.9–12.7)
PMV BLD AUTO: 11.9 FL (ref 8.9–12.7)
PMV BLD AUTO: 12.1 FL (ref 8.9–12.7)
PMV BLD AUTO: 12.1 FL (ref 8.9–12.7)
PMV BLD AUTO: 12.2 FL (ref 8.9–12.7)
PMV BLD AUTO: 12.3 FL (ref 8.9–12.7)
PMV BLD AUTO: 12.3 FL (ref 8.9–12.7)
PMV BLD AUTO: 12.6 FL (ref 8.9–12.7)
PO2 BLDV: 40 MM HG (ref 35–45)
POTASSIUM SERPL-SCNC: 2.9 MMOL/L (ref 3.5–5.3)
POTASSIUM SERPL-SCNC: 3.2 MMOL/L (ref 3.5–5.3)
POTASSIUM SERPL-SCNC: 3.6 MMOL/L (ref 3.5–5.3)
POTASSIUM SERPL-SCNC: 3.8 MMOL/L (ref 3.5–5.3)
POTASSIUM SERPL-SCNC: 3.9 MMOL/L (ref 3.5–5.3)
POTASSIUM SERPL-SCNC: 4 MMOL/L (ref 3.5–5.3)
POTASSIUM SERPL-SCNC: 4.1 MMOL/L (ref 3.5–5.3)
POTASSIUM SERPL-SCNC: 4.1 MMOL/L (ref 3.5–5.3)
POTASSIUM SERPL-SCNC: 4.3 MMOL/L (ref 3.5–5.3)
PR INTERVAL: 124 MS
PROCALCITONIN SERPL-MCNC: 21.18 NG/ML
PROCALCITONIN SERPL-MCNC: 23.3 NG/ML
PROCALCITONIN SERPL-MCNC: 41.1 NG/ML
PROCALCITONIN SERPL-MCNC: 81.01 NG/ML
PROT SERPL-MCNC: 6.6 G/DL (ref 6.4–8.2)
PROT SERPL-MCNC: 6.9 G/DL (ref 6.4–8.2)
PROT SERPL-MCNC: 7 G/DL (ref 6.4–8.2)
PROT SERPL-MCNC: 7.1 G/DL (ref 6.4–8.2)
PROT SERPL-MCNC: 7.6 G/DL (ref 6.4–8.2)
PROT UR STRIP-MCNC: ABNORMAL MG/DL
PROTHROMBIN TIME: 14.6 SECONDS (ref 11.6–14.5)
QRS AXIS: 44 DEGREES
QRSD INTERVAL: 86 MS
QT INTERVAL: 364 MS
QTC INTERVAL: 521 MS
RBC # BLD AUTO: 2.29 MILLION/UL (ref 3.81–5.12)
RBC # BLD AUTO: 2.53 MILLION/UL (ref 3.81–5.12)
RBC # BLD AUTO: 2.61 MILLION/UL (ref 3.81–5.12)
RBC # BLD AUTO: 2.68 MILLION/UL (ref 3.81–5.12)
RBC # BLD AUTO: 2.75 MILLION/UL (ref 3.81–5.12)
RBC # BLD AUTO: 2.75 MILLION/UL (ref 3.81–5.12)
RBC # BLD AUTO: 2.85 MILLION/UL (ref 3.81–5.12)
RBC # BLD AUTO: 2.86 MILLION/UL (ref 3.81–5.12)
RBC # BLD AUTO: 2.94 MILLION/UL (ref 3.81–5.12)
RBC # BLD AUTO: 2.98 MILLION/UL (ref 3.81–5.12)
RBC # BLD AUTO: 3.18 MILLION/UL (ref 3.81–5.12)
RBC # BLD AUTO: 3.35 MILLION/UL (ref 3.81–5.12)
RBC #/AREA URNS AUTO: ABNORMAL /HPF
RH BLD: POSITIVE
SARS-COV-2 RNA RESP QL NAA+PROBE: NEGATIVE
SODIUM 24H UR-SCNC: 48 MOL/L
SODIUM SERPL-SCNC: 133 MMOL/L (ref 136–145)
SODIUM SERPL-SCNC: 137 MMOL/L (ref 136–145)
SODIUM SERPL-SCNC: 138 MMOL/L (ref 136–145)
SODIUM SERPL-SCNC: 139 MMOL/L (ref 136–145)
SODIUM SERPL-SCNC: 140 MMOL/L (ref 136–145)
SODIUM SERPL-SCNC: 141 MMOL/L (ref 136–145)
SODIUM SERPL-SCNC: 142 MMOL/L (ref 136–145)
SODIUM SERPL-SCNC: 143 MMOL/L (ref 136–145)
SP GR UR STRIP.AUTO: 1.02 (ref 1–1.03)
SPECIMEN EXPIRATION DATE: NORMAL
T WAVE AXIS: 76 DEGREES
TROPONIN I SERPL-MCNC: 0.06 NG/ML
TSH SERPL DL<=0.05 MIU/L-ACNC: 0.8 UIU/ML (ref 0.36–3.74)
TSH SERPL DL<=0.05 MIU/L-ACNC: 1.09 UIU/ML (ref 0.36–3.74)
TSH SERPL DL<=0.05 MIU/L-ACNC: 1.73 UIU/ML (ref 0.36–3.74)
TSH SERPL DL<=0.05 MIU/L-ACNC: 2.01 UIU/ML (ref 0.36–3.74)
TSH SERPL DL<=0.05 MIU/L-ACNC: 2.05 UIU/ML (ref 0.36–3.74)
UNIT DISPENSE STATUS: NORMAL
UNIT PRODUCT CODE: NORMAL
UNIT PRODUCT VOLUME: 350 ML
UNIT RH: NORMAL
URATE SERPL-MCNC: 4.3 MG/DL (ref 2–6.8)
UROBILINOGEN UR QL STRIP.AUTO: 0.2 E.U./DL
VENTRICULAR RATE: 123 BPM
WBC # BLD AUTO: 10.57 THOUSAND/UL (ref 4.31–10.16)
WBC # BLD AUTO: 11.63 THOUSAND/UL (ref 4.31–10.16)
WBC # BLD AUTO: 19.27 THOUSAND/UL (ref 4.31–10.16)
WBC # BLD AUTO: 21.91 THOUSAND/UL (ref 4.31–10.16)
WBC # BLD AUTO: 26.48 THOUSAND/UL (ref 4.31–10.16)
WBC # BLD AUTO: 28.93 THOUSAND/UL (ref 4.31–10.16)
WBC # BLD AUTO: 32.8 THOUSAND/UL (ref 4.31–10.16)
WBC # BLD AUTO: 34.74 THOUSAND/UL (ref 4.31–10.16)
WBC # BLD AUTO: 34.87 THOUSAND/UL (ref 4.31–10.16)
WBC # BLD AUTO: 46.39 THOUSAND/UL (ref 4.31–10.16)
WBC # BLD AUTO: 7.19 THOUSAND/UL (ref 4.31–10.16)
WBC # BLD AUTO: 8.43 THOUSAND/UL (ref 4.31–10.16)
WBC #/AREA URNS AUTO: ABNORMAL /HPF

## 2021-01-01 PROCEDURE — 84443 ASSAY THYROID STIM HORMONE: CPT

## 2021-01-01 PROCEDURE — P9016 RBC LEUKOCYTES REDUCED: HCPCS

## 2021-01-01 PROCEDURE — 86304 IMMUNOASSAY TUMOR CA 125: CPT | Performed by: OBSTETRICS & GYNECOLOGY

## 2021-01-01 PROCEDURE — 86923 COMPATIBILITY TEST ELECTRIC: CPT

## 2021-01-01 PROCEDURE — 71260 CT THORAX DX C+: CPT

## 2021-01-01 PROCEDURE — 99214 OFFICE O/P EST MOD 30 MIN: CPT | Performed by: OBSTETRICS & GYNECOLOGY

## 2021-01-01 PROCEDURE — 96367 TX/PROPH/DG ADDL SEQ IV INF: CPT

## 2021-01-01 PROCEDURE — 96361 HYDRATE IV INFUSION ADD-ON: CPT

## 2021-01-01 PROCEDURE — 96376 TX/PRO/DX INJ SAME DRUG ADON: CPT

## 2021-01-01 PROCEDURE — 85027 COMPLETE CBC AUTOMATED: CPT | Performed by: INTERNAL MEDICINE

## 2021-01-01 PROCEDURE — 36415 COLL VENOUS BLD VENIPUNCTURE: CPT

## 2021-01-01 PROCEDURE — 99233 SBSQ HOSP IP/OBS HIGH 50: CPT | Performed by: FAMILY MEDICINE

## 2021-01-01 PROCEDURE — U0005 INFEC AGEN DETEC AMPLI PROBE: HCPCS | Performed by: EMERGENCY MEDICINE

## 2021-01-01 PROCEDURE — 83735 ASSAY OF MAGNESIUM: CPT | Performed by: INTERNAL MEDICINE

## 2021-01-01 PROCEDURE — 82150 ASSAY OF AMYLASE: CPT

## 2021-01-01 PROCEDURE — 83735 ASSAY OF MAGNESIUM: CPT | Performed by: OBSTETRICS & GYNECOLOGY

## 2021-01-01 PROCEDURE — 83605 ASSAY OF LACTIC ACID: CPT | Performed by: EMERGENCY MEDICINE

## 2021-01-01 PROCEDURE — 70450 CT HEAD/BRAIN W/O DYE: CPT

## 2021-01-01 PROCEDURE — 86901 BLOOD TYPING SEROLOGIC RH(D): CPT

## 2021-01-01 PROCEDURE — 83690 ASSAY OF LIPASE: CPT

## 2021-01-01 PROCEDURE — 86900 BLOOD TYPING SEROLOGIC ABO: CPT | Performed by: NURSE PRACTITIONER

## 2021-01-01 PROCEDURE — 74176 CT ABD & PELVIS W/O CONTRAST: CPT

## 2021-01-01 PROCEDURE — 80048 BASIC METABOLIC PNL TOTAL CA: CPT

## 2021-01-01 PROCEDURE — 80053 COMPREHEN METABOLIC PANEL: CPT | Performed by: OBSTETRICS & GYNECOLOGY

## 2021-01-01 PROCEDURE — 85025 COMPLETE CBC W/AUTO DIFF WBC: CPT

## 2021-01-01 PROCEDURE — 36430 TRANSFUSION BLD/BLD COMPNT: CPT

## 2021-01-01 PROCEDURE — 83735 ASSAY OF MAGNESIUM: CPT

## 2021-01-01 PROCEDURE — 99223 1ST HOSP IP/OBS HIGH 75: CPT | Performed by: FAMILY MEDICINE

## 2021-01-01 PROCEDURE — 71250 CT THORAX DX C-: CPT

## 2021-01-01 PROCEDURE — 85610 PROTHROMBIN TIME: CPT | Performed by: EMERGENCY MEDICINE

## 2021-01-01 PROCEDURE — 85025 COMPLETE CBC W/AUTO DIFF WBC: CPT | Performed by: INTERNAL MEDICINE

## 2021-01-01 PROCEDURE — 74420 UROGRAPHY RTRGR +-KUB: CPT

## 2021-01-01 PROCEDURE — G1004 CDSM NDSC: HCPCS

## 2021-01-01 PROCEDURE — 86304 IMMUNOASSAY TUMOR CA 125: CPT | Performed by: NURSE PRACTITIONER

## 2021-01-01 PROCEDURE — 84145 PROCALCITONIN (PCT): CPT | Performed by: EMERGENCY MEDICINE

## 2021-01-01 PROCEDURE — 99215 OFFICE O/P EST HI 40 MIN: CPT | Performed by: OBSTETRICS & GYNECOLOGY

## 2021-01-01 PROCEDURE — 85027 COMPLETE CBC AUTOMATED: CPT

## 2021-01-01 PROCEDURE — 87185 SC STD ENZYME DETCJ PER NZM: CPT | Performed by: EMERGENCY MEDICINE

## 2021-01-01 PROCEDURE — 96413 CHEMO IV INFUSION 1 HR: CPT

## 2021-01-01 PROCEDURE — 84145 PROCALCITONIN (PCT): CPT | Performed by: FAMILY MEDICINE

## 2021-01-01 PROCEDURE — 96375 TX/PRO/DX INJ NEW DRUG ADDON: CPT

## 2021-01-01 PROCEDURE — 83735 ASSAY OF MAGNESIUM: CPT | Performed by: NURSE PRACTITIONER

## 2021-01-01 PROCEDURE — 86304 IMMUNOASSAY TUMOR CA 125: CPT

## 2021-01-01 PROCEDURE — 99203 OFFICE O/P NEW LOW 30 MIN: CPT | Performed by: UROLOGY

## 2021-01-01 PROCEDURE — 86900 BLOOD TYPING SEROLOGIC ABO: CPT

## 2021-01-01 PROCEDURE — 86901 BLOOD TYPING SEROLOGIC RH(D): CPT | Performed by: OBSTETRICS & GYNECOLOGY

## 2021-01-01 PROCEDURE — 96417 CHEMO IV INFUS EACH ADDL SEQ: CPT

## 2021-01-01 PROCEDURE — 74177 CT ABD & PELVIS W/CONTRAST: CPT

## 2021-01-01 PROCEDURE — 99233 SBSQ HOSP IP/OBS HIGH 50: CPT | Performed by: STUDENT IN AN ORGANIZED HEALTH CARE EDUCATION/TRAINING PROGRAM

## 2021-01-01 PROCEDURE — 83605 ASSAY OF LACTIC ACID: CPT | Performed by: FAMILY MEDICINE

## 2021-01-01 PROCEDURE — 82948 REAGENT STRIP/BLOOD GLUCOSE: CPT

## 2021-01-01 PROCEDURE — 86850 RBC ANTIBODY SCREEN: CPT

## 2021-01-01 PROCEDURE — 99232 SBSQ HOSP IP/OBS MODERATE 35: CPT | Performed by: INTERNAL MEDICINE

## 2021-01-01 PROCEDURE — 84443 ASSAY THYROID STIM HORMONE: CPT | Performed by: NURSE PRACTITIONER

## 2021-01-01 PROCEDURE — 80053 COMPREHEN METABOLIC PANEL: CPT

## 2021-01-01 PROCEDURE — 81001 URINALYSIS AUTO W/SCOPE: CPT

## 2021-01-01 PROCEDURE — 99285 EMERGENCY DEPT VISIT HI MDM: CPT

## 2021-01-01 PROCEDURE — C1769 GUIDE WIRE: HCPCS | Performed by: UROLOGY

## 2021-01-01 PROCEDURE — 99233 SBSQ HOSP IP/OBS HIGH 50: CPT | Performed by: INTERNAL MEDICINE

## 2021-01-01 PROCEDURE — 99223 1ST HOSP IP/OBS HIGH 75: CPT | Performed by: INTERNAL MEDICINE

## 2021-01-01 PROCEDURE — 84484 ASSAY OF TROPONIN QUANT: CPT | Performed by: EMERGENCY MEDICINE

## 2021-01-01 PROCEDURE — 80048 BASIC METABOLIC PNL TOTAL CA: CPT | Performed by: INTERNAL MEDICINE

## 2021-01-01 PROCEDURE — 96360 HYDRATION IV INFUSION INIT: CPT

## 2021-01-01 PROCEDURE — 99232 SBSQ HOSP IP/OBS MODERATE 35: CPT | Performed by: STUDENT IN AN ORGANIZED HEALTH CARE EDUCATION/TRAINING PROGRAM

## 2021-01-01 PROCEDURE — 93010 ELECTROCARDIOGRAM REPORT: CPT | Performed by: INTERNAL MEDICINE

## 2021-01-01 PROCEDURE — C1758 CATHETER, URETERAL: HCPCS | Performed by: UROLOGY

## 2021-01-01 PROCEDURE — 86900 BLOOD TYPING SEROLOGIC ABO: CPT | Performed by: OBSTETRICS & GYNECOLOGY

## 2021-01-01 PROCEDURE — 36415 COLL VENOUS BLD VENIPUNCTURE: CPT | Performed by: NURSE PRACTITIONER

## 2021-01-01 PROCEDURE — 99223 1ST HOSP IP/OBS HIGH 75: CPT | Performed by: OBSTETRICS & GYNECOLOGY

## 2021-01-01 PROCEDURE — 1124F ACP DISCUSS-NO DSCNMKR DOCD: CPT | Performed by: OBSTETRICS & GYNECOLOGY

## 2021-01-01 PROCEDURE — 82150 ASSAY OF AMYLASE: CPT | Performed by: NURSE PRACTITIONER

## 2021-01-01 PROCEDURE — 85007 BL SMEAR W/DIFF WBC COUNT: CPT

## 2021-01-01 PROCEDURE — 83605 ASSAY OF LACTIC ACID: CPT

## 2021-01-01 PROCEDURE — 83935 ASSAY OF URINE OSMOLALITY: CPT | Performed by: INTERNAL MEDICINE

## 2021-01-01 PROCEDURE — 86850 RBC ANTIBODY SCREEN: CPT | Performed by: OBSTETRICS & GYNECOLOGY

## 2021-01-01 PROCEDURE — 85007 BL SMEAR W/DIFF WBC COUNT: CPT | Performed by: EMERGENCY MEDICINE

## 2021-01-01 PROCEDURE — 82805 BLOOD GASES W/O2 SATURATION: CPT | Performed by: EMERGENCY MEDICINE

## 2021-01-01 PROCEDURE — 99285 EMERGENCY DEPT VISIT HI MDM: CPT | Performed by: EMERGENCY MEDICINE

## 2021-01-01 PROCEDURE — 36415 COLL VENOUS BLD VENIPUNCTURE: CPT | Performed by: EMERGENCY MEDICINE

## 2021-01-01 PROCEDURE — 83690 ASSAY OF LIPASE: CPT | Performed by: NURSE PRACTITIONER

## 2021-01-01 PROCEDURE — 99222 1ST HOSP IP/OBS MODERATE 55: CPT | Performed by: STUDENT IN AN ORGANIZED HEALTH CARE EDUCATION/TRAINING PROGRAM

## 2021-01-01 PROCEDURE — 85730 THROMBOPLASTIN TIME PARTIAL: CPT | Performed by: EMERGENCY MEDICINE

## 2021-01-01 PROCEDURE — 85025 COMPLETE CBC W/AUTO DIFF WBC: CPT | Performed by: OBSTETRICS & GYNECOLOGY

## 2021-01-01 PROCEDURE — 99447 NTRPROF PH1/NTRNET/EHR 11-20: CPT | Performed by: PHYSICIAN ASSISTANT

## 2021-01-01 PROCEDURE — 85025 COMPLETE CBC W/AUTO DIFF WBC: CPT | Performed by: NURSE PRACTITIONER

## 2021-01-01 PROCEDURE — 86850 RBC ANTIBODY SCREEN: CPT | Performed by: NURSE PRACTITIONER

## 2021-01-01 PROCEDURE — 99239 HOSP IP/OBS DSCHRG MGMT >30: CPT | Performed by: FAMILY MEDICINE

## 2021-01-01 PROCEDURE — 99442 PR PHYS/QHP TELEPHONE EVALUATION 11-20 MIN: CPT | Performed by: NURSE PRACTITIONER

## 2021-01-01 PROCEDURE — 87040 BLOOD CULTURE FOR BACTERIA: CPT | Performed by: EMERGENCY MEDICINE

## 2021-01-01 PROCEDURE — 36593 DECLOT VASCULAR DEVICE: CPT

## 2021-01-01 PROCEDURE — C9113 INJ PANTOPRAZOLE SODIUM, VIA: HCPCS | Performed by: EMERGENCY MEDICINE

## 2021-01-01 PROCEDURE — 93005 ELECTROCARDIOGRAM TRACING: CPT

## 2021-01-01 PROCEDURE — 87086 URINE CULTURE/COLONY COUNT: CPT

## 2021-01-01 PROCEDURE — 86901 BLOOD TYPING SEROLOGIC RH(D): CPT | Performed by: NURSE PRACTITIONER

## 2021-01-01 PROCEDURE — 85027 COMPLETE CBC AUTOMATED: CPT | Performed by: EMERGENCY MEDICINE

## 2021-01-01 PROCEDURE — C2617 STENT, NON-COR, TEM W/O DEL: HCPCS | Performed by: UROLOGY

## 2021-01-01 PROCEDURE — 80053 COMPREHEN METABOLIC PANEL: CPT | Performed by: NURSE PRACTITIONER

## 2021-01-01 PROCEDURE — 84550 ASSAY OF BLOOD/URIC ACID: CPT | Performed by: INTERNAL MEDICINE

## 2021-01-01 PROCEDURE — 80053 COMPREHEN METABOLIC PANEL: CPT | Performed by: EMERGENCY MEDICINE

## 2021-01-01 PROCEDURE — 96365 THER/PROPH/DIAG IV INF INIT: CPT

## 2021-01-01 PROCEDURE — 52332 CYSTOSCOPY AND TREATMENT: CPT | Performed by: UROLOGY

## 2021-01-01 PROCEDURE — 84300 ASSAY OF URINE SODIUM: CPT | Performed by: INTERNAL MEDICINE

## 2021-01-01 PROCEDURE — 70551 MRI BRAIN STEM W/O DYE: CPT

## 2021-01-01 PROCEDURE — U0003 INFECTIOUS AGENT DETECTION BY NUCLEIC ACID (DNA OR RNA); SEVERE ACUTE RESPIRATORY SYNDROME CORONAVIRUS 2 (SARS-COV-2) (CORONAVIRUS DISEASE [COVID-19]), AMPLIFIED PROBE TECHNIQUE, MAKING USE OF HIGH THROUGHPUT TECHNOLOGIES AS DESCRIBED BY CMS-2020-01-R: HCPCS | Performed by: EMERGENCY MEDICINE

## 2021-01-01 PROCEDURE — 82436 ASSAY OF URINE CHLORIDE: CPT | Performed by: INTERNAL MEDICINE

## 2021-01-01 PROCEDURE — 87076 CULTURE ANAEROBE IDENT EACH: CPT | Performed by: EMERGENCY MEDICINE

## 2021-01-01 PROCEDURE — 83690 ASSAY OF LIPASE: CPT | Performed by: EMERGENCY MEDICINE

## 2021-01-01 PROCEDURE — 87181 SC STD AGAR DILUTION PER AGT: CPT | Performed by: EMERGENCY MEDICINE

## 2021-01-01 RX ORDER — DIPHENHYDRAMINE HCL 25 MG
25 TABLET ORAL ONCE
Status: CANCELLED | OUTPATIENT
Start: 2021-01-01

## 2021-01-01 RX ORDER — SODIUM CHLORIDE 9 MG/ML
20 INJECTION, SOLUTION INTRAVENOUS ONCE
Status: CANCELLED | OUTPATIENT
Start: 2021-01-01

## 2021-01-01 RX ORDER — POLYETHYLENE GLYCOL 3350 17 G/17G
17 POWDER, FOR SOLUTION ORAL DAILY
Status: DISCONTINUED | OUTPATIENT
Start: 2021-01-01 | End: 2021-01-01 | Stop reason: HOSPADM

## 2021-01-01 RX ORDER — SODIUM CHLORIDE 9 MG/ML
20 INJECTION, SOLUTION INTRAVENOUS ONCE
Status: COMPLETED | OUTPATIENT
Start: 2021-01-01 | End: 2021-01-01

## 2021-01-01 RX ORDER — FENTANYL CITRATE 50 UG/ML
100 INJECTION, SOLUTION INTRAMUSCULAR; INTRAVENOUS ONCE
Status: COMPLETED | OUTPATIENT
Start: 2021-01-01 | End: 2021-01-01

## 2021-01-01 RX ORDER — MAGNESIUM HYDROXIDE/ALUMINUM HYDROXICE/SIMETHICONE 120; 1200; 1200 MG/30ML; MG/30ML; MG/30ML
30 SUSPENSION ORAL EVERY 4 HOURS PRN
Qty: 355 ML | Refills: 0 | Status: SHIPPED | OUTPATIENT
Start: 2021-01-01

## 2021-01-01 RX ORDER — ONDANSETRON 2 MG/ML
4 INJECTION INTRAMUSCULAR; INTRAVENOUS EVERY 6 HOURS PRN
Status: DISCONTINUED | OUTPATIENT
Start: 2021-01-01 | End: 2021-01-01 | Stop reason: HOSPADM

## 2021-01-01 RX ORDER — ACETAMINOPHEN 325 MG/1
650 TABLET ORAL EVERY 6 HOURS PRN
Status: DISCONTINUED | OUTPATIENT
Start: 2021-01-01 | End: 2021-01-01 | Stop reason: HOSPADM

## 2021-01-01 RX ORDER — MAGNESIUM HYDROXIDE 1200 MG/15ML
LIQUID ORAL AS NEEDED
Status: DISCONTINUED | OUTPATIENT
Start: 2021-01-01 | End: 2021-01-01 | Stop reason: HOSPADM

## 2021-01-01 RX ORDER — PANTOPRAZOLE SODIUM 40 MG/1
40 INJECTION, POWDER, FOR SOLUTION INTRAVENOUS ONCE
Status: DISCONTINUED | OUTPATIENT
Start: 2021-01-01 | End: 2021-01-01

## 2021-01-01 RX ORDER — ALBUTEROL SULFATE 2.5 MG/3ML
2.5 SOLUTION RESPIRATORY (INHALATION) ONCE AS NEEDED
Status: DISCONTINUED | OUTPATIENT
Start: 2021-01-01 | End: 2021-01-01 | Stop reason: HOSPADM

## 2021-01-01 RX ORDER — OXYCODONE HYDROCHLORIDE 10 MG/1
10 TABLET ORAL
Status: DISCONTINUED | OUTPATIENT
Start: 2021-01-01 | End: 2021-01-01

## 2021-01-01 RX ORDER — PALONOSETRON 0.05 MG/ML
0.25 INJECTION, SOLUTION INTRAVENOUS ONCE
Status: COMPLETED | OUTPATIENT
Start: 2021-01-01 | End: 2021-01-01

## 2021-01-01 RX ORDER — FAMOTIDINE 20 MG/1
20 TABLET, FILM COATED ORAL DAILY
Qty: 30 TABLET | Refills: 0 | Status: SHIPPED | OUTPATIENT
Start: 2021-01-01 | End: 2021-11-10

## 2021-01-01 RX ORDER — AMOXICILLIN AND CLAVULANATE POTASSIUM 875; 125 MG/1; MG/1
1 TABLET, FILM COATED ORAL EVERY 12 HOURS SCHEDULED
Status: DISCONTINUED | OUTPATIENT
Start: 2021-01-01 | End: 2021-01-01 | Stop reason: HOSPADM

## 2021-01-01 RX ORDER — ONDANSETRON 2 MG/ML
4 INJECTION INTRAMUSCULAR; INTRAVENOUS EVERY 4 HOURS PRN
Status: DISCONTINUED | OUTPATIENT
Start: 2021-01-01 | End: 2021-01-01 | Stop reason: HOSPADM

## 2021-01-01 RX ORDER — FENTANYL CITRATE 50 UG/ML
100 INJECTION, SOLUTION INTRAMUSCULAR; INTRAVENOUS EVERY 4 HOURS PRN
Status: DISCONTINUED | OUTPATIENT
Start: 2021-01-01 | End: 2021-01-01

## 2021-01-01 RX ORDER — HYDROMORPHONE HYDROCHLORIDE 2 MG/1
2 TABLET ORAL
Qty: 20 TABLET | Refills: 0 | Status: SHIPPED | OUTPATIENT
Start: 2021-01-01 | End: 2021-01-01

## 2021-01-01 RX ORDER — HYDROMORPHONE HCL/PF 1 MG/ML
0.5 SYRINGE (ML) INJECTION ONCE AS NEEDED
Status: DISCONTINUED | OUTPATIENT
Start: 2021-01-01 | End: 2021-01-01

## 2021-01-01 RX ORDER — HYDROMORPHONE HCL/PF 1 MG/ML
0.5 SYRINGE (ML) INJECTION
Status: DISCONTINUED | OUTPATIENT
Start: 2021-01-01 | End: 2021-01-01 | Stop reason: HOSPADM

## 2021-01-01 RX ORDER — HYDROMORPHONE HYDROCHLORIDE 4 MG/1
4 TABLET ORAL
Status: DISCONTINUED | OUTPATIENT
Start: 2021-01-01 | End: 2021-01-01

## 2021-01-01 RX ORDER — SUCCINYLCHOLINE/SOD CL,ISO/PF 100 MG/5ML
SYRINGE (ML) INTRAVENOUS AS NEEDED
Status: DISCONTINUED | OUTPATIENT
Start: 2021-01-01 | End: 2021-01-01

## 2021-01-01 RX ORDER — HALOPERIDOL 5 MG/ML
1 INJECTION INTRAMUSCULAR EVERY 6 HOURS PRN
Status: DISCONTINUED | OUTPATIENT
Start: 2021-01-01 | End: 2021-01-01 | Stop reason: HOSPADM

## 2021-01-01 RX ORDER — HYDROMORPHONE HYDROCHLORIDE 4 MG/1
4 TABLET ORAL
Status: DISCONTINUED | OUTPATIENT
Start: 2021-01-01 | End: 2021-01-01 | Stop reason: HOSPADM

## 2021-01-01 RX ORDER — TAMSULOSIN HYDROCHLORIDE 0.4 MG/1
0.4 CAPSULE ORAL
Status: DISCONTINUED | OUTPATIENT
Start: 2021-01-01 | End: 2021-01-01 | Stop reason: HOSPADM

## 2021-01-01 RX ORDER — POLYETHYLENE GLYCOL 3350 17 G/17G
17 POWDER, FOR SOLUTION ORAL DAILY PRN
Refills: 0
Start: 2021-01-01

## 2021-01-01 RX ORDER — OXYCODONE HYDROCHLORIDE 10 MG/1
10 TABLET ORAL EVERY 4 HOURS PRN
Status: DISCONTINUED | OUTPATIENT
Start: 2021-01-01 | End: 2021-01-01

## 2021-01-01 RX ORDER — BUTALBITAL, ACETAMINOPHEN AND CAFFEINE 50; 325; 40 MG/1; MG/1; MG/1
1 TABLET ORAL EVERY 6 HOURS PRN
Status: DISCONTINUED | OUTPATIENT
Start: 2021-01-01 | End: 2021-01-01 | Stop reason: HOSPADM

## 2021-01-01 RX ORDER — MAGNESIUM SULFATE 1 G/100ML
1 INJECTION INTRAVENOUS ONCE
Status: COMPLETED | OUTPATIENT
Start: 2021-01-01 | End: 2021-01-01

## 2021-01-01 RX ORDER — FENTANYL CITRATE 50 UG/ML
INJECTION, SOLUTION INTRAMUSCULAR; INTRAVENOUS AS NEEDED
Status: DISCONTINUED | OUTPATIENT
Start: 2021-01-01 | End: 2021-01-01

## 2021-01-01 RX ORDER — TRAMADOL HYDROCHLORIDE 50 MG/1
50 TABLET ORAL EVERY 6 HOURS PRN
Status: DISCONTINUED | OUTPATIENT
Start: 2021-01-01 | End: 2021-01-01

## 2021-01-01 RX ORDER — TRAMADOL HYDROCHLORIDE 50 MG/1
50 TABLET ORAL EVERY 6 HOURS PRN
Qty: 30 TABLET | Refills: 1 | Status: SHIPPED | OUTPATIENT
Start: 2021-01-01 | End: 2021-01-01 | Stop reason: SDUPTHER

## 2021-01-01 RX ORDER — MAGNESIUM HYDROXIDE/ALUMINUM HYDROXICE/SIMETHICONE 120; 1200; 1200 MG/30ML; MG/30ML; MG/30ML
30 SUSPENSION ORAL EVERY 4 HOURS PRN
Status: DISCONTINUED | OUTPATIENT
Start: 2021-01-01 | End: 2021-01-01 | Stop reason: HOSPADM

## 2021-01-01 RX ORDER — XYLITOL/YERBA SANTA
5 AEROSOL, SPRAY WITH PUMP (ML) MUCOUS MEMBRANE 4 TIMES DAILY PRN
Qty: 60 ML | Refills: 0 | Status: SHIPPED | OUTPATIENT
Start: 2021-01-01 | End: 2021-11-09

## 2021-01-01 RX ORDER — PROMETHAZINE HYDROCHLORIDE 25 MG/ML
25 INJECTION, SOLUTION INTRAMUSCULAR; INTRAVENOUS EVERY 6 HOURS PRN
Status: DISCONTINUED | OUTPATIENT
Start: 2021-01-01 | End: 2021-01-01

## 2021-01-01 RX ORDER — FENTANYL CITRATE 50 UG/ML
25 INJECTION, SOLUTION INTRAMUSCULAR; INTRAVENOUS ONCE AS NEEDED
Status: COMPLETED | OUTPATIENT
Start: 2021-01-01 | End: 2021-01-01

## 2021-01-01 RX ORDER — HYDROMORPHONE HYDROCHLORIDE 4 MG/1
8 TABLET ORAL
Status: DISCONTINUED | OUTPATIENT
Start: 2021-01-01 | End: 2021-01-01

## 2021-01-01 RX ORDER — ACETAMINOPHEN 325 MG/1
650 TABLET ORAL ONCE
Status: COMPLETED | OUTPATIENT
Start: 2021-01-01 | End: 2021-01-01

## 2021-01-01 RX ORDER — HEPARIN SODIUM 5000 [USP'U]/ML
5000 INJECTION, SOLUTION INTRAVENOUS; SUBCUTANEOUS EVERY 8 HOURS SCHEDULED
Status: DISCONTINUED | OUTPATIENT
Start: 2021-01-01 | End: 2021-01-01 | Stop reason: HOSPADM

## 2021-01-01 RX ORDER — FAMOTIDINE 20 MG/1
20 TABLET, FILM COATED ORAL DAILY
Status: DISCONTINUED | OUTPATIENT
Start: 2021-01-01 | End: 2021-01-01 | Stop reason: HOSPADM

## 2021-01-01 RX ORDER — ONDANSETRON 2 MG/ML
4 INJECTION INTRAMUSCULAR; INTRAVENOUS EVERY 6 HOURS PRN
Status: DISCONTINUED | OUTPATIENT
Start: 2021-01-01 | End: 2021-01-01

## 2021-01-01 RX ORDER — TRAMADOL HYDROCHLORIDE 50 MG/1
50 TABLET ORAL EVERY 8 HOURS PRN
Qty: 30 TABLET | Refills: 1 | Status: SHIPPED | OUTPATIENT
Start: 2021-01-01 | End: 2021-01-01 | Stop reason: HOSPADM

## 2021-01-01 RX ORDER — DIPHENHYDRAMINE HCL 25 MG
25 TABLET ORAL ONCE
Status: COMPLETED | OUTPATIENT
Start: 2021-01-01 | End: 2021-01-01

## 2021-01-01 RX ORDER — PANTOPRAZOLE SODIUM 40 MG/1
40 INJECTION, POWDER, FOR SOLUTION INTRAVENOUS ONCE
Status: COMPLETED | OUTPATIENT
Start: 2021-01-01 | End: 2021-01-01

## 2021-01-01 RX ORDER — PALONOSETRON 0.05 MG/ML
0.25 INJECTION, SOLUTION INTRAVENOUS ONCE
Status: CANCELLED | OUTPATIENT
Start: 2021-01-01

## 2021-01-01 RX ORDER — CYCLOPHOSPHAMIDE 50 MG/1
50 CAPSULE ORAL DAILY
Qty: 30 CAPSULE | Refills: 5 | Status: SHIPPED | OUTPATIENT
Start: 2021-01-01 | End: 2021-01-01 | Stop reason: ALTCHOICE

## 2021-01-01 RX ORDER — TAMSULOSIN HYDROCHLORIDE 0.4 MG/1
0.4 CAPSULE ORAL
Qty: 30 CAPSULE | Refills: 3 | Status: SHIPPED | OUTPATIENT
Start: 2021-01-01

## 2021-01-01 RX ORDER — FENTANYL CITRATE/PF 50 MCG/ML
50 SYRINGE (ML) INJECTION
Status: DISCONTINUED | OUTPATIENT
Start: 2021-01-01 | End: 2021-01-01 | Stop reason: HOSPADM

## 2021-01-01 RX ORDER — SODIUM CHLORIDE, SODIUM LACTATE, POTASSIUM CHLORIDE, CALCIUM CHLORIDE 600; 310; 30; 20 MG/100ML; MG/100ML; MG/100ML; MG/100ML
INJECTION, SOLUTION INTRAVENOUS CONTINUOUS PRN
Status: DISCONTINUED | OUTPATIENT
Start: 2021-01-01 | End: 2021-01-01

## 2021-01-01 RX ORDER — ACETAMINOPHEN 325 MG/1
650 TABLET ORAL ONCE
Status: CANCELLED | OUTPATIENT
Start: 2021-01-01

## 2021-01-01 RX ORDER — HYDROMORPHONE HYDROCHLORIDE 2 MG/1
2 TABLET ORAL
Status: DISCONTINUED | OUTPATIENT
Start: 2021-01-01 | End: 2021-01-01 | Stop reason: HOSPADM

## 2021-01-01 RX ORDER — NYSTATIN 100000 [USP'U]/G
POWDER TOPICAL 3 TIMES DAILY
Qty: 15 G | Refills: 4 | Status: SHIPPED | OUTPATIENT
Start: 2021-01-01

## 2021-01-01 RX ORDER — ONDANSETRON 4 MG/1
4 TABLET, ORALLY DISINTEGRATING ORAL EVERY 6 HOURS PRN
Qty: 20 TABLET | Refills: 0 | Status: SHIPPED | OUTPATIENT
Start: 2021-01-01

## 2021-01-01 RX ORDER — FENTANYL CITRATE 50 UG/ML
50 INJECTION, SOLUTION INTRAMUSCULAR; INTRAVENOUS EVERY 2 HOUR PRN
Status: DISCONTINUED | OUTPATIENT
Start: 2021-01-01 | End: 2021-01-01

## 2021-01-01 RX ORDER — HYDROMORPHONE HCL IN WATER/PF 6 MG/30 ML
0.2 PATIENT CONTROLLED ANALGESIA SYRINGE INTRAVENOUS EVERY 6 HOURS PRN
Status: DISCONTINUED | OUTPATIENT
Start: 2021-01-01 | End: 2021-01-01

## 2021-01-01 RX ORDER — ONDANSETRON 2 MG/ML
1 INJECTION INTRAMUSCULAR; INTRAVENOUS ONCE
Status: COMPLETED | OUTPATIENT
Start: 2021-01-01 | End: 2021-01-01

## 2021-01-01 RX ORDER — DIPHENHYDRAMINE HYDROCHLORIDE 50 MG/ML
25 INJECTION INTRAMUSCULAR; INTRAVENOUS
Status: COMPLETED | OUTPATIENT
Start: 2021-01-01 | End: 2021-01-01

## 2021-01-01 RX ORDER — ONDANSETRON 2 MG/ML
4 INJECTION INTRAMUSCULAR; INTRAVENOUS ONCE AS NEEDED
Status: DISCONTINUED | OUTPATIENT
Start: 2021-01-01 | End: 2021-01-01 | Stop reason: HOSPADM

## 2021-01-01 RX ORDER — FENTANYL CITRATE 50 UG/ML
100 INJECTION, SOLUTION INTRAMUSCULAR; INTRAVENOUS
Status: DISCONTINUED | OUTPATIENT
Start: 2021-01-01 | End: 2021-01-01 | Stop reason: HOSPADM

## 2021-01-01 RX ORDER — FENTANYL CITRATE 50 UG/ML
50 INJECTION, SOLUTION INTRAMUSCULAR; INTRAVENOUS ONCE
Status: COMPLETED | OUTPATIENT
Start: 2021-01-01 | End: 2021-01-01

## 2021-01-01 RX ORDER — SODIUM CHLORIDE, SODIUM GLUCONATE, SODIUM ACETATE, POTASSIUM CHLORIDE, MAGNESIUM CHLORIDE, SODIUM PHOSPHATE, DIBASIC, AND POTASSIUM PHOSPHATE .53; .5; .37; .037; .03; .012; .00082 G/100ML; G/100ML; G/100ML; G/100ML; G/100ML; G/100ML; G/100ML
100 INJECTION, SOLUTION INTRAVENOUS CONTINUOUS
Status: DISCONTINUED | OUTPATIENT
Start: 2021-01-01 | End: 2021-01-01 | Stop reason: HOSPADM

## 2021-01-01 RX ORDER — METRONIDAZOLE 500 MG/1
500 TABLET ORAL EVERY 8 HOURS SCHEDULED
Status: DISCONTINUED | OUTPATIENT
Start: 2021-01-01 | End: 2021-01-01

## 2021-01-01 RX ORDER — SODIUM CHLORIDE, SODIUM LACTATE, POTASSIUM CHLORIDE, CALCIUM CHLORIDE 600; 310; 30; 20 MG/100ML; MG/100ML; MG/100ML; MG/100ML
125 INJECTION, SOLUTION INTRAVENOUS CONTINUOUS
Status: DISCONTINUED | OUTPATIENT
Start: 2021-01-01 | End: 2021-01-01 | Stop reason: HOSPADM

## 2021-01-01 RX ORDER — HYDROMORPHONE HCL/PF 1 MG/ML
0.5 SYRINGE (ML) INJECTION ONCE
Status: COMPLETED | OUTPATIENT
Start: 2021-01-01 | End: 2021-01-01

## 2021-01-01 RX ORDER — OXYCODONE HYDROCHLORIDE 5 MG/1
5 TABLET ORAL EVERY 4 HOURS PRN
Status: DISCONTINUED | OUTPATIENT
Start: 2021-01-01 | End: 2021-01-01 | Stop reason: HOSPADM

## 2021-01-01 RX ORDER — ACETAMINOPHEN 325 MG/1
975 TABLET ORAL ONCE
Status: CANCELLED | OUTPATIENT
Start: 2021-01-01 | End: 2021-01-01

## 2021-01-01 RX ORDER — DOCUSATE SODIUM 100 MG/1
100 CAPSULE, LIQUID FILLED ORAL 2 TIMES DAILY
Status: DISCONTINUED | OUTPATIENT
Start: 2021-01-01 | End: 2021-01-01 | Stop reason: HOSPADM

## 2021-01-01 RX ORDER — DOCUSATE SODIUM 100 MG/1
100 CAPSULE, LIQUID FILLED ORAL 2 TIMES DAILY
Refills: 0
Start: 2021-01-01

## 2021-01-01 RX ORDER — PROMETHAZINE HYDROCHLORIDE 25 MG/ML
25 INJECTION, SOLUTION INTRAMUSCULAR; INTRAVENOUS ONCE
Status: COMPLETED | OUTPATIENT
Start: 2021-01-01 | End: 2021-01-01

## 2021-01-01 RX ORDER — FAMOTIDINE 20 MG/50ML
20 INJECTION, SOLUTION INTRAVENOUS ONCE
Status: COMPLETED | OUTPATIENT
Start: 2021-01-01 | End: 2021-01-01

## 2021-01-01 RX ORDER — CIPROFLOXACIN 250 MG/1
250 TABLET, FILM COATED ORAL EVERY 12 HOURS SCHEDULED
Qty: 6 TABLET | Refills: 0 | Status: SHIPPED | OUTPATIENT
Start: 2021-01-01 | End: 2021-01-01

## 2021-01-01 RX ORDER — ACETAMINOPHEN 325 MG/1
650 TABLET ORAL EVERY 6 HOURS PRN
Refills: 0
Start: 2021-01-01

## 2021-01-01 RX ORDER — PANTOPRAZOLE SODIUM 40 MG/1
40 TABLET, DELAYED RELEASE ORAL
Status: DISCONTINUED | OUTPATIENT
Start: 2021-01-01 | End: 2021-01-01 | Stop reason: HOSPADM

## 2021-01-01 RX ORDER — POTASSIUM CHLORIDE 20 MEQ/1
20 TABLET, EXTENDED RELEASE ORAL DAILY
Status: DISCONTINUED | OUTPATIENT
Start: 2021-01-01 | End: 2021-01-01 | Stop reason: HOSPADM

## 2021-01-01 RX ORDER — PROPOFOL 10 MG/ML
INJECTION, EMULSION INTRAVENOUS AS NEEDED
Status: DISCONTINUED | OUTPATIENT
Start: 2021-01-01 | End: 2021-01-01

## 2021-01-01 RX ORDER — OXYBUTYNIN CHLORIDE 5 MG/1
10 TABLET, EXTENDED RELEASE ORAL
Status: DISCONTINUED | OUTPATIENT
Start: 2021-01-01 | End: 2021-01-01 | Stop reason: HOSPADM

## 2021-01-01 RX ORDER — DIPHENHYDRAMINE HCL 25 MG
12.5 TABLET ORAL EVERY 6 HOURS PRN
Status: DISCONTINUED | OUTPATIENT
Start: 2021-01-01 | End: 2021-01-01 | Stop reason: HOSPADM

## 2021-01-01 RX ORDER — OXYBUTYNIN CHLORIDE 5 MG/1
5 TABLET ORAL 3 TIMES DAILY PRN
Status: DISCONTINUED | OUTPATIENT
Start: 2021-01-01 | End: 2021-01-01 | Stop reason: HOSPADM

## 2021-01-01 RX ORDER — VANCOMYCIN HYDROCHLORIDE 1 G/200ML
15 INJECTION, SOLUTION INTRAVENOUS EVERY 24 HOURS
Status: DISCONTINUED | OUTPATIENT
Start: 2021-01-01 | End: 2021-01-01

## 2021-01-01 RX ORDER — CEFAZOLIN SODIUM 1 G/50ML
1000 SOLUTION INTRAVENOUS ONCE
Status: COMPLETED | OUTPATIENT
Start: 2021-01-01 | End: 2021-01-01

## 2021-01-01 RX ORDER — GABAPENTIN 100 MG/1
300 CAPSULE ORAL ONCE
Status: CANCELLED | OUTPATIENT
Start: 2021-01-01 | End: 2021-01-01

## 2021-01-01 RX ORDER — SODIUM CHLORIDE, SODIUM GLUCONATE, SODIUM ACETATE, POTASSIUM CHLORIDE, MAGNESIUM CHLORIDE, SODIUM PHOSPHATE, DIBASIC, AND POTASSIUM PHOSPHATE .53; .5; .37; .037; .03; .012; .00082 G/100ML; G/100ML; G/100ML; G/100ML; G/100ML; G/100ML; G/100ML
1000 INJECTION, SOLUTION INTRAVENOUS ONCE
Status: COMPLETED | OUTPATIENT
Start: 2021-01-01 | End: 2021-01-01

## 2021-01-01 RX ORDER — POTASSIUM CHLORIDE 20 MEQ/1
40 TABLET, EXTENDED RELEASE ORAL ONCE
Status: COMPLETED | OUTPATIENT
Start: 2021-01-01 | End: 2021-01-01

## 2021-01-01 RX ORDER — HYDROMORPHONE HYDROCHLORIDE 4 MG/1
4 TABLET ORAL
Qty: 20 TABLET | Refills: 0 | Status: SHIPPED | OUTPATIENT
Start: 2021-01-01 | End: 2021-01-01

## 2021-01-01 RX ORDER — DEXAMETHASONE SODIUM PHOSPHATE 4 MG/ML
INJECTION, SOLUTION INTRA-ARTICULAR; INTRALESIONAL; INTRAMUSCULAR; INTRAVENOUS; SOFT TISSUE AS NEEDED
Status: DISCONTINUED | OUTPATIENT
Start: 2021-01-01 | End: 2021-01-01

## 2021-01-01 RX ORDER — NYSTATIN 100000 [USP'U]/G
POWDER TOPICAL
Qty: 15 G | Refills: 4 | Status: SHIPPED | OUTPATIENT
Start: 2021-01-01

## 2021-01-01 RX ORDER — POTASSIUM CHLORIDE 14.9 MG/ML
20 INJECTION INTRAVENOUS ONCE
Status: COMPLETED | OUTPATIENT
Start: 2021-01-01 | End: 2021-01-01

## 2021-01-01 RX ORDER — OXYBUTYNIN CHLORIDE 10 MG/1
10 TABLET, EXTENDED RELEASE ORAL
Qty: 30 TABLET | Refills: 3 | Status: SHIPPED | OUTPATIENT
Start: 2021-01-01 | End: 2021-10-30

## 2021-01-01 RX ORDER — FENTANYL CITRATE 50 UG/ML
100 INJECTION, SOLUTION INTRAMUSCULAR; INTRAVENOUS ONCE
Status: DISCONTINUED | OUTPATIENT
Start: 2021-01-01 | End: 2021-01-01 | Stop reason: HOSPADM

## 2021-01-01 RX ORDER — ONDANSETRON 2 MG/ML
INJECTION INTRAMUSCULAR; INTRAVENOUS AS NEEDED
Status: DISCONTINUED | OUTPATIENT
Start: 2021-01-01 | End: 2021-01-01

## 2021-01-01 RX ORDER — HYDROMORPHONE HCL/PF 1 MG/ML
0.5 SYRINGE (ML) INJECTION EVERY 6 HOURS PRN
Status: DISCONTINUED | OUTPATIENT
Start: 2021-01-01 | End: 2021-01-01

## 2021-01-01 RX ORDER — XYLITOL/YERBA SANTA
5 AEROSOL, SPRAY WITH PUMP (ML) MUCOUS MEMBRANE 4 TIMES DAILY PRN
Status: DISCONTINUED | OUTPATIENT
Start: 2021-01-01 | End: 2021-01-01 | Stop reason: HOSPADM

## 2021-01-01 RX ORDER — LIDOCAINE HYDROCHLORIDE 10 MG/ML
INJECTION, SOLUTION EPIDURAL; INFILTRATION; INTRACAUDAL; PERINEURAL AS NEEDED
Status: DISCONTINUED | OUTPATIENT
Start: 2021-01-01 | End: 2021-01-01

## 2021-01-01 RX ADMIN — SODIUM CHLORIDE 1000 ML: 0.9 INJECTION, SOLUTION INTRAVENOUS at 23:50

## 2021-01-01 RX ADMIN — HEPARIN SODIUM 5000 UNITS: 5000 INJECTION INTRAVENOUS; SUBCUTANEOUS at 05:33

## 2021-01-01 RX ADMIN — TRIMETHOBENZAMIDE HYDROCHLORIDE 200 MG: 100 INJECTION INTRAMUSCULAR at 22:14

## 2021-01-01 RX ADMIN — SODIUM CHLORIDE 20 ML/HR: 0.9 INJECTION, SOLUTION INTRAVENOUS at 09:55

## 2021-01-01 RX ADMIN — SODIUM CHLORIDE 20 ML/HR: 0.9 INJECTION, SOLUTION INTRAVENOUS at 08:39

## 2021-01-01 RX ADMIN — OXYBUTYNIN CHLORIDE 10 MG: 5 TABLET, EXTENDED RELEASE ORAL at 22:13

## 2021-01-01 RX ADMIN — FENTANYL CITRATE 25 MCG: 50 INJECTION, SOLUTION INTRAMUSCULAR; INTRAVENOUS at 06:27

## 2021-01-01 RX ADMIN — FAMOTIDINE 20 MG: 10 INJECTION INTRAVENOUS at 10:18

## 2021-01-01 RX ADMIN — AMOXICILLIN AND CLAVULANATE POTASSIUM 1 TABLET: 875; 125 TABLET, FILM COATED ORAL at 22:29

## 2021-01-01 RX ADMIN — DIPHENHYDRAMINE HYDROCHLORIDE 25 MG: 50 INJECTION, SOLUTION INTRAMUSCULAR; INTRAVENOUS at 11:33

## 2021-01-01 RX ADMIN — SODIUM CHLORIDE 1000 ML: 0.9 INJECTION, SOLUTION INTRAVENOUS at 08:55

## 2021-01-01 RX ADMIN — FOSAPREPITANT 150 MG: 150 INJECTION, POWDER, LYOPHILIZED, FOR SOLUTION INTRAVENOUS at 12:24

## 2021-01-01 RX ADMIN — HEPARIN SODIUM 5000 UNITS: 5000 INJECTION INTRAVENOUS; SUBCUTANEOUS at 21:55

## 2021-01-01 RX ADMIN — DOCUSATE SODIUM 100 MG: 100 CAPSULE, LIQUID FILLED ORAL at 08:35

## 2021-01-01 RX ADMIN — FENTANYL CITRATE 50 MCG: 50 INJECTION INTRAMUSCULAR; INTRAVENOUS at 02:32

## 2021-01-01 RX ADMIN — PACLITAXEL 90.6 MG: 100 INJECTION INTRAVENOUS at 10:57

## 2021-01-01 RX ADMIN — DIPHENHYDRAMINE HYDROCHLORIDE 25 MG: 25 TABLET ORAL at 09:45

## 2021-01-01 RX ADMIN — DIPHENHYDRAMINE HYDROCHLORIDE 25 MG: 25 TABLET ORAL at 10:05

## 2021-01-01 RX ADMIN — FAMOTIDINE 20 MG: 10 INJECTION INTRAVENOUS at 10:29

## 2021-01-01 RX ADMIN — CARBOPLATIN 311 MG: 10 INJECTION, SOLUTION INTRAVENOUS at 12:09

## 2021-01-01 RX ADMIN — SODIUM CHLORIDE 20 ML/HR: 9 INJECTION, SOLUTION INTRAVENOUS at 10:08

## 2021-01-01 RX ADMIN — HYDROMORPHONE HYDROCHLORIDE 0.5 MG: 1 INJECTION, SOLUTION INTRAMUSCULAR; INTRAVENOUS; SUBCUTANEOUS at 02:32

## 2021-01-01 RX ADMIN — HYDROMORPHONE HYDROCHLORIDE 4 MG: 4 TABLET ORAL at 22:20

## 2021-01-01 RX ADMIN — SODIUM CHLORIDE, SODIUM GLUCONATE, SODIUM ACETATE, POTASSIUM CHLORIDE, MAGNESIUM CHLORIDE, SODIUM PHOSPHATE, DIBASIC, AND POTASSIUM PHOSPHATE 100 ML/HR: .53; .5; .37; .037; .03; .012; .00082 INJECTION, SOLUTION INTRAVENOUS at 18:50

## 2021-01-01 RX ADMIN — FENTANYL CITRATE 50 MCG: 50 INJECTION INTRAMUSCULAR; INTRAVENOUS at 07:58

## 2021-01-01 RX ADMIN — FAMOTIDINE 20 MG: 20 TABLET, FILM COATED ORAL at 08:35

## 2021-01-01 RX ADMIN — SODIUM CHLORIDE 20 ML/HR: 0.9 INJECTION, SOLUTION INTRAVENOUS at 09:34

## 2021-01-01 RX ADMIN — ACETAMINOPHEN 650 MG: 325 TABLET, FILM COATED ORAL at 10:22

## 2021-01-01 RX ADMIN — OXYBUTYNIN CHLORIDE 10 MG: 5 TABLET, EXTENDED RELEASE ORAL at 22:29

## 2021-01-01 RX ADMIN — DOCUSATE SODIUM 100 MG: 100 CAPSULE, LIQUID FILLED ORAL at 08:45

## 2021-01-01 RX ADMIN — FAMOTIDINE 20 MG: 20 INJECTION, SOLUTION INTRAVENOUS at 14:38

## 2021-01-01 RX ADMIN — DIPHENHYDRAMINE HYDROCHLORIDE 25 MG: 50 INJECTION, SOLUTION INTRAMUSCULAR; INTRAVENOUS at 10:49

## 2021-01-01 RX ADMIN — HEPARIN SODIUM 5000 UNITS: 5000 INJECTION INTRAVENOUS; SUBCUTANEOUS at 05:42

## 2021-01-01 RX ADMIN — DIPHENHYDRAMINE HYDROCHLORIDE 25 MG: 50 INJECTION, SOLUTION INTRAMUSCULAR; INTRAVENOUS at 09:03

## 2021-01-01 RX ADMIN — CARBOPLATIN 313 MG: 10 INJECTION, SOLUTION INTRAVENOUS at 14:13

## 2021-01-01 RX ADMIN — HYDROCORTISONE SODIUM SUCCINATE 50 MG: 100 INJECTION, POWDER, FOR SOLUTION INTRAMUSCULAR; INTRAVENOUS at 14:44

## 2021-01-01 RX ADMIN — HYDROMORPHONE HYDROCHLORIDE 4 MG: 4 TABLET ORAL at 05:33

## 2021-01-01 RX ADMIN — SODIUM CHLORIDE 20 ML/HR: 9 INJECTION, SOLUTION INTRAVENOUS at 10:59

## 2021-01-01 RX ADMIN — OXYBUTYNIN CHLORIDE 10 MG: 5 TABLET, EXTENDED RELEASE ORAL at 21:25

## 2021-01-01 RX ADMIN — FENTANYL CITRATE 100 MCG: 50 INJECTION INTRAMUSCULAR; INTRAVENOUS at 01:51

## 2021-01-01 RX ADMIN — PALONOSETRON 0.25 MG: 0.05 INJECTION, SOLUTION INTRAVENOUS at 09:01

## 2021-01-01 RX ADMIN — HEPARIN SODIUM 5000 UNITS: 5000 INJECTION INTRAVENOUS; SUBCUTANEOUS at 16:40

## 2021-01-01 RX ADMIN — Medication 80 MG: at 13:42

## 2021-01-01 RX ADMIN — METRONIDAZOLE 500 MG: 500 INJECTION, SOLUTION INTRAVENOUS at 12:21

## 2021-01-01 RX ADMIN — FENTANYL CITRATE 50 MCG: 50 INJECTION INTRAMUSCULAR; INTRAVENOUS at 10:37

## 2021-01-01 RX ADMIN — FENTANYL CITRATE 50 MCG: 50 INJECTION INTRAMUSCULAR; INTRAVENOUS at 20:49

## 2021-01-01 RX ADMIN — INSULIN LISPRO 1 UNITS: 100 INJECTION, SOLUTION INTRAVENOUS; SUBCUTANEOUS at 09:36

## 2021-01-01 RX ADMIN — FOSAPREPITANT 150 MG: 150 INJECTION, POWDER, LYOPHILIZED, FOR SOLUTION INTRAVENOUS at 12:32

## 2021-01-01 RX ADMIN — POTASSIUM CHLORIDE 20 MEQ: 1500 TABLET, EXTENDED RELEASE ORAL at 09:36

## 2021-01-01 RX ADMIN — ONDANSETRON 4 MG: 2 INJECTION INTRAMUSCULAR; INTRAVENOUS at 13:45

## 2021-01-01 RX ADMIN — CEFEPIME HYDROCHLORIDE 1000 MG: 2 INJECTION, POWDER, FOR SOLUTION INTRAVENOUS at 01:34

## 2021-01-01 RX ADMIN — DEXAMETHASONE SODIUM PHOSPHATE 20 MG: 10 INJECTION, SOLUTION INTRAMUSCULAR; INTRAVENOUS at 09:34

## 2021-01-01 RX ADMIN — METRONIDAZOLE 500 MG: 500 INJECTION, SOLUTION INTRAVENOUS at 20:16

## 2021-01-01 RX ADMIN — HEPARIN SODIUM 5000 UNITS: 5000 INJECTION INTRAVENOUS; SUBCUTANEOUS at 16:47

## 2021-01-01 RX ADMIN — SODIUM CHLORIDE 200 MG: 9 INJECTION, SOLUTION INTRAVENOUS at 10:59

## 2021-01-01 RX ADMIN — DOCUSATE SODIUM 100 MG: 100 CAPSULE, LIQUID FILLED ORAL at 17:32

## 2021-01-01 RX ADMIN — AMOXICILLIN AND CLAVULANATE POTASSIUM 1 TABLET: 875; 125 TABLET, FILM COATED ORAL at 22:04

## 2021-01-01 RX ADMIN — ACETAMINOPHEN 650 MG: 325 TABLET, FILM COATED ORAL at 08:22

## 2021-01-01 RX ADMIN — ACETAMINOPHEN 650 MG: 325 TABLET, FILM COATED ORAL at 10:04

## 2021-01-01 RX ADMIN — ONDANSETRON 4 MG: 2 INJECTION INTRAMUSCULAR; INTRAVENOUS at 14:15

## 2021-01-01 RX ADMIN — PACLITAXEL 90.6 MG: 6 INJECTION, SOLUTION, CONCENTRATE INTRAVENOUS at 11:51

## 2021-01-01 RX ADMIN — VANCOMYCIN HYDROCHLORIDE 1250 MG: 1 INJECTION, POWDER, LYOPHILIZED, FOR SOLUTION INTRAVENOUS at 06:19

## 2021-01-01 RX ADMIN — FENTANYL CITRATE 100 MCG: 50 INJECTION INTRAMUSCULAR; INTRAVENOUS at 23:10

## 2021-01-01 RX ADMIN — PACLITAXEL 90.6 MG: 6 INJECTION, SOLUTION INTRAVENOUS at 12:04

## 2021-01-01 RX ADMIN — FENTANYL CITRATE 50 MCG: 50 INJECTION INTRAMUSCULAR; INTRAVENOUS at 06:37

## 2021-01-01 RX ADMIN — PACLITAXEL 90 MG: 6 INJECTION, SOLUTION, CONCENTRATE INTRAVENOUS at 11:54

## 2021-01-01 RX ADMIN — TAMSULOSIN HYDROCHLORIDE 0.4 MG: 0.4 CAPSULE ORAL at 16:39

## 2021-01-01 RX ADMIN — SODIUM CHLORIDE 500 ML: 0.9 INJECTION, SOLUTION INTRAVENOUS at 14:38

## 2021-01-01 RX ADMIN — SODIUM CHLORIDE, SODIUM GLUCONATE, SODIUM ACETATE, POTASSIUM CHLORIDE, MAGNESIUM CHLORIDE, SODIUM PHOSPHATE, DIBASIC, AND POTASSIUM PHOSPHATE 125 ML/HR: .53; .5; .37; .037; .03; .012; .00082 INJECTION, SOLUTION INTRAVENOUS at 06:30

## 2021-01-01 RX ADMIN — HYDROMORPHONE HYDROCHLORIDE 4 MG: 4 TABLET ORAL at 22:29

## 2021-01-01 RX ADMIN — PROMETHAZINE HYDROCHLORIDE 25 MG: 25 INJECTION INTRAMUSCULAR; INTRAVENOUS at 01:02

## 2021-01-01 RX ADMIN — POTASSIUM CHLORIDE 20 MEQ: 1500 TABLET, EXTENDED RELEASE ORAL at 08:45

## 2021-01-01 RX ADMIN — HYDROMORPHONE HYDROCHLORIDE 4 MG: 4 TABLET ORAL at 09:09

## 2021-01-01 RX ADMIN — SODIUM CHLORIDE 200 MG: 9 INJECTION, SOLUTION INTRAVENOUS at 10:39

## 2021-01-01 RX ADMIN — PROPOFOL 140 MG: 10 INJECTION, EMULSION INTRAVENOUS at 13:42

## 2021-01-01 RX ADMIN — SODIUM CHLORIDE 200 MG: 9 INJECTION, SOLUTION INTRAVENOUS at 12:04

## 2021-01-01 RX ADMIN — FAMOTIDINE 20 MG: 10 INJECTION, SOLUTION INTRAVENOUS at 11:58

## 2021-01-01 RX ADMIN — ACETAMINOPHEN 650 MG: 325 TABLET, FILM COATED ORAL at 09:45

## 2021-01-01 RX ADMIN — SODIUM CHLORIDE, SODIUM LACTATE, POTASSIUM CHLORIDE, AND CALCIUM CHLORIDE: .6; .31; .03; .02 INJECTION, SOLUTION INTRAVENOUS at 13:35

## 2021-01-01 RX ADMIN — OXYBUTYNIN CHLORIDE 10 MG: 5 TABLET, EXTENDED RELEASE ORAL at 22:05

## 2021-01-01 RX ADMIN — FAMOTIDINE 20 MG: 10 INJECTION, SOLUTION INTRAVENOUS at 11:13

## 2021-01-01 RX ADMIN — PACLITAXEL 89.4 MG: 6 INJECTION, SOLUTION INTRAVENOUS at 13:16

## 2021-01-01 RX ADMIN — SODIUM CHLORIDE 20 ML/HR: 0.9 INJECTION, SOLUTION INTRAVENOUS at 09:40

## 2021-01-01 RX ADMIN — DOCUSATE SODIUM 100 MG: 100 CAPSULE, LIQUID FILLED ORAL at 09:08

## 2021-01-01 RX ADMIN — HEPARIN SODIUM 5000 UNITS: 5000 INJECTION INTRAVENOUS; SUBCUTANEOUS at 06:02

## 2021-01-01 RX ADMIN — METRONIDAZOLE 500 MG: 500 TABLET, FILM COATED ORAL at 22:13

## 2021-01-01 RX ADMIN — DIPHENHYDRAMINE HYDROCHLORIDE 25 MG: 50 INJECTION, SOLUTION INTRAMUSCULAR; INTRAVENOUS at 10:47

## 2021-01-01 RX ADMIN — ALTEPLASE 2 MG: 2.2 INJECTION, POWDER, LYOPHILIZED, FOR SOLUTION INTRAVENOUS at 09:44

## 2021-01-01 RX ADMIN — CEFEPIME HYDROCHLORIDE 1000 MG: 2 INJECTION, POWDER, FOR SOLUTION INTRAVENOUS at 14:27

## 2021-01-01 RX ADMIN — HEPARIN SODIUM 5000 UNITS: 5000 INJECTION INTRAVENOUS; SUBCUTANEOUS at 22:05

## 2021-01-01 RX ADMIN — PACLITAXEL 90 MG: 6 INJECTION, SOLUTION, CONCENTRATE INTRAVENOUS at 13:08

## 2021-01-01 RX ADMIN — FAMOTIDINE 20 MG: 10 INJECTION, SOLUTION INTRAVENOUS at 12:09

## 2021-01-01 RX ADMIN — FENTANYL CITRATE 100 MCG: 50 INJECTION INTRAMUSCULAR; INTRAVENOUS at 10:45

## 2021-01-01 RX ADMIN — DEXAMETHASONE SODIUM PHOSPHATE 4 MG: 4 INJECTION, SOLUTION INTRAMUSCULAR; INTRAVENOUS at 13:45

## 2021-01-01 RX ADMIN — POTASSIUM CHLORIDE 20 MEQ: 1500 TABLET, EXTENDED RELEASE ORAL at 08:20

## 2021-01-01 RX ADMIN — FOSAPREPITANT 150 MG: 150 INJECTION, POWDER, LYOPHILIZED, FOR SOLUTION INTRAVENOUS at 11:17

## 2021-01-01 RX ADMIN — DEXAMETHASONE SODIUM PHOSPHATE 20 MG: 10 INJECTION, SOLUTION INTRAMUSCULAR; INTRAVENOUS at 11:22

## 2021-01-01 RX ADMIN — FAMOTIDINE 20 MG: 10 INJECTION INTRAVENOUS at 10:23

## 2021-01-01 RX ADMIN — FAMOTIDINE 20 MG: 10 INJECTION INTRAVENOUS at 11:26

## 2021-01-01 RX ADMIN — CEFEPIME HYDROCHLORIDE 1000 MG: 2 INJECTION, POWDER, FOR SOLUTION INTRAVENOUS at 03:49

## 2021-01-01 RX ADMIN — DIPHENHYDRAMINE HYDROCHLORIDE 25 MG: 50 INJECTION, SOLUTION INTRAMUSCULAR; INTRAVENOUS at 11:47

## 2021-01-01 RX ADMIN — SODIUM CHLORIDE 20 ML/HR: 0.9 INJECTION, SOLUTION INTRAVENOUS at 09:45

## 2021-01-01 RX ADMIN — ALTEPLASE 2 MG: 2.2 INJECTION, POWDER, LYOPHILIZED, FOR SOLUTION INTRAVENOUS at 09:30

## 2021-01-01 RX ADMIN — DIPHENHYDRAMINE HYDROCHLORIDE 25 MG: 50 INJECTION, SOLUTION INTRAMUSCULAR; INTRAVENOUS at 10:30

## 2021-01-01 RX ADMIN — FENTANYL CITRATE 50 MCG: 50 INJECTION INTRAMUSCULAR; INTRAVENOUS at 04:10

## 2021-01-01 RX ADMIN — DIPHENHYDRAMINE HYDROCHLORIDE 25 MG: 50 INJECTION, SOLUTION INTRAMUSCULAR; INTRAVENOUS at 09:45

## 2021-01-01 RX ADMIN — POLYETHYLENE GLYCOL 3350 17 G: 17 POWDER, FOR SOLUTION ORAL at 11:39

## 2021-01-01 RX ADMIN — POTASSIUM CHLORIDE 40 MEQ: 1500 TABLET, EXTENDED RELEASE ORAL at 01:18

## 2021-01-01 RX ADMIN — POTASSIUM CHLORIDE 20 MEQ: 14.9 INJECTION, SOLUTION INTRAVENOUS at 01:25

## 2021-01-01 RX ADMIN — HEPARIN SODIUM 5000 UNITS: 5000 INJECTION INTRAVENOUS; SUBCUTANEOUS at 18:12

## 2021-01-01 RX ADMIN — METRONIDAZOLE 500 MG: 500 TABLET, FILM COATED ORAL at 06:02

## 2021-01-01 RX ADMIN — DIPHENHYDRAMINE HYDROCHLORIDE 25 MG: 50 INJECTION, SOLUTION INTRAMUSCULAR; INTRAVENOUS at 14:43

## 2021-01-01 RX ADMIN — POTASSIUM CHLORIDE 20 MEQ: 1500 TABLET, EXTENDED RELEASE ORAL at 08:28

## 2021-01-01 RX ADMIN — FENTANYL CITRATE 25 MCG: 50 INJECTION, SOLUTION INTRAMUSCULAR; INTRAVENOUS at 14:07

## 2021-01-01 RX ADMIN — DEXAMETHASONE SODIUM PHOSPHATE 20 MG: 10 INJECTION, SOLUTION INTRAMUSCULAR; INTRAVENOUS at 09:40

## 2021-01-01 RX ADMIN — SODIUM CHLORIDE, SODIUM GLUCONATE, SODIUM ACETATE, POTASSIUM CHLORIDE, MAGNESIUM CHLORIDE, SODIUM PHOSPHATE, DIBASIC, AND POTASSIUM PHOSPHATE 100 ML/HR: .53; .5; .37; .037; .03; .012; .00082 INJECTION, SOLUTION INTRAVENOUS at 01:51

## 2021-01-01 RX ADMIN — DEXAMETHASONE SODIUM PHOSPHATE 20 MG: 10 INJECTION, SOLUTION INTRAMUSCULAR; INTRAVENOUS at 10:08

## 2021-01-01 RX ADMIN — ACETAMINOPHEN 650 MG: 325 TABLET, FILM COATED ORAL at 09:23

## 2021-01-01 RX ADMIN — LIDOCAINE HYDROCHLORIDE 50 MG: 10 INJECTION, SOLUTION EPIDURAL; INFILTRATION; INTRACAUDAL; PERINEURAL at 13:42

## 2021-01-01 RX ADMIN — POTASSIUM CHLORIDE 20 MEQ: 1500 TABLET, EXTENDED RELEASE ORAL at 09:09

## 2021-01-01 RX ADMIN — PANTOPRAZOLE SODIUM 40 MG: 40 TABLET, DELAYED RELEASE ORAL at 10:50

## 2021-01-01 RX ADMIN — SODIUM CHLORIDE 20 ML/HR: 0.9 INJECTION, SOLUTION INTRAVENOUS at 10:03

## 2021-01-01 RX ADMIN — DEXAMETHASONE SODIUM PHOSPHATE 20 MG: 10 INJECTION, SOLUTION INTRAMUSCULAR; INTRAVENOUS at 09:30

## 2021-01-01 RX ADMIN — CEFEPIME HYDROCHLORIDE 1000 MG: 2 INJECTION, POWDER, FOR SOLUTION INTRAVENOUS at 02:45

## 2021-01-01 RX ADMIN — PACLITAXEL 91.2 MG: 6 INJECTION, SOLUTION INTRAVENOUS at 11:27

## 2021-01-01 RX ADMIN — DEXAMETHASONE SODIUM PHOSPHATE 20 MG: 10 INJECTION, SOLUTION INTRAMUSCULAR; INTRAVENOUS at 11:10

## 2021-01-01 RX ADMIN — HYDROMORPHONE HYDROCHLORIDE 4 MG: 4 TABLET ORAL at 16:59

## 2021-01-01 RX ADMIN — FENTANYL CITRATE 50 MCG: 50 INJECTION INTRAMUSCULAR; INTRAVENOUS at 12:17

## 2021-01-01 RX ADMIN — FAMOTIDINE 20 MG: 10 INJECTION, SOLUTION INTRAVENOUS at 11:10

## 2021-01-01 RX ADMIN — DEXAMETHASONE SODIUM PHOSPHATE 20 MG: 10 INJECTION, SOLUTION INTRAMUSCULAR; INTRAVENOUS at 09:21

## 2021-01-01 RX ADMIN — SODIUM CHLORIDE 20 ML/HR: 0.9 INJECTION, SOLUTION INTRAVENOUS at 10:18

## 2021-01-01 RX ADMIN — FAMOTIDINE 20 MG: 20 TABLET, FILM COATED ORAL at 09:08

## 2021-01-01 RX ADMIN — PACLITAXEL 90 MG: 6 INJECTION, SOLUTION, CONCENTRATE INTRAVENOUS at 11:18

## 2021-01-01 RX ADMIN — PACLITAXEL 90.6 MG: 6 INJECTION, SOLUTION, CONCENTRATE INTRAVENOUS at 12:04

## 2021-01-01 RX ADMIN — DEXAMETHASONE SODIUM PHOSPHATE 20 MG: 10 INJECTION, SOLUTION INTRAMUSCULAR; INTRAVENOUS at 10:25

## 2021-01-01 RX ADMIN — SODIUM CHLORIDE 20 ML/HR: 0.9 INJECTION, SOLUTION INTRAVENOUS at 08:22

## 2021-01-01 RX ADMIN — HALOPERIDOL LACTATE 1 MG: 5 INJECTION, SOLUTION INTRAMUSCULAR at 18:40

## 2021-01-01 RX ADMIN — FENTANYL CITRATE 50 MCG: 50 INJECTION INTRAMUSCULAR; INTRAVENOUS at 04:41

## 2021-01-01 RX ADMIN — DIPHENHYDRAMINE HYDROCHLORIDE 25 MG: 50 INJECTION, SOLUTION INTRAMUSCULAR; INTRAVENOUS at 10:51

## 2021-01-01 RX ADMIN — HEPARIN SODIUM 5000 UNITS: 5000 INJECTION INTRAVENOUS; SUBCUTANEOUS at 22:29

## 2021-01-01 RX ADMIN — FAMOTIDINE 20 MG: 20 TABLET, FILM COATED ORAL at 08:44

## 2021-01-01 RX ADMIN — METRONIDAZOLE 500 MG: 500 INJECTION, SOLUTION INTRAVENOUS at 11:20

## 2021-01-01 RX ADMIN — DEXAMETHASONE SODIUM PHOSPHATE 20 MG: 10 INJECTION, SOLUTION INTRAMUSCULAR; INTRAVENOUS at 10:29

## 2021-01-01 RX ADMIN — ACETAMINOPHEN 650 MG: 325 TABLET, FILM COATED ORAL at 07:55

## 2021-01-01 RX ADMIN — PACLITAXEL 91.2 MG: 6 INJECTION, SOLUTION INTRAVENOUS at 11:05

## 2021-01-01 RX ADMIN — HEPARIN SODIUM 5000 UNITS: 5000 INJECTION INTRAVENOUS; SUBCUTANEOUS at 21:28

## 2021-01-01 RX ADMIN — DIPHENHYDRAMINE HYDROCHLORIDE 25 MG: 50 INJECTION, SOLUTION INTRAMUSCULAR; INTRAVENOUS at 14:34

## 2021-01-01 RX ADMIN — IOHEXOL 100 ML: 350 INJECTION, SOLUTION INTRAVENOUS at 11:05

## 2021-01-01 RX ADMIN — CEFEPIME HYDROCHLORIDE 1000 MG: 2 INJECTION, POWDER, FOR SOLUTION INTRAVENOUS at 14:15

## 2021-01-01 RX ADMIN — FENTANYL CITRATE 100 MCG: 50 INJECTION INTRAMUSCULAR; INTRAVENOUS at 15:40

## 2021-01-01 RX ADMIN — SODIUM CHLORIDE, SODIUM GLUCONATE, SODIUM ACETATE, POTASSIUM CHLORIDE, MAGNESIUM CHLORIDE, SODIUM PHOSPHATE, DIBASIC, AND POTASSIUM PHOSPHATE 100 ML/HR: .53; .5; .37; .037; .03; .012; .00082 INJECTION, SOLUTION INTRAVENOUS at 22:33

## 2021-01-01 RX ADMIN — ACETAMINOPHEN 650 MG: 325 TABLET, FILM COATED ORAL at 01:15

## 2021-01-01 RX ADMIN — METRONIDAZOLE 500 MG: 500 INJECTION, SOLUTION INTRAVENOUS at 04:35

## 2021-01-01 RX ADMIN — SODIUM CHLORIDE 20 ML/HR: 9 INJECTION, SOLUTION INTRAVENOUS at 09:12

## 2021-01-01 RX ADMIN — SODIUM CHLORIDE 20 ML/HR: 0.9 INJECTION, SOLUTION INTRAVENOUS at 11:18

## 2021-01-01 RX ADMIN — FENTANYL CITRATE 100 MCG: 50 INJECTION INTRAMUSCULAR; INTRAVENOUS at 22:05

## 2021-01-01 RX ADMIN — SODIUM CHLORIDE 20 ML/HR: 0.9 INJECTION, SOLUTION INTRAVENOUS at 10:22

## 2021-01-01 RX ADMIN — CARBOPLATIN 338 MG: 10 INJECTION, SOLUTION INTRAVENOUS at 13:09

## 2021-01-01 RX ADMIN — HYDROMORPHONE HYDROCHLORIDE 4 MG: 4 TABLET ORAL at 08:43

## 2021-01-01 RX ADMIN — SODIUM CHLORIDE, SODIUM GLUCONATE, SODIUM ACETATE, POTASSIUM CHLORIDE, MAGNESIUM CHLORIDE, SODIUM PHOSPHATE, DIBASIC, AND POTASSIUM PHOSPHATE 100 ML/HR: .53; .5; .37; .037; .03; .012; .00082 INJECTION, SOLUTION INTRAVENOUS at 05:35

## 2021-01-01 RX ADMIN — AMOXICILLIN AND CLAVULANATE POTASSIUM 1 TABLET: 875; 125 TABLET, FILM COATED ORAL at 08:35

## 2021-01-01 RX ADMIN — SODIUM CHLORIDE 20 ML/HR: 9 INJECTION, SOLUTION INTRAVENOUS at 11:02

## 2021-01-01 RX ADMIN — SODIUM CHLORIDE 20 ML/HR: 0.9 INJECTION, SOLUTION INTRAVENOUS at 11:10

## 2021-01-01 RX ADMIN — SODIUM CHLORIDE, SODIUM GLUCONATE, SODIUM ACETATE, POTASSIUM CHLORIDE, MAGNESIUM CHLORIDE, SODIUM PHOSPHATE, DIBASIC, AND POTASSIUM PHOSPHATE 125 ML/HR: .53; .5; .37; .037; .03; .012; .00082 INJECTION, SOLUTION INTRAVENOUS at 07:58

## 2021-01-01 RX ADMIN — SODIUM CHLORIDE 1000 ML: 0.9 INJECTION, SOLUTION INTRAVENOUS at 11:30

## 2021-01-01 RX ADMIN — PALONOSETRON 0.25 MG: 0.05 INJECTION, SOLUTION INTRAVENOUS at 11:10

## 2021-01-01 RX ADMIN — SODIUM CHLORIDE 20 ML/HR: 0.9 INJECTION, SOLUTION INTRAVENOUS at 10:15

## 2021-01-01 RX ADMIN — METRONIDAZOLE 500 MG: 500 INJECTION, SOLUTION INTRAVENOUS at 05:36

## 2021-01-01 RX ADMIN — METRONIDAZOLE 500 MG: 500 INJECTION, SOLUTION INTRAVENOUS at 20:12

## 2021-01-01 RX ADMIN — METRONIDAZOLE 500 MG: 500 INJECTION, SOLUTION INTRAVENOUS at 12:42

## 2021-01-01 RX ADMIN — CEFEPIME HYDROCHLORIDE 2000 MG: 2 INJECTION, POWDER, FOR SOLUTION INTRAVENOUS at 01:35

## 2021-01-01 RX ADMIN — HEPARIN SODIUM 5000 UNITS: 5000 INJECTION INTRAVENOUS; SUBCUTANEOUS at 17:00

## 2021-01-01 RX ADMIN — SODIUM CHLORIDE, SODIUM GLUCONATE, SODIUM ACETATE, POTASSIUM CHLORIDE, MAGNESIUM CHLORIDE, SODIUM PHOSPHATE, DIBASIC, AND POTASSIUM PHOSPHATE 1000 ML: .53; .5; .37; .037; .03; .012; .00082 INJECTION, SOLUTION INTRAVENOUS at 02:41

## 2021-01-01 RX ADMIN — SODIUM CHLORIDE 1000 ML: 0.9 INJECTION, SOLUTION INTRAVENOUS at 07:53

## 2021-01-01 RX ADMIN — HEPARIN SODIUM 5000 UNITS: 5000 INJECTION INTRAVENOUS; SUBCUTANEOUS at 17:33

## 2021-01-01 RX ADMIN — FENTANYL CITRATE 50 MCG: 50 INJECTION INTRAMUSCULAR; INTRAVENOUS at 12:42

## 2021-01-01 RX ADMIN — FAMOTIDINE 20 MG: 10 INJECTION, SOLUTION INTRAVENOUS at 09:56

## 2021-01-01 RX ADMIN — FENTANYL CITRATE 50 MCG: 50 INJECTION, SOLUTION INTRAMUSCULAR; INTRAVENOUS at 13:42

## 2021-01-01 RX ADMIN — TRIMETHOBENZAMIDE HYDROCHLORIDE 200 MG: 100 INJECTION INTRAMUSCULAR at 15:49

## 2021-01-01 RX ADMIN — TAMSULOSIN HYDROCHLORIDE 0.4 MG: 0.4 CAPSULE ORAL at 17:32

## 2021-01-01 RX ADMIN — TRAMADOL HYDROCHLORIDE 50 MG: 50 TABLET, FILM COATED ORAL at 15:19

## 2021-01-01 RX ADMIN — DIPHENHYDRAMINE HYDROCHLORIDE 25 MG: 25 TABLET ORAL at 10:22

## 2021-01-01 RX ADMIN — FENTANYL CITRATE 50 MCG: 50 INJECTION INTRAMUSCULAR; INTRAVENOUS at 15:01

## 2021-01-01 RX ADMIN — CEFAZOLIN SODIUM 1000 MG: 1 SOLUTION INTRAVENOUS at 13:26

## 2021-01-01 RX ADMIN — DIPHENHYDRAMINE HYDROCHLORIDE 25 MG: 25 TABLET ORAL at 08:22

## 2021-01-01 RX ADMIN — PANTOPRAZOLE SODIUM 40 MG: 40 TABLET, DELAYED RELEASE ORAL at 05:33

## 2021-01-01 RX ADMIN — TOPOTECAN 5.3 MG: 1 INJECTION, SOLUTION, CONCENTRATE INTRAVENOUS at 09:45

## 2021-01-01 RX ADMIN — OXYCODONE HYDROCHLORIDE 10 MG: 10 TABLET ORAL at 22:34

## 2021-01-01 RX ADMIN — PANTOPRAZOLE SODIUM 40 MG: 40 INJECTION, POWDER, FOR SOLUTION INTRAVENOUS at 02:34

## 2021-01-01 RX ADMIN — FAMOTIDINE 20 MG: 10 INJECTION, SOLUTION INTRAVENOUS at 10:30

## 2021-01-01 RX ADMIN — SODIUM CHLORIDE 1000 ML: 0.9 INJECTION, SOLUTION INTRAVENOUS at 09:39

## 2021-01-01 RX ADMIN — DEXAMETHASONE SODIUM PHOSPHATE 10 MG: 10 INJECTION, SOLUTION INTRAMUSCULAR; INTRAVENOUS at 08:49

## 2021-01-01 RX ADMIN — POTASSIUM CHLORIDE 20 MEQ: 1500 TABLET, EXTENDED RELEASE ORAL at 08:35

## 2021-01-01 RX ADMIN — SODIUM CHLORIDE 20 ML/HR: 0.9 INJECTION, SOLUTION INTRAVENOUS at 08:56

## 2021-01-01 RX ADMIN — MAGNESIUM SULFATE HEPTAHYDRATE 1 G: 1 INJECTION, SOLUTION INTRAVENOUS at 02:26

## 2021-01-01 RX ADMIN — SODIUM CHLORIDE, SODIUM LACTATE, POTASSIUM CHLORIDE, AND CALCIUM CHLORIDE 125 ML/HR: .6; .31; .03; .02 INJECTION, SOLUTION INTRAVENOUS at 13:26

## 2021-01-01 RX ADMIN — AMOXICILLIN AND CLAVULANATE POTASSIUM 1 TABLET: 875; 125 TABLET, FILM COATED ORAL at 08:45

## 2021-01-01 RX ADMIN — HEPARIN SODIUM 5000 UNITS: 5000 INJECTION INTRAVENOUS; SUBCUTANEOUS at 09:35

## 2021-01-01 RX ADMIN — DIPHENHYDRAMINE HYDROCHLORIDE 25 MG: 50 INJECTION, SOLUTION INTRAMUSCULAR; INTRAVENOUS at 10:06

## 2021-01-01 RX ADMIN — HEPARIN SODIUM 5000 UNITS: 5000 INJECTION INTRAVENOUS; SUBCUTANEOUS at 06:08

## 2021-01-01 RX ADMIN — PALONOSETRON 0.25 MG: 0.05 INJECTION, SOLUTION INTRAVENOUS at 11:57

## 2021-01-01 RX ADMIN — OXYCODONE HYDROCHLORIDE 10 MG: 10 TABLET ORAL at 16:47

## 2021-01-01 RX ADMIN — DIPHENHYDRAMINE HYDROCHLORIDE 25 MG: 50 INJECTION, SOLUTION INTRAMUSCULAR; INTRAVENOUS at 09:57

## 2021-01-01 RX ADMIN — CARBOPLATIN 330 MG: 10 INJECTION, SOLUTION INTRAVENOUS at 14:21

## 2021-01-01 RX ADMIN — PANTOPRAZOLE SODIUM 40 MG: 40 TABLET, DELAYED RELEASE ORAL at 06:08

## 2021-01-01 RX ADMIN — TAMSULOSIN HYDROCHLORIDE 0.4 MG: 0.4 CAPSULE ORAL at 17:01

## 2021-01-01 RX ADMIN — DOCUSATE SODIUM 100 MG: 100 CAPSULE, LIQUID FILLED ORAL at 11:39

## 2021-01-01 RX ADMIN — PALONOSETRON 0.25 MG: 0.05 INJECTION, SOLUTION INTRAVENOUS at 11:17

## 2021-01-01 RX ADMIN — FENTANYL CITRATE 25 MCG: 50 INJECTION, SOLUTION INTRAMUSCULAR; INTRAVENOUS at 13:52

## 2021-01-01 RX ADMIN — CEFEPIME HYDROCHLORIDE 1000 MG: 2 INJECTION, POWDER, FOR SOLUTION INTRAVENOUS at 14:55

## 2021-01-01 RX ADMIN — HEPARIN SODIUM 5000 UNITS: 5000 INJECTION INTRAVENOUS; SUBCUTANEOUS at 05:36

## 2021-01-01 RX ADMIN — FOSAPREPITANT 150 MG: 150 INJECTION, POWDER, LYOPHILIZED, FOR SOLUTION INTRAVENOUS at 10:24

## 2021-01-01 RX ADMIN — PACLITAXEL 91.2 MG: 6 INJECTION, SOLUTION, CONCENTRATE INTRAVENOUS at 11:08

## 2021-01-01 RX ADMIN — FAMOTIDINE 20 MG: 20 TABLET, FILM COATED ORAL at 10:50

## 2021-01-01 RX ADMIN — HYDROCORTISONE SODIUM SUCCINATE 50 MG: 100 INJECTION, POWDER, FOR SOLUTION INTRAMUSCULAR; INTRAVENOUS at 14:37

## 2021-01-01 RX ADMIN — SODIUM CHLORIDE 20 ML/HR: 0.9 INJECTION, SOLUTION INTRAVENOUS at 10:00

## 2021-01-01 RX ADMIN — PANTOPRAZOLE SODIUM 40 MG: 40 TABLET, DELAYED RELEASE ORAL at 06:02

## 2021-01-01 RX ADMIN — DIPHENHYDRAMINE HYDROCHLORIDE 25 MG: 50 INJECTION, SOLUTION INTRAMUSCULAR; INTRAVENOUS at 10:08

## 2021-01-01 RX ADMIN — AMOXICILLIN AND CLAVULANATE POTASSIUM 1 TABLET: 875; 125 TABLET, FILM COATED ORAL at 10:46

## 2021-01-01 RX ADMIN — ONDANSETRON 4 MG: 2 INJECTION INTRAMUSCULAR; INTRAVENOUS at 01:34

## 2021-01-01 RX ADMIN — FENTANYL CITRATE 100 MCG: 50 INJECTION INTRAMUSCULAR; INTRAVENOUS at 06:49

## 2021-01-01 RX ADMIN — FENTANYL CITRATE 100 MCG: 50 INJECTION INTRAMUSCULAR; INTRAVENOUS at 01:41

## 2021-01-01 RX ADMIN — FAMOTIDINE 20 MG: 10 INJECTION INTRAVENOUS at 10:53

## 2021-01-01 RX ADMIN — HEPARIN SODIUM 5000 UNITS: 5000 INJECTION INTRAVENOUS; SUBCUTANEOUS at 22:14

## 2021-01-01 RX ADMIN — DIPHENHYDRAMINE HYDROCHLORIDE 25 MG: 25 TABLET ORAL at 09:23

## 2021-01-01 RX ADMIN — TAMSULOSIN HYDROCHLORIDE 0.4 MG: 0.4 CAPSULE ORAL at 16:47

## 2021-01-01 RX ADMIN — OXYBUTYNIN CHLORIDE 10 MG: 5 TABLET, EXTENDED RELEASE ORAL at 21:55

## 2021-01-01 RX ADMIN — HYDROMORPHONE HYDROCHLORIDE 2 MG: 2 TABLET ORAL at 15:50

## 2021-01-01 RX ADMIN — SODIUM CHLORIDE 20 ML/HR: 0.9 INJECTION, SOLUTION INTRAVENOUS at 09:39

## 2021-01-01 RX ADMIN — SODIUM CHLORIDE 20 ML/HR: 0.9 INJECTION, SOLUTION INTRAVENOUS at 10:23

## 2021-01-01 RX ADMIN — SODIUM CHLORIDE, SODIUM GLUCONATE, SODIUM ACETATE, POTASSIUM CHLORIDE, MAGNESIUM CHLORIDE, SODIUM PHOSPHATE, DIBASIC, AND POTASSIUM PHOSPHATE 100 ML/HR: .53; .5; .37; .037; .03; .012; .00082 INJECTION, SOLUTION INTRAVENOUS at 20:29

## 2021-01-12 NOTE — PLAN OF CARE
Problem: Potential for Falls  Goal: Patient will remain free of falls  Description: INTERVENTIONS:  - Assess patient frequently for physical needs  -  Identify cognitive and physical deficits and behaviors that affect risk of falls  -  [place] fall precautions as indicated by assessment   - Educate patient/family on patient safety including physical limitations  - Instruct patient to call for assistance with activity based on assessment  - Modify environment to reduce risk of injury  - Consider OT/PT consult to assist with strengthening/mobility  Outcome: Progressing     Problem: SAFETY ADULT  Goal: Patient will remain free of falls  Description: INTERVENTIONS:  - Assess patient frequently for physical needs  -  Identify cognitive and physical deficits and behaviors that affect risk of falls  -  [place] fall precautions as indicated by assessment   - Educate patient/family on patient safety including physical limitations  - Instruct patient to call for assistance with activity based on assessment  - Modify environment to reduce risk of injury  - Consider OT/PT consult to assist with strengthening/mobility  Outcome: Progressing     Problem: Knowledge Deficit  Goal: Patient/family/caregiver demonstrates understanding of disease process, treatment plan, medications, and discharge instructions  Description: Complete learning assessment and assess knowledge base    Interventions:  - Provide teaching at level of understanding  - Provide teaching via preferred learning methods  Outcome: Progressing

## 2021-01-12 NOTE — PROGRESS NOTES
Pt to clinic for initial taxol and carboplatin treatment, spoke with Kostas Anderson NP regarding pt's most recent creatinine, nu Dominguez Gave pt okay to receive treatment today, port accessed and gave excellent blood return, pt tolerated infusion without complications, aware of next appointment, port deaccessed prior to discharge, avs printed and reviewed

## 2021-01-19 NOTE — PROGRESS NOTES
Confirmed with [facility] RIGOBERTO Johnson pt is to receive Taxol and Carbo Day 1, Taxol Day 8, Taxol Day 15

## 2021-01-19 NOTE — PROGRESS NOTES
Pt presents for Taxol  Verbalizes intermittent neuropathy  That does not interfere with ADL's  Pt tolerated tx with out incident  AVS provided    Aware of next appt

## 2021-01-19 NOTE — PLAN OF CARE
Problem: Potential for Falls  Goal: Patient will remain free of falls  Description: INTERVENTIONS:  - Assess patient frequently for physical needs  -  Identify cognitive and physical deficits and behaviors that affect risk of falls  -  [place] fall precautions as indicated by assessment   - Educate patient/family on patient safety including physical limitations  - Instruct patient to call for assistance with activity based on assessment  - Modify environment to reduce risk of injury  - Consider OT/PT consult to assist with strengthening/mobility  Outcome: Progressing     Problem: Knowledge Deficit  Goal: Patient/family/caregiver demonstrates understanding of disease process, treatment plan, medications, and discharge instructions  Description: Complete learning assessment and assess knowledge base    Interventions:  - Provide teaching at level of understanding  - Provide teaching via preferred learning methods  Outcome: Progressing

## 2021-01-26 NOTE — TELEPHONE ENCOUNTER
Catherine called and wanted to reschedule her infusion appointment  I let her know that someone from Eladia's office will get back to her

## 2021-01-26 NOTE — TELEPHONE ENCOUNTER
----- Message from Zari Sung RN sent at 1/26/2021  8:26 AM EST -----  Regarding: Pt cancelling appointment for today  [facility],    I just wanted to give you a heads up that Bree Wells called here hoping to cancel and reschedule her appointment for today  I directed her to call your office to cancel, so you should receive a call soon      Thanks,  Zari Sung RN

## 2021-01-27 NOTE — PROGRESS NOTES
Liya Wood  Virtual Regular Visit      Assessment/Plan:    Problem List Items Addressed This Visit        Endocrine    Ovarian cancer [facility]) - Primary     Patient is very pleasant 59-year-old female with a history of stage IV ovarian cancer now 2 and half years since diagnosis  She is currently Denise haute treating with carboplatin paclitaxel  She has developed some moderate symptomatology of generalized malaise as well as potential new development of worsening neuropathy  At this point we discussed dose reduction  Since she is mid range will continue with the same dose  With regard to the miss day of treatment due to snow we will discontinue on and start the carboplatin paclitaxel with the next cycle and allow that day to go missing within the schedule  I reviewed the patient's blood work and all 3 weeks of treatment review team seem to be unremarkable  We will therefore continue present dose at same schedule  Overall consultation took 25 minutes with greater than 50% being dedicated toward discussion time  Reason for visit is chemotherapy management for recurrent ovarian cancer  Chief Complaint   Patient presents with    Virtual Regular Visit        Encounter provider Sun Butler MD    Provider located at [address] [place] [id number]      Recent Visits  No visits were found meeting these conditions  Showing recent visits within past 7 days and meeting all other requirements     Today's Visits  Date Type Provider Dept   01/27/21 Radha Koenig MD  [facility] [place]   Showing today's visits and meeting all other requirements     Future Appointments  No visits were found meeting these conditions  Showing future appointments within next 150 days and meeting all other requirements        The patient was identified by name and date of birth   Taz Richards was informed that this is a telemedicine visit and that the visit is being conducted through [employer] and patient was informed that this is not a secure, HIPAA-compliant platform  She agrees to proceed     My office door was closed  No one else was in the room  She acknowledged consent and understanding of privacy and security of the video platform  The patient has agreed to participate and understands they can discontinue the visit at any time  Patient is aware this is a billable service  Subjective  Adrianna Sanford is a 69 y o  female for management of 2nd cycle of carboplatin paclitaxel for recurrent ovarian cancer   Oncology History   Ovarian cancer ([facility] [address] )   11/9/2018 Initial Diagnosis    Ovarian cancer ([facility] [address] )   tumor marker 194 5     11/9/2018 Biopsy    CT-guided needle biopsy of abdominal mass 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  Given liver involvement this would be stage IV     11/14/2018 - 12/4/2018 Chemotherapy    Neoadjuvant therapy: carboplatin area under the curve of 6, paclitaxel 135 milligrams/meter squared, Avastin 10 milligrams/kilogram  Completed 1 of 3 cycles  12/14/2018 Surgery    LAPAROTOMY EXPLORATORY; Abdominal Washout; Application of Abthera Vac Dressing for suspected bowel perforation and septic shock        12/15/2018 Surgery    LAPAROTOMY EXPLORATORY, ABDOMINAL WASHOUT, DRAIN PLACEMENT x 4, DIVERTING LOOP ILEOSTOMY AND ABDOMINAL CLOSURE for bowel perforation     3/26/2019 -  Chemotherapy    Taxol weekly 80 mg/m2 for 3 consecutive weeks, may add carboplatin in the future with AUC of 5      3/26/2019 - 2/3/2020 Chemotherapy    palonosetron (ALOXI) injection 0 25 mg, 0 25 mg, Intravenous, Once, 6 of 6 cycles  Administration: 0 25 mg (5/28/2019), 0 25 mg (6/25/2019), 0 25 mg (11/6/2019), 0 25 mg (12/10/2019), 0 25 mg (1/7/2020)  fosaprepitant (EMEND) 150 mg in sodium chloride 0 9 % 255 mL IVPB, 150 mg, Intravenous, Once, 6 of 6 cycles  Administration: 150 mg (5/28/2019), 150 mg (6/25/2019), 150 mg (11/6/2019), 150 mg (12/10/2019), 150 mg (1/7/2020)  CARBOplatin (PARAPLATIN) in sodium chloride 0 9 % 250 mL IVPB,  (original dose ), Intravenous, Once, 6 of 6 cycles  Dose modification:   (Cycle 2),   (original dose 258 4 mg, Cycle 3),   (original dose 258 4 mg, Cycle 3),   (original dose 244 4 mg, Cycle 4)  Administration: 258 4 mg (5/28/2019), 244 4 mg (6/25/2019), 285 2 mg (11/6/2019), 296 4 mg (12/10/2019), 286 4 mg (1/7/2020)  PACLitaxel (TAXOL) 127 8 mg in sodium chloride 0 9 % 250 mL chemo IVPB, 80 mg/m2, Intravenous, Once, 7 of 7 cycles  Dose modification: 65 mg/m2 (original dose 80 mg/m2, Cycle 2, Reason: Dose Not Tolerated)  Administration: 103 8 mg (5/14/2019), 108 6 mg (5/28/2019), 108 6 mg (6/4/2019), 108 6 mg (6/11/2019), 109 8 mg (6/25/2019), 109 8 mg (7/2/2019), 109 8 mg (7/9/2019), 111 6 mg (11/6/2019), 111 6 mg (11/12/2019), 111 6 mg (11/19/2019), 113 4 mg (12/10/2019), 113 4 mg (12/17/2019), 113 4 mg (12/23/2019), 113 4 mg (1/7/2020), 114 6 mg (1/14/2020), 114 6 mg (1/21/2020)     4/29/2019 Genomic Testing     Foundation 1 testing revealed a PD L1 tumor proportions score of 0%  The patient is not a candidate for PD L1 manipulation      Genetic Testing    [facility] Breast/Gyn panel, wildtype  9/10/2019 Surgery    Exploratory laparotomy radical omentectomy, posterior exenteration, takedown of ileostomy and end colostomy for complete debulking of visible tumor  Final pathology report revealed high-grade serous malignancy in both the omentum and the pelvic mass     9/25/2019 -  Chemotherapy    Carboplatin area under the curve of 5+ weekly paclitaxel at 80 milligrams/meters squared for 3 further cycles of treatment followed by consolidation this is to begin mid October     10/2019 Surgery    Interval debulking with TAHBSO revision of colostomy omentectomy and tumor debulking with complete debulking    Several weeks postoperatively the patient required a open cholecystectomy       10/2019 - 12/2019 Chemotherapy    Carboplatin paclitaxel x3     10/9/2019 -  Cancer Staged    Staging form: Ovary, Fallopian Tube, Primary Peritoneal, AJCC 8th Edition  - Pathologic stage from 10/9/2019: FIGO Stage IIIC (pT3c, pN1, cM0) - Signed by Shantelle Collado MD on 10/9/2019  Histologic grade (G): G3  Histologic grading system: 4 grade system  Gross residual tumor after primary cyto-reductive surgery: Absent       3/24/2020 - 9/21/2020 Chemotherapy    palonosetron (ALOXI) injection 0 25 mg, 0 25 mg, Intravenous, Once, 6 of 7 cycles  Administration: 0 25 mg (3/24/2020), 0 25 mg (4/21/2020), 0 25 mg (5/19/2020), 0 25 mg (6/23/2020), 0 25 mg (7/28/2020), 0 25 mg (8/25/2020)  pegfilgrastim (NEULASTA ONPRO) subcutaneous injection kit 6 mg, 6 mg, Subcutaneous, Once, 6 of 7 cycles  Administration: 6 mg (3/31/2020), 6 mg (4/28/2020), 6 mg (5/26/2020), 6 mg (6/29/2020), 6 mg (8/4/2020), 6 mg (9/1/2020)  DOXOrubicin liposome (DOXIL) 54 3 mg in dextrose 5 % 250 mL chemo infusion, 30 mg/m2 = 54 3 mg, Intravenous, Once, 6 of 7 cycles  Administration: 54 3 mg (3/24/2020), 54 6 mg (4/21/2020), 54 9 mg (5/19/2020), 54 6 mg (6/23/2020), 54 9 mg (7/28/2020), 54 9 mg (8/25/2020)  gemcitabine (GEMZAR) 1,176 4 mg in sodium chloride 0 9 % 250 mL infusion, 650 mg/m2 = 1,176 4 mg, Intravenous, Once, 6 of 7 cycles  Administration: 1,176 4 mg (3/24/2020), 1,176 4 mg (3/31/2020), 1,182 9 mg (4/21/2020), 1,200 mg (4/28/2020), 1,189 4 mg (5/19/2020), 1,189 4 mg (5/26/2020), 1,182 9 mg (6/23/2020), 1,182 9 mg (6/29/2020), 1,189 4 mg (7/28/2020), 1,189 4 mg (8/4/2020), 1,189 4 mg (8/25/2020), 1,195 8 mg (9/1/2020)     1/12/2021 - 1/12/2021 Chemotherapy    palonosetron (ALOXI) injection 0 25 mg, 0 25 mg, Intravenous, Once, 0 of 6 cycles  fosaprepitant (EMEND) 150 mg in sodium chloride 0 9 % 250 mL IVPB, 150 mg, Intravenous, Once, 0 of 6 cycles  CARBOplatin (PARAPLATIN) in sodium chloride 0 9 % 250 mL IVPB, , Intravenous, Once, 0 of 6 cycles  PACLitaxel (TAXOL) 246 mg in sodium chloride 0 9 % 500 mL chemo IVPB, 135 mg/m2 = 246 mg (77 1 % of original dose 175 mg/m2), Intravenous, Once, 0 of 6 cycles  Dose modification: 135 mg/m2 (original dose 175 mg/m2, Cycle 1, Reason: Other (See Comments), Comment: heavily pretreated)     1/12/2021 -  Chemotherapy    palonosetron (ALOXI) injection 0 25 mg, 0 25 mg, Intravenous, Once, 1 of 6 cycles  Administration: 0 25 mg (1/12/2021)  fosaprepitant (EMEND) 150 mg in sodium chloride 0 9 % 250 mL IVPB, 150 mg, Intravenous, Once, 1 of 6 cycles  Administration: 150 mg (1/12/2021)  CARBOplatin (PARAPLATIN) 311 mg in sodium chloride 0 9 % 250 mL IVPB, 311 mg, Intravenous, Once, 1 of 6 cycles  Administration: 311 mg (1/12/2021)  PACLitaxel (TAXOL) 91 2 mg in sodium chloride 0 9 % 250 mL chemo IVPB, 50 mg/m2 = 91 2 mg (62 5 % of original dose 80 mg/m2), Intravenous, Once, 1 of 6 cycles  Dose modification: 50 mg/m2 (original dose 80 mg/m2, Cycle 1, Reason: Other (See Comments))  Administration: 91 2 mg (1/12/2021), 90 6 mg (1/19/2021)           Patient is very pleasant 24-year-old female with history of stage IV B endometrial adenocarcinoma status post neoadjuvant carboplatin paclitaxel and Avastin after diagnosis in the fall of 2018  She developed a bowel perforation and was subsequently treated with carboplatin paclitaxel for her 1st cycle  She required surgical evacuation and colonic diversion  Since that time the patient has been often on chemotherapy due to recurrent and persistent disease  Recently she has been on a approximately 6 month chemo holiday but due to elevation in her  chemotherapy was re-initiated with carboplatin and paclitaxel on January 12th  She had weekly taxol  She was unable to receive the third treatment due to weather  I have reviewed her 3 complete blood counts and 3 complete metabolic profiles    Overall there has been no significant hematologic or biochemical exacerbations of laboratories  Her hemoglobin remains about 10  She has not had a significant decrease in her white blood cell count  Her creatinine remains approximately a 1 3  She has mild elevation in her alkaline phosphatase but not significant enough to require dose change  Past Medical History:   Diagnosis Date    Abdominal fluid collection     Anemia     Asthma     Cancer ([facility] [address] )     ovaries    Diabetes mellitus ([facility] [address] )     History of transfusion     PONV (postoperative nausea and vomiting)        Past Surgical History:   Procedure Laterality Date    ABDOMINAL SURGERY      CHOLECYSTECTOMY N/A 10/20/2019    Procedure: CHOLECYSTECTOMY, open;  Surgeon: Venkata Hagen MD;  Location: [facility];  Service: General    CT GUIDED PARACENTESIS  11/6/2018    CT GUIDED PERC DRAINAGE CATHETER PLACEMENT  12/24/2018    CT NEEDLE BIOPSY PERITONEUM  11/6/2018    CYSTOSCOPY N/A 9/10/2019    Procedure: CYSTOSCOPY , INSERTION URETERAL CATHETERS;  Surgeon: Damon Rogers MD;  [facility];  Service: Gynecology Oncology    ECTOPIC PREGNANCY SURGERY      ECTOPIC PREGNANCY SURGERY      EXENTERATION PELVIS N/A 9/10/2019    Procedure: EXPLORATORY LAPAROTOMY, EXENTERATION POSTERIOR PELVIS,  END COLOSTOMY, ILEOSTOMY REVERSAL, LYSIS OF ADHESIONS, rADICAL OMENTECTOMY AND TUMOR DEBULKINGFLEXIBLE SIGMOIDOSCOPY, CYSTOGRAM, INSPECTION OF URETERS;  Surgeon: Damon Rogers MD;  [facility];  Service: Gynecology Oncology    IR PARACENTESIS  9/18/2018    IR PARACENTESIS  10/18/2018    IR PARACENTESIS  12/10/2018    IR PORT PLACEMENT  11/29/2018    IR THORACENTESIS  9/16/2019    LAPAROTOMY N/A 12/14/2018    Procedure: LAPAROTOMY EXPLORATORY; Abdominal Washout;  Application of Abthera Vac Dressing;  Surgeon: Harriet Morataya MD;  [facility];  Service: Gynecology Oncology    LAPAROTOMY N/A 12/15/2018    Procedure: LAPAROTOMY EXPLORATORY, ABDOMINAL Loraine Passer PLACEMENT x 4, DIVERTING LOOP ILEOSTOMY AND ABDOMINAL CLOSURE,  ALL OTHER INDICATED PROCEDURES;  Surgeon: Barbara Graham MD;  [facility];  Service: Gynecology Oncology    AK COLONOSCOPY FLX DX W/LATESHAJ [facility] WHEN PFRMD N/A 9/25/2018    Procedure: EGD AND COLONOSCOPY;  Surgeon: Villa Bonner MD;  Location: [facility]; Service: Gastroenterology    TONSILECTOMY AND ADNOIDECTOMY      TONSILLECTOMY         Current Outpatient Medications   Medication Sig Dispense Refill    aspirin-acetaminophen-caffeine (EXCEDRIN MIGRAINE) 250-250-65 MG per tablet Take 1 tablet by mouth every 6 (six) hours as needed for headaches      gabapentin (NEURONTIN) 300 mg capsule Take 1 capsule (300 mg total) by mouth 3 (three) times a day 90 capsule 0    nystatin (MYCOSTATIN) powder Apply topically 3 (three) times a day 15 g 4    pantoprazole (PROTONIX) 40 mg tablet Take 1 tablet (40 mg total) by mouth 2 (two) times a day before meals  0    potassium chloride (K-DUR,KLOR-CON) 20 mEq tablet Take 1 tablet (20 mEq total) by mouth daily 30 tablet 0    traMADol (ULTRAM) 50 mg tablet Take 1 tablet (50 mg total) by mouth every 6 (six) hours as needed for moderate pain 20 tablet 1     No current facility-administered medications for this visit  No Known Allergies    Review of Systems   Constitutional: Negative  HENT: Negative  Eyes: Negative  Respiratory: Negative  Cardiovascular: Negative  Gastrointestinal: Negative  Endocrine: Negative  Genitourinary: Negative  Musculoskeletal: Negative  Skin: Negative  Neurological: Negative  Hematological: Negative  Psychiatric/Behavioral: Negative  Video Exam    There were no vitals filed for this visit  Physical Exam   It was my intent to perform this visit via video technology but the patient was not able to do a video connection so the visit was completed via audio telephone only    I spent 25 minutes directly with the patient during this visit      VIRTUAL VISIT DISCLAIMER    Catherine Hong acknowledges that she has consented to an online visit or consultation  She understands that the online visit is based solely on information provided by her, and that, in the absence of a face-to-face physical evaluation by the physician, the diagnosis she receives is both limited and provisional in terms of accuracy and completeness  This is not intended to replace a full medical face-to-face evaluation by the physician  Bree Wells understands and accepts these terms

## 2021-01-27 NOTE — ASSESSMENT & PLAN NOTE
Patient is very pleasant 27-year-old female with a history of stage IV ovarian cancer now 2 and half years since diagnosis  She is currently Denise haute treating with carboplatin paclitaxel  She has developed some moderate symptomatology of generalized malaise as well as potential new development of worsening neuropathy  At this point we discussed dose reduction  Since she is mid range will continue with the same dose  With regard to the miss day of treatment due to snow we will discontinue on and start the carboplatin paclitaxel with the next cycle and allow that day to go missing within the schedule  I reviewed the patient's blood work and all 3 weeks of treatment review team seem to be unremarkable  We will therefore continue present dose at same schedule  Overall consultation took 25 minutes with greater than 50% being dedicated toward discussion time

## 2021-01-27 NOTE — LETTER
January 27, 2021     Breanne Devlin, [id number] [facility] [address]    Patient: Shay Lamb   YOB: 1950   Date of Visit: 1/27/2021       Dear Dr Alek Cain:    Thank you for referring Shay Lamb to me for evaluation  Below are my notes for this consultation  If you have questions, please do not hesitate to call me  I look forward to following your patient along with you  Sincerely,        Cornelia Miranda MD        CC: MD Cornelia Rodriguez MD  1/27/2021 10:42 AM  Sign when Signing Visit   Buddy Richard  Virtual Regular Visit      Assessment/Plan:    Problem List Items Addressed This Visit        Endocrine    Ovarian cancer [facility]) - Primary     Patient is very pleasant 69-year-old female with a history of stage IV ovarian cancer now 2 and half years since diagnosis  She is currently Denise haute treating with carboplatin paclitaxel  She has developed some moderate symptomatology of generalized malaise as well as potential new development of worsening neuropathy  At this point we discussed dose reduction  Since she is mid range will continue with the same dose  With regard to the miss day of treatment due to snow we will discontinue on and start the carboplatin paclitaxel with the next cycle and allow that day to go missing within the schedule  I reviewed the patient's blood work and all 3 weeks of treatment review team seem to be unremarkable  We will therefore continue present dose at same schedule  Overall consultation took 25 minutes with greater than 50% being dedicated toward discussion time                      Reason for visit is chemotherapy management for recurrent ovarian cancer  Chief Complaint   Patient presents with    Virtual Regular Visit        Encounter provider Cornelia Miranda MD    Provider located at [address] [place] [id number]      Recent Visits  No visits were found meeting these conditions  Showing recent visits within past 7 days and meeting all other requirements     Today's Visits  Date Type Provider Dept   01/27/21 Alis Birmingham MD [facility]   Showing today's visits and meeting all other requirements     Future Appointments  No visits were found meeting these conditions  Showing future appointments within next 150 days and meeting all other requirements        The patient was identified by name and date of birth  Kimmy Gilbert was informed that this is a telemedicine visit and that the visit is being conducted through [employer] and patient was informed that this is not a secure, HIPAA-compliant platform  She agrees to proceed     My office door was closed  No one else was in the room  She acknowledged consent and understanding of privacy and security of the video platform  The patient has agreed to participate and understands they can discontinue the visit at any time  Patient is aware this is a billable service  Subjective  Kimmy Gilbert is a 69 y o  female for management of 2nd cycle of carboplatin paclitaxel for recurrent ovarian cancer   Oncology History   Ovarian cancer ([facility] [address] )   11/9/2018 Initial Diagnosis    Ovarian cancer ([facility] [address] )   tumor marker 194 5     11/9/2018 Biopsy    CT-guided needle biopsy of abdominal mass 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  Given liver involvement this would be stage IV     11/14/2018 - 12/4/2018 Chemotherapy    Neoadjuvant therapy: carboplatin area under the curve of 6, paclitaxel 135 milligrams/meter squared, Avastin 10 milligrams/kilogram  Completed 1 of 3 cycles  12/14/2018 Surgery    LAPAROTOMY EXPLORATORY; Abdominal Washout; Application of Abthera Vac Dressing for suspected bowel perforation and septic shock        12/15/2018 Surgery    LAPAROTOMY EXPLORATORY, ABDOMINAL WASHOUT, DRAIN PLACEMENT x 4, DIVERTING LOOP ILEOSTOMY AND ABDOMINAL CLOSURE for bowel perforation     3/26/2019 -  Chemotherapy    Taxol weekly 80 mg/m2 for 3 consecutive weeks, may add carboplatin in the future with AUC of 5      3/26/2019 - 2/3/2020 Chemotherapy    palonosetron (ALOXI) injection 0 25 mg, 0 25 mg, Intravenous, Once, 6 of 6 cycles  Administration: 0 25 mg (5/28/2019), 0 25 mg (6/25/2019), 0 25 mg (11/6/2019), 0 25 mg (12/10/2019), 0 25 mg (1/7/2020)  fosaprepitant (EMEND) 150 mg in sodium chloride 0 9 % 255 mL IVPB, 150 mg, Intravenous, Once, 6 of 6 cycles  Administration: 150 mg (5/28/2019), 150 mg (6/25/2019), 150 mg (11/6/2019), 150 mg (12/10/2019), 150 mg (1/7/2020)  CARBOplatin (PARAPLATIN) in sodium chloride 0 9 % 250 mL IVPB,  (original dose ), Intravenous, Once, 6 of 6 cycles  Dose modification:   (Cycle 2),   (original dose 258 4 mg, Cycle 3),   (original dose 258 4 mg, Cycle 3),   (original dose 244 4 mg, Cycle 4)  Administration: 258 4 mg (5/28/2019), 244 4 mg (6/25/2019), 285 2 mg (11/6/2019), 296 4 mg (12/10/2019), 286 4 mg (1/7/2020)  PACLitaxel (TAXOL) 127 8 mg in sodium chloride 0 9 % 250 mL chemo IVPB, 80 mg/m2, Intravenous, Once, 7 of 7 cycles  Dose modification: 65 mg/m2 (original dose 80 mg/m2, Cycle 2, Reason: Dose Not Tolerated)  Administration: 103 8 mg (5/14/2019), 108 6 mg (5/28/2019), 108 6 mg (6/4/2019), 108 6 mg (6/11/2019), 109 8 mg (6/25/2019), 109 8 mg (7/2/2019), 109 8 mg (7/9/2019), 111 6 mg (11/6/2019), 111 6 mg (11/12/2019), 111 6 mg (11/19/2019), 113 4 mg (12/10/2019), 113 4 mg (12/17/2019), 113 4 mg (12/23/2019), 113 4 mg (1/7/2020), 114 6 mg (1/14/2020), 114 6 mg (1/21/2020)     4/29/2019 Genomic Testing     Foundation 1 testing revealed a PD L1 tumor proportions score of 0%  The patient is not a candidate for PD L1 manipulation      Genetic Testing    [facility] Breast/Gyn panel, wildtype       9/10/2019 Surgery    Exploratory laparotomy radical omentectomy, posterior exenteration, takedown of ileostomy and end colostomy for complete debulking of visible tumor  Final pathology report revealed high-grade serous malignancy in both the omentum and the pelvic mass     9/25/2019 -  Chemotherapy    Carboplatin area under the curve of 5+ weekly paclitaxel at 80 milligrams/meters squared for 3 further cycles of treatment followed by consolidation this is to begin mid October     10/2019 Surgery    Interval debulking with TAHBSO revision of colostomy omentectomy and tumor debulking with complete debulking  Several weeks postoperatively the patient required a open cholecystectomy       10/2019 - 12/2019 Chemotherapy    Carboplatin paclitaxel x3     10/9/2019 -  Cancer Staged    Staging form: Ovary, Fallopian Tube, Primary Peritoneal, AJCC 8th Edition  - Pathologic stage from 10/9/2019: FIGO Stage IIIC (pT3c, pN1, cM0) - Signed by Vaishnavi Rouse MD on 10/9/2019  Histologic grade (G): G3  Histologic grading system: 4 grade system  Gross residual tumor after primary cyto-reductive surgery: Absent       3/24/2020 - 9/21/2020 Chemotherapy    palonosetron (ALOXI) injection 0 25 mg, 0 25 mg, Intravenous, Once, 6 of 7 cycles  Administration: 0 25 mg (3/24/2020), 0 25 mg (4/21/2020), 0 25 mg (5/19/2020), 0 25 mg (6/23/2020), 0 25 mg (7/28/2020), 0 25 mg (8/25/2020)  pegfilgrastim (NEULASTA ONPRO) subcutaneous injection kit 6 mg, 6 mg, Subcutaneous, Once, 6 of 7 cycles  Administration: 6 mg (3/31/2020), 6 mg (4/28/2020), 6 mg (5/26/2020), 6 mg (6/29/2020), 6 mg (8/4/2020), 6 mg (9/1/2020)  DOXOrubicin liposome (DOXIL) 54 3 mg in dextrose 5 % 250 mL chemo infusion, 30 mg/m2 = 54 3 mg, Intravenous, Once, 6 of 7 cycles  Administration: 54 3 mg (3/24/2020), 54 6 mg (4/21/2020), 54 9 mg (5/19/2020), 54 6 mg (6/23/2020), 54 9 mg (7/28/2020), 54 9 mg (8/25/2020)  gemcitabine (GEMZAR) 1,176 4 mg in sodium chloride 0 9 % 250 mL infusion, 650 mg/m2 = 1,176 4 mg, Intravenous, Once, 6 of 7 cycles  Administration: 1,176 4 mg (3/24/2020), 1,176 4 mg (3/31/2020), 1,182 9 mg (4/21/2020), 1,200 mg (4/28/2020), 1,189 4 mg (5/19/2020), 1,189 4 mg (5/26/2020), 1,182 9 mg (6/23/2020), 1,182 9 mg (6/29/2020), 1,189 4 mg (7/28/2020), 1,189 4 mg (8/4/2020), 1,189 4 mg (8/25/2020), 1,195 8 mg (9/1/2020)     1/12/2021 - 1/12/2021 Chemotherapy    palonosetron (ALOXI) injection 0 25 mg, 0 25 mg, Intravenous, Once, 0 of 6 cycles  fosaprepitant (EMEND) 150 mg in sodium chloride 0 9 % 250 mL IVPB, 150 mg, Intravenous, Once, 0 of 6 cycles  CARBOplatin (PARAPLATIN) in sodium chloride 0 9 % 250 mL IVPB, , Intravenous, Once, 0 of 6 cycles  PACLitaxel (TAXOL) 246 mg in sodium chloride 0 9 % 500 mL chemo IVPB, 135 mg/m2 = 246 mg (77 1 % of original dose 175 mg/m2), Intravenous, Once, 0 of 6 cycles  Dose modification: 135 mg/m2 (original dose 175 mg/m2, Cycle 1, Reason: Other (See Comments), Comment: heavily pretreated)     1/12/2021 -  Chemotherapy    palonosetron (ALOXI) injection 0 25 mg, 0 25 mg, Intravenous, Once, 1 of 6 cycles  Administration: 0 25 mg (1/12/2021)  fosaprepitant (EMEND) 150 mg in sodium chloride 0 9 % 250 mL IVPB, 150 mg, Intravenous, Once, 1 of 6 cycles  Administration: 150 mg (1/12/2021)  CARBOplatin (PARAPLATIN) 311 mg in sodium chloride 0 9 % 250 mL IVPB, 311 mg, Intravenous, Once, 1 of 6 cycles  Administration: 311 mg (1/12/2021)  PACLitaxel (TAXOL) 91 2 mg in sodium chloride 0 9 % 250 mL chemo IVPB, 50 mg/m2 = 91 2 mg (62 5 % of original dose 80 mg/m2), Intravenous, Once, 1 of 6 cycles  Dose modification: 50 mg/m2 (original dose 80 mg/m2, Cycle 1, Reason: Other (See Comments))  Administration: 91 2 mg (1/12/2021), 90 6 mg (1/19/2021)           Patient is very pleasant 69-year-old female with history of stage IV B endometrial adenocarcinoma status post neoadjuvant carboplatin paclitaxel and Avastin after diagnosis in the fall of 2018  She developed a bowel perforation and was subsequently treated with carboplatin paclitaxel for her 1st cycle    She required surgical evacuation and colonic diversion  Since that time the patient has been often on chemotherapy due to recurrent and persistent disease  Recently she has been on a approximately 6 month chemo holiday but due to elevation in her  chemotherapy was re-initiated with carboplatin and paclitaxel on January 12th  She had weekly taxol  She was unable to receive the third treatment due to weather  I have reviewed her 3 complete blood counts and 3 complete metabolic profiles  Overall there has been no significant hematologic or biochemical exacerbations of laboratories  Her hemoglobin remains about 10  She has not had a significant decrease in her white blood cell count  Her creatinine remains approximately a 1 3  She has mild elevation in her alkaline phosphatase but not significant enough to require dose change           Past Medical History:   Diagnosis Date    Abdominal fluid collection     Anemia     Asthma     Cancer ([facility] [address] )     ovaries    Diabetes mellitus ([facility] [address] )     History of transfusion     PONV (postoperative nausea and vomiting)        Past Surgical History:   Procedure Laterality Date    ABDOMINAL SURGERY      CHOLECYSTECTOMY N/A 10/20/2019    Procedure: CHOLECYSTECTOMY, open;  Surgeon: Derek Lee MD;  Location: [facility];  Service: General    CT GUIDED PARACENTESIS  11/6/2018    CT GUIDED PERC DRAINAGE CATHETER PLACEMENT  12/24/2018    CT NEEDLE BIOPSY PERITONEUM  11/6/2018    CYSTOSCOPY N/A 9/10/2019    Procedure: CYSTOSCOPY , INSERTION URETERAL CATHETERS;  Surgeon: Violet Felton MD;  Location:  MAIN OR;  Service: Gynecology Oncology    ECTOPIC PREGNANCY SURGERY      ECTOPIC PREGNANCY SURGERY      EXENTERATION PELVIS N/A 9/10/2019    Procedure: EXPLORATORY LAPAROTOMY, EXENTERATION POSTERIOR PELVIS,  END COLOSTOMY, ILEOSTOMY REVERSAL, LYSIS OF ADHESIONS, rADICAL OMENTECTOMY AND TUMOR DEBULKINGFLEXIBLE SIGMOIDOSCOPY, CYSTOGRAM, INSPECTION OF URETERS;  Surgeon: Sukhjinder Soler MD;  [facility];  Service: Gynecology Oncology    IR PARACENTESIS  9/18/2018    IR PARACENTESIS  10/18/2018    IR PARACENTESIS  12/10/2018    IR PORT PLACEMENT  11/29/2018    IR THORACENTESIS  9/16/2019    LAPAROTOMY N/A 12/14/2018    Procedure: LAPAROTOMY EXPLORATORY; Abdominal Washout; Application of Abthera Vac Dressing;  Surgeon: Ronn Anton MD;  [facility];  Service: Gynecology Oncology    LAPAROTOMY N/A 12/15/2018    Procedure: LAPAROTOMY EXPLORATORY, ABDOMINAL WASHOUT, DRAIN PLACEMENT x 4, DIVERTING LOOP ILEOSTOMY AND ABDOMINAL CLOSURE,  ALL OTHER INDICATED PROCEDURES;  Surgeon: Ronn Anton MD;  [facility]: [facility];  Service: Gynecology Oncology    NE COLONOSCOPY FLX DX W/COLLJ [facility] WHEN PFRMD N/A 9/25/2018    Procedure: EGD AND COLONOSCOPY;  Surgeon: Karyn Willis MD;  Location: [facility]; Service: Gastroenterology    TONSILECTOMY AND ADNOIDECTOMY      TONSILLECTOMY         Current Outpatient Medications   Medication Sig Dispense Refill    aspirin-acetaminophen-caffeine (EXCEDRIN MIGRAINE) 250-250-65 MG per tablet Take 1 tablet by mouth every 6 (six) hours as needed for headaches      gabapentin (NEURONTIN) 300 mg capsule Take 1 capsule (300 mg total) by mouth 3 (three) times a day 90 capsule 0    nystatin (MYCOSTATIN) powder Apply topically 3 (three) times a day 15 g 4    pantoprazole (PROTONIX) 40 mg tablet Take 1 tablet (40 mg total) by mouth 2 (two) times a day before meals  0    potassium chloride (K-DUR,KLOR-CON) 20 mEq tablet Take 1 tablet (20 mEq total) by mouth daily 30 tablet 0    traMADol (ULTRAM) 50 mg tablet Take 1 tablet (50 mg total) by mouth every 6 (six) hours as needed for moderate pain 20 tablet 1     No current facility-administered medications for this visit  No Known Allergies    Review of Systems   Constitutional: Negative  HENT: Negative  Eyes: Negative  Respiratory: Negative  Cardiovascular: Negative  Gastrointestinal: Negative  Endocrine: Negative  Genitourinary: Negative  Musculoskeletal: Negative  Skin: Negative  Neurological: Negative  Hematological: Negative  Psychiatric/Behavioral: Negative  Video Exam    There were no vitals filed for this visit  Physical Exam   It was my intent to perform this visit via video technology but the patient was not able to do a video connection so the visit was completed via audio telephone only  I spent 25 minutes directly with the patient during this visit      [address] acknowledges that she has consented to an online visit or consultation  She understands that the online visit is based solely on information provided by her, and that, in the absence of a face-to-face physical evaluation by the physician, the diagnosis she receives is both limited and provisional in terms of accuracy and completeness  This is not intended to replace a full medical face-to-face evaluation by the physician  Kimmy Gilbert understands and accepts these terms

## 2021-01-27 NOTE — LETTER
January 27, 2021     Gladys Estrada, [id number] [facility] [address]    Patient: Aline Fernandez   YOB: 1950   Date of Visit: 1/27/2021       Dear Dr Nataliia Campos:    Thank you for referring Aline Fernandez to me for evaluation  Below are my notes for this consultation  If you have questions, please do not hesitate to call me  I look forward to following your patient along with you  Sincerely,        Dillon Washington MD        CC: MD Dillon Quiroga MD  1/27/2021 10:41 AM  Sign when Signing Visit   Karl Diamond  Virtual Regular Visit      Assessment/Plan:    Problem List Items Addressed This Visit        Endocrine    Ovarian cancer [facility]) - Primary     Patient is very pleasant 14-year-old female with a history of stage IV ovarian cancer now 2 and half years since diagnosis  She is currently Denise haute treating with carboplatin paclitaxel  She has developed some moderate symptomatology of generalized malaise as well as potential new development of worsening neuropathy  At this point we discussed dose reduction  Since she is mid range will continue with the same dose  With regard to the miss day of treatment due to snow we will discontinue on and start the carboplatin paclitaxel with the next cycle and allow that day to go missing within the schedule  I reviewed the patient's blood work and all 3 weeks of treatment review team seem to be unremarkable  We will therefore continue present dose at same schedule  Overall consultation took 25 minutes with greater than 50% being dedicated toward discussion time                      Reason for visit is chemotherapy management for recurrent ovarian cancer  Chief Complaint   Patient presents with    Virtual Regular Visit        Encounter provider Dillon Washington MD    Provider located at [address] [place] [id number]      Recent Visits  No visits were found meeting these conditions  Showing recent visits within past 7 days and meeting all other requirements     Today's Visits  Date Type Provider Dept   01/27/21 Jerome Modi MD [facility]   Showing today's visits and meeting all other requirements     Future Appointments  No visits were found meeting these conditions  Showing future appointments within next 150 days and meeting all other requirements        The patient was identified by name and date of birth  Se Jean was informed that this is a telemedicine visit and that the visit is being conducted through [employer] and patient was informed that this is not a secure, HIPAA-compliant platform  She agrees to proceed     My office door was closed  No one else was in the room  She acknowledged consent and understanding of privacy and security of the video platform  The patient has agreed to participate and understands they can discontinue the visit at any time  Patient is aware this is a billable service  Subjective  Se Jean is a 69 y o  female for management of 2nd cycle of carboplatin paclitaxel for recurrent ovarian cancer   Oncology History   Ovarian cancer ([facility] [address] )   11/9/2018 Initial Diagnosis    Ovarian cancer ([facility] [address] )   tumor marker 194 5     11/9/2018 Biopsy    CT-guided needle biopsy of abdominal mass 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  Given liver involvement this would be stage IV     11/14/2018 - 12/4/2018 Chemotherapy    Neoadjuvant therapy: carboplatin area under the curve of 6, paclitaxel 135 milligrams/meter squared, Avastin 10 milligrams/kilogram  Completed 1 of 3 cycles  12/14/2018 Surgery    LAPAROTOMY EXPLORATORY; Abdominal Washout; Application of Abthera Vac Dressing for suspected bowel perforation and septic shock        12/15/2018 Surgery    LAPAROTOMY EXPLORATORY, ABDOMINAL WASHOUT, DRAIN PLACEMENT x 4, DIVERTING LOOP ILEOSTOMY AND ABDOMINAL CLOSURE for bowel perforation     3/26/2019 -  Chemotherapy    Taxol weekly 80 mg/m2 for 3 consecutive weeks, may add carboplatin in the future with AUC of 5      3/26/2019 - 2/3/2020 Chemotherapy    palonosetron (ALOXI) injection 0 25 mg, 0 25 mg, Intravenous, Once, 6 of 6 cycles  Administration: 0 25 mg (5/28/2019), 0 25 mg (6/25/2019), 0 25 mg (11/6/2019), 0 25 mg (12/10/2019), 0 25 mg (1/7/2020)  fosaprepitant (EMEND) 150 mg in sodium chloride 0 9 % 255 mL IVPB, 150 mg, Intravenous, Once, 6 of 6 cycles  Administration: 150 mg (5/28/2019), 150 mg (6/25/2019), 150 mg (11/6/2019), 150 mg (12/10/2019), 150 mg (1/7/2020)  CARBOplatin (PARAPLATIN) in sodium chloride 0 9 % 250 mL IVPB,  (original dose ), Intravenous, Once, 6 of 6 cycles  Dose modification:   (Cycle 2),   (original dose 258 4 mg, Cycle 3),   (original dose 258 4 mg, Cycle 3),   (original dose 244 4 mg, Cycle 4)  Administration: 258 4 mg (5/28/2019), 244 4 mg (6/25/2019), 285 2 mg (11/6/2019), 296 4 mg (12/10/2019), 286 4 mg (1/7/2020)  PACLitaxel (TAXOL) 127 8 mg in sodium chloride 0 9 % 250 mL chemo IVPB, 80 mg/m2, Intravenous, Once, 7 of 7 cycles  Dose modification: 65 mg/m2 (original dose 80 mg/m2, Cycle 2, Reason: Dose Not Tolerated)  Administration: 103 8 mg (5/14/2019), 108 6 mg (5/28/2019), 108 6 mg (6/4/2019), 108 6 mg (6/11/2019), 109 8 mg (6/25/2019), 109 8 mg (7/2/2019), 109 8 mg (7/9/2019), 111 6 mg (11/6/2019), 111 6 mg (11/12/2019), 111 6 mg (11/19/2019), 113 4 mg (12/10/2019), 113 4 mg (12/17/2019), 113 4 mg (12/23/2019), 113 4 mg (1/7/2020), 114 6 mg (1/14/2020), 114 6 mg (1/21/2020)     4/29/2019 Genomic Testing     Foundation 1 testing revealed a PD L1 tumor proportions score of 0%  The patient is not a candidate for PD L1 manipulation      Genetic Testing    [facility] Breast/Gyn panel, wildtype       9/10/2019 Surgery    Exploratory laparotomy radical omentectomy, posterior exenteration, takedown of ileostomy and end colostomy for complete debulking of visible tumor  Final pathology report revealed high-grade serous malignancy in both the omentum and the pelvic mass     9/25/2019 -  Chemotherapy    Carboplatin area under the curve of 5+ weekly paclitaxel at 80 milligrams/meters squared for 3 further cycles of treatment followed by consolidation this is to begin mid October     10/2019 Surgery    Interval debulking with TAHBSO revision of colostomy omentectomy and tumor debulking with complete debulking  Several weeks postoperatively the patient required a open cholecystectomy       10/2019 - 12/2019 Chemotherapy    Carboplatin paclitaxel x3     10/9/2019 -  Cancer Staged    Staging form: Ovary, Fallopian Tube, Primary Peritoneal, AJCC 8th Edition  - Pathologic stage from 10/9/2019: FIGO Stage IIIC (pT3c, pN1, cM0) - Signed by Hank Best MD on 10/9/2019  Histologic grade (G): G3  Histologic grading system: 4 grade system  Gross residual tumor after primary cyto-reductive surgery: Absent       3/24/2020 - 9/21/2020 Chemotherapy    palonosetron (ALOXI) injection 0 25 mg, 0 25 mg, Intravenous, Once, 6 of 7 cycles  Administration: 0 25 mg (3/24/2020), 0 25 mg (4/21/2020), 0 25 mg (5/19/2020), 0 25 mg (6/23/2020), 0 25 mg (7/28/2020), 0 25 mg (8/25/2020)  pegfilgrastim (NEULASTA ONPRO) subcutaneous injection kit 6 mg, 6 mg, Subcutaneous, Once, 6 of 7 cycles  Administration: 6 mg (3/31/2020), 6 mg (4/28/2020), 6 mg (5/26/2020), 6 mg (6/29/2020), 6 mg (8/4/2020), 6 mg (9/1/2020)  DOXOrubicin liposome (DOXIL) 54 3 mg in dextrose 5 % 250 mL chemo infusion, 30 mg/m2 = 54 3 mg, Intravenous, Once, 6 of 7 cycles  Administration: 54 3 mg (3/24/2020), 54 6 mg (4/21/2020), 54 9 mg (5/19/2020), 54 6 mg (6/23/2020), 54 9 mg (7/28/2020), 54 9 mg (8/25/2020)  gemcitabine (GEMZAR) 1,176 4 mg in sodium chloride 0 9 % 250 mL infusion, 650 mg/m2 = 1,176 4 mg, Intravenous, Once, 6 of 7 cycles  Administration: 1,176 4 mg (3/24/2020), 1,176 4 mg (3/31/2020), 1,182 9 mg (4/21/2020), 1,200 mg (4/28/2020), 1,189 4 mg (5/19/2020), 1,189 4 mg (5/26/2020), 1,182 9 mg (6/23/2020), 1,182 9 mg (6/29/2020), 1,189 4 mg (7/28/2020), 1,189 4 mg (8/4/2020), 1,189 4 mg (8/25/2020), 1,195 8 mg (9/1/2020)     1/12/2021 - 1/12/2021 Chemotherapy    palonosetron (ALOXI) injection 0 25 mg, 0 25 mg, Intravenous, Once, 0 of 6 cycles  fosaprepitant (EMEND) 150 mg in sodium chloride 0 9 % 250 mL IVPB, 150 mg, Intravenous, Once, 0 of 6 cycles  CARBOplatin (PARAPLATIN) in sodium chloride 0 9 % 250 mL IVPB, , Intravenous, Once, 0 of 6 cycles  PACLitaxel (TAXOL) 246 mg in sodium chloride 0 9 % 500 mL chemo IVPB, 135 mg/m2 = 246 mg (77 1 % of original dose 175 mg/m2), Intravenous, Once, 0 of 6 cycles  Dose modification: 135 mg/m2 (original dose 175 mg/m2, Cycle 1, Reason: Other (See Comments), Comment: heavily pretreated)     1/12/2021 -  Chemotherapy    palonosetron (ALOXI) injection 0 25 mg, 0 25 mg, Intravenous, Once, 1 of 6 cycles  Administration: 0 25 mg (1/12/2021)  fosaprepitant (EMEND) 150 mg in sodium chloride 0 9 % 250 mL IVPB, 150 mg, Intravenous, Once, 1 of 6 cycles  Administration: 150 mg (1/12/2021)  CARBOplatin (PARAPLATIN) 311 mg in sodium chloride 0 9 % 250 mL IVPB, 311 mg, Intravenous, Once, 1 of 6 cycles  Administration: 311 mg (1/12/2021)  PACLitaxel (TAXOL) 91 2 mg in sodium chloride 0 9 % 250 mL chemo IVPB, 50 mg/m2 = 91 2 mg (62 5 % of original dose 80 mg/m2), Intravenous, Once, 1 of 6 cycles  Dose modification: 50 mg/m2 (original dose 80 mg/m2, Cycle 1, Reason: Other (See Comments))  Administration: 91 2 mg (1/12/2021), 90 6 mg (1/19/2021)           Patient is very pleasant 76-year-old female with history of stage IV B endometrial adenocarcinoma status post neoadjuvant carboplatin paclitaxel and Avastin after diagnosis in the fall of 2018  She developed a bowel perforation and was subsequently treated with carboplatin paclitaxel for her 1st cycle    She required surgical evacuation and colonic diversion  Since that time the patient has been often on chemotherapy due to recurrent and persistent disease  Recently she has been on a approximately 6 month chemo holiday but due to elevation in her  chemotherapy was re-initiated with carboplatin and paclitaxel on January 12th  She had weekly taxol  She was unable to receive the third treatment due to weather  I have reviewed her 3 complete blood counts and 3 complete metabolic profiles  Overall there has been no significant hematologic or biochemical exacerbations of laboratories  Her hemoglobin remains about 10  She has not had a significant decrease in her white blood cell count  Her creatinine remains approximately a 1 3  She has mild elevation in her alkaline phosphatase but not significant enough to require dose change           Past Medical History:   Diagnosis Date    Abdominal fluid collection     Anemia     Asthma     Cancer ([facility] [address] )     ovaries    Diabetes mellitus ([facility] [address] )     History of transfusion     PONV (postoperative nausea and vomiting)        Past Surgical History:   Procedure Laterality Date    ABDOMINAL SURGERY      CHOLECYSTECTOMY N/A 10/20/2019    Procedure: CHOLECYSTECTOMY, open;  Surgeon: Suzanne Resendiz MD;  Location: [facility];  Service: General    CT GUIDED PARACENTESIS  11/6/2018    CT GUIDED PERC DRAINAGE CATHETER PLACEMENT  12/24/2018    CT NEEDLE BIOPSY PERITONEUM  11/6/2018    CYSTOSCOPY N/A 9/10/2019    Procedure: CYSTOSCOPY , INSERTION URETERAL CATHETERS;  Surgeon: Mary Acevedo MD;  Location:  MAIN OR;  Service: Gynecology Oncology    ECTOPIC PREGNANCY SURGERY      ECTOPIC PREGNANCY SURGERY      EXENTERATION PELVIS N/A 9/10/2019    Procedure: EXPLORATORY LAPAROTOMY, EXENTERATION POSTERIOR PELVIS,  END COLOSTOMY, ILEOSTOMY REVERSAL, LYSIS OF ADHESIONS, rADICAL OMENTECTOMY AND TUMOR DEBULKINGFLEXIBLE SIGMOIDOSCOPY, CYSTOGRAM, INSPECTION OF URETERS;  Surgeon: Mary Acevedo MD;  [facility]: [facility];  Service: Gynecology Oncology    IR PARACENTESIS  9/18/2018    IR PARACENTESIS  10/18/2018    IR PARACENTESIS  12/10/2018    IR PORT PLACEMENT  11/29/2018    IR THORACENTESIS  9/16/2019    LAPAROTOMY N/A 12/14/2018    Procedure: LAPAROTOMY EXPLORATORY; Abdominal Washout; Application of Abthera Vac Dressing;  Surgeon: Camryn Viveros MD;  [facility];  Service: Gynecology Oncology    LAPAROTOMY N/A 12/15/2018    Procedure: LAPAROTOMY EXPLORATORY, ABDOMINAL WASHOUT, DRAIN PLACEMENT x 4, DIVERTING LOOP ILEOSTOMY AND ABDOMINAL CLOSURE,  ALL OTHER INDICATED PROCEDURES;  Surgeon: Camryn Viveros MD;  [facility]: [facility];  Service: Gynecology Oncology    DE COLONOSCOPY FLX DX W/COLLJ [facility] WHEN PFRMD N/A 9/25/2018    Procedure: EGD AND COLONOSCOPY;  Surgeon: Deja Merrill MD;  Location: [facility]; Service: Gastroenterology    TONSILECTOMY AND ADNOIDECTOMY      TONSILLECTOMY         Current Outpatient Medications   Medication Sig Dispense Refill    aspirin-acetaminophen-caffeine (EXCEDRIN MIGRAINE) 250-250-65 MG per tablet Take 1 tablet by mouth every 6 (six) hours as needed for headaches      gabapentin (NEURONTIN) 300 mg capsule Take 1 capsule (300 mg total) by mouth 3 (three) times a day 90 capsule 0    nystatin (MYCOSTATIN) powder Apply topically 3 (three) times a day 15 g 4    pantoprazole (PROTONIX) 40 mg tablet Take 1 tablet (40 mg total) by mouth 2 (two) times a day before meals  0    potassium chloride (K-DUR,KLOR-CON) 20 mEq tablet Take 1 tablet (20 mEq total) by mouth daily 30 tablet 0    traMADol (ULTRAM) 50 mg tablet Take 1 tablet (50 mg total) by mouth every 6 (six) hours as needed for moderate pain 20 tablet 1     No current facility-administered medications for this visit  No Known Allergies    Review of Systems   Constitutional: Negative  HENT: Negative  Eyes: Negative  Respiratory: Negative  Cardiovascular: Negative  Gastrointestinal: Negative  Endocrine: Negative  Genitourinary: Negative  Musculoskeletal: Negative  Skin: Negative  Neurological: Negative  Hematological: Negative  Psychiatric/Behavioral: Negative  Video Exam    There were no vitals filed for this visit  Physical Exam   It was my intent to perform this visit via video technology but the patient was not able to do a video connection so the visit was completed via audio telephone only  {covid time spent:20736}      VIRTUAL VISIT DISCLAIMER    Catherine Rodriguez Shaina acknowledges that she has consented to an online visit or consultation  She understands that the online visit is based solely on information provided by her, and that, in the absence of a face-to-face physical evaluation by the physician, the diagnosis she receives is both limited and provisional in terms of accuracy and completeness  This is not intended to replace a full medical face-to-face evaluation by the physician  Sudha Rocha understands and accepts these terms

## 2021-02-02 NOTE — TELEPHONE ENCOUNTER
Patient states her infusion appt today was canceled and would like to know if/when she should be rescheduled    Best call back [phone]

## 2021-02-16 NOTE — TELEPHONE ENCOUNTER
Pt stated she will not be coming in today for her treatment due to the weather and will be in next Tuesday

## 2021-02-23 NOTE — PROGRESS NOTES
Virtual Brief Visit    Assessment/Plan:    Problem List Items Addressed This Visit        Endocrine    Ovarian cancer ([facility] ) - Primary     69-year-old with recurrent stage IIIC ovarian cancer receiving palliative chemotherapy with weekly Taxol and Carboplatin  Called patient today to check in with her as she has cancelled her last 5 scheduled chemotherapy treatments due to snow (1/26, 2/2, 2/9, 2/16, 2/23)  She is feeling well  Last week on 2/17 she had a prolonged episode of nausea and vomiting and was advised by me to go to the ED for evaluation, but didn't go as this resolved at home  WBC count was mildly elevated last week   with minimal decrease to from 85 to 75 U/ml  PS is 0  Patient will proceed with chemo next week (cycle 2) pending labs  RTO as scheduled  Reason for visit is check in prior to delayed cycle 2 of chemo  Chief Complaint   Patient presents with    Virtual Brief Visit        Encounter provider RIGOBERTO Dent    Provider located at [address] [place] [address],[address] [facility] [id number] [phone]    Recent Visits  No visits were found meeting these conditions  Showing recent visits within past 7 days and meeting all other requirements     Today's Visits  Date Type Provider Dept   02/23/21 Telemedicine Elle Abdi, [address]   02/23/21 Telephone Elle Abdi, [address]  today's visits and meeting all other requirements     Future Appointments  No visits were found meeting these conditions     Showing future appointments within next 150 days and meeting all other requirements        Oncology History   Ovarian cancer ([facility] )   11/9/2018 Initial Diagnosis    Ovarian cancer ([facility] [address] )   tumor marker 194 5     11/9/2018 Biopsy    CT-guided needle biopsy of abdominal mass 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  Given liver involvement this would be stage IV     11/14/2018 - 12/4/2018 Chemotherapy    Neoadjuvant therapy: carboplatin area under the curve of 6, paclitaxel 135 milligrams/meter squared, Avastin 10 milligrams/kilogram  Completed 1 of 3 cycles  12/14/2018 Surgery    LAPAROTOMY EXPLORATORY; Abdominal Washout; Application of Abthera Vac Dressing for suspected bowel perforation and septic shock        12/15/2018 Surgery    LAPAROTOMY EXPLORATORY, ABDOMINAL WASHOUT, DRAIN PLACEMENT x 4, DIVERTING LOOP ILEOSTOMY AND ABDOMINAL CLOSURE for bowel perforation     3/26/2019 -  Chemotherapy    Taxol weekly 80 mg/m2 for 3 consecutive weeks, may add carboplatin in the future with AUC of 5      3/26/2019 - 2/3/2020 Chemotherapy    palonosetron (ALOXI) injection 0 25 mg, 0 25 mg, Intravenous, Once, 6 of 6 cycles  Administration: 0 25 mg (5/28/2019), 0 25 mg (6/25/2019), 0 25 mg (11/6/2019), 0 25 mg (12/10/2019), 0 25 mg (1/7/2020)  fosaprepitant (EMEND) 150 mg in sodium chloride 0 9 % 255 mL IVPB, 150 mg, Intravenous, Once, 6 of 6 cycles  Administration: 150 mg (5/28/2019), 150 mg (6/25/2019), 150 mg (11/6/2019), 150 mg (12/10/2019), 150 mg (1/7/2020)  CARBOplatin (PARAPLATIN) in sodium chloride 0 9 % 250 mL IVPB,  (original dose ), Intravenous, Once, 6 of 6 cycles  Dose modification:   (Cycle 2),   (original dose 258 4 mg, Cycle 3),   (original dose 258 4 mg, Cycle 3),   (original dose 244 4 mg, Cycle 4)  Administration: 258 4 mg (5/28/2019), 244 4 mg (6/25/2019), 285 2 mg (11/6/2019), 296 4 mg (12/10/2019), 286 4 mg (1/7/2020)  PACLitaxel (TAXOL) 127 8 mg in sodium chloride 0 9 % 250 mL chemo IVPB, 80 mg/m2, Intravenous, Once, 7 of 7 cycles  Dose modification: 65 mg/m2 (original dose 80 mg/m2, Cycle 2, Reason: Dose Not Tolerated)  Administration: 103 8 mg (5/14/2019), 108 6 mg (5/28/2019), 108 6 mg (6/4/2019), 108 6 mg (6/11/2019), 109 8 mg (6/25/2019), 109 8 mg (7/2/2019), 109 8 mg (7/9/2019), 111 6 mg (11/6/2019), 111 6 mg (11/12/2019), 111 6 mg (11/19/2019), 113 4 mg (12/10/2019), 113 4 mg (12/17/2019), 113 4 mg (12/23/2019), 113 4 mg (1/7/2020), 114 6 mg (1/14/2020), 114 6 mg (1/21/2020)     4/29/2019 [address] 1 testing revealed a PD L1 tumor proportions score of 0%  The patient is not a candidate for PD L1 manipulation      Genetic Testing    [facility] Breast/Gyn panel, wildtype  9/10/2019 Surgery    Exploratory laparotomy radical omentectomy, posterior exenteration, takedown of ileostomy and end colostomy for complete debulking of visible tumor  Final pathology report revealed high-grade serous malignancy in both the omentum and the pelvic mass     9/25/2019 -  Chemotherapy    Carboplatin area under the curve of 5+ weekly paclitaxel at 80 milligrams/meters squared for 3 further cycles of treatment followed by consolidation this is to begin mid October     10/2019 Surgery    Interval debulking with TAHBSO revision of colostomy omentectomy and tumor debulking with complete debulking  Several weeks postoperatively the patient required a open cholecystectomy       10/2019 - 12/2019 Chemotherapy    Carboplatin paclitaxel x3     10/9/2019 -  Cancer Staged    Staging form: Ovary, Fallopian Tube, Primary Peritoneal, AJCC 8th Edition  - Pathologic stage from 10/9/2019: FIGO Stage IIIC (pT3c, pN1, cM0) - Signed by Amy Kam MD on 10/9/2019  Histologic grade (G): G3  Histologic grading system: 4 grade system  Gross residual tumor after primary cyto-reductive surgery: Absent       3/24/2020 - 9/21/2020 Chemotherapy    palonosetron (ALOXI) injection 0 25 mg, 0 25 mg, Intravenous, Once, 6 of 7 cycles  Administration: 0 25 mg (3/24/2020), 0 25 mg (4/21/2020), 0 25 mg (5/19/2020), 0 25 mg (6/23/2020), 0 25 mg (7/28/2020), 0 25 mg (8/25/2020)  pegfilgrastim (NEULASTA ONPRO) subcutaneous injection kit 6 mg, 6 mg, Subcutaneous, Once, 6 of 7 cycles  Administration: 6 mg (3/31/2020), 6 mg (4/28/2020), 6 mg (5/26/2020), 6 mg (6/29/2020), 6 mg (8/4/2020), 6 mg (9/1/2020)  DOXOrubicin liposome (DOXIL) 54 3 mg in dextrose 5 % 250 mL chemo infusion, 30 mg/m2 = 54 3 mg, Intravenous, Once, 6 of 7 cycles  Administration: 54 3 mg (3/24/2020), 54 6 mg (4/21/2020), 54 9 mg (5/19/2020), 54 6 mg (6/23/2020), 54 9 mg (7/28/2020), 54 9 mg (8/25/2020)  gemcitabine (GEMZAR) 1,176 4 mg in sodium chloride 0 9 % 250 mL infusion, 650 mg/m2 = 1,176 4 mg, Intravenous, Once, 6 of 7 cycles  Administration: 1,176 4 mg (3/24/2020), 1,176 4 mg (3/31/2020), 1,182 9 mg (4/21/2020), 1,200 mg (4/28/2020), 1,189 4 mg (5/19/2020), 1,189 4 mg (5/26/2020), 1,182 9 mg (6/23/2020), 1,182 9 mg (6/29/2020), 1,189 4 mg (7/28/2020), 1,189 4 mg (8/4/2020), 1,189 4 mg (8/25/2020), 1,195 8 mg (9/1/2020)     1/12/2021 - 1/12/2021 Chemotherapy    palonosetron (ALOXI) injection 0 25 mg, 0 25 mg, Intravenous, Once, 0 of 6 cycles  fosaprepitant (EMEND) 150 mg in sodium chloride 0 9 % 250 mL IVPB, 150 mg, Intravenous, Once, 0 of 6 cycles  CARBOplatin (PARAPLATIN) in sodium chloride 0 9 % 250 mL IVPB, , Intravenous, Once, 0 of 6 cycles  PACLitaxel (TAXOL) 246 mg in sodium chloride 0 9 % 500 mL chemo IVPB, 135 mg/m2 = 246 mg (77 1 % of original dose 175 mg/m2), Intravenous, Once, 0 of 6 cycles  Dose modification: 135 mg/m2 (original dose 175 mg/m2, Cycle 1, Reason: Other (See Comments), Comment: heavily pretreated)     1/12/2021 -  Chemotherapy    palonosetron (ALOXI) injection 0 25 mg, 0 25 mg, Intravenous, Once, 1 of 6 cycles  Administration: 0 25 mg (1/12/2021)  fosaprepitant (EMEND) 150 mg in sodium chloride 0 9 % 250 mL IVPB, 150 mg, Intravenous, Once, 1 of 6 cycles  Administration: 150 mg (1/12/2021)  CARBOplatin (PARAPLATIN) 311 mg in sodium chloride 0 9 % 250 mL IVPB, 311 mg, Intravenous, Once, 1 of 6 cycles  Administration: 311 mg (1/12/2021)  PACLitaxel (TAXOL) 91 2 mg in sodium chloride 0 9 % 250 mL chemo IVPB, 50 mg/m2 = 91 2 mg (62 5 % of original dose 80 mg/m2), Intravenous, Once, 1 of 6 cycles  Dose modification: 50 mg/m2 (original dose 80 mg/m2, Cycle 1, Reason: Other (See Comments))  Administration: 91 2 mg (1/12/2021), 90 6 mg (1/19/2021)         After connecting through telephone, the patient was identified by name and date of birth  Taz Richards was informed that this is a telemedicine visit and that the visit is being conducted through telephone  My office door was closed  No one else was in the room  She acknowledged consent and understanding of privacy and security of the platform  The patient has agreed to participate and understands she can discontinue the visit at any time  Patient is aware this is a billable service  Subjective    Taz Richards is a 69 y o  female     [place] has no new concerns since her last visit  Instance of nausea with vomiting last week has resolved  Patient states she believes it was due to a "blockage " She is taking once Colace tab e/o/d  She is passing stool into her ostomy bag appropriately and does not believe she is constipated  Her appetite is appropriate  Normal bladder function  She denies abdominal or pelvic pain  She is without vaginal bleeding or discharge  She is ambulatory           Past Medical History:   Diagnosis Date    Abdominal fluid collection     Anemia     Asthma     Cancer ([facility] )     ovaries    Diabetes mellitus ([facility] )     History of transfusion     PONV (postoperative nausea and vomiting)        Past Surgical History:   Procedure Laterality Date    ABDOMINAL SURGERY      CHOLECYSTECTOMY N/A 10/20/2019    Procedure: CHOLECYSTECTOMY, open;  Surgeon: Roseline Castrejon MD;  Location: [facility];  Service: General    CT GUIDED PARACENTESIS  11/6/2018    CT GUIDED PERC DRAINAGE CATHETER PLACEMENT  12/24/2018    CT NEEDLE BIOPSY PERITONEUM  11/6/2018    CYSTOSCOPY N/A 9/10/2019    Procedure: CYSTOSCOPY , INSERTION URETERAL CATHETERS;  Surgeon: Sun Butler MD; [facility];  Service: Gynecology Oncology    ECTOPIC PREGNANCY SURGERY      ECTOPIC PREGNANCY SURGERY      EXENTERATION PELVIS N/A 9/10/2019    Procedure: EXPLORATORY LAPAROTOMY, EXENTERATION POSTERIOR PELVIS,  END COLOSTOMY, ILEOSTOMY REVERSAL, LYSIS OF ADHESIONS, rADICAL OMENTECTOMY AND TUMOR DEBULKINGFLEXIBLE SIGMOIDOSCOPY, CYSTOGRAM, INSPECTION OF URETERS;  Surgeon: Korina Srivastava MD;  [facility];  Service: Gynecology Oncology    IR PARACENTESIS  9/18/2018    IR PARACENTESIS  10/18/2018    IR PARACENTESIS  12/10/2018    IR PORT PLACEMENT  11/29/2018    IR THORACENTESIS  9/16/2019    LAPAROTOMY N/A 12/14/2018    Procedure: LAPAROTOMY EXPLORATORY; Abdominal Washout; Application of Abthera Vac Dressing;  Surgeon: Pilar Lara MD;  [facility];  Service: Gynecology Oncology    LAPAROTOMY N/A 12/15/2018    Procedure: LAPAROTOMY EXPLORATORY, ABDOMINAL WASHOUT, DRAIN PLACEMENT x 4, DIVERTING LOOP ILEOSTOMY AND ABDOMINAL CLOSURE,  ALL OTHER INDICATED PROCEDURES;  Surgeon: Pilar Lara MD;  [facility];  Service: Gynecology Oncology    AL COLONOSCOPY FLX DX W/[facility] WHEN PFR[facility] N/A 9/25/2018    Procedure: EGD AND COLONOSCOPY;  Surgeon: Tong Boss MD;  Location: [facility];   Service: Gastroenterology    TONSILECTOMY AND ADNOIDECTOMY      TONSILLECTOMY         Current Outpatient Medications   Medication Sig Dispense Refill    aspirin-acetaminophen-caffeine (EXCEDRIN MIGRAINE) 250-250-65 MG per tablet Take 1 tablet by mouth every 6 (six) hours as needed for headaches      gabapentin (NEURONTIN) 300 mg capsule Take 1 capsule (300 mg total) by mouth 3 (three) times a day 90 capsule 0    nystatin (MYCOSTATIN) powder Apply topically 3 (three) times a day 15 g 4    pantoprazole (PROTONIX) 40 mg tablet Take 1 tablet (40 mg total) by mouth 2 (two) times a day before meals  0    potassium chloride (K-DUR,KLOR-CON) 20 mEq tablet Take 1 tablet (20 mEq total) by mouth daily 30 tablet 0    traMADol (ULTRAM) 50 mg tablet Take 1 tablet (50 mg total) by mouth every 6 (six) hours as needed for moderate pain 20 tablet 1     No current facility-administered medications for this visit  No Known Allergies    Review of Systems   Constitutional: Negative for activity change, appetite change, chills, fatigue, fever and unexpected weight change  HENT: Negative for mouth sores  Eyes: Negative  Respiratory: Negative for cough, chest tightness, shortness of breath and wheezing  Cardiovascular: Negative for chest pain, palpitations and leg swelling  Gastrointestinal: Negative for abdominal distention, abdominal pain, anal bleeding, blood in stool, constipation, diarrhea, nausea and vomiting  Endocrine: Negative  Genitourinary: Negative for difficulty urinating, dysuria, flank pain, frequency, hematuria, pelvic pain, urgency, vaginal bleeding, vaginal discharge and vaginal pain  Musculoskeletal: Negative for arthralgias and myalgias  Skin: Negative for color change, pallor and rash  Neurological: Negative for dizziness, weakness, numbness and headaches  Hematological: Negative  Psychiatric/Behavioral: The patient is not nervous/anxious  There were no vitals filed for this visit  I spent 15 minutes directly with the patient during this visit    [address] acknowledges that she has consented to an online visit or consultation  She understands that the online visit is based solely on information provided by her, and that, in the absence of a face-to-face physical evaluation by the physician, the diagnosis she receives is both limited and provisional in terms of accuracy and completeness  This is not intended to replace a full medical face-to-face evaluation by the physician  Eusebio Fleischer understands and accepts these terms

## 2021-02-23 NOTE — ASSESSMENT & PLAN NOTE
41-year-old with recurrent stage IIIC ovarian cancer receiving palliative chemotherapy with weekly Taxol and Carboplatin  Called patient today to check in with her as she has cancelled her last 5 scheduled chemotherapy treatments due to snow (1/26, 2/2, 2/9, 2/16, 2/23)  She is feeling well  Last week on 2/17 she had a prolonged episode of nausea and vomiting and was advised by me to go to the ED for evaluation, but didn't go as this resolved at home  WBC count was mildly elevated last week   with minimal decrease to from 85 to 75 U/ml  PS is 0  Patient will proceed with chemo next week (cycle 2) pending labs  RTO as scheduled  She will start taking one Colace daily

## 2021-02-23 NOTE — TELEPHONE ENCOUNTER
Patient unable to make it in today due to her grandchildren having a delay at school please call pt and let her know how to proceed with schedule

## 2021-03-02 NOTE — PROGRESS NOTES
Pt here for chemotherapy, taxol and carboplatin  Right port accessed with positive blood return noted throughout treatment  Labs were drawn via port  Tolerated infusion without incident  Port flushed and de-accessed  AVS given  Janay Muck out in stable condition

## 2021-03-09 NOTE — PROGRESS NOTES
Pt here for chemotherapy, taxol  Right port accessed with positive blood return noted throughout treatment  Tolerated infusion without incident  Port flushed and de-accessed  AVS given  Aware of next appt  Walked out in stable condition

## 2021-03-16 NOTE — PLAN OF CARE
Problem: Potential for Falls  Goal: Patient will remain free of falls  Description: INTERVENTIONS:  - Assess patient frequently for physical needs  -  Identify cognitive and physical deficits and behaviors that affect risk of falls    -  Anderson fall precautions as indicated by assessment   - Educate patient/family on patient safety including physical limitations  - Instruct patient to call for assistance with activity based on assessment  - Modify environment to reduce risk of injury  - Consider OT/PT consult to assist with strengthening/mobility  Outcome: Progressing

## 2021-03-16 NOTE — PROGRESS NOTES
Patient arrived for chemotherapy, offering no complaints  Port was accessed and flushed with good blood return noted through out treatment  Pt tolerated entire treatment without incident  Port was flushed and de-accessed, pt given AVS and discharged in stable condition

## 2021-03-17 NOTE — ASSESSMENT & PLAN NOTE
Patient is very pleasant 48-year-old female with history of stage IV B  Ovarian cancer now again in treatment  She continues to have some treatment related side effects as well as some disease related side effects  It is unclear of her disease response to the new chemotherapy  She has completed 2 cycles and the 2nd 1 was delayed  Her  has gone up however the patient is now feeling better  I have recommended a CT scan of the chest abdomen and pelvis after cycle 3  Which will be performed shortly  We recommend continued dosing in the weekly Taxol as well as bolus  Carboplatin  Will maintain present dosages  If the patient is unable to get all of the Taxol in we may dose reduce again or add  Granix   and CT scan will be performed for assessment prior to cycle 4

## 2021-03-17 NOTE — PROGRESS NOTES
Assessment/Plan:    Problem List Items Addressed This Visit        Endocrine    Ovarian cancer [facility]) - Primary       Patient is very pleasant 65-year-old female with history of stage IV B  Ovarian cancer now again in treatment  She continues to have some treatment related side effects as well as some disease related side effects  It is unclear of her disease response to the new chemotherapy  She has completed 2 cycles and the 2nd 1 was delayed  Her  has gone up however the patient is now feeling better  I have recommended a CT scan of the chest abdomen and pelvis after cycle 3  Which will be performed shortly  We recommend continued dosing in the weekly Taxol as well as bolus  Carboplatin  Will maintain present dosages  If the patient is unable to get all of the Taxol in we may dose reduce again or add  Granix   and CT scan will be performed for assessment prior to cycle 4  Relevant Orders    CT chest abdomen pelvis w contrast            CHIEF COMPLAINT:  Consideration of cycle 3  Carboplatin and weekly paclitaxel for recurrent ovarian carcinoma stage IV B      Problem:  Cancer Staging  Ovarian cancer [facility])  Staging form: Ovary, Fallopian Tube, Primary Peritoneal, AJCC 8th Edition  - Clinical stage from 11/14/2018: FIGO Stage IVB (cT3c, cN0, pM1b) - Signed by Damon Rogers MD on 11/14/2018  - Pathologic stage from 10/9/2019: FIGO Stage IIIC (pT3c, pN1, cM0) - Signed by Damon Rogers MD on 10/9/2019        Previous therapy:  Oncology History   Ovarian cancer ([facility] [address] )   11/9/2018 Initial Diagnosis    Ovarian cancer ([facility] [address] )   tumor marker 194 5     11/9/2018 Biopsy    CT-guided needle biopsy of abdominal mass 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    Given liver involvement this would be stage IV     11/14/2018 - 12/4/2018 Chemotherapy    Neoadjuvant therapy: carboplatin area under the curve of 6, paclitaxel 135 milligrams/meter squared, Avastin 10 milligrams/kilogram  Completed 1 of 3 cycles  12/14/2018 Surgery    LAPAROTOMY EXPLORATORY; Abdominal Washout; Application of Abthera Vac Dressing for suspected bowel perforation and septic shock        12/15/2018 Surgery    LAPAROTOMY EXPLORATORY, ABDOMINAL WASHOUT, DRAIN PLACEMENT x 4, DIVERTING LOOP ILEOSTOMY AND ABDOMINAL CLOSURE for bowel perforation     3/26/2019 -  Chemotherapy    Taxol weekly 80 mg/m2 for 3 consecutive weeks, may add carboplatin in the future with AUC of 5      3/26/2019 - 2/3/2020 Chemotherapy    palonosetron (ALOXI) injection 0 25 mg, 0 25 mg, Intravenous, Once, 6 of 6 cycles  Administration: 0 25 mg (5/28/2019), 0 25 mg (6/25/2019), 0 25 mg (11/6/2019), 0 25 mg (12/10/2019), 0 25 mg (1/7/2020)  fosaprepitant (EMEND) 150 mg in sodium chloride 0 9 % 255 mL IVPB, 150 mg, Intravenous, Once, 6 of 6 cycles  Administration: 150 mg (5/28/2019), 150 mg (6/25/2019), 150 mg (11/6/2019), 150 mg (12/10/2019), 150 mg (1/7/2020)  CARBOplatin (PARAPLATIN) in sodium chloride 0 9 % 250 mL IVPB,  (original dose ), Intravenous, Once, 6 of 6 cycles  Dose modification:   (Cycle 2),   (original dose 258 4 mg, Cycle 3),   (original dose 258 4 mg, Cycle 3),   (original dose 244 4 mg, Cycle 4)  Administration: 258 4 mg (5/28/2019), 244 4 mg (6/25/2019), 285 2 mg (11/6/2019), 296 4 mg (12/10/2019), 286 4 mg (1/7/2020)  PACLitaxel (TAXOL) 127 8 mg in sodium chloride 0 9 % 250 mL chemo IVPB, 80 mg/m2, Intravenous, Once, 7 of 7 cycles  Dose modification: 65 mg/m2 (original dose 80 mg/m2, Cycle 2, Reason: Dose Not Tolerated)  Administration: 103 8 mg (5/14/2019), 108 6 mg (5/28/2019), 108 6 mg (6/4/2019), 108 6 mg (6/11/2019), 109 8 mg (6/25/2019), 109 8 mg (7/2/2019), 109 8 mg (7/9/2019), 111 6 mg (11/6/2019), 111 6 mg (11/12/2019), 111 6 mg (11/19/2019), 113 4 mg (12/10/2019), 113 4 mg (12/17/2019), 113 4 mg (12/23/2019), 113 4 mg (1/7/2020), 114 6 mg (1/14/2020), 114 6 mg (1/21/2020) 4/29/2019 Genomic Testing     Foundation 1 testing revealed a PD L1 tumor proportions score of 0%  The patient is not a candidate for PD L1 manipulation      Genetic Testing    [employer] Breast/Gyn panel, wildtype  9/10/2019 Surgery    Exploratory laparotomy radical omentectomy, posterior exenteration, takedown of ileostomy and end colostomy for complete debulking of visible tumor  Final pathology report revealed high-grade serous malignancy in both the omentum and the pelvic mass     9/25/2019 -  Chemotherapy    Carboplatin area under the curve of 5+ weekly paclitaxel at 80 milligrams/meters squared for 3 further cycles of treatment followed by consolidation this is to begin mid October     10/2019 Surgery    Interval debulking with TAHBSO revision of colostomy omentectomy and tumor debulking with complete debulking  Several weeks postoperatively the patient required a open cholecystectomy       10/2019 - 12/2019 Chemotherapy    Carboplatin paclitaxel x3     10/9/2019 -  Cancer Staged    Staging form: Ovary, Fallopian Tube, Primary Peritoneal, AJCC 8th Edition  - Pathologic stage from 10/9/2019: FIGO Stage IIIC (pT3c, pN1, cM0) - Signed by Mary Acevedo MD on 10/9/2019  Histologic grade (G): G3  Histologic grading system: 4 grade system  Gross residual tumor after primary cyto-reductive surgery: Absent       3/24/2020 - 9/21/2020 Chemotherapy    palonosetron (ALOXI) injection 0 25 mg, 0 25 mg, Intravenous, Once, 6 of 7 cycles  Administration: 0 25 mg (3/24/2020), 0 25 mg (4/21/2020), 0 25 mg (5/19/2020), 0 25 mg (6/23/2020), 0 25 mg (7/28/2020), 0 25 mg (8/25/2020)  pegfilgrastim (NEULASTA ONPRO) subcutaneous injection kit 6 mg, 6 mg, Subcutaneous, Once, 6 of 7 cycles  Administration: 6 mg (3/31/2020), 6 mg (4/28/2020), 6 mg (5/26/2020), 6 mg (6/29/2020), 6 mg (8/4/2020), 6 mg (9/1/2020)  DOXOrubicin liposome (DOXIL) 54 3 mg in dextrose 5 % 250 mL chemo infusion, 30 mg/m2 = 54 3 mg, Intravenous, Once, 6 of 7 cycles  Administration: 54 3 mg (3/24/2020), 54 6 mg (4/21/2020), 54 9 mg (5/19/2020), 54 6 mg (6/23/2020), 54 9 mg (7/28/2020), 54 9 mg (8/25/2020)  gemcitabine (GEMZAR) 1,176 4 mg in sodium chloride 0 9 % 250 mL infusion, 650 mg/m2 = 1,176 4 mg, Intravenous, Once, 6 of 7 cycles  Administration: 1,176 4 mg (3/24/2020), 1,176 4 mg (3/31/2020), 1,182 9 mg (4/21/2020), 1,200 mg (4/28/2020), 1,189 4 mg (5/19/2020), 1,189 4 mg (5/26/2020), 1,182 9 mg (6/23/2020), 1,182 9 mg (6/29/2020), 1,189 4 mg (7/28/2020), 1,189 4 mg (8/4/2020), 1,189 4 mg (8/25/2020), 1,195 8 mg (9/1/2020)     1/12/2021 - 1/12/2021 Chemotherapy    palonosetron (ALOXI) injection 0 25 mg, 0 25 mg, Intravenous, Once, 0 of 6 cycles  fosaprepitant (EMEND) 150 mg in sodium chloride 0 9 % 250 mL IVPB, 150 mg, Intravenous, Once, 0 of 6 cycles  CARBOplatin (PARAPLATIN) in sodium chloride 0 9 % 250 mL IVPB, , Intravenous, Once, 0 of 6 cycles  PACLitaxel (TAXOL) 246 mg in sodium chloride 0 9 % 500 mL chemo IVPB, 135 mg/m2 = 246 mg (77 1 % of original dose 175 mg/m2), Intravenous, Once, 0 of 6 cycles  Dose modification: 135 mg/m2 (original dose 175 mg/m2, Cycle 1, Reason: Other (See Comments), Comment: heavily pretreated)     1/12/2021 -  Chemotherapy    palonosetron (ALOXI) injection 0 25 mg, 0 25 mg, Intravenous, Once, 2 of 6 cycles  Administration: 0 25 mg (1/12/2021), 0 25 mg (3/2/2021)  fosaprepitant (EMEND) 150 mg in sodium chloride 0 9 % 250 mL IVPB, 150 mg, Intravenous, Once, 2 of 6 cycles  Administration: 150 mg (1/12/2021), 150 mg (3/2/2021)  CARBOplatin (PARAPLATIN) 311 mg in sodium chloride 0 9 % 250 mL IVPB, 311 mg, Intravenous, Once, 2 of 6 cycles  Administration: 311 mg (1/12/2021), 330 mg (3/2/2021)  PACLitaxel (TAXOL) 91 2 mg in sodium chloride 0 9 % 250 mL chemo IVPB, 50 mg/m2 = 91 2 mg (62 5 % of original dose 80 mg/m2), Intravenous, Once, 2 of 6 cycles  Dose modification: 50 mg/m2 (original dose 80 mg/m2, Cycle 1, Reason: Other (See Comments))  Administration: 91 2 mg (1/12/2021), 90 6 mg (1/19/2021), 89 4 mg (3/2/2021), 91 2 mg (3/9/2021), 91 2 mg (3/16/2021)           Patient ID: Frantz Meraz is a 69 y o  female    Patient is very pleasant 78-year-old female with history of stage IV ovarian carcinoma diagnosed in approximately September of 2018  She has been on and off treatment since that time  She has recently restarted carboplatin paclitaxel and is in consideration for cycle 3 today  Over the past few weeks the patient has been having increased abdominal bloating nausea and bowel dysfunction  She has increased her Protonix and this has improved  She has not had a CT scan since  December  Her  has crept upward to 110 over the past month from 75  Patient experienced a much better cycle 2  But her Taxol was delayed multiple times due to weather related issue  She has only completed her last Taxol a few days ago  the patient's blood work has been reviewed  The patient has undergone 4 weekly CBC and CPM P  These were all reviewed  Her hematologic  Parameters are stable for treatment  Her biochemical markers remain stable as well  Today, the patient is doing well  She denies significant abdominal pain, pelvic pain, nausea, vomiting, constipation, diarrhea, fevers, chills, or vaginal bleeding  The following portions of the patient's history were reviewed and updated as appropriate: allergies, current medications, past medical history, past social history, past surgical history and problem list     Review of Systems   Constitutional: Negative  HENT: Negative  Eyes: Negative  Respiratory: Negative  Cardiovascular: Negative  Gastrointestinal: Negative  Endocrine: Negative  Genitourinary: Negative  Musculoskeletal: Negative  Skin: Negative  Neurological: Negative  Patient notes some brain fog associated with her chemotherapy   Hematological: Negative      Psychiatric/Behavioral: Negative  Current Outpatient Medications   Medication Sig Dispense Refill    aspirin-acetaminophen-caffeine (EXCEDRIN MIGRAINE) 250-250-65 MG per tablet Take 1 tablet by mouth every 6 (six) hours as needed for headaches      nystatin (MYCOSTATIN) powder Apply topically 3 (three) times a day 15 g 4    pantoprazole (PROTONIX) 40 mg tablet Take 1 tablet (40 mg total) by mouth 2 (two) times a day before meals  0    traMADol (ULTRAM) 50 mg tablet Take 1 tablet (50 mg total) by mouth every 6 (six) hours as needed for moderate pain 30 tablet 1    gabapentin (NEURONTIN) 300 mg capsule Take 1 capsule (300 mg total) by mouth 3 (three) times a day 90 capsule 0    potassium chloride (K-DUR,KLOR-CON) 20 mEq tablet Take 1 tablet (20 mEq total) by mouth daily 30 tablet 0     No current facility-administered medications for this visit  Objective:    Blood pressure 128/64, pulse 72, temperature 98 1 °F (36 7 °C), resp  rate 16, height 5' 4 17" (1 63 m), weight 75 5 kg (166 lb 8 oz), not currently breastfeeding  Body mass index is 28 43 kg/m²  Body surface area is 1 81 meters squared      Physical Exam    Lab Results   Component Value Date     110 8 (H) 03/12/2021     Lab Results   Component Value Date    K 4 4 03/12/2021     03/12/2021    CO2 28 03/12/2021    BUN 20 03/12/2021    CREATININE 1 12 03/12/2021    GLUCOSE 228 (H) 09/10/2019    GLUF 152 (H) 03/05/2021    CALCIUM 9 0 03/12/2021    CORRECTEDCA 9 6 03/12/2021    AST 16 03/12/2021    ALT 18 03/12/2021    ALKPHOS 113 03/12/2021    EGFR 50 03/12/2021     Lab Results   Component Value Date    WBC 5 65 03/12/2021    HGB 8 6 (L) 03/12/2021    HCT 27 9 (L) 03/12/2021    MCV 89 03/12/2021     03/12/2021     Lab Results   Component Value Date    NEUTROABS 3 66 03/12/2021        Trend:  Lab Results   Component Value Date     110 8 (H) 03/12/2021     101 9 (H) 03/02/2021     75 8 (H) 02/12/2021     85 7 (H) 01/22/2021  102 0 (H) 12/05/2020     77 1 (H) 11/30/2020     45 7 (H) 10/16/2020     39 6 (H) 09/11/2020     44 9 (H) 09/01/2020     29 2 06/15/2020     27 4 06/10/2020     32 5 (H) 05/26/2020     28 6 05/15/2020     30 6 (H) 05/11/2020     36 5 (H) 04/27/2020     37 9 (H) 04/17/2020     42 5 (H) 04/10/2020     48 1 (H) 03/27/2020     40 9 (H) 03/20/2020     18 2 01/28/2020     17 5 12/31/2019     14 5 12/02/2019     22 0 11/19/2019     32 6 (H) 11/06/2019     19 1 08/30/2019     12 7 08/13/2019     13 9 06/18/2019     19 9 05/21/2019     79 5 (H) 04/16/2019     96 4 (H) 03/19/2019     80 4 (H) 03/08/2019     135 0 (H) 11/30/2018     194 5 (H) 10/18/2018

## 2021-03-17 NOTE — LETTER
March 17, 2021     Radha Marquez, [phone] [facility] [address]    Patient: Wandy Palomares   YOB: 1950   Date of Visit: 3/17/2021       Dear Dr Miguelangel Recinos:    Thank you for referring Wandy Palomares to me for evaluation  Below are my notes for this consultation  If you have questions, please do not hesitate to call me  I look forward to following your patient along with you  Sincerely,        Selwyn Spencer MD        CC: MD Selwyn Dawn MD  3/17/2021 10:30 AM  Sign when Signing Visit  Assessment/Plan:    Problem List Items Addressed This Visit        Endocrine    Ovarian cancer [facility]) - Primary       Patient is very pleasant 65-year-old female with history of stage IV B  Ovarian cancer now again in treatment  She continues to have some treatment related side effects as well as some disease related side effects  It is unclear of her disease response to the new chemotherapy  She has completed 2 cycles and the 2nd 1 was delayed  Her  has gone up however the patient is now feeling better  I have recommended a CT scan of the chest abdomen and pelvis after cycle 3  Which will be performed shortly  We recommend continued dosing in the weekly Taxol as well as bolus  Carboplatin  Will maintain present dosages  If the patient is unable to get all of the Taxol in we may dose reduce again or add  Granix   and CT scan will be performed for assessment prior to cycle 4  Relevant Orders    CT chest abdomen pelvis w contrast            CHIEF COMPLAINT:  Consideration of cycle 3   Carboplatin and weekly paclitaxel for recurrent ovarian carcinoma stage IV B      Problem:  Cancer Staging  Ovarian cancer [facility])  Staging form: Ovary, Fallopian Tube, Primary Peritoneal, AJCC 8th Edition  - Clinical stage from 11/14/2018: FIGO Stage IVB (cT3c, cN0, pM1b) - Signed by Selwyn Spencer MD on 11/14/2018  - Pathologic stage from 10/9/2019: Lisa Poe Stage IIIC (pT3c, pN1, cM0) - Signed by Vladimir Mcfarland MD on 10/9/2019        Previous therapy:  Oncology History   Ovarian cancer ([facility] [address] )   11/9/2018 Initial Diagnosis    Ovarian cancer (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  Given liver involvement this would be stage IV     11/14/2018 - 12/4/2018 Chemotherapy    Neoadjuvant therapy: carboplatin area under the curve of 6, paclitaxel 135 milligrams/meter squared, Avastin 10 milligrams/kilogram  Completed 1 of 3 cycles  12/14/2018 Surgery    LAPAROTOMY EXPLORATORY; Abdominal Washout; Application of Abthera Vac Dressing for suspected bowel perforation and septic shock        12/15/2018 Surgery    LAPAROTOMY EXPLORATORY, ABDOMINAL WASHOUT, DRAIN PLACEMENT x 4, DIVERTING LOOP ILEOSTOMY AND ABDOMINAL CLOSURE for bowel perforation     3/26/2019 -  Chemotherapy    Taxol weekly 80 mg/m2 for 3 consecutive weeks, may add carboplatin in the future with AUC of 5      3/26/2019 - 2/3/2020 Chemotherapy    palonosetron (ALOXI) injection 0 25 mg, 0 25 mg, Intravenous, Once, 6 of 6 cycles  Administration: 0 25 mg (5/28/2019), 0 25 mg (6/25/2019), 0 25 mg (11/6/2019), 0 25 mg (12/10/2019), 0 25 mg (1/7/2020)  fosaprepitant (EMEND) 150 mg in sodium chloride 0 9 % 255 mL IVPB, 150 mg, Intravenous, Once, 6 of 6 cycles  Administration: 150 mg (5/28/2019), 150 mg (6/25/2019), 150 mg (11/6/2019), 150 mg (12/10/2019), 150 mg (1/7/2020)  CARBOplatin (PARAPLATIN) in sodium chloride 0 9 % 250 mL IVPB,  (original dose ), Intravenous, Once, 6 of 6 cycles  Dose modification:   (Cycle 2),   (original dose 258 4 mg, Cycle 3),   (original dose 258 4 mg, Cycle 3),   (original dose 244 4 mg, Cycle 4)  Administration: 258 4 mg (5/28/2019), 244 4 mg (6/25/2019), 285 2 mg (11/6/2019), 296 4 mg (12/10/2019), 286 4 mg (1/7/2020)  PACLitaxel (TAXOL) 127 8 mg in sodium chloride 0 9 % 250 mL chemo IVPB, 80 mg/m2, Intravenous, Once, 7 of 7 cycles  Dose modification: 65 mg/m2 (original dose 80 mg/m2, Cycle 2, Reason: Dose Not Tolerated)  Administration: 103 8 mg (5/14/2019), 108 6 mg (5/28/2019), 108 6 mg (6/4/2019), 108 6 mg (6/11/2019), 109 8 mg (6/25/2019), 109 8 mg (7/2/2019), 109 8 mg (7/9/2019), 111 6 mg (11/6/2019), 111 6 mg (11/12/2019), 111 6 mg (11/19/2019), 113 4 mg (12/10/2019), 113 4 mg (12/17/2019), 113 4 mg (12/23/2019), 113 4 mg (1/7/2020), 114 6 mg (1/14/2020), 114 6 mg (1/21/2020)     4/29/2019 [address] [place] [address],[facility] Fl 1 testing revealed a PD L1 tumor proportions score of 0%  The patient is not a candidate for PD L1 manipulation      Genetic Testing    [facility] Breast/Gyn panel, wildtype  9/10/2019 Surgery    Exploratory laparotomy radical omentectomy, posterior exenteration, takedown of ileostomy and end colostomy for complete debulking of visible tumor  Final pathology report revealed high-grade serous malignancy in both the omentum and the pelvic mass     9/25/2019 -  Chemotherapy    Carboplatin area under the curve of 5+ weekly paclitaxel at 80 milligrams/meters squared for 3 further cycles of treatment followed by consolidation this is to begin mid October     10/2019 Surgery    Interval debulking with TAHBSO revision of colostomy omentectomy and tumor debulking with complete debulking  Several weeks postoperatively the patient required a open cholecystectomy       10/2019 - 12/2019 Chemotherapy    Carboplatin paclitaxel x3     10/9/2019 -  Cancer Staged    Staging form: Ovary, Fallopian Tube, Primary Peritoneal, AJCC 8th Edition  - Pathologic stage from 10/9/2019: FIGO Stage IIIC (pT3c, pN1, cM0) - Signed by Stacey Cabrera MD on 10/9/2019  Histologic grade (G): G3  Histologic grading system: 4 grade system  Gross residual tumor after primary cyto-reductive surgery: Absent       3/24/2020 - 9/21/2020 Chemotherapy    palonosetron (ALOXI) injection 0 25 mg, 0 25 mg, Intravenous, Once, 6 of 7 cycles  Administration: 0 25 mg (3/24/2020), 0 25 mg (4/21/2020), 0 25 mg (5/19/2020), 0 25 mg (6/23/2020), 0 25 mg (7/28/2020), 0 25 mg (8/25/2020)  pegfilgrastim (NEULASTA ONPRO) subcutaneous injection kit 6 mg, 6 mg, Subcutaneous, Once, 6 of 7 cycles  Administration: 6 mg (3/31/2020), 6 mg (4/28/2020), 6 mg (5/26/2020), 6 mg (6/29/2020), 6 mg (8/4/2020), 6 mg (9/1/2020)  DOXOrubicin liposome (DOXIL) 54 3 mg in dextrose 5 % 250 mL chemo infusion, 30 mg/m2 = 54 3 mg, Intravenous, Once, 6 of 7 cycles  Administration: 54 3 mg (3/24/2020), 54 6 mg (4/21/2020), 54 9 mg (5/19/2020), 54 6 mg (6/23/2020), 54 9 mg (7/28/2020), 54 9 mg (8/25/2020)  gemcitabine (GEMZAR) 1,176 4 mg in sodium chloride 0 9 % 250 mL infusion, 650 mg/m2 = 1,176 4 mg, Intravenous, Once, 6 of 7 cycles  Administration: 1,176 4 mg (3/24/2020), 1,176 4 mg (3/31/2020), 1,182 9 mg (4/21/2020), 1,200 mg (4/28/2020), 1,189 4 mg (5/19/2020), 1,189 4 mg (5/26/2020), 1,182 9 mg (6/23/2020), 1,182 9 mg (6/29/2020), 1,189 4 mg (7/28/2020), 1,189 4 mg (8/4/2020), 1,189 4 mg (8/25/2020), 1,195 8 mg (9/1/2020)     1/12/2021 - 1/12/2021 Chemotherapy    palonosetron (ALOXI) injection 0 25 mg, 0 25 mg, Intravenous, Once, 0 of 6 cycles  fosaprepitant (EMEND) 150 mg in sodium chloride 0 9 % 250 mL IVPB, 150 mg, Intravenous, Once, 0 of 6 cycles  CARBOplatin (PARAPLATIN) in sodium chloride 0 9 % 250 mL IVPB, , Intravenous, Once, 0 of 6 cycles  PACLitaxel (TAXOL) 246 mg in sodium chloride 0 9 % 500 mL chemo IVPB, 135 mg/m2 = 246 mg (77 1 % of original dose 175 mg/m2), Intravenous, Once, 0 of 6 cycles  Dose modification: 135 mg/m2 (original dose 175 mg/m2, Cycle 1, Reason: Other (See Comments), Comment: heavily pretreated)     1/12/2021 -  Chemotherapy    palonosetron (ALOXI) injection 0 25 mg, 0 25 mg, Intravenous, Once, 2 of 6 cycles  Administration: 0 25 mg (1/12/2021), 0 25 mg (3/2/2021)  fosaprepitant (EMEND) 150 mg in sodium chloride 0 9 % 250 mL IVPB, 150 mg, Intravenous, Once, 2 of 6 cycles  Administration: 150 mg (1/12/2021), 150 mg (3/2/2021)  CARBOplatin (PARAPLATIN) 311 mg in sodium chloride 0 9 % 250 mL IVPB, 311 mg, Intravenous, Once, 2 of 6 cycles  Administration: 311 mg (1/12/2021), 330 mg (3/2/2021)  PACLitaxel (TAXOL) 91 2 mg in sodium chloride 0 9 % 250 mL chemo IVPB, 50 mg/m2 = 91 2 mg (62 5 % of original dose 80 mg/m2), Intravenous, Once, 2 of 6 cycles  Dose modification: 50 mg/m2 (original dose 80 mg/m2, Cycle 1, Reason: Other (See Comments))  Administration: 91 2 mg (1/12/2021), 90 6 mg (1/19/2021), 89 4 mg (3/2/2021), 91 2 mg (3/9/2021), 91 2 mg (3/16/2021)           Patient ID: Sean Garcia is a 69 y o  female    Patient is very pleasant 65-year-old female with history of stage IV ovarian carcinoma diagnosed in approximately September of 2018  She has been on and off treatment since that time  She has recently restarted carboplatin paclitaxel and is in consideration for cycle 3 today  Over the past few weeks the patient has been having increased abdominal bloating nausea and bowel dysfunction  She has increased her Protonix and this has improved  She has not had a CT scan since  December  Her  has crept upward to 110 over the past month from 75  Patient experienced a much better cycle 2  But her Taxol was delayed multiple times due to weather related issue  She has only completed her last Taxol a few days ago  the patient's blood work has been reviewed  The patient has undergone 4 weekly CBC and CPM P  These were all reviewed  Her hematologic  Parameters are stable for treatment  Her biochemical markers remain stable as well  Today, the patient is doing well  She denies significant abdominal pain, pelvic pain, nausea, vomiting, constipation, diarrhea, fevers, chills, or vaginal bleeding        The following portions of the patient's history were reviewed and updated as appropriate: allergies, current medications, past medical history, past social history, past surgical history and problem list     Review of Systems   Constitutional: Negative  HENT: Negative  Eyes: Negative  Respiratory: Negative  Cardiovascular: Negative  Gastrointestinal: Negative  Endocrine: Negative  Genitourinary: Negative  Musculoskeletal: Negative  Skin: Negative  Neurological: Negative  Patient notes some brain fog associated with her chemotherapy   Hematological: Negative  Psychiatric/Behavioral: Negative  Current Outpatient Medications   Medication Sig Dispense Refill    aspirin-acetaminophen-caffeine (EXCEDRIN MIGRAINE) 250-250-65 MG per tablet Take 1 tablet by mouth every 6 (six) hours as needed for headaches      nystatin (MYCOSTATIN) powder Apply topically 3 (three) times a day 15 g 4    pantoprazole (PROTONIX) 40 mg tablet Take 1 tablet (40 mg total) by mouth 2 (two) times a day before meals  0    traMADol (ULTRAM) 50 mg tablet Take 1 tablet (50 mg total) by mouth every 6 (six) hours as needed for moderate pain 30 tablet 1    gabapentin (NEURONTIN) 300 mg capsule Take 1 capsule (300 mg total) by mouth 3 (three) times a day 90 capsule 0    potassium chloride (K-DUR,KLOR-CON) 20 mEq tablet Take 1 tablet (20 mEq total) by mouth daily 30 tablet 0     No current facility-administered medications for this visit  Objective:    Blood pressure 128/64, pulse 72, temperature 98 1 °F (36 7 °C), resp  rate 16, height 5' 4 17" (1 63 m), weight 75 5 kg (166 lb 8 oz), not currently breastfeeding  Body mass index is 28 43 kg/m²  Body surface area is 1 81 meters squared      Physical Exam    Lab Results   Component Value Date     110 8 (H) 03/12/2021     Lab Results   Component Value Date    K 4 4 03/12/2021     03/12/2021    CO2 28 03/12/2021    BUN 20 03/12/2021    CREATININE 1 12 03/12/2021    GLUCOSE 228 (H) 09/10/2019    GLUF 152 (H) 03/05/2021    CALCIUM 9 0 03/12/2021    CORRECTEDCA 9 6 03/12/2021    AST 16 03/12/2021    ALT 18 03/12/2021    ALKPHOS 113 03/12/2021    EGFR 50 03/12/2021     Lab Results   Component Value Date    WBC 5 65 03/12/2021    HGB 8 6 (L) 03/12/2021    HCT 27 9 (L) 03/12/2021    MCV 89 03/12/2021     03/12/2021     Lab Results   Component Value Date    NEUTROABS 3 66 03/12/2021        Trend:  Lab Results   Component Value Date     110 8 (H) 03/12/2021     101 9 (H) 03/02/2021     75 8 (H) 02/12/2021     85 7 (H) 01/22/2021     102 0 (H) 12/05/2020     77 1 (H) 11/30/2020     45 7 (H) 10/16/2020     39 6 (H) 09/11/2020     44 9 (H) 09/01/2020     29 2 06/15/2020     27 4 06/10/2020     32 5 (H) 05/26/2020     28 6 05/15/2020     30 6 (H) 05/11/2020     36 5 (H) 04/27/2020     37 9 (H) 04/17/2020     42 5 (H) 04/10/2020     48 1 (H) 03/27/2020     40 9 (H) 03/20/2020     18 2 01/28/2020     17 5 12/31/2019     14 5 12/02/2019     22 0 11/19/2019     32 6 (H) 11/06/2019     19 1 08/30/2019     12 7 08/13/2019     13 9 06/18/2019     19 9 05/21/2019     79 5 (H) 04/16/2019     96 4 (H) 03/19/2019     80 4 (H) 03/08/2019     135 0 (H) 11/30/2018     194 5 (H) 10/18/2018

## 2021-03-23 NOTE — PROGRESS NOTES
Pt presents for chemo  Offers no complaints  Pt tolerated tx  Verbalized at completion of tx that she had itching in her left hand only  No rash noted  Instructed to monitor and contact provider if it progresses to other areas of her body   AVS provided  Aware of next appt

## 2021-03-23 NOTE — PLAN OF CARE
Problem: INFECTION - ADULT  Goal: Absence of fever/infection during neutropenic period  Description: INTERVENTIONS:  - Monitor WBC    Outcome: Progressing     Problem: Knowledge Deficit  Goal: Patient/family/caregiver demonstrates understanding of disease process, treatment plan, medications, and discharge instructions  Description: Complete learning assessment and assess knowledge base    Interventions:  - Provide teaching at level of understanding  - Provide teaching via preferred learning methods  Outcome: Progressing

## 2021-03-30 NOTE — PROGRESS NOTES
Pt presents for Chemo  Offers no complaints  Pt tolerated tx with out incident  AVS provided  Aware of next appt  Dc'd  in stable condition

## 2021-03-30 NOTE — PLAN OF CARE
Problem: Potential for Falls  Goal: Patient will remain free of falls  Description: INTERVENTIONS:  - Assess patient frequently for physical needs  -  Identify cognitive and physical deficits and behaviors that affect risk of falls  -  [place] fall precautions as indicated by assessment   - Educate patient/family on patient safety including physical limitations  - Instruct patient to call for assistance with activity based on assessment  - Modify environment to reduce risk of injury  - Consider OT/PT consult to assist with strengthening/mobility  Outcome: Progressing     Problem: Knowledge Deficit  Goal: Patient/family/caregiver demonstrates understanding of disease process, treatment plan, medications, and discharge instructions  Description: Complete learning assessment and assess knowledge base    Interventions:  - Provide teaching at level of understanding  - Provide teaching via preferred learning methods  Outcome: Progressing

## 2021-04-06 NOTE — PROGRESS NOTES
Pt here for chemotherapy, taxol only today  Offers no complaints at present time  Right port accessed with positive blood return noted throughout treatment  Tolerated infusion without incident  Port flushed and de-accessed  AVS given  Walked out in stable condition

## 2021-04-07 NOTE — ASSESSMENT & PLAN NOTE
Patient is very pleasant 65-year-old female with history of recurrent metastatic ovarian cancer stage IV B  She is presently tolerating her chemo well  Her  is taken a nice drop  Her stay CT scan is currently pending  We have recommended no further change to her therapy and will continue her treatment as planned  The patient hopes to go to [place] at the end of treatment of cycle 6  We will move ahead with cycle 4 without any dose modification

## 2021-04-07 NOTE — PROGRESS NOTES
Assessment/Plan:    Problem List Items Addressed This Visit        Endocrine    Ovarian cancer [facility]) - Primary       Patient is very pleasant 79-year-old female with history of recurrent metastatic ovarian cancer stage IV B  She is presently tolerating her chemo well  Her  is taken a nice drop  Her stay CT scan is currently pending  We have recommended no further change to her therapy and will continue her treatment as planned  The patient hopes to go to [place] at the end of treatment of cycle 6  We will move ahead with cycle 4 without any dose modification  CHIEF COMPLAINT:  Consideration of cycle 4  Carboplatin paclitaxel for recurrent stage IV B ovarian cancer      Problem:  Cancer Staging  Ovarian cancer [facility])  Staging form: Ovary, Fallopian Tube, Primary Peritoneal, AJCC 8th Edition  - Clinical stage from 11/14/2018: FIGO Stage IVB (cT3c, cN0, pM1b) - Signed by Alanna Sewell MD on 11/14/2018  - Pathologic stage from 10/9/2019: FIGO Stage IIIC (pT3c, pN1, cM0) - Signed by Alanna Sewell MD on 10/9/2019        Previous therapy:  Oncology History   Ovarian cancer ([facility] [address] )   11/9/2018 Initial Diagnosis    Ovarian cancer ([facility] [address] )   tumor marker 194 5     11/9/2018 Biopsy    CT-guided needle biopsy of abdominal mass 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  Given liver involvement this would be stage IV     11/14/2018 - 12/4/2018 Chemotherapy    Neoadjuvant therapy: carboplatin area under the curve of 6, paclitaxel 135 milligrams/meter squared, Avastin 10 milligrams/kilogram  Completed 1 of 3 cycles  12/14/2018 Surgery    LAPAROTOMY EXPLORATORY; Abdominal Washout; Application of Abthera Vac Dressing for suspected bowel perforation and septic shock        12/15/2018 Surgery    LAPAROTOMY EXPLORATORY, ABDOMINAL WASHOUT, DRAIN PLACEMENT x 4, DIVERTING LOOP ILEOSTOMY AND ABDOMINAL CLOSURE for bowel perforation     3/26/2019 -  Chemotherapy Taxol weekly 80 mg/m2 for 3 consecutive weeks, may add carboplatin in the future with AUC of 5      3/26/2019 - 2/3/2020 Chemotherapy    palonosetron (ALOXI) injection 0 25 mg, 0 25 mg, Intravenous, Once, 6 of 6 cycles  Administration: 0 25 mg (5/28/2019), 0 25 mg (6/25/2019), 0 25 mg (11/6/2019), 0 25 mg (12/10/2019), 0 25 mg (1/7/2020)  fosaprepitant (EMEND) 150 mg in sodium chloride 0 9 % 255 mL IVPB, 150 mg, Intravenous, Once, 6 of 6 cycles  Administration: 150 mg (5/28/2019), 150 mg (6/25/2019), 150 mg (11/6/2019), 150 mg (12/10/2019), 150 mg (1/7/2020)  CARBOplatin (PARAPLATIN) in sodium chloride 0 9 % 250 mL IVPB,  (original dose ), Intravenous, Once, 6 of 6 cycles  Dose modification:   (Cycle 2),   (original dose 258 4 mg, Cycle 3),   (original dose 258 4 mg, Cycle 3),   (original dose 244 4 mg, Cycle 4)  Administration: 258 4 mg (5/28/2019), 244 4 mg (6/25/2019), 285 2 mg (11/6/2019), 296 4 mg (12/10/2019), 286 4 mg (1/7/2020)  PACLitaxel (TAXOL) 127 8 mg in sodium chloride 0 9 % 250 mL chemo IVPB, 80 mg/m2, Intravenous, Once, 7 of 7 cycles  Dose modification: 65 mg/m2 (original dose 80 mg/m2, Cycle 2, Reason: Dose Not Tolerated)  Administration: 103 8 mg (5/14/2019), 108 6 mg (5/28/2019), 108 6 mg (6/4/2019), 108 6 mg (6/11/2019), 109 8 mg (6/25/2019), 109 8 mg (7/2/2019), 109 8 mg (7/9/2019), 111 6 mg (11/6/2019), 111 6 mg (11/12/2019), 111 6 mg (11/19/2019), 113 4 mg (12/10/2019), 113 4 mg (12/17/2019), 113 4 mg (12/23/2019), 113 4 mg (1/7/2020), 114 6 mg (1/14/2020), 114 6 mg (1/21/2020)     4/29/2019 Genomic Testing     Foundation 1 testing revealed a PD L1 tumor proportions score of 0%  The patient is not a candidate for PD L1 manipulation      Genetic Testing    [employer] Breast/Gyn panel, wildtype  9/10/2019 Surgery    Exploratory laparotomy radical omentectomy, posterior exenteration, takedown of ileostomy and end colostomy for complete debulking of visible tumor      Final pathology report revealed high-grade serous malignancy in both the omentum and the pelvic mass     9/25/2019 -  Chemotherapy    Carboplatin area under the curve of 5+ weekly paclitaxel at 80 milligrams/meters squared for 3 further cycles of treatment followed by consolidation this is to begin mid October     10/2019 Surgery    Interval debulking with TAHBSO revision of colostomy omentectomy and tumor debulking with complete debulking  Several weeks postoperatively the patient required a open cholecystectomy       10/2019 - 12/2019 Chemotherapy    Carboplatin paclitaxel x3     10/9/2019 -  Cancer Staged    Staging form: Ovary, Fallopian Tube, Primary Peritoneal, AJCC 8th Edition  - Pathologic stage from 10/9/2019: FIGO Stage IIIC (pT3c, pN1, cM0) - Signed by Tawnya Ghosh MD on 10/9/2019  Histologic grade (G): G3  Histologic grading system: 4 grade system  Gross residual tumor after primary cyto-reductive surgery: Absent       3/24/2020 - 9/21/2020 Chemotherapy    palonosetron (ALOXI) injection 0 25 mg, 0 25 mg, Intravenous, Once, 6 of 7 cycles  Administration: 0 25 mg (3/24/2020), 0 25 mg (4/21/2020), 0 25 mg (5/19/2020), 0 25 mg (6/23/2020), 0 25 mg (7/28/2020), 0 25 mg (8/25/2020)  pegfilgrastim (NEULASTA ONPRO) subcutaneous injection kit 6 mg, 6 mg, Subcutaneous, Once, 6 of 7 cycles  Administration: 6 mg (3/31/2020), 6 mg (4/28/2020), 6 mg (5/26/2020), 6 mg (6/29/2020), 6 mg (8/4/2020), 6 mg (9/1/2020)  DOXOrubicin liposome (DOXIL) 54 3 mg in dextrose 5 % 250 mL chemo infusion, 30 mg/m2 = 54 3 mg, Intravenous, Once, 6 of 7 cycles  Administration: 54 3 mg (3/24/2020), 54 6 mg (4/21/2020), 54 9 mg (5/19/2020), 54 6 mg (6/23/2020), 54 9 mg (7/28/2020), 54 9 mg (8/25/2020)  gemcitabine (GEMZAR) 1,176 4 mg in sodium chloride 0 9 % 250 mL infusion, 650 mg/m2 = 1,176 4 mg, Intravenous, Once, 6 of 7 cycles  Administration: 1,176 4 mg (3/24/2020), 1,176 4 mg (3/31/2020), 1,182 9 mg (4/21/2020), 1,200 mg (4/28/2020), 1,189 4 mg (5/19/2020), 1,189 4 mg (5/26/2020), 1,182 9 mg (6/23/2020), 1,182 9 mg (6/29/2020), 1,189 4 mg (7/28/2020), 1,189 4 mg (8/4/2020), 1,189 4 mg (8/25/2020), 1,195 8 mg (9/1/2020)     1/12/2021 - 1/12/2021 Chemotherapy    palonosetron (ALOXI) injection 0 25 mg, 0 25 mg, Intravenous, Once, 0 of 6 cycles  fosaprepitant (EMEND) 150 mg in sodium chloride 0 9 % 250 mL IVPB, 150 mg, Intravenous, Once, 0 of 6 cycles  CARBOplatin (PARAPLATIN) in sodium chloride 0 9 % 250 mL IVPB, , Intravenous, Once, 0 of 6 cycles  PACLitaxel (TAXOL) 246 mg in sodium chloride 0 9 % 500 mL chemo IVPB, 135 mg/m2 = 246 mg (77 1 % of original dose 175 mg/m2), Intravenous, Once, 0 of 6 cycles  Dose modification: 135 mg/m2 (original dose 175 mg/m2, Cycle 1, Reason: Other (See Comments), Comment: heavily pretreated)     1/12/2021 -  Chemotherapy    palonosetron (ALOXI) injection 0 25 mg, 0 25 mg, Intravenous, Once, 3 of 6 cycles  Administration: 0 25 mg (1/12/2021), 0 25 mg (3/2/2021), 0 25 mg (3/23/2021)  fosaprepitant (EMEND) 150 mg in sodium chloride 0 9 % 250 mL IVPB, 150 mg, Intravenous, Once, 3 of 6 cycles  Administration: 150 mg (1/12/2021), 150 mg (3/2/2021), 150 mg (3/23/2021)  CARBOplatin (PARAPLATIN) 311 mg in sodium chloride 0 9 % 250 mL IVPB, 311 mg, Intravenous, Once, 3 of 6 cycles  Administration: 311 mg (1/12/2021), 330 mg (3/2/2021), 338 mg (3/23/2021)  PACLitaxel (TAXOL) 91 2 mg in sodium chloride 0 9 % 250 mL chemo IVPB, 50 mg/m2 = 91 2 mg (62 5 % of original dose 80 mg/m2), Intravenous, Once, 3 of 6 cycles  Dose modification: 50 mg/m2 (original dose 80 mg/m2, Cycle 1, Reason: Other (See Comments))  Administration: 91 2 mg (1/12/2021), 90 6 mg (1/19/2021), 89 4 mg (3/2/2021), 91 2 mg (3/9/2021), 91 2 mg (3/16/2021), 90 6 mg (3/23/2021), 90 6 mg (3/30/2021), 90 6 mg (4/6/2021)           Patient ID: Tate Giron is a 69 y o  female    Patient is very pleasant 67-year-old female with a history of recurrent stage IVB ovarian cancer for consideration of cycle 4 of carboplatin paclitaxel  The patient underwent 3 cycles of treatment has tolerated this reasonably well  Her  has now cut in half from 120-60  Her CT scan  Has been performed and is currently being read  I reviewed reviewed the patient's weekly CBC and CPM P blood work  These reveal stable blood count including white blood cell count and platelet function as well as preserved hepatic and renal function  overall the patient is doing well  She continues to have difficulty with her stoma  Function after her chemotherapy  She has a day or 2 of constipation followed by excessive output  This requires her to use more than 20 of her allotted stoma bags per month  As indicated by the insurance company  Aside from this she is doing well  She has no pain she is tolerating her chemotherapy extremely well  Today, the patient is doing well  She denies significant abdominal pain, pelvic pain, nausea, vomiting, constipation, diarrhea, fevers, chills, or vaginal bleeding  The following portions of the patient's history were reviewed and updated as appropriate: allergies, current medications, past medical history, past social history, past surgical history and problem list     Review of Systems   Constitutional: Negative  HENT: Negative  Eyes: Negative  Respiratory: Negative  Cardiovascular: Negative  Gastrointestinal: Negative  Endocrine: Negative  Genitourinary: Negative  Musculoskeletal: Negative  Skin: Negative  Neurological: Negative  Hematological: Negative  Psychiatric/Behavioral: Negative          Current Outpatient Medications   Medication Sig Dispense Refill    aspirin-acetaminophen-caffeine (EXCEDRIN MIGRAINE) 250-250-65 MG per tablet Take 1 tablet by mouth every 6 (six) hours as needed for headaches      nystatin (MYCOSTATIN) powder Apply topically 3 (three) times a day 15 g 4    pantoprazole (PROTONIX) 40 mg tablet Take 1 tablet (40 mg total) by mouth 2 (two) times a day before meals  0    traMADol (ULTRAM) 50 mg tablet Take 1 tablet (50 mg total) by mouth every 6 (six) hours as needed for moderate pain 30 tablet 1    gabapentin (NEURONTIN) 300 mg capsule Take 1 capsule (300 mg total) by mouth 3 (three) times a day 90 capsule 0    potassium chloride (K-DUR,KLOR-CON) 20 mEq tablet Take 1 tablet (20 mEq total) by mouth daily 30 tablet 0     No current facility-administered medications for this visit  Facility-Administered Medications Ordered in Other Visits   Medication Dose Route Frequency Provider Last Rate Last Admin    barium (READI-CAT 2) suspension 900 mL  900 mL Oral Once in imaging Alek Jama MD               Objective:    Blood pressure 136/62, pulse 86, temperature 97 8 °F (36 6 °C), resp  rate 18, height 5' 4 17" (1 63 m), weight 76 7 kg (169 lb), not currently breastfeeding  Body mass index is 28 86 kg/m²  Body surface area is 1 82 meters squared  Physical Exam  Constitutional:       Appearance: She is well-developed  HENT:      Head: Normocephalic and atraumatic  Neck:      Musculoskeletal: Normal range of motion and neck supple  Thyroid: No thyromegaly  Cardiovascular:      Rate and Rhythm: Normal rate and regular rhythm  Heart sounds: Normal heart sounds  Pulmonary:      Effort: Pulmonary effort is normal       Breath sounds: Normal breath sounds  Abdominal:      General: Bowel sounds are normal       Palpations: Abdomen is soft  Comments: Well healed incisions  Stoma  Appliance in place   Genitourinary:     Comments: Deferred    Musculoskeletal: Normal range of motion  Lymphadenopathy:      Cervical: No cervical adenopathy  Skin:     General: Skin is warm and dry  Neurological:      Mental Status: She is alert and oriented to person, place, and time     Psychiatric:         Behavior: Behavior normal          Lab Results   Component Value Date     60 8 (H) 04/02/2021     Lab Results   Component Value Date    K 4 3 04/02/2021     04/02/2021    CO2 26 04/02/2021    BUN 23 04/02/2021    CREATININE 1 30 04/02/2021    GLUCOSE 228 (H) 09/10/2019    GLUF 146 (H) 03/19/2021    CALCIUM 8 4 04/02/2021    CORRECTEDCA 9 1 03/19/2021    AST 15 04/02/2021    ALT 22 04/02/2021    ALKPHOS 98 04/02/2021    EGFR 41 04/02/2021     Lab Results   Component Value Date    WBC 4 95 04/02/2021    HGB 8 6 (L) 04/02/2021    HCT 28 1 (L) 04/02/2021    MCV 91 04/02/2021     04/02/2021     Lab Results   Component Value Date    NEUTROABS 2 74 04/02/2021        Trend:  Lab Results   Component Value Date     60 8 (H) 04/02/2021     110 8 (H) 03/12/2021     101 9 (H) 03/02/2021     75 8 (H) 02/12/2021     85 7 (H) 01/22/2021     102 0 (H) 12/05/2020     77 1 (H) 11/30/2020     45 7 (H) 10/16/2020     39 6 (H) 09/11/2020     44 9 (H) 09/01/2020     29 2 06/15/2020     27 4 06/10/2020     32 5 (H) 05/26/2020     28 6 05/15/2020     30 6 (H) 05/11/2020     36 5 (H) 04/27/2020     37 9 (H) 04/17/2020     42 5 (H) 04/10/2020     48 1 (H) 03/27/2020     40 9 (H) 03/20/2020     18 2 01/28/2020     17 5 12/31/2019     14 5 12/02/2019     22 0 11/19/2019     32 6 (H) 11/06/2019     19 1 08/30/2019     12 7 08/13/2019     13 9 06/18/2019     19 9 05/21/2019     79 5 (H) 04/16/2019     96 4 (H) 03/19/2019     80 4 (H) 03/08/2019     135 0 (H) 11/30/2018     194 5 (H) 10/18/2018

## 2021-04-07 NOTE — LETTER
April 7, 2021     Yarely Perches, [phone] [facility] [address]    Patient: Acacia Anthony   YOB: 1950   Date of Visit: 4/7/2021       Dear Dr Americo Frankel:    Thank you for referring Acacia Anthony to me for evaluation  Below are my notes for this consultation  If you have questions, please do not hesitate to call me  I look forward to following your patient along with you  Sincerely,        Annelise Elena MD        CC: MD Annelise Drew Se, MD  4/7/2021 10:48 AM  Sign when Signing Visit  Assessment/Plan:    Problem List Items Addressed This Visit        Endocrine    Ovarian cancer [facility]) - Primary       Patient is very pleasant 50-year-old female with history of recurrent metastatic ovarian cancer stage IV B  She is presently tolerating her chemo well  Her  is taken a nice drop  Her stay CT scan is currently pending  We have recommended no further change to her therapy and will continue her treatment as planned  The patient hopes to go to [place] at the end of treatment of cycle 6  We will move ahead with cycle 4 without any dose modification  CHIEF COMPLAINT:  Consideration of cycle 4  Carboplatin paclitaxel for recurrent stage IV B ovarian cancer      Problem:  Cancer Staging  Ovarian cancer [facility])  Staging form: Ovary, Fallopian Tube, Primary Peritoneal, AJCC 8th Edition  - Clinical stage from 11/14/2018: FIGO Stage IVB (cT3c, cN0, pM1b) - Signed by Annelise Elena MD on 11/14/2018  - Pathologic stage from 10/9/2019: FIGO Stage IIIC (pT3c, pN1, cM0) - Signed by Annelise Elena MD on 10/9/2019        Previous therapy:  Oncology History   Ovarian cancer ([facility] [address] )   11/9/2018 Initial Diagnosis    Ovarian cancer ([facility] [address] )   tumor marker 194 5     11/9/2018 Biopsy    CT-guided needle biopsy of abdominal mass 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    Given liver involvement this would be stage IV     11/14/2018 - 12/4/2018 Chemotherapy    Neoadjuvant therapy: carboplatin area under the curve of 6, paclitaxel 135 milligrams/meter squared, Avastin 10 milligrams/kilogram  Completed 1 of 3 cycles  12/14/2018 Surgery    LAPAROTOMY EXPLORATORY; Abdominal Washout; Application of Abthera Vac Dressing for suspected bowel perforation and septic shock        12/15/2018 Surgery    LAPAROTOMY EXPLORATORY, ABDOMINAL WASHOUT, DRAIN PLACEMENT x 4, DIVERTING LOOP ILEOSTOMY AND ABDOMINAL CLOSURE for bowel perforation     3/26/2019 -  Chemotherapy    Taxol weekly 80 mg/m2 for 3 consecutive weeks, may add carboplatin in the future with AUC of 5      3/26/2019 - 2/3/2020 Chemotherapy    palonosetron (ALOXI) injection 0 25 mg, 0 25 mg, Intravenous, Once, 6 of 6 cycles  Administration: 0 25 mg (5/28/2019), 0 25 mg (6/25/2019), 0 25 mg (11/6/2019), 0 25 mg (12/10/2019), 0 25 mg (1/7/2020)  fosaprepitant (EMEND) 150 mg in sodium chloride 0 9 % 255 mL IVPB, 150 mg, Intravenous, Once, 6 of 6 cycles  Administration: 150 mg (5/28/2019), 150 mg (6/25/2019), 150 mg (11/6/2019), 150 mg (12/10/2019), 150 mg (1/7/2020)  CARBOplatin (PARAPLATIN) in sodium chloride 0 9 % 250 mL IVPB,  (original dose ), Intravenous, Once, 6 of 6 cycles  Dose modification:   (Cycle 2),   (original dose 258 4 mg, Cycle 3),   (original dose 258 4 mg, Cycle 3),   (original dose 244 4 mg, Cycle 4)  Administration: 258 4 mg (5/28/2019), 244 4 mg (6/25/2019), 285 2 mg (11/6/2019), 296 4 mg (12/10/2019), 286 4 mg (1/7/2020)  PACLitaxel (TAXOL) 127 8 mg in sodium chloride 0 9 % 250 mL chemo IVPB, 80 mg/m2, Intravenous, Once, 7 of 7 cycles  Dose modification: 65 mg/m2 (original dose 80 mg/m2, Cycle 2, Reason: Dose Not Tolerated)  Administration: 103 8 mg (5/14/2019), 108 6 mg (5/28/2019), 108 6 mg (6/4/2019), 108 6 mg (6/11/2019), 109 8 mg (6/25/2019), 109 8 mg (7/2/2019), 109 8 mg (7/9/2019), 111 6 mg (11/6/2019), 111 6 mg (11/12/2019), 111 6 mg (11/19/2019), 113 4 mg (12/10/2019), 113 4 mg (12/17/2019), 113 4 mg (12/23/2019), 113 4 mg (1/7/2020), 114 6 mg (1/14/2020), 114 6 mg (1/21/2020)     4/29/2019 [address] testing revealed a PD L1 tumor proportions score of 0%  The patient is not a candidate for PD L1 manipulation      Genetic Testing    [facility] Breast/Gyn panel, wildtype  9/10/2019 Surgery    Exploratory laparotomy radical omentectomy, posterior exenteration, takedown of ileostomy and end colostomy for complete debulking of visible tumor  Final pathology report revealed high-grade serous malignancy in both the omentum and the pelvic mass     9/25/2019 -  Chemotherapy    Carboplatin area under the curve of 5+ weekly paclitaxel at 80 milligrams/meters squared for 3 further cycles of treatment followed by consolidation this is to begin mid October     10/2019 Surgery    Interval debulking with TAHBSO revision of colostomy omentectomy and tumor debulking with complete debulking  Several weeks postoperatively the patient required a open cholecystectomy       10/2019 - 12/2019 Chemotherapy    Carboplatin paclitaxel x3     10/9/2019 -  Cancer Staged    Staging form: Ovary, Fallopian Tube, Primary Peritoneal, AJCC 8th Edition  - Pathologic stage from 10/9/2019: FIGO Stage IIIC (pT3c, pN1, cM0) - Signed by Zander Roblero MD on 10/9/2019  Histologic grade (G): G3  Histologic grading system: 4 grade system  Gross residual tumor after primary cyto-reductive surgery: Absent       3/24/2020 - 9/21/2020 Chemotherapy    palonosetron (ALOXI) injection 0 25 mg, 0 25 mg, Intravenous, Once, 6 of 7 cycles  Administration: 0 25 mg (3/24/2020), 0 25 mg (4/21/2020), 0 25 mg (5/19/2020), 0 25 mg (6/23/2020), 0 25 mg (7/28/2020), 0 25 mg (8/25/2020)  pegfilgrastim (NEULASTA ONPRO) subcutaneous injection kit 6 mg, 6 mg, Subcutaneous, Once, 6 of 7 cycles  Administration: 6 mg (3/31/2020), 6 mg (4/28/2020), 6 mg (5/26/2020), 6 mg (6/29/2020), 6 mg (8/4/2020), 6 mg (9/1/2020)  DOXOrubicin liposome (DOXIL) 54 3 mg in dextrose 5 % 250 mL chemo infusion, 30 mg/m2 = 54 3 mg, Intravenous, Once, 6 of 7 cycles  Administration: 54 3 mg (3/24/2020), 54 6 mg (4/21/2020), 54 9 mg (5/19/2020), 54 6 mg (6/23/2020), 54 9 mg (7/28/2020), 54 9 mg (8/25/2020)  gemcitabine (GEMZAR) 1,176 4 mg in sodium chloride 0 9 % 250 mL infusion, 650 mg/m2 = 1,176 4 mg, Intravenous, Once, 6 of 7 cycles  Administration: 1,176 4 mg (3/24/2020), 1,176 4 mg (3/31/2020), 1,182 9 mg (4/21/2020), 1,200 mg (4/28/2020), 1,189 4 mg (5/19/2020), 1,189 4 mg (5/26/2020), 1,182 9 mg (6/23/2020), 1,182 9 mg (6/29/2020), 1,189 4 mg (7/28/2020), 1,189 4 mg (8/4/2020), 1,189 4 mg (8/25/2020), 1,195 8 mg (9/1/2020)     1/12/2021 - 1/12/2021 Chemotherapy    palonosetron (ALOXI) injection 0 25 mg, 0 25 mg, Intravenous, Once, 0 of 6 cycles  fosaprepitant (EMEND) 150 mg in sodium chloride 0 9 % 250 mL IVPB, 150 mg, Intravenous, Once, 0 of 6 cycles  CARBOplatin (PARAPLATIN) in sodium chloride 0 9 % 250 mL IVPB, , Intravenous, Once, 0 of 6 cycles  PACLitaxel (TAXOL) 246 mg in sodium chloride 0 9 % 500 mL chemo IVPB, 135 mg/m2 = 246 mg (77 1 % of original dose 175 mg/m2), Intravenous, Once, 0 of 6 cycles  Dose modification: 135 mg/m2 (original dose 175 mg/m2, Cycle 1, Reason: Other (See Comments), Comment: heavily pretreated)     1/12/2021 -  Chemotherapy    palonosetron (ALOXI) injection 0 25 mg, 0 25 mg, Intravenous, Once, 3 of 6 cycles  Administration: 0 25 mg (1/12/2021), 0 25 mg (3/2/2021), 0 25 mg (3/23/2021)  fosaprepitant (EMEND) 150 mg in sodium chloride 0 9 % 250 mL IVPB, 150 mg, Intravenous, Once, 3 of 6 cycles  Administration: 150 mg (1/12/2021), 150 mg (3/2/2021), 150 mg (3/23/2021)  CARBOplatin (PARAPLATIN) 311 mg in sodium chloride 0 9 % 250 mL IVPB, 311 mg, Intravenous, Once, 3 of 6 cycles  Administration: 311 mg (1/12/2021), 330 mg (3/2/2021), 338 mg (3/23/2021)  PACLitaxel (TAXOL) 91 2 mg in sodium chloride 0 9 % 250 mL chemo IVPB, 50 mg/m2 = 91 2 mg (62 5 % of original dose 80 mg/m2), Intravenous, Once, 3 of 6 cycles  Dose modification: 50 mg/m2 (original dose 80 mg/m2, Cycle 1, Reason: Other (See Comments))  Administration: 91 2 mg (1/12/2021), 90 6 mg (1/19/2021), 89 4 mg (3/2/2021), 91 2 mg (3/9/2021), 91 2 mg (3/16/2021), 90 6 mg (3/23/2021), 90 6 mg (3/30/2021), 90 6 mg (4/6/2021)           Patient ID: Catherine Perdomo is a 69 y o  female    Patient is very pleasant 58-year-old female with a history of recurrent stage IVB ovarian cancer for consideration of cycle 4 of carboplatin paclitaxel  The patient underwent 3 cycles of treatment has tolerated this reasonably well  Her  has now cut in half from 120-60  Her CT scan  Has been performed and is currently being read  I reviewed reviewed the patient's weekly CBC and CPM P blood work  These reveal stable blood count including white blood cell count and platelet function as well as preserved hepatic and renal function  overall the patient is doing well  She continues to have difficulty with her stoma  Function after her chemotherapy  She has a day or 2 of constipation followed by excessive output  This requires her to use more than 20 of her allotted stoma bags per month  As indicated by the insurance company  Aside from this she is doing well  She has no pain she is tolerating her chemotherapy extremely well  Today, the patient is doing well  She denies significant abdominal pain, pelvic pain, nausea, vomiting, constipation, diarrhea, fevers, chills, or vaginal bleeding  The following portions of the patient's history were reviewed and updated as appropriate: allergies, current medications, past medical history, past social history, past surgical history and problem list     Review of Systems   Constitutional: Negative  HENT: Negative  Eyes: Negative  Respiratory: Negative  Cardiovascular: Negative  Gastrointestinal: Negative  Endocrine: Negative  Genitourinary: Negative  Musculoskeletal: Negative  Skin: Negative  Neurological: Negative  Hematological: Negative  Psychiatric/Behavioral: Negative  Current Outpatient Medications   Medication Sig Dispense Refill    aspirin-acetaminophen-caffeine (EXCEDRIN MIGRAINE) 250-250-65 MG per tablet Take 1 tablet by mouth every 6 (six) hours as needed for headaches      nystatin (MYCOSTATIN) powder Apply topically 3 (three) times a day 15 g 4    pantoprazole (PROTONIX) 40 mg tablet Take 1 tablet (40 mg total) by mouth 2 (two) times a day before meals  0    traMADol (ULTRAM) 50 mg tablet Take 1 tablet (50 mg total) by mouth every 6 (six) hours as needed for moderate pain 30 tablet 1    gabapentin (NEURONTIN) 300 mg capsule Take 1 capsule (300 mg total) by mouth 3 (three) times a day 90 capsule 0    potassium chloride (K-DUR,KLOR-CON) 20 mEq tablet Take 1 tablet (20 mEq total) by mouth daily 30 tablet 0     No current facility-administered medications for this visit  Facility-Administered Medications Ordered in Other Visits   Medication Dose Route Frequency Provider Last Rate Last Admin    barium (READI-CAT 2) suspension 900 mL  900 mL Oral Once in imaging Peace Mitchell MD               Objective:    Blood pressure 136/62, pulse 86, temperature 97 8 °F (36 6 °C), resp  rate 18, height 5' 4 17" (1 63 m), weight 76 7 kg (169 lb), not currently breastfeeding  Body mass index is 28 86 kg/m²  Body surface area is 1 82 meters squared  Physical Exam  Constitutional:       Appearance: She is well-developed  HENT:      Head: Normocephalic and atraumatic  Neck:      Musculoskeletal: Normal range of motion and neck supple  Thyroid: No thyromegaly  Cardiovascular:      Rate and Rhythm: Normal rate and regular rhythm  Heart sounds: Normal heart sounds     Pulmonary:      Effort: Pulmonary effort is normal       Breath sounds: Normal breath sounds  Abdominal:      General: Bowel sounds are normal       Palpations: Abdomen is soft  Comments: Well healed incisions  Stoma  Appliance in place   Genitourinary:     Comments: Deferred    Musculoskeletal: Normal range of motion  Lymphadenopathy:      Cervical: No cervical adenopathy  Skin:     General: Skin is warm and dry  Neurological:      Mental Status: She is alert and oriented to person, place, and time     Psychiatric:         Behavior: Behavior normal          Lab Results   Component Value Date     60 8 (H) 04/02/2021     Lab Results   Component Value Date    K 4 3 04/02/2021     04/02/2021    CO2 26 04/02/2021    BUN 23 04/02/2021    CREATININE 1 30 04/02/2021    GLUCOSE 228 (H) 09/10/2019    GLUF 146 (H) 03/19/2021    CALCIUM 8 4 04/02/2021    CORRECTEDCA 9 1 03/19/2021    AST 15 04/02/2021    ALT 22 04/02/2021    ALKPHOS 98 04/02/2021    EGFR 41 04/02/2021     Lab Results   Component Value Date    WBC 4 95 04/02/2021    HGB 8 6 (L) 04/02/2021    HCT 28 1 (L) 04/02/2021    MCV 91 04/02/2021     04/02/2021     Lab Results   Component Value Date    NEUTROABS 2 74 04/02/2021        Trend:  Lab Results   Component Value Date     60 8 (H) 04/02/2021     110 8 (H) 03/12/2021     101 9 (H) 03/02/2021     75 8 (H) 02/12/2021     85 7 (H) 01/22/2021     102 0 (H) 12/05/2020     77 1 (H) 11/30/2020     45 7 (H) 10/16/2020     39 6 (H) 09/11/2020     44 9 (H) 09/01/2020     29 2 06/15/2020     27 4 06/10/2020     32 5 (H) 05/26/2020     28 6 05/15/2020     30 6 (H) 05/11/2020     36 5 (H) 04/27/2020     37 9 (H) 04/17/2020     42 5 (H) 04/10/2020     48 1 (H) 03/27/2020     40 9 (H) 03/20/2020     18 2 01/28/2020     17 5 12/31/2019     14 5 12/02/2019     22 0 11/19/2019     32 6 (H) 11/06/2019     19 1 08/30/2019     12 7 08/13/2019     13 9 06/18/2019     19 9 05/21/2019     79 5 (H) 04/16/2019     96 4 (H) 03/19/2019     80 4 (H) 03/08/2019     135 0 (H) 11/30/2018     194 5 (H) 10/18/2018

## 2021-04-12 NOTE — TELEPHONE ENCOUNTER
Patient called with a question about tomorrows infusion  She stated she has a head cold, but no fever  Infusion told her it was ok to have her treatment tomorrow, but to just confirm with you  Please call her when available

## 2021-04-12 NOTE — TELEPHONE ENCOUNTER
Return call placed to patient  Also discussed CT scan at this time  Progression identified  Will cancel treatment tomorrow  Scheduled discussion with Dr Kriss Villa on 4/14/21

## 2021-04-14 NOTE — PROGRESS NOTES
Assessment/Plan:    Problem List Items Addressed This Visit        Endocrine    Ovarian cancer ([facility] [address] ) - Primary      The patient is in a difficult situation with platinum refractory disease  It is unclear why her  has dropped yet her CT scan has progressed  She has been on this treatment regimen for over 3 and half months  The treatment itself was delayed somewhat in February due to snow however I would have expected the CT scan findings to of been even more likely to show regression if this treatment regimen is working after 3  Cycles of carboplatin paclitaxel  The patient is due to have blood work drawn on Friday I have asked her to include a  and that  In the interim we have discussed the risks and benefits of  Keytruda Avastin and metronomic cyclophosphamide  I have given her the informational sheets regarding this  The biggest concern is the possibility of recurrence of bowel perforation which may be fatal   However without aggressive treatment the patient will likely do poorly and succumb to her illness soon  Other alternatives include continuing with the same regimen for another cycle or 2 if her  remains  Low at 60  Additionally alternative agents such as topotecan,  Premetrexed  Or other alternatives could be tried however there success rate is low  Patient will get her , she will discuss the case with her family and the options, and I will reach out to see if any other institutions have experience with retreating with Avastin after a bowel perforation  CHIEF COMPLAINT:   Stage IV B ovarian cancer recurrent presently on carboplatin paclitaxel after 3 cycles with progression of disease        Problem:  Cancer Staging  Ovarian cancer [facility])  Staging form: Ovary, Fallopian Tube, Primary Peritoneal, AJCC 8th Edition  - Clinical stage from 11/14/2018: FIGO Stage IVB (cT3c, cN0, pM1b) - Signed by Loren Dupree MD on 11/14/2018  - Pathologic stage from 10/9/2019: FIGO Stage IIIC (pT3c, pN1, cM0) - Signed by Radha Osei MD on 10/9/2019        Previous therapy:  Oncology History   Ovarian cancer ([facility] [address] )   11/9/2018 Initial Diagnosis    Ovarian cancer ([facility] [address] )   tumor marker 194 5     11/9/2018 Biopsy    CT-guided needle biopsy of abdominal mass 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  Given liver involvement this would be stage IV     11/14/2018 - 12/4/2018 Chemotherapy    Neoadjuvant therapy: carboplatin area under the curve of 6, paclitaxel 135 milligrams/meter squared, Avastin 10 milligrams/kilogram  Completed 1 of 3 cycles  12/14/2018 Surgery    LAPAROTOMY EXPLORATORY; Abdominal Washout; Application of Abthera Vac Dressing for suspected bowel perforation and septic shock        12/15/2018 Surgery    LAPAROTOMY EXPLORATORY, ABDOMINAL WASHOUT, DRAIN PLACEMENT x 4, DIVERTING LOOP ILEOSTOMY AND ABDOMINAL CLOSURE for bowel perforation     3/26/2019 -  Chemotherapy    Taxol weekly 80 mg/m2 for 3 consecutive weeks, may add carboplatin in the future with AUC of 5      3/26/2019 - 2/3/2020 Chemotherapy    palonosetron (ALOXI) injection 0 25 mg, 0 25 mg, Intravenous, Once, 6 of 6 cycles  Administration: 0 25 mg (5/28/2019), 0 25 mg (6/25/2019), 0 25 mg (11/6/2019), 0 25 mg (12/10/2019), 0 25 mg (1/7/2020)  fosaprepitant (EMEND) 150 mg in sodium chloride 0 9 % 255 mL IVPB, 150 mg, Intravenous, Once, 6 of 6 cycles  Administration: 150 mg (5/28/2019), 150 mg (6/25/2019), 150 mg (11/6/2019), 150 mg (12/10/2019), 150 mg (1/7/2020)  CARBOplatin (PARAPLATIN) in sodium chloride 0 9 % 250 mL IVPB,  (original dose ), Intravenous, Once, 6 of 6 cycles  Dose modification:   (Cycle 2),   (original dose 258 4 mg, Cycle 3),   (original dose 258 4 mg, Cycle 3),   (original dose 244 4 mg, Cycle 4)  Administration: 258 4 mg (5/28/2019), 244 4 mg (6/25/2019), 285 2 mg (11/6/2019), 296 4 mg (12/10/2019), 286 4 mg (1/7/2020)  PACLitaxel (TAXOL) 127 8 mg in sodium chloride 0 9 % 250 mL chemo IVPB, 80 mg/m2, Intravenous, Once, 7 of 7 cycles  Dose modification: 65 mg/m2 (original dose 80 mg/m2, Cycle 2, Reason: Dose Not Tolerated)  Administration: 103 8 mg (5/14/2019), 108 6 mg (5/28/2019), 108 6 mg (6/4/2019), 108 6 mg (6/11/2019), 109 8 mg (6/25/2019), 109 8 mg (7/2/2019), 109 8 mg (7/9/2019), 111 6 mg (11/6/2019), 111 6 mg (11/12/2019), 111 6 mg (11/19/2019), 113 4 mg (12/10/2019), 113 4 mg (12/17/2019), 113 4 mg (12/23/2019), 113 4 mg (1/7/2020), 114 6 mg (1/14/2020), 114 6 mg (1/21/2020)     4/29/2019 Genomic Testing     Foundation 1 testing revealed a PD L1 tumor proportions score of 0%  The patient is not a candidate for PD L1 manipulation      Genetic Testing    [employer] Breast/Gyn panel, wildtype  9/10/2019 Surgery    Exploratory laparotomy radical omentectomy, posterior exenteration, takedown of ileostomy and end colostomy for complete debulking of visible tumor  Final pathology report revealed high-grade serous malignancy in both the omentum and the pelvic mass     9/25/2019 -  Chemotherapy    Carboplatin area under the curve of 5+ weekly paclitaxel at 80 milligrams/meters squared for 3 further cycles of treatment followed by consolidation this is to begin mid October     10/2019 Surgery    Interval debulking with TAHBSO revision of colostomy omentectomy and tumor debulking with complete debulking  Several weeks postoperatively the patient required a open cholecystectomy       10/2019 - 12/2019 Chemotherapy    Carboplatin paclitaxel x3     10/9/2019 -  Cancer Staged    Staging form: Ovary, Fallopian Tube, Primary Peritoneal, AJCC 8th Edition  - Pathologic stage from 10/9/2019: FIGO Stage IIIC (pT3c, pN1, cM0) - Signed by Aby Carreon MD on 10/9/2019  Histologic grade (G): G3  Histologic grading system: 4 grade system  Gross residual tumor after primary cyto-reductive surgery: Absent       3/24/2020 - 9/21/2020 Chemotherapy palonosetron (ALOXI) injection 0 25 mg, 0 25 mg, Intravenous, Once, 6 of 7 cycles  Administration: 0 25 mg (3/24/2020), 0 25 mg (4/21/2020), 0 25 mg (5/19/2020), 0 25 mg (6/23/2020), 0 25 mg (7/28/2020), 0 25 mg (8/25/2020)  pegfilgrastim (NEULASTA ONPRO) subcutaneous injection kit 6 mg, 6 mg, Subcutaneous, Once, 6 of 7 cycles  Administration: 6 mg (3/31/2020), 6 mg (4/28/2020), 6 mg (5/26/2020), 6 mg (6/29/2020), 6 mg (8/4/2020), 6 mg (9/1/2020)  DOXOrubicin liposome (DOXIL) 54 3 mg in dextrose 5 % 250 mL chemo infusion, 30 mg/m2 = 54 3 mg, Intravenous, Once, 6 of 7 cycles  Administration: 54 3 mg (3/24/2020), 54 6 mg (4/21/2020), 54 9 mg (5/19/2020), 54 6 mg (6/23/2020), 54 9 mg (7/28/2020), 54 9 mg (8/25/2020)  gemcitabine (GEMZAR) 1,176 4 mg in sodium chloride 0 9 % 250 mL infusion, 650 mg/m2 = 1,176 4 mg, Intravenous, Once, 6 of 7 cycles  Administration: 1,176 4 mg (3/24/2020), 1,176 4 mg (3/31/2020), 1,182 9 mg (4/21/2020), 1,200 mg (4/28/2020), 1,189 4 mg (5/19/2020), 1,189 4 mg (5/26/2020), 1,182 9 mg (6/23/2020), 1,182 9 mg (6/29/2020), 1,189 4 mg (7/28/2020), 1,189 4 mg (8/4/2020), 1,189 4 mg (8/25/2020), 1,195 8 mg (9/1/2020)     1/12/2021 - 1/12/2021 Chemotherapy    palonosetron (ALOXI) injection 0 25 mg, 0 25 mg, Intravenous, Once, 0 of 6 cycles  fosaprepitant (EMEND) 150 mg in sodium chloride 0 9 % 250 mL IVPB, 150 mg, Intravenous, Once, 0 of 6 cycles  CARBOplatin (PARAPLATIN) in sodium chloride 0 9 % 250 mL IVPB, , Intravenous, Once, 0 of 6 cycles  PACLitaxel (TAXOL) 246 mg in sodium chloride 0 9 % 500 mL chemo IVPB, 135 mg/m2 = 246 mg (77 1 % of original dose 175 mg/m2), Intravenous, Once, 0 of 6 cycles  Dose modification: 135 mg/m2 (original dose 175 mg/m2, Cycle 1, Reason: Other (See Comments), Comment: heavily pretreated)     1/12/2021 -  Chemotherapy    palonosetron (ALOXI) injection 0 25 mg, 0 25 mg, Intravenous, Once, 3 of 3 cycles  Administration: 0 25 mg (1/12/2021), 0 25 mg (3/2/2021), 0 25 mg (3/23/2021)  fosaprepitant (EMEND) 150 mg in sodium chloride 0 9 % 250 mL IVPB, 150 mg, Intravenous, Once, 3 of 3 cycles  Administration: 150 mg (1/12/2021), 150 mg (3/2/2021), 150 mg (3/23/2021)  CARBOplatin (PARAPLATIN) 311 mg in sodium chloride 0 9 % 250 mL IVPB, 311 mg, Intravenous, Once, 3 of 3 cycles  Administration: 311 mg (1/12/2021), 330 mg (3/2/2021), 338 mg (3/23/2021)  PACLitaxel (TAXOL) 91 2 mg in sodium chloride 0 9 % 250 mL chemo IVPB, 50 mg/m2 = 91 2 mg (62 5 % of original dose 80 mg/m2), Intravenous, Once, 3 of 4 cycles  Dose modification: 50 mg/m2 (original dose 80 mg/m2, Cycle 1, Reason: Other (See Comments))  Administration: 91 2 mg (1/12/2021), 90 6 mg (1/19/2021), 89 4 mg (3/2/2021), 91 2 mg (3/9/2021), 91 2 mg (3/16/2021), 90 6 mg (3/23/2021), 90 6 mg (3/30/2021), 90 6 mg (4/6/2021)           Patient ID: Evelio Escoto is a 69 y o  female    Patient is very pleasant 48-year-old female with history of stage IV B ovarian carcinoma  She underwent multiple cycles of treatment and is presently on the 2nd  courseof Carboplatinum paclitaxel  The patient was seen late last week in anticipation of starting cycle 4 of treatment however her CT scan was not back at that time  Her  had dropped in half  The patient was feeling reasonable  Patient's CT scan recurred with multiple sites of progression of disease but no new disease  The patient presents now for consideration of changing chemotherapy  we have discussed treatment options  The patient with platinum refractory disease has few options however at this point she is doing well  The most aggressive option for platinum resistant disease which is affective would include Keytruda, a Avastin and  Metronomic cyclophosphamide    The patient has however had a significant bowel perforation with use of Avastin in the past   The patient presents today for consideration of treatment options      The following portions of the patient's history were reviewed and updated as appropriate: allergies, current medications, past family history, past social history, past surgical history and problem list     Review of Systems   Constitutional: Negative  HENT: Negative  Eyes: Negative  Respiratory: Negative  Cardiovascular: Negative  Gastrointestinal: Negative  Endocrine: Negative  Genitourinary: Negative  Musculoskeletal: Negative  Skin: Negative  Neurological: Negative  Hematological: Negative  Psychiatric/Behavioral: Negative  Current Outpatient Medications   Medication Sig Dispense Refill    aspirin-acetaminophen-caffeine (EXCEDRIN MIGRAINE) 250-250-65 MG per tablet Take 1 tablet by mouth every 6 (six) hours as needed for headaches      nystatin (MYCOSTATIN) powder Apply topically 3 (three) times a day 15 g 4    pantoprazole (PROTONIX) 40 mg tablet Take 1 tablet (40 mg total) by mouth 2 (two) times a day before meals  0    traMADol (ULTRAM) 50 mg tablet Take 1 tablet (50 mg total) by mouth every 6 (six) hours as needed for moderate pain 30 tablet 1    gabapentin (NEURONTIN) 300 mg capsule Take 1 capsule (300 mg total) by mouth 3 (three) times a day 90 capsule 0    potassium chloride (K-DUR,KLOR-CON) 20 mEq tablet Take 1 tablet (20 mEq total) by mouth daily 30 tablet 0     No current facility-administered medications for this visit  Objective:    Blood pressure 100/62, pulse 89, temperature 99 2 °F (37 3 °C), resp  rate 18, height 5' 4 17" (1 63 m), weight 74 4 kg (164 lb), not currently breastfeeding  Body mass index is 28 kg/m²  Body surface area is 1 8 meters squared  Physical Exam  Constitutional:       Appearance: She is well-developed  HENT:      Head: Normocephalic and atraumatic  Neck:      Musculoskeletal: Normal range of motion and neck supple  Thyroid: No thyromegaly  Cardiovascular:      Rate and Rhythm: Normal rate and regular rhythm        Heart sounds: Normal heart sounds  Pulmonary:      Effort: Pulmonary effort is normal       Breath sounds: Normal breath sounds  Abdominal:      General: Bowel sounds are normal       Palpations: Abdomen is soft  Comments: Well healed laparoscopic incisions  Genitourinary:     Comments: -Normal external female genitalia, normal Bartholin's and [place]'s glands                  -Normal midline urethral meatus  No lesions notes                  -Bladder without fullness mass or tenderness                  -Vagina without lesion or discharge No significant cystocele or rectocele noted                  -Cervix surgically absent                  -Uterus surgically absent                  -Adnexae surgically absent                  - Anus without fissure of lesion    Musculoskeletal: Normal range of motion  Lymphadenopathy:      Cervical: No cervical adenopathy  Skin:     General: Skin is warm and dry  Neurological:      Mental Status: She is alert and oriented to person, place, and time     Psychiatric:         Behavior: Behavior normal          Lab Results   Component Value Date     60 8 (H) 04/02/2021     Lab Results   Component Value Date    K 4 4 04/09/2021     04/09/2021    CO2 25 04/09/2021    BUN 20 04/09/2021    CREATININE 1 12 04/09/2021    GLUCOSE 228 (H) 09/10/2019    GLUF 127 (H) 04/09/2021    CALCIUM 8 5 04/09/2021    CORRECTEDCA 9 1 03/19/2021    AST 23 04/09/2021    ALT 26 04/09/2021    ALKPHOS 103 04/09/2021    EGFR 50 04/09/2021     Lab Results   Component Value Date    WBC 4 21 (L) 04/09/2021    HGB 8 6 (L) 04/09/2021    HCT 27 3 (L) 04/09/2021    MCV 91 04/09/2021     04/09/2021     Lab Results   Component Value Date    NEUTROABS 2 22 04/09/2021        Trend:  Lab Results   Component Value Date     60 8 (H) 04/02/2021     110 8 (H) 03/12/2021     101 9 (H) 03/02/2021     75 8 (H) 02/12/2021     85 7 (H) 01/22/2021     102 0 (H) 12/05/2020     77 1 (H) 11/30/2020     45 7 (H) 10/16/2020     39 6 (H) 09/11/2020     44 9 (H) 09/01/2020     29 2 06/15/2020     27 4 06/10/2020     32 5 (H) 05/26/2020     28 6 05/15/2020     30 6 (H) 05/11/2020     36 5 (H) 04/27/2020     37 9 (H) 04/17/2020     42 5 (H) 04/10/2020     48 1 (H) 03/27/2020     40 9 (H) 03/20/2020     18 2 01/28/2020     17 5 12/31/2019     14 5 12/02/2019     22 0 11/19/2019     32 6 (H) 11/06/2019     19 1 08/30/2019     12 7 08/13/2019     13 9 06/18/2019     19 9 05/21/2019     79 5 (H) 04/16/2019     96 4 (H) 03/19/2019     80 4 (H) 03/08/2019     135 0 (H) 11/30/2018     194 5 (H) 10/18/2018

## 2021-04-14 NOTE — ASSESSMENT & PLAN NOTE
The patient is in a difficult situation with platinum refractory disease  It is unclear why her  has dropped yet her CT scan has progressed  She has been on this treatment regimen for over 3 and half months  The treatment itself was delayed somewhat in February due to snow however I would have expected the CT scan findings to of been even more likely to show regression if this treatment regimen is working after 3  Cycles of carboplatin paclitaxel  The patient is due to have blood work drawn on Friday I have asked her to include a  and that  In the interim we have discussed the risks and benefits of  Keytruda Avastin and metronomic cyclophosphamide  I have given her the informational sheets regarding this  The biggest concern is the possibility of recurrence of bowel perforation which may be fatal   However without aggressive treatment the patient will likely do poorly and succumb to her illness soon  Other alternatives include continuing with the same regimen for another cycle or 2 if her  remains  Low at 60  Additionally alternative agents such as topotecan,  Premetrexed  Or other alternatives could be tried however there success rate is low  Patient will get her , she will discuss the case with her family and the options, and I will reach out to see if any other institutions have experience with retreating with Avastin after a bowel perforation

## 2021-04-14 NOTE — LETTER
April 14, 2021     [facility], [id number] [facility] [address]    Patient: Alex Orr   YOB: 1950   Date of Visit: 4/14/2021       Dear Dr Nidia Gomez:    Thank you for referring Alex Orr to me for evaluation  Below are my notes for this consultation  If you have questions, please do not hesitate to call me  I look forward to following your patient along with you  Sincerely,        Cedric De Souza MD        CC: MD Cedric Her MD  4/14/2021  5:04 PM  Sign when Signing Visit  Assessment/Plan:    Problem List Items Addressed This Visit        Endocrine    Ovarian cancer [facility]) - Primary      The patient is in a difficult situation with platinum refractory disease  It is unclear why her  has dropped yet her CT scan has progressed  She has been on this treatment regimen for over 3 and half months  The treatment itself was delayed somewhat in February due to snow however I would have expected the CT scan findings to of been even more likely to show regression if this treatment regimen is working after 3  Cycles of carboplatin paclitaxel  The patient is due to have blood work drawn on Friday I have asked her to include a  and that  In the interim we have discussed the risks and benefits of  Keytruda Avastin and metronomic cyclophosphamide  I have given her the informational sheets regarding this  The biggest concern is the possibility of recurrence of bowel perforation which may be fatal   However without aggressive treatment the patient will likely do poorly and succumb to her illness soon  Other alternatives include continuing with the same regimen for another cycle or 2 if her  remains  Low at 60  Additionally alternative agents such as topotecan,  Premetrexed  Or other alternatives could be tried however there success rate is low      Patient will get her , she will discuss the case with her family and the options, and I will reach out to see if any other institutions have experience with retreating with Avastin after a bowel perforation  CHIEF COMPLAINT:   Stage IV B ovarian cancer recurrent presently on carboplatin paclitaxel after 3 cycles with progression of disease  Problem:  Cancer Staging  Ovarian cancer [facility])  Staging form: Ovary, Fallopian Tube, Primary Peritoneal, AJCC 8th Edition  - Clinical stage from 11/14/2018: FIGO Stage IVB (cT3c, cN0, pM1b) - Signed by Radha Hernandez MD on 11/14/2018  - Pathologic stage from 10/9/2019: FIGO Stage IIIC (pT3c, pN1, cM0) - Signed by Radha Hernandez MD on 10/9/2019        Previous therapy:  Oncology History   Ovarian cancer ([facility] [address] )   11/9/2018 Initial Diagnosis    Ovarian cancer ([facility] [address] )   tumor marker 194 5     11/9/2018 Biopsy    CT-guided needle biopsy of abdominal mass 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  Given liver involvement this would be stage IV     11/14/2018 - 12/4/2018 Chemotherapy    Neoadjuvant therapy: carboplatin area under the curve of 6, paclitaxel 135 milligrams/meter squared, Avastin 10 milligrams/kilogram  Completed 1 of 3 cycles  12/14/2018 Surgery    LAPAROTOMY EXPLORATORY; Abdominal Washout; Application of Abthera Vac Dressing for suspected bowel perforation and septic shock        12/15/2018 Surgery    LAPAROTOMY EXPLORATORY, ABDOMINAL WASHOUT, DRAIN PLACEMENT x 4, DIVERTING LOOP ILEOSTOMY AND ABDOMINAL CLOSURE for bowel perforation     3/26/2019 -  Chemotherapy    Taxol weekly 80 mg/m2 for 3 consecutive weeks, may add carboplatin in the future with AUC of 5      3/26/2019 - 2/3/2020 Chemotherapy    palonosetron (ALOXI) injection 0 25 mg, 0 25 mg, Intravenous, Once, 6 of 6 cycles  Administration: 0 25 mg (5/28/2019), 0 25 mg (6/25/2019), 0 25 mg (11/6/2019), 0 25 mg (12/10/2019), 0 25 mg (1/7/2020)  fosaprepitant (EMEND) 150 mg in sodium chloride 0 9 % 255 mL IVPB, 150 mg, Intravenous, Once, 6 of 6 cycles  Administration: 150 mg (5/28/2019), 150 mg (6/25/2019), 150 mg (11/6/2019), 150 mg (12/10/2019), 150 mg (1/7/2020)  CARBOplatin (PARAPLATIN) in sodium chloride 0 9 % 250 mL IVPB,  (original dose ), Intravenous, Once, 6 of 6 cycles  Dose modification:   (Cycle 2),   (original dose 258 4 mg, Cycle 3),   (original dose 258 4 mg, Cycle 3),   (original dose 244 4 mg, Cycle 4)  Administration: 258 4 mg (5/28/2019), 244 4 mg (6/25/2019), 285 2 mg (11/6/2019), 296 4 mg (12/10/2019), 286 4 mg (1/7/2020)  PACLitaxel (TAXOL) 127 8 mg in sodium chloride 0 9 % 250 mL chemo IVPB, 80 mg/m2, Intravenous, Once, 7 of 7 cycles  Dose modification: 65 mg/m2 (original dose 80 mg/m2, Cycle 2, Reason: Dose Not Tolerated)  Administration: 103 8 mg (5/14/2019), 108 6 mg (5/28/2019), 108 6 mg (6/4/2019), 108 6 mg (6/11/2019), 109 8 mg (6/25/2019), 109 8 mg (7/2/2019), 109 8 mg (7/9/2019), 111 6 mg (11/6/2019), 111 6 mg (11/12/2019), 111 6 mg (11/19/2019), 113 4 mg (12/10/2019), 113 4 mg (12/17/2019), 113 4 mg (12/23/2019), 113 4 mg (1/7/2020), 114 6 mg (1/14/2020), 114 6 mg (1/21/2020)     4/29/2019 Genomic Testing     Foundation 1 testing revealed a PD L1 tumor proportions score of 0%  The patient is not a candidate for PD L1 manipulation      Genetic Testing    [employer] Breast/Gyn panel, wildtype  9/10/2019 Surgery    Exploratory laparotomy radical omentectomy, posterior exenteration, takedown of ileostomy and end colostomy for complete debulking of visible tumor      Final pathology report revealed high-grade serous malignancy in both the omentum and the pelvic mass     9/25/2019 -  Chemotherapy    Carboplatin area under the curve of 5+ weekly paclitaxel at 80 milligrams/meters squared for 3 further cycles of treatment followed by consolidation this is to begin mid October     10/2019 Surgery    Interval debulking with TAHBSO revision of colostomy omentectomy and tumor debulking with complete debulking  Several weeks postoperatively the patient required a open cholecystectomy       10/2019 - 12/2019 Chemotherapy    Carboplatin paclitaxel x3     10/9/2019 -  Cancer Staged    Staging form: Ovary, Fallopian Tube, Primary Peritoneal, AJCC 8th Edition  - Pathologic stage from 10/9/2019: FIGO Stage IIIC (pT3c, pN1, cM0) - Signed by Reji Alvarez MD on 10/9/2019  Histologic grade (G): G3  Histologic grading system: 4 grade system  Gross residual tumor after primary cyto-reductive surgery: Absent       3/24/2020 - 9/21/2020 Chemotherapy    palonosetron (ALOXI) injection 0 25 mg, 0 25 mg, Intravenous, Once, 6 of 7 cycles  Administration: 0 25 mg (3/24/2020), 0 25 mg (4/21/2020), 0 25 mg (5/19/2020), 0 25 mg (6/23/2020), 0 25 mg (7/28/2020), 0 25 mg (8/25/2020)  pegfilgrastim (NEULASTA ONPRO) subcutaneous injection kit 6 mg, 6 mg, Subcutaneous, Once, 6 of 7 cycles  Administration: 6 mg (3/31/2020), 6 mg (4/28/2020), 6 mg (5/26/2020), 6 mg (6/29/2020), 6 mg (8/4/2020), 6 mg (9/1/2020)  DOXOrubicin liposome (DOXIL) 54 3 mg in dextrose 5 % 250 mL chemo infusion, 30 mg/m2 = 54 3 mg, Intravenous, Once, 6 of 7 cycles  Administration: 54 3 mg (3/24/2020), 54 6 mg (4/21/2020), 54 9 mg (5/19/2020), 54 6 mg (6/23/2020), 54 9 mg (7/28/2020), 54 9 mg (8/25/2020)  gemcitabine (GEMZAR) 1,176 4 mg in sodium chloride 0 9 % 250 mL infusion, 650 mg/m2 = 1,176 4 mg, Intravenous, Once, 6 of 7 cycles  Administration: 1,176 4 mg (3/24/2020), 1,176 4 mg (3/31/2020), 1,182 9 mg (4/21/2020), 1,200 mg (4/28/2020), 1,189 4 mg (5/19/2020), 1,189 4 mg (5/26/2020), 1,182 9 mg (6/23/2020), 1,182 9 mg (6/29/2020), 1,189 4 mg (7/28/2020), 1,189 4 mg (8/4/2020), 1,189 4 mg (8/25/2020), 1,195 8 mg (9/1/2020)     1/12/2021 - 1/12/2021 Chemotherapy    palonosetron (ALOXI) injection 0 25 mg, 0 25 mg, Intravenous, Once, 0 of 6 cycles  fosaprepitant (EMEND) 150 mg in sodium chloride 0 9 % 250 mL IVPB, 150 mg, Intravenous, Once, 0 of 6 cycles  CARBOplatin (PARAPLATIN) in sodium chloride 0 9 % 250 mL IVPB, , Intravenous, Once, 0 of 6 cycles  PACLitaxel (TAXOL) 246 mg in sodium chloride 0 9 % 500 mL chemo IVPB, 135 mg/m2 = 246 mg (77 1 % of original dose 175 mg/m2), Intravenous, Once, 0 of 6 cycles  Dose modification: 135 mg/m2 (original dose 175 mg/m2, Cycle 1, Reason: Other (See Comments), Comment: heavily pretreated)     1/12/2021 -  Chemotherapy    palonosetron (ALOXI) injection 0 25 mg, 0 25 mg, Intravenous, Once, 3 of 3 cycles  Administration: 0 25 mg (1/12/2021), 0 25 mg (3/2/2021), 0 25 mg (3/23/2021)  fosaprepitant (EMEND) 150 mg in sodium chloride 0 9 % 250 mL IVPB, 150 mg, Intravenous, Once, 3 of 3 cycles  Administration: 150 mg (1/12/2021), 150 mg (3/2/2021), 150 mg (3/23/2021)  CARBOplatin (PARAPLATIN) 311 mg in sodium chloride 0 9 % 250 mL IVPB, 311 mg, Intravenous, Once, 3 of 3 cycles  Administration: 311 mg (1/12/2021), 330 mg (3/2/2021), 338 mg (3/23/2021)  PACLitaxel (TAXOL) 91 2 mg in sodium chloride 0 9 % 250 mL chemo IVPB, 50 mg/m2 = 91 2 mg (62 5 % of original dose 80 mg/m2), Intravenous, Once, 3 of 4 cycles  Dose modification: 50 mg/m2 (original dose 80 mg/m2, Cycle 1, Reason: Other (See Comments))  Administration: 91 2 mg (1/12/2021), 90 6 mg (1/19/2021), 89 4 mg (3/2/2021), 91 2 mg (3/9/2021), 91 2 mg (3/16/2021), 90 6 mg (3/23/2021), 90 6 mg (3/30/2021), 90 6 mg (4/6/2021)           Patient ID: Dale Jordan is a 69 y o  female    Patient is very pleasant 23-year-old female with history of stage IV B ovarian carcinoma  She underwent multiple cycles of treatment and is presently on the 2nd  courseof Carboplatinum paclitaxel  The patient was seen late last week in anticipation of starting cycle 4 of treatment however her CT scan was not back at that time  Her  had dropped in half  The patient was feeling reasonable  Patient's CT scan recurred with multiple sites of progression of disease but no new disease  The patient presents now for consideration of changing chemotherapy  we have discussed treatment options  The patient with platinum refractory disease has few options however at this point she is doing well  The most aggressive option for platinum resistant disease which is affective would include Keytruda, a Avastin and  Metronomic cyclophosphamide  The patient has however had a significant bowel perforation with use of Avastin in the past   The patient presents today for consideration of treatment options      The following portions of the patient's history were reviewed and updated as appropriate: allergies, current medications, past family history, past social history, past surgical history and problem list     Review of Systems   Constitutional: Negative  HENT: Negative  Eyes: Negative  Respiratory: Negative  Cardiovascular: Negative  Gastrointestinal: Negative  Endocrine: Negative  Genitourinary: Negative  Musculoskeletal: Negative  Skin: Negative  Neurological: Negative  Hematological: Negative  Psychiatric/Behavioral: Negative  Current Outpatient Medications   Medication Sig Dispense Refill    aspirin-acetaminophen-caffeine (EXCEDRIN MIGRAINE) 250-250-65 MG per tablet Take 1 tablet by mouth every 6 (six) hours as needed for headaches      nystatin (MYCOSTATIN) powder Apply topically 3 (three) times a day 15 g 4    pantoprazole (PROTONIX) 40 mg tablet Take 1 tablet (40 mg total) by mouth 2 (two) times a day before meals  0    traMADol (ULTRAM) 50 mg tablet Take 1 tablet (50 mg total) by mouth every 6 (six) hours as needed for moderate pain 30 tablet 1    gabapentin (NEURONTIN) 300 mg capsule Take 1 capsule (300 mg total) by mouth 3 (three) times a day 90 capsule 0    potassium chloride (K-DUR,KLOR-CON) 20 mEq tablet Take 1 tablet (20 mEq total) by mouth daily 30 tablet 0     No current facility-administered medications for this visit  Objective:    Blood pressure 100/62, pulse 89, temperature 99 2 °F (37 3 °C), resp  rate 18, height 5' 4 17" (1 63 m), weight 74 4 kg (164 lb), not currently breastfeeding  Body mass index is 28 kg/m²  Body surface area is 1 8 meters squared  Physical Exam  Constitutional:       Appearance: She is well-developed  HENT:      Head: Normocephalic and atraumatic  Neck:      Musculoskeletal: Normal range of motion and neck supple  Thyroid: No thyromegaly  Cardiovascular:      Rate and Rhythm: Normal rate and regular rhythm  Heart sounds: Normal heart sounds  Pulmonary:      Effort: Pulmonary effort is normal       Breath sounds: Normal breath sounds  Abdominal:      General: Bowel sounds are normal       Palpations: Abdomen is soft  Comments: Well healed laparoscopic incisions  Genitourinary:     Comments: -Normal external female genitalia, normal [place]'s [place]'s glands                  -Normal midline urethral meatus  No lesions notes                  -Bladder without fullness mass or tenderness                  -Vagina without lesion or discharge No significant cystocele or rectocele noted                  -Cervix surgically absent                  -Uterus surgically absent                  -Adnexae surgically absent                  - Anus without fissure of lesion    Musculoskeletal: Normal range of motion  Lymphadenopathy:      Cervical: No cervical adenopathy  Skin:     General: Skin is warm and dry  Neurological:      Mental Status: She is alert and oriented to person, place, and time     Psychiatric:         Behavior: Behavior normal          Lab Results   Component Value Date     60 8 (H) 04/02/2021     Lab Results   Component Value Date    K 4 4 04/09/2021     04/09/2021    CO2 25 04/09/2021    BUN 20 04/09/2021    CREATININE 1 12 04/09/2021    GLUCOSE 228 (H) 09/10/2019    GLUF 127 (H) 04/09/2021    CALCIUM 8 5 04/09/2021    CORRECTEDCA 9 1 03/19/2021    AST 23 04/09/2021    ALT 26 04/09/2021    ALKPHOS 103 04/09/2021    EGFR 50 04/09/2021     Lab Results   Component Value Date    WBC 4 21 (L) 04/09/2021    HGB 8 6 (L) 04/09/2021    HCT 27 3 (L) 04/09/2021    MCV 91 04/09/2021     04/09/2021     Lab Results   Component Value Date    NEUTROABS 2 22 04/09/2021        Trend:  Lab Results   Component Value Date     60 8 (H) 04/02/2021     110 8 (H) 03/12/2021     101 9 (H) 03/02/2021     75 8 (H) 02/12/2021     85 7 (H) 01/22/2021     102 0 (H) 12/05/2020     77 1 (H) 11/30/2020     45 7 (H) 10/16/2020     39 6 (H) 09/11/2020     44 9 (H) 09/01/2020     29 2 06/15/2020     27 4 06/10/2020     32 5 (H) 05/26/2020     28 6 05/15/2020     30 6 (H) 05/11/2020     36 5 (H) 04/27/2020     37 9 (H) 04/17/2020     42 5 (H) 04/10/2020     48 1 (H) 03/27/2020     40 9 (H) 03/20/2020     18 2 01/28/2020     17 5 12/31/2019     14 5 12/02/2019     22 0 11/19/2019     32 6 (H) 11/06/2019     19 1 08/30/2019     12 7 08/13/2019     13 9 06/18/2019     19 9 05/21/2019     79 5 (H) 04/16/2019     96 4 (H) 03/19/2019     80 4 (H) 03/08/2019     135 0 (H) 11/30/2018     194 5 (H) 10/18/2018

## 2021-04-19 NOTE — TELEPHONE ENCOUNTER
Patient wants a call back asap  She has several questions about her infusion treatment tomorrow   She can be reached at [phone]

## 2021-04-21 NOTE — LETTER
April 21, 2021     Isacc Gao, [phone] [facility] [address]    Patient: Marito Sam   YOB: 1950   Date of Visit: 4/21/2021       Dear Dr Ji Contreras:    Thank you for referring Marito Sam to me for evaluation  Below are my notes for this consultation  If you have questions, please do not hesitate to call me  I look forward to following your patient along with you  Sincerely,        Antonio Palm MD        CC: MD Antonio Mcdonald MD  4/21/2021  2:28 PM  Incomplete  Assessment/Plan:    Problem List Items Addressed This Visit        Endocrine    Ovarian cancer [facility]) - Primary       Patient is very pleasant 60-year-old female with history of recurrent metastatic stage IV  Ovarian cancer  We have had a long discussion regarding the patient's present chemotherapy use  As her CT scan revealed progression and her  is now down there is some question as to whether the treatment is effective or not  We have discussed this   Present treatment  Of carboplatin plus weekly paclitaxel verses a new regimen of oral cyclophosphamide IV Keytruda and IV Avastin and its risks especially in someone who has previously had a bowel perforation  the patient would like to stick with the present regimen for at least 1 more cycle  I agree given her  coming down  We will arrange for this as soon as possible  Pre chemo blood work was reasonable last week                   CHIEF COMPLAINT: Consideration of chemotherapy change for recurrent stage IV ovarian cancer      Problem:  Cancer Staging  Ovarian cancer [facility])  Staging form: Ovary, Fallopian Tube, Primary Peritoneal, AJCC 8th Edition  - Clinical stage from 11/14/2018: FIGO Stage IVB (cT3c, cN0, pM1b) - Signed by Antonio Palm MD on 11/14/2018  - Pathologic stage from 10/9/2019: FIGO Stage IIIC (pT3c, pN1, cM0) - Signed by Antonio Palm MD on 10/9/2019        Previous therapy:  Oncology History   Ovarian cancer ([facility] [address] )   11/9/2018 Initial Diagnosis    Ovarian cancer ([facility] [address] )   tumor marker 194 5     11/9/2018 Biopsy    CT-guided needle biopsy of abdominal mass 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  Given liver involvement this would be stage IV     11/14/2018 - 12/4/2018 Chemotherapy    Neoadjuvant therapy: carboplatin area under the curve of 6, paclitaxel 135 milligrams/meter squared, Avastin 10 milligrams/kilogram  Completed 1 of 3 cycles  12/14/2018 Surgery    LAPAROTOMY EXPLORATORY; Abdominal Washout; Application of Abthera Vac Dressing for suspected bowel perforation and septic shock        12/15/2018 Surgery    LAPAROTOMY EXPLORATORY, ABDOMINAL WASHOUT, DRAIN PLACEMENT x 4, DIVERTING LOOP ILEOSTOMY AND ABDOMINAL CLOSURE for bowel perforation     3/26/2019 -  Chemotherapy    Taxol weekly 80 mg/m2 for 3 consecutive weeks, may add carboplatin in the future with AUC of 5      3/26/2019 - 2/3/2020 Chemotherapy    palonosetron (ALOXI) injection 0 25 mg, 0 25 mg, Intravenous, Once, 6 of 6 cycles  Administration: 0 25 mg (5/28/2019), 0 25 mg (6/25/2019), 0 25 mg (11/6/2019), 0 25 mg (12/10/2019), 0 25 mg (1/7/2020)  fosaprepitant (EMEND) 150 mg in sodium chloride 0 9 % 255 mL IVPB, 150 mg, Intravenous, Once, 6 of 6 cycles  Administration: 150 mg (5/28/2019), 150 mg (6/25/2019), 150 mg (11/6/2019), 150 mg (12/10/2019), 150 mg (1/7/2020)  CARBOplatin (PARAPLATIN) in sodium chloride 0 9 % 250 mL IVPB,  (original dose ), Intravenous, Once, 6 of 6 cycles  Dose modification:   (Cycle 2),   (original dose 258 4 mg, Cycle 3),   (original dose 258 4 mg, Cycle 3),   (original dose 244 4 mg, Cycle 4)  Administration: 258 4 mg (5/28/2019), 244 4 mg (6/25/2019), 285 2 mg (11/6/2019), 296 4 mg (12/10/2019), 286 4 mg (1/7/2020)  PACLitaxel (TAXOL) 127 8 mg in sodium chloride 0 9 % 250 mL chemo IVPB, 80 mg/m2, Intravenous, Once, 7 of 7 cycles  Dose modification: 65 mg/m2 (original dose 80 mg/m2, Cycle 2, Reason: Dose Not Tolerated)  Administration: 103 8 mg (5/14/2019), 108 6 mg (5/28/2019), 108 6 mg (6/4/2019), 108 6 mg (6/11/2019), 109 8 mg (6/25/2019), 109 8 mg (7/2/2019), 109 8 mg (7/9/2019), 111 6 mg (11/6/2019), 111 6 mg (11/12/2019), 111 6 mg (11/19/2019), 113 4 mg (12/10/2019), 113 4 mg (12/17/2019), 113 4 mg (12/23/2019), 113 4 mg (1/7/2020), 114 6 mg (1/14/2020), 114 6 mg (1/21/2020)     4/29/2019 Genomic Testing     [employer] [facility] 1 testing revealed a PD L1 tumor proportions score of 0%  The patient is not a candidate for PD L1 manipulation      Genetic Testing    [employer] Breast/Gyn panel, wildtype  9/10/2019 Surgery    Exploratory laparotomy radical omentectomy, posterior exenteration, takedown of ileostomy and end colostomy for complete debulking of visible tumor  Final pathology report revealed high-grade serous malignancy in both the omentum and the pelvic mass     9/25/2019 -  Chemotherapy    Carboplatin area under the curve of 5+ weekly paclitaxel at 80 milligrams/meters squared for 3 further cycles of treatment followed by consolidation this is to begin mid October     10/2019 Surgery    Interval debulking with TAHBSO revision of colostomy omentectomy and tumor debulking with complete debulking  Several weeks postoperatively the patient required a open cholecystectomy       10/2019 - 12/2019 Chemotherapy    Carboplatin paclitaxel x3     10/9/2019 -  Cancer Staged    Staging form: Ovary, Fallopian Tube, Primary Peritoneal, AJCC 8th Edition  - Pathologic stage from 10/9/2019: FIGO Stage IIIC (pT3c, pN1, cM0) - Signed by Harper Mathew MD on 10/9/2019  Histologic grade (G): G3  Histologic grading system: 4 grade system  Gross residual tumor after primary cyto-reductive surgery: Absent       3/24/2020 - 9/21/2020 Chemotherapy    palonosetron (ALOXI) injection 0 25 mg, 0 25 mg, Intravenous, Once, 6 of 7 cycles  Administration: 0 25 mg (3/24/2020), 0 25 mg (4/21/2020), 0 25 mg (5/19/2020), 0 25 mg (6/23/2020), 0 25 mg (7/28/2020), 0 25 mg (8/25/2020)  pegfilgrastim (NEULASTA ONPRO) subcutaneous injection kit 6 mg, 6 mg, Subcutaneous, Once, 6 of 7 cycles  Administration: 6 mg (3/31/2020), 6 mg (4/28/2020), 6 mg (5/26/2020), 6 mg (6/29/2020), 6 mg (8/4/2020), 6 mg (9/1/2020)  DOXOrubicin liposome (DOXIL) 54 3 mg in dextrose 5 % 250 mL chemo infusion, 30 mg/m2 = 54 3 mg, Intravenous, Once, 6 of 7 cycles  Administration: 54 3 mg (3/24/2020), 54 6 mg (4/21/2020), 54 9 mg (5/19/2020), 54 6 mg (6/23/2020), 54 9 mg (7/28/2020), 54 9 mg (8/25/2020)  gemcitabine (GEMZAR) 1,176 4 mg in sodium chloride 0 9 % 250 mL infusion, 650 mg/m2 = 1,176 4 mg, Intravenous, Once, 6 of 7 cycles  Administration: 1,176 4 mg (3/24/2020), 1,176 4 mg (3/31/2020), 1,182 9 mg (4/21/2020), 1,200 mg (4/28/2020), 1,189 4 mg (5/19/2020), 1,189 4 mg (5/26/2020), 1,182 9 mg (6/23/2020), 1,182 9 mg (6/29/2020), 1,189 4 mg (7/28/2020), 1,189 4 mg (8/4/2020), 1,189 4 mg (8/25/2020), 1,195 8 mg (9/1/2020)     1/12/2021 - 1/12/2021 Chemotherapy    palonosetron (ALOXI) injection 0 25 mg, 0 25 mg, Intravenous, Once, 0 of 6 cycles  fosaprepitant (EMEND) 150 mg in sodium chloride 0 9 % 250 mL IVPB, 150 mg, Intravenous, Once, 0 of 6 cycles  CARBOplatin (PARAPLATIN) in sodium chloride 0 9 % 250 mL IVPB, , Intravenous, Once, 0 of 6 cycles  PACLitaxel (TAXOL) 246 mg in sodium chloride 0 9 % 500 mL chemo IVPB, 135 mg/m2 = 246 mg (77 1 % of original dose 175 mg/m2), Intravenous, Once, 0 of 6 cycles  Dose modification: 135 mg/m2 (original dose 175 mg/m2, Cycle 1, Reason: Other (See Comments), Comment: heavily pretreated)     1/12/2021 -  Chemotherapy    palonosetron (ALOXI) injection 0 25 mg, 0 25 mg, Intravenous, Once, 3 of 3 cycles  Administration: 0 25 mg (1/12/2021), 0 25 mg (3/2/2021), 0 25 mg (3/23/2021)  fosaprepitant (EMEND) 150 mg in sodium chloride 0 9 % 250 mL IVPB, 150 mg, Intravenous, Once, 3 of 3 cycles  Administration: 150 mg (1/12/2021), 150 mg (3/2/2021), 150 mg (3/23/2021)  CARBOplatin (PARAPLATIN) 311 mg in sodium chloride 0 9 % 250 mL IVPB, 311 mg, Intravenous, Once, 3 of 3 cycles  Administration: 311 mg (1/12/2021), 330 mg (3/2/2021), 338 mg (3/23/2021)  PACLitaxel (TAXOL) 91 2 mg in sodium chloride 0 9 % 250 mL chemo IVPB, 50 mg/m2 = 91 2 mg (62 5 % of original dose 80 mg/m2), Intravenous, Once, 3 of 4 cycles  Dose modification: 50 mg/m2 (original dose 80 mg/m2, Cycle 1, Reason: Other (See Comments))  Administration: 91 2 mg (1/12/2021), 90 6 mg (1/19/2021), 89 4 mg (3/2/2021), 91 2 mg (3/9/2021), 91 2 mg (3/16/2021), 90 6 mg (3/23/2021), 90 6 mg (3/30/2021), 90 6 mg (4/6/2021)           Patient ID: Karolina Mendez is a 69 y o  female   Patient is very pleasant 65-year-old female with a history of stage IV ovarian cancer  She has undergone numerous chemotherapy agents she is presently undergone 3 cycles of carboplatin paclitaxel on a weekly basis  She has had a CT scan after her 3rd cycle which shows progression however her  has dropped in half and stayed stable there  The patient does have symptoms of intermittent bowel obstruction which has been long standing for her  Since her last visit I have explored the idea of treating her again with a Avastin despite her bowel perforation 1st time in conjunction with p o  cyclophosphamide and Keytruda  I have contacted [facility] who state that this is a reasonable thing to try I have provided the patient does not have a significant tumor burden around the bowel  The patient and her daughter remain somewhat uncomfortable at this due to the increased risk  I believe that  We have discussed continuing her present chemotherapy regimen as there may be simply be a delay in response    We have spent some time read dressing these issues with side effects and the patient and her family would like to continue with this present regimen  The following portions of the patient's history were reviewed and updated as appropriate: allergies, current medications, past family history, past social history, past surgical history and problem list     Review of Systems   Constitutional: Negative  HENT: Negative  Eyes: Negative  Respiratory: Negative  Cardiovascular: Negative  Gastrointestinal: Negative  Endocrine: Negative  Genitourinary: Negative  Musculoskeletal: Negative  Skin: Negative  Neurological: Negative  Hematological: Negative  Psychiatric/Behavioral: Negative  Current Outpatient Medications   Medication Sig Dispense Refill    aspirin-acetaminophen-caffeine (EXCEDRIN MIGRAINE) 250-250-65 MG per tablet Take 1 tablet by mouth every 6 (six) hours as needed for headaches      nystatin (MYCOSTATIN) powder Apply topically 3 (three) times a day 15 g 4    pantoprazole (PROTONIX) 40 mg tablet Take 1 tablet (40 mg total) by mouth 2 (two) times a day before meals  0    traMADol (ULTRAM) 50 mg tablet Take 1 tablet (50 mg total) by mouth every 6 (six) hours as needed for moderate pain 30 tablet 1    gabapentin (NEURONTIN) 300 mg capsule Take 1 capsule (300 mg total) by mouth 3 (three) times a day 90 capsule 0    potassium chloride (K-DUR,KLOR-CON) 20 mEq tablet Take 1 tablet (20 mEq total) by mouth daily 30 tablet 0     No current facility-administered medications for this visit  Objective:    Blood pressure 130/80, pulse 85, temperature 98 1 °F (36 7 °C), resp  rate 18, height 5' 4 17" (1 63 m), weight 74 4 kg (164 lb), not currently breastfeeding  Body mass index is 28 kg/m²  Body surface area is 1 8 meters squared  Physical Exam  Constitutional:       Appearance: She is well-developed  HENT:      Head: Normocephalic and atraumatic  Neck:      Musculoskeletal: Normal range of motion and neck supple  Thyroid: No thyromegaly     Cardiovascular:      Rate and Rhythm: Normal rate and regular rhythm  Heart sounds: Normal heart sounds  Pulmonary:      Effort: Pulmonary effort is normal       Breath sounds: Normal breath sounds  Abdominal:      General: Bowel sounds are normal       Palpations: Abdomen is soft  Comments: Well healed laparoscopic incisions  Genitourinary:     Comments: Deferred  Musculoskeletal: Normal range of motion  Lymphadenopathy:      Cervical: No cervical adenopathy  Skin:     General: Skin is warm and dry  Neurological:      Mental Status: She is alert and oriented to person, place, and time     Psychiatric:         Behavior: Behavior normal          Lab Results   Component Value Date     62 8 (H) 04/16/2021     Lab Results   Component Value Date    K 3 7 04/16/2021     04/16/2021    CO2 24 04/16/2021    BUN 24 04/16/2021    CREATININE 1 55 (H) 04/16/2021    GLUCOSE 228 (H) 09/10/2019    GLUF 153 (H) 04/16/2021    CALCIUM 8 7 04/16/2021    CORRECTEDCA 9 1 03/19/2021    AST 21 04/16/2021    ALT 24 04/16/2021    ALKPHOS 111 04/16/2021    EGFR 33 04/16/2021     Lab Results   Component Value Date    WBC 5 49 04/16/2021    HGB 8 5 (L) 04/16/2021    HCT 27 2 (L) 04/16/2021    MCV 91 04/16/2021     04/16/2021     Lab Results   Component Value Date    NEUTROABS 2 75 04/16/2021        Trend:  Lab Results   Component Value Date     62 8 (H) 04/16/2021     60 8 (H) 04/02/2021     110 8 (H) 03/12/2021     101 9 (H) 03/02/2021     75 8 (H) 02/12/2021     85 7 (H) 01/22/2021     102 0 (H) 12/05/2020     77 1 (H) 11/30/2020     45 7 (H) 10/16/2020     39 6 (H) 09/11/2020     44 9 (H) 09/01/2020     29 2 06/15/2020     27 4 06/10/2020     32 5 (H) 05/26/2020     28 6 05/15/2020     30 6 (H) 05/11/2020     36 5 (H) 04/27/2020     37 9 (H) 04/17/2020     42 5 (H) 04/10/2020     48 1 (H) 03/27/2020     40 9 (H) 03/20/2020     18 2 01/28/2020     17 5 12/31/2019     14 5 12/02/2019     22 0 11/19/2019     32 6 (H) 11/06/2019     19 1 08/30/2019     12 7 08/13/2019     13 9 06/18/2019     19 9 05/21/2019     79 5 (H) 04/16/2019     96 4 (H) 03/19/2019     80 4 (H) 03/08/2019     135 0 (H) 11/30/2018     194 5 (H) 10/18/2018                  Cedric De Souza MD  4/21/2021  2:07 PM  Incomplete  Assessment/Plan:    {Assess/PlanSmartLinks:21373}      CHIEF COMPLAINT:       Problem:  Cancer Staging  Ovarian cancer [facility])  Staging form: Ovary, Fallopian Tube, Primary Peritoneal, AJCC 8th Edition  - Clinical stage from 11/14/2018: FIGO Stage IVB (cT3c, cN0, pM1b) - Signed by Cedric De Souza MD on 11/14/2018  - Pathologic stage from 10/9/2019: FIGO Stage IIIC (pT3c, pN1, cM0) - Signed by Cedric De Souza MD on 10/9/2019        Previous therapy:  Oncology History   Ovarian cancer ([facility] [address] )   11/9/2018 Initial Diagnosis    Ovarian cancer ([facility] [address] )   tumor marker 194 5     11/9/2018 Biopsy    CT-guided needle biopsy of abdominal mass 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  Given liver involvement this would be stage IV     11/14/2018 - 12/4/2018 Chemotherapy    Neoadjuvant therapy: carboplatin area under the curve of 6, paclitaxel 135 milligrams/meter squared, Avastin 10 milligrams/kilogram  Completed 1 of 3 cycles  12/14/2018 Surgery    LAPAROTOMY EXPLORATORY; Abdominal Washout; Application of Abthera Vac Dressing for suspected bowel perforation and septic shock        12/15/2018 Surgery    LAPAROTOMY EXPLORATORY, ABDOMINAL WASHOUT, DRAIN PLACEMENT x 4, DIVERTING LOOP ILEOSTOMY AND ABDOMINAL CLOSURE for bowel perforation     3/26/2019 -  Chemotherapy    Taxol weekly 80 mg/m2 for 3 consecutive weeks, may add carboplatin in the future with AUC of 5      3/26/2019 - 2/3/2020 Chemotherapy    palonosetron (ALOXI) injection 0 25 mg, 0 25 mg, Intravenous, Once, 6 of 6 cycles  Administration: 0 25 mg (5/28/2019), 0 25 mg (6/25/2019), 0 25 mg (11/6/2019), 0 25 mg (12/10/2019), 0 25 mg (1/7/2020)  fosaprepitant (EMEND) 150 mg in sodium chloride 0 9 % 255 mL IVPB, 150 mg, Intravenous, Once, 6 of 6 cycles  Administration: 150 mg (5/28/2019), 150 mg (6/25/2019), 150 mg (11/6/2019), 150 mg (12/10/2019), 150 mg (1/7/2020)  CARBOplatin (PARAPLATIN) in sodium chloride 0 9 % 250 mL IVPB,  (original dose ), Intravenous, Once, 6 of 6 cycles  Dose modification:   (Cycle 2),   (original dose 258 4 mg, Cycle 3),   (original dose 258 4 mg, Cycle 3),   (original dose 244 4 mg, Cycle 4)  Administration: 258 4 mg (5/28/2019), 244 4 mg (6/25/2019), 285 2 mg (11/6/2019), 296 4 mg (12/10/2019), 286 4 mg (1/7/2020)  PACLitaxel (TAXOL) 127 8 mg in sodium chloride 0 9 % 250 mL chemo IVPB, 80 mg/m2, Intravenous, Once, 7 of 7 cycles  Dose modification: 65 mg/m2 (original dose 80 mg/m2, Cycle 2, Reason: Dose Not Tolerated)  Administration: 103 8 mg (5/14/2019), 108 6 mg (5/28/2019), 108 6 mg (6/4/2019), 108 6 mg (6/11/2019), 109 8 mg (6/25/2019), 109 8 mg (7/2/2019), 109 8 mg (7/9/2019), 111 6 mg (11/6/2019), 111 6 mg (11/12/2019), 111 6 mg (11/19/2019), 113 4 mg (12/10/2019), 113 4 mg (12/17/2019), 113 4 mg (12/23/2019), 113 4 mg (1/7/2020), 114 6 mg (1/14/2020), 114 6 mg (1/21/2020)     4/29/2019 Genomic Testing     [facility] 1 testing revealed a PD L1 tumor proportions score of 0%  The patient is not a candidate for PD L1 manipulation      Genetic Testing    [employer] Breast/Gyn panel, wildtype  9/10/2019 Surgery    Exploratory laparotomy radical omentectomy, posterior exenteration, takedown of ileostomy and end colostomy for complete debulking of visible tumor      Final pathology report revealed high-grade serous malignancy in both the omentum and the pelvic mass     9/25/2019 -  Chemotherapy    Carboplatin area under the curve of 5+ weekly paclitaxel at 80 milligrams/meters squared for 3 further cycles of treatment followed by consolidation this is to begin mid October     10/2019 Surgery    Interval debulking with TAHBSO revision of colostomy omentectomy and tumor debulking with complete debulking  Several weeks postoperatively the patient required a open cholecystectomy       10/2019 - 12/2019 Chemotherapy    Carboplatin paclitaxel x3     10/9/2019 -  Cancer Staged    Staging form: Ovary, Fallopian Tube, Primary Peritoneal, AJCC 8th Edition  - Pathologic stage from 10/9/2019: FIGO Stage IIIC (pT3c, pN1, cM0) - Signed by Pili Pendleton MD on 10/9/2019  Histologic grade (G): G3  Histologic grading system: 4 grade system  Gross residual tumor after primary cyto-reductive surgery: Absent       3/24/2020 - 9/21/2020 Chemotherapy    palonosetron (ALOXI) injection 0 25 mg, 0 25 mg, Intravenous, Once, 6 of 7 cycles  Administration: 0 25 mg (3/24/2020), 0 25 mg (4/21/2020), 0 25 mg (5/19/2020), 0 25 mg (6/23/2020), 0 25 mg (7/28/2020), 0 25 mg (8/25/2020)  pegfilgrastim (NEULASTA ONPRO) subcutaneous injection kit 6 mg, 6 mg, Subcutaneous, Once, 6 of 7 cycles  Administration: 6 mg (3/31/2020), 6 mg (4/28/2020), 6 mg (5/26/2020), 6 mg (6/29/2020), 6 mg (8/4/2020), 6 mg (9/1/2020)  DOXOrubicin liposome (DOXIL) 54 3 mg in dextrose 5 % 250 mL chemo infusion, 30 mg/m2 = 54 3 mg, Intravenous, Once, 6 of 7 cycles  Administration: 54 3 mg (3/24/2020), 54 6 mg (4/21/2020), 54 9 mg (5/19/2020), 54 6 mg (6/23/2020), 54 9 mg (7/28/2020), 54 9 mg (8/25/2020)  gemcitabine (GEMZAR) 1,176 4 mg in sodium chloride 0 9 % 250 mL infusion, 650 mg/m2 = 1,176 4 mg, Intravenous, Once, 6 of 7 cycles  Administration: 1,176 4 mg (3/24/2020), 1,176 4 mg (3/31/2020), 1,182 9 mg (4/21/2020), 1,200 mg (4/28/2020), 1,189 4 mg (5/19/2020), 1,189 4 mg (5/26/2020), 1,182 9 mg (6/23/2020), 1,182 9 mg (6/29/2020), 1,189 4 mg (7/28/2020), 1,189 4 mg (8/4/2020), 1,189 4 mg (8/25/2020), 1,195 8 mg (9/1/2020)     1/12/2021 - 1/12/2021 Chemotherapy    palonosetron (ALOXI) injection 0 25 mg, 0 25 mg, Intravenous, Once, 0 of 6 cycles  fosaprepitant (EMEND) 150 mg in sodium chloride 0 9 % 250 mL IVPB, 150 mg, Intravenous, Once, 0 of 6 cycles  CARBOplatin (PARAPLATIN) in sodium chloride 0 9 % 250 mL IVPB, , Intravenous, Once, 0 of 6 cycles  PACLitaxel (TAXOL) 246 mg in sodium chloride 0 9 % 500 mL chemo IVPB, 135 mg/m2 = 246 mg (77 1 % of original dose 175 mg/m2), Intravenous, Once, 0 of 6 cycles  Dose modification: 135 mg/m2 (original dose 175 mg/m2, Cycle 1, Reason: Other (See Comments), Comment: heavily pretreated)     1/12/2021 -  Chemotherapy    palonosetron (ALOXI) injection 0 25 mg, 0 25 mg, Intravenous, Once, 3 of 3 cycles  Administration: 0 25 mg (1/12/2021), 0 25 mg (3/2/2021), 0 25 mg (3/23/2021)  fosaprepitant (EMEND) 150 mg in sodium chloride 0 9 % 250 mL IVPB, 150 mg, Intravenous, Once, 3 of 3 cycles  Administration: 150 mg (1/12/2021), 150 mg (3/2/2021), 150 mg (3/23/2021)  CARBOplatin (PARAPLATIN) 311 mg in sodium chloride 0 9 % 250 mL IVPB, 311 mg, Intravenous, Once, 3 of 3 cycles  Administration: 311 mg (1/12/2021), 330 mg (3/2/2021), 338 mg (3/23/2021)  PACLitaxel (TAXOL) 91 2 mg in sodium chloride 0 9 % 250 mL chemo IVPB, 50 mg/m2 = 91 2 mg (62 5 % of original dose 80 mg/m2), Intravenous, Once, 3 of 4 cycles  Dose modification: 50 mg/m2 (original dose 80 mg/m2, Cycle 1, Reason: Other (See Comments))  Administration: 91 2 mg (1/12/2021), 90 6 mg (1/19/2021), 89 4 mg (3/2/2021), 91 2 mg (3/9/2021), 91 2 mg (3/16/2021), 90 6 mg (3/23/2021), 90 6 mg (3/30/2021), 90 6 mg (4/6/2021)           Patient ID: Kusum Sethi is a 69 y o  female  HPI    {Common ambulatory SmartLinks:61739}    Review of Systems    Current Outpatient Medications   Medication Sig Dispense Refill    aspirin-acetaminophen-caffeine (EXCEDRIN MIGRAINE) 250-250-65 MG per tablet Take 1 tablet by mouth every 6 (six) hours as needed for headaches      nystatin (MYCOSTATIN) powder Apply topically 3 (three) times a day 15 g 4    pantoprazole (PROTONIX) 40 mg tablet Take 1 tablet (40 mg total) by mouth 2 (two) times a day before meals  0    traMADol (ULTRAM) 50 mg tablet Take 1 tablet (50 mg total) by mouth every 6 (six) hours as needed for moderate pain 30 tablet 1    gabapentin (NEURONTIN) 300 mg capsule Take 1 capsule (300 mg total) by mouth 3 (three) times a day 90 capsule 0    potassium chloride (K-DUR,KLOR-CON) 20 mEq tablet Take 1 tablet (20 mEq total) by mouth daily 30 tablet 0     No current facility-administered medications for this visit  Objective:    Resp  rate 18, height 5' 4 17" (1 63 m), weight 74 4 kg (164 lb), not currently breastfeeding  Body mass index is 28 kg/m²  Body surface area is 1 8 meters squared      Physical Exam    Lab Results   Component Value Date     62 8 (H) 04/16/2021     Lab Results   Component Value Date    K 3 7 04/16/2021     04/16/2021    CO2 24 04/16/2021    BUN 24 04/16/2021    CREATININE 1 55 (H) 04/16/2021    GLUCOSE 228 (H) 09/10/2019    GLUF 153 (H) 04/16/2021    CALCIUM 8 7 04/16/2021    CORRECTEDCA 9 1 03/19/2021    AST 21 04/16/2021    ALT 24 04/16/2021    ALKPHOS 111 04/16/2021    EGFR 33 04/16/2021     Lab Results   Component Value Date    WBC 5 49 04/16/2021    HGB 8 5 (L) 04/16/2021    HCT 27 2 (L) 04/16/2021    MCV 91 04/16/2021     04/16/2021     Lab Results   Component Value Date    NEUTROABS 2 75 04/16/2021        Trend:  Lab Results   Component Value Date     62 8 (H) 04/16/2021     60 8 (H) 04/02/2021     110 8 (H) 03/12/2021     101 9 (H) 03/02/2021     75 8 (H) 02/12/2021     85 7 (H) 01/22/2021     102 0 (H) 12/05/2020     77 1 (H) 11/30/2020     45 7 (H) 10/16/2020     39 6 (H) 09/11/2020     44 9 (H) 09/01/2020     29 2 06/15/2020     27 4 06/10/2020     32 5 (H) 05/26/2020     28 6 05/15/2020  30 6 (H) 05/11/2020     36 5 (H) 04/27/2020     37 9 (H) 04/17/2020     42 5 (H) 04/10/2020     48 1 (H) 03/27/2020     40 9 (H) 03/20/2020     18 2 01/28/2020     17 5 12/31/2019     14 5 12/02/2019     22 0 11/19/2019     32 6 (H) 11/06/2019     19 1 08/30/2019     12 7 08/13/2019     13 9 06/18/2019     19 9 05/21/2019     79 5 (H) 04/16/2019     96 4 (H) 03/19/2019     80 4 (H) 03/08/2019     135 0 (H) 11/30/2018     194 5 (H) 10/18/2018

## 2021-04-21 NOTE — LETTER
April 21, 2021     Yvette Gonzales, [phone] [facility] [address]    Patient: Armani Wagner   YOB: 1950   Date of Visit: 4/21/2021       Dear Dr Soni Loop:    Thank you for referring Armani Wagner to me for evaluation  Below are my notes for this consultation  If you have questions, please do not hesitate to call me  I look forward to following your patient along with you  Sincerely,        Harper Mathew MD        CC: MD Harper Fraire MD  4/21/2021  2:29 PM  Sign when Signing Visit  Assessment/Plan:    Problem List Items Addressed This Visit        Endocrine    Ovarian cancer [facility]) - Primary       Patient is very pleasant 24-year-old female with history of recurrent metastatic stage IV  Ovarian cancer  We have had a long discussion regarding the patient's present chemotherapy use  As her CT scan revealed progression and her  is now down there is some question as to whether the treatment is effective or not  We have discussed this   Present treatment  Of carboplatin plus weekly paclitaxel verses a new regimen of oral cyclophosphamide IV Keytruda and IV Avastin and its risks especially in someone who has previously had a bowel perforation  the patient would like to stick with the present regimen for at least 1 more cycle  I agree given her  coming down  We will arrange for this as soon as possible  Pre chemo blood work was reasonable last week                   CHIEF COMPLAINT: Consideration of chemotherapy change for recurrent stage IV ovarian cancer      Problem:  Cancer Staging  Ovarian cancer [facility])  Staging form: Ovary, Fallopian Tube, Primary Peritoneal, AJCC 8th Edition  - Clinical stage from 11/14/2018: FIGO Stage IVB (cT3c, cN0, pM1b) - Signed by Harper Mathew MD on 11/14/2018  - Pathologic stage from 10/9/2019: FIGO Stage IIIC (pT3c, pN1, cM0) - Signed by Harper Mathew MD on 10/9/2019        Previous therapy:  Oncology History   Ovarian cancer ([facility] [address] )   11/9/2018 Initial Diagnosis    Ovarian cancer ([facility] [address] )   tumor marker 194 5     11/9/2018 Biopsy    CT-guided needle biopsy of abdominal mass 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  Given liver involvement this would be stage IV     11/14/2018 - 12/4/2018 Chemotherapy    Neoadjuvant therapy: carboplatin area under the curve of 6, paclitaxel 135 milligrams/meter squared, Avastin 10 milligrams/kilogram  Completed 1 of 3 cycles  12/14/2018 Surgery    LAPAROTOMY EXPLORATORY; Abdominal Washout; Application of Abthera Vac Dressing for suspected bowel perforation and septic shock        12/15/2018 Surgery    LAPAROTOMY EXPLORATORY, ABDOMINAL WASHOUT, DRAIN PLACEMENT x 4, DIVERTING LOOP ILEOSTOMY AND ABDOMINAL CLOSURE for bowel perforation     3/26/2019 -  Chemotherapy    Taxol weekly 80 mg/m2 for 3 consecutive weeks, may add carboplatin in the future with AUC of 5      3/26/2019 - 2/3/2020 Chemotherapy    palonosetron (ALOXI) injection 0 25 mg, 0 25 mg, Intravenous, Once, 6 of 6 cycles  Administration: 0 25 mg (5/28/2019), 0 25 mg (6/25/2019), 0 25 mg (11/6/2019), 0 25 mg (12/10/2019), 0 25 mg (1/7/2020)  fosaprepitant (EMEND) 150 mg in sodium chloride 0 9 % 255 mL IVPB, 150 mg, Intravenous, Once, 6 of 6 cycles  Administration: 150 mg (5/28/2019), 150 mg (6/25/2019), 150 mg (11/6/2019), 150 mg (12/10/2019), 150 mg (1/7/2020)  CARBOplatin (PARAPLATIN) in sodium chloride 0 9 % 250 mL IVPB,  (original dose ), Intravenous, Once, 6 of 6 cycles  Dose modification:   (Cycle 2),   (original dose 258 4 mg, Cycle 3),   (original dose 258 4 mg, Cycle 3),   (original dose 244 4 mg, Cycle 4)  Administration: 258 4 mg (5/28/2019), 244 4 mg (6/25/2019), 285 2 mg (11/6/2019), 296 4 mg (12/10/2019), 286 4 mg (1/7/2020)  PACLitaxel (TAXOL) 127 8 mg in sodium chloride 0 9 % 250 mL chemo IVPB, 80 mg/m2, Intravenous, Once, 7 of 7 cycles  Dose modification: 65 mg/m2 (original dose 80 mg/m2, Cycle 2, Reason: Dose Not Tolerated)  Administration: 103 8 mg (5/14/2019), 108 6 mg (5/28/2019), 108 6 mg (6/4/2019), 108 6 mg (6/11/2019), 109 8 mg (6/25/2019), 109 8 mg (7/2/2019), 109 8 mg (7/9/2019), 111 6 mg (11/6/2019), 111 6 mg (11/12/2019), 111 6 mg (11/19/2019), 113 4 mg (12/10/2019), 113 4 mg (12/17/2019), 113 4 mg (12/23/2019), 113 4 mg (1/7/2020), 114 6 mg (1/14/2020), 114 6 mg (1/21/2020)     4/29/2019 [address] testing revealed a PD L1 tumor proportions score of 0%  The patient is not a candidate for PD L1 manipulation      Genetic Testing    [facility] Breast/Gyn panel, wildtype  9/10/2019 Surgery    Exploratory laparotomy radical omentectomy, posterior exenteration, takedown of ileostomy and end colostomy for complete debulking of visible tumor  Final pathology report revealed high-grade serous malignancy in both the omentum and the pelvic mass     9/25/2019 -  Chemotherapy    Carboplatin area under the curve of 5+ weekly paclitaxel at 80 milligrams/meters squared for 3 further cycles of treatment followed by consolidation this is to begin mid October     10/2019 Surgery    Interval debulking with TAHBSO revision of colostomy omentectomy and tumor debulking with complete debulking  Several weeks postoperatively the patient required a open cholecystectomy       10/2019 - 12/2019 Chemotherapy    Carboplatin paclitaxel x3     10/9/2019 -  Cancer Staged    Staging form: Ovary, Fallopian Tube, Primary Peritoneal, AJCC 8th Edition  - Pathologic stage from 10/9/2019: FIGO Stage IIIC (pT3c, pN1, cM0) - Signed by Cedric De Souza MD on 10/9/2019  Histologic grade (G): G3  Histologic grading system: 4 grade system  Gross residual tumor after primary cyto-reductive surgery: Absent       3/24/2020 - 9/21/2020 Chemotherapy    palonosetron (ALOXI) injection 0 25 mg, 0 25 mg, Intravenous, Once, 6 of 7 cycles  Administration: 0 25 mg (3/24/2020), 0 25 mg (4/21/2020), 0 25 mg (5/19/2020), 0 25 mg (6/23/2020), 0 25 mg (7/28/2020), 0 25 mg (8/25/2020)  pegfilgrastim (NEULASTA ONPRO) subcutaneous injection kit 6 mg, 6 mg, Subcutaneous, Once, 6 of 7 cycles  Administration: 6 mg (3/31/2020), 6 mg (4/28/2020), 6 mg (5/26/2020), 6 mg (6/29/2020), 6 mg (8/4/2020), 6 mg (9/1/2020)  DOXOrubicin liposome (DOXIL) 54 3 mg in dextrose 5 % 250 mL chemo infusion, 30 mg/m2 = 54 3 mg, Intravenous, Once, 6 of 7 cycles  Administration: 54 3 mg (3/24/2020), 54 6 mg (4/21/2020), 54 9 mg (5/19/2020), 54 6 mg (6/23/2020), 54 9 mg (7/28/2020), 54 9 mg (8/25/2020)  gemcitabine (GEMZAR) 1,176 4 mg in sodium chloride 0 9 % 250 mL infusion, 650 mg/m2 = 1,176 4 mg, Intravenous, Once, 6 of 7 cycles  Administration: 1,176 4 mg (3/24/2020), 1,176 4 mg (3/31/2020), 1,182 9 mg (4/21/2020), 1,200 mg (4/28/2020), 1,189 4 mg (5/19/2020), 1,189 4 mg (5/26/2020), 1,182 9 mg (6/23/2020), 1,182 9 mg (6/29/2020), 1,189 4 mg (7/28/2020), 1,189 4 mg (8/4/2020), 1,189 4 mg (8/25/2020), 1,195 8 mg (9/1/2020)     1/12/2021 - 1/12/2021 Chemotherapy    palonosetron (ALOXI) injection 0 25 mg, 0 25 mg, Intravenous, Once, 0 of 6 cycles  fosaprepitant (EMEND) 150 mg in sodium chloride 0 9 % 250 mL IVPB, 150 mg, Intravenous, Once, 0 of 6 cycles  CARBOplatin (PARAPLATIN) in sodium chloride 0 9 % 250 mL IVPB, , Intravenous, Once, 0 of 6 cycles  PACLitaxel (TAXOL) 246 mg in sodium chloride 0 9 % 500 mL chemo IVPB, 135 mg/m2 = 246 mg (77 1 % of original dose 175 mg/m2), Intravenous, Once, 0 of 6 cycles  Dose modification: 135 mg/m2 (original dose 175 mg/m2, Cycle 1, Reason: Other (See Comments), Comment: heavily pretreated)     1/12/2021 -  Chemotherapy    palonosetron (ALOXI) injection 0 25 mg, 0 25 mg, Intravenous, Once, 3 of 3 cycles  Administration: 0 25 mg (1/12/2021), 0 25 mg (3/2/2021), 0 25 mg (3/23/2021)  fosaprepitant (EMEND) 150 mg in sodium chloride 0 9 % 250 mL IVPB, 150 mg, Intravenous, Once, 3 of 3 cycles  Administration: 150 mg (1/12/2021), 150 mg (3/2/2021), 150 mg (3/23/2021)  CARBOplatin (PARAPLATIN) 311 mg in sodium chloride 0 9 % 250 mL IVPB, 311 mg, Intravenous, Once, 3 of 3 cycles  Administration: 311 mg (1/12/2021), 330 mg (3/2/2021), 338 mg (3/23/2021)  PACLitaxel (TAXOL) 91 2 mg in sodium chloride 0 9 % 250 mL chemo IVPB, 50 mg/m2 = 91 2 mg (62 5 % of original dose 80 mg/m2), Intravenous, Once, 3 of 4 cycles  Dose modification: 50 mg/m2 (original dose 80 mg/m2, Cycle 1, Reason: Other (See Comments))  Administration: 91 2 mg (1/12/2021), 90 6 mg (1/19/2021), 89 4 mg (3/2/2021), 91 2 mg (3/9/2021), 91 2 mg (3/16/2021), 90 6 mg (3/23/2021), 90 6 mg (3/30/2021), 90 6 mg (4/6/2021)           Patient ID: Ruy Sepulveda is a 69 y o  female   Patient is very pleasant 76 year-old female with a history of stage IV ovarian cancer  She has undergone numerous chemotherapy agents she is presently undergone 3 cycles of carboplatin paclitaxel on a weekly basis  She has had a CT scan after her 3rd cycle which shows progression however her  has dropped in half and stayed stable there  The patient does have symptoms of intermittent bowel obstruction which has been long standing for her  Since her last visit I have explored the idea of treating her again with a Avastin despite her bowel perforation 1st time in conjunction with p o  cyclophosphamide and Keytruda  I have contacted [facility] who state that this is a reasonable thing to try I have provided the patient does not have a significant tumor burden around the bowel  The patient and her daughter remain somewhat uncomfortable at this due to the increased risk  I believe that  We have discussed continuing her present chemotherapy regimen as there may be simply be a delay in response    We have spent some time read dressing these issues with side effects and the patient and her family would like to continue with this present regimen  The following portions of the patient's history were reviewed and updated as appropriate: allergies, current medications, past family history, past social history, past surgical history and problem list     Review of Systems   Constitutional: Negative  HENT: Negative  Eyes: Negative  Respiratory: Negative  Cardiovascular: Negative  Gastrointestinal: Negative  Endocrine: Negative  Genitourinary: Negative  Musculoskeletal: Negative  Skin: Negative  Neurological: Negative  Hematological: Negative  Psychiatric/Behavioral: Negative  Current Outpatient Medications   Medication Sig Dispense Refill    aspirin-acetaminophen-caffeine (EXCEDRIN MIGRAINE) 250-250-65 MG per tablet Take 1 tablet by mouth every 6 (six) hours as needed for headaches      nystatin (MYCOSTATIN) powder Apply topically 3 (three) times a day 15 g 4    pantoprazole (PROTONIX) 40 mg tablet Take 1 tablet (40 mg total) by mouth 2 (two) times a day before meals  0    traMADol (ULTRAM) 50 mg tablet Take 1 tablet (50 mg total) by mouth every 6 (six) hours as needed for moderate pain 30 tablet 1    gabapentin (NEURONTIN) 300 mg capsule Take 1 capsule (300 mg total) by mouth 3 (three) times a day 90 capsule 0    potassium chloride (K-DUR,KLOR-CON) 20 mEq tablet Take 1 tablet (20 mEq total) by mouth daily 30 tablet 0     No current facility-administered medications for this visit  Objective:    Blood pressure 130/80, pulse 85, temperature 98 1 °F (36 7 °C), resp  rate 18, height 5' 4 17" (1 63 m), weight 74 4 kg (164 lb), not currently breastfeeding  Body mass index is 28 kg/m²  Body surface area is 1 8 meters squared  Physical Exam  Constitutional:       Appearance: She is well-developed  HENT:      Head: Normocephalic and atraumatic  Neck:      Musculoskeletal: Normal range of motion and neck supple  Thyroid: No thyromegaly     Cardiovascular:      Rate and Rhythm: Normal rate and regular rhythm  Heart sounds: Normal heart sounds  Pulmonary:      Effort: Pulmonary effort is normal       Breath sounds: Normal breath sounds  Abdominal:      General: Bowel sounds are normal       Palpations: Abdomen is soft  Comments: Well healed laparoscopic incisions  Genitourinary:     Comments: Deferred  Musculoskeletal: Normal range of motion  Lymphadenopathy:      Cervical: No cervical adenopathy  Skin:     General: Skin is warm and dry  Neurological:      Mental Status: She is alert and oriented to person, place, and time     Psychiatric:         Behavior: Behavior normal          Lab Results   Component Value Date     62 8 (H) 04/16/2021     Lab Results   Component Value Date    K 3 7 04/16/2021     04/16/2021    CO2 24 04/16/2021    BUN 24 04/16/2021    CREATININE 1 55 (H) 04/16/2021    GLUCOSE 228 (H) 09/10/2019    GLUF 153 (H) 04/16/2021    CALCIUM 8 7 04/16/2021    CORRECTEDCA 9 1 03/19/2021    AST 21 04/16/2021    ALT 24 04/16/2021    ALKPHOS 111 04/16/2021    EGFR 33 04/16/2021     Lab Results   Component Value Date    WBC 5 49 04/16/2021    HGB 8 5 (L) 04/16/2021    HCT 27 2 (L) 04/16/2021    MCV 91 04/16/2021     04/16/2021     Lab Results   Component Value Date    NEUTROABS 2 75 04/16/2021        Trend:  Lab Results   Component Value Date     62 8 (H) 04/16/2021     60 8 (H) 04/02/2021     110 8 (H) 03/12/2021     101 9 (H) 03/02/2021     75 8 (H) 02/12/2021     85 7 (H) 01/22/2021     102 0 (H) 12/05/2020     77 1 (H) 11/30/2020     45 7 (H) 10/16/2020     39 6 (H) 09/11/2020     44 9 (H) 09/01/2020     29 2 06/15/2020     27 4 06/10/2020     32 5 (H) 05/26/2020     28 6 05/15/2020     30 6 (H) 05/11/2020     36 5 (H) 04/27/2020     37 9 (H) 04/17/2020     42 5 (H) 04/10/2020     48 1 (H) 03/27/2020     40 9 (H) 03/20/2020     18 2 01/28/2020     17 5 12/31/2019     14 5 12/02/2019     22 0 11/19/2019     32 6 (H) 11/06/2019     19 1 08/30/2019     12 7 08/13/2019     13 9 06/18/2019     19 9 05/21/2019     79 5 (H) 04/16/2019     96 4 (H) 03/19/2019     80 4 (H) 03/08/2019     135 0 (H) 11/30/2018     194 5 (H) 10/18/2018

## 2021-04-21 NOTE — PROGRESS NOTES
Assessment/Plan:    Problem List Items Addressed This Visit        Endocrine    Ovarian cancer [facility]) - Primary       Patient is very pleasant 79-year-old female with history of recurrent metastatic stage IV  Ovarian cancer  We have had a long discussion regarding the patient's present chemotherapy use  As her CT scan revealed progression and her  is now down there is some question as to whether the treatment is effective or not  We have discussed this   Present treatment  Of carboplatin plus weekly paclitaxel verses a new regimen of oral cyclophosphamide IV Keytruda and IV Avastin and its risks especially in someone who has previously had a bowel perforation  the patient would like to stick with the present regimen for at least 1 more cycle  I agree given her  coming down  We will arrange for this as soon as possible  Pre chemo blood work was reasonable last week  CHIEF COMPLAINT: Consideration of chemotherapy change for recurrent stage IV ovarian cancer      Problem:  Cancer Staging  Ovarian cancer [facility])  Staging form: Ovary, Fallopian Tube, Primary Peritoneal, AJCC 8th Edition  - Clinical stage from 11/14/2018: FIGO Stage IVB (cT3c, cN0, pM1b) - Signed by Noemi Viveros MD on 11/14/2018  - Pathologic stage from 10/9/2019: FIGO Stage IIIC (pT3c, pN1, cM0) - Signed by Noemi Viveros MD on 10/9/2019        Previous therapy:  Oncology History   Ovarian cancer ([facility] [address] )   11/9/2018 Initial Diagnosis    Ovarian cancer ([facility] [address] )   tumor marker 194 5     11/9/2018 Biopsy    CT-guided needle biopsy of abdominal mass 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  Given liver involvement this would be stage IV     11/14/2018 - 12/4/2018 Chemotherapy    Neoadjuvant therapy: carboplatin area under the curve of 6, paclitaxel 135 milligrams/meter squared, Avastin 10 milligrams/kilogram  Completed 1 of 3 cycles        12/14/2018 Surgery    LAPAROTOMY EXPLORATORY; Abdominal Washout; Application of Abthera Vac Dressing for suspected bowel perforation and septic shock  12/15/2018 Surgery    LAPAROTOMY EXPLORATORY, ABDOMINAL WASHOUT, DRAIN PLACEMENT x 4, DIVERTING LOOP ILEOSTOMY AND ABDOMINAL CLOSURE for bowel perforation     3/26/2019 -  Chemotherapy    Taxol weekly 80 mg/m2 for 3 consecutive weeks, may add carboplatin in the future with AUC of 5      3/26/2019 - 2/3/2020 Chemotherapy    palonosetron (ALOXI) injection 0 25 mg, 0 25 mg, Intravenous, Once, 6 of 6 cycles  Administration: 0 25 mg (5/28/2019), 0 25 mg (6/25/2019), 0 25 mg (11/6/2019), 0 25 mg (12/10/2019), 0 25 mg (1/7/2020)  fosaprepitant (EMEND) 150 mg in sodium chloride 0 9 % 255 mL IVPB, 150 mg, Intravenous, Once, 6 of 6 cycles  Administration: 150 mg (5/28/2019), 150 mg (6/25/2019), 150 mg (11/6/2019), 150 mg (12/10/2019), 150 mg (1/7/2020)  CARBOplatin (PARAPLATIN) in sodium chloride 0 9 % 250 mL IVPB,  (original dose ), Intravenous, Once, 6 of 6 cycles  Dose modification:   (Cycle 2),   (original dose 258 4 mg, Cycle 3),   (original dose 258 4 mg, Cycle 3),   (original dose 244 4 mg, Cycle 4)  Administration: 258 4 mg (5/28/2019), 244 4 mg (6/25/2019), 285 2 mg (11/6/2019), 296 4 mg (12/10/2019), 286 4 mg (1/7/2020)  PACLitaxel (TAXOL) 127 8 mg in sodium chloride 0 9 % 250 mL chemo IVPB, 80 mg/m2, Intravenous, Once, 7 of 7 cycles  Dose modification: 65 mg/m2 (original dose 80 mg/m2, Cycle 2, Reason: Dose Not Tolerated)  Administration: 103 8 mg (5/14/2019), 108 6 mg (5/28/2019), 108 6 mg (6/4/2019), 108 6 mg (6/11/2019), 109 8 mg (6/25/2019), 109 8 mg (7/2/2019), 109 8 mg (7/9/2019), 111 6 mg (11/6/2019), 111 6 mg (11/12/2019), 111 6 mg (11/19/2019), 113 4 mg (12/10/2019), 113 4 mg (12/17/2019), 113 4 mg (12/23/2019), 113 4 mg (1/7/2020), 114 6 mg (1/14/2020), 114 6 mg (1/21/2020)     4/29/2019 Genomic Testing     Foundation 1 testing revealed a PD L1 tumor proportions score of 0%    The patient is not a candidate for PD L1 manipulation      Genetic Testing    [employer] Breast/Gyn panel, wildtype  9/10/2019 Surgery    Exploratory laparotomy radical omentectomy, posterior exenteration, takedown of ileostomy and end colostomy for complete debulking of visible tumor  Final pathology report revealed high-grade serous malignancy in both the omentum and the pelvic mass     9/25/2019 -  Chemotherapy    Carboplatin area under the curve of 5+ weekly paclitaxel at 80 milligrams/meters squared for 3 further cycles of treatment followed by consolidation this is to begin mid October     10/2019 Surgery    Interval debulking with TAHBSO revision of colostomy omentectomy and tumor debulking with complete debulking  Several weeks postoperatively the patient required a open cholecystectomy       10/2019 - 12/2019 Chemotherapy    Carboplatin paclitaxel x3     10/9/2019 -  Cancer Staged    Staging form: Ovary, Fallopian Tube, Primary Peritoneal, AJCC 8th Edition  - Pathologic stage from 10/9/2019: FIGO Stage IIIC (pT3c, pN1, cM0) - Signed by Manuel Rudolph MD on 10/9/2019  Histologic grade (G): G3  Histologic grading system: 4 grade system  Gross residual tumor after primary cyto-reductive surgery: Absent       3/24/2020 - 9/21/2020 Chemotherapy    palonosetron (ALOXI) injection 0 25 mg, 0 25 mg, Intravenous, Once, 6 of 7 cycles  Administration: 0 25 mg (3/24/2020), 0 25 mg (4/21/2020), 0 25 mg (5/19/2020), 0 25 mg (6/23/2020), 0 25 mg (7/28/2020), 0 25 mg (8/25/2020)  pegfilgrastim (NEULASTA ONPRO) subcutaneous injection kit 6 mg, 6 mg, Subcutaneous, Once, 6 of 7 cycles  Administration: 6 mg (3/31/2020), 6 mg (4/28/2020), 6 mg (5/26/2020), 6 mg (6/29/2020), 6 mg (8/4/2020), 6 mg (9/1/2020)  DOXOrubicin liposome (DOXIL) 54 3 mg in dextrose 5 % 250 mL chemo infusion, 30 mg/m2 = 54 3 mg, Intravenous, Once, 6 of 7 cycles  Administration: 54 3 mg (3/24/2020), 54 6 mg (4/21/2020), 54 9 mg (5/19/2020), 54 6 mg (6/23/2020), 54 9 mg (7/28/2020), 54 9 mg (8/25/2020)  gemcitabine (GEMZAR) 1,176 4 mg in sodium chloride 0 9 % 250 mL infusion, 650 mg/m2 = 1,176 4 mg, Intravenous, Once, 6 of 7 cycles  Administration: 1,176 4 mg (3/24/2020), 1,176 4 mg (3/31/2020), 1,182 9 mg (4/21/2020), 1,200 mg (4/28/2020), 1,189 4 mg (5/19/2020), 1,189 4 mg (5/26/2020), 1,182 9 mg (6/23/2020), 1,182 9 mg (6/29/2020), 1,189 4 mg (7/28/2020), 1,189 4 mg (8/4/2020), 1,189 4 mg (8/25/2020), 1,195 8 mg (9/1/2020)     1/12/2021 - 1/12/2021 Chemotherapy    palonosetron (ALOXI) injection 0 25 mg, 0 25 mg, Intravenous, Once, 0 of 6 cycles  fosaprepitant (EMEND) 150 mg in sodium chloride 0 9 % 250 mL IVPB, 150 mg, Intravenous, Once, 0 of 6 cycles  CARBOplatin (PARAPLATIN) in sodium chloride 0 9 % 250 mL IVPB, , Intravenous, Once, 0 of 6 cycles  PACLitaxel (TAXOL) 246 mg in sodium chloride 0 9 % 500 mL chemo IVPB, 135 mg/m2 = 246 mg (77 1 % of original dose 175 mg/m2), Intravenous, Once, 0 of 6 cycles  Dose modification: 135 mg/m2 (original dose 175 mg/m2, Cycle 1, Reason: Other (See Comments), Comment: heavily pretreated)     1/12/2021 -  Chemotherapy    palonosetron (ALOXI) injection 0 25 mg, 0 25 mg, Intravenous, Once, 3 of 3 cycles  Administration: 0 25 mg (1/12/2021), 0 25 mg (3/2/2021), 0 25 mg (3/23/2021)  fosaprepitant (EMEND) 150 mg in sodium chloride 0 9 % 250 mL IVPB, 150 mg, Intravenous, Once, 3 of 3 cycles  Administration: 150 mg (1/12/2021), 150 mg (3/2/2021), 150 mg (3/23/2021)  CARBOplatin (PARAPLATIN) 311 mg in sodium chloride 0 9 % 250 mL IVPB, 311 mg, Intravenous, Once, 3 of 3 cycles  Administration: 311 mg (1/12/2021), 330 mg (3/2/2021), 338 mg (3/23/2021)  PACLitaxel (TAXOL) 91 2 mg in sodium chloride 0 9 % 250 mL chemo IVPB, 50 mg/m2 = 91 2 mg (62 5 % of original dose 80 mg/m2), Intravenous, Once, 3 of 4 cycles  Dose modification: 50 mg/m2 (original dose 80 mg/m2, Cycle 1, Reason: Other (See Comments))  Administration: 91 2 mg (1/12/2021), 90 6 mg (1/19/2021), 89 4 mg (3/2/2021), 91 2 mg (3/9/2021), 91 2 mg (3/16/2021), 90 6 mg (3/23/2021), 90 6 mg (3/30/2021), 90 6 mg (4/6/2021)           Patient ID: Karolina Mendez is a 69 y o  female   Patient is very pleasant 50-year-old female with a history of stage IV ovarian cancer  She has undergone numerous chemotherapy agents she is presently undergone 3 cycles of carboplatin paclitaxel on a weekly basis  She has had a CT scan after her 3rd cycle which shows progression however her  has dropped in half and stayed stable there  The patient does have symptoms of intermittent bowel obstruction which has been long standing for her  Since her last visit I have explored the idea of treating her again with a Avastin despite her bowel perforation 1st time in conjunction with p o  cyclophosphamide and Keytruda  I have contacted [facility] who state that this is a reasonable thing to try I have provided the patient does not have a significant tumor burden around the bowel  The patient and her daughter remain somewhat uncomfortable at this due to the increased risk  I believe that  We have discussed continuing her present chemotherapy regimen as there may be simply be a delay in response  We have spent some time read dressing these issues with side effects and the patient and her family would like to continue with this present regimen  The following portions of the patient's history were reviewed and updated as appropriate: allergies, current medications, past family history, past social history, past surgical history and problem list     Review of Systems   Constitutional: Negative  HENT: Negative  Eyes: Negative  Respiratory: Negative  Cardiovascular: Negative  Gastrointestinal: Negative  Endocrine: Negative  Genitourinary: Negative  Musculoskeletal: Negative  Skin: Negative  Neurological: Negative  Hematological: Negative  Psychiatric/Behavioral: Negative  Current Outpatient Medications   Medication Sig Dispense Refill    aspirin-acetaminophen-caffeine (EXCEDRIN MIGRAINE) 250-250-65 MG per tablet Take 1 tablet by mouth every 6 (six) hours as needed for headaches      nystatin (MYCOSTATIN) powder Apply topically 3 (three) times a day 15 g 4    pantoprazole (PROTONIX) 40 mg tablet Take 1 tablet (40 mg total) by mouth 2 (two) times a day before meals  0    traMADol (ULTRAM) 50 mg tablet Take 1 tablet (50 mg total) by mouth every 6 (six) hours as needed for moderate pain 30 tablet 1    gabapentin (NEURONTIN) 300 mg capsule Take 1 capsule (300 mg total) by mouth 3 (three) times a day 90 capsule 0    potassium chloride (K-DUR,KLOR-CON) 20 mEq tablet Take 1 tablet (20 mEq total) by mouth daily 30 tablet 0     No current facility-administered medications for this visit  Objective:    Blood pressure 130/80, pulse 85, temperature 98 1 °F (36 7 °C), resp  rate 18, height 5' 4 17" (1 63 m), weight 74 4 kg (164 lb), not currently breastfeeding  Body mass index is 28 kg/m²  Body surface area is 1 8 meters squared  Physical Exam  Constitutional:       Appearance: She is well-developed  HENT:      Head: Normocephalic and atraumatic  Neck:      Musculoskeletal: Normal range of motion and neck supple  Thyroid: No thyromegaly  Cardiovascular:      Rate and Rhythm: Normal rate and regular rhythm  Heart sounds: Normal heart sounds  Pulmonary:      Effort: Pulmonary effort is normal       Breath sounds: Normal breath sounds  Abdominal:      General: Bowel sounds are normal       Palpations: Abdomen is soft  Comments: Well healed laparoscopic incisions  Genitourinary:     Comments: Deferred  Musculoskeletal: Normal range of motion  Lymphadenopathy:      Cervical: No cervical adenopathy  Skin:     General: Skin is warm and dry     Neurological:      Mental Status: She is alert and oriented to person, place, and time     Psychiatric:         Behavior: Behavior normal          Lab Results   Component Value Date     62 8 (H) 04/16/2021     Lab Results   Component Value Date    K 3 7 04/16/2021     04/16/2021    CO2 24 04/16/2021    BUN 24 04/16/2021    CREATININE 1 55 (H) 04/16/2021    GLUCOSE 228 (H) 09/10/2019    GLUF 153 (H) 04/16/2021    CALCIUM 8 7 04/16/2021    CORRECTEDCA 9 1 03/19/2021    AST 21 04/16/2021    ALT 24 04/16/2021    ALKPHOS 111 04/16/2021    EGFR 33 04/16/2021     Lab Results   Component Value Date    WBC 5 49 04/16/2021    HGB 8 5 (L) 04/16/2021    HCT 27 2 (L) 04/16/2021    MCV 91 04/16/2021     04/16/2021     Lab Results   Component Value Date    NEUTROABS 2 75 04/16/2021        Trend:  Lab Results   Component Value Date     62 8 (H) 04/16/2021     60 8 (H) 04/02/2021     110 8 (H) 03/12/2021     101 9 (H) 03/02/2021     75 8 (H) 02/12/2021     85 7 (H) 01/22/2021     102 0 (H) 12/05/2020     77 1 (H) 11/30/2020     45 7 (H) 10/16/2020     39 6 (H) 09/11/2020     44 9 (H) 09/01/2020     29 2 06/15/2020     27 4 06/10/2020     32 5 (H) 05/26/2020     28 6 05/15/2020     30 6 (H) 05/11/2020     36 5 (H) 04/27/2020     37 9 (H) 04/17/2020     42 5 (H) 04/10/2020     48 1 (H) 03/27/2020     40 9 (H) 03/20/2020     18 2 01/28/2020     17 5 12/31/2019     14 5 12/02/2019     22 0 11/19/2019     32 6 (H) 11/06/2019     19 1 08/30/2019     12 7 08/13/2019     13 9 06/18/2019     19 9 05/21/2019     79 5 (H) 04/16/2019     96 4 (H) 03/19/2019     80 4 (H) 03/08/2019     135 0 (H) 11/30/2018     194 5 (H) 10/18/2018

## 2021-04-21 NOTE — ASSESSMENT & PLAN NOTE
Patient is very pleasant 79-year-old female with history of recurrent metastatic stage IV  Ovarian cancer  We have had a long discussion regarding the patient's present chemotherapy use  As her CT scan revealed progression and her  is now down there is some question as to whether the treatment is effective or not  We have discussed this   Present treatment  Of carboplatin plus weekly paclitaxel verses a new regimen of oral cyclophosphamide IV Keytruda and IV Avastin and its risks especially in someone who has previously had a bowel perforation  the patient would like to stick with the present regimen for at least 1 more cycle  I agree given her  coming down  We will arrange for this as soon as possible  Pre chemo blood work was reasonable last week

## 2021-04-27 NOTE — PROGRESS NOTES
REACTION;  At 1433 pt started with infusion reaction to carboplatin  There was 206 6 ml's left out of 301 when reaction occurred  Pt was complaining itching along b/l hands and chest, swelling of her lips and tongue and redness on hands  Hypersensitivity protocol initiated  Pt still c/o lips and tongue swelling, Medical emergency called  Ky Miller Sr, PA-C came and evaluated patient  He stated that he wanted to keep her here on floor as pt symptoms were now starting to resolve  He stated that he will come back and re-evaluate her  Pt was ok at with this plan  At the same time I was Teams-ing with Susu Ramos NP, Dr Albert Granger said to stop Carboplatin all together for today, she can continue on with her Taxol next week  He will discuss options with her at there next office visit on 5/19  Pt is okay with this plan as well  Daughter Elda Bajwa came to floor, explained everything that happened today  Jeremiah Ramos PA-C came back to evaluate pt, she stated she felt much better, back to baseline  Vitals were stable  He felt comfortable letting her go home and pt is aware if anything changes to go to the ER  Port flushed and de-accessed  AVS given  Pt walked out in stable condition

## 2021-04-27 NOTE — PROGRESS NOTES
Pt here for chemotherapy, taxol/carbo  Offers no complaints at present time  Right port accessed with positive blood return noted throughout treatment

## 2021-04-28 NOTE — TELEPHONE ENCOUNTER
Patient called and was asking if you discussed with Dr Montilla the incident she had with infusion yesterday  She is concerned about her infusion for next Tuesday  Please call her at [phone]

## 2021-05-03 NOTE — TELEPHONE ENCOUNTER
Patient states that on Tuesday 5/27/2021 there was an incident that occurred at the infusion center  Patient would not state the details of said incident  The patient would like to know if Dr Bill Rodriguez or Alesha Rodriguez were made aware of this incident  Patient would also like to know if she should keep her infusion appointment scheduled for tomorrow 5/4/2021  Please have Dr Bill Rodriguez contact patient, [phone]

## 2021-05-04 NOTE — PLAN OF CARE
Problem: Potential for Falls  Goal: Patient will remain free of falls  Description: INTERVENTIONS:  - Assess patient frequently for physical needs  -  Identify cognitive and physical deficits and behaviors that affect risk of falls    -  [place] fall precautions as indicated by assessment   - Educate patient/family on patient safety including physical limitations  - Instruct patient to call for assistance with activity based on assessment  - Modify environment to reduce risk of injury  - Consider OT/PT consult to assist with strengthening/mobility  Outcome: Progressing

## 2021-05-04 NOTE — PROGRESS NOTES
Pt presented for chemotherapy, offering no complaints  Pt's port was accessed, with no blood return, 2 2mL of cathflo was instilled for 30mins, after that blood return was achieved  Pt tolerated entire treatment without incident  Port was flushed per protocol  Pt was given AVS and discharged in stable condition

## 2021-05-10 NOTE — TELEPHONE ENCOUNTER
Catherine stating that she still has her UTI and she finished her antibodies on Thursday  She stated she wanted to know if another prescription could be sent to the pharmacy, [address] [facility], [address], or what you think her next steps should be  Please review and contact the patient to discuss

## 2021-05-10 NOTE — TELEPHONE ENCOUNTER
Patient completed 3 days of Cipro last week for suspected UTI  She had improvement, then slight worsening of her dysuria  Encouraged as previous to go to for urine culture  Order entered  Rx extended for an additional 3 days

## 2021-05-11 PROBLEM — D64.81 ANEMIA DUE TO CHEMOTHERAPY: Status: ACTIVE | Noted: 2021-01-01

## 2021-05-11 PROBLEM — T45.1X5A ANEMIA DUE TO CHEMOTHERAPY: Status: ACTIVE | Noted: 2021-01-01

## 2021-05-11 NOTE — PLAN OF CARE
Problem: Potential for Falls  Goal: Patient will remain free of falls  Description: INTERVENTIONS:  - Assess patient frequently for physical needs  -  Identify cognitive and physical deficits and behaviors that affect risk of falls  -  [place] fall precautions as indicated by assessment   - Educate patient/family on patient safety including physical limitations  - Instruct patient to call for assistance with activity based on assessment  - Modify environment to reduce risk of injury  - Consider OT/PT consult to assist with strengthening/mobility  Outcome: Progressing     Problem: Knowledge Deficit  Goal: Patient/family/caregiver demonstrates understanding of disease process, treatment plan, medications, and discharge instructions  Description: Complete learning assessment and assess knowledge base    Interventions:  - Provide teaching at level of understanding  - Provide teaching via preferred learning methods  Outcome: Progressing

## 2021-05-11 NOTE — PROGRESS NOTES
Pt presents for  Taxol only  Offers no complaints  Pt tolerated tx with out incident  Requesting Port remain accessed as she is to receive a unit of blood tomorrow  Dsg changed to chorohexadine dsg and saline locked  Labs obtained per order  AVS provided  Aware of next appt

## 2021-05-12 NOTE — PROGRESS NOTES
Pt presents for Blood transfusion  Dr En Warner bed side to obtain new blood consent  Pt Offers no complaints  Pt tolerated tx with out incident  AVS declined Aware of next appt

## 2021-05-12 NOTE — PROGRESS NOTES
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 as well as how involved she still is, given all of her health issues in the past few years  She talked about her  and his recovery from a major stroke in 2016  They are  43 years, and her goal is to make it to their 50th anniversary as well as see her grandchildren graduate from high school  Pt has a very strong marcelina and is very involved in her Gnosticist  She talked about how present cancer is in the world, and says that she's part of the prayer team and it's hard to see how many families are affected by cancer or other illness  Pt denies any needs at this time, but was happy for the time spent talking today  MSW will remain available to her as needed moving forward, no other concerns at this time

## 2021-05-12 NOTE — PLAN OF CARE
Problem: Potential for Falls  Goal: Patient will remain free of falls  Description: INTERVENTIONS:  - Assess patient frequently for physical needs  -  Identify cognitive and physical deficits and behaviors that affect risk of falls  -  [place] fall precautions as indicated by assessment   - Educate patient/family on patient safety including physical limitations  - Instruct patient to call for assistance with activity based on assessment  - Modify environment to reduce risk of injury  - Consider OT/PT consult to assist with strengthening/mobility  Outcome: Progressing     Problem: Knowledge Deficit  Goal: Patient/family/caregiver demonstrates understanding of disease process, treatment plan, medications, and discharge instructions  Description: Complete learning assessment and assess knowledge base    Interventions:  - Provide teaching at level of understanding  - Provide teaching via preferred learning methods  Outcome: Progressing

## 2021-05-19 NOTE — ASSESSMENT & PLAN NOTE
Patient is very pleasant 69-year-old female with history of 4 B ovarian carcinoma now approximately 2 and half years out from initial diagnosis  She appears to have progressed with carboplatin and paclitaxel as evidence of CT scan and now elevation in her   As an alternative we have discussed the opportunity of utilizing Keytruda a Avastin and cyclophosphamide  I have researched this and found that it is a reasonable course of action  The patient remains hesitant to undergo  Avastin due to the catastrophic side effects of bowel perforation  We have therefore elected to  Treat with Keytruda  200 mg IV and  Metronomic [id number] mg p o  daily  We have discussed the risks and benefits and the patient has signed an informed consent  We will moved toward treatment as soon as possible  However the patient has not yet had her COVID immunization and plans to travel by plane  We have recommended her COVID immunization as soon as possible prior to starting the next chemotherapy

## 2021-05-19 NOTE — LETTER
May 19, 2021     Ashley Nagel, [phone] [facility] [address]    Patient: Juany Mars   YOB: 1950   Date of Visit: 5/19/2021       Dear Dr Konstantin Garcia:    Thank you for referring Juany Mars to me for evaluation  Below are my notes for this consultation  If you have questions, please do not hesitate to call me  I look forward to following your patient along with you  Sincerely,        Thania Anderson MD        CC: MD Thania Ackerman MD  5/19/2021  9:20 AM  Sign when Signing Visit  Assessment/Plan:    Problem List Items Addressed This Visit        Endocrine    Ovarian cancer [facility]) - Primary       Patient is very pleasant 79-year-old female with history of 4 B ovarian carcinoma now approximately 2 and half years out from initial diagnosis  She appears to have progressed with carboplatin and paclitaxel as evidence of CT scan and now elevation in her   As an alternative we have discussed the opportunity of utilizing Keytruda a Avastin and cyclophosphamide  I have researched this and found that it is a reasonable course of action  The patient remains hesitant to undergo  Avastin due to the catastrophic side effects of bowel perforation  We have therefore elected to  Treat with Keytruda  200 mg IV and  Metronomic [id number] mg p o  daily  We have discussed the risks and benefits and the patient has signed an informed consent  We will moved toward treatment as soon as possible  However the patient has not yet had her COVID immunization and plans to travel by plane  We have recommended her COVID immunization as soon as possible prior to starting the next chemotherapy                   CHIEF COMPLAINT:   Consideration of chemotherapy for recurrent stage IV B ovarian cancer      Problem:  Cancer Staging  Ovarian cancer [facility])  Staging form: Ovary, Fallopian Tube, Primary Peritoneal, AJCC 8th Edition  - Clinical stage from 11/14/2018: Reina Mayorga Stage IVB (cT3c, cN0, pM1b) - Signed by Randy Yusuf MD on 11/14/2018  - Pathologic stage from 10/9/2019: FIGO Stage IIIC (pT3c, pN1, cM0) - Signed by Randy Yusuf MD on 10/9/2019        Previous therapy:  Oncology History   Ovarian cancer ([facility] [address] )   11/9/2018 Initial Diagnosis    Ovarian cancer ([facility] [address] )   tumor marker 194 5     11/9/2018 Biopsy    CT-guided needle biopsy of abdominal mass 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  Given liver involvement this would be stage IV     11/14/2018 - 12/4/2018 Chemotherapy    Neoadjuvant therapy: carboplatin area under the curve of 6, paclitaxel 135 milligrams/meter squared, Avastin 10 milligrams/kilogram  Completed 1 of 3 cycles  12/14/2018 Surgery    LAPAROTOMY EXPLORATORY; Abdominal Washout; Application of Abthera Vac Dressing for suspected bowel perforation and septic shock        12/15/2018 Surgery    LAPAROTOMY EXPLORATORY, ABDOMINAL WASHOUT, DRAIN PLACEMENT x 4, DIVERTING LOOP ILEOSTOMY AND ABDOMINAL CLOSURE for bowel perforation     3/26/2019 -  Chemotherapy    Taxol weekly 80 mg/m2 for 3 consecutive weeks, may add carboplatin in the future with AUC of 5      3/26/2019 - 2/3/2020 Chemotherapy    palonosetron (ALOXI) injection 0 25 mg, 0 25 mg, Intravenous, Once, 6 of 6 cycles  Administration: 0 25 mg (5/28/2019), 0 25 mg (6/25/2019), 0 25 mg (11/6/2019), 0 25 mg (12/10/2019), 0 25 mg (1/7/2020)  fosaprepitant (EMEND) 150 mg in sodium chloride 0 9 % 255 mL IVPB, 150 mg, Intravenous, Once, 6 of 6 cycles  Administration: 150 mg (5/28/2019), 150 mg (6/25/2019), 150 mg (11/6/2019), 150 mg (12/10/2019), 150 mg (1/7/2020)  CARBOplatin (PARAPLATIN) in sodium chloride 0 9 % 250 mL IVPB,  (original dose ), Intravenous, Once, 6 of 6 cycles  Dose modification:   (Cycle 2),   (original dose 258 4 mg, Cycle 3),   (original dose 258 4 mg, Cycle 3),   (original dose 244 4 mg, Cycle 4)  Administration: 258 4 mg (5/28/2019), 244 4 mg (6/25/2019), 285 2 mg (11/6/2019), 296 4 mg (12/10/2019), 286 4 mg (1/7/2020)  PACLitaxel (TAXOL) 127 8 mg in sodium chloride 0 9 % 250 mL chemo IVPB, 80 mg/m2, Intravenous, Once, 7 of 7 cycles  Dose modification: 65 mg/m2 (original dose 80 mg/m2, Cycle 2, Reason: Dose Not Tolerated)  Administration: 103 8 mg (5/14/2019), 108 6 mg (5/28/2019), 108 6 mg (6/4/2019), 108 6 mg (6/11/2019), 109 8 mg (6/25/2019), 109 8 mg (7/2/2019), 109 8 mg (7/9/2019), 111 6 mg (11/6/2019), 111 6 mg (11/12/2019), 111 6 mg (11/19/2019), 113 4 mg (12/10/2019), 113 4 mg (12/17/2019), 113 4 mg (12/23/2019), 113 4 mg (1/7/2020), 114 6 mg (1/14/2020), 114 6 mg (1/21/2020)     4/29/2019 Genomic Testing     Foundation 1 testing revealed a PD L1 tumor proportions score of 0%  The patient is not a candidate for PD L1 manipulation      Genetic Testing    [employer] Breast/Gyn panel, wildtype  9/10/2019 Surgery    Exploratory laparotomy radical omentectomy, posterior exenteration, takedown of ileostomy and end colostomy for complete debulking of visible tumor  Final pathology report revealed high-grade serous malignancy in both the omentum and the pelvic mass     9/25/2019 -  Chemotherapy    Carboplatin area under the curve of 5+ weekly paclitaxel at 80 milligrams/meters squared for 3 further cycles of treatment followed by consolidation this is to begin mid October     10/2019 Surgery    Interval debulking with TAHBSO revision of colostomy omentectomy and tumor debulking with complete debulking  Several weeks postoperatively the patient required a open cholecystectomy       10/2019 - 12/2019 Chemotherapy    Carboplatin paclitaxel x3     10/9/2019 -  Cancer Staged    Staging form: Ovary, Fallopian Tube, Primary Peritoneal, AJCC 8th Edition  - Pathologic stage from 10/9/2019: FIGO Stage IIIC (pT3c, pN1, cM0) - Signed by Naty Lee MD on 10/9/2019  Histologic grade (G): G3  Histologic grading system: 4 grade system  Gross residual tumor after primary cyto-reductive surgery: Absent       3/24/2020 - 9/21/2020 Chemotherapy    palonosetron (ALOXI) injection 0 25 mg, 0 25 mg, Intravenous, Once, 6 of 7 cycles  Administration: 0 25 mg (3/24/2020), 0 25 mg (4/21/2020), 0 25 mg (5/19/2020), 0 25 mg (6/23/2020), 0 25 mg (7/28/2020), 0 25 mg (8/25/2020)  pegfilgrastim (NEULASTA ONPRO) subcutaneous injection kit 6 mg, 6 mg, Subcutaneous, Once, 6 of 7 cycles  Administration: 6 mg (3/31/2020), 6 mg (4/28/2020), 6 mg (5/26/2020), 6 mg (6/29/2020), 6 mg (8/4/2020), 6 mg (9/1/2020)  DOXOrubicin liposome (DOXIL) 54 3 mg in dextrose 5 % 250 mL chemo infusion, 30 mg/m2 = 54 3 mg, Intravenous, Once, 6 of 7 cycles  Administration: 54 3 mg (3/24/2020), 54 6 mg (4/21/2020), 54 9 mg (5/19/2020), 54 6 mg (6/23/2020), 54 9 mg (7/28/2020), 54 9 mg (8/25/2020)  gemcitabine (GEMZAR) 1,176 4 mg in sodium chloride 0 9 % 250 mL infusion, 650 mg/m2 = 1,176 4 mg, Intravenous, Once, 6 of 7 cycles  Administration: 1,176 4 mg (3/24/2020), 1,176 4 mg (3/31/2020), 1,182 9 mg (4/21/2020), 1,200 mg (4/28/2020), 1,189 4 mg (5/19/2020), 1,189 4 mg (5/26/2020), 1,182 9 mg (6/23/2020), 1,182 9 mg (6/29/2020), 1,189 4 mg (7/28/2020), 1,189 4 mg (8/4/2020), 1,189 4 mg (8/25/2020), 1,195 8 mg (9/1/2020)     1/12/2021 - 1/12/2021 Chemotherapy    palonosetron (ALOXI) injection 0 25 mg, 0 25 mg, Intravenous, Once, 0 of 6 cycles  fosaprepitant (EMEND) 150 mg in sodium chloride 0 9 % 250 mL IVPB, 150 mg, Intravenous, Once, 0 of 6 cycles  CARBOplatin (PARAPLATIN) in sodium chloride 0 9 % 250 mL IVPB, , Intravenous, Once, 0 of 6 cycles  PACLitaxel (TAXOL) 246 mg in sodium chloride 0 9 % 500 mL chemo IVPB, 135 mg/m2 = 246 mg (77 1 % of original dose 175 mg/m2), Intravenous, Once, 0 of 6 cycles  Dose modification: 135 mg/m2 (original dose 175 mg/m2, Cycle 1, Reason: Other (See Comments), Comment: heavily pretreated)     1/12/2021 -  Chemotherapy    palonosetron (ALOXI) injection 0 25 mg, 0 25 mg, Intravenous, Once, 4 of 4 cycles  Administration: 0 25 mg (1/12/2021), 0 25 mg (3/2/2021), 0 25 mg (3/23/2021), 0 25 mg (4/27/2021)  fosaprepitant (EMEND) 150 mg in sodium chloride 0 9 % 250 mL IVPB, 150 mg, Intravenous, Once, 4 of 4 cycles  Administration: 150 mg (1/12/2021), 150 mg (3/2/2021), 150 mg (3/23/2021), 150 mg (4/27/2021)  CARBOplatin (PARAPLATIN) 311 mg in sodium chloride 0 9 % 250 mL IVPB, 311 mg, Intravenous, Once, 4 of 4 cycles  Administration: 311 mg (1/12/2021), 330 mg (3/2/2021), 338 mg (3/23/2021), 313 mg (4/27/2021)  PACLitaxel (TAXOL) 91 2 mg in sodium chloride 0 9 % 250 mL chemo IVPB, 50 mg/m2 = 91 2 mg (62 5 % of original dose 80 mg/m2), Intravenous, Once, 4 of 5 cycles  Dose modification: 50 mg/m2 (original dose 80 mg/m2, Cycle 1, Reason: Other (See Comments))  Administration: 91 2 mg (1/12/2021), 90 6 mg (1/19/2021), 89 4 mg (3/2/2021), 91 2 mg (3/9/2021), 91 2 mg (3/16/2021), 90 6 mg (3/23/2021), 90 6 mg (3/30/2021), 90 6 mg (4/6/2021), 90 mg (5/4/2021), 90 mg (5/11/2021), 90 mg (4/27/2021)           Patient ID: Kristen Li is a 69 y o  female   Patient is very pleasant 67-year-old female with a history of stage  Ovarian carcinoma with multiple treatments  She has persistent disease and has re-initiated carboplatin paclitaxel  She underwent her for cycle and had a carboplatin reaction  She continued the weekly Taxol  During this time her CT scan had revealed possible progression of disease  Her  however had decreased  Most recently her  is increased from 58-80 and remains with a repeat exam   The patient is interested in changing chemotherapy and I agree with that  With regard to symptoms the patient is doing well  She had a difficult experience with her carboplatin reaction and did require going to the emergency department for treatment  She said that she did not feel well for up to a week thereafter    She has also had a blood transfusion since that time due to low hemoglobin  Overall she is doing reasonably  We had previously discussed as an alternative the use of Keytruda oral cyclophosphamide plus Avastin  There was a recent study which showed significant regression in stabilization of disease with this  The patient did however have an initial  Bowel perforation related to her 1st dose of paclitaxel  She is hesitant about restarting this  I have reached out to MD Latrice Chopra and [facility]'s to see if it was a possibility to   Reinitiate Avastin chemotherapy  I did not hear back from MD Latrice Chopra however the [facility] medical oncologist to work with gyn sites have stated that it is reasonable to consider utilizing Avastin again provided there is not significant intra-abdominal tumor  The patient's predominant tumor is in her liver at this time  There is some abdominal but not significant  Today, the patient is doing well  She denies significant abdominal pain, pelvic pain, nausea, vomiting, constipation, diarrhea, fevers, chills, or vaginal bleeding  Her stoma is functioning well  The following portions of the patient's history were reviewed and updated as appropriate: allergies, past family history, past medical history, past social history, past surgical history and problem list     Review of Systems   Constitutional: Negative  HENT: Negative  Eyes: Negative  Respiratory: Negative  Cardiovascular: Negative  Gastrointestinal: Negative  Endocrine: Negative  Genitourinary: Negative  Musculoskeletal: Negative  Skin: Negative  Neurological: Negative  Hematological: Negative  Psychiatric/Behavioral: Negative          Current Outpatient Medications   Medication Sig Dispense Refill    aspirin-acetaminophen-caffeine (EXCEDRIN MIGRAINE) 250-250-65 MG per tablet Take 1 tablet by mouth every 6 (six) hours as needed for headaches      nystatin (MYCOSTATIN) powder Apply topically 3 (three) times a day 15 g 4    pantoprazole (PROTONIX) 40 mg tablet Take 1 tablet (40 mg total) by mouth 2 (two) times a day before meals  0    traMADol (ULTRAM) 50 mg tablet Take 1 tablet (50 mg total) by mouth every 6 (six) hours as needed for moderate pain 30 tablet 1    gabapentin (NEURONTIN) 300 mg capsule Take 1 capsule (300 mg total) by mouth 3 (three) times a day 90 capsule 0    potassium chloride (K-DUR,KLOR-CON) 20 mEq tablet Take 1 tablet (20 mEq total) by mouth daily 30 tablet 0     No current facility-administered medications for this visit  Objective:    Blood pressure 120/70, pulse 76, temperature 97 8 °F (36 6 °C), resp  rate 18, height 5' 4 17" (1 63 m), weight 77 6 kg (171 lb), not currently breastfeeding  Body mass index is 29 2 kg/m²  Body surface area is 1 83 meters squared  Physical Exam  Constitutional:       Appearance: She is well-developed  HENT:      Head: Normocephalic and atraumatic  Eyes:      Pupils: Pupils are equal, round, and reactive to light  Neck:      Musculoskeletal: Normal range of motion and neck supple  Thyroid: No thyromegaly  Cardiovascular:      Rate and Rhythm: Normal rate and regular rhythm  Heart sounds: Normal heart sounds  Pulmonary:      Effort: Pulmonary effort is normal  No respiratory distress  Breath sounds: Normal breath sounds  Abdominal:      General: Bowel sounds are normal  There is no distension  Palpations: Abdomen is soft  Abdomen is not rigid  Tenderness: There is no abdominal tenderness  There is no guarding or rebound  Comments: Well healed incisions  Well-functioning stoma   Genitourinary:     Comments: -Normal external female genitalia, normal Bartholin's and [place]'s glands                  -Normal midline urethral meatus   No lesions notes                  -Bladder without fullness mass or tenderness                  -Vagina without lesion or discharge No significant cystocele or rectocele noted                  -Cervix normal appearing without visible lesions                  -Uterus with normal contour, mobility  No tenderness,                  -Adnexae without  mass or tenderness                  - Anus without fissure of lesion    Musculoskeletal: Normal range of motion  Lymphadenopathy:      Cervical: No cervical adenopathy  Upper Body:      Right upper body: No supraclavicular adenopathy  Left upper body: No supraclavicular adenopathy  Skin:     General: Skin is warm and dry  Neurological:      Mental Status: She is alert and oriented to person, place, and time     Psychiatric:         Behavior: Behavior normal          Lab Results   Component Value Date     91 0 (H) 05/14/2021     Lab Results   Component Value Date    K 4 3 05/14/2021     05/14/2021    CO2 27 05/14/2021    BUN 22 05/14/2021    CREATININE 1 23 05/14/2021    GLUCOSE 228 (H) 09/10/2019    GLUF 248 (H) 05/14/2021    CALCIUM 8 9 05/14/2021    CORRECTEDCA 9 5 05/14/2021    AST 20 05/14/2021    ALT 17 05/14/2021    ALKPHOS 111 05/14/2021    EGFR 44 05/14/2021     Lab Results   Component Value Date    WBC 6 85 05/14/2021    HGB 9 2 (L) 05/14/2021    HCT 29 4 (L) 05/14/2021    MCV 93 05/14/2021     05/14/2021     Lab Results   Component Value Date    NEUTROABS 4 53 05/14/2021        Trend:  Lab Results   Component Value Date     91 0 (H) 05/14/2021     90 1 (H) 05/07/2021     62 8 (H) 04/16/2021     60 8 (H) 04/02/2021     110 8 (H) 03/12/2021     101 9 (H) 03/02/2021     75 8 (H) 02/12/2021     85 7 (H) 01/22/2021     102 0 (H) 12/05/2020     77 1 (H) 11/30/2020     45 7 (H) 10/16/2020     39 6 (H) 09/11/2020     44 9 (H) 09/01/2020     29 2 06/15/2020     27 4 06/10/2020     32 5 (H) 05/26/2020     28 6 05/15/2020     30 6 (H) 05/11/2020     36 5 (H) 04/27/2020     37 9 (H) 04/17/2020  42 5 (H) 04/10/2020     48 1 (H) 03/27/2020     40 9 (H) 03/20/2020     18 2 01/28/2020     17 5 12/31/2019     14 5 12/02/2019     22 0 11/19/2019     32 6 (H) 11/06/2019     19 1 08/30/2019     12 7 08/13/2019     13 9 06/18/2019     19 9 05/21/2019     79 5 (H) 04/16/2019     96 4 (H) 03/19/2019     80 4 (H) 03/08/2019     135 0 (H) 11/30/2018     194 5 (H) 10/18/2018

## 2021-05-19 NOTE — PROGRESS NOTES
Assessment/Plan:    Problem List Items Addressed This Visit        Endocrine    Ovarian cancer [facility]) - Primary       Patient is very pleasant 78-year-old female with history of 4 B ovarian carcinoma now approximately 2 and half years out from initial diagnosis  She appears to have progressed with carboplatin and paclitaxel as evidence of CT scan and now elevation in her   As an alternative we have discussed the opportunity of utilizing Keytruda a Avastin and cyclophosphamide  I have researched this and found that it is a reasonable course of action  The patient remains hesitant to undergo  Avastin due to the catastrophic side effects of bowel perforation  We have therefore elected to  Treat with Keytruda  200 mg IV and  Metronomic [id number] mg p o  daily  We have discussed the risks and benefits and the patient has signed an informed consent  We will moved toward treatment as soon as possible  However the patient has not yet had her COVID immunization and plans to travel by plane  We have recommended her COVID immunization as soon as possible prior to starting the next chemotherapy  CHIEF COMPLAINT:   Consideration of chemotherapy for recurrent stage IV B ovarian cancer      Problem:  Cancer Staging  Ovarian cancer [facility])  Staging form: Ovary, Fallopian Tube, Primary Peritoneal, AJCC 8th Edition  - Clinical stage from 11/14/2018: FIGO Stage IVB (cT3c, cN0, pM1b) - Signed by Alek Jama MD on 11/14/2018  - Pathologic stage from 10/9/2019: FIGO Stage IIIC (pT3c, pN1, cM0) - Signed by Alek Jama MD on 10/9/2019        Previous therapy:  Oncology History   Ovarian cancer ([facility] [address] )   11/9/2018 Initial Diagnosis    Ovarian cancer ([facility] [address] )   tumor marker 194 5     11/9/2018 Biopsy    CT-guided needle biopsy of abdominal mass 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    Given liver involvement this would be stage IV     11/14/2018 - 12/4/2018 Chemotherapy    Neoadjuvant therapy: carboplatin area under the curve of 6, paclitaxel 135 milligrams/meter squared, Avastin 10 milligrams/kilogram  Completed 1 of 3 cycles  12/14/2018 Surgery    LAPAROTOMY EXPLORATORY; Abdominal Washout; Application of Abthera Vac Dressing for suspected bowel perforation and septic shock        12/15/2018 Surgery    LAPAROTOMY EXPLORATORY, ABDOMINAL WASHOUT, DRAIN PLACEMENT x 4, DIVERTING LOOP ILEOSTOMY AND ABDOMINAL CLOSURE for bowel perforation     3/26/2019 -  Chemotherapy    Taxol weekly 80 mg/m2 for 3 consecutive weeks, may add carboplatin in the future with AUC of 5      3/26/2019 - 2/3/2020 Chemotherapy    palonosetron (ALOXI) injection 0 25 mg, 0 25 mg, Intravenous, Once, 6 of 6 cycles  Administration: 0 25 mg (5/28/2019), 0 25 mg (6/25/2019), 0 25 mg (11/6/2019), 0 25 mg (12/10/2019), 0 25 mg (1/7/2020)  fosaprepitant (EMEND) 150 mg in sodium chloride 0 9 % 255 mL IVPB, 150 mg, Intravenous, Once, 6 of 6 cycles  Administration: 150 mg (5/28/2019), 150 mg (6/25/2019), 150 mg (11/6/2019), 150 mg (12/10/2019), 150 mg (1/7/2020)  CARBOplatin (PARAPLATIN) in sodium chloride 0 9 % 250 mL IVPB,  (original dose ), Intravenous, Once, 6 of 6 cycles  Dose modification:   (Cycle 2),   (original dose 258 4 mg, Cycle 3),   (original dose 258 4 mg, Cycle 3),   (original dose 244 4 mg, Cycle 4)  Administration: 258 4 mg (5/28/2019), 244 4 mg (6/25/2019), 285 2 mg (11/6/2019), 296 4 mg (12/10/2019), 286 4 mg (1/7/2020)  PACLitaxel (TAXOL) 127 8 mg in sodium chloride 0 9 % 250 mL chemo IVPB, 80 mg/m2, Intravenous, Once, 7 of 7 cycles  Dose modification: 65 mg/m2 (original dose 80 mg/m2, Cycle 2, Reason: Dose Not Tolerated)  Administration: 103 8 mg (5/14/2019), 108 6 mg (5/28/2019), 108 6 mg (6/4/2019), 108 6 mg (6/11/2019), 109 8 mg (6/25/2019), 109 8 mg (7/2/2019), 109 8 mg (7/9/2019), 111 6 mg (11/6/2019), 111 6 mg (11/12/2019), 111 6 mg (11/19/2019), 113 4 mg (12/10/2019), 113 4 mg (12/17/2019), 113 4 mg (12/23/2019), 113 4 mg (1/7/2020), 114 6 mg (1/14/2020), 114 6 mg (1/21/2020)     4/29/2019 [address] testing revealed a PD L1 tumor proportions score of 0%  The patient is not a candidate for PD L1 manipulation      Genetic Testing    [facility] Breast/Gyn panel, wildtype  9/10/2019 Surgery    Exploratory laparotomy radical omentectomy, posterior exenteration, takedown of ileostomy and end colostomy for complete debulking of visible tumor  Final pathology report revealed high-grade serous malignancy in both the omentum and the pelvic mass     9/25/2019 -  Chemotherapy    Carboplatin area under the curve of 5+ weekly paclitaxel at 80 milligrams/meters squared for 3 further cycles of treatment followed by consolidation this is to begin mid October     10/2019 Surgery    Interval debulking with TAHBSO revision of colostomy omentectomy and tumor debulking with complete debulking  Several weeks postoperatively the patient required a open cholecystectomy       10/2019 - 12/2019 Chemotherapy    Carboplatin paclitaxel x3     10/9/2019 -  Cancer Staged    Staging form: Ovary, Fallopian Tube, Primary Peritoneal, AJCC 8th Edition  - Pathologic stage from 10/9/2019: FIGO Stage IIIC (pT3c, pN1, cM0) - Signed by Virginia Stephens MD on 10/9/2019  Histologic grade (G): G3  Histologic grading system: 4 grade system  Gross residual tumor after primary cyto-reductive surgery: Absent       3/24/2020 - 9/21/2020 Chemotherapy    palonosetron (ALOXI) injection 0 25 mg, 0 25 mg, Intravenous, Once, 6 of 7 cycles  Administration: 0 25 mg (3/24/2020), 0 25 mg (4/21/2020), 0 25 mg (5/19/2020), 0 25 mg (6/23/2020), 0 25 mg (7/28/2020), 0 25 mg (8/25/2020)  pegfilgrastim (NEULASTA ONPRO) subcutaneous injection kit 6 mg, 6 mg, Subcutaneous, Once, 6 of 7 cycles  Administration: 6 mg (3/31/2020), 6 mg (4/28/2020), 6 mg (5/26/2020), 6 mg (6/29/2020), 6 mg (8/4/2020), 6 mg (9/1/2020)  DOXOrubicin liposome (DOXIL) 54 3 mg in dextrose 5 % 250 mL chemo infusion, 30 mg/m2 = 54 3 mg, Intravenous, Once, 6 of 7 cycles  Administration: 54 3 mg (3/24/2020), 54 6 mg (4/21/2020), 54 9 mg (5/19/2020), 54 6 mg (6/23/2020), 54 9 mg (7/28/2020), 54 9 mg (8/25/2020)  gemcitabine (GEMZAR) 1,176 4 mg in sodium chloride 0 9 % 250 mL infusion, 650 mg/m2 = 1,176 4 mg, Intravenous, Once, 6 of 7 cycles  Administration: 1,176 4 mg (3/24/2020), 1,176 4 mg (3/31/2020), 1,182 9 mg (4/21/2020), 1,200 mg (4/28/2020), 1,189 4 mg (5/19/2020), 1,189 4 mg (5/26/2020), 1,182 9 mg (6/23/2020), 1,182 9 mg (6/29/2020), 1,189 4 mg (7/28/2020), 1,189 4 mg (8/4/2020), 1,189 4 mg (8/25/2020), 1,195 8 mg (9/1/2020)     1/12/2021 - 1/12/2021 Chemotherapy    palonosetron (ALOXI) injection 0 25 mg, 0 25 mg, Intravenous, Once, 0 of 6 cycles  fosaprepitant (EMEND) 150 mg in sodium chloride 0 9 % 250 mL IVPB, 150 mg, Intravenous, Once, 0 of 6 cycles  CARBOplatin (PARAPLATIN) in sodium chloride 0 9 % 250 mL IVPB, , Intravenous, Once, 0 of 6 cycles  PACLitaxel (TAXOL) 246 mg in sodium chloride 0 9 % 500 mL chemo IVPB, 135 mg/m2 = 246 mg (77 1 % of original dose 175 mg/m2), Intravenous, Once, 0 of 6 cycles  Dose modification: 135 mg/m2 (original dose 175 mg/m2, Cycle 1, Reason: Other (See Comments), Comment: heavily pretreated)     1/12/2021 -  Chemotherapy    palonosetron (ALOXI) injection 0 25 mg, 0 25 mg, Intravenous, Once, 4 of 4 cycles  Administration: 0 25 mg (1/12/2021), 0 25 mg (3/2/2021), 0 25 mg (3/23/2021), 0 25 mg (4/27/2021)  fosaprepitant (EMEND) 150 mg in sodium chloride 0 9 % 250 mL IVPB, 150 mg, Intravenous, Once, 4 of 4 cycles  Administration: 150 mg (1/12/2021), 150 mg (3/2/2021), 150 mg (3/23/2021), 150 mg (4/27/2021)  CARBOplatin (PARAPLATIN) 311 mg in sodium chloride 0 9 % 250 mL IVPB, 311 mg, Intravenous, Once, 4 of 4 cycles  Administration: 311 mg (1/12/2021), 330 mg (3/2/2021), 338 mg (3/23/2021), 313 mg (4/27/2021)  PACLitaxel (TAXOL) 91 2 mg in sodium chloride 0 9 % 250 mL chemo IVPB, 50 mg/m2 = 91 2 mg (62 5 % of original dose 80 mg/m2), Intravenous, Once, 4 of 5 cycles  Dose modification: 50 mg/m2 (original dose 80 mg/m2, Cycle 1, Reason: Other (See Comments))  Administration: 91 2 mg (1/12/2021), 90 6 mg (1/19/2021), 89 4 mg (3/2/2021), 91 2 mg (3/9/2021), 91 2 mg (3/16/2021), 90 6 mg (3/23/2021), 90 6 mg (3/30/2021), 90 6 mg (4/6/2021), 90 mg (5/4/2021), 90 mg (5/11/2021), 90 mg (4/27/2021)           Patient ID: Wolfgang Michael is a 69 y o  female   Patient is very pleasant 69-year-old female with a history of stage  Ovarian carcinoma with multiple treatments  She has persistent disease and has re-initiated carboplatin paclitaxel  She underwent her for cycle and had a carboplatin reaction  She continued the weekly Taxol  During this time her CT scan had revealed possible progression of disease  Her  however had decreased  Most recently her  is increased from 58-80 and remains with a repeat exam   The patient is interested in changing chemotherapy and I agree with that  With regard to symptoms the patient is doing well  She had a difficult experience with her carboplatin reaction and did require going to the emergency department for treatment  She said that she did not feel well for up to a week thereafter  She has also had a blood transfusion since that time due to low hemoglobin  Overall she is doing reasonably  We had previously discussed as an alternative the use of Keytruda oral cyclophosphamide plus Avastin  There was a recent study which showed significant regression in stabilization of disease with this  The patient did however have an initial  Bowel perforation related to her 1st dose of paclitaxel  She is hesitant about restarting this        I have reached out to MD [facility] and [facility] center's to see if it was a possibility to   Reinitiate Avastin chemotherapy  I did not hear back from MD Antonio Monson however the [facility] medical oncologist to work with gyn sites have stated that it is reasonable to consider utilizing Avastin again provided there is not significant intra-abdominal tumor  The patient's predominant tumor is in her liver at this time  There is some abdominal but not significant  Today, the patient is doing well  She denies significant abdominal pain, pelvic pain, nausea, vomiting, constipation, diarrhea, fevers, chills, or vaginal bleeding  Her stoma is functioning well  The following portions of the patient's history were reviewed and updated as appropriate: allergies, past family history, past medical history, past social history, past surgical history and problem list     Review of Systems   Constitutional: Negative  HENT: Negative  Eyes: Negative  Respiratory: Negative  Cardiovascular: Negative  Gastrointestinal: Negative  Endocrine: Negative  Genitourinary: Negative  Musculoskeletal: Negative  Skin: Negative  Neurological: Negative  Hematological: Negative  Psychiatric/Behavioral: Negative          Current Outpatient Medications   Medication Sig Dispense Refill    aspirin-acetaminophen-caffeine (EXCEDRIN MIGRAINE) 250-250-65 MG per tablet Take 1 tablet by mouth every 6 (six) hours as needed for headaches      nystatin (MYCOSTATIN) powder Apply topically 3 (three) times a day 15 g 4    pantoprazole (PROTONIX) 40 mg tablet Take 1 tablet (40 mg total) by mouth 2 (two) times a day before meals  0    traMADol (ULTRAM) 50 mg tablet Take 1 tablet (50 mg total) by mouth every 6 (six) hours as needed for moderate pain 30 tablet 1    gabapentin (NEURONTIN) 300 mg capsule Take 1 capsule (300 mg total) by mouth 3 (three) times a day 90 capsule 0    potassium chloride (K-DUR,KLOR-CON) 20 mEq tablet Take 1 tablet (20 mEq total) by mouth daily 30 tablet 0     No current facility-administered medications for this visit  Objective:    Blood pressure 120/70, pulse 76, temperature 97 8 °F (36 6 °C), resp  rate 18, height 5' 4 17" (1 63 m), weight 77 6 kg (171 lb), not currently breastfeeding  Body mass index is 29 2 kg/m²  Body surface area is 1 83 meters squared  Physical Exam  Constitutional:       Appearance: She is well-developed  HENT:      Head: Normocephalic and atraumatic  Eyes:      Pupils: Pupils are equal, round, and reactive to light  Neck:      Musculoskeletal: Normal range of motion and neck supple  Thyroid: No thyromegaly  Cardiovascular:      Rate and Rhythm: Normal rate and regular rhythm  Heart sounds: Normal heart sounds  Pulmonary:      Effort: Pulmonary effort is normal  No respiratory distress  Breath sounds: Normal breath sounds  Abdominal:      General: Bowel sounds are normal  There is no distension  Palpations: Abdomen is soft  Abdomen is not rigid  Tenderness: There is no abdominal tenderness  There is no guarding or rebound  Comments: Well healed incisions  Well-functioning stoma   Genitourinary:     Comments: -Normal external female genitalia, normal Barth[place]'s and [place]'s glands                  -Normal midline urethral meatus  No lesions notes                  -Bladder without fullness mass or tenderness                  -Vagina without lesion or discharge No significant cystocele or rectocele noted                  -Cervix normal appearing without visible lesions                  -Uterus with normal contour, mobility  No tenderness,                  -Adnexae without  mass or tenderness                  - Anus without fissure of lesion    Musculoskeletal: Normal range of motion  Lymphadenopathy:      Cervical: No cervical adenopathy  Upper Body:      Right upper body: No supraclavicular adenopathy  Left upper body: No supraclavicular adenopathy  Skin:     General: Skin is warm and dry     Neurological: Mental Status: She is alert and oriented to person, place, and time     Psychiatric:         Behavior: Behavior normal          Lab Results   Component Value Date     91 0 (H) 05/14/2021     Lab Results   Component Value Date    K 4 3 05/14/2021     05/14/2021    CO2 27 05/14/2021    BUN 22 05/14/2021    CREATININE 1 23 05/14/2021    GLUCOSE 228 (H) 09/10/2019    GLUF 248 (H) 05/14/2021    CALCIUM 8 9 05/14/2021    CORRECTEDCA 9 5 05/14/2021    AST 20 05/14/2021    ALT 17 05/14/2021    ALKPHOS 111 05/14/2021    EGFR 44 05/14/2021     Lab Results   Component Value Date    WBC 6 85 05/14/2021    HGB 9 2 (L) 05/14/2021    HCT 29 4 (L) 05/14/2021    MCV 93 05/14/2021     05/14/2021     Lab Results   Component Value Date    NEUTROABS 4 53 05/14/2021        Trend:  Lab Results   Component Value Date     91 0 (H) 05/14/2021     90 1 (H) 05/07/2021     62 8 (H) 04/16/2021     60 8 (H) 04/02/2021     110 8 (H) 03/12/2021     101 9 (H) 03/02/2021     75 8 (H) 02/12/2021     85 7 (H) 01/22/2021     102 0 (H) 12/05/2020     77 1 (H) 11/30/2020     45 7 (H) 10/16/2020     39 6 (H) 09/11/2020     44 9 (H) 09/01/2020     29 2 06/15/2020     27 4 06/10/2020     32 5 (H) 05/26/2020     28 6 05/15/2020     30 6 (H) 05/11/2020     36 5 (H) 04/27/2020     37 9 (H) 04/17/2020     42 5 (H) 04/10/2020     48 1 (H) 03/27/2020     40 9 (H) 03/20/2020     18 2 01/28/2020     17 5 12/31/2019     14 5 12/02/2019     22 0 11/19/2019     32 6 (H) 11/06/2019     19 1 08/30/2019     12 7 08/13/2019     13 9 06/18/2019     19 9 05/21/2019     79 5 (H) 04/16/2019     96 4 (H) 03/19/2019     80 4 (H) 03/08/2019     135 0 (H) 11/30/2018     194 5 (H) 10/18/2018

## 2021-05-31 NOTE — TELEPHONE ENCOUNTER
Pt would like to know if she should go through chemotherapy tomorrow since she is currently not feeling well

## 2021-06-01 NOTE — TELEPHONE ENCOUNTER
Patient states has an ostomy blockage and is not feeling well      She will be cancelling her infusion for today @ 9:00AM     Please contact patient @ [phone]

## 2021-06-10 NOTE — PROGRESS NOTES
Patient completed treatment without any difficulty or complaint  Patient AVS given  Patient d/c'ed in stable condition

## 2021-06-10 NOTE — PLAN OF CARE
Problem: Potential for Falls  Goal: Patient will remain free of falls  Description: INTERVENTIONS:  - Assess patient frequently for physical needs  -  Identify cognitive and physical deficits and behaviors that affect risk of falls  -  [place] fall precautions as indicated by assessment   - Educate patient/family on patient safety including physical limitations  - Instruct patient to call for assistance with activity based on assessment  - Modify environment to reduce risk of injury  - Consider OT/PT consult to assist with strengthening/mobility  Outcome: Progressing     Problem: Knowledge Deficit  Goal: Patient/family/caregiver demonstrates understanding of disease process, treatment plan, medications, and discharge instructions  Description: Complete learning assessment and assess knowledge base    Interventions:  - Provide teaching at level of understanding  - Provide teaching via preferred learning methods  Outcome: Progressing

## 2021-06-11 NOTE — RESULT ENCOUNTER NOTE
Spoke with patient regarding her hemoglobin  She is feeling well and feels she doesn't need a blood transfusion set up at this time  Repeat labs next week

## 2021-06-14 NOTE — TELEPHONE ENCOUNTER
Catherine called to request Tramadol, please send request to [facility] on [address] in [facility]  Catherine asked if you could call her once complete

## 2021-06-23 NOTE — ASSESSMENT & PLAN NOTE
Patient is very pleasant 39-year-old female with a history of 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 now approximately 2 and half years out from diagnosis  She is recently begun treatment with Keytruda cyclophosphamide metronomic and Avastin  This had a significant amount of side effects however the patient was able to remedy them  We would like to continue with cycle 2 at present dosage  The patient is working on ways around the side effects  If she has significant side effects again with cycle 2 we may delay cycle 3 until after she comes back from a planned vacation which is currently scheduled to fly out of town 2  Days after cycle 3  Presently we will start with cycle 2  To as planned  I would hope to see the  begin to plateau next cycle

## 2021-06-23 NOTE — PROGRESS NOTES
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 now approximately 2 and half years out from diagnosis  She is recently begun treatment with Keytruda cyclophosphamide metronomic and Avastin  This had a significant amount of side effects however the patient was able to remedy them  We would like to continue with cycle 2 at present dosage  The patient is working on ways around the side effects  If she has significant side effects again with cycle 2 we may delay cycle 3 until after she comes back from a planned vacation which is currently scheduled to fly out of town 2  Days after cycle 3  Presently we will start with cycle 2  To as planned  I would hope to see the  begin to plateau next cycle  CHIEF COMPLAINT:  Consideration of cycle 2  Keytruda  Avastin and metronomic cyclophosphamide      Problem:  Cancer Staging  Ovarian cancer [facility])  Staging form: Ovary, Fallopian Tube, Primary Peritoneal, AJCC 8th Edition  - Clinical stage from 11/14/2018: FIGO Stage IVB (cT3c, cN0, pM1b) - Signed by Eber Carmona MD on 11/14/2018  - Pathologic stage from 10/9/2019: FIGO Stage IIIC (pT3c, pN1, cM0) - Signed by Eber Carmona MD on 10/9/2019        Previous therapy:  Oncology History   Ovarian cancer ([facility] [address] )   11/9/2018 Initial Diagnosis    Ovarian cancer ([facility] [address] )   tumor marker 194 5     11/9/2018 Biopsy    CT-guided needle biopsy of abdominal mass 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  Given liver involvement this would be stage IV     11/14/2018 - 12/4/2018 Chemotherapy    Neoadjuvant therapy: carboplatin area under the curve of 6, paclitaxel 135 milligrams/meter squared, Avastin 10 milligrams/kilogram  Completed 1 of 3 cycles        12/14/2018 Surgery    LAPAROTOMY EXPLORATORY; Abdominal Washout; Application of Abthera Vac Dressing for suspected bowel perforation and septic shock  12/15/2018 Surgery    LAPAROTOMY EXPLORATORY, ABDOMINAL WASHOUT, DRAIN PLACEMENT x 4, DIVERTING LOOP ILEOSTOMY AND ABDOMINAL CLOSURE for bowel perforation     3/26/2019 -  Chemotherapy    Taxol weekly 80 mg/m2 for 3 consecutive weeks, may add carboplatin in the future with AUC of 5      3/26/2019 - 2/3/2020 Chemotherapy    palonosetron (ALOXI) injection 0 25 mg, 0 25 mg, Intravenous, Once, 6 of 6 cycles  Administration: 0 25 mg (5/28/2019), 0 25 mg (6/25/2019), 0 25 mg (11/6/2019), 0 25 mg (12/10/2019), 0 25 mg (1/7/2020)  fosaprepitant (EMEND) 150 mg in sodium chloride 0 9 % 255 mL IVPB, 150 mg, Intravenous, Once, 6 of 6 cycles  Administration: 150 mg (5/28/2019), 150 mg (6/25/2019), 150 mg (11/6/2019), 150 mg (12/10/2019), 150 mg (1/7/2020)  CARBOplatin (PARAPLATIN) in sodium chloride 0 9 % 250 mL IVPB,  (original dose ), Intravenous, Once, 6 of 6 cycles  Dose modification:   (Cycle 2),   (original dose 258 4 mg, Cycle 3),   (original dose 258 4 mg, Cycle 3),   (original dose 244 4 mg, Cycle 4)  Administration: 258 4 mg (5/28/2019), 244 4 mg (6/25/2019), 285 2 mg (11/6/2019), 296 4 mg (12/10/2019), 286 4 mg (1/7/2020)  PACLitaxel (TAXOL) 127 8 mg in sodium chloride 0 9 % 250 mL chemo IVPB, 80 mg/m2, Intravenous, Once, 7 of 7 cycles  Dose modification: 65 mg/m2 (original dose 80 mg/m2, Cycle 2, Reason: Dose Not Tolerated)  Administration: 103 8 mg (5/14/2019), 108 6 mg (5/28/2019), 108 6 mg (6/4/2019), 108 6 mg (6/11/2019), 109 8 mg (6/25/2019), 109 8 mg (7/2/2019), 109 8 mg (7/9/2019), 111 6 mg (11/6/2019), 111 6 mg (11/12/2019), 111 6 mg (11/19/2019), 113 4 mg (12/10/2019), 113 4 mg (12/17/2019), 113 4 mg (12/23/2019), 113 4 mg (1/7/2020), 114 6 mg (1/14/2020), 114 6 mg (1/21/2020)     4/29/2019 Genomic Testing     Foundation 1 testing revealed a PD L1 tumor proportions score of 0%    The patient is not a candidate for PD L1 manipulation      Genetic Testing    [employer] Breast/Gyn panel, wildtype  9/10/2019 Surgery    Exploratory laparotomy radical omentectomy, posterior exenteration, takedown of ileostomy and end colostomy for complete debulking of visible tumor  Final pathology report revealed high-grade serous malignancy in both the omentum and the pelvic mass     9/25/2019 -  Chemotherapy    Carboplatin area under the curve of 5+ weekly paclitaxel at 80 milligrams/meters squared for 3 further cycles of treatment followed by consolidation this is to begin mid October     10/2019 Surgery    Interval debulking with TAHBSO revision of colostomy omentectomy and tumor debulking with complete debulking  Several weeks postoperatively the patient required a open cholecystectomy       10/2019 - 12/2019 Chemotherapy    Carboplatin paclitaxel x3     10/9/2019 -  Cancer Staged    Staging form: Ovary, Fallopian Tube, Primary Peritoneal, AJCC 8th Edition  - Pathologic stage from 10/9/2019: FIGO Stage IIIC (pT3c, pN1, cM0) - Signed by Reji Alvarez MD on 10/9/2019  Histologic grade (G): G3  Histologic grading system: 4 grade system  Gross residual tumor after primary cyto-reductive surgery: Absent       3/24/2020 - 9/21/2020 Chemotherapy    palonosetron (ALOXI) injection 0 25 mg, 0 25 mg, Intravenous, Once, 6 of 7 cycles  Administration: 0 25 mg (3/24/2020), 0 25 mg (4/21/2020), 0 25 mg (5/19/2020), 0 25 mg (6/23/2020), 0 25 mg (7/28/2020), 0 25 mg (8/25/2020)  pegfilgrastim (NEULASTA ONPRO) subcutaneous injection kit 6 mg, 6 mg, Subcutaneous, Once, 6 of 7 cycles  Administration: 6 mg (3/31/2020), 6 mg (4/28/2020), 6 mg (5/26/2020), 6 mg (6/29/2020), 6 mg (8/4/2020), 6 mg (9/1/2020)  DOXOrubicin liposome (DOXIL) 54 3 mg in dextrose 5 % 250 mL chemo infusion, 30 mg/m2 = 54 3 mg, Intravenous, Once, 6 of 7 cycles  Administration: 54 3 mg (3/24/2020), 54 6 mg (4/21/2020), 54 9 mg (5/19/2020), 54 6 mg (6/23/2020), 54 9 mg (7/28/2020), 54 9 mg (8/25/2020)  gemcitabine (GEMZAR) 1,176 4 mg in sodium chloride 0 9 % 250 mL infusion, 650 mg/m2 = 1,176 4 mg, Intravenous, Once, 6 of 7 cycles  Administration: 1,176 4 mg (3/24/2020), 1,176 4 mg (3/31/2020), 1,182 9 mg (4/21/2020), 1,200 mg (4/28/2020), 1,189 4 mg (5/19/2020), 1,189 4 mg (5/26/2020), 1,182 9 mg (6/23/2020), 1,182 9 mg (6/29/2020), 1,189 4 mg (7/28/2020), 1,189 4 mg (8/4/2020), 1,189 4 mg (8/25/2020), 1,195 8 mg (9/1/2020)     1/12/2021 - 1/12/2021 Chemotherapy    palonosetron (ALOXI) injection 0 25 mg, 0 25 mg, Intravenous, Once, 0 of 6 cycles  fosaprepitant (EMEND) 150 mg in sodium chloride 0 9 % 250 mL IVPB, 150 mg, Intravenous, Once, 0 of 6 cycles  CARBOplatin (PARAPLATIN) in sodium chloride 0 9 % 250 mL IVPB, , Intravenous, Once, 0 of 6 cycles  PACLitaxel (TAXOL) 246 mg in sodium chloride 0 9 % 500 mL chemo IVPB, 135 mg/m2 = 246 mg (77 1 % of original dose 175 mg/m2), Intravenous, Once, 0 of 6 cycles  Dose modification: 135 mg/m2 (original dose 175 mg/m2, Cycle 1, Reason: Other (See Comments), Comment: heavily pretreated)     1/12/2021 - 5/24/2021 Chemotherapy    palonosetron (ALOXI) injection 0 25 mg, 0 25 mg, Intravenous, Once, 4 of 4 cycles  Administration: 0 25 mg (1/12/2021), 0 25 mg (3/2/2021), 0 25 mg (3/23/2021), 0 25 mg (4/27/2021)  fosaprepitant (EMEND) 150 mg in sodium chloride 0 9 % 250 mL IVPB, 150 mg, Intravenous, Once, 4 of 4 cycles  Administration: 150 mg (1/12/2021), 150 mg (3/2/2021), 150 mg (3/23/2021), 150 mg (4/27/2021)  CARBOplatin (PARAPLATIN) 311 mg in sodium chloride 0 9 % 250 mL IVPB, 311 mg, Intravenous, Once, 4 of 4 cycles  Administration: 311 mg (1/12/2021), 330 mg (3/2/2021), 338 mg (3/23/2021), 313 mg (4/27/2021)  PACLitaxel (TAXOL) 91 2 mg in sodium chloride 0 9 % 250 mL chemo IVPB, 50 mg/m2 = 91 2 mg (62 5 % of original dose 80 mg/m2), Intravenous, Once, 4 of 5 cycles  Dose modification: 50 mg/m2 (original dose 80 mg/m2, Cycle 1, Reason: Other (See Comments))  Administration: 91 2 mg (1/12/2021), 90 6 mg (1/19/2021), 89 4 mg (3/2/2021), 91 2 mg (3/9/2021), 91 2 mg (3/16/2021), 90 6 mg (3/23/2021), 90 6 mg (3/30/2021), 90 6 mg (4/6/2021), 90 mg (5/4/2021), 90 mg (5/11/2021), 90 mg (4/27/2021)     6/10/2021 -  Chemotherapy    pembrolizumab (KEYTRUDA) IVPB, 200 mg, Intravenous, Once, 1 of 4 cycles  Administration: 200 mg (6/10/2021)           Patient ID: Karolina Mendez is a 69 y o  female    Patient is very pleasant 69-year-old female with history of stage IV B  Ovarian carcinoma  She was diagnosed in approximately October of 2018  She has been on and off chemotherapy at since that time  Most recently patient has been on carboplatin paclitaxel however had a rising  and an CT scan indicating progression of disease  Patient was therefore most recently switched to pembrolizumab Avastin and metronomic cyclophosphamide  This began earlier this month  She presents in consideration of cycle 2  Of treatment  Patient had some difficulty tolerating both the IV Avastin and Keytruda as well as the oral cyclophosphamide  She has begun taking the oral  Cyclophosphamide in the evening with a large bottle of water to help flush through to minimize the risk of hemorrhagic cystitis  With this the brain fog that comes with it is usually happening and at night and allowing her to function better during the day  With regard to the IV infusions the patient had 2-3 days of abdominal pain nausea minimal diarrhea significant fatigue and just not feeling well  However 3 days this resolved and the patient did feel much better  She needed a new prescription of tramadol as she was taking this for significant part of the several days after her chemo  She has also noted a in imbalance which was significant immediately after the chemo infusion but has improved since that time  She still has some minor imbalance issues      I have reviewed the patient's weekly blood work CBC CPM P and  data  I have also reviewed her amylase lipase and TSH  All blood work remains stable for treatment  Her  has once again risen from  103-130  Today, the patient is doing well  She denies significant abdominal pain, pelvic pain, nausea, vomiting, constipation, diarrhea, fevers, chills, or vaginal bleeding  The following portions of the patient's history were reviewed and updated as appropriate: allergies, current medications, past family history, past social history, past surgical history and problem list     Review of Systems   Constitutional: Positive for fatigue  HENT: Negative  Eyes: Negative  Respiratory: Negative  Cardiovascular: Negative  Gastrointestinal: Negative  Endocrine: Negative  Genitourinary: Negative  Musculoskeletal: Positive for gait problem  Skin: Negative  Hematological: Negative  Psychiatric/Behavioral: Negative  Current Outpatient Medications   Medication Sig Dispense Refill    aspirin-acetaminophen-caffeine (EXCEDRIN MIGRAINE) 250-250-65 MG per tablet Take 1 tablet by mouth every 6 (six) hours as needed for headaches      cyclophosphamide (CYTOXAN) 50 mg capsule Take 1 capsule (50 mg total) by mouth daily 30 capsule 5    nystatin (MYCOSTATIN) powder Apply topically 3 (three) times a day 15 g 4    pantoprazole (PROTONIX) 40 mg tablet Take 1 tablet (40 mg total) by mouth 2 (two) times a day before meals  0    traMADol (ULTRAM) 50 mg tablet Take 1 tablet (50 mg total) by mouth every 6 (six) hours as needed for moderate pain 30 tablet 1    gabapentin (NEURONTIN) 300 mg capsule Take 1 capsule (300 mg total) by mouth 3 (three) times a day 90 capsule 0    potassium chloride (K-DUR,KLOR-CON) 20 mEq tablet Take 1 tablet (20 mEq total) by mouth daily 30 tablet 0     No current facility-administered medications for this visit             Objective:    Blood pressure 104/60, pulse 76, temperature 98 2 °F (36 8 °C), resp  rate 18, height 5' 4 17" (1 63 m), weight 76 7 kg (169 lb), not currently breastfeeding  Body mass index is 28 86 kg/m²  Body surface area is 1 82 meters squared  Physical Exam  Constitutional:       Appearance: She is well-developed  HENT:      Head: Normocephalic and atraumatic  Neck:      Thyroid: No thyromegaly  Cardiovascular:      Rate and Rhythm: Normal rate and regular rhythm  Heart sounds: Normal heart sounds  Pulmonary:      Effort: Pulmonary effort is normal       Breath sounds: Normal breath sounds  Abdominal:      General: Bowel sounds are normal       Palpations: Abdomen is soft  Comments: Well healed l incisions With functioning stoma  Genitourinary:     Comments: deferred    Musculoskeletal:         General: Normal range of motion  Cervical back: Normal range of motion and neck supple  Lymphadenopathy:      Cervical: No cervical adenopathy  Skin:     General: Skin is warm and dry  Neurological:      Mental Status: She is alert and oriented to person, place, and time     Psychiatric:         Behavior: Behavior normal          Lab Results   Component Value Date     131 7 (H) 06/18/2021     Lab Results   Component Value Date    K 4 1 06/18/2021     06/18/2021    CO2 27 06/18/2021    BUN 20 06/18/2021    CREATININE 1 21 06/18/2021    GLUCOSE 228 (H) 09/10/2019    GLUF 248 (H) 05/14/2021    CALCIUM 8 9 06/18/2021    CORRECTEDCA 9 7 06/18/2021    AST 21 06/18/2021    ALT 19 06/18/2021    ALKPHOS 126 (H) 06/18/2021    EGFR 45 06/18/2021     Lab Results   Component Value Date    WBC 9 72 06/18/2021    HGB 7 5 (L) 06/18/2021    HCT 24 7 (L) 06/18/2021    MCV 89 06/18/2021     06/18/2021     Lab Results   Component Value Date    NEUTROABS 6 50 06/18/2021        Trend:  Lab Results   Component Value Date     131 7 (H) 06/18/2021     103 1 (H) 05/28/2021     91 0 (H) 05/14/2021     90 1 (H) 05/07/2021     62 8 (H) 04/16/2021     60 8 (H) 04/02/2021     110 8 (H) 03/12/2021     101 9 (H) 03/02/2021     75 8 (H) 02/12/2021     85 7 (H) 01/22/2021     102 0 (H) 12/05/2020     77 1 (H) 11/30/2020     45 7 (H) 10/16/2020     39 6 (H) 09/11/2020     44 9 (H) 09/01/2020     29 2 06/15/2020     27 4 06/10/2020     32 5 (H) 05/26/2020     28 6 05/15/2020     30 6 (H) 05/11/2020     36 5 (H) 04/27/2020     37 9 (H) 04/17/2020     42 5 (H) 04/10/2020     48 1 (H) 03/27/2020     40 9 (H) 03/20/2020     18 2 01/28/2020     17 5 12/31/2019     14 5 12/02/2019     22 0 11/19/2019     32 6 (H) 11/06/2019     19 1 08/30/2019     12 7 08/13/2019     13 9 06/18/2019     19 9 05/21/2019     79 5 (H) 04/16/2019     96 4 (H) 03/19/2019     80 4 (H) 03/08/2019     135 0 (H) 11/30/2018     194 5 (H) 10/18/2018

## 2021-06-23 NOTE — LETTER
June 23, 2021     Juan Manuel Hernandez MD  [address]  [address]  [address] [facility]    Patient: Tate Giron   YOB: 1950   Date of Visit: 6/23/2021       Dear Dr Wilma Mayers: Thank you for referring Tate Giron to me for evaluation  Below are my notes for this consultation  If you have questions, please do not hesitate to call me  I look forward to following your patient along with you  Sincerely,        Virginia Stephens MD        CC: No Recipients  Virginia Stephens MD  6/23/2021 11:26 AM  Sign when Signing Visit  Assessment/Plan:    Problem List Items Addressed This Visit        Endocrine    Ovarian cancer [facility]) - Primary       Patient is very pleasant 76-year-old female with a history of 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 now approximately 2 and half years out from diagnosis  She is recently begun treatment with Keytruda cyclophosphamide metronomic and Avastin  This had a significant amount of side effects however the patient was able to remedy them  We would like to continue with cycle 2 at present dosage  The patient is working on ways around the side effects  If she has significant side effects again with cycle 2 we may delay cycle 3 until after she comes back from a planned vacation which is currently scheduled to fly out of town 2  Days after cycle 3  Presently we will start with cycle 2  To as planned  I would hope to see the  begin to plateau next cycle  CHIEF COMPLAINT:  Consideration of cycle 2    Keytruda  Avastin and metronomic cyclophosphamide      Problem:  Cancer Staging  Ovarian cancer [facility])  Staging form: Ovary, Fallopian Tube, Primary Peritoneal, AJCC 8th Edition  - Clinical stage from 11/14/2018: FIGO Stage IVB (cT3c, cN0, pM1b) - Signed by Virginia Stephens MD on 11/14/2018  - Pathologic stage from 10/9/2019: FIGO Stage IIIC (pT3c, pN1, cM0) - Signed by Virginia Stephens MD on 10/9/2019        Previous therapy:  Oncology History   Ovarian cancer ([facility] [address] )   11/9/2018 Initial Diagnosis    Ovarian cancer ([facility] [address] )   tumor marker 194 5     11/9/2018 Biopsy    CT-guided needle biopsy of abdominal mass 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  Given liver involvement this would be stage IV     11/14/2018 - 12/4/2018 Chemotherapy    Neoadjuvant therapy: carboplatin area under the curve of 6, paclitaxel 135 milligrams/meter squared, Avastin 10 milligrams/kilogram  Completed 1 of 3 cycles  12/14/2018 Surgery    LAPAROTOMY EXPLORATORY; Abdominal Washout; Application of Abthera Vac Dressing for suspected bowel perforation and septic shock        12/15/2018 Surgery    LAPAROTOMY EXPLORATORY, ABDOMINAL WASHOUT, DRAIN PLACEMENT x 4, DIVERTING LOOP ILEOSTOMY AND ABDOMINAL CLOSURE for bowel perforation     3/26/2019 -  Chemotherapy    Taxol weekly 80 mg/m2 for 3 consecutive weeks, may add carboplatin in the future with AUC of 5      3/26/2019 - 2/3/2020 Chemotherapy    palonosetron (ALOXI) injection 0 25 mg, 0 25 mg, Intravenous, Once, 6 of 6 cycles  Administration: 0 25 mg (5/28/2019), 0 25 mg (6/25/2019), 0 25 mg (11/6/2019), 0 25 mg (12/10/2019), 0 25 mg (1/7/2020)  fosaprepitant (EMEND) 150 mg in sodium chloride 0 9 % 255 mL IVPB, 150 mg, Intravenous, Once, 6 of 6 cycles  Administration: 150 mg (5/28/2019), 150 mg (6/25/2019), 150 mg (11/6/2019), 150 mg (12/10/2019), 150 mg (1/7/2020)  CARBOplatin (PARAPLATIN) in sodium chloride 0 9 % 250 mL IVPB,  (original dose ), Intravenous, Once, 6 of 6 cycles  Dose modification:   (Cycle 2),   (original dose 258 4 mg, Cycle 3),   (original dose 258 4 mg, Cycle 3),   (original dose 244 4 mg, Cycle 4)  Administration: 258 4 mg (5/28/2019), 244 4 mg (6/25/2019), 285 2 mg (11/6/2019), 296 4 mg (12/10/2019), 286 4 mg (1/7/2020)  PACLitaxel (TAXOL) 127 8 mg in sodium chloride 0 9 % 250 mL chemo IVPB, 80 mg/m2, Intravenous, Once, 7 of 7 cycles  Dose modification: 65 mg/m2 (original dose 80 mg/m2, Cycle 2, Reason: Dose Not Tolerated)  Administration: 103 8 mg (5/14/2019), 108 6 mg (5/28/2019), 108 6 mg (6/4/2019), 108 6 mg (6/11/2019), 109 8 mg (6/25/2019), 109 8 mg (7/2/2019), 109 8 mg (7/9/2019), 111 6 mg (11/6/2019), 111 6 mg (11/12/2019), 111 6 mg (11/19/2019), 113 4 mg (12/10/2019), 113 4 mg (12/17/2019), 113 4 mg (12/23/2019), 113 4 mg (1/7/2020), 114 6 mg (1/14/2020), 114 6 mg (1/21/2020)     4/29/2019 [address] testing revealed a PD L1 tumor proportions score of 0%  The patient is not a candidate for PD L1 manipulation      Genetic Testing    [facility] Breast[facility] panel, wildtype  9/10/2019 Surgery    Exploratory laparotomy radical omentectomy, posterior exenteration, takedown of ileostomy and end colostomy for complete debulking of visible tumor  Final pathology report revealed high-grade serous malignancy in both the omentum and the pelvic mass     9/25/2019 -  Chemotherapy    Carboplatin area under the curve of 5+ weekly paclitaxel at 80 milligrams/meters squared for 3 further cycles of treatment followed by consolidation this is to begin mid October     10/2019 Surgery    Interval debulking with TAHBSO revision of colostomy omentectomy and tumor debulking with complete debulking  Several weeks postoperatively the patient required a open cholecystectomy       10/2019 - 12/2019 Chemotherapy    Carboplatin paclitaxel x3     10/9/2019 -  Cancer Staged    Staging form: Ovary, Fallopian Tube, Primary Peritoneal, AJCC 8th Edition  - Pathologic stage from 10/9/2019: FIGO Stage IIIC (pT3c, pN1, cM0) - Signed by Jocelyne Mcallister MD on 10/9/2019  Histologic grade (G): G3  Histologic grading system: 4 grade system  Gross residual tumor after primary cyto-reductive surgery: Absent       3/24/2020 - 9/21/2020 Chemotherapy    palonosetron (ALOXI) injection 0 25 mg, 0 25 mg, Intravenous, Once, 6 of 7 cycles  Administration: 0 25 mg (3/24/2020), 0 25 mg (4/21/2020), 0 25 mg (5/19/2020), 0 25 mg (6/23/2020), 0 25 mg (7/28/2020), 0 25 mg (8/25/2020)  pegfilgrastim (NEULASTA ONPRO) subcutaneous injection kit 6 mg, 6 mg, Subcutaneous, Once, 6 of 7 cycles  Administration: 6 mg (3/31/2020), 6 mg (4/28/2020), 6 mg (5/26/2020), 6 mg (6/29/2020), 6 mg (8/4/2020), 6 mg (9/1/2020)  DOXOrubicin liposome (DOXIL) 54 3 mg in dextrose 5 % 250 mL chemo infusion, 30 mg/m2 = 54 3 mg, Intravenous, Once, 6 of 7 cycles  Administration: 54 3 mg (3/24/2020), 54 6 mg (4/21/2020), 54 9 mg (5/19/2020), 54 6 mg (6/23/2020), 54 9 mg (7/28/2020), 54 9 mg (8/25/2020)  gemcitabine (GEMZAR) 1,176 4 mg in sodium chloride 0 9 % 250 mL infusion, 650 mg/m2 = 1,176 4 mg, Intravenous, Once, 6 of 7 cycles  Administration: 1,176 4 mg (3/24/2020), 1,176 4 mg (3/31/2020), 1,182 9 mg (4/21/2020), 1,200 mg (4/28/2020), 1,189 4 mg (5/19/2020), 1,189 4 mg (5/26/2020), 1,182 9 mg (6/23/2020), 1,182 9 mg (6/29/2020), 1,189 4 mg (7/28/2020), 1,189 4 mg (8/4/2020), 1,189 4 mg (8/25/2020), 1,195 8 mg (9/1/2020)     1/12/2021 - 1/12/2021 Chemotherapy    palonosetron (ALOXI) injection 0 25 mg, 0 25 mg, Intravenous, Once, 0 of 6 cycles  fosaprepitant (EMEND) 150 mg in sodium chloride 0 9 % 250 mL IVPB, 150 mg, Intravenous, Once, 0 of 6 cycles  CARBOplatin (PARAPLATIN) in sodium chloride 0 9 % 250 mL IVPB, , Intravenous, Once, 0 of 6 cycles  PACLitaxel (TAXOL) 246 mg in sodium chloride 0 9 % 500 mL chemo IVPB, 135 mg/m2 = 246 mg (77 1 % of original dose 175 mg/m2), Intravenous, Once, 0 of 6 cycles  Dose modification: 135 mg/m2 (original dose 175 mg/m2, Cycle 1, Reason: Other (See Comments), Comment: heavily pretreated)     1/12/2021 - 5/24/2021 Chemotherapy    palonosetron (ALOXI) injection 0 25 mg, 0 25 mg, Intravenous, Once, 4 of 4 cycles  Administration: 0 25 mg (1/12/2021), 0 25 mg (3/2/2021), 0 25 mg (3/23/2021), 0 25 mg (4/27/2021)  fosaprepitant (EMEND) 150 mg in sodium chloride 0 9 % 250 mL IVPB, 150 mg, Intravenous, Once, 4 of 4 cycles  Administration: 150 mg (1/12/2021), 150 mg (3/2/2021), 150 mg (3/23/2021), 150 mg (4/27/2021)  CARBOplatin (PARAPLATIN) 311 mg in sodium chloride 0 9 % 250 mL IVPB, 311 mg, Intravenous, Once, 4 of 4 cycles  Administration: 311 mg (1/12/2021), 330 mg (3/2/2021), 338 mg (3/23/2021), 313 mg (4/27/2021)  PACLitaxel (TAXOL) 91 2 mg in sodium chloride 0 9 % 250 mL chemo IVPB, 50 mg/m2 = 91 2 mg (62 5 % of original dose 80 mg/m2), Intravenous, Once, 4 of 5 cycles  Dose modification: 50 mg/m2 (original dose 80 mg/m2, Cycle 1, Reason: Other (See Comments))  Administration: 91 2 mg (1/12/2021), 90 6 mg (1/19/2021), 89 4 mg (3/2/2021), 91 2 mg (3/9/2021), 91 2 mg (3/16/2021), 90 6 mg (3/23/2021), 90 6 mg (3/30/2021), 90 6 mg (4/6/2021), 90 mg (5/4/2021), 90 mg (5/11/2021), 90 mg (4/27/2021)     6/10/2021 -  Chemotherapy    pembrolizumab (KEYTRUDA) IVPB, 200 mg, Intravenous, Once, 1 of 4 cycles  Administration: 200 mg (6/10/2021)           Patient ID: Alex Orr is a 69 y o  female    Patient is very pleasant 21-year-old female with history of stage IV B  Ovarian carcinoma  She was diagnosed in approximately October of 2018  She has been on and off chemotherapy at since that time  Most recently patient has been on carboplatin paclitaxel however had a rising  and an CT scan indicating progression of disease  Patient was therefore most recently switched to pembrolizumab Avastin and metronomic cyclophosphamide  This began earlier this month  She presents in consideration of cycle 2  Of treatment  Patient had some difficulty tolerating both the IV Avastin and Keytruda as well as the oral cyclophosphamide  She has begun taking the oral  Cyclophosphamide in the evening with a large bottle of water to help flush through to minimize the risk of hemorrhagic cystitis    With this the brain fog that comes with it is usually happening and at night and allowing her to function better during the day  With regard to the IV infusions the patient had 2-3 days of abdominal pain nausea minimal diarrhea significant fatigue and just not feeling well  However 3 days this resolved and the patient did feel much better  She needed a new prescription of tramadol as she was taking this for significant part of the several days after her chemo  She has also noted a in imbalance which was significant immediately after the chemo infusion but has improved since that time  She still has some minor imbalance issues  I have reviewed the patient's weekly blood work CBC CPM P and  data  I have also reviewed her amylase lipase and TSH  All blood work remains stable for treatment  Her  has once again risen from  103-130  Today, the patient is doing well  She denies significant abdominal pain, pelvic pain, nausea, vomiting, constipation, diarrhea, fevers, chills, or vaginal bleeding  The following portions of the patient's history were reviewed and updated as appropriate: allergies, current medications, past family history, past social history, past surgical history and problem list     Review of Systems   Constitutional: Positive for fatigue  HENT: Negative  Eyes: Negative  Respiratory: Negative  Cardiovascular: Negative  Gastrointestinal: Negative  Endocrine: Negative  Genitourinary: Negative  Musculoskeletal: Positive for gait problem  Skin: Negative  Hematological: Negative  Psychiatric/Behavioral: Negative          Current Outpatient Medications   Medication Sig Dispense Refill    aspirin-acetaminophen-caffeine (EXCEDRIN MIGRAINE) 250-250-65 MG per tablet Take 1 tablet by mouth every 6 (six) hours as needed for headaches      cyclophosphamide (CYTOXAN) 50 mg capsule Take 1 capsule (50 mg total) by mouth daily 30 capsule 5    nystatin (MYCOSTATIN) powder Apply topically 3 (three) times a day 15 g 4    pantoprazole (PROTONIX) 40 mg tablet Take 1 tablet (40 mg total) by mouth 2 (two) times a day before meals  0    traMADol (ULTRAM) 50 mg tablet Take 1 tablet (50 mg total) by mouth every 6 (six) hours as needed for moderate pain 30 tablet 1    gabapentin (NEURONTIN) 300 mg capsule Take 1 capsule (300 mg total) by mouth 3 (three) times a day 90 capsule 0    potassium chloride (K-DUR,KLOR-CON) 20 mEq tablet Take 1 tablet (20 mEq total) by mouth daily 30 tablet 0     No current facility-administered medications for this visit  Objective:    Blood pressure 104/60, pulse 76, temperature 98 2 °F (36 8 °C), resp  rate 18, height 5' 4 17" (1 63 m), weight 76 7 kg (169 lb), not currently breastfeeding  Body mass index is 28 86 kg/m²  Body surface area is 1 82 meters squared  Physical Exam  Constitutional:       Appearance: She is well-developed  HENT:      Head: Normocephalic and atraumatic  Neck:      Thyroid: No thyromegaly  Cardiovascular:      Rate and Rhythm: Normal rate and regular rhythm  Heart sounds: Normal heart sounds  Pulmonary:      Effort: Pulmonary effort is normal       Breath sounds: Normal breath sounds  Abdominal:      General: Bowel sounds are normal       Palpations: Abdomen is soft  Comments: Well healed l incisions With functioning stoma  Genitourinary:     Comments: deferred    Musculoskeletal:         General: Normal range of motion  Cervical back: Normal range of motion and neck supple  Lymphadenopathy:      Cervical: No cervical adenopathy  Skin:     General: Skin is warm and dry  Neurological:      Mental Status: She is alert and oriented to person, place, and time     Psychiatric:         Behavior: Behavior normal          Lab Results   Component Value Date     131 7 (H) 06/18/2021     Lab Results   Component Value Date    K 4 1 06/18/2021     06/18/2021    CO2 27 06/18/2021    BUN 20 06/18/2021    CREATININE 1 21 06/18/2021    GLUCOSE 228 (H) 09/10/2019    GLUF 248 (H) 05/14/2021    CALCIUM 8 9 06/18/2021    CORRECTEDCA 9 7 06/18/2021    AST 21 06/18/2021    ALT 19 06/18/2021    ALKPHOS 126 (H) 06/18/2021    EGFR 45 06/18/2021     Lab Results   Component Value Date    WBC 9 72 06/18/2021    HGB 7 5 (L) 06/18/2021    HCT 24 7 (L) 06/18/2021    MCV 89 06/18/2021     06/18/2021     Lab Results   Component Value Date    NEUTROABS 6 50 06/18/2021        Trend:  Lab Results   Component Value Date     131 7 (H) 06/18/2021     103 1 (H) 05/28/2021     91 0 (H) 05/14/2021     90 1 (H) 05/07/2021     62 8 (H) 04/16/2021     60 8 (H) 04/02/2021     110 8 (H) 03/12/2021     101 9 (H) 03/02/2021     75 8 (H) 02/12/2021     85 7 (H) 01/22/2021     102 0 (H) 12/05/2020     77 1 (H) 11/30/2020     45 7 (H) 10/16/2020     39 6 (H) 09/11/2020     44 9 (H) 09/01/2020     29 2 06/15/2020     27 4 06/10/2020     32 5 (H) 05/26/2020     28 6 05/15/2020     30 6 (H) 05/11/2020     36 5 (H) 04/27/2020     37 9 (H) 04/17/2020     42 5 (H) 04/10/2020     48 1 (H) 03/27/2020     40 9 (H) 03/20/2020     18 2 01/28/2020     17 5 12/31/2019     14 5 12/02/2019     22 0 11/19/2019     32 6 (H) 11/06/2019     19 1 08/30/2019     12 7 08/13/2019     13 9 06/18/2019     19 9 05/21/2019     79 5 (H) 04/16/2019     96 4 (H) 03/19/2019     80 4 (H) 03/08/2019     135 0 (H) 11/30/2018     194 5 (H) 10/18/2018

## 2021-06-29 NOTE — PLAN OF CARE
Problem: Potential for Falls  Goal: Patient will remain free of falls  Description: INTERVENTIONS:  - Educate patient/family on patient safety including physical limitations  -- Consider moving patient to room near nurses station  Outcome: Progressing     Problem: Knowledge Deficit  Goal: Patient/family/caregiver demonstrates understanding of disease process, treatment plan, medications, and discharge instructions  Description: Complete learning assessment and assess knowledge base    Interventions:  - Provide teaching at level of understanding  - Provide teaching via preferred learning methods  Outcome: Progressing

## 2021-06-29 NOTE — PROGRESS NOTES
Pt presents for Keytruda  Offers no complaints  Port with blood return initially  No blood return when trying to obtain labs  Cath Yvon administered with + blood return after 1hour  Pt tolerated tx with out incident  AVS provided  Critical lab value obtained from [facility] of lab- hgb  6 9  Carlos Wilson notified of same  1 unit of PRBC to be given next week  Pt notified of same  Aware to have Type and Screen Monday  Aware of next appt  AVS provided

## 2021-07-06 NOTE — PROGRESS NOTES
Pt to clinic for 1 unit of PRBCs, offers no complaints today, currently tolerating transfusion without complications via port, will continue to monitor

## 2021-07-06 NOTE — PROGRESS NOTES
Pt tolerated entire transfusion without incident  Port flushed per protocol, pt declined AVS and was discharged in stable conditon

## 2021-07-14 NOTE — PROGRESS NOTES
Assessment/Plan:    Problem List Items Addressed This Visit        Endocrine    Ovarian cancer [facility]) - Primary       Patient is very pleasant 30-year-old female with a history of recurrent stage IV B ovarian carcinoma now on new regimen of treatment with Keytruda and metronomic cyclophosphamide  She is tolerating the cyclophosphamide well as she takes this at night  She notes no further side effects  She does have continued diarrhea for a day or 2 after her Keytruda  I have recommended using Lomotil or Imodium immediately after the infusion  The patient would like to hold her chemo infusion prior to her upcoming treatment  We will schedule a CT scan for when she comes back and continue her metronomic cyclophosphamide while she is away  We will see the patient upon her return from [place]  Relevant Orders    CT chest abdomen pelvis w contrast            CHIEF COMPLAINT:   Consideration cycle 3  Keytruda and metronomic cyclophosphamide for recurrent stage IV B ovarian cancer      Problem:  Cancer Staging  Ovarian cancer ([facility] [address] )  Staging form: Ovary, Fallopian Tube, Primary Peritoneal, AJCC 8th Edition  - Clinical stage from 11/14/2018: FIGO Stage IVB (cT3c, cN0, pM1b) - Signed by Ma Collet, MD on 11/14/2018  - Pathologic stage from 10/9/2019: FIGO Stage IIIC (pT3c, pN1, cM0) - Signed by Ma Collet, MD on 10/9/2019        Previous therapy:  Oncology History   Ovarian cancer ([facility] [address] )   11/9/2018 Initial Diagnosis    Ovarian cancer ([facility] [address] )   tumor marker 194 5     11/9/2018 Biopsy    CT-guided needle biopsy of abdominal mass 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  Given liver involvement this would be stage IV     11/14/2018 - 12/4/2018 Chemotherapy    Neoadjuvant therapy: carboplatin area under the curve of 6, paclitaxel 135 milligrams/meter squared, Avastin 10 milligrams/kilogram  Completed 1 of 3 cycles        12/14/2018 Surgery    LAPAROTOMY EXPLORATORY; Abdominal Washout; Application of Abthera Vac Dressing for suspected bowel perforation and septic shock  12/15/2018 Surgery    LAPAROTOMY EXPLORATORY, ABDOMINAL WASHOUT, DRAIN PLACEMENT x 4, DIVERTING LOOP ILEOSTOMY AND ABDOMINAL CLOSURE for bowel perforation     3/26/2019 -  Chemotherapy    Taxol weekly 80 mg/m2 for 3 consecutive weeks, may add carboplatin in the future with AUC of 5      3/26/2019 - 2/3/2020 Chemotherapy    palonosetron (ALOXI) injection 0 25 mg, 0 25 mg, Intravenous, Once, 6 of 6 cycles  Administration: 0 25 mg (5/28/2019), 0 25 mg (6/25/2019), 0 25 mg (11/6/2019), 0 25 mg (12/10/2019), 0 25 mg (1/7/2020)  fosaprepitant (EMEND) 150 mg in sodium chloride 0 9 % 255 mL IVPB, 150 mg, Intravenous, Once, 6 of 6 cycles  Administration: 150 mg (5/28/2019), 150 mg (6/25/2019), 150 mg (11/6/2019), 150 mg (12/10/2019), 150 mg (1/7/2020)  CARBOplatin (PARAPLATIN) in sodium chloride 0 9 % 250 mL IVPB,  (original dose ), Intravenous, Once, 6 of 6 cycles  Dose modification:   (Cycle 2),   (original dose 258 4 mg, Cycle 3),   (original dose 258 4 mg, Cycle 3),   (original dose 244 4 mg, Cycle 4)  Administration: 258 4 mg (5/28/2019), 244 4 mg (6/25/2019), 285 2 mg (11/6/2019), 296 4 mg (12/10/2019), 286 4 mg (1/7/2020)  PACLitaxel (TAXOL) 127 8 mg in sodium chloride 0 9 % 250 mL chemo IVPB, 80 mg/m2, Intravenous, Once, 7 of 7 cycles  Dose modification: 65 mg/m2 (original dose 80 mg/m2, Cycle 2, Reason: Dose Not Tolerated)  Administration: 103 8 mg (5/14/2019), 108 6 mg (5/28/2019), 108 6 mg (6/4/2019), 108 6 mg (6/11/2019), 109 8 mg (6/25/2019), 109 8 mg (7/2/2019), 109 8 mg (7/9/2019), 111 6 mg (11/6/2019), 111 6 mg (11/12/2019), 111 6 mg (11/19/2019), 113 4 mg (12/10/2019), 113 4 mg (12/17/2019), 113 4 mg (12/23/2019), 113 4 mg (1/7/2020), 114 6 mg (1/14/2020), 114 6 mg (1/21/2020)     4/29/2019 Genomic Testing     Foundation 1 testing revealed a PD L1 tumor proportions score of 0%    The patient is not a candidate for PD L1 manipulation      Genetic Testing    [employer] Breast/Gyn panel, wildtype  9/10/2019 Surgery    Exploratory laparotomy radical omentectomy, posterior exenteration, takedown of ileostomy and end colostomy for complete debulking of visible tumor  Final pathology report revealed high-grade serous malignancy in both the omentum and the pelvic mass     9/25/2019 -  Chemotherapy    Carboplatin area under the curve of 5+ weekly paclitaxel at 80 milligrams/meters squared for 3 further cycles of treatment followed by consolidation this is to begin mid October     10/2019 Surgery    Interval debulking with TAHBSO revision of colostomy omentectomy and tumor debulking with complete debulking  Several weeks postoperatively the patient required a open cholecystectomy       10/2019 - 12/2019 Chemotherapy    Carboplatin paclitaxel x3     10/9/2019 -  Cancer Staged    Staging form: Ovary, Fallopian Tube, Primary Peritoneal, AJCC 8th Edition  - Pathologic stage from 10/9/2019: FIGO Stage IIIC (pT3c, pN1, cM0) - Signed by Catalina Nair MD on 10/9/2019  Histologic grade (G): G3  Histologic grading system: 4 grade system  Gross residual tumor after primary cyto-reductive surgery: Absent       3/24/2020 - 9/21/2020 Chemotherapy    palonosetron (ALOXI) injection 0 25 mg, 0 25 mg, Intravenous, Once, 6 of 7 cycles  Administration: 0 25 mg (3/24/2020), 0 25 mg (4/21/2020), 0 25 mg (5/19/2020), 0 25 mg (6/23/2020), 0 25 mg (7/28/2020), 0 25 mg (8/25/2020)  pegfilgrastim (NEULASTA ONPRO) subcutaneous injection kit 6 mg, 6 mg, Subcutaneous, Once, 6 of 7 cycles  Administration: 6 mg (3/31/2020), 6 mg (4/28/2020), 6 mg (5/26/2020), 6 mg (6/29/2020), 6 mg (8/4/2020), 6 mg (9/1/2020)  DOXOrubicin liposome (DOXIL) 54 3 mg in dextrose 5 % 250 mL chemo infusion, 30 mg/m2 = 54 3 mg, Intravenous, Once, 6 of 7 cycles  Administration: 54 3 mg (3/24/2020), 54 6 mg (4/21/2020), 54 9 mg (5/19/2020), 54 6 mg (6/23/2020), 54 9 mg (7/28/2020), 54 9 mg (8/25/2020)  gemcitabine (GEMZAR) 1,176 4 mg in sodium chloride 0 9 % 250 mL infusion, 650 mg/m2 = 1,176 4 mg, Intravenous, Once, 6 of 7 cycles  Administration: 1,176 4 mg (3/24/2020), 1,176 4 mg (3/31/2020), 1,182 9 mg (4/21/2020), 1,200 mg (4/28/2020), 1,189 4 mg (5/19/2020), 1,189 4 mg (5/26/2020), 1,182 9 mg (6/23/2020), 1,182 9 mg (6/29/2020), 1,189 4 mg (7/28/2020), 1,189 4 mg (8/4/2020), 1,189 4 mg (8/25/2020), 1,195 8 mg (9/1/2020)     1/12/2021 - 1/12/2021 Chemotherapy    palonosetron (ALOXI) injection 0 25 mg, 0 25 mg, Intravenous, Once, 0 of 6 cycles  fosaprepitant (EMEND) 150 mg in sodium chloride 0 9 % 250 mL IVPB, 150 mg, Intravenous, Once, 0 of 6 cycles  CARBOplatin (PARAPLATIN) in sodium chloride 0 9 % 250 mL IVPB, , Intravenous, Once, 0 of 6 cycles  PACLitaxel (TAXOL) 246 mg in sodium chloride 0 9 % 500 mL chemo IVPB, 135 mg/m2 = 246 mg (77 1 % of original dose 175 mg/m2), Intravenous, Once, 0 of 6 cycles  Dose modification: 135 mg/m2 (original dose 175 mg/m2, Cycle 1, Reason: Other (See Comments), Comment: heavily pretreated)     1/12/2021 - 5/24/2021 Chemotherapy    palonosetron (ALOXI) injection 0 25 mg, 0 25 mg, Intravenous, Once, 4 of 4 cycles  Administration: 0 25 mg (1/12/2021), 0 25 mg (3/2/2021), 0 25 mg (3/23/2021), 0 25 mg (4/27/2021)  fosaprepitant (EMEND) 150 mg in sodium chloride 0 9 % 250 mL IVPB, 150 mg, Intravenous, Once, 4 of 4 cycles  Administration: 150 mg (1/12/2021), 150 mg (3/2/2021), 150 mg (3/23/2021), 150 mg (4/27/2021)  CARBOplatin (PARAPLATIN) 311 mg in sodium chloride 0 9 % 250 mL IVPB, 311 mg, Intravenous, Once, 4 of 4 cycles  Administration: 311 mg (1/12/2021), 330 mg (3/2/2021), 338 mg (3/23/2021), 313 mg (4/27/2021)  PACLitaxel (TAXOL) 91 2 mg in sodium chloride 0 9 % 250 mL chemo IVPB, 50 mg/m2 = 91 2 mg (62 5 % of original dose 80 mg/m2), Intravenous, Once, 4 of 5 cycles  Dose modification: 50 mg/m2 (original dose 80 mg/m2, Cycle 1, Reason: Other (See Comments))  Administration: 91 2 mg (1/12/2021), 90 6 mg (1/19/2021), 89 4 mg (3/2/2021), 91 2 mg (3/9/2021), 91 2 mg (3/16/2021), 90 6 mg (3/23/2021), 90 6 mg (3/30/2021), 90 6 mg (4/6/2021), 90 mg (5/4/2021), 90 mg (5/11/2021), 90 mg (4/27/2021)     6/10/2021 -  Chemotherapy    pembrolizumab (KEYTRUDA) IVPB, 200 mg, Intravenous, Once, 2 of 4 cycles  Administration: 200 mg (6/10/2021), 200 mg (6/29/2021)           Patient ID: Lio Quiñones is a 69 y o  female    Patient is very pleasant 50-year-old female with a her history of recurrent stage IV B  Ovarian adenocarcinoma  She has been on multiple cycles of chemotherapy over the past nearly 3 years of treatment  Presently she has recently started [place] and metronomic cyclophosphamide  Due to her history of bowel perforation with a Avastin we elected to withhold the Avastin from treatment regimen  Since she has begun this treatment regimen her  has begun to stabilized and slightly trended downward  I have reviewed the patient's , CBC and CPM P data  They indicate that present dosage is stable for treatment  The patient's creatinine has risen over the course of the past several months but again now is stabilized at 1 4  Her hemoglobin has been as low as 6 9 and did require transfusion  It is now at 7 8  Today, the patient is doing well  She denies significant abdominal pain, pelvic pain, nausea, vomiting, constipation, diarrhea, fevers, chills, or vaginal bleeding  She continues to note some fatigue But this is significantly better after her transfusion  She does not which another transfusion  The patient does note 2 days of significant profuse diarrhea after her Keytruda infusion and would like to hold the next 1 prior to her upcoming vacation        The following portions of the patient's history were reviewed and updated as appropriate: allergies, current medications, past family history, past social history, past surgical history and problem list     Review of Systems   Constitutional: Negative  HENT: Negative  Eyes: Negative  Respiratory: Negative  Cardiovascular: Negative  Gastrointestinal: Negative  Endocrine: Negative  Genitourinary: Negative  Musculoskeletal: Negative  Skin: Negative  Neurological: Negative  Hematological: Negative  Psychiatric/Behavioral: Negative  Current Outpatient Medications   Medication Sig Dispense Refill    aspirin-acetaminophen-caffeine (EXCEDRIN MIGRAINE) 250-250-65 MG per tablet Take 1 tablet by mouth every 6 (six) hours as needed for headaches      cyclophosphamide (CYTOXAN) 50 mg capsule Take 1 capsule (50 mg total) by mouth daily 30 capsule 5    nystatin (MYCOSTATIN) powder Apply topically 3 (three) times a day 15 g 4    pantoprazole (PROTONIX) 40 mg tablet Take 1 tablet (40 mg total) by mouth 2 (two) times a day before meals  0    traMADol (ULTRAM) 50 mg tablet Take 1 tablet (50 mg total) by mouth every 6 (six) hours as needed for moderate pain 30 tablet 1    gabapentin (NEURONTIN) 300 mg capsule Take 1 capsule (300 mg total) by mouth 3 (three) times a day 90 capsule 0    potassium chloride (K-DUR,KLOR-CON) 20 mEq tablet Take 1 tablet (20 mEq total) by mouth daily 30 tablet 0     No current facility-administered medications for this visit  Objective:    Blood pressure 122/62, pulse 71, temperature 98 2 °F (36 8 °C), resp  rate 18, height 5' 4" (1 626 m), weight 74 2 kg (163 lb 8 oz), not currently breastfeeding  Body mass index is 28 06 kg/m²  Body surface area is 1 8 meters squared  Physical Exam  Constitutional:       Appearance: She is well-developed  HENT:      Head: Normocephalic and atraumatic  Neck:      Thyroid: No thyromegaly  Cardiovascular:      Rate and Rhythm: Normal rate and regular rhythm  Heart sounds: Normal heart sounds     Pulmonary:      Effort: Pulmonary effort is normal  Breath sounds: Normal breath sounds  Abdominal:      General: Bowel sounds are normal       Palpations: Abdomen is soft  Comments: Well healed Midline incisions  Stoma pink and functioning   Genitourinary:     Comments: Deferred    Musculoskeletal:         General: Normal range of motion  Cervical back: Normal range of motion and neck supple  Lymphadenopathy:      Cervical: No cervical adenopathy  Skin:     General: Skin is warm and dry  Neurological:      Mental Status: She is alert and oriented to person, place, and time     Psychiatric:         Behavior: Behavior normal          Lab Results   Component Value Date     117 1 (H) 07/09/2021     Lab Results   Component Value Date    K 4 3 07/09/2021     07/09/2021    CO2 24 07/09/2021    BUN 21 07/09/2021    CREATININE 1 40 (H) 07/09/2021    GLUCOSE 228 (H) 09/10/2019    GLUF 191 (H) 07/02/2021    CALCIUM 9 0 07/09/2021    CORRECTEDCA 9 7 07/09/2021    AST 16 07/09/2021    ALT 15 07/09/2021    ALKPHOS 112 07/09/2021    EGFR 38 07/09/2021     Lab Results   Component Value Date    WBC 8 07 07/09/2021    HGB 7 8 (L) 07/09/2021    HCT 25 0 (L) 07/09/2021    MCV 87 07/09/2021     07/09/2021     Lab Results   Component Value Date    NEUTROABS 5 39 07/09/2021        Trend:  Lab Results   Component Value Date     117 1 (H) 07/09/2021     116 0 (H) 07/02/2021     113 5 (H) 06/25/2021     131 7 (H) 06/18/2021     103 1 (H) 05/28/2021     91 0 (H) 05/14/2021     90 1 (H) 05/07/2021     62 8 (H) 04/16/2021     60 8 (H) 04/02/2021     110 8 (H) 03/12/2021     101 9 (H) 03/02/2021     75 8 (H) 02/12/2021     85 7 (H) 01/22/2021     102 0 (H) 12/05/2020     77 1 (H) 11/30/2020     45 7 (H) 10/16/2020     39 6 (H) 09/11/2020     44 9 (H) 09/01/2020     29 2 06/15/2020     27 4 06/10/2020     32 5 (H) 05/26/2020     28 6 05/15/2020  30 6 (H) 05/11/2020     36 5 (H) 04/27/2020     37 9 (H) 04/17/2020     42 5 (H) 04/10/2020     48 1 (H) 03/27/2020     40 9 (H) 03/20/2020     18 2 01/28/2020     17 5 12/31/2019     14 5 12/02/2019     22 0 11/19/2019     32 6 (H) 11/06/2019     19 1 08/30/2019     12 7 08/13/2019     13 9 06/18/2019     19 9 05/21/2019     79 5 (H) 04/16/2019     96 4 (H) 03/19/2019     80 4 (H) 03/08/2019     135 0 (H) 11/30/2018     194 5 (H) 10/18/2018

## 2021-07-14 NOTE — LETTER
July 14, 2021     Ella Painter MD  [address]  [address]  [address] [facility]    Patient: Deedee Puckett   YOB: 1950   Date of Visit: 7/14/2021       Dear Dr Rodriguez Ards: Thank you for referring Deedee Puckett to me for evaluation  Below are my notes for this consultation  If you have questions, please do not hesitate to call me  I look forward to following your patient along with you  Sincerely,        Kaiden Posadas MD        CC: No Recipients  Kaiden Posadas MD  7/14/2021 10:10 AM  Sign when Signing Visit  Assessment/Plan:    Problem List Items Addressed This Visit        Endocrine    Ovarian cancer [facility]) - Primary       Patient is very pleasant 74-year-old female with a history of recurrent stage IV B ovarian carcinoma now on new regimen of treatment with Keytruda and metronomic cyclophosphamide  She is tolerating the cyclophosphamide well as she takes this at night  She notes no further side effects  She does have continued diarrhea for a day or 2 after her Keytruda  I have recommended using Lomotil or Imodium immediately after the infusion  The patient would like to hold her chemo infusion prior to her upcoming treatment  We will schedule a CT scan for when she comes back and continue her metronomic cyclophosphamide while she is away  We will see the patient upon her return from [place]           Relevant Orders    CT chest abdomen pelvis w contrast            CHIEF COMPLAINT:   Consideration cycle 3  Keytruda and metronomic cyclophosphamide for recurrent stage IV B ovarian cancer      Problem:  Cancer Staging  Ovarian cancer [facility])  Staging form: Ovary, Fallopian Tube, Primary Peritoneal, AJCC 8th Edition  - Clinical stage from 11/14/2018: FIGO Stage IVB (cT3c, cN0, pM1b) - Signed by Kaiden Posadas MD on 11/14/2018  - Pathologic stage from 10/9/2019: FIGO Stage IIIC (pT3c, pN1, cM0) - Signed by Kaiden Posadas MD on 10/9/2019        Previous therapy:  Oncology History   Ovarian cancer ([facility] [address] )   11/9/2018 Initial Diagnosis    Ovarian cancer ([facility] [address] )   tumor marker 194 5     11/9/2018 Biopsy    CT-guided needle biopsy of abdominal mass 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  Given liver involvement this would be stage IV     11/14/2018 - 12/4/2018 Chemotherapy    Neoadjuvant therapy: carboplatin area under the curve of 6, paclitaxel 135 milligrams/meter squared, Avastin 10 milligrams/kilogram  Completed 1 of 3 cycles  12/14/2018 Surgery    LAPAROTOMY EXPLORATORY; Abdominal Washout; Application of Abthera Vac Dressing for suspected bowel perforation and septic shock        12/15/2018 Surgery    LAPAROTOMY EXPLORATORY, ABDOMINAL WASHOUT, DRAIN PLACEMENT x 4, DIVERTING LOOP ILEOSTOMY AND ABDOMINAL CLOSURE for bowel perforation     3/26/2019 -  Chemotherapy    Taxol weekly 80 mg/m2 for 3 consecutive weeks, may add carboplatin in the future with AUC of 5      3/26/2019 - 2/3/2020 Chemotherapy    palonosetron (ALOXI) injection 0 25 mg, 0 25 mg, Intravenous, Once, 6 of 6 cycles  Administration: 0 25 mg (5/28/2019), 0 25 mg (6/25/2019), 0 25 mg (11/6/2019), 0 25 mg (12/10/2019), 0 25 mg (1/7/2020)  fosaprepitant (EMEND) 150 mg in sodium chloride 0 9 % 255 mL IVPB, 150 mg, Intravenous, Once, 6 of 6 cycles  Administration: 150 mg (5/28/2019), 150 mg (6/25/2019), 150 mg (11/6/2019), 150 mg (12/10/2019), 150 mg (1/7/2020)  CARBOplatin (PARAPLATIN) in sodium chloride 0 9 % 250 mL IVPB,  (original dose ), Intravenous, Once, 6 of 6 cycles  Dose modification:   (Cycle 2),   (original dose 258 4 mg, Cycle 3),   (original dose 258 4 mg, Cycle 3),   (original dose 244 4 mg, Cycle 4)  Administration: 258 4 mg (5/28/2019), 244 4 mg (6/25/2019), 285 2 mg (11/6/2019), 296 4 mg (12/10/2019), 286 4 mg (1/7/2020)  PACLitaxel (TAXOL) 127 8 mg in sodium chloride 0 9 % 250 mL chemo IVPB, 80 mg/m2, Intravenous, Once, 7 of 7 cycles  Dose modification: 65 mg/m2 (original dose 80 mg/m2, Cycle 2, Reason: Dose Not Tolerated)  Administration: 103 8 mg (5/14/2019), 108 6 mg (5/28/2019), 108 6 mg (6/4/2019), 108 6 mg (6/11/2019), 109 8 mg (6/25/2019), 109 8 mg (7/2/2019), 109 8 mg (7/9/2019), 111 6 mg (11/6/2019), 111 6 mg (11/12/2019), 111 6 mg (11/19/2019), 113 4 mg (12/10/2019), 113 4 mg (12/17/2019), 113 4 mg (12/23/2019), 113 4 mg (1/7/2020), 114 6 mg (1/14/2020), 114 6 mg (1/21/2020)     4/29/2019 [address] testing revealed a PD L1 tumor proportions score of 0%  The patient is not a candidate for PD L1 manipulation      Genetic Testing    [facility] Breast/Gyn panel, wildtype  9/10/2019 Surgery    Exploratory laparotomy radical omentectomy, posterior exenteration, takedown of ileostomy and end colostomy for complete debulking of visible tumor  Final pathology report revealed high-grade serous malignancy in both the omentum and the pelvic mass     9/25/2019 -  Chemotherapy    Carboplatin area under the curve of 5+ weekly paclitaxel at 80 milligrams/meters squared for 3 further cycles of treatment followed by consolidation this is to begin mid October     10/2019 Surgery    Interval debulking with TAHBSO revision of colostomy omentectomy and tumor debulking with complete debulking  Several weeks postoperatively the patient required a open cholecystectomy       10/2019 - 12/2019 Chemotherapy    Carboplatin paclitaxel x3     10/9/2019 -  Cancer Staged    Staging form: Ovary, Fallopian Tube, Primary Peritoneal, AJCC 8th Edition  - Pathologic stage from 10/9/2019: FIGO Stage IIIC (pT3c, pN1, cM0) - Signed by Kaiden Posadas MD on 10/9/2019  Histologic grade (G): G3  Histologic grading system: 4 grade system  Gross residual tumor after primary cyto-reductive surgery: Absent       3/24/2020 - 9/21/2020 Chemotherapy    palonosetron (ALOXI) injection 0 25 mg, 0 25 mg, Intravenous, Once, 6 of 7 cycles  Administration: 0 25 mg (3/24/2020), 0 25 mg (4/21/2020), 0 25 mg (5/19/2020), 0 25 mg (6/23/2020), 0 25 mg (7/28/2020), 0 25 mg (8/25/2020)  pegfilgrastim (NEULASTA ONPRO) subcutaneous injection kit 6 mg, 6 mg, Subcutaneous, Once, 6 of 7 cycles  Administration: 6 mg (3/31/2020), 6 mg (4/28/2020), 6 mg (5/26/2020), 6 mg (6/29/2020), 6 mg (8/4/2020), 6 mg (9/1/2020)  DOXOrubicin liposome (DOXIL) 54 3 mg in dextrose 5 % 250 mL chemo infusion, 30 mg/m2 = 54 3 mg, Intravenous, Once, 6 of 7 cycles  Administration: 54 3 mg (3/24/2020), 54 6 mg (4/21/2020), 54 9 mg (5/19/2020), 54 6 mg (6/23/2020), 54 9 mg (7/28/2020), 54 9 mg (8/25/2020)  gemcitabine (GEMZAR) 1,176 4 mg in sodium chloride 0 9 % 250 mL infusion, 650 mg/m2 = 1,176 4 mg, Intravenous, Once, 6 of 7 cycles  Administration: 1,176 4 mg (3/24/2020), 1,176 4 mg (3/31/2020), 1,182 9 mg (4/21/2020), 1,200 mg (4/28/2020), 1,189 4 mg (5/19/2020), 1,189 4 mg (5/26/2020), 1,182 9 mg (6/23/2020), 1,182 9 mg (6/29/2020), 1,189 4 mg (7/28/2020), 1,189 4 mg (8/4/2020), 1,189 4 mg (8/25/2020), 1,195 8 mg (9/1/2020)     1/12/2021 - 1/12/2021 Chemotherapy    palonosetron (ALOXI) injection 0 25 mg, 0 25 mg, Intravenous, Once, 0 of 6 cycles  fosaprepitant (EMEND) 150 mg in sodium chloride 0 9 % 250 mL IVPB, 150 mg, Intravenous, Once, 0 of 6 cycles  CARBOplatin (PARAPLATIN) in sodium chloride 0 9 % 250 mL IVPB, , Intravenous, Once, 0 of 6 cycles  PACLitaxel (TAXOL) 246 mg in sodium chloride 0 9 % 500 mL chemo IVPB, 135 mg/m2 = 246 mg (77 1 % of original dose 175 mg/m2), Intravenous, Once, 0 of 6 cycles  Dose modification: 135 mg/m2 (original dose 175 mg/m2, Cycle 1, Reason: Other (See Comments), Comment: heavily pretreated)     1/12/2021 - 5/24/2021 Chemotherapy    palonosetron (ALOXI) injection 0 25 mg, 0 25 mg, Intravenous, Once, 4 of 4 cycles  Administration: 0 25 mg (1/12/2021), 0 25 mg (3/2/2021), 0 25 mg (3/23/2021), 0 25 mg (4/27/2021)  fosaprepitant (EMEND) 150 mg in sodium chloride 0 9 % 250 mL IVPB, 150 mg, Intravenous, Once, 4 of 4 cycles  Administration: 150 mg (1/12/2021), 150 mg (3/2/2021), 150 mg (3/23/2021), 150 mg (4/27/2021)  CARBOplatin (PARAPLATIN) 311 mg in sodium chloride 0 9 % 250 mL IVPB, 311 mg, Intravenous, Once, 4 of 4 cycles  Administration: 311 mg (1/12/2021), 330 mg (3/2/2021), 338 mg (3/23/2021), 313 mg (4/27/2021)  PACLitaxel (TAXOL) 91 2 mg in sodium chloride 0 9 % 250 mL chemo IVPB, 50 mg/m2 = 91 2 mg (62 5 % of original dose 80 mg/m2), Intravenous, Once, 4 of 5 cycles  Dose modification: 50 mg/m2 (original dose 80 mg/m2, Cycle 1, Reason: Other (See Comments))  Administration: 91 2 mg (1/12/2021), 90 6 mg (1/19/2021), 89 4 mg (3/2/2021), 91 2 mg (3/9/2021), 91 2 mg (3/16/2021), 90 6 mg (3/23/2021), 90 6 mg (3/30/2021), 90 6 mg (4/6/2021), 90 mg (5/4/2021), 90 mg (5/11/2021), 90 mg (4/27/2021)     6/10/2021 -  Chemotherapy    pembrolizumab (KEYTRUDA) IVPB, 200 mg, Intravenous, Once, 2 of 4 cycles  Administration: 200 mg (6/10/2021), 200 mg (6/29/2021)           Patient ID: Moshe Cole is a 69 y o  female    Patient is very pleasant 34-year-old female with a her history of recurrent stage IV B  Ovarian adenocarcinoma  She has been on multiple cycles of chemotherapy over the past nearly 3 years of treatment  Presently she has recently started [place] and metronomic cyclophosphamide  Due to her history of bowel perforation with a Avastin we elected to withhold the Avastin from treatment regimen  Since she has begun this treatment regimen her  has begun to stabilized and slightly trended downward  I have reviewed the patient's , CBC and CPM P data  They indicate that present dosage is stable for treatment  The patient's creatinine has risen over the course of the past several months but again now is stabilized at 1 4  Her hemoglobin has been as low as 6 9 and did require transfusion  It is now at 7 8  Today, the patient is doing well    She denies significant abdominal pain, pelvic pain, nausea, vomiting, constipation, diarrhea, fevers, chills, or vaginal bleeding  She continues to note some fatigue But this is significantly better after her transfusion  She does not which another transfusion  The patient does note 2 days of significant profuse diarrhea after her Keytruda infusion and would like to hold the next 1 prior to her upcoming vacation  The following portions of the patient's history were reviewed and updated as appropriate: allergies, current medications, past family history, past social history, past surgical history and problem list     Review of Systems   Constitutional: Negative  HENT: Negative  Eyes: Negative  Respiratory: Negative  Cardiovascular: Negative  Gastrointestinal: Negative  Endocrine: Negative  Genitourinary: Negative  Musculoskeletal: Negative  Skin: Negative  Neurological: Negative  Hematological: Negative  Psychiatric/Behavioral: Negative  Current Outpatient Medications   Medication Sig Dispense Refill    aspirin-acetaminophen-caffeine (EXCEDRIN MIGRAINE) 250-250-65 MG per tablet Take 1 tablet by mouth every 6 (six) hours as needed for headaches      cyclophosphamide (CYTOXAN) 50 mg capsule Take 1 capsule (50 mg total) by mouth daily 30 capsule 5    nystatin (MYCOSTATIN) powder Apply topically 3 (three) times a day 15 g 4    pantoprazole (PROTONIX) 40 mg tablet Take 1 tablet (40 mg total) by mouth 2 (two) times a day before meals  0    traMADol (ULTRAM) 50 mg tablet Take 1 tablet (50 mg total) by mouth every 6 (six) hours as needed for moderate pain 30 tablet 1    gabapentin (NEURONTIN) 300 mg capsule Take 1 capsule (300 mg total) by mouth 3 (three) times a day 90 capsule 0    potassium chloride (K-DUR,KLOR-CON) 20 mEq tablet Take 1 tablet (20 mEq total) by mouth daily 30 tablet 0     No current facility-administered medications for this visit  Objective:    Blood pressure 122/62, pulse 71, temperature 98 2 °F (36 8 °C), resp  rate 18, height 5' 4" (1 626 m), weight 74 2 kg (163 lb 8 oz), not currently breastfeeding  Body mass index is 28 06 kg/m²  Body surface area is 1 8 meters squared  Physical Exam  Constitutional:       Appearance: She is well-developed  HENT:      Head: Normocephalic and atraumatic  Neck:      Thyroid: No thyromegaly  Cardiovascular:      Rate and Rhythm: Normal rate and regular rhythm  Heart sounds: Normal heart sounds  Pulmonary:      Effort: Pulmonary effort is normal       Breath sounds: Normal breath sounds  Abdominal:      General: Bowel sounds are normal       Palpations: Abdomen is soft  Comments: Well healed Midline incisions  Stoma pink and functioning   Genitourinary:     Comments: Deferred    Musculoskeletal:         General: Normal range of motion  Cervical back: Normal range of motion and neck supple  Lymphadenopathy:      Cervical: No cervical adenopathy  Skin:     General: Skin is warm and dry  Neurological:      Mental Status: She is alert and oriented to person, place, and time     Psychiatric:         Behavior: Behavior normal          Lab Results   Component Value Date     117 1 (H) 07/09/2021     Lab Results   Component Value Date    K 4 3 07/09/2021     07/09/2021    CO2 24 07/09/2021    BUN 21 07/09/2021    CREATININE 1 40 (H) 07/09/2021    GLUCOSE 228 (H) 09/10/2019    GLUF 191 (H) 07/02/2021    CALCIUM 9 0 07/09/2021    CORRECTEDCA 9 7 07/09/2021    AST 16 07/09/2021    ALT 15 07/09/2021    ALKPHOS 112 07/09/2021    EGFR 38 07/09/2021     Lab Results   Component Value Date    WBC 8 07 07/09/2021    HGB 7 8 (L) 07/09/2021    HCT 25 0 (L) 07/09/2021    MCV 87 07/09/2021     07/09/2021     Lab Results   Component Value Date    NEUTROABS 5 39 07/09/2021        Trend:  Lab Results   Component Value Date     117 1 (H) 07/09/2021     116 0 (H) 07/02/2021     113 5 (H) 06/25/2021     131 7 (H) 06/18/2021     103 1 (H) 05/28/2021     91 0 (H) 05/14/2021     90 1 (H) 05/07/2021     62 8 (H) 04/16/2021     60 8 (H) 04/02/2021     110 8 (H) 03/12/2021     101 9 (H) 03/02/2021     75 8 (H) 02/12/2021     85 7 (H) 01/22/2021     102 0 (H) 12/05/2020     77 1 (H) 11/30/2020     45 7 (H) 10/16/2020     39 6 (H) 09/11/2020     44 9 (H) 09/01/2020     29 2 06/15/2020     27 4 06/10/2020     32 5 (H) 05/26/2020     28 6 05/15/2020     30 6 (H) 05/11/2020     36 5 (H) 04/27/2020     37 9 (H) 04/17/2020     42 5 (H) 04/10/2020     48 1 (H) 03/27/2020     40 9 (H) 03/20/2020     18 2 01/28/2020     17 5 12/31/2019     14 5 12/02/2019     22 0 11/19/2019     32 6 (H) 11/06/2019     19 1 08/30/2019     12 7 08/13/2019     13 9 06/18/2019     19 9 05/21/2019     79 5 (H) 04/16/2019     96 4 (H) 03/19/2019     80 4 (H) 03/08/2019     135 0 (H) 11/30/2018     194 5 (H) 10/18/2018

## 2021-07-14 NOTE — ASSESSMENT & PLAN NOTE
Patient is very pleasant 69-year-old female with a history of recurrent stage IV B ovarian carcinoma now on new regimen of treatment with Keytruda and metronomic cyclophosphamide  She is tolerating the cyclophosphamide well as she takes this at night  She notes no further side effects  She does have continued diarrhea for a day or 2 after her Keytruda  I have recommended using Lomotil or Imodium immediately after the infusion  The patient would like to hold her chemo infusion prior to her upcoming treatment  We will schedule a CT scan for when she comes back and continue her metronomic cyclophosphamide while she is away  We will see the patient upon her return from [place]

## 2021-07-14 NOTE — TELEPHONE ENCOUNTER
Scheduled a CT for patient on [address]  in [place] [address] per patient's preference, verified with Central Scheduling the port will not be accessed and Central Scheduling made the appt  On 8/18/21  After Zac Ventura spoke with the [facility] and the manager on [address] [facility]  Central Scheduling stated the CT will need to be scheduled at [facility] due to patient having a port even though it will not be accessed  Central Scheduling cancelled the CT and stated they will give the patient a call relaying the information and to reschedule the CT

## 2021-07-20 NOTE — TELEPHONE ENCOUNTER
Appointment Confirmation     Appointment with  CT   Appointment date & time  08- @ 10:30am   Location [facility]   Patient verbilized Understanding

## 2021-08-09 NOTE — TELEPHONE ENCOUNTER
Pt calling with constant itching believes it may be due to her medication, call back #  [phone]

## 2021-08-11 NOTE — PROGRESS NOTES
Pt presents for 1 unit RBCs offering no complaints  Transfusion tolerated without incident  Port flushed per protocol  AVS declined  Pt discharged in stable condition

## 2021-08-17 NOTE — TELEPHONE ENCOUNTER
Patient was calling to cancel their CT scan because they are not feeling well enough to drink the Barium   Patient would like a call back @ [phone]

## 2021-08-17 NOTE — TELEPHONE ENCOUNTER
I PAULINO for pt with central [facility] phone number for her to call and r/s her CT scan

## 2021-08-25 NOTE — ASSESSMENT & PLAN NOTE
Patient is very pleasant 59-year-old female with history of recurrent metastatic stage ALTAGRACIA  Ovarian cancer  She has undergone 3 cycles of  Metronomic cyclophosphamide and Keytruda with holding Avastin  Her  is climbed during the entirety of this treatment and she has developed a reaction again cyclophosphamide  We have therefore recommended discontinuing this treatment plan  I have looked at other treatment options and have recommended topotecan  We have discussed with the patient risks and benefits of the drug and have recommended topotecan 3 milligrams/meter squared per week for 3 consecutive weeks followed by  Neulasta  The patient is comfortable with this  I have discussed risks and benefits and given informational sheets the patient has signed an informed consent  We discussed the biggest risk being neutropenia and infection  The patient would like to get started the 1st Tuesday in August we have discussed the use of Avastin with this but again have discussed withholding it due to the patient's prior history of bowel perforation  The patient has not yet been vaccinated against COVID and we have strongly recommended that she does get vaccinated

## 2021-08-25 NOTE — LETTER
August 25, 2021     [place], [address] [place] [address]    Patient: Ivette Esparza   YOB: 1950   Date of Visit: 8/25/2021       Dear Dr Quyen Saleh:    Thank you for referring Ivette Esparza to me for evaluation  Below are my notes for this consultation  If you have questions, please do not hesitate to call me  I look forward to following your patient along with you  Sincerely,        Radha Stevenson MD        CC: MD Radha Zelaya MD  8/25/2021 10:57 AM  Sign when Signing Visit  Assessment/Plan:    Problem List Items Addressed This Visit        Endocrine    Ovarian cancer [facility]) - Primary       Patient is very pleasant 69-year-old female with history of recurrent metastatic stage ALTAGRACIA  Ovarian cancer  She has undergone 3 cycles of  Metronomic cyclophosphamide and Keytruda with holding Avastin  Her  is climbed during the entirety of this treatment and she has developed a reaction again cyclophosphamide  We have therefore recommended discontinuing this treatment plan  I have looked at other treatment options and have recommended topotecan  We have discussed with the patient risks and benefits of the drug and have recommended topotecan 3 milligrams/meter squared per week for 3 consecutive weeks followed by  Neulasta  The patient is comfortable with this  I have discussed risks and benefits and given informational sheets the patient has signed an informed consent  We discussed the biggest risk being neutropenia and infection  The patient would like to get started the 1st Tuesday in August we have discussed the use of Avastin with this but again have discussed withholding it due to the patient's prior history of bowel perforation  The patient has not yet been vaccinated against COVID and we have strongly recommended that she does get vaccinated                 The patient will have a new baseline CT scan performed within the next week or so  Additionally she is planning to have wisdom teeth surgery on August 31st       CHIEF COMPLAINT:  Consideration of cycle 4 Keytruda plus oral metronomic cyclophosphamide for recurrent stage IV A ovarian cancer      Problem:  Cancer Staging  Ovarian cancer [facility])  Staging form: Ovary, Fallopian Tube, Primary Peritoneal, AJCC 8th Edition  - Clinical stage from 11/14/2018: FIGO Stage IVB (cT3c, cN0, pM1b) - Signed by Shekhar Shah MD on 11/14/2018  - Pathologic stage from 10/9/2019: FIGO Stage IIIC (pT3c, pN1, cM0) - Signed by Shekhar Shah MD on 10/9/2019        Previous therapy:  Oncology History   Ovarian cancer ([facility] [address] )   11/9/2018 Initial Diagnosis    Ovarian cancer ([facility] [address] )   tumor marker 194 5     11/9/2018 Biopsy    CT-guided needle biopsy of abdominal mass 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  Given liver involvement this would be stage IV     11/14/2018 - 12/4/2018 Chemotherapy    Neoadjuvant therapy: carboplatin area under the curve of 6, paclitaxel 135 milligrams/meter squared, Avastin 10 milligrams/kilogram  Completed 1 of 3 cycles  12/14/2018 Surgery    LAPAROTOMY EXPLORATORY; Abdominal Washout; Application of Abthera Vac Dressing for suspected bowel perforation and septic shock        12/15/2018 Surgery    LAPAROTOMY EXPLORATORY, ABDOMINAL WASHOUT, DRAIN PLACEMENT x 4, DIVERTING LOOP ILEOSTOMY AND ABDOMINAL CLOSURE for bowel perforation     3/26/2019 -  Chemotherapy    Taxol weekly 80 mg/m2 for 3 consecutive weeks, may add carboplatin in the future with AUC of 5      3/26/2019 - 2/3/2020 Chemotherapy    palonosetron (ALOXI) injection 0 25 mg, 0 25 mg, Intravenous, Once, 6 of 6 cycles  Administration: 0 25 mg (5/28/2019), 0 25 mg (6/25/2019), 0 25 mg (11/6/2019), 0 25 mg (12/10/2019), 0 25 mg (1/7/2020)  fosaprepitant (EMEND) 150 mg in sodium chloride 0 9 % 255 mL IVPB, 150 mg, Intravenous, Once, 6 of 6 cycles  Administration: 150 mg (5/28/2019), 150 mg (6/25/2019), 150 mg (11/6/2019), 150 mg (12/10/2019), 150 mg (1/7/2020)  CARBOplatin (PARAPLATIN) in sodium chloride 0 9 % 250 mL IVPB,  (original dose ), Intravenous, Once, 6 of 6 cycles  Dose modification:   (Cycle 2),   (original dose 258 4 mg, Cycle 3),   (original dose 258 4 mg, Cycle 3),   (original dose 244 4 mg, Cycle 4)  Administration: 258 4 mg (5/28/2019), 244 4 mg (6/25/2019), 285 2 mg (11/6/2019), 296 4 mg (12/10/2019), 286 4 mg (1/7/2020)  PACLitaxel (TAXOL) 127 8 mg in sodium chloride 0 9 % 250 mL chemo IVPB, 80 mg/m2, Intravenous, Once, 7 of 7 cycles  Dose modification: 65 mg/m2 (original dose 80 mg/m2, Cycle 2, Reason: Dose Not Tolerated)  Administration: 103 8 mg (5/14/2019), 108 6 mg (5/28/2019), 108 6 mg (6/4/2019), 108 6 mg (6/11/2019), 109 8 mg (6/25/2019), 109 8 mg (7/2/2019), 109 8 mg (7/9/2019), 111 6 mg (11/6/2019), 111 6 mg (11/12/2019), 111 6 mg (11/19/2019), 113 4 mg (12/10/2019), 113 4 mg (12/17/2019), 113 4 mg (12/23/2019), 113 4 mg (1/7/2020), 114 6 mg (1/14/2020), 114 6 mg (1/21/2020)     4/29/2019 Genomic Testing     Foundation 1 testing revealed a PD L1 tumor proportions score of 0%  The patient is not a candidate for PD L1 manipulation      Genetic Testing    [employer] Breast/Gyn panel, wildtype  9/10/2019 Surgery    Exploratory laparotomy radical omentectomy, posterior exenteration, takedown of ileostomy and end colostomy for complete debulking of visible tumor  Final pathology report revealed high-grade serous malignancy in both the omentum and the pelvic mass     9/25/2019 -  Chemotherapy    Carboplatin area under the curve of 5+ weekly paclitaxel at 80 milligrams/meters squared for 3 further cycles of treatment followed by consolidation this is to begin mid October     10/2019 Surgery    Interval debulking with TAHBSO revision of colostomy omentectomy and tumor debulking with complete debulking    Several weeks postoperatively the patient required a open cholecystectomy       10/2019 - 12/2019 Chemotherapy    Carboplatin paclitaxel x3     10/9/2019 -  Cancer Staged    Staging form: Ovary, Fallopian Tube, Primary Peritoneal, AJCC 8th Edition  - Pathologic stage from 10/9/2019: FIGO Stage IIIC (pT3c, pN1, cM0) - Signed by Lio Gould MD on 10/9/2019  Histologic grade (G): G3  Histologic grading system: 4 grade system  Gross residual tumor after primary cyto-reductive surgery: Absent       3/24/2020 - 9/21/2020 Chemotherapy    palonosetron (ALOXI) injection 0 25 mg, 0 25 mg, Intravenous, Once, 6 of 7 cycles  Administration: 0 25 mg (3/24/2020), 0 25 mg (4/21/2020), 0 25 mg (5/19/2020), 0 25 mg (6/23/2020), 0 25 mg (7/28/2020), 0 25 mg (8/25/2020)  pegfilgrastim (NEULASTA ONPRO) subcutaneous injection kit 6 mg, 6 mg, Subcutaneous, Once, 6 of 7 cycles  Administration: 6 mg (3/31/2020), 6 mg (4/28/2020), 6 mg (5/26/2020), 6 mg (6/29/2020), 6 mg (8/4/2020), 6 mg (9/1/2020)  DOXOrubicin liposome (DOXIL) 54 3 mg in dextrose 5 % 250 mL chemo infusion, 30 mg/m2 = 54 3 mg, Intravenous, Once, 6 of 7 cycles  Administration: 54 3 mg (3/24/2020), 54 6 mg (4/21/2020), 54 9 mg (5/19/2020), 54 6 mg (6/23/2020), 54 9 mg (7/28/2020), 54 9 mg (8/25/2020)  gemcitabine (GEMZAR) 1,176 4 mg in sodium chloride 0 9 % 250 mL infusion, 650 mg/m2 = 1,176 4 mg, Intravenous, Once, 6 of 7 cycles  Administration: 1,176 4 mg (3/24/2020), 1,176 4 mg (3/31/2020), 1,182 9 mg (4/21/2020), 1,200 mg (4/28/2020), 1,189 4 mg (5/19/2020), 1,189 4 mg (5/26/2020), 1,182 9 mg (6/23/2020), 1,182 9 mg (6/29/2020), 1,189 4 mg (7/28/2020), 1,189 4 mg (8/4/2020), 1,189 4 mg (8/25/2020), 1,195 8 mg (9/1/2020)     1/12/2021 - 1/12/2021 Chemotherapy    palonosetron (ALOXI) injection 0 25 mg, 0 25 mg, Intravenous, Once, 0 of 6 cycles  fosaprepitant (EMEND) 150 mg in sodium chloride 0 9 % 250 mL IVPB, 150 mg, Intravenous, Once, 0 of 6 cycles  CARBOplatin (PARAPLATIN) in sodium chloride 0 9 % 250 mL IVPB, , Intravenous, Once, 0 of 6 cycles  PACLitaxel (TAXOL) 246 mg in sodium chloride 0 9 % 500 mL chemo IVPB, 135 mg/m2 = 246 mg (77 1 % of original dose 175 mg/m2), Intravenous, Once, 0 of 6 cycles  Dose modification: 135 mg/m2 (original dose 175 mg/m2, Cycle 1, Reason: Other (See Comments), Comment: heavily pretreated)     1/12/2021 - 5/24/2021 Chemotherapy    palonosetron (ALOXI) injection 0 25 mg, 0 25 mg, Intravenous, Once, 4 of 4 cycles  Administration: 0 25 mg (1/12/2021), 0 25 mg (3/2/2021), 0 25 mg (3/23/2021), 0 25 mg (4/27/2021)  fosaprepitant (EMEND) 150 mg in sodium chloride 0 9 % 250 mL IVPB, 150 mg, Intravenous, Once, 4 of 4 cycles  Administration: 150 mg (1/12/2021), 150 mg (3/2/2021), 150 mg (3/23/2021), 150 mg (4/27/2021)  CARBOplatin (PARAPLATIN) 311 mg in sodium chloride 0 9 % 250 mL IVPB, 311 mg, Intravenous, Once, 4 of 4 cycles  Administration: 311 mg (1/12/2021), 330 mg (3/2/2021), 338 mg (3/23/2021), 313 mg (4/27/2021)  PACLitaxel (TAXOL) 91 2 mg in sodium chloride 0 9 % 250 mL chemo IVPB, 50 mg/m2 = 91 2 mg (62 5 % of original dose 80 mg/m2), Intravenous, Once, 4 of 5 cycles  Dose modification: 50 mg/m2 (original dose 80 mg/m2, Cycle 1, Reason: Other (See Comments))  Administration: 91 2 mg (1/12/2021), 90 6 mg (1/19/2021), 89 4 mg (3/2/2021), 91 2 mg (3/9/2021), 91 2 mg (3/16/2021), 90 6 mg (3/23/2021), 90 6 mg (3/30/2021), 90 6 mg (4/6/2021), 90 mg (5/4/2021), 90 mg (5/11/2021), 90 mg (4/27/2021)     6/10/2021 - 8/30/2021 Chemotherapy    pembrolizumab (KEYTRUDA) IVPB, 200 mg, Intravenous, Once, 3 of 7 cycles  Administration: 200 mg (6/10/2021), 200 mg (6/29/2021), 200 mg (8/10/2021)     9/8/2021 -  Chemotherapy    Pegfilgrastim-bmez (ZIEXTENZO), 6 mg, Subcutaneous, Once, 0 of 6 cycles  topotecan (HYCAMTIN) chemo infusion, 3 mg/m2, Intravenous, Once, 0 of 6 cycles           Patient ID: Karon Langston is a 69 y o  female  Patient is very pleasant 57-year-old female with a history of recurrent metastatic for a ovarian carcinoma  She initially had treatment with carboplatin paclitaxel and Avastin resulting in a bowel perforation after 1st cycle  Since that time the patient has not been using Avastin  Most recently patient was recommended a treatment plan of Keytruda Avastin and  Metronomic cyclophosphamide  The Avastin was withheld for the above reasons  The patient has had difficulty tolerating the  Metronomic cyclophosphamide due to recent rash  She was on 2 short courses of steroids for this  I have reviewed the patient's weekly CBC and CPM P data  The patient has had a slight treatment break due to being on vacation in [place] and the reaction to the cyclophosphamide  The patient's  has climbed throughout the course of her treatment and is now at approximately 190  Overall the patient is feeling well  She has had some constipation after stopping the metronomic cyclophosphamide as this had caused diarrhea  She is now back on her stool softeners and is doing well  Today, the patient is doing well  She denies significant abdominal pain, pelvic pain, nausea, vomiting, constipation, diarrhea, fevers, chills, or vaginal bleeding  The following portions of the patient's history were reviewed and updated as appropriate: allergies, current medications, past family history, past medical history, past surgical history and problem list     Review of Systems   Constitutional: Negative  HENT: Negative  Eyes: Negative  Respiratory: Negative  Cardiovascular: Negative  Gastrointestinal: Negative  Endocrine: Negative  Genitourinary: Negative  Musculoskeletal: Negative  Skin: Negative  Neurological: Negative  Hematological: Negative  Psychiatric/Behavioral: Negative          Current Outpatient Medications   Medication Sig Dispense Refill    aspirin-acetaminophen-caffeine (EXCEDRIN MIGRAINE) 250-250-65 MG per tablet Take 1 tablet by mouth every 6 (six) hours as needed for headaches      cyclophosphamide (CYTOXAN) 50 mg capsule Take 1 capsule (50 mg total) by mouth daily 30 capsule 5    methylPREDNISolone 4 MG tablet therapy pack Use as directed on package 21 each 0    nystatin (MYCOSTATIN) powder Apply topically 3 (three) times a day 15 g 4    pantoprazole (PROTONIX) 40 mg tablet Take 1 tablet (40 mg total) by mouth 2 (two) times a day before meals  0    traMADol (ULTRAM) 50 mg tablet Take 1 tablet (50 mg total) by mouth every 6 (six) hours as needed for moderate pain 30 tablet 1    gabapentin (NEURONTIN) 300 mg capsule Take 1 capsule (300 mg total) by mouth 3 (three) times a day 90 capsule 0    potassium chloride (K-DUR,KLOR-CON) 20 mEq tablet Take 1 tablet (20 mEq total) by mouth daily 30 tablet 0     No current facility-administered medications for this visit  Objective:    Blood pressure 130/82, pulse 80, temperature 98 1 °F (36 7 °C), resp  rate 18, height 5' 4" (1 626 m), weight 72 8 kg (160 lb 8 oz), not currently breastfeeding  Body mass index is 27 55 kg/m²  Body surface area is 1 78 meters squared  Physical Exam  Constitutional:       Appearance: She is well-developed  HENT:      Head: Normocephalic and atraumatic  Neck:      Thyroid: No thyromegaly  Cardiovascular:      Rate and Rhythm: Normal rate and regular rhythm  Heart sounds: Normal heart sounds  Pulmonary:      Effort: Pulmonary effort is normal       Breath sounds: Normal breath sounds  Abdominal:      General: Bowel sounds are normal       Palpations: Abdomen is soft  Comments: Well healed laparoscopic incisions  Genitourinary:     Comments: Deferred    Musculoskeletal:         General: Normal range of motion  Cervical back: Normal range of motion and neck supple  Lymphadenopathy:      Cervical: No cervical adenopathy  Skin:     General: Skin is warm and dry     Neurological:      Mental Status: She is alert and oriented to person, place, and time     Psychiatric:         Behavior: Behavior normal          Lab Results   Component Value Date     198 4 (H) 08/21/2021     Lab Results   Component Value Date    K 4 2 08/21/2021     08/21/2021    CO2 23 08/21/2021    BUN 37 (H) 08/21/2021    CREATININE 2 03 (H) 08/21/2021    GLUCOSE 228 (H) 09/10/2019    GLUF 191 (H) 07/02/2021    CALCIUM 8 6 08/21/2021    CORRECTEDCA 9 2 08/21/2021    AST 26 08/21/2021    ALT 23 08/21/2021    ALKPHOS 129 (H) 08/21/2021    EGFR 24 08/21/2021     Lab Results   Component Value Date    WBC 8 85 08/21/2021    HGB 6 9 (LL) 08/21/2021    HCT 22 4 (L) 08/21/2021    MCV 85 08/21/2021     08/21/2021     Lab Results   Component Value Date    NEUTROABS 6 62 08/21/2021        Trend:  Lab Results   Component Value Date     198 4 (H) 08/21/2021     135 0 (H) 08/10/2021     117 1 (H) 07/09/2021     116 0 (H) 07/02/2021     113 5 (H) 06/25/2021     131 7 (H) 06/18/2021     103 1 (H) 05/28/2021     91 0 (H) 05/14/2021     90 1 (H) 05/07/2021     62 8 (H) 04/16/2021     60 8 (H) 04/02/2021     110 8 (H) 03/12/2021     101 9 (H) 03/02/2021     75 8 (H) 02/12/2021     85 7 (H) 01/22/2021     102 0 (H) 12/05/2020     77 1 (H) 11/30/2020     45 7 (H) 10/16/2020     39 6 (H) 09/11/2020     44 9 (H) 09/01/2020     29 2 06/15/2020     27 4 06/10/2020     32 5 (H) 05/26/2020     28 6 05/15/2020     30 6 (H) 05/11/2020     36 5 (H) 04/27/2020     37 9 (H) 04/17/2020     42 5 (H) 04/10/2020     48 1 (H) 03/27/2020     40 9 (H) 03/20/2020     18 2 01/28/2020     17 5 12/31/2019     14 5 12/02/2019     22 0 11/19/2019     32 6 (H) 11/06/2019     19 1 08/30/2019     12 7 08/13/2019     13 9 06/18/2019     19 9 05/21/2019     79 5 (H) 04/16/2019  96 4 (H) 03/19/2019     80 4 (H) 03/08/2019     135 0 (H) 11/30/2018     194 5 (H) 10/18/2018

## 2021-08-25 NOTE — PROGRESS NOTES
Assessment/Plan:    Problem List Items Addressed This Visit        Endocrine    Ovarian cancer [facility]) - Primary       Patient is very pleasant 58-year-old female with history of recurrent metastatic stage ALTAGRACIA  Ovarian cancer  She has undergone 3 cycles of  Metronomic cyclophosphamide and Keytruda with holding Avastin  Her  is climbed during the entirety of this treatment and she has developed a reaction again cyclophosphamide  We have therefore recommended discontinuing this treatment plan  I have looked at other treatment options and have recommended topotecan  We have discussed with the patient risks and benefits of the drug and have recommended topotecan 3 milligrams/meter squared per week for 3 consecutive weeks followed by  Neulasta  The patient is comfortable with this  I have discussed risks and benefits and given informational sheets the patient has signed an informed consent  We discussed the biggest risk being neutropenia and infection  The patient would like to get started the 1st Tuesday in August we have discussed the use of Avastin with this but again have discussed withholding it due to the patient's prior history of bowel perforation  The patient has not yet been vaccinated against COVID and we have strongly recommended that she does get vaccinated  Nervous and Auditory    Neuropathy    Relevant Medications    traMADol (ULTRAM) 50 mg tablet          The patient will have a new baseline CT scan performed within the next week or so    Additionally she is planning to have wisdom teeth surgery on August 31st       CHIEF COMPLAINT:  Consideration of cycle 4 Keytruda plus oral metronomic cyclophosphamide for recurrent stage IV A ovarian cancer      Problem:  Cancer Staging  Ovarian cancer [facility])  Staging form: Ovary, Fallopian Tube, Primary Peritoneal, AJCC 8th Edition  - Clinical stage from 11/14/2018: FIGO Stage IVB (cT3c, cN0, pM1b) - Signed by Garth Everett MD on 11/14/2018  - Pathologic stage from 10/9/2019: FIGO Stage IIIC (pT3c, pN1, cM0) - Signed by Ashley Villarreal MD on 10/9/2019        Previous therapy:  Oncology History   Ovarian cancer ([facility] [address] )   11/9/2018 Initial Diagnosis    Ovarian cancer ([facility] [address] )   tumor marker 194 5     11/9/2018 Biopsy    CT-guided needle biopsy of abdominal mass 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  Given liver involvement this would be stage IV     11/14/2018 - 12/4/2018 Chemotherapy    Neoadjuvant therapy: carboplatin area under the curve of 6, paclitaxel 135 milligrams/meter squared, Avastin 10 milligrams/kilogram  Completed 1 of 3 cycles  12/14/2018 Surgery    LAPAROTOMY EXPLORATORY; Abdominal Washout; Application of Abthera Vac Dressing for suspected bowel perforation and septic shock        12/15/2018 Surgery    LAPAROTOMY EXPLORATORY, ABDOMINAL WASHOUT, DRAIN PLACEMENT x 4, DIVERTING LOOP ILEOSTOMY AND ABDOMINAL CLOSURE for bowel perforation     3/26/2019 -  Chemotherapy    Taxol weekly 80 mg/m2 for 3 consecutive weeks, may add carboplatin in the future with AUC of 5      3/26/2019 - 2/3/2020 Chemotherapy    palonosetron (ALOXI) injection 0 25 mg, 0 25 mg, Intravenous, Once, 6 of 6 cycles  Administration: 0 25 mg (5/28/2019), 0 25 mg (6/25/2019), 0 25 mg (11/6/2019), 0 25 mg (12/10/2019), 0 25 mg (1/7/2020)  fosaprepitant (EMEND) 150 mg in sodium chloride 0 9 % 255 mL IVPB, 150 mg, Intravenous, Once, 6 of 6 cycles  Administration: 150 mg (5/28/2019), 150 mg (6/25/2019), 150 mg (11/6/2019), 150 mg (12/10/2019), 150 mg (1/7/2020)  CARBOplatin (PARAPLATIN) in sodium chloride 0 9 % 250 mL IVPB,  (original dose ), Intravenous, Once, 6 of 6 cycles  Dose modification:   (Cycle 2),   (original dose 258 4 mg, Cycle 3),   (original dose 258 4 mg, Cycle 3),   (original dose 244 4 mg, Cycle 4)  Administration: 258 4 mg (5/28/2019), 244 4 mg (6/25/2019), 285 2 mg (11/6/2019), 296 4 mg (12/10/2019), 286 4 mg (1/7/2020)  PACLitaxel (TAXOL) 127 8 mg in sodium chloride 0 9 % 250 mL chemo IVPB, 80 mg/m2, Intravenous, Once, 7 of 7 cycles  Dose modification: 65 mg/m2 (original dose 80 mg/m2, Cycle 2, Reason: Dose Not Tolerated)  Administration: 103 8 mg (5/14/2019), 108 6 mg (5/28/2019), 108 6 mg (6/4/2019), 108 6 mg (6/11/2019), 109 8 mg (6/25/2019), 109 8 mg (7/2/2019), 109 8 mg (7/9/2019), 111 6 mg (11/6/2019), 111 6 mg (11/12/2019), 111 6 mg (11/19/2019), 113 4 mg (12/10/2019), 113 4 mg (12/17/2019), 113 4 mg (12/23/2019), 113 4 mg (1/7/2020), 114 6 mg (1/14/2020), 114 6 mg (1/21/2020)     4/29/2019 Genomic Testing     Foundation 1 testing revealed a PD L1 tumor proportions score of 0%  The patient is not a candidate for PD L1 manipulation      Genetic Testing    [employer] Breast/Gyn panel, wildtype  9/10/2019 Surgery    Exploratory laparotomy radical omentectomy, posterior exenteration, takedown of ileostomy and end colostomy for complete debulking of visible tumor  Final pathology report revealed high-grade serous malignancy in both the omentum and the pelvic mass     9/25/2019 -  Chemotherapy    Carboplatin area under the curve of 5+ weekly paclitaxel at 80 milligrams/meters squared for 3 further cycles of treatment followed by consolidation this is to begin mid October     10/2019 Surgery    Interval debulking with TAHBSO revision of colostomy omentectomy and tumor debulking with complete debulking  Several weeks postoperatively the patient required a open cholecystectomy       10/2019 - 12/2019 Chemotherapy    Carboplatin paclitaxel x3     10/9/2019 -  Cancer Staged    Staging form: Ovary, Fallopian Tube, Primary Peritoneal, AJCC 8th Edition  - Pathologic stage from 10/9/2019: FIGO Stage IIIC (pT3c, pN1, cM0) - Signed by Klaus Hinkle MD on 10/9/2019  Histologic grade (G): G3  Histologic grading system: 4 grade system  Gross residual tumor after primary cyto-reductive surgery: Absent 3/24/2020 - 9/21/2020 Chemotherapy    palonosetron (ALOXI) injection 0 25 mg, 0 25 mg, Intravenous, Once, 6 of 7 cycles  Administration: 0 25 mg (3/24/2020), 0 25 mg (4/21/2020), 0 25 mg (5/19/2020), 0 25 mg (6/23/2020), 0 25 mg (7/28/2020), 0 25 mg (8/25/2020)  pegfilgrastim (NEULASTA ONPRO) subcutaneous injection kit 6 mg, 6 mg, Subcutaneous, Once, 6 of 7 cycles  Administration: 6 mg (3/31/2020), 6 mg (4/28/2020), 6 mg (5/26/2020), 6 mg (6/29/2020), 6 mg (8/4/2020), 6 mg (9/1/2020)  DOXOrubicin liposome (DOXIL) 54 3 mg in dextrose 5 % 250 mL chemo infusion, 30 mg/m2 = 54 3 mg, Intravenous, Once, 6 of 7 cycles  Administration: 54 3 mg (3/24/2020), 54 6 mg (4/21/2020), 54 9 mg (5/19/2020), 54 6 mg (6/23/2020), 54 9 mg (7/28/2020), 54 9 mg (8/25/2020)  gemcitabine (GEMZAR) 1,176 4 mg in sodium chloride 0 9 % 250 mL infusion, 650 mg/m2 = 1,176 4 mg, Intravenous, Once, 6 of 7 cycles  Administration: 1,176 4 mg (3/24/2020), 1,176 4 mg (3/31/2020), 1,182 9 mg (4/21/2020), 1,200 mg (4/28/2020), 1,189 4 mg (5/19/2020), 1,189 4 mg (5/26/2020), 1,182 9 mg (6/23/2020), 1,182 9 mg (6/29/2020), 1,189 4 mg (7/28/2020), 1,189 4 mg (8/4/2020), 1,189 4 mg (8/25/2020), 1,195 8 mg (9/1/2020)     1/12/2021 - 1/12/2021 Chemotherapy    palonosetron (ALOXI) injection 0 25 mg, 0 25 mg, Intravenous, Once, 0 of 6 cycles  fosaprepitant (EMEND) 150 mg in sodium chloride 0 9 % 250 mL IVPB, 150 mg, Intravenous, Once, 0 of 6 cycles  CARBOplatin (PARAPLATIN) in sodium chloride 0 9 % 250 mL IVPB, , Intravenous, Once, 0 of 6 cycles  PACLitaxel (TAXOL) 246 mg in sodium chloride 0 9 % 500 mL chemo IVPB, 135 mg/m2 = 246 mg (77 1 % of original dose 175 mg/m2), Intravenous, Once, 0 of 6 cycles  Dose modification: 135 mg/m2 (original dose 175 mg/m2, Cycle 1, Reason: Other (See Comments), Comment: heavily pretreated)     1/12/2021 - 5/24/2021 Chemotherapy    palonosetron (ALOXI) injection 0 25 mg, 0 25 mg, Intravenous, Once, 4 of 4 cycles  Administration: 0 25 mg (1/12/2021), 0 25 mg (3/2/2021), 0 25 mg (3/23/2021), 0 25 mg (4/27/2021)  fosaprepitant (EMEND) 150 mg in sodium chloride 0 9 % 250 mL IVPB, 150 mg, Intravenous, Once, 4 of 4 cycles  Administration: 150 mg (1/12/2021), 150 mg (3/2/2021), 150 mg (3/23/2021), 150 mg (4/27/2021)  CARBOplatin (PARAPLATIN) 311 mg in sodium chloride 0 9 % 250 mL IVPB, 311 mg, Intravenous, Once, 4 of 4 cycles  Administration: 311 mg (1/12/2021), 330 mg (3/2/2021), 338 mg (3/23/2021), 313 mg (4/27/2021)  PACLitaxel (TAXOL) 91 2 mg in sodium chloride 0 9 % 250 mL chemo IVPB, 50 mg/m2 = 91 2 mg (62 5 % of original dose 80 mg/m2), Intravenous, Once, 4 of 5 cycles  Dose modification: 50 mg/m2 (original dose 80 mg/m2, Cycle 1, Reason: Other (See Comments))  Administration: 91 2 mg (1/12/2021), 90 6 mg (1/19/2021), 89 4 mg (3/2/2021), 91 2 mg (3/9/2021), 91 2 mg (3/16/2021), 90 6 mg (3/23/2021), 90 6 mg (3/30/2021), 90 6 mg (4/6/2021), 90 mg (5/4/2021), 90 mg (5/11/2021), 90 mg (4/27/2021)     6/10/2021 - 8/30/2021 Chemotherapy    pembrolizumab (KEYTRUDA) IVPB, 200 mg, Intravenous, Once, 3 of 7 cycles  Administration: 200 mg (6/10/2021), 200 mg (6/29/2021), 200 mg (8/10/2021)     9/8/2021 -  Chemotherapy    Pegfilgrastim-bmez (ZIEXTENZO), 6 mg, Subcutaneous, Once, 0 of 6 cycles  topotecan (HYCAMTIN) chemo infusion, 3 mg/m2, Intravenous, Once, 0 of 6 cycles           Patient ID: Thomas Cool is a 69 y o  female  Patient is very pleasant 65-year-old female with a history of recurrent metastatic for a ovarian carcinoma  She initially had treatment with carboplatin paclitaxel and Avastin resulting in a bowel perforation after 1st cycle  Since that time the patient has not been using Avastin  Most recently patient was recommended a treatment plan of Keytruda Avastin and  Metronomic cyclophosphamide  The Avastin was withheld for the above reasons    The patient has had difficulty tolerating the  Metronomic cyclophosphamide due to recent rash  She was on 2 short courses of steroids for this  I have reviewed the patient's weekly CBC and CPM P data  The patient has had a slight treatment break due to being on vacation in [place] and the reaction to the cyclophosphamide  The patient's  has climbed throughout the course of her treatment and is now at approximately 190  Overall the patient is feeling well  She has had some constipation after stopping the metronomic cyclophosphamide as this had caused diarrhea  She is now back on her stool softeners and is doing well  Today, the patient is doing well  She denies significant abdominal pain, pelvic pain, nausea, vomiting, constipation, diarrhea, fevers, chills, or vaginal bleeding  The following portions of the patient's history were reviewed and updated as appropriate: allergies, current medications, past family history, past medical history, past surgical history and problem list     Review of Systems   Constitutional: Negative  HENT: Negative  Eyes: Negative  Respiratory: Negative  Cardiovascular: Negative  Gastrointestinal: Negative  Endocrine: Negative  Genitourinary: Negative  Musculoskeletal: Negative  Skin: Negative  Neurological: Negative  Hematological: Negative  Psychiatric/Behavioral: Negative          Current Outpatient Medications   Medication Sig Dispense Refill    aspirin-acetaminophen-caffeine (EXCEDRIN MIGRAINE) 250-250-65 MG per tablet Take 1 tablet by mouth every 6 (six) hours as needed for headaches      cyclophosphamide (CYTOXAN) 50 mg capsule Take 1 capsule (50 mg total) by mouth daily 30 capsule 5    methylPREDNISolone 4 MG tablet therapy pack Use as directed on package 21 each 0    nystatin (MYCOSTATIN) powder Apply topically 3 (three) times a day 15 g 4    pantoprazole (PROTONIX) 40 mg tablet Take 1 tablet (40 mg total) by mouth 2 (two) times a day before meals  0    traMADol (ULTRAM) 50 mg tablet Take 1 tablet (50 mg total) by mouth every 8 (eight) hours as needed for moderate pain 30 tablet 1    gabapentin (NEURONTIN) 300 mg capsule Take 1 capsule (300 mg total) by mouth 3 (three) times a day 90 capsule 0    potassium chloride (K-DUR,KLOR-CON) 20 mEq tablet Take 1 tablet (20 mEq total) by mouth daily 30 tablet 0     No current facility-administered medications for this visit  Objective:    Blood pressure 130/82, pulse 80, temperature 98 1 °F (36 7 °C), resp  rate 18, height 5' 4" (1 626 m), weight 72 8 kg (160 lb 8 oz), not currently breastfeeding  Body mass index is 27 55 kg/m²  Body surface area is 1 78 meters squared  Physical Exam  Constitutional:       Appearance: She is well-developed  HENT:      Head: Normocephalic and atraumatic  Neck:      Thyroid: No thyromegaly  Cardiovascular:      Rate and Rhythm: Normal rate and regular rhythm  Heart sounds: Normal heart sounds  Pulmonary:      Effort: Pulmonary effort is normal       Breath sounds: Normal breath sounds  Abdominal:      General: Bowel sounds are normal       Palpations: Abdomen is soft  Comments: Well healed laparoscopic incisions  Genitourinary:     Comments: Deferred    Musculoskeletal:         General: Normal range of motion  Cervical back: Normal range of motion and neck supple  Lymphadenopathy:      Cervical: No cervical adenopathy  Skin:     General: Skin is warm and dry  Neurological:      Mental Status: She is alert and oriented to person, place, and time     Psychiatric:         Behavior: Behavior normal          Lab Results   Component Value Date     198 4 (H) 08/21/2021     Lab Results   Component Value Date    K 4 2 08/21/2021     08/21/2021    CO2 23 08/21/2021    BUN 37 (H) 08/21/2021    CREATININE 2 03 (H) 08/21/2021    GLUCOSE 228 (H) 09/10/2019    GLUF 191 (H) 07/02/2021    CALCIUM 8 6 08/21/2021    CORRECTEDCA 9 2 08/21/2021    AST 26 08/21/2021    ALT 23 08/21/2021    ALKPHOS 129 (H) 08/21/2021    EGFR 24 08/21/2021     Lab Results   Component Value Date    WBC 8 85 08/21/2021    HGB 6 9 (LL) 08/21/2021    HCT 22 4 (L) 08/21/2021    MCV 85 08/21/2021     08/21/2021     Lab Results   Component Value Date    NEUTROABS 6 62 08/21/2021        Trend:  Lab Results   Component Value Date     198 4 (H) 08/21/2021     135 0 (H) 08/10/2021     117 1 (H) 07/09/2021     116 0 (H) 07/02/2021     113 5 (H) 06/25/2021     131 7 (H) 06/18/2021     103 1 (H) 05/28/2021     91 0 (H) 05/14/2021     90 1 (H) 05/07/2021     62 8 (H) 04/16/2021     60 8 (H) 04/02/2021     110 8 (H) 03/12/2021     101 9 (H) 03/02/2021     75 8 (H) 02/12/2021     85 7 (H) 01/22/2021     102 0 (H) 12/05/2020     77 1 (H) 11/30/2020     45 7 (H) 10/16/2020     39 6 (H) 09/11/2020     44 9 (H) 09/01/2020     29 2 06/15/2020     27 4 06/10/2020     32 5 (H) 05/26/2020     28 6 05/15/2020     30 6 (H) 05/11/2020     36 5 (H) 04/27/2020     37 9 (H) 04/17/2020     42 5 (H) 04/10/2020     48 1 (H) 03/27/2020     40 9 (H) 03/20/2020     18 2 01/28/2020     17 5 12/31/2019     14 5 12/02/2019     22 0 11/19/2019     32 6 (H) 11/06/2019     19 1 08/30/2019     12 7 08/13/2019     13 9 06/18/2019     19 9 05/21/2019     79 5 (H) 04/16/2019     96 4 (H) 03/19/2019     80 4 (H) 03/08/2019     135 0 (H) 11/30/2018     194 5 (H) 10/18/2018

## 2021-08-30 NOTE — PROGRESS NOTES
Pt presents for 1 unit RBCs offering no complaints  Transfusion tolerated without incident  Vitals stable  Port flushed per protocol  AVS declined  Pt discharged in stable condition

## 2021-09-08 NOTE — PROGRESS NOTES
Pt to clinic for initial topotecan treatment, offers no complaints today, per Carlos Wilson NP pt okay to treat with hemoglobin of 7 7, pt tolerated infusion without complications, aware of next appointment, port de-accessed, avs printed and reviewed

## 2021-09-10 NOTE — LETTER
[address] [place] [address]  [address] [facility] [address]      September 22, 2021    MRN: [id number]     Phone: [phone]     Dear Ms  Mark Ellison recently had a(n) Cat Scan performed on 9/10/2021 at  [address] [place] [address] that was requested by Olesya Leroy MD  The study was reviewed by a radiologist, which is a physician who specializes in medical imaging  The radiologist issued a report describing his or her findings  In that report there was a finding that the radiologist felt warranted further discussion with your health care provider and that discussion would be beneficial to you  The results were sent to Olesya Leroy MD on 09/17/2021  8:49 AM  We recommend that you contact Olesya Leroy MD at [phone] or set up an appointment to discuss the results of the imaging test  If you have already heard from Olesya Leroy MD regarding the results of your study, you can disregard this letter  This letter is not meant to alarm you, but intended to encourage you to follow-up on your results with the provider that sent you for the imaging study  In addition, we have enclosed answers to frequently asked questions by other patients who have also received a letter to review results with their health care provider (see page two)  Thank you for choosing [address] [place] [address] for your medical imaging needs  FREQUENTLY ASKED QUESTIONS    1  Why am I receiving this letter? [facility] requires us to notify patients who have findings on imaging exams that may require more testing or follow-up with a health professional within the next 3 months          2  How serious is the finding on the imaging test?  This letter is sent to all patients who may need follow-up or more testing within the next 3 months  Receiving this letter does not necessarily mean you have a life-threatening imaging finding or disease  Recommendations in the radiologists imaging report are general in nature and it is up to your healthcare provider to say whether those recommendations make sense for your situation  You are strongly encouraged to talk to your health care provider about the results and ask whether additional steps need to be taken  3  Where can I get a copy of the final report for my recent radiology exam?  To get a full copy of the report you can access your records online at [email or web address] or please contact [address] [place] [address] Records Department at [phone] Monday through Friday between 8 am and 6 pm          4  What do I need to do now? Please contact your health care provider who requested the imaging study to discuss what further actions (if any) are needed  You may have already heard from (your ordering provider) in regard to this test in which case you can disregard this letter  NOTICE IN ACCORDANCE WITH THE [facility] PATIENT TEST RESULT INFORMATION ACT OF 2018    You are receiving this notice as a result of a determination by your diagnostic imaging service that further discussions of your test results are warranted and would be beneficial to you  The complete results of your test or tests have been or will be sent to the health care practitioner that ordered the test or tests  It is recommended that you contact your health care practitioner to discuss your results as soon as possible

## 2021-09-13 NOTE — TELEPHONE ENCOUNTER
Danuta Gutierrez from [address] [facility] called with critical result of patient's hemoglobin of 6 2

## 2021-09-13 NOTE — TELEPHONE ENCOUNTER
Creatinine elevated at over 2  Hemoglobin 6 2  Will cancel treatment tomorrow and transfuse with two units + hydration  Will also obtain kidney u/s

## 2021-09-13 NOTE — PROGRESS NOTES
Per Severo Garb CRNP holding chemo treatment 9/14 and giving 2 units PRBC and hydration  Mikey Rod in pharmacy made aware

## 2021-09-14 NOTE — PROGRESS NOTES
Pt here today, denies any discomforts, vital signs stable  Good blood return noted via port  Hydration initiated

## 2021-09-14 NOTE — PROGRESS NOTES
Labs drawn from port and sent to lab  Pt tolerated hydration, and 2 units of PRBC's without incident today  Port flushed and de-accessed  AVS declined  Aware of next appt    Walked out in stable condition

## 2021-09-17 NOTE — TELEPHONE ENCOUNTER
Called to get patient scheduled for an appointment  Offered patient today at 4:00 but patient stated that she has another appointment at 330  Reviewed schedules, [facility] is fully booked on Monday  Offered patient another office and she refused due to these offices being 45 minutes from her and she is 5 minutes from the [facility] office  Patient is scheduled for Tuesday      ----- Message from Jia Ace PA-C sent at 9/17/2021  2:03 PM EDT -----  Regarding: FW: Patient needs stat stent placement vs  PCN  This is a new patient  Can this be scheduled for appointment today or latest this Monday to arrange for urgent stent placement?    ----- Message -----  From: RIGOBERTO Luz  Sent: 9/17/2021   1:48 PM EDT  To: Sacha Pierce, #  Subject: Patient needs stat stent placement vs  PCN       Hello -- we received this patient's CT report this morning which shows moderate to severe b/l hydronephrosis  Dr Melo Standard wanted to have her scheduled ASAP for stent placement  If this isn't possible, she will need PCN tubes  "New moderate to severe hydroureteronephrosis bilaterally down to the mid to distal ureteral level where there is extensive retroperitoneal adenopathy likely the cause of obstruction "    I had also previously ordered an ultrasound of the kidney and bladder which looks like it has not yet been scheduled, but I will defer scheduling at this time in light of the above findings  She prefers the [place] location  My apologies for sending to the [employer] -- I didn't see a [place] clerical pool option  Hoping you can get it to the correct scheduling team      Thank you!     Hernan Kwano 6

## 2021-09-17 NOTE — RESULT ENCOUNTER NOTE
Discussed with patient findings of hydronephrosis and urgent need for urology consult   Message sent to [place] urology providers and clerical team

## 2021-09-17 NOTE — TELEPHONE ENCOUNTER
Significant Findings     Call Received From [facility]   Radiology Department Location  [facility]  CT Chest, Abdomen and Pelvis    Date of Study  09/10   Relevant Information

## 2021-09-27 NOTE — PROGRESS NOTES
Referring Physician: Ayde Perla MD  A copy of this note was sent to the referring physician  Assessment and plan:       1  Bilateral hydroureteronephrosis  -  -    2  Progression of retroperitoneal, mesenteric and pelvic lymphadenopathy  Scattered intraperitoneal nodularity suspicious for peritoneal metastasis (causing #1)  -  3  Chemotherapy placed on hold    4  Patient to be scheduled for cystoscopy, bilateral ureteral stent placement  -  5  Explained briefly that if stents cannot be placed, likely Interventional Radiology will be notified for percutaneous nephrostomy tube(s) placement    Karl Green MD      Chief Complaint     Abnormal CT scans showing blocked kidneys      History of Present Illness     Anirudh Nuno is a 69 y o   female with history of recurrent metastatic stage ALTAGRACIA  Ovarian cancer  She underwent 3 cycles of  Metronomic cyclophosphamide and Keytruda with holding Avastin  Her  climbed during the entirety of this treatment and she developed a reaction again to cyclophosphamide  It was recommended to discontinue this treatment plan        At present, a additional chemotherapy has been placed on hold because of abnormal CT scan showing progression of disease causing bilateral hydronephrosis    Detailed Urologic History     - please refer to HPI    Review of Systems     Review of Systems  10-14 point system noted,, reviewed in the EMR; otherwise negative        Allergies     No Known Allergies    Physical Exam     Physical Exam    General:  Elderly frail female in no acute distress  HEENT: Normocephalic, atraumatic  Neck:  Range of motion adequate  Neuro:  Awake, alert  Psych:  Patient pleasant, cooperative, displaying appropriate mood and affect    Vital Signs  Vitals:    09/27/21 1040   BP: 144/68   Weight: 66 7 kg (147 lb)   Height: 5' 3" (1 6 m)         Current Medications       Current Outpatient Medications:     aspirin-acetaminophen-caffeine (EXCEDRIN MIGRAINE) 250-250-65 MG per tablet, Take 1 tablet by mouth every 6 (six) hours as needed for headaches, Disp: , Rfl:     methylPREDNISolone 4 MG tablet therapy pack, Use as directed on package, Disp: 21 each, Rfl: 0    nystatin (MYCOSTATIN) powder, Apply topically 3 (three) times a day, Disp: 15 g, Rfl: 4    nystatin powder, apply to affected area three times a day, Disp: 15 g, Rfl: 4    pantoprazole (PROTONIX) 40 mg tablet, Take 1 tablet (40 mg total) by mouth 2 (two) times a day before meals, Disp: , Rfl: 0    traMADol (ULTRAM) 50 mg tablet, Take 1 tablet (50 mg total) by mouth every 8 (eight) hours as needed for moderate pain, Disp: 30 tablet, Rfl: 1    gabapentin (NEURONTIN) 300 mg capsule, Take 1 capsule (300 mg total) by mouth 3 (three) times a day, Disp: 90 capsule, Rfl: 0    potassium chloride (K-DUR,KLOR-CON) 20 mEq tablet, Take 1 tablet (20 mEq total) by mouth daily, Disp: 30 tablet, Rfl: 0      Active Problems     Patient Active Problem List   Diagnosis    Other ascites    Lesion of liver    Edema leg    Ovarian cancer (HCC)    Cholecystitis    Intractable nausea and vomiting    Bowel perforation (HCC)    Septic shock (HCC)    Peritonitis (HCC)    S/P ileostomy (HCC)    Acute blood loss anemia    Leukocytosis    Hypokalemia    Stoma dermatitis    Dehydration    Deep venous thrombosis of right profunda femoris vein (HCC)    Neuropathy    Encounter for central line care    Anemia    SBO (small bowel obstruction) (HCC)    Acute kidney injury (HCC)    Metabolic acidosis    Right lower quadrant abdominal pain    Surgical wound infection    Mild protein-calorie malnutrition (HCC)    Granulation tissue    Chemotherapy induced neutropenia (HCC)    Intestinal adhesions with partial obstruction (HCC)    Anemia due to chemotherapy         Past Medical History     Past Medical History:   Diagnosis Date    Abdominal fluid collection     Anemia     Asthma     Cancer ([facility] [address] )     ovaries    Diabetes mellitus ([facility] [address] )     History of transfusion     PONV (postoperative nausea and vomiting)          Surgical History     Past Surgical History:   Procedure Laterality Date    ABDOMINAL SURGERY      CHOLECYSTECTOMY N/A 10/20/2019    Procedure: CHOLECYSTECTOMY, open;  Surgeon: Balbir Mary MD;  Location: [facility];  Service: General    CT GUIDED PARACENTESIS  11/6/2018    CT GUIDED PERC DRAINAGE CATHETER PLACEMENT  12/24/2018    CT NEEDLE BIOPSY PERITONEUM  11/6/2018    CYSTOSCOPY N/A 9/10/2019    Procedure: CYSTOSCOPY , INSERTION URETERAL CATHETERS;  Surgeon: Ford Amaya MD;  [facility];  Service: Gynecology Oncology    ECTOPIC PREGNANCY SURGERY      ECTOPIC PREGNANCY SURGERY      EXENTERATION PELVIS N/A 9/10/2019    Procedure: EXPLORATORY LAPAROTOMY, EXENTERATION POSTERIOR PELVIS,  END COLOSTOMY, ILEOSTOMY REVERSAL, LYSIS OF ADHESIONS, rADICAL OMENTECTOMY AND TUMOR DEBULKINGFLEXIBLE SIGMOIDOSCOPY, CYSTOGRAM, INSPECTION OF URETERS;  Surgeon: Ford Amaya MD;  [facility];  Service: Gynecology Oncology    IR PARACENTESIS  9/18/2018    IR PARACENTESIS  10/18/2018    IR PARACENTESIS  12/10/2018    IR PORT PLACEMENT  11/29/2018    IR THORACENTESIS  9/16/2019    LAPAROTOMY N/A 12/14/2018    Procedure: LAPAROTOMY EXPLORATORY; Abdominal Washout; Application of Abthera Vac Dressing;  Surgeon: Rohith Coley MD;  [facility];  Service: Gynecology Oncology    LAPAROTOMY N/A 12/15/2018    Procedure: LAPAROTOMY EXPLORATORY, ABDOMINAL WASHOUT, DRAIN PLACEMENT x 4, DIVERTING LOOP ILEOSTOMY AND ABDOMINAL CLOSURE,  ALL OTHER INDICATED PROCEDURES;  Surgeon: Rohith Coley MD;  [facility];  Service: Gynecology Oncology    IL COLONOSCOPY FL[facility] DX W[facility] WHEN PFRMD N/A 9/25/2018    Procedure: EGD AND COLONOSCOPY;  Surgeon: Rosy Stone MD;  Location: [facility];   Service: Gastroenterology    TONSILECTOMY AND ADNOIDECTOMY      TONSILLECTOMY           Family History     Family History   Problem Relation Age of Onset    Cirrhosis Mother     Colon cancer Mother     Leukemia Cousin     Diabetes Father          Social History     Social History     Social History     Tobacco Use   Smoking Status Former Smoker   Smokeless Tobacco Never Used   Tobacco Comment    "Quit 40 yrs ago"         Pertinent Lab Values     Lab Results   Component Value Date    CREATININE 2 09 (H) 09/17/2021       No results found for: PSA    @RESULTRCNT(1H])@      Pertinent Imaging     Study Result    Narrative & Impression   CT CHEST, ABDOMEN AND PELVIS WITHOUT IV CONTRAST     INDICATION:   C56 9: Malignant neoplasm of unspecified ovary  Recurrent ovarian cancer status post therapy      COMPARISON:  CT chest abdomen pelvis 4/5/2021     TECHNIQUE: CT examination of the chest, abdomen and pelvis was performed without intravenous contrast   Axial, sagittal, and coronal 2D reformatted images were created from the source data and submitted for interpretation      Radiation dose length product (DLP) for this visit:  736 mGy-cm   This examination, like all CT scans performed in the [facility], was performed utilizing techniques to minimize radiation dose exposure, including the use of iterative   reconstruction and automated exposure control       Enteric contrast was administered       FINDINGS:  Limited exam without IV contrast contrast particularly for soft tissue evaluation      CHEST     LUNGS:  Stable linear atelectasis versus scarring in the left upper lobe slightly nodular in appearance but unchanged  Additional areas of scattered linear atelectasis versus scarring bilaterally      Scattered emphysematous changes      Scattered calcified granuloma      New 6 mm pleural-based nodule at the right lung base centrally image 74 series 205      PLEURA:  Unremarkable      HEART/GREAT VESSELS:  Mild/moderate coronary calcifications  Heart is normal size  Thoracic aorta is normal caliber with scattered wall calcification      MEDIASTINUM AND GEMMA:  Right-sided Mediport line tip terminates at the SVC      CHEST WALL AND LOWER NECK:   Stable small calcified nodule at the right thyroid gland      ABDOMEN     LIVER/BILIARY TREE:  Multiple vague hypodense lesions in the liver suspicious for metastasis        *  Previously identified segment 6 lesion is not as well seen without IV contrast, now approximately 1 8 cm in size, not a significant change, previously measured as 1 9 cm  See image 62 series 201  *  Previously identified segment 7 lesion now measures up to 3 4 cm image 56 series 201, larger previously 1 6 cm  *  A lesion at the dome of the liver, segment 7, measures 2 9 x 2 1 cm image 39 series 201, larger, previously 1 9 x 0 8 cm      GALLBLADDER:  Gallbladder is surgically absent      SPLEEN:  Splenic hypodense lesions again noted not as well seen without IV contrast   Dominant lesion posteriorly with mild peripheral calcification measures up to 3 5 cm, stable previously 3 6 cm      PANCREAS:  Unremarkable      ADRENAL GLANDS:  Unremarkable      KIDNEYS/URETERS:  New moderate to severe hydroureteronephrosis bilaterally down to the mid to distal ureteral level where there is extensive retroperitoneal adenopathy      STOMACH AND BOWEL:  Left lower quadrant ostomy identified  Questionable areas of mild to moderate circumferential colonic wall thickening, question reactive related to the ascites but colitis is not excluded  No bowel obstruction  Large parastomal   hernia containing bowel      APPENDIX:  No findings to suggest appendicitis      ABDOMINOPELVIC CAVITY:  Scattered intraperitoneal nodules suspicious for peritoneal metastasis  6 mm nodule noted along the left paracolic gutter on image 62 series 201, stable      *  New aortocaval lymph node measures 2 2 x 1 1 cm image 60 series 201    *  Enlarged left external iliac chain lymph node measures 4 2 x 3 8 cm image 93 series 201, previously 3 9 x 3 2 cm  *  Enlarged mesenteric lymph nodes again seen some of which are partially calcified  Partially calcified mesenteric lymph node centrally measures 2 0 x 1 4 cm image 74 series 201, stable previously 1 9 x 1 5 cm  Enlarging mesenteric lymph node more   anteriorly measures up to 1 2 cm image 75 series 201, previously 9 mm  *  Bulky right common iliac chain lymph node measures approximately 4 6 x 2 8 cm image 82 series 201, previously approximately 2 5 x 1 8 cm  These enlarged common iliac chain lymph nodes likely cause ureteral obstruction  *  A large left pelvic sidewall lymph node measures 3 8 x 2 7 cm image 97 series 201, previously 2 1 x 1 4 cm  *  Soft tissue fullness in the micaela hepatis, not well-defined  Previously seen was enlarged lymph node here      Mild ascites      VESSELS:  Atherosclerotic changes are present  No evidence of aneurysm      PELVIS     REPRODUCTIVE ORGANS:  Surgical changes of prior hysterectomy      URINARY BLADDER:  Bladder extends posteriorly to the pararectal space      ABDOMINAL WALL/INGUINAL REGIONS:  Left lower quadrant ostomy  Associated herniation of bowel  Additional ventral wall hernia in the lower abdominal wall on the right containing bowel and ascites without obstruction      OSSEOUS STRUCTURES:  No acute fracture or destructive osseous lesion  Mild anterolisthesis of L4 on L5  Multilevel degenerative spurring of the spine  Mild superior endplate deformity at L3 is stable      IMPRESSION:     1  Overall findings concerning for disease progression  2   New 6 mm pleural-based nodule at the right lung base centrally may represent metastasis  3  Multiple vague hypodense lesions in the liver suspicious for metastasis  Overall these appear to have progressed  4   Progression of retroperitoneal, mesenteric and pelvic lymphadenopathy    Scattered intraperitoneal nodularity suspicious for peritoneal metastasis  5   New moderate to severe hydroureteronephrosis bilaterally down to the mid to distal ureteral level where there is extensive retroperitoneal adenopathy likely the cause of obstruction  6   Questionable areas of mild to moderate circumferential colonic wall thickening, question reactive related to the ascites but colitis is not excluded           The study was marked in EPIC for immediate notification  Portions of the record may have been created with voice recognition software  Occasional wrong word or "sound a like" substitutions may have occurred due to the inherent limitations of voice recognition software  Read the chart carefully and recognize, using context, where substitutions have occurred

## 2021-09-28 NOTE — TELEPHONE ENCOUNTER
Dr Jocelynn Ashford, patient was seen yesterday in office with you  I hade received in take but do not see surgical order placed  Pls place order if patient to be sched w us in OR or please place referral if patient is to be seen by IR   Thank you

## 2021-09-29 PROBLEM — E86.0 DEHYDRATION: Status: ACTIVE | Noted: 2021-01-01

## 2021-09-29 NOTE — PROGRESS NOTES
Good afternoon Dr Jolly Butts; We are trying to get patient on OR schedule for tomorrow  Thank you for your letter    Carmen Costello MD

## 2021-09-29 NOTE — TELEPHONE ENCOUNTER
Spoke w patient and she is sched for tomorrow w Dr Simon Both at the [facility]  She is aware the hospital will contact her w time of arrival  She thanked me for the call

## 2021-09-29 NOTE — LETTER
September 29, 2021     Natalya Granger MD  [address]  [address]  [address] [facility]    Patient: Eladia Brush   YOB: 1950   Date of Visit: 9/29/2021       Dear Dr aLcie Chairez: Thank you for referring Eladia Brush to me for evaluation  Below are my notes for this consultation  If you have questions, please do not hesitate to call me  I look forward to following your patient along with you  Sincerely,        Mamta Arteaga MD        CC: No Recipients  Mamta Arteaga MD  9/29/2021 10:55 AM  Incomplete  Assessment/Plan:    Problem List Items Addressed This Visit        Endocrine    Ovarian cancer [facility]) - Primary      Patient is very pleasant 69-year-old female with history of stage IV ovarian cancer now approximately 3 years post diagnosis  The patient is having progression of disease after recent treatment with cyclophosphamide and  Keytruda  The patient has active bowel obstruction which is partial and slowly resolving but the patient is taking minimal p o  intake  This in the face of worsening renal function of recommended the patient be admitted for hydration  She would prefer outpatient  We will retry outpatient hydration and recheck her labs today  I have emailed Dr Severiano Darner from Urology to see if he can expedite the patient's stent placement to maximize her kidney function so that we can Rebegin her chemotherapy  We will see the patient back in a week for consideration of treatment  If she continues to have bowel obstructive symptoms  She will call and we will continue hydration   Or admission                   CHIEF COMPLAINT:  Consideration of cycle 2 topotecan      Problem:  Cancer Staging  Ovarian cancer [facility])  Staging form: Ovary, Fallopian Tube, Primary Peritoneal, AJCC 8th Edition  - Clinical stage from 11/14/2018: FIGO Stage IVB (cT3c, cN0, pM1b) - Signed by Mamta Arteaga MD on 11/14/2018  - Pathologic stage from 10/9/2019: FIGO Stage IIIC (pT3c, pN1, cM0) - Signed by Paulina Hunter MD on 10/9/2019        Previous therapy:  Oncology History   Ovarian cancer ([facility] [address] )   11/9/2018 Initial Diagnosis    Ovarian cancer ([facility] [address] )   tumor marker 194 5     11/9/2018 Biopsy    CT-guided needle biopsy of abdominal mass 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  Given liver involvement this would be stage IV     11/14/2018 - 12/4/2018 Chemotherapy    Neoadjuvant therapy: carboplatin area under the curve of 6, paclitaxel 135 milligrams/meter squared, Avastin 10 milligrams/kilogram  Completed 1 of 3 cycles  12/14/2018 Surgery    LAPAROTOMY EXPLORATORY; Abdominal Washout; Application of Abthera Vac Dressing for suspected bowel perforation and septic shock        12/15/2018 Surgery    LAPAROTOMY EXPLORATORY, ABDOMINAL WASHOUT, DRAIN PLACEMENT x 4, DIVERTING LOOP ILEOSTOMY AND ABDOMINAL CLOSURE for bowel perforation     3/26/2019 -  Chemotherapy    Taxol weekly 80 mg/m2 for 3 consecutive weeks, may add carboplatin in the future with AUC of 5      3/26/2019 - 2/3/2020 Chemotherapy    palonosetron (ALOXI) injection 0 25 mg, 0 25 mg, Intravenous, Once, 6 of 6 cycles  Administration: 0 25 mg (5/28/2019), 0 25 mg (6/25/2019), 0 25 mg (11/6/2019), 0 25 mg (12/10/2019), 0 25 mg (1/7/2020)  fosaprepitant (EMEND) 150 mg in sodium chloride 0 9 % 255 mL IVPB, 150 mg, Intravenous, Once, 6 of 6 cycles  Administration: 150 mg (5/28/2019), 150 mg (6/25/2019), 150 mg (11/6/2019), 150 mg (12/10/2019), 150 mg (1/7/2020)  CARBOplatin (PARAPLATIN) in sodium chloride 0 9 % 250 mL IVPB,  (original dose ), Intravenous, Once, 6 of 6 cycles  Dose modification:   (Cycle 2),   (original dose 258 4 mg, Cycle 3),   (original dose 258 4 mg, Cycle 3),   (original dose 244 4 mg, Cycle 4)  Administration: 258 4 mg (5/28/2019), 244 4 mg (6/25/2019), 285 2 mg (11/6/2019), 296 4 mg (12/10/2019), 286 4 mg (1/7/2020)  PACLitaxel (TAXOL) 127 8 mg in sodium chloride 0 9 % 250 mL chemo IVPB, 80 mg/m2, Intravenous, Once, 7 of 7 cycles  Dose modification: 65 mg/m2 (original dose 80 mg/m2, Cycle 2, Reason: Dose Not Tolerated)  Administration: 103 8 mg (5/14/2019), 108 6 mg (5/28/2019), 108 6 mg (6/4/2019), 108 6 mg (6/11/2019), 109 8 mg (6/25/2019), 109 8 mg (7/2/2019), 109 8 mg (7/9/2019), 111 6 mg (11/6/2019), 111 6 mg (11/12/2019), 111 6 mg (11/19/2019), 113 4 mg (12/10/2019), 113 4 mg (12/17/2019), 113 4 mg (12/23/2019), 113 4 mg (1/7/2020), 114 6 mg (1/14/2020), 114 6 mg (1/21/2020)     4/29/2019 [address] [place] [address],[address] 1 testing revealed a PD L1 tumor proportions score of 0%  The patient is not a candidate for PD L1 manipulation      Genetic Testing    [employer] Breast/Gyn panel, wildtype  9/10/2019 Surgery    Exploratory laparotomy radical omentectomy, posterior exenteration, takedown of ileostomy and end colostomy for complete debulking of visible tumor  Final pathology report revealed high-grade serous malignancy in both the omentum and the pelvic mass     9/25/2019 -  Chemotherapy    Carboplatin area under the curve of 5+ weekly paclitaxel at 80 milligrams/meters squared for 3 further cycles of treatment followed by consolidation this is to begin mid October     10/2019 Surgery    Interval debulking with TAHBSO revision of colostomy omentectomy and tumor debulking with complete debulking  Several weeks postoperatively the patient required a open cholecystectomy       10/2019 - 12/2019 Chemotherapy    Carboplatin paclitaxel x3     10/9/2019 -  Cancer Staged    Staging form: Ovary, Fallopian Tube, Primary Peritoneal, AJCC 8th Edition  - Pathologic stage from 10/9/2019: FIGO Stage IIIC (pT3c, pN1, cM0) - Signed by Crystal Hernandez MD on 10/9/2019  Histologic grade (G): G3  Histologic grading system: 4 grade system  Gross residual tumor after primary cyto-reductive surgery: Absent       3/24/2020 - 9/21/2020 Chemotherapy    palonosetron (ALOXI) injection 0 25 mg, 0 25 mg, Intravenous, Once, 6 of 7 cycles  Administration: 0 25 mg (3/24/2020), 0 25 mg (4/21/2020), 0 25 mg (5/19/2020), 0 25 mg (6/23/2020), 0 25 mg (7/28/2020), 0 25 mg (8/25/2020)  pegfilgrastim (NEULASTA ONPRO) subcutaneous injection kit 6 mg, 6 mg, Subcutaneous, Once, 6 of 7 cycles  Administration: 6 mg (3/31/2020), 6 mg (4/28/2020), 6 mg (5/26/2020), 6 mg (6/29/2020), 6 mg (8/4/2020), 6 mg (9/1/2020)  DOXOrubicin liposome (DOXIL) 54 3 mg in dextrose 5 % 250 mL chemo infusion, 30 mg/m2 = 54 3 mg, Intravenous, Once, 6 of 7 cycles  Administration: 54 3 mg (3/24/2020), 54 6 mg (4/21/2020), 54 9 mg (5/19/2020), 54 6 mg (6/23/2020), 54 9 mg (7/28/2020), 54 9 mg (8/25/2020)  gemcitabine (GEMZAR) 1,176 4 mg in sodium chloride 0 9 % 250 mL infusion, 650 mg/m2 = 1,176 4 mg, Intravenous, Once, 6 of 7 cycles  Administration: 1,176 4 mg (3/24/2020), 1,176 4 mg (3/31/2020), 1,182 9 mg (4/21/2020), 1,200 mg (4/28/2020), 1,189 4 mg (5/19/2020), 1,189 4 mg (5/26/2020), 1,182 9 mg (6/23/2020), 1,182 9 mg (6/29/2020), 1,189 4 mg (7/28/2020), 1,189 4 mg (8/4/2020), 1,189 4 mg (8/25/2020), 1,195 8 mg (9/1/2020)     1/12/2021 - 1/12/2021 Chemotherapy    palonosetron (ALOXI) injection 0 25 mg, 0 25 mg, Intravenous, Once, 0 of 6 cycles  fosaprepitant (EMEND) 150 mg in sodium chloride 0 9 % 250 mL IVPB, 150 mg, Intravenous, Once, 0 of 6 cycles  CARBOplatin (PARAPLATIN) in sodium chloride 0 9 % 250 mL IVPB, , Intravenous, Once, 0 of 6 cycles  PACLitaxel (TAXOL) 246 mg in sodium chloride 0 9 % 500 mL chemo IVPB, 135 mg/m2 = 246 mg (77 1 % of original dose 175 mg/m2), Intravenous, Once, 0 of 6 cycles  Dose modification: 135 mg/m2 (original dose 175 mg/m2, Cycle 1, Reason: Other (See Comments), Comment: heavily pretreated)     1/12/2021 - 5/24/2021 Chemotherapy    palonosetron (ALOXI) injection 0 25 mg, 0 25 mg, Intravenous, Once, 4 of 4 cycles  Administration: 0 25 mg (1/12/2021), 0 25 mg (3/2/2021), 0 25 mg (3/23/2021), 0 25 mg (4/27/2021)  fosaprepitant (EMEND) 150 mg in sodium chloride 0 9 % 250 mL IVPB, 150 mg, Intravenous, Once, 4 of 4 cycles  Administration: 150 mg (1/12/2021), 150 mg (3/2/2021), 150 mg (3/23/2021), 150 mg (4/27/2021)  CARBOplatin (PARAPLATIN) 311 mg in sodium chloride 0 9 % 250 mL IVPB, 311 mg, Intravenous, Once, 4 of 4 cycles  Administration: 311 mg (1/12/2021), 330 mg (3/2/2021), 338 mg (3/23/2021), 313 mg (4/27/2021)  PACLitaxel (TAXOL) 91 2 mg in sodium chloride 0 9 % 250 mL chemo IVPB, 50 mg/m2 = 91 2 mg (62 5 % of original dose 80 mg/m2), Intravenous, Once, 4 of 5 cycles  Dose modification: 50 mg/m2 (original dose 80 mg/m2, Cycle 1, Reason: Other (See Comments))  Administration: 91 2 mg (1/12/2021), 90 6 mg (1/19/2021), 89 4 mg (3/2/2021), 91 2 mg (3/9/2021), 91 2 mg (3/16/2021), 90 6 mg (3/23/2021), 90 6 mg (3/30/2021), 90 6 mg (4/6/2021), 90 mg (5/4/2021), 90 mg (5/11/2021), 90 mg (4/27/2021)     6/10/2021 - 8/30/2021 Chemotherapy    pembrolizumab (KEYTRUDA) IVPB, 200 mg, Intravenous, Once, 3 of 7 cycles  Administration: 200 mg (6/10/2021), 200 mg (6/29/2021), 200 mg (8/10/2021)     9/8/2021 -  Chemotherapy    Pegfilgrastim-bmez (ZIEXTENZO), 6 mg, Subcutaneous, Once, 0 of 3 cycles  topotecan (HYCAMTIN) chemo infusion, 3 mg/m2 = 5 3 mg, Intravenous, Once, 1 of 4 cycles  Administration: 5 3 mg (9/8/2021)           Patient ID: Vernon Stagegretchen is a 69 y o  female   Patient is very pleasant 76-year-old female with history of stage IV B recurrent metastatic ovarian cancer originally diagnosed in approximately October of 2018  She has undergone multiple cycles of chemotherapy treatments and surgeries  She has a stoma in place due to 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 at her initial cycle of treatment  As such the patient has not been a candidate for Avastin use  Most recently she was placed on metronomic cyclophosphamide plus  Keytruda however disease progressed    She was switched to topotecan alone which began this month  Unfortunately the patient's creatinine was elevated and a CT scan was performed revealing the following:   LIVER/BILIARY TREE:  Multiple vague hypodense lesions in the liver suspicious for metastasis        *  Previously identified segment 6 lesion is not as well seen without IV contrast, now approximately 1 8 cm in size, not a significant change, previously measured as 1 9 cm  See image 62 series 201  *  Previously identified segment 7 lesion now measures up to 3 4 cm image 56 series 201, larger previously 1 6 cm  *  A lesion at the dome of the liver, segment 7, measures 2 9 x 2 1 cm image 39 series 201, larger, previously 1 9 x 0 8 cm      GALLBLADDER:  Gallbladder is surgically absent      SPLEEN:  Splenic hypodense lesions again noted not as well seen without IV contrast   Dominant lesion posteriorly with mild peripheral calcification measures up to 3 5 cm, stable previously 3 6 cm      PANCREAS:  Unremarkable      ADRENAL GLANDS:  Unremarkable      KIDNEYS/URETERS:  New moderate to severe hydroureteronephrosis bilaterally down to the mid to distal ureteral level where there is extensive retroperitoneal adenopathy      STOMACH AND BOWEL:  Left lower quadrant ostomy identified  Questionable areas of mild to moderate circumferential colonic wall thickening, question reactive related to the ascites but colitis is not excluded  No bowel obstruction  Large parastomal   hernia containing bowel      APPENDIX:  No findings to suggest appendicitis      ABDOMINOPELVIC CAVITY:  Scattered intraperitoneal nodules suspicious for peritoneal metastasis  6 mm nodule noted along the left paracolic gutter on image 62 series 201, stable      *  New aortocaval lymph node measures 2 2 x 1 1 cm image 60 series 201  *  Enlarged left external iliac chain lymph node measures 4 2 x 3 8 cm image 93 series 201, previously 3 9 x 3 2 cm    *  Enlarged mesenteric lymph nodes again seen some of which are partially calcified  Partially calcified mesenteric lymph node centrally measures 2 0 x 1 4 cm image 74 series 201, stable previously 1 9 x 1 5 cm  Enlarging mesenteric lymph node more   anteriorly measures up to 1 2 cm image 75 series 201, previously 9 mm  *  Bulky right common iliac chain lymph node measures approximately 4 6 x 2 8 cm image 82 series 201, previously approximately 2 5 x 1 8 cm  These enlarged common iliac chain lymph nodes likely cause ureteral obstruction  *  A large left pelvic sidewall lymph node measures 3 8 x 2 7 cm image 97 series 201, previously 2 1 x 1 4 cm  *  Soft tissue fullness in the micaela hepatis, not well-defined  Previously seen was enlarged lymph node here      Mild ascites      VESSELS:  Atherosclerotic changes are present  No evidence of aneurysm      PELVIS     REPRODUCTIVE ORGANS:  Surgical changes of prior hysterectomy      URINARY BLADDER:  Bladder extends posteriorly to the pararectal space      ABDOMINAL WALL/INGUINAL REGIONS:  Left lower quadrant ostomy  Associated herniation of bowel  Additional ventral wall hernia in the lower abdominal wall on the right containing bowel and ascites without obstruction      OSSEOUS STRUCTURES:  No acute fracture or destructive osseous lesion  Mild anterolisthesis of L4 on L5  Multilevel degenerative spurring of the spine  Mild superior endplate deformity at L3 is stable      IMPRESSION:     1  Overall findings concerning for disease progression  2   New 6 mm pleural-based nodule at the right lung base centrally may represent metastasis  3  Multiple vague hypodense lesions in the liver suspicious for metastasis  Overall these appear to have progressed  4   Progression of retroperitoneal, mesenteric and pelvic lymphadenopathy  Scattered intraperitoneal nodularity suspicious for peritoneal metastasis    5   New moderate to severe hydroureteronephrosis bilaterally down to the mid to distal ureteral level where there is extensive retroperitoneal adenopathy likely the cause of obstruction  6   Questionable areas of mild to moderate circumferential colonic wall thickening, question reactive related to the ascites but colitis is not excluded  This CT scan was compared to a CT scan back in April of 2021  The patient has been referred to Urology for placement of stents  She has seen them on Monday but the placement has not been scheduled yet  Her most recent creatinine remains elevated at 2 09  I have reviewed the P patient's weekly CBC and CPM P data  Due to elevation in creatinine the patient is not an active candidate for chemotherapy  The remainder of the blood work appears unremarkable  The patient's hemoglobin dropped to as low as 6 2 and did require transfusion  It is presently 8  Her white blood cell count and platelets were are stable for continued treatment  The patient has been experiencing bowel obstructive symptoms for week  She has last vomited approximately 5 days ago  Her p o  intake has been minimal since that time  She would prefer outpatient hydration rather than inpatient hospitalization at this time  The patient's rash has been treated with p o  Benadryl as well as steroids  This is improved but sometimes still itchy  The patient notes that the rash is still present on her flank      The following portions of the patient's history were reviewed and updated as appropriate: allergies, current medications, past medical history, past social history, past surgical history and problem list     Review of Systems   Constitutional: Positive for fatigue  HENT: Negative  Eyes: Negative  Respiratory: Negative  Cardiovascular: Negative  Gastrointestinal: Positive for abdominal pain and constipation  Endocrine: Negative  Genitourinary: Negative  Musculoskeletal: Negative  Skin: Negative  Neurological: Negative  Hematological: Negative  Psychiatric/Behavioral: Negative  Current Outpatient Medications   Medication Sig Dispense Refill    aspirin-acetaminophen-caffeine (EXCEDRIN MIGRAINE) 250-250-65 MG per tablet Take 1 tablet by mouth every 6 (six) hours as needed for headaches      methylPREDNISolone 4 MG tablet therapy pack Use as directed on package 21 each 0    nystatin (MYCOSTATIN) powder Apply topically 3 (three) times a day 15 g 4    nystatin powder apply to affected area three times a day 15 g 4    pantoprazole (PROTONIX) 40 mg tablet Take 1 tablet (40 mg total) by mouth 2 (two) times a day before meals  0    traMADol (ULTRAM) 50 mg tablet Take 1 tablet (50 mg total) by mouth every 8 (eight) hours as needed for moderate pain 30 tablet 1    gabapentin (NEURONTIN) 300 mg capsule Take 1 capsule (300 mg total) by mouth 3 (three) times a day 90 capsule 0    potassium chloride (K-DUR,KLOR-CON) 20 mEq tablet Take 1 tablet (20 mEq total) by mouth daily 30 tablet 0     No current facility-administered medications for this visit  Objective:    Blood pressure 104/78, pulse 93, temperature 97 5 °F (36 4 °C), resp  rate 16, height 5' 3" (1 6 m), weight 67 1 kg (148 lb), SpO2 97 %, not currently breastfeeding  Body mass index is 26 22 kg/m²  Body surface area is 1 7 meters squared  Physical Exam  Vitals reviewed  Constitutional:       Appearance: She is well-developed  She is ill-appearing  Comments: No significant rash noted  HENT:      Head: Normocephalic and atraumatic  Neck:      Thyroid: No thyromegaly  Cardiovascular:      Rate and Rhythm: Normal rate and regular rhythm  Heart sounds: Normal heart sounds  Pulmonary:      Effort: Pulmonary effort is normal       Breath sounds: Normal breath sounds  Abdominal:      General: There is distension  Palpations: Abdomen is soft  Comments: Well healedMidline incisions  Abdomen distended and tympanic  Some gas in stoma appliance    No significant tenderness noted  Genitourinary:     Comments: Refused    Musculoskeletal:         General: Normal range of motion  Cervical back: Normal range of motion and neck supple  Lymphadenopathy:      Cervical: No cervical adenopathy  Skin:     General: Skin is warm and dry  Neurological:      Mental Status: She is alert and oriented to person, place, and time     Psychiatric:         Behavior: Behavior normal          Lab Results   Component Value Date     225 6 (H) 09/03/2021     Lab Results   Component Value Date    K 4 1 09/17/2021     09/17/2021    CO2 21 09/17/2021    BUN 34 (H) 09/17/2021    CREATININE 2 09 (H) 09/17/2021    GLUCOSE 228 (H) 09/10/2019    GLUF 122 (H) 09/17/2021    CALCIUM 8 4 09/17/2021    CORRECTEDCA 9 3 09/17/2021    AST 25 09/17/2021    ALT 17 09/17/2021    ALKPHOS 150 (H) 09/17/2021    EGFR 23 09/17/2021     Lab Results   Component Value Date    WBC 5 46 09/17/2021    HGB 8 1 (L) 09/17/2021    HCT 25 2 (L) 09/17/2021    MCV 87 09/17/2021     (L) 09/17/2021     Lab Results   Component Value Date    NEUTROABS 3 87 09/17/2021        Trend:  Lab Results   Component Value Date     225 6 (H) 09/03/2021     210 2 (H) 08/27/2021     198 4 (H) 08/21/2021     135 0 (H) 08/10/2021     117 1 (H) 07/09/2021     116 0 (H) 07/02/2021     113 5 (H) 06/25/2021     131 7 (H) 06/18/2021     103 1 (H) 05/28/2021     91 0 (H) 05/14/2021     90 1 (H) 05/07/2021     62 8 (H) 04/16/2021     60 8 (H) 04/02/2021     110 8 (H) 03/12/2021     101 9 (H) 03/02/2021     75 8 (H) 02/12/2021     85 7 (H) 01/22/2021     102 0 (H) 12/05/2020     77 1 (H) 11/30/2020     45 7 (H) 10/16/2020     39 6 (H) 09/11/2020     44 9 (H) 09/01/2020     29 2 06/15/2020     27 4 06/10/2020     32 5 (H) 05/26/2020     28 6 05/15/2020     30 6 (H) 05/11/2020     36 5 (H) 04/27/2020     37 9 (H) 04/17/2020     42 5 (H) 04/10/2020     48 1 (H) 03/27/2020     40 9 (H) 03/20/2020     18 2 01/28/2020     17 5 12/31/2019     14 5 12/02/2019     22 0 11/19/2019     32 6 (H) 11/06/2019     19 1 08/30/2019     12 7 08/13/2019     13 9 06/18/2019     19 9 05/21/2019     79 5 (H) 04/16/2019     96 4 (H) 03/19/2019     80 4 (H) 03/08/2019     135 0 (H) 11/30/2018     194 5 (H) 10/18/2018                  Shekhar Shah MD  9/29/2021 10:41 AM  Incomplete  Assessment/Plan:    Problem List Items Addressed This Visit        Endocrine    Ovarian cancer ([facility] [address] ) - Primary            CHIEF COMPLAINT:  Consideration of cycle 2 topotecan      Problem:  Cancer Staging  Ovarian cancer [facility])  Staging form: Ovary, Fallopian Tube, Primary Peritoneal, AJCC 8th Edition  - Clinical stage from 11/14/2018: FIGO Stage IVB (cT3c, cN0, pM1b) - Signed by Shekhar Shah MD on 11/14/2018  - Pathologic stage from 10/9/2019: FIGO Stage IIIC (pT3c, pN1, cM0) - Signed by Shekhar Shah MD on 10/9/2019        Previous therapy:  Oncology History   Ovarian cancer ([facility] [address] )   11/9/2018 Initial Diagnosis    Ovarian cancer ([facility] [address] )   tumor marker 194 5     11/9/2018 Biopsy    CT-guided needle biopsy of abdominal mass 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  Given liver involvement this would be stage IV     11/14/2018 - 12/4/2018 Chemotherapy    Neoadjuvant therapy: carboplatin area under the curve of 6, paclitaxel 135 milligrams/meter squared, Avastin 10 milligrams/kilogram  Completed 1 of 3 cycles  12/14/2018 Surgery    LAPAROTOMY EXPLORATORY; Abdominal Washout; Application of Abthera Vac Dressing for suspected bowel perforation and septic shock        12/15/2018 Surgery    LAPAROTOMY EXPLORATORY, ABDOMINAL WASHOUT, DRAIN PLACEMENT x 4, DIVERTING LOOP ILEOSTOMY AND ABDOMINAL CLOSURE for bowel perforation     3/26/2019 -  Chemotherapy    Taxol weekly 80 mg/m2 for 3 consecutive weeks, may add carboplatin in the future with AUC of 5      3/26/2019 - 2/3/2020 Chemotherapy    palonosetron (ALOXI) injection 0 25 mg, 0 25 mg, Intravenous, Once, 6 of 6 cycles  Administration: 0 25 mg (5/28/2019), 0 25 mg (6/25/2019), 0 25 mg (11/6/2019), 0 25 mg (12/10/2019), 0 25 mg (1/7/2020)  fosaprepitant (EMEND) 150 mg in sodium chloride 0 9 % 255 mL IVPB, 150 mg, Intravenous, Once, 6 of 6 cycles  Administration: 150 mg (5/28/2019), 150 mg (6/25/2019), 150 mg (11/6/2019), 150 mg (12/10/2019), 150 mg (1/7/2020)  CARBOplatin (PARAPLATIN) in sodium chloride 0 9 % 250 mL IVPB,  (original dose ), Intravenous, Once, 6 of 6 cycles  Dose modification:   (Cycle 2),   (original dose 258 4 mg, Cycle 3),   (original dose 258 4 mg, Cycle 3),   (original dose 244 4 mg, Cycle 4)  Administration: 258 4 mg (5/28/2019), 244 4 mg (6/25/2019), 285 2 mg (11/6/2019), 296 4 mg (12/10/2019), 286 4 mg (1/7/2020)  PACLitaxel (TAXOL) 127 8 mg in sodium chloride 0 9 % 250 mL chemo IVPB, 80 mg/m2, Intravenous, Once, 7 of 7 cycles  Dose modification: 65 mg/m2 (original dose 80 mg/m2, Cycle 2, Reason: Dose Not Tolerated)  Administration: 103 8 mg (5/14/2019), 108 6 mg (5/28/2019), 108 6 mg (6/4/2019), 108 6 mg (6/11/2019), 109 8 mg (6/25/2019), 109 8 mg (7/2/2019), 109 8 mg (7/9/2019), 111 6 mg (11/6/2019), 111 6 mg (11/12/2019), 111 6 mg (11/19/2019), 113 4 mg (12/10/2019), 113 4 mg (12/17/2019), 113 4 mg (12/23/2019), 113 4 mg (1/7/2020), 114 6 mg (1/14/2020), 114 6 mg (1/21/2020)     4/29/2019 Genomic Testing     Foundation 1 testing revealed a PD L1 tumor proportions score of 0%  The patient is not a candidate for PD L1 manipulation      Genetic Testing    [employer] Breast/Gyn panel, wildtype       9/10/2019 Surgery    Exploratory laparotomy radical omentectomy, posterior exenteration, takedown of ileostomy and end colostomy for complete debulking of visible tumor  Final pathology report revealed high-grade serous malignancy in both the omentum and the pelvic mass     9/25/2019 -  Chemotherapy    Carboplatin area under the curve of 5+ weekly paclitaxel at 80 milligrams/meters squared for 3 further cycles of treatment followed by consolidation this is to begin mid October     10/2019 Surgery    Interval debulking with TAHBSO revision of colostomy omentectomy and tumor debulking with complete debulking  Several weeks postoperatively the patient required a open cholecystectomy       10/2019 - 12/2019 Chemotherapy    Carboplatin paclitaxel x3     10/9/2019 -  Cancer Staged    Staging form: Ovary, Fallopian Tube, Primary Peritoneal, AJCC 8th Edition  - Pathologic stage from 10/9/2019: FIGO Stage IIIC (pT3c, pN1, cM0) - Signed by Sabrina Escobar MD on 10/9/2019  Histologic grade (G): G3  Histologic grading system: 4 grade system  Gross residual tumor after primary cyto-reductive surgery: Absent       3/24/2020 - 9/21/2020 Chemotherapy    palonosetron (ALOXI) injection 0 25 mg, 0 25 mg, Intravenous, Once, 6 of 7 cycles  Administration: 0 25 mg (3/24/2020), 0 25 mg (4/21/2020), 0 25 mg (5/19/2020), 0 25 mg (6/23/2020), 0 25 mg (7/28/2020), 0 25 mg (8/25/2020)  pegfilgrastim (NEULASTA ONPRO) subcutaneous injection kit 6 mg, 6 mg, Subcutaneous, Once, 6 of 7 cycles  Administration: 6 mg (3/31/2020), 6 mg (4/28/2020), 6 mg (5/26/2020), 6 mg (6/29/2020), 6 mg (8/4/2020), 6 mg (9/1/2020)  DOXOrubicin liposome (DOXIL) 54 3 mg in dextrose 5 % 250 mL chemo infusion, 30 mg/m2 = 54 3 mg, Intravenous, Once, 6 of 7 cycles  Administration: 54 3 mg (3/24/2020), 54 6 mg (4/21/2020), 54 9 mg (5/19/2020), 54 6 mg (6/23/2020), 54 9 mg (7/28/2020), 54 9 mg (8/25/2020)  gemcitabine (GEMZAR) 1,176 4 mg in sodium chloride 0 9 % 250 mL infusion, 650 mg/m2 = 1,176 4 mg, Intravenous, Once, 6 of 7 cycles  Administration: 1,176 4 mg (3/24/2020), 1,176 4 mg (3/31/2020), 1,182 9 mg (4/21/2020), 1,200 mg (4/28/2020), 1,189 4 mg (5/19/2020), 1,189 4 mg (5/26/2020), 1,182 9 mg (6/23/2020), 1,182 9 mg (6/29/2020), 1,189 4 mg (7/28/2020), 1,189 4 mg (8/4/2020), 1,189 4 mg (8/25/2020), 1,195 8 mg (9/1/2020)     1/12/2021 - 1/12/2021 Chemotherapy    palonosetron (ALOXI) injection 0 25 mg, 0 25 mg, Intravenous, Once, 0 of 6 cycles  fosaprepitant (EMEND) 150 mg in sodium chloride 0 9 % 250 mL IVPB, 150 mg, Intravenous, Once, 0 of 6 cycles  CARBOplatin (PARAPLATIN) in sodium chloride 0 9 % 250 mL IVPB, , Intravenous, Once, 0 of 6 cycles  PACLitaxel (TAXOL) 246 mg in sodium chloride 0 9 % 500 mL chemo IVPB, 135 mg/m2 = 246 mg (77 1 % of original dose 175 mg/m2), Intravenous, Once, 0 of 6 cycles  Dose modification: 135 mg/m2 (original dose 175 mg/m2, Cycle 1, Reason: Other (See Comments), Comment: heavily pretreated)     1/12/2021 - 5/24/2021 Chemotherapy    palonosetron (ALOXI) injection 0 25 mg, 0 25 mg, Intravenous, Once, 4 of 4 cycles  Administration: 0 25 mg (1/12/2021), 0 25 mg (3/2/2021), 0 25 mg (3/23/2021), 0 25 mg (4/27/2021)  fosaprepitant (EMEND) 150 mg in sodium chloride 0 9 % 250 mL IVPB, 150 mg, Intravenous, Once, 4 of 4 cycles  Administration: 150 mg (1/12/2021), 150 mg (3/2/2021), 150 mg (3/23/2021), 150 mg (4/27/2021)  CARBOplatin (PARAPLATIN) 311 mg in sodium chloride 0 9 % 250 mL IVPB, 311 mg, Intravenous, Once, 4 of 4 cycles  Administration: 311 mg (1/12/2021), 330 mg (3/2/2021), 338 mg (3/23/2021), 313 mg (4/27/2021)  PACLitaxel (TAXOL) 91 2 mg in sodium chloride 0 9 % 250 mL chemo IVPB, 50 mg/m2 = 91 2 mg (62 5 % of original dose 80 mg/m2), Intravenous, Once, 4 of 5 cycles  Dose modification: 50 mg/m2 (original dose 80 mg/m2, Cycle 1, Reason: Other (See Comments))  Administration: 91 2 mg (1/12/2021), 90 6 mg (1/19/2021), 89 4 mg (3/2/2021), 91 2 mg (3/9/2021), 91 2 mg (3/16/2021), 90 6 mg (3/23/2021), 90 6 mg (3/30/2021), 90 6 mg (4/6/2021), 90 mg (5/4/2021), 90 mg (5/11/2021), 90 mg (4/27/2021)     6/10/2021 - 8/30/2021 Chemotherapy    pembrolizumab (KEYTRUDA) IVPB, 200 mg, Intravenous, Once, 3 of 7 cycles  Administration: 200 mg (6/10/2021), 200 mg (6/29/2021), 200 mg (8/10/2021)     9/8/2021 -  Chemotherapy    Pegfilgrastim-bmez (ZIEXTENZO), 6 mg, Subcutaneous, Once, 0 of 3 cycles  topotecan (HYCAMTIN) chemo infusion, 3 mg/m2 = 5 3 mg, Intravenous, Once, 1 of 4 cycles  Administration: 5 3 mg (9/8/2021)           Patient ID: Katalina Murillo is a 69 y o  female   Patient is very pleasant 76-year-old female with history of stage IV B recurrent metastatic ovarian cancer originally diagnosed in approximately October of 2018  She has undergone multiple cycles of chemotherapy treatments and surgeries  She has a stoma in place due to 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 at her initial cycle of treatment  As such the patient has not been a candidate for Avastin use  Most recently she was placed on metronomic cyclophosphamide plus  Keytruda however disease progressed  She was switched to topotecan alone which began this month  Unfortunately the patient's creatinine was elevated and a CT scan was performed revealing the following:   LIVER/BILIARY TREE:  Multiple vague hypodense lesions in the liver suspicious for metastasis        *  Previously identified segment 6 lesion is not as well seen without IV contrast, now approximately 1 8 cm in size, not a significant change, previously measured as 1 9 cm  See image 62 series 201  *  Previously identified segment 7 lesion now measures up to 3 4 cm image 56 series 201, larger previously 1 6 cm      *  A lesion at the dome of the liver, segment 7, measures 2 9 x 2 1 cm image 39 series 201, larger, previously 1 9 x 0 8 cm      GALLBLADDER:  Gallbladder is surgically absent      SPLEEN:  Splenic hypodense lesions again noted not as well seen without IV contrast   Dominant lesion posteriorly with mild peripheral calcification measures up to 3 5 cm, stable previously 3 6 cm      PANCREAS:  Unremarkable      ADRENAL GLANDS:  Unremarkable      KIDNEYS/URETERS:  New moderate to severe hydroureteronephrosis bilaterally down to the mid to distal ureteral level where there is extensive retroperitoneal adenopathy      STOMACH AND BOWEL:  Left lower quadrant ostomy identified  Questionable areas of mild to moderate circumferential colonic wall thickening, question reactive related to the ascites but colitis is not excluded  No bowel obstruction  Large parastomal   hernia containing bowel      APPENDIX:  No findings to suggest appendicitis      ABDOMINOPELVIC CAVITY:  Scattered intraperitoneal nodules suspicious for peritoneal metastasis  6 mm nodule noted along the left paracolic gutter on image 62 series 201, stable      *  New aortocaval lymph node measures 2 2 x 1 1 cm image 60 series 201  *  Enlarged left external iliac chain lymph node measures 4 2 x 3 8 cm image 93 series 201, previously 3 9 x 3 2 cm  *  Enlarged mesenteric lymph nodes again seen some of which are partially calcified  Partially calcified mesenteric lymph node centrally measures 2 0 x 1 4 cm image 74 series 201, stable previously 1 9 x 1 5 cm  Enlarging mesenteric lymph node more   anteriorly measures up to 1 2 cm image 75 series 201, previously 9 mm  *  Bulky right common iliac chain lymph node measures approximately 4 6 x 2 8 cm image 82 series 201, previously approximately 2 5 x 1 8 cm  These enlarged common iliac chain lymph nodes likely cause ureteral obstruction  *  A large left pelvic sidewall lymph node measures 3 8 x 2 7 cm image 97 series 201, previously 2 1 x 1 4 cm  *  Soft tissue fullness in the micaela hepatis, not well-defined  Previously seen was enlarged lymph node here      Mild ascites      VESSELS:  Atherosclerotic changes are present    No evidence of aneurysm      PELVIS     REPRODUCTIVE ORGANS:  Surgical changes of prior hysterectomy      URINARY BLADDER: Bladder extends posteriorly to the pararectal space      ABDOMINAL WALL/INGUINAL REGIONS:  Left lower quadrant ostomy  Associated herniation of bowel  Additional ventral wall hernia in the lower abdominal wall on the right containing bowel and ascites without obstruction      OSSEOUS STRUCTURES:  No acute fracture or destructive osseous lesion  Mild anterolisthesis of L4 on L5  Multilevel degenerative spurring of the spine  Mild superior endplate deformity at L3 is stable      IMPRESSION:     1  Overall findings concerning for disease progression  2   New 6 mm pleural-based nodule at the right lung base centrally may represent metastasis  3  Multiple vague hypodense lesions in the liver suspicious for metastasis  Overall these appear to have progressed  4   Progression of retroperitoneal, mesenteric and pelvic lymphadenopathy  Scattered intraperitoneal nodularity suspicious for peritoneal metastasis  5   New moderate to severe hydroureteronephrosis bilaterally down to the mid to distal ureteral level where there is extensive retroperitoneal adenopathy likely the cause of obstruction  6   Questionable areas of mild to moderate circumferential colonic wall thickening, question reactive related to the ascites but colitis is not excluded  This CT scan was compared to a CT scan back in April of 2021  The patient has been referred to Urology for placement of stents  She has seen them on Monday but the placement has not been scheduled yet  Her most recent creatinine remains elevated at 2 09  I have reviewed the P patient's weekly CBC and CPM P data  Due to elevation in creatinine the patient is not an active candidate for chemotherapy  The remainder of the blood work appears unremarkable  The patient's hemoglobin dropped to as low as 6 2 and did require transfusion  It is presently 8  Her white blood cell count and platelets were are stable for continued treatment        The following portions of the patient's history were reviewed and updated as appropriate: allergies, current medications, past medical history, past social history, past surgical history and problem list     Review of Systems    Current Outpatient Medications   Medication Sig Dispense Refill    aspirin-acetaminophen-caffeine (EXCEDRIN MIGRAINE) 250-250-65 MG per tablet Take 1 tablet by mouth every 6 (six) hours as needed for headaches      methylPREDNISolone 4 MG tablet therapy pack Use as directed on package 21 each 0    nystatin (MYCOSTATIN) powder Apply topically 3 (three) times a day 15 g 4    nystatin powder apply to affected area three times a day 15 g 4    pantoprazole (PROTONIX) 40 mg tablet Take 1 tablet (40 mg total) by mouth 2 (two) times a day before meals  0    traMADol (ULTRAM) 50 mg tablet Take 1 tablet (50 mg total) by mouth every 8 (eight) hours as needed for moderate pain 30 tablet 1    gabapentin (NEURONTIN) 300 mg capsule Take 1 capsule (300 mg total) by mouth 3 (three) times a day 90 capsule 0    potassium chloride (K-DUR,KLOR-CON) 20 mEq tablet Take 1 tablet (20 mEq total) by mouth daily 30 tablet 0     No current facility-administered medications for this visit  Objective:    Blood pressure 104/78, pulse 93, temperature 97 5 °F (36 4 °C), resp  rate 16, height 5' 3" (1 6 m), weight 67 1 kg (148 lb), SpO2 97 %, not currently breastfeeding  Body mass index is 26 22 kg/m²  Body surface area is 1 7 meters squared      Physical Exam    Lab Results   Component Value Date     225 6 (H) 09/03/2021     Lab Results   Component Value Date    K 4 1 09/17/2021     09/17/2021    CO2 21 09/17/2021    BUN 34 (H) 09/17/2021    CREATININE 2 09 (H) 09/17/2021    GLUCOSE 228 (H) 09/10/2019    GLUF 122 (H) 09/17/2021    CALCIUM 8 4 09/17/2021    CORRECTEDCA 9 3 09/17/2021    AST 25 09/17/2021    ALT 17 09/17/2021    ALKPHOS 150 (H) 09/17/2021    EGFR 23 09/17/2021     Lab Results   Component Value Date    WBC 5 46 09/17/2021    HGB 8 1 (L) 09/17/2021    HCT 25 2 (L) 09/17/2021    MCV 87 09/17/2021     (L) 09/17/2021     Lab Results   Component Value Date    NEUTROABS 3 87 09/17/2021        Trend:  Lab Results   Component Value Date     225 6 (H) 09/03/2021     210 2 (H) 08/27/2021     198 4 (H) 08/21/2021     135 0 (H) 08/10/2021     117 1 (H) 07/09/2021     116 0 (H) 07/02/2021     113 5 (H) 06/25/2021     131 7 (H) 06/18/2021     103 1 (H) 05/28/2021     91 0 (H) 05/14/2021     90 1 (H) 05/07/2021     62 8 (H) 04/16/2021     60 8 (H) 04/02/2021     110 8 (H) 03/12/2021     101 9 (H) 03/02/2021     75 8 (H) 02/12/2021     85 7 (H) 01/22/2021     102 0 (H) 12/05/2020     77 1 (H) 11/30/2020     45 7 (H) 10/16/2020     39 6 (H) 09/11/2020     44 9 (H) 09/01/2020     29 2 06/15/2020     27 4 06/10/2020     32 5 (H) 05/26/2020     28 6 05/15/2020     30 6 (H) 05/11/2020     36 5 (H) 04/27/2020     37 9 (H) 04/17/2020     42 5 (H) 04/10/2020     48 1 (H) 03/27/2020     40 9 (H) 03/20/2020     18 2 01/28/2020     17 5 12/31/2019     14 5 12/02/2019     22 0 11/19/2019     32 6 (H) 11/06/2019     19 1 08/30/2019     12 7 08/13/2019     13 9 06/18/2019     19 9 05/21/2019     79 5 (H) 04/16/2019     96 4 (H) 03/19/2019     80 4 (H) 03/08/2019     135 0 (H) 11/30/2018     194 5 (H) 10/18/2018

## 2021-09-29 NOTE — PROGRESS NOTES
Assessment/Plan:    Problem List Items Addressed This Visit        Endocrine    Ovarian cancer [facility]) - Primary      Patient is very pleasant 57-year-old female with history of stage IV ovarian cancer now approximately 3 years post diagnosis  The patient is having progression of disease after recent treatment with cyclophosphamide and  Keytruda  The patient has active bowel obstruction which is partial and slowly resolving but the patient is taking minimal p o  intake  This in the face of worsening renal function of recommended the patient be admitted for hydration  She would prefer outpatient  We will retry outpatient hydration and recheck her labs today  I have emailed Dr Aleksey Vergara from Urology to see if he can expedite the patient's stent placement to maximize her kidney function so that we can Rebegin her chemotherapy  We will see the patient back in a week for consideration of treatment  If she continues to have bowel obstructive symptoms  She will call and we will continue hydration   Or admission  Other    Dehydration            CHIEF COMPLAINT:  Consideration of cycle 2 topotecan      Problem:  Cancer Staging  Ovarian cancer [facility])  Staging form: Ovary, Fallopian Tube, Primary Peritoneal, AJCC 8th Edition  - Clinical stage from 11/14/2018: FIGO Stage IVB (cT3c, cN0, pM1b) - Signed by Dennis Thomas MD on 11/14/2018  - Pathologic stage from 10/9/2019: FIGO Stage IIIC (pT3c, pN1, cM0) - Signed by Dennis Thomas MD on 10/9/2019        Previous therapy:  Oncology History   Ovarian cancer ([facility] [address] )   11/9/2018 Initial Diagnosis    Ovarian cancer ([facility] [address] )   tumor marker 194 5     11/9/2018 Biopsy    CT-guided needle biopsy of abdominal mass 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    Given liver involvement this would be stage IV     11/14/2018 - 12/4/2018 Chemotherapy    Neoadjuvant therapy: carboplatin area under the curve of 6, paclitaxel 135 milligrams/meter squared, Avastin 10 milligrams/kilogram  Completed 1 of 3 cycles  12/14/2018 Surgery    LAPAROTOMY EXPLORATORY; Abdominal Washout; Application of Abthera Vac Dressing for suspected bowel perforation and septic shock        12/15/2018 Surgery    LAPAROTOMY EXPLORATORY, ABDOMINAL WASHOUT, DRAIN PLACEMENT x 4, DIVERTING LOOP ILEOSTOMY AND ABDOMINAL CLOSURE for bowel perforation     3/26/2019 -  Chemotherapy    Taxol weekly 80 mg/m2 for 3 consecutive weeks, may add carboplatin in the future with AUC of 5      3/26/2019 - 2/3/2020 Chemotherapy    palonosetron (ALOXI) injection 0 25 mg, 0 25 mg, Intravenous, Once, 6 of 6 cycles  Administration: 0 25 mg (5/28/2019), 0 25 mg (6/25/2019), 0 25 mg (11/6/2019), 0 25 mg (12/10/2019), 0 25 mg (1/7/2020)  fosaprepitant (EMEND) 150 mg in sodium chloride 0 9 % 255 mL IVPB, 150 mg, Intravenous, Once, 6 of 6 cycles  Administration: 150 mg (5/28/2019), 150 mg (6/25/2019), 150 mg (11/6/2019), 150 mg (12/10/2019), 150 mg (1/7/2020)  CARBOplatin (PARAPLATIN) in sodium chloride 0 9 % 250 mL IVPB,  (original dose ), Intravenous, Once, 6 of 6 cycles  Dose modification:   (Cycle 2),   (original dose 258 4 mg, Cycle 3),   (original dose 258 4 mg, Cycle 3),   (original dose 244 4 mg, Cycle 4)  Administration: 258 4 mg (5/28/2019), 244 4 mg (6/25/2019), 285 2 mg (11/6/2019), 296 4 mg (12/10/2019), 286 4 mg (1/7/2020)  PACLitaxel (TAXOL) 127 8 mg in sodium chloride 0 9 % 250 mL chemo IVPB, 80 mg/m2, Intravenous, Once, 7 of 7 cycles  Dose modification: 65 mg/m2 (original dose 80 mg/m2, Cycle 2, Reason: Dose Not Tolerated)  Administration: 103 8 mg (5/14/2019), 108 6 mg (5/28/2019), 108 6 mg (6/4/2019), 108 6 mg (6/11/2019), 109 8 mg (6/25/2019), 109 8 mg (7/2/2019), 109 8 mg (7/9/2019), 111 6 mg (11/6/2019), 111 6 mg (11/12/2019), 111 6 mg (11/19/2019), 113 4 mg (12/10/2019), 113 4 mg (12/17/2019), 113 4 mg (12/23/2019), 113 4 mg (1/7/2020), 114 6 mg (1/14/2020), 114 6 mg (1/21/2020)     4/29/2019 Genomic Testing     Foundation 1 testing revealed a PD L1 tumor proportions score of 0%  The patient is not a candidate for PD L1 manipulation      Genetic Testing    [employer] Breast/Gyn panel, wildtype  9/10/2019 Surgery    Exploratory laparotomy radical omentectomy, posterior exenteration, takedown of ileostomy and end colostomy for complete debulking of visible tumor  Final pathology report revealed high-grade serous malignancy in both the omentum and the pelvic mass     9/25/2019 -  Chemotherapy    Carboplatin area under the curve of 5+ weekly paclitaxel at 80 milligrams/meters squared for 3 further cycles of treatment followed by consolidation this is to begin mid October     10/2019 Surgery    Interval debulking with TAHBSO revision of colostomy omentectomy and tumor debulking with complete debulking  Several weeks postoperatively the patient required a open cholecystectomy       10/2019 - 12/2019 Chemotherapy    Carboplatin paclitaxel x3     10/9/2019 -  Cancer Staged    Staging form: Ovary, Fallopian Tube, Primary Peritoneal, AJCC 8th Edition  - Pathologic stage from 10/9/2019: FIGO Stage IIIC (pT3c, pN1, cM0) - Signed by Garth Everett MD on 10/9/2019  Histologic grade (G): G3  Histologic grading system: 4 grade system  Gross residual tumor after primary cyto-reductive surgery: Absent       3/24/2020 - 9/21/2020 Chemotherapy    palonosetron (ALOXI) injection 0 25 mg, 0 25 mg, Intravenous, Once, 6 of 7 cycles  Administration: 0 25 mg (3/24/2020), 0 25 mg (4/21/2020), 0 25 mg (5/19/2020), 0 25 mg (6/23/2020), 0 25 mg (7/28/2020), 0 25 mg (8/25/2020)  pegfilgrastim (NEULASTA ONPRO) subcutaneous injection kit 6 mg, 6 mg, Subcutaneous, Once, 6 of 7 cycles  Administration: 6 mg (3/31/2020), 6 mg (4/28/2020), 6 mg (5/26/2020), 6 mg (6/29/2020), 6 mg (8/4/2020), 6 mg (9/1/2020)  DOXOrubicin liposome (DOXIL) 54 3 mg in dextrose 5 % 250 mL chemo infusion, 30 mg/m2 = 54 3 mg, Intravenous, Once, 6 of 7 cycles  Administration: 54 3 mg (3/24/2020), 54 6 mg (4/21/2020), 54 9 mg (5/19/2020), 54 6 mg (6/23/2020), 54 9 mg (7/28/2020), 54 9 mg (8/25/2020)  gemcitabine (GEMZAR) 1,176 4 mg in sodium chloride 0 9 % 250 mL infusion, 650 mg/m2 = 1,176 4 mg, Intravenous, Once, 6 of 7 cycles  Administration: 1,176 4 mg (3/24/2020), 1,176 4 mg (3/31/2020), 1,182 9 mg (4/21/2020), 1,200 mg (4/28/2020), 1,189 4 mg (5/19/2020), 1,189 4 mg (5/26/2020), 1,182 9 mg (6/23/2020), 1,182 9 mg (6/29/2020), 1,189 4 mg (7/28/2020), 1,189 4 mg (8/4/2020), 1,189 4 mg (8/25/2020), 1,195 8 mg (9/1/2020)     1/12/2021 - 1/12/2021 Chemotherapy    palonosetron (ALOXI) injection 0 25 mg, 0 25 mg, Intravenous, Once, 0 of 6 cycles  fosaprepitant (EMEND) 150 mg in sodium chloride 0 9 % 250 mL IVPB, 150 mg, Intravenous, Once, 0 of 6 cycles  CARBOplatin (PARAPLATIN) in sodium chloride 0 9 % 250 mL IVPB, , Intravenous, Once, 0 of 6 cycles  PACLitaxel (TAXOL) 246 mg in sodium chloride 0 9 % 500 mL chemo IVPB, 135 mg/m2 = 246 mg (77 1 % of original dose 175 mg/m2), Intravenous, Once, 0 of 6 cycles  Dose modification: 135 mg/m2 (original dose 175 mg/m2, Cycle 1, Reason: Other (See Comments), Comment: heavily pretreated)     1/12/2021 - 5/24/2021 Chemotherapy    palonosetron (ALOXI) injection 0 25 mg, 0 25 mg, Intravenous, Once, 4 of 4 cycles  Administration: 0 25 mg (1/12/2021), 0 25 mg (3/2/2021), 0 25 mg (3/23/2021), 0 25 mg (4/27/2021)  fosaprepitant (EMEND) 150 mg in sodium chloride 0 9 % 250 mL IVPB, 150 mg, Intravenous, Once, 4 of 4 cycles  Administration: 150 mg (1/12/2021), 150 mg (3/2/2021), 150 mg (3/23/2021), 150 mg (4/27/2021)  CARBOplatin (PARAPLATIN) 311 mg in sodium chloride 0 9 % 250 mL IVPB, 311 mg, Intravenous, Once, 4 of 4 cycles  Administration: 311 mg (1/12/2021), 330 mg (3/2/2021), 338 mg (3/23/2021), 313 mg (4/27/2021)  PACLitaxel (TAXOL) 91 2 mg in sodium chloride 0 9 % 250 mL chemo IVPB, 50 mg/m2 = 91 2 mg (62 5 % of original dose 80 mg/m2), Intravenous, Once, 4 of 5 cycles  Dose modification: 50 mg/m2 (original dose 80 mg/m2, Cycle 1, Reason: Other (See Comments))  Administration: 91 2 mg (1/12/2021), 90 6 mg (1/19/2021), 89 4 mg (3/2/2021), 91 2 mg (3/9/2021), 91 2 mg (3/16/2021), 90 6 mg (3/23/2021), 90 6 mg (3/30/2021), 90 6 mg (4/6/2021), 90 mg (5/4/2021), 90 mg (5/11/2021), 90 mg (4/27/2021)     6/10/2021 - 8/30/2021 Chemotherapy    pembrolizumab (KEYTRUDA) IVPB, 200 mg, Intravenous, Once, 3 of 7 cycles  Administration: 200 mg (6/10/2021), 200 mg (6/29/2021), 200 mg (8/10/2021)     9/8/2021 -  Chemotherapy    Pegfilgrastim-bmez (ZIEXTENZO), 6 mg, Subcutaneous, Once, 0 of 3 cycles  topotecan (HYCAMTIN) chemo infusion, 3 mg/m2 = 5 3 mg, Intravenous, Once, 1 of 4 cycles  Administration: 5 3 mg (9/8/2021)           Patient ID: Alee Soares is a 69 y o  female   Patient is very pleasant 69-year-old female with history of stage IV B recurrent metastatic ovarian cancer originally diagnosed in approximately October of 2018  She has undergone multiple cycles of chemotherapy treatments and surgeries  She has a stoma in place due to 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 at her initial cycle of treatment  As such the patient has not been a candidate for Avastin use  Most recently she was placed on metronomic cyclophosphamide plus  Keytruda however disease progressed  She was switched to topotecan alone which began this month  Unfortunately the patient's creatinine was elevated and a CT scan was performed revealing the following:   LIVER/BILIARY TREE:  Multiple vague hypodense lesions in the liver suspicious for metastasis        *  Previously identified segment 6 lesion is not as well seen without IV contrast, now approximately 1 8 cm in size, not a significant change, previously measured as 1 9 cm  See image 62 series 201      *  Previously identified segment 7 lesion now measures up to 3 4 cm image 56 series 201, larger previously 1 6 cm  *  A lesion at the dome of the liver, segment 7, measures 2 9 x 2 1 cm image 39 series 201, larger, previously 1 9 x 0 8 cm      GALLBLADDER:  Gallbladder is surgically absent      SPLEEN:  Splenic hypodense lesions again noted not as well seen without IV contrast   Dominant lesion posteriorly with mild peripheral calcification measures up to 3 5 cm, stable previously 3 6 cm      PANCREAS:  Unremarkable      ADRENAL GLANDS:  Unremarkable      KIDNEYS/URETERS:  New moderate to severe hydroureteronephrosis bilaterally down to the mid to distal ureteral level where there is extensive retroperitoneal adenopathy      STOMACH AND BOWEL:  Left lower quadrant ostomy identified  Questionable areas of mild to moderate circumferential colonic wall thickening, question reactive related to the ascites but colitis is not excluded  No bowel obstruction  Large parastomal   hernia containing bowel      APPENDIX:  No findings to suggest appendicitis      ABDOMINOPELVIC CAVITY:  Scattered intraperitoneal nodules suspicious for peritoneal metastasis  6 mm nodule noted along the left paracolic gutter on image 62 series 201, stable      *  New aortocaval lymph node measures 2 2 x 1 1 cm image 60 series 201  *  Enlarged left external iliac chain lymph node measures 4 2 x 3 8 cm image 93 series 201, previously 3 9 x 3 2 cm  *  Enlarged mesenteric lymph nodes again seen some of which are partially calcified  Partially calcified mesenteric lymph node centrally measures 2 0 x 1 4 cm image 74 series 201, stable previously 1 9 x 1 5 cm  Enlarging mesenteric lymph node more   anteriorly measures up to 1 2 cm image 75 series 201, previously 9 mm  *  Bulky right common iliac chain lymph node measures approximately 4 6 x 2 8 cm image 82 series 201, previously approximately 2 5 x 1 8 cm  These enlarged common iliac chain lymph nodes likely cause ureteral obstruction    *  A large left pelvic sidewall lymph node measures 3 8 x 2 7 cm image 97 series 201, previously 2 1 x 1 4 cm  *  Soft tissue fullness in the micaela hepatis, not well-defined  Previously seen was enlarged lymph node here      Mild ascites      VESSELS:  Atherosclerotic changes are present  No evidence of aneurysm      PELVIS     REPRODUCTIVE ORGANS:  Surgical changes of prior hysterectomy      URINARY BLADDER:  Bladder extends posteriorly to the pararectal space      ABDOMINAL WALL/INGUINAL REGIONS:  Left lower quadrant ostomy  Associated herniation of bowel  Additional ventral wall hernia in the lower abdominal wall on the right containing bowel and ascites without obstruction      OSSEOUS STRUCTURES:  No acute fracture or destructive osseous lesion  Mild anterolisthesis of L4 on L5  Multilevel degenerative spurring of the spine  Mild superior endplate deformity at L3 is stable      IMPRESSION:     1  Overall findings concerning for disease progression  2   New 6 mm pleural-based nodule at the right lung base centrally may represent metastasis  3  Multiple vague hypodense lesions in the liver suspicious for metastasis  Overall these appear to have progressed  4   Progression of retroperitoneal, mesenteric and pelvic lymphadenopathy  Scattered intraperitoneal nodularity suspicious for peritoneal metastasis  5   New moderate to severe hydroureteronephrosis bilaterally down to the mid to distal ureteral level where there is extensive retroperitoneal adenopathy likely the cause of obstruction  6   Questionable areas of mild to moderate circumferential colonic wall thickening, question reactive related to the ascites but colitis is not excluded  This CT scan was compared to a CT scan back in April of 2021  The patient has been referred to Urology for placement of stents  She has seen them on Monday but the placement has not been scheduled yet  Her most recent creatinine remains elevated at 2 09        I have reviewed the P patient's weekly CBC and CPM P data  Due to elevation in creatinine the patient is not an active candidate for chemotherapy  The remainder of the blood work appears unremarkable  The patient's hemoglobin dropped to as low as 6 2 and did require transfusion  It is presently 8  Her white blood cell count and platelets were are stable for continued treatment  The patient has been experiencing bowel obstructive symptoms for week  She has last vomited approximately 5 days ago  Her p o  intake has been minimal since that time  She would prefer outpatient hydration rather than inpatient hospitalization at this time  The patient's rash has been treated with p o  Benadryl as well as steroids  This is improved but sometimes still itchy  The patient notes that the rash is still present on her flank      The following portions of the patient's history were reviewed and updated as appropriate: allergies, current medications, past medical history, past social history, past surgical history and problem list     Review of Systems   Constitutional: Positive for fatigue  HENT: Negative  Eyes: Negative  Respiratory: Negative  Cardiovascular: Negative  Gastrointestinal: Positive for abdominal pain and constipation  Endocrine: Negative  Genitourinary: Negative  Musculoskeletal: Negative  Skin: Negative  Neurological: Negative  Hematological: Negative  Psychiatric/Behavioral: Negative          Current Outpatient Medications   Medication Sig Dispense Refill    aspirin-acetaminophen-caffeine (EXCEDRIN MIGRAINE) 250-250-65 MG per tablet Take 1 tablet by mouth every 6 (six) hours as needed for headaches      methylPREDNISolone 4 MG tablet therapy pack Use as directed on package 21 each 0    nystatin (MYCOSTATIN) powder Apply topically 3 (three) times a day 15 g 4    nystatin powder apply to affected area three times a day 15 g 4    pantoprazole (PROTONIX) 40 mg tablet Take 1 tablet (40 mg total) by mouth 2 (two) times a day before meals  0    traMADol (ULTRAM) 50 mg tablet Take 1 tablet (50 mg total) by mouth every 8 (eight) hours as needed for moderate pain 30 tablet 1    gabapentin (NEURONTIN) 300 mg capsule Take 1 capsule (300 mg total) by mouth 3 (three) times a day 90 capsule 0    potassium chloride (K-DUR,KLOR-CON) 20 mEq tablet Take 1 tablet (20 mEq total) by mouth daily 30 tablet 0     No current facility-administered medications for this visit  Objective:    Blood pressure 104/78, pulse 93, temperature 97 5 °F (36 4 °C), resp  rate 16, height 5' 3" (1 6 m), weight 67 1 kg (148 lb), SpO2 97 %, not currently breastfeeding  Body mass index is 26 22 kg/m²  Body surface area is 1 7 meters squared  Physical Exam  Vitals reviewed  Constitutional:       Appearance: She is well-developed  She is ill-appearing  Comments: No significant rash noted  HENT:      Head: Normocephalic and atraumatic  Neck:      Thyroid: No thyromegaly  Cardiovascular:      Rate and Rhythm: Normal rate and regular rhythm  Heart sounds: Normal heart sounds  Pulmonary:      Effort: Pulmonary effort is normal       Breath sounds: Normal breath sounds  Abdominal:      General: There is distension  Palpations: Abdomen is soft  Comments: Well healedMidline incisions  Abdomen distended and tympanic  Some gas in stoma appliance  No significant tenderness noted  Genitourinary:     Comments: Refused    Musculoskeletal:         General: Normal range of motion  Cervical back: Normal range of motion and neck supple  Lymphadenopathy:      Cervical: No cervical adenopathy  Skin:     General: Skin is warm and dry  Neurological:      Mental Status: She is alert and oriented to person, place, and time     Psychiatric:         Behavior: Behavior normal          Lab Results   Component Value Date     225 6 (H) 09/03/2021     Lab Results   Component Value Date    K 3 2 (L) 09/29/2021     09/29/2021    CO2 23 09/29/2021    BUN 30 (H) 09/29/2021    CREATININE 1 78 (H) 09/29/2021    GLUCOSE 228 (H) 09/10/2019    GLUF 122 (H) 09/17/2021    CALCIUM 8 8 09/29/2021    CORRECTEDCA 10 0 09/29/2021    AST 34 09/29/2021    ALT 26 09/29/2021    ALKPHOS 206 (H) 09/29/2021    EGFR 28 09/29/2021     Lab Results   Component Value Date    WBC 19 27 (H) 09/29/2021    HGB 8 7 (L) 09/29/2021    HCT 27 0 (L) 09/29/2021    MCV 85 09/29/2021     (H) 09/29/2021     Lab Results   Component Value Date    NEUTROABS 16 35 (H) 09/29/2021        Trend:  Lab Results   Component Value Date     225 6 (H) 09/03/2021     210 2 (H) 08/27/2021     198 4 (H) 08/21/2021     135 0 (H) 08/10/2021     117 1 (H) 07/09/2021     116 0 (H) 07/02/2021     113 5 (H) 06/25/2021     131 7 (H) 06/18/2021     103 1 (H) 05/28/2021     91 0 (H) 05/14/2021     90 1 (H) 05/07/2021     62 8 (H) 04/16/2021     60 8 (H) 04/02/2021     110 8 (H) 03/12/2021     101 9 (H) 03/02/2021     75 8 (H) 02/12/2021     85 7 (H) 01/22/2021     102 0 (H) 12/05/2020     77 1 (H) 11/30/2020     45 7 (H) 10/16/2020     39 6 (H) 09/11/2020     44 9 (H) 09/01/2020     29 2 06/15/2020     27 4 06/10/2020     32 5 (H) 05/26/2020     28 6 05/15/2020     30 6 (H) 05/11/2020     36 5 (H) 04/27/2020     37 9 (H) 04/17/2020     42 5 (H) 04/10/2020     48 1 (H) 03/27/2020     40 9 (H) 03/20/2020     18 2 01/28/2020     17 5 12/31/2019     14 5 12/02/2019     22 0 11/19/2019     32 6 (H) 11/06/2019     19 1 08/30/2019     12 7 08/13/2019     13 9 06/18/2019     19 9 05/21/2019     79 5 (H) 04/16/2019     96 4 (H) 03/19/2019     80 4 (H) 03/08/2019     135 0 (H) 11/30/2018     194 5 (H) 10/18/2018

## 2021-09-29 NOTE — ASSESSMENT & PLAN NOTE
Patient is very pleasant 14-year-old female with history of stage IV ovarian cancer now approximately 3 years post diagnosis  The patient is having progression of disease after recent treatment with cyclophosphamide and  Keytruda  The patient has active bowel obstruction which is partial and slowly resolving but the patient is taking minimal p o  intake  This in the face of worsening renal function of recommended the patient be admitted for hydration  She would prefer outpatient  We will retry outpatient hydration and recheck her labs today  I have emailed Dr Marshall Sigala from Urology to see if he can expedite the patient's stent placement to maximize her kidney function so that we can Rebegin her chemotherapy  We will see the patient back in a week for consideration of treatment  If she continues to have bowel obstructive symptoms  She will call and we will continue hydration   Or admission

## 2021-09-29 NOTE — LETTER
September 29, 2021     Chitra Hernandez[address] [address]    Patient: Jo Shin   YOB: 1950   Date of Visit: 9/29/2021       Dear Dr Beckie Moura: Thank you for referring Jo Shin to me for evaluation  Below are my notes for this consultation  If you have questions, please do not hesitate to call me  I look forward to following your patient along with you  Sincerely,        Claudia Palomares MD        CC: No Recipients  Claudia Palomares MD  9/29/2021 10:55 AM  Incomplete  Assessment/Plan:    Problem List Items Addressed This Visit        Endocrine    Ovarian cancer [facility]) - Primary      Patient is very pleasant 54-year-old female with history of stage IV ovarian cancer now approximately 3 years post diagnosis  The patient is having progression of disease after recent treatment with cyclophosphamide and  Keytruda  The patient has active bowel obstruction which is partial and slowly resolving but the patient is taking minimal p o  intake  This in the face of worsening renal function of recommended the patient be admitted for hydration  She would prefer outpatient  We will retry outpatient hydration and recheck her labs today  I have emailed Dr Beckie Moura from Urology to see if he can expedite the patient's stent placement to maximize her kidney function so that we can Rebegin her chemotherapy  We will see the patient back in a week for consideration of treatment  If she continues to have bowel obstructive symptoms  She will call and we will continue hydration   Or admission                   CHIEF COMPLAINT:  Consideration of cycle 2 topotecan      Problem:  Cancer Staging  Ovarian cancer [facility])  Staging form: Ovary, Fallopian Tube, Primary Peritoneal, AJCC 8th Edition  - Clinical stage from 11/14/2018: FIGO Stage IVB (cT3c, cN0, pM1b) - Signed by Claudia Palomares MD on 11/14/2018  - Pathologic stage from 10/9/2019: Yoni Marinelli Stage IIIC (pT3c, pN1, cM0) - Signed by Susan Weaver MD on 10/9/2019        Previous therapy:  Oncology History   Ovarian cancer ([facility] [address] )   11/9/2018 Initial Diagnosis    Ovarian cancer ([facility] [address] )   tumor marker 194 5     11/9/2018 Biopsy    CT-guided needle biopsy of abdominal mass 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  Given liver involvement this would be stage IV     11/14/2018 - 12/4/2018 Chemotherapy    Neoadjuvant therapy: carboplatin area under the curve of 6, paclitaxel 135 milligrams/meter squared, Avastin 10 milligrams/kilogram  Completed 1 of 3 cycles  12/14/2018 Surgery    LAPAROTOMY EXPLORATORY; Abdominal Washout; Application of Abthera Vac Dressing for suspected bowel perforation and septic shock        12/15/2018 Surgery    LAPAROTOMY EXPLORATORY, ABDOMINAL WASHOUT, DRAIN PLACEMENT x 4, DIVERTING LOOP ILEOSTOMY AND ABDOMINAL CLOSURE for bowel perforation     3/26/2019 -  Chemotherapy    Taxol weekly 80 mg/m2 for 3 consecutive weeks, may add carboplatin in the future with AUC of 5      3/26/2019 - 2/3/2020 Chemotherapy    palonosetron (ALOXI) injection 0 25 mg, 0 25 mg, Intravenous, Once, 6 of 6 cycles  Administration: 0 25 mg (5/28/2019), 0 25 mg (6/25/2019), 0 25 mg (11/6/2019), 0 25 mg (12/10/2019), 0 25 mg (1/7/2020)  fosaprepitant (EMEND) 150 mg in sodium chloride 0 9 % 255 mL IVPB, 150 mg, Intravenous, Once, 6 of 6 cycles  Administration: 150 mg (5/28/2019), 150 mg (6/25/2019), 150 mg (11/6/2019), 150 mg (12/10/2019), 150 mg (1/7/2020)  CARBOplatin (PARAPLATIN) in sodium chloride 0 9 % 250 mL IVPB,  (original dose ), Intravenous, Once, 6 of 6 cycles  Dose modification:   (Cycle 2),   (original dose 258 4 mg, Cycle 3),   (original dose 258 4 mg, Cycle 3),   (original dose 244 4 mg, Cycle 4)  Administration: 258 4 mg (5/28/2019), 244 4 mg (6/25/2019), 285 2 mg (11/6/2019), 296 4 mg (12/10/2019), 286 4 mg (1/7/2020)  PACLitaxel (TAXOL) 127 8 mg in sodium chloride 0 9 % 250 mL chemo IVPB, 80 mg/m2, Intravenous, Once, 7 of 7 cycles  Dose modification: 65 mg/m2 (original dose 80 mg/m2, Cycle 2, Reason: Dose Not Tolerated)  Administration: 103 8 mg (5/14/2019), 108 6 mg (5/28/2019), 108 6 mg (6/4/2019), 108 6 mg (6/11/2019), 109 8 mg (6/25/2019), 109 8 mg (7/2/2019), 109 8 mg (7/9/2019), 111 6 mg (11/6/2019), 111 6 mg (11/12/2019), 111 6 mg (11/19/2019), 113 4 mg (12/10/2019), 113 4 mg (12/17/2019), 113 4 mg (12/23/2019), 113 4 mg (1/7/2020), 114 6 mg (1/14/2020), 114 6 mg (1/21/2020)     4/29/2019 [address] [place] [address],[address] 1 testing revealed a PD L1 tumor proportions score of 0%  The patient is not a candidate for PD L1 manipulation      Genetic Testing    [employer] Breast/Gyn panel, wildtype  9/10/2019 Surgery    Exploratory laparotomy radical omentectomy, posterior exenteration, takedown of ileostomy and end colostomy for complete debulking of visible tumor  Final pathology report revealed high-grade serous malignancy in both the omentum and the pelvic mass     9/25/2019 -  Chemotherapy    Carboplatin area under the curve of 5+ weekly paclitaxel at 80 milligrams/meters squared for 3 further cycles of treatment followed by consolidation this is to begin mid October     10/2019 Surgery    Interval debulking with TAHBSO revision of colostomy omentectomy and tumor debulking with complete debulking  Several weeks postoperatively the patient required a open cholecystectomy       10/2019 - 12/2019 Chemotherapy    Carboplatin paclitaxel x3     10/9/2019 -  Cancer Staged    Staging form: Ovary, Fallopian Tube, Primary Peritoneal, AJCC 8th Edition  - Pathologic stage from 10/9/2019: FIGO Stage IIIC (pT3c, pN1, cM0) - Signed by Gayle Rousseau MD on 10/9/2019  Histologic grade (G): G3  Histologic grading system: 4 grade system  Gross residual tumor after primary cyto-reductive surgery: Absent       3/24/2020 - 9/21/2020 Chemotherapy    palonosetron (ALOXI) injection 0 25 mg, 0 25 mg, Intravenous, Once, 6 of 7 cycles  Administration: 0 25 mg (3/24/2020), 0 25 mg (4/21/2020), 0 25 mg (5/19/2020), 0 25 mg (6/23/2020), 0 25 mg (7/28/2020), 0 25 mg (8/25/2020)  pegfilgrastim (NEULASTA ONPRO) subcutaneous injection kit 6 mg, 6 mg, Subcutaneous, Once, 6 of 7 cycles  Administration: 6 mg (3/31/2020), 6 mg (4/28/2020), 6 mg (5/26/2020), 6 mg (6/29/2020), 6 mg (8/4/2020), 6 mg (9/1/2020)  DOXOrubicin liposome (DOXIL) 54 3 mg in dextrose 5 % 250 mL chemo infusion, 30 mg/m2 = 54 3 mg, Intravenous, Once, 6 of 7 cycles  Administration: 54 3 mg (3/24/2020), 54 6 mg (4/21/2020), 54 9 mg (5/19/2020), 54 6 mg (6/23/2020), 54 9 mg (7/28/2020), 54 9 mg (8/25/2020)  gemcitabine (GEMZAR) 1,176 4 mg in sodium chloride 0 9 % 250 mL infusion, 650 mg/m2 = 1,176 4 mg, Intravenous, Once, 6 of 7 cycles  Administration: 1,176 4 mg (3/24/2020), 1,176 4 mg (3/31/2020), 1,182 9 mg (4/21/2020), 1,200 mg (4/28/2020), 1,189 4 mg (5/19/2020), 1,189 4 mg (5/26/2020), 1,182 9 mg (6/23/2020), 1,182 9 mg (6/29/2020), 1,189 4 mg (7/28/2020), 1,189 4 mg (8/4/2020), 1,189 4 mg (8/25/2020), 1,195 8 mg (9/1/2020)     1/12/2021 - 1/12/2021 Chemotherapy    palonosetron (ALOXI) injection 0 25 mg, 0 25 mg, Intravenous, Once, 0 of 6 cycles  fosaprepitant (EMEND) 150 mg in sodium chloride 0 9 % 250 mL IVPB, 150 mg, Intravenous, Once, 0 of 6 cycles  CARBOplatin (PARAPLATIN) in sodium chloride 0 9 % 250 mL IVPB, , Intravenous, Once, 0 of 6 cycles  PACLitaxel (TAXOL) 246 mg in sodium chloride 0 9 % 500 mL chemo IVPB, 135 mg/m2 = 246 mg (77 1 % of original dose 175 mg/m2), Intravenous, Once, 0 of 6 cycles  Dose modification: 135 mg/m2 (original dose 175 mg/m2, Cycle 1, Reason: Other (See Comments), Comment: heavily pretreated)     1/12/2021 - 5/24/2021 Chemotherapy    palonosetron (ALOXI) injection 0 25 mg, 0 25 mg, Intravenous, Once, 4 of 4 cycles  Administration: 0 25 mg (1/12/2021), 0 25 mg (3/2/2021), 0 25 mg (3/23/2021), 0 25 mg (4/27/2021)  fosaprepitant (EMEND) 150 mg in sodium chloride 0 9 % 250 mL IVPB, 150 mg, Intravenous, Once, 4 of 4 cycles  Administration: 150 mg (1/12/2021), 150 mg (3/2/2021), 150 mg (3/23/2021), 150 mg (4/27/2021)  CARBOplatin (PARAPLATIN) 311 mg in sodium chloride 0 9 % 250 mL IVPB, 311 mg, Intravenous, Once, 4 of 4 cycles  Administration: 311 mg (1/12/2021), 330 mg (3/2/2021), 338 mg (3/23/2021), 313 mg (4/27/2021)  PACLitaxel (TAXOL) 91 2 mg in sodium chloride 0 9 % 250 mL chemo IVPB, 50 mg/m2 = 91 2 mg (62 5 % of original dose 80 mg/m2), Intravenous, Once, 4 of 5 cycles  Dose modification: 50 mg/m2 (original dose 80 mg/m2, Cycle 1, Reason: Other (See Comments))  Administration: 91 2 mg (1/12/2021), 90 6 mg (1/19/2021), 89 4 mg (3/2/2021), 91 2 mg (3/9/2021), 91 2 mg (3/16/2021), 90 6 mg (3/23/2021), 90 6 mg (3/30/2021), 90 6 mg (4/6/2021), 90 mg (5/4/2021), 90 mg (5/11/2021), 90 mg (4/27/2021)     6/10/2021 - 8/30/2021 Chemotherapy    pembrolizumab (KEYTRUDA) IVPB, 200 mg, Intravenous, Once, 3 of 7 cycles  Administration: 200 mg (6/10/2021), 200 mg (6/29/2021), 200 mg (8/10/2021)     9/8/2021 -  Chemotherapy    Pegfilgrastim-bmez (ZIEXTENZO), 6 mg, Subcutaneous, Once, 0 of 3 cycles  topotecan (HYCAMTIN) chemo infusion, 3 mg/m2 = 5 3 mg, Intravenous, Once, 1 of 4 cycles  Administration: 5 3 mg (9/8/2021)           Patient ID: Ivette Esparza is a 69 y o  female   Patient is very pleasant 59-year-old female with history of stage IV B recurrent metastatic ovarian cancer originally diagnosed in approximately October of 2018  She has undergone multiple cycles of chemotherapy treatments and surgeries  She has a stoma in place due to 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 at her initial cycle of treatment  As such the patient has not been a candidate for Avastin use  Most recently she was placed on metronomic cyclophosphamide plus  Keytruda however disease progressed    She was switched to topotecan alone which began this month  Unfortunately the patient's creatinine was elevated and a CT scan was performed revealing the following:   LIVER/BILIARY TREE:  Multiple vague hypodense lesions in the liver suspicious for metastasis        *  Previously identified segment 6 lesion is not as well seen without IV contrast, now approximately 1 8 cm in size, not a significant change, previously measured as 1 9 cm  See image 62 series 201  *  Previously identified segment 7 lesion now measures up to 3 4 cm image 56 series 201, larger previously 1 6 cm  *  A lesion at the dome of the liver, segment 7, measures 2 9 x 2 1 cm image 39 series 201, larger, previously 1 9 x 0 8 cm      GALLBLADDER:  Gallbladder is surgically absent      SPLEEN:  Splenic hypodense lesions again noted not as well seen without IV contrast   Dominant lesion posteriorly with mild peripheral calcification measures up to 3 5 cm, stable previously 3 6 cm      PANCREAS:  Unremarkable      ADRENAL GLANDS:  Unremarkable      KIDNEYS/URETERS:  New moderate to severe hydroureteronephrosis bilaterally down to the mid to distal ureteral level where there is extensive retroperitoneal adenopathy      STOMACH AND BOWEL:  Left lower quadrant ostomy identified  Questionable areas of mild to moderate circumferential colonic wall thickening, question reactive related to the ascites but colitis is not excluded  No bowel obstruction  Large parastomal   hernia containing bowel      APPENDIX:  No findings to suggest appendicitis      ABDOMINOPELVIC CAVITY:  Scattered intraperitoneal nodules suspicious for peritoneal metastasis  6 mm nodule noted along the left paracolic gutter on image 62 series 201, stable      *  New aortocaval lymph node measures 2 2 x 1 1 cm image 60 series 201  *  Enlarged left external iliac chain lymph node measures 4 2 x 3 8 cm image 93 series 201, previously 3 9 x 3 2 cm    *  Enlarged mesenteric lymph nodes again seen some of which are partially calcified  Partially calcified mesenteric lymph node centrally measures 2 0 x 1 4 cm image 74 series 201, stable previously 1 9 x 1 5 cm  Enlarging mesenteric lymph node more   anteriorly measures up to 1 2 cm image 75 series 201, previously 9 mm  *  Bulky right common iliac chain lymph node measures approximately 4 6 x 2 8 cm image 82 series 201, previously approximately 2 5 x 1 8 cm  These enlarged common iliac chain lymph nodes likely cause ureteral obstruction  *  A large left pelvic sidewall lymph node measures 3 8 x 2 7 cm image 97 series 201, previously 2 1 x 1 4 cm  *  Soft tissue fullness in the micaela hepatis, not well-defined  Previously seen was enlarged lymph node here      Mild ascites      VESSELS:  Atherosclerotic changes are present  No evidence of aneurysm      PELVIS     REPRODUCTIVE ORGANS:  Surgical changes of prior hysterectomy      URINARY BLADDER:  Bladder extends posteriorly to the pararectal space      ABDOMINAL WALL/INGUINAL REGIONS:  Left lower quadrant ostomy  Associated herniation of bowel  Additional ventral wall hernia in the lower abdominal wall on the right containing bowel and ascites without obstruction      OSSEOUS STRUCTURES:  No acute fracture or destructive osseous lesion  Mild anterolisthesis of L4 on L5  Multilevel degenerative spurring of the spine  Mild superior endplate deformity at L3 is stable      IMPRESSION:     1  Overall findings concerning for disease progression  2   New 6 mm pleural-based nodule at the right lung base centrally may represent metastasis  3  Multiple vague hypodense lesions in the liver suspicious for metastasis  Overall these appear to have progressed  4   Progression of retroperitoneal, mesenteric and pelvic lymphadenopathy  Scattered intraperitoneal nodularity suspicious for peritoneal metastasis    5   New moderate to severe hydroureteronephrosis bilaterally down to the mid to distal ureteral level where there is extensive retroperitoneal adenopathy likely the cause of obstruction  6   Questionable areas of mild to moderate circumferential colonic wall thickening, question reactive related to the ascites but colitis is not excluded  This CT scan was compared to a CT scan back in April of 2021  The patient has been referred to Urology for placement of stents  She has seen them on Monday but the placement has not been scheduled yet  Her most recent creatinine remains elevated at 2 09  I have reviewed the P patient's weekly CBC and CPM P data  Due to elevation in creatinine the patient is not an active candidate for chemotherapy  The remainder of the blood work appears unremarkable  The patient's hemoglobin dropped to as low as 6 2 and did require transfusion  It is presently 8  Her white blood cell count and platelets were are stable for continued treatment  The patient has been experiencing bowel obstructive symptoms for week  She has last vomited approximately 5 days ago  Her p o  intake has been minimal since that time  She would prefer outpatient hydration rather than inpatient hospitalization at this time  The patient's rash has been treated with p o  Benadryl as well as steroids  This is improved but sometimes still itchy  The patient notes that the rash is still present on her flank      The following portions of the patient's history were reviewed and updated as appropriate: allergies, current medications, past medical history, past social history, past surgical history and problem list     Review of Systems   Constitutional: Positive for fatigue  HENT: Negative  Eyes: Negative  Respiratory: Negative  Cardiovascular: Negative  Gastrointestinal: Positive for abdominal pain and constipation  Endocrine: Negative  Genitourinary: Negative  Musculoskeletal: Negative  Skin: Negative  Neurological: Negative  Hematological: Negative  Psychiatric/Behavioral: Negative  Current Outpatient Medications   Medication Sig Dispense Refill    aspirin-acetaminophen-caffeine (EXCEDRIN MIGRAINE) 250-250-65 MG per tablet Take 1 tablet by mouth every 6 (six) hours as needed for headaches      methylPREDNISolone 4 MG tablet therapy pack Use as directed on package 21 each 0    nystatin (MYCOSTATIN) powder Apply topically 3 (three) times a day 15 g 4    nystatin powder apply to affected area three times a day 15 g 4    pantoprazole (PROTONIX) 40 mg tablet Take 1 tablet (40 mg total) by mouth 2 (two) times a day before meals  0    traMADol (ULTRAM) 50 mg tablet Take 1 tablet (50 mg total) by mouth every 8 (eight) hours as needed for moderate pain 30 tablet 1    gabapentin (NEURONTIN) 300 mg capsule Take 1 capsule (300 mg total) by mouth 3 (three) times a day 90 capsule 0    potassium chloride (K-DUR,KLOR-CON) 20 mEq tablet Take 1 tablet (20 mEq total) by mouth daily 30 tablet 0     No current facility-administered medications for this visit  Objective:    Blood pressure 104/78, pulse 93, temperature 97 5 °F (36 4 °C), resp  rate 16, height 5' 3" (1 6 m), weight 67 1 kg (148 lb), SpO2 97 %, not currently breastfeeding  Body mass index is 26 22 kg/m²  Body surface area is 1 7 meters squared  Physical Exam  Vitals reviewed  Constitutional:       Appearance: She is well-developed  She is ill-appearing  Comments: No significant rash noted  HENT:      Head: Normocephalic and atraumatic  Neck:      Thyroid: No thyromegaly  Cardiovascular:      Rate and Rhythm: Normal rate and regular rhythm  Heart sounds: Normal heart sounds  Pulmonary:      Effort: Pulmonary effort is normal       Breath sounds: Normal breath sounds  Abdominal:      General: There is distension  Palpations: Abdomen is soft  Comments: Well healedMidline incisions  Abdomen distended and tympanic  Some gas in stoma appliance    No significant tenderness noted  Genitourinary:     Comments: Refused    Musculoskeletal:         General: Normal range of motion  Cervical back: Normal range of motion and neck supple  Lymphadenopathy:      Cervical: No cervical adenopathy  Skin:     General: Skin is warm and dry  Neurological:      Mental Status: She is alert and oriented to person, place, and time     Psychiatric:         Behavior: Behavior normal          Lab Results   Component Value Date     225 6 (H) 09/03/2021     Lab Results   Component Value Date    K 4 1 09/17/2021     09/17/2021    CO2 21 09/17/2021    BUN 34 (H) 09/17/2021    CREATININE 2 09 (H) 09/17/2021    GLUCOSE 228 (H) 09/10/2019    GLUF 122 (H) 09/17/2021    CALCIUM 8 4 09/17/2021    CORRECTEDCA 9 3 09/17/2021    AST 25 09/17/2021    ALT 17 09/17/2021    ALKPHOS 150 (H) 09/17/2021    EGFR 23 09/17/2021     Lab Results   Component Value Date    WBC 5 46 09/17/2021    HGB 8 1 (L) 09/17/2021    HCT 25 2 (L) 09/17/2021    MCV 87 09/17/2021     (L) 09/17/2021     Lab Results   Component Value Date    NEUTROABS 3 87 09/17/2021        Trend:  Lab Results   Component Value Date     225 6 (H) 09/03/2021     210 2 (H) 08/27/2021     198 4 (H) 08/21/2021     135 0 (H) 08/10/2021     117 1 (H) 07/09/2021     116 0 (H) 07/02/2021     113 5 (H) 06/25/2021     131 7 (H) 06/18/2021     103 1 (H) 05/28/2021     91 0 (H) 05/14/2021     90 1 (H) 05/07/2021     62 8 (H) 04/16/2021     60 8 (H) 04/02/2021     110 8 (H) 03/12/2021     101 9 (H) 03/02/2021     75 8 (H) 02/12/2021     85 7 (H) 01/22/2021     102 0 (H) 12/05/2020     77 1 (H) 11/30/2020     45 7 (H) 10/16/2020     39 6 (H) 09/11/2020     44 9 (H) 09/01/2020     29 2 06/15/2020     27 4 06/10/2020     32 5 (H) 05/26/2020     28 6 05/15/2020     30 6 (H) 05/11/2020     36 5 (H) 04/27/2020     37 9 (H) 04/17/2020     42 5 (H) 04/10/2020     48 1 (H) 03/27/2020     40 9 (H) 03/20/2020     18 2 01/28/2020     17 5 12/31/2019     14 5 12/02/2019     22 0 11/19/2019     32 6 (H) 11/06/2019     19 1 08/30/2019     12 7 08/13/2019     13 9 06/18/2019     19 9 05/21/2019     79 5 (H) 04/16/2019     96 4 (H) 03/19/2019     80 4 (H) 03/08/2019     135 0 (H) 11/30/2018     194 5 (H) 10/18/2018                  Blair Verma MD  9/29/2021 10:41 AM  Incomplete  Assessment/Plan:    Problem List Items Addressed This Visit        Endocrine    Ovarian cancer ([facility] [address] ) - Primary            CHIEF COMPLAINT:  Consideration of cycle 2 topotecan      Problem:  Cancer Staging  Ovarian cancer [facility])  Staging form: Ovary, Fallopian Tube, Primary Peritoneal, AJCC 8th Edition  - Clinical stage from 11/14/2018: FIGO Stage IVB (cT3c, cN0, pM1b) - Signed by Blair Verma MD on 11/14/2018  - Pathologic stage from 10/9/2019: FIGO Stage IIIC (pT3c, pN1, cM0) - Signed by Blair Verma MD on 10/9/2019        Previous therapy:  Oncology History   Ovarian cancer ([facility] [address] )   11/9/2018 Initial Diagnosis    Ovarian cancer ([facility] [address] )   tumor marker 194 5     11/9/2018 Biopsy    CT-guided needle biopsy of abdominal mass 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  Given liver involvement this would be stage IV     11/14/2018 - 12/4/2018 Chemotherapy    Neoadjuvant therapy: carboplatin area under the curve of 6, paclitaxel 135 milligrams/meter squared, Avastin 10 milligrams/kilogram  Completed 1 of 3 cycles  12/14/2018 Surgery    LAPAROTOMY EXPLORATORY; Abdominal Washout; Application of Abthera Vac Dressing for suspected bowel perforation and septic shock        12/15/2018 Surgery    LAPAROTOMY EXPLORATORY, ABDOMINAL WASHOUT, DRAIN PLACEMENT x 4, DIVERTING LOOP ILEOSTOMY AND ABDOMINAL CLOSURE for bowel perforation     3/26/2019 -  Chemotherapy    Taxol weekly 80 mg/m2 for 3 consecutive weeks, may add carboplatin in the future with AUC of 5      3/26/2019 - 2/3/2020 Chemotherapy    palonosetron (ALOXI) injection 0 25 mg, 0 25 mg, Intravenous, Once, 6 of 6 cycles  Administration: 0 25 mg (5/28/2019), 0 25 mg (6/25/2019), 0 25 mg (11/6/2019), 0 25 mg (12/10/2019), 0 25 mg (1/7/2020)  fosaprepitant (EMEND) 150 mg in sodium chloride 0 9 % 255 mL IVPB, 150 mg, Intravenous, Once, 6 of 6 cycles  Administration: 150 mg (5/28/2019), 150 mg (6/25/2019), 150 mg (11/6/2019), 150 mg (12/10/2019), 150 mg (1/7/2020)  CARBOplatin (PARAPLATIN) in sodium chloride 0 9 % 250 mL IVPB,  (original dose ), Intravenous, Once, 6 of 6 cycles  Dose modification:   (Cycle 2),   (original dose 258 4 mg, Cycle 3),   (original dose 258 4 mg, Cycle 3),   (original dose 244 4 mg, Cycle 4)  Administration: 258 4 mg (5/28/2019), 244 4 mg (6/25/2019), 285 2 mg (11/6/2019), 296 4 mg (12/10/2019), 286 4 mg (1/7/2020)  PACLitaxel (TAXOL) 127 8 mg in sodium chloride 0 9 % 250 mL chemo IVPB, 80 mg/m2, Intravenous, Once, 7 of 7 cycles  Dose modification: 65 mg/m2 (original dose 80 mg/m2, Cycle 2, Reason: Dose Not Tolerated)  Administration: 103 8 mg (5/14/2019), 108 6 mg (5/28/2019), 108 6 mg (6/4/2019), 108 6 mg (6/11/2019), 109 8 mg (6/25/2019), 109 8 mg (7/2/2019), 109 8 mg (7/9/2019), 111 6 mg (11/6/2019), 111 6 mg (11/12/2019), 111 6 mg (11/19/2019), 113 4 mg (12/10/2019), 113 4 mg (12/17/2019), 113 4 mg (12/23/2019), 113 4 mg (1/7/2020), 114 6 mg (1/14/2020), 114 6 mg (1/21/2020)     4/29/2019 Genomic Testing     Foundation 1 testing revealed a PD L1 tumor proportions score of 0%  The patient is not a candidate for PD L1 manipulation      Genetic Testing    [employer] Breast/Gyn panel, wildtype       9/10/2019 Surgery    Exploratory laparotomy radical omentectomy, posterior exenteration, takedown of ileostomy and end colostomy for complete debulking of visible tumor  Final pathology report revealed high-grade serous malignancy in both the omentum and the pelvic mass     9/25/2019 -  Chemotherapy    Carboplatin area under the curve of 5+ weekly paclitaxel at 80 milligrams/meters squared for 3 further cycles of treatment followed by consolidation this is to begin mid October     10/2019 Surgery    Interval debulking with TAHBSO revision of colostomy omentectomy and tumor debulking with complete debulking  Several weeks postoperatively the patient required a open cholecystectomy       10/2019 - 12/2019 Chemotherapy    Carboplatin paclitaxel x3     10/9/2019 -  Cancer Staged    Staging form: Ovary, Fallopian Tube, Primary Peritoneal, AJCC 8th Edition  - Pathologic stage from 10/9/2019: FIGO Stage IIIC (pT3c, pN1, cM0) - Signed by Gayle Rousseau MD on 10/9/2019  Histologic grade (G): G3  Histologic grading system: 4 grade system  Gross residual tumor after primary cyto-reductive surgery: Absent       3/24/2020 - 9/21/2020 Chemotherapy    palonosetron (ALOXI) injection 0 25 mg, 0 25 mg, Intravenous, Once, 6 of 7 cycles  Administration: 0 25 mg (3/24/2020), 0 25 mg (4/21/2020), 0 25 mg (5/19/2020), 0 25 mg (6/23/2020), 0 25 mg (7/28/2020), 0 25 mg (8/25/2020)  pegfilgrastim (NEULASTA ONPRO) subcutaneous injection kit 6 mg, 6 mg, Subcutaneous, Once, 6 of 7 cycles  Administration: 6 mg (3/31/2020), 6 mg (4/28/2020), 6 mg (5/26/2020), 6 mg (6/29/2020), 6 mg (8/4/2020), 6 mg (9/1/2020)  DOXOrubicin liposome (DOXIL) 54 3 mg in dextrose 5 % 250 mL chemo infusion, 30 mg/m2 = 54 3 mg, Intravenous, Once, 6 of 7 cycles  Administration: 54 3 mg (3/24/2020), 54 6 mg (4/21/2020), 54 9 mg (5/19/2020), 54 6 mg (6/23/2020), 54 9 mg (7/28/2020), 54 9 mg (8/25/2020)  gemcitabine (GEMZAR) 1,176 4 mg in sodium chloride 0 9 % 250 mL infusion, 650 mg/m2 = 1,176 4 mg, Intravenous, Once, 6 of 7 cycles  Administration: 1,176 4 mg (3/24/2020), 1,176 4 mg (3/31/2020), 1,182 9 mg (4/21/2020), 1,200 mg (4/28/2020), 1,189 4 mg (5/19/2020), 1,189 4 mg (5/26/2020), 1,182 9 mg (6/23/2020), 1,182 9 mg (6/29/2020), 1,189 4 mg (7/28/2020), 1,189 4 mg (8/4/2020), 1,189 4 mg (8/25/2020), 1,195 8 mg (9/1/2020)     1/12/2021 - 1/12/2021 Chemotherapy    palonosetron (ALOXI) injection 0 25 mg, 0 25 mg, Intravenous, Once, 0 of 6 cycles  fosaprepitant (EMEND) 150 mg in sodium chloride 0 9 % 250 mL IVPB, 150 mg, Intravenous, Once, 0 of 6 cycles  CARBOplatin (PARAPLATIN) in sodium chloride 0 9 % 250 mL IVPB, , Intravenous, Once, 0 of 6 cycles  PACLitaxel (TAXOL) 246 mg in sodium chloride 0 9 % 500 mL chemo IVPB, 135 mg/m2 = 246 mg (77 1 % of original dose 175 mg/m2), Intravenous, Once, 0 of 6 cycles  Dose modification: 135 mg/m2 (original dose 175 mg/m2, Cycle 1, Reason: Other (See Comments), Comment: heavily pretreated)     1/12/2021 - 5/24/2021 Chemotherapy    palonosetron (ALOXI) injection 0 25 mg, 0 25 mg, Intravenous, Once, 4 of 4 cycles  Administration: 0 25 mg (1/12/2021), 0 25 mg (3/2/2021), 0 25 mg (3/23/2021), 0 25 mg (4/27/2021)  fosaprepitant (EMEND) 150 mg in sodium chloride 0 9 % 250 mL IVPB, 150 mg, Intravenous, Once, 4 of 4 cycles  Administration: 150 mg (1/12/2021), 150 mg (3/2/2021), 150 mg (3/23/2021), 150 mg (4/27/2021)  CARBOplatin (PARAPLATIN) 311 mg in sodium chloride 0 9 % 250 mL IVPB, 311 mg, Intravenous, Once, 4 of 4 cycles  Administration: 311 mg (1/12/2021), 330 mg (3/2/2021), 338 mg (3/23/2021), 313 mg (4/27/2021)  PACLitaxel (TAXOL) 91 2 mg in sodium chloride 0 9 % 250 mL chemo IVPB, 50 mg/m2 = 91 2 mg (62 5 % of original dose 80 mg/m2), Intravenous, Once, 4 of 5 cycles  Dose modification: 50 mg/m2 (original dose 80 mg/m2, Cycle 1, Reason: Other (See Comments))  Administration: 91 2 mg (1/12/2021), 90 6 mg (1/19/2021), 89 4 mg (3/2/2021), 91 2 mg (3/9/2021), 91 2 mg (3/16/2021), 90 6 mg (3/23/2021), 90 6 mg (3/30/2021), 90 6 mg (4/6/2021), 90 mg (5/4/2021), 90 mg (5/11/2021), 90 mg (4/27/2021)     6/10/2021 - 8/30/2021 Chemotherapy    pembrolizumab (KEYTRUDA) IVPB, 200 mg, Intravenous, Once, 3 of 7 cycles  Administration: 200 mg (6/10/2021), 200 mg (6/29/2021), 200 mg (8/10/2021)     9/8/2021 -  Chemotherapy    Pegfilgrastim-bmez (ZIEXTENZO), 6 mg, Subcutaneous, Once, 0 of 3 cycles  topotecan (HYCAMTIN) chemo infusion, 3 mg/m2 = 5 3 mg, Intravenous, Once, 1 of 4 cycles  Administration: 5 3 mg (9/8/2021)           Patient ID: Shaun Colindres is a 69 y o  female   Patient is very pleasant 71-year-old female with history of stage IV B recurrent metastatic ovarian cancer originally diagnosed in approximately October of 2018  She has undergone multiple cycles of chemotherapy treatments and surgeries  She has a stoma in place due to 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 at her initial cycle of treatment  As such the patient has not been a candidate for Avastin use  Most recently she was placed on metronomic cyclophosphamide plus  Keytruda however disease progressed  She was switched to topotecan alone which began this month  Unfortunately the patient's creatinine was elevated and a CT scan was performed revealing the following:   LIVER/BILIARY TREE:  Multiple vague hypodense lesions in the liver suspicious for metastasis        *  Previously identified segment 6 lesion is not as well seen without IV contrast, now approximately 1 8 cm in size, not a significant change, previously measured as 1 9 cm  See image 62 series 201  *  Previously identified segment 7 lesion now measures up to 3 4 cm image 56 series 201, larger previously 1 6 cm      *  A lesion at the dome of the liver, segment 7, measures 2 9 x 2 1 cm image 39 series 201, larger, previously 1 9 x 0 8 cm      GALLBLADDER:  Gallbladder is surgically absent      SPLEEN:  Splenic hypodense lesions again noted not as well seen without IV contrast   Dominant lesion posteriorly with mild peripheral calcification measures up to 3 5 cm, stable previously 3 6 cm      PANCREAS:  Unremarkable      ADRENAL GLANDS:  Unremarkable      KIDNEYS/URETERS:  New moderate to severe hydroureteronephrosis bilaterally down to the mid to distal ureteral level where there is extensive retroperitoneal adenopathy      STOMACH AND BOWEL:  Left lower quadrant ostomy identified  Questionable areas of mild to moderate circumferential colonic wall thickening, question reactive related to the ascites but colitis is not excluded  No bowel obstruction  Large parastomal   hernia containing bowel      APPENDIX:  No findings to suggest appendicitis      ABDOMINOPELVIC CAVITY:  Scattered intraperitoneal nodules suspicious for peritoneal metastasis  6 mm nodule noted along the left paracolic gutter on image 62 series 201, stable      *  New aortocaval lymph node measures 2 2 x 1 1 cm image 60 series 201  *  Enlarged left external iliac chain lymph node measures 4 2 x 3 8 cm image 93 series 201, previously 3 9 x 3 2 cm  *  Enlarged mesenteric lymph nodes again seen some of which are partially calcified  Partially calcified mesenteric lymph node centrally measures 2 0 x 1 4 cm image 74 series 201, stable previously 1 9 x 1 5 cm  Enlarging mesenteric lymph node more   anteriorly measures up to 1 2 cm image 75 series 201, previously 9 mm  *  Bulky right common iliac chain lymph node measures approximately 4 6 x 2 8 cm image 82 series 201, previously approximately 2 5 x 1 8 cm  These enlarged common iliac chain lymph nodes likely cause ureteral obstruction  *  A large left pelvic sidewall lymph node measures 3 8 x 2 7 cm image 97 series 201, previously 2 1 x 1 4 cm  *  Soft tissue fullness in the micaela hepatis, not well-defined  Previously seen was enlarged lymph node here      Mild ascites      VESSELS:  Atherosclerotic changes are present    No evidence of aneurysm      PELVIS     REPRODUCTIVE ORGANS:  Surgical changes of prior hysterectomy      URINARY BLADDER: Bladder extends posteriorly to the pararectal space      ABDOMINAL WALL/INGUINAL REGIONS:  Left lower quadrant ostomy  Associated herniation of bowel  Additional ventral wall hernia in the lower abdominal wall on the right containing bowel and ascites without obstruction      OSSEOUS STRUCTURES:  No acute fracture or destructive osseous lesion  Mild anterolisthesis of L4 on L5  Multilevel degenerative spurring of the spine  Mild superior endplate deformity at L3 is stable      IMPRESSION:     1  Overall findings concerning for disease progression  2   New 6 mm pleural-based nodule at the right lung base centrally may represent metastasis  3  Multiple vague hypodense lesions in the liver suspicious for metastasis  Overall these appear to have progressed  4   Progression of retroperitoneal, mesenteric and pelvic lymphadenopathy  Scattered intraperitoneal nodularity suspicious for peritoneal metastasis  5   New moderate to severe hydroureteronephrosis bilaterally down to the mid to distal ureteral level where there is extensive retroperitoneal adenopathy likely the cause of obstruction  6   Questionable areas of mild to moderate circumferential colonic wall thickening, question reactive related to the ascites but colitis is not excluded  This CT scan was compared to a CT scan back in April of 2021  The patient has been referred to Urology for placement of stents  She has seen them on Monday but the placement has not been scheduled yet  Her most recent creatinine remains elevated at 2 09  I have reviewed the P patient's weekly CBC and CPM P data  Due to elevation in creatinine the patient is not an active candidate for chemotherapy  The remainder of the blood work appears unremarkable  The patient's hemoglobin dropped to as low as 6 2 and did require transfusion  It is presently 8  Her white blood cell count and platelets were are stable for continued treatment        The following portions of the patient's history were reviewed and updated as appropriate: allergies, current medications, past medical history, past social history, past surgical history and problem list     Review of Systems    Current Outpatient Medications   Medication Sig Dispense Refill    aspirin-acetaminophen-caffeine (EXCEDRIN MIGRAINE) 250-250-65 MG per tablet Take 1 tablet by mouth every 6 (six) hours as needed for headaches      methylPREDNISolone 4 MG tablet therapy pack Use as directed on package 21 each 0    nystatin (MYCOSTATIN) powder Apply topically 3 (three) times a day 15 g 4    nystatin powder apply to affected area three times a day 15 g 4    pantoprazole (PROTONIX) 40 mg tablet Take 1 tablet (40 mg total) by mouth 2 (two) times a day before meals  0    traMADol (ULTRAM) 50 mg tablet Take 1 tablet (50 mg total) by mouth every 8 (eight) hours as needed for moderate pain 30 tablet 1    gabapentin (NEURONTIN) 300 mg capsule Take 1 capsule (300 mg total) by mouth 3 (three) times a day 90 capsule 0    potassium chloride (K-DUR,KLOR-CON) 20 mEq tablet Take 1 tablet (20 mEq total) by mouth daily 30 tablet 0     No current facility-administered medications for this visit  Objective:    Blood pressure 104/78, pulse 93, temperature 97 5 °F (36 4 °C), resp  rate 16, height 5' 3" (1 6 m), weight 67 1 kg (148 lb), SpO2 97 %, not currently breastfeeding  Body mass index is 26 22 kg/m²  Body surface area is 1 7 meters squared      Physical Exam    Lab Results   Component Value Date     225 6 (H) 09/03/2021     Lab Results   Component Value Date    K 4 1 09/17/2021     09/17/2021    CO2 21 09/17/2021    BUN 34 (H) 09/17/2021    CREATININE 2 09 (H) 09/17/2021    GLUCOSE 228 (H) 09/10/2019    GLUF 122 (H) 09/17/2021    CALCIUM 8 4 09/17/2021    CORRECTEDCA 9 3 09/17/2021    AST 25 09/17/2021    ALT 17 09/17/2021    ALKPHOS 150 (H) 09/17/2021    EGFR 23 09/17/2021     Lab Results   Component Value Date    WBC 5 46 09/17/2021    HGB 8 1 (L) 09/17/2021    HCT 25 2 (L) 09/17/2021    MCV 87 09/17/2021     (L) 09/17/2021     Lab Results   Component Value Date    NEUTROABS 3 87 09/17/2021        Trend:  Lab Results   Component Value Date     225 6 (H) 09/03/2021     210 2 (H) 08/27/2021     198 4 (H) 08/21/2021     135 0 (H) 08/10/2021     117 1 (H) 07/09/2021     116 0 (H) 07/02/2021     113 5 (H) 06/25/2021     131 7 (H) 06/18/2021     103 1 (H) 05/28/2021     91 0 (H) 05/14/2021     90 1 (H) 05/07/2021     62 8 (H) 04/16/2021     60 8 (H) 04/02/2021     110 8 (H) 03/12/2021     101 9 (H) 03/02/2021     75 8 (H) 02/12/2021     85 7 (H) 01/22/2021     102 0 (H) 12/05/2020     77 1 (H) 11/30/2020     45 7 (H) 10/16/2020     39 6 (H) 09/11/2020     44 9 (H) 09/01/2020     29 2 06/15/2020     27 4 06/10/2020     32 5 (H) 05/26/2020     28 6 05/15/2020     30 6 (H) 05/11/2020     36 5 (H) 04/27/2020     37 9 (H) 04/17/2020     42 5 (H) 04/10/2020     48 1 (H) 03/27/2020     40 9 (H) 03/20/2020     18 2 01/28/2020     17 5 12/31/2019     14 5 12/02/2019     22 0 11/19/2019     32 6 (H) 11/06/2019     19 1 08/30/2019     12 7 08/13/2019     13 9 06/18/2019     19 9 05/21/2019     79 5 (H) 04/16/2019     96 4 (H) 03/19/2019     80 4 (H) 03/08/2019     135 0 (H) 11/30/2018     194 5 (H) 10/18/2018

## 2021-09-30 PROBLEM — N13.30 BILATERAL HYDRONEPHROSIS: Status: ACTIVE | Noted: 2021-01-01

## 2021-09-30 NOTE — ANESTHESIA PREPROCEDURE EVALUATION
Procedure:  CYSTOSCOPY RETROGRADE PYELOGRAM WITH INSERTION STENT URETERAL (Bilateral Bladder)    Relevant Problems   CARDIO   (+) Deep venous thrombosis of right profunda femoris vein (HCC)      GI/HEPATIC   (+) Intestinal adhesions with partial obstruction (HCC)   (+) SBO (small bowel obstruction) (HCC)      /RENAL   (+) Acute kidney injury ([address] )   (+) Bilateral hydronephrosis      GYN   (+) Ovarian cancer (HCC)      HEMATOLOGY   (+) Acute blood loss anemia   (+) Anemia   (+) Anemia due to chemotherapy      Per gyn onc note, yesterday:  65-year-old female with history of stage IV ovarian cancer now approximately 3 years post diagnosis  The patient is having progression of disease after recent treatment with cyclophosphamide and  Keytruda        The patient has active bowel obstruction which is partial and slowly resolving but the patient is taking minimal p o  intake  This in the face of worsening renal function of recommended the patient be admitted for hydration  She would prefer outpatient  We will retry outpatient hydration and recheck her labs today      I have emailed Dr Ana Mulligan from Urology to see if he can expedite the patient's stent placement to maximize her kidney function so that we can Rebegin her chemotherapy        We will see the patient back in a week for consideration of treatment  If she continues to have bowel obstructive symptoms  She will call and we will continue hydration   Or admission  Results for Sergio Marycarmen (MRN [id number]) as of 9/30/2021 13:18   Ref  Range 9/29/2021 11:32   Sodium Latest Ref Range: 136 - 145 mmol/L 133 (L)   Potassium Latest Ref Range: 3 5 - 5 3 mmol/L 3 2 (L)   Results for Sergio Marycarmen (MRN [id number]) as of 9/30/2021 13:18   Ref  Range 9/29/2021 11:32   Creatinine Latest Ref Range: 0 60 - 1 30 mg/dL 1 78 (H)   Results for Sergio Conroy (MRN [id number]) as of 9/30/2021 13:18   Ref   Range 9/29/2021 11:32   WBC Latest Ref Range: 4 31 - 10 16 Thousand/uL 19 27 (H)   Results for Giovani Shook (MRN [id number]) as of 9/30/2021 13:18   Ref  Range 9/29/2021 11:32   Hemoglobin Latest Ref Range: 11 5 - 15 4 g/dL 8 7 (L)   Labs drawn prior to hydration  Physical Exam    Airway    Mallampati score: II  TM Distance: >3 FB  Neck ROM: full     Dental   Comment: Denies loose teeth,     Cardiovascular  Cardiovascular exam normal    Pulmonary  Pulmonary exam normal     Other Findings  Portions of exam deferred due to low yield and/or unknown COVID status      Anesthesia Plan  ASA Score- 4 Emergent    Anesthesia Type- general with ASA Monitors  Additional Monitors:   Airway Plan: ETT  Plan Factors-Exercise tolerance (METS): <4 METS  Chart reviewed  Existing labs reviewed  Patient summary reviewed  Patient is not a current smoker  Induction- intravenous  Postoperative Plan-     Informed Consent- Anesthetic plan and risks discussed with patient and daughter  I personally reviewed this patient with the CRNA  Discussed and agreed on the Anesthesia Plan with the CRNA  Raul Omalley Pt does not desire admission for any reason  Small bowel obstruction being managed with outpatient hydration  Pt endorses continued nausea, although she has not had vomiting since last week  Procedure is non-elective, as stenting is necessary for her to continue with chemo  We plan to proceed under GETA  Pt understands and agrees

## 2021-09-30 NOTE — ANESTHESIA POSTPROCEDURE EVALUATION
Post-Op Assessment Note    CV Status:  Stable  Pain Score: 0    Pain management: adequate     Mental Status:  Arousable   Hydration Status:  Stable   PONV Controlled:  None   Airway Patency:  Patent      Post Op Vitals Reviewed: Yes      Staff: Anesthesiologist, CRNA         No complications documented      /63 (09/30/21 1420)    Temp 98 °F (36 7 °C) (09/30/21 1420)    Pulse 87 (09/30/21 1420)   Resp 16 (09/30/21 1420)    SpO2 98 % (09/30/21 1420)

## 2021-09-30 NOTE — TELEPHONE ENCOUNTER
The following message has been sent to our clinical team:    This patient is status post bilateral ureteral stent placement, with a 7 [place] by 24 centimeter left ureteral stent in place, no string, and a 7 [place] by 26 centimeter right ureteral stent, no string  Please update the stent database    Please schedule her for a 3 month ureteral stent exchange, thank you

## 2021-10-01 NOTE — TELEPHONE ENCOUNTER
Spoke w patient and she is sched for 1/6/21 at [place]  She is aware I will be in touch in Dec carleen ail packet and go over surgical protocol  I did sched her for an H&P w Ronnell Wilks on 12/16  She is aware to contact us between now and then w any issues or concerns

## 2021-10-05 PROBLEM — A41.9 SEPSIS (HCC): Status: ACTIVE | Noted: 2021-01-01

## 2021-10-05 PROBLEM — R41.82 ALTERED MENTAL STATUS: Status: ACTIVE | Noted: 2021-01-01

## 2021-10-06 PROBLEM — R90.89 MIDLINE SHIFT OF BRAIN: Status: ACTIVE | Noted: 2021-01-01

## 2021-10-06 PROBLEM — G93.41 ACUTE METABOLIC ENCEPHALOPATHY: Status: ACTIVE | Noted: 2021-01-01

## 2021-10-06 PROBLEM — G93.6 VASOGENIC EDEMA (HCC): Status: ACTIVE | Noted: 2021-01-01

## 2022-08-01 NOTE — LETTER
No urgent indication for hemodialysis at the moment.  However likely would need dialysis in near future.  Consult Nephrology for evaluation further treatment recommendations.   September 4, 2018     Aidee Alexis, [address] [facility]  [address] [facility]    Patient: Ander Mcghee   YOB: 1950   Date of Visit: 9/4/2018       Dear Dr Denise Garciaf: Thank you for referring Ander Mcghee to me for evaluation  Below are my notes for this consultation  If you have questions, please do not hesitate to call me  I look forward to following your patient along with you  Sincerely,        Carlitos Kramer MD        CC: No Recipients  Carlitos Kramer MD  9/4/2018 11:49 AM  Incomplete  [facility]    Dear Wong Morgan,    I had the pleasure of seeing your patient Ander Mcghee in the office today and I thank you for this kind referral        Chief Complaint:   Abdominal swelling      HPI:  Ander Mcghee is a 75 y o  female who presents with       Review of Systems:   Constitutional: No fever or chills, feels well, no tiredness, no recent weight gain or weight loss  HENT: No complaints of earache, no hearing loss, no nosebleeds, no nasal discharge, no sore throat, no hoarseness  Eyes: No complaints of eye pain, no red eyes, no discharge from eyes, no itchy eyes  Cardiovascular: No complaints of slow heart rate, no fast heart rate, no chest pain, no palpitations, no leg claudication, no lower extremity edema  Respiratory: No complaints of shortness of breath, no wheezing, no cough, no SOB on exertion, no orthopnea  Gastrointestinal: As noted in HPI  Genitourinary: No complaints of dysuria, no incontinence, no hesitancy, no nocturia  Musculoskeletal: No complaints of arthralgia, no myalgias, no joint swelling or stiffness, no limb pain or swelling  Neurological: No complaints of headache, no confusion, no convulsions, no numbness or tingling, no dizziness or fainting, no limb weakness, no difficulty walking  Skin: No complaints of skin rash or skin lesions, no itching, no skin wound, no dry skin      Hematological/Lymphatic: No complaints of swollen glands, does not bleed easy  Allergic/Immunologic: No immunocompromised state  Endocrine:  No complaints of polyuria, no polydipsia  Psychiatric/Behavioral: is not suicidal, no sleep disturbances, no anxiety or depression, no change in personality, no emotional problems  Historical Information   Past Medical History:   Diagnosis Date    Diabetes mellitus ([address] )      Past Surgical History:   Procedure Laterality Date    ECTOPIC PREGNANCY SURGERY       Social History   History   Alcohol Use    Yes     Comment: ocassional     History   Drug Use No     History   Smoking Status    Former Smoker   Smokeless Tobacco    Former User     Family History   Problem Relation Age of Onset    Cirrhosis Mother          Current Medications: currently has no medications in their medication list        Vital Signs: /70   Pulse 80   Ht 5' 4 5" (1 638 m)   Wt 78 kg (172 lb)   BMI 29 07 kg/m²      Physical Exam:   Constitutional  General Appearance: No acute distress, well appearing and well nourished  Head  Normocephalic  Eyes  Conjunctivae and lids: No swelling, erythema, or discharge  Pupils and irises: Equal, round and reactive to light  Ears, Nose, Mouth, and Throat  External inspection of ears and nose: Normal  Nasal mucosa, septum and turbinates: Normal without edema or erythema/   Oropharynx: Normal with no erythema, edema, exudate or lesions  Neck  Normal range of motion  Neck supple  Cardiovascular  Auscultation of the heart: Normal rate and rhythm, normal S1 and S2 without murmurs  Examination of the extremities for edema and/or varicosities: Normal  Pulmonary/Chest  Respiratory effort: No increased work of breathing or signs of respiratory distress  Auscultation of lungs: Clear to auscultation, equal breath sounds bilaterally, no wheezes, rales, no rhonchi  Abdomen  Abdomen: Non-tender, no masses  Liver and spleen: No hepatomegaly or splenomegaly  Musculoskeletal  Gait and station: normal   Digits and Nails: normal without clubbing or cyanosis  Inspection/palpation of joints, bones, and muscles: Normal  Neurological  No nystagmus or asterixis  Skin  Skin and subcutaneous tissue: Normal without rashes or lesions  Lymphatic  Palpation of the lymph nodes in neck: No lymphadenopathy  Psychiatric  Orientation to person, place and time: Normal   Mood and affect: Normal          Labs:   No results found for: ALBUMIN, ALT, AST, BUN, CALCIUM, CL, CHOL, CO2, CREATININE, GFRAA, GFRNONAA, GLU, HDL, HCT, HGB, HGBA1C, LDL, MG, PHOS, PLT, K, PSA, NA, TRIG, WBC      X-Rays & Procedures:   MRI abdomen wo contrast and mrcp    (Results Pending)   IR paracentesis    (Results Pending)         ______________________________________________________________________      Assessment & Plan:      Diagnoses and all orders for this visit:    Abnormal ultrasound  -     Alpha-1-antitrypsin; Future  -     EMA Screen w/ Reflex to Titer/Pattern; Future  -     Antimitochondrial antibody; Future  -     Celiac Disease Antibody Profile; Future  -     Ferritin; Future  -     Hepatitis B core antibody, total; Future  -     Hepatitis B surface antigen; Future  -     Hepatitis C Qualitative by PCR; Future  -     Iron Saturation %; Future  -     TIBC; Future  -     MRI abdomen wo contrast and mrcp; Future  -     IR paracentesis; Future    Other cirrhosis of liver (HCC)  -     Alpha-1-antitrypsin; Future  -     EMA Screen w/ Reflex to Titer/Pattern; Future  -     Antimitochondrial antibody; Future  -     Celiac Disease Antibody Profile; Future  -     Ferritin; Future  -     Hepatitis B core antibody, total; Future  -     Hepatitis B surface antigen; Future  -     Hepatitis C Qualitative by PCR; Future  -     Iron Saturation %; Future  -     TIBC; Future  -     MRI abdomen wo contrast and mrcp; Future  -     IR paracentesis; Future    Other ascites  -     Alpha-1-antitrypsin;  Future  -     EMA Screen w/ Reflex to Titer/Pattern; Future  -     Antimitochondrial antibody; Future  -     Celiac Disease Antibody Profile; Future  -     Ferritin; Future  -     Hepatitis B core antibody, total; Future  -     Hepatitis B surface antigen; Future  -     Hepatitis C Qualitative by PCR; Future  -     Iron Saturation %; Future  -     TIBC; Future  -     MRI abdomen wo contrast and mrcp; Future  -     IR paracentesis; Future    Rectal bleeding    Dilated bile duct    Gallstones    Other orders  -     Diet NPO; Sips with meds; Standing  -     Void on call to OR; Standing  -     Insert peripheral IV; Standing  -     Diet NPO; Sips with meds; Standing  -     Void on call to OR; Standing  -     Insert peripheral IV; Standing        I have taken liberty of scheduling the patient for both EGD and colonoscopy  We will also obtain an MRI with an MRCP  Abdominal paracentesis will be performed for diagnostic purposes  A full liver workup will be done  At the end of it, there is a possibility this might be Mieg's syndrome since neither of her ovaries Are visible, and her liver functions and platelet count are unremarkable  The nodularity of the liver is certainly minimal on the ultrasound  At any rate, further recommendations will depend on the study results      I would like to thank you for allowing me to participate in her care      With warmest regards,    Yonas Morse MD, [facility]

## 2024-01-30 NOTE — CONSULTS
Consultation - Palliative Care   Bernardo Agrawal 75 y o  female MRN: [id number]  Unit/Bed#: [facility] 513-01 Encounter: [id number]      Assessment/Plan     Assessment:  Patient Active Problem List   Diagnosis    Other ascites    Lesion of liver    Edema leg    [facility]    Abdominal pain    Intractable nausea and vomiting    Bowel perforation ([facility]    Septic shock ([facility]    Peritonitis ([facility]    S/P ileostomy ([facility]    Acute blood loss anemia    Leukocytosis    Hypokalemia       Plan:  Goals of care:   - continue with treatment-focused plan of care, with goal of living longer   - unable to discuss future plans while still in post-op phase, wants to focus on "one day at a time"   - does not have advance directive but is interested, copy of Five Wishes left at bedside (however patient does not have glasses)- she will review with family or with  in a couple days when she is feeling up to it   - patient is competent; if she were to lose her ability to make her own medical decisions, surrogate decision-making would be shared btw daughter and son, as  is not competent to serve in this role per her description of his health issues   - palliative care will continue to follow along, met together with palliative care SW    Symptom management: per gyn-onc/ critical care team   - PRN Tylenol    - PRN oxyIR 5-10mg   - PRN morphine IV    - PRN ondansetron    History of Present Illness   Physician Requesting Consult: Isaac Patel MD  Reason for Consult / Principal Problem: goals of care  HPI: Bernardo Agrawal is a 75 y o  female with ovarian cancer stage IVB who presents as a transfer from [facility] on 12/14 with intractable n/v and abdominal pain, suspected to have bowel perforation  Underwent ex-lap with washout on 12/14, abdomen was left open and patient returned to OR on 12/15 for ex-lap, washout, diverting loop ileostomy and abdominal closure   Found to have diffuse peritoneal carcinomatosis  Patient has been on abx for septic shock related to bowel perforation; she has been improving  Initially diagnosed with stage IVB ovarian cancer in Oct 2018  Underwent 1st cycle of neoadjuvant chemo with carboplatin, Taxol, Avastin on 12/4  Patient lives at home with  who had stroke earlier this year; she is his primary caregiver  She has an adult daughter and son who live locally  She is also a caregiver for 3 grandchildren  Her family and her marcelina are the most important things in her life  She describes herself as a '[address] ' She thinks her pastors will be coming to visit and does not want a visit from pastoral care  She states that "God must have more plans" for her and is happy to be alive  Not ready to talk about plan of care beyond current post-op phase, wants to take things "one day at a time "    Does not have appetite but food is starting to sound good to her  No nausea at this time  Abdominal pain is tolerable after receiving Tylenol  Prefers to avoid opioids  Inpatient consult to Palliative Care  Consult performed by: Madeline Vizcaino  Consult ordered by: Estella CHAO          Review of Systems   Constitutional: Positive for appetite change and fatigue  Gastrointestinal: Positive for abdominal pain  Negative for nausea  Skin: Positive for wound  All other systems reviewed and are negative        Historical Information   Past Medical History:   Diagnosis Date    Abdominal fluid collection     Asthma     Cancer ([address] )     ovaries    Diabetes mellitus ([address] )      Past Surgical History:   Procedure Laterality Date    CT GUIDED PARACENTESIS  11/6/2018    CT NEEDLE BIOPSY PERITONEUM  11/6/2018    ECTOPIC PREGNANCY SURGERY      ECTOPIC PREGNANCY SURGERY      IR PARACENTESIS  9/18/2018    IR PARACENTESIS  10/18/2018    IR PARACENTESIS  12/10/2018    IR PORT PLACEMENT  11/29/2018    WA COLONOSCOPY FLX DX W/COLLJ SPEC WHEN PFRMD N/A 9/25/2018    Procedure: EGD AND COLONOSCOPY;  Surgeon: Tanvi Reina MD;  Location: [facility];   Service: Gastroenterology    TONSILECTOMY AND ADNOIDECTOMY      TONSILLECTOMY       Social History     Social History    Marital status: /Civil Union     Spouse name: N/A    Number of children: N/A    Years of education: N/A     Social History Main Topics    Smoking status: Former Smoker    Smokeless tobacco: Never Used    Alcohol use Yes      Comment: occassionally    Drug use: No    Sexual activity: Not Asked     Other Topics Concern    None     Social History Narrative    None     Family History   Problem Relation Age of Onset    Cirrhosis Mother     Colon cancer Mother     Leukemia Cousin     Diabetes Father        Meds/Allergies   all current active meds have been reviewed and current meds:   Current Facility-Administered Medications   Medication Dose Route Frequency    acetaminophen (TYLENOL) tablet 650 mg  650 mg Oral Q6H PRN    [START ON 12/19/2018] cefepime (MAXIPIME) 2,000 mg in dextrose 5 % 50 mL IVPB  2,000 mg Intravenous Q12H    chlorhexidine (PERIDEX) 0 12 % oral rinse 15 mL  15 mL Swish & Spit Q12H [address]    enoxaparin (LOVENOX) subcutaneous injection 40 mg  40 mg Subcutaneous Q24H MARCIO    insulin lispro (HumaLOG) 100 units/mL subcutaneous injection 1-5 Units  1-5 Units Subcutaneous HS    insulin lispro (HumaLOG) 100 units/mL subcutaneous injection 1-6 Units  1-6 Units Subcutaneous TID AC    metroNIDAZOLE (FLAGYL) IVPB (premix) 500 mg  500 mg Intravenous Q8H    morphine (PF) 4 mg/mL injection 4 mg  4 mg Intravenous Q4H PRN    ondansetron (ZOFRAN) injection 4 mg  4 mg Intravenous Q6H PRN    oxyCODONE (ROXICODONE) immediate release tablet 10 mg  10 mg Oral Q4H PRN    oxyCODONE (ROXICODONE) IR tablet 5 mg  5 mg Oral Q4H PRN    pantoprazole (PROTONIX) injection 40 mg  40 mg Intravenous Q24H MARCIO    phenol (CHLORASEPTIC) 1 4 % mucosal liquid 1 spray  1 spray Mouth/Throat Q2H PRN    potassium phosphate 21 mmol in sodium chloride 0 9 % 250 mL infusion  21 mmol Intravenous Once    potassium-sodium phosphates (PHOS-NAK) packet 1 packet  1 packet Oral 4x Daily (with meals and at bedtime)       No Known Allergies    Objective     Physical Exam   Constitutional: She is oriented to person, place, and time  She is cooperative  Appears chronically ill and fatigued   HENT:   Head: Normocephalic and atraumatic  Right Ear: Hearing and external ear normal    Left Ear: Hearing and external ear normal    Mouth/Throat: Mucous membranes are normal    Eyes: Conjunctivae, EOM and lids are normal    Neck: Trachea normal  Neck supple  Cardiovascular: Normal rate  Pulmonary/Chest: Effort normal    Neurological: She is alert and oriented to person, place, and time  Skin: Skin is warm and dry  There is pallor  Psychiatric: She has a normal mood and affect  Her behavior is normal  Cognition and memory are normal        Lab Results: I have personally reviewed pertinent labs  Imaging Studies: I have personally reviewed pertinent reports  EKG, Pathology, and Other Studies: I have personally reviewed pertinent reports        Code Status: Level 1 - Full Code  Advance Directive and Living Will:    none  Power of :  would default to son and daughter as described above  POLST:  none Hospitals/Psychiatric Facilities

## 2024-03-14 NOTE — PROGRESS NOTES
Gyn Oncology Progress note   Bernardo Agrawal 75 y o  female MRN: [id number]  Unit/Bed#: [facility] 509-01 Encounter: [id number]    Bernardo Agrawal has no current complaints  Pain is present - adequately treated  Patient is currently voiding  She is ambulating  She is tolerating PO, and denies nausea or vomitting  Patient denies fever, chills, chest pain, shortness of breath, or calf tenderness  /66 (BP Location: Left arm)   Pulse 86   Temp 98 2 °F (36 8 °C) (Oral)   Resp 18   Wt 66 2 kg (146 lb)   SpO2 95%   BMI 24 50 kg/m²     I/O last 3 completed shifts:  In: -   Out: 9477 [Urine:900; Drains:600; Stool:175]  No intake/output data recorded  Lab Results   Component Value Date    WBC 25 62 (H) 12/19/2018    HGB 8 7 (L) 12/19/2018    HCT 27 9 (L) 12/19/2018    MCV 80 (L) 12/19/2018     12/19/2018       Lab Results   Component Value Date    GLUCOSE 84 12/15/2018    CALCIUM 7 6 (L) 12/19/2018    K 3 9 12/19/2018    CO2 26 12/19/2018     12/19/2018    BUN 5 12/19/2018    CREATININE 0 31 (L) 12/19/2018       Lab Results   Component Value Date/Time    POCGLU 78 12/19/2018 06:12 AM    POCGLU 98 12/18/2018 09:03 PM    POCGLU 110 12/18/2018 03:58 PM    POCGLU 130 12/18/2018 12:24 PM    POCGLU 91 12/18/2018 06:22 AM       Physical Exam  Gen: AAOx3, NAD, comfortable in bed  CVS: S1S2+, RRR, no murmurs  Lungs: CTA b/l normal respiratory effort and rate  Abdomen: soft, non tender, incision closed with staples-some clear drainage at inferior portion of incision  ANDREAS drain x 4 in place; ileostomy in place, pink stoma productive of stool  Extremities: SCDs on and on, non tender    A/P: Assessment / Plan: 75 y  o   with Stage IV ovarian cancer admitted on 12/14/18 in septic shock to bowel perforation,  status post exploratory laparotomy, abdominal washout and Abthera vac placement on 12/14/18, followed by exploratory laparotomy, abdominal washout,  Drain placement x 4, loop ileostomy & abdominal closure on 12/15/18, POD#4, doing well postoperatively, Hospital Day #5        #1 Sepsis secondary to bowel perforation:   - Status post surgery as mentioned, now in level 1 step down from ICU    - WBC began to trend down on 12/18, however, this am it has increased  ID following  Continue to trend WBCs, will consider re-imaging if continue to trend upwards  Otherwise patient remains afebrile  - Initial Blood cultures no growth  Urine culture no growth  Tissue gram gram 4+ polys, gram + and - sergio, final consistent with multi drug resistant e coli  Anaerobic cultures growing Clostridium   - Cefepime and Flagyl were discontinued on 12/18 per ID recs, patient now on Zosyn  Awaiting final susceptibilities     #2 Post operative Care:  - Pain: controlled with tylenol and roxicodone-palliative care following  - Encouraged incentive spirometry to reduce atelectasis and pneumonia risk  - Encouraged ambulation as tolerated-PT following, currently recommending STR-CM working on placement at [employer]  - DVT ppx: SCDs, Lovenox 40mg daily     #3 Stage IV Ovarian Cancer  Status post neoadjuvant chemotherapy with Carboplatin, Taxol & Avastin  Any further chemo is currently on hold until acute illness has subsided     #4 Type 2 DM:   Accuchecks 70s-110s  Continue sliding scale insulin, #3     #5 FEN: Regular with Glucerna; Isolyte @ 125cc/hr     #6 Disposition: Inpatient Antihistamine Recommendations: Daily Allegra or Zyrtec Moisturizer Recommendations: Vaseline over top of Hydrocortisone. Detail Level: Detailed

## 2024-05-21 NOTE — RESTORATIVE TECHNICIAN NOTE
Restorative Specialist Mobility Note       Pt refused OOB at this time  Will continue to follow up daily  RN ruben Griggs Restorative Technician, BS  Cardiac Monitor/Defib/ACLS/Rescue Kit/O2/BVM

## (undated) DEVICE — INTENDED FOR TISSUE SEPARATION, AND OTHER PROCEDURES THAT REQUIRE A SHARP SURGICAL BLADE TO PUNCTURE OR CUT.: Brand: BARD-PARKER SAFETY BLADES SIZE 10, STERILE

## (undated) DEVICE — MEDI-VAC YANKAUER SUCTION HANDLE W/STRAIGHT TIP & CONTROL VENT: Brand: CARDINAL HEALTH

## (undated) DEVICE — SUT PDS II 1 CTX 36 IN Z371T

## (undated) DEVICE — LUBRICANT SURGILUBE TUBE 4 OZ  FLIP TOP

## (undated) DEVICE — SUT VICRYL 3-0 SH 27 IN J416H

## (undated) DEVICE — UROCATCH BAG

## (undated) DEVICE — SPONGE 4 X 4 XRAY 16 PLY STRL LF RFD

## (undated) DEVICE — JP PERF DRN SIL FLT 10MM FULL: Brand: CARDINAL HEALTH

## (undated) DEVICE — SUT VICRYL 2-0 SH 27 IN UNDYED J417H

## (undated) DEVICE — ELECTRODE BLADE MOD  E-Z CLEAN 6.5IN -0014M

## (undated) DEVICE — 3M™ TEGADERM™ TRANSPARENT FILM DRESSING FRAME STYLE, 1626W, 4 IN X 4-3/4 IN (10 CM X 12 CM), 50/CT 4CT/CASE: Brand: 3M™ TEGADERM™

## (undated) DEVICE — PACK TUR

## (undated) DEVICE — MEDI-VAC YANK SUCT HNDL W/TPRD BULBOUS TIP: Brand: CARDINAL HEALTH

## (undated) DEVICE — TRAY FOLEY 16FR URIMETER SILICONE SURESTEP

## (undated) DEVICE — ABDOMINAL PAD: Brand: DERMACEA

## (undated) DEVICE — 40595 XL TRENDELENBURG POSITIONING KIT: Brand: 40595 XL TRENDELENBURG POSITIONING KIT

## (undated) DEVICE — ADHESIVE SKN CLSR HISTOACRYL FLEX 0.5ML LF

## (undated) DEVICE — JACKSON-PRATT 100CC BULB RESERVOIR: Brand: CARDINAL HEALTH

## (undated) DEVICE — SKIN MARKER DUAL TIP WITH RULER CAP, FLEXIBLE RULER AND LABELS: Brand: DEVON

## (undated) DEVICE — GLOVE INDICATOR PI UNDERGLOVE SZ 8 BLUE

## (undated) DEVICE — GUIDEWIRE STRGHT TIP 0.035 IN  SOLO PLUS

## (undated) DEVICE — TISSEEL EASYSPRAY SET

## (undated) DEVICE — CHLORAPREP HI-LITE 10.5ML ORANGE

## (undated) DEVICE — GLOVE PI ULTRA TOUCH SZ.7.5

## (undated) DEVICE — TISSEEL FIBRIN 10 ML FROZEN

## (undated) DEVICE — ANTIBACTERIAL UNDYED BRAIDED (POLYGLACTIN 910), SYNTHETIC ABSORBABLE SUTURE: Brand: COATED VICRYL

## (undated) DEVICE — ENSEAL 20 CM SHAFT, LARGE JAW: Brand: ENSEAL X1

## (undated) DEVICE — 3M™ STERI-DRAPE™ UNDER BUTTOCKS DRAPE WITH POUCH 1084: Brand: STERI-DRAPE™

## (undated) DEVICE — TOWEL SET X-RAY

## (undated) DEVICE — SUT PDS II 1 XLH 96 IN LOOPED Z881G

## (undated) DEVICE — CHLORHEXIDINE 4PCT 4 OZ

## (undated) DEVICE — GLOVE INDICATOR PI UNDERGLOVE SZ 8.5 BLUE

## (undated) DEVICE — ABTHERA OPEN ABDOMEN DRESSING: Brand: ABTHERA™

## (undated) DEVICE — 3M™ IOBAN™ 2 ANTIMICROBIAL INCISE DRAPE 6650EZ: Brand: IOBAN™ 2

## (undated) DEVICE — BETHLEHEM MAJOR GENERAL PACK: Brand: CARDINAL HEALTH

## (undated) DEVICE — PACK PBDS STERILE LAP LITHOTOMY RF

## (undated) DEVICE — SUT VICRYL 0 REEL 54 IN J287G

## (undated) DEVICE — UNDER BUTTOCKS DRAPE W/FLUID CONTROL POUCH: Brand: CONVERTORS

## (undated) DEVICE — PROXIMATE RELOADABLE LINEAR CUTTER WITH SAFETY LOCK-OUT, 75MM: Brand: PROXIMATE

## (undated) DEVICE — ENDOSCOPIC CURVED INTRALUMINAL STAPLER (ILS) 24 TITANIUM ADJUSTABLE HEIGHT STAPLES

## (undated) DEVICE — CYSTO TUBING SINGLE IRRIGATION

## (undated) DEVICE — GAUZE SPONGES,16 PLY: Brand: CURITY

## (undated) DEVICE — REM POLYHESIVE ADULT PATIENT RETURN ELECTRODE: Brand: VALLEYLAB

## (undated) DEVICE — SCD SEQUENTIAL COMPRESSION COMFORT SLEEVE MEDIUM KNEE LENGTH: Brand: KENDALL SCD

## (undated) DEVICE — PENCIL ELECTROSURG E-Z CLEAN -0035H

## (undated) DEVICE — SUT PLAIN 2-0 CTX 27 IN 872H

## (undated) DEVICE — PACK PBDS MAJOR GYN VAGINAL SLB

## (undated) DEVICE — GLOVE SRG BIOGEL 7

## (undated) DEVICE — SUT SILK 2-0 TIES 144 IN LA55G

## (undated) DEVICE — PREMIUM DRY TRAY LF: Brand: MEDLINE INDUSTRIES, INC.

## (undated) DEVICE — CO2 AND WATER TUBING/CAP SET FOR OLYMPUS® SCOPES & UCR: Brand: ERBE

## (undated) DEVICE — 3M™ TEGADERM™ TRANSPARENT FILM DRESSING FRAME STYLE, 1628, 6 IN X 8 IN (15 CM X 20 CM), 10/CT 8CT/CASE: Brand: 3M™ TEGADERM™

## (undated) DEVICE — CAUTERY TIP POLISHER: Brand: DEVON

## (undated) DEVICE — GAUZE SPONGES,8 PLY: Brand: CURITY

## (undated) DEVICE — PAD GROUNDING ADULT

## (undated) DEVICE — SHEATH URETERAL ACCESS 12/14FR 35CM PROXIS

## (undated) DEVICE — POOLE SUCTION HANDLE: Brand: CARDINAL HEALTH

## (undated) DEVICE — SUT VICRYL 0 CT-1 27 IN J260H

## (undated) DEVICE — GLOVE PI ULTRA TOUCH SZ.8.0

## (undated) DEVICE — SURGICEL 4 X 8

## (undated) DEVICE — VAC CANISTER VAC VIA 250ML

## (undated) DEVICE — INTENDED FOR TISSUE SEPARATION, AND OTHER PROCEDURES THAT REQUIRE A SHARP SURGICAL BLADE TO PUNCTURE OR CUT.: Brand: BARD-PARKER SAFETY BLADES SIZE 11, STERILE

## (undated) DEVICE — CYSTOSCOPY PACK: Brand: CONVERTORS

## (undated) DEVICE — LIGACLIP MCA MULTIPLE CLIP APPLIERS, 20 MEDIUM CLIPS: Brand: LIGACLIP

## (undated) DEVICE — Device: Brand: DEFENDO AIR/WATER/SUCTION AND BIOPSY VALVE

## (undated) DEVICE — 3000CC GUARDIAN II: Brand: GUARDIAN

## (undated) DEVICE — GLOVE SRG BIOGEL 6.5

## (undated) DEVICE — GLOVE PI ULTRA TOUCH SZ.8.5

## (undated) DEVICE — GUIDEWIRE AMPLATZ SS .038 180CM

## (undated) DEVICE — INVIEW CLEAR LEGGINGS: Brand: CONVERTORS

## (undated) DEVICE — SUT STRATAFIX SPIRAL 3-0 PGA/PCL 30 X 30 CM SXMD2B408

## (undated) DEVICE — TELFA NON-ADHERENT ABSORBENT DRESSING: Brand: TELFA

## (undated) DEVICE — 3M™ TEGADERM™ TRANSPARENT FILM DRESSING FRAME STYLE, 1624W, 2-3/8 IN X 2-3/4 IN (6 CM X 7 CM), 100/CT 4CT/CASE: Brand: 3M™ TEGADERM™

## (undated) DEVICE — CONTOUR CURVED CUTTER STAPLER: Brand: CONTOUR

## (undated) DEVICE — GAUZE SPONGES,USP TYPE VII GAUZE, 12 PLY: Brand: CURITY

## (undated) DEVICE — CHLORAPREP HI-LITE 26ML ORANGE

## (undated) DEVICE — SUT MONOCRYL 4-0 PS-2 18 IN Y496G

## (undated) DEVICE — 3M™ TEGADERM™ TRANSPARENT FILM DRESSING FRAME STYLE, 1627, 4 IN X 10 IN (10 CM X 25 CM), 20/CT 4CT/CASE: Brand: 3M™ TEGADERM™

## (undated) DEVICE — CONTOUR CURVED CUTTER STAPLER RELOAD: Brand: CONTOUR

## (undated) DEVICE — LIGHT HANDLE COVER SLEEVE DISP BLUE STELLAR

## (undated) DEVICE — ADHESIVE SKIN HIGH VISCOSITY EXOFIN 1ML

## (undated) DEVICE — JP CHANNEL DRAIN 19FR, FULL FLUTES: Brand: JACKSON-PRATT

## (undated) DEVICE — ABTHERA OPEN ABDOMEN DRESSING WITH SENSATRAC PAD: Brand: ABTHERA™ SENSAT.R.A.C.™

## (undated) DEVICE — INTENDED FOR TISSUE SEPARATION, AND OTHER PROCEDURES THAT REQUIRE A SHARP SURGICAL BLADE TO PUNCTURE OR CUT.: Brand: BARD-PARKER SAFETY BLADES SIZE 15, STERILE

## (undated) DEVICE — SUT VICRYL 3-0 SH C/R 18 IN J864D

## (undated) DEVICE — 3M™ STERI-STRIP™ REINFORCED ADHESIVE SKIN CLOSURES, R1546, 1/4 IN X 4 IN (6 MM X 100 MM), 10 STRIPS/ENVELOPE: Brand: 3M™ STERI-STRIP™

## (undated) DEVICE — LIGACLIP MCA MULTIPLE CLIP APPLIERS, 20 LARGE CLIPS: Brand: LIGACLIP

## (undated) DEVICE — SUT PROLENE 2-0 SH 36 IN 8523H

## (undated) DEVICE — TRAVELKIT CONTAINS FIRST STEP KIT (200ML EP-4 KIT) AND SOILED SCOPE BAG - 1 KIT: Brand: TRAVELKIT CONTAINS FIRST STEP KIT AND SOILED SCOPE BAG

## (undated) DEVICE — INSUFFLATION TUBING PRIMFLO

## (undated) DEVICE — GLOVE SRG BIOGEL ECLIPSE 7.5

## (undated) DEVICE — PROXIMATE LINEAR CUTTER RELOADS (STANDARD)-100MM: Brand: PROXIMATE

## (undated) DEVICE — SUT STRATAFIX SPIRAL 4-0 PGA/PCL 30 X 30 CM SXMD2B409

## (undated) DEVICE — PROXIMATE RELOADABLE LINEAR CUTTER WITH SAFETY LOCK-OUT, 100MM: Brand: PROXIMATE

## (undated) DEVICE — SUT ETHILON 3-0 PS-1 18 IN 1663H

## (undated) DEVICE — GLOVE INDICATOR PI UNDERGLOVE SZ 7.5 BLUE

## (undated) DEVICE — VESSEL LOOPS X-RAY DETECTABLE: Brand: DEROYAL

## (undated) DEVICE — DRAPE EQUIPMENT RF WAND

## (undated) DEVICE — SUT VICRYL 2 TP-1 27 IN J849G

## (undated) DEVICE — ENSEAL TISSUE SEALER G2 SUPER JAW CURVED FOR USE WITH GENERATOR G11 22CM SHAFT LENGTH: Brand: ENSEAL

## (undated) DEVICE — ALL PURPOSE SPONGES,NONWOVEN, 4 PLY: Brand: CURITY

## (undated) DEVICE — DRAIN SPONGES,6 PLY: Brand: EXCILON

## (undated) DEVICE — VIOLET BRAIDED (POLYGLACTIN 910), SYNTHETIC ABSORBABLE SUTURE: Brand: COATED VICRYL

## (undated) DEVICE — CATH URETHERAL CONNECTOR

## (undated) DEVICE — SPONGE STICK WITH PVP-I: Brand: KENDALL

## (undated) DEVICE — CATH URETERAL 5FR X 70 CM FLEX TIP POLYUR BARD

## (undated) DEVICE — URETERAL CATHETER POLLACK OPEN ENDED 5FR

## (undated) DEVICE — SUT VICRYL 2-0 18 IN J911T